# Patient Record
Sex: FEMALE | Race: WHITE | NOT HISPANIC OR LATINO
[De-identification: names, ages, dates, MRNs, and addresses within clinical notes are randomized per-mention and may not be internally consistent; named-entity substitution may affect disease eponyms.]

---

## 2017-03-03 ENCOUNTER — RX RENEWAL (OUTPATIENT)
Age: 72
End: 2017-03-03

## 2017-04-19 ENCOUNTER — NON-APPOINTMENT (OUTPATIENT)
Age: 72
End: 2017-04-19

## 2017-04-19 ENCOUNTER — APPOINTMENT (OUTPATIENT)
Dept: CARDIOLOGY | Facility: CLINIC | Age: 72
End: 2017-04-19

## 2017-04-19 VITALS
OXYGEN SATURATION: 97 % | HEIGHT: 68 IN | DIASTOLIC BLOOD PRESSURE: 67 MMHG | SYSTOLIC BLOOD PRESSURE: 137 MMHG | RESPIRATION RATE: 14 BRPM | WEIGHT: 268 LBS | BODY MASS INDEX: 40.62 KG/M2 | HEART RATE: 71 BPM

## 2017-04-20 LAB
24R-OH-CALCIDIOL SERPL-MCNC: 22.1 PG/ML
ALBUMIN SERPL ELPH-MCNC: 3.6 G/DL
ALP BLD-CCNC: 120 U/L
ALT SERPL-CCNC: 16 U/L
ANION GAP SERPL CALC-SCNC: 17 MMOL/L
AST SERPL-CCNC: 28 U/L
BASOPHILS # BLD AUTO: 0.05 K/UL
BASOPHILS NFR BLD AUTO: 0.8 %
BILIRUB SERPL-MCNC: 0.4 MG/DL
BUN SERPL-MCNC: 27 MG/DL
CALCIUM SERPL-MCNC: 9.4 MG/DL
CHLORIDE SERPL-SCNC: 106 MMOL/L
CHOLEST SERPL-MCNC: 160 MG/DL
CHOLEST/HDLC SERPL: 2.3 RATIO
CO2 SERPL-SCNC: 20 MMOL/L
CREAT SERPL-MCNC: 0.84 MG/DL
EOSINOPHIL # BLD AUTO: 0.28 K/UL
EOSINOPHIL NFR BLD AUTO: 4.4 %
GLUCOSE SERPL-MCNC: 89 MG/DL
HBA1C MFR BLD HPLC: 5.8 %
HCT VFR BLD CALC: 39.4 %
HDLC SERPL-MCNC: 69 MG/DL
HGB BLD-MCNC: 12.6 G/DL
IMM GRANULOCYTES NFR BLD AUTO: 0.2 %
LDLC SERPL CALC-MCNC: 73 MG/DL
LYMPHOCYTES # BLD AUTO: 1.74 K/UL
LYMPHOCYTES NFR BLD AUTO: 27.4 %
MAN DIFF?: NORMAL
MCHC RBC-ENTMCNC: 30.1 PG
MCHC RBC-ENTMCNC: 32 GM/DL
MCV RBC AUTO: 94 FL
MONOCYTES # BLD AUTO: 0.87 K/UL
MONOCYTES NFR BLD AUTO: 13.7 %
NEUTROPHILS # BLD AUTO: 3.39 K/UL
NEUTROPHILS NFR BLD AUTO: 53.5 %
PLATELET # BLD AUTO: 306 K/UL
POTASSIUM SERPL-SCNC: 5 MMOL/L
PROT SERPL-MCNC: 6.8 G/DL
RBC # BLD: 4.19 M/UL
RBC # FLD: 15.2 %
SODIUM SERPL-SCNC: 143 MMOL/L
TRIGL SERPL-MCNC: 89 MG/DL
TSH SERPL-ACNC: 0.62 UIU/ML
VIT B12 SERPL-MCNC: 326 PG/ML
WBC # FLD AUTO: 6.34 K/UL

## 2017-05-12 ENCOUNTER — RX RENEWAL (OUTPATIENT)
Age: 72
End: 2017-05-12

## 2017-06-28 ENCOUNTER — APPOINTMENT (OUTPATIENT)
Dept: OPHTHALMOLOGY | Facility: CLINIC | Age: 72
End: 2017-06-28

## 2017-10-18 ENCOUNTER — NON-APPOINTMENT (OUTPATIENT)
Age: 72
End: 2017-10-18

## 2017-10-18 ENCOUNTER — APPOINTMENT (OUTPATIENT)
Dept: CARDIOLOGY | Facility: CLINIC | Age: 72
End: 2017-10-18
Payer: COMMERCIAL

## 2017-10-18 VITALS
HEART RATE: 78 BPM | DIASTOLIC BLOOD PRESSURE: 71 MMHG | TEMPERATURE: 98.1 F | RESPIRATION RATE: 12 BRPM | HEIGHT: 68 IN | SYSTOLIC BLOOD PRESSURE: 103 MMHG | BODY MASS INDEX: 39.4 KG/M2 | OXYGEN SATURATION: 98 % | WEIGHT: 260 LBS

## 2017-10-18 PROCEDURE — 99215 OFFICE O/P EST HI 40 MIN: CPT

## 2017-11-21 ENCOUNTER — APPOINTMENT (OUTPATIENT)
Dept: INTERNAL MEDICINE | Facility: CLINIC | Age: 72
End: 2017-11-21
Payer: COMMERCIAL

## 2017-11-21 VITALS
HEIGHT: 68 IN | OXYGEN SATURATION: 98 % | RESPIRATION RATE: 12 BRPM | DIASTOLIC BLOOD PRESSURE: 69 MMHG | BODY MASS INDEX: 40.16 KG/M2 | SYSTOLIC BLOOD PRESSURE: 146 MMHG | HEART RATE: 74 BPM | WEIGHT: 265 LBS | TEMPERATURE: 98 F

## 2017-11-21 PROCEDURE — 99213 OFFICE O/P EST LOW 20 MIN: CPT

## 2018-03-05 ENCOUNTER — RX RENEWAL (OUTPATIENT)
Age: 73
End: 2018-03-05

## 2018-04-18 ENCOUNTER — APPOINTMENT (OUTPATIENT)
Dept: CARDIOLOGY | Facility: CLINIC | Age: 73
End: 2018-04-18
Payer: COMMERCIAL

## 2018-04-18 ENCOUNTER — NON-APPOINTMENT (OUTPATIENT)
Age: 73
End: 2018-04-18

## 2018-04-18 VITALS
BODY MASS INDEX: 38.8 KG/M2 | SYSTOLIC BLOOD PRESSURE: 138 MMHG | OXYGEN SATURATION: 99 % | RESPIRATION RATE: 12 BRPM | WEIGHT: 256 LBS | TEMPERATURE: 98.4 F | HEART RATE: 88 BPM | DIASTOLIC BLOOD PRESSURE: 74 MMHG | HEIGHT: 68 IN

## 2018-04-18 DIAGNOSIS — I45.10 UNSPECIFIED RIGHT BUNDLE-BRANCH BLOCK: ICD-10-CM

## 2018-04-18 LAB
ALBUMIN SERPL ELPH-MCNC: 3.4 G/DL
ALP BLD-CCNC: 97 U/L
ALT SERPL-CCNC: 19 U/L
ANION GAP SERPL CALC-SCNC: 13 MMOL/L
AST SERPL-CCNC: 33 U/L
BASOPHILS # BLD AUTO: 0.05 K/UL
BASOPHILS NFR BLD AUTO: 0.7 %
BILIRUB SERPL-MCNC: 0.5 MG/DL
BUN SERPL-MCNC: 32 MG/DL
CALCIUM SERPL-MCNC: 9.2 MG/DL
CHLORIDE SERPL-SCNC: 105 MMOL/L
CHOLEST SERPL-MCNC: 152 MG/DL
CHOLEST/HDLC SERPL: 2.1 RATIO
CO2 SERPL-SCNC: 24 MMOL/L
CREAT SERPL-MCNC: 0.97 MG/DL
EOSINOPHIL # BLD AUTO: 0.13 K/UL
EOSINOPHIL NFR BLD AUTO: 1.8 %
GLUCOSE SERPL-MCNC: 92 MG/DL
HBA1C MFR BLD HPLC: 5.4 %
HCT VFR BLD CALC: 36.4 %
HDLC SERPL-MCNC: 73 MG/DL
HGB BLD-MCNC: 12 G/DL
IMM GRANULOCYTES NFR BLD AUTO: 0.1 %
LDLC SERPL CALC-MCNC: 66 MG/DL
LYMPHOCYTES # BLD AUTO: 1.34 K/UL
LYMPHOCYTES NFR BLD AUTO: 18.3 %
MAN DIFF?: NORMAL
MCHC RBC-ENTMCNC: 31.4 PG
MCHC RBC-ENTMCNC: 33 GM/DL
MCV RBC AUTO: 95.3 FL
MONOCYTES # BLD AUTO: 0.53 K/UL
MONOCYTES NFR BLD AUTO: 7.3 %
NEUTROPHILS # BLD AUTO: 5.25 K/UL
NEUTROPHILS NFR BLD AUTO: 71.8 %
PLATELET # BLD AUTO: 336 K/UL
POTASSIUM SERPL-SCNC: 4.4 MMOL/L
PROT SERPL-MCNC: 6.7 G/DL
RBC # BLD: 3.82 M/UL
RBC # FLD: 15 %
SODIUM SERPL-SCNC: 142 MMOL/L
TRIGL SERPL-MCNC: 65 MG/DL
WBC # FLD AUTO: 7.31 K/UL

## 2018-04-18 PROCEDURE — 99214 OFFICE O/P EST MOD 30 MIN: CPT

## 2018-04-19 LAB — 25(OH)D3 SERPL-MCNC: 43.9 NG/ML

## 2018-05-14 ENCOUNTER — RX RENEWAL (OUTPATIENT)
Age: 73
End: 2018-05-14

## 2018-07-17 ENCOUNTER — MEDICATION RENEWAL (OUTPATIENT)
Age: 73
End: 2018-07-17

## 2018-09-12 ENCOUNTER — APPOINTMENT (OUTPATIENT)
Dept: CARDIOLOGY | Facility: CLINIC | Age: 73
End: 2018-09-12
Payer: COMMERCIAL

## 2018-09-12 ENCOUNTER — NON-APPOINTMENT (OUTPATIENT)
Age: 73
End: 2018-09-12

## 2018-09-12 VITALS
HEART RATE: 76 BPM | OXYGEN SATURATION: 100 % | SYSTOLIC BLOOD PRESSURE: 130 MMHG | DIASTOLIC BLOOD PRESSURE: 67 MMHG | TEMPERATURE: 98.7 F | HEIGHT: 68 IN | RESPIRATION RATE: 14 BRPM | BODY MASS INDEX: 37.74 KG/M2 | WEIGHT: 249 LBS

## 2018-09-12 LAB
BASOPHILS # BLD AUTO: 0.01 K/UL
BASOPHILS NFR BLD AUTO: 0.1 %
EOSINOPHIL # BLD AUTO: 0 K/UL
EOSINOPHIL NFR BLD AUTO: 0 %
HCT VFR BLD CALC: 38.5 %
HGB BLD-MCNC: 12.4 G/DL
IMM GRANULOCYTES NFR BLD AUTO: 0.2 %
LYMPHOCYTES # BLD AUTO: 0.97 K/UL
LYMPHOCYTES NFR BLD AUTO: 10.7 %
MAN DIFF?: NORMAL
MCHC RBC-ENTMCNC: 31.1 PG
MCHC RBC-ENTMCNC: 32.2 GM/DL
MCV RBC AUTO: 96.5 FL
MONOCYTES # BLD AUTO: 0.86 K/UL
MONOCYTES NFR BLD AUTO: 9.5 %
NEUTROPHILS # BLD AUTO: 7.17 K/UL
NEUTROPHILS NFR BLD AUTO: 79.5 %
PLATELET # BLD AUTO: 370 K/UL
RBC # BLD: 3.99 M/UL
RBC # FLD: 14.7 %
WBC # FLD AUTO: 9.03 K/UL

## 2018-09-12 PROCEDURE — 99214 OFFICE O/P EST MOD 30 MIN: CPT | Mod: 25

## 2018-09-13 LAB
25(OH)D3 SERPL-MCNC: 49.9 NG/ML
ALBUMIN SERPL ELPH-MCNC: 3.6 G/DL
ALP BLD-CCNC: 92 U/L
ALT SERPL-CCNC: 29 U/L
ANION GAP SERPL CALC-SCNC: 14 MMOL/L
AST SERPL-CCNC: 38 U/L
BILIRUB SERPL-MCNC: 0.4 MG/DL
BUN SERPL-MCNC: 34 MG/DL
CALCIUM SERPL-MCNC: 9.5 MG/DL
CHLORIDE SERPL-SCNC: 106 MMOL/L
CHOLEST SERPL-MCNC: 149 MG/DL
CHOLEST/HDLC SERPL: 2.4 RATIO
CO2 SERPL-SCNC: 23 MMOL/L
CREAT SERPL-MCNC: 0.93 MG/DL
GLUCOSE SERPL-MCNC: 92 MG/DL
HBA1C MFR BLD HPLC: 5.4 %
HDLC SERPL-MCNC: 63 MG/DL
LDLC SERPL CALC-MCNC: 73 MG/DL
POTASSIUM SERPL-SCNC: 4.7 MMOL/L
PROT SERPL-MCNC: 6.4 G/DL
SODIUM SERPL-SCNC: 143 MMOL/L
TRIGL SERPL-MCNC: 65 MG/DL

## 2018-11-26 ENCOUNTER — RX RENEWAL (OUTPATIENT)
Age: 73
End: 2018-11-26

## 2019-01-25 ENCOUNTER — APPOINTMENT (OUTPATIENT)
Dept: INTERNAL MEDICINE | Facility: CLINIC | Age: 74
End: 2019-01-25
Payer: COMMERCIAL

## 2019-01-25 VITALS
SYSTOLIC BLOOD PRESSURE: 131 MMHG | DIASTOLIC BLOOD PRESSURE: 69 MMHG | TEMPERATURE: 97.6 F | RESPIRATION RATE: 12 BRPM | HEIGHT: 68 IN | HEART RATE: 71 BPM | OXYGEN SATURATION: 97 %

## 2019-01-25 DIAGNOSIS — Z87.09 PERSONAL HISTORY OF OTHER DISEASES OF THE RESPIRATORY SYSTEM: ICD-10-CM

## 2019-01-25 DIAGNOSIS — J06.9 ACUTE UPPER RESPIRATORY INFECTION, UNSPECIFIED: ICD-10-CM

## 2019-01-25 PROCEDURE — 99214 OFFICE O/P EST MOD 30 MIN: CPT

## 2019-01-25 NOTE — REVIEW OF SYSTEMS
[Fever] : fever [Chills] : chills [Fatigue] : fatigue [Earache] : earache [Hearing Loss] : no hearing loss [Nasal Discharge] : nasal discharge [Sore Throat] : sore throat [Shortness Of Breath] : no shortness of breath [Cough] : cough [Negative] : Heme/Lymph

## 2019-01-25 NOTE — PHYSICAL EXAM
[No Acute Distress] : no acute distress [Well Nourished] : well nourished [Well Developed] : well developed [Normal Sclera/Conjunctiva] : normal sclera/conjunctiva [EOMI] : extraocular movements intact [Normal Outer Ear/Nose] : the outer ears and nose were normal in appearance [Normal TMs] : both tympanic membranes were normal [No JVD] : no jugular venous distention [Supple] : supple [No Lymphadenopathy] : no lymphadenopathy [Thyroid Normal, No Nodules] : the thyroid was normal and there were no nodules present [No Respiratory Distress] : no respiratory distress  [Clear to Auscultation] : lungs were clear to auscultation bilaterally [No Accessory Muscle Use] : no accessory muscle use [Normal Rate] : normal rate  [Regular Rhythm] : with a regular rhythm [Normal S1, S2] : normal S1 and S2 [No Murmur] : no murmur heard [No Carotid Bruits] : no carotid bruits [Pedal Pulses Present] : the pedal pulses are present [No Edema] : there was no peripheral edema [Soft] : abdomen soft [Non Tender] : non-tender [Non-distended] : non-distended [No HSM] : no HSM [Normal Bowel Sounds] : normal bowel sounds [Normal Posterior Cervical Nodes] : no posterior cervical lymphadenopathy [Normal Anterior Cervical Nodes] : no anterior cervical lymphadenopathy [No CVA Tenderness] : no CVA  tenderness [No Spinal Tenderness] : no spinal tenderness [No Joint Swelling] : no joint swelling [Grossly Normal Strength/Tone] : grossly normal strength/tone [No Rash] : no rash [Normal Gait] : normal gait [Coordination Grossly Intact] : coordination grossly intact [No Focal Deficits] : no focal deficits [Normal Affect] : the affect was normal [Normal Insight/Judgement] : insight and judgment were intact [de-identified] : + throat erythema / no exudate

## 2019-01-25 NOTE — HISTORY OF PRESENT ILLNESS
[de-identified] : 73 year old female with h/o RA/ Lupus/ Hypothyroidism/ Hypertension/ Hyperlipidemia presents with acute URI x 3 days : + sore throat/ earache/ cough mostly dry , + associated sneezing, fever, chills, malaise . Symptoms getting worse at night \par She takes OTC cold medication without much improvement.

## 2019-02-04 PROBLEM — I45.10 RIGHT BUNDLE BRANCH BLOCK: Status: ACTIVE | Noted: 2018-04-18

## 2019-02-06 ENCOUNTER — APPOINTMENT (OUTPATIENT)
Dept: CARDIOLOGY | Facility: CLINIC | Age: 74
End: 2019-02-06
Payer: COMMERCIAL

## 2019-02-06 ENCOUNTER — APPOINTMENT (OUTPATIENT)
Dept: INTERNAL MEDICINE | Facility: CLINIC | Age: 74
End: 2019-02-06
Payer: COMMERCIAL

## 2019-02-06 ENCOUNTER — NON-APPOINTMENT (OUTPATIENT)
Age: 74
End: 2019-02-06

## 2019-02-06 VITALS
BODY MASS INDEX: 36.37 KG/M2 | SYSTOLIC BLOOD PRESSURE: 116 MMHG | RESPIRATION RATE: 12 BRPM | TEMPERATURE: 97.4 F | HEIGHT: 68 IN | OXYGEN SATURATION: 100 % | HEART RATE: 88 BPM | WEIGHT: 240 LBS | DIASTOLIC BLOOD PRESSURE: 73 MMHG

## 2019-02-06 DIAGNOSIS — G56.01 CARPAL TUNNEL SYNDROME, RIGHT UPPER LIMB: ICD-10-CM

## 2019-02-06 PROCEDURE — 99214 OFFICE O/P EST MOD 30 MIN: CPT

## 2019-02-06 PROCEDURE — 93000 ELECTROCARDIOGRAM COMPLETE: CPT

## 2019-02-06 PROCEDURE — 99215 OFFICE O/P EST HI 40 MIN: CPT | Mod: 25

## 2019-02-06 RX ORDER — MOMETASONE 50 UG/1
50 SPRAY, METERED NASAL DAILY
Qty: 1 | Refills: 0 | Status: DISCONTINUED | COMMUNITY
Start: 2019-01-25 | End: 2019-02-06

## 2019-02-06 RX ORDER — AMOXICILLIN AND CLAVULANATE POTASSIUM 875; 125 MG/1; MG/1
875-125 TABLET, COATED ORAL
Qty: 14 | Refills: 0 | Status: DISCONTINUED | COMMUNITY
Start: 2019-01-25 | End: 2019-02-06

## 2019-02-06 RX ORDER — BENZONATATE 200 MG/1
200 CAPSULE ORAL
Qty: 20 | Refills: 0 | Status: DISCONTINUED | COMMUNITY
Start: 2019-01-25 | End: 2019-02-06

## 2019-02-06 NOTE — DISCUSSION/SUMMARY
[Procedure Low Risk] : the procedure risk is low [Patient Intermediate Risk] : the patient is an intermediate risk [Optimized for Surgery] : the patient is optimized for surgery [FreeTextEntry1] : The patient is a 73-year-old obese female history of rheumatoid arthritis,  lupus, hypothyroidism, hyperlipidemia, hypertension, lower extremity edema ,RBBB, s/p left lobectomy for cancer awaiting hand surgery.\par #1 RA- on MTX and plaquenil, ambulates with walker\par #2 Htn- stable on losartan 50/12.5mg\par #3 Lipids- on atorvastatin\par #4 Hypothyroidism- on levothyroxine\par #5 CV- no angina or HF, ECG with RBB unchanged\par #6 Ortho- There are no cardiac contraindications to proceeding with right hand surgery. \par \par

## 2019-02-06 NOTE — PHYSICAL EXAM
[No Deformities] : no deformities [Normal Conjunctiva] : the conjunctiva exhibited no abnormalities [Eyelids - No Xanthelasma] : the eyelids demonstrated no xanthelasmas [Normal Oral Mucosa] : normal oral mucosa [No Oral Pallor] : no oral pallor [No Oral Cyanosis] : no oral cyanosis [Normal Jugular Venous A Waves Present] : normal jugular venous A waves present [Normal Jugular Venous V Waves Present] : normal jugular venous V waves present [No Jugular Venous Valerio A Waves] : no jugular venous valerio A waves [Respiration, Rhythm And Depth] : normal respiratory rhythm and effort [Exaggerated Use Of Accessory Muscles For Inspiration] : no accessory muscle use [Auscultation Breath Sounds / Voice Sounds] : lungs were clear to auscultation bilaterally [Heart Rate And Rhythm] : heart rate and rhythm were normal [Heart Sounds] : normal S1 and S2 [Abdomen Soft] : soft [Abdomen Tenderness] : non-tender [Abdomen Mass (___ Cm)] : no abdominal mass palpated [Abnormal Walk] : normal gait [Gait - Sufficient For Exercise Testing] : the gait was sufficient for exercise testing [Nail Clubbing] : no clubbing of the fingernails [Cyanosis, Localized] : no localized cyanosis [Petechial Hemorrhages (___cm)] : no petechial hemorrhages [FreeTextEntry1] : nonpitting edema [Skin Color & Pigmentation] : normal skin color and pigmentation [] : no rash [No Venous Stasis] : no venous stasis [Skin Lesions] : no skin lesions [No Skin Ulcers] : no skin ulcer [No Xanthoma] : no  xanthoma was observed [Oriented To Time, Place, And Person] : oriented to person, place, and time [Affect] : the affect was normal [Mood] : the mood was normal [No Anxiety] : not feeling anxious

## 2019-02-06 NOTE — REVIEW OF SYSTEMS
[see HPI] : see HPI [Recent Weight Gain (___ Lbs)] : no recent weight gain [Recent Weight Loss (___ Lbs)] : recent [unfilled] ~Ulb weight loss [Shortness Of Breath] : no shortness of breath [Dyspnea on exertion] : not dyspnea during exertion [Lower Ext Edema] : no extremity edema [Palpitations] : no palpitations

## 2019-02-06 NOTE — HISTORY OF PRESENT ILLNESS
[Preoperative Visit] : for a medical evaluation prior to surgery [Scheduled Procedure ___] : a [unfilled] [Date of Surgery ___] : on [unfilled] [Surgeon Name ___] : surgeon: [unfilled] [FreeTextEntry1] : Marely is awaiting carpal tunnel surgery first on her right hand. She has been having difficulty walking even with a rolling walker She denies any new symptoms of chest pain, palpitations or shortness of breath. She is in good spirits today.

## 2019-02-07 LAB
ALBUMIN SERPL ELPH-MCNC: 3.7 G/DL
ALP BLD-CCNC: 88 U/L
ALT SERPL-CCNC: 18 U/L
ANION GAP SERPL CALC-SCNC: 11 MMOL/L
APPEARANCE: CLEAR
APTT BLD: 27.5 SEC
AST SERPL-CCNC: 24 U/L
BACTERIA: NEGATIVE
BASOPHILS # BLD AUTO: 0.03 K/UL
BASOPHILS NFR BLD AUTO: 0.4 %
BILIRUB SERPL-MCNC: 0.4 MG/DL
BILIRUBIN URINE: NEGATIVE
BLOOD URINE: NEGATIVE
BUN SERPL-MCNC: 32 MG/DL
CALCIUM SERPL-MCNC: 9 MG/DL
CHLORIDE SERPL-SCNC: 104 MMOL/L
CO2 SERPL-SCNC: 26 MMOL/L
COLOR: ABNORMAL
CREAT SERPL-MCNC: 0.89 MG/DL
EOSINOPHIL # BLD AUTO: 0.16 K/UL
EOSINOPHIL NFR BLD AUTO: 1.9 %
GLUCOSE QUALITATIVE U: NEGATIVE MG/DL
GLUCOSE SERPL-MCNC: 87 MG/DL
HCT VFR BLD CALC: 41.3 %
HGB BLD-MCNC: 12.7 G/DL
HYALINE CASTS: 8 /LPF
IMM GRANULOCYTES NFR BLD AUTO: 0.1 %
INR PPP: 0.93 RATIO
KETONES URINE: NEGATIVE
LEUKOCYTE ESTERASE URINE: ABNORMAL
LYMPHOCYTES # BLD AUTO: 1.49 K/UL
LYMPHOCYTES NFR BLD AUTO: 18 %
MAN DIFF?: NORMAL
MCHC RBC-ENTMCNC: 30.8 GM/DL
MCHC RBC-ENTMCNC: 30.8 PG
MCV RBC AUTO: 100.2 FL
MICROSCOPIC-UA: NORMAL
MONOCYTES # BLD AUTO: 1.14 K/UL
MONOCYTES NFR BLD AUTO: 13.8 %
NEUTROPHILS # BLD AUTO: 5.43 K/UL
NEUTROPHILS NFR BLD AUTO: 65.8 %
NITRITE URINE: NEGATIVE
PH URINE: 6
PLATELET # BLD AUTO: 339 K/UL
POTASSIUM SERPL-SCNC: 4.6 MMOL/L
PROT SERPL-MCNC: 6.3 G/DL
PROTEIN URINE: NEGATIVE MG/DL
PT BLD: 10.6 SEC
RBC # BLD: 4.12 M/UL
RBC # FLD: 15.5 %
RED BLOOD CELLS URINE: 4 /HPF
SODIUM SERPL-SCNC: 141 MMOL/L
SPECIFIC GRAVITY URINE: 1.03
SQUAMOUS EPITHELIAL CELLS: 18 /HPF
UROBILINOGEN URINE: NEGATIVE MG/DL
WBC # FLD AUTO: 8.26 K/UL
WHITE BLOOD CELLS URINE: 3 /HPF

## 2019-02-10 PROBLEM — G56.01 CARPAL TUNNEL SYNDROME OF RIGHT WRIST: Status: ACTIVE | Noted: 2019-02-10

## 2019-02-10 NOTE — HISTORY OF PRESENT ILLNESS
[FreeTextEntry1] : Rt carpal tunnel surgery  [FreeTextEntry2] : 2/20/19 [FreeTextEntry3] : Dr. Price [FreeTextEntry4] : 73 year old female with h/o RA/ Lupus/ Hypothyroidism/ Hypertension presents for medical clearance for Rt carpal tunnel surgery.\par Pt denies CP/SOB, dizziness, abdominal p[ain, palpitation. \par

## 2019-02-10 NOTE — PHYSICAL EXAM
[No Acute Distress] : no acute distress [Well Nourished] : well nourished [Well Developed] : well developed [Well-Appearing] : well-appearing [Normal Sclera/Conjunctiva] : normal sclera/conjunctiva [EOMI] : extraocular movements intact [Normal Outer Ear/Nose] : the outer ears and nose were normal in appearance [Normal Oropharynx] : the oropharynx was normal [Normal TMs] : both tympanic membranes were normal [No JVD] : no jugular venous distention [Supple] : supple [No Lymphadenopathy] : no lymphadenopathy [Thyroid Normal, No Nodules] : the thyroid was normal and there were no nodules present [No Respiratory Distress] : no respiratory distress  [Clear to Auscultation] : lungs were clear to auscultation bilaterally [No Accessory Muscle Use] : no accessory muscle use [Normal Rate] : normal rate  [Regular Rhythm] : with a regular rhythm [Normal S1, S2] : normal S1 and S2 [No Murmur] : no murmur heard [No Carotid Bruits] : no carotid bruits [Pedal Pulses Present] : the pedal pulses are present [No Edema] : there was no peripheral edema [Soft] : abdomen soft [Non Tender] : non-tender [Non-distended] : non-distended [No HSM] : no HSM [Normal Bowel Sounds] : normal bowel sounds [Normal Posterior Cervical Nodes] : no posterior cervical lymphadenopathy [Normal Anterior Cervical Nodes] : no anterior cervical lymphadenopathy [No CVA Tenderness] : no CVA  tenderness [No Spinal Tenderness] : no spinal tenderness [No Joint Swelling] : no joint swelling [Grossly Normal Strength/Tone] : grossly normal strength/tone [No Rash] : no rash [Normal Gait] : normal gait [Coordination Grossly Intact] : coordination grossly intact [No Focal Deficits] : no focal deficits [Normal Affect] : the affect was normal [Normal Insight/Judgement] : insight and judgment were intact [de-identified] : ambulates with walker

## 2019-02-10 NOTE — PLAN
[FreeTextEntry1] : 73 year old female with h/o RA/ Lupus/ Hypothyroidism/ Hypertension presents medically stable for planned surgery . Pt was evaluated by cardio prior to surgery > see cardio clearance attached.\par Low risk surgery/ intermediate risk patient.

## 2019-02-11 LAB — BACTERIA UR CULT: NORMAL

## 2019-04-30 ENCOUNTER — APPOINTMENT (OUTPATIENT)
Dept: ORTHOPEDIC SURGERY | Facility: CLINIC | Age: 74
End: 2019-04-30
Payer: COMMERCIAL

## 2019-04-30 VITALS — HEART RATE: 78 BPM | DIASTOLIC BLOOD PRESSURE: 63 MMHG | SYSTOLIC BLOOD PRESSURE: 100 MMHG

## 2019-04-30 VITALS — BODY MASS INDEX: 37.35 KG/M2 | WEIGHT: 238 LBS | HEIGHT: 67 IN

## 2019-04-30 PROCEDURE — 73562 X-RAY EXAM OF KNEE 3: CPT | Mod: 50

## 2019-04-30 PROCEDURE — 99204 OFFICE O/P NEW MOD 45 MIN: CPT

## 2019-04-30 PROCEDURE — 73521 X-RAY EXAM HIPS BI 2 VIEWS: CPT

## 2019-06-17 ENCOUNTER — NON-APPOINTMENT (OUTPATIENT)
Age: 74
End: 2019-06-17

## 2019-06-17 ENCOUNTER — APPOINTMENT (OUTPATIENT)
Dept: CARDIOLOGY | Facility: CLINIC | Age: 74
End: 2019-06-17
Payer: MEDICARE

## 2019-06-17 VITALS
DIASTOLIC BLOOD PRESSURE: 64 MMHG | BODY MASS INDEX: 36.88 KG/M2 | OXYGEN SATURATION: 99 % | HEIGHT: 67 IN | WEIGHT: 235 LBS | SYSTOLIC BLOOD PRESSURE: 101 MMHG | TEMPERATURE: 97.4 F | RESPIRATION RATE: 13 BRPM | HEART RATE: 89 BPM

## 2019-06-17 PROCEDURE — 99215 OFFICE O/P EST HI 40 MIN: CPT | Mod: 25

## 2019-06-17 PROCEDURE — 93000 ELECTROCARDIOGRAM COMPLETE: CPT

## 2019-06-17 RX ORDER — NITROFURANTOIN MACROCRYSTALS 100 MG/1
100 CAPSULE ORAL
Qty: 28 | Refills: 0 | Status: DISCONTINUED | COMMUNITY
Start: 2019-01-09

## 2019-06-17 RX ORDER — ACETAMINOPHEN AND CODEINE 300; 30 MG/1; MG/1
300-30 TABLET ORAL
Qty: 60 | Refills: 0 | Status: DISCONTINUED | COMMUNITY
Start: 2019-04-29

## 2019-06-17 RX ORDER — CEFADROXIL 500 MG/1
500 CAPSULE ORAL
Qty: 4 | Refills: 0 | Status: DISCONTINUED | COMMUNITY
Start: 2019-02-19

## 2019-06-17 RX ORDER — GABAPENTIN 300 MG/1
300 CAPSULE ORAL
Qty: 60 | Refills: 0 | Status: DISCONTINUED | COMMUNITY
Start: 2019-04-03

## 2019-06-17 RX ORDER — ETODOLAC 400 MG/1
400 TABLET, FILM COATED ORAL
Qty: 30 | Refills: 0 | Status: DISCONTINUED | COMMUNITY
Start: 2019-05-06

## 2019-06-21 ENCOUNTER — APPOINTMENT (OUTPATIENT)
Dept: CARDIOLOGY | Facility: CLINIC | Age: 74
End: 2019-06-21
Payer: MEDICARE

## 2019-06-21 PROCEDURE — 93306 TTE W/DOPPLER COMPLETE: CPT

## 2019-06-27 NOTE — REVIEW OF SYSTEMS
[see HPI] : see HPI [Recent Weight Gain (___ Lbs)] : no recent weight gain [Recent Weight Loss (___ Lbs)] : no recent weight loss [Shortness Of Breath] : no shortness of breath [Dyspnea on exertion] : not dyspnea during exertion [Lower Ext Edema] : no extremity edema [Palpitations] : no palpitations

## 2019-06-27 NOTE — HISTORY OF PRESENT ILLNESS
[Preoperative Visit] : for a medical evaluation prior to surgery [Scheduled Procedure ___] : a [unfilled] [Date of Surgery ___] : on [unfilled] [Surgeon Name ___] : surgeon: [unfilled] [FreeTextEntry1] : Marely  has been having difficulty walking even with a rolling walker and is now proceeding with surgery. She denies any new symptoms of chest pain, palpitations or shortness of breath. She is in good spirits today.

## 2019-06-27 NOTE — DISCUSSION/SUMMARY
[Procedure Intermediate Risk] : the procedure risk is intermediate [Patient Intermediate Risk] : the patient is an intermediate risk [Additional Diagnostics Recommended] : additional diagnostics recommended [FreeTextEntry1] : The patient is a 73-year-old obese female history of rheumatoid arthritis,  lupus, hypothyroidism, hyperlipidemia, hypertension, lower extremity edema ,RBBB, s/p left lobectomy for cancer awaiting hip and knee surgery.\par #1 RA- on MTX and plaquenil, ambulates with walker\par #2 Htn- stable on losartan 50/12.5mg\par #3 Lipids- on atorvastatin\par #4 Hypothyroidism- on levothyroxine\par #5 CV- no angina or HF, ECG with RBBB unchanged, recommend echo for MR prior to surgery\par #6 Ortho- Hip and knee surgery clearance pending ECHO results\par \par 6/27/19 Addendum: ECHO normal LV function. There are no cardiac contraindications to hip and knee surgery as planned. \par \par

## 2019-06-27 NOTE — PHYSICAL EXAM
[No Deformities] : no deformities [Eyelids - No Xanthelasma] : the eyelids demonstrated no xanthelasmas [Normal Conjunctiva] : the conjunctiva exhibited no abnormalities [No Oral Cyanosis] : no oral cyanosis [Normal Oral Mucosa] : normal oral mucosa [No Oral Pallor] : no oral pallor [No Jugular Venous Valerio A Waves] : no jugular venous valerio A waves [Normal Jugular Venous A Waves Present] : normal jugular venous A waves present [Normal Jugular Venous V Waves Present] : normal jugular venous V waves present [Exaggerated Use Of Accessory Muscles For Inspiration] : no accessory muscle use [Respiration, Rhythm And Depth] : normal respiratory rhythm and effort [Heart Rate And Rhythm] : heart rate and rhythm were normal [Auscultation Breath Sounds / Voice Sounds] : lungs were clear to auscultation bilaterally [Heart Sounds] : normal S1 and S2 [Abdomen Tenderness] : non-tender [Abdomen Soft] : soft [Abdomen Mass (___ Cm)] : no abdominal mass palpated [Gait - Sufficient For Exercise Testing] : the gait was sufficient for exercise testing [Abnormal Walk] : normal gait [Nail Clubbing] : no clubbing of the fingernails [Cyanosis, Localized] : no localized cyanosis [Petechial Hemorrhages (___cm)] : no petechial hemorrhages [Skin Color & Pigmentation] : normal skin color and pigmentation [No Venous Stasis] : no venous stasis [] : no rash [No Xanthoma] : no  xanthoma was observed [No Skin Ulcers] : no skin ulcer [Skin Lesions] : no skin lesions [No Anxiety] : not feeling anxious [Affect] : the affect was normal [Mood] : the mood was normal [Oriented To Time, Place, And Person] : oriented to person, place, and time [Systolic grade ___/6] : A grade [unfilled]/6 systolic murmur was heard. [FreeTextEntry1] : nonpitting edema

## 2019-06-28 ENCOUNTER — APPOINTMENT (OUTPATIENT)
Dept: INTERNAL MEDICINE | Facility: CLINIC | Age: 74
End: 2019-06-28
Payer: MEDICARE

## 2019-06-28 VITALS
DIASTOLIC BLOOD PRESSURE: 72 MMHG | RESPIRATION RATE: 12 BRPM | WEIGHT: 230 LBS | BODY MASS INDEX: 36.1 KG/M2 | TEMPERATURE: 98.1 F | SYSTOLIC BLOOD PRESSURE: 127 MMHG | HEART RATE: 78 BPM | OXYGEN SATURATION: 100 % | HEIGHT: 67 IN

## 2019-06-28 DIAGNOSIS — M16.11 UNILATERAL PRIMARY OSTEOARTHRITIS, RIGHT HIP: ICD-10-CM

## 2019-06-28 PROCEDURE — 99214 OFFICE O/P EST MOD 30 MIN: CPT

## 2019-07-01 LAB
ALBUMIN SERPL ELPH-MCNC: 3.6 G/DL
ALP BLD-CCNC: 104 U/L
ALT SERPL-CCNC: 14 U/L
ANION GAP SERPL CALC-SCNC: 13 MMOL/L
APPEARANCE: ABNORMAL
APTT BLD: 27.4 SEC
AST SERPL-CCNC: 17 U/L
BACTERIA UR CULT: ABNORMAL
BACTERIA: ABNORMAL
BASOPHILS # BLD AUTO: 0.07 K/UL
BASOPHILS NFR BLD AUTO: 0.8 %
BILIRUB SERPL-MCNC: 0.8 MG/DL
BILIRUBIN URINE: ABNORMAL
BLOOD URINE: NEGATIVE
BUN SERPL-MCNC: 27 MG/DL
CALCIUM SERPL-MCNC: 9.2 MG/DL
CHLORIDE SERPL-SCNC: 104 MMOL/L
CO2 SERPL-SCNC: 24 MMOL/L
COLOR: YELLOW
CREAT SERPL-MCNC: 0.75 MG/DL
EOSINOPHIL # BLD AUTO: 0.12 K/UL
EOSINOPHIL NFR BLD AUTO: 1.3 %
GLUCOSE QUALITATIVE U: NEGATIVE
GLUCOSE SERPL-MCNC: 82 MG/DL
HCT VFR BLD CALC: 39.3 %
HGB BLD-MCNC: 12.5 G/DL
HYALINE CASTS: 0 /LPF
IMM GRANULOCYTES NFR BLD AUTO: 0.4 %
INR PPP: 1.01 RATIO
KETONES URINE: NEGATIVE
LEUKOCYTE ESTERASE URINE: ABNORMAL
LYMPHOCYTES # BLD AUTO: 1.58 K/UL
LYMPHOCYTES NFR BLD AUTO: 17.6 %
MAN DIFF?: NORMAL
MCHC RBC-ENTMCNC: 30.4 PG
MCHC RBC-ENTMCNC: 31.8 GM/DL
MCV RBC AUTO: 95.6 FL
MICROSCOPIC-UA: NORMAL
MONOCYTES # BLD AUTO: 0.87 K/UL
MONOCYTES NFR BLD AUTO: 9.7 %
NEUTROPHILS # BLD AUTO: 6.32 K/UL
NEUTROPHILS NFR BLD AUTO: 70.2 %
NITRITE URINE: NEGATIVE
PH URINE: 6.5
PLATELET # BLD AUTO: 260 K/UL
POTASSIUM SERPL-SCNC: 3.9 MMOL/L
PROT SERPL-MCNC: 5.9 G/DL
PROTEIN URINE: NEGATIVE
PT BLD: 11.4 SEC
RBC # BLD: 4.11 M/UL
RBC # FLD: 14.4 %
RED BLOOD CELLS URINE: 12 /HPF
SODIUM SERPL-SCNC: 141 MMOL/L
SPECIFIC GRAVITY URINE: 1.02
SQUAMOUS EPITHELIAL CELLS: 6 /HPF
TSH SERPL-ACNC: 0.55 UIU/ML
UROBILINOGEN URINE: NORMAL
WBC # FLD AUTO: 9 K/UL
WHITE BLOOD CELLS URINE: 18 /HPF

## 2019-07-02 ENCOUNTER — OUTPATIENT (OUTPATIENT)
Dept: OUTPATIENT SERVICES | Facility: HOSPITAL | Age: 74
LOS: 1 days | End: 2019-07-02
Payer: MEDICARE

## 2019-07-02 VITALS
WEIGHT: 231.49 LBS | SYSTOLIC BLOOD PRESSURE: 118 MMHG | RESPIRATION RATE: 15 BRPM | OXYGEN SATURATION: 96 % | TEMPERATURE: 98 F | DIASTOLIC BLOOD PRESSURE: 80 MMHG | HEART RATE: 78 BPM | HEIGHT: 62 IN

## 2019-07-02 DIAGNOSIS — Z01.818 ENCOUNTER FOR OTHER PREPROCEDURAL EXAMINATION: ICD-10-CM

## 2019-07-02 DIAGNOSIS — Z98.890 OTHER SPECIFIED POSTPROCEDURAL STATES: Chronic | ICD-10-CM

## 2019-07-02 DIAGNOSIS — Z98.49 CATARACT EXTRACTION STATUS, UNSPECIFIED EYE: Chronic | ICD-10-CM

## 2019-07-02 DIAGNOSIS — M25.562 PAIN IN LEFT KNEE: ICD-10-CM

## 2019-07-02 DIAGNOSIS — C34.90 MALIGNANT NEOPLASM OF UNSPECIFIED PART OF UNSPECIFIED BRONCHUS OR LUNG: Chronic | ICD-10-CM

## 2019-07-02 DIAGNOSIS — Z90.89 ACQUIRED ABSENCE OF OTHER ORGANS: Chronic | ICD-10-CM

## 2019-07-02 DIAGNOSIS — M16.11 UNILATERAL PRIMARY OSTEOARTHRITIS, RIGHT HIP: ICD-10-CM

## 2019-07-02 DIAGNOSIS — M25.551 PAIN IN RIGHT HIP: ICD-10-CM

## 2019-07-02 LAB
ALBUMIN SERPL ELPH-MCNC: 3.1 G/DL — LOW (ref 3.3–5)
ALP SERPL-CCNC: 113 U/L — SIGNIFICANT CHANGE UP (ref 30–120)
ALT FLD-CCNC: 23 U/L DA — SIGNIFICANT CHANGE UP (ref 10–60)
ANION GAP SERPL CALC-SCNC: 6 MMOL/L — SIGNIFICANT CHANGE UP (ref 5–17)
APTT BLD: 29 SEC — SIGNIFICANT CHANGE UP (ref 28.5–37)
AST SERPL-CCNC: 20 U/L — SIGNIFICANT CHANGE UP (ref 10–40)
BILIRUB SERPL-MCNC: 0.5 MG/DL — SIGNIFICANT CHANGE UP (ref 0.2–1.2)
BUN SERPL-MCNC: 31 MG/DL — HIGH (ref 7–23)
CALCIUM SERPL-MCNC: 9 MG/DL — SIGNIFICANT CHANGE UP (ref 8.4–10.5)
CHLORIDE SERPL-SCNC: 106 MMOL/L — SIGNIFICANT CHANGE UP (ref 96–108)
CO2 SERPL-SCNC: 31 MMOL/L — SIGNIFICANT CHANGE UP (ref 22–31)
CREAT SERPL-MCNC: 0.83 MG/DL — SIGNIFICANT CHANGE UP (ref 0.5–1.3)
GLUCOSE SERPL-MCNC: 111 MG/DL — HIGH (ref 70–99)
HCT VFR BLD CALC: 41.9 % — SIGNIFICANT CHANGE UP (ref 34.5–45)
HGB BLD-MCNC: 13.6 G/DL — SIGNIFICANT CHANGE UP (ref 11.5–15.5)
INR BLD: 1.02 RATIO — SIGNIFICANT CHANGE UP (ref 0.88–1.16)
MCHC RBC-ENTMCNC: 31.6 PG — SIGNIFICANT CHANGE UP (ref 27–34)
MCHC RBC-ENTMCNC: 32.5 GM/DL — SIGNIFICANT CHANGE UP (ref 32–36)
MCV RBC AUTO: 97.2 FL — SIGNIFICANT CHANGE UP (ref 80–100)
MRSA PCR RESULT.: SIGNIFICANT CHANGE UP
NRBC # BLD: 0 /100 WBCS — SIGNIFICANT CHANGE UP (ref 0–0)
PLATELET # BLD AUTO: 264 K/UL — SIGNIFICANT CHANGE UP (ref 150–400)
POTASSIUM SERPL-MCNC: 3.5 MMOL/L — SIGNIFICANT CHANGE UP (ref 3.5–5.3)
POTASSIUM SERPL-SCNC: 3.5 MMOL/L — SIGNIFICANT CHANGE UP (ref 3.5–5.3)
PROT SERPL-MCNC: 7 G/DL — SIGNIFICANT CHANGE UP (ref 6–8.3)
PROTHROM AB SERPL-ACNC: 11.1 SEC — SIGNIFICANT CHANGE UP (ref 10–12.9)
RBC # BLD: 4.31 M/UL — SIGNIFICANT CHANGE UP (ref 3.8–5.2)
RBC # FLD: 14.5 % — SIGNIFICANT CHANGE UP (ref 10.3–14.5)
S AUREUS DNA NOSE QL NAA+PROBE: DETECTED
SODIUM SERPL-SCNC: 143 MMOL/L — SIGNIFICANT CHANGE UP (ref 135–145)
WBC # BLD: 10.05 K/UL — SIGNIFICANT CHANGE UP (ref 3.8–10.5)
WBC # FLD AUTO: 10.05 K/UL — SIGNIFICANT CHANGE UP (ref 3.8–10.5)

## 2019-07-02 PROCEDURE — 86901 BLOOD TYPING SEROLOGIC RH(D): CPT

## 2019-07-02 PROCEDURE — 87640 STAPH A DNA AMP PROBE: CPT

## 2019-07-02 PROCEDURE — 36415 COLL VENOUS BLD VENIPUNCTURE: CPT

## 2019-07-02 PROCEDURE — 85730 THROMBOPLASTIN TIME PARTIAL: CPT

## 2019-07-02 PROCEDURE — 85027 COMPLETE CBC AUTOMATED: CPT

## 2019-07-02 PROCEDURE — 87641 MR-STAPH DNA AMP PROBE: CPT

## 2019-07-02 PROCEDURE — 86900 BLOOD TYPING SEROLOGIC ABO: CPT

## 2019-07-02 PROCEDURE — 80053 COMPREHEN METABOLIC PANEL: CPT

## 2019-07-02 PROCEDURE — G0463: CPT

## 2019-07-02 PROCEDURE — 86850 RBC ANTIBODY SCREEN: CPT

## 2019-07-02 PROCEDURE — 85610 PROTHROMBIN TIME: CPT

## 2019-07-02 NOTE — H&P PST ADULT - NSICDXPASTMEDICALHX_GEN_ALL_CORE_FT
PAST MEDICAL HISTORY:  Benign heart murmur     H/O degenerative disc disease     Hyperlipidemia     Hypertension     Hypothyroid     Osteoarthritis     Rheumatoid arthritis     SLE (systemic lupus erythematosus)

## 2019-07-02 NOTE — H&P PST ADULT - VENOUS THROMBOEMBOLISM CURRENT STATUS
(2) malignancy (present or previous)/(1) other risk factor (includes escalating BMI, pack-years of smoking, diabetes requiring insulin, chemotherapy, female gender and length of surgery)/(1) swollen legs (current)

## 2019-07-02 NOTE — H&P PST ADULT - NSICDXPROBLEM_GEN_ALL_CORE_FT
PROBLEM DIAGNOSES  Problem: Left knee pain  Assessment and Plan: left total knee replacement    Problem: Right hip pain  Assessment and Plan: right total hip replacement

## 2019-07-02 NOTE — H&P PST ADULT - NSICDXPASTSURGICALHX_GEN_ALL_CORE_FT
PAST SURGICAL HISTORY:  Lung cancer small tumor removed 2015    S/P carpal tunnel release left    S/P cataract surgery left    S/P eye surgery child    S/P knee surgery bilateral    S/P thyroid surgery 1 lobe removed    S/P tonsillectomy

## 2019-07-02 NOTE — H&P PST ADULT - ASSESSMENT
this is a 74 y/o female who is scheduled for right total hip replacement on 7/17 and left knee replacement 7/22

## 2019-07-02 NOTE — H&P PST ADULT - HISTORY OF PRESENT ILLNESS
this is a 74 y/o female who has right hip and left knee pain for years; it got much worse recently; tests show bone on bone of right hip; arthritis left knee; to have right total hip replacement and left total knee replacement-takes lodine, gabapentin and ketoprofen for pain

## 2019-07-03 RX ORDER — MUPIROCIN 20 MG/G
1 OINTMENT TOPICAL
Qty: 1 | Refills: 0
Start: 2019-07-03 | End: 2019-07-07

## 2019-07-03 NOTE — PROGRESS NOTE ADULT - SUBJECTIVE AND OBJECTIVE BOX
Pts nose culture is + for MSSA. Rx sent to pharmacy. Called and spoke with patient. Instructions reviewed and questions addressed. Pt. is reminded to document use of ointment on yellow work sheet and to bring that with her on the day of surgery. tg

## 2019-07-07 PROBLEM — M16.11 PRIMARY LOCALIZED OSTEOARTHRITIS OF RIGHT HIP: Status: ACTIVE | Noted: 2019-04-30

## 2019-07-07 NOTE — PHYSICAL EXAM
[No Acute Distress] : no acute distress [Well Nourished] : well nourished [Well Developed] : well developed [Well-Appearing] : well-appearing [Normal Sclera/Conjunctiva] : normal sclera/conjunctiva [EOMI] : extraocular movements intact [Normal Outer Ear/Nose] : the outer ears and nose were normal in appearance [Normal Oropharynx] : the oropharynx was normal [Normal TMs] : both tympanic membranes were normal [No JVD] : no jugular venous distention [Supple] : supple [No Lymphadenopathy] : no lymphadenopathy [Thyroid Normal, No Nodules] : the thyroid was normal and there were no nodules present [No Respiratory Distress] : no respiratory distress  [Clear to Auscultation] : lungs were clear to auscultation bilaterally [No Accessory Muscle Use] : no accessory muscle use [Normal Rate] : normal rate  [Regular Rhythm] : with a regular rhythm [Normal S1, S2] : normal S1 and S2 [No Carotid Bruits] : no carotid bruits [Pedal Pulses Present] : the pedal pulses are present [No Edema] : there was no peripheral edema [Soft] : abdomen soft [Non Tender] : non-tender [Non-distended] : non-distended [No Masses] : no abdominal mass palpated [No HSM] : no HSM [Normal Bowel Sounds] : normal bowel sounds [Normal Posterior Cervical Nodes] : no posterior cervical lymphadenopathy [Normal Anterior Cervical Nodes] : no anterior cervical lymphadenopathy [No CVA Tenderness] : no CVA  tenderness [No Spinal Tenderness] : no spinal tenderness [Grossly Normal Strength/Tone] : grossly normal strength/tone [No Rash] : no rash [Normal Gait] : normal gait [Coordination Grossly Intact] : coordination grossly intact [No Focal Deficits] : no focal deficits [Deep Tendon Reflexes (DTR)] : deep tendon reflexes were 2+ and symmetric [Normal Affect] : the affect was normal [Normal Insight/Judgement] : insight and judgment were intact [de-identified] : + BECCA 2/6 [de-identified] : + knee/hip  pain

## 2019-07-07 NOTE — HISTORY OF PRESENT ILLNESS
[FreeTextEntry1] : Rt hip arthroplasty  [FreeTextEntry2] : 7/17/19 [FreeTextEntry3] : DR. Galindo/ Dr. Leslie [FreeTextEntry4] : 73 year old female with h/o RA/Lupus / Htn/Hld/Hypothyroidism presents for medical clearance prior to Rt hip surgery.\par She denies CP/SOB/dizziness, palpitation, abdominal pain. She ambulates with walker\par

## 2019-07-07 NOTE — PLAN
[FreeTextEntry1] : 73 year old female with h/o RA/Htn/Hld/ Hypothyroidism medically stable for planned surgery. Pt was evaluated by cardio prior to surgery ( see cardio clearance attached ) \par Intermediate risk surgery and patient

## 2019-07-08 ENCOUNTER — RX RENEWAL (OUTPATIENT)
Age: 74
End: 2019-07-08

## 2019-07-15 RX ORDER — ONDANSETRON 8 MG/1
4 TABLET, FILM COATED ORAL EVERY 6 HOURS
Refills: 0 | Status: DISCONTINUED | OUTPATIENT
Start: 2019-07-17 | End: 2019-07-22

## 2019-07-15 RX ORDER — POLYETHYLENE GLYCOL 3350 17 G/17G
17 POWDER, FOR SOLUTION ORAL DAILY
Refills: 0 | Status: DISCONTINUED | OUTPATIENT
Start: 2019-07-17 | End: 2019-07-22

## 2019-07-15 RX ORDER — DOCUSATE SODIUM 100 MG
100 CAPSULE ORAL THREE TIMES A DAY
Refills: 0 | Status: DISCONTINUED | OUTPATIENT
Start: 2019-07-17 | End: 2019-07-22

## 2019-07-15 RX ORDER — MAGNESIUM HYDROXIDE 400 MG/1
30 TABLET, CHEWABLE ORAL DAILY
Refills: 0 | Status: DISCONTINUED | OUTPATIENT
Start: 2019-07-17 | End: 2019-07-22

## 2019-07-15 RX ORDER — SENNA PLUS 8.6 MG/1
2 TABLET ORAL AT BEDTIME
Refills: 0 | Status: DISCONTINUED | OUTPATIENT
Start: 2019-07-17 | End: 2019-07-22

## 2019-07-16 NOTE — PATIENT PROFILE ADULT - IS PATIENT POST-MENOPAUSAL?
yes Has The Growth Been Previously Biopsied?: has been previously biopsied Body Location Override (Optional): Right central forehead

## 2019-07-17 ENCOUNTER — APPOINTMENT (OUTPATIENT)
Dept: ORTHOPEDIC SURGERY | Facility: HOSPITAL | Age: 74
End: 2019-07-17

## 2019-07-17 ENCOUNTER — RESULT REVIEW (OUTPATIENT)
Age: 74
End: 2019-07-17

## 2019-07-17 ENCOUNTER — INPATIENT (INPATIENT)
Facility: HOSPITAL | Age: 74
LOS: 4 days | Discharge: NURSING, REHAB OR LONG-TERM CARE | DRG: 470 | End: 2019-07-22
Attending: ORTHOPAEDIC SURGERY | Admitting: ORTHOPAEDIC SURGERY
Payer: MEDICARE

## 2019-07-17 VITALS
TEMPERATURE: 98 F | DIASTOLIC BLOOD PRESSURE: 52 MMHG | HEIGHT: 66 IN | SYSTOLIC BLOOD PRESSURE: 126 MMHG | HEART RATE: 96 BPM | WEIGHT: 232.37 LBS | RESPIRATION RATE: 20 BRPM

## 2019-07-17 DIAGNOSIS — Z98.890 OTHER SPECIFIED POSTPROCEDURAL STATES: Chronic | ICD-10-CM

## 2019-07-17 DIAGNOSIS — Z90.89 ACQUIRED ABSENCE OF OTHER ORGANS: Chronic | ICD-10-CM

## 2019-07-17 DIAGNOSIS — Z98.49 CATARACT EXTRACTION STATUS, UNSPECIFIED EYE: Chronic | ICD-10-CM

## 2019-07-17 DIAGNOSIS — E78.5 HYPERLIPIDEMIA, UNSPECIFIED: ICD-10-CM

## 2019-07-17 DIAGNOSIS — C34.90 MALIGNANT NEOPLASM OF UNSPECIFIED PART OF UNSPECIFIED BRONCHUS OR LUNG: Chronic | ICD-10-CM

## 2019-07-17 DIAGNOSIS — M32.9 SYSTEMIC LUPUS ERYTHEMATOSUS, UNSPECIFIED: ICD-10-CM

## 2019-07-17 DIAGNOSIS — M16.11 UNILATERAL PRIMARY OSTEOARTHRITIS, RIGHT HIP: ICD-10-CM

## 2019-07-17 DIAGNOSIS — I10 ESSENTIAL (PRIMARY) HYPERTENSION: ICD-10-CM

## 2019-07-17 DIAGNOSIS — E03.9 HYPOTHYROIDISM, UNSPECIFIED: ICD-10-CM

## 2019-07-17 DIAGNOSIS — M19.90 UNSPECIFIED OSTEOARTHRITIS, UNSPECIFIED SITE: ICD-10-CM

## 2019-07-17 PROBLEM — R01.0 BENIGN AND INNOCENT CARDIAC MURMURS: Chronic | Status: ACTIVE | Noted: 2019-07-02

## 2019-07-17 PROBLEM — M06.9 RHEUMATOID ARTHRITIS, UNSPECIFIED: Chronic | Status: ACTIVE | Noted: 2019-07-02

## 2019-07-17 PROBLEM — Z87.39 PERSONAL HISTORY OF OTHER DISEASES OF THE MUSCULOSKELETAL SYSTEM AND CONNECTIVE TISSUE: Chronic | Status: ACTIVE | Noted: 2019-07-02

## 2019-07-17 LAB
ANION GAP SERPL CALC-SCNC: 8 MMOL/L — SIGNIFICANT CHANGE UP (ref 5–17)
BUN SERPL-MCNC: 24 MG/DL — HIGH (ref 7–23)
CALCIUM SERPL-MCNC: 9.1 MG/DL — SIGNIFICANT CHANGE UP (ref 8.4–10.5)
CHLORIDE SERPL-SCNC: 102 MMOL/L — SIGNIFICANT CHANGE UP (ref 96–108)
CO2 SERPL-SCNC: 27 MMOL/L — SIGNIFICANT CHANGE UP (ref 22–31)
CREAT SERPL-MCNC: 0.84 MG/DL — SIGNIFICANT CHANGE UP (ref 0.5–1.3)
GLUCOSE SERPL-MCNC: 208 MG/DL — HIGH (ref 70–99)
HCT VFR BLD CALC: 37.2 % — SIGNIFICANT CHANGE UP (ref 34.5–45)
HGB BLD-MCNC: 12 G/DL — SIGNIFICANT CHANGE UP (ref 11.5–15.5)
POTASSIUM SERPL-MCNC: 4 MMOL/L — SIGNIFICANT CHANGE UP (ref 3.5–5.3)
POTASSIUM SERPL-SCNC: 4 MMOL/L — SIGNIFICANT CHANGE UP (ref 3.5–5.3)
SODIUM SERPL-SCNC: 137 MMOL/L — SIGNIFICANT CHANGE UP (ref 135–145)

## 2019-07-17 PROCEDURE — 73502 X-RAY EXAM HIP UNI 2-3 VIEWS: CPT | Mod: 26,RT

## 2019-07-17 PROCEDURE — 27130 TOTAL HIP ARTHROPLASTY: CPT | Mod: RT

## 2019-07-17 PROCEDURE — 88311 DECALCIFY TISSUE: CPT | Mod: 26

## 2019-07-17 PROCEDURE — 99223 1ST HOSP IP/OBS HIGH 75: CPT

## 2019-07-17 PROCEDURE — 27130 TOTAL HIP ARTHROPLASTY: CPT | Mod: 82,RT

## 2019-07-17 PROCEDURE — 88305 TISSUE EXAM BY PATHOLOGIST: CPT | Mod: 26

## 2019-07-17 RX ORDER — ATORVASTATIN CALCIUM 80 MG/1
20 TABLET, FILM COATED ORAL AT BEDTIME
Refills: 0 | Status: DISCONTINUED | OUTPATIENT
Start: 2019-07-17 | End: 2019-07-22

## 2019-07-17 RX ORDER — TRANEXAMIC ACID 100 MG/ML
1000 INJECTION, SOLUTION INTRAVENOUS ONCE
Refills: 0 | Status: COMPLETED | OUTPATIENT
Start: 2019-07-17 | End: 2019-07-17

## 2019-07-17 RX ORDER — ONDANSETRON 8 MG/1
4 TABLET, FILM COATED ORAL ONCE
Refills: 0 | Status: DISCONTINUED | OUTPATIENT
Start: 2019-07-17 | End: 2019-07-17

## 2019-07-17 RX ORDER — APREPITANT 80 MG/1
40 CAPSULE ORAL ONCE
Refills: 0 | Status: COMPLETED | OUTPATIENT
Start: 2019-07-17 | End: 2019-07-17

## 2019-07-17 RX ORDER — HYDROMORPHONE HYDROCHLORIDE 2 MG/ML
0.5 INJECTION INTRAMUSCULAR; INTRAVENOUS; SUBCUTANEOUS
Refills: 0 | Status: DISCONTINUED | OUTPATIENT
Start: 2019-07-17 | End: 2019-07-17

## 2019-07-17 RX ORDER — ACETAMINOPHEN 500 MG
1000 TABLET ORAL ONCE
Refills: 0 | Status: COMPLETED | OUTPATIENT
Start: 2019-07-17 | End: 2019-07-17

## 2019-07-17 RX ORDER — DIPHENHYDRAMINE HCL 50 MG
25 CAPSULE ORAL ONCE
Refills: 0 | Status: COMPLETED | OUTPATIENT
Start: 2019-07-17 | End: 2019-07-17

## 2019-07-17 RX ORDER — ACETAMINOPHEN 500 MG
1000 TABLET ORAL EVERY 8 HOURS
Refills: 0 | Status: DISCONTINUED | OUTPATIENT
Start: 2019-07-18 | End: 2019-07-22

## 2019-07-17 RX ORDER — LOSARTAN POTASSIUM 100 MG/1
50 TABLET, FILM COATED ORAL DAILY
Refills: 0 | Status: DISCONTINUED | OUTPATIENT
Start: 2019-07-19 | End: 2019-07-22

## 2019-07-17 RX ORDER — CHLORHEXIDINE GLUCONATE 213 G/1000ML
1 SOLUTION TOPICAL ONCE
Refills: 0 | Status: COMPLETED | OUTPATIENT
Start: 2019-07-17 | End: 2019-07-17

## 2019-07-17 RX ORDER — HYDROMORPHONE HYDROCHLORIDE 2 MG/ML
0.5 INJECTION INTRAMUSCULAR; INTRAVENOUS; SUBCUTANEOUS
Refills: 0 | Status: DISCONTINUED | OUTPATIENT
Start: 2019-07-17 | End: 2019-07-22

## 2019-07-17 RX ORDER — CEFAZOLIN SODIUM 1 G
2000 VIAL (EA) INJECTION EVERY 8 HOURS
Refills: 0 | Status: COMPLETED | OUTPATIENT
Start: 2019-07-17 | End: 2019-07-18

## 2019-07-17 RX ORDER — CELECOXIB 200 MG/1
200 CAPSULE ORAL EVERY 12 HOURS
Refills: 0 | Status: COMPLETED | OUTPATIENT
Start: 2019-07-18 | End: 2019-07-21

## 2019-07-17 RX ORDER — LEVOTHYROXINE SODIUM 125 MCG
200 TABLET ORAL DAILY
Refills: 0 | Status: DISCONTINUED | OUTPATIENT
Start: 2019-07-17 | End: 2019-07-22

## 2019-07-17 RX ORDER — CEFAZOLIN SODIUM 1 G
2000 VIAL (EA) INJECTION ONCE
Refills: 0 | Status: COMPLETED | OUTPATIENT
Start: 2019-07-17 | End: 2019-07-17

## 2019-07-17 RX ORDER — GABAPENTIN 400 MG/1
300 CAPSULE ORAL
Refills: 0 | Status: DISCONTINUED | OUTPATIENT
Start: 2019-07-17 | End: 2019-07-22

## 2019-07-17 RX ORDER — FOLIC ACID 0.8 MG
1 TABLET ORAL DAILY
Refills: 0 | Status: DISCONTINUED | OUTPATIENT
Start: 2019-07-17 | End: 2019-07-22

## 2019-07-17 RX ORDER — ENOXAPARIN SODIUM 100 MG/ML
40 INJECTION SUBCUTANEOUS DAILY
Refills: 0 | Status: DISCONTINUED | OUTPATIENT
Start: 2019-07-18 | End: 2019-07-20

## 2019-07-17 RX ORDER — OXYCODONE HYDROCHLORIDE 5 MG/1
10 TABLET ORAL
Refills: 0 | Status: DISCONTINUED | OUTPATIENT
Start: 2019-07-17 | End: 2019-07-22

## 2019-07-17 RX ORDER — SODIUM CHLORIDE 9 MG/ML
1000 INJECTION, SOLUTION INTRAVENOUS
Refills: 0 | Status: DISCONTINUED | OUTPATIENT
Start: 2019-07-17 | End: 2019-07-17

## 2019-07-17 RX ORDER — OXYCODONE HYDROCHLORIDE 5 MG/1
5 TABLET ORAL
Refills: 0 | Status: DISCONTINUED | OUTPATIENT
Start: 2019-07-17 | End: 2019-07-22

## 2019-07-17 RX ORDER — ACETAMINOPHEN 500 MG
1000 TABLET ORAL EVERY 6 HOURS
Refills: 0 | Status: COMPLETED | OUTPATIENT
Start: 2019-07-17 | End: 2019-07-18

## 2019-07-17 RX ORDER — SODIUM CHLORIDE 9 MG/ML
1000 INJECTION, SOLUTION INTRAVENOUS
Refills: 0 | Status: DISCONTINUED | OUTPATIENT
Start: 2019-07-17 | End: 2019-07-18

## 2019-07-17 RX ADMIN — Medication 100 MILLIGRAM(S): at 16:58

## 2019-07-17 RX ADMIN — Medication 100 MILLIGRAM(S): at 21:22

## 2019-07-17 RX ADMIN — SODIUM CHLORIDE 100 MILLILITER(S): 9 INJECTION, SOLUTION INTRAVENOUS at 16:08

## 2019-07-17 RX ADMIN — OXYCODONE HYDROCHLORIDE 10 MILLIGRAM(S): 5 TABLET ORAL at 23:15

## 2019-07-17 RX ADMIN — Medication 1000 MILLIGRAM(S): at 21:23

## 2019-07-17 RX ADMIN — CHLORHEXIDINE GLUCONATE 1 APPLICATION(S): 213 SOLUTION TOPICAL at 07:06

## 2019-07-17 RX ADMIN — HYDROMORPHONE HYDROCHLORIDE 0.5 MILLIGRAM(S): 2 INJECTION INTRAMUSCULAR; INTRAVENOUS; SUBCUTANEOUS at 12:40

## 2019-07-17 RX ADMIN — HYDROMORPHONE HYDROCHLORIDE 0.5 MILLIGRAM(S): 2 INJECTION INTRAMUSCULAR; INTRAVENOUS; SUBCUTANEOUS at 12:14

## 2019-07-17 RX ADMIN — Medication 400 MILLIGRAM(S): at 15:13

## 2019-07-17 RX ADMIN — HYDROMORPHONE HYDROCHLORIDE 0.5 MILLIGRAM(S): 2 INJECTION INTRAMUSCULAR; INTRAVENOUS; SUBCUTANEOUS at 12:11

## 2019-07-17 RX ADMIN — SODIUM CHLORIDE 100 MILLILITER(S): 9 INJECTION, SOLUTION INTRAVENOUS at 11:35

## 2019-07-17 RX ADMIN — ATORVASTATIN CALCIUM 20 MILLIGRAM(S): 80 TABLET, FILM COATED ORAL at 21:22

## 2019-07-17 RX ADMIN — Medication 25 MILLIGRAM(S): at 12:41

## 2019-07-17 RX ADMIN — Medication 400 MILLIGRAM(S): at 21:22

## 2019-07-17 RX ADMIN — Medication 1000 MILLIGRAM(S): at 15:16

## 2019-07-17 RX ADMIN — APREPITANT 40 MILLIGRAM(S): 80 CAPSULE ORAL at 07:38

## 2019-07-17 RX ADMIN — OXYCODONE HYDROCHLORIDE 10 MILLIGRAM(S): 5 TABLET ORAL at 22:31

## 2019-07-17 RX ADMIN — HYDROMORPHONE HYDROCHLORIDE 0.5 MILLIGRAM(S): 2 INJECTION INTRAMUSCULAR; INTRAVENOUS; SUBCUTANEOUS at 11:51

## 2019-07-17 RX ADMIN — GABAPENTIN 300 MILLIGRAM(S): 400 CAPSULE ORAL at 16:58

## 2019-07-17 NOTE — OCCUPATIONAL THERAPY INITIAL EVALUATION ADULT - ADDITIONAL COMMENTS
Lives alone . 5 steps to enter with handrail. no steps inside. stall shower. Has a cane, RW and showerchair

## 2019-07-17 NOTE — BRIEF OPERATIVE NOTE - NSICDXBRIEFPREOP_GEN_ALL_CORE_FT
PRE-OP DIAGNOSIS:  Degenerative joint disease (DJD) of hip 17-Jul-2019 11:10:00 right,  RA, SLE Leandro Reza

## 2019-07-17 NOTE — CONSULT NOTE ADULT - PROBLEM SELECTOR RECOMMENDATION 2
hold methotrexate and plaquenil for now  restart plaquenil 3-5 days post second surgery  restart methotrexate 3 weeks post second surgery

## 2019-07-17 NOTE — CONSULT NOTE ADULT - SUBJECTIVE AND OBJECTIVE BOX
Patient is a 73y old  Female who presents with a chief complaint of right hip and left knee pain    HPI: 72 y/o female who has right hip and left knee pain for years, recently much worse and affecting ambulation.  She has been taking lodine, gabapentin and ketoprofen for pain.  Now s/p right THR.  No major complaints. pain overall controlled.  feeling very sleepy.     REVIEW OF SYSTEMS:  CONSTITUTIONAL: No fever, weight loss; +post anesthesia fatigue  EYES: No eye pain, visual disturbances, or discharge  ENMT:  No difficulty hearing, tinnitus, vertigo; No sinus or throat pain  NECK: No pain or stiffness  BREASTS: No pain, masses, or nipple discharge  RESPIRATORY: No cough, wheezing, chills or hemoptysis; No shortness of breath  CARDIOVASCULAR: No chest pain, palpitations, dizziness, or leg swelling  GASTROINTESTINAL: No abdominal or epigastric pain. No nausea, vomiting, or hematemesis; No diarrhea or constipation. No melena or hematochezia.  GENITOURINARY: No dysuria, frequency, hematuria, or incontinence  NEUROLOGICAL: No headaches, memory loss, loss of strength, numbness, or tremors  SKIN: No itching, burning, rashes, or lesions   LYMPH NODES: No enlarged glands  ENDOCRINE: No heat or cold intolerance; No hair loss  MUSCULOSKELETAL: No muscle or back pain  PSYCHIATRIC: No depression, anxiety, mood swings, or difficulty sleeping  HEME/LYMPH: No easy bruising, or bleeding gums  ALLERGY AND IMMUNOLOGIC: No hives or eczema    PAST MEDICAL & SURGICAL HISTORY:  Hypothyroid  Benign heart murmur  Hyperlipidemia  Hypertension  H/O degenerative disc disease  Osteoarthritis  SLE (systemic lupus erythematosus)  Rheumatoid arthritis  S/P thyroid surgery: 1 lobe removed  Lung cancer: small tumor removed   S/P knee surgery: bilateral  S/P carpal tunnel release: left  S/P eye surgery: child  S/P cataract surgery: left  S/P tonsillectomy      SOCIAL HISTORY:  Residence: [ ] [facility]  [ ] SNF  [x ] Community  [ ] Substance abuse: denies  [ ] Tobacco: never  [ ] Alcohol use: denies    Allergies  No Known Allergies    MEDICATIONS  (STANDING):  acetaminophen  IVPB .. 1000 milliGRAM(s) IV Intermittent every 6 hours  atorvastatin 20 milliGRAM(s) Oral at bedtime  ceFAZolin   IVPB 2000 milliGRAM(s) IV Intermittent every 8 hours  docusate sodium 100 milliGRAM(s) Oral three times a day  folic acid 1 milliGRAM(s) Oral daily  gabapentin 300 milliGRAM(s) Oral two times a day  lactated ringers. 1000 milliLiter(s) (100 mL/Hr) IV Continuous <Continuous>  levothyroxine 200 MICROGram(s) Oral daily  polyethylene glycol 3350 17 Gram(s) Oral daily    MEDICATIONS  (PRN):  aluminum hydroxide/magnesium hydroxide/simethicone Suspension 30 milliLiter(s) Oral four times a day PRN Indigestion  HYDROmorphone  Injectable 0.5 milliGRAM(s) IV Push every 3 hours PRN Severe Pain (7 - 10)  magnesium hydroxide Suspension 30 milliLiter(s) Oral daily PRN Constipation  ondansetron Injectable 4 milliGRAM(s) IV Push every 6 hours PRN Nausea and/or Vomiting  oxyCODONE    IR 5 milliGRAM(s) Oral every 3 hours PRN Mild Pain (1 - 3)  oxyCODONE    IR 10 milliGRAM(s) Oral every 3 hours PRN Moderate Pain (4 - 6)  senna 2 Tablet(s) Oral at bedtime PRN Constipation      FAMILY HISTORY:  FHx: rheumatoid arthritis: father     Vital Signs Last 24 Hrs  T(C): 36.8 (2019 15:05), Max: 36.8 (2019 06:43)  T(F): 98.3 (2019 15:05), Max: 98.3 (2019 15:05)  HR: 86 (2019 15:05) (63 - 96)  BP: 107/57 (2019 15:05) (105/57 - 144/72)  BP(mean): --  RR: 18 (2019 15:05) (12 - 20)  SpO2: 98% (2019 15:05) (97% - 100%)    PHYSICAL EXAM:  GENERAL: NAD, well-groomed, well-developed  HEAD:  Atraumatic, Normocephalic  EYES:  conjunctiva and sclera clear  ENMT:  Moist mucous membranes  NECK: Supple, No JVD  NERVOUS SYSTEM:  Alert & Oriented X3, Good concentration; Moving all 4 extremities; No gross sensory deficits  CHEST/LUNG: Clear to auscultation bilaterally; No rales, rhonchi, wheezing, or rubs  HEART: Regular rate and rhythm; No murmurs, rubs, or gallops  ABDOMEN: Soft, Nontender, Nondistended; Bowel sounds present  EXTREMITIES:  2+ Peripheral Pulses, No clubbing, cyanosis, or edema  LYMPH: No lymphadenopathy noted  /RECTAL: Not examined  BREAST: Not examined  SKIN: No rashes or lesions  INCISION: dressing dry and intake    LABS:    CAPILLARY BLOOD GLUCOSE    RADIOLOGY & ADDITIONAL STUDIES:    EKG: rbbb, nsr  Personally Reviewed:  [x ] YES     Imaging: thr in place  Personally Reviewed:  [x ] YES     Consultant(s) Notes Reviewed:  pre-op med, cardio, rheum clearances.     Care Discussed with Consultants/Other Providers:

## 2019-07-17 NOTE — CONSULT NOTE ADULT - PROBLEM SELECTOR RECOMMENDATION 9
Pain Management: acceptable- continue current care Tylenol ATC/Celebrex ATC/ Oxycodone PRN  Continue PT/OT  DVT proph: [  ] low risk - Aspirin  [ x ] high risk -Lovenox [  ] high risk - Eliquis [  ] other:_________  DC plan:  [  ] Home with HC  [ x ] Rehab   [  ] TBD  [  ]other:___________

## 2019-07-17 NOTE — BRIEF OPERATIVE NOTE - NSICDXBRIEFPOSTOP_GEN_ALL_CORE_FT
POST-OP DIAGNOSIS:  Degenerative joint disease (DJD) of hip 17-Jul-2019 11:10:14 Right , RA, SLE Leandro Reza

## 2019-07-18 LAB
ANION GAP SERPL CALC-SCNC: 8 MMOL/L — SIGNIFICANT CHANGE UP (ref 5–17)
BUN SERPL-MCNC: 21 MG/DL — SIGNIFICANT CHANGE UP (ref 7–23)
CALCIUM SERPL-MCNC: 9.2 MG/DL — SIGNIFICANT CHANGE UP (ref 8.4–10.5)
CHLORIDE SERPL-SCNC: 103 MMOL/L — SIGNIFICANT CHANGE UP (ref 96–108)
CO2 SERPL-SCNC: 26 MMOL/L — SIGNIFICANT CHANGE UP (ref 22–31)
CREAT SERPL-MCNC: 0.77 MG/DL — SIGNIFICANT CHANGE UP (ref 0.5–1.3)
GLUCOSE SERPL-MCNC: 128 MG/DL — HIGH (ref 70–99)
HCT VFR BLD CALC: 34.1 % — LOW (ref 34.5–45)
HGB BLD-MCNC: 11.2 G/DL — LOW (ref 11.5–15.5)
MCHC RBC-ENTMCNC: 31.5 PG — SIGNIFICANT CHANGE UP (ref 27–34)
MCHC RBC-ENTMCNC: 32.8 GM/DL — SIGNIFICANT CHANGE UP (ref 32–36)
MCV RBC AUTO: 96.1 FL — SIGNIFICANT CHANGE UP (ref 80–100)
NRBC # BLD: 0 /100 WBCS — SIGNIFICANT CHANGE UP (ref 0–0)
PLATELET # BLD AUTO: 246 K/UL — SIGNIFICANT CHANGE UP (ref 150–400)
POTASSIUM SERPL-MCNC: 4.7 MMOL/L — SIGNIFICANT CHANGE UP (ref 3.5–5.3)
POTASSIUM SERPL-SCNC: 4.7 MMOL/L — SIGNIFICANT CHANGE UP (ref 3.5–5.3)
RBC # BLD: 3.55 M/UL — LOW (ref 3.8–5.2)
RBC # FLD: 13.4 % — SIGNIFICANT CHANGE UP (ref 10.3–14.5)
SODIUM SERPL-SCNC: 137 MMOL/L — SIGNIFICANT CHANGE UP (ref 135–145)
WBC # BLD: 14.78 K/UL — HIGH (ref 3.8–10.5)
WBC # FLD AUTO: 14.78 K/UL — HIGH (ref 3.8–10.5)

## 2019-07-18 PROCEDURE — 99233 SBSQ HOSP IP/OBS HIGH 50: CPT

## 2019-07-18 RX ADMIN — Medication 1000 MILLIGRAM(S): at 17:17

## 2019-07-18 RX ADMIN — Medication 100 MILLIGRAM(S): at 05:28

## 2019-07-18 RX ADMIN — CELECOXIB 200 MILLIGRAM(S): 200 CAPSULE ORAL at 22:20

## 2019-07-18 RX ADMIN — CELECOXIB 200 MILLIGRAM(S): 200 CAPSULE ORAL at 08:41

## 2019-07-18 RX ADMIN — OXYCODONE HYDROCHLORIDE 10 MILLIGRAM(S): 5 TABLET ORAL at 08:24

## 2019-07-18 RX ADMIN — Medication 100 MILLIGRAM(S): at 22:20

## 2019-07-18 RX ADMIN — Medication 100 MILLIGRAM(S): at 14:47

## 2019-07-18 RX ADMIN — Medication 400 MILLIGRAM(S): at 03:39

## 2019-07-18 RX ADMIN — CELECOXIB 200 MILLIGRAM(S): 200 CAPSULE ORAL at 08:42

## 2019-07-18 RX ADMIN — OXYCODONE HYDROCHLORIDE 10 MILLIGRAM(S): 5 TABLET ORAL at 22:20

## 2019-07-18 RX ADMIN — ENOXAPARIN SODIUM 40 MILLIGRAM(S): 100 INJECTION SUBCUTANEOUS at 08:41

## 2019-07-18 RX ADMIN — Medication 1 MILLIGRAM(S): at 14:47

## 2019-07-18 RX ADMIN — OXYCODONE HYDROCHLORIDE 10 MILLIGRAM(S): 5 TABLET ORAL at 08:54

## 2019-07-18 RX ADMIN — Medication 100 MILLIGRAM(S): at 00:48

## 2019-07-18 RX ADMIN — GABAPENTIN 300 MILLIGRAM(S): 400 CAPSULE ORAL at 05:28

## 2019-07-18 RX ADMIN — Medication 1000 MILLIGRAM(S): at 08:41

## 2019-07-18 RX ADMIN — Medication 1000 MILLIGRAM(S): at 03:51

## 2019-07-18 RX ADMIN — ATORVASTATIN CALCIUM 20 MILLIGRAM(S): 80 TABLET, FILM COATED ORAL at 22:20

## 2019-07-18 RX ADMIN — OXYCODONE HYDROCHLORIDE 10 MILLIGRAM(S): 5 TABLET ORAL at 22:55

## 2019-07-18 RX ADMIN — Medication 200 MICROGRAM(S): at 05:28

## 2019-07-18 RX ADMIN — POLYETHYLENE GLYCOL 3350 17 GRAM(S): 17 POWDER, FOR SOLUTION ORAL at 22:20

## 2019-07-18 RX ADMIN — GABAPENTIN 300 MILLIGRAM(S): 400 CAPSULE ORAL at 17:17

## 2019-07-18 RX ADMIN — Medication 1000 MILLIGRAM(S): at 08:42

## 2019-07-18 NOTE — PROGRESS NOTE ADULT - PROBLEM SELECTOR PLAN 2
hold methotrexate and plaquenil for now  restart plaquenil 3-5 days post second surgery  restart methotrexate 3 weeks post second surgery.

## 2019-07-18 NOTE — PROGRESS NOTE ADULT - PROBLEM SELECTOR PLAN 1
Pain Management: acceptable- continue current care Tylenol ATC/Celebrex ATC/ Oxycodone PRN  Continue PT/OT  DVT proph: [  ] low risk - Aspirin  [x ] high risk -Lovenox [  ] high risk - Eliquis [  ] other:_________  DC plan:  [  ] Home with HC  [ x ] Rehab   [  ] TBD  [  ]other:___________

## 2019-07-19 DIAGNOSIS — R33.9 RETENTION OF URINE, UNSPECIFIED: ICD-10-CM

## 2019-07-19 LAB
ANION GAP SERPL CALC-SCNC: 4 MMOL/L — LOW (ref 5–17)
BUN SERPL-MCNC: 24 MG/DL — HIGH (ref 7–23)
CALCIUM SERPL-MCNC: 9.1 MG/DL — SIGNIFICANT CHANGE UP (ref 8.4–10.5)
CHLORIDE SERPL-SCNC: 107 MMOL/L — SIGNIFICANT CHANGE UP (ref 96–108)
CO2 SERPL-SCNC: 30 MMOL/L — SIGNIFICANT CHANGE UP (ref 22–31)
CREAT SERPL-MCNC: 0.76 MG/DL — SIGNIFICANT CHANGE UP (ref 0.5–1.3)
GLUCOSE SERPL-MCNC: 110 MG/DL — HIGH (ref 70–99)
HCT VFR BLD CALC: 31.6 % — LOW (ref 34.5–45)
HGB BLD-MCNC: 10.3 G/DL — LOW (ref 11.5–15.5)
MCHC RBC-ENTMCNC: 31.5 PG — SIGNIFICANT CHANGE UP (ref 27–34)
MCHC RBC-ENTMCNC: 32.6 GM/DL — SIGNIFICANT CHANGE UP (ref 32–36)
MCV RBC AUTO: 96.6 FL — SIGNIFICANT CHANGE UP (ref 80–100)
NRBC # BLD: 0 /100 WBCS — SIGNIFICANT CHANGE UP (ref 0–0)
PLATELET # BLD AUTO: 244 K/UL — SIGNIFICANT CHANGE UP (ref 150–400)
POTASSIUM SERPL-MCNC: 4.4 MMOL/L — SIGNIFICANT CHANGE UP (ref 3.5–5.3)
POTASSIUM SERPL-SCNC: 4.4 MMOL/L — SIGNIFICANT CHANGE UP (ref 3.5–5.3)
RBC # BLD: 3.27 M/UL — LOW (ref 3.8–5.2)
RBC # FLD: 13.7 % — SIGNIFICANT CHANGE UP (ref 10.3–14.5)
SODIUM SERPL-SCNC: 141 MMOL/L — SIGNIFICANT CHANGE UP (ref 135–145)
WBC # BLD: 12.98 K/UL — HIGH (ref 3.8–10.5)
WBC # FLD AUTO: 12.98 K/UL — HIGH (ref 3.8–10.5)

## 2019-07-19 PROCEDURE — 99233 SBSQ HOSP IP/OBS HIGH 50: CPT

## 2019-07-19 RX ADMIN — GABAPENTIN 300 MILLIGRAM(S): 400 CAPSULE ORAL at 16:47

## 2019-07-19 RX ADMIN — Medication 1000 MILLIGRAM(S): at 08:37

## 2019-07-19 RX ADMIN — ATORVASTATIN CALCIUM 20 MILLIGRAM(S): 80 TABLET, FILM COATED ORAL at 21:27

## 2019-07-19 RX ADMIN — Medication 1000 MILLIGRAM(S): at 16:47

## 2019-07-19 RX ADMIN — ENOXAPARIN SODIUM 40 MILLIGRAM(S): 100 INJECTION SUBCUTANEOUS at 08:37

## 2019-07-19 RX ADMIN — Medication 200 MICROGRAM(S): at 06:19

## 2019-07-19 RX ADMIN — OXYCODONE HYDROCHLORIDE 10 MILLIGRAM(S): 5 TABLET ORAL at 03:21

## 2019-07-19 RX ADMIN — CELECOXIB 200 MILLIGRAM(S): 200 CAPSULE ORAL at 08:37

## 2019-07-19 RX ADMIN — Medication 100 MILLIGRAM(S): at 06:19

## 2019-07-19 RX ADMIN — CELECOXIB 200 MILLIGRAM(S): 200 CAPSULE ORAL at 21:27

## 2019-07-19 RX ADMIN — LOSARTAN POTASSIUM 50 MILLIGRAM(S): 100 TABLET, FILM COATED ORAL at 06:19

## 2019-07-19 RX ADMIN — Medication 100 MILLIGRAM(S): at 11:48

## 2019-07-19 RX ADMIN — CELECOXIB 200 MILLIGRAM(S): 200 CAPSULE ORAL at 21:28

## 2019-07-19 RX ADMIN — CELECOXIB 200 MILLIGRAM(S): 200 CAPSULE ORAL at 08:36

## 2019-07-19 RX ADMIN — Medication 1 MILLIGRAM(S): at 11:48

## 2019-07-19 RX ADMIN — OXYCODONE HYDROCHLORIDE 10 MILLIGRAM(S): 5 TABLET ORAL at 03:55

## 2019-07-19 NOTE — PROGRESS NOTE ADULT - PROBLEM SELECTOR PLAN 1
Pain Management: acceptable- continue current care Tylenol ATC/Celebrex ATC/ Oxycodone PRN  Continue PT/OT  DVT proph: [  ] low risk - Aspirin  [x ] high risk -Lovenox [  ] high risk - Eliquis [  ] other:_________  DC plan:  [  ] Home with HC  [ x ] Rehab   [  ] TBD  [  ]other:___________  d/w patient and daughter instructions for stage 2. they are considering whether to proceed.

## 2019-07-19 NOTE — PROGRESS NOTE ADULT - PROBLEM SELECTOR PLAN 6
velasquez placed last night.  does have history of constipation as well.  considering patient is moving very slowly would leave velasquez in at least until she has a BM.  will continue to monitor for appropriate voiding time.

## 2019-07-20 PROCEDURE — 99233 SBSQ HOSP IP/OBS HIGH 50: CPT

## 2019-07-20 PROCEDURE — 93970 EXTREMITY STUDY: CPT | Mod: 26

## 2019-07-20 RX ORDER — ENOXAPARIN SODIUM 100 MG/ML
110 INJECTION SUBCUTANEOUS
Refills: 0 | Status: DISCONTINUED | OUTPATIENT
Start: 2019-07-20 | End: 2019-07-22

## 2019-07-20 RX ADMIN — Medication 100 MILLIGRAM(S): at 21:22

## 2019-07-20 RX ADMIN — Medication 1000 MILLIGRAM(S): at 17:22

## 2019-07-20 RX ADMIN — GABAPENTIN 300 MILLIGRAM(S): 400 CAPSULE ORAL at 17:21

## 2019-07-20 RX ADMIN — OXYCODONE HYDROCHLORIDE 10 MILLIGRAM(S): 5 TABLET ORAL at 23:22

## 2019-07-20 RX ADMIN — ENOXAPARIN SODIUM 110 MILLIGRAM(S): 100 INJECTION SUBCUTANEOUS at 11:04

## 2019-07-20 RX ADMIN — Medication 1 MILLIGRAM(S): at 11:02

## 2019-07-20 RX ADMIN — Medication 1000 MILLIGRAM(S): at 09:46

## 2019-07-20 RX ADMIN — Medication 200 MICROGRAM(S): at 06:11

## 2019-07-20 RX ADMIN — LOSARTAN POTASSIUM 50 MILLIGRAM(S): 100 TABLET, FILM COATED ORAL at 06:12

## 2019-07-20 RX ADMIN — CELECOXIB 200 MILLIGRAM(S): 200 CAPSULE ORAL at 09:47

## 2019-07-20 RX ADMIN — Medication 100 MILLIGRAM(S): at 06:11

## 2019-07-20 RX ADMIN — ATORVASTATIN CALCIUM 20 MILLIGRAM(S): 80 TABLET, FILM COATED ORAL at 21:21

## 2019-07-20 RX ADMIN — OXYCODONE HYDROCHLORIDE 10 MILLIGRAM(S): 5 TABLET ORAL at 23:52

## 2019-07-20 RX ADMIN — CELECOXIB 200 MILLIGRAM(S): 200 CAPSULE ORAL at 21:21

## 2019-07-20 RX ADMIN — CELECOXIB 200 MILLIGRAM(S): 200 CAPSULE ORAL at 09:46

## 2019-07-20 RX ADMIN — ENOXAPARIN SODIUM 110 MILLIGRAM(S): 100 INJECTION SUBCUTANEOUS at 21:21

## 2019-07-20 RX ADMIN — Medication 1000 MILLIGRAM(S): at 17:21

## 2019-07-20 RX ADMIN — GABAPENTIN 300 MILLIGRAM(S): 400 CAPSULE ORAL at 06:12

## 2019-07-20 RX ADMIN — Medication 1000 MILLIGRAM(S): at 09:47

## 2019-07-21 LAB
ALBUMIN SERPL ELPH-MCNC: 2.4 G/DL — LOW (ref 3.3–5)
ALP SERPL-CCNC: 100 U/L — SIGNIFICANT CHANGE UP (ref 30–120)
ALT FLD-CCNC: 27 U/L DA — SIGNIFICANT CHANGE UP (ref 10–60)
ANION GAP SERPL CALC-SCNC: 3 MMOL/L — LOW (ref 5–17)
APTT BLD: 34.2 SEC — SIGNIFICANT CHANGE UP (ref 28.5–37)
AST SERPL-CCNC: 36 U/L — SIGNIFICANT CHANGE UP (ref 10–40)
BASOPHILS # BLD AUTO: 0.05 K/UL — SIGNIFICANT CHANGE UP (ref 0–0.2)
BASOPHILS NFR BLD AUTO: 0.6 % — SIGNIFICANT CHANGE UP (ref 0–2)
BILIRUB SERPL-MCNC: 0.5 MG/DL — SIGNIFICANT CHANGE UP (ref 0.2–1.2)
BUN SERPL-MCNC: 22 MG/DL — SIGNIFICANT CHANGE UP (ref 7–23)
CALCIUM SERPL-MCNC: 9.1 MG/DL — SIGNIFICANT CHANGE UP (ref 8.4–10.5)
CHLORIDE SERPL-SCNC: 104 MMOL/L — SIGNIFICANT CHANGE UP (ref 96–108)
CO2 SERPL-SCNC: 34 MMOL/L — HIGH (ref 22–31)
CREAT SERPL-MCNC: 0.75 MG/DL — SIGNIFICANT CHANGE UP (ref 0.5–1.3)
EOSINOPHIL # BLD AUTO: 0.21 K/UL — SIGNIFICANT CHANGE UP (ref 0–0.5)
EOSINOPHIL NFR BLD AUTO: 2.4 % — SIGNIFICANT CHANGE UP (ref 0–6)
GLUCOSE SERPL-MCNC: 96 MG/DL — SIGNIFICANT CHANGE UP (ref 70–99)
HCT VFR BLD CALC: 37.2 % — SIGNIFICANT CHANGE UP (ref 34.5–45)
HGB BLD-MCNC: 11.7 G/DL — SIGNIFICANT CHANGE UP (ref 11.5–15.5)
IMM GRANULOCYTES NFR BLD AUTO: 0.5 % — SIGNIFICANT CHANGE UP (ref 0–1.5)
INR BLD: 1.03 RATIO — SIGNIFICANT CHANGE UP (ref 0.88–1.16)
LYMPHOCYTES # BLD AUTO: 1.66 K/UL — SIGNIFICANT CHANGE UP (ref 1–3.3)
LYMPHOCYTES # BLD AUTO: 19 % — SIGNIFICANT CHANGE UP (ref 13–44)
MAGNESIUM SERPL-MCNC: 2.2 MG/DL — SIGNIFICANT CHANGE UP (ref 1.6–2.6)
MCHC RBC-ENTMCNC: 30.9 PG — SIGNIFICANT CHANGE UP (ref 27–34)
MCHC RBC-ENTMCNC: 31.5 GM/DL — LOW (ref 32–36)
MCV RBC AUTO: 98.2 FL — SIGNIFICANT CHANGE UP (ref 80–100)
MONOCYTES # BLD AUTO: 0.96 K/UL — HIGH (ref 0–0.9)
MONOCYTES NFR BLD AUTO: 11 % — SIGNIFICANT CHANGE UP (ref 2–14)
NEUTROPHILS # BLD AUTO: 5.83 K/UL — SIGNIFICANT CHANGE UP (ref 1.8–7.4)
NEUTROPHILS NFR BLD AUTO: 66.5 % — SIGNIFICANT CHANGE UP (ref 43–77)
NRBC # BLD: 0 /100 WBCS — SIGNIFICANT CHANGE UP (ref 0–0)
PHOSPHATE SERPL-MCNC: 3.3 MG/DL — SIGNIFICANT CHANGE UP (ref 2.5–4.5)
PLATELET # BLD AUTO: 353 K/UL — SIGNIFICANT CHANGE UP (ref 150–400)
POTASSIUM SERPL-MCNC: 4.1 MMOL/L — SIGNIFICANT CHANGE UP (ref 3.5–5.3)
POTASSIUM SERPL-SCNC: 4.1 MMOL/L — SIGNIFICANT CHANGE UP (ref 3.5–5.3)
PROT SERPL-MCNC: 6.7 G/DL — SIGNIFICANT CHANGE UP (ref 6–8.3)
PROTHROM AB SERPL-ACNC: 11.2 SEC — SIGNIFICANT CHANGE UP (ref 10–12.9)
RBC # BLD: 3.79 M/UL — LOW (ref 3.8–5.2)
RBC # FLD: 14.1 % — SIGNIFICANT CHANGE UP (ref 10.3–14.5)
SODIUM SERPL-SCNC: 141 MMOL/L — SIGNIFICANT CHANGE UP (ref 135–145)
WBC # BLD: 8.75 K/UL — SIGNIFICANT CHANGE UP (ref 3.8–10.5)
WBC # FLD AUTO: 8.75 K/UL — SIGNIFICANT CHANGE UP (ref 3.8–10.5)

## 2019-07-21 PROCEDURE — 99233 SBSQ HOSP IP/OBS HIGH 50: CPT

## 2019-07-21 RX ORDER — BACITRACIN ZINC 500 UNIT/G
1 OINTMENT IN PACKET (EA) TOPICAL
Refills: 0 | Status: DISCONTINUED | OUTPATIENT
Start: 2019-07-21 | End: 2019-07-22

## 2019-07-21 RX ADMIN — Medication 1000 MILLIGRAM(S): at 17:17

## 2019-07-21 RX ADMIN — ENOXAPARIN SODIUM 110 MILLIGRAM(S): 100 INJECTION SUBCUTANEOUS at 21:26

## 2019-07-21 RX ADMIN — Medication 100 MILLIGRAM(S): at 06:05

## 2019-07-21 RX ADMIN — Medication 1000 MILLIGRAM(S): at 17:20

## 2019-07-21 RX ADMIN — Medication 1 APPLICATION(S): at 17:17

## 2019-07-21 RX ADMIN — ATORVASTATIN CALCIUM 20 MILLIGRAM(S): 80 TABLET, FILM COATED ORAL at 21:25

## 2019-07-21 RX ADMIN — GABAPENTIN 300 MILLIGRAM(S): 400 CAPSULE ORAL at 06:04

## 2019-07-21 RX ADMIN — Medication 1 MILLIGRAM(S): at 12:09

## 2019-07-21 RX ADMIN — CELECOXIB 200 MILLIGRAM(S): 200 CAPSULE ORAL at 08:06

## 2019-07-21 RX ADMIN — GABAPENTIN 300 MILLIGRAM(S): 400 CAPSULE ORAL at 17:17

## 2019-07-21 RX ADMIN — OXYCODONE HYDROCHLORIDE 10 MILLIGRAM(S): 5 TABLET ORAL at 23:18

## 2019-07-21 RX ADMIN — Medication 1000 MILLIGRAM(S): at 08:12

## 2019-07-21 RX ADMIN — CELECOXIB 200 MILLIGRAM(S): 200 CAPSULE ORAL at 01:48

## 2019-07-21 RX ADMIN — Medication 1 APPLICATION(S): at 08:13

## 2019-07-21 RX ADMIN — Medication 200 MICROGRAM(S): at 06:05

## 2019-07-21 RX ADMIN — Medication 1000 MILLIGRAM(S): at 08:06

## 2019-07-21 RX ADMIN — ENOXAPARIN SODIUM 110 MILLIGRAM(S): 100 INJECTION SUBCUTANEOUS at 08:07

## 2019-07-21 RX ADMIN — Medication 100 MILLIGRAM(S): at 12:10

## 2019-07-21 RX ADMIN — CELECOXIB 200 MILLIGRAM(S): 200 CAPSULE ORAL at 08:12

## 2019-07-22 ENCOUNTER — TRANSCRIPTION ENCOUNTER (OUTPATIENT)
Age: 74
End: 2019-07-22

## 2019-07-22 ENCOUNTER — APPOINTMENT (OUTPATIENT)
Dept: ORTHOPEDIC SURGERY | Facility: HOSPITAL | Age: 74
End: 2019-07-22

## 2019-07-22 VITALS
HEART RATE: 70 BPM | SYSTOLIC BLOOD PRESSURE: 101 MMHG | OXYGEN SATURATION: 92 % | DIASTOLIC BLOOD PRESSURE: 54 MMHG | RESPIRATION RATE: 16 BRPM | TEMPERATURE: 98 F

## 2019-07-22 LAB
ANION GAP SERPL CALC-SCNC: 2 MMOL/L — LOW (ref 5–17)
APTT BLD: 33.3 SEC — SIGNIFICANT CHANGE UP (ref 28.5–37)
BASOPHILS # BLD AUTO: 0.04 K/UL — SIGNIFICANT CHANGE UP (ref 0–0.2)
BASOPHILS NFR BLD AUTO: 0.6 % — SIGNIFICANT CHANGE UP (ref 0–2)
BUN SERPL-MCNC: 23 MG/DL — SIGNIFICANT CHANGE UP (ref 7–23)
CALCIUM SERPL-MCNC: 8.9 MG/DL — SIGNIFICANT CHANGE UP (ref 8.4–10.5)
CHLORIDE SERPL-SCNC: 107 MMOL/L — SIGNIFICANT CHANGE UP (ref 96–108)
CO2 SERPL-SCNC: 32 MMOL/L — HIGH (ref 22–31)
CREAT SERPL-MCNC: 0.85 MG/DL — SIGNIFICANT CHANGE UP (ref 0.5–1.3)
EOSINOPHIL # BLD AUTO: 0.22 K/UL — SIGNIFICANT CHANGE UP (ref 0–0.5)
EOSINOPHIL NFR BLD AUTO: 3 % — SIGNIFICANT CHANGE UP (ref 0–6)
GLUCOSE SERPL-MCNC: 85 MG/DL — SIGNIFICANT CHANGE UP (ref 70–99)
HCT VFR BLD CALC: 33.1 % — LOW (ref 34.5–45)
HGB BLD-MCNC: 10.6 G/DL — LOW (ref 11.5–15.5)
IMM GRANULOCYTES NFR BLD AUTO: 1 % — SIGNIFICANT CHANGE UP (ref 0–1.5)
INR BLD: 1.07 RATIO — SIGNIFICANT CHANGE UP (ref 0.88–1.16)
LYMPHOCYTES # BLD AUTO: 1.44 K/UL — SIGNIFICANT CHANGE UP (ref 1–3.3)
LYMPHOCYTES # BLD AUTO: 19.9 % — SIGNIFICANT CHANGE UP (ref 13–44)
MAGNESIUM SERPL-MCNC: 2.1 MG/DL — SIGNIFICANT CHANGE UP (ref 1.6–2.6)
MCHC RBC-ENTMCNC: 31.4 PG — SIGNIFICANT CHANGE UP (ref 27–34)
MCHC RBC-ENTMCNC: 32 GM/DL — SIGNIFICANT CHANGE UP (ref 32–36)
MCV RBC AUTO: 97.9 FL — SIGNIFICANT CHANGE UP (ref 80–100)
MONOCYTES # BLD AUTO: 0.93 K/UL — HIGH (ref 0–0.9)
MONOCYTES NFR BLD AUTO: 12.9 % — SIGNIFICANT CHANGE UP (ref 2–14)
NEUTROPHILS # BLD AUTO: 4.52 K/UL — SIGNIFICANT CHANGE UP (ref 1.8–7.4)
NEUTROPHILS NFR BLD AUTO: 62.6 % — SIGNIFICANT CHANGE UP (ref 43–77)
NRBC # BLD: 0 /100 WBCS — SIGNIFICANT CHANGE UP (ref 0–0)
PHOSPHATE SERPL-MCNC: 3.7 MG/DL — SIGNIFICANT CHANGE UP (ref 2.5–4.5)
PLATELET # BLD AUTO: 314 K/UL — SIGNIFICANT CHANGE UP (ref 150–400)
POTASSIUM SERPL-MCNC: 4.5 MMOL/L — SIGNIFICANT CHANGE UP (ref 3.5–5.3)
POTASSIUM SERPL-SCNC: 4.5 MMOL/L — SIGNIFICANT CHANGE UP (ref 3.5–5.3)
PROTHROM AB SERPL-ACNC: 11.7 SEC — SIGNIFICANT CHANGE UP (ref 10–12.9)
RBC # BLD: 3.38 M/UL — LOW (ref 3.8–5.2)
RBC # FLD: 13.9 % — SIGNIFICANT CHANGE UP (ref 10.3–14.5)
SODIUM SERPL-SCNC: 141 MMOL/L — SIGNIFICANT CHANGE UP (ref 135–145)
WBC # BLD: 7.22 K/UL — SIGNIFICANT CHANGE UP (ref 3.8–10.5)
WBC # FLD AUTO: 7.22 K/UL — SIGNIFICANT CHANGE UP (ref 3.8–10.5)

## 2019-07-22 PROCEDURE — 99239 HOSP IP/OBS DSCHRG MGMT >30: CPT

## 2019-07-22 RX ORDER — HYDROXYCHLOROQUINE SULFATE 200 MG
1 TABLET ORAL
Qty: 0 | Refills: 0 | DISCHARGE

## 2019-07-22 RX ORDER — OXYCODONE HYDROCHLORIDE 5 MG/1
1 TABLET ORAL
Qty: 0 | Refills: 0 | DISCHARGE
Start: 2019-07-22

## 2019-07-22 RX ORDER — IBUPROFEN 200 MG
2 TABLET ORAL
Qty: 0 | Refills: 0 | DISCHARGE

## 2019-07-22 RX ORDER — APIXABAN 2.5 MG/1
2 TABLET, FILM COATED ORAL
Qty: 20 | Refills: 0
Start: 2019-07-22 | End: 2019-07-26

## 2019-07-22 RX ORDER — APIXABAN 2.5 MG/1
1 TABLET, FILM COATED ORAL
Qty: 0 | Refills: 0 | DISCHARGE
Start: 2019-07-22

## 2019-07-22 RX ORDER — SENNA PLUS 8.6 MG/1
2 TABLET ORAL
Qty: 0 | Refills: 0 | DISCHARGE
Start: 2019-07-22

## 2019-07-22 RX ORDER — METHOTREXATE 2.5 MG/1
0 TABLET ORAL
Qty: 0 | Refills: 0 | DISCHARGE

## 2019-07-22 RX ORDER — ETODOLAC 400 MG/1
1 TABLET, FILM COATED ORAL
Qty: 0 | Refills: 0 | DISCHARGE

## 2019-07-22 RX ORDER — APIXABAN 2.5 MG/1
10 TABLET, FILM COATED ORAL EVERY 12 HOURS
Refills: 0 | Status: DISCONTINUED | OUTPATIENT
Start: 2019-07-22 | End: 2019-07-22

## 2019-07-22 RX ORDER — APIXABAN 2.5 MG/1
2 TABLET, FILM COATED ORAL
Qty: 0 | Refills: 0 | DISCHARGE
Start: 2019-07-22

## 2019-07-22 RX ORDER — ACETAMINOPHEN 500 MG
2 TABLET ORAL
Qty: 0 | Refills: 0 | DISCHARGE
Start: 2019-07-22

## 2019-07-22 RX ADMIN — OXYCODONE HYDROCHLORIDE 10 MILLIGRAM(S): 5 TABLET ORAL at 00:04

## 2019-07-22 RX ADMIN — LOSARTAN POTASSIUM 50 MILLIGRAM(S): 100 TABLET, FILM COATED ORAL at 05:37

## 2019-07-22 RX ADMIN — Medication 1000 MILLIGRAM(S): at 08:55

## 2019-07-22 RX ADMIN — Medication 200 MICROGRAM(S): at 05:37

## 2019-07-22 RX ADMIN — Medication 1000 MILLIGRAM(S): at 08:52

## 2019-07-22 RX ADMIN — OXYCODONE HYDROCHLORIDE 10 MILLIGRAM(S): 5 TABLET ORAL at 07:27

## 2019-07-22 RX ADMIN — Medication 1 MILLIGRAM(S): at 12:18

## 2019-07-22 RX ADMIN — GABAPENTIN 300 MILLIGRAM(S): 400 CAPSULE ORAL at 05:37

## 2019-07-22 RX ADMIN — ENOXAPARIN SODIUM 110 MILLIGRAM(S): 100 INJECTION SUBCUTANEOUS at 08:52

## 2019-07-22 RX ADMIN — Medication 1 APPLICATION(S): at 05:37

## 2019-07-22 RX ADMIN — OXYCODONE HYDROCHLORIDE 10 MILLIGRAM(S): 5 TABLET ORAL at 06:57

## 2019-07-22 NOTE — PROGRESS NOTE ADULT - ASSESSMENT
73F with SLE, HTN, HLD, here electively for right hip replacement.  POD #4 (s/p   surgery on 7/17)    Acute DVT   - provoked from surgery  - Switch to Eliquis - minimum 3 months tx.   - monitor bleeding  - staged surgery cancelled  - discussed case with patients rheumatologist.  he will follow up hypercoagulable work up after rehab.  May need longer than 3 months but he will decide after he views lab results. Message left for PMD.     Right hip replacement  - PT/OT  - incentive spirometer  - bowel regimen    HTN/HLD  - cont with losartan  - cont to hold HCTZ  - cont with lipitor    SLE  - holding methotrexate and plaquenil  - restart plaquenil 3-5 days post second surgery  - restart methotrexate 3 weeks post second surgery    Urinary retention  - resolved    Hypothyroidism  - cont with synthroid    Preventive measure  - will be on therapeutic dosing of eliquis
73F with SLE, HTN, HLD, here electively for right hip replacement.  POD #4 (s/p   surgery on 7/17)    Acute DVT   - provoked from surgery  - cont lovenox (110mg BID)  - monitor bleeding  - staged surgery cancelled  - consider transition to eliquis or other NOAC @ rehab    Right hip replacement  - DVT ppx and pain control as per ortho/anesthesia  - PT/OT  - advance diet as tolerated  - anti-emetics PRN  - incentive spirometer  - bowel regiment    HTN/HLD  - cont with losartan  - cont to hold HCTZ  - cont with lipitor    SLE  - holding methotrexate and plaquenil  - restart plaquenil 3-5 days post second surgery  - restart methotrexate 3 weeks post second surgery    Urinary retention  - resolved    Hypothyroidism  - cont with synthroid    Preventive measure  - will be on therapeutic dosing of lovenox for acute DVT
73F s/p right THR
73F s/p right THR
73F with SLE, HTN, HLD, here electively for right hip replacement.  POD #3 (s/p   surgery on 7/17)    Acute DVT   - provoked from surgery  - increase lovenox to therapeutic dosing (110mg BID)  - ortho to post-pone surgery for now  - transition to eliquis or other NOAC     Right hip replacement  - DVT ppx and pain control as per ortho/anesthesia  - PT/OT  - advance diet as tolerated  - anti-emetics PRN  - incentive spirometer  - bowel regiment    HTN/HLD  - cont with losartan  - cont to hold HCTZ  - cont with lipitor    SLE  - holding methotrexate and plaquenil  - restart plaquenil 3-5 days post second surgery  - restart methotrexate 3 weeks post second surgery    Urinary retention  - resolved    Hypothyroidism  - cont with synthroid    Preventive measure  - will be on therapeutic dosing of lovenox for acute DVT
Neurovascular status in tact

## 2019-07-22 NOTE — DISCHARGE NOTE PROVIDER - CARE PROVIDER_API CALL
Mansoor Castillo)  Orthopaedic Surgery  [address] [facility], [address]  [place] [address]  Phone: [phone]  Fax: [phone]  Follow Up Time: Mansoor Castillo)  Orthopaedic Surgery  [address] [facility], [address]  [place] [address]  Phone: [phone]  Fax: [phone]  Follow Up Time:     Kalani Uriarte (DO)  Internal Medicine  [address] [place] [address]  Phone: [phone]  Fax: [phone]  Follow Up Time: 1 week    Carmel Mejias ()  [facility] [place] [facility], [address] [place] [address] [address] [place] [address]  Phone: [phone]  Fax: [phone]  Follow Up Time: 2 weeks    Josselin Ramirez)  Cardiovascular Disease; Interventional Cardiology  [address] [place] [address]  Phone: [phone]  Fax: [phone]  Follow Up Time: 1 month

## 2019-07-22 NOTE — PROGRESS NOTE ADULT - PROVIDER SPECIALTY LIST ADULT
Hospitalist
Orthopedics
Hospitalist
Hospitalist

## 2019-07-22 NOTE — DISCHARGE NOTE PROVIDER - PROVIDER TOKENS
PROVIDER:[TOKEN:[[id number]]] PROVIDER:[TOKEN:[[id number]]],PROVIDER:[TOKEN:[[id number]],FOLLOWUP:[1 week]],PROVIDER:[TOKEN:[[id number]],FOLLOWUP:[2 weeks]],PROVIDER:[TOKEN:[[id number]],FOLLOWUP:[1 month]]

## 2019-07-22 NOTE — DISCHARGE NOTE PROVIDER - CARE PROVIDERS DIRECT ADDRESSES
,[email or web address]

## 2019-07-22 NOTE — DISCHARGE NOTE PROVIDER - HOSPITAL COURSE
This patient was admitted to [facility] with a history of severe degenerative joint disease of the right hip.  Patient went to Pre-Surgical Testing at [facility] and was medically cleared to undergo elective procedure.  No operative or irineo-operative complications arose during patients hospital course.  Patient received antibiotic according to SCIP guidelines for infection prevention.  Lovenox was given for DVT prophylaxis since the patient was scheduled to have stage 2 of her procedure (left total knee replacement) on 7/22.  A routine lower extremity doppler was done after stage one of her procedure where a DVT was noted.  Patient was started on therapeutic lovenox after confirmation of her DVT and then changed to therapeutic Eliquis. Anesthesia, Medical Hospitalist, Physical Therapy and Occupational Therapy were consulted. Patient is stable for discharge with a good prognosis.  Appropriate discharge instructions and medications are provided in this document.

## 2019-07-22 NOTE — PROGRESS NOTE ADULT - SUBJECTIVE AND OBJECTIVE BOX
INTERVAL HPI/OVERNIGHT EVENTS:  No acute events overnight.  Patient doing well.  Tolerating PO with normal BMs.  Awaiting rehab.      MEDICATIONS  (STANDING):  acetaminophen   Tablet .. 1000 milliGRAM(s) Oral every 8 hours  atorvastatin 20 milliGRAM(s) Oral at bedtime  BACItracin   Ointment 1 Application(s) Topical two times a day  docusate sodium 100 milliGRAM(s) Oral three times a day  enoxaparin Injectable 110 milliGRAM(s) SubCutaneous two times a day  folic acid 1 milliGRAM(s) Oral daily  gabapentin 300 milliGRAM(s) Oral two times a day  levothyroxine 200 MICROGram(s) Oral daily  losartan 50 milliGRAM(s) Oral daily  polyethylene glycol 3350 17 Gram(s) Oral daily    MEDICATIONS  (PRN):  aluminum hydroxide/magnesium hydroxide/simethicone Suspension 30 milliLiter(s) Oral four times a day PRN Indigestion  bisacodyl Suppository 10 milliGRAM(s) Rectal daily PRN If no bowel movement by POD#2  HYDROmorphone  Injectable 0.5 milliGRAM(s) IV Push every 3 hours PRN Severe Pain (7 - 10)  magnesium hydroxide Suspension 30 milliLiter(s) Oral daily PRN Constipation  ondansetron Injectable 4 milliGRAM(s) IV Push every 6 hours PRN Nausea and/or Vomiting  oxyCODONE    IR 5 milliGRAM(s) Oral every 3 hours PRN Mild Pain (1 - 3)  oxyCODONE    IR 10 milliGRAM(s) Oral every 3 hours PRN Moderate Pain (4 - 6)  senna 2 Tablet(s) Oral at bedtime PRN Constipation      Allergies    No Known Allergies    Intolerances      Vital Signs Last 24 Hrs  T(C): 36.4 (21 Jul 2019 07:39), Max: 36.7 (20 Jul 2019 15:03)  T(F): 97.6 (21 Jul 2019 07:39), Max: 98.1 (20 Jul 2019 15:03)  HR: 63 (21 Jul 2019 07:39) (63 - 75)  BP: 115/72 (21 Jul 2019 07:39) (105/52 - 115/72)  BP(mean): --  RR: 16 (21 Jul 2019 07:39) (16 - 16)  SpO2: 99% (21 Jul 2019 07:39) (99% - 100%)    PHYSICAL EXAM:  GENERAL: middle age female in NAD  HEAD:  atraumatic  EYES: EOMI, conjunctiva and sclera clear  ENMT: No tonsillar erythema, exudates, or enlargement; Moist mucous membranes, Good dentition, No lesions  NECK: Supple, No JVD, Normal thyroid  NERVOUS SYSTEM:  Alert & Oriented X3, Good concentration; Motor Strength 5/5 B/L upper and lower extremities  CHEST/LUNG: Clear to auscultation bilaterally; No rales, rhonchi, wheezing, or rubs  HEART: Regular rate and rhythm; No murmurs, rubs, or gallops  ABDOMEN: Soft, Nontender, Nondistended; Bowel sounds present  EXTREMITIES:  2+ Peripheral Pulses, No clubbing, cyanosis, or edema  SKIN: surgical site clean      LABS:                        11.7   8.75  )-----------( 353      ( 21 Jul 2019 06:34 )             37.2     21 Jul 2019 06:34    141    |  104    |  22     ----------------------------<  96     4.1     |  34     |  0.75     Ca    9.1        21 Jul 2019 06:34  Phos  3.3       21 Jul 2019 06:34  Mg     2.2       21 Jul 2019 06:34    TPro  6.7    /  Alb  2.4    /  TBili  0.5    /  DBili  x      /  AST  36     /  ALT  27     /  AlkPhos  100    21 Jul 2019 06:34    PT/INR - ( 21 Jul 2019 06:34 )   PT: 11.2 sec;   INR: 1.03 ratio         PTT - ( 21 Jul 2019 06:34 )  PTT:34.2 sec    RADIOLOGY & ADDITIONAL TESTS:    Imaging Personally Reviewed:  [ ] YES  [ ] NO    Consultant(s) Notes Reviewed:  [X] YES  [ ] NO    Care Discussed with Consultants/Other Providers [ ] YES  [ ] NO
Patient is a 73y old  Female who presents with a chief complaint of Total hip replacmenent (22 Jul 2019 10:32)    INTERVAL HPI/OVERNIGHT EVENTS: feeling well, needs pain medication but then feels a little out of it from the meds.  no new knee pain or swelling.     MEDICATIONS  (STANDING):  acetaminophen   Tablet .. 1000 milliGRAM(s) Oral every 8 hours  apixaban 10 milliGRAM(s) Oral every 12 hours  atorvastatin 20 milliGRAM(s) Oral at bedtime  BACItracin   Ointment 1 Application(s) Topical two times a day  docusate sodium 100 milliGRAM(s) Oral three times a day  folic acid 1 milliGRAM(s) Oral daily  gabapentin 300 milliGRAM(s) Oral two times a day  levothyroxine 200 MICROGram(s) Oral daily  losartan 50 milliGRAM(s) Oral daily  polyethylene glycol 3350 17 Gram(s) Oral daily    MEDICATIONS  (PRN):  aluminum hydroxide/magnesium hydroxide/simethicone Suspension 30 milliLiter(s) Oral four times a day PRN Indigestion  bisacodyl Suppository 10 milliGRAM(s) Rectal daily PRN If no bowel movement by POD#2  HYDROmorphone  Injectable 0.5 milliGRAM(s) IV Push every 3 hours PRN Severe Pain (7 - 10)  magnesium hydroxide Suspension 30 milliLiter(s) Oral daily PRN Constipation  ondansetron Injectable 4 milliGRAM(s) IV Push every 6 hours PRN Nausea and/or Vomiting  oxyCODONE    IR 5 milliGRAM(s) Oral every 3 hours PRN Mild Pain (1 - 3)  oxyCODONE    IR 10 milliGRAM(s) Oral every 3 hours PRN Moderate Pain (4 - 6)  senna 2 Tablet(s) Oral at bedtime PRN Constipation      Allergies  No Known Allergies    REVIEW OF SYSTEMS:  CONSTITUTIONAL: No fever, weight loss, or fatigue  EYES: No eye pain, visual disturbances, or discharge  ENMT:  No difficulty hearing, tinnitus, vertigo; No sinus or throat pain  NECK: No pain or stiffness  BREASTS: No pain, masses, or nipple discharge  RESPIRATORY: No cough, wheezing, chills or hemoptysis; No shortness of breath  CARDIOVASCULAR: No chest pain, palpitations, or lightheadedness  GASTROINTESTINAL: No abdominal or epigastric pain. No nausea, vomiting, or hematemesis; No diarrhea or constipation. No melena or hematochezia.  GENITOURINARY: No dysuria, frequency, hematuria, or incontinence  NEUROLOGICAL: No headaches, vertigo, memory loss, loss of strength, numbness, or tremors  SKIN: No itching, burning, rashes, or lesions   LYMPH NODES: No enlarged glands  ENDOCRINE: No heat or cold intolerance; No hair loss; No polydipsia or polyuria  MUSCULOSKELETAL: No back pain  PSYCHIATRIC: No depression, anxiety, or mood swings  HEME/LYMPH: No easy bruising, or bleeding gums  ALLERGY AND IMMUNOLOGIC: No hives or eczema    Vital Signs Last 24 Hrs  T(C): 36.6 (22 Jul 2019 07:32), Max: 36.6 (21 Jul 2019 15:33)  T(F): 97.9 (22 Jul 2019 07:32), Max: 97.9 (22 Jul 2019 07:32)  HR: 70 (22 Jul 2019 07:32) (70 - 71)  BP: 101/54 (22 Jul 2019 07:32) (101/54 - 129/77)  BP(mean): --  RR: 16 (22 Jul 2019 07:32) (16 - 17)  SpO2: 92% (22 Jul 2019 07:32) (92% - 97%)    PHYSICAL EXAM:  GENERAL: NAD, well-groomed, well-developed  HEAD:  Atraumatic, Normocephalic  EYES: E conjunctiva and sclera clear  ENMT: Moist mucous membranes  NECK: Supple, No JVD,  NERVOUS SYSTEM:  Alert & Oriented X3, Good concentration; Bilateral LE mobile, sensation to light touch intact  CHEST/LUNG: Clear to auscultation bilaterally; No rales, rhonchi, wheezing, or rubs  HEART: Regular rate and rhythm; No murmurs, rubs, or gallops  ABDOMEN: Soft, Nontender, Nondistended; Bowel sounds present  EXTREMITIES:  2+ Peripheral Pulses, No clubbing or cyanosis  LYMPH: No lymphadenopathy noted  SKIN: No rashes or lesions  INCISION:  Dressing dry and intact    LABS:                        10.6   7.22  )-----------( 314      ( 22 Jul 2019 06:44 )             33.1     22 Jul 2019 06:44    141    |  107    |  23     ----------------------------<  85     4.5     |  32     |  0.85     Ca    8.9        22 Jul 2019 06:44  Phos  3.7       22 Jul 2019 06:44  Mg     2.1       22 Jul 2019 06:44      PT/INR - ( 22 Jul 2019 06:44 )   PT: 11.7 sec;   INR: 1.07 ratio         PTT - ( 22 Jul 2019 06:44 )  PTT:33.3 sec    CAPILLARY BLOOD GLUCOSE          RADIOLOGY & ADDITIONAL TESTS:    Imaging Personally Reviewed:      [ ] Consultant(s) Notes Reviewed  [x] Care Discussed with Consultants/Other Providers:  Ortho PA- plan of care
INTERVAL HPI/OVERNIGHT EVENTS:  No acute events overnight.  Feeling well overall.  Pain controlled.  Tolerating PO with no nausea/vomiting.  Moving her bowels with no diarrhea.  US earlier this morning revealed a right popliteal DVT.      MEDICATIONS  (STANDING):  acetaminophen   Tablet .. 1000 milliGRAM(s) Oral every 8 hours  atorvastatin 20 milliGRAM(s) Oral at bedtime  celecoxib 200 milliGRAM(s) Oral every 12 hours  docusate sodium 100 milliGRAM(s) Oral three times a day  enoxaparin Injectable 110 milliGRAM(s) SubCutaneous two times a day  folic acid 1 milliGRAM(s) Oral daily  gabapentin 300 milliGRAM(s) Oral two times a day  levothyroxine 200 MICROGram(s) Oral daily  losartan 50 milliGRAM(s) Oral daily  polyethylene glycol 3350 17 Gram(s) Oral daily    MEDICATIONS  (PRN):  aluminum hydroxide/magnesium hydroxide/simethicone Suspension 30 milliLiter(s) Oral four times a day PRN Indigestion  bisacodyl Suppository 10 milliGRAM(s) Rectal daily PRN If no bowel movement by POD#2  HYDROmorphone  Injectable 0.5 milliGRAM(s) IV Push every 3 hours PRN Severe Pain (7 - 10)  magnesium hydroxide Suspension 30 milliLiter(s) Oral daily PRN Constipation  ondansetron Injectable 4 milliGRAM(s) IV Push every 6 hours PRN Nausea and/or Vomiting  oxyCODONE    IR 5 milliGRAM(s) Oral every 3 hours PRN Mild Pain (1 - 3)  oxyCODONE    IR 10 milliGRAM(s) Oral every 3 hours PRN Moderate Pain (4 - 6)  senna 2 Tablet(s) Oral at bedtime PRN Constipation      Allergies  No Known Allergies    REVIEW OF SYSTEMS:  slight hip pain but controlled, other ROS negative    Vital Signs Last 24 Hrs  T(C): 36.8 (20 Jul 2019 07:53), Max: 36.8 (20 Jul 2019 07:53)  T(F): 98.2 (20 Jul 2019 07:53), Max: 98.2 (20 Jul 2019 07:53)  HR: 62 (20 Jul 2019 07:53) (62 - 69)  BP: 117/73 (20 Jul 2019 07:53) (106/67 - 122/70)  BP(mean): --  RR: 17 (20 Jul 2019 07:53) (16 - 17)  SpO2: 99% (20 Jul 2019 07:53) (94% - 99%)    PHYSICAL EXAM:  GENERAL: middle age female in NAD  HEAD:  atraumatic  EYES: EOMI, conjunctiva and sclera clear  ENMT: No tonsillar erythema, exudates, or enlargement; Moist mucous membranes, Good dentition, No lesions  NECK: Supple, No JVD, Normal thyroid  NERVOUS SYSTEM:  Alert & Oriented X3, Good concentration; Motor Strength 5/5 B/L upper and lower extremities  CHEST/LUNG: Clear to auscultation bilaterally; No rales, rhonchi, wheezing, or rubs  HEART: Regular rate and rhythm; No murmurs, rubs, or gallops  ABDOMEN: Soft, Nontender, Nondistended; Bowel sounds present  EXTREMITIES:  2+ Peripheral Pulses, No clubbing, cyanosis, or edema  SKIN: No rashes or lesions    LABS:    Ca    9.1        19 Jul 2019 07:13
KAI THOMAS [id number]    Pt is a 73y year old Female s/p right THR. pain is 5/10. Tolerating regular diet, (+) voids.  Denies chest pain/shortness of breath/nausea/vomitting.     Vital Signs Last 24 Hrs  T(C): 36.6 (22 Jul 2019 07:32), Max: 36.6 (21 Jul 2019 15:33)  T(F): 97.9 (22 Jul 2019 07:32), Max: 97.9 (22 Jul 2019 07:32)  HR: 70 (22 Jul 2019 07:32) (70 - 71)  BP: 101/54 (22 Jul 2019 07:32) (101/54 - 129/77)  BP(mean): --  RR: 16 (22 Jul 2019 07:32) (16 - 17)  SpO2: 92% (22 Jul 2019 07:32) (92% - 97%)                              10.6   7.22  )-----------( 314      ( 22 Jul 2019 06:44 )             33.1     07-22    141  |  107  |  23  ----------------------------<  85  4.5   |  32<H>  |  0.85    Ca    8.9      22 Jul 2019 06:44  Phos  3.7     07-22  Mg     2.1     07-22    TPro  6.7  /  Alb  2.4<L>  /  TBili  0.5  /  DBili  x   /  AST  36  /  ALT  27  /  AlkPhos  100  07-21        PE:   RLE: Dressing changed, wound clean and intact, no erythema or drainage, prineo intact. Sensation intact to light touch distally, (+2) DP/PT pulses, EHL/FHL/TA intact, Capillary refill < 2 seconds. negative calf tenderness PAS on.    A: 73y year old Female s/p right THR POD#5    Plan:   -DVT ppx = PAS +  enoxaparin Injectable 110 milliGRAM(s) SubCutaneous two times a day    -PT/OT = OOB  -Hip dislocation precautions  -Pain control   -Medicine to follow   -Incentive spirometry  -+ DVT RLE, currently receiving therapeutic lovenox   dispo: D/C planning
ORTHOPEDIC PA PROGRESS NOTE  KAI THOMAS      73y Female                                 SY 2WST 218 02                                                                                                                           POD #    3d     STATUS POST:       Procedure: Right total hip replacement             Patient seen and examined at bedside.      Current Pain Management:    acetaminophen   Tablet .. 1000 milliGRAM(s) Oral every 8 hours  celecoxib 200 milliGRAM(s) Oral every 12 hours  gabapentin 300 milliGRAM(s) Oral two times a day  HYDROmorphone  Injectable 0.5 milliGRAM(s) IV Push every 3 hours PRN  ondansetron Injectable 4 milliGRAM(s) IV Push every 6 hours PRN  oxyCODONE    IR 5 milliGRAM(s) Oral every 3 hours PRN  oxyCODONE    IR 10 milliGRAM(s) Oral every 3 hours PRN      T(F): 98.2  HR: 62  BP: 117/73  RR: 17  SpO2: 99%               07-19-19 @ 07:01  -  07-20-19 @ 07:00  --------------------------------------------------------  IN:  Total IN: 0 mL    OUT:    Indwelling Catheter - Urethral: 2850 mL  Total OUT: 2850 mL    Total NET: -2850 mL        Physical Exam :    -   Dressing changed sterile.   -   Wound C/D/I.   -   Distal Neurvascular status intact grossly.   -   Warm well perfused; capillary refill <3 seconds   -   (+)EHL/FHL   -   (+) Sensation to light touch  -   (+) Calf tenderness LLE      A/P: 73y Female s/p Right total hip replacement; +DVT LLE     -   Ortho Stable  -   Pain control:  acetaminophen   Tablet .. 1000 milliGRAM(s) Oral every 8 hours  celecoxib 200 milliGRAM(s) Oral every 12 hours  gabapentin 300 milliGRAM(s) Oral two times a day  HYDROmorphone  Injectable 0.5 milliGRAM(s) IV Push every 3 hours PRN  ondansetron Injectable 4 milliGRAM(s) IV Push every 6 hours PRN  oxyCODONE    IR 5 milliGRAM(s) Oral every 3 hours PRN  oxyCODONE    IR 10 milliGRAM(s) Oral every 3 hours PRN    -   Medicine to follow  -   DVT LLE Discussed w Dr. Delmis will order ^Lovenox  -   DVT ppx:    PAS +  ^Lovenox  -   PT/OT OOB,  Weight bearing status: WBAT   -  Dispo:  STAGE 2 Cancelled  -   Prescribed Medications:  mupirocin 2% topical ointment: Apply topically in both nostrils 2 times a day x5 days
ORTHOPEDIC PA PROGRESS NOTE  KAI THOMAS      73y Female                                 SY 2WST 218 02                                                                                                                           POD #    4d STAGE 2 Cancelled    STATUS POST:       Procedure: Right total hip replacement             Patient seen and examined at bedside.      Current Pain Management:    acetaminophen   Tablet .. 1000 milliGRAM(s) Oral every 8 hours  celecoxib 200 milliGRAM(s) Oral every 12 hours  gabapentin 300 milliGRAM(s) Oral two times a day  HYDROmorphone  Injectable 0.5 milliGRAM(s) IV Push every 3 hours PRN  ondansetron Injectable 4 milliGRAM(s) IV Push every 6 hours PRN  oxyCODONE    IR 5 milliGRAM(s) Oral every 3 hours PRN  oxyCODONE    IR 10 milliGRAM(s) Oral every 3 hours PRN      T(F): 97.6  HR: 63  BP: 115/72  RR: 16  SpO2: 99%                         11.7   8.75  )-----------( 353      ( 21 Jul 2019 06:34 )             37.2         07-21    141  |  104  |  22  ----------------------------<  96  4.1   |  34<H>  |  0.75    Ca    9.1      21 Jul 2019 06:34  Phos  3.3     07-21  Mg     2.2     07-21    TPro  6.7  /  Alb  2.4<L>  /  TBili  0.5  /  DBili  x   /  AST  36  /  ALT  27  /  AlkPhos  100  07-21  PT/INR - ( 21 Jul 2019 06:34 )   PT: 11.2 sec;   INR: 1.03 ratio           07-20-19 @ 07:01  -  07-21-19 @ 07:00  --------------------------------------------------------  IN:  Total IN: 0 mL    OUT:    Voided: 400 mL  Total OUT: 400 mL    Total NET: -400 mL        Physical Exam :    -   Dressing changed sterile.   -   Wound C/D/I. prineo  -   Distal Neurvascular status intact grossly.   -   Warm well perfused; capillary refill <3 seconds   -   (+)EHL/FHL   -   (+) Sensation to light touch  -   (-) Calf tenderness Bilaterally      A/P: 73y Female s/p Right total hip replacement     -   Ortho Stable  -   Pain control:  acetaminophen   Tablet .. 1000 milliGRAM(s) Oral every 8 hours  celecoxib 200 milliGRAM(s) Oral every 12 hours  gabapentin 300 milliGRAM(s) Oral two times a day  HYDROmorphone  Injectable 0.5 milliGRAM(s) IV Push every 3 hours PRN  ondansetron Injectable 4 milliGRAM(s) IV Push every 6 hours PRN  oxyCODONE    IR 5 milliGRAM(s) Oral every 3 hours PRN  oxyCODONE    IR 10 milliGRAM(s) Oral every 3 hours PRN    -   Medicine ---> DVT RLE -->  ^Lov  -   DVT ppx:    PAS +  enoxaparin Injectable: 110 milliGRAM(s) SubCutaneous, enoxaparin Injectable: 110 milliGRAM(s) SubCutaneous  -   PT/OT OOB,  Weight bearing status: WBAT   -  Dispo:  [facility] Monday  -   Prescribed Medications:  mupirocin 2% topical ointment: Apply topically in both nostrils 2 times a day x5 days
Ortho PA - Post Op Check - S/P - Right THR with Spinal;  STAGED Left KNEE 7/22        Pt alert and comfortable with c/o 4 out of 10 pain in her right HIP (given IVP dilaudid)  Denies nausea     Vital Signs Last 24 Hrs  T(C): 36.8 (07-17-19 @ 15:05), Max: 36.8 (07-17-19 @ 15:05)  T(F): 98.3 (07-17-19 @ 15:05), Max: 98.3 (07-17-19 @ 15:05)  HR: 86 (07-17-19 @ 15:05) (63 - 86)  BP: 107/57 (07-17-19 @ 15:05) (105/57 - 144/72)  BP(mean): --  RR: 18 (07-17-19 @ 15:05) (12 - 20)  SpO2: 98% (07-17-19 @ 15:05) (97% - 100%)  I&O's Detail    17 Jul 2019 07:01  -  17 Jul 2019 16:07  --------------------------------------------------------  IN:    lactated ringers.: 1750 mL  Total IN: 1750 mL    OUT:    Estimated Blood Loss: 300 mL in OR  Total OUT: 300 mL    Total NET: 1450 mL        I&O's Summary    17 Jul 2019 07:01  -  17 Jul 2019 16:07  --------------------------------------------------------  IN: 1750 mL / OUT: 300 mL / NET: 1450 mL                       MEDICATIONS:acetaminophen  IVPB .. 1000 milliGRAM(s) IV Intermittent every 6 hours  aluminum hydroxide/magnesium hydroxide/simethicone Suspension 30 milliLiter(s) Oral four times a day PRN  atorvastatin 20 milliGRAM(s) Oral at bedtime  ceFAZolin   IVPB 2000 milliGRAM(s) IV Intermittent every 8 hours  docusate sodium 100 milliGRAM(s) Oral three times a day  folic acid 1 milliGRAM(s) Oral daily  gabapentin 300 milliGRAM(s) Oral two times a day  HYDROmorphone  Injectable 0.5 milliGRAM(s) IV Push every 3 hours PRN  lactated ringers. 1000 milliLiter(s) IV Continuous <Continuous>  levothyroxine 200 MICROGram(s) Oral daily  magnesium hydroxide Suspension 30 milliLiter(s) Oral daily PRN  ondansetron Injectable 4 milliGRAM(s) IV Push every 6 hours PRN  oxyCODONE    IR 5 milliGRAM(s) Oral every 3 hours PRN  oxyCODONE    IR 10 milliGRAM(s) Oral every 3 hours PRN  polyethylene glycol 3350 17 Gram(s) Oral daily  senna 2 Tablet(s) Oral at bedtime PRN    Anticoagulation: PAS to LE's      Antibiotics:   ceFAZolin   IVPB 2000 milliGRAM(s) IV Intermittent every 8 hours for 2 more doses postop      Pain medications:   acetaminophen  IVPB .. 1000 milliGRAM(s) IV Intermittent every 6 hours  gabapentin 300 milliGRAM(s) Oral two times a day  HYDROmorphone  Injectable 0.5 milliGRAM(s) IV Push every 3 hours PRN  ondansetron Injectable 4 milliGRAM(s) IV Push every 6 hours PRN  oxyCODONE    IR 5 milliGRAM(s) Oral every 3 hours PRN  oxyCODONE    IR 10 milliGRAM(s) Oral every 3 hours PRN          PE:  Right Hip-abduction pillow in place.  Primary surgical bandage dry and intact.  Feet mobile and sensate.  DP pulse 2+ right foot.  PAS on LE's.  Calves soft and nontender.    A/P: Ortho stable;  STAGE 2 left total KNEE on 7/22  - Continue post-op orders; pain management with above plan  - Check labs today and in A.M.  - DVT prevention with Lovenox 40 mg daily starting in AM until 7/21  - PT /OT for OOB, full WBAT  - Medical consult with Hospitalist pending.  -Will continue to monitor closely with attendings.
POST OPERATIVE DAY #: 1    73y Female  s/p  Right  Posterior THR                        SUBJECTIVE: Patient seen and examined at bedside. s/p straight cath last night for urinary retention. voided to day with minimal residual, will continue to monitor.     Pain:  well controlled       Pain scale:   3/10  Denies CP, SOB, N/V/D, weakness, numbness   No new complains     OBJECTIVE:     Vital Signs Last 24 Hrs  T(C): 36.6 (18 Jul 2019 07:45), Max: 37 (17 Jul 2019 23:48)  T(F): 97.8 (18 Jul 2019 07:45), Max: 98.6 (17 Jul 2019 23:48)  HR: 63 (18 Jul 2019 07:45) (63 - 88)  BP: 105/66 (18 Jul 2019 07:45) (105/57 - 144/72)  BP(mean): --  RR: 16 (18 Jul 2019 07:45) (12 - 20)  SpO2: 98% (18 Jul 2019 07:45) (93% - 100%)    Affected extremity: RLE         Dressing: clean/dry/intact                          Sensation: intact to light touch          Motor exam:   5/ 5 Tibialis Anterior/Gastrocnemius-Soleus, EHL/FHL         warm, well-perfused; capillary refill < 3 seconds         negative calf tenderness B/L LE    LABS:                        11.2   14.78 )-----------( 246      ( 18 Jul 2019 07:18 )             34.1     07-18    137  |  103  |  21  ----------------------------<  128<H>  4.7   |  26  |  0.77    Ca    9.2      18 Jul 2019 07:18        I&O's Detail    17 Jul 2019 07:01  -  18 Jul 2019 07:00  --------------------------------------------------------  IN:    lactated ringers.: 1750 mL  Total IN: 1750 mL    OUT:    Estimated Blood Loss: 300 mL    Intermittent Catheterization - Urethral: 1300 mL  Total OUT: 1600 mL    Total NET: 150 mL      18 Jul 2019 07:01  -  18 Jul 2019 08:36  --------------------------------------------------------  IN:  Total IN: 0 mL    OUT:    Voided: 280 mL  Total OUT: 280 mL    Total NET: -280 mL          MEDICATIONS:      Pain management:  acetaminophen   Tablet .. 1000 milliGRAM(s) Oral every 8 hours  celecoxib 200 milliGRAM(s) Oral every 12 hours  gabapentin 300 milliGRAM(s) Oral two times a day  HYDROmorphone  Injectable 0.5 milliGRAM(s) IV Push every 3 hours PRN  ondansetron Injectable 4 milliGRAM(s) IV Push every 6 hours PRN  oxyCODONE    IR 5 milliGRAM(s) Oral every 3 hours PRN  oxyCODONE    IR 10 milliGRAM(s) Oral every 3 hours PRN    DVT prophylaxis:   enoxaparin Injectable 40 milliGRAM(s) SubCutaneous daily      RADIOLOGY & ADDITIONAL STUDIES:    ASSESSMENT AND PLAN:     - Analgesic pain control  - DVT prophylaxis:  Lovenox 40mg daily    SCDs       - HO prophylaxis: Celebrex 200mg twice a day x 21 days   - PT/OT: Weight Bearing Status:  Weight bearing as tolerated, OOBTC          Posteiror Hip precautions   Abduction pillow     -  Incentive spirometry  - Advance diet as tolerated  - Hospitalist is following  -  Follow up labs  -  Disposition: staged for Left TKR for next week
Patient is a 73y old  Female who presents with a chief complaint of     INTERVAL HPI/OVERNIGHT EVENTS: feeling well, no complaints.  pain controlled. had some urinary retention last night.  still feels like she needs to urinate and cannot.     MEDICATIONS  (STANDING):  acetaminophen   Tablet .. 1000 milliGRAM(s) Oral every 8 hours  atorvastatin 20 milliGRAM(s) Oral at bedtime  celecoxib 200 milliGRAM(s) Oral every 12 hours  docusate sodium 100 milliGRAM(s) Oral three times a day  enoxaparin Injectable 40 milliGRAM(s) SubCutaneous daily  folic acid 1 milliGRAM(s) Oral daily  gabapentin 300 milliGRAM(s) Oral two times a day  levothyroxine 200 MICROGram(s) Oral daily  polyethylene glycol 3350 17 Gram(s) Oral daily    MEDICATIONS  (PRN):  aluminum hydroxide/magnesium hydroxide/simethicone Suspension 30 milliLiter(s) Oral four times a day PRN Indigestion  HYDROmorphone  Injectable 0.5 milliGRAM(s) IV Push every 3 hours PRN Severe Pain (7 - 10)  magnesium hydroxide Suspension 30 milliLiter(s) Oral daily PRN Constipation  ondansetron Injectable 4 milliGRAM(s) IV Push every 6 hours PRN Nausea and/or Vomiting  oxyCODONE    IR 5 milliGRAM(s) Oral every 3 hours PRN Mild Pain (1 - 3)  oxyCODONE    IR 10 milliGRAM(s) Oral every 3 hours PRN Moderate Pain (4 - 6)  senna 2 Tablet(s) Oral at bedtime PRN Constipation    Allergies  No Known Allergies    REVIEW OF SYSTEMS:  CONSTITUTIONAL: No fever, weight loss, or fatigue  EYES: No eye pain, visual disturbances, or discharge  ENMT:  No difficulty hearing, tinnitus, vertigo; No sinus or throat pain  NECK: No pain or stiffness  BREASTS: No pain, masses, or nipple discharge  RESPIRATORY: No cough, wheezing, chills or hemoptysis; No shortness of breath  CARDIOVASCULAR: No chest pain, palpitations, or lightheadedness  GASTROINTESTINAL: No abdominal or epigastric pain. No nausea, vomiting, or hematemesis; No diarrhea or constipation. No melena or hematochezia.  GENITOURINARY: see hpi  NEUROLOGICAL: No headaches, vertigo, memory loss, loss of strength, numbness, or tremors  SKIN: No itching, burning, rashes, or lesions   LYMPH NODES: No enlarged glands  ENDOCRINE: No heat or cold intolerance; No hair loss; No polydipsia or polyuria  MUSCULOSKELETAL: No back pain  PSYCHIATRIC: No depression, anxiety, or mood swings  HEME/LYMPH: No easy bruising, or bleeding gums  ALLERGY AND IMMUNOLOGIC: No hives or eczema    Vital Signs Last 24 Hrs  T(C): 36.7 (18 Jul 2019 11:46), Max: 37 (17 Jul 2019 23:48)  T(F): 98 (18 Jul 2019 11:46), Max: 98.6 (17 Jul 2019 23:48)  HR: 62 (18 Jul 2019 11:46) (62 - 88)  BP: 102/62 (18 Jul 2019 11:46) (102/62 - 129/82)  BP(mean): --  RR: 16 (18 Jul 2019 11:46) (14 - 18)  SpO2: 97% (18 Jul 2019 11:46) (93% - 100%)    PHYSICAL EXAM:  GENERAL: NAD, well-groomed, well-developed  HEAD:  Atraumatic, Normocephalic  EYES: EOMI, PERRLA, conjunctiva and sclera clear  ENMT: Moist mucous membranes  NECK: Supple, No JVD  NERVOUS SYSTEM:  Alert & Oriented X3, Good concentration; Bilateral LE mobile, sensation to light touch intact  CHEST/LUNG: Clear to auscultation bilaterally; No rales, rhonchi, wheezing, or rubs  HEART: Regular rate and rhythm; No murmurs, rubs, or gallops  ABDOMEN: Soft, Nontender, Nondistended; Bowel sounds present  EXTREMITIES:  2+ Peripheral Pulses, No clubbing or cyanosis  LYMPH: No lymphadenopathy noted  SKIN: No rashes or lesions  INCISION:  Dressing dry and intact    LABS:                        11.2   14.78 )-----------( 246      ( 18 Jul 2019 07:18 )             34.1     18 Jul 2019 07:18    137    |  103    |  21     ----------------------------<  128    4.7     |  26     |  0.77     Ca    9.2        18 Jul 2019 07:18          CAPILLARY BLOOD GLUCOSE          RADIOLOGY & ADDITIONAL TESTS:    Imaging Personally Reviewed:      [ ] Consultant(s) Notes Reviewed  [x] Care Discussed with Consultants/Other Providers:  Ortho PA- plan of care
Patient is a 73y old  Female who presents with a chief complaint of right hip pain, left knee pain (19 Jul 2019 10:01)    INTERVAL HPI/OVERNIGHT EVENTS: feeling well, pain controlled.  needed velasquez placement last night.     MEDICATIONS  (STANDING):  acetaminophen   Tablet .. 1000 milliGRAM(s) Oral every 8 hours  atorvastatin 20 milliGRAM(s) Oral at bedtime  celecoxib 200 milliGRAM(s) Oral every 12 hours  docusate sodium 100 milliGRAM(s) Oral three times a day  enoxaparin Injectable 40 milliGRAM(s) SubCutaneous daily  folic acid 1 milliGRAM(s) Oral daily  gabapentin 300 milliGRAM(s) Oral two times a day  levothyroxine 200 MICROGram(s) Oral daily  losartan 50 milliGRAM(s) Oral daily  polyethylene glycol 3350 17 Gram(s) Oral daily    MEDICATIONS  (PRN):  aluminum hydroxide/magnesium hydroxide/simethicone Suspension 30 milliLiter(s) Oral four times a day PRN Indigestion  bisacodyl Suppository 10 milliGRAM(s) Rectal daily PRN If no bowel movement by POD#2  HYDROmorphone  Injectable 0.5 milliGRAM(s) IV Push every 3 hours PRN Severe Pain (7 - 10)  magnesium hydroxide Suspension 30 milliLiter(s) Oral daily PRN Constipation  ondansetron Injectable 4 milliGRAM(s) IV Push every 6 hours PRN Nausea and/or Vomiting  oxyCODONE    IR 5 milliGRAM(s) Oral every 3 hours PRN Mild Pain (1 - 3)  oxyCODONE    IR 10 milliGRAM(s) Oral every 3 hours PRN Moderate Pain (4 - 6)  senna 2 Tablet(s) Oral at bedtime PRN Constipation      Allergies  No Known Allergies    REVIEW OF SYSTEMS:  CONSTITUTIONAL: No fever, weight loss, or fatigue  EYES: No eye pain, visual disturbances, or discharge  ENMT:  No difficulty hearing, tinnitus, vertigo; No sinus or throat pain  NECK: No pain or stiffness  BREASTS: No pain, masses, or nipple discharge  RESPIRATORY: No cough, wheezing, chills or hemoptysis; No shortness of breath  CARDIOVASCULAR: No chest pain, palpitations, or lightheadedness  GASTROINTESTINAL: No abdominal or epigastric pain. No nausea, vomiting, or hematemesis; No diarrhea or constipation. No melena or hematochezia.  GENITOURINARY: No dysuria, frequency, hematuria, or incontinence  NEUROLOGICAL: No headaches, vertigo, memory loss, loss of strength, numbness, or tremors  SKIN: No itching, burning, rashes, or lesions   LYMPH NODES: No enlarged glands  ENDOCRINE: No heat or cold intolerance; No hair loss; No polydipsia or polyuria  MUSCULOSKELETAL: No back pain  PSYCHIATRIC: No depression, anxiety, or mood swings  HEME/LYMPH: No easy bruising, or bleeding gums  ALLERGY AND IMMUNOLOGIC: No hives or eczema    Vital Signs Last 24 Hrs  T(C): 36.4 (19 Jul 2019 11:07), Max: 36.6 (18 Jul 2019 15:30)  T(F): 97.6 (19 Jul 2019 11:07), Max: 97.8 (18 Jul 2019 15:30)  HR: 67 (19 Jul 2019 11:07) (61 - 71)  BP: 137/76 (19 Jul 2019 11:07) (109/59 - 137/76)  BP(mean): --  RR: 17 (19 Jul 2019 11:07) (15 - 18)  SpO2: 98% (19 Jul 2019 11:07) (95% - 100%)    PHYSICAL EXAM:  GENERAL: NAD, well-groomed, well-developed  HEAD:  Atraumatic, Normocephalic  EYES:  conjunctiva and sclera clear  ENMT: Moist mucous membranes,  NECK: Supple, No JVD, Normal thyroid  NERVOUS SYSTEM:  Alert & Oriented X3, Good concentration; Bilateral LE mobile, sensation to light touch intact  CHEST/LUNG: Clear to auscultation bilaterally;   HEART: Regular rate and rhythm;   ABDOMEN: Soft, Nontender, Nondistended; Bowel sounds present  EXTREMITIES:  2+ Peripheral Pulses, No clubbing or cyanosis  LYMPH: No lymphadenopathy noted  SKIN: No rashes or lesions  INCISION:  Dressing dry and intact    LABS:                        10.3   12.98 )-----------( 244      ( 19 Jul 2019 07:13 )             31.6     19 Jul 2019 07:13    141    |  107    |  24     ----------------------------<  110    4.4     |  30     |  0.76     Ca    9.1        19 Jul 2019 07:13    CAPILLARY BLOOD GLUCOSE      RADIOLOGY & ADDITIONAL TESTS:    Imaging Personally Reviewed:      [ ] Consultant(s) Notes Reviewed  [x] Care Discussed with Consultants/Other Providers:  Ortho PA- plan of care
Procedure: Right  Posterior THR  POD#: 2    S: Pt without complaints. No SOB,CP, N/V. Tolerated Fluids / Diet well.   Pain comfortable on Interval Rx + Tylenol + Celebrex. No BM yet  + flatus, No abdominal pain.  Simmons placed last night for retention  acetaminophen   Tablet .. 1000 milliGRAM(s) Oral every 8 hours  celecoxib 200 milliGRAM(s) Oral every 12 hours  gabapentin 300 milliGRAM(s) Oral two times a day  HYDROmorphone  Injectable 0.5 milliGRAM(s) IV Push every 3 hours PRN  ondansetron Injectable 4 milliGRAM(s) IV Push every 6 hours PRN  oxyCODONE    IR 5 milliGRAM(s) Oral every 3 hours PRN  oxyCODONE    IR 10 milliGRAM(s) Oral every 3 hours PRN    O: General: Pt Alert and oriented, On exam NAD,   VS: Vital Signs Last 24 Hrs  T(C): 36.5 (19 Jul 2019 07:11), Max: 36.7 (18 Jul 2019 11:46)  T(F): 97.7 (19 Jul 2019 07:11), Max: 98 (18 Jul 2019 11:46)  HR: 61 (19 Jul 2019 07:11) (61 - 71)  BP: 109/59 (19 Jul 2019 07:11) (102/62 - 119/77)  BP(mean): --  RR: 17 (19 Jul 2019 07:11) (15 - 18)  SpO2: 96% (19 Jul 2019 07:11) (95% - 100%)  Heart: RRR 2/6 syst murmur  Lungs: BS clear bilat.  Abdomen: Soft; no distention, benign exam    Ext Right  Post. Hip  Posterior Incision clean, dry, & intact, ;  redressed.   Neurologic: Has sensation bilat. feet & toes ;  Full AROM bilat feet & toes. EHL / AT  = Bilat: 5/5   Vascular: Feet toes warm, pink. DP = 2+. Calves soft ; w/o tenderness bilat..  VTEP: On Bilat. Venodynes + enoxaparin Injectable 40 milliGRAM(s) SubCutaneous daily     H.O Prophylaxis: Celebrex 200mg BID x Goal 21 Days  Activity in PT  Noted. Walked 150 ft. with walker                          10.3   12.98 )-----------( 244      ( 19 Jul 2019 07:13 )             31.6     07-19    141  |  107  |  24<H>  ----------------------------<  110<H>  4.4   |  30  |  0.76    Ca    9.1      19 Jul 2019 07:13        Hospitalist input noted    Primary Orthopedic Assessment:  • Stable from Orthopedic perspective  • Neuro motor exam stable  • Labs: stable, post op anemia as expected.      Plan:   • Continue:  PT/OT/Weightbearing as tolerated with assistance of a walker/Posterior THR precautions/Ice to hip/          Incentive spirometry encouraged / Celebrex for H.O  • Continue DVT prophylaxis as prescribed, including use of compression devices and ankle pumps  • Continue Pain Rx  • Posterior THR hip precautions reviewed with patient  • Plans per Medicine / Anesthesia / Cardiology  • Discharge planning – anticipated discharge is  subacute rehabilitation when medically stable & cleared by PT/OT, after left TKA next week  -- to have doppler this weekend as per routine

## 2019-07-22 NOTE — DISCHARGE NOTE PROVIDER - NSDCCPCAREPLAN_GEN_ALL_CORE_FT
PRINCIPAL DISCHARGE DIAGNOSIS  Diagnosis: History of total hip replacement, right  Assessment and Plan of Treatment: PRINCIPAL DISCHARGE DIAGNOSIS  Diagnosis: History of total hip replacement, right  Assessment and Plan of Treatment:       SECONDARY DISCHARGE DIAGNOSES  Diagnosis: DVT, lower extremity  Assessment and Plan of Treatment: Patient started Tx on 7/20.  Needs 7 days Eliquis 10mg BID through 7/26.  on 7/27 switch to 5mg BID for 3 months therapy minimum.   patient needs to f/u with her rheum about hypercoagulable work up. PRINCIPAL DISCHARGE DIAGNOSIS  Diagnosis: History of total hip replacement, right  Assessment and Plan of Treatment:       SECONDARY DISCHARGE DIAGNOSES  Diagnosis: DVT, lower extremity  Assessment and Plan of Treatment: Patient started Tx on 7/20.  Needs 7 days Eliquis 10mg BID through 7/26.  on 7/27 switch to 5mg BID for 3 months therapy minimum.   patient needs to f/u with her rheum about hypercoagulable work up.    Diagnosis: Systemic lupus erythematosus, unspecified SLE type, unspecified organ involvement status  Assessment and Plan of Treatment: - restart plaquenil 3-5 days   - restart methotrexate 3 weeks post surgery

## 2019-07-22 NOTE — DISCHARGE NOTE PROVIDER - NSDCFUADDINST_GEN_ALL_CORE_FT
Physical Therapy /Occupational Therapy for: Ambulation, Transfers , Stairs, Range of motion, isometrics ADLs (activities of daily living)  TOTAL HIP PRECAUTIONS-Weight bearing as tolerated.  Continue abduction pillow while in bed, do not bend hip >90 degrees, sit in hip chair only.  Keep incision area clean and dry you may shower post operative day 5 if no drainage present.  Follow up in 2 weeks for prineo removal.  Follow up with your primary care phsyician within 2-3 weeks of discharge home   DVT- Patient will be on Eliquis 10mg twice daily for 5 more days while in rehab then change to Eliquis 5mg twice daily for 3 months.    Patient has been instructed to follow up with Vascular surgeon upon discharge from rehab facility

## 2019-07-22 NOTE — DISCHARGE NOTE NURSING/CASE MANAGEMENT/SOCIAL WORK - NSDCDPATPORTLINK_GEN_ALL_CORE
You can access the [facility], offered by [facility], by registering with the following website: [email or web address]

## 2019-07-23 LAB
B2 GLYCOPROT1 AB SER QL: NEGATIVE — SIGNIFICANT CHANGE UP
CARDIOLIPIN AB SER-ACNC: NEGATIVE — SIGNIFICANT CHANGE UP
DRVVT SCREEN TO CONFIRM RATIO: SIGNIFICANT CHANGE UP
LA NT DPL PPP QL: 32.2 SEC — SIGNIFICANT CHANGE UP

## 2019-07-24 LAB
PS IGA SER-ACNC: <20 U/ML — SIGNIFICANT CHANGE UP
PS IGG SER-ACNC: <10 U/ML — SIGNIFICANT CHANGE UP
PS IGM SER-ACNC: <25 U/ML — SIGNIFICANT CHANGE UP

## 2019-08-05 ENCOUNTER — APPOINTMENT (OUTPATIENT)
Dept: ORTHOPEDIC SURGERY | Facility: CLINIC | Age: 74
End: 2019-08-05
Payer: MEDICARE

## 2019-08-05 VITALS — DIASTOLIC BLOOD PRESSURE: 69 MMHG | HEART RATE: 67 BPM | SYSTOLIC BLOOD PRESSURE: 122 MMHG

## 2019-08-05 PROCEDURE — 99024 POSTOP FOLLOW-UP VISIT: CPT

## 2019-08-05 PROCEDURE — 73502 X-RAY EXAM HIP UNI 2-3 VIEWS: CPT

## 2019-08-07 ENCOUNTER — APPOINTMENT (OUTPATIENT)
Dept: CARDIOLOGY | Facility: CLINIC | Age: 74
End: 2019-08-07

## 2019-08-08 NOTE — PRE-OP CHECKLIST - MUPIRONCIN START DATE
-on oral hypoglycemics, hold inpatient  -continue with ISS. F/u hga1c, if FS remain elevated, should start on basal bolus
12-Jul-2019

## 2019-08-15 ENCOUNTER — NON-APPOINTMENT (OUTPATIENT)
Age: 74
End: 2019-08-15

## 2019-08-15 ENCOUNTER — APPOINTMENT (OUTPATIENT)
Dept: INTERNAL MEDICINE | Facility: CLINIC | Age: 74
End: 2019-08-15
Payer: MEDICARE

## 2019-08-15 ENCOUNTER — APPOINTMENT (OUTPATIENT)
Dept: CARDIOLOGY | Facility: CLINIC | Age: 74
End: 2019-08-15
Payer: MEDICARE

## 2019-08-15 VITALS
DIASTOLIC BLOOD PRESSURE: 70 MMHG | TEMPERATURE: 97.8 F | OXYGEN SATURATION: 99 % | SYSTOLIC BLOOD PRESSURE: 118 MMHG | HEART RATE: 109 BPM | BODY MASS INDEX: 36.57 KG/M2 | HEIGHT: 67 IN | RESPIRATION RATE: 12 BRPM | WEIGHT: 233 LBS

## 2019-08-15 PROCEDURE — 99215 OFFICE O/P EST HI 40 MIN: CPT

## 2019-08-15 PROCEDURE — 99214 OFFICE O/P EST MOD 30 MIN: CPT

## 2019-08-15 NOTE — PHYSICAL EXAM
[No Deformities] : no deformities [Normal Conjunctiva] : the conjunctiva exhibited no abnormalities [Eyelids - No Xanthelasma] : the eyelids demonstrated no xanthelasmas [Normal Oral Mucosa] : normal oral mucosa [No Oral Pallor] : no oral pallor [No Oral Cyanosis] : no oral cyanosis [Normal Jugular Venous A Waves Present] : normal jugular venous A waves present [Normal Jugular Venous V Waves Present] : normal jugular venous V waves present [No Jugular Venous Valerio A Waves] : no jugular venous valerio A waves [Respiration, Rhythm And Depth] : normal respiratory rhythm and effort [Exaggerated Use Of Accessory Muscles For Inspiration] : no accessory muscle use [Auscultation Breath Sounds / Voice Sounds] : lungs were clear to auscultation bilaterally [Heart Sounds] : normal S1 and S2 [Heart Rate And Rhythm] : heart rate and rhythm were normal [Systolic grade ___/6] : A grade [unfilled]/6 systolic murmur was heard. [Abdomen Soft] : soft [Abdomen Tenderness] : non-tender [Abdomen Mass (___ Cm)] : no abdominal mass palpated [Abnormal Walk] : normal gait [Gait - Sufficient For Exercise Testing] : the gait was sufficient for exercise testing [Nail Clubbing] : no clubbing of the fingernails [Petechial Hemorrhages (___cm)] : no petechial hemorrhages [Cyanosis, Localized] : no localized cyanosis [FreeTextEntry1] : nonpitting edema [] : no rash [Skin Color & Pigmentation] : normal skin color and pigmentation [No Venous Stasis] : no venous stasis [Skin Lesions] : no skin lesions [No Xanthoma] : no  xanthoma was observed [No Skin Ulcers] : no skin ulcer [Oriented To Time, Place, And Person] : oriented to person, place, and time [Affect] : the affect was normal [Mood] : the mood was normal [No Anxiety] : not feeling anxious

## 2019-08-15 NOTE — HISTORY OF PRESENT ILLNESS
[FreeTextEntry1] : Marely  had her rigth hip surgery but developed DVT in that leg at rehab. Now knee surgery on hold. She denies any new symptoms of chest pain, palpitations or shortness of breath. She is in good spirits today.

## 2019-08-15 NOTE — DISCUSSION/SUMMARY
[FreeTextEntry1] : The patient is a 73-year-old obese female history of rheumatoid arthritis,  lupus, hypothyroidism, hyperlipidemia, hypertension, lower extremity edema ,RBBB, s/p left lobectomy for cancer s/p rt hip replacement complicated by DVT.\par #1 RA- on MTX and plaquenil, ambulates with walker\par #2 Htn- stable on losartan 50/12.5mg\par #3 Lipids- on atorvastatin\par #4 Hypothyroidism- on levothyroxine\par #5 CV- no angina or HF, ECG with RBBB unchanged\par #6 Ortho- s/p right hip replacement, now with right DVT on eliquis, no bleeding, postpone surgery for three months on left knee, recommend appointment with Dr. Hoyos prior to surgery\par \par 6/27/19 Addendum: ECHO normal LV function. There are no cardiac contraindications to hip and knee surgery as planned. \par \par  [___ Month(s)] : [unfilled] month(s)

## 2019-08-15 NOTE — REASON FOR VISIT
[Follow-Up - From Hospitalization] : follow-up of a recent hospitalization for [FreeTextEntry1] : DVT

## 2019-08-19 LAB
ALBUMIN SERPL ELPH-MCNC: 3.6 G/DL
ALP BLD-CCNC: 113 U/L
ALT SERPL-CCNC: 8 U/L
ANION GAP SERPL CALC-SCNC: 12 MMOL/L
AST SERPL-CCNC: 16 U/L
BASOPHILS # BLD AUTO: 0.1 K/UL
BASOPHILS NFR BLD AUTO: 1.4 %
BILIRUB SERPL-MCNC: 0.3 MG/DL
BUN SERPL-MCNC: 43 MG/DL
CALCIUM SERPL-MCNC: 9.2 MG/DL
CHLORIDE SERPL-SCNC: 105 MMOL/L
CO2 SERPL-SCNC: 24 MMOL/L
CREAT SERPL-MCNC: 0.95 MG/DL
EOSINOPHIL # BLD AUTO: 0.22 K/UL
EOSINOPHIL NFR BLD AUTO: 3.2 %
GLUCOSE SERPL-MCNC: 134 MG/DL
HCT VFR BLD CALC: 39.5 %
HGB BLD-MCNC: 11.8 G/DL
IMM GRANULOCYTES NFR BLD AUTO: 0.1 %
LYMPHOCYTES # BLD AUTO: 1.56 K/UL
LYMPHOCYTES NFR BLD AUTO: 22.4 %
MAN DIFF?: NORMAL
MCHC RBC-ENTMCNC: 29.9 GM/DL
MCHC RBC-ENTMCNC: 30.3 PG
MCV RBC AUTO: 101.3 FL
MONOCYTES # BLD AUTO: 0.93 K/UL
MONOCYTES NFR BLD AUTO: 13.4 %
NEUTROPHILS # BLD AUTO: 4.13 K/UL
NEUTROPHILS NFR BLD AUTO: 59.5 %
PLATELET # BLD AUTO: 354 K/UL
POTASSIUM SERPL-SCNC: 3.5 MMOL/L
PROT SERPL-MCNC: 7.1 G/DL
RBC # BLD: 3.9 M/UL
RBC # FLD: 13.7 %
SODIUM SERPL-SCNC: 141 MMOL/L
WBC # FLD AUTO: 6.95 K/UL

## 2019-09-07 NOTE — PLAN
[FreeTextEntry1] : 1. RA\par continue MTX/ Plaquenil \par rheumatology follow up\par 2. s/p RTHR \par PT/ orto follow up\par 3. DVT\par continue Eliquis \par 4. Htn\par Losartan/ HCTZ 50/12.5 mg QD \par cardio follow up \par 5. Hld\par continue statins\par will order labs today

## 2019-09-07 NOTE — HISTORY OF PRESENT ILLNESS
[de-identified] : 73 year old female with h/o RA/ SLE/ Htn/Hld/ Hypothyroidism presents for follow up after recent on 7/17/19 Rt THR >complicated by  post OP DVT >pt  on Eliquis > Left THR on hold for now. Pt doing well post op. She denies CP/SOB, dizziness, N, V, abd pain. \par She ambulates with walker

## 2019-09-10 ENCOUNTER — INBOUND DOCUMENT (OUTPATIENT)
Age: 74
End: 2019-09-10

## 2019-09-17 ENCOUNTER — APPOINTMENT (OUTPATIENT)
Dept: ORTHOPEDIC SURGERY | Facility: CLINIC | Age: 74
End: 2019-09-17
Payer: MEDICARE

## 2019-09-17 VITALS
HEART RATE: 68 BPM | BODY MASS INDEX: 36.57 KG/M2 | WEIGHT: 233 LBS | DIASTOLIC BLOOD PRESSURE: 65 MMHG | SYSTOLIC BLOOD PRESSURE: 112 MMHG | HEIGHT: 67 IN

## 2019-09-17 DIAGNOSIS — M17.12 UNILATERAL PRIMARY OSTEOARTHRITIS, LEFT KNEE: ICD-10-CM

## 2019-09-17 DIAGNOSIS — Z96.641 PRESENCE OF RIGHT ARTIFICIAL HIP JOINT: ICD-10-CM

## 2019-09-17 PROCEDURE — 73502 X-RAY EXAM HIP UNI 2-3 VIEWS: CPT

## 2019-09-17 PROCEDURE — 99024 POSTOP FOLLOW-UP VISIT: CPT

## 2019-09-19 PROCEDURE — 85027 COMPLETE CBC AUTOMATED: CPT

## 2019-09-19 PROCEDURE — 97165 OT EVAL LOW COMPLEX 30 MIN: CPT

## 2019-09-19 PROCEDURE — 83735 ASSAY OF MAGNESIUM: CPT

## 2019-09-19 PROCEDURE — 88305 TISSUE EXAM BY PATHOLOGIST: CPT

## 2019-09-19 PROCEDURE — 86147 CARDIOLIPIN ANTIBODY EA IG: CPT

## 2019-09-19 PROCEDURE — 85014 HEMATOCRIT: CPT

## 2019-09-19 PROCEDURE — 80048 BASIC METABOLIC PNL TOTAL CA: CPT

## 2019-09-19 PROCEDURE — 97535 SELF CARE MNGMENT TRAINING: CPT

## 2019-09-19 PROCEDURE — 36415 COLL VENOUS BLD VENIPUNCTURE: CPT

## 2019-09-19 PROCEDURE — 97110 THERAPEUTIC EXERCISES: CPT

## 2019-09-19 PROCEDURE — 80053 COMPREHEN METABOLIC PANEL: CPT

## 2019-09-19 PROCEDURE — 86146 BETA-2 GLYCOPROTEIN ANTIBODY: CPT

## 2019-09-19 PROCEDURE — 85730 THROMBOPLASTIN TIME PARTIAL: CPT

## 2019-09-19 PROCEDURE — 84100 ASSAY OF PHOSPHORUS: CPT

## 2019-09-19 PROCEDURE — 93970 EXTREMITY STUDY: CPT

## 2019-09-19 PROCEDURE — 85613 RUSSELL VIPER VENOM DILUTED: CPT

## 2019-09-19 PROCEDURE — C1776: CPT

## 2019-09-19 PROCEDURE — 86148 ANTI-PHOSPHOLIPID ANTIBODY: CPT

## 2019-09-19 PROCEDURE — C1713: CPT

## 2019-09-19 PROCEDURE — 97530 THERAPEUTIC ACTIVITIES: CPT

## 2019-09-19 PROCEDURE — 85018 HEMOGLOBIN: CPT

## 2019-09-19 PROCEDURE — 97161 PT EVAL LOW COMPLEX 20 MIN: CPT

## 2019-09-19 PROCEDURE — 88311 DECALCIFY TISSUE: CPT

## 2019-09-19 PROCEDURE — 85610 PROTHROMBIN TIME: CPT

## 2019-09-19 PROCEDURE — 97116 GAIT TRAINING THERAPY: CPT

## 2019-09-19 PROCEDURE — 73502 X-RAY EXAM HIP UNI 2-3 VIEWS: CPT

## 2019-10-04 ENCOUNTER — APPOINTMENT (OUTPATIENT)
Dept: CARDIOLOGY | Facility: CLINIC | Age: 74
End: 2019-10-04
Payer: MEDICARE

## 2019-10-04 VITALS
TEMPERATURE: 98.1 F | SYSTOLIC BLOOD PRESSURE: 109 MMHG | HEIGHT: 67 IN | OXYGEN SATURATION: 97 % | RESPIRATION RATE: 12 BRPM | HEART RATE: 67 BPM | DIASTOLIC BLOOD PRESSURE: 67 MMHG

## 2019-10-04 PROCEDURE — 99214 OFFICE O/P EST MOD 30 MIN: CPT

## 2019-10-04 NOTE — PHYSICAL EXAM
[Normal Appearance] : normal appearance [General Appearance - Well Developed] : well developed [Well Groomed] : well groomed [General Appearance - Well Nourished] : well nourished [No Deformities] : no deformities [General Appearance - In No Acute Distress] : no acute distress [Normal Conjunctiva] : the conjunctiva exhibited no abnormalities [Eyelids - No Xanthelasma] : the eyelids demonstrated no xanthelasmas [No Oral Pallor] : no oral pallor [Normal Oral Mucosa] : normal oral mucosa [No Oral Cyanosis] : no oral cyanosis [Normal Jugular Venous V Waves Present] : normal jugular venous V waves present [Normal Jugular Venous A Waves Present] : normal jugular venous A waves present [Heart Sounds] : normal S1 and S2 [No Jugular Venous Valerio A Waves] : no jugular venous valerio A waves [Heart Rate And Rhythm] : heart rate and rhythm were normal [Murmurs] : no murmurs present [Respiration, Rhythm And Depth] : normal respiratory rhythm and effort [Exaggerated Use Of Accessory Muscles For Inspiration] : no accessory muscle use [Abdomen Soft] : soft [Abdomen Tenderness] : non-tender [Auscultation Breath Sounds / Voice Sounds] : lungs were clear to auscultation bilaterally [Abdomen Mass (___ Cm)] : no abdominal mass palpated [Nail Clubbing] : no clubbing of the fingernails [Cyanosis, Localized] : no localized cyanosis [Petechial Hemorrhages (___cm)] : no petechial hemorrhages [FreeTextEntry1] : 1 + edema bilaterally  [Skin Color & Pigmentation] : normal skin color and pigmentation [] : no rash [No Venous Stasis] : no venous stasis [No Skin Ulcers] : no skin ulcer [Skin Lesions] : no skin lesions [No Xanthoma] : no  xanthoma was observed [Affect] : the affect was normal [Oriented To Time, Place, And Person] : oriented to person, place, and time [Mood] : the mood was normal [No Anxiety] : not feeling anxious

## 2019-10-04 NOTE — ASSESSMENT
[FreeTextEntry1] : Assessment:\par 1.  R Pop DVT\par - provoked\par - July 20th 2019\par - on Eliquis 5mg BID\par 2.  R THP\par 3.  needs L TKR\par \par Plan\par 1.  At the end of 3 months of a/c would repeat venous duplex with visit to see me afterwards\par 2.  If there is no DVT on duplex, then I have no objection to proceeding with TKR.   Would hold Eliquis 2 days prior to TKR, and restart Eliquis day afterwards for another 3 months\par 3.  Await duplex at the end of this month\par \par [place] [id number]

## 2019-10-04 NOTE — REASON FOR VISIT
[Initial Evaluation] : an initial evaluation of [FreeTextEntry1] : 73 F R total hip replacement July 17th - developed DVT.  No prior history of DVT.  No family history of DVT.  She has been on Eliquis since July 20th (less than 3 months).  She is here today because she plans to have a L TKR November 11th with Dr. Castillo.  her R leg feels good.  \par \par \par \par IMPRESSION: \par \par DVT noted in the right popliteal vein and visualized segment of the right \par posterior tibial vein. \par \par The findings were discussed with WILLIE BELLA on 7/20/2019 8:20 AM. \par Hospital policies for call back including read back policies were followed. \par The verbal communication call back supplements this written report. \par \par \par

## 2019-11-01 ENCOUNTER — APPOINTMENT (OUTPATIENT)
Dept: CARDIOLOGY | Facility: CLINIC | Age: 74
End: 2019-11-01
Payer: MEDICARE

## 2019-11-01 VITALS
WEIGHT: 248 LBS | SYSTOLIC BLOOD PRESSURE: 152 MMHG | HEIGHT: 67 IN | BODY MASS INDEX: 38.92 KG/M2 | OXYGEN SATURATION: 96 % | RESPIRATION RATE: 14 BRPM | DIASTOLIC BLOOD PRESSURE: 82 MMHG | HEART RATE: 73 BPM

## 2019-11-01 PROCEDURE — ZZZZZ: CPT

## 2019-11-01 PROCEDURE — 93970 EXTREMITY STUDY: CPT

## 2019-11-01 PROCEDURE — 99214 OFFICE O/P EST MOD 30 MIN: CPT

## 2019-11-01 NOTE — PHYSICAL EXAM
[General Appearance - Well Developed] : well developed [Normal Appearance] : normal appearance [Well Groomed] : well groomed [General Appearance - Well Nourished] : well nourished [No Deformities] : no deformities [General Appearance - In No Acute Distress] : no acute distress [Normal Conjunctiva] : the conjunctiva exhibited no abnormalities [Eyelids - No Xanthelasma] : the eyelids demonstrated no xanthelasmas [Normal Oral Mucosa] : normal oral mucosa [No Oral Pallor] : no oral pallor [No Oral Cyanosis] : no oral cyanosis [Normal Jugular Venous A Waves Present] : normal jugular venous A waves present [Normal Jugular Venous V Waves Present] : normal jugular venous V waves present [No Jugular Venous Valerio A Waves] : no jugular venous valerio A waves [Respiration, Rhythm And Depth] : normal respiratory rhythm and effort [Exaggerated Use Of Accessory Muscles For Inspiration] : no accessory muscle use [Auscultation Breath Sounds / Voice Sounds] : lungs were clear to auscultation bilaterally [Heart Rate And Rhythm] : heart rate and rhythm were normal [Heart Sounds] : normal S1 and S2 [Murmurs] : no murmurs present [Abdomen Soft] : soft [Abdomen Tenderness] : non-tender [Abdomen Mass (___ Cm)] : no abdominal mass palpated [Nail Clubbing] : no clubbing of the fingernails [Cyanosis, Localized] : no localized cyanosis [Petechial Hemorrhages (___cm)] : no petechial hemorrhages [Skin Color & Pigmentation] : normal skin color and pigmentation [] : no rash [No Venous Stasis] : no venous stasis [Skin Lesions] : no skin lesions [No Skin Ulcers] : no skin ulcer [No Xanthoma] : no  xanthoma was observed [Oriented To Time, Place, And Person] : oriented to person, place, and time [Affect] : the affect was normal [Mood] : the mood was normal [No Anxiety] : not feeling anxious [FreeTextEntry1] : 1 + edema bilaterally.  Stemmers sign, hyperpigmentation, lipodermatosclerosis.

## 2019-11-01 NOTE — REASON FOR VISIT
[Follow-Up - Clinic] : a clinic follow-up of [FreeTextEntry1] :  Followup visit 11/1/2019\par She had a venous duplex today which showed NO DVT\par SHe needs bilateral TKR\par tentative for November 11th. \par She remains on eliquis 5mg BID\par No prior DVT\par she has recovered from her hip\par Both knees bother her.\par She walks with a walker.\par She is still has leg swelling - despite leg elevation and compression. \par

## 2019-12-18 ENCOUNTER — NON-APPOINTMENT (OUTPATIENT)
Age: 74
End: 2019-12-18

## 2019-12-18 ENCOUNTER — APPOINTMENT (OUTPATIENT)
Dept: CARDIOLOGY | Facility: CLINIC | Age: 74
End: 2019-12-18
Payer: MEDICARE

## 2019-12-18 VITALS
DIASTOLIC BLOOD PRESSURE: 75 MMHG | WEIGHT: 247 LBS | TEMPERATURE: 97.4 F | HEIGHT: 67 IN | HEART RATE: 70 BPM | OXYGEN SATURATION: 99 % | RESPIRATION RATE: 14 BRPM | BODY MASS INDEX: 38.77 KG/M2 | SYSTOLIC BLOOD PRESSURE: 146 MMHG

## 2019-12-18 PROCEDURE — 99215 OFFICE O/P EST HI 40 MIN: CPT

## 2019-12-18 NOTE — DISCUSSION/SUMMARY
[Procedure Intermediate Risk] : the procedure risk is intermediate [Patient Intermediate Risk] : the patient is an intermediate risk [Optimized for Surgery] : the patient is optimized for surgery [FreeTextEntry1] : The patient is a 74-year-old obese female history of rheumatoid arthritis,  lupus, hypothyroidism, hyperlipidemia, hypertension, lower extremity edema ,RBBB, s/p left lobectomy for cancer s/p rt hip replacement complicated by DVT awaiting knee replacement.\par #1 RA- on MTX and plaquenil, ambulates with walker\par #2 Htn- stable on losartan 50/12.5mg\par #3 Lipids- on atorvastatin\par #4 Hypothyroidism- on levothyroxine\par #5 CV- no angina or HF, ECG with RBBB unchanged\par #6 Ortho- s/p right hip replacement, now with right DVT on eliquis, no bleeding, There are no cardiac contraindications to proceeding with left knee replacement surgery.\par \par 6/27/19 Addendum: ECHO normal LV function. There are no cardiac contraindications to hip and knee surgery as planned. \par \par

## 2019-12-18 NOTE — PHYSICAL EXAM
[No Deformities] : no deformities [Normal Conjunctiva] : the conjunctiva exhibited no abnormalities [Eyelids - No Xanthelasma] : the eyelids demonstrated no xanthelasmas [Normal Oral Mucosa] : normal oral mucosa [No Oral Pallor] : no oral pallor [No Oral Cyanosis] : no oral cyanosis [Normal Jugular Venous A Waves Present] : normal jugular venous A waves present [Normal Jugular Venous V Waves Present] : normal jugular venous V waves present [No Jugular Venous Valerio A Waves] : no jugular venous valerio A waves [Respiration, Rhythm And Depth] : normal respiratory rhythm and effort [Exaggerated Use Of Accessory Muscles For Inspiration] : no accessory muscle use [Heart Rate And Rhythm] : heart rate and rhythm were normal [Heart Sounds] : normal S1 and S2 [Auscultation Breath Sounds / Voice Sounds] : lungs were clear to auscultation bilaterally [Systolic grade ___/6] : A grade [unfilled]/6 systolic murmur was heard. [Abdomen Soft] : soft [Abdomen Tenderness] : non-tender [Abdomen Mass (___ Cm)] : no abdominal mass palpated [Gait - Sufficient For Exercise Testing] : the gait was sufficient for exercise testing [Abnormal Walk] : normal gait [Nail Clubbing] : no clubbing of the fingernails [Petechial Hemorrhages (___cm)] : no petechial hemorrhages [Cyanosis, Localized] : no localized cyanosis [FreeTextEntry1] : nonpitting edema [Skin Color & Pigmentation] : normal skin color and pigmentation [No Venous Stasis] : no venous stasis [] : no rash [Skin Lesions] : no skin lesions [No Skin Ulcers] : no skin ulcer [No Xanthoma] : no  xanthoma was observed [Oriented To Time, Place, And Person] : oriented to person, place, and time [Affect] : the affect was normal [No Anxiety] : not feeling anxious [Mood] : the mood was normal

## 2019-12-18 NOTE — REVIEW OF SYSTEMS
[Recent Weight Gain (___ Lbs)] : no recent weight gain [see HPI] : see HPI [Recent Weight Loss (___ Lbs)] : no recent weight loss [Shortness Of Breath] : no shortness of breath [Dyspnea on exertion] : not dyspnea during exertion [Palpitations] : no palpitations [Lower Ext Edema] : no extremity edema

## 2019-12-18 NOTE — HISTORY OF PRESENT ILLNESS
[Preoperative Visit] : for a medical evaluation prior to surgery [Scheduled Procedure ___] : a [unfilled] [Date of Surgery ___] : on [unfilled] [Surgeon Name ___] : surgeon: [unfilled] [FreeTextEntry1] : Marely  had her right hip surgery which went well. Now proceeding with left knee. She denies any new symptoms of chest pain, palpitations or shortness of breath.

## 2019-12-27 ENCOUNTER — APPOINTMENT (OUTPATIENT)
Dept: INTERNAL MEDICINE | Facility: CLINIC | Age: 74
End: 2019-12-27
Payer: MEDICARE

## 2019-12-27 ENCOUNTER — APPOINTMENT (OUTPATIENT)
Dept: CARDIOLOGY | Facility: CLINIC | Age: 74
End: 2019-12-27
Payer: MEDICARE

## 2019-12-27 VITALS
RESPIRATION RATE: 14 BRPM | WEIGHT: 242 LBS | DIASTOLIC BLOOD PRESSURE: 65 MMHG | HEART RATE: 88 BPM | TEMPERATURE: 98.9 F | SYSTOLIC BLOOD PRESSURE: 136 MMHG | BODY MASS INDEX: 37.98 KG/M2 | OXYGEN SATURATION: 100 % | HEIGHT: 67 IN

## 2019-12-27 PROCEDURE — 99214 OFFICE O/P EST MOD 30 MIN: CPT

## 2019-12-27 RX ORDER — AMOXICILLIN 500 MG/1
500 CAPSULE ORAL
Qty: 20 | Refills: 4 | Status: DISCONTINUED | COMMUNITY
Start: 2019-08-05 | End: 2019-12-27

## 2019-12-27 NOTE — REASON FOR VISIT
[Follow-Up - Clinic] : a clinic follow-up of [FreeTextEntry1] :  Followup visit 12/27/2019\par She had a venous duplex last visit which showed NO DVT\par She remains on eliquis 5mg BID\par Both knees bother her.\par Walking with a walker\par She received her pnuematic pump, but not using.  \par No bleeding or bruising.  \par \par Plans for TKR on the left 1/15/2019.

## 2019-12-27 NOTE — PHYSICAL EXAM
[General Appearance - Well Developed] : well developed [Normal Appearance] : normal appearance [Well Groomed] : well groomed [General Appearance - Well Nourished] : well nourished [No Deformities] : no deformities [General Appearance - In No Acute Distress] : no acute distress [Normal Conjunctiva] : the conjunctiva exhibited no abnormalities [Eyelids - No Xanthelasma] : the eyelids demonstrated no xanthelasmas [Normal Oral Mucosa] : normal oral mucosa [No Oral Pallor] : no oral pallor [No Oral Cyanosis] : no oral cyanosis [Normal Jugular Venous A Waves Present] : normal jugular venous A waves present [Normal Jugular Venous V Waves Present] : normal jugular venous V waves present [No Jugular Venous Valerio A Waves] : no jugular venous valerio A waves [Respiration, Rhythm And Depth] : normal respiratory rhythm and effort [Exaggerated Use Of Accessory Muscles For Inspiration] : no accessory muscle use [Auscultation Breath Sounds / Voice Sounds] : lungs were clear to auscultation bilaterally [Heart Rate And Rhythm] : heart rate and rhythm were normal [Heart Sounds] : normal S1 and S2 [Murmurs] : no murmurs present [Abdomen Soft] : soft [Abdomen Tenderness] : non-tender [Abdomen Mass (___ Cm)] : no abdominal mass palpated [Nail Clubbing] : no clubbing of the fingernails [Cyanosis, Localized] : no localized cyanosis [Petechial Hemorrhages (___cm)] : no petechial hemorrhages [] : no rash [Skin Color & Pigmentation] : normal skin color and pigmentation [No Venous Stasis] : no venous stasis [Skin Lesions] : no skin lesions [No Skin Ulcers] : no skin ulcer [No Xanthoma] : no  xanthoma was observed [Affect] : the affect was normal [Oriented To Time, Place, And Person] : oriented to person, place, and time [No Anxiety] : not feeling anxious [Mood] : the mood was normal [FreeTextEntry1] : 1 + edema bilaterally.  Stemmers sign, hyperpigmentation, lipodermatosclerosis.

## 2019-12-27 NOTE — ASSESSMENT
[FreeTextEntry1] : Assessment:\par 1.  R Pop DVT\par - provoked\par - July 20th 2019\par - on Eliquis 5mg BID\par 2.  R THP\par 3.  needs L TKR\par 4.  Lymphedema\par \par Plan\par 1.  I have no objection to proceeding with TKR. \par 2.  Would hold Eliquis 2-3 days prior to TKR, and restart Eliquis day afterwards for another 3 months\par 3.  While off anticoagulation, would make sure to don pneumatic compression\par 4.  No need for IVC filter\par 5.  Start using pneumatic pump 1 hour per day.\par \par Return 3 months post op\par \par Soha [id number]

## 2019-12-29 ENCOUNTER — RX RENEWAL (OUTPATIENT)
Age: 74
End: 2019-12-29

## 2019-12-29 LAB
APPEARANCE: CLEAR
BACTERIA UR CULT: ABNORMAL
BACTERIA: ABNORMAL
BASOPHILS # BLD AUTO: 0.09 K/UL
BASOPHILS NFR BLD AUTO: 1 %
BILIRUBIN URINE: NEGATIVE
BLOOD URINE: NEGATIVE
COLOR: YELLOW
EOSINOPHIL # BLD AUTO: 0.12 K/UL
EOSINOPHIL NFR BLD AUTO: 1.3 %
GLUCOSE QUALITATIVE U: NEGATIVE
HCT VFR BLD CALC: 40.8 %
HGB BLD-MCNC: 12.8 G/DL
HYALINE CASTS: 4 /LPF
IMM GRANULOCYTES NFR BLD AUTO: 0.4 %
KETONES URINE: NEGATIVE
LEUKOCYTE ESTERASE URINE: ABNORMAL
LYMPHOCYTES # BLD AUTO: 1.76 K/UL
LYMPHOCYTES NFR BLD AUTO: 19 %
MAN DIFF?: NORMAL
MCHC RBC-ENTMCNC: 30.3 PG
MCHC RBC-ENTMCNC: 31.4 GM/DL
MCV RBC AUTO: 96.5 FL
MICROSCOPIC-UA: NORMAL
MONOCYTES # BLD AUTO: 1.04 K/UL
MONOCYTES NFR BLD AUTO: 11.3 %
NEUTROPHILS # BLD AUTO: 6.19 K/UL
NEUTROPHILS NFR BLD AUTO: 67 %
NITRITE URINE: POSITIVE
PH URINE: 5.5
PLATELET # BLD AUTO: 291 K/UL
PROTEIN URINE: NEGATIVE
RBC # BLD: 4.23 M/UL
RBC # FLD: 16.5 %
RED BLOOD CELLS URINE: 6 /HPF
SPECIFIC GRAVITY URINE: 1.02
SQUAMOUS EPITHELIAL CELLS: 6 /HPF
TSH SERPL-ACNC: 3.28 UIU/ML
UROBILINOGEN URINE: NORMAL
WBC # FLD AUTO: 9.24 K/UL
WHITE BLOOD CELLS URINE: 11 /HPF

## 2019-12-29 NOTE — PHYSICAL EXAM
[No Acute Distress] : no acute distress [Well Nourished] : well nourished [Well Developed] : well developed [Well-Appearing] : well-appearing [Normal Sclera/Conjunctiva] : normal sclera/conjunctiva [EOMI] : extraocular movements intact [Normal Outer Ear/Nose] : the outer ears and nose were normal in appearance [Normal Oropharynx] : the oropharynx was normal [Normal TMs] : both tympanic membranes were normal [No JVD] : no jugular venous distention [No Lymphadenopathy] : no lymphadenopathy [Supple] : supple [No Respiratory Distress] : no respiratory distress  [No Accessory Muscle Use] : no accessory muscle use [Clear to Auscultation] : lungs were clear to auscultation bilaterally [Normal Rate] : normal rate  [Regular Rhythm] : with a regular rhythm [Normal S1, S2] : normal S1 and S2 [No Murmur] : no murmur heard [No Carotid Bruits] : no carotid bruits [Pedal Pulses Present] : the pedal pulses are present [Soft] : abdomen soft [Non Tender] : non-tender [Non-distended] : non-distended [No Masses] : no abdominal mass palpated [No HSM] : no HSM [Normal Bowel Sounds] : normal bowel sounds [Normal Posterior Cervical Nodes] : no posterior cervical lymphadenopathy [Normal Anterior Cervical Nodes] : no anterior cervical lymphadenopathy [No CVA Tenderness] : no CVA  tenderness [No Spinal Tenderness] : no spinal tenderness [Grossly Normal Strength/Tone] : grossly normal strength/tone [No Rash] : no rash [No Focal Deficits] : no focal deficits [Normal Affect] : the affect was normal [Normal Insight/Judgement] : insight and judgment were intact [de-identified] : + lymphedema  [de-identified] : b/l knee tenderness with ROM  [de-identified] : ambulates with walker

## 2019-12-29 NOTE — PLAN
[FreeTextEntry1] : 74 year old female with h/o RA/ Lupus / Htn/Hld/ Hypothyroidism/ OA / DVT on Eliquis  medically stable for planned surgery. \par Pt was evaluated by cardio/ vascular > se recommendation attached .\par Labs > + UTI with E. coli > start Macrobid 100 mg BID x 7 days \par Intermediate risk surgery/ intermediate risk patient

## 2019-12-29 NOTE — HISTORY OF PRESENT ILLNESS
[FreeTextEntry1] : left TKR [FreeTextEntry2] : 1/15/19 [FreeTextEntry3] : Dr. Castillo [FreeTextEntry4] : 74 year old female with h/o RA/ Lupus / Htn/Hld/ Hypothyroidism/ OA / DVT on Eliquis  presents for medical clearance prior to left TKR.Pt had Venous doppler in 10/2019 > no DVT \par Pt denies CP/SOB, dizziness, palpitation, abdominal pain

## 2019-12-30 LAB
ALBUMIN SERPL ELPH-MCNC: 3.9 G/DL
ALP BLD-CCNC: 111 U/L
ALT SERPL-CCNC: 12 U/L
ANION GAP SERPL CALC-SCNC: 13 MMOL/L
AST SERPL-CCNC: 20 U/L
BILIRUB SERPL-MCNC: 0.3 MG/DL
BUN SERPL-MCNC: 29 MG/DL
CALCIUM SERPL-MCNC: 9 MG/DL
CHLORIDE SERPL-SCNC: 101 MMOL/L
CO2 SERPL-SCNC: 26 MMOL/L
CREAT SERPL-MCNC: 0.84 MG/DL
GLUCOSE SERPL-MCNC: 89 MG/DL
POTASSIUM SERPL-SCNC: 4.1 MMOL/L
PROT SERPL-MCNC: 6.8 G/DL
SODIUM SERPL-SCNC: 140 MMOL/L

## 2019-12-31 ENCOUNTER — OUTPATIENT (OUTPATIENT)
Dept: OUTPATIENT SERVICES | Facility: HOSPITAL | Age: 74
LOS: 1 days | End: 2019-12-31
Payer: MEDICARE

## 2019-12-31 VITALS
HEIGHT: 66 IN | SYSTOLIC BLOOD PRESSURE: 120 MMHG | HEART RATE: 62 BPM | DIASTOLIC BLOOD PRESSURE: 68 MMHG | OXYGEN SATURATION: 98 % | RESPIRATION RATE: 16 BRPM | WEIGHT: 243.83 LBS | TEMPERATURE: 97 F

## 2019-12-31 DIAGNOSIS — Z98.890 OTHER SPECIFIED POSTPROCEDURAL STATES: Chronic | ICD-10-CM

## 2019-12-31 DIAGNOSIS — Z90.89 ACQUIRED ABSENCE OF OTHER ORGANS: Chronic | ICD-10-CM

## 2019-12-31 DIAGNOSIS — M17.12 UNILATERAL PRIMARY OSTEOARTHRITIS, LEFT KNEE: ICD-10-CM

## 2019-12-31 DIAGNOSIS — Z98.49 CATARACT EXTRACTION STATUS, UNSPECIFIED EYE: Chronic | ICD-10-CM

## 2019-12-31 DIAGNOSIS — C34.90 MALIGNANT NEOPLASM OF UNSPECIFIED PART OF UNSPECIFIED BRONCHUS OR LUNG: Chronic | ICD-10-CM

## 2019-12-31 DIAGNOSIS — Z96.641 PRESENCE OF RIGHT ARTIFICIAL HIP JOINT: Chronic | ICD-10-CM

## 2019-12-31 DIAGNOSIS — Z01.818 ENCOUNTER FOR OTHER PREPROCEDURAL EXAMINATION: ICD-10-CM

## 2019-12-31 LAB
ALBUMIN SERPL ELPH-MCNC: 3.4 G/DL — SIGNIFICANT CHANGE UP (ref 3.3–5)
ALP SERPL-CCNC: 116 U/L — SIGNIFICANT CHANGE UP (ref 30–120)
ALT FLD-CCNC: 20 U/L DA — SIGNIFICANT CHANGE UP (ref 10–60)
ANION GAP SERPL CALC-SCNC: 6 MMOL/L — SIGNIFICANT CHANGE UP (ref 5–17)
APTT BLD: 30.1 SEC — SIGNIFICANT CHANGE UP (ref 28.5–37)
AST SERPL-CCNC: 21 U/L — SIGNIFICANT CHANGE UP (ref 10–40)
BILIRUB SERPL-MCNC: 0.5 MG/DL — SIGNIFICANT CHANGE UP (ref 0.2–1.2)
BLD GP AB SCN SERPL QL: SIGNIFICANT CHANGE UP
BUN SERPL-MCNC: 28 MG/DL — HIGH (ref 7–23)
CALCIUM SERPL-MCNC: 8.9 MG/DL — SIGNIFICANT CHANGE UP (ref 8.4–10.5)
CHLORIDE SERPL-SCNC: 104 MMOL/L — SIGNIFICANT CHANGE UP (ref 96–108)
CO2 SERPL-SCNC: 30 MMOL/L — SIGNIFICANT CHANGE UP (ref 22–31)
CREAT SERPL-MCNC: 0.82 MG/DL — SIGNIFICANT CHANGE UP (ref 0.5–1.3)
GLUCOSE SERPL-MCNC: 92 MG/DL — SIGNIFICANT CHANGE UP (ref 70–99)
HCT VFR BLD CALC: 40.4 % — SIGNIFICANT CHANGE UP (ref 34.5–45)
HGB BLD-MCNC: 12.9 G/DL — SIGNIFICANT CHANGE UP (ref 11.5–15.5)
INR BLD: 1.13 RATIO — SIGNIFICANT CHANGE UP (ref 0.88–1.16)
MCHC RBC-ENTMCNC: 30.2 PG — SIGNIFICANT CHANGE UP (ref 27–34)
MCHC RBC-ENTMCNC: 31.9 GM/DL — LOW (ref 32–36)
MCV RBC AUTO: 94.6 FL — SIGNIFICANT CHANGE UP (ref 80–100)
MRSA PCR RESULT.: SIGNIFICANT CHANGE UP
NRBC # BLD: 0 /100 WBCS — SIGNIFICANT CHANGE UP (ref 0–0)
PLATELET # BLD AUTO: 342 K/UL — SIGNIFICANT CHANGE UP (ref 150–400)
POTASSIUM SERPL-MCNC: 4 MMOL/L — SIGNIFICANT CHANGE UP (ref 3.5–5.3)
POTASSIUM SERPL-SCNC: 4 MMOL/L — SIGNIFICANT CHANGE UP (ref 3.5–5.3)
PROT SERPL-MCNC: 7.4 G/DL — SIGNIFICANT CHANGE UP (ref 6–8.3)
PROTHROM AB SERPL-ACNC: 12.4 SEC — SIGNIFICANT CHANGE UP (ref 10–12.9)
RBC # BLD: 4.27 M/UL — SIGNIFICANT CHANGE UP (ref 3.8–5.2)
RBC # FLD: 16.4 % — HIGH (ref 10.3–14.5)
S AUREUS DNA NOSE QL NAA+PROBE: DETECTED
SODIUM SERPL-SCNC: 140 MMOL/L — SIGNIFICANT CHANGE UP (ref 135–145)
WBC # BLD: 7.69 K/UL — SIGNIFICANT CHANGE UP (ref 3.8–10.5)
WBC # FLD AUTO: 7.69 K/UL — SIGNIFICANT CHANGE UP (ref 3.8–10.5)

## 2019-12-31 PROCEDURE — G0463: CPT

## 2019-12-31 RX ORDER — ATORVASTATIN CALCIUM 80 MG/1
1 TABLET, FILM COATED ORAL
Qty: 0 | Refills: 0 | DISCHARGE

## 2019-12-31 NOTE — H&P PST ADULT - NSICDXPASTSURGICALHX_GEN_ALL_CORE_FT
PAST SURGICAL HISTORY:  Lung cancer small tumor removed 2015    S/P carpal tunnel release left    S/P cataract surgery left    S/P eye surgery child    S/P knee surgery bilateral    S/P thyroid surgery 1 lobe removed    S/P tonsillectomy     Status post total hip replacement, right

## 2019-12-31 NOTE — H&P PST ADULT - NSICDXPASTMEDICALHX_GEN_ALL_CORE_FT
PAST MEDICAL HISTORY:  Benign heart murmur     H/O degenerative disc disease     Hyperlipidemia     Hypertension     Hypothyroid     Obesity, Class II, BMI 35-39.9, no comorbidity     Osteoarthritis     Rheumatoid arthritis     SLE (systemic lupus erythematosus)

## 2019-12-31 NOTE — H&P PST ADULT - HISTORY OF PRESENT ILLNESS
73yo female patient with approximately 15yr history of progressively worsening left knee pain. She states that the pain "comes and goes" throughout  the week. She has had injections in the past with only minor and temporary improvement. She rates the pain at 3-5/10. She takes Advil or Tylenol prn, very infrequently with some relief. She was told that TKR is recommended and presents today for PSTs.

## 2019-12-31 NOTE — H&P PST ADULT - NSICDXPROBLEM_GEN_ALL_CORE_FT
PROBLEM DIAGNOSES  Problem: Primary osteoarthritis of left knee  Assessment and Plan: LEFT Total Knee Replacement on 1/15/20

## 2019-12-31 NOTE — H&P PST ADULT - MUSCULOSKELETAL
details… left knee/decreased ROM/diminished strength/decreased ROM due to pain/joint swelling/no joint erythema/no joint warmth/no calf tenderness detailed exam

## 2019-12-31 NOTE — H&P PST ADULT - ASSESSMENT
75yo female patient scheduled for surgery on 1/15/20.  She will obtain Medical, Cardiac, Rheumatology and Vascular Clearances. She will be NPO as per Anesthesia and will take Synthroid and Losartan HCTZ on AM of surgery with a sip of water. All pre-op instructions reviewed with patient. She denies any metal allergies. Her med sheet will be reviewed by Dr. Chilel.

## 2019-12-31 NOTE — H&P PST ADULT - OTHER CARE PROVIDERS
Cardiologist: Dr. Ramirez- [phone]/ Vascular: Dr. Hoyos- [phone]/Rheumatolgist: Dr. Davis- [phone]

## 2020-01-02 RX ORDER — MUPIROCIN 20 MG/G
1 OINTMENT TOPICAL
Qty: 1 | Refills: 0
Start: 2020-01-02 | End: 2020-01-06

## 2020-01-14 NOTE — PROGRESS NOTE ADULT - ASSESSMENT
Presurgical evaluation:  1.	Topical TXA (DVT within 12 months, RA, SLE)  2.	Acetaminophen and Celecoxib 100mg q12h (x 2-3 weeks only)  3.	VTE prophylaxis: Eliquis 2.5mg q12h POD1 then Eliquis 5mg q12h beginning POD2

## 2020-01-14 NOTE — PROGRESS NOTE ADULT - SUBJECTIVE AND OBJECTIVE BOX
Presurgical evaluation:  No Known Allergies:     Home Medications:   · 	apixaban (Eliquis) 5 mg every 12 hours  · 	hydroxychloroquine 200 mg once a day  · 	methotrexate 15 mg once a week- q Monday. Last dose 12/30/19- until after surgery.  · 	Centrum Silver once a day  · 	Centrum MultiGummies Women once a day  · 	folic acid 1 mg once a day  · 	losartan-hydrochlorothiazide 50mg-12.5mg once a day  · 	Synthroid 200 mcg once a day  · 	gabapentin 300 mg 2 times a day    PAST MEDICAL HISTORY:  Benign heart murmur   H/O degenerative disc disease   Hyperlipidemia   Hypertension   Hypothyroid   Obesity, Class II, BMI 35-39.9, no comorbidity   Osteoarthritis   Rheumatoid arthritis   SLE (systemic lupus erythematosus).     PAST SURGICAL HISTORY:  Lung cancer small tumor removed 2015  S/P carpal tunnel release left  S/P cataract surgery left  S/P eye surgery child  S/P knee surgery bilateral  S/P thyroid surgery 1 lobe removed  S/P tonsillectomy   Status post total hip replacement, right.     PST values of interest:  SCr 0.82 mg/dL  BUN 28 (­)  PT/INR 12.3/1.13 (normal? Þ Eliquis)  LFTs WNL

## 2020-01-15 ENCOUNTER — RESULT REVIEW (OUTPATIENT)
Age: 75
End: 2020-01-15

## 2020-01-15 ENCOUNTER — APPOINTMENT (OUTPATIENT)
Dept: ORTHOPEDIC SURGERY | Facility: HOSPITAL | Age: 75
End: 2020-01-15

## 2020-01-15 ENCOUNTER — INPATIENT (INPATIENT)
Facility: HOSPITAL | Age: 75
LOS: 1 days | Discharge: NURSING, REHAB OR LONG-TERM CARE | DRG: 470 | End: 2020-01-17
Attending: ORTHOPAEDIC SURGERY | Admitting: ORTHOPAEDIC SURGERY
Payer: MEDICARE

## 2020-01-15 VITALS
HEART RATE: 68 BPM | HEIGHT: 66 IN | RESPIRATION RATE: 18 BRPM | DIASTOLIC BLOOD PRESSURE: 60 MMHG | WEIGHT: 244.27 LBS | SYSTOLIC BLOOD PRESSURE: 112 MMHG | TEMPERATURE: 98 F

## 2020-01-15 DIAGNOSIS — Z01.818 ENCOUNTER FOR OTHER PREPROCEDURAL EXAMINATION: ICD-10-CM

## 2020-01-15 DIAGNOSIS — C34.90 MALIGNANT NEOPLASM OF UNSPECIFIED PART OF UNSPECIFIED BRONCHUS OR LUNG: Chronic | ICD-10-CM

## 2020-01-15 DIAGNOSIS — Z98.890 OTHER SPECIFIED POSTPROCEDURAL STATES: Chronic | ICD-10-CM

## 2020-01-15 DIAGNOSIS — Z98.49 CATARACT EXTRACTION STATUS, UNSPECIFIED EYE: Chronic | ICD-10-CM

## 2020-01-15 DIAGNOSIS — Z96.641 PRESENCE OF RIGHT ARTIFICIAL HIP JOINT: Chronic | ICD-10-CM

## 2020-01-15 DIAGNOSIS — M17.12 UNILATERAL PRIMARY OSTEOARTHRITIS, LEFT KNEE: ICD-10-CM

## 2020-01-15 DIAGNOSIS — Z90.89 ACQUIRED ABSENCE OF OTHER ORGANS: Chronic | ICD-10-CM

## 2020-01-15 PROBLEM — E66.9 OBESITY, UNSPECIFIED: Chronic | Status: ACTIVE | Noted: 2019-12-31

## 2020-01-15 LAB
ALBUMIN SERPL ELPH-MCNC: 2.7 G/DL — LOW (ref 3.3–5)
ALP SERPL-CCNC: 108 U/L — SIGNIFICANT CHANGE UP (ref 30–120)
ALT FLD-CCNC: 20 U/L DA — SIGNIFICANT CHANGE UP (ref 10–60)
ANION GAP SERPL CALC-SCNC: 10 MMOL/L — SIGNIFICANT CHANGE UP (ref 5–17)
ANION GAP SERPL CALC-SCNC: 4 MMOL/L — LOW (ref 5–17)
AST SERPL-CCNC: 21 U/L — SIGNIFICANT CHANGE UP (ref 10–40)
BILIRUB SERPL-MCNC: 0.5 MG/DL — SIGNIFICANT CHANGE UP (ref 0.2–1.2)
BUN SERPL-MCNC: 21 MG/DL — SIGNIFICANT CHANGE UP (ref 7–23)
BUN SERPL-MCNC: 25 MG/DL — HIGH (ref 7–23)
CALCIUM SERPL-MCNC: 8.5 MG/DL — SIGNIFICANT CHANGE UP (ref 8.4–10.5)
CALCIUM SERPL-MCNC: 8.6 MG/DL — SIGNIFICANT CHANGE UP (ref 8.4–10.5)
CHLORIDE SERPL-SCNC: 105 MMOL/L — SIGNIFICANT CHANGE UP (ref 96–108)
CHLORIDE SERPL-SCNC: 99 MMOL/L — SIGNIFICANT CHANGE UP (ref 96–108)
CO2 SERPL-SCNC: 27 MMOL/L — SIGNIFICANT CHANGE UP (ref 22–31)
CO2 SERPL-SCNC: 31 MMOL/L — SIGNIFICANT CHANGE UP (ref 22–31)
CREAT SERPL-MCNC: 0.79 MG/DL — SIGNIFICANT CHANGE UP (ref 0.5–1.3)
CREAT SERPL-MCNC: 1.02 MG/DL — SIGNIFICANT CHANGE UP (ref 0.5–1.3)
GLUCOSE SERPL-MCNC: 136 MG/DL — HIGH (ref 70–99)
GLUCOSE SERPL-MCNC: 224 MG/DL — HIGH (ref 70–99)
HCT VFR BLD CALC: 39.7 % — SIGNIFICANT CHANGE UP (ref 34.5–45)
HGB BLD-MCNC: 12.5 G/DL — SIGNIFICANT CHANGE UP (ref 11.5–15.5)
MAGNESIUM SERPL-MCNC: 1.8 MG/DL — SIGNIFICANT CHANGE UP (ref 1.6–2.6)
MCHC RBC-ENTMCNC: 29.8 PG — SIGNIFICANT CHANGE UP (ref 27–34)
MCHC RBC-ENTMCNC: 31.5 GM/DL — LOW (ref 32–36)
MCV RBC AUTO: 94.5 FL — SIGNIFICANT CHANGE UP (ref 80–100)
NRBC # BLD: 0 /100 WBCS — SIGNIFICANT CHANGE UP (ref 0–0)
PLATELET # BLD AUTO: 263 K/UL — SIGNIFICANT CHANGE UP (ref 150–400)
POTASSIUM SERPL-MCNC: 3.5 MMOL/L — SIGNIFICANT CHANGE UP (ref 3.5–5.3)
POTASSIUM SERPL-MCNC: 4 MMOL/L — SIGNIFICANT CHANGE UP (ref 3.5–5.3)
POTASSIUM SERPL-SCNC: 3.5 MMOL/L — SIGNIFICANT CHANGE UP (ref 3.5–5.3)
POTASSIUM SERPL-SCNC: 4 MMOL/L — SIGNIFICANT CHANGE UP (ref 3.5–5.3)
PROT SERPL-MCNC: 6.1 G/DL — SIGNIFICANT CHANGE UP (ref 6–8.3)
RBC # BLD: 4.2 M/UL — SIGNIFICANT CHANGE UP (ref 3.8–5.2)
RBC # FLD: 15.6 % — HIGH (ref 10.3–14.5)
SODIUM SERPL-SCNC: 136 MMOL/L — SIGNIFICANT CHANGE UP (ref 135–145)
SODIUM SERPL-SCNC: 140 MMOL/L — SIGNIFICANT CHANGE UP (ref 135–145)
TSH SERPL-MCNC: 1.21 UIU/ML — SIGNIFICANT CHANGE UP (ref 0.27–4.2)
WBC # BLD: 10.81 K/UL — HIGH (ref 3.8–10.5)
WBC # FLD AUTO: 10.81 K/UL — HIGH (ref 3.8–10.5)

## 2020-01-15 PROCEDURE — 27447 TOTAL KNEE ARTHROPLASTY: CPT | Mod: LT

## 2020-01-15 PROCEDURE — 88311 DECALCIFY TISSUE: CPT | Mod: 26

## 2020-01-15 PROCEDURE — 88305 TISSUE EXAM BY PATHOLOGIST: CPT | Mod: 26

## 2020-01-15 PROCEDURE — 27447 TOTAL KNEE ARTHROPLASTY: CPT | Mod: 82,LT

## 2020-01-15 PROCEDURE — 73562 X-RAY EXAM OF KNEE 3: CPT | Mod: 26,LT

## 2020-01-15 PROCEDURE — 99223 1ST HOSP IP/OBS HIGH 75: CPT

## 2020-01-15 PROCEDURE — 93010 ELECTROCARDIOGRAM REPORT: CPT

## 2020-01-15 RX ORDER — LEVOTHYROXINE SODIUM 125 MCG
200 TABLET ORAL DAILY
Refills: 0 | Status: DISCONTINUED | OUTPATIENT
Start: 2020-01-15 | End: 2020-01-17

## 2020-01-15 RX ORDER — SENNA PLUS 8.6 MG/1
2 TABLET ORAL AT BEDTIME
Refills: 0 | Status: DISCONTINUED | OUTPATIENT
Start: 2020-01-15 | End: 2020-01-17

## 2020-01-15 RX ORDER — OXYCODONE HYDROCHLORIDE 5 MG/1
10 TABLET ORAL
Refills: 0 | Status: DISCONTINUED | OUTPATIENT
Start: 2020-01-15 | End: 2020-01-17

## 2020-01-15 RX ORDER — CHLORHEXIDINE GLUCONATE 213 G/1000ML
1 SOLUTION TOPICAL ONCE
Refills: 0 | Status: COMPLETED | OUTPATIENT
Start: 2020-01-15 | End: 2020-01-15

## 2020-01-15 RX ORDER — APIXABAN 2.5 MG/1
2.5 TABLET, FILM COATED ORAL EVERY 12 HOURS
Refills: 0 | Status: COMPLETED | OUTPATIENT
Start: 2020-01-16 | End: 2020-01-16

## 2020-01-15 RX ORDER — POLYETHYLENE GLYCOL 3350 17 G/17G
17 POWDER, FOR SOLUTION ORAL AT BEDTIME
Refills: 0 | Status: DISCONTINUED | OUTPATIENT
Start: 2020-01-15 | End: 2020-01-17

## 2020-01-15 RX ORDER — ONDANSETRON 8 MG/1
4 TABLET, FILM COATED ORAL EVERY 6 HOURS
Refills: 0 | Status: DISCONTINUED | OUTPATIENT
Start: 2020-01-15 | End: 2020-01-17

## 2020-01-15 RX ORDER — ACETAMINOPHEN 500 MG
1000 TABLET ORAL EVERY 6 HOURS
Refills: 0 | Status: COMPLETED | OUTPATIENT
Start: 2020-01-15 | End: 2020-01-16

## 2020-01-15 RX ORDER — SODIUM CHLORIDE 9 MG/ML
1000 INJECTION, SOLUTION INTRAVENOUS
Refills: 0 | Status: DISCONTINUED | OUTPATIENT
Start: 2020-01-15 | End: 2020-01-15

## 2020-01-15 RX ORDER — HYDROMORPHONE HYDROCHLORIDE 2 MG/ML
0.5 INJECTION INTRAMUSCULAR; INTRAVENOUS; SUBCUTANEOUS
Refills: 0 | Status: DISCONTINUED | OUTPATIENT
Start: 2020-01-15 | End: 2020-01-17

## 2020-01-15 RX ORDER — ONDANSETRON 8 MG/1
4 TABLET, FILM COATED ORAL ONCE
Refills: 0 | Status: DISCONTINUED | OUTPATIENT
Start: 2020-01-15 | End: 2020-01-15

## 2020-01-15 RX ORDER — OXYCODONE HYDROCHLORIDE 5 MG/1
5 TABLET ORAL
Refills: 0 | Status: DISCONTINUED | OUTPATIENT
Start: 2020-01-15 | End: 2020-01-17

## 2020-01-15 RX ORDER — APIXABAN 2.5 MG/1
5 TABLET, FILM COATED ORAL EVERY 12 HOURS
Refills: 0 | Status: DISCONTINUED | OUTPATIENT
Start: 2020-01-17 | End: 2020-01-17

## 2020-01-15 RX ORDER — HYDROXYCHLOROQUINE SULFATE 200 MG
200 TABLET ORAL DAILY
Refills: 0 | Status: DISCONTINUED | OUTPATIENT
Start: 2020-01-15 | End: 2020-01-15

## 2020-01-15 RX ORDER — HYDROMORPHONE HYDROCHLORIDE 2 MG/ML
0.5 INJECTION INTRAMUSCULAR; INTRAVENOUS; SUBCUTANEOUS
Refills: 0 | Status: DISCONTINUED | OUTPATIENT
Start: 2020-01-15 | End: 2020-01-15

## 2020-01-15 RX ORDER — POLYETHYLENE GLYCOL 3350 17 G/17G
17 POWDER, FOR SOLUTION ORAL DAILY
Refills: 0 | Status: DISCONTINUED | OUTPATIENT
Start: 2020-01-15 | End: 2020-01-15

## 2020-01-15 RX ORDER — MAGNESIUM HYDROXIDE 400 MG/1
30 TABLET, CHEWABLE ORAL DAILY
Refills: 0 | Status: DISCONTINUED | OUTPATIENT
Start: 2020-01-15 | End: 2020-01-17

## 2020-01-15 RX ORDER — APREPITANT 80 MG/1
40 CAPSULE ORAL ONCE
Refills: 0 | Status: COMPLETED | OUTPATIENT
Start: 2020-01-15 | End: 2020-01-15

## 2020-01-15 RX ORDER — CEFAZOLIN SODIUM 1 G
2000 VIAL (EA) INJECTION ONCE
Refills: 0 | Status: COMPLETED | OUTPATIENT
Start: 2020-01-15 | End: 2020-01-15

## 2020-01-15 RX ORDER — ACETAMINOPHEN 500 MG
1000 TABLET ORAL ONCE
Refills: 0 | Status: COMPLETED | OUTPATIENT
Start: 2020-01-15 | End: 2020-01-15

## 2020-01-15 RX ORDER — PANTOPRAZOLE SODIUM 20 MG/1
40 TABLET, DELAYED RELEASE ORAL
Refills: 0 | Status: DISCONTINUED | OUTPATIENT
Start: 2020-01-15 | End: 2020-01-17

## 2020-01-15 RX ORDER — ACETAMINOPHEN 500 MG
1000 TABLET ORAL EVERY 8 HOURS
Refills: 0 | Status: DISCONTINUED | OUTPATIENT
Start: 2020-01-16 | End: 2020-01-17

## 2020-01-15 RX ORDER — FOLIC ACID 0.8 MG
1 TABLET ORAL DAILY
Refills: 0 | Status: DISCONTINUED | OUTPATIENT
Start: 2020-01-15 | End: 2020-01-17

## 2020-01-15 RX ORDER — LOSARTAN POTASSIUM 100 MG/1
50 TABLET, FILM COATED ORAL DAILY
Refills: 0 | Status: DISCONTINUED | OUTPATIENT
Start: 2020-01-17 | End: 2020-01-17

## 2020-01-15 RX ORDER — CEFAZOLIN SODIUM 1 G
2000 VIAL (EA) INJECTION EVERY 8 HOURS
Refills: 0 | Status: COMPLETED | OUTPATIENT
Start: 2020-01-15 | End: 2020-01-15

## 2020-01-15 RX ORDER — GABAPENTIN 400 MG/1
300 CAPSULE ORAL
Refills: 0 | Status: DISCONTINUED | OUTPATIENT
Start: 2020-01-15 | End: 2020-01-17

## 2020-01-15 RX ORDER — SENNA PLUS 8.6 MG/1
2 TABLET ORAL AT BEDTIME
Refills: 0 | Status: DISCONTINUED | OUTPATIENT
Start: 2020-01-15 | End: 2020-01-15

## 2020-01-15 RX ORDER — SODIUM CHLORIDE 9 MG/ML
1000 INJECTION, SOLUTION INTRAVENOUS
Refills: 0 | Status: DISCONTINUED | OUTPATIENT
Start: 2020-01-15 | End: 2020-01-17

## 2020-01-15 RX ORDER — OXYCODONE HYDROCHLORIDE 5 MG/1
5 TABLET ORAL ONCE
Refills: 0 | Status: DISCONTINUED | OUTPATIENT
Start: 2020-01-15 | End: 2020-01-15

## 2020-01-15 RX ADMIN — OXYCODONE HYDROCHLORIDE 5 MILLIGRAM(S): 5 TABLET ORAL at 23:54

## 2020-01-15 RX ADMIN — APREPITANT 40 MILLIGRAM(S): 80 CAPSULE ORAL at 06:37

## 2020-01-15 RX ADMIN — Medication 100 MILLIGRAM(S): at 23:54

## 2020-01-15 RX ADMIN — CHLORHEXIDINE GLUCONATE 1 APPLICATION(S): 213 SOLUTION TOPICAL at 06:37

## 2020-01-15 RX ADMIN — OXYCODONE HYDROCHLORIDE 10 MILLIGRAM(S): 5 TABLET ORAL at 19:20

## 2020-01-15 RX ADMIN — Medication 100 MILLIGRAM(S): at 16:39

## 2020-01-15 RX ADMIN — OXYCODONE HYDROCHLORIDE 10 MILLIGRAM(S): 5 TABLET ORAL at 18:50

## 2020-01-15 RX ADMIN — HYDROMORPHONE HYDROCHLORIDE 0.5 MILLIGRAM(S): 2 INJECTION INTRAMUSCULAR; INTRAVENOUS; SUBCUTANEOUS at 11:15

## 2020-01-15 RX ADMIN — SODIUM CHLORIDE 75 MILLILITER(S): 9 INJECTION, SOLUTION INTRAVENOUS at 15:06

## 2020-01-15 RX ADMIN — HYDROMORPHONE HYDROCHLORIDE 0.5 MILLIGRAM(S): 2 INJECTION INTRAMUSCULAR; INTRAVENOUS; SUBCUTANEOUS at 10:45

## 2020-01-15 RX ADMIN — HYDROMORPHONE HYDROCHLORIDE 0.5 MILLIGRAM(S): 2 INJECTION INTRAMUSCULAR; INTRAVENOUS; SUBCUTANEOUS at 11:00

## 2020-01-15 RX ADMIN — Medication 400 MILLIGRAM(S): at 21:10

## 2020-01-15 RX ADMIN — SODIUM CHLORIDE 100 MILLILITER(S): 9 INJECTION, SOLUTION INTRAVENOUS at 11:07

## 2020-01-15 RX ADMIN — Medication 400 MILLIGRAM(S): at 15:07

## 2020-01-15 RX ADMIN — HYDROMORPHONE HYDROCHLORIDE 0.5 MILLIGRAM(S): 2 INJECTION INTRAMUSCULAR; INTRAVENOUS; SUBCUTANEOUS at 10:28

## 2020-01-15 RX ADMIN — Medication 1000 MILLIGRAM(S): at 15:35

## 2020-01-15 RX ADMIN — GABAPENTIN 300 MILLIGRAM(S): 400 CAPSULE ORAL at 18:50

## 2020-01-15 NOTE — CONSULT NOTE ADULT - SUBJECTIVE AND OBJECTIVE BOX
Information Obtained from:  EHR, Physical Chart, Patient at bedside (relevant EHR and Chart information verified with patient)    HPI:  73yo F admitted s/p LEFT TKR today.  Had Right THR in 2019, was planning on having staged, but found to have Right LE DVT and stage 2 was cancelled.  Has had 15 years of progressively worsening left knee pain, intermittent, sharp, tried injections with some relief, takes tylenol or advil PRN with some relief, pain ranges rom 0-5/10, worse with activity.     REVIEW OF SYSTEMS:  CONSTITUTIONAL: No fever, weight loss, or fatigue  EYES: No eye pain, visual disturbances, or discharge  ENMT:  No difficulty hearing, tinnitus, vertigo; No sinus or throat pain  NECK: No pain or stiffness  BREASTS: No pain, masses, or nipple discharge  RESPIRATORY: No cough, wheezing, chills or hemoptysis; No shortness of breath  CARDIOVASCULAR: No chest pain, palpitations, dizziness, or leg swelling  GASTROINTESTINAL: No abdominal or epigastric pain. No nausea, vomiting, or hematemesis; No diarrhea or constipation. No melena or hematochezia.  GENITOURINARY: No dysuria, frequency, hematuria, or incontinence  NEUROLOGICAL: No headaches, memory loss, loss of strength, numbness, or tremors  SKIN: No itching, burning, rashes, or lesions   LYMPH NODES: No enlarged glands  ENDOCRINE: No heat or cold intolerance; No hair loss  MUSCULOSKELETAL: No myalgias  PSYCHIATRIC: No depression, anxiety, mood swings, or difficulty sleeping  HEME/LYMPH: No easy bruising, or bleeding gums  ALLERGY AND IMMUNOLOGIC: No hives or eczema    PAST MEDICAL & SURGICAL HISTORY:  Obesity, Class II, BMI 35-39.9, no comorbidity  Hypothyroid  Benign heart murmur  Hyperlipidemia  Hypertension  H/O degenerative disc disease  Osteoarthritis  SLE (systemic lupus erythematosus)  Rheumatoid arthritis  Status post total hip replacement, right  S/P thyroid surgery: 1 lobe removed  Lung cancer: small tumor removed   S/P knee surgery: bilateral  S/P carpal tunnel release: left  S/P eye surgery: child  S/P cataract surgery: left  S/P tonsillectomy    SOCIAL HISTORY:  Lives: alone  Smoking Hx: none  ETOH consumption: 1-2 drinks monthly or less on average  Illicit Drug Use:  None    Allergies    No Known Allergies    Intolerances     Home Medications:  apixaban 5 mg oral tablet: 1 tab(s) orally every 12 hours (15 Tyron 2020 06:34)  Centrum MultiGummies Women oral tablet, chewable: 2 tab(s) orally once a day (15 Tyron 2020 06:34)  Centrum Silver oral tablet: 1 tab(s) orally once a day (15 Tyron 2020 06:34)  folic acid 1 mg oral tablet: 1 tab(s) orally once a day (15 Tyron 2020 06:34)  gabapentin 300 mg oral tablet: orally 2 times a day (15 Tyron 2020 06:34)  hydroxychloroquine 200 mg oral tablet: 1 tab(s) orally once a day (15 Tyron 2020 06:34)  losartan-hydrochlorothiazide 50mg-12.5mg oral tablet: 1 tab(s) orally once a day (15 Tyron 2020 06:34)  methotrexate 2.5 mg oral tablet: 6 tab(s) orally once a week- q Monday.  Last dose 19- until after surgery. (15 Tyron 2020 06:34)  Synthroid 200 mcg (0.2 mg) oral tablet: 1 tab(s) orally once a day (15 Tyron 2020 06:34)    FAMILY HISTORY:  FHx: rheumatoid arthritis: father     PHYSICAL EXAM:  Vital Signs Last 24 Hrs  T(C): 36.4 (15 Tyron 2020 06:15), Max: 36.4 (15 Tyron 2020 06:15)  T(F): 97.5 (15 Tyron 2020 06:15), Max: 97.5 (15 Tyron 2020 06:15)  HR: 68 (15 Tyron 2020 06:15) (68 - 68)  BP: 112/60 (15 Tyron 2020 06:15) (112/60 - 112/60)  BP(mean): --  RR: 18 (15 Tyron 2020 06:15) (18 - 18)  SpO2: --    GENERAL: NAD, well-groomed, well-developed, awake, alert, oriented x 3, fluent and coherent speech  EYES: EOMI, PERRLA, conjunctiva and sclera clear  ENMT: No tonsillar erythema, exudates, or enlargement; Moist mucous membranes, Good dentition, No lesions  NECK: Supple, No JVD, No Cervical LAD, No thyromegaly, No thyroid nodules  NERVOUS SYSTEM:  Good concentration; No facial droop  CHEST/LUNG: Clear to auscultation bilaterally; No rales, rhonchi, wheezing, or rubs  HEART: Regular rate and rhythm; No murmurs, rubs, or gallops  ABDOMEN: Soft, Nontender, Nondistended, Bowel sounds present, No palpable masses or organomegaly, No bruits  EXTREMITIES:  2+ Peripheral Pulses, No clubbing, cyanosis, or edema  INCISION:  Dressing clean/dry/intact    EKG (was personally reviewed): Information Obtained from:  EHR, Physical Chart, Patient at bedside (relevant EHR and Chart information verified with patient)    HPI:  73yo F admitted s/p LEFT TKR today.  Had Right THR in 2019, was planning on having staged, but found to have Right LE DVT and stage 2 was cancelled.  Has had 15 years of progressively worsening left knee pain, intermittent, sharp, tried injections with some relief, takes tylenol or advil PRN with some relief, pain ranges rom 0-5/10, worse with activity.     Patient had an arrythmia in OR, started as soon as he as moved to OR table, only med received prior was Versed 2mg.  On EKGs, looks like Type 2 Heart Block (Mobitz I), also had ST depressions in V1-V6, resolved on subsequent ekg.    REVIEW OF SYSTEMS:  CONSTITUTIONAL: No fever, weight loss, or fatigue  EYES: No eye pain, visual disturbances, or discharge  ENMT:  No difficulty hearing, tinnitus, vertigo; No sinus or throat pain  NECK: No pain or stiffness  BREASTS: No pain, masses, or nipple discharge  RESPIRATORY: No cough, wheezing, chills or hemoptysis; No shortness of breath  CARDIOVASCULAR: No chest pain, palpitations, dizziness, or leg swelling  GASTROINTESTINAL: No abdominal or epigastric pain. No nausea, vomiting, or hematemesis; No diarrhea or constipation. No melena or hematochezia.  GENITOURINARY: No dysuria, frequency, hematuria, or incontinence  NEUROLOGICAL: No headaches, memory loss, loss of strength, numbness, or tremors  SKIN: No itching, burning, rashes, or lesions   LYMPH NODES: No enlarged glands  ENDOCRINE: No heat or cold intolerance; No hair loss  MUSCULOSKELETAL: No myalgias  PSYCHIATRIC: No depression, anxiety, mood swings, or difficulty sleeping  HEME/LYMPH: No easy bruising, or bleeding gums  ALLERGY AND IMMUNOLOGIC: No hives or eczema    PAST MEDICAL & SURGICAL HISTORY:  Obesity, Class II, BMI 35-39.9, no comorbidity  Hypothyroid  Benign heart murmur  Hyperlipidemia  Hypertension  H/O degenerative disc disease  Osteoarthritis  SLE (systemic lupus erythematosus)  Rheumatoid arthritis  Status post total hip replacement, right  S/P thyroid surgery: 1 lobe removed  Lung cancer: small tumor removed   S/P knee surgery: bilateral  S/P carpal tunnel release: left  S/P eye surgery: child  S/P cataract surgery: left  S/P tonsillectomy    SOCIAL HISTORY:  Lives: alone  Smoking Hx: none  ETOH consumption: 1-2 drinks monthly or less on average  Illicit Drug Use:  None    Allergies    No Known Allergies    Intolerances     Home Medications:  apixaban 5 mg oral tablet: 1 tab(s) orally every 12 hours (15 Tyron 2020 06:34)  Centrum MultiGummies Women oral tablet, chewable: 2 tab(s) orally once a day (15 Tyron 2020 06:34)  Centrum Silver oral tablet: 1 tab(s) orally once a day (15 Tyron 2020 06:34)  folic acid 1 mg oral tablet: 1 tab(s) orally once a day (15 Tyron 2020 06:)  gabapentin 300 mg oral tablet: orally 2 times a day (15 Tyron 2020 06:34)  hydroxychloroquine 200 mg oral tablet: 1 tab(s) orally once a day (15 Tyron 2020 06:34)  losartan-hydrochlorothiazide 50mg-12.5mg oral tablet: 1 tab(s) orally once a day (15 Tyron 2020 06:34)  methotrexate 2.5 mg oral tablet: 6 tab(s) orally once a week- q Monday.  Last dose 19- until after surgery. (15 Tyron 2020 06:34)  Synthroid 200 mcg (0.2 mg) oral tablet: 1 tab(s) orally once a day (15 Tyron 2020 06:34)    FAMILY HISTORY:  FHx: rheumatoid arthritis: father     PHYSICAL EXAM:  Vital Signs Last 24 Hrs  T(C): 36.4 (15 Tyron 2020 06:15), Max: 36.4 (15 Tyron 2020 06:15)  T(F): 97.5 (15 Tyron 2020 06:15), Max: 97.5 (15 Tyron 2020 06:15)  HR: 68 (15 Tyron 2020 06:15) (68 - 68)  BP: 112/60 (15 Tyron 2020 06:15) (112/60 - 112/60)  BP(mean): --  RR: 18 (15 Tyron 2020 06:15) (18 - 18)  SpO2: --    GENERAL: NAD, well-groomed, well-developed, awake,oriented x 3, fluent and coherent speech  EYES: EOMI, PERRLA, conjunctiva and sclera clear  ENMT: No tonsillar erythema, exudates, or enlargement; Moist mucous membranes, Good dentition, No lesions  NECK: Supple, No JVD, No Cervical LAD, No thyromegaly, No thyroid nodules  NERVOUS SYSTEM:  Good concentration; No facial droop  CHEST/LUNG: Clear to auscultation bilaterally; No rales, rhonchi, wheezing, or rubs  HEART: Regular rate and rhythm; +systolic murmur LSB  ABDOMEN: Soft, Nontender, Nondistended, Bowel sounds present, No palpable masses or organomegaly, No bruits  EXTREMITIES:  2+ Peripheral Pulses, No clubbing, cyanosis, or edema  INCISION:  Dressing clean/dry/intact    EKG (was personally reviewed): Yes, see HPI

## 2020-01-15 NOTE — PHYSICAL THERAPY INITIAL EVALUATION ADULT - RANGE OF MOTION EXAMINATION, REHAB EVAL
Right LE ROM was WFL (within functional limits)/deficits as listed below/left knee flexion 60 deg in sitting

## 2020-01-15 NOTE — PHYSICAL THERAPY INITIAL EVALUATION ADULT - ADDITIONAL COMMENTS
Pt lives alone in a house with no steps to enter, no steps inside.   Pt has rolling walker and straight cane

## 2020-01-15 NOTE — PHYSICAL THERAPY INITIAL EVALUATION ADULT - GAIT DEVIATIONS NOTED, PT EVAL
decreased wai/decreased weight-shifting ability/decreased velocity of limb motion/decreased step length/decreased stride length

## 2020-01-15 NOTE — PROGRESS NOTE ADULT - SUBJECTIVE AND OBJECTIVE BOX
Ortho PA - Post Op Check - S/P Left TKR under general anesthesia and adductor canal nerve block           Pt lethargic but easily arousable and comfortable with no complaints of pain or nausea.    Vital Signs Last 24 Hrs  T(C): 36.3 (01-15-20 @ 12:02), Max: 36.6 (01-15-20 @ 10:14)  T(F): 97.4 (01-15-20 @ 12:02), Max: 97.8 (01-15-20 @ 10:14)  HR: 66 (01-15-20 @ 12:02) (66 - 88)  BP: 108/72 (01-15-20 @ 12:02) (100/57 - 124/58)  BP(mean): --  RR: 16 (01-15-20 @ 12:02) (15 - 22)  SpO2: 95% (01-15-20 @ 11:40) (94% - 100%)  I&O's Detail    15 Tyron 2020 07:01  -  15 Tyron 2020 15:40  --------------------------------------------------------  IN:    lactated ringers.: 1100 mL  Total IN: 1100 mL    OUT:    Estimated Blood Loss: 150 mL in OR  Total OUT: 150 mL    Total NET: 950 mL        I&O's Summary    15 Tyron 2020 07:01  -  15 Tyron 2020 15:40  --------------------------------------------------------  IN: 1100 mL / OUT: 150 mL / NET: 950 mL               01-15    140  |  105  |  21  ----------------------------<  136<H>  3.5   |  31  |  0.79    Ca    8.6      15 Tyron 2020 10:54  Mg     1.8     01-15    TPro  6.1  /  Alb  2.7<L>  /  TBili  0.5  /  DBili  x   /  AST  21  /  ALT  20  /  AlkPhos  108  01-15    MEDICATIONS:acetaminophen  IVPB .. 1000 milliGRAM(s) IV Intermittent every 6 hours  aluminum hydroxide/magnesium hydroxide/simethicone Suspension 30 milliLiter(s) Oral four times a day PRN  ceFAZolin   IVPB 2000 milliGRAM(s) IV Intermittent every 8 hours  folic acid 1 milliGRAM(s) Oral daily  gabapentin 300 milliGRAM(s) Oral two times a day  HYDROmorphone  Injectable 0.5 milliGRAM(s) IV Push every 3 hours PRN  lactated ringers. 1000 milliLiter(s) IV Continuous <Continuous>  levothyroxine 200 MICROGram(s) Oral daily  magnesium hydroxide Suspension 30 milliLiter(s) Oral daily PRN  ondansetron Injectable 4 milliGRAM(s) IV Push every 6 hours PRN  oxyCODONE    IR 5 milliGRAM(s) Oral every 3 hours PRN  oxyCODONE    IR 10 milliGRAM(s) Oral every 3 hours PRN  pantoprazole    Tablet 40 milliGRAM(s) Oral before breakfast  polyethylene glycol 3350 17 Gram(s) Oral at bedtime  senna 2 Tablet(s) Oral at bedtime    Anticoagulation:  PAS to both LE's      Antibiotics:   ceFAZolin   IVPB 2000 milliGRAM(s) IV Intermittent every 8 hours for 2 more doses post op      Pain medications:   acetaminophen  IVPB .. 1000 milliGRAM(s) IV Intermittent every 6 hours  gabapentin 300 milliGRAM(s) Oral two times a day  HYDROmorphone  Injectable 0.5 milliGRAM(s) IV Push every 3 hours PRN  ondansetron Injectable 4 milliGRAM(s) IV Push every 6 hours PRN  oxyCODONE    IR 5 milliGRAM(s) Oral every 3 hours PRN  oxyCODONE    IR 10 milliGRAM(s) Oral every 3 hours PRN          PE:  Left Knee-primary surgical bandage/NELLY dry and intact.  Hemovac drain intact and patent.  Feet mobile, warm and sensate.  EHLs/ant.tibs. 5/5  PAS on LE's.  Calves soft and nontender. Moderate pitting edema noted left foot and ankle.     A/P: Ortho stable post-op  - Continue post-op orders; pain management with above plan.  - Check labs today and in A.M.  - DVT prevention with PAS and will start Eliquis in AM (on PREop treatment doses of Eliquis for hx of DVT)  - PT /OT for OOB, full WBAT  - Medical consult with Dr. Navas appreciated and cardiology consult with Dr. Chambers appreciated (NO new recommendations for arrhythmia noted in OR); tele monitoring ordered.  -Discharge planning Rehab, as per patient.  -Will continue to monitor closely with attendings.

## 2020-01-15 NOTE — CONSULT NOTE ADULT - ASSESSMENT
POD#0 s/p LEFT TKR  - Pain control  - Bowel regimen  - PT/OT  - VTE PPx - Eliquis    Post-Operative RLE DVT in July 2019  - Eliquis 2.5mg BID on POD#1  - resume treatment dose Eliquis on POD#2    HTN  - hold hctz  - resume ARB on POD#@    HLD  -     SLE  -     Hypothyroidism  - POD#0 s/p LEFT TKR  - Pain control  - Bowel regimen  - PT/OT  - VTE PPx - Eliquis    Intraoperative Arrhythmia - appears to have been Type 2 Mobitz I  - no AV node blocking agents  - monitor on tele  - labs pending  - Cardiology eval  - ECHO in June 2019 - diastolic dysfunction, normal EF, normal RVSP    Post-Operative RLE DVT in July 2019  - Eliquis 2.5mg BID on POD#1  - resume treatment dose Eliquis on POD#2  - per note in chart, she had f/u Dopplers in Oct 2019 which did not show any DVT  - no right calf tenderness/edema    HTN  - hold hctz  - resume ARB on POD#2    SLE and RA  - per her rheum clearance, advised to hold MTX and Hydroxychloroquine until Feb  - continue folic acid    Hypothyroidism  - continue synthroid

## 2020-01-15 NOTE — CONSULT NOTE ADULT - ASSESSMENT
The patient is a 74 year old female with a history of HTN, HL, RA, DVT who is admitted s/p left TKR.    Plan:  - Underlying ECG with mild conduction disease  - Telemetry findings possibly due to undiagnosed TAMMY  - Continue to monitor on telemetry  - Avoid AV eliz blocking agents  - Labs pending  - Continue apixaban for prior DVT and DVT prophylaxis  - Continue losartan 50 mg daily

## 2020-01-16 ENCOUNTER — TRANSCRIPTION ENCOUNTER (OUTPATIENT)
Age: 75
End: 2020-01-16

## 2020-01-16 LAB
ANION GAP SERPL CALC-SCNC: 6 MMOL/L — SIGNIFICANT CHANGE UP (ref 5–17)
BASOPHILS # BLD AUTO: 0.01 K/UL — SIGNIFICANT CHANGE UP (ref 0–0.2)
BASOPHILS NFR BLD AUTO: 0.1 % — SIGNIFICANT CHANGE UP (ref 0–2)
BUN SERPL-MCNC: 22 MG/DL — SIGNIFICANT CHANGE UP (ref 7–23)
CALCIUM SERPL-MCNC: 9 MG/DL — SIGNIFICANT CHANGE UP (ref 8.4–10.5)
CHLORIDE SERPL-SCNC: 100 MMOL/L — SIGNIFICANT CHANGE UP (ref 96–108)
CO2 SERPL-SCNC: 29 MMOL/L — SIGNIFICANT CHANGE UP (ref 22–31)
CREAT SERPL-MCNC: 1.09 MG/DL — SIGNIFICANT CHANGE UP (ref 0.5–1.3)
EOSINOPHIL # BLD AUTO: 0 K/UL — SIGNIFICANT CHANGE UP (ref 0–0.5)
EOSINOPHIL NFR BLD AUTO: 0 % — SIGNIFICANT CHANGE UP (ref 0–6)
GLUCOSE SERPL-MCNC: 133 MG/DL — HIGH (ref 70–99)
HCT VFR BLD CALC: 36 % — SIGNIFICANT CHANGE UP (ref 34.5–45)
HGB BLD-MCNC: 11.6 G/DL — SIGNIFICANT CHANGE UP (ref 11.5–15.5)
IMM GRANULOCYTES NFR BLD AUTO: 0.4 % — SIGNIFICANT CHANGE UP (ref 0–1.5)
LYMPHOCYTES # BLD AUTO: 1.04 K/UL — SIGNIFICANT CHANGE UP (ref 1–3.3)
LYMPHOCYTES # BLD AUTO: 6.9 % — LOW (ref 13–44)
MCHC RBC-ENTMCNC: 30.1 PG — SIGNIFICANT CHANGE UP (ref 27–34)
MCHC RBC-ENTMCNC: 32.2 GM/DL — SIGNIFICANT CHANGE UP (ref 32–36)
MCV RBC AUTO: 93.5 FL — SIGNIFICANT CHANGE UP (ref 80–100)
MONOCYTES # BLD AUTO: 1.27 K/UL — HIGH (ref 0–0.9)
MONOCYTES NFR BLD AUTO: 8.4 % — SIGNIFICANT CHANGE UP (ref 2–14)
NEUTROPHILS # BLD AUTO: 12.7 K/UL — HIGH (ref 1.8–7.4)
NEUTROPHILS NFR BLD AUTO: 84.2 % — HIGH (ref 43–77)
NRBC # BLD: 0 /100 WBCS — SIGNIFICANT CHANGE UP (ref 0–0)
PLATELET # BLD AUTO: 259 K/UL — SIGNIFICANT CHANGE UP (ref 150–400)
POTASSIUM SERPL-MCNC: 4.4 MMOL/L — SIGNIFICANT CHANGE UP (ref 3.5–5.3)
POTASSIUM SERPL-SCNC: 4.4 MMOL/L — SIGNIFICANT CHANGE UP (ref 3.5–5.3)
RBC # BLD: 3.85 M/UL — SIGNIFICANT CHANGE UP (ref 3.8–5.2)
RBC # FLD: 15.3 % — HIGH (ref 10.3–14.5)
SODIUM SERPL-SCNC: 135 MMOL/L — SIGNIFICANT CHANGE UP (ref 135–145)
WBC # BLD: 15.08 K/UL — HIGH (ref 3.8–10.5)
WBC # FLD AUTO: 15.08 K/UL — HIGH (ref 3.8–10.5)

## 2020-01-16 PROCEDURE — 99233 SBSQ HOSP IP/OBS HIGH 50: CPT

## 2020-01-16 RX ORDER — CELECOXIB 200 MG/1
100 CAPSULE ORAL ONCE
Refills: 0 | Status: COMPLETED | OUTPATIENT
Start: 2020-01-16 | End: 2020-01-16

## 2020-01-16 RX ORDER — ACETAMINOPHEN 500 MG
2 TABLET ORAL
Qty: 0 | Refills: 0 | DISCHARGE
Start: 2020-01-16

## 2020-01-16 RX ORDER — SENNA PLUS 8.6 MG/1
2 TABLET ORAL
Qty: 0 | Refills: 0 | DISCHARGE
Start: 2020-01-16

## 2020-01-16 RX ORDER — CELECOXIB 200 MG/1
1 CAPSULE ORAL
Qty: 0 | Refills: 0 | DISCHARGE
Start: 2020-01-16

## 2020-01-16 RX ORDER — PANTOPRAZOLE SODIUM 20 MG/1
1 TABLET, DELAYED RELEASE ORAL
Qty: 0 | Refills: 0 | DISCHARGE
Start: 2020-01-16

## 2020-01-16 RX ORDER — CELECOXIB 200 MG/1
100 CAPSULE ORAL EVERY 12 HOURS
Refills: 0 | Status: DISCONTINUED | OUTPATIENT
Start: 2020-01-16 | End: 2020-01-17

## 2020-01-16 RX ORDER — OXYCODONE HYDROCHLORIDE 5 MG/1
1 TABLET ORAL
Qty: 0 | Refills: 0 | DISCHARGE
Start: 2020-01-16

## 2020-01-16 RX ORDER — LOSARTAN POTASSIUM 100 MG/1
1 TABLET, FILM COATED ORAL
Qty: 0 | Refills: 0 | DISCHARGE
Start: 2020-01-16

## 2020-01-16 RX ORDER — POLYETHYLENE GLYCOL 3350 17 G/17G
17 POWDER, FOR SOLUTION ORAL
Qty: 0 | Refills: 0 | DISCHARGE
Start: 2020-01-16

## 2020-01-16 RX ADMIN — PANTOPRAZOLE SODIUM 40 MILLIGRAM(S): 20 TABLET, DELAYED RELEASE ORAL at 05:43

## 2020-01-16 RX ADMIN — CELECOXIB 100 MILLIGRAM(S): 200 CAPSULE ORAL at 12:06

## 2020-01-16 RX ADMIN — OXYCODONE HYDROCHLORIDE 5 MILLIGRAM(S): 5 TABLET ORAL at 00:24

## 2020-01-16 RX ADMIN — Medication 400 MILLIGRAM(S): at 01:55

## 2020-01-16 RX ADMIN — Medication 1000 MILLIGRAM(S): at 09:00

## 2020-01-16 RX ADMIN — Medication 200 MICROGRAM(S): at 05:43

## 2020-01-16 RX ADMIN — OXYCODONE HYDROCHLORIDE 5 MILLIGRAM(S): 5 TABLET ORAL at 05:44

## 2020-01-16 RX ADMIN — CELECOXIB 100 MILLIGRAM(S): 200 CAPSULE ORAL at 21:25

## 2020-01-16 RX ADMIN — SENNA PLUS 2 TABLET(S): 8.6 TABLET ORAL at 21:26

## 2020-01-16 RX ADMIN — Medication 1000 MILLIGRAM(S): at 01:57

## 2020-01-16 RX ADMIN — OXYCODONE HYDROCHLORIDE 5 MILLIGRAM(S): 5 TABLET ORAL at 21:55

## 2020-01-16 RX ADMIN — OXYCODONE HYDROCHLORIDE 5 MILLIGRAM(S): 5 TABLET ORAL at 06:14

## 2020-01-16 RX ADMIN — APIXABAN 2.5 MILLIGRAM(S): 2.5 TABLET, FILM COATED ORAL at 21:25

## 2020-01-16 RX ADMIN — CELECOXIB 100 MILLIGRAM(S): 200 CAPSULE ORAL at 12:05

## 2020-01-16 RX ADMIN — Medication 1000 MILLIGRAM(S): at 17:40

## 2020-01-16 RX ADMIN — GABAPENTIN 300 MILLIGRAM(S): 400 CAPSULE ORAL at 05:43

## 2020-01-16 RX ADMIN — CELECOXIB 100 MILLIGRAM(S): 200 CAPSULE ORAL at 21:27

## 2020-01-16 RX ADMIN — APIXABAN 2.5 MILLIGRAM(S): 2.5 TABLET, FILM COATED ORAL at 08:37

## 2020-01-16 RX ADMIN — GABAPENTIN 300 MILLIGRAM(S): 400 CAPSULE ORAL at 17:40

## 2020-01-16 RX ADMIN — Medication 1 MILLIGRAM(S): at 12:05

## 2020-01-16 RX ADMIN — Medication 1000 MILLIGRAM(S): at 08:37

## 2020-01-16 RX ADMIN — OXYCODONE HYDROCHLORIDE 5 MILLIGRAM(S): 5 TABLET ORAL at 21:25

## 2020-01-16 NOTE — PROGRESS NOTE ADULT - ASSESSMENT
POD#1 s/p LEFT TKR  - Pain control  - Bowel regimen  - PT/OT  - VTE PPx - Eliquis  - Wants SHAZIA    Intraoperative Arrhythmia - appears to have been Type 2 Mobitz I  - no AV node blocking agents  - monitor on tele  - Cardiology eval appreciated  - ECHO in June 2019 - diastolic dysfunction, normal EF, normal RVSP    Post-Operative RLE DVT in July 2019  - Eliquis 2.5mg BID on POD#1  - resume treatment dose Eliquis on POD#2  - per note in chart, she had f/u Dopplers in Oct 2019 which did not show any DVT  - no right calf tenderness/edema    HTN  - hold hctz  - resume ARB on POD#2    SLE and RA  - per her rheum clearance, advised to hold MTX and Hydroxychloroquine until Feb  - continue folic acid    Hypothyroidism  - continue synthroid

## 2020-01-16 NOTE — DISCHARGE NOTE PROVIDER - NSDCACTIVITY_GEN_ALL_CORE
Do not drive or operate machinery/Walking - Outdoors allowed/Showering allowed/Stairs allowed/Walking - Indoors allowed

## 2020-01-16 NOTE — DISCHARGE NOTE PROVIDER - HOSPITAL COURSE
This patient was admitted to [facility] with a history of severe degenerative joint disease of the left knee.  Patient went to Pre-Surgical Testing at [facility] and was medically cleared to undergo elective procedure. Patient underwent left total knee replacement by Dr. Castillo. Procedure was well tolerated.  No operative or irineo-operative complications arose during patients hospital course.  Patient received antibiotic according to SCIP guidelines for infection prevention.  Eliquis was given for DVT prophylaxis.  Anesthesia, Medical Hospitalist, Physical Therapy and Occupational Therapy were consulted. Patient is stable for discharge with a good prognosis.  Appropriate discharge instructions and medications are provided in this document.

## 2020-01-16 NOTE — DISCHARGE NOTE PROVIDER - CARE PROVIDERS DIRECT ADDRESSES
[email or web address]

## 2020-01-16 NOTE — PROGRESS NOTE ADULT - SUBJECTIVE AND OBJECTIVE BOX
Chief Complaint: TKR    Interval Events: No events overnight. No complaints.    Review of Systems:  General: No fevers, chills, weight loss or gain  Skin: No rashes, color changes  Cardiovascular: No chest pain, orthopnea  Respiratory: No shortness of breath, cough  Gastrointestinal: No nausea, abdominal pain  Genitourinary: No incontinence, pain with urination  Musculoskeletal: No pain, swelling, decreased range of motion  Neurological: No headache, weakness  Psychiatric: No depression, anxiety  Endocrine: No weight loss or gain, increased thirst  All other systems are comprehensively negative.    Physical Exam:  Vitals:        Vital Signs Last 24 Hrs  T(C): 36.8 (16 Jan 2020 07:11), Max: 36.8 (15 Tyron 2020 23:36)  T(F): 98.3 (16 Jan 2020 07:11), Max: 98.3 (15 Tyron 2020 23:36)  HR: 68 (16 Jan 2020 07:11) (63 - 88)  BP: 113/64 (16 Jan 2020 07:11) (96/60 - 124/58)  BP(mean): --  RR: 16 (16 Jan 2020 07:11) (15 - 22)  SpO2: 95% (16 Jan 2020 07:11) (92% - 100%)  General: NAD  HEENT: MMM  Neck: No JVD, no carotid bruit  Lungs: CTAB  CV: RRR, nl S1/S2, no M/R/G  Abdomen: S/NT/ND, +BS  Extremities: No LE edema, no cyanosis  Neuro: AAOx3, non-focal  Skin: No rash    Labs:                        11.6   15.08 )-----------( 259      ( 16 Jan 2020 07:15 )             36.0     01-16    135  |  100  |  22  ----------------------------<  133<H>  4.4   |  29  |  1.09    Ca    9.0      16 Jan 2020 07:15  Mg     1.8     01-15    TPro  6.1  /  Alb  2.7<L>  /  TBili  0.5  /  DBili  x   /  AST  21  /  ALT  20  /  AlkPhos  108  01-15            Telemetry: Sinus rhythm, Mobitz 1

## 2020-01-16 NOTE — PROGRESS NOTE ADULT - SUBJECTIVE AND OBJECTIVE BOX
INTERVAL HPI/OVERNIGHT EVENTS:   Patient seen and examined.  Eating, voiding, no BM yet.  No fevers, chills, sweats, dizziness, HA, changes in vision, cp, palpitations, sob, persistent cough, n/v/d, abd pain, dysuria, focal weakness, or calf pain.     REVIEW OF SYSTEMS:  See HPI,  all others negative    PHYSICAL EXAM:  Vital Signs Last 24 Hrs  T(C): 36.8 (2020 07:11), Max: 36.8 (15 Tyron 2020 23:36)  T(F): 98.3 (2020 07:11), Max: 98.3 (15 Tyron 2020 23:36)  HR: 68 (2020 07:11) (63 - 88)  BP: 113/64 (2020 07:11) (96/60 - 124/58)  BP(mean): --  RR: 16 (2020 07:11) (15 - 22)  SpO2: 95% (2020 07:11) (92% - 100%)    GENERAL: NAD, well-groomed, well-developed, awake, alert, oriented x 3, fluent and coherent speech  EYES: EOMI, PERRLA, conjunctiva and sclera clear  ENMT: No tonsillar erythema, exudates, or enlargement;   NECK: Supple, No JVD, No Cervical LAD, No thyromegaly, No thyroid nodules felt  NERVOUS SYSTEM:  Good concentration; Moving all 4 extremities against gravity and resistance; No gross sensory deficits, No facial droop  CHEST/LUNG: Clear to auscultation bilaterally; No rales, rhonchi, wheezing, or rubs  HEART: Regular rate and rhythm; No murmurs, rubs, or gallops  ABDOMEN: Soft, Nontender, Nondistended, Bowel sounds present, No palpable masses or organomegaly, No bruits  EXTREMITIES:  2+ Peripheral Pulses, No clubbing, cyanosis, or edema  LYMPH: No lymphadenopathy  SKIN: No rashes or lesions    Diagnostic Testin.6   15.08 )-----------( 259      ( 2020 07:15 )             36.0     2020 07:15    135    |  100    |  22     ----------------------------<  133    4.4     |  29     |  1.09     Ca    9.0        2020 07:15  Mg     1.8       15 2020 10:54    TPro  6.1    /  Alb  2.7    /  TBili  0.5    /  DBili  x      /  AST  21     /  ALT  20     /  AlkPhos  108    15 Tyron 2020 10:54

## 2020-01-16 NOTE — DISCHARGE NOTE PROVIDER - NSDCCPCAREPLAN_GEN_ALL_CORE_FT
PRINCIPAL DISCHARGE DIAGNOSIS  Diagnosis: Degenerative joint disease of left knee  Assessment and Plan of Treatment: For your total knee replacement:  Physical Therapy/Occupational Therapy for: ambulation, transfers, stairs, ADL's (activities of daily living), range of motion exercises, and isometrics  -Activity  • Weight Bearing as tolerated with rolling walker.  • Take short, frequent walks increasing the distance that you walk each day as tolerated.  • Change your position every hour to decrease pain and stiffness.  • Continue the exercises taught to you by your physical therapist.  • No driving until cleared by the doctor.  • No tub baths, hot tubs, or swimming pools until instructed by your doctor.  • Do not squat down on the floor.  • Do not kneel or twist your knee.  • Range of Motion Goals: Flexion= 120 degrees, Extension = 0 degrees  Keep Mirna dressing on for 7 days from date of surgery. Before showering, pause the suction by pressing the orange button and then disconnect to battery pack. Pat dressing dry when you are finished showering, and reconnect the battery pack to the tubing. Press the orange button to resume. After 7 days, remove and dispose of the dressing and battery pack.  Suture removal 2 weeks after surgery at Surgeon's office. PRINCIPAL DISCHARGE DIAGNOSIS  Diagnosis: Degenerative joint disease of left knee  Assessment and Plan of Treatment: For your total knee replacement:  Physical Therapy/Occupational Therapy for: ambulation, transfers, stairs, ADL's (activities of daily living), range of motion exercises, and isometrics  -Activity  • Weight Bearing as tolerated with rolling walker.  • Take short, frequent walks increasing the distance that you walk each day as tolerated.  • Change your position every hour to decrease pain and stiffness.  • Continue the exercises taught to you by your physical therapist.  • No driving until cleared by the doctor.  • No tub baths, hot tubs, or swimming pools until instructed by your doctor.  • Do not squat down on the floor.  • Do not kneel or twist your knee.  • Range of Motion Goals: Flexion= 120 degrees, Extension = 0 degrees  Keep Mirna dressing on for 7 days from date of surgery. Before showering, pause the suction by pressing the orange button and then disconnect to battery pack. Pat dressing dry when you are finished showering, and reconnect the battery pack to the tubing. Press the orange button to resume. After 7 days, remove and dispose of the dressing and battery pack.  Suture removal 2 weeks after surgery at Surgeon's office.      SECONDARY DISCHARGE DIAGNOSES  Diagnosis: Systemic lupus erythematosus  Assessment and Plan of Treatment: - concurrent RA as well  - MTX and Hydroxychloroquine on hold until Feb per her Rheum recs  - f/u with Rheum prior to resuming    Diagnosis: Second degree heart block  Assessment and Plan of Treatment: - Fermín 1  - intermittent  - asymptomatic  - avoid AV eliz blocking agents

## 2020-01-16 NOTE — DISCHARGE NOTE PROVIDER - CARE PROVIDER_API CALL
Mansoor Castillo)  Orthopaedic Surgery  [address], [address]  [place] [address]  Phone: [phone]  Fax: [phone]  Follow Up Time:

## 2020-01-16 NOTE — PROGRESS NOTE ADULT - SUBJECTIVE AND OBJECTIVE BOX
KAI THOMAS                                                                [id number]                                                      Allergies---No Known Allergies        Pt is a 74y year old Female s/p left TKR.    Pain is 2/10.   Tolerating the diet.   The patient is A&O x 3, resting comfortably in bed with no complaints.   Denies any chest pain / shortness of breath / dyspnea/ nausea / vomiting / headaches or light headed ness.        Vital Signs Last 24 Hrs  T(F): 98.3 (01-16-20 @ 07:11), Max: 98.3 (01-15-20 @ 23:36)  HR: 68 (01-16-20 @ 07:11)  BP: 113/64 (01-16-20 @ 07:11)  RR: 16 (01-16-20 @ 07:11)  SpO2: 95% (01-16-20 @ 07:11)      I&O's Detail    15 Tyron 2020 07:01  -  16 Jan 2020 07:00  --------------------------------------------------------  IN:    IV PiggyBack: 150 mL    lactated ringers.: 450 mL    lactated ringers.: 1100 mL  Total IN: 1700 mL    OUT:    Estimated Blood Loss: 150 mL    Voided: 1300 mL  Total OUT: 1450 mL    Total NET: 250 mL      PE:   Left Lower Extremity:   Dressing/Ace wrap is C/D/I.   Neurovascularly intact.   No gross evidence of motor or sensory deficit.   +2  DP/PT pulses.   EHL/FHL/TA intact.   Toes are pink and mobile.   Capillary refill < 2 seconds.   Negative calf tenderness.   PAS on.   HV in place                             11.6   15.08 )--------------( 259                          01-16-20 @ 07:15               36.0         135   |  100  |  22  -----------------------------<  133                  01-16-20 @ 07:15  4.4    |  29    |  1.09        Ca    9.0              A:   Pt is a 74y year old Female S/P left total knee replacement, Post Op Day #1        Plan:    - Follow up with Medicine   - OOB with PT/OT   - Pain control    - Incentive spirometry   - Labs in A.M.   - Dressing change tomorrow   - D/C planning   - DVT ppx = PAS +  apixaban 2.5 milliGRAM(s) Oral every 12 hours                                                                                                                                                                             Sin Arizmendi RPA-KARIE

## 2020-01-16 NOTE — DISCHARGE NOTE PROVIDER - NSDCCPTREATMENT_GEN_ALL_CORE_FT
PRINCIPAL PROCEDURE  Procedure: Total left knee replacement  Findings and Treatment: severe left knee DJD

## 2020-01-16 NOTE — DISCHARGE NOTE PROVIDER - NSDCMRMEDTOKEN_GEN_ALL_CORE_FT
acetaminophen 500 mg oral tablet: 2 tab(s) orally every 8 hours  apixaban 5 mg oral tablet: 1 tab(s) orally every 12 hours  celecoxib 100 mg oral capsule: 1 cap(s) orally every 12 hours  Centrum MultiGummies Women oral tablet, chewable: 2 tab(s) orally once a day  Centrum Silver oral tablet: 1 tab(s) orally once a day  folic acid 1 mg oral tablet: 1 tab(s) orally once a day  gabapentin 300 mg oral tablet: orally 2 times a day  hydroxychloroquine 200 mg oral tablet: 1 tab(s) orally once a day  losartan 50 mg oral tablet: 1 tab(s) orally once a day  losartan-hydrochlorothiazide 50mg-12.5mg oral tablet: 1 tab(s) orally once a day  methotrexate 2.5 mg oral tablet: 6 tab(s) orally once a week- q Monday.  Last dose 12/30/19- until after surgery.  oxyCODONE 10 mg oral tablet: 1 tab(s) orally every 3 hours, As needed, Moderate Pain (4 - 6)  oxyCODONE 5 mg oral tablet: 1 tab(s) orally every 3 hours, As needed, Mild Pain (1 - 3)  pantoprazole 40 mg oral delayed release tablet: 1 tab(s) orally once a day (before a meal)  polyethylene glycol 3350 oral powder for reconstitution: 17 gram(s) orally once a day (at bedtime)  senna oral tablet: 2 tab(s) orally once a day (at bedtime)  Synthroid 200 mcg (0.2 mg) oral tablet: 1 tab(s) orally once a day acetaminophen 500 mg oral tablet: 2 tab(s) orally every 8 hours  apixaban 5 mg oral tablet: 1 tab(s) orally every 12 hours  celecoxib 100 mg oral capsule: 1 cap(s) orally every 12 hours  Centrum MultiGummies Women oral tablet, chewable: 2 tab(s) orally once a day  Centrum Silver oral tablet: 1 tab(s) orally once a day  folic acid 1 mg oral tablet: 1 tab(s) orally once a day  gabapentin 300 mg oral tablet: orally 2 times a day  losartan 50 mg oral tablet: 1 tab(s) orally once a day  losartan-hydrochlorothiazide 50mg-12.5mg oral tablet: 1 tab(s) orally once a day  oxyCODONE 10 mg oral tablet: 1 tab(s) orally every 3 hours, As needed, Moderate Pain (4 - 6)  oxyCODONE 5 mg oral tablet: 1 tab(s) orally every 3 hours, As needed, Mild Pain (1 - 3)  pantoprazole 40 mg oral delayed release tablet: 1 tab(s) orally once a day (before a meal)  polyethylene glycol 3350 oral powder for reconstitution: 17 gram(s) orally once a day (at bedtime)  senna oral tablet: 2 tab(s) orally once a day (at bedtime)  Synthroid 200 mcg (0.2 mg) oral tablet: 1 tab(s) orally once a day

## 2020-01-16 NOTE — DISCHARGE NOTE PROVIDER - NSDCFUSCHEDAPPT_GEN_ALL_CORE_FT
KAI THOMAS ; 02/03/2020 ; [facility]  KAI THOMAS ; 03/17/2020 ; [facility] KAI THOMAS ; 02/03/2020 ; [facility]  KAI THOMAS ; 03/17/2020 ; [facility] KAI THOMAS ; 02/03/2020 ; [facility]  KAI THOMAS ; 03/17/2020 ; [facility] [address] KAI THOMAS ; 02/03/2020 ; [facility]  KAI THOMAS ; 03/17/2020 ; [facility] [address]

## 2020-01-16 NOTE — PROGRESS NOTE ADULT - ASSESSMENT
The patient is a 74 year old female with a history of HTN, HL, RA, DVT who is admitted s/p left TKR.    Plan:  - Yesterday with Fermín 1 on telemetry. Today with 1st deg AVB.  - Continue to monitor on telemetry  - Avoid AV eliz blocking agents  - Continue apixaban for prior DVT and DVT prophylaxis  - Continue losartan 50 mg daily  - Pain control  - PT

## 2020-01-17 ENCOUNTER — TRANSCRIPTION ENCOUNTER (OUTPATIENT)
Age: 75
End: 2020-01-17

## 2020-01-17 VITALS
HEART RATE: 70 BPM | DIASTOLIC BLOOD PRESSURE: 70 MMHG | RESPIRATION RATE: 18 BRPM | TEMPERATURE: 98 F | OXYGEN SATURATION: 97 % | SYSTOLIC BLOOD PRESSURE: 111 MMHG

## 2020-01-17 DIAGNOSIS — M32.9 SYSTEMIC LUPUS ERYTHEMATOSUS, UNSPECIFIED: ICD-10-CM

## 2020-01-17 LAB
ANION GAP SERPL CALC-SCNC: 3 MMOL/L — LOW (ref 5–17)
BASOPHILS # BLD AUTO: 0.02 K/UL — SIGNIFICANT CHANGE UP (ref 0–0.2)
BASOPHILS NFR BLD AUTO: 0.2 % — SIGNIFICANT CHANGE UP (ref 0–2)
BUN SERPL-MCNC: 24 MG/DL — HIGH (ref 7–23)
CALCIUM SERPL-MCNC: 8.9 MG/DL — SIGNIFICANT CHANGE UP (ref 8.4–10.5)
CHLORIDE SERPL-SCNC: 104 MMOL/L — SIGNIFICANT CHANGE UP (ref 96–108)
CO2 SERPL-SCNC: 32 MMOL/L — HIGH (ref 22–31)
CREAT SERPL-MCNC: 0.9 MG/DL — SIGNIFICANT CHANGE UP (ref 0.5–1.3)
EOSINOPHIL # BLD AUTO: 0.01 K/UL — SIGNIFICANT CHANGE UP (ref 0–0.5)
EOSINOPHIL NFR BLD AUTO: 0.1 % — SIGNIFICANT CHANGE UP (ref 0–6)
GLUCOSE SERPL-MCNC: 115 MG/DL — HIGH (ref 70–99)
HCT VFR BLD CALC: 33.7 % — LOW (ref 34.5–45)
HGB BLD-MCNC: 10.7 G/DL — LOW (ref 11.5–15.5)
IMM GRANULOCYTES NFR BLD AUTO: 0.4 % — SIGNIFICANT CHANGE UP (ref 0–1.5)
LYMPHOCYTES # BLD AUTO: 1.21 K/UL — SIGNIFICANT CHANGE UP (ref 1–3.3)
LYMPHOCYTES # BLD AUTO: 9.4 % — LOW (ref 13–44)
MCHC RBC-ENTMCNC: 29.9 PG — SIGNIFICANT CHANGE UP (ref 27–34)
MCHC RBC-ENTMCNC: 31.8 GM/DL — LOW (ref 32–36)
MCV RBC AUTO: 94.1 FL — SIGNIFICANT CHANGE UP (ref 80–100)
MONOCYTES # BLD AUTO: 2.12 K/UL — HIGH (ref 0–0.9)
MONOCYTES NFR BLD AUTO: 16.4 % — HIGH (ref 2–14)
NEUTROPHILS # BLD AUTO: 9.51 K/UL — HIGH (ref 1.8–7.4)
NEUTROPHILS NFR BLD AUTO: 73.5 % — SIGNIFICANT CHANGE UP (ref 43–77)
NRBC # BLD: 0 /100 WBCS — SIGNIFICANT CHANGE UP (ref 0–0)
PLATELET # BLD AUTO: 255 K/UL — SIGNIFICANT CHANGE UP (ref 150–400)
POTASSIUM SERPL-MCNC: 4.6 MMOL/L — SIGNIFICANT CHANGE UP (ref 3.5–5.3)
POTASSIUM SERPL-SCNC: 4.6 MMOL/L — SIGNIFICANT CHANGE UP (ref 3.5–5.3)
RBC # BLD: 3.58 M/UL — LOW (ref 3.8–5.2)
RBC # FLD: 15.8 % — HIGH (ref 10.3–14.5)
SODIUM SERPL-SCNC: 139 MMOL/L — SIGNIFICANT CHANGE UP (ref 135–145)
WBC # BLD: 12.92 K/UL — HIGH (ref 3.8–10.5)
WBC # FLD AUTO: 12.92 K/UL — HIGH (ref 3.8–10.5)

## 2020-01-17 PROCEDURE — 97530 THERAPEUTIC ACTIVITIES: CPT

## 2020-01-17 PROCEDURE — 97161 PT EVAL LOW COMPLEX 20 MIN: CPT

## 2020-01-17 PROCEDURE — 97110 THERAPEUTIC EXERCISES: CPT

## 2020-01-17 PROCEDURE — 97165 OT EVAL LOW COMPLEX 30 MIN: CPT

## 2020-01-17 PROCEDURE — 93005 ELECTROCARDIOGRAM TRACING: CPT

## 2020-01-17 PROCEDURE — 99239 HOSP IP/OBS DSCHRG MGMT >30: CPT

## 2020-01-17 PROCEDURE — C1713: CPT

## 2020-01-17 PROCEDURE — 36415 COLL VENOUS BLD VENIPUNCTURE: CPT

## 2020-01-17 PROCEDURE — 84443 ASSAY THYROID STIM HORMONE: CPT

## 2020-01-17 PROCEDURE — 83735 ASSAY OF MAGNESIUM: CPT

## 2020-01-17 PROCEDURE — 88311 DECALCIFY TISSUE: CPT

## 2020-01-17 PROCEDURE — C1776: CPT

## 2020-01-17 PROCEDURE — 85027 COMPLETE CBC AUTOMATED: CPT

## 2020-01-17 PROCEDURE — 97116 GAIT TRAINING THERAPY: CPT

## 2020-01-17 PROCEDURE — C1889: CPT

## 2020-01-17 PROCEDURE — 80053 COMPREHEN METABOLIC PANEL: CPT

## 2020-01-17 PROCEDURE — 88305 TISSUE EXAM BY PATHOLOGIST: CPT

## 2020-01-17 PROCEDURE — 73562 X-RAY EXAM OF KNEE 3: CPT

## 2020-01-17 PROCEDURE — 80048 BASIC METABOLIC PNL TOTAL CA: CPT

## 2020-01-17 RX ORDER — HYDROXYCHLOROQUINE SULFATE 200 MG
1 TABLET ORAL
Qty: 0 | Refills: 0 | DISCHARGE

## 2020-01-17 RX ORDER — METHOTREXATE 2.5 MG/1
6 TABLET ORAL
Qty: 0 | Refills: 0 | DISCHARGE

## 2020-01-17 RX ORDER — BENZOCAINE AND MENTHOL 5; 1 G/100ML; G/100ML
1 LIQUID ORAL ONCE
Refills: 0 | Status: COMPLETED | OUTPATIENT
Start: 2020-01-17 | End: 2020-01-17

## 2020-01-17 RX ADMIN — Medication 200 MICROGRAM(S): at 05:22

## 2020-01-17 RX ADMIN — Medication 1000 MILLIGRAM(S): at 09:25

## 2020-01-17 RX ADMIN — Medication 1000 MILLIGRAM(S): at 09:55

## 2020-01-17 RX ADMIN — Medication 1 MILLIGRAM(S): at 11:44

## 2020-01-17 RX ADMIN — APIXABAN 5 MILLIGRAM(S): 2.5 TABLET, FILM COATED ORAL at 09:25

## 2020-01-17 RX ADMIN — CELECOXIB 100 MILLIGRAM(S): 200 CAPSULE ORAL at 09:25

## 2020-01-17 RX ADMIN — BENZOCAINE AND MENTHOL 1 LOZENGE: 5; 1 LIQUID ORAL at 05:42

## 2020-01-17 RX ADMIN — MAGNESIUM HYDROXIDE 30 MILLILITER(S): 400 TABLET, CHEWABLE ORAL at 05:22

## 2020-01-17 RX ADMIN — CELECOXIB 100 MILLIGRAM(S): 200 CAPSULE ORAL at 09:55

## 2020-01-17 RX ADMIN — PANTOPRAZOLE SODIUM 40 MILLIGRAM(S): 20 TABLET, DELAYED RELEASE ORAL at 05:22

## 2020-01-17 RX ADMIN — GABAPENTIN 300 MILLIGRAM(S): 400 CAPSULE ORAL at 05:22

## 2020-01-17 NOTE — PROGRESS NOTE ADULT - ASSESSMENT
POD#2 s/p LEFT TKR  - Pain control  - Bowel regimen  - PT/OT  - VTE PPx - Eliquis  - stable for discharge from medical perspective    Intraoperative Arrhythmia - appears to have been Type 2 Mobitz I  - no AV node blocking agents  - monitor on tele  - Cardiology eval appreciated  - ECHO in June 2019 - diastolic dysfunction, normal EF, normal RVSP    Post-Operative RLE DVT in July 2019  - Eliquis 2.5mg BID on POD#1  - resume treatment dose Eliquis on POD#2  - per note in chart, she had f/u Dopplers in Oct 2019 which did not show any DVT  - not calf tenderness/edema    HTN  - hold hctz  - resume ARB on POD#2    SLE and RA  - per her rheum clearance, advised to hold MTX and Hydroxychloroquine until Feb  - continue folic acid    Hypothyroidism  - continue synthroid

## 2020-01-17 NOTE — PROGRESS NOTE ADULT - SUBJECTIVE AND OBJECTIVE BOX
INTERVAL HPI/OVERNIGHT EVENTS:   Patient seen and examined.  Eating, voiding, had BM.  No fevers, chills, sweats, dizziness, HA, changes in vision, cp, palpitations, sob, persistent cough, n/v/d, abd pain, dysuria, focal weakness, or calf pain.   Feeling well overall.    REVIEW OF SYSTEMS:  See HPI,  all others negative    PHYSICAL EXAM:  Vital Signs Last 24 Hrs  T(C): 36.4 (17 Jan 2020 07:56), Max: 36.8 (16 Jan 2020 11:13)  T(F): 97.6 (17 Jan 2020 07:56), Max: 98.3 (16 Jan 2020 11:13)  HR: 72 (17 Jan 2020 07:56) (64 - 75)  BP: 150/76 (17 Jan 2020 07:56) (112/72 - 150/76)  BP(mean): --  RR: 18 (17 Jan 2020 07:56) (15 - 18)  SpO2: 96% (17 Jan 2020 07:56) (95% - 98%)    GENERAL: NAD, well-groomed, well-developed, awake, alert, oriented x 3, fluent and coherent speech  EYES: EOMI, PERRLA, conjunctiva and sclera clear  ENMT: No tonsillar erythema, exudates, or enlargement;   NECK: Supple, No JVD, No Cervical LAD   NERVOUS SYSTEM:  Good concentration; Moving all 4 extremities against gravity and resistance; No gross sensory deficits, No facial droop  CHEST/LUNG: Clear to auscultation bilaterally; No rales, rhonchi, wheezing, or rubs  HEART: Regular rate and rhythm; No murmurs, rubs, or gallops  ABDOMEN: Soft, Nontender, Nondistended, Bowel sounds present, No palpable masses or organomegaly, No bruits  EXTREMITIES:  2+ Peripheral Pulses, No clubbing, cyanosis, or edema, no calf tenderness in either leg  WOUND: c/d/i, no drainage	    Diagnostic Testing:                          10.7   12.92 )-----------( 255      ( 17 Jan 2020 07:21 )             33.7     01-17    139  |  104  |  24<H>  ----------------------------<  115<H>  4.6   |  32<H>  |  0.90    Ca    8.9      17 Jan 2020 07:21  Mg     1.8     01-15    TPro  6.1  /  Alb  2.7<L>  /  TBili  0.5  /  DBili  x   /  AST  21  /  ALT  20  /  AlkPhos  108  01-15

## 2020-01-17 NOTE — PHARMACOTHERAPY INTERVENTION NOTE - COMMENTS
Admission medication reconciliation POD1
Presurgical evaluation for postoperative medication management. Team emailed. Note placed in [facility].

## 2020-01-17 NOTE — PROGRESS NOTE ADULT - SUBJECTIVE AND OBJECTIVE BOX
Discharge medication calendar:  (Eliquis 5mg BID preop)  Eliquis 5mg q12h  APAP 1000mg q12h x 2-3 weeks  Celecoxib 100mg q12h x 2-3 weeks only  Pantoprazole 40mg QAM x 3 weeks  Narcotic PRN  Docusate 100mg TID while taking narcotic  Miralax, Senna, or Bisacodyl PRN for treatment of constipation

## 2020-01-17 NOTE — DISCHARGE NOTE NURSING/CASE MANAGEMENT/SOCIAL WORK - PATIENT PORTAL LINK FT
You can access the FollowMyHealth Patient Portal offered by [facility] by registering at the following website: [email or web address]. By joining [employer]’s FollowMyHealth portal, you will also be able to view your health information using other applications (apps) compatible with our system.

## 2020-01-17 NOTE — PROGRESS NOTE ADULT - SUBJECTIVE AND OBJECTIVE BOX
Chief Complaint: TKR    Interval Events: No events overnight. No complaints.    Review of Systems:  General: No fevers, chills, weight loss or gain  Skin: No rashes, color changes  Cardiovascular: No chest pain, orthopnea  Respiratory: No shortness of breath, cough  Gastrointestinal: No nausea, abdominal pain  Genitourinary: No incontinence, pain with urination  Musculoskeletal: No pain, swelling, decreased range of motion  Neurological: No headache, weakness  Psychiatric: No depression, anxiety  Endocrine: No weight loss or gain, increased thirst  All other systems are comprehensively negative.    Physical Exam:  Vital Signs Last 24 Hrs  T(C): 36.4 (17 Jan 2020 07:56), Max: 36.8 (16 Jan 2020 11:13)  T(F): 97.6 (17 Jan 2020 07:56), Max: 98.3 (16 Jan 2020 11:13)  HR: 72 (17 Jan 2020 07:56) (64 - 75)  BP: 150/76 (17 Jan 2020 07:56) (112/72 - 150/76)  BP(mean): --  RR: 18 (17 Jan 2020 07:56) (15 - 18)  SpO2: 96% (17 Jan 2020 07:56) (95% - 98%)  General: NAD  HEENT: MMM  Neck: No JVD, no carotid bruit  Lungs: CTAB  CV: RRR, nl S1/S2, no M/R/G  Abdomen: S/NT/ND, +BS  Extremities: No LE edema, no cyanosis  Neuro: AAOx3, non-focal  Skin: No rash    Labs:             01-17    139  |  104  |  24<H>  ----------------------------<  115<H>  4.6   |  32<H>  |  0.90    Ca    8.9      17 Jan 2020 07:21  Mg     1.8     01-15    TPro  6.1  /  Alb  2.7<L>  /  TBili  0.5  /  DBili  x   /  AST  21  /  ALT  20  /  AlkPhos  108  01-15                        10.7   12.92 )-----------( 255      ( 17 Jan 2020 07:21 )             33.7         Telemetry: Sinus rhythm

## 2020-01-17 NOTE — PROGRESS NOTE ADULT - ASSESSMENT
The patient is a 74 year old female with a history of HTN, HL, RA, DVT who is admitted s/p left TKR.    Plan:  - Fermín 1 - during the irineo-operative period. Resolved.  - Avoid AV eliz blocking agents  - Discontinue telemetry  - Continue apixaban for prior DVT and DVT prophylaxis  - Continue losartan 50 mg daily  - Pain control  - PT  - Discharge planning

## 2020-01-17 NOTE — PROGRESS NOTE ADULT - SUBJECTIVE AND OBJECTIVE BOX
KAI THOMAS  [id number]    Pt is a 74y year old Female s/p left TKR. pain is 3/10. (+) Voids, tolerating regular diet. Denies chest pain/shortness of breath/nausea/vomitting.     Vital Signs Last 24 Hrs  T(C): 36.5 (17 Jan 2020 03:45), Max: 36.8 (16 Jan 2020 11:13)  T(F): 97.7 (17 Jan 2020 03:45), Max: 98.3 (16 Jan 2020 11:13)  HR: 64 (17 Jan 2020 05:21) (64 - 75)  BP: 117/72 (17 Jan 2020 05:21) (112/72 - 123/60)  BP(mean): --  RR: 15 (17 Jan 2020 03:45) (15 - 16)  SpO2: 97% (17 Jan 2020 03:45) (95% - 98%)    I&O's Detail                            11.6   15.08 )-----------( 259      ( 16 Jan 2020 07:15 )             36.0     01-16    135  |  100  |  22  ----------------------------<  133<H>  4.4   |  29  |  1.09    Ca    9.0      16 Jan 2020 07:15  Mg     1.8     01-15    TPro  6.1  /  Alb  2.7<L>  /  TBili  0.5  /  DBili  x   /  AST  21  /  ALT  20  /  AlkPhos  108  01-15        PE:   LLE: Dressing changed, incision clean and dry, no erythema or drainage karmen intact and functioning SILT distally, (+2) DP/PT pulses, EHL/FHL/TA intact, Capillary refill < 2 seconds. negative calf tenderness PAS on,  A: 74y year old Female s/p left TKR POD#2    Plan:   -DVT ppx = apixaban 5 milliGRAM(s) Oral every 12 hours    -PT/OT = OOB  -Pain control   -Medicine to follow   -continue to follow Labs  -continue use of PAS  -Dispo: SHAZIA

## 2020-01-25 ENCOUNTER — RX RENEWAL (OUTPATIENT)
Age: 75
End: 2020-01-25

## 2020-02-03 ENCOUNTER — APPOINTMENT (OUTPATIENT)
Dept: ORTHOPEDIC SURGERY | Facility: CLINIC | Age: 75
End: 2020-02-03
Payer: MEDICARE

## 2020-02-03 VITALS — HEART RATE: 80 BPM | DIASTOLIC BLOOD PRESSURE: 71 MMHG | SYSTOLIC BLOOD PRESSURE: 115 MMHG

## 2020-02-03 PROCEDURE — 73562 X-RAY EXAM OF KNEE 3: CPT | Mod: LT

## 2020-02-03 PROCEDURE — 99024 POSTOP FOLLOW-UP VISIT: CPT

## 2020-02-28 ENCOUNTER — APPOINTMENT (OUTPATIENT)
Dept: CARDIOLOGY | Facility: CLINIC | Age: 75
End: 2020-02-28
Payer: MEDICARE

## 2020-02-28 VITALS
SYSTOLIC BLOOD PRESSURE: 123 MMHG | DIASTOLIC BLOOD PRESSURE: 65 MMHG | HEIGHT: 67 IN | HEART RATE: 92 BPM | OXYGEN SATURATION: 98 % | RESPIRATION RATE: 16 BRPM

## 2020-02-28 PROCEDURE — 99214 OFFICE O/P EST MOD 30 MIN: CPT

## 2020-02-28 NOTE — ASSESSMENT
[FreeTextEntry1] : Assessment:\par 1.  R Pop DVT\par - provoked\par - July 20th 2019\par - on Eliquis 5mg BID\par 2.  R THP\par 3.  needs L TKR\par 4.  Lymphedema\par \par Plan\par 1.  Continue eliquis 5mg BID for now\par 2.  Increase pneumatic pump to BID   - once at 1PM and once at 7PM\par 3. Compression garments as tolerated - rx given \par 4.  Still with immobility, will continue eliquis for now. \par 5.   Will consider d/c eliquis vs reduce dose next vist\par 6. Return in 3 months.

## 2020-02-28 NOTE — PHYSICAL EXAM
[Normal Appearance] : normal appearance [General Appearance - Well Developed] : well developed [Well Groomed] : well groomed [General Appearance - Well Nourished] : well nourished [No Deformities] : no deformities [Normal Conjunctiva] : the conjunctiva exhibited no abnormalities [General Appearance - In No Acute Distress] : no acute distress [Eyelids - No Xanthelasma] : the eyelids demonstrated no xanthelasmas [No Oral Pallor] : no oral pallor [No Oral Cyanosis] : no oral cyanosis [Normal Oral Mucosa] : normal oral mucosa [Normal Jugular Venous A Waves Present] : normal jugular venous A waves present [Normal Jugular Venous V Waves Present] : normal jugular venous V waves present [No Jugular Venous Valerio A Waves] : no jugular venous valerio A waves [Respiration, Rhythm And Depth] : normal respiratory rhythm and effort [Exaggerated Use Of Accessory Muscles For Inspiration] : no accessory muscle use [Heart Sounds] : normal S1 and S2 [Auscultation Breath Sounds / Voice Sounds] : lungs were clear to auscultation bilaterally [Heart Rate And Rhythm] : heart rate and rhythm were normal [Murmurs] : no murmurs present [Abdomen Soft] : soft [Abdomen Tenderness] : non-tender [Abdomen Mass (___ Cm)] : no abdominal mass palpated [Nail Clubbing] : no clubbing of the fingernails [FreeTextEntry1] : 1 + edema bilaterally L>R.    Stemmers sign, hyperpigmentation, lipodermatosclerosis.  [Petechial Hemorrhages (___cm)] : no petechial hemorrhages [Cyanosis, Localized] : no localized cyanosis [] : no rash [No Venous Stasis] : no venous stasis [Skin Color & Pigmentation] : normal skin color and pigmentation [No Xanthoma] : no  xanthoma was observed [Skin Lesions] : no skin lesions [No Skin Ulcers] : no skin ulcer [Affect] : the affect was normal [Oriented To Time, Place, And Person] : oriented to person, place, and time [Mood] : the mood was normal [No Anxiety] : not feeling anxious

## 2020-02-28 NOTE — REASON FOR VISIT
[Follow-Up - Clinic] : a clinic follow-up of [FreeTextEntry1] :  Followup visit 2/28/2020\par  Had L TKR 6 weeks ago. \par She remains on eliquis 5mg BID\par  Walking with a walker\par She received her pneumatic pump - using every day in the evening. \par   No blood in the urine or stool

## 2020-03-17 ENCOUNTER — APPOINTMENT (OUTPATIENT)
Dept: ORTHOPEDIC SURGERY | Facility: CLINIC | Age: 75
End: 2020-03-17
Payer: MEDICARE

## 2020-03-17 ENCOUNTER — APPOINTMENT (OUTPATIENT)
Dept: ORTHOPEDIC SURGERY | Facility: CLINIC | Age: 75
End: 2020-03-17

## 2020-03-17 VITALS
DIASTOLIC BLOOD PRESSURE: 71 MMHG | SYSTOLIC BLOOD PRESSURE: 116 MMHG | HEART RATE: 69 BPM | BODY MASS INDEX: 37.83 KG/M2 | TEMPERATURE: 98.3 F | HEIGHT: 67 IN | WEIGHT: 241 LBS

## 2020-03-17 PROCEDURE — 99024 POSTOP FOLLOW-UP VISIT: CPT

## 2020-03-17 PROCEDURE — 73562 X-RAY EXAM OF KNEE 3: CPT | Mod: 26,LT

## 2020-05-22 ENCOUNTER — APPOINTMENT (OUTPATIENT)
Dept: CARDIOLOGY | Facility: CLINIC | Age: 75
End: 2020-05-22

## 2020-08-05 ENCOUNTER — NON-APPOINTMENT (OUTPATIENT)
Age: 75
End: 2020-08-05

## 2020-08-05 ENCOUNTER — APPOINTMENT (OUTPATIENT)
Dept: CARDIOLOGY | Facility: CLINIC | Age: 75
End: 2020-08-05
Payer: MEDICARE

## 2020-08-05 VITALS
DIASTOLIC BLOOD PRESSURE: 74 MMHG | RESPIRATION RATE: 16 BRPM | SYSTOLIC BLOOD PRESSURE: 122 MMHG | HEART RATE: 93 BPM | TEMPERATURE: 97.1 F | OXYGEN SATURATION: 98 %

## 2020-08-05 PROCEDURE — 99214 OFFICE O/P EST MOD 30 MIN: CPT

## 2020-08-05 PROCEDURE — 93000 ELECTROCARDIOGRAM COMPLETE: CPT

## 2020-08-05 NOTE — HISTORY OF PRESENT ILLNESS
[FreeTextEntry1] : Marely  had her left knee surgery in January and signed out of rehab February just before everything exploded with COVID. She denies any new symptoms of chest pain, palpitations or shortness of breath. She is in good spirits today.

## 2020-08-05 NOTE — PHYSICAL EXAM
[No Deformities] : no deformities [Normal Conjunctiva] : the conjunctiva exhibited no abnormalities [Normal Oral Mucosa] : normal oral mucosa [Eyelids - No Xanthelasma] : the eyelids demonstrated no xanthelasmas [No Oral Pallor] : no oral pallor [No Oral Cyanosis] : no oral cyanosis [Normal Jugular Venous V Waves Present] : normal jugular venous V waves present [Normal Jugular Venous A Waves Present] : normal jugular venous A waves present [No Jugular Venous Valerio A Waves] : no jugular venous valerio A waves [Respiration, Rhythm And Depth] : normal respiratory rhythm and effort [Auscultation Breath Sounds / Voice Sounds] : lungs were clear to auscultation bilaterally [Exaggerated Use Of Accessory Muscles For Inspiration] : no accessory muscle use [Heart Sounds] : normal S1 and S2 [Heart Rate And Rhythm] : heart rate and rhythm were normal [Abdomen Soft] : soft [Systolic grade ___/6] : A grade [unfilled]/6 systolic murmur was heard. [Abdomen Tenderness] : non-tender [Abdomen Mass (___ Cm)] : no abdominal mass palpated [Abnormal Walk] : normal gait [Gait - Sufficient For Exercise Testing] : the gait was sufficient for exercise testing [Nail Clubbing] : no clubbing of the fingernails [Cyanosis, Localized] : no localized cyanosis [Petechial Hemorrhages (___cm)] : no petechial hemorrhages [Skin Color & Pigmentation] : normal skin color and pigmentation [] : no rash [No Venous Stasis] : no venous stasis [Skin Lesions] : no skin lesions [No Skin Ulcers] : no skin ulcer [No Xanthoma] : no  xanthoma was observed [Oriented To Time, Place, And Person] : oriented to person, place, and time [Affect] : the affect was normal [Mood] : the mood was normal [No Anxiety] : not feeling anxious [FreeTextEntry1] : nonpitting edema

## 2020-08-05 NOTE — DISCUSSION/SUMMARY
[___ Month(s)] : [unfilled] month(s) [FreeTextEntry1] : The patient is a 74-year-old obese female history of rheumatoid arthritis,  lupus, hypothyroidism, hyperlipidemia, hypertension, lower extremity edema ,RBBB, s/p left lobectomy for cancer s/p rt hip replacement complicated by DVT s/p left knee replacement who is doing well. \par #1 RA- on MTX and plaquenil, ambulates with walker\par #2 Htn- stable on losartan 50/12.5mg\par #3 Lipids- on atorvastatin, labs today\par #4 Hypothyroidism- on levothyroxine\par #5 CV- no angina or HF, ECG with RBBB unchanged\par #6 Ortho- s/p right hip replacement and left knee replacement, right DVT on eliquis, no bleeding, f/u Dr. Hoyos\par \par 6/27/19 Addendum: ECHO normal LV function. There are no cardiac contraindications to hip and knee surgery as planned. \par \par

## 2020-08-06 LAB
25(OH)D3 SERPL-MCNC: 38.2 NG/ML
ALBUMIN SERPL ELPH-MCNC: 4 G/DL
ALP BLD-CCNC: 102 U/L
ALT SERPL-CCNC: 17 U/L
ANION GAP SERPL CALC-SCNC: 18 MMOL/L
AST SERPL-CCNC: 29 U/L
BASOPHILS # BLD AUTO: 0.03 K/UL
BASOPHILS NFR BLD AUTO: 0.6 %
BILIRUB SERPL-MCNC: 0.5 MG/DL
BUN SERPL-MCNC: 26 MG/DL
CALCIUM SERPL-MCNC: 9.5 MG/DL
CHLORIDE SERPL-SCNC: 101 MMOL/L
CHOLEST SERPL-MCNC: 160 MG/DL
CHOLEST/HDLC SERPL: 2.3 RATIO
CO2 SERPL-SCNC: 22 MMOL/L
CREAT SERPL-MCNC: 0.84 MG/DL
EOSINOPHIL # BLD AUTO: 0.07 K/UL
EOSINOPHIL NFR BLD AUTO: 1.3 %
ESTIMATED AVERAGE GLUCOSE: 111 MG/DL
GLUCOSE SERPL-MCNC: 96 MG/DL
HBA1C MFR BLD HPLC: 5.5 %
HCT VFR BLD CALC: 41.7 %
HDLC SERPL-MCNC: 71 MG/DL
HGB BLD-MCNC: 13.3 G/DL
IMM GRANULOCYTES NFR BLD AUTO: 0 %
LDLC SERPL CALC-MCNC: 70 MG/DL
LYMPHOCYTES # BLD AUTO: 1.29 K/UL
LYMPHOCYTES NFR BLD AUTO: 24.7 %
MAN DIFF?: NORMAL
MCHC RBC-ENTMCNC: 30.6 PG
MCHC RBC-ENTMCNC: 31.9 GM/DL
MCV RBC AUTO: 95.9 FL
MONOCYTES # BLD AUTO: 0.59 K/UL
MONOCYTES NFR BLD AUTO: 11.3 %
NEUTROPHILS # BLD AUTO: 3.24 K/UL
NEUTROPHILS NFR BLD AUTO: 62.1 %
PLATELET # BLD AUTO: 304 K/UL
POTASSIUM SERPL-SCNC: 4.1 MMOL/L
PROT SERPL-MCNC: 6.5 G/DL
RBC # BLD: 4.35 M/UL
RBC # FLD: 14.9 %
SODIUM SERPL-SCNC: 141 MMOL/L
TRIGL SERPL-MCNC: 96 MG/DL
TSH SERPL-ACNC: 3.15 UIU/ML
VIT B12 SERPL-MCNC: 840 PG/ML
WBC # FLD AUTO: 5.22 K/UL

## 2020-08-07 ENCOUNTER — APPOINTMENT (OUTPATIENT)
Dept: CARDIOLOGY | Facility: CLINIC | Age: 75
End: 2020-08-07
Payer: MEDICARE

## 2020-08-07 VITALS
HEART RATE: 88 BPM | RESPIRATION RATE: 16 BRPM | SYSTOLIC BLOOD PRESSURE: 133 MMHG | OXYGEN SATURATION: 98 % | TEMPERATURE: 97.9 F | DIASTOLIC BLOOD PRESSURE: 69 MMHG

## 2020-08-07 LAB
SARS-COV-2 IGG SERPL IA-ACNC: 0.08 INDEX
SARS-COV-2 IGG SERPL QL IA: NEGATIVE

## 2020-08-07 PROCEDURE — 99214 OFFICE O/P EST MOD 30 MIN: CPT

## 2020-08-07 NOTE — PHYSICAL EXAM
[General Appearance - Well Developed] : well developed [Normal Appearance] : normal appearance [Well Groomed] : well groomed [General Appearance - Well Nourished] : well nourished [No Deformities] : no deformities [General Appearance - In No Acute Distress] : no acute distress [Normal Conjunctiva] : the conjunctiva exhibited no abnormalities [Normal Oral Mucosa] : normal oral mucosa [Eyelids - No Xanthelasma] : the eyelids demonstrated no xanthelasmas [No Oral Pallor] : no oral pallor [Normal Jugular Venous A Waves Present] : normal jugular venous A waves present [No Oral Cyanosis] : no oral cyanosis [No Jugular Venous Valerio A Waves] : no jugular venous valerio A waves [Normal Jugular Venous V Waves Present] : normal jugular venous V waves present [Auscultation Breath Sounds / Voice Sounds] : lungs were clear to auscultation bilaterally [Exaggerated Use Of Accessory Muscles For Inspiration] : no accessory muscle use [Respiration, Rhythm And Depth] : normal respiratory rhythm and effort [Murmurs] : no murmurs present [Heart Rate And Rhythm] : heart rate and rhythm were normal [Heart Sounds] : normal S1 and S2 [Abdomen Soft] : soft [Abdomen Tenderness] : non-tender [Abdomen Mass (___ Cm)] : no abdominal mass palpated [Cyanosis, Localized] : no localized cyanosis [Nail Clubbing] : no clubbing of the fingernails [Petechial Hemorrhages (___cm)] : no petechial hemorrhages [Skin Color & Pigmentation] : normal skin color and pigmentation [] : no rash [Skin Lesions] : no skin lesions [No Venous Stasis] : no venous stasis [No Xanthoma] : no  xanthoma was observed [No Skin Ulcers] : no skin ulcer [Affect] : the affect was normal [Oriented To Time, Place, And Person] : oriented to person, place, and time [Mood] : the mood was normal [No Anxiety] : not feeling anxious [FreeTextEntry1] : 1 + edema bilaterally L>R.    Stemmers sign, hyperpigmentation, lipodermatosclerosis.

## 2020-08-07 NOTE — ASSESSMENT
[FreeTextEntry1] : Assessment:\par 1.  R Pop DVT\par - provoked\par - July 20th 2019\par - on Eliquis 5mg BID\par 2.  R THP\par 3.  Lymphedema\par \par Plan\par 1.  Continue eliquis 5mg BID for now\par 2.  Continue pneumatic pump to BID   - once at 1PM and once at 7PM\par 3.  Compression garments as tolerated  \par 4.  Still with immobility, will continue eliquis for now. \par 5.  Referral to [facility] lymphedema therapy. \par 6.  Leg elevation.\par 7. Return  6 months.

## 2020-08-07 NOTE — REASON FOR VISIT
[Follow-Up - Clinic] : a clinic follow-up of [FreeTextEntry1] :  Followup visit 8/7/2020\par  \par She remains on eliquis 5mg BID\par  Walking with a walker\par  She is using the pump BID in the morning and evening.  Using it 1 hour each time.\par She is wearing compression, but not today\par She is using moisturizer\par She complains of L knee pains at times.\par She is using a knee high compression.\par \par \par No changes to her medications.\par

## 2020-08-13 ENCOUNTER — RX RENEWAL (OUTPATIENT)
Age: 75
End: 2020-08-13

## 2020-08-18 ENCOUNTER — APPOINTMENT (OUTPATIENT)
Dept: ORTHOPEDIC SURGERY | Facility: CLINIC | Age: 75
End: 2020-08-18
Payer: MEDICARE

## 2020-08-18 VITALS
TEMPERATURE: 97.8 F | HEIGHT: 67 IN | WEIGHT: 240 LBS | HEART RATE: 65 BPM | SYSTOLIC BLOOD PRESSURE: 116 MMHG | DIASTOLIC BLOOD PRESSURE: 67 MMHG | BODY MASS INDEX: 37.67 KG/M2

## 2020-08-18 PROCEDURE — 73562 X-RAY EXAM OF KNEE 3: CPT | Mod: 50

## 2020-08-18 PROCEDURE — 99213 OFFICE O/P EST LOW 20 MIN: CPT

## 2020-08-18 RX ORDER — NITROFURANTOIN MACROCRYSTALS 100 MG/1
100 CAPSULE ORAL
Qty: 14 | Refills: 0 | Status: DISCONTINUED | COMMUNITY
Start: 2019-12-29 | End: 2020-08-18

## 2020-08-31 ENCOUNTER — RESULT REVIEW (OUTPATIENT)
Age: 75
End: 2020-08-31

## 2020-09-04 ENCOUNTER — RESULT REVIEW (OUTPATIENT)
Age: 75
End: 2020-09-04

## 2020-09-04 ENCOUNTER — APPOINTMENT (OUTPATIENT)
Dept: MAMMOGRAPHY | Facility: IMAGING CENTER | Age: 75
End: 2020-09-04
Payer: MEDICARE

## 2020-09-04 ENCOUNTER — OUTPATIENT (OUTPATIENT)
Dept: OUTPATIENT SERVICES | Facility: HOSPITAL | Age: 75
LOS: 1 days | End: 2020-09-04
Payer: COMMERCIAL

## 2020-09-04 DIAGNOSIS — C34.90 MALIGNANT NEOPLASM OF UNSPECIFIED PART OF UNSPECIFIED BRONCHUS OR LUNG: Chronic | ICD-10-CM

## 2020-09-04 DIAGNOSIS — Z98.890 OTHER SPECIFIED POSTPROCEDURAL STATES: Chronic | ICD-10-CM

## 2020-09-04 DIAGNOSIS — Z00.8 ENCOUNTER FOR OTHER GENERAL EXAMINATION: ICD-10-CM

## 2020-09-04 DIAGNOSIS — Z96.641 PRESENCE OF RIGHT ARTIFICIAL HIP JOINT: Chronic | ICD-10-CM

## 2020-09-04 DIAGNOSIS — Z90.89 ACQUIRED ABSENCE OF OTHER ORGANS: Chronic | ICD-10-CM

## 2020-09-04 DIAGNOSIS — Z98.49 CATARACT EXTRACTION STATUS, UNSPECIFIED EYE: Chronic | ICD-10-CM

## 2020-09-04 PROCEDURE — 77063 BREAST TOMOSYNTHESIS BI: CPT | Mod: 26

## 2020-09-04 PROCEDURE — 77067 SCR MAMMO BI INCL CAD: CPT

## 2020-09-04 PROCEDURE — 77063 BREAST TOMOSYNTHESIS BI: CPT

## 2020-09-04 PROCEDURE — 77067 SCR MAMMO BI INCL CAD: CPT | Mod: 26

## 2020-09-25 ENCOUNTER — EMERGENCY (EMERGENCY)
Facility: HOSPITAL | Age: 75
LOS: 1 days | Discharge: HOME | End: 2020-09-25
Attending: EMERGENCY MEDICINE | Admitting: EMERGENCY MEDICINE
Payer: MEDICARE

## 2020-09-25 VITALS
TEMPERATURE: 98 F | HEART RATE: 71 BPM | DIASTOLIC BLOOD PRESSURE: 67 MMHG | RESPIRATION RATE: 14 BRPM | OXYGEN SATURATION: 100 % | SYSTOLIC BLOOD PRESSURE: 119 MMHG

## 2020-09-25 DIAGNOSIS — Z96.641 PRESENCE OF RIGHT ARTIFICIAL HIP JOINT: Chronic | ICD-10-CM

## 2020-09-25 DIAGNOSIS — C34.90 MALIGNANT NEOPLASM OF UNSPECIFIED PART OF UNSPECIFIED BRONCHUS OR LUNG: Chronic | ICD-10-CM

## 2020-09-25 DIAGNOSIS — Z98.890 OTHER SPECIFIED POSTPROCEDURAL STATES: Chronic | ICD-10-CM

## 2020-09-25 DIAGNOSIS — Z96.659 PRESENCE OF UNSPECIFIED ARTIFICIAL KNEE JOINT: Chronic | ICD-10-CM

## 2020-09-25 DIAGNOSIS — Z98.49 CATARACT EXTRACTION STATUS, UNSPECIFIED EYE: Chronic | ICD-10-CM

## 2020-09-25 DIAGNOSIS — Z90.89 ACQUIRED ABSENCE OF OTHER ORGANS: Chronic | ICD-10-CM

## 2020-09-25 PROCEDURE — 99283 EMERGENCY DEPT VISIT LOW MDM: CPT

## 2020-09-25 RX ORDER — OXYCODONE HYDROCHLORIDE 5 MG/1
1 TABLET ORAL
Qty: 8 | Refills: 0
Start: 2020-09-25

## 2020-09-25 RX ORDER — OXYCODONE HYDROCHLORIDE 5 MG/1
5 TABLET ORAL ONCE
Refills: 0 | Status: DISCONTINUED | OUTPATIENT
Start: 2020-09-25 | End: 2020-09-25

## 2020-09-25 RX ORDER — LIDOCAINE 4 G/100G
1 CREAM TOPICAL ONCE
Refills: 0 | Status: COMPLETED | OUTPATIENT
Start: 2020-09-25 | End: 2020-09-25

## 2020-09-25 RX ORDER — MENTHOL AND METHYL SALICYLATE 10; 30 G/100G; G/100G
1 STICK TOPICAL
Qty: 8 | Refills: 0
Start: 2020-09-25

## 2020-09-25 RX ADMIN — OXYCODONE HYDROCHLORIDE 5 MILLIGRAM(S): 5 TABLET ORAL at 10:17

## 2020-09-25 RX ADMIN — LIDOCAINE 1 PATCH: 4 CREAM TOPICAL at 10:17

## 2020-09-25 NOTE — ED PROVIDER NOTE - ATTENDING CONTRIBUTION TO CARE
Pt was seen and evaluated by me. Pt is a 75 y/o female with a hx of chronic back pain, HTN, Hypothyroidism, RA, and OA who presented to the ED for low back pain X 3 wks. Pt states over the past 3 wks having increased low back pain when sitting or trying to stand but improved when standing or lying down. Pt denies any recent trauma. Denies any loss of bowel or bladder function. Pt was seen recently by her Orthopedic, Dr. Salvador where a recent MRI was done. Pt denies any fever, chills, nausea, vomiting, SOB, chest pain, or abd pain. Lungs CTA b/l. RRR. Abd soft, non-tender. No midline tenderness or swelling. 5/5 b/l LE.   Concern for back strain/herniation  Analgesia

## 2020-09-25 NOTE — ED PROVIDER NOTE - NSFOLLOWUPINSTRUCTIONS_ED_ALL_ED_FT
Follow up with your Primary Medical Doctor in 1-2 days.  Follow up with your Orthopedic Dr Salvador on Wednesday as scheduled.  Heat to back.  Take Tylenol 650mg orally every 6 hours as needed for mild to moderate pain.  Oxycodone 1 tablet every 6 hrs as needed for severe pain. Do not drive or drink alcohol while taking this medications.  Return to the ER for any persistent/worsening or new symptoms, weakness, numbness, difficulty urinating or any concerning symptoms.

## 2020-09-25 NOTE — ED ADULT NURSE NOTE - NSIMPLEMENTINTERV_GEN_ALL_ED
Implemented All Universal Safety Interventions:  [place] to call system. Call bell, personal items and telephone within reach. Instruct patient to call for assistance. Room bathroom lighting operational. Non-slip footwear when patient is off stretcher. Physically safe environment: no spills, clutter or unnecessary equipment. Stretcher in lowest position, wheels locked, appropriate side rails in place.

## 2020-09-25 NOTE — ED ADULT NURSE NOTE - OBJECTIVE STATEMENT
Receive pt in spot 27 alert and oriented x 4 c/o B/L LE pain and lower back pain.  Denies recent injury , incontinent., chills, N/V, dysuria.  Pt ambulates with a walker.  Meds given as per order

## 2020-09-25 NOTE — ED PROVIDER NOTE - PROGRESS NOTE DETAILS
Dr. Gómez: Dr. Salvador's office contacted to get copy of recent MRI. MRI showed multiple areas of herniation with impingement on thecal sac. Discussed results with Dr. Salvador ([phone]) who noted that given pt is currently neurologically intact pt can f/u as outpt as scheduled this Wednesday. Dr. Gómez: MRI results discussed with pt. Pt was offered Social Work but notes feel safe at home and ambulating with walker. Pt feels comfortable and would like to f/u as outpt. WILLIE Gaytan: pt feels better, pain improved, pt ambulatory with walker.  Pt states she feels safe at home offered and declined SW.  Discharge reviewed with and d/w patient.

## 2020-09-25 NOTE — ED PROVIDER NOTE - PATIENT PORTAL LINK FT
You can access the FollowMyHealth Patient Portal offered by [facility] by registering at the following website: [email or web address]. By joining [facility] [facility]’s FollowMyHealth portal, you will also be able to view your health information using other applications (apps) compatible with our system.

## 2020-09-25 NOTE — ED PROVIDER NOTE - OBJECTIVE STATEMENT
73 y/o female with a hx of HTN, Hypothyroidism, RA, LLE DVT(2019) on Eliquis presents to the ER c/o chronic low back pain worse over the last 3 weeks.  Pt is being followed by  Orthopedics Dr Sloan Salvador and had a MRI and x-ray approximately 1 week ago.  Pt denies fall, trauma, weakness, numbness, tingling, fecal/urinary incontinence, fevers, chills.  Pt is only taking Tylenol at home with no relief.  Pt walks with a  walker.

## 2020-09-25 NOTE — ED ADULT TRIAGE NOTE - CHIEF COMPLAINT QUOTE
Pt arrives c/o back pain and B/L leg pain x 2 weeks that has progressively worsened. Hx: R. Arthritis, HTN, Hypothyroid, on Eliquis for hx of clots. Ambulating with walker and difficulty in triage. Taking pain medication at home w/o relief.

## 2020-09-25 NOTE — ED PROVIDER NOTE - CLINICAL SUMMARY MEDICAL DECISION MAKING FREE TEXT BOX
73 y/o female with a hx of HTN, Hypothyroidism, RA, LLE DVT(2019) on Eliquis presents to the ER c/o chronic low back pain worse over the last 3 weeks.    Recent MRI followed by Ortho  Concern for back strain/herniation  Analgesia

## 2020-09-25 NOTE — ED PROVIDER NOTE - PMH
DVT (deep venous thrombosis)    HTN (hypertension)    Hypothyroidism    RA (rheumatoid arthritis)

## 2020-10-12 ENCOUNTER — APPOINTMENT (OUTPATIENT)
Dept: CARDIOLOGY | Facility: CLINIC | Age: 75
End: 2020-10-12
Payer: MEDICARE

## 2020-10-12 ENCOUNTER — APPOINTMENT (OUTPATIENT)
Dept: ORTHOPEDIC SURGERY | Facility: HOSPITAL | Age: 75
End: 2020-10-12

## 2020-10-12 ENCOUNTER — NON-APPOINTMENT (OUTPATIENT)
Age: 75
End: 2020-10-12

## 2020-10-12 VITALS — DIASTOLIC BLOOD PRESSURE: 65 MMHG | SYSTOLIC BLOOD PRESSURE: 110 MMHG

## 2020-10-12 VITALS
OXYGEN SATURATION: 97 % | TEMPERATURE: 96.6 F | RESPIRATION RATE: 14 BRPM | SYSTOLIC BLOOD PRESSURE: 94 MMHG | HEART RATE: 67 BPM | DIASTOLIC BLOOD PRESSURE: 56 MMHG

## 2020-10-12 PROCEDURE — 99214 OFFICE O/P EST MOD 30 MIN: CPT

## 2020-10-12 RX ORDER — OXYCODONE 5 MG/1
5 TABLET ORAL
Qty: 8 | Refills: 0 | Status: DISCONTINUED | COMMUNITY
Start: 2020-09-25

## 2020-10-12 RX ORDER — BETAMETHASONE DIPROPIONATE 0.5 MG/G
0.05 OINTMENT TOPICAL
Qty: 45 | Refills: 0 | Status: DISCONTINUED | COMMUNITY
Start: 2020-09-04

## 2020-10-12 RX ORDER — SODIUM CHLORIDE 20 MG/ML
2 SOLUTION OPHTHALMIC
Qty: 15 | Refills: 0 | Status: DISCONTINUED | COMMUNITY
Start: 2020-09-28

## 2020-10-12 RX ORDER — LOSARTAN POTASSIUM AND HYDROCHLOROTHIAZIDE 12.5; 5 MG/1; MG/1
50-12.5 TABLET ORAL DAILY
Qty: 90 | Refills: 3 | Status: DISCONTINUED | COMMUNITY
Start: 2018-07-17 | End: 2020-10-12

## 2020-10-12 RX ORDER — NITROFURANTOIN (MONOHYDRATE/MACROCRYSTALS) 25; 75 MG/1; MG/1
100 CAPSULE ORAL
Qty: 10 | Refills: 0 | Status: DISCONTINUED | COMMUNITY
Start: 2020-09-29

## 2020-10-12 NOTE — PHYSICAL EXAM
[No Deformities] : no deformities [Normal Conjunctiva] : the conjunctiva exhibited no abnormalities [Eyelids - No Xanthelasma] : the eyelids demonstrated no xanthelasmas [Normal Oral Mucosa] : normal oral mucosa [No Oral Pallor] : no oral pallor [No Oral Cyanosis] : no oral cyanosis [Normal Jugular Venous A Waves Present] : normal jugular venous A waves present [Normal Jugular Venous V Waves Present] : normal jugular venous V waves present [No Jugular Venous Valerio A Waves] : no jugular venous valerio A waves [Respiration, Rhythm And Depth] : normal respiratory rhythm and effort [Exaggerated Use Of Accessory Muscles For Inspiration] : no accessory muscle use [Auscultation Breath Sounds / Voice Sounds] : lungs were clear to auscultation bilaterally [Heart Rate And Rhythm] : heart rate and rhythm were normal [Heart Sounds] : normal S1 and S2 [Systolic grade ___/6] : A grade [unfilled]/6 systolic murmur was heard. [Abdomen Soft] : soft [Abdomen Tenderness] : non-tender [Abdomen Mass (___ Cm)] : no abdominal mass palpated [Abnormal Walk] : normal gait [Gait - Sufficient For Exercise Testing] : the gait was sufficient for exercise testing [Nail Clubbing] : no clubbing of the fingernails [Cyanosis, Localized] : no localized cyanosis [Petechial Hemorrhages (___cm)] : no petechial hemorrhages [Skin Color & Pigmentation] : normal skin color and pigmentation [] : no rash [No Venous Stasis] : no venous stasis [Skin Lesions] : no skin lesions [No Skin Ulcers] : no skin ulcer [No Xanthoma] : no  xanthoma was observed [Oriented To Time, Place, And Person] : oriented to person, place, and time [Affect] : the affect was normal [Mood] : the mood was normal [No Anxiety] : not feeling anxious [FreeTextEntry1] : nonpitting edema

## 2020-10-12 NOTE — HISTORY OF PRESENT ILLNESS
[FreeTextEntry1] : Marely denies any chest pain, palpitations or dizziness. Requires an injection for hip pain.

## 2020-10-27 ENCOUNTER — RX RENEWAL (OUTPATIENT)
Age: 75
End: 2020-10-27

## 2020-12-21 PROBLEM — Z87.09 HISTORY OF SORE THROAT: Status: RESOLVED | Noted: 2019-01-25 | Resolved: 2020-12-21

## 2020-12-21 PROBLEM — J06.9 ACUTE URI: Status: RESOLVED | Noted: 2017-11-21 | Resolved: 2020-12-21

## 2020-12-27 ENCOUNTER — INPATIENT (INPATIENT)
Facility: HOSPITAL | Age: 75
LOS: 15 days | Discharge: NURSING, REHAB OR LONG-TERM CARE | End: 2021-01-12
Attending: NEUROLOGICAL SURGERY | Admitting: NEUROLOGICAL SURGERY
Payer: MEDICARE

## 2020-12-27 VITALS
DIASTOLIC BLOOD PRESSURE: 65 MMHG | HEART RATE: 91 BPM | TEMPERATURE: 97 F | OXYGEN SATURATION: 100 % | HEIGHT: 66 IN | RESPIRATION RATE: 16 BRPM | SYSTOLIC BLOOD PRESSURE: 129 MMHG

## 2020-12-27 DIAGNOSIS — Z98.890 OTHER SPECIFIED POSTPROCEDURAL STATES: Chronic | ICD-10-CM

## 2020-12-27 DIAGNOSIS — Z98.49 CATARACT EXTRACTION STATUS, UNSPECIFIED EYE: Chronic | ICD-10-CM

## 2020-12-27 DIAGNOSIS — C34.90 MALIGNANT NEOPLASM OF UNSPECIFIED PART OF UNSPECIFIED BRONCHUS OR LUNG: Chronic | ICD-10-CM

## 2020-12-27 DIAGNOSIS — Z96.641 PRESENCE OF RIGHT ARTIFICIAL HIP JOINT: Chronic | ICD-10-CM

## 2020-12-27 DIAGNOSIS — Z96.659 PRESENCE OF UNSPECIFIED ARTIFICIAL KNEE JOINT: Chronic | ICD-10-CM

## 2020-12-27 DIAGNOSIS — Z90.89 ACQUIRED ABSENCE OF OTHER ORGANS: Chronic | ICD-10-CM

## 2020-12-27 LAB
ALBUMIN SERPL ELPH-MCNC: 3.4 G/DL — SIGNIFICANT CHANGE UP (ref 3.3–5)
ALP SERPL-CCNC: 99 U/L — SIGNIFICANT CHANGE UP (ref 40–120)
ALT FLD-CCNC: 16 U/L — SIGNIFICANT CHANGE UP (ref 4–33)
ANION GAP SERPL CALC-SCNC: 12 MMOL/L — SIGNIFICANT CHANGE UP (ref 7–14)
APTT BLD: 29.3 SEC — SIGNIFICANT CHANGE UP (ref 27–36.3)
AST SERPL-CCNC: 26 U/L — SIGNIFICANT CHANGE UP (ref 4–32)
BASE EXCESS BLDV CALC-SCNC: 2.1 MMOL/L — HIGH (ref -3–2)
BASOPHILS # BLD AUTO: 0 K/UL — SIGNIFICANT CHANGE UP (ref 0–0.2)
BASOPHILS NFR BLD AUTO: 0 % — SIGNIFICANT CHANGE UP (ref 0–2)
BILIRUB SERPL-MCNC: 0.6 MG/DL — SIGNIFICANT CHANGE UP (ref 0.2–1.2)
BLOOD GAS VENOUS - CREATININE: 0.8 MG/DL — SIGNIFICANT CHANGE UP (ref 0.5–1.3)
BLOOD GAS VENOUS COMPREHENSIVE RESULT: SIGNIFICANT CHANGE UP
BUN SERPL-MCNC: 19 MG/DL — SIGNIFICANT CHANGE UP (ref 7–23)
CALCIUM SERPL-MCNC: 9.2 MG/DL — SIGNIFICANT CHANGE UP (ref 8.4–10.5)
CHLORIDE BLDV-SCNC: 105 MMOL/L — SIGNIFICANT CHANGE UP (ref 96–108)
CHLORIDE SERPL-SCNC: 103 MMOL/L — SIGNIFICANT CHANGE UP (ref 98–107)
CO2 SERPL-SCNC: 24 MMOL/L — SIGNIFICANT CHANGE UP (ref 22–31)
CREAT SERPL-MCNC: 0.8 MG/DL — SIGNIFICANT CHANGE UP (ref 0.5–1.3)
EOSINOPHIL # BLD AUTO: 0.16 K/UL — SIGNIFICANT CHANGE UP (ref 0–0.5)
EOSINOPHIL NFR BLD AUTO: 1.7 % — SIGNIFICANT CHANGE UP (ref 0–6)
GAS PNL BLDV: 139 MMOL/L — SIGNIFICANT CHANGE UP (ref 136–146)
GLUCOSE BLDV-MCNC: 113 MG/DL — HIGH (ref 70–99)
GLUCOSE SERPL-MCNC: 109 MG/DL — HIGH (ref 70–99)
HCO3 BLDV-SCNC: 25 MMOL/L — SIGNIFICANT CHANGE UP (ref 20–27)
HCT VFR BLD CALC: 38.8 % — SIGNIFICANT CHANGE UP (ref 34.5–45)
HCT VFR BLDA CALC: 40.8 % — SIGNIFICANT CHANGE UP (ref 34.5–46.5)
HGB BLD CALC-MCNC: 13.3 G/DL — SIGNIFICANT CHANGE UP (ref 11.5–15.5)
HGB BLD-MCNC: 12.2 G/DL — SIGNIFICANT CHANGE UP (ref 11.5–15.5)
IANC: 7.26 K/UL — SIGNIFICANT CHANGE UP (ref 1.5–8.5)
INR BLD: 1.38 RATIO — HIGH (ref 0.88–1.16)
LACTATE BLDV-MCNC: 1.3 MMOL/L — SIGNIFICANT CHANGE UP (ref 0.5–2)
LYMPHOCYTES # BLD AUTO: 0.42 K/UL — LOW (ref 1–3.3)
LYMPHOCYTES # BLD AUTO: 4.4 % — LOW (ref 13–44)
MCHC RBC-ENTMCNC: 30.1 PG — SIGNIFICANT CHANGE UP (ref 27–34)
MCHC RBC-ENTMCNC: 31.4 GM/DL — LOW (ref 32–36)
MCV RBC AUTO: 95.8 FL — SIGNIFICANT CHANGE UP (ref 80–100)
MONOCYTES # BLD AUTO: 0.85 K/UL — SIGNIFICANT CHANGE UP (ref 0–0.9)
MONOCYTES NFR BLD AUTO: 8.8 % — SIGNIFICANT CHANGE UP (ref 2–14)
NEUTROPHILS # BLD AUTO: 7.94 K/UL — HIGH (ref 1.8–7.4)
NEUTROPHILS NFR BLD AUTO: 82.5 % — HIGH (ref 43–77)
PCO2 BLDV: 48 MMHG — SIGNIFICANT CHANGE UP (ref 41–51)
PH BLDV: 7.36 — SIGNIFICANT CHANGE UP (ref 7.32–7.43)
PLATELET # BLD AUTO: 327 K/UL — SIGNIFICANT CHANGE UP (ref 150–400)
PO2 BLDV: 36 MMHG — SIGNIFICANT CHANGE UP (ref 35–40)
POTASSIUM BLDV-SCNC: 3.8 MMOL/L — SIGNIFICANT CHANGE UP (ref 3.4–4.5)
POTASSIUM SERPL-MCNC: 4.1 MMOL/L — SIGNIFICANT CHANGE UP (ref 3.5–5.3)
POTASSIUM SERPL-SCNC: 4.1 MMOL/L — SIGNIFICANT CHANGE UP (ref 3.5–5.3)
PROT SERPL-MCNC: 6.6 G/DL — SIGNIFICANT CHANGE UP (ref 6–8.3)
PROTHROM AB SERPL-ACNC: 15.6 SEC — HIGH (ref 10.6–13.6)
RBC # BLD: 4.05 M/UL — SIGNIFICANT CHANGE UP (ref 3.8–5.2)
RBC # FLD: 13.6 % — SIGNIFICANT CHANGE UP (ref 10.3–14.5)
SAO2 % BLDV: 64.5 % — SIGNIFICANT CHANGE UP (ref 60–85)
SODIUM SERPL-SCNC: 139 MMOL/L — SIGNIFICANT CHANGE UP (ref 135–145)
WBC # BLD: 9.63 K/UL — SIGNIFICANT CHANGE UP (ref 3.8–10.5)
WBC # FLD AUTO: 9.63 K/UL — SIGNIFICANT CHANGE UP (ref 3.8–10.5)

## 2020-12-27 PROCEDURE — 99285 EMERGENCY DEPT VISIT HI MDM: CPT

## 2020-12-27 PROCEDURE — 72170 X-RAY EXAM OF PELVIS: CPT | Mod: 26

## 2020-12-27 RX ORDER — OXYCODONE HYDROCHLORIDE 5 MG/1
10 TABLET ORAL ONCE
Refills: 0 | Status: DISCONTINUED | OUTPATIENT
Start: 2020-12-27 | End: 2020-12-27

## 2020-12-27 RX ORDER — LIDOCAINE 4 G/100G
1 CREAM TOPICAL ONCE
Refills: 0 | Status: COMPLETED | OUTPATIENT
Start: 2020-12-27 | End: 2020-12-27

## 2020-12-27 RX ORDER — ACETAMINOPHEN 500 MG
975 TABLET ORAL ONCE
Refills: 0 | Status: COMPLETED | OUTPATIENT
Start: 2020-12-27 | End: 2020-12-27

## 2020-12-27 RX ADMIN — LIDOCAINE 1 PATCH: 4 CREAM TOPICAL at 21:03

## 2020-12-27 RX ADMIN — Medication 975 MILLIGRAM(S): at 21:02

## 2020-12-27 NOTE — ED PROVIDER NOTE - PHYSICAL EXAMINATION
Gen: WDWN, NAD, morbidly obese   HEENT: EOMI, no nasal discharge, mucous membranes moist  CV: RRR, no M/R/G  Resp: CTAB, no W/R/R  GI: Abdomen soft non-distended, NTTP, no masses  MSK: No open wounds, no bruising, no LE edema  Neuro: A&Ox4, following commands, moving all four extremities spontaneously  Psych: appropriate mood, denies AH, VH, SI Gen: WDWN, NAD, morbidly obese   HEENT: EOMI, no nasal discharge, mucous membranes moist  CV: RRR, no M/R/G  Resp: CTAB, no W/R/R  GI: Abdomen soft non-distended, NTTP, no masses  MSK: No open wounds, no bruising, + midline lumbar ttp w/o overlying skin changes, ambulation deferred, LLE noted asymmetrically larger then RLE w/o acute change or tenderness  Neuro: CN2-12 grossly intact, A&Ox4, MS +5/5 in UE and  4/5 in LE BL, gait deferred, LE sensation intact b/l, + intact rectal tone   Psych: appropriate mood, denies AH, VH, SI  Skin: no skin changes overlying spine

## 2020-12-27 NOTE — ED ADULT NURSE NOTE - OBJECTIVE STATEMENT
Pt a&ox3 non ambulatory due to worsening back pain. Pt presents to ED for severe back pain. Pt states she has had chronic back pain but has worsened after epidural injection last week. Pt also endorses constipation as well. Pt left leg chronically larger than right. Skin intact. 20g IV placed in Rt AC, labs sent. Will monitor.

## 2020-12-27 NOTE — ED PROVIDER NOTE - CLINICAL SUMMARY MEDICAL DECISION MAKING FREE TEXT BOX
75F hx chronic back pain w/ multiple prior sites of herniation, thecal sac inpingement p/w 1 week worsening lower back pain, inability to ambulate s/p spinal injection w/ intact LE sensation, rectal tone, + midline back tenderness.     Impression: concern for epidural abscess vs hematoma s/p injection given pt on AC vs possibly back pain 2/2 physical habitus, possible new bony injury. Plan for labs, MRI imaging.

## 2020-12-27 NOTE — ED PROVIDER NOTE - ATTENDING CONTRIBUTION TO CARE
76 yo F past medical history htn , hypothyroid, ra, LLE dvt with acute on chronic lower back pain. reports pain worse since recent "vitamin" injection in her back approx a week ago. patient states has been adherent with AC and was not dc'd prior to injection.  +constipation for same period of time, no changes in urination. denies numbness. exam as above. Ddx includes, but not limited to, epidural hematoma, epidural abscess, progressive worsening of chronic back pain. plan: labs, stat MRI, admit, symptom relief prn.

## 2020-12-27 NOTE — ED ADULT TRIAGE NOTE - CHIEF COMPLAINT QUOTE
Pt c/o back pain x 1 wee, unable to stand due to pain. Has worsened. Denies any falls/injuries, urinary symptoms. Hx: R. Arthritis, Lupus, HLD, HTN

## 2020-12-27 NOTE — ED PROVIDER NOTE - PMH
Benign heart murmur    DVT (deep venous thrombosis)    H/O degenerative disc disease    HTN (hypertension)    Hyperlipidemia    Hypertension    Hypothyroid    Hypothyroidism    Obesity, Class II, BMI 35-39.9, no comorbidity    Osteoarthritis    RA (rheumatoid arthritis)    Rheumatoid arthritis    SLE (systemic lupus erythematosus)

## 2020-12-27 NOTE — ED ADULT NURSE NOTE - NSIMPLEMENTINTERV_GEN_ALL_ED
Implemented All Fall Risk Interventions:  [place] to call system. Call bell, personal items and telephone within reach. Instruct patient to call for assistance. Room bathroom lighting operational. Non-slip footwear when patient is off stretcher. Physically safe environment: no spills, clutter or unnecessary equipment. Stretcher in lowest position, wheels locked, appropriate side rails in place. Provide visual cue, wrist band, yellow gown, etc. Monitor gait and stability. Monitor for mental status changes and reorient to person, place, and time. Review medications for side effects contributing to fall risk. Reinforce activity limits and safety measures with patient and family.

## 2020-12-27 NOTE — ED ADULT NURSE REASSESSMENT NOTE - NS ED NURSE REASSESS COMMENT FT1
Received report from ALE Hutchins. Pt resting in bed at this time, complaining of back pain. Medicated as per order. Resp. even and unlabored. Denies CP, SOB, HA, and dizziness. VS as noted. NAD. Will continue to monitor.

## 2020-12-27 NOTE — ED PROVIDER NOTE - OBJECTIVE STATEMENT
75F HTN, hypoparathyroid (on synthroid), RA, LLE DVT (2019) on eliquis p/w severe acute on chronic lower back pain. Pt reports months-long hx of 75F HTN, hypoparathyroid (on synthroid), RA, LLE DVT (2019) on eliquis p/w severe acute on chronic lower back pain. Pt reports months-long hx of chronic lower back pain, prior MRI imaging (in september) + for multiple areas of herniation w/ impingement on thecal sac - at that time pt followed outpt w/ Dr. Salvador, her orthopedist. Pt endorses multiple lower spinal injections (unsure of product) most recently last wednesday, and endorses severe worsened midspinal tenderness since most recent injectections w/ decreased inability to ambulate, also endorsing constipation over past week w/o change in urination, no overlying skin changes over spine, no fevers/chills/CP or SOB, no new LE tenderness or changes in sensation, denies numbness/tingling. Baseline walks with walker. 75F HTN, hypothyroid (on synthroid), RA, LLE DVT (2019) on eliquis p/w severe acute on chronic lower back pain. Pt reports months-long hx of chronic lower back pain, prior MRI imaging (in september) + for multiple areas of herniation w/ impingement on thecal sac - at that time pt followed outpt w/ Dr. Salvador, her orthopedist. Pt endorses multiple lower spinal injections (unsure of product) most recently last wednesday, and endorses severe worsened midspinal tenderness since most recent injectections w/ decreased inability to ambulate, also endorsing constipation over past week w/o change in urination, no overlying skin changes over spine, no fevers/chills/CP or SOB, no new LE tenderness or changes in sensation, denies numbness/tingling. Baseline walks with walker.

## 2020-12-28 DIAGNOSIS — M32.9 SYSTEMIC LUPUS ERYTHEMATOSUS, UNSPECIFIED: ICD-10-CM

## 2020-12-28 DIAGNOSIS — I10 ESSENTIAL (PRIMARY) HYPERTENSION: ICD-10-CM

## 2020-12-28 DIAGNOSIS — M06.9 RHEUMATOID ARTHRITIS, UNSPECIFIED: ICD-10-CM

## 2020-12-28 DIAGNOSIS — E03.9 HYPOTHYROIDISM, UNSPECIFIED: ICD-10-CM

## 2020-12-28 DIAGNOSIS — M47.27 OTHER SPONDYLOSIS WITH RADICULOPATHY, LUMBOSACRAL REGION: ICD-10-CM

## 2020-12-28 DIAGNOSIS — I82.531 CHRONIC EMBOLISM AND THROMBOSIS OF RIGHT POPLITEAL VEIN: ICD-10-CM

## 2020-12-28 DIAGNOSIS — Z29.9 ENCOUNTER FOR PROPHYLACTIC MEASURES, UNSPECIFIED: ICD-10-CM

## 2020-12-28 DIAGNOSIS — M54.9 DORSALGIA, UNSPECIFIED: ICD-10-CM

## 2020-12-28 LAB
ERYTHROCYTE [SEDIMENTATION RATE] IN BLOOD: 52 MM/HR — HIGH (ref 4–25)
HCT VFR BLD CALC: 36.2 % — SIGNIFICANT CHANGE UP (ref 34.5–45)
HGB BLD-MCNC: 11.5 G/DL — SIGNIFICANT CHANGE UP (ref 11.5–15.5)
MCHC RBC-ENTMCNC: 29.9 PG — SIGNIFICANT CHANGE UP (ref 27–34)
MCHC RBC-ENTMCNC: 31.8 GM/DL — LOW (ref 32–36)
MCV RBC AUTO: 94 FL — SIGNIFICANT CHANGE UP (ref 80–100)
NRBC # BLD: 0 /100 WBCS — SIGNIFICANT CHANGE UP
NRBC # FLD: 0 K/UL — SIGNIFICANT CHANGE UP
PLATELET # BLD AUTO: 297 K/UL — SIGNIFICANT CHANGE UP (ref 150–400)
RBC # BLD: 3.85 M/UL — SIGNIFICANT CHANGE UP (ref 3.8–5.2)
RBC # FLD: 13.6 % — SIGNIFICANT CHANGE UP (ref 10.3–14.5)
SARS-COV-2 RNA SPEC QL NAA+PROBE: SIGNIFICANT CHANGE UP
WBC # BLD: 6.9 K/UL — SIGNIFICANT CHANGE UP (ref 3.8–10.5)
WBC # FLD AUTO: 6.9 K/UL — SIGNIFICANT CHANGE UP (ref 3.8–10.5)

## 2020-12-28 PROCEDURE — 99223 1ST HOSP IP/OBS HIGH 75: CPT

## 2020-12-28 PROCEDURE — 72147 MRI CHEST SPINE W/DYE: CPT | Mod: 26

## 2020-12-28 PROCEDURE — 72149 MRI LUMBAR SPINE W/DYE: CPT | Mod: 26

## 2020-12-28 RX ORDER — OXYCODONE AND ACETAMINOPHEN 5; 325 MG/1; MG/1
1 TABLET ORAL EVERY 6 HOURS
Refills: 0 | Status: DISCONTINUED | OUTPATIENT
Start: 2020-12-28 | End: 2021-01-01

## 2020-12-28 RX ORDER — FOLIC ACID 0.8 MG
1 TABLET ORAL DAILY
Refills: 0 | Status: DISCONTINUED | OUTPATIENT
Start: 2020-12-28 | End: 2021-01-12

## 2020-12-28 RX ORDER — OXYCODONE HYDROCHLORIDE 5 MG/1
5 TABLET ORAL ONCE
Refills: 0 | Status: DISCONTINUED | OUTPATIENT
Start: 2020-12-28 | End: 2020-12-28

## 2020-12-28 RX ORDER — POLYETHYLENE GLYCOL 3350 17 G/17G
17 POWDER, FOR SOLUTION ORAL DAILY
Refills: 0 | Status: DISCONTINUED | OUTPATIENT
Start: 2020-12-28 | End: 2021-01-01

## 2020-12-28 RX ORDER — INFLUENZA VIRUS VACCINE 15; 15; 15; 15 UG/.5ML; UG/.5ML; UG/.5ML; UG/.5ML
0.5 SUSPENSION INTRAMUSCULAR ONCE
Refills: 0 | Status: DISCONTINUED | OUTPATIENT
Start: 2020-12-28 | End: 2020-12-29

## 2020-12-28 RX ORDER — GABAPENTIN 400 MG/1
0 CAPSULE ORAL
Qty: 0 | Refills: 0 | DISCHARGE

## 2020-12-28 RX ORDER — ACETAMINOPHEN 500 MG
650 TABLET ORAL EVERY 6 HOURS
Refills: 0 | Status: DISCONTINUED | OUTPATIENT
Start: 2020-12-28 | End: 2021-01-02

## 2020-12-28 RX ORDER — LEVOTHYROXINE SODIUM 125 MCG
200 TABLET ORAL DAILY
Refills: 0 | Status: DISCONTINUED | OUTPATIENT
Start: 2020-12-28 | End: 2021-01-02

## 2020-12-28 RX ORDER — LOSARTAN POTASSIUM 100 MG/1
50 TABLET, FILM COATED ORAL DAILY
Refills: 0 | Status: DISCONTINUED | OUTPATIENT
Start: 2020-12-28 | End: 2021-01-05

## 2020-12-28 RX ORDER — OXYCODONE AND ACETAMINOPHEN 5; 325 MG/1; MG/1
2 TABLET ORAL EVERY 6 HOURS
Refills: 0 | Status: DISCONTINUED | OUTPATIENT
Start: 2020-12-28 | End: 2021-01-01

## 2020-12-28 RX ORDER — MULTIVIT-MIN/FERROUS GLUCONATE 9 MG/15 ML
1 LIQUID (ML) ORAL
Qty: 0 | Refills: 0 | DISCHARGE

## 2020-12-28 RX ORDER — LOSARTAN/HYDROCHLOROTHIAZIDE 100MG-25MG
1 TABLET ORAL
Qty: 0 | Refills: 0 | DISCHARGE

## 2020-12-28 RX ORDER — HYDROXYCHLOROQUINE SULFATE 200 MG
200 TABLET ORAL DAILY
Refills: 0 | Status: DISCONTINUED | OUTPATIENT
Start: 2020-12-28 | End: 2021-01-12

## 2020-12-28 RX ORDER — SENNA PLUS 8.6 MG/1
2 TABLET ORAL AT BEDTIME
Refills: 0 | Status: DISCONTINUED | OUTPATIENT
Start: 2020-12-28 | End: 2021-01-12

## 2020-12-28 RX ORDER — ATORVASTATIN CALCIUM 80 MG/1
20 TABLET, FILM COATED ORAL AT BEDTIME
Refills: 0 | Status: DISCONTINUED | OUTPATIENT
Start: 2020-12-28 | End: 2021-01-12

## 2020-12-28 RX ORDER — MULTIVIT-MIN/FERROUS GLUCONATE 9 MG/15 ML
2 LIQUID (ML) ORAL
Qty: 0 | Refills: 0 | DISCHARGE

## 2020-12-28 RX ORDER — APIXABAN 2.5 MG/1
5 TABLET, FILM COATED ORAL EVERY 12 HOURS
Refills: 0 | Status: DISCONTINUED | OUTPATIENT
Start: 2020-12-28 | End: 2020-12-29

## 2020-12-28 RX ORDER — METHOTREXATE 2.5 MG/1
15 TABLET ORAL
Refills: 0 | Status: DISCONTINUED | OUTPATIENT
Start: 2020-12-28 | End: 2020-12-29

## 2020-12-28 RX ADMIN — Medication 200 MILLIGRAM(S): at 12:52

## 2020-12-28 RX ADMIN — LOSARTAN POTASSIUM 50 MILLIGRAM(S): 100 TABLET, FILM COATED ORAL at 12:53

## 2020-12-28 RX ADMIN — POLYETHYLENE GLYCOL 3350 17 GRAM(S): 17 POWDER, FOR SOLUTION ORAL at 12:53

## 2020-12-28 RX ADMIN — Medication 1 MILLIGRAM(S): at 12:53

## 2020-12-28 RX ADMIN — OXYCODONE AND ACETAMINOPHEN 2 TABLET(S): 5; 325 TABLET ORAL at 21:25

## 2020-12-28 RX ADMIN — ATORVASTATIN CALCIUM 20 MILLIGRAM(S): 80 TABLET, FILM COATED ORAL at 21:18

## 2020-12-28 RX ADMIN — OXYCODONE HYDROCHLORIDE 5 MILLIGRAM(S): 5 TABLET ORAL at 12:53

## 2020-12-28 RX ADMIN — OXYCODONE HYDROCHLORIDE 5 MILLIGRAM(S): 5 TABLET ORAL at 13:40

## 2020-12-28 RX ADMIN — OXYCODONE HYDROCHLORIDE 5 MILLIGRAM(S): 5 TABLET ORAL at 00:47

## 2020-12-28 RX ADMIN — OXYCODONE AND ACETAMINOPHEN 2 TABLET(S): 5; 325 TABLET ORAL at 20:31

## 2020-12-28 RX ADMIN — APIXABAN 5 MILLIGRAM(S): 2.5 TABLET, FILM COATED ORAL at 17:19

## 2020-12-28 RX ADMIN — METHOTREXATE 15 MILLIGRAM(S): 2.5 TABLET ORAL at 14:19

## 2020-12-28 RX ADMIN — Medication 200 MICROGRAM(S): at 14:19

## 2020-12-28 RX ADMIN — SENNA PLUS 2 TABLET(S): 8.6 TABLET ORAL at 21:18

## 2020-12-28 RX ADMIN — OXYCODONE HYDROCHLORIDE 5 MILLIGRAM(S): 5 TABLET ORAL at 00:23

## 2020-12-28 NOTE — H&P ADULT - ASSESSMENT
75F with past medical history of HTN, HLD, SLE, RA, hypothyroidism, provoked R. popliteal DVT in 2019 on eliquis, chronic low back pain admitted for acute on chronic low back pain concern r/o OM, pending ortho and PT evaluation, low concern for cord compression.

## 2020-12-28 NOTE — PATIENT PROFILE ADULT - FLU SEASON?
----- Message from Stefano Hernandez sent at 8/30/2017 12:47 PM CDT -----  Contact: Ne IyerKathleen Howard is returning your call. She can be reached at [phone]  
I spoke to patient.  She is one Maxitrol per her Ophthalmologist on the [facility] for eyelid infection.  Questioned if it was safe to use with the FML.  Advised her to follow-up with they prescribing physician and to call back should she have any further questions/concerns.   
Yes...

## 2020-12-28 NOTE — H&P ADULT - NSHPPHYSICALEXAM_GEN_ALL_CORE
Vital Signs Last 24 Hrs  T(C): 37.7 (28 Dec 2020 08:55), Max: 37.7 (28 Dec 2020 08:55)  T(F): 99.8 (28 Dec 2020 08:55), Max: 99.8 (28 Dec 2020 08:55)  HR: 75 (28 Dec 2020 08:55) (72 - 91)  BP: 130/71 (28 Dec 2020 08:55) (114/53 - 130/71)  BP(mean): --  RR: 20 (28 Dec 2020 08:55) (16 - 20)  SpO2: 100% (28 Dec 2020 08:55) (97% - 100%)    General: morbidly obese, mod distress due to pain   Neurology: A&Ox3, 5/5 b/l UE and RLE, 3/5 LLE, CN 2-12 intact, unable to assess gait, per ED+rectal tone  Eyes: PERRLA/ EOMI, Gross vision intact  ENT/Neck: Neck supple, Gross hearing intact  Respiratory: CTA B/L, No wheezing, rales, rhonchi  CV: RRR, S1S2, no murmurs, rubs or gallops  Abdominal: Soft, NT, ND, hypoactive BS  Extremities: LLE non pitting edema > RLE, +peripheral pulses  MSK: TTP over L4,L5, S1 vertebra, no paraspinal tenderness, sensation intact, unable to asses flexion/extension  Skin: No lumbosacral erythema, ulcer, skin tear

## 2020-12-28 NOTE — H&P ADULT - PROBLEM SELECTOR PLAN 1
MR lumbosacral showed abnormal signal adjacent to the L2-3 disc space. While these findings could be compatible with degenerative changes possibly of early discitis and osteomyelitis must be considered. Clinical correlation and continued close interval follow-up is recommended  - concern for early OM given recent inj 12/16, no epidural abscess or cord compression seen   - pt appears non toxic, afebrile, no leukocytosis  - obtain ESR/CRP to further risk stratify  - if elevated consider ID consult to aid in decision making   - ortho consulted in ED, f/u reccs  - pain control with percocet 10 mg q6h PRN for severe pain, percocet 5 mg q6h for mod pain, acetaminophen prn for mild pain  - add bowel regimen  - monitor off abx  - if febrile or decompensates start on vanc/zosyn  - f/u Bx x 2 (specimen received) MR lumbosacral showed abnormal signal adjacent to the L2-3 disc space. "While these findings could be compatible with degenerative changes possibly of early discitis and osteomyelitis must be considered"  - concern for early OM given recent inj 12/16, although no hematoma, epidural abscess or cord compression seen  - pt appears non toxic, afebrile, no leukocytosis  - obtain ESR/CRP to further risk stratify  - if elevated consider ID consult to aid in decision making   - ortho consulted in ED, f/u reccs  - pain control with percocet 10 mg q6h PRN for severe pain, percocet 5 mg q6h for mod pain, acetaminophen prn for mild pain  - add bowel regimen  - monitor off abx  - if febrile or decompensates start on vanc/zosyn  - f/u Bx x 2 (specimen received)

## 2020-12-28 NOTE — CHART NOTE - NSCHARTNOTEFT_GEN_A_CORE
Spoke to the daughter Adeline and answered all questions and addressed all concerns. Ortho recs still pending so unable to update the daughter fully.

## 2020-12-28 NOTE — H&P ADULT - NSHPSOCIALHISTORY_GEN_ALL_CORE
Lives alone, independent in ADLS and IADLs. ambulates with a rolling walker. Denies smoking, other illicit drug use. Occasional etoh use.

## 2020-12-28 NOTE — H&P ADULT - NSICDXPASTSURGICALHX_GEN_ALL_CORE_FT
PAST SURGICAL HISTORY:  Lung cancer small tumor removed 2015    S/P carpal tunnel release left    S/P cataract surgery left    S/P eye surgery child    S/P knee replacement     S/P knee surgery bilateral    S/P thyroid surgery 1 lobe removed    S/P tonsillectomy     Status post total hip replacement, right

## 2020-12-28 NOTE — H&P ADULT - PROBLEM SELECTOR PLAN 3
resume eliquis 5 mg BID for R. popliteal DVT  follows vascular cardiologist Dr. Hoyos outpatient, per most recent note, pt had L<R lymphedema, recc compression stockings  can f.u with Dr. Hoyos post discharge resume eliquis 5 mg BID for R. popliteal DVT  follows vascular cardiologist Dr. Hoyos outpatient, per most recent note, pt had L>R lymphedema, recc compression stockings  can f.u with Dr. Hoyos post discharge

## 2020-12-29 DIAGNOSIS — M51.36 OTHER INTERVERTEBRAL DISC DEGENERATION, LUMBAR REGION: ICD-10-CM

## 2020-12-29 LAB
ANION GAP SERPL CALC-SCNC: 10 MMOL/L — SIGNIFICANT CHANGE UP (ref 7–14)
BUN SERPL-MCNC: 20 MG/DL — SIGNIFICANT CHANGE UP (ref 7–23)
CALCIUM SERPL-MCNC: 9.1 MG/DL — SIGNIFICANT CHANGE UP (ref 8.4–10.5)
CHLORIDE SERPL-SCNC: 104 MMOL/L — SIGNIFICANT CHANGE UP (ref 98–107)
CO2 SERPL-SCNC: 27 MMOL/L — SIGNIFICANT CHANGE UP (ref 22–31)
CREAT SERPL-MCNC: 0.82 MG/DL — SIGNIFICANT CHANGE UP (ref 0.5–1.3)
GLUCOSE SERPL-MCNC: 79 MG/DL — SIGNIFICANT CHANGE UP (ref 70–99)
HCT VFR BLD CALC: 37.2 % — SIGNIFICANT CHANGE UP (ref 34.5–45)
HGB BLD-MCNC: 11.8 G/DL — SIGNIFICANT CHANGE UP (ref 11.5–15.5)
MAGNESIUM SERPL-MCNC: 2 MG/DL — SIGNIFICANT CHANGE UP (ref 1.6–2.6)
MCHC RBC-ENTMCNC: 30.1 PG — SIGNIFICANT CHANGE UP (ref 27–34)
MCHC RBC-ENTMCNC: 31.7 GM/DL — LOW (ref 32–36)
MCV RBC AUTO: 94.9 FL — SIGNIFICANT CHANGE UP (ref 80–100)
NRBC # BLD: 0 /100 WBCS — SIGNIFICANT CHANGE UP
NRBC # FLD: 0 K/UL — SIGNIFICANT CHANGE UP
PHOSPHATE SERPL-MCNC: 4.2 MG/DL — SIGNIFICANT CHANGE UP (ref 2.5–4.5)
PLATELET # BLD AUTO: 309 K/UL — SIGNIFICANT CHANGE UP (ref 150–400)
POTASSIUM SERPL-MCNC: 4.1 MMOL/L — SIGNIFICANT CHANGE UP (ref 3.5–5.3)
POTASSIUM SERPL-SCNC: 4.1 MMOL/L — SIGNIFICANT CHANGE UP (ref 3.5–5.3)
RBC # BLD: 3.92 M/UL — SIGNIFICANT CHANGE UP (ref 3.8–5.2)
RBC # FLD: 13.5 % — SIGNIFICANT CHANGE UP (ref 10.3–14.5)
SODIUM SERPL-SCNC: 141 MMOL/L — SIGNIFICANT CHANGE UP (ref 135–145)
WBC # BLD: 6.05 K/UL — SIGNIFICANT CHANGE UP (ref 3.8–10.5)
WBC # FLD AUTO: 6.05 K/UL — SIGNIFICANT CHANGE UP (ref 3.8–10.5)

## 2020-12-29 PROCEDURE — 99233 SBSQ HOSP IP/OBS HIGH 50: CPT

## 2020-12-29 PROCEDURE — 93970 EXTREMITY STUDY: CPT | Mod: 26

## 2020-12-29 PROCEDURE — 93010 ELECTROCARDIOGRAM REPORT: CPT

## 2020-12-29 RX ORDER — MAGNESIUM HYDROXIDE 400 MG/1
30 TABLET, CHEWABLE ORAL DAILY
Refills: 0 | Status: DISCONTINUED | OUTPATIENT
Start: 2020-12-29 | End: 2021-01-01

## 2020-12-29 RX ORDER — ENOXAPARIN SODIUM 100 MG/ML
40 INJECTION SUBCUTANEOUS AT BEDTIME
Refills: 0 | Status: DISCONTINUED | OUTPATIENT
Start: 2020-12-29 | End: 2020-12-31

## 2020-12-29 RX ORDER — INFLUENZA VIRUS VACCINE 15; 15; 15; 15 UG/.5ML; UG/.5ML; UG/.5ML; UG/.5ML
0.7 SUSPENSION INTRAMUSCULAR ONCE
Refills: 0 | Status: COMPLETED | OUTPATIENT
Start: 2020-12-29 | End: 2020-12-29

## 2020-12-29 RX ADMIN — ENOXAPARIN SODIUM 40 MILLIGRAM(S): 100 INJECTION SUBCUTANEOUS at 21:55

## 2020-12-29 RX ADMIN — Medication 200 MICROGRAM(S): at 06:12

## 2020-12-29 RX ADMIN — Medication 1 MILLIGRAM(S): at 11:09

## 2020-12-29 RX ADMIN — SENNA PLUS 2 TABLET(S): 8.6 TABLET ORAL at 21:55

## 2020-12-29 RX ADMIN — OXYCODONE AND ACETAMINOPHEN 2 TABLET(S): 5; 325 TABLET ORAL at 15:37

## 2020-12-29 RX ADMIN — APIXABAN 5 MILLIGRAM(S): 2.5 TABLET, FILM COATED ORAL at 06:12

## 2020-12-29 RX ADMIN — OXYCODONE AND ACETAMINOPHEN 2 TABLET(S): 5; 325 TABLET ORAL at 15:07

## 2020-12-29 RX ADMIN — POLYETHYLENE GLYCOL 3350 17 GRAM(S): 17 POWDER, FOR SOLUTION ORAL at 11:09

## 2020-12-29 RX ADMIN — OXYCODONE AND ACETAMINOPHEN 2 TABLET(S): 5; 325 TABLET ORAL at 07:00

## 2020-12-29 RX ADMIN — LOSARTAN POTASSIUM 50 MILLIGRAM(S): 100 TABLET, FILM COATED ORAL at 06:12

## 2020-12-29 RX ADMIN — OXYCODONE AND ACETAMINOPHEN 2 TABLET(S): 5; 325 TABLET ORAL at 06:12

## 2020-12-29 RX ADMIN — OXYCODONE AND ACETAMINOPHEN 2 TABLET(S): 5; 325 TABLET ORAL at 22:43

## 2020-12-29 RX ADMIN — Medication 200 MILLIGRAM(S): at 11:09

## 2020-12-29 RX ADMIN — OXYCODONE AND ACETAMINOPHEN 2 TABLET(S): 5; 325 TABLET ORAL at 21:56

## 2020-12-29 RX ADMIN — ATORVASTATIN CALCIUM 20 MILLIGRAM(S): 80 TABLET, FILM COATED ORAL at 21:55

## 2020-12-29 RX ADMIN — MAGNESIUM HYDROXIDE 30 MILLILITER(S): 400 TABLET, CHEWABLE ORAL at 17:46

## 2020-12-29 NOTE — PROGRESS NOTE ADULT - ASSESSMENT
76 yo F with HTN, HLD, SLE, RA, hypothyroidism, provoked R. popliteal DVT in 2019 on Eliquis, chronic low back pain admitted for acute on chronic low back pain concern r/o OM, pending NSY and PT eval.

## 2020-12-29 NOTE — PROGRESS NOTE ADULT - SUBJECTIVE AND OBJECTIVE BOX
Patient is a 75y old  Female who presents with a chief complaint of acute on chronic low back pain    SUBJECTIVE / OVERNIGHT EVENTS:    No CP, SOB, f/c/n/v  Reports constipation, no dysuria   Reports back pain still there, some intermittent radiation down legs L>R, denies numbness or tingling  Reports intermittent accidents with urine but not 2/2 loss of sensation but due to inability to make it to bathroom 2/2 pain   No prior CVA or MI, exercise tolerance limited 2/2 back pain, no episodes of CP or SOB  Follows with a cardiologist for a murmur & HTN, no prior stents etc, has baseline RBBB  Per d/w daughter eliquis on since knee surgery in Jan 2020 for provoked DVT s/p knee replacement, no other VTE events, she is not sure why she is still on it but thinks it is due to plan for L knee replacement in Oct that was later canceled.     MEDICATIONS  (STANDING):  apixaban 5 milliGRAM(s) Oral every 12 hours  atorvastatin 20 milliGRAM(s) Oral at bedtime  folic acid 1 milliGRAM(s) Oral daily  hydroxychloroquine 200 milliGRAM(s) Oral daily  influenza  Vaccine (HIGH DOSE) 0.7 milliLiter(s) IntraMuscular once  levothyroxine 200 MICROGram(s) Oral daily  losartan 50 milliGRAM(s) Oral daily  methotrexate 15 milliGRAM(s) Oral <User Schedule>  polyethylene glycol 3350 17 Gram(s) Oral daily  senna 2 Tablet(s) Oral at bedtime    MEDICATIONS  (PRN):  acetaminophen   Tablet .. 650 milliGRAM(s) Oral every 6 hours PRN Mild Pain (1 - 3)  oxycodone    5 mG/acetaminophen 325 mG 2 Tablet(s) Oral every 6 hours PRN Severe Pain (7 - 10)  oxycodone    5 mG/acetaminophen 325 mG 1 Tablet(s) Oral every 6 hours PRN Moderate Pain (4 - 6)    T(C): 36.4 (12-29-20 @ 09:40), Max: 36.9 (12-29-20 @ 06:09)  HR: 60 (12-29-20 @ 09:40) (60 - 76)  BP: 121/51 (12-29-20 @ 09:40) (121/51 - 135/79)  RR: 18 (12-29-20 @ 09:40) (17 - 20)  SpO2: 100% (12-29-20 @ 09:40) (98% - 100%)    I&O's Summary    28 Dec 2020 07:01  -  29 Dec 2020 07:00  --------------------------------------------------------  IN: 600 mL / OUT: 1900 mL / NET: -1300 mL    29 Dec 2020 07:01  -  29 Dec 2020 12:33  --------------------------------------------------------  IN: 0 mL / OUT: 250 mL / NET: -250 mL    PHYSICAL EXAM:  GENERAL: NAD, obese   HEAD:  Atraumatic, Normocephalic  CHEST/LUNG: Clear to auscultation bilaterally; No wheeze  HEART: Regular rate and rhythm; No murmurs, rubs, or gallops  ABDOMEN: obese, soft, Nontender, Nondistended; Bowel sounds present  EXTREMITIES:   warm and well perfused, No clubbing, cyanosis, or edema  PSYCH: AAOx3  NEUROLOGY: non-focal  SKIN: healed R knee scar     LABS:                        11.8   6.05  )-----------( 309      ( 29 Dec 2020 07:19 )             37.2     12-29    141  |  104  |  20  ----------------------------<  79  4.1   |  27  |  0.82    Ca    9.1      29 Dec 2020 07:19  Phos  4.2     12-29  Mg     2.0     12-29    TPro  6.6  /  Alb  3.4  /  TBili  0.6  /  DBili  x   /  AST  26  /  ALT  16  /  AlkPhos  99  12-27    PT/INR - ( 27 Dec 2020 20:48 )   PT: 15.6 sec;   INR: 1.38 ratio    PTT - ( 27 Dec 2020 20:48 )  PTT:29.3 sec    Microbiology: Culture Results:   No growth to date. (12-28 @ 07:14)  Culture Results:   No growth to date. (12-28 @ 01:50)    VBG 12-27 @ 20:47  pH: 7.36/pCO2: 48/pO2: 36/HCO3: 25/lactate: 1.3    Consultant(s) Notes Reviewed:  NSY    Care Discussed with Consultants/Other Providers:   Patient is a 75y old  Female who presents with a chief complaint of acute on chronic low back pain    SUBJECTIVE / OVERNIGHT EVENTS:    No CP, SOB, f/c/n/v  Reports constipation, no dysuria   Reports back pain still there, some intermittent radiation down legs L>R, denies numbness or tingling  Reports intermittent accidents with urine but not 2/2 loss of sensation but due to inability to make it to bathroom 2/2 pain   No prior CVA or MI, exercise tolerance limited 2/2 back pain, no episodes of CP or SOB  Follows with a cardiologist for a murmur & HTN, no prior stents etc, has baseline RBBB  Per d/w daughter eliquis on since knee surgery in Jan 2020 for provoked DVT s/p knee replacement, no other VTE events, she is not sure why she is still on it    MEDICATIONS  (STANDING):  apixaban 5 milliGRAM(s) Oral every 12 hours  atorvastatin 20 milliGRAM(s) Oral at bedtime  folic acid 1 milliGRAM(s) Oral daily  hydroxychloroquine 200 milliGRAM(s) Oral daily  influenza  Vaccine (HIGH DOSE) 0.7 milliLiter(s) IntraMuscular once  levothyroxine 200 MICROGram(s) Oral daily  losartan 50 milliGRAM(s) Oral daily  methotrexate 15 milliGRAM(s) Oral <User Schedule>  polyethylene glycol 3350 17 Gram(s) Oral daily  senna 2 Tablet(s) Oral at bedtime    MEDICATIONS  (PRN):  acetaminophen   Tablet .. 650 milliGRAM(s) Oral every 6 hours PRN Mild Pain (1 - 3)  oxycodone    5 mG/acetaminophen 325 mG 2 Tablet(s) Oral every 6 hours PRN Severe Pain (7 - 10)  oxycodone    5 mG/acetaminophen 325 mG 1 Tablet(s) Oral every 6 hours PRN Moderate Pain (4 - 6)    T(C): 36.4 (12-29-20 @ 09:40), Max: 36.9 (12-29-20 @ 06:09)  HR: 60 (12-29-20 @ 09:40) (60 - 76)  BP: 121/51 (12-29-20 @ 09:40) (121/51 - 135/79)  RR: 18 (12-29-20 @ 09:40) (17 - 20)  SpO2: 100% (12-29-20 @ 09:40) (98% - 100%)    I&O's Summary    28 Dec 2020 07:01  -  29 Dec 2020 07:00  --------------------------------------------------------  IN: 600 mL / OUT: 1900 mL / NET: -1300 mL    29 Dec 2020 07:01  -  29 Dec 2020 12:33  --------------------------------------------------------  IN: 0 mL / OUT: 250 mL / NET: -250 mL    PHYSICAL EXAM:  GENERAL: NAD, obese   HEAD:  Atraumatic, Normocephalic  CHEST/LUNG: Clear to auscultation bilaterally; No wheeze  HEART: Regular rate and rhythm; SEJM  ABDOMEN: obese, soft, Nontender, Nondistended; Bowel sounds present  EXTREMITIES:   warm and well perfused, No clubbing, cyanosis, or edema  PSYCH: AAOx3  NEUROLOGY: non-focal  SKIN: healed R knee scar     LABS:                        11.8   6.05  )-----------( 309      ( 29 Dec 2020 07:19 )             37.2     12-29    141  |  104  |  20  ----------------------------<  79  4.1   |  27  |  0.82    Ca    9.1      29 Dec 2020 07:19  Phos  4.2     12-29  Mg     2.0     12-29    TPro  6.6  /  Alb  3.4  /  TBili  0.6  /  DBili  x   /  AST  26  /  ALT  16  /  AlkPhos  99  12-27    PT/INR - ( 27 Dec 2020 20:48 )   PT: 15.6 sec;   INR: 1.38 ratio    PTT - ( 27 Dec 2020 20:48 )  PTT:29.3 sec    Microbiology: Culture Results:   No growth to date. (12-28 @ 07:14)  Culture Results:   No growth to date. (12-28 @ 01:50)    VBG 12-27 @ 20:47  pH: 7.36/pCO2: 48/pO2: 36/HCO3: 25/lactate: 1.3    Consultant(s) Notes Reviewed:  NSY    Care Discussed with Consultants/Other Providers:

## 2020-12-29 NOTE — CONSULT NOTE ADULT - SUBJECTIVE AND OBJECTIVE BOX
KAI THOMAS 75y ,Female  HPI:  75F with past medical history of HTN, HLD, SLE, RA, hypothyroidism, provoked R. popliteal DVT in 2019 on eliquis, chronic low back pain presents to ED for acute on chronic low back pain. Pt has several months of chronic lumbosacral back pain, evaluated by outpatient ortho and referred for physical therapy and pain management. She has been receiving ?vitamin spinal injections. She last received low back inj on 12/16 after which she her low back pain worsened. She took oxycodone 5 mg qd (prescribed in september during a ED visit) and acetaminophen for past three days without relief. She also endorses worsening pain during ambulation with walker and has affected her quality of life. She has constipation, last BM 4 days ago. Otherwise denies fever, chills, N/V, abdominal pain, dysuria, urinary retention, fecal incontinence, saddle anesthesia, fall, LOC, trauma. During ROS she endorsed LLE weakness for several months, no numbness or tingling and has LLE edema >RLE edema, reported undergoing LLE US 4 months ago, was negative for DVT. Currently has 8/10 pain, improved to 5/10 with oxycodone 5 mg x 2 in ED. MRI of T/L spine with chantel shows Abnormal signal with associated enhancement is seen involving the endplates adjacent to the L2-3 disc space. There is slight increased T2 prolongation involving the L2-3 disc space with associated widening. While these findings could be compatible with degenerative changes possibly of early discitis and osteomyelitis must be considered. Clinical correlation and continued close interval follow-up is recommended.   There are no abnormal disc herniations identified.T9-10: Disc bulge and bilateral hypertrophic facet changes seen. Moderate narrowing spinal canal. Severe narrowing left neural foramen T10-11: Disc bulge and bilateral hypertrophic facet changes seen. Mild narrowing spinal canal. Severe narrowing left neural foramen T11-12: Bilateral hypertrophic facet changes seen. Moderate narrowing left neural foramen. T12-L1: Disc bulge and bilateral hypertrophic facet changes seen. Moderate narrowing right neural foramen The spinal cord demonstrates normal signal and caliber.  No abnormal areas enhancement seen.  Patient paraspinal soft tissues appear normal.    PAST MEDICAL & SURGICAL HISTORY:  HTN (hypertension)  DVT (deep venous thrombosis)  Hypothyroidism  RA (rheumatoid arthritis)  Obesity, Class II, BMI 35-39.9, no comorbidity  Hypothyroid  Benign heart murmur  Hyperlipidemia  H/O degenerative disc disease  Osteoarthritis  SLE (systemic lupus erythematosus)  S/P knee replacement  Status post total hip replacement, right  S/P thyroid surgery   1 lobes mall tumor removed 2015  S/P carpal tunnel releaseleft  S/P cataract surgery left  S/P tonsillectomy    Allergies  No Known Allergies    MEDICATIONS  (STANDING):  apixaban 5 milliGRAM(s) Oral every 12 hours  atorvastatin 20 milliGRAM(s) Oral at bedtime  folic acid 1 milliGRAM(s) Oral daily  hydroxychloroquine 200 milliGRAM(s) Oral daily  influenza  Vaccine (HIGH DOSE) 0.7 milliLiter(s) IntraMuscular once  levothyroxine 200 MICROGram(s) Oral daily  losartan 50 milliGRAM(s) Oral daily  methotrexate 15 milliGRAM(s) Oral <User Schedule>  polyethylene glycol 3350 17 Gram(s) Oral daily  senna 2 Tablet(s) Oral at bedtime    MEDICATIONS  (PRN):  acetaminophen   Tablet .. 650 milliGRAM(s) Oral every 6 hours PRN Mild Pain (1 - 3)  oxycodone    5 mG/acetaminophen 325 mG 2 Tablet(s) Oral every 6 hours PRN Severe Pain (7 - 10)  oxycodone    5 mG/acetaminophen 325 mG 1 Tablet(s) Oral every 6 hours PRN Moderate Pain (4 - 6)    Vital Signs Last 24 Hrs  T(C): 36.9 (29 Dec 2020 06:09), Max: 36.9 (29 Dec 2020 06:09)  T(F): 98.4 (29 Dec 2020 06:09), Max: 98.4 (29 Dec 2020 06:09)  HR: 63 (29 Dec 2020 06:09) (63 - 76)  BP: 121/64 (29 Dec 2020 06:09) (121/60 - 135/79)  BP(mean): --  RR: 17 (29 Dec 2020 06:09) (17 - 20)  SpO2: 100% (29 Dec 2020 06:09) (98% - 100%)    PE:  AA&0 x 3, CN 2-12 grossly intact, speach clear, follows commands, PERRL  Motor: strength : BUE  Sensory: intact to light touch    LABS:                        11.8   6.05  )-----------( 309      ( 29 Dec 2020 07:19 )             37.2     12-29    141  |  104  |  20  ----------------------------<  79  4.1   |  27  |  0.82    Ca    9.1      29 Dec 2020 07:19  Phos  4.2     12-29  Mg     2.0     12-29    TPro  6.6  /  Alb  3.4  /  TBili  0.6  /  DBili  x   /  AST  26  /  ALT  16  /  AlkPhos  99  12-27    PT/INR - ( 27 Dec 2020 20:48 )   PT: 15.6 sec;   INR: 1.38 ratio         PTT - ( 27 Dec 2020 20:48 )  PTT:29.3 sec      12-28-20 @ 07:01  -  12-29-20 @ 07:00  --------------------------------------------------------  IN: 600 mL / OUT: 1900 mL / NET: -1300 mL         KAI THOMAS 75y ,Female  HPI:  75F with past medical history of HTN, HLD, SLE, RA, hypothyroidism, provoked R. popliteal DVT in 2019 on eliquis, chronic low back pain presents to ED for acute on chronic low back pain. Pt has several months of chronic lumbosacral back pain, evaluated by outpatient ortho and referred for physical therapy and pain management. She has been receiving ?vitamin spinal injections. She last received low back inj on 12/16 after which she her low back pain worsened. She took oxycodone 5 mg qd (prescribed in september during a ED visit) and acetaminophen for past three days without relief. She also endorses worsening pain during ambulation with walker and has affected her quality of life. She has constipation, last BM 4 days ago. Otherwise denies fever, chills, N/V, abdominal pain, dysuria, urinary retention, fecal incontinence, saddle anesthesia, fall, LOC, trauma. During ROS she endorsed LLE weakness for several months, no numbness or tingling and has LLE edema >RLE edema, reported undergoing LLE US 4 months ago, was negative for DVT. Currently has 8/10 pain, improved to 5/10 with oxycodone 5 mg x 2 in ED. MRI of T/L spine with chantel shows Abnormal signal with associated enhancement is seen involving the endplates adjacent to the L2-3 disc space. There is slight increased T2 prolongation involving the L2-3 disc space with associated widening. While these findings could be compatible with degenerative changes possibly of early discitis and osteomyelitis must be considered. Clinical correlation and continued close interval follow-up is recommended.   There are no abnormal disc herniations identified.T9-10: Disc bulge and bilateral hypertrophic facet changes seen. Moderate narrowing spinal canal. Severe narrowing left neural foramen T10-11: Disc bulge and bilateral hypertrophic facet changes seen. Mild narrowing spinal canal. Severe narrowing left neural foramen T11-12: Bilateral hypertrophic facet changes seen. Moderate narrowing left neural foramen. T12-L1: Disc bulge and bilateral hypertrophic facet changes seen. Moderate narrowing right neural foramen The spinal cord demonstrates normal signal and caliber.  No abnormal areas enhancement seen.  Patient paraspinal soft tissues appear normal.    PAST MEDICAL & SURGICAL HISTORY:  HTN (hypertension)  DVT (deep venous thrombosis)  Hypothyroidism  RA (rheumatoid arthritis)  Obesity, Class II, BMI 35-39.9, no comorbidity  Hypothyroid  Benign heart murmur  Hyperlipidemia  H/O degenerative disc disease  Osteoarthritis  SLE (systemic lupus erythematosus)  S/P knee replacement  Status post total hip replacement, right  S/P thyroid surgery   1 lobes mall tumor removed 2015  S/P carpal tunnel releaseleft  S/P cataract surgery left  S/P tonsillectomy    Allergies  No Known Allergies    MEDICATIONS  (STANDING):  apixaban 5 milliGRAM(s) Oral every 12 hours  atorvastatin 20 milliGRAM(s) Oral at bedtime  folic acid 1 milliGRAM(s) Oral daily  hydroxychloroquine 200 milliGRAM(s) Oral daily  influenza  Vaccine (HIGH DOSE) 0.7 milliLiter(s) IntraMuscular once  levothyroxine 200 MICROGram(s) Oral daily  losartan 50 milliGRAM(s) Oral daily  methotrexate 15 milliGRAM(s) Oral <User Schedule>  polyethylene glycol 3350 17 Gram(s) Oral daily  senna 2 Tablet(s) Oral at bedtime    MEDICATIONS  (PRN):  acetaminophen   Tablet .. 650 milliGRAM(s) Oral every 6 hours PRN Mild Pain (1 - 3)  oxycodone    5 mG/acetaminophen 325 mG 2 Tablet(s) Oral every 6 hours PRN Severe Pain (7 - 10)  oxycodone    5 mG/acetaminophen 325 mG 1 Tablet(s) Oral every 6 hours PRN Moderate Pain (4 - 6)    Vital Signs Last 24 Hrs  T(C): 36.9 (29 Dec 2020 06:09), Max: 36.9 (29 Dec 2020 06:09)  T(F): 98.4 (29 Dec 2020 06:09), Max: 98.4 (29 Dec 2020 06:09)  HR: 63 (29 Dec 2020 06:09) (63 - 76)  BP: 121/64 (29 Dec 2020 06:09) (121/60 - 135/79)  BP(mean): --  RR: 17 (29 Dec 2020 06:09) (17 - 20)  SpO2: 100% (29 Dec 2020 06:09) (98% - 100%)    PE:  AA&0 x 3, CN 2-12 grossly intact, speach clear, follows commands, PERRL  Motor: strength : BUE 5/5, BLE: HF/HA 4/5, KE/KF 5/5, PF/DF 5/5  Sensory: intact to light touch  no clonus  SLR + 20 degrees b/l  + bony tenderness over lower lumbar spine, no paraspinal tenderness    LABS:                        11.8   6.05  )-----------( 309      ( 29 Dec 2020 07:19 )             37.2     12-29    141  |  104  |  20  ----------------------------<  79  4.1   |  27  |  0.82    Ca    9.1      29 Dec 2020 07:19  Phos  4.2     12-29  Mg     2.0     12-29    TPro  6.6  /  Alb  3.4  /  TBili  0.6  /  DBili  x   /  AST  26  /  ALT  16  /  AlkPhos  99  12-27    PT/INR - ( 27 Dec 2020 20:48 )   PT: 15.6 sec;   INR: 1.38 ratio         PTT - ( 27 Dec 2020 20:48 )  PTT:29.3 sec      12-28-20 @ 07:01  -  12-29-20 @ 07:00  --------------------------------------------------------  IN: 600 mL / OUT: 1900 mL / NET: -1300 mL

## 2020-12-29 NOTE — PROGRESS NOTE ADULT - PROBLEM SELECTOR PLAN 1
MR thoracic/lumbar shows multilevel degenerative disease with several areas of severe neural foramen stenosis likely leading to LLE weakness, no evidence of cord compression   - c/w pain control and bowel reigmen  - f/u NSY recs  - PT eval  - pt report NSY may be recommending OR, RCRI=0 (no DM, stroke, MI, renal disease), medically optimized for OR, need to hold Eliquis at least 48 hours prior but defer to NSY MR thoracic/lumbar shows multilevel degenerative disease with several areas of severe neural foramen stenosis likely leading to LLE weakness, no evidence of cord compression   - c/w pain control and bowel reigmen  - f/u NSY recs  - PT eval  - pt report NSY may be recommending OR, RCRI=0 (no DM, stroke, MI, renal disease), medically optimized for OR, need to hold Eliquis at least 48 hours prior but defer to NSY;  TTE 2019, normal EF 65%, stage 1 diastolic dysfxn, mild-mod MR MR thoracic/lumbar shows multilevel degenerative disease with several areas of severe neural foramen stenosis likely leading to LLE weakness, no evidence of cord compression   - c/w pain control and bowel regimen  - f/u NSY recs (note incomplete)  - PT eval (12/29): home PT  - pt report NSY may be recommending OR, RCRI=0 (no DM, stroke, MI, renal disease), medically optimized for OR, need to hold Eliquis at least 48 hours prior (last dose 12/29 6am) but defer to NSY final duration;  TTE 2019, normal EF 65%, stage 1 diastolic dysfxn, mild-mod MR; check EKG, CXR, neck x-ray given RA history

## 2020-12-29 NOTE — PROGRESS NOTE ADULT - PROBLEM SELECTOR PLAN 8
DVT ppx: on therapeutic Eliquis  Diet: DASH  Dispo: pending NSY recs; care d/w daughter 12/29 DVT ppx: lovenox ppx  Diet: DASH  Dispo: pending NSY recs; care d/w daughter 12/29

## 2020-12-29 NOTE — PROGRESS NOTE ADULT - PROBLEM SELECTOR PLAN 3
Follows with vascular cardiologist Dr. Hoyos outpatient, per most recent note, pt had L>R lymphedema, recc compression stockings  - provoked DVT >6 months of treatment, will hold eliquis, check LE dopplers prior to possible OR Follows with vascular cardiologist Dr. Hoyos outpatient.  US July 2019:  DVT noted in the right popliteal vein and visualized segment of the right  posterior tibial vein.  - provoked DVT >6 months of treatment, will hold Eliquis, check LE dopplers prior to possible OR

## 2020-12-30 DIAGNOSIS — K59.01 SLOW TRANSIT CONSTIPATION: ICD-10-CM

## 2020-12-30 PROCEDURE — 93306 TTE W/DOPPLER COMPLETE: CPT | Mod: 26

## 2020-12-30 PROCEDURE — 99233 SBSQ HOSP IP/OBS HIGH 50: CPT

## 2020-12-30 PROCEDURE — 71046 X-RAY EXAM CHEST 2 VIEWS: CPT | Mod: 26

## 2020-12-30 PROCEDURE — 72040 X-RAY EXAM NECK SPINE 2-3 VW: CPT | Mod: 26

## 2020-12-30 RX ORDER — SOD SULF/SODIUM/NAHCO3/KCL/PEG
500 SOLUTION, RECONSTITUTED, ORAL ORAL ONCE
Refills: 0 | Status: DISCONTINUED | OUTPATIENT
Start: 2020-12-30 | End: 2021-01-01

## 2020-12-30 RX ADMIN — LOSARTAN POTASSIUM 50 MILLIGRAM(S): 100 TABLET, FILM COATED ORAL at 05:08

## 2020-12-30 RX ADMIN — Medication 200 MICROGRAM(S): at 05:08

## 2020-12-30 RX ADMIN — Medication 650 MILLIGRAM(S): at 12:33

## 2020-12-30 RX ADMIN — POLYETHYLENE GLYCOL 3350 17 GRAM(S): 17 POWDER, FOR SOLUTION ORAL at 12:33

## 2020-12-30 RX ADMIN — OXYCODONE AND ACETAMINOPHEN 2 TABLET(S): 5; 325 TABLET ORAL at 06:37

## 2020-12-30 RX ADMIN — Medication 650 MILLIGRAM(S): at 13:20

## 2020-12-30 RX ADMIN — OXYCODONE AND ACETAMINOPHEN 2 TABLET(S): 5; 325 TABLET ORAL at 05:09

## 2020-12-30 RX ADMIN — Medication 200 MILLIGRAM(S): at 12:34

## 2020-12-30 RX ADMIN — Medication 1 MILLIGRAM(S): at 12:34

## 2020-12-30 RX ADMIN — ENOXAPARIN SODIUM 40 MILLIGRAM(S): 100 INJECTION SUBCUTANEOUS at 22:47

## 2020-12-30 NOTE — PROGRESS NOTE ADULT - PROBLEM SELECTOR PLAN 1
MR thoracic/lumbar shows multilevel degenerative disease with several areas of severe neural foramen stenosis likely leading to LLE weakness, no evidence of cord compression   - c/w pain control and bowel regimen  - f/u NSY recs (note incomplete)  - PT eval (12/29): home PT  - pt report NSY may be recommending OR, RCRI=0 (no DM, stroke, MI, renal disease), medically optimized for OR, need to hold Eliquis at least 48 hours prior (last dose 12/29 6am) but defer to NSY final duration;  TTE 2019, normal EF 65%, stage 1 diastolic dysfxn, mild-mod MR;  EKG reviewed, check TTE (called to expedite), neck x-ray OK

## 2020-12-30 NOTE — PROGRESS NOTE ADULT - SUBJECTIVE AND OBJECTIVE BOX
Patient is a 75y old  Female who presents with a chief complaint of acute on chronic low back pain    SUBJECTIVE / OVERNIGHT EVENTS:    Feels OK  No CP, SOB, f/c/n/v  Still no BM, tolerating diet  Still amenable to surgery if offered     MEDICATIONS  (STANDING):  atorvastatin 20 milliGRAM(s) Oral at bedtime  enoxaparin Injectable 40 milliGRAM(s) SubCutaneous at bedtime  folic acid 1 milliGRAM(s) Oral daily  hydroxychloroquine 200 milliGRAM(s) Oral daily  influenza  Vaccine (HIGH DOSE) 0.7 milliLiter(s) IntraMuscular once  levothyroxine 200 MICROGram(s) Oral daily  losartan 50 milliGRAM(s) Oral daily  polyethylene glycol 3350 17 Gram(s) Oral daily  polyethylene glycol/electrolyte Solution 500 milliLiter(s) Oral once  senna 2 Tablet(s) Oral at bedtime    MEDICATIONS  (PRN):  acetaminophen   Tablet .. 650 milliGRAM(s) Oral every 6 hours PRN Mild Pain (1 - 3)  magnesium hydroxide Suspension 30 milliLiter(s) Oral daily PRN Constipation  oxycodone    5 mG/acetaminophen 325 mG 2 Tablet(s) Oral every 6 hours PRN Severe Pain (7 - 10)  oxycodone    5 mG/acetaminophen 325 mG 1 Tablet(s) Oral every 6 hours PRN Moderate Pain (4 - 6)    T(C): 36.6 (12-30-20 @ 13:03), Max: 36.8 (12-30-20 @ 09:50)  HR: 63 (12-30-20 @ 13:03) (62 - 67)  BP: 123/61 (12-30-20 @ 13:03) (112/59 - 123/61)  RR: 16 (12-30-20 @ 13:03) (15 - 17)  SpO2: 100% (12-30-20 @ 13:03) (97% - 100%)    I&O's Summary    29 Dec 2020 07:01  -  30 Dec 2020 07:00  --------------------------------------------------------  IN: 0 mL / OUT: 1000 mL / NET: -1000 mL    30 Dec 2020 07:01  -  30 Dec 2020 13:15  --------------------------------------------------------  IN: 0 mL / OUT: 370 mL / NET: -370 mL    PHYSICAL EXAM:  GENERAL: NAD, obese   HEAD:  Atraumatic, Normocephalic  CHEST/LUNG: Clear to auscultation bilaterally; No wheeze  HEART: Regular rate and rhythm; SEJM  ABDOMEN: obese, soft, Nontender, Nondistended; Bowel sounds present  EXTREMITIES:   warm and well perfused, No clubbing, cyanosis, or edema  PSYCH: AAOx3  NEUROLOGY: non-focal  SKIN: healed l knee scar     LABS:                        11.8   6.05  )-----------( 309      ( 29 Dec 2020 07:19 )             37.2     12-29    141  |  104  |  20  ----------------------------<  79  4.1   |  27  |  0.82    Ca    9.1      29 Dec 2020 07:19  Phos  4.2     12-29  Mg     2.0     12-29    Microbiology: Culture Results:   No growth to date. (12-28 @ 07:14)  Culture Results:   No growth to date. (12-28 @ 01:50)    LE US: No evidence of deep venous thrombosis in either lower extremity.    Consultant(s) Notes Reviewed:    Care Discussed with Consultants/Other Providers:

## 2020-12-30 NOTE — PROGRESS NOTE ADULT - PROBLEM SELECTOR PLAN 3
Follows with vascular cardiologist Dr. Hoyos outpatient.  US July 2019:  DVT noted in the right popliteal vein and visualized segment of the right  posterior tibial vein.  - provoked DVT >6 months of treatment, hold eliquis, LE dopplers on 12/29 neg

## 2020-12-30 NOTE — PROGRESS NOTE ADULT - ASSESSMENT
76 yo F with HTN, HLD, SLE, RA, hypothyroidism, provoked R. popliteal DVT in 2019 in setting of R hip replacement on Eliquis, chronic low back pain admitted for acute on chronic low back pain concern due to severe DJD with neuroforaminal narrowing.  74 yo F with HTN, HLD, SLE, RA, hypothyroidism, provoked R. popliteal DVT in 2019 in setting of R hip replacement on Eliquis, chronic low back pain admitted for acute on chronic low back pain concern due to severe DJD with severe neural foramen narrowing planned for OR. yes

## 2020-12-31 ENCOUNTER — TRANSCRIPTION ENCOUNTER (OUTPATIENT)
Age: 75
End: 2020-12-31

## 2020-12-31 PROCEDURE — 99232 SBSQ HOSP IP/OBS MODERATE 35: CPT

## 2020-12-31 PROCEDURE — 72131 CT LUMBAR SPINE W/O DYE: CPT | Mod: 26

## 2020-12-31 PROCEDURE — 72128 CT CHEST SPINE W/O DYE: CPT | Mod: 26

## 2020-12-31 RX ADMIN — LOSARTAN POTASSIUM 50 MILLIGRAM(S): 100 TABLET, FILM COATED ORAL at 05:48

## 2020-12-31 RX ADMIN — Medication 1 MILLIGRAM(S): at 12:39

## 2020-12-31 RX ADMIN — OXYCODONE AND ACETAMINOPHEN 2 TABLET(S): 5; 325 TABLET ORAL at 15:13

## 2020-12-31 RX ADMIN — POLYETHYLENE GLYCOL 3350 17 GRAM(S): 17 POWDER, FOR SOLUTION ORAL at 12:39

## 2020-12-31 RX ADMIN — OXYCODONE AND ACETAMINOPHEN 1 TABLET(S): 5; 325 TABLET ORAL at 09:11

## 2020-12-31 RX ADMIN — ATORVASTATIN CALCIUM 20 MILLIGRAM(S): 80 TABLET, FILM COATED ORAL at 05:47

## 2020-12-31 RX ADMIN — Medication 200 MICROGRAM(S): at 05:48

## 2020-12-31 RX ADMIN — SENNA PLUS 2 TABLET(S): 8.6 TABLET ORAL at 05:48

## 2020-12-31 RX ADMIN — SENNA PLUS 2 TABLET(S): 8.6 TABLET ORAL at 22:25

## 2020-12-31 RX ADMIN — OXYCODONE AND ACETAMINOPHEN 2 TABLET(S): 5; 325 TABLET ORAL at 16:10

## 2020-12-31 RX ADMIN — Medication 200 MILLIGRAM(S): at 12:39

## 2020-12-31 RX ADMIN — OXYCODONE AND ACETAMINOPHEN 1 TABLET(S): 5; 325 TABLET ORAL at 08:25

## 2020-12-31 RX ADMIN — ATORVASTATIN CALCIUM 20 MILLIGRAM(S): 80 TABLET, FILM COATED ORAL at 22:25

## 2020-12-31 NOTE — PROGRESS NOTE ADULT - PROBLEM SELECTOR PLAN 9
DVT ppx: Lovenox ppx  Diet: DASH  Dispo: pending OR 1/1, plan per NSY DVT ppx: Lovenox ppx  Diet: DASH  Dispo: pending OR 1/1, plan per NSY, pending consent, per NSY will likely be tx to their service post-op

## 2020-12-31 NOTE — PROGRESS NOTE ADULT - PROBLEM SELECTOR PLAN 3
Follows with vascular cardiologist Dr. Hoyos outpatient.  US July 2019:  DVT noted in the right popliteal vein and visualized segment of the right  posterior tibial vein.  - provoked DVT July 2019 in setting of hip surgery, already >6 months of treatment, hold eliquis, LE dopplers on 12/29 neg  - would start ppx as soon as able post-op given history of post-op DVT

## 2020-12-31 NOTE — PROGRESS NOTE ADULT - PROBLEM SELECTOR PLAN 1
MR thoracic/lumbar shows multilevel degenerative disease with several areas of severe neural foramen stenosis likely leading to LLE weakness, no evidence of cord compression   - c/w pain control and bowel regimen  - NSY plans for OR 1/1  - PT eval (12/29): home PT, likely needs re-eval post-op  - RCRI=0 (no DM, stroke, MI, renal disease), medically optimized for OR, Eliquis off least 48 hours (last dose 12/29 6am) ; TTE as above (EF wnl, stage 1 diastolic dysfxn, mod AS), CXR and neck x-ray OK--Ok to proceed with planned procedure

## 2020-12-31 NOTE — PROGRESS NOTE ADULT - ASSESSMENT
76 yo F with HTN, HLD, SLE, RA, hypothyroidism, provoked R. popliteal DVT in 2019 in setting of R hip replacement on Eliquis, chronic low back pain admitted for acute on chronic low back pain concern due to severe DJD with severe neural foramen narrowing planned for OR.

## 2021-01-01 ENCOUNTER — APPOINTMENT (OUTPATIENT)
Dept: NEUROSURGERY | Facility: HOSPITAL | Age: 76
End: 2021-01-01
Payer: MEDICARE

## 2021-01-01 LAB
ALBUMIN SERPL ELPH-MCNC: 3.2 G/DL — LOW (ref 3.3–5)
ALP SERPL-CCNC: 89 U/L — SIGNIFICANT CHANGE UP (ref 40–120)
ALT FLD-CCNC: 18 U/L — SIGNIFICANT CHANGE UP (ref 4–33)
ANION GAP SERPL CALC-SCNC: 10 MMOL/L — SIGNIFICANT CHANGE UP (ref 7–14)
AST SERPL-CCNC: 34 U/L — HIGH (ref 4–32)
BILIRUB SERPL-MCNC: 0.5 MG/DL — SIGNIFICANT CHANGE UP (ref 0.2–1.2)
BLD GP AB SCN SERPL QL: NEGATIVE — SIGNIFICANT CHANGE UP
BLOOD GAS ARTERIAL COMPREHENSIVE RESULT: SIGNIFICANT CHANGE UP
BUN SERPL-MCNC: 15 MG/DL — SIGNIFICANT CHANGE UP (ref 7–23)
CALCIUM SERPL-MCNC: 8.7 MG/DL — SIGNIFICANT CHANGE UP (ref 8.4–10.5)
CHLORIDE SERPL-SCNC: 104 MMOL/L — SIGNIFICANT CHANGE UP (ref 98–107)
CO2 SERPL-SCNC: 24 MMOL/L — SIGNIFICANT CHANGE UP (ref 22–31)
CREAT SERPL-MCNC: 0.82 MG/DL — SIGNIFICANT CHANGE UP (ref 0.5–1.3)
GAS PNL BLDA: SIGNIFICANT CHANGE UP
GAS PNL BLDA: SIGNIFICANT CHANGE UP
GLUCOSE SERPL-MCNC: 130 MG/DL — HIGH (ref 70–99)
HCT VFR BLD CALC: 38.5 % — SIGNIFICANT CHANGE UP (ref 34.5–45)
HGB BLD-MCNC: 12.8 G/DL — SIGNIFICANT CHANGE UP (ref 11.5–15.5)
MAGNESIUM SERPL-MCNC: 1.7 MG/DL — SIGNIFICANT CHANGE UP (ref 1.6–2.6)
MCHC RBC-ENTMCNC: 30.3 PG — SIGNIFICANT CHANGE UP (ref 27–34)
MCHC RBC-ENTMCNC: 33.2 GM/DL — SIGNIFICANT CHANGE UP (ref 32–36)
MCV RBC AUTO: 91 FL — SIGNIFICANT CHANGE UP (ref 80–100)
NRBC # BLD: 0 /100 WBCS — SIGNIFICANT CHANGE UP
NRBC # FLD: 0 K/UL — SIGNIFICANT CHANGE UP
PHOSPHATE SERPL-MCNC: 4.2 MG/DL — SIGNIFICANT CHANGE UP (ref 2.5–4.5)
PLATELET # BLD AUTO: 350 K/UL — SIGNIFICANT CHANGE UP (ref 150–400)
POTASSIUM SERPL-MCNC: 4 MMOL/L — SIGNIFICANT CHANGE UP (ref 3.5–5.3)
POTASSIUM SERPL-SCNC: 4 MMOL/L — SIGNIFICANT CHANGE UP (ref 3.5–5.3)
PROT SERPL-MCNC: 5.6 G/DL — LOW (ref 6–8.3)
RBC # BLD: 4.23 M/UL — SIGNIFICANT CHANGE UP (ref 3.8–5.2)
RBC # FLD: 13.7 % — SIGNIFICANT CHANGE UP (ref 10.3–14.5)
RH IG SCN BLD-IMP: POSITIVE — SIGNIFICANT CHANGE UP
RH IG SCN BLD-IMP: POSITIVE — SIGNIFICANT CHANGE UP
SODIUM SERPL-SCNC: 138 MMOL/L — SIGNIFICANT CHANGE UP (ref 135–145)
WBC # BLD: 13.41 K/UL — HIGH (ref 3.8–10.5)
WBC # FLD AUTO: 13.41 K/UL — HIGH (ref 3.8–10.5)

## 2021-01-01 PROCEDURE — 22843 INSERT SPINE FIXATION DEVICE: CPT

## 2021-01-01 PROCEDURE — 99232 SBSQ HOSP IP/OBS MODERATE 35: CPT

## 2021-01-01 PROCEDURE — 63048 LAM FACETEC &FORAMOT EA ADDL: CPT

## 2021-01-01 PROCEDURE — 71045 X-RAY EXAM CHEST 1 VIEW: CPT | Mod: 26

## 2021-01-01 PROCEDURE — 22612 ARTHRD PST TQ 1NTRSPC LUMBAR: CPT

## 2021-01-01 PROCEDURE — 63047 LAM FACETEC & FORAMOT LUMBAR: CPT

## 2021-01-01 PROCEDURE — 22614 ARTHRD PST TQ 1NTRSPC EA ADD: CPT

## 2021-01-01 RX ORDER — CHLORHEXIDINE GLUCONATE 213 G/1000ML
15 SOLUTION TOPICAL EVERY 12 HOURS
Refills: 0 | Status: DISCONTINUED | OUTPATIENT
Start: 2021-01-01 | End: 2021-01-02

## 2021-01-01 RX ORDER — OXYCODONE AND ACETAMINOPHEN 5; 325 MG/1; MG/1
2 TABLET ORAL EVERY 6 HOURS
Refills: 0 | Status: DISCONTINUED | OUTPATIENT
Start: 2021-01-01 | End: 2021-01-01

## 2021-01-01 RX ORDER — CEFAZOLIN SODIUM 1 G
3000 VIAL (EA) INJECTION EVERY 8 HOURS
Refills: 0 | Status: DISCONTINUED | OUTPATIENT
Start: 2021-01-02 | End: 2021-01-06

## 2021-01-01 RX ORDER — SODIUM CHLORIDE 9 MG/ML
1000 INJECTION INTRAMUSCULAR; INTRAVENOUS; SUBCUTANEOUS
Refills: 0 | Status: DISCONTINUED | OUTPATIENT
Start: 2021-01-01 | End: 2021-01-03

## 2021-01-01 RX ORDER — OXYCODONE AND ACETAMINOPHEN 5; 325 MG/1; MG/1
1 TABLET ORAL EVERY 6 HOURS
Refills: 0 | Status: DISCONTINUED | OUTPATIENT
Start: 2021-01-01 | End: 2021-01-01

## 2021-01-01 RX ADMIN — OXYCODONE AND ACETAMINOPHEN 1 TABLET(S): 5; 325 TABLET ORAL at 05:57

## 2021-01-01 RX ADMIN — LOSARTAN POTASSIUM 50 MILLIGRAM(S): 100 TABLET, FILM COATED ORAL at 05:57

## 2021-01-01 RX ADMIN — OXYCODONE AND ACETAMINOPHEN 1 TABLET(S): 5; 325 TABLET ORAL at 06:30

## 2021-01-01 RX ADMIN — Medication 200 MICROGRAM(S): at 05:57

## 2021-01-01 NOTE — PROGRESS NOTE ADULT - PROBLEM SELECTOR PLAN 9
DVT ppx: Lovenox ppx  Diet: DASH  Dispo: pending OR 1/1, plan per NSY, pending consent, per NSY will likely be tx to their service post-op

## 2021-01-01 NOTE — PROGRESS NOTE ADULT - SUBJECTIVE AND OBJECTIVE BOX
Patient is a 75y old  Female who presents with a chief complaint of acute on chronic low back pain      SUBJECTIVE / OVERNIGHT EVENTS: No events.    MEDICATIONS  (STANDING):  atorvastatin 20 milliGRAM(s) Oral at bedtime  chlorhexidine 0.12% Liquid 15 milliLiter(s) Oral Mucosa every 12 hours  folic acid 1 milliGRAM(s) Oral daily  hydroxychloroquine 200 milliGRAM(s) Oral daily  influenza  Vaccine (HIGH DOSE) 0.7 milliLiter(s) IntraMuscular once  levothyroxine 200 MICROGram(s) Oral daily  losartan 50 milliGRAM(s) Oral daily  polyethylene glycol 3350 17 Gram(s) Oral daily  polyethylene glycol/electrolyte Solution 500 milliLiter(s) Oral once  senna 2 Tablet(s) Oral at bedtime    MEDICATIONS  (PRN):  acetaminophen   Tablet .. 650 milliGRAM(s) Oral every 6 hours PRN Mild Pain (1 - 3)  magnesium hydroxide Suspension 30 milliLiter(s) Oral daily PRN Constipation  oxycodone    5 mG/acetaminophen 325 mG 2 Tablet(s) Oral every 6 hours PRN Severe Pain (7 - 10)  oxycodone    5 mG/acetaminophen 325 mG 1 Tablet(s) Oral every 6 hours PRN Moderate Pain (4 - 6)      PHYSICAL EXAM:  Vital Signs Last 24 Hrs  T(C): 36.5 (01 Jan 2021 09:15), Max: 36.6 (01 Jan 2021 01:27)  T(F): 97.7 (01 Jan 2021 09:15), Max: 97.9 (01 Jan 2021 06:00)  HR: 62 (01 Jan 2021 09:15) (62 - 78)  BP: 138/54 (01 Jan 2021 09:15) (116/64 - 138/54)  BP(mean): --  RR: 18 (01 Jan 2021 09:15) (18 - 18)  SpO2: 97% (01 Jan 2021 09:15) (97% - 100%)    CONSTITUTIONAL: NAD, well-developed, well-groomed  RESPIRATORY: Normal respiratory effort; lungs are clear to auscultation bilaterally  CARDIOVASCULAR: Regular rate and rhythm, normal S1 and S2, no murmur/rub/gallop; No lower extremity edema  GASTROINTESTINAL: Nontender to palpation, normoactive bowel sounds, no rebound/guarding; No hepatosplenomegaly  MUSCULOSKELETAL:  no clubbing or cyanosis of digits; no joint swelling or tenderness to palpation  NEUROLOGY: non-focal; no gross sensory deficits   PSYCH: A+O to person, place, and time; affect appropriate  SKIN: No rashes; warm     LABS:                      RADIOLOGY & ADDITIONAL TESTS:  Results Reviewed: Y  Imaging Personally Reviewed: Y  Electrocardiogram Personally Reviewed:    COORDINATION OF CARE:  Care Discussed with Consultants/Other Providers [Y/N]:  Prior or Outpatient Records Reviewed [Y/N]:

## 2021-01-01 NOTE — BRIEF OPERATIVE NOTE - NSICDXBRIEFPROCEDURE_GEN_ALL_CORE_FT
PROCEDURES:  Decompression, spine, lumbar, posterior approach, with fusion of posterior spinal column 01-Jan-2021 21:31:34  Jerson Funez

## 2021-01-02 DIAGNOSIS — Z98.1 ARTHRODESIS STATUS: ICD-10-CM

## 2021-01-02 LAB
ANION GAP SERPL CALC-SCNC: 12 MMOL/L — SIGNIFICANT CHANGE UP (ref 7–14)
BLOOD GAS ARTERIAL COMPREHENSIVE RESULT: SIGNIFICANT CHANGE UP
BUN SERPL-MCNC: 17 MG/DL — SIGNIFICANT CHANGE UP (ref 7–23)
CALCIUM SERPL-MCNC: 9.1 MG/DL — SIGNIFICANT CHANGE UP (ref 8.4–10.5)
CHLORIDE SERPL-SCNC: 103 MMOL/L — SIGNIFICANT CHANGE UP (ref 98–107)
CO2 SERPL-SCNC: 22 MMOL/L — SIGNIFICANT CHANGE UP (ref 22–31)
CREAT SERPL-MCNC: 0.8 MG/DL — SIGNIFICANT CHANGE UP (ref 0.5–1.3)
CULTURE RESULTS: SIGNIFICANT CHANGE UP
CULTURE RESULTS: SIGNIFICANT CHANGE UP
GLUCOSE SERPL-MCNC: 143 MG/DL — HIGH (ref 70–99)
HCT VFR BLD CALC: 40.1 % — SIGNIFICANT CHANGE UP (ref 34.5–45)
HGB BLD-MCNC: 13.3 G/DL — SIGNIFICANT CHANGE UP (ref 11.5–15.5)
MAGNESIUM SERPL-MCNC: 1.8 MG/DL — SIGNIFICANT CHANGE UP (ref 1.6–2.6)
MCHC RBC-ENTMCNC: 30.2 PG — SIGNIFICANT CHANGE UP (ref 27–34)
MCHC RBC-ENTMCNC: 33.2 GM/DL — SIGNIFICANT CHANGE UP (ref 32–36)
MCV RBC AUTO: 91.1 FL — SIGNIFICANT CHANGE UP (ref 80–100)
NRBC # BLD: 0 /100 WBCS — SIGNIFICANT CHANGE UP
NRBC # FLD: 0 K/UL — SIGNIFICANT CHANGE UP
PHOSPHATE SERPL-MCNC: 5.1 MG/DL — HIGH (ref 2.5–4.5)
PLATELET # BLD AUTO: 356 K/UL — SIGNIFICANT CHANGE UP (ref 150–400)
POTASSIUM SERPL-MCNC: 4.4 MMOL/L — SIGNIFICANT CHANGE UP (ref 3.5–5.3)
POTASSIUM SERPL-SCNC: 4.4 MMOL/L — SIGNIFICANT CHANGE UP (ref 3.5–5.3)
RBC # BLD: 4.4 M/UL — SIGNIFICANT CHANGE UP (ref 3.8–5.2)
RBC # FLD: 13.9 % — SIGNIFICANT CHANGE UP (ref 10.3–14.5)
SODIUM SERPL-SCNC: 137 MMOL/L — SIGNIFICANT CHANGE UP (ref 135–145)
SPECIMEN SOURCE: SIGNIFICANT CHANGE UP
SPECIMEN SOURCE: SIGNIFICANT CHANGE UP
WBC # BLD: 16.61 K/UL — HIGH (ref 3.8–10.5)
WBC # FLD AUTO: 16.61 K/UL — HIGH (ref 3.8–10.5)

## 2021-01-02 PROCEDURE — 99232 SBSQ HOSP IP/OBS MODERATE 35: CPT

## 2021-01-02 PROCEDURE — 99291 CRITICAL CARE FIRST HOUR: CPT | Mod: 25

## 2021-01-02 PROCEDURE — 71045 X-RAY EXAM CHEST 1 VIEW: CPT | Mod: 26

## 2021-01-02 RX ORDER — DEXMEDETOMIDINE HYDROCHLORIDE IN 0.9% SODIUM CHLORIDE 4 UG/ML
0.2 INJECTION INTRAVENOUS
Qty: 400 | Refills: 0 | Status: DISCONTINUED | OUTPATIENT
Start: 2021-01-02 | End: 2021-01-02

## 2021-01-02 RX ORDER — SODIUM CHLORIDE 9 MG/ML
1000 INJECTION, SOLUTION INTRAVENOUS ONCE
Refills: 0 | Status: COMPLETED | OUTPATIENT
Start: 2021-01-02 | End: 2021-01-02

## 2021-01-02 RX ORDER — SODIUM CHLORIDE 9 MG/ML
500 INJECTION, SOLUTION INTRAVENOUS
Refills: 0 | Status: DISCONTINUED | OUTPATIENT
Start: 2021-01-02 | End: 2021-01-03

## 2021-01-02 RX ORDER — HYDROMORPHONE HYDROCHLORIDE 2 MG/ML
0.5 INJECTION INTRAMUSCULAR; INTRAVENOUS; SUBCUTANEOUS EVERY 4 HOURS
Refills: 0 | Status: DISCONTINUED | OUTPATIENT
Start: 2021-01-02 | End: 2021-01-03

## 2021-01-02 RX ORDER — SODIUM CHLORIDE 9 MG/ML
500 INJECTION INTRAMUSCULAR; INTRAVENOUS; SUBCUTANEOUS ONCE
Refills: 0 | Status: COMPLETED | OUTPATIENT
Start: 2021-01-02 | End: 2021-01-02

## 2021-01-02 RX ORDER — LEVOTHYROXINE SODIUM 125 MCG
150 TABLET ORAL AT BEDTIME
Refills: 0 | Status: DISCONTINUED | OUTPATIENT
Start: 2021-01-02 | End: 2021-01-03

## 2021-01-02 RX ORDER — MAGNESIUM SULFATE 500 MG/ML
2 VIAL (ML) INJECTION ONCE
Refills: 0 | Status: COMPLETED | OUTPATIENT
Start: 2021-01-02 | End: 2021-01-02

## 2021-01-02 RX ORDER — FENTANYL CITRATE 50 UG/ML
50 INJECTION INTRAVENOUS EVERY 4 HOURS
Refills: 0 | Status: DISCONTINUED | OUTPATIENT
Start: 2021-01-02 | End: 2021-01-03

## 2021-01-02 RX ORDER — PHENYLEPHRINE HYDROCHLORIDE 10 MG/ML
0.1 INJECTION INTRAVENOUS
Qty: 160 | Refills: 0 | Status: DISCONTINUED | OUTPATIENT
Start: 2021-01-02 | End: 2021-01-03

## 2021-01-02 RX ORDER — ACETAMINOPHEN 500 MG
1000 TABLET ORAL EVERY 6 HOURS
Refills: 0 | Status: COMPLETED | OUTPATIENT
Start: 2021-01-02 | End: 2021-01-03

## 2021-01-02 RX ORDER — PROPOFOL 10 MG/ML
5 INJECTION, EMULSION INTRAVENOUS
Qty: 1000 | Refills: 0 | Status: DISCONTINUED | OUTPATIENT
Start: 2021-01-02 | End: 2021-01-02

## 2021-01-02 RX ADMIN — FENTANYL CITRATE 50 MICROGRAM(S): 50 INJECTION INTRAVENOUS at 21:00

## 2021-01-02 RX ADMIN — HYDROMORPHONE HYDROCHLORIDE 0.5 MILLIGRAM(S): 2 INJECTION INTRAMUSCULAR; INTRAVENOUS; SUBCUTANEOUS at 17:52

## 2021-01-02 RX ADMIN — CHLORHEXIDINE GLUCONATE 15 MILLILITER(S): 213 SOLUTION TOPICAL at 17:25

## 2021-01-02 RX ADMIN — SODIUM CHLORIDE 1000 MILLILITER(S): 9 INJECTION, SOLUTION INTRAVENOUS at 04:24

## 2021-01-02 RX ADMIN — HYDROMORPHONE HYDROCHLORIDE 0.5 MILLIGRAM(S): 2 INJECTION INTRAMUSCULAR; INTRAVENOUS; SUBCUTANEOUS at 22:35

## 2021-01-02 RX ADMIN — SODIUM CHLORIDE 1000 MILLILITER(S): 9 INJECTION, SOLUTION INTRAVENOUS at 21:02

## 2021-01-02 RX ADMIN — HYDROMORPHONE HYDROCHLORIDE 0.5 MILLIGRAM(S): 2 INJECTION INTRAMUSCULAR; INTRAVENOUS; SUBCUTANEOUS at 06:31

## 2021-01-02 RX ADMIN — PHENYLEPHRINE HYDROCHLORIDE 2.3 MICROGRAM(S)/KG/MIN: 10 INJECTION INTRAVENOUS at 00:59

## 2021-01-02 RX ADMIN — SODIUM CHLORIDE 100 MILLILITER(S): 9 INJECTION INTRAMUSCULAR; INTRAVENOUS; SUBCUTANEOUS at 09:20

## 2021-01-02 RX ADMIN — SODIUM CHLORIDE 100 MILLILITER(S): 9 INJECTION INTRAMUSCULAR; INTRAVENOUS; SUBCUTANEOUS at 00:59

## 2021-01-02 RX ADMIN — HYDROMORPHONE HYDROCHLORIDE 0.5 MILLIGRAM(S): 2 INJECTION INTRAMUSCULAR; INTRAVENOUS; SUBCUTANEOUS at 17:25

## 2021-01-02 RX ADMIN — Medication 400 MILLIGRAM(S): at 17:25

## 2021-01-02 RX ADMIN — HYDROMORPHONE HYDROCHLORIDE 0.5 MILLIGRAM(S): 2 INJECTION INTRAMUSCULAR; INTRAVENOUS; SUBCUTANEOUS at 07:00

## 2021-01-02 RX ADMIN — CHLORHEXIDINE GLUCONATE 15 MILLILITER(S): 213 SOLUTION TOPICAL at 05:07

## 2021-01-02 RX ADMIN — PROPOFOL 3.68 MICROGRAM(S)/KG/MIN: 10 INJECTION, EMULSION INTRAVENOUS at 00:59

## 2021-01-02 RX ADMIN — Medication 150 MICROGRAM(S): at 22:01

## 2021-01-02 RX ADMIN — Medication 50 GRAM(S): at 06:12

## 2021-01-02 RX ADMIN — Medication 400 MILLIGRAM(S): at 12:11

## 2021-01-02 RX ADMIN — Medication 1000 MILLIGRAM(S): at 12:41

## 2021-01-02 RX ADMIN — SODIUM CHLORIDE 100 MILLILITER(S): 9 INJECTION, SOLUTION INTRAVENOUS at 18:00

## 2021-01-02 RX ADMIN — SENNA PLUS 2 TABLET(S): 8.6 TABLET ORAL at 22:02

## 2021-01-02 RX ADMIN — HYDROMORPHONE HYDROCHLORIDE 0.5 MILLIGRAM(S): 2 INJECTION INTRAMUSCULAR; INTRAVENOUS; SUBCUTANEOUS at 22:05

## 2021-01-02 RX ADMIN — Medication 200 MILLIGRAM(S): at 18:52

## 2021-01-02 RX ADMIN — SODIUM CHLORIDE 500 MILLILITER(S): 9 INJECTION INTRAMUSCULAR; INTRAVENOUS; SUBCUTANEOUS at 11:22

## 2021-01-02 RX ADMIN — Medication 200 MILLIGRAM(S): at 11:20

## 2021-01-02 RX ADMIN — Medication 400 MILLIGRAM(S): at 05:07

## 2021-01-02 RX ADMIN — DEXMEDETOMIDINE HYDROCHLORIDE IN 0.9% SODIUM CHLORIDE 6.13 MICROGRAM(S)/KG/HR: 4 INJECTION INTRAVENOUS at 09:20

## 2021-01-02 RX ADMIN — Medication 1000 MILLIGRAM(S): at 05:45

## 2021-01-02 RX ADMIN — ATORVASTATIN CALCIUM 20 MILLIGRAM(S): 80 TABLET, FILM COATED ORAL at 22:01

## 2021-01-02 RX ADMIN — Medication 100 MILLIGRAM(S): at 01:49

## 2021-01-02 RX ADMIN — FENTANYL CITRATE 50 MICROGRAM(S): 50 INJECTION INTRAVENOUS at 20:28

## 2021-01-02 RX ADMIN — Medication 1000 MILLIGRAM(S): at 18:52

## 2021-01-02 NOTE — CONSULT NOTE ADULT - ATTENDING COMMENTS
Patient s/p laminectomy and spinal fusion remained intubated postop  N d/c propofol, start precedex, maintain on pain medication prn  resp minimal vent settings, sbt cpap/ps  cv hemodynamically stable perfusing well  gi npo plan for extubation to resume po medications  gurenal monitor uop  heme vte ppx  id no changes  endo - to resume methotrexate and hydroxychloroquine once tolerating diet    The patient is a critical care patient with life threatening hemodynamic and metabolic instability in SICU.  I have personally interviewed when possible and examined the patient, reviewed data and laboratory tests/x-rays and all pertinent electronic images.  I was physically present for the key portions of the evaluation and management (E/M) service provided.   The SICU team has a constant risk benefit analyzes discussion with the primary team, all consultants, House Staff and PA's on all decisions.  These diagnoses are unrelated to the surgical procedure noted above.  I meet with family if needed to get further history, discuss the case and make care decisions for this patient who might not be able to participate.  Time involved in performance of separately billable procedures was not counted toward my critical care time. There is no overlap.  I spent 55-75 minutes ( 0800Hrs-0915Hrs in AM/ 1600Hrs-1715Hrs in PM, or other time indicated) of critical care time for the diagnoses, assessment, plan and interventions.  This time excludes time spent on separate procedures and teaching.

## 2021-01-02 NOTE — CHART NOTE - NSCHARTNOTEFT_GEN_A_CORE
Patient remains in the SICU at this time.        MEDICATIONS  (STANDING):  acetaminophen  IVPB .. 1000 milliGRAM(s) IV Intermittent every 6 hours  atorvastatin 20 milliGRAM(s) Oral at bedtime  ceFAZolin   IVPB 3000 milliGRAM(s) IV Intermittent every 8 hours  chlorhexidine 0.12% Liquid 15 milliLiter(s) Oral Mucosa every 12 hours  dexMEDEtomidine Infusion 0.2 MICROgram(s)/kG/Hr (6.13 mL/Hr) IV Continuous <Continuous>  folic acid 1 milliGRAM(s) Oral daily  hydroxychloroquine 200 milliGRAM(s) Oral daily  influenza  Vaccine (HIGH DOSE) 0.7 milliLiter(s) IntraMuscular once  lactated ringers. 500 milliLiter(s) (100 mL/Hr) IV Continuous <Continuous>  levothyroxine Injectable 150 MICROGram(s) IV Push at bedtime  losartan 50 milliGRAM(s) Oral daily  phenylephrine    Infusion 0.1 MICROgram(s)/kG/Min (2.3 mL/Hr) IV Continuous <Continuous>  senna 2 Tablet(s) Oral at bedtime  sodium chloride 0.9%. 1000 milliLiter(s) (100 mL/Hr) IV Continuous <Continuous>    MEDICATIONS  (PRN):  HYDROmorphone  Injectable 0.5 milliGRAM(s) IV Push every 4 hours PRN Severe Pain (7 - 10)      PHYSICAL EXAM:  Vital Signs Last 24 Hrs  T(C): 36.9 (02 Jan 2021 16:00), Max: 37.2 (02 Jan 2021 08:00)  T(F): 98.5 (02 Jan 2021 16:00), Max: 99 (02 Jan 2021 08:00)  HR: 79 (02 Jan 2021 18:00) (57 - 91)  BP: --  BP(mean): --  RR: 22 (02 Jan 2021 18:00) (12 - 22)  SpO2: 94% (02 Jan 2021 18:00) (94% - 100%)      LABS:                        13.3   16.61 )-----------( 356      ( 02 Jan 2021 01:06 )             40.1     01-02    137  |  103  |  17  ----------------------------<  143<H>  4.4   |  22  |  0.80    Ca    9.1      02 Jan 2021 01:06  Phos  5.1     01-02  Mg     1.8     01-02    TPro  5.6<L>  /  Alb  3.2<L>  /  TBili  0.5  /  DBili  x   /  AST  34<H>  /  ALT  18  /  AlkPhos  89  01-01

## 2021-01-02 NOTE — PROGRESS NOTE ADULT - ASSESSMENT
74 YO female postop Posterior T10-L5 instrumented fusion, L1-L5 laminectomies, L L4-L5 foraminotomy, still intubated and sedated

## 2021-01-02 NOTE — CONSULT NOTE ADULT - ASSESSMENT
75F w/ hx of HTN, HLD, SLE, RA, hypothyroidism, provoked R. popliteal DVT in 2019 on eliquis, chronic low back pain presents with acute on chronic low back pain. now s/p T10-L5 fusion and L1-L5 laminectomy 1/1 for degenerative disc disease, remained intubated postoperatively. SICU consulted for mechanical ventilator management and q1hr neuro check.     PLAN:    NEURO: degenerative disc disease s/p T10-L5 fusion, L1-L5 laminectomy; acute postoperative pain; acute on chronic back pain  - propofol for sedation, wean as tolerated  - tylenol, dilaudid PRN for pain  - q1 hour neuro check    RESPIRATORY:   - Intubated on AC 12/0.58/5/60%  - follow up ABG, AM CXR    CARDIOVASCULAR: HTN  - carmen PRN for MAP goal of >65  - holding home antihypertensive medication  - resume lipitor, losartan, once resume PO    GI/NUTRITION:  - NPO  - bowel regimen with senna    GENITOURINARY/RENAL:  - Monitor UOP, I&Os  - Monitor Cr/BUN  - NS@100    HEMATOLOGIC: provoked R popliteal DVT  - monitor h&h  - holding DVT ppx    INFECTIOUS DISEASE: no acute issue  - Ancef 3g q8 for 24 hr postop  - monitor for fever and leukocytosis    ENDOCRINE: hypothyroidism  - continue with home synthroid    Rheum: SLE, RA  - continue with hydroxychloroquine for SLE    DISPO: SICU 75F w/ hx of HTN, HLD, SLE, RA, hypothyroidism, provoked R. popliteal DVT in 2019 on eliquis, chronic low back pain presents with acute on chronic low back pain. now s/p T10-L5 fusion and L1-L5 laminectomy 1/1 for degenerative disc disease, remained intubated postoperatively. SICU consulted for mechanical ventilator management and q1hr neuro check.     PLAN:    NEURO: degenerative disc disease s/p T10-L5 fusion, L1-L5 laminectomy; acute postoperative pain; acute on chronic back pain  - propofol for sedation, wean as tolerated  - tylenol, dilaudid PRN for pain  - q1 hour neuro check    RESPIRATORY:   - Intubated on AC 12/0.58/5/60%  - follow up ABG, AM CXR    CARDIOVASCULAR: HTN  - carmen PRN for MAP goal of >65  - holding home antihypertensive medication  - resume lipitor, losartan, once resume PO    GI/NUTRITION:  - NPO  - bowel regimen with senna    GENITOURINARY/RENAL:  - Monitor UOP, I&Os  - Monitor Cr/BUN  - NS@100  - getting 1L LR bolus for low UOP    HEMATOLOGIC: provoked R popliteal DVT  - monitor h&h  - holding DVT ppx    INFECTIOUS DISEASE: no acute issue  - Ancef 3g q8 for 24 hr postop  - monitor for fever and leukocytosis    ENDOCRINE: hypothyroidism  - continue with home synthroid    Rheum: SLE, RA  - continue with hydroxychloroquine for SLE    DISPO: SICU 75F w/ hx of HTN, HLD, SLE, RA, hypothyroidism, provoked R. popliteal DVT in 2019 on eliquis, chronic low back pain presents with acute on chronic low back pain. now s/p T10-L5 fusion and L1-L5 laminectomy 1/1 for degenerative disc disease, remained intubated postoperatively. SICU consulted for mechanical ventilator management and q1hr neuro check.     PLAN:    NEURO: degenerative disc disease s/p T10-L5 fusion, L1-L5 laminectomy; acute postoperative pain; acute on chronic back pain  - propofol for sedation, wean as tolerated  - tylenol, dilaudid PRN for pain  - q1 hour neuro check    RESPIRATORY:   - Intubated on AC 12/0.58/5/60%  - follow up ABG, AM CXR    CARDIOVASCULAR: HTN  - carmen PRN for MAP goal of >65  - holding home antihypertensive medication  - resume lipitor, losartan, once resume PO    GI/NUTRITION:  - NPO  - keotube attempted, unable to advance 2/2 hiatal hernia  - bowel regimen with senna    GENITOURINARY/RENAL:  - Monitor UOP, I&Os  - Monitor Cr/BUN  - NS@100  - getting 1L LR bolus for low UOP    HEMATOLOGIC: provoked R popliteal DVT  - monitor h&h  - holding chemical DVT ppx; SCDs DVT ppx    INFECTIOUS DISEASE: no acute issue  - Ancef 3g q8 for 24 hr postop  - monitor for fever and leukocytosis    ENDOCRINE: hypothyroidism  - continue with home synthroid    Rheum: SLE, RA  - continue with hydroxychloroquine, methotrexate for SLE when resume PO    DISPO: SICU 75F w/ hx of HTN, HLD, SLE, RA, hypothyroidism, provoked R. popliteal DVT in 2019 on eliquis, chronic low back pain presents with acute on chronic low back pain. now s/p T10-L5 fusion and L1-L5 laminectomy 1/1 for degenerative disc disease, remained intubated postoperatively. SICU consulted for mechanical ventilator management and q1hr neuro check.     PLAN:    NEURO: degenerative disc disease s/p T10-L5 fusion, L1-L5 laminectomy; acute postoperative pain; acute on chronic back pain. Awake, alert, moves extremities, follows commands.   - start precedex, wean propofol for extubation   - Haldol PRN for agitation   - tylenol, dilaudid PRN for pain  - q1 hour neuro check    RESPIRATORY:   - Intubated on AC 12/0.58/5/40%  - follow up ABG, AM CXR  - plan to extubate this morning     CARDIOVASCULAR: HTN  - carmen PRN for MAP goal of >65  - holding home antihypertensive medication  - resume lipitor, losartan, once resume PO    GI/NUTRITION:  - NPO  - keotube attempted, unable to advance 2/2 hiatal hernia  - possible regular diet after extubation   - bowel regimen with senna    GENITOURINARY/RENAL:  - Monitor UOP, I&Os  - Monitor Cr/BUN  - NS@100  - s/p 1L bolus, improved urine output today     HEMATOLOGIC: provoked R popliteal DVT  - monitor h&h  - holding chemical DVT ppx; SCDs DVT ppx    INFECTIOUS DISEASE: no acute issue  - Ancef 3g q8 for 24 hr postop  - monitor for fever and leukocytosis    ENDOCRINE: hypothyroidism  - continue with home synthroid IV     Rheum: SLE, RA  - continue with hydroxychloroquine, methotrexate for SLE when resume PO    F/u PT today     DISPO: SICU

## 2021-01-02 NOTE — PROGRESS NOTE ADULT - SUBJECTIVE AND OBJECTIVE BOX
Neurosurgery postop  Patient remains intubated, on vent  ICU Vital Signs Last 24 Hrs  T(C): 36.1 (01 Jan 2021 22:00), Max: 36.6 (01 Jan 2021 06:00)  T(F): 96.9 (01 Jan 2021 22:00), Max: 97.9 (01 Jan 2021 06:00)  HR: 65 (02 Jan 2021 02:00) (57 - 69)  BP: 138/54 (01 Jan 2021 09:15) (130/83 - 138/54)  BP(mean): --  ABP: 131/67 (02 Jan 2021 02:00) (75/39 - 131/67)  ABP(mean): 87 (02 Jan 2021 02:00) (51 - 87)  RR: 12 (02 Jan 2021 02:00) (12 - 18)  SpO2: 100% (02 Jan 2021 02:00) (96% - 100%)    On propofol  Not opening eyes  +Following simple commands  CONNELL with good strength    HMV # 1: 75cc  HMV #2: 30cc  HMV #3: 30cc    MEDICATIONS  (STANDING):  atorvastatin 20 milliGRAM(s) Oral at bedtime  ceFAZolin   IVPB 3000 milliGRAM(s) IV Intermittent every 8 hours  chlorhexidine 0.12% Liquid 15 milliLiter(s) Oral Mucosa every 12 hours  folic acid 1 milliGRAM(s) Oral daily  hydroxychloroquine 200 milliGRAM(s) Oral daily  influenza  Vaccine (HIGH DOSE) 0.7 milliLiter(s) IntraMuscular once  levothyroxine 200 MICROGram(s) Oral daily  losartan 50 milliGRAM(s) Oral daily  phenylephrine    Infusion 0.1 MICROgram(s)/kG/Min (2.3 mL/Hr) IV Continuous <Continuous>  propofol Infusion 5 MICROgram(s)/kG/Min (3.68 mL/Hr) IV Continuous <Continuous>  senna 2 Tablet(s) Oral at bedtime  sodium chloride 0.9%. 1000 milliLiter(s) (100 mL/Hr) IV Continuous <Continuous>    MEDICATIONS  (PRN):  acetaminophen   Tablet .. 650 milliGRAM(s) Oral every 6 hours PRN Mild Pain (1 - 3)                          13.3   16.61 )-----------( 356      ( 02 Jan 2021 01:06 )             40.1     01-02    137  |  103  |  17  ----------------------------<  143<H>  4.4   |  22  |  0.80    Ca    9.1      02 Jan 2021 01:06  Phos  5.1     01-02  Mg     1.8     01-02    TPro  5.6<L>  /  Alb  3.2<L>  /  TBili  0.5  /  DBili  x   /  AST  34<H>  /  ALT  18  /  AlkPhos  89  01-01

## 2021-01-02 NOTE — CONSULT NOTE ADULT - SUBJECTIVE AND OBJECTIVE BOX
SICU CONSULT NOTE    HPI  75F with past medical history of HTN, HLD, SLE, RA, hypothyroidism, provoked R. popliteal DVT in 2019 on eliquis, chronic low back pain presents to ED for acute on chronic low back pain. Pt has several months of chronic lumbosacral back pain, evaluated by outpatient ortho and referred for physical therapy and pain management. She has been receiving ?vitamin spinal injections. She last received low back inj on 12/16 after which she her low back pain worsened. She took oxycodone 5 mg qd (prescribed in september during a ED visit) and acetaminophen for past three days without relief. She also endorses worsening pain during ambulation with walker and has affected her quality of life. She has constipation, last BM 4 days ago. Otherwise denies fever, chills, N/V, abdominal pain, dysuria, urinary retention, fecal incontinence, saddle anesthesia, fall, LOC, trauma. During ROS she endorsed LLE weakness for several months, no numbness or tingling and has LLE edema >RLE edema, reported undergoing LLE US 4 months ago, was negative for DVT. Currently has 8/10 pain, improved to 5/10 with oxycodone 5 mg x 2 in ED.    Pt now s/p T10-L5 fusion and L1-L5 laminectomy 1/1, remained intubated postoperatively. SICU consulted for mechanical ventilator management and q1hr neuro check. (Duration: ~7hr, EBL 1L, 2u pRBC, 500 albumin, 4L crystolloid, )    PAST MEDICAL HISTORY: HTN (hypertension)    DVT (deep venous thrombosis)    Hypothyroidism    RA (rheumatoid arthritis)    Obesity, Class II, BMI 35-39.9, no comorbidity    Hypothyroid    Benign heart murmur    Hyperlipidemia    Hypertension    H/O degenerative disc disease    Osteoarthritis    SLE (systemic lupus erythematosus)    Rheumatoid arthritis    PAST SURGICAL HISTORY: S/P knee replacement    Status post total hip replacement, right    S/P thyroid surgery    Lung cancer    S/P knee surgery    S/P carpal tunnel release    S/P eye surgery    S/P cataract surgery    S/P tonsillectomy    FAMILY HISTORY: No pertinent family history in first degree relatives    FHx: rheumatoid arthritis    SOCIAL HISTORY:    CODE STATUS:     HOME MEDICATIONS:    ALLERGIES: No Known Allergies      VITAL SIGNS:  ICU Vital Signs Last 24 Hrs  T(C): 36.1 (01 Jan 2021 22:00), Max: 36.6 (01 Jan 2021 06:00)  T(F): 96.9 (01 Jan 2021 22:00), Max: 97.9 (01 Jan 2021 06:00)  HR: 65 (02 Jan 2021 02:00) (57 - 69)  BP: 138/54 (01 Jan 2021 09:15) (130/83 - 138/54)  BP(mean): --  ABP: 131/67 (02 Jan 2021 02:00) (75/39 - 131/67)  ABP(mean): 87 (02 Jan 2021 02:00) (51 - 87)  RR: 12 (02 Jan 2021 02:00) (12 - 18)  SpO2: 100% (02 Jan 2021 02:00) (96% - 100%)      NEURO  Exam: minimally sedated; hemovac x 3 draining ss  acetaminophen   Tablet .. 650 milliGRAM(s) Oral every 6 hours PRN Mild Pain (1 - 3)  propofol Infusion 5 MICROgram(s)/kG/Min IV Continuous <Continuous>    RESPIRATORY  Mechanical Ventilation: Mode: AC/ CMV (Assist Control/ Continuous Mandatory Ventilation), RR (machine): 12, RR (patient): 12, TV (machine): 580, FiO2: 40, PEEP: 5, ITime: 1, MAP: 10, PIP: 31  ABG - ( 02 Jan 2021 01:06 )  pH: 7.39  /  pCO2: 41    /  pO2: 118   / HCO3: 24    / Base Excess: x     /  SaO2: 98.6    Lactate: x      Exam: intubated on mechanical ventilation    CARDIOVASCULAR  Exam: RRR  Cardiac Rhythm:  losartan 50 milliGRAM(s) Oral daily  phenylephrine    Infusion 0.1 MICROgram(s)/kG/Min IV Continuous <Continuous>    GI/NUTRITION  Exam: obese abdomen, soft, ND  Diet: NPO  senna 2 Tablet(s) Oral at bedtime    GENITOURINARY/RENAL  folic acid 1 milliGRAM(s) Oral daily  sodium chloride 0.9%. 1000 milliLiter(s) IV Continuous <Continuous>    12-31 @ 07:01  -  01-01 @ 07:00  --------------------------------------------------------  IN:  Total IN: 0 mL    OUT:    Voided (mL): 1700 mL  Total OUT: 1700 mL    Total NET: -1700 mL      01-01 @ 07:01  -  01-02 @ 03:09  --------------------------------------------------------  IN:    Phenylephrine: 13.8 mL    Propofol: 43.8 mL    sodium chloride 0.9%: 300 mL  Total IN: 357.6 mL    OUT:    Drain (mL): 30 mL    Drain (mL): 75 mL    Drain (mL): 30 mL    Indwelling Catheter - Urethral (mL): 50 mL    Voided (mL): 200 mL  Total OUT: 385 mL    Total NET: -27.4 mL        Weight (kg): 122.5 (01-01 @ 09:10)  01-02    137  |  103  |  17  ----------------------------<  143<H>  4.4   |  22  |  0.80    Ca    9.1      02 Jan 2021 01:06  Phos  5.1     01-02  Mg     1.8     01-02    TPro  5.6<L>  /  Alb  3.2<L>  /  TBili  0.5  /  DBili  x   /  AST  34<H>  /  ALT  18  /  AlkPhos  89  01-01    [ ] Simmons catheter, indication: urine output monitoring in critically ill patient    HEMATOLOGIC  [ ] VTE Prophylaxis:                          13.3   16.61 )-----------( 356      ( 02 Jan 2021 01:06 )             40.1       Transfusion: [ ] PRBC	[ ] Platelets	[ ] FFP	[ ] Cryoprecipitate      INFECTIOUS DISEASES  ceFAZolin   IVPB 3000 milliGRAM(s) IV Intermittent every 8 hours  hydroxychloroquine 200 milliGRAM(s) Oral daily  influenza  Vaccine (HIGH DOSE) 0.7 milliLiter(s) IntraMuscular once    RECENT CULTURES:  Specimen Source: .Blood Blood-Venous  Date/Time: 12-28 @ 07:14  Culture Results:   No growth to date.  Gram Stain: --  Organism: --      ENDOCRINE  atorvastatin 20 milliGRAM(s) Oral at bedtime  levothyroxine 200 MICROGram(s) Oral daily    CAPILLARY BLOOD GLUCOSE          PATIENT CARE ACCESS DEVICES:  [x] Peripheral IV  [ ] Central Venous Line	[ ] R	[ ] L	[ ] IJ	[ ] Fem	[ ] SC	Placed:   [x] Arterial Line		[x] R	[ ] L	[ ] Fem	[x] Rad	[ ] Ax	Placed:   [ ] PICC:					[ ] Mediport  [x] Urinary Catheter, Date Placed: 1/1  [x] Necessity of urinary, arterial, and venous catheters discussed    OTHER MEDICATIONS: chlorhexidine 0.12% Liquid 15 milliLiter(s) Oral Mucosa every 12 hours      IMAGING STUDIES:

## 2021-01-03 LAB
ANION GAP SERPL CALC-SCNC: 12 MMOL/L — SIGNIFICANT CHANGE UP (ref 7–14)
BLD GP AB SCN SERPL QL: NEGATIVE — SIGNIFICANT CHANGE UP
BLOOD GAS ARTERIAL - LYTES,HGB,ICA,LACT RESULT: SIGNIFICANT CHANGE UP
BUN SERPL-MCNC: 24 MG/DL — HIGH (ref 7–23)
CALCIUM SERPL-MCNC: 8.2 MG/DL — LOW (ref 8.4–10.5)
CHLORIDE SERPL-SCNC: 103 MMOL/L — SIGNIFICANT CHANGE UP (ref 98–107)
CO2 SERPL-SCNC: 20 MMOL/L — LOW (ref 22–31)
CREAT SERPL-MCNC: 1.1 MG/DL — SIGNIFICANT CHANGE UP (ref 0.5–1.3)
GLUCOSE SERPL-MCNC: 118 MG/DL — HIGH (ref 70–99)
HCT VFR BLD CALC: 27.5 % — LOW (ref 34.5–45)
HCT VFR BLD CALC: 27.9 % — LOW (ref 34.5–45)
HCT VFR BLD CALC: 30.7 % — LOW (ref 34.5–45)
HGB BLD-MCNC: 10.3 G/DL — LOW (ref 11.5–15.5)
HGB BLD-MCNC: 9.1 G/DL — LOW (ref 11.5–15.5)
HGB BLD-MCNC: 9.4 G/DL — LOW (ref 11.5–15.5)
MAGNESIUM SERPL-MCNC: 2 MG/DL — SIGNIFICANT CHANGE UP (ref 1.6–2.6)
MCHC RBC-ENTMCNC: 30.2 PG — SIGNIFICANT CHANGE UP (ref 27–34)
MCHC RBC-ENTMCNC: 30.7 PG — SIGNIFICANT CHANGE UP (ref 27–34)
MCHC RBC-ENTMCNC: 31.1 PG — SIGNIFICANT CHANGE UP (ref 27–34)
MCHC RBC-ENTMCNC: 33.1 GM/DL — SIGNIFICANT CHANGE UP (ref 32–36)
MCHC RBC-ENTMCNC: 33.6 GM/DL — SIGNIFICANT CHANGE UP (ref 32–36)
MCHC RBC-ENTMCNC: 33.7 GM/DL — SIGNIFICANT CHANGE UP (ref 32–36)
MCV RBC AUTO: 91.4 FL — SIGNIFICANT CHANGE UP (ref 80–100)
MCV RBC AUTO: 91.4 FL — SIGNIFICANT CHANGE UP (ref 80–100)
MCV RBC AUTO: 92.4 FL — SIGNIFICANT CHANGE UP (ref 80–100)
NRBC # BLD: 0 /100 WBCS — SIGNIFICANT CHANGE UP
NRBC # FLD: 0 K/UL — SIGNIFICANT CHANGE UP
PHOSPHATE SERPL-MCNC: 4.7 MG/DL — HIGH (ref 2.5–4.5)
PLATELET # BLD AUTO: 235 K/UL — SIGNIFICANT CHANGE UP (ref 150–400)
PLATELET # BLD AUTO: 235 K/UL — SIGNIFICANT CHANGE UP (ref 150–400)
PLATELET # BLD AUTO: 263 K/UL — SIGNIFICANT CHANGE UP (ref 150–400)
POTASSIUM SERPL-MCNC: 4.2 MMOL/L — SIGNIFICANT CHANGE UP (ref 3.5–5.3)
POTASSIUM SERPL-SCNC: 4.2 MMOL/L — SIGNIFICANT CHANGE UP (ref 3.5–5.3)
RBC # BLD: 3.01 M/UL — LOW (ref 3.8–5.2)
RBC # BLD: 3.02 M/UL — LOW (ref 3.8–5.2)
RBC # BLD: 3.36 M/UL — LOW (ref 3.8–5.2)
RBC # FLD: 14.3 % — SIGNIFICANT CHANGE UP (ref 10.3–14.5)
RBC # FLD: 14.3 % — SIGNIFICANT CHANGE UP (ref 10.3–14.5)
RBC # FLD: 14.4 % — SIGNIFICANT CHANGE UP (ref 10.3–14.5)
RH IG SCN BLD-IMP: POSITIVE — SIGNIFICANT CHANGE UP
SODIUM SERPL-SCNC: 135 MMOL/L — SIGNIFICANT CHANGE UP (ref 135–145)
WBC # BLD: 14.31 K/UL — HIGH (ref 3.8–10.5)
WBC # BLD: 15.35 K/UL — HIGH (ref 3.8–10.5)
WBC # BLD: 16.95 K/UL — HIGH (ref 3.8–10.5)
WBC # FLD AUTO: 14.31 K/UL — HIGH (ref 3.8–10.5)
WBC # FLD AUTO: 15.35 K/UL — HIGH (ref 3.8–10.5)
WBC # FLD AUTO: 16.95 K/UL — HIGH (ref 3.8–10.5)

## 2021-01-03 PROCEDURE — 71045 X-RAY EXAM CHEST 1 VIEW: CPT | Mod: 26

## 2021-01-03 PROCEDURE — 99232 SBSQ HOSP IP/OBS MODERATE 35: CPT

## 2021-01-03 RX ORDER — LEVOTHYROXINE SODIUM 125 MCG
200 TABLET ORAL DAILY
Refills: 0 | Status: DISCONTINUED | OUTPATIENT
Start: 2021-01-03 | End: 2021-01-12

## 2021-01-03 RX ORDER — SODIUM CHLORIDE 9 MG/ML
1000 INJECTION INTRAMUSCULAR; INTRAVENOUS; SUBCUTANEOUS ONCE
Refills: 0 | Status: COMPLETED | OUTPATIENT
Start: 2021-01-03 | End: 2021-01-03

## 2021-01-03 RX ORDER — TIZANIDINE 4 MG/1
1 TABLET ORAL EVERY 6 HOURS
Refills: 0 | Status: COMPLETED | OUTPATIENT
Start: 2021-01-03 | End: 2021-01-05

## 2021-01-03 RX ORDER — DEXMEDETOMIDINE HYDROCHLORIDE IN 0.9% SODIUM CHLORIDE 4 UG/ML
0.2 INJECTION INTRAVENOUS
Qty: 400 | Refills: 0 | Status: DISCONTINUED | OUTPATIENT
Start: 2021-01-03 | End: 2021-01-03

## 2021-01-03 RX ORDER — OXYCODONE HYDROCHLORIDE 5 MG/1
10 TABLET ORAL
Refills: 0 | Status: DISCONTINUED | OUTPATIENT
Start: 2021-01-03 | End: 2021-01-10

## 2021-01-03 RX ORDER — SODIUM CHLORIDE 9 MG/ML
500 INJECTION, SOLUTION INTRAVENOUS ONCE
Refills: 0 | Status: COMPLETED | OUTPATIENT
Start: 2021-01-03 | End: 2021-01-03

## 2021-01-03 RX ORDER — OXYCODONE HYDROCHLORIDE 5 MG/1
5 TABLET ORAL
Refills: 0 | Status: DISCONTINUED | OUTPATIENT
Start: 2021-01-03 | End: 2021-01-10

## 2021-01-03 RX ADMIN — OXYCODONE HYDROCHLORIDE 10 MILLIGRAM(S): 5 TABLET ORAL at 15:35

## 2021-01-03 RX ADMIN — SODIUM CHLORIDE 1000 MILLILITER(S): 9 INJECTION INTRAMUSCULAR; INTRAVENOUS; SUBCUTANEOUS at 22:44

## 2021-01-03 RX ADMIN — TIZANIDINE 1 MILLIGRAM(S): 4 TABLET ORAL at 11:09

## 2021-01-03 RX ADMIN — Medication 200 MILLIGRAM(S): at 11:34

## 2021-01-03 RX ADMIN — FENTANYL CITRATE 50 MICROGRAM(S): 50 INJECTION INTRAVENOUS at 01:25

## 2021-01-03 RX ADMIN — HYDROMORPHONE HYDROCHLORIDE 0.5 MILLIGRAM(S): 2 INJECTION INTRAMUSCULAR; INTRAVENOUS; SUBCUTANEOUS at 06:11

## 2021-01-03 RX ADMIN — Medication 400 MILLIGRAM(S): at 00:09

## 2021-01-03 RX ADMIN — TIZANIDINE 1 MILLIGRAM(S): 4 TABLET ORAL at 18:39

## 2021-01-03 RX ADMIN — FENTANYL CITRATE 50 MICROGRAM(S): 50 INJECTION INTRAVENOUS at 09:38

## 2021-01-03 RX ADMIN — Medication 200 MILLIGRAM(S): at 11:10

## 2021-01-03 RX ADMIN — DEXMEDETOMIDINE HYDROCHLORIDE IN 0.9% SODIUM CHLORIDE 6.13 MICROGRAM(S)/KG/HR: 4 INJECTION INTRAVENOUS at 02:09

## 2021-01-03 RX ADMIN — Medication 1000 MILLIGRAM(S): at 00:30

## 2021-01-03 RX ADMIN — SODIUM CHLORIDE 500 MILLILITER(S): 9 INJECTION, SOLUTION INTRAVENOUS at 04:00

## 2021-01-03 RX ADMIN — Medication 1 MILLIGRAM(S): at 11:10

## 2021-01-03 RX ADMIN — LOSARTAN POTASSIUM 50 MILLIGRAM(S): 100 TABLET, FILM COATED ORAL at 11:10

## 2021-01-03 RX ADMIN — FENTANYL CITRATE 50 MICROGRAM(S): 50 INJECTION INTRAVENOUS at 09:24

## 2021-01-03 RX ADMIN — OXYCODONE HYDROCHLORIDE 10 MILLIGRAM(S): 5 TABLET ORAL at 16:05

## 2021-01-03 RX ADMIN — OXYCODONE HYDROCHLORIDE 10 MILLIGRAM(S): 5 TABLET ORAL at 11:40

## 2021-01-03 RX ADMIN — HYDROMORPHONE HYDROCHLORIDE 0.5 MILLIGRAM(S): 2 INJECTION INTRAMUSCULAR; INTRAVENOUS; SUBCUTANEOUS at 06:30

## 2021-01-03 RX ADMIN — SENNA PLUS 2 TABLET(S): 8.6 TABLET ORAL at 21:32

## 2021-01-03 RX ADMIN — Medication 200 MILLIGRAM(S): at 18:39

## 2021-01-03 RX ADMIN — OXYCODONE HYDROCHLORIDE 10 MILLIGRAM(S): 5 TABLET ORAL at 18:39

## 2021-01-03 RX ADMIN — Medication 200 MILLIGRAM(S): at 01:40

## 2021-01-03 RX ADMIN — TIZANIDINE 1 MILLIGRAM(S): 4 TABLET ORAL at 23:39

## 2021-01-03 RX ADMIN — FENTANYL CITRATE 50 MICROGRAM(S): 50 INJECTION INTRAVENOUS at 01:09

## 2021-01-03 RX ADMIN — OXYCODONE HYDROCHLORIDE 10 MILLIGRAM(S): 5 TABLET ORAL at 11:10

## 2021-01-03 RX ADMIN — ATORVASTATIN CALCIUM 20 MILLIGRAM(S): 80 TABLET, FILM COATED ORAL at 21:32

## 2021-01-03 NOTE — PHYSICAL THERAPY INITIAL EVALUATION ADULT - DISCHARGE DISPOSITION, PT EVAL
anticipate discharge to inpatient rehab facility; however, monitor PT notes closely pending functional assessment
Anticipate Home with home PT in order to address independence during ADLs and ambulation and decrease fall risk during all functional activities . monitor PT notes closely, pending further gait assessment.

## 2021-01-03 NOTE — PHYSICAL THERAPY INITIAL EVALUATION ADULT - ADDITIONAL COMMENTS
Pt lives in house alone. Pt has ramp to enter, resides on first floor. ambulates with rolling walker versus rollator. Pt was independent in functional activities.     pt left seated in bedside chair, NAD. +call bell. RN aware of session.
Pt lives in house alone. Ramp to enter house. Resides on first floor. ambulates with rolling walker versus rollator. Pt was independent in functional activities prior to admission.    Pt left semi-españa in bed, NAD. +call bell. RN present and aware of session. lines and tubes intact.

## 2021-01-03 NOTE — PROGRESS NOTE ADULT - ATTENDING COMMENTS
Patient overnight without issues. Extubated, doing well.  post op  pain control  q4 hr neurochecks  monitor hg, repeat cbc in afternoon, if stable floor eligible  resume vte ppx  home medications resumed, holding antihypertensives insetting of requirement for elevated map for spinal perfusion    I have personally interviewed when possible and examined the patient, reviewed data and laboratory tests/x-rays and all pertinent electronic images.  I was physically present for the key portions of the evaluation and management (E/M) service provided.   The SICU team has a constant risk benefit analyzes discussion with the primary team, all consultants, House Staff and PA's on all decisions.  These diagnoses are unrelated to the surgical procedure noted above.  I meet with family if needed to get further history, discuss the case and make care decisions for this patient who might not be able to participate.  Time involved in performance of separately billable procedures was not counted toward my critical care time. There is no overlap.  I spent 30 minutes ( 0800Hrs-0915Hrs in AM/ 1600Hrs-1715Hrs in PM, or other time indicated) of critical care time for the diagnoses, assessment, plan and interventions.  This time excludes time spent on separate procedures and teaching.

## 2021-01-03 NOTE — PHYSICAL THERAPY INITIAL EVALUATION ADULT - GENERAL OBSERVATIONS, REHAB EVAL
Pt received semi-españa in bed, NAD. Pt agreeable to PT consultation. Cleared for PT as per ALE Chirinos
Pt received semi-españa in bed, NAD. Pt agreeable to PT consultation. Cleared for PT as per RN

## 2021-01-03 NOTE — PROGRESS NOTE ADULT - SUBJECTIVE AND OBJECTIVE BOX
S/P extubation  C/O pain  ICU Vital Signs Last 24 Hrs  T(C): 36.6 (03 Jan 2021 00:00), Max: 37.3 (02 Jan 2021 20:00)  T(F): 97.8 (03 Jan 2021 00:00), Max: 99.1 (02 Jan 2021 20:00)  HR: 99 (03 Jan 2021 01:00) (60 - 101)  BP: --  BP(mean): --  ABP: 129/42 (03 Jan 2021 01:00) (85/44 - 155/77)  ABP(mean): 64 (03 Jan 2021 01:00) (45 - 101)  RR: 18 (03 Jan 2021 01:00) (12 - 22)  SpO2: 95% (03 Jan 2021 01:00) (93% - 100%)    AAO X 3  PERRLA, EOMI  CONNELL strength 5/5 bilateral UE  4/5 bilateral LE limited by pain  SILT    HMV # 1: 210cc  HMV #2: 70cc  HMV #3: 40cc    MEDICATIONS  (STANDING):  atorvastatin 20 milliGRAM(s) Oral at bedtime  ceFAZolin   IVPB 3000 milliGRAM(s) IV Intermittent every 8 hours  dexMEDEtomidine Infusion 0.2 MICROgram(s)/kG/Hr (6.13 mL/Hr) IV Continuous <Continuous>  folic acid 1 milliGRAM(s) Oral daily  hydroxychloroquine 200 milliGRAM(s) Oral daily  influenza  Vaccine (HIGH DOSE) 0.7 milliLiter(s) IntraMuscular once  lactated ringers. 500 milliLiter(s) (100 mL/Hr) IV Continuous <Continuous>  levothyroxine Injectable 150 MICROGram(s) IV Push at bedtime  losartan 50 milliGRAM(s) Oral daily  phenylephrine    Infusion 0.1 MICROgram(s)/kG/Min (2.3 mL/Hr) IV Continuous <Continuous>  senna 2 Tablet(s) Oral at bedtime  sodium chloride 0.9%. 1000 milliLiter(s) (100 mL/Hr) IV Continuous <Continuous>    MEDICATIONS  (PRN):  fentaNYL    Injectable 50 MICROGram(s) IV Push every 4 hours PRN Severe Pain (7 - 10)  HYDROmorphone  Injectable 0.5 milliGRAM(s) IV Push every 4 hours PRN Severe Pain (7 - 10)                          10.3   16.95 )-----------( 263      ( 03 Jan 2021 01:05 )             30.7     01-03    135  |  103  |  24<H>  ----------------------------<  118<H>  4.2   |  20<L>  |  1.10    Ca    8.2<L>      03 Jan 2021 01:05  Phos  4.7     01-03  Mg     2.0     01-03    TPro  5.6<L>  /  Alb  3.2<L>  /  TBili  0.5  /  DBili  x   /  AST  34<H>  /  ALT  18  /  AlkPhos  89  01-01

## 2021-01-03 NOTE — CONSULT NOTE ADULT - SUBJECTIVE AND OBJECTIVE BOX
Requested to consult on this patient for pain management.    HPI: [id number]    No pertinent family history in first degree relatives    FHx: rheumatoid arthritis    Handoff    High Risk    HTN (hypertension)    DVT (deep venous thrombosis)    Hypothyroidism    RA (rheumatoid arthritis)    Obesity, Class II, BMI 35-39.9, no comorbidity    Hypothyroid    Benign heart murmur    Hyperlipidemia    Hypertension    H/O degenerative disc disease    Osteoarthritis    SLE (systemic lupus erythematosus)    Rheumatoid arthritis    Back pain    S/P spinal fusion    Slow transit constipation    Degenerative disc disease, lumbar    Preventive measure    Hypothyroidism    SLE (systemic lupus erythematosus)    RA (rheumatoid arthritis)    Essential hypertension    Chronic deep vein thrombosis (DVT) of popliteal vein of right lower extremity    Lumbosacral spondylosis with radiculopathy    Decompression, spine, lumbar, posterior approach, with fusion of posterior spinal column    S/P knee replacement    Status post total hip replacement, right    S/P thyroid surgery    Lung cancer    S/P knee surgery    S/P carpal tunnel release    S/P eye surgery    S/P cataract surgery    S/P tonsillectomy    BACK PAIN    Discitis of lumbosacral region    90+    [email or web address]        atorvastatin 20 milliGRAM(s) Oral at bedtime  ceFAZolin   IVPB 3000 milliGRAM(s) IV Intermittent every 8 hours  dexMEDEtomidine Infusion 0.2 MICROgram(s)/kG/Hr IV Continuous <Continuous>  folic acid 1 milliGRAM(s) Oral daily  hydroxychloroquine 200 milliGRAM(s) Oral daily  influenza  Vaccine (HIGH DOSE) 0.7 milliLiter(s) IntraMuscular once  levothyroxine Injectable 150 MICROGram(s) IV Push at bedtime  losartan 50 milliGRAM(s) Oral daily  oxyCODONE    IR 5 milliGRAM(s) Oral every 3 hours PRN  oxyCODONE    IR 10 milliGRAM(s) Oral every 3 hours PRN  phenylephrine    Infusion 0.1 MICROgram(s)/kG/Min IV Continuous <Continuous>  senna 2 Tablet(s) Oral at bedtime  sodium chloride 0.9%. 1000 milliLiter(s) IV Continuous <Continuous>      No Known Allergies      oxyCODONE    IR:   10 milliGRAM(s), Oral, every 3 hours, PRN for Severe Pain (7 - 10)  Special Instructions: Hold for oversedation. Only one dose in any three hours.  Administration Instructions: This is a Look-alike/Sound-alike Medication  Provider's Contact #: [phone] (01-03-21 @ 10:11)  oxyCODONE    IR:   5 milliGRAM(s), Oral, every 3 hours, PRN for Moderate Pain (4 - 6)  Special Instructions: Hold for oversedation. Only one dose in any three hours.  Administration Instructions: This is a Look-alike/Sound-alike Medication  Provider's Contact #: [phone] (01-03-21 @ 10:11)  Complete Blood Count: 14:00 (01-03-21 @ 08:48)  Intake and Output:     Frequency:  every 4 hours (01-03-21 @ 07:53)  Vital Signs:     Frequency:  every 4 hours (01-03-21 @ 07:53)  Assess Neurological Status:     Type of Neuro Check:  General    Frequency:  every 4 hours (01-03-21 @ 07:53)  Type + Screen: STAT (01-03-21 @ 06:51)  Complete Blood Count: STAT (01-03-21 @ 06:50)  lactated ringers Bolus:   500 milliLiter(s), IV Bolus, once, infuse over 1 Hour(s), Stop After 1 Doses (01-03-21 @ 03:42)  Diet, Regular (01-03-21 @ 03:07)  Consult- Pain Service, Acute:    Reason for Consult: 60 y.o. F s/p T10-L5 fusion and L1-L5 laminectomy for generative disc disease   Team Name: [facility] Pain Service Acute, Team (01-03-21 @ 02:01)  dexMEDEtomidine Infusion: [Ordered as PRECEDEX]  400 MICROGram(s) in sodium chloride 0.9% 100 milliLiter(s), infuse at 6.13 mL/Hr  Dose Rate: 0.2 MICROgram(s)/kG/Hr   Titrate to Goal of:  RASS Score of 0 to -1     TITRATION INSTRUCTIONS: Increase or decrease by 0.05 MICROgram(s)/kG/Hr every 15 minutes to reach specified physiologic goal.   (Maximum of 1.5 MICROgram(s)/kG/Hr)     PAUSE INSTRUCTIONS: If no contraindications for daily spontaneous awakening trial- Pause medication daily     RESTART INSTRUCTIONS: If patient awakens within 30 and less than 60 minutes, restart dose at 1/2 previous rate; if greater than 60 minutes notify Physician/PA/NP to discontinue medication and re-evaluate need for sedation  Special Instructions: Notify provider at 0.7 MICROgram(s)/kG/Hr or greater.  Administration Instructions: This is a Look-alike/Sound-alike Medication  Provider's Contact #: [phone] (01-03-21 @ 02:01)  Blood Gas Arterial - Calcium, Ionized (01-03-21 @ 01:05)  Blood Gas Hemoglobin/Hematocrit (01-03-21 @ 01:05)  Blood Gas Arterial, Lactate (01-03-21 @ 01:05)  Blood Gas Arterial - Sodium (01-03-21 @ 01:05)  Blood Gas Arterial - Potassium (01-03-21 @ 01:05)  Blood Gas Arterial - Glucose (01-03-21 @ 01:05)  Blood Gas Arterial - Chloride (01-03-21 @ 01:05)  Blood Gas Profile - Arterial (01-03-21 @ 01:05)  Indwelling Urethral Catheter:     Connect To:  Straight Drainage/[place]    Indication:  Perioperative use for Selected Surgical Procedures (01-03-21 @ 00:12)  Diet, NPO:   Except Medications   Tube Feed Start Time: 01:00 (01-02-21 @ 21:32)  lactated ringers Bolus:   1000 milliLiter(s), IV Bolus, once, infuse over 60 Minute(s), Stop After 1 Doses (01-02-21 @ 20:34)  fentaNYL    Injectable:   50 MICROGram(s), IV Push, every 4 hours, PRN for Severe Pain (7 - 10)  Administration Instructions: This is a Look-alike/Sound-alike Medication (01-02-21 @ 20:21)  lactated ringers.: Solution, 500 milliLiter(s) infuse at 100 mL/Hr (01-02-21 @ 17:50)  Extubation:   Clinical Indication. pt asking for tube out, able to raise head above pillow, following commands, CPAP vent on minimal  settings, oxygenating well. vital signs normal. (01-02-21 @ 17:08)  Consult- PT Evaluate and Treat:   *Reason for Consult - Must select at least one choice*     Ortho Trauma/Surgery  Weight Bearing Restrictions: Yes    Left Lower Extremity: Non-Weight Bearing    Right Lower Extremity: Non-Weight Bearing (01-02-21 @ 10:53)  sodium chloride 0.9% Bolus:   500 milliLiter(s), IV Bolus, once, infuse over 1 Hour(s), Stop After 1 Doses (01-02-21 @ 10:53)  Indwelling Urethral Catheter:     Connect To:  Straight Drainage/[place]    Indication:  Perioperative use for Selected Surgical Procedures (01-02-21 @ 10:52)                   PHYSICAL EXAM:    GENERAL: Alert & Oriented x 3 in NAD, well-groomed, well-developed         Impression/Plan: Requested by SICU to help manage pain. PO Oxycodone ordered PRN for now along with non-opioid adjuvant analgesics. May call Pain Service if further assistance needed.    Patient was seen 01-03-21 @ 9:12

## 2021-01-03 NOTE — PHYSICAL THERAPY INITIAL EVALUATION ADULT - CRITERIA FOR SKILLED THERAPEUTIC INTERVENTIONS
impairments found/rehab potential/anticipated discharge recommendation
impairments found/rehab potential/anticipated discharge recommendation

## 2021-01-03 NOTE — PROGRESS NOTE ADULT - ASSESSMENT
75F w/ hx of HTN, HLD, SLE, RA, hypothyroidism, provoked R. popliteal DVT in 2019 on eliquis, chronic low back pain presents with acute on chronic low back pain. now s/p T10-L5 fusion and L1-L5 laminectomy 1/1 for degenerative disc disease, remained intubated postoperatively. SICU consulted for mechanical ventilator management and q1hr neuro check.     PLAN:    NEURO: degenerative disc disease s/p T10-L5 fusion, L1-L5 laminectomy; acute postoperative pain; acute on chronic back pain. Awake, alert, moves extremities, follows commands.   - tylenol, dilaudid PRN/ standing tylenol  - pain not controlled, pain consulted, will see pt in AM  - started precedex gtt  - q1 hour neuro check    RESPIRATORY:   - extubatedm satting well no RA    CARDIOVASCULAR: HTN  - MAP goal of >65  - holding home antihypertensive medication  - resume lipitor, losartan, once resume PO    GI/NUTRITION:  - NPO  - keotube attempted, unable to advance 2/2 hiatal hernia  - extubated, started reg diet  - bowel regimen with senna    GENITOURINARY/RENAL:  - Monitor UOP, I&Os  - Monitor Cr/BUN  - NS@100  - s/p 1L bolus, improved urine output today     HEMATOLOGIC: provoked R popliteal DVT  - monitor h&h  - holding chemical DVT ppx; SCDs DVT ppx    INFECTIOUS DISEASE: no acute issue  - Ancef 3g q8 for 24 hr postop  - monitor for fever and leukocytosis    ENDOCRINE: hypothyroidism  - continue with home synthroid IV     Rheum: SLE, RA  - continue with hydroxychloroquine, methotrexate for SLE when resume PO    F/u PT today     DISPO: SICU 75F w/ hx of HTN, HLD, SLE, RA, hypothyroidism, provoked R. popliteal DVT in 2019 on eliquis, chronic low back pain presents with acute on chronic low back pain. now s/p T10-L5 fusion and L1-L5 laminectomy 1/1 for degenerative disc disease, remained intubated postoperatively. SICU consulted for mechanical ventilator management and q1hr neuro check.     PLAN:    NEURO: degenerative disc disease s/p T10-L5 fusion, L1-L5 laminectomy; acute postoperative pain; acute on chronic back pain. Awake, alert, moves extremities, follows commands.   - oxy, flexaril, Dilaudid PRN/ standing Tylenol  - pain not controlled, pain consulted, will see pt in AM  - started precedex gtt  - q4 hour neuro check    RESPIRATORY:   - extubated satting well no RA    CARDIOVASCULAR: HTN  - MAP goal of >65  - holding home antihypertensive medication  - resume lipitor, losartan, once resume PO    GI/NUTRITION:  - started reg diet  - bowel regimen with senna, lactulose, Miralax     GENITOURINARY/RENAL:  - Monitor UOP, I&Os  - Monitor Cr/BUN  - s/p 1L bolus, improved urine output today     HEMATOLOGIC: provoked R popliteal DVT  -drop in H/H noted to 9/27. Will repeat CBC    -monitor h&h  -holding chemical DVT ppx; will restart when H/H stable   -SCDs    INFECTIOUS DISEASE: no acute issue  - Ancef 3g q8 for 24 hr postop  - monitor for fever and leukocytosis    ENDOCRINE: hypothyroidism  - continue with home synthroid IV     Rheum: SLE, RA  - continue with hydroxychloroquine, methotrexate (1x weekly) for SLE when resume PO    F/u PT today     DISPO: SICU

## 2021-01-03 NOTE — PHYSICAL THERAPY INITIAL EVALUATION ADULT - LEVEL OF INDEPENDENCE: SUPINE/SIT, REHAB EVAL
pt defers secondary to pain
log roll to minimize stress on spine/minimum assist (75% patients effort)

## 2021-01-03 NOTE — PHYSICAL THERAPY INITIAL EVALUATION ADULT - SHORT TERM MEMORY, REHAB EVAL
Called patient and left VM in reference to scheduled device check appt today at 9 am. Asked pt to return call to clinic to notify us if he is coming to today's appt or not.   
intact
intact

## 2021-01-03 NOTE — PROGRESS NOTE ADULT - ASSESSMENT
74 YO female Stable POD # 2 S/P Posterior T10-L5 instrumented fusion, L1-L5 laminectomies, L L4-L5 foraminotomy

## 2021-01-03 NOTE — PROGRESS NOTE ADULT - SUBJECTIVE AND OBJECTIVE BOX
Day _1  of Anesthesia Pain Management Service    SUBJECTIVE:    Therapy:	  [ ] IV PCA	   [ ] Epidural           [ ] s/p Spinal Opoid              [ ] Postpartum infusion	  [ ] Patient controlled regional anesthesia (PCRA)    [X] prn Analgesics    OBJECTIVE:   [X ] No new signs     [ ] Other:    Side Effects:  [X] None			[ ] Other:    Assessment of Catheter Site:		[ ] Intact		[ ] Other:    ASSESSMENT/PLAN  [X ] Continue current therapy    [ ] Therapy changed to:    [ ] IV PCA       [ ] Epidural     [ ] prn Analgesics     Comments: Agree with NP note

## 2021-01-03 NOTE — PHYSICAL THERAPY INITIAL EVALUATION ADULT - PERTINENT HX OF CURRENT PROBLEM, REHAB EVAL
76 YO female Stable POD # 2 S/P Posterior T10-L5 instrumented fusion, L1-L5 laminectomies, L4-L5 foraminotomy on 1/1/2021
75 y.o. Female with HTN, HLD, SLE, RA, hypothyroidism, provoked R. popliteal DVT in 2019 on Eliquis, chronic low back pain admitted for acute on chronic low back pain concern rule out OM, pending neurosurgery and PT eval.

## 2021-01-03 NOTE — PROGRESS NOTE ADULT - SUBJECTIVE AND OBJECTIVE BOX
SICU Daily Progress Note  ----------------------------------------------------------------------------------  Overnight events:  - pt complaining of controlled pain after offering dilaudid/fentanyl IVP and standing tylenol  - pain consult placed, will see pt in the morning    HPI  75F with past medical history of HTN, HLD, SLE, RA, hypothyroidism, provoked R. popliteal DVT in 2019 on eliquis, chronic low back pain presents to ED for acute on chronic low back pain. Pt has several months of chronic lumbosacral back pain, evaluated by outpatient ortho and referred for physical therapy and pain management. She has been receiving ?vitamin spinal injections. She last received low back inj on 12/16 after which she her low back pain worsened. She took oxycodone 5 mg qd (prescribed in september during a ED visit) and acetaminophen for past three days without relief. She also endorses worsening pain during ambulation with walker and has affected her quality of life. She has constipation, last BM 4 days ago. Otherwise denies fever, chills, N/V, abdominal pain, dysuria, urinary retention, fecal incontinence, saddle anesthesia, fall, LOC, trauma. During ROS she endorsed LLE weakness for several months, no numbness or tingling and has LLE edema >RLE edema, reported undergoing LLE US 4 months ago, was negative for DVT. Currently has 8/10 pain, improved to 5/10 with oxycodone 5 mg x 2 in ED.    Pt now s/p T10-L5 fusion and L1-L5 laminectomy 1/1, remained intubated postoperatively. SICU consulted for mechanical ventilator management and q1hr neuro check. (Duration: ~7hr, EBL 1L, 2u pRBC, 500 albumin, 4L crystolloid, )        MEDICATIONS  (STANDING):  atorvastatin 20 milliGRAM(s) Oral at bedtime  ceFAZolin   IVPB 3000 milliGRAM(s) IV Intermittent every 8 hours  dexMEDEtomidine Infusion 0.2 MICROgram(s)/kG/Hr (6.13 mL/Hr) IV Continuous <Continuous>  folic acid 1 milliGRAM(s) Oral daily  hydroxychloroquine 200 milliGRAM(s) Oral daily  influenza  Vaccine (HIGH DOSE) 0.7 milliLiter(s) IntraMuscular once  lactated ringers. 500 milliLiter(s) (100 mL/Hr) IV Continuous <Continuous>  levothyroxine Injectable 150 MICROGram(s) IV Push at bedtime  losartan 50 milliGRAM(s) Oral daily  phenylephrine    Infusion 0.1 MICROgram(s)/kG/Min (2.3 mL/Hr) IV Continuous <Continuous>  senna 2 Tablet(s) Oral at bedtime  sodium chloride 0.9%. 1000 milliLiter(s) (100 mL/Hr) IV Continuous <Continuous>    MEDICATIONS  (PRN):  fentaNYL    Injectable 50 MICROGram(s) IV Push every 4 hours PRN Severe Pain (7 - 10)  HYDROmorphone  Injectable 0.5 milliGRAM(s) IV Push every 4 hours PRN Severe Pain (7 - 10)    ICU Vital Signs Last 24 Hrs  T(C): 36.6 (03 Jan 2021 00:00), Max: 37.3 (02 Jan 2021 20:00)  T(F): 97.8 (03 Jan 2021 00:00), Max: 99.1 (02 Jan 2021 20:00)  HR: 99 (03 Jan 2021 01:00) (60 - 101)  BP: --  BP(mean): --  ABP: 129/42 (03 Jan 2021 01:00) (85/44 - 155/77)  ABP(mean): 64 (03 Jan 2021 01:00) (45 - 101)  RR: 18 (03 Jan 2021 01:00) (12 - 22)  SpO2: 95% (03 Jan 2021 01:00) (93% - 100%)        NEURO  Exam: minimally sedated; hemovac x 3 draining ss      RESPIRATORY  CTA B/L    CARDIOVASCULAR  Exam: RRR  Cardiac Rhythm:    GI/NUTRITION  Exam: obese abdomen, soft, ND  Diet: NPO    GENITOURINARY/RENAL  I&O's Detail    01 Jan 2021 07:01  -  02 Jan 2021 07:00  --------------------------------------------------------  IN:    Phenylephrine: 16 mL    Propofol: 102.3 mL    sodium chloride 0.9%: 800 mL  Total IN: 918.3 mL    OUT:    Drain (mL): 60 mL    Drain (mL): 60 mL    Drain (mL): 165 mL    Indwelling Catheter - Urethral (mL): 350 mL    Voided (mL): 200 mL  Total OUT: 835 mL    Total NET: 83.3 mL      02 Jan 2021 07:01  -  03 Jan 2021 03:04  --------------------------------------------------------  IN:    Dexmedetomidine: 36.6 mL    Lactated Ringers: 500 mL    Lactated Ringers Bolus: 1000 mL    Propofol: 7.3 mL    sodium chloride 0.9%: 1900 mL    Sodium Chloride 0.9% Bolus: 500 mL  Total IN: 3943.9 mL    OUT:    Drain (mL): 210 mL    Drain (mL): 70 mL    Drain (mL): 40 mL    Indwelling Catheter - Urethral (mL): 505 mL  Total OUT: 825 mL    Total NET: 3118.9 mL          PATIENT CARE ACCESS DEVICES:  [x] Peripheral IV  [ ] Central Venous Line	[ ] R	[ ] L	[ ] IJ	[ ] Fem	[ ] SC	Placed:   [x] Arterial Line		[x] R	[ ] L	[ ] Fem	[x] Rad	[ ] Ax	Placed:   [ ] PICC:					[ ] Mediport  [x] Urinary Catheter, Date Placed: 1/1  [x] Necessity of urinary, arterial, and venous catheters discussed    CBC (01-03 @ 01:05)                              10.3<L>                         16.95<H>  )----------------(  263        --    % Neutrophils, --    % Lymphocytes, ANC: --                                  30.7<L>  CBC (01-02 @ 01:06)                              13.3                           16.61<H>  )----------------(  356        --    % Neutrophils, --    % Lymphocytes, ANC: --                                  40.1      BMP (01-03 @ 01:05)             135     |  103     |  24<H> 		Ca++ --      Ca 8.2<L>             ---------------------------------( 118<H>		Mg 2.0                4.2     |  20<L>   |  1.10  			Ph 4.7<H>  BMP (01-02 @ 01:06)             137     |  103     |  17    		Ca++ --      Ca 9.1                ---------------------------------( 143<H>		Mg 1.8                4.4     |  22      |  0.80  			Ph 5.1<H>    LFTs (01-01 @ 22:38)      TPro 5.6<L> / Alb 3.2<L> / TBili 0.5 / DBili -- / AST 34<H> / ALT 18 / AlkPhos 89        ABG (01-03 @ 01:05)     7.38 / 38 / 77<L> / 23 / -1.9 / 96.2%     Lactate:    ABG (01-02 @ 01:06)     7.39 / 41 / 118<H> / 24 /  / 98.6%     Lactate:       SICU Daily Progress Note  ----------------------------------------------------------------------------------  Overnight events:  - pt complaining of controlled pain after offering dilaudid/fentanyl IVP and standing tylenol  - pain continues to be difficult to control. pain consult placed, will see pt in the morning  -listed to floor  -2pm CBC to monitor post op H/H drop  -q4h neuro checks    HPI  75F with past medical history of HTN, HLD, SLE, RA, hypothyroidism, provoked R. popliteal DVT in 2019 on eliquis, chronic low back pain presents to ED for acute on chronic low back pain. Pt has several months of chronic lumbosacral back pain, evaluated by outpatient ortho and referred for physical therapy and pain management. She has been receiving ?vitamin spinal injections. She last received low back inj on 12/16 after which she her low back pain worsened. She took oxycodone 5 mg qd (prescribed in september during a ED visit) and acetaminophen for past three days without relief. She also endorses worsening pain during ambulation with walker and has affected her quality of life. She has constipation, last BM 4 days ago. Otherwise denies fever, chills, N/V, abdominal pain, dysuria, urinary retention, fecal incontinence, saddle anesthesia, fall, LOC, trauma. During ROS she endorsed LLE weakness for several months, no numbness or tingling and has LLE edema >RLE edema, reported undergoing LLE US 4 months ago, was negative for DVT. Currently has 8/10 pain, improved to 5/10 with oxycodone 5 mg x 2 in ED.    Pt now s/p T10-L5 fusion and L1-L5 laminectomy 1/1, remained intubated postoperatively. SICU consulted for mechanical ventilator management and q1hr neuro check. (Duration: ~7hr, EBL 1L, 2u pRBC, 500 albumin, 4L crystolloid, )        MEDICATIONS  (STANDING):  atorvastatin 20 milliGRAM(s) Oral at bedtime  ceFAZolin   IVPB 3000 milliGRAM(s) IV Intermittent every 8 hours  dexMEDEtomidine Infusion 0.2 MICROgram(s)/kG/Hr (6.13 mL/Hr) IV Continuous <Continuous>  folic acid 1 milliGRAM(s) Oral daily  hydroxychloroquine 200 milliGRAM(s) Oral daily  influenza  Vaccine (HIGH DOSE) 0.7 milliLiter(s) IntraMuscular once  lactated ringers. 500 milliLiter(s) (100 mL/Hr) IV Continuous <Continuous>  levothyroxine Injectable 150 MICROGram(s) IV Push at bedtime  losartan 50 milliGRAM(s) Oral daily  phenylephrine    Infusion 0.1 MICROgram(s)/kG/Min (2.3 mL/Hr) IV Continuous <Continuous>  senna 2 Tablet(s) Oral at bedtime  sodium chloride 0.9%. 1000 milliLiter(s) (100 mL/Hr) IV Continuous <Continuous>    MEDICATIONS  (PRN):  fentaNYL    Injectable 50 MICROGram(s) IV Push every 4 hours PRN Severe Pain (7 - 10)  HYDROmorphone  Injectable 0.5 milliGRAM(s) IV Push every 4 hours PRN Severe Pain (7 - 10)    ICU Vital Signs Last 24 Hrs  T(C): 36.6 (03 Jan 2021 00:00), Max: 37.3 (02 Jan 2021 20:00)  T(F): 97.8 (03 Jan 2021 00:00), Max: 99.1 (02 Jan 2021 20:00)  HR: 99 (03 Jan 2021 01:00) (60 - 101)  ABP: 129/42 (03 Jan 2021 01:00) (85/44 - 155/77)  ABP(mean): 64 (03 Jan 2021 01:00) (45 - 101)  RR: 18 (03 Jan 2021 01:00) (12 - 22)  SpO2: 95% (03 Jan 2021 01:00) (93% - 100%)    NEURO  Exam: minimally sedated; hemovac x 3 draining ss    RESPIRATORY  CTA B/L    CARDIOVASCULAR  Exam: RRR  Cardiac Rhythm:    GI/NUTRITION  Exam: obese abdomen, soft, ND  Diet: NPO    GENITOURINARY/RENAL  I&O's Detail    01 Jan 2021 07:01  -  02 Jan 2021 07:00  --------------------------------------------------------  IN:    Phenylephrine: 16 mL    Propofol: 102.3 mL    sodium chloride 0.9%: 800 mL  Total IN: 918.3 mL    OUT:    Drain (mL): 60 mL    Drain (mL): 60 mL    Drain (mL): 165 mL    Indwelling Catheter - Urethral (mL): 350 mL    Voided (mL): 200 mL  Total OUT: 835 mL    Total NET: 83.3 mL      02 Jan 2021 07:01  -  03 Jan 2021 03:04  --------------------------------------------------------  IN:    Dexmedetomidine: 36.6 mL    Lactated Ringers: 500 mL    Lactated Ringers Bolus: 1000 mL    Propofol: 7.3 mL    sodium chloride 0.9%: 1900 mL    Sodium Chloride 0.9% Bolus: 500 mL  Total IN: 3943.9 mL    OUT:    Drain (mL): 210 mL    Drain (mL): 70 mL    Drain (mL): 40 mL    Indwelling Catheter - Urethral (mL): 505 mL  Total OUT: 825 mL    Total NET: 3118.9 mL    PATIENT CARE ACCESS DEVICES:  [x] Peripheral IV  [ ] Central Venous Line	[ ] R	[ ] L	[ ] IJ	[ ] Fem	[ ] SC	Placed:   [x] Arterial Line		[x] R	[ ] L	[ ] Fem	[x] Rad	[ ] Ax	Placed:   [ ] PICC:					[ ] Mediport  [x] Urinary Catheter, Date Placed: 1/1  [x] Necessity of urinary, arterial, and venous catheters discussed    CBC (01-03 @ 01:05)                              10.3<L>                         16.95<H>  )----------------(  263        --    % Neutrophils, --    % Lymphocytes, ANC: --                                  30.7<L>  CBC (01-02 @ 01:06)                              13.3                           16.61<H>  )----------------(  356        --    % Neutrophils, --    % Lymphocytes, ANC: --                                  40.1      BMP (01-03 @ 01:05)             135     |  103     |  24<H> 		Ca++ --      Ca 8.2<L>             ---------------------------------( 118<H>		Mg 2.0                4.2     |  20<L>   |  1.10  			Ph 4.7<H>  BMP (01-02 @ 01:06)             137     |  103     |  17    		Ca++ --      Ca 9.1                ---------------------------------( 143<H>		Mg 1.8                4.4     |  22      |  0.80  			Ph 5.1<H>    LFTs (01-01 @ 22:38)      TPro 5.6<L> / Alb 3.2<L> / TBili 0.5 / DBili -- / AST 34<H> / ALT 18 / AlkPhos 89  ABG (01-03 @ 01:05)     7.38 / 38 / 77<L> / 23 / -1.9 / 96.2%     Lactate:    ABG (01-02 @ 01:06)     7.39 / 41 / 118<H> / 24 /  / 98.6%     Lactate:

## 2021-01-03 NOTE — PHYSICAL THERAPY INITIAL EVALUATION ADULT - MANUAL MUSCLE TESTING RESULTS, REHAB EVAL
spinal precautions, BLE nonweight bearing/not tested due to
spinal precautions/grossly assessed due to

## 2021-01-04 LAB
ANION GAP SERPL CALC-SCNC: 6 MMOL/L — LOW (ref 7–14)
BUN SERPL-MCNC: 26 MG/DL — HIGH (ref 7–23)
CALCIUM SERPL-MCNC: 8.2 MG/DL — LOW (ref 8.4–10.5)
CHLORIDE SERPL-SCNC: 107 MMOL/L — SIGNIFICANT CHANGE UP (ref 98–107)
CO2 SERPL-SCNC: 24 MMOL/L — SIGNIFICANT CHANGE UP (ref 22–31)
CREAT SERPL-MCNC: 1.02 MG/DL — SIGNIFICANT CHANGE UP (ref 0.5–1.3)
GLUCOSE SERPL-MCNC: 101 MG/DL — HIGH (ref 70–99)
HCT VFR BLD CALC: 29.1 % — LOW (ref 34.5–45)
HGB BLD-MCNC: 9.3 G/DL — LOW (ref 11.5–15.5)
MAGNESIUM SERPL-MCNC: 2.1 MG/DL — SIGNIFICANT CHANGE UP (ref 1.6–2.6)
MCHC RBC-ENTMCNC: 30.5 PG — SIGNIFICANT CHANGE UP (ref 27–34)
MCHC RBC-ENTMCNC: 32 GM/DL — SIGNIFICANT CHANGE UP (ref 32–36)
MCV RBC AUTO: 95.4 FL — SIGNIFICANT CHANGE UP (ref 80–100)
NRBC # BLD: 0 /100 WBCS — SIGNIFICANT CHANGE UP
NRBC # FLD: 0 K/UL — SIGNIFICANT CHANGE UP
PHOSPHATE SERPL-MCNC: 3.4 MG/DL — SIGNIFICANT CHANGE UP (ref 2.5–4.5)
PLATELET # BLD AUTO: 241 K/UL — SIGNIFICANT CHANGE UP (ref 150–400)
POTASSIUM SERPL-MCNC: 4.1 MMOL/L — SIGNIFICANT CHANGE UP (ref 3.5–5.3)
POTASSIUM SERPL-SCNC: 4.1 MMOL/L — SIGNIFICANT CHANGE UP (ref 3.5–5.3)
RBC # BLD: 3.05 M/UL — LOW (ref 3.8–5.2)
RBC # FLD: 14.6 % — HIGH (ref 10.3–14.5)
SODIUM SERPL-SCNC: 137 MMOL/L — SIGNIFICANT CHANGE UP (ref 135–145)
WBC # BLD: 11.77 K/UL — HIGH (ref 3.8–10.5)
WBC # FLD AUTO: 11.77 K/UL — HIGH (ref 3.8–10.5)

## 2021-01-04 PROCEDURE — 99222 1ST HOSP IP/OBS MODERATE 55: CPT

## 2021-01-04 PROCEDURE — 99232 SBSQ HOSP IP/OBS MODERATE 35: CPT

## 2021-01-04 RX ORDER — HYDROMORPHONE HYDROCHLORIDE 2 MG/ML
0.25 INJECTION INTRAMUSCULAR; INTRAVENOUS; SUBCUTANEOUS
Refills: 0 | Status: DISCONTINUED | OUTPATIENT
Start: 2021-01-04 | End: 2021-01-11

## 2021-01-04 RX ORDER — ENOXAPARIN SODIUM 100 MG/ML
40 INJECTION SUBCUTANEOUS
Refills: 0 | Status: DISCONTINUED | OUTPATIENT
Start: 2021-01-04 | End: 2021-01-12

## 2021-01-04 RX ORDER — POLYETHYLENE GLYCOL 3350 17 G/17G
17 POWDER, FOR SOLUTION ORAL DAILY
Refills: 0 | Status: DISCONTINUED | OUTPATIENT
Start: 2021-01-04 | End: 2021-01-12

## 2021-01-04 RX ORDER — ENOXAPARIN SODIUM 100 MG/ML
40 INJECTION SUBCUTANEOUS DAILY
Refills: 0 | Status: DISCONTINUED | OUTPATIENT
Start: 2021-01-04 | End: 2021-01-04

## 2021-01-04 RX ORDER — KETOROLAC TROMETHAMINE 30 MG/ML
30 SYRINGE (ML) INJECTION EVERY 8 HOURS
Refills: 0 | Status: DISCONTINUED | OUTPATIENT
Start: 2021-01-04 | End: 2021-01-06

## 2021-01-04 RX ORDER — DIPHENHYDRAMINE HCL 50 MG
25 CAPSULE ORAL AT BEDTIME
Refills: 0 | Status: DISCONTINUED | OUTPATIENT
Start: 2021-01-04 | End: 2021-01-12

## 2021-01-04 RX ORDER — ACETAMINOPHEN 500 MG
500 TABLET ORAL EVERY 8 HOURS
Refills: 0 | Status: COMPLETED | OUTPATIENT
Start: 2021-01-04 | End: 2021-01-06

## 2021-01-04 RX ORDER — ACETAMINOPHEN 500 MG
650 TABLET ORAL EVERY 6 HOURS
Refills: 0 | Status: DISCONTINUED | OUTPATIENT
Start: 2021-01-04 | End: 2021-01-04

## 2021-01-04 RX ORDER — SODIUM CHLORIDE 9 MG/ML
1000 INJECTION INTRAMUSCULAR; INTRAVENOUS; SUBCUTANEOUS ONCE
Refills: 0 | Status: COMPLETED | OUTPATIENT
Start: 2021-01-04 | End: 2021-01-04

## 2021-01-04 RX ORDER — HYDROMORPHONE HYDROCHLORIDE 2 MG/ML
0.5 INJECTION INTRAMUSCULAR; INTRAVENOUS; SUBCUTANEOUS
Refills: 0 | Status: DISCONTINUED | OUTPATIENT
Start: 2021-01-04 | End: 2021-01-11

## 2021-01-04 RX ORDER — LANOLIN ALCOHOL/MO/W.PET/CERES
3 CREAM (GRAM) TOPICAL AT BEDTIME
Refills: 0 | Status: DISCONTINUED | OUTPATIENT
Start: 2021-01-04 | End: 2021-01-12

## 2021-01-04 RX ADMIN — Medication 200 MILLIGRAM(S): at 11:49

## 2021-01-04 RX ADMIN — Medication 650 MILLIGRAM(S): at 02:36

## 2021-01-04 RX ADMIN — TIZANIDINE 1 MILLIGRAM(S): 4 TABLET ORAL at 17:46

## 2021-01-04 RX ADMIN — Medication 200 MILLIGRAM(S): at 22:53

## 2021-01-04 RX ADMIN — Medication 200 MILLIGRAM(S): at 14:30

## 2021-01-04 RX ADMIN — Medication 30 MILLIGRAM(S): at 22:58

## 2021-01-04 RX ADMIN — Medication 500 MILLIGRAM(S): at 14:30

## 2021-01-04 RX ADMIN — Medication 200 MILLIGRAM(S): at 06:00

## 2021-01-04 RX ADMIN — SODIUM CHLORIDE 1000 MILLILITER(S): 9 INJECTION INTRAMUSCULAR; INTRAVENOUS; SUBCUTANEOUS at 00:28

## 2021-01-04 RX ADMIN — Medication 500 MILLIGRAM(S): at 22:16

## 2021-01-04 RX ADMIN — ENOXAPARIN SODIUM 40 MILLIGRAM(S): 100 INJECTION SUBCUTANEOUS at 17:45

## 2021-01-04 RX ADMIN — ATORVASTATIN CALCIUM 20 MILLIGRAM(S): 80 TABLET, FILM COATED ORAL at 22:16

## 2021-01-04 RX ADMIN — TIZANIDINE 1 MILLIGRAM(S): 4 TABLET ORAL at 11:50

## 2021-01-04 RX ADMIN — Medication 30 MILLIGRAM(S): at 16:15

## 2021-01-04 RX ADMIN — Medication 30 MILLIGRAM(S): at 15:41

## 2021-01-04 RX ADMIN — Medication 500 MILLIGRAM(S): at 22:58

## 2021-01-04 RX ADMIN — TIZANIDINE 1 MILLIGRAM(S): 4 TABLET ORAL at 05:59

## 2021-01-04 RX ADMIN — Medication 200 MICROGRAM(S): at 05:59

## 2021-01-04 RX ADMIN — POLYETHYLENE GLYCOL 3350 17 GRAM(S): 17 POWDER, FOR SOLUTION ORAL at 17:45

## 2021-01-04 RX ADMIN — LOSARTAN POTASSIUM 50 MILLIGRAM(S): 100 TABLET, FILM COATED ORAL at 05:59

## 2021-01-04 RX ADMIN — Medication 3 MILLIGRAM(S): at 02:36

## 2021-01-04 RX ADMIN — OXYCODONE HYDROCHLORIDE 10 MILLIGRAM(S): 5 TABLET ORAL at 10:00

## 2021-01-04 RX ADMIN — Medication 1 MILLIGRAM(S): at 11:49

## 2021-01-04 RX ADMIN — Medication 30 MILLIGRAM(S): at 22:16

## 2021-01-04 RX ADMIN — OXYCODONE HYDROCHLORIDE 10 MILLIGRAM(S): 5 TABLET ORAL at 09:00

## 2021-01-04 RX ADMIN — SENNA PLUS 2 TABLET(S): 8.6 TABLET ORAL at 22:16

## 2021-01-04 RX ADMIN — Medication 500 MILLIGRAM(S): at 15:06

## 2021-01-04 NOTE — PROGRESS NOTE ADULT - SUBJECTIVE AND OBJECTIVE BOX
PAST 24HR EVENTS: overnight patient hypotensive, asymptomatic, 2 boluses given     PHYSICAL EXAM:  Vital Signs Last 24 Hrs  T(C): 36.7 (03 Jan 2021 21:17), Max: 36.9 (03 Jan 2021 12:00)  T(F): 98 (03 Jan 2021 21:17), Max: 98.5 (03 Jan 2021 17:23)  HR: 76 (04 Jan 2021 00:28) (74 - 101)  BP: 99/42 (04 Jan 2021 00:28) (83/43 - 121/56)  RR: 18 (03 Jan 2021 22:19) (18 - 22)  SpO2: 100% (03 Jan 2021 22:19) (95% - 100%)    AAO X 3  PERRLA, EOMI  CONNELL strength 5/5 bilateral UE  4/5 bilateral LE limited by pain  SILT  3 HMV in place    MEDS:   atorvastatin 20 milliGRAM(s) Oral at bedtime  ceFAZolin   IVPB 3000 milliGRAM(s) IV Intermittent every 8 hours  folic acid 1 milliGRAM(s) Oral daily  hydroxychloroquine 200 milliGRAM(s) Oral daily  influenza  Vaccine (HIGH DOSE) 0.7 milliLiter(s) IntraMuscular once  levothyroxine 200 MICROGram(s) Oral daily  losartan 50 milliGRAM(s) Oral daily  oxyCODONE    IR 5 milliGRAM(s) Oral every 3 hours PRN  oxyCODONE    IR 10 milliGRAM(s) Oral every 3 hours PRN  senna 2 Tablet(s) Oral at bedtime  tiZANidine 1 milliGRAM(s) Oral every 6 hours      LABS:                        9.1    15.35 )-----------( 235      ( 03 Jan 2021 12:29 )             27.5     01-03    135  |  103  |  24<H>  ----------------------------<  118<H>  4.2   |  20<L>  |  1.10    Ca    8.2<L>      03 Jan 2021 01:05  Phos  4.7     01-03  Mg     2.0     01-03          RADIOLOGY:

## 2021-01-04 NOTE — PROGRESS NOTE ADULT - ASSESSMENT
75y old  Female who presents with a chief complaint of acute on chronic low back pain found to have severe neural foramen stenosis s/p surgical intervention on 1/1

## 2021-01-04 NOTE — OCCUPATIONAL THERAPY INITIAL EVALUATION ADULT - PERTINENT HX OF CURRENT PROBLEM, REHAB EVAL
Pt is a 74 Y/O female with  S/P Posterior T10-L5 instrumented fusion, L1-L5 laminectomies, L4-L5 foraminotomy on 1/1/2021

## 2021-01-04 NOTE — PROGRESS NOTE ADULT - SUBJECTIVE AND OBJECTIVE BOX
Follow up consult for Acute Pain Management     SUBJECTIVE:  The patient states she has back pain that has gotten worse overnight. She was unable to receive her oxycodone earlier as her blood pressure was low. Otherwise denies nausea/vomiting.  		  OBJECTIVE:  Patient is sitting in bed, appears mildly uncomfortable.    Pain Score:   (X) Refer to pain scores    Therapy:	[ ] IV PCA	[ ] Epidural   [ ] s/p Spinal Opioid	[ ] Peripheral nerve block  (x) PRN Oral/IV opioids and or Adjuvant non-opioid medications  	  Vital Signs Last 24 Hrs  T(C): 36.3 (04 Jan 2021 05:58), Max: 36.9 (03 Jan 2021 12:00)  T(F): 97.4 (04 Jan 2021 05:58), Max: 98.5 (03 Jan 2021 17:23)  HR: 72 (04 Jan 2021 05:58) (72 - 88)  BP: 100/45 (04 Jan 2021 05:58) (83/43 - 121/56)  BP(mean): --  RR: 18 (04 Jan 2021 05:58) (18 - 21)  SpO2: 99% (04 Jan 2021 05:58) (96% - 100%)    ( x) Alert & Oriented     ( ) No motor/sensory block     ( ) Nausea     ( ) Pruritis     ( ) Headache    ASSESSMENT/ PLAN    Therapy to  be:	[x ] Continue   [ ] Discontinued      Documentation and Verification of current medications:   [X] Done	[ ] Not done, not eligible    Comments:   Patient's pain is mildly worse due to not being dosed oxycodone in the setting of hypotension. Adding Tylenol 500mg PO q8h x2 days. Continue current pain regimen. Add Dilaudid 0.25mg IVP q3h PRN for moderate breakthrough pain, Dilaudid 0.50mg IVP q3h PRN for severe breakthrough pain, neither to be given within 1 hour of any other immediate acting opioid or sedative agents. IV Dilaudid can be discontinued 1-2 days prior to discharge.     PRN Oral/IV opioids and/or Adjuvant non-opioid medication to be ordered at this point. Pain service to sign off, no further recommendations for pain medications, at this time.  May call pain service if needed.    Progress Note written now but Patient was seen earlier.

## 2021-01-04 NOTE — PROGRESS NOTE ADULT - SUBJECTIVE AND OBJECTIVE BOX
Patient is a 75y old  Female who presents with a chief complaint of acute on chronic low back pain    SUBJECTIVE / OVERNIGHT EVENTS: Reports pain is better control today. Denies any chest pain, dizziness or SOB. Reports constipation.     MEDICATIONS  (STANDING):  atorvastatin 20 milliGRAM(s) Oral at bedtime  chlorhexidine 0.12% Liquid 15 milliLiter(s) Oral Mucosa every 12 hours  folic acid 1 milliGRAM(s) Oral daily  hydroxychloroquine 200 milliGRAM(s) Oral daily  influenza  Vaccine (HIGH DOSE) 0.7 milliLiter(s) IntraMuscular once  levothyroxine 200 MICROGram(s) Oral daily  losartan 50 milliGRAM(s) Oral daily  polyethylene glycol 3350 17 Gram(s) Oral daily  polyethylene glycol/electrolyte Solution 500 milliLiter(s) Oral once  senna 2 Tablet(s) Oral at bedtime    MEDICATIONS  (PRN):  acetaminophen   Tablet .. 650 milliGRAM(s) Oral every 6 hours PRN Mild Pain (1 - 3)  magnesium hydroxide Suspension 30 milliLiter(s) Oral daily PRN Constipation  oxycodone    5 mG/acetaminophen 325 mG 2 Tablet(s) Oral every 6 hours PRN Severe Pain (7 - 10)  oxycodone    5 mG/acetaminophen 325 mG 1 Tablet(s) Oral every 6 hours PRN Moderate Pain (4 - 6)      PHYSICAL EXAM:  Vital Signs Last 24 Hrs  T(C): 37 (04 Jan 2021 11:48), Max: 37 (04 Jan 2021 11:48)  T(F): 98.6 (04 Jan 2021 11:48), Max: 98.6 (04 Jan 2021 11:48)  HR: 76 (04 Jan 2021 11:48) (67 - 85)  BP: 101/54 (04 Jan 2021 11:48) (83/43 - 121/56)  BP(mean): --  RR: 15 (04 Jan 2021 11:48) (15 - 21)  SpO2: 98% (04 Jan 2021 11:48) (96% - 100%)    CONSTITUTIONAL: NAD, well-developed, well-groomed  RESPIRATORY: Normal respiratory effort; lungs are clear to auscultation bilaterally  CARDIOVASCULAR: Regular rate and rhythm, normal S1 and S2, no murmur/rub/gallop; No lower extremity edema  GASTROINTESTINAL: Nontender to palpation, normoactive bowel sounds, no rebound/guarding; No hepatosplenomegaly  MUSCULOSKELETAL:  no clubbing or cyanosis of digits; no joint swelling or tenderness to palpation  NEUROLOGY: non-focal; no gross sensory deficits   PSYCH: A+O to person, place, and time; affect appropriate  SKIN: No rashes; warm. 3 Surgical drains intact     LABS:                          9.3    11.77 )-----------( 241      ( 04 Jan 2021 07:12 )             29.1   01-04    137  |  107  |  26<H>  ----------------------------<  101<H>  4.1   |  24  |  1.02    Ca    8.2<L>      04 Jan 2021 07:12  Phos  3.4     01-04  Mg     2.1     01-04      RADIOLOGY & ADDITIONAL TESTS:  Results Reviewed: Y  Imaging Personally Reviewed: Y  Electrocardiogram Personally Reviewed:    COORDINATION OF CARE:  Care Discussed with Consultants/Other Providers [Y/N]:  Prior or Outpatient Records Reviewed [Y/N]:

## 2021-01-04 NOTE — PROGRESS NOTE ADULT - ASSESSMENT
74 yo female POD 3 s/p Posterior T10-L5 instrumented fusion, L1-L5 laminectomies, L L4-L5 foraminotomy    PLAN:   - monitor drains  - continue to check BP regularly

## 2021-01-04 NOTE — PROGRESS NOTE ADULT - PROBLEM SELECTOR PLAN 1
MR thoracic/lumbar shows multilevel degenerative disease with several areas of severe neural foramen stenosis likely leading to LLE weakness, no evidence of cord compression   - c/w pain control and bowel regimen  -s/p posterior T10-L5 instrumented fusion, L1-L5 laminectomies, L L4-L5 foraminotomy on 1/1  - PT eval post op- pending

## 2021-01-04 NOTE — CONSULT NOTE ADULT - SUBJECTIVE AND OBJECTIVE BOX
HPI:  75F with past medical history of HTN, HLD, SLE, RA, hypothyroidism, provoked R. popliteal DVT in 2019 on eliquis, chronic low back pain presents to ED for acute on chronic low back pain. Pt has several months of chronic lumbosacral back pain, evaluated by outpatient ortho and referred for physical therapy and pain management. She has been receiving ?vitamin spinal injections. She last received low back inj on 12/16 after which she her low back pain worsened. She took oxycodone 5 mg qd (prescribed in september during a ED visit) and acetaminophen for past three days without relief. She also endorses worsening pain during ambulation with walker and has affected her quality of life. She has constipation, last BM 4 days ago. Otherwise denies fever, chills, N/V, abdominal pain, dysuria, urinary retention, fecal incontinence, saddle anesthesia, fall, LOC, trauma. During ROS she endorsed LLE weakness for several months, no numbness or tingling and has LLE edema >RLE edema, reported undergoing LLE US 4 months ago, was negative for DVT. Currently has 8/10 pain, improved to 5/10 with oxycodone 5 mg x 2 in ED. (28 Dec 2020 11:16)    Patient was admitted on 12/28, MRI with severe neural foramen narrowing, multilevel degenerative disease, no evidence for cord compression. Patient had posterior T10-L5 instrumented fusion, L1-L5 laminectomies, L L4-L5 foraminotomy on 1/1, extubated on 1/2. Post op course significant for pain, hypotension. Patient seen today on [facility], s/p blood transfusion. Given Tylenol for pain due to low blood pressure. Patient reports she was taking tylenol and muscle relaxants at home regularly for pain.       REVIEW OF SYSTEMS  Constitutional - No fever, No weight loss, No fatigue  HEENT - No eye pain, No visual disturbances, No difficulty hearing, No tinnitus, No vertigo, No neck pain  Respiratory - No cough, No wheezing, No shortness of breath  Cardiovascular - No chest pain, No palpitations  Gastrointestinal - No abdominal pain, No nausea, No vomiting, No diarrhea, + constipation  Genitourinary - +velasquez  Neurological - No headaches, No memory loss, No loss of strength, No numbness, No tremors  Skin - No itching, No rashes, No lesions   Endocrine - No temperature intolerance  Musculoskeletal - No joint pain, No joint swelling, No muscle pain  Psychiatric - No depression, No anxiety    VITALS  T(C): 36.7 (01-04-21 @ 14:24), Max: 37 (01-04-21 @ 11:48)  HR: 79 (01-04-21 @ 14:24) (67 - 85)  BP: 96/56 (01-04-21 @ 14:24) (83/43 - 121/56)  RR: 15 (01-04-21 @ 14:24) (15 - 21)  SpO2: 99% (01-04-21 @ 14:24) (96% - 100%)  Wt(kg): --    PAST MEDICAL & SURGICAL HISTORY  HTN (hypertension)    DVT (deep venous thrombosis)    Hypothyroidism    RA (rheumatoid arthritis)    Obesity, Class II, BMI 35-39.9, no comorbidity    Hypothyroid    Benign heart murmur    Hyperlipidemia    Hypertension    H/O degenerative disc disease    Osteoarthritis    SLE (systemic lupus erythematosus)    Rheumatoid arthritis    S/P knee replacement    Status post total hip replacement, right    S/P thyroid surgery    Lung cancer    S/P knee surgery    S/P carpal tunnel release    S/P eye surgery    S/P cataract surgery    S/P tonsillectomy        SOCIAL HISTORY  Smoking - Denied  EtOH - Denied   Drugs - Denied    FUNCTIONAL HISTORY  Lives alone in private home, ramp to enter home, lives on first floor  Independent ADLs, used rollator/RW, son helps with shopping, driving her home after she shops     CURRENT FUNCTIONAL STATUS  1/4 PT  unable to document bed mobility due to pain/blood transfusion        FAMILY HISTORY     FHx: rheumatoid arthritis        RECENT LABS/IMAGING  CBC Full  -  ( 04 Jan 2021 07:12 )  WBC Count : 11.77 K/uL  RBC Count : 3.05 M/uL  Hemoglobin : 9.3 g/dL  Hematocrit : 29.1 %  Platelet Count - Automated : 241 K/uL  Mean Cell Volume : 95.4 fL  Mean Cell Hemoglobin : 30.5 pg  Mean Cell Hemoglobin Concentration : 32.0 gm/dL  Auto Neutrophil # : x  Auto Lymphocyte # : x  Auto Monocyte # : x  Auto Eosinophil # : x  Auto Basophil # : x  Auto Neutrophil % : x  Auto Lymphocyte % : x  Auto Monocyte % : x  Auto Eosinophil % : x  Auto Basophil % : x    01-04    137  |  107  |  26<H>  ----------------------------<  101<H>  4.1   |  24  |  1.02    Ca    8.2<L>      04 Jan 2021 07:12  Phos  3.4     01-04  Mg     2.1     01-04      < from: MR Lumbar Spine w/ IV Cont (12.28.20 @ 02:51) >    Lumbar spine:    Loss of the normal lumbar lordosis is seen.    Scoliosis is seen.    There is evidence of abnormal T1 and T2 prolongation with associated enhancement seen along seen involving the endplates adjacent to the L2-3 disc space. There is abnormal T2 prolongation seen involving the L2-3 disc spaces with associated widening of this disc space. There is also slight increase enhancement seen involving the interspinous process ligament at the L2-3 level. While these findings could be compatible with degenerative changes the possibility of early discitis and osteomyelitis inthis region must be considered. Clinical correlation and continued close interval follow-up is recommended.    L1-2: Bilateral hypertrophic facet changes seen. Mild narrowing of the spinal canal. Severe narrowing of the right neural foramen    L2-3: Disc bulge and bilateral hypertrophic facet changes seen. Hypertrophic change involving both ligamentum flavum flavum. Moderate narrowing of the spinal canal. Severe narrowing of the right neural foramen    L3-4: Disc bulge and bilateral hypertrophic facet changes seen. Severe narrowing of the spinal canal. Moderate narrowing of the left neural foramen and severe narrowing of the right neural foramen    L4-5: Disc bulge is seen which is more prominent on the left side. Bilateral hypertrophic facetchanges are seen. Moderate narrowing of the spinal canal. Severe narrowing of the left neural foramen.    L5-S1: Disc bulge and bilateral hypertrophic facet changes seen. This disc bulge is more prominent on the left side and does appear to contact the left S1 nerve root. Mild to moderate narrowing of the left spinal canal seen. Mild to moderate narrowing of both neural foramen    The conus ends at the top of L2 and appears normal    Atrophic changes suspected involving the right psoas muscle.    IMPRESSION: Degenerative changes as described above    Abnormal signal with associated enhancement is seen involving the endplates adjacent to the L2-3 disc space. There is slight increased T2 prolongation involving the L2-3 disc space with associated widening. While these findings could be compatible with degenerative changes possibly of early discitis and osteomyelitis must be considered. Clinical correlation and continued close interval follow-up is recommended.      < end of copied text >      ALLERGIES  No Known Allergies      MEDICATIONS   acetaminophen   Tablet .. 500 milliGRAM(s) Oral every 8 hours  atorvastatin 20 milliGRAM(s) Oral at bedtime  ceFAZolin   IVPB 3000 milliGRAM(s) IV Intermittent every 8 hours  diphenhydrAMINE 25 milliGRAM(s) Oral at bedtime PRN  folic acid 1 milliGRAM(s) Oral daily  HYDROmorphone  Injectable 0.5 milliGRAM(s) IV Push every 3 hours PRN  HYDROmorphone  Injectable 0.25 milliGRAM(s) IV Push every 3 hours PRN  hydroxychloroquine 200 milliGRAM(s) Oral daily  influenza  Vaccine (HIGH DOSE) 0.7 milliLiter(s) IntraMuscular once  levothyroxine 200 MICROGram(s) Oral daily  losartan 50 milliGRAM(s) Oral daily  melatonin 3 milliGRAM(s) Oral at bedtime PRN  oxyCODONE    IR 5 milliGRAM(s) Oral every 3 hours PRN  oxyCODONE    IR 10 milliGRAM(s) Oral every 3 hours PRN  polyethylene glycol 3350 17 Gram(s) Oral daily  senna 2 Tablet(s) Oral at bedtime  tiZANidine 1 milliGRAM(s) Oral every 6 hours      ----------------------------------------------------------------------------------------  PHYSICAL EXAM  Constitutional - NAD, in mild distress, in bed   HEENT - NCAT, EOMI, +drains   Neck - Supple, No limited ROM  Chest - Breathing comfortably, No wheezing  Cardiovascular - well perfused   Abdomen - Soft  Extremities - No C/C/E, No calf tenderness   Neurologic Exam -                    Cognitive - Awake, Alert, AAO to self, place, date, year, situation     Communication - Fluent, No dysarthria       Motor - moves all ext       Sensory - Intact to LT     Psychiatric - crying at times   ----------------------------------------------------------------------------------------  ASSESSMENT/PLAN  75yFemale with h/o HTN, RA, SLE on plaquenil, hypothyroidism with functional deficits after Posterior T10-L5 instrumented fusion, L1-L5 laminectomies, L L4-L5 foraminotomy  bowel regimen, miralax, senna   Pain - Tylenol, Dilaudid, tizanidine q6 hours   DVT PPX - SCDs  Rehab - Will continue to follow for ongoing rehab needs and recommendations.   patient has been unable to tolerate therapy post op, needs full PT/OT evals  she will need inpatient rehabilitation, level to be determined based on functional mobility/ability to tolerate three hours of therapy daily  continue bedside PT/OT  d/w patient and her daughter Opal

## 2021-01-05 LAB
HCT VFR BLD CALC: 29.9 % — LOW (ref 34.5–45)
HGB BLD-MCNC: 9.4 G/DL — LOW (ref 11.5–15.5)
MCHC RBC-ENTMCNC: 29.2 PG — SIGNIFICANT CHANGE UP (ref 27–34)
MCHC RBC-ENTMCNC: 31.4 GM/DL — LOW (ref 32–36)
MCV RBC AUTO: 92.9 FL — SIGNIFICANT CHANGE UP (ref 80–100)
NRBC # BLD: 0 /100 WBCS — SIGNIFICANT CHANGE UP
NRBC # FLD: 0 K/UL — SIGNIFICANT CHANGE UP
PLATELET # BLD AUTO: 244 K/UL — SIGNIFICANT CHANGE UP (ref 150–400)
RBC # BLD: 3.22 M/UL — LOW (ref 3.8–5.2)
RBC # FLD: 15.9 % — HIGH (ref 10.3–14.5)
WBC # BLD: 9.17 K/UL — SIGNIFICANT CHANGE UP (ref 3.8–10.5)
WBC # FLD AUTO: 9.17 K/UL — SIGNIFICANT CHANGE UP (ref 3.8–10.5)

## 2021-01-05 PROCEDURE — 99232 SBSQ HOSP IP/OBS MODERATE 35: CPT

## 2021-01-05 RX ADMIN — Medication 30 MILLIGRAM(S): at 22:35

## 2021-01-05 RX ADMIN — OXYCODONE HYDROCHLORIDE 5 MILLIGRAM(S): 5 TABLET ORAL at 00:24

## 2021-01-05 RX ADMIN — OXYCODONE HYDROCHLORIDE 10 MILLIGRAM(S): 5 TABLET ORAL at 10:12

## 2021-01-05 RX ADMIN — Medication 30 MILLIGRAM(S): at 15:00

## 2021-01-05 RX ADMIN — OXYCODONE HYDROCHLORIDE 10 MILLIGRAM(S): 5 TABLET ORAL at 15:20

## 2021-01-05 RX ADMIN — Medication 1 MILLIGRAM(S): at 11:49

## 2021-01-05 RX ADMIN — POLYETHYLENE GLYCOL 3350 17 GRAM(S): 17 POWDER, FOR SOLUTION ORAL at 13:52

## 2021-01-05 RX ADMIN — Medication 500 MILLIGRAM(S): at 15:00

## 2021-01-05 RX ADMIN — TIZANIDINE 1 MILLIGRAM(S): 4 TABLET ORAL at 05:13

## 2021-01-05 RX ADMIN — Medication 200 MILLIGRAM(S): at 11:49

## 2021-01-05 RX ADMIN — Medication 200 MILLIGRAM(S): at 05:16

## 2021-01-05 RX ADMIN — Medication 500 MILLIGRAM(S): at 22:34

## 2021-01-05 RX ADMIN — OXYCODONE HYDROCHLORIDE 10 MILLIGRAM(S): 5 TABLET ORAL at 21:00

## 2021-01-05 RX ADMIN — HYDROMORPHONE HYDROCHLORIDE 0.5 MILLIGRAM(S): 2 INJECTION INTRAMUSCULAR; INTRAVENOUS; SUBCUTANEOUS at 11:50

## 2021-01-05 RX ADMIN — TIZANIDINE 1 MILLIGRAM(S): 4 TABLET ORAL at 00:24

## 2021-01-05 RX ADMIN — SENNA PLUS 2 TABLET(S): 8.6 TABLET ORAL at 22:35

## 2021-01-05 RX ADMIN — Medication 30 MILLIGRAM(S): at 13:52

## 2021-01-05 RX ADMIN — Medication 30 MILLIGRAM(S): at 05:16

## 2021-01-05 RX ADMIN — ATORVASTATIN CALCIUM 20 MILLIGRAM(S): 80 TABLET, FILM COATED ORAL at 22:34

## 2021-01-05 RX ADMIN — OXYCODONE HYDROCHLORIDE 10 MILLIGRAM(S): 5 TABLET ORAL at 11:27

## 2021-01-05 RX ADMIN — ENOXAPARIN SODIUM 40 MILLIGRAM(S): 100 INJECTION SUBCUTANEOUS at 17:45

## 2021-01-05 RX ADMIN — OXYCODONE HYDROCHLORIDE 10 MILLIGRAM(S): 5 TABLET ORAL at 20:16

## 2021-01-05 RX ADMIN — LOSARTAN POTASSIUM 50 MILLIGRAM(S): 100 TABLET, FILM COATED ORAL at 05:16

## 2021-01-05 RX ADMIN — HYDROMORPHONE HYDROCHLORIDE 0.5 MILLIGRAM(S): 2 INJECTION INTRAMUSCULAR; INTRAVENOUS; SUBCUTANEOUS at 12:30

## 2021-01-05 RX ADMIN — Medication 200 MICROGRAM(S): at 05:13

## 2021-01-05 RX ADMIN — OXYCODONE HYDROCHLORIDE 5 MILLIGRAM(S): 5 TABLET ORAL at 01:20

## 2021-01-05 RX ADMIN — Medication 500 MILLIGRAM(S): at 05:12

## 2021-01-05 RX ADMIN — ENOXAPARIN SODIUM 40 MILLIGRAM(S): 100 INJECTION SUBCUTANEOUS at 05:14

## 2021-01-05 RX ADMIN — Medication 500 MILLIGRAM(S): at 13:52

## 2021-01-05 RX ADMIN — Medication 3 MILLIGRAM(S): at 22:34

## 2021-01-05 RX ADMIN — Medication 500 MILLIGRAM(S): at 23:05

## 2021-01-05 RX ADMIN — Medication 30 MILLIGRAM(S): at 23:05

## 2021-01-05 RX ADMIN — Medication 200 MILLIGRAM(S): at 13:54

## 2021-01-05 RX ADMIN — OXYCODONE HYDROCHLORIDE 10 MILLIGRAM(S): 5 TABLET ORAL at 16:08

## 2021-01-05 NOTE — PROGRESS NOTE ADULT - ASSESSMENT
76 YO female Luis S/P Posterior T10-L5 instrumented fusion, L1-L5 laminectomies, L L4-L5 foraminotomy

## 2021-01-05 NOTE — PROGRESS NOTE ADULT - SUBJECTIVE AND OBJECTIVE BOX
/Patient is a 75y old  Female who presents with a chief complaint of acute on chronic low back pain    SUBJECTIVE / OVERNIGHT EVENTS: Reports 9/10 pain in back today. Also reports constipation. No N/V/D.     MEDICATIONS  (STANDING):  atorvastatin 20 milliGRAM(s) Oral at bedtime  chlorhexidine 0.12% Liquid 15 milliLiter(s) Oral Mucosa every 12 hours  folic acid 1 milliGRAM(s) Oral daily  hydroxychloroquine 200 milliGRAM(s) Oral daily  influenza  Vaccine (HIGH DOSE) 0.7 milliLiter(s) IntraMuscular once  levothyroxine 200 MICROGram(s) Oral daily  losartan 50 milliGRAM(s) Oral daily  polyethylene glycol 3350 17 Gram(s) Oral daily  polyethylene glycol/electrolyte Solution 500 milliLiter(s) Oral once  senna 2 Tablet(s) Oral at bedtime    MEDICATIONS  (PRN):  acetaminophen   Tablet .. 650 milliGRAM(s) Oral every 6 hours PRN Mild Pain (1 - 3)  magnesium hydroxide Suspension 30 milliLiter(s) Oral daily PRN Constipation  oxycodone    5 mG/acetaminophen 325 mG 2 Tablet(s) Oral every 6 hours PRN Severe Pain (7 - 10)  oxycodone    5 mG/acetaminophen 325 mG 1 Tablet(s) Oral every 6 hours PRN Moderate Pain (4 - 6)      PHYSICAL EXAM:  Vital Signs Last 24 Hrs  T(C): 36.8 (05 Jan 2021 10:12), Max: 37 (04 Jan 2021 11:48)  T(F): 98.3 (05 Jan 2021 10:12), Max: 98.6 (04 Jan 2021 11:48)  HR: 67 (05 Jan 2021 10:12) (62 - 80)  BP: 104/59 (05 Jan 2021 10:12) (90/55 - 115/53)  BP(mean): --  RR: 15 (05 Jan 2021 10:12) (15 - 16)  SpO2: 99% (05 Jan 2021 10:12) (97% - 100%)    CONSTITUTIONAL: NAD, well-developed, well-groomed  RESPIRATORY: Normal respiratory effort; lungs are clear to auscultation bilaterally  CARDIOVASCULAR: Regular rate and rhythm, normal S1 and S2, no murmur/rub/gallop; No lower extremity edema  GASTROINTESTINAL: Nontender to palpation, normoactive bowel sounds, no rebound/guarding; No hepatosplenomegaly  MUSCULOSKELETAL:  no clubbing or cyanosis of digits; no joint swelling or tenderness to palpation  NEUROLOGY: non-focal; no gross sensory deficits   PSYCH: A+O to person, place, and time; affect appropriate  SKIN: No rashes; warm. 3 Surgical drains intact     LABS:                          9.4    9.17  )-----------( 244      ( 05 Jan 2021 05:23 )             29.9   01-04    137  |  107  |  26<H>  ----------------------------<  101<H>  4.1   |  24  |  1.02    Ca    8.2<L>      04 Jan 2021 07:12  Phos  3.4     01-04  Mg     2.1     01-04        RADIOLOGY & ADDITIONAL TESTS:  Results Reviewed: Y  Imaging Personally Reviewed: Y  Electrocardiogram Personally Reviewed:    COORDINATION OF CARE:  Care Discussed with Consultants/Other Providers [Y/N]:  Prior or Outpatient Records Reviewed [Y/N]:

## 2021-01-05 NOTE — PROGRESS NOTE ADULT - PROBLEM SELECTOR PLAN 1
MR thoracic/lumbar shows multilevel degenerative disease with several areas of severe neural foramen stenosis likely leading to LLE weakness, no evidence of cord compression   - c/w pain control and bowel regimen  -s/p posterior T10-L5 instrumented fusion, L1-L5 laminectomies, L L4-L5 foraminotomy on 1/1  - PT eval - likely will need rehab

## 2021-01-05 NOTE — PROGRESS NOTE ADULT - SUBJECTIVE AND OBJECTIVE BOX
Still having pain  Vital Signs Last 24 Hrs  T(C): 36.6 (05 Jan 2021 00:32), Max: 37 (04 Jan 2021 11:48)  T(F): 97.8 (05 Jan 2021 00:32), Max: 98.6 (04 Jan 2021 11:48)  HR: 75 (05 Jan 2021 00:32) (67 - 80)  BP: 107/45 (05 Jan 2021 00:32) (90/55 - 112/59)  BP(mean): --  RR: 16 (05 Jan 2021 00:32) (15 - 18)  SpO2: 97% (05 Jan 2021 00:32) (97% - 99%)    AAO X 3  PERRLA, EOMI  CONNELL strength 5/5 bilateral UE  4/5 bilateral LE limited by pain  SILT    HMV # 1: 75cc  HMV #2: 50cc  HMV #3: 52.5cc    MEDICATIONS  (STANDING):  acetaminophen   Tablet .. 500 milliGRAM(s) Oral every 8 hours  atorvastatin 20 milliGRAM(s) Oral at bedtime  ceFAZolin   IVPB 3000 milliGRAM(s) IV Intermittent every 8 hours  enoxaparin Injectable 40 milliGRAM(s) SubCutaneous two times a day  folic acid 1 milliGRAM(s) Oral daily  hydroxychloroquine 200 milliGRAM(s) Oral daily  influenza  Vaccine (HIGH DOSE) 0.7 milliLiter(s) IntraMuscular once  ketorolac   Injectable 30 milliGRAM(s) IV Push every 8 hours  levothyroxine 200 MICROGram(s) Oral daily  losartan 50 milliGRAM(s) Oral daily  polyethylene glycol 3350 17 Gram(s) Oral daily  senna 2 Tablet(s) Oral at bedtime  tiZANidine 1 milliGRAM(s) Oral every 6 hours    MEDICATIONS  (PRN):  diphenhydrAMINE 25 milliGRAM(s) Oral at bedtime PRN Insomnia  HYDROmorphone  Injectable 0.5 milliGRAM(s) IV Push every 3 hours PRN Severe breakthrough Pain (7 - 10)  HYDROmorphone  Injectable 0.25 milliGRAM(s) IV Push every 3 hours PRN Moderate breakthrough Pain (4 - 6)  melatonin 3 milliGRAM(s) Oral at bedtime PRN Insomnia  oxyCODONE    IR 5 milliGRAM(s) Oral every 3 hours PRN Moderate Pain (4 - 6)  oxyCODONE    IR 10 milliGRAM(s) Oral every 3 hours PRN Severe Pain (7 - 10)                          9.3    11.77 )-----------( 241      ( 04 Jan 2021 07:12 )             29.1     01-04    137  |  107  |  26<H>  ----------------------------<  101<H>  4.1   |  24  |  1.02    Ca    8.2<L>      04 Jan 2021 07:12  Phos  3.4     01-04  Mg     2.1     01-04

## 2021-01-06 ENCOUNTER — TRANSCRIPTION ENCOUNTER (OUTPATIENT)
Age: 76
End: 2021-01-06

## 2021-01-06 PROCEDURE — 99232 SBSQ HOSP IP/OBS MODERATE 35: CPT

## 2021-01-06 RX ORDER — POLYETHYLENE GLYCOL 3350 17 G/17G
17 POWDER, FOR SOLUTION ORAL
Qty: 0 | Refills: 0 | DISCHARGE
Start: 2021-01-06

## 2021-01-06 RX ORDER — SENNA PLUS 8.6 MG/1
2 TABLET ORAL
Qty: 0 | Refills: 0 | DISCHARGE
Start: 2021-01-06

## 2021-01-06 RX ADMIN — Medication 30 MILLIGRAM(S): at 06:26

## 2021-01-06 RX ADMIN — OXYCODONE HYDROCHLORIDE 10 MILLIGRAM(S): 5 TABLET ORAL at 12:18

## 2021-01-06 RX ADMIN — Medication 200 MICROGRAM(S): at 06:25

## 2021-01-06 RX ADMIN — SENNA PLUS 2 TABLET(S): 8.6 TABLET ORAL at 20:48

## 2021-01-06 RX ADMIN — OXYCODONE HYDROCHLORIDE 10 MILLIGRAM(S): 5 TABLET ORAL at 01:20

## 2021-01-06 RX ADMIN — Medication 200 MILLIGRAM(S): at 12:18

## 2021-01-06 RX ADMIN — Medication 30 MILLIGRAM(S): at 15:11

## 2021-01-06 RX ADMIN — Medication 200 MILLIGRAM(S): at 07:57

## 2021-01-06 RX ADMIN — Medication 200 MILLIGRAM(S): at 00:37

## 2021-01-06 RX ADMIN — ENOXAPARIN SODIUM 40 MILLIGRAM(S): 100 INJECTION SUBCUTANEOUS at 17:39

## 2021-01-06 RX ADMIN — ATORVASTATIN CALCIUM 20 MILLIGRAM(S): 80 TABLET, FILM COATED ORAL at 20:48

## 2021-01-06 RX ADMIN — ENOXAPARIN SODIUM 40 MILLIGRAM(S): 100 INJECTION SUBCUTANEOUS at 06:25

## 2021-01-06 RX ADMIN — OXYCODONE HYDROCHLORIDE 10 MILLIGRAM(S): 5 TABLET ORAL at 17:38

## 2021-01-06 RX ADMIN — OXYCODONE HYDROCHLORIDE 10 MILLIGRAM(S): 5 TABLET ORAL at 00:37

## 2021-01-06 RX ADMIN — Medication 500 MILLIGRAM(S): at 06:25

## 2021-01-06 RX ADMIN — OXYCODONE HYDROCHLORIDE 10 MILLIGRAM(S): 5 TABLET ORAL at 06:25

## 2021-01-06 RX ADMIN — Medication 1 MILLIGRAM(S): at 12:18

## 2021-01-06 RX ADMIN — POLYETHYLENE GLYCOL 3350 17 GRAM(S): 17 POWDER, FOR SOLUTION ORAL at 15:12

## 2021-01-06 RX ADMIN — OXYCODONE HYDROCHLORIDE 10 MILLIGRAM(S): 5 TABLET ORAL at 20:48

## 2021-01-06 NOTE — DISCHARGE NOTE PROVIDER - NSDCMRMEDTOKEN_GEN_ALL_CORE_FT
acetaminophen 500 mg oral tablet: 2 tab(s) orally every 8 hours  apixaban 5 mg oral tablet: 1 tab(s) orally every 12 hours  atorvastatin 20 mg oral tablet: 1 tab(s) orally once a day  folic acid 1 mg oral tablet: 1 tab(s) orally once a day  hydroxychloroquine 200 mg oral tablet: 1 tab(s) orally once a day  losartan 50 mg oral tablet: 1 tab(s) orally once a day  methotrexate 2.5 mg oral tablet: 6 tab(s) orally once a week on mondays  oxyCODONE 5 mg oral capsule: 1 cap(s) orally every 6 hours as needed for severe pain, don&#x27;t drive or drink alcohol while taking this medication. MDD:4  polyethylene glycol 3350 oral powder for reconstitution: 17 gram(s) orally once a day  senna oral tablet: 2 tab(s) orally once a day (at bedtime)  Synthroid 200 mcg (0.2 mg) oral tablet: 1 tab(s) orally once a day   acetaminophen 500 mg oral tablet: 2 tab(s) orally every 8 hours  apixaban 5 mg oral tablet: 1 tab(s) orally every 12 hours  atorvastatin 20 mg oral tablet: 1 tab(s) orally once a day  folic acid 1 mg oral tablet: 1 tab(s) orally once a day  hydroxychloroquine 200 mg oral tablet: 1 tab(s) orally once a day  losartan 50 mg oral tablet: 1 tab(s) orally once a day  magnesium hydroxide 8% oral suspension: 30 milliliter(s) orally once a day, As needed, Constipation  methotrexate 2.5 mg oral tablet: 6 tab(s) orally once a week on mondays  oxyCODONE 10 mg oral tablet: 1 tab(s) orally every 4 hours, As needed, Severe Pain (7 - 10)  oxyCODONE 5 mg oral capsule: 1 cap(s) orally every 6 hours as needed for severe pain, don&#x27;t drive or drink alcohol while taking this medication. MDD:4  polyethylene glycol 3350 oral powder for reconstitution: 17 gram(s) orally once a day  senna oral tablet: 2 tab(s) orally once a day (at bedtime)  sorbitol 70%/mineral oil/magnesium hydroxide/glycerin Enema: 120 milliliter(s) rectal once  Synthroid 200 mcg (0.2 mg) oral tablet: 1 tab(s) orally once a day

## 2021-01-06 NOTE — DISCHARGE NOTE PROVIDER - HOSPITAL COURSE
HPI:  75F with past medical history of HTN, HLD, SLE, RA, hypothyroidism, provoked R. popliteal DVT in 2019 on eliquis, chronic low back pain presents to ED for acute on chronic low back pain. Pt has several months of chronic lumbosacral back pain, evaluated by outpatient ortho and referred for physical therapy and pain management. She has been receiving ?vitamin spinal injections. She last received low back inj on 12/16 after which she her low back pain worsened. She took oxycodone 5 mg qd (prescribed in september during a ED visit) and acetaminophen for past three days without relief. She also endorses worsening pain during ambulation with walker and has affected her quality of life. She has constipation, last BM 4 days ago. Otherwise denies fever, chills, N/V, abdominal pain, dysuria, urinary retention, fecal incontinence, saddle anesthesia, fall, LOC, trauma. During ROS she endorsed LLE weakness for several months, no numbness or tingling and has LLE edema >RLE edema, reported undergoing LLE US 4 months ago, was negative for DVT. Currently has 8/10 pain, improved to 5/10 with oxycodone 5 mg x 2 in ED. (28 Dec 2020 11:16)     12/28: Mri T/Ls: IMPRESSION: Degenerative changes as described above    Abnormal signal with associated enhancement is seen involving the endplates adjacent to the L2-3 disc space. There is slight increased T2 prolongation involving the L2-3 disc space with associated widening. While these findings could be compatible with degenerative changes possibly of early discitis and osteomyelitis must be considered. Clinical correlation and continued close interval follow-up is recommended.    01/01/20 Patient s/p L1-L5 laminectomy, T10-L5 posterior surgical fusion. HMV x 3 drains placed.     PT/OT recommended Rehab.

## 2021-01-06 NOTE — PROGRESS NOTE ADULT - SUBJECTIVE AND OBJECTIVE BOX
/Patient is a 75y old  Female who presents with a chief complaint of acute on chronic low back pain    SUBJECTIVE / OVERNIGHT EVENTS: Reports pain is better today. Also reports constipation but wants to try prune juice today. No N/V/D.     MEDICATIONS  (STANDING):  atorvastatin 20 milliGRAM(s) Oral at bedtime  chlorhexidine 0.12% Liquid 15 milliLiter(s) Oral Mucosa every 12 hours  folic acid 1 milliGRAM(s) Oral daily  hydroxychloroquine 200 milliGRAM(s) Oral daily  influenza  Vaccine (HIGH DOSE) 0.7 milliLiter(s) IntraMuscular once  levothyroxine 200 MICROGram(s) Oral daily  losartan 50 milliGRAM(s) Oral daily  polyethylene glycol 3350 17 Gram(s) Oral daily  polyethylene glycol/electrolyte Solution 500 milliLiter(s) Oral once  senna 2 Tablet(s) Oral at bedtime    MEDICATIONS  (PRN):  acetaminophen   Tablet .. 650 milliGRAM(s) Oral every 6 hours PRN Mild Pain (1 - 3)  magnesium hydroxide Suspension 30 milliLiter(s) Oral daily PRN Constipation  oxycodone    5 mG/acetaminophen 325 mG 2 Tablet(s) Oral every 6 hours PRN Severe Pain (7 - 10)  oxycodone    5 mG/acetaminophen 325 mG 1 Tablet(s) Oral every 6 hours PRN Moderate Pain (4 - 6)      PHYSICAL EXAM:  Vital Signs Last 24 Hrs  T(C): 36.6 (06 Jan 2021 06:22), Max: 36.8 (05 Jan 2021 11:47)  T(F): 97.8 (06 Jan 2021 06:22), Max: 98.3 (05 Jan 2021 11:47)  HR: 67 (06 Jan 2021 06:22) (67 - 80)  BP: 110/60 (06 Jan 2021 06:22) (107/51 - 136/75)  BP(mean): --  RR: 16 (06 Jan 2021 06:22) (15 - 18)  SpO2: 99% (06 Jan 2021 06:22) (96% - 100%)    CONSTITUTIONAL: NAD, well-developed, well-groomed  RESPIRATORY: Normal respiratory effort; lungs are clear to auscultation bilaterally  CARDIOVASCULAR: Regular rate and rhythm, normal S1 and S2, no murmur/rub/gallop; No lower extremity edema  GASTROINTESTINAL: Nontender to palpation, normoactive bowel sounds, no rebound/guarding; No hepatosplenomegaly  MUSCULOSKELETAL:  no clubbing or cyanosis of digits; no joint swelling or tenderness to palpation  NEUROLOGY: non-focal; no gross sensory deficits   PSYCH: A+O to person, place, and time; affect appropriate  SKIN: No rashes; warm. 3 Surgical drains intact     LABS:                          9.4    9.17  )-----------( 244      ( 05 Jan 2021 05:23 )             29.9   01-04    137  |  107  |  26<H>  ----------------------------<  101<H>  4.1   |  24  |  1.02    Ca    8.2<L>      04 Jan 2021 07:12  Phos  3.4     01-04  Mg     2.1     01-04        RADIOLOGY & ADDITIONAL TESTS:  Results Reviewed: Y  Imaging Personally Reviewed: Y  Electrocardiogram Personally Reviewed:    COORDINATION OF CARE:  Care Discussed with Consultants/Other Providers [Y/N]:  Prior or Outpatient Records Reviewed [Y/N]:

## 2021-01-06 NOTE — DISCHARGE NOTE PROVIDER - CARE PROVIDER_API CALL
[facility] - [facility]  [address], [address]  [place] [address]  Phone: [phone]  Fax: [phone]  Follow Up Time: 1 week

## 2021-01-06 NOTE — PROGRESS NOTE ADULT - ASSESSMENT
74 YO female stable S/P Posterior T10-L5 instrumented fusion, L1-L5 laminectomies, L L4-L5 foraminotomy

## 2021-01-06 NOTE — PROGRESS NOTE ADULT - SUBJECTIVE AND OBJECTIVE BOX
No issues overnight  Vital Signs Last 24 Hrs  T(C): 36.5 (06 Jan 2021 01:24), Max: 36.9 (05 Jan 2021 09:14)  T(F): 97.7 (06 Jan 2021 01:24), Max: 98.5 (05 Jan 2021 09:14)  HR: 67 (06 Jan 2021 01:24) (62 - 80)  BP: 107/51 (06 Jan 2021 01:24) (102/57 - 136/75)  BP(mean): --  RR: 18 (06 Jan 2021 01:24) (15 - 18)  SpO2: 96% (06 Jan 2021 01:24) (96% - 100%)    AAO X 3  PERRLA, EOMI  CONNELL strength 5/5 bilateral UE  4/5 bilateral LE limited by pain  SILT    HMV # 1: 25cc  HMV #2: 25cc  HMV #3: 15cc    MEDICATIONS  (STANDING):  acetaminophen   Tablet .. 500 milliGRAM(s) Oral every 8 hours  atorvastatin 20 milliGRAM(s) Oral at bedtime  ceFAZolin   IVPB 3000 milliGRAM(s) IV Intermittent every 8 hours  enoxaparin Injectable 40 milliGRAM(s) SubCutaneous two times a day  folic acid 1 milliGRAM(s) Oral daily  hydroxychloroquine 200 milliGRAM(s) Oral daily  influenza  Vaccine (HIGH DOSE) 0.7 milliLiter(s) IntraMuscular once  ketorolac   Injectable 30 milliGRAM(s) IV Push every 8 hours  levothyroxine 200 MICROGram(s) Oral daily  polyethylene glycol 3350 17 Gram(s) Oral daily  senna 2 Tablet(s) Oral at bedtime    MEDICATIONS  (PRN):  diphenhydrAMINE 25 milliGRAM(s) Oral at bedtime PRN Insomnia  HYDROmorphone  Injectable 0.5 milliGRAM(s) IV Push every 3 hours PRN Severe breakthrough Pain (7 - 10)  HYDROmorphone  Injectable 0.25 milliGRAM(s) IV Push every 3 hours PRN Moderate breakthrough Pain (4 - 6)  melatonin 3 milliGRAM(s) Oral at bedtime PRN Insomnia  oxyCODONE    IR 5 milliGRAM(s) Oral every 3 hours PRN Moderate Pain (4 - 6)  oxyCODONE    IR 10 milliGRAM(s) Oral every 3 hours PRN Severe Pain (7 - 10)                          9.4    9.17  )-----------( 244      ( 05 Jan 2021 05:23 )             29.9     01-04    137  |  107  |  26<H>  ----------------------------<  101<H>  4.1   |  24  |  1.02    Ca    8.2<L>      04 Jan 2021 07:12  Phos  3.4     01-04  Mg     2.1     01-04

## 2021-01-06 NOTE — DISCHARGE NOTE PROVIDER - NSDCCPTREATMENT_GEN_ALL_CORE_FT
PRINCIPAL PROCEDURE  Procedure: Decompression, spine, lumbar, posterior approach, with fusion of posterior spinal column  Findings and Treatment:

## 2021-01-06 NOTE — PROGRESS NOTE ADULT - PROBLEM SELECTOR PLAN 3
Follows with vascular cardiologist Dr. Hoyos outpatient.  US July 2019:  DVT noted in the right popliteal vein and visualized segment of the right  posterior tibial vein.  - provoked DVT July 2019 in setting of hip surgery, already >6 months of treatment, hold eliquis, LE dopplers on 12/29 neg  - lovenox for dvt ppx

## 2021-01-07 PROCEDURE — 99222 1ST HOSP IP/OBS MODERATE 55: CPT

## 2021-01-07 PROCEDURE — 99232 SBSQ HOSP IP/OBS MODERATE 35: CPT

## 2021-01-07 RX ORDER — GLYCERIN ADULT
1 SUPPOSITORY, RECTAL RECTAL ONCE
Refills: 0 | Status: COMPLETED | OUTPATIENT
Start: 2021-01-07 | End: 2021-01-07

## 2021-01-07 RX ADMIN — Medication 1 MILLIGRAM(S): at 11:15

## 2021-01-07 RX ADMIN — OXYCODONE HYDROCHLORIDE 10 MILLIGRAM(S): 5 TABLET ORAL at 00:05

## 2021-01-07 RX ADMIN — ATORVASTATIN CALCIUM 20 MILLIGRAM(S): 80 TABLET, FILM COATED ORAL at 22:22

## 2021-01-07 RX ADMIN — OXYCODONE HYDROCHLORIDE 10 MILLIGRAM(S): 5 TABLET ORAL at 05:07

## 2021-01-07 RX ADMIN — HYDROMORPHONE HYDROCHLORIDE 0.5 MILLIGRAM(S): 2 INJECTION INTRAMUSCULAR; INTRAVENOUS; SUBCUTANEOUS at 10:58

## 2021-01-07 RX ADMIN — Medication 200 MICROGRAM(S): at 05:07

## 2021-01-07 RX ADMIN — Medication 1 SUPPOSITORY(S): at 22:23

## 2021-01-07 RX ADMIN — ENOXAPARIN SODIUM 40 MILLIGRAM(S): 100 INJECTION SUBCUTANEOUS at 05:07

## 2021-01-07 RX ADMIN — Medication 200 MILLIGRAM(S): at 11:15

## 2021-01-07 RX ADMIN — OXYCODONE HYDROCHLORIDE 10 MILLIGRAM(S): 5 TABLET ORAL at 22:22

## 2021-01-07 RX ADMIN — ENOXAPARIN SODIUM 40 MILLIGRAM(S): 100 INJECTION SUBCUTANEOUS at 17:59

## 2021-01-07 RX ADMIN — OXYCODONE HYDROCHLORIDE 10 MILLIGRAM(S): 5 TABLET ORAL at 17:59

## 2021-01-07 RX ADMIN — POLYETHYLENE GLYCOL 3350 17 GRAM(S): 17 POWDER, FOR SOLUTION ORAL at 11:15

## 2021-01-07 RX ADMIN — OXYCODONE HYDROCHLORIDE 10 MILLIGRAM(S): 5 TABLET ORAL at 09:30

## 2021-01-07 RX ADMIN — OXYCODONE HYDROCHLORIDE 10 MILLIGRAM(S): 5 TABLET ORAL at 12:29

## 2021-01-07 RX ADMIN — SENNA PLUS 2 TABLET(S): 8.6 TABLET ORAL at 22:22

## 2021-01-07 NOTE — CONSULT NOTE ADULT - REASON FOR ADMISSION
acute on chronic low back pain

## 2021-01-07 NOTE — CONSULT NOTE ADULT - SUBJECTIVE AND OBJECTIVE BOX
Patient is a 75y old  Female who presents with a chief complaint of acute on chronic low back pain (07 Jan 2021 13:20)      HPI:  75F with past medical history of HTN, HLD, SLE, RA, hypothyroidism, provoked R. popliteal DVT in 2019 on eliquis, chronic low back pain presents to ED for acute on chronic low back pain. Pt has several months of chronic lumbosacral back pain, evaluated by outpatient ortho and referred for physical therapy and pain management. She has been receiving ?vitamin spinal injections. She last received low back inj on 12/16 after which she her low back pain worsened. She took oxycodone 5 mg qd (prescribed in september during a ED visit) and acetaminophen for past three days without relief. She also endorses worsening pain during ambulation with walker and has affected her quality of life. She has constipation, last BM 4 days ago. Otherwise denies fever, chills, N/V, abdominal pain, dysuria, urinary retention, fecal incontinence, saddle anesthesia, fall, LOC, trauma. During ROS she endorsed LLE weakness for several months, no numbness or tingling and has LLE edema >RLE edema, reported undergoing LLE US 4 months ago, was negative for DVT. Currently has 8/10 pain, improved to 5/10 with oxycodone 5 mg x 2 in ED. (28 Dec 2020 11:16)  Patient went to OR 1/1/2021 S/P Posterior T10-L5 instrumented fusion, L1-L5 laminectomies, L L4-L5 foraminotomy   for fusion    Patient reports pain is 7/10 currently, she reports constipation.     REVIEW OF SYSTEMS  Constitutional - No fever, No weight loss, No fatigue  HEENT - No eye pain, No visual disturbances, No difficulty hearing, No tinnitus, No vertigo, No neck pain  Respiratory - + mild cough, No wheezing, No shortness of breath  Cardiovascular - No chest pain, No palpitations  Gastrointestinal - +abdominal pain, No nausea, No vomiting, No diarrhea, + constipation  Genitourinary - No dysuria, No frequency, No hematuria, No incontinence  Neurological - No headaches, No memory loss, + loss of strength, No numbness, No tremors  Skin - s/p surgery  Endocrine - No temperature intolerance  Musculoskeletal - + pain  Psychiatric - No depression, No anxiety    PAST MEDICAL & SURGICAL HISTORY  HTN (hypertension)  DVT (deep venous thrombosis)  Hypothyroidism  RA (rheumatoid arthritis)  Obesity, Class II, BMI 35-39.9, no comorbidity  Hypothyroid  Benign heart murmur  Hyperlipidemia  Hypertension  H/O degenerative disc disease  Osteoarthritis  SLE (systemic lupus erythematosus)  Rheumatoid arthritis  S/P knee replacement  Status post total hip replacement, right  S/P thyroid surgery  Lung cancer  S/P knee surgery  S/P carpal tunnel release  S/P eye surgery  S/P cataract surgery  S/P tonsillectomy      SOCIAL HISTORY  Smoking - Denied  EtOH - Denied   Drugs - Denied    FUNCTIONAL HISTORY  Lives alone in 1 story house  Independent with RW at baseline    CURRENT FUNCTIONAL STATUS  BM: max of 2  T: 10 steps with RW and mod of 2    FAMILY HISTORY   No pertinent family history in first degree relatives    FHx: rheumatoid arthritis        RECENT LABS/IMAGING  < from: MR Lumbar Spine w/ IV Cont (12.28.20 @ 02:51) >    EXAM:  MR SPINE LUMBAR IC      EXAM:  MR SPINE THORACIC IC        PROCEDURE DATE:  Dec 28 2020         INTERPRETATION:  Clinical indication: Recent spinal injections. Patient presents with back pain.    MRI of the thoracic and lumbar spine was performed using sagittal T1-T2 and STIR sequence. Axial T1 and T2-weighted sequences were performed as well. The patient was injected with approximately 10 cc of Gadavist IV and sagittal T1-weighted sequence fat suppression was performed. Axial T1-weighted sequence was performed. Thoracic spine:    Scoliosis is identified.    Disc desiccation seen throughout the third thoracic spine region which is secondary to chronic degenerative changes.    The vertebral body height appears maintained.    There areno abnormal disc herniations identified.    T9-10: Disc bulge and bilateral hypertrophic facet changes seen. Moderate narrowing spinal canal. Severe narrowing left neural foramen    T10-11: Disc bulge and bilateral hypertrophic facet changes seen. Mild narrowing spinal canal. Severe narrowing left neural foramen    T11-12: Bilateral hypertrophic facet changes seen. Moderate narrowing left neural foramen.    T12-L1: Disc bulge and bilateral hypertrophic facet changes seen. Moderate narrowing rightneural foramen    The spinal cord demonstrates normal signal and caliber.    No abnormal areas enhancement seen.    Patient paraspinal soft tissues appear normal.    Lumbar spine:    Loss of the normal lumbar lordosis is seen.    Scoliosis is seen.    There is evidence of abnormal T1 and T2 prolongation with associated enhancement seen along seen involving the endplates adjacent to the L2-3 disc space. There is abnormal T2 prolongation seen involving the L2-3 disc spaces with associated widening of this disc space. There is also slight increase enhancement seen involving the interspinous process ligament at the L2-3 level. While these findings could be compatible with degenerative changes the possibility of early discitis and osteomyelitis inthis region must be considered. Clinical correlation and continued close interval follow-up is recommended.    L1-2: Bilateral hypertrophic facet changes seen. Mild narrowing of the spinal canal. Severe narrowing of the right neural foramen    L2-3: Disc bulge and bilateral hypertrophic facet changes seen. Hypertrophic change involving both ligamentum flavum flavum. Moderate narrowing of the spinal canal. Severe narrowing of the right neural foramen    L3-4: Disc bulge and bilateral hypertrophic facet changes seen. Severe narrowing of the spinal canal. Moderate narrowing of the left neural foramen and severe narrowing of the right neural foramen    L4-5: Disc bulge is seen which is more prominent on the left side. Bilateral hypertrophic facetchanges are seen. Moderate narrowing of the spinal canal. Severe narrowing of the left neural foramen.    L5-S1: Disc bulge and bilateral hypertrophic facet changes seen. This disc bulge is more prominent on the left side and does appear to contact the left S1 nerve root. Mild to moderate narrowing of the left spinal canal seen. Mild to moderate narrowing of both neural foramen    The conus ends at the top of L2 and appears normal    Atrophic changes suspected involving the right psoas muscle.    IMPRESSION: Degenerative changes as described above    Abnormal signal with associated enhancement is seen involving the endplates adjacent to the L2-3 disc space. There is slight increased T2 prolongation involving the L2-3 disc space with associated widening. While these findings could be compatible with degenerative changes possibly of early discitis and osteomyelitis must be considered. Clinical correlation and continued close interval follow-up is recommended.      < end of copied text >      VITALS  T(C): 36.8 (01-07-21 @ 10:24), Max: 36.8 (01-07-21 @ 02:15)  HR: 82 (01-07-21 @ 11:15) (72 - 88)  BP: 110/48 (01-07-21 @ 11:15) (108/45 - 124/49)  RR: 20 (01-07-21 @ 11:15) (16 - 20)  SpO2: 95% (01-07-21 @ 11:15) (95% - 100%)  Wt(kg): --    ALLERGIES  No Known Allergies      MEDICATIONS   atorvastatin 20 milliGRAM(s) Oral at bedtime  diphenhydrAMINE 25 milliGRAM(s) Oral at bedtime PRN  enoxaparin Injectable 40 milliGRAM(s) SubCutaneous two times a day  folic acid 1 milliGRAM(s) Oral daily  HYDROmorphone  Injectable 0.5 milliGRAM(s) IV Push every 3 hours PRN  HYDROmorphone  Injectable 0.25 milliGRAM(s) IV Push every 3 hours PRN  hydroxychloroquine 200 milliGRAM(s) Oral daily  influenza  Vaccine (HIGH DOSE) 0.7 milliLiter(s) IntraMuscular once  levothyroxine 200 MICROGram(s) Oral daily  melatonin 3 milliGRAM(s) Oral at bedtime PRN  oxyCODONE    IR 5 milliGRAM(s) Oral every 3 hours PRN  oxyCODONE    IR 10 milliGRAM(s) Oral every 3 hours PRN  polyethylene glycol 3350 17 Gram(s) Oral daily  senna 2 Tablet(s) Oral at bedtime      ----------------------------------------------------------------------------------------  PHYSICAL EXAM  Constitutional - NAD   HEENT - NCAT, EOMI   Chest - no respiratory distress  Cardiovascular - RRR, S1S2   Abdomen -   Soft, NTND  Extremities - healed L knee incision, No calf tenderness   Neurologic Exam -                    Cognitive - Awake, Alert, AAO to self, place, date, year, situation     Communication - Fluent, No dysarthria     Cranial Nerves - CN 2-12 intact     Motor -                      LEFT    UE - ShAB 5/5, EF 5/5, EE 5/5, WE 5/5,  5/5                    RIGHT UE - ShAB 5/5, EF 5/5, EE 5/5, WE 5/5,  5/5                    LEFT    LE - 4/5                    RIGHT LE - 4+/5     Sensory - Intact to LT          Balance - WNL Static  Psychiatric - Mood stable, Affect WNL  ----------------------------------------------------------------------------------------  ASSESSMENT/PLAN  74 yo female stable S/P Posterior T10-L5 instrumented fusion, L1-L5 laminectomies, L L4-L5 foraminotomy  constipation: pt reports last bm 1 week ago- recommend dulcolax suppository  Pain -dilaudid iv prn, oxy ir, with bowel regimen  Diet: regular  continue bedside PT and OT  turn and position q 2 to prevent skin breakdown  Rehab -    Recommend ACUTE inpatient rehabilitation for the functional deficits consisting of 3 hours of therapy/day & 24 hour RN/daily PMR physician for comorbid medical management. Will continue to follow for ongoing rehab needs and recommendations.     patient must be off iv pain medications 24 hours prior to rehab

## 2021-01-07 NOTE — PROGRESS NOTE ADULT - SUBJECTIVE AND OBJECTIVE BOX
Patient is a 75y old  Female who presents with a chief complaint of acute on chronic low back pain (07 Jan 2021 01:12)      SUBJECTIVE / OVERNIGHT EVENTS: Pt feeling generally well. Major complaint is constipation unrelieved with miralax.    MEDICATIONS  (STANDING):  atorvastatin 20 milliGRAM(s) Oral at bedtime  enoxaparin Injectable 40 milliGRAM(s) SubCutaneous two times a day  folic acid 1 milliGRAM(s) Oral daily  hydroxychloroquine 200 milliGRAM(s) Oral daily  influenza  Vaccine (HIGH DOSE) 0.7 milliLiter(s) IntraMuscular once  levothyroxine 200 MICROGram(s) Oral daily  polyethylene glycol 3350 17 Gram(s) Oral daily  senna 2 Tablet(s) Oral at bedtime    MEDICATIONS  (PRN):  diphenhydrAMINE 25 milliGRAM(s) Oral at bedtime PRN Insomnia  HYDROmorphone  Injectable 0.5 milliGRAM(s) IV Push every 3 hours PRN Severe breakthrough Pain (7 - 10)  HYDROmorphone  Injectable 0.25 milliGRAM(s) IV Push every 3 hours PRN Moderate breakthrough Pain (4 - 6)  melatonin 3 milliGRAM(s) Oral at bedtime PRN Insomnia  oxyCODONE    IR 5 milliGRAM(s) Oral every 3 hours PRN Moderate Pain (4 - 6)  oxyCODONE    IR 10 milliGRAM(s) Oral every 3 hours PRN Severe Pain (7 - 10)      CAPILLARY BLOOD GLUCOSE        I&O's Summary    06 Jan 2021 07:01  -  07 Jan 2021 07:00  --------------------------------------------------------  IN: 400 mL / OUT: 1550 mL / NET: -1150 mL        PHYSICAL EXAM:  Vital Signs Last 24 Hrs  T(C): 36.8 (07 Jan 2021 10:24), Max: 37 (06 Jan 2021 15:10)  T(F): 98.3 (07 Jan 2021 10:24), Max: 98.6 (06 Jan 2021 15:10)  HR: 82 (07 Jan 2021 11:15) (72 - 88)  BP: 110/48 (07 Jan 2021 11:15) (108/45 - 124/49)  BP(mean): --  RR: 20 (07 Jan 2021 11:15) (16 - 20)  SpO2: 95% (07 Jan 2021 11:15) (95% - 100%)  CONSTITUTIONAL: NAD, well-developed, well-groomed  EYES: PERRLA; conjunctiva and sclera clear  ENMT: Moist oral mucosa, no pharyngeal injection or exudates; normal dentition  NECK: Supple, no palpable masses; no thyromegaly  RESPIRATORY: Normal respiratory effort; lungs are clear to auscultation bilaterally  CARDIOVASCULAR: Regular rate and rhythm, normal S1 and S2, no murmur/rub/gallop; No lower extremity edema; Peripheral pulses are 2+ bilaterally  ABDOMEN: Nontender to palpation, normoactive bowel sounds, no rebound/guarding; No hepatosplenomegaly    LABS:                      RADIOLOGY & ADDITIONAL TESTS:  Results Reviewed:   Imaging Personally Reviewed:  Electrocardiogram Personally Reviewed:    COORDINATION OF CARE:  Care Discussed with Consultants/Other Providers [Y/N]:  Prior or Outpatient Records Reviewed [Y/N]:

## 2021-01-07 NOTE — PROGRESS NOTE ADULT - ASSESSMENT
76 YO female stable S/P Posterior T10-L5 instrumented fusion, L1-L5 laminectomies, L L4-L5 foraminotomy

## 2021-01-07 NOTE — CONSULT NOTE ADULT - CONSULT REASON
Intubated, q1 hr neuro check
Rehabilitation consult
s/p lumbar fusion
evaluate for rehab
DDD, T/L spine

## 2021-01-07 NOTE — PROGRESS NOTE ADULT - SUBJECTIVE AND OBJECTIVE BOX
No issues overnight  Vital Signs Last 24 Hrs  T(C): 36.6 (06 Jan 2021 21:16), Max: 37 (06 Jan 2021 15:10)  T(F): 97.8 (06 Jan 2021 21:16), Max: 98.6 (06 Jan 2021 15:10)  HR: 82 (06 Jan 2021 21:16) (64 - 82)  BP: 123/67 (06 Jan 2021 21:16) (107/51 - 124/49)  BP(mean): --  RR: 16 (06 Jan 2021 21:16) (16 - 18)  SpO2: 97% (06 Jan 2021 21:16) (96% - 100%)    AAO X 3  PERRLA, EOMI  CONNELL strength 5/5 bilateral UE  4/5 bilateral LE     MEDICATIONS  (STANDING):  atorvastatin 20 milliGRAM(s) Oral at bedtime  enoxaparin Injectable 40 milliGRAM(s) SubCutaneous two times a day  folic acid 1 milliGRAM(s) Oral daily  hydroxychloroquine 200 milliGRAM(s) Oral daily  influenza  Vaccine (HIGH DOSE) 0.7 milliLiter(s) IntraMuscular once  levothyroxine 200 MICROGram(s) Oral daily  polyethylene glycol 3350 17 Gram(s) Oral daily  senna 2 Tablet(s) Oral at bedtime    MEDICATIONS  (PRN):  diphenhydrAMINE 25 milliGRAM(s) Oral at bedtime PRN Insomnia  HYDROmorphone  Injectable 0.5 milliGRAM(s) IV Push every 3 hours PRN Severe breakthrough Pain (7 - 10)  HYDROmorphone  Injectable 0.25 milliGRAM(s) IV Push every 3 hours PRN Moderate breakthrough Pain (4 - 6)  melatonin 3 milliGRAM(s) Oral at bedtime PRN Insomnia  oxyCODONE    IR 5 milliGRAM(s) Oral every 3 hours PRN Moderate Pain (4 - 6)  oxyCODONE    IR 10 milliGRAM(s) Oral every 3 hours PRN Severe Pain (7 - 10)                          9.4    9.17  )-----------( 244      ( 05 Jan 2021 05:23 )             29.9

## 2021-01-08 PROCEDURE — 99232 SBSQ HOSP IP/OBS MODERATE 35: CPT

## 2021-01-08 RX ORDER — GLYCERIN ADULT
1 SUPPOSITORY, RECTAL RECTAL ONCE
Refills: 0 | Status: DISCONTINUED | OUTPATIENT
Start: 2021-01-08 | End: 2021-01-12

## 2021-01-08 RX ORDER — MAGNESIUM HYDROXIDE 400 MG/1
30 TABLET, CHEWABLE ORAL DAILY
Refills: 0 | Status: DISCONTINUED | OUTPATIENT
Start: 2021-01-08 | End: 2021-01-12

## 2021-01-08 RX ADMIN — Medication 200 MICROGRAM(S): at 06:08

## 2021-01-08 RX ADMIN — OXYCODONE HYDROCHLORIDE 10 MILLIGRAM(S): 5 TABLET ORAL at 09:47

## 2021-01-08 RX ADMIN — ENOXAPARIN SODIUM 40 MILLIGRAM(S): 100 INJECTION SUBCUTANEOUS at 17:30

## 2021-01-08 RX ADMIN — Medication 1 ENEMA: at 18:45

## 2021-01-08 RX ADMIN — OXYCODONE HYDROCHLORIDE 10 MILLIGRAM(S): 5 TABLET ORAL at 06:08

## 2021-01-08 RX ADMIN — Medication 1 MILLIGRAM(S): at 12:50

## 2021-01-08 RX ADMIN — SENNA PLUS 2 TABLET(S): 8.6 TABLET ORAL at 22:05

## 2021-01-08 RX ADMIN — ENOXAPARIN SODIUM 40 MILLIGRAM(S): 100 INJECTION SUBCUTANEOUS at 06:08

## 2021-01-08 RX ADMIN — OXYCODONE HYDROCHLORIDE 10 MILLIGRAM(S): 5 TABLET ORAL at 02:09

## 2021-01-08 RX ADMIN — OXYCODONE HYDROCHLORIDE 10 MILLIGRAM(S): 5 TABLET ORAL at 18:45

## 2021-01-08 RX ADMIN — ATORVASTATIN CALCIUM 20 MILLIGRAM(S): 80 TABLET, FILM COATED ORAL at 22:04

## 2021-01-08 RX ADMIN — Medication 200 MILLIGRAM(S): at 12:50

## 2021-01-08 RX ADMIN — POLYETHYLENE GLYCOL 3350 17 GRAM(S): 17 POWDER, FOR SOLUTION ORAL at 12:50

## 2021-01-08 NOTE — PROGRESS NOTE ADULT - SUBJECTIVE AND OBJECTIVE BOX
PAST 24HR EVENTS: no acute issues o/n reported.    HPI: 75y Female     PHYSICAL EXAM:  awake, a&Ox3, fc  perrl, tracts  b/l UE 5/5 b/l LE 4/5 pain limited  incision clean, dry, no dc noted.    Vital Signs Last 24 Hrs  T(C): 36.8 (08 Jan 2021 01:44), Max: 37.1 (07 Jan 2021 18:02)  T(F): 98.2 (08 Jan 2021 01:44), Max: 98.7 (07 Jan 2021 18:02)  HR: 84 (08 Jan 2021 01:44) (68 - 88)  BP: 128/62 (08 Jan 2021 01:44) (108/45 - 140/87)  BP(mean): --  RR: 20 (08 Jan 2021 01:44) (18 - 20)  SpO2: 98% (08 Jan 2021 01:44) (95% - 98%)      MEDS:   atorvastatin 20 milliGRAM(s) Oral at bedtime  diphenhydrAMINE 25 milliGRAM(s) Oral at bedtime PRN  enoxaparin Injectable 40 milliGRAM(s) SubCutaneous two times a day  folic acid 1 milliGRAM(s) Oral daily  HYDROmorphone  Injectable 0.5 milliGRAM(s) IV Push every 3 hours PRN  HYDROmorphone  Injectable 0.25 milliGRAM(s) IV Push every 3 hours PRN  hydroxychloroquine 200 milliGRAM(s) Oral daily  influenza  Vaccine (HIGH DOSE) 0.7 milliLiter(s) IntraMuscular once  levothyroxine 200 MICROGram(s) Oral daily  melatonin 3 milliGRAM(s) Oral at bedtime PRN  oxyCODONE    IR 10 milliGRAM(s) Oral every 3 hours PRN  oxyCODONE    IR 5 milliGRAM(s) Oral every 3 hours PRN  polyethylene glycol 3350 17 Gram(s) Oral daily  senna 2 Tablet(s) Oral at bedtime      LABS:

## 2021-01-08 NOTE — PROGRESS NOTE ADULT - SUBJECTIVE AND OBJECTIVE BOX
Patient is a 75y old  Female who presents with a chief complaint of acute on chronic low back pain (08 Jan 2021 10:16)      SUBJECTIVE / OVERNIGHT EVENTS: Still with constipation.    MEDICATIONS  (STANDING):  atorvastatin 20 milliGRAM(s) Oral at bedtime  enoxaparin Injectable 40 milliGRAM(s) SubCutaneous two times a day  folic acid 1 milliGRAM(s) Oral daily  hydroxychloroquine 200 milliGRAM(s) Oral daily  influenza  Vaccine (HIGH DOSE) 0.7 milliLiter(s) IntraMuscular once  levothyroxine 200 MICROGram(s) Oral daily  polyethylene glycol 3350 17 Gram(s) Oral daily  senna 2 Tablet(s) Oral at bedtime    MEDICATIONS  (PRN):  diphenhydrAMINE 25 milliGRAM(s) Oral at bedtime PRN Insomnia  HYDROmorphone  Injectable 0.5 milliGRAM(s) IV Push every 3 hours PRN Severe breakthrough Pain (7 - 10)  HYDROmorphone  Injectable 0.25 milliGRAM(s) IV Push every 3 hours PRN Moderate breakthrough Pain (4 - 6)  melatonin 3 milliGRAM(s) Oral at bedtime PRN Insomnia  oxyCODONE    IR 10 milliGRAM(s) Oral every 3 hours PRN Severe Pain (7 - 10)  oxyCODONE    IR 5 milliGRAM(s) Oral every 3 hours PRN Moderate Pain (4 - 6)      CAPILLARY BLOOD GLUCOSE        I&O's Summary    07 Jan 2021 07:01  -  08 Jan 2021 07:00  --------------------------------------------------------  IN: 0 mL / OUT: 1400 mL / NET: -1400 mL        PHYSICAL EXAM:  Vital Signs Last 24 Hrs  T(C): 36.9 (08 Jan 2021 06:07), Max: 37.1 (07 Jan 2021 18:02)  T(F): 98.5 (08 Jan 2021 06:07), Max: 98.7 (07 Jan 2021 18:02)  HR: 97 (08 Jan 2021 06:07) (68 - 97)  BP: 128/76 (08 Jan 2021 06:07) (118/53 - 140/87)  BP(mean): --  RR: 19 (08 Jan 2021 06:07) (18 - 20)  SpO2: 97% (08 Jan 2021 06:07) (95% - 98%)  CONSTITUTIONAL: NAD, well-developed, well-groomed  EYES: PERRLA; conjunctiva and sclera clear  ENMT: Moist oral mucosa, no pharyngeal injection or exudates; normal dentition  NECK: Supple, no palpable masses; no thyromegaly  RESPIRATORY: Normal respiratory effort; lungs are clear to auscultation bilaterally  CARDIOVASCULAR: Regular rate and rhythm, normal S1 and S2, no murmur/rub/gallop; No lower extremity edema; Peripheral pulses are 2+ bilaterally  ABDOMEN: Nontender to palpation, normoactive bowel sounds, no rebound/guarding; No hepatosplenomegaly

## 2021-01-09 PROCEDURE — 99232 SBSQ HOSP IP/OBS MODERATE 35: CPT

## 2021-01-09 RX ADMIN — Medication 200 MILLIGRAM(S): at 12:32

## 2021-01-09 RX ADMIN — OXYCODONE HYDROCHLORIDE 10 MILLIGRAM(S): 5 TABLET ORAL at 12:32

## 2021-01-09 RX ADMIN — OXYCODONE HYDROCHLORIDE 10 MILLIGRAM(S): 5 TABLET ORAL at 09:17

## 2021-01-09 RX ADMIN — MAGNESIUM HYDROXIDE 30 MILLILITER(S): 400 TABLET, CHEWABLE ORAL at 03:42

## 2021-01-09 RX ADMIN — SENNA PLUS 2 TABLET(S): 8.6 TABLET ORAL at 21:15

## 2021-01-09 RX ADMIN — OXYCODONE HYDROCHLORIDE 5 MILLIGRAM(S): 5 TABLET ORAL at 03:39

## 2021-01-09 RX ADMIN — Medication 1 MILLIGRAM(S): at 12:32

## 2021-01-09 RX ADMIN — OXYCODONE HYDROCHLORIDE 5 MILLIGRAM(S): 5 TABLET ORAL at 21:15

## 2021-01-09 RX ADMIN — OXYCODONE HYDROCHLORIDE 5 MILLIGRAM(S): 5 TABLET ORAL at 16:15

## 2021-01-09 RX ADMIN — ENOXAPARIN SODIUM 40 MILLIGRAM(S): 100 INJECTION SUBCUTANEOUS at 16:15

## 2021-01-09 RX ADMIN — OXYCODONE HYDROCHLORIDE 5 MILLIGRAM(S): 5 TABLET ORAL at 00:38

## 2021-01-09 RX ADMIN — POLYETHYLENE GLYCOL 3350 17 GRAM(S): 17 POWDER, FOR SOLUTION ORAL at 18:22

## 2021-01-09 RX ADMIN — ATORVASTATIN CALCIUM 20 MILLIGRAM(S): 80 TABLET, FILM COATED ORAL at 21:15

## 2021-01-09 RX ADMIN — ENOXAPARIN SODIUM 40 MILLIGRAM(S): 100 INJECTION SUBCUTANEOUS at 06:29

## 2021-01-09 RX ADMIN — Medication 200 MICROGRAM(S): at 06:29

## 2021-01-09 NOTE — PROGRESS NOTE ADULT - SUBJECTIVE AND OBJECTIVE BOX
Patient is a 75y old  Female who presents with a chief complaint of acute on chronic low back pain (09 Jan 2021 05:20)      SUBJECTIVE / OVERNIGHT EVENTS: No overnight event. No complaint this morning.    MEDICATIONS  (STANDING):  atorvastatin 20 milliGRAM(s) Oral at bedtime  enoxaparin Injectable 40 milliGRAM(s) SubCutaneous two times a day  folic acid 1 milliGRAM(s) Oral daily  hydroxychloroquine 200 milliGRAM(s) Oral daily  influenza  Vaccine (HIGH DOSE) 0.7 milliLiter(s) IntraMuscular once  levothyroxine 200 MICROGram(s) Oral daily  polyethylene glycol 3350 17 Gram(s) Oral daily  senna 2 Tablet(s) Oral at bedtime    MEDICATIONS  (PRN):  diphenhydrAMINE 25 milliGRAM(s) Oral at bedtime PRN Insomnia  glycerin Suppository - Adult 1 Suppository(s) Rectal once PRN Constipation  HYDROmorphone  Injectable 0.5 milliGRAM(s) IV Push every 3 hours PRN Severe breakthrough Pain (7 - 10)  HYDROmorphone  Injectable 0.25 milliGRAM(s) IV Push every 3 hours PRN Moderate breakthrough Pain (4 - 6)  magnesium hydroxide Suspension 30 milliLiter(s) Oral daily PRN Constipation  melatonin 3 milliGRAM(s) Oral at bedtime PRN Insomnia  oxyCODONE    IR 5 milliGRAM(s) Oral every 3 hours PRN Moderate Pain (4 - 6)  oxyCODONE    IR 10 milliGRAM(s) Oral every 3 hours PRN Severe Pain (7 - 10)      CAPILLARY BLOOD GLUCOSE        I&O's Summary    08 Jan 2021 07:01  -  09 Jan 2021 07:00  --------------------------------------------------------  IN: 0 mL / OUT: 1000 mL / NET: -1000 mL        PHYSICAL EXAM:  Vital Signs Last 24 Hrs  T(C): 36.6 (09 Jan 2021 09:28), Max: 36.9 (08 Jan 2021 14:05)  T(F): 97.8 (09 Jan 2021 09:28), Max: 98.4 (08 Jan 2021 14:05)  HR: 69 (09 Jan 2021 09:28) (64 - 85)  BP: 145/60 (09 Jan 2021 09:28) (105/47 - 145/60)  BP(mean): --  RR: 16 (09 Jan 2021 09:28) (16 - 20)  SpO2: 97% (09 Jan 2021 09:28) (95% - 100%)  CONSTITUTIONAL: NAD, well-developed, well-groomed  EYES: PERRLA; conjunctiva and sclera clear  ENMT: Moist oral mucosa, no pharyngeal injection or exudates; normal dentition  NECK: Supple, no palpable masses; no thyromegaly  RESPIRATORY: Normal respiratory effort; lungs are clear to auscultation bilaterally  CARDIOVASCULAR: Regular rate and rhythm, normal S1 and S2, no murmur/rub/gallop; No lower extremity edema; Peripheral pulses are 2+ bilaterally  ABDOMEN: Nontender to palpation, normoactive bowel sounds, no rebound/guarding; No hepatosplenomegaly

## 2021-01-09 NOTE — PROGRESS NOTE ADULT - SUBJECTIVE AND OBJECTIVE BOX
PAST 24HR EVENTS: no acute issues o/n reported.    HPI: 75y Female     PHYSICAL EXAM:  awake, a&Ox3, fc  perrl, tracts  b/l UE 5/5 b/l LE 4/5 pain limited     Vital Signs Last 24 Hrs  T(C): 36.6 (08 Jan 2021 21:36), Max: 36.9 (08 Jan 2021 06:07)  T(F): 97.9 (08 Jan 2021 21:36), Max: 98.5 (08 Jan 2021 06:07)  HR: 77 (08 Jan 2021 21:36) (64 - 97)  BP: 136/65 (08 Jan 2021 21:36) (105/47 - 136/65)  BP(mean): --  ABP: --  ABP(mean): --  RR: 16 (08 Jan 2021 21:36) (16 - 20)  SpO2: 95% (08 Jan 2021 21:36) (95% - 100%)  incision clean, dry, no dc noted.          MEDS:   atorvastatin 20 milliGRAM(s) Oral at bedtime  diphenhydrAMINE 25 milliGRAM(s) Oral at bedtime PRN  enoxaparin Injectable 40 milliGRAM(s) SubCutaneous two times a day  folic acid 1 milliGRAM(s) Oral daily  HYDROmorphone  Injectable 0.5 milliGRAM(s) IV Push every 3 hours PRN  HYDROmorphone  Injectable 0.25 milliGRAM(s) IV Push every 3 hours PRN  hydroxychloroquine 200 milliGRAM(s) Oral daily  influenza  Vaccine (HIGH DOSE) 0.7 milliLiter(s) IntraMuscular once  levothyroxine 200 MICROGram(s) Oral daily  melatonin 3 milliGRAM(s) Oral at bedtime PRN  oxyCODONE    IR 10 milliGRAM(s) Oral every 3 hours PRN  oxyCODONE    IR 5 milliGRAM(s) Oral every 3 hours PRN  polyethylene glycol 3350 17 Gram(s) Oral daily  senna 2 Tablet(s) Oral at bedtime      LABS:

## 2021-01-10 PROCEDURE — 99232 SBSQ HOSP IP/OBS MODERATE 35: CPT

## 2021-01-10 RX ORDER — MULTIVIT WITH MIN/MFOLATE/K2 340-15/3 G
1 POWDER (GRAM) ORAL ONCE
Refills: 0 | Status: DISCONTINUED | OUTPATIENT
Start: 2021-01-10 | End: 2021-01-12

## 2021-01-10 RX ADMIN — ATORVASTATIN CALCIUM 20 MILLIGRAM(S): 80 TABLET, FILM COATED ORAL at 21:12

## 2021-01-10 RX ADMIN — MAGNESIUM HYDROXIDE 30 MILLILITER(S): 400 TABLET, CHEWABLE ORAL at 07:06

## 2021-01-10 RX ADMIN — Medication 200 MILLIGRAM(S): at 14:16

## 2021-01-10 RX ADMIN — ENOXAPARIN SODIUM 40 MILLIGRAM(S): 100 INJECTION SUBCUTANEOUS at 06:30

## 2021-01-10 RX ADMIN — OXYCODONE HYDROCHLORIDE 10 MILLIGRAM(S): 5 TABLET ORAL at 10:34

## 2021-01-10 RX ADMIN — ENOXAPARIN SODIUM 40 MILLIGRAM(S): 100 INJECTION SUBCUTANEOUS at 18:57

## 2021-01-10 RX ADMIN — OXYCODONE HYDROCHLORIDE 10 MILLIGRAM(S): 5 TABLET ORAL at 14:16

## 2021-01-10 RX ADMIN — OXYCODONE HYDROCHLORIDE 5 MILLIGRAM(S): 5 TABLET ORAL at 01:21

## 2021-01-10 RX ADMIN — POLYETHYLENE GLYCOL 3350 17 GRAM(S): 17 POWDER, FOR SOLUTION ORAL at 14:16

## 2021-01-10 RX ADMIN — Medication 200 MICROGRAM(S): at 06:32

## 2021-01-10 RX ADMIN — OXYCODONE HYDROCHLORIDE 10 MILLIGRAM(S): 5 TABLET ORAL at 20:27

## 2021-01-10 RX ADMIN — Medication 1 MILLIGRAM(S): at 14:15

## 2021-01-10 RX ADMIN — OXYCODONE HYDROCHLORIDE 10 MILLIGRAM(S): 5 TABLET ORAL at 06:30

## 2021-01-10 RX ADMIN — HYDROMORPHONE HYDROCHLORIDE 0.5 MILLIGRAM(S): 2 INJECTION INTRAMUSCULAR; INTRAVENOUS; SUBCUTANEOUS at 15:33

## 2021-01-10 NOTE — PROGRESS NOTE ADULT - SUBJECTIVE AND OBJECTIVE BOX
Patient is a 75y old  Female who presents with a chief complaint of acute on chronic low back pain (09 Jan 2021 13:14)      SUBJECTIVE / OVERNIGHT EVENTS: No overnight event. Pain tolerable today.    MEDICATIONS  (STANDING):  atorvastatin 20 milliGRAM(s) Oral at bedtime  enoxaparin Injectable 40 milliGRAM(s) SubCutaneous two times a day  folic acid 1 milliGRAM(s) Oral daily  hydroxychloroquine 200 milliGRAM(s) Oral daily  influenza  Vaccine (HIGH DOSE) 0.7 milliLiter(s) IntraMuscular once  levothyroxine 200 MICROGram(s) Oral daily  magnesium citrate Oral Solution 1 Bottle Oral once  polyethylene glycol 3350 17 Gram(s) Oral daily  senna 2 Tablet(s) Oral at bedtime  sorbitol 70%/mineral oil/magnesium hydroxide/glycerin Enema 120 milliLiter(s) Rectal once    MEDICATIONS  (PRN):  diphenhydrAMINE 25 milliGRAM(s) Oral at bedtime PRN Insomnia  glycerin Suppository - Adult 1 Suppository(s) Rectal once PRN Constipation  HYDROmorphone  Injectable 0.5 milliGRAM(s) IV Push every 3 hours PRN Severe breakthrough Pain (7 - 10)  HYDROmorphone  Injectable 0.25 milliGRAM(s) IV Push every 3 hours PRN Moderate breakthrough Pain (4 - 6)  magnesium hydroxide Suspension 30 milliLiter(s) Oral daily PRN Constipation  melatonin 3 milliGRAM(s) Oral at bedtime PRN Insomnia  oxyCODONE    IR 5 milliGRAM(s) Oral every 3 hours PRN Moderate Pain (4 - 6)  oxyCODONE    IR 10 milliGRAM(s) Oral every 3 hours PRN Severe Pain (7 - 10)      CAPILLARY BLOOD GLUCOSE        I&O's Summary    09 Jan 2021 07:01  -  10 Tyron 2021 07:00  --------------------------------------------------------  IN: 900 mL / OUT: 500 mL / NET: 400 mL        PHYSICAL EXAM:  Vital Signs Last 24 Hrs  T(C): 36.8 (10 Tyron 2021 10:21), Max: 37.1 (09 Jan 2021 23:03)  T(F): 98.3 (10 Tyron 2021 10:21), Max: 98.7 (09 Jan 2021 23:03)  HR: 68 (10 Tyron 2021 10:21) (68 - 82)  BP: 112/58 (10 Tyron 2021 10:21) (112/58 - 141/60)  BP(mean): --  RR: 16 (10 Tyron 2021 10:21) (16 - 16)  SpO2: 100% (10 Tyron 2021 10:21) (96% - 100%)  CONSTITUTIONAL: NAD, well-developed, well-groomed  EYES: PERRLA; conjunctiva and sclera clear  ENMT: Moist oral mucosa, no pharyngeal injection or exudates; normal dentition  NECK: Supple, no palpable masses; no thyromegaly  RESPIRATORY: Normal respiratory effort; lungs are clear to auscultation bilaterally  CARDIOVASCULAR: Regular rate and rhythm, normal S1 and S2, no murmur/rub/gallop; No lower extremity edema; Peripheral pulses are 2+ bilaterally  ABDOMEN: Nontender to palpation, normoactive bowel sounds, no rebound/guarding; No hepatosplenomegaly

## 2021-01-10 NOTE — PROGRESS NOTE ADULT - SUBJECTIVE AND OBJECTIVE BOX
PAST 24HR EVENTS: no acute issues o/n reported.    HPI: 75y Female     PHYSICAL EXAM:  awake, a&Ox3, fc  b/l UE 5/5 b/l LE 4/5 pain limited proximally otherwise 5/5     Vital Signs Last 24 Hrs  T(C): 36.6 (08 Jan 2021 21:36), Max: 36.9 (08 Jan 2021 06:07)  T(F): 97.9 (08 Jan 2021 21:36), Max: 98.5 (08 Jan 2021 06:07)  HR: 77 (08 Jan 2021 21:36) (64 - 97)  BP: 136/65 (08 Jan 2021 21:36) (105/47 - 136/65)  BP(mean): --  ABP: --  ABP(mean): --  RR: 16 (08 Jan 2021 21:36) (16 - 20)  SpO2: 95% (08 Jan 2021 21:36) (95% - 100%)  incision clean, dry, no dc noted.          MEDS:   atorvastatin 20 milliGRAM(s) Oral at bedtime  diphenhydrAMINE 25 milliGRAM(s) Oral at bedtime PRN  enoxaparin Injectable 40 milliGRAM(s) SubCutaneous two times a day  folic acid 1 milliGRAM(s) Oral daily  HYDROmorphone  Injectable 0.5 milliGRAM(s) IV Push every 3 hours PRN  HYDROmorphone  Injectable 0.25 milliGRAM(s) IV Push every 3 hours PRN  hydroxychloroquine 200 milliGRAM(s) Oral daily  influenza  Vaccine (HIGH DOSE) 0.7 milliLiter(s) IntraMuscular once  levothyroxine 200 MICROGram(s) Oral daily  melatonin 3 milliGRAM(s) Oral at bedtime PRN  oxyCODONE    IR 10 milliGRAM(s) Oral every 3 hours PRN  oxyCODONE    IR 5 milliGRAM(s) Oral every 3 hours PRN  polyethylene glycol 3350 17 Gram(s) Oral daily  senna 2 Tablet(s) Oral at bedtime      LABS:

## 2021-01-10 NOTE — PROGRESS NOTE ADULT - ASSESSMENT
75F s/p L1-L5 lami, T10-L5 PSF  - awaiting rehab  - was able to take a few steps the other day  - cont incision checks  - cont dvt ppx

## 2021-01-11 LAB
ANION GAP SERPL CALC-SCNC: 8 MMOL/L — SIGNIFICANT CHANGE UP (ref 7–14)
BUN SERPL-MCNC: 16 MG/DL — SIGNIFICANT CHANGE UP (ref 7–23)
CALCIUM SERPL-MCNC: 8.8 MG/DL — SIGNIFICANT CHANGE UP (ref 8.4–10.5)
CHLORIDE SERPL-SCNC: 100 MMOL/L — SIGNIFICANT CHANGE UP (ref 98–107)
CO2 SERPL-SCNC: 29 MMOL/L — SIGNIFICANT CHANGE UP (ref 22–31)
CREAT SERPL-MCNC: 0.76 MG/DL — SIGNIFICANT CHANGE UP (ref 0.5–1.3)
GLUCOSE SERPL-MCNC: 95 MG/DL — SIGNIFICANT CHANGE UP (ref 70–99)
HCT VFR BLD CALC: 38 % — SIGNIFICANT CHANGE UP (ref 34.5–45)
HGB BLD-MCNC: 11.7 G/DL — SIGNIFICANT CHANGE UP (ref 11.5–15.5)
MCHC RBC-ENTMCNC: 29.5 PG — SIGNIFICANT CHANGE UP (ref 27–34)
MCHC RBC-ENTMCNC: 30.8 GM/DL — LOW (ref 32–36)
MCV RBC AUTO: 95.7 FL — SIGNIFICANT CHANGE UP (ref 80–100)
NRBC # BLD: 0 /100 WBCS — SIGNIFICANT CHANGE UP
NRBC # FLD: 0 K/UL — SIGNIFICANT CHANGE UP
PLATELET # BLD AUTO: 477 K/UL — HIGH (ref 150–400)
POTASSIUM SERPL-MCNC: 4.2 MMOL/L — SIGNIFICANT CHANGE UP (ref 3.5–5.3)
POTASSIUM SERPL-SCNC: 4.2 MMOL/L — SIGNIFICANT CHANGE UP (ref 3.5–5.3)
RBC # BLD: 3.97 M/UL — SIGNIFICANT CHANGE UP (ref 3.8–5.2)
RBC # FLD: 14.1 % — SIGNIFICANT CHANGE UP (ref 10.3–14.5)
SARS-COV-2 RNA SPEC QL NAA+PROBE: SIGNIFICANT CHANGE UP
SODIUM SERPL-SCNC: 137 MMOL/L — SIGNIFICANT CHANGE UP (ref 135–145)
WBC # BLD: 8.11 K/UL — SIGNIFICANT CHANGE UP (ref 3.8–10.5)
WBC # FLD AUTO: 8.11 K/UL — SIGNIFICANT CHANGE UP (ref 3.8–10.5)

## 2021-01-11 PROCEDURE — 99233 SBSQ HOSP IP/OBS HIGH 50: CPT

## 2021-01-11 RX ORDER — OXYCODONE HYDROCHLORIDE 5 MG/1
5 TABLET ORAL EVERY 4 HOURS
Refills: 0 | Status: DISCONTINUED | OUTPATIENT
Start: 2021-01-11 | End: 2021-01-12

## 2021-01-11 RX ORDER — OXYCODONE HYDROCHLORIDE 5 MG/1
10 TABLET ORAL EVERY 4 HOURS
Refills: 0 | Status: DISCONTINUED | OUTPATIENT
Start: 2021-01-11 | End: 2021-01-12

## 2021-01-11 RX ADMIN — OXYCODONE HYDROCHLORIDE 10 MILLIGRAM(S): 5 TABLET ORAL at 07:03

## 2021-01-11 RX ADMIN — OXYCODONE HYDROCHLORIDE 10 MILLIGRAM(S): 5 TABLET ORAL at 15:50

## 2021-01-11 RX ADMIN — ENOXAPARIN SODIUM 40 MILLIGRAM(S): 100 INJECTION SUBCUTANEOUS at 18:53

## 2021-01-11 RX ADMIN — OXYCODONE HYDROCHLORIDE 10 MILLIGRAM(S): 5 TABLET ORAL at 22:19

## 2021-01-11 RX ADMIN — ENOXAPARIN SODIUM 40 MILLIGRAM(S): 100 INJECTION SUBCUTANEOUS at 05:37

## 2021-01-11 RX ADMIN — Medication 200 MICROGRAM(S): at 05:37

## 2021-01-11 RX ADMIN — OXYCODONE HYDROCHLORIDE 10 MILLIGRAM(S): 5 TABLET ORAL at 11:27

## 2021-01-11 RX ADMIN — ATORVASTATIN CALCIUM 20 MILLIGRAM(S): 80 TABLET, FILM COATED ORAL at 22:19

## 2021-01-11 RX ADMIN — Medication 200 MILLIGRAM(S): at 12:40

## 2021-01-11 RX ADMIN — Medication 1 MILLIGRAM(S): at 12:40

## 2021-01-11 NOTE — PROGRESS NOTE ADULT - SUBJECTIVE AND OBJECTIVE BOX
PAST 24HR EVENTS: no acute issues o/n reported.    HPI: 75y Female     PHYSICAL EXAM:  awake, a&Ox3, fc  perrl, tracts  b/l UE 5/5 b/l LE 4/5 pain limited    Vital Signs Last 24 Hrs  T(C): 36.9 (11 Jan 2021 01:09), Max: 36.9 (11 Jan 2021 01:09)  T(F): 98.4 (11 Jan 2021 01:09), Max: 98.4 (11 Jan 2021 01:09)  HR: 69 (11 Jan 2021 01:09) (68 - 95)  BP: 117/60 (11 Jan 2021 01:09) (112/58 - 143/72)  BP(mean): --  ABP: --  ABP(mean): --  RR: 18 (11 Jan 2021 01:09) (16 - 18)  SpO2: 99% (11 Jan 2021 01:09) (96% - 100%)            MEDS:   atorvastatin 20 milliGRAM(s) Oral at bedtime  diphenhydrAMINE 25 milliGRAM(s) Oral at bedtime PRN  enoxaparin Injectable 40 milliGRAM(s) SubCutaneous two times a day  folic acid 1 milliGRAM(s) Oral daily  HYDROmorphone  Injectable 0.5 milliGRAM(s) IV Push every 3 hours PRN  HYDROmorphone  Injectable 0.25 milliGRAM(s) IV Push every 3 hours PRN  hydroxychloroquine 200 milliGRAM(s) Oral daily  influenza  Vaccine (HIGH DOSE) 0.7 milliLiter(s) IntraMuscular once  levothyroxine 200 MICROGram(s) Oral daily  melatonin 3 milliGRAM(s) Oral at bedtime PRN  oxyCODONE    IR 10 milliGRAM(s) Oral every 3 hours PRN  oxyCODONE    IR 5 milliGRAM(s) Oral every 3 hours PRN  polyethylene glycol 3350 17 Gram(s) Oral daily  senna 2 Tablet(s) Oral at bedtime      LABS:

## 2021-01-11 NOTE — DIETITIAN INITIAL EVALUATION ADULT. - ADD RECOMMEND
1. Encourage & assist Pt with meals; Monitor PO diet tolerance; Honor food preferences;       2. Add Multivitamins with minerals 1 tab daily for micronutrient coverage;         3. Monitor labs, hydration status;

## 2021-01-11 NOTE — DIETITIAN INITIAL EVALUATION ADULT. - OTHER INFO
Pt 76 yo female with acute on chronic low back pain found to have severe neural foramen stenosis; Pt s/p posterior T10-L5 instrumented fusion, L1-L5 laminectomies, L L4-L5 foraminotomy on 1/1 - per chart review.     Of note Pt appears alert, oriented. Per Pt, her appetite fair; no chewing or swallowing difficulty; no nausea, vomiting or diarrhea @ this time. +BM (1/10/21) - per flow sheet. At home Pt does not follow any therapeutic diet reported. Per Pt her height: ~67", but Pt not sure about her current body weight. Food preferences discussed. At time of visit Pt C/O abdominal pain, nurse made aware.     No other food related concerns voiced @ present. RDN remains available.

## 2021-01-11 NOTE — PROGRESS NOTE ADULT - SUBJECTIVE AND OBJECTIVE BOX
Shane Rdz MD  [employer]ist  Pager [phone]  Email: [email or web address]          CHIEF COMPLAINT: f/u     SUBJECTIVE / OVERNIGHT EVENTS: Patient seen and examined. No acute events overnight. Pain well controlled and patient without any complaints.    MEDICATIONS  (STANDING):  atorvastatin 20 milliGRAM(s) Oral at bedtime  enoxaparin Injectable 40 milliGRAM(s) SubCutaneous two times a day  folic acid 1 milliGRAM(s) Oral daily  hydroxychloroquine 200 milliGRAM(s) Oral daily  influenza  Vaccine (HIGH DOSE) 0.7 milliLiter(s) IntraMuscular once  levothyroxine 200 MICROGram(s) Oral daily  magnesium citrate Oral Solution 1 Bottle Oral once  polyethylene glycol 3350 17 Gram(s) Oral daily  senna 2 Tablet(s) Oral at bedtime  sorbitol 70%/mineral oil/magnesium hydroxide/glycerin Enema 120 milliLiter(s) Rectal once    MEDICATIONS  (PRN):  diphenhydrAMINE 25 milliGRAM(s) Oral at bedtime PRN Insomnia  glycerin Suppository - Adult 1 Suppository(s) Rectal once PRN Constipation  magnesium hydroxide Suspension 30 milliLiter(s) Oral daily PRN Constipation  melatonin 3 milliGRAM(s) Oral at bedtime PRN Insomnia  oxyCODONE    IR 5 milliGRAM(s) Oral every 4 hours PRN Moderate Pain (4 - 6)  oxyCODONE    IR 10 milliGRAM(s) Oral every 4 hours PRN Severe Pain (7 - 10)      VITALS:  T(F): 98.5 (01-11-21 @ 09:47), Max: 98.7 (01-11-21 @ 05:24)  HR: 81 (01-11-21 @ 09:47) (69 - 95)  BP: 118/55 (01-11-21 @ 09:47) (109/57 - 143/72)  RR: 18 (01-11-21 @ 09:47) (16 - 18)  SpO2: 96% (01-11-21 @ 09:47)  Wt(kg): --      CAPILLARY BLOOD GLUCOSE      PHYSICAL EXAM:  GENERAL: NAD, on room air, obese  HEAD:  Atraumatic, Normocephalic  EYES: conjunctiva and sclera clear  NECK: Supple, No JVD  CHEST/LUNG: Clear to auscultation bilaterally; No wheeze  HEART: Regular rate and rhythm; No murmurs, rubs, or gallops  ABDOMEN: Soft, Nontender, Nondistended; Bowel sounds present  EXTREMITIES:  2+ Peripheral Pulses, No clubbing, cyanosis, or edema  PSYCH: AAOx3  NEUROLOGY: non-focal  SKIN: No rashes or lesions    LABS:                        MICROBIOLOGY:        RADIOLOGY & ADDITIONAL TESTS:  Imaging Personally Reviewed: [ ] Yes    [ ] Consultant(s) Notes Reviewed:  [x] Care Discussed with Consultants/Other Providers:

## 2021-01-11 NOTE — PROGRESS NOTE ADULT - SUBJECTIVE AND OBJECTIVE BOX
patient continues to have pain, participating with therapy    REVIEW OF SYSTEMS  Constitutional - No fever,  No fatigue  HEENT - No vertigo, No neck pain  Neurological - No headaches, No memory loss, No loss of strength, No numbness, No tremors  Skin - No rashes, No lesions   Musculoskeletal - No joint pain, No joint swelling, No muscle pain  Psychiatric - No depression, No anxiety    FUNCTIONAL PROGRESS  1/11 PT  bed mobility min to mod assist x 2  transfers, min to mod assist x 2  ambulation min to mod assist x 2, 5 steps with RW   limited by Bowel movements    1/11 OT  bed mobility mod to max assist  transfers min to mod assist x 2      VITALS  T(C): 36.9 (01-11-21 @ 09:47), Max: 37.1 (01-11-21 @ 05:24)  HR: 81 (01-11-21 @ 09:47) (69 - 81)  BP: 118/55 (01-11-21 @ 09:47) (109/57 - 133/53)  RR: 18 (01-11-21 @ 09:47) (16 - 18)  SpO2: 96% (01-11-21 @ 09:47) (96% - 99%)  Wt(kg): --    MEDICATIONS   atorvastatin 20 milliGRAM(s) at bedtime  diphenhydrAMINE 25 milliGRAM(s) at bedtime PRN  enoxaparin Injectable 40 milliGRAM(s) two times a day  folic acid 1 milliGRAM(s) daily  glycerin Suppository - Adult 1 Suppository(s) once PRN  hydroxychloroquine 200 milliGRAM(s) daily  influenza  Vaccine (HIGH DOSE) 0.7 milliLiter(s) once  levothyroxine 200 MICROGram(s) daily  magnesium citrate Oral Solution 1 Bottle once  magnesium hydroxide Suspension 30 milliLiter(s) daily PRN  melatonin 3 milliGRAM(s) at bedtime PRN  oxyCODONE    IR 5 milliGRAM(s) every 4 hours PRN  oxyCODONE    IR 10 milliGRAM(s) every 4 hours PRN  polyethylene glycol 3350 17 Gram(s) daily  senna 2 Tablet(s) at bedtime  sorbitol 70%/mineral oil/magnesium hydroxide/glycerin Enema 120 milliLiter(s) once      RECENT LABS - Reviewed    -----------------------------------------------------------------------------------   PHYSICAL EXAM  Constitutional - NAD, seen during therapy   HEENT - NCAT, EOMI   Chest - no respiratory distress  Cardiovascular - warm, well perfused   Abdomen -   Soft, NTND  Back incision C/D/I, no drainage   Neurologic Exam -                    Cognitive - Awake, Alert, AAO to self, place, date, year, situation     Motor -                      LEFT    UE - ShAB 5/5, EF 5/5, EE 5/5, WE 5/5,  5/5                    RIGHT UE - ShAB 5/5, EF 5/5, EE 5/5, WE 5/5,  5/5                    LEFT    LE - 4/5                    RIGHT LE - 4+/5     Sensory - Intact to LT          Balance - WNL Static  Psychiatric - Mood stable, Affect WNL  ----------------------------------------------------------------------------------------  ASSESSMENT/PLAN  76 yo female h/o RA, SLE, hypothyroidism stable S/P Posterior T10-L5 instrumented fusion, L1-L5 laminectomies, L L4-L5 foraminotomy  bowel regimen, multiple bm today, had lactulose   Pain -oxycodone prn   dvt ppx: lovenox  turn and position q 2 to prevent skin breakdown  Rehab -    Recommend ACUTE inpatient rehabilitation for the functional deficits consisting of 3 hours of therapy/day & 24 hour RN/daily PMR physician for comorbid medical management. Will continue to follow for ongoing rehab needs and recommendations.   continue bedside PT and OT  out of bed to chair daily   patient is making slow progress with therapy, but able to tolerate three therapies today.

## 2021-01-12 ENCOUNTER — INPATIENT (INPATIENT)
Facility: HOSPITAL | Age: 76
LOS: 49 days | Discharge: HOME HEALTH | DRG: 949 | End: 2021-03-03
Attending: PHYSICAL MEDICINE & REHABILITATION | Admitting: SPECIALIST
Payer: MEDICARE

## 2021-01-12 ENCOUNTER — TRANSCRIPTION ENCOUNTER (OUTPATIENT)
Age: 76
End: 2021-01-12

## 2021-01-12 VITALS
WEIGHT: 279.99 LBS | HEART RATE: 76 BPM | SYSTOLIC BLOOD PRESSURE: 135 MMHG | DIASTOLIC BLOOD PRESSURE: 75 MMHG | OXYGEN SATURATION: 97 % | RESPIRATION RATE: 14 BRPM | HEIGHT: 67 IN | TEMPERATURE: 99 F

## 2021-01-12 VITALS
SYSTOLIC BLOOD PRESSURE: 115 MMHG | OXYGEN SATURATION: 95 % | HEART RATE: 79 BPM | DIASTOLIC BLOOD PRESSURE: 60 MMHG | RESPIRATION RATE: 16 BRPM | TEMPERATURE: 98 F

## 2021-01-12 DIAGNOSIS — Z98.890 OTHER SPECIFIED POSTPROCEDURAL STATES: Chronic | ICD-10-CM

## 2021-01-12 DIAGNOSIS — Z90.89 ACQUIRED ABSENCE OF OTHER ORGANS: Chronic | ICD-10-CM

## 2021-01-12 DIAGNOSIS — Z98.1 ARTHRODESIS STATUS: ICD-10-CM

## 2021-01-12 DIAGNOSIS — C34.90 MALIGNANT NEOPLASM OF UNSPECIFIED PART OF UNSPECIFIED BRONCHUS OR LUNG: Chronic | ICD-10-CM

## 2021-01-12 DIAGNOSIS — Z96.641 PRESENCE OF RIGHT ARTIFICIAL HIP JOINT: Chronic | ICD-10-CM

## 2021-01-12 DIAGNOSIS — Z98.49 CATARACT EXTRACTION STATUS, UNSPECIFIED EYE: Chronic | ICD-10-CM

## 2021-01-12 DIAGNOSIS — Z96.659 PRESENCE OF UNSPECIFIED ARTIFICIAL KNEE JOINT: Chronic | ICD-10-CM

## 2021-01-12 PROCEDURE — 99222 1ST HOSP IP/OBS MODERATE 55: CPT | Mod: GC,AI

## 2021-01-12 PROCEDURE — 99233 SBSQ HOSP IP/OBS HIGH 50: CPT

## 2021-01-12 RX ORDER — MAGNESIUM HYDROXIDE 400 MG/1
30 TABLET, CHEWABLE ORAL
Qty: 0 | Refills: 0 | DISCHARGE
Start: 2021-01-12

## 2021-01-12 RX ORDER — HYDROXYCHLOROQUINE SULFATE 200 MG
200 TABLET ORAL DAILY
Refills: 0 | Status: DISCONTINUED | OUTPATIENT
Start: 2021-01-13 | End: 2021-02-01

## 2021-01-12 RX ORDER — LEVOTHYROXINE SODIUM 125 MCG
200 TABLET ORAL DAILY
Refills: 0 | Status: DISCONTINUED | OUTPATIENT
Start: 2021-01-13 | End: 2021-03-03

## 2021-01-12 RX ORDER — SENNA PLUS 8.6 MG/1
2 TABLET ORAL AT BEDTIME
Refills: 0 | Status: DISCONTINUED | OUTPATIENT
Start: 2021-01-12 | End: 2021-02-26

## 2021-01-12 RX ORDER — SIMETHICONE 80 MG/1
80 TABLET, CHEWABLE ORAL ONCE
Refills: 0 | Status: COMPLETED | OUTPATIENT
Start: 2021-01-12 | End: 2021-01-12

## 2021-01-12 RX ORDER — ENOXAPARIN SODIUM 100 MG/ML
40 INJECTION SUBCUTANEOUS
Refills: 0 | Status: DISCONTINUED | OUTPATIENT
Start: 2021-01-12 | End: 2021-02-05

## 2021-01-12 RX ORDER — INFLUENZA VIRUS VACCINE 15; 15; 15; 15 UG/.5ML; UG/.5ML; UG/.5ML; UG/.5ML
0.5 SUSPENSION INTRAMUSCULAR ONCE
Refills: 0 | Status: COMPLETED | OUTPATIENT
Start: 2021-01-12 | End: 2021-01-12

## 2021-01-12 RX ORDER — OXYCODONE HYDROCHLORIDE 5 MG/1
10 TABLET ORAL EVERY 4 HOURS
Refills: 0 | Status: DISCONTINUED | OUTPATIENT
Start: 2021-01-12 | End: 2021-01-19

## 2021-01-12 RX ORDER — MAGNESIUM HYDROXIDE 400 MG/1
30 TABLET, CHEWABLE ORAL DAILY
Refills: 0 | Status: DISCONTINUED | OUTPATIENT
Start: 2021-01-12 | End: 2021-03-03

## 2021-01-12 RX ORDER — OXYCODONE HYDROCHLORIDE 5 MG/1
5 TABLET ORAL EVERY 4 HOURS
Refills: 0 | Status: DISCONTINUED | OUTPATIENT
Start: 2021-01-12 | End: 2021-01-19

## 2021-01-12 RX ORDER — LANOLIN ALCOHOL/MO/W.PET/CERES
3 CREAM (GRAM) TOPICAL AT BEDTIME
Refills: 0 | Status: DISCONTINUED | OUTPATIENT
Start: 2021-01-12 | End: 2021-03-03

## 2021-01-12 RX ORDER — POLYETHYLENE GLYCOL 3350 17 G/17G
17 POWDER, FOR SOLUTION ORAL
Refills: 0 | Status: DISCONTINUED | OUTPATIENT
Start: 2021-01-12 | End: 2021-01-14

## 2021-01-12 RX ORDER — OXYCODONE HYDROCHLORIDE 5 MG/1
1 TABLET ORAL
Qty: 0 | Refills: 0 | DISCHARGE
Start: 2021-01-12

## 2021-01-12 RX ORDER — ATORVASTATIN CALCIUM 80 MG/1
20 TABLET, FILM COATED ORAL AT BEDTIME
Refills: 0 | Status: DISCONTINUED | OUTPATIENT
Start: 2021-01-12 | End: 2021-03-03

## 2021-01-12 RX ORDER — FOLIC ACID 0.8 MG
1 TABLET ORAL DAILY
Refills: 0 | Status: DISCONTINUED | OUTPATIENT
Start: 2021-01-13 | End: 2021-03-03

## 2021-01-12 RX ADMIN — POLYETHYLENE GLYCOL 3350 17 GRAM(S): 17 POWDER, FOR SOLUTION ORAL at 11:55

## 2021-01-12 RX ADMIN — ATORVASTATIN CALCIUM 20 MILLIGRAM(S): 80 TABLET, FILM COATED ORAL at 22:21

## 2021-01-12 RX ADMIN — Medication 200 MILLIGRAM(S): at 11:56

## 2021-01-12 RX ADMIN — ENOXAPARIN SODIUM 40 MILLIGRAM(S): 100 INJECTION SUBCUTANEOUS at 17:46

## 2021-01-12 RX ADMIN — Medication 200 MICROGRAM(S): at 06:22

## 2021-01-12 RX ADMIN — SENNA PLUS 2 TABLET(S): 8.6 TABLET ORAL at 22:21

## 2021-01-12 RX ADMIN — OXYCODONE HYDROCHLORIDE 10 MILLIGRAM(S): 5 TABLET ORAL at 06:22

## 2021-01-12 RX ADMIN — OXYCODONE HYDROCHLORIDE 10 MILLIGRAM(S): 5 TABLET ORAL at 17:47

## 2021-01-12 RX ADMIN — Medication 1 MILLIGRAM(S): at 11:56

## 2021-01-12 RX ADMIN — ENOXAPARIN SODIUM 40 MILLIGRAM(S): 100 INJECTION SUBCUTANEOUS at 06:22

## 2021-01-12 RX ADMIN — OXYCODONE HYDROCHLORIDE 10 MILLIGRAM(S): 5 TABLET ORAL at 23:45

## 2021-01-12 RX ADMIN — SIMETHICONE 80 MILLIGRAM(S): 80 TABLET, CHEWABLE ORAL at 10:30

## 2021-01-12 RX ADMIN — Medication 3 MILLIGRAM(S): at 23:47

## 2021-01-12 NOTE — H&P ADULT - ATTENDING COMMENTS
I saw and examined patient. I have reviewed the resident;'s note and agree with findings and plan as documented  75 year old female with history as stated above, including chronic LBP, recent fall at home, findings of extensive degenerative spine disease, admitted to rehab after recent spine surgery on 1/1/21.   Patient with LE weakness.   Back incision- clean  Begin full rehab program  On Lovenox for DVT prophylaxis.   Will need to discuss with neurosurgery about timing for resumption of oral AC    Hospitalist consult for management of medical comorbidities.

## 2021-01-12 NOTE — H&P ADULT - ASSESSMENT
Assessment/Plan:  KAI THOMAS is a 75y F with a history of HTN, HLD, SLE, RA, hypothyroidism, provoked R. 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 Hospital Course complicated by brief SICU stay for mechanical ventilation management, now extubated, also c/bpain control and constipation. Patient now admitted for a multidisciplinary rehab program. 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 Jaja Campos  - Gait Instability, ADL impairments and Functional impairments: start Comprehensive Rehab Program of PT/OT/SLP  - Pain management as below    #SLE/RA  - c/w plaquenil 200mg po daily    #h/o Provoked DVT in 2019  - Follows with vascular cardiologist Dr. Hoyos outpatient.    - US July 2019:  DVT noted in the right popliteal vein and visualized segment of the right  posterior tibial vein.  - provoked DVT July 2019 in setting of hip surgery, already >6 months of treatment,   - was on eliqiuis 5mg po bid at home, but per hospitalist at [facility] hold eliquis, as LE dopplers on 12/29 neg for any DVTs  - only lovenox for dvt ppx while admitted    #HTN  - monitor BP   - off bp meds right now as BP was on lower side at [facility] due to opioids  - takes losartan 50mg po qd at home    #HLD  - c/w lipitor 20mg po daily    #hypothyroidism  - c/w levothyroxine 200mcg po daily    #Sleep  - melatonin 3mg PRN    #Skin  - Skin assessment:  - Dressing assessment:  - plan:  - Pressure injury/Skin: OOB to Chair, PT/OT     #Pain Mgmt   - Tylenol PRN mild pain  - Oxycodone 5mg IR PRN moderate 4-7/10 pain  - Oxycodone 10mg IR prn severe 8-10/10 pain    #GI/Bowel Mgmt   - Continent c/w Senna, Miralax PRN  - advance regimen if still constipated    #/Bladder Mgmt   - Continent,   - PVR 8qh, SC if PVR > 350cc    #FEN   - Diet - Regular + Thins     #Precautions / PROPHYLAXIS:   - Falls, Spinal,   - ortho: spine precautions  - DVT: Lovenox        MEDICAL PROGNOSIS: GOOD                                   REHAB POTENTIAL: GOOD  ESTIMATED DISPOSITION: HOME                             ELOS: 10-14 Days   EXPECTED THERAPY:     P.T. 2hr/day       O.T. 1hr/day           EXP FREQUENCY: 5 days per 7 day period     PRESCREEN COMPARISON: I have reviewed the prescreen information and I have found no relevant changes between the preadmission screening and my post admission evaluation     RATIONALE FOR INPATIENT ADMISSION - Patient demonstrates the following: (check all that apply)  [X] Medically appropriate for rehabilitation admission  [X] Has attainable rehab goals with an appropriate initial discharge plan  [X] Has rehabilitation potential (expected to make a significant improvement within a reasonable period of time)   [X] Requires close medical managment by a rehab physician, rehab nursing care, Hospitalist and comprehensive interdisciplinary team (including PT, OT, & or SLP, Prosthetics and Orthotics)     Assessment/Plan:  KAI THOMAS is a 75y F with a history of HTN, HLD, SLE, RA, hypothyroidism, provoked R. 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 Hospital Course complicated by brief SICU stay for mechanical ventilation management, now extubated, also c/bpain control and constipation. Patient now admitted for a multidisciplinary rehab program. 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 Jaja Campos  - Gait Instability, ADL impairments and Functional impairments: start Comprehensive Rehab Program of PT/OT  - Pain management as below    #SLE/RA  - c/w plaquenil 200mg po daily    #h/o Provoked DVT in 2019  - Follows with vascular cardiologist Dr. Hoyos outpatient.    - US July 2019:  DVT noted in the right popliteal vein and visualized segment of the right  posterior tibial vein.  - provoked DVT July 2019 in setting of hip surgery, already >6 months of treatment,   - was on eliqiuis 5mg po bid at home, but per hospitalist at [facility] hold eliquis, as LE dopplers on 12/29 neg for any DVTs  - only lovenox for dvt ppx while admitted    #HTN  - monitor BP   - off bp meds right now as BP was on lower side at [facility] due to opioids  - takes losartan 50mg po qd at home    #HLD  - c/w lipitor 20mg po daily    #hypothyroidism  - c/w levothyroxine 200mcg po daily    #Sleep  - melatonin 3mg PRN    #Skin  - Skin assessment:  - Dressing assessment:  - plan:  - Pressure injury/Skin: OOB to Chair, PT/OT     #Pain Mgmt   - Tylenol PRN mild pain  - Oxycodone 5mg IR PRN moderate 4-7/10 pain  - Oxycodone 10mg IR prn severe 8-10/10 pain    #GI/Bowel Mgmt   - Continent c/w Senna, Miralax PRN  - advance regimen if still constipated    #/Bladder Mgmt   - Continent,   - PVR 8qh, SC if PVR > 350cc    #FEN   - Diet - Regular + Thins     #Precautions / PROPHYLAXIS:   - Falls, Spinal,   - ortho: spine precautions  - DVT: Lovenox        MEDICAL PROGNOSIS: GOOD                                   REHAB POTENTIAL: GOOD  ESTIMATED DISPOSITION: HOME                             ELOS:14-16 DAYS   EXPECTED THERAPY:     P.T. 2hr/day       O.T. 1hr/day           EXP FREQUENCY: 5 days per 7 day period     PRESCREEN COMPARISON: I have reviewed the prescreen information and I have found no relevant changes between the preadmission screening and my post admission evaluation     RATIONALE FOR INPATIENT ADMISSION - Patient demonstrates the following: (check all that apply)  [X] Medically appropriate for rehabilitation admission  [X] Has attainable rehab goals with an appropriate initial discharge plan  [X] Has rehabilitation potential (expected to make a significant improvement within a reasonable period of time)   [X] Requires close medical managment by a rehab physician, rehab nursing care, Hospitalist and comprehensive interdisciplinary team (including PT, OT, & or SLP, Prosthetics and Orthotics)

## 2021-01-12 NOTE — H&P ADULT - HISTORY OF PRESENT ILLNESS
75F with past medical history of HTN, HLD, SLE, RA, hypothyroidism, provoked R. popliteal DVT in 2019 on eliquis, chronic low back pain presents to ED for acute on chronic low back pain. Pt has several months of chronic lumbosacral back pain, evaluated by outpatient ortho and referred for physical therapy and pain management. She has been receiving ?vitamin spinal injections. She last received low back inj on 12/16 after which she her low back pain worsened. She took oxycodone 5 mg qd (prescribed in september during a ED visit) and acetaminophen for past three days without relief. She also endorses worsening pain during ambulation with walker and has affected her quality of life. She has constipation, last BM 4 days ago. Otherwise denies fever, chills, N/V, abdominal pain, dysuria, urinary retention, fecal incontinence, saddle anesthesia, fall, LOC, trauma. During ROS she endorsed LLE weakness for several months, no numbness or tingling and has LLE edema >RLE edema, reported undergoing LLE US 4 months ago, was negative for DVT. Currently has 8/10 pain, improved to 5/10 with oxycodone 5 mg x 2 in ED. (28 Dec 2020 11:16)   12/28: Mri T/Ls: IMPRESSION: Degenerative changes as described above  Abnormal signal with associated enhancement is seen involving the endplates adjacent to the L2-3 disc space. There is slight increased T2 prolongation involving the L2-3 disc space with associated widening. While these findings could be compatible with degenerative changes possibly of early discitis and osteomyelitis must be considered. Clinical correlation and continued close interval follow-up is recommended.  01/01/20 Patient s/p L1-L5 laminectomy, T10-L5 posterior surgical fusion. HMV x 3 drains placed by Dr. Campos.    PT/OT and PM&R evaluated patient and recommended Rehab.     Patient medically cleared and admitted to  Rehab on 1/12/21 75F with past medical history of HTN, HLD, SLE, RA, hypothyroidism, provoked R. popliteal DVT in 2019 on eliquis, chronic low back pain presents to ED for acute on chronic low back pain. Pt has several months of chronic lumbosacral back pain, evaluated by outpatient ortho and referred for physical therapy and pain management. She has been receiving ?vitamin spinal injections. She last received low back inj on 12/16 after which she her low back pain worsened. She took oxycodone 5 mg qd (prescribed in september during a ED visit) and acetaminophen for past three days without relief. She also endorses worsening pain during ambulation with walker and has affected her quality of life. She has constipation, last BM 4 days ago. Otherwise denies fever, chills, N/V, abdominal pain, dysuria, urinary retention, fecal incontinence, saddle anesthesia, fall, LOC, trauma. During ROS she endorsed LLE weakness for several months, no numbness or tingling and has LLE edema >RLE edema, reported undergoing LLE US 4 months ago, was negative for DVT. Currently has 8/10 pain, improved to 5/10 with oxycodone 5 mg x 2 in ED. (28 Dec 2020 11:16)   12/28: Mri T/Ls: IMPRESSION: Degenerative changes  Abnormal signal with associated enhancement is seen involving the endplates adjacent to the L2-3 disc space. There is slight increased T2 prolongation involving the L2-3 disc space with associated widening. While these findings could be compatible with degenerative changes possibly of early discitis and osteomyelitis must be considered.  Patient s/p L1-L5 laminectomy, T10-L5 posterior surgical fusion by Dr. Campos on 1/1/21  HMV x 3 drains. Post op with constipation needing aggressive bowel regimen.     PT/OT and PM&R evaluated patient and recommended Rehab.     Patient medically cleared and admitted to  Rehab on 1/12/21

## 2021-01-12 NOTE — PROGRESS NOTE ADULT - PROBLEM SELECTOR PLAN 4
BP at goal  Hold losartan due to low normal BP
BP at goal  - c/w losartan 50 mg daily
BP at goal  - c/w losartan 50 mg daily
BP at goal  Hold losartan due to low normal BP
- c/w losartan 50 mg daily
BP at goal  - c/w losartan 50 mg daily
BP at goal  Hold losartan due to low normal BP
- c/w losartan 50 mg daily
BP at goal  Hold losartan due to low normal BP

## 2021-01-12 NOTE — H&P ADULT - NSHPREVIEWOFSYSTEMS_GEN_ALL_CORE
Constitutional - No fever, No weight loss, No fatigue  HEENT - No eye pain, No visual disturbances, No difficulty hearing, No tinnitus, No vertigo, No neck pain  Respiratory - + mild cough, No wheezing, No shortness of breath  Cardiovascular - No chest pain, No palpitations  Gastrointestinal - +abdominal pain, No nausea, No vomiting, No diarrhea, + constipation  Genitourinary - No dysuria, No frequency, No hematuria, No incontinence  Neurological - No headaches, No memory loss, + loss of strength, No numbness, No tremors  Skin - s/p surgery  Endocrine - No temperature intolerance  Musculoskeletal - + pain  Psychiatric - No depression, No anxiety Constitutional - No fever, No weight loss, No fatigue  HEENT - No eye pain,   Respiratory - + mild cough, No wheezing, No shortness of breath  Cardiovascular - No chest pain, No palpitations  Gastrointestinal - +abdominal pain,  + constipation. Last BM today- Had BMs over past two days  Genitourinary - No dysuria,   Neurological - No headaches, No numbness  Skin - no it susanne  Endocrine - No temperature intolerance  Musculoskeletal - + pain  Psychiatric - No depression, No anxiety

## 2021-01-12 NOTE — PROGRESS NOTE ADULT - PROBLEM SELECTOR PROBLEM 3
Chronic deep vein thrombosis (DVT) of popliteal vein of right lower extremity

## 2021-01-12 NOTE — PROGRESS NOTE ADULT - PROBLEM SELECTOR PROBLEM 8
Preventive measure
Slow transit constipation

## 2021-01-12 NOTE — PROGRESS NOTE ADULT - REASON FOR ADMISSION
acute on chronic low back pain
LBP
acute on chronic low back pain

## 2021-01-12 NOTE — H&P ADULT - NSHPLABSRESULTS_GEN_ALL_CORE
< from: MR Lumbar Spine w/ IV Cont (12.28.20 @ 02:51) >    Lumbar spine:    Loss of the normal lumbar lordosis is seen.    Scoliosis is seen.    There is evidence of abnormal T1 and T2 prolongation with associated enhancement seen along seen involving the endplates adjacent to the L2-3 disc space. There is abnormal T2 prolongation seen involving the L2-3 disc spaces with associated widening of this disc space. There is also slight increase enhancement seen involving the interspinous process ligament at the L2-3 level. While these findings could be compatible with degenerative changes the possibility of early discitis and osteomyelitis inthis region must be considered. Clinical correlation and continued close interval follow-up is recommended.    L1-2: Bilateral hypertrophic facet changes seen. Mild narrowing of the spinal canal. Severe narrowing of the right neural foramen    L2-3: Disc bulge and bilateral hypertrophic facet changes seen. Hypertrophic change involving both ligamentum flavum flavum. Moderate narrowing of the spinal canal. Severe narrowing of the right neural foramen    L3-4: Disc bulge and bilateral hypertrophic facet changes seen. Severe narrowing of the spinal canal. Moderate narrowing of the left neural foramen and severe narrowing of the right neural foramen    L4-5: Disc bulge is seen which is more prominent on the left side. Bilateral hypertrophic facetchanges are seen. Moderate narrowing of the spinal canal. Severe narrowing of the left neural foramen.    L5-S1: Disc bulge and bilateral hypertrophic facet changes seen. This disc bulge is more prominent on the left side and does appear to contact the left S1 nerve root. Mild to moderate narrowing of the left spinal canal seen. Mild to moderate narrowing of both neural foramen    The conus ends at the top of L2 and appears normal    Atrophic changes suspected involving the right psoas muscle.    IMPRESSION: Degenerative changes as described above    Abnormal signal with associated enhancement is seen involving the endplates adjacent to the L2-3 disc space. There is slight increased T2 prolongation involving the L2-3 disc space with associated widening. While these findings could be compatible with degenerative changes possibly of early discitis and osteomyelitis must be considered. Clinical correlation and continued close interval follow-up is recommended.      < end of copied text >

## 2021-01-12 NOTE — DISCHARGE NOTE NURSING/CASE MANAGEMENT/SOCIAL WORK - PATIENT PORTAL LINK FT
You can access the FollowMyHealth Patient Portal offered by [facility] by registering at the following website: [email or web address]. By joining [facility]’s FollowMyHealth portal, you will also be able to view your health information using other applications (apps) compatible with our system.

## 2021-01-12 NOTE — PROGRESS NOTE ADULT - PROBLEM SELECTOR PROBLEM 5
RA (rheumatoid arthritis)

## 2021-01-12 NOTE — PROGRESS NOTE ADULT - SUBJECTIVE AND OBJECTIVE BOX
PAST 24HR EVENTS: no issues overnight, patient sitting in chair     PHYSICAL EXAM:  Vital Signs Last 24 Hrs  T(C): 36.9 (12 Jan 2021 01:41), Max: 37.1 (11 Jan 2021 22:19)  T(F): 98.5 (12 Jan 2021 01:41), Max: 98.8 (11 Jan 2021 22:19)  HR: 75 (12 Jan 2021 01:41) (69 - 81)  BP: 120/50 (12 Jan 2021 01:41) (118/55 - 127/52)  BP(mean): --  RR: 18 (12 Jan 2021 01:41) (18 - 18)  SpO2: 97% (12 Jan 2021 01:41) (96% - 100%)    AOx3, follows commands  PERRL, EOMI  b/l UE 5/5 b/l LE 4/5 pain limited  sensation intact  incision C/D/I    MEDS:   atorvastatin 20 milliGRAM(s) Oral at bedtime  diphenhydrAMINE 25 milliGRAM(s) Oral at bedtime PRN  enoxaparin Injectable 40 milliGRAM(s) SubCutaneous two times a day  folic acid 1 milliGRAM(s) Oral daily  glycerin Suppository - Adult 1 Suppository(s) Rectal once PRN  hydroxychloroquine 200 milliGRAM(s) Oral daily  influenza  Vaccine (HIGH DOSE) 0.7 milliLiter(s) IntraMuscular once  levothyroxine 200 MICROGram(s) Oral daily  magnesium citrate Oral Solution 1 Bottle Oral once  magnesium hydroxide Suspension 30 milliLiter(s) Oral daily PRN  melatonin 3 milliGRAM(s) Oral at bedtime PRN  oxyCODONE    IR 5 milliGRAM(s) Oral every 4 hours PRN  oxyCODONE    IR 10 milliGRAM(s) Oral every 4 hours PRN  polyethylene glycol 3350 17 Gram(s) Oral daily  senna 2 Tablet(s) Oral at bedtime  sorbitol 70%/mineral oil/magnesium hydroxide/glycerin Enema 120 milliLiter(s) Rectal once      LABS:                        11.7   8.11  )-----------( 477      ( 11 Jan 2021 18:11 )             38.0     01-11    137  |  100  |  16  ----------------------------<  95  4.2   |  29  |  0.76    Ca    8.8      11 Jan 2021 18:11          RADIOLOGY:

## 2021-01-12 NOTE — H&P ADULT - NSICDXPASTMEDICALHX_GEN_ALL_CORE_FT
PAST MEDICAL HISTORY:  Benign heart murmur     DVT (deep venous thrombosis)     H/O degenerative disc disease     HTN (hypertension)     Hyperlipidemia     Hypertension     Hypothyroid     Hypothyroidism     Obesity, Class II, BMI 35-39.9, no comorbidity     Osteoarthritis     RA (rheumatoid arthritis)     Rheumatoid arthritis     SLE (systemic lupus erythematosus)

## 2021-01-12 NOTE — PROGRESS NOTE ADULT - PROBLEM SELECTOR PROBLEM 7
Hypothyroidism

## 2021-01-12 NOTE — PROGRESS NOTE ADULT - PROBLEM SELECTOR PROBLEM 2
R/O Discitis of lumbosacral region

## 2021-01-12 NOTE — PROGRESS NOTE ADULT - SUBJECTIVE AND OBJECTIVE BOX
Shane Rdz MD  [employer]ist  Pager [phone]  Email: [email or web address]          PROGRESS NOTE:     Patient is a 75y old  Female who presents with a chief complaint of acute on chronic low back pain (11 Jan 2021 16:37)      SUBJECTIVE / OVERNIGHT EVENTS:  Patient seen and examined this morning. She complains of pain and discomfort in her stomach, states that she has this kind of discomfort before when she had gas. She denies shortness of breath, f/c/n/v.       MEDICATIONS  (STANDING):  atorvastatin 20 milliGRAM(s) Oral at bedtime  enoxaparin Injectable 40 milliGRAM(s) SubCutaneous two times a day  folic acid 1 milliGRAM(s) Oral daily  hydroxychloroquine 200 milliGRAM(s) Oral daily  levothyroxine 200 MICROGram(s) Oral daily  magnesium citrate Oral Solution 1 Bottle Oral once  polyethylene glycol 3350 17 Gram(s) Oral daily  senna 2 Tablet(s) Oral at bedtime  sorbitol 70%/mineral oil/magnesium hydroxide/glycerin Enema 120 milliLiter(s) Rectal once    MEDICATIONS  (PRN):  diphenhydrAMINE 25 milliGRAM(s) Oral at bedtime PRN Insomnia  glycerin Suppository - Adult 1 Suppository(s) Rectal once PRN Constipation  magnesium hydroxide Suspension 30 milliLiter(s) Oral daily PRN Constipation  melatonin 3 milliGRAM(s) Oral at bedtime PRN Insomnia  oxyCODONE    IR 5 milliGRAM(s) Oral every 4 hours PRN Moderate Pain (4 - 6)  oxyCODONE    IR 10 milliGRAM(s) Oral every 4 hours PRN Severe Pain (7 - 10)      CAPILLARY BLOOD GLUCOSE        I&O's Summary    11 Jan 2021 07:01  -  12 Jan 2021 07:00  --------------------------------------------------------  IN: 0 mL / OUT: 800 mL / NET: -800 mL    12 Jan 2021 07:01  -  12 Jan 2021 12:27  --------------------------------------------------------  IN: 0 mL / OUT: 700 mL / NET: -700 mL        PHYSICAL EXAM:  Vital Signs Last 24 Hrs  T(C): 36.8 (12 Jan 2021 10:30), Max: 37.1 (11 Jan 2021 22:19)  T(F): 98.3 (12 Jan 2021 10:30), Max: 98.8 (11 Jan 2021 22:19)  HR: 79 (12 Jan 2021 10:30) (69 - 79)  BP: 115/60 (12 Jan 2021 10:30) (115/60 - 145/47)  BP(mean): --  RR: 16 (12 Jan 2021 10:30) (16 - 18)  SpO2: 95% (12 Jan 2021 10:30) (95% - 100%)    PHYSICAL EXAM:  GENERAL: NAD, on room air, obese  HEAD:  Atraumatic, Normocephalic  EYES: conjunctiva and sclera clear  NECK: Supple, No JVD  CHEST/LUNG: Clear to auscultation bilaterally; No wheeze  HEART: Regular rate and rhythm; No murmurs, rubs, or gallops  ABDOMEN: Soft, Nontender, Nondistended; Bowel sounds present  EXTREMITIES:  2+ Peripheral Pulses, No clubbing, cyanosis, or edema  PSYCH: AAOx3  NEUROLOGY: non-focal  SKIN: No rashes or lesions      LABS:                        11.7   8.11  )-----------( 477      ( 11 Jan 2021 18:11 )             38.0     01-11    137  |  100  |  16  ----------------------------<  95  4.2   |  29  |  0.76    Ca    8.8      11 Jan 2021 18:11                  RADIOLOGY & ADDITIONAL TESTS:  Results Reviewed:   Imaging Personally Reviewed:  Electrocardiogram Personally Reviewed:    COORDINATION OF CARE:  Care Discussed with Consultants/Other Providers [Y/N]:  Prior or Outpatient Records Reviewed [Y/N]:

## 2021-01-12 NOTE — DISCHARGE NOTE NURSING/CASE MANAGEMENT/SOCIAL WORK - NSDCPNINST_GEN_ALL_CORE
Notify Dr Campos if you experience any increase in pain not relieved with medication, any redness, drainage or swelling around incision, any numbness or tingling in extremities or any fever >100.5.   Notify Dr Campos if you experience any increase in pain not relieved with medication, any redness, drainage or swelling around incision, any numbness or tingling in extremities or any fever >100.5.  Drink plenty of fluids.  no heavy lifting or straining.  Maintain proper body alignment, no bending or twisting at the waist.

## 2021-01-12 NOTE — PROGRESS NOTE ADULT - PROVIDER SPECIALTY LIST ADULT
Rehab Medicine
Neurosurgery
Pain Medicine
Pain Medicine
Rehab Medicine
Hospitalist
Neurosurgery
SICU
Neurosurgery
Neurosurgery
Hospitalist
Neurosurgery
Hospitalist
Neurosurgery
Neurosurgery
Hospitalist
Neurosurgery
Neurosurgery
Hospitalist

## 2021-01-12 NOTE — PROGRESS NOTE ADULT - PROBLEM SELECTOR PLAN 6
- c/w Plaquenil

## 2021-01-12 NOTE — PROGRESS NOTE ADULT - PROBLEM SELECTOR PLAN 2
MR lumbosacral showed abnormal signal adjacent to the L2-3 disc space. "While these findings could be compatible with degenerative changes possibly of early discitis and osteomyelitis must be considered".  At this point seems more c/w DJD as no s/s of infection.  - defer to NSY   - pt appears non toxic, afebrile, no leukocytosis; no recent fevers, blood cultures no growth
MR lumbosacral showed abnormal signal adjacent to the L2-3 disc space. "While these findings could be compatible with degenerative changes possibly of early discitis and osteomyelitis must be considered".  At this point seems more c/w DJD as no s/s of infection.  - defer to NSY re: imaging findings  - pt appears non toxic, afebrile, no leukocytosis; no recent fevers, blood cultures no growth  - ESR (52)--however given underling SLE/RA may be baseline elevated  - c/w pain control: percocet 10 mg q6h PRN for severe pain, percocet 5 mg q6h for mod pain, acetaminophen prn for mild pain; c/w bowel regimen ( 12/30)
MR lumbosacral showed abnormal signal adjacent to the L2-3 disc space. "While these findings could be compatible with degenerative changes possibly of early discitis and osteomyelitis must be considered".  At this point seems more c/w DJD as no s/s of infection.  - defer to NSY re: imaging findings  - pt appears non toxic, afebrile, no leukocytosis; no recent fevers, blood cultures no growth  - ESR (52)--however given underling SLE/RA may be baseline elevated  - c/w pain control: percocet 10 mg q6h PRN for severe pain, percocet 5 mg q6h for mod pain, acetaminophen prn for mild pain; c/w bowel regimen ( 12/30)
MR lumbosacral showed abnormal signal adjacent to the L2-3 disc space. "While these findings could be compatible with degenerative changes possibly of early discitis and osteomyelitis must be considered".  At this point seems more c/w DJD as no s/s of infection.  - defer to NSY re: imaging findings  - pt appears non toxic, afebrile, no leukocytosis; no recent fevers, blood cultures no growth  - ESR (52) and CRP (in lab)--however given underling SLE/RA may be baseline elevated  - c/w pain control: percocet 10 mg q6h PRN for severe pain, percocet 5 mg q6h for mod pain, acetaminophen prn for mild pain; c/w bowel regimen
MR lumbosacral showed abnormal signal adjacent to the L2-3 disc space. "While these findings could be compatible with degenerative changes possibly of early discitis and osteomyelitis must be considered".  At this point seems more c/w DJD as no s/s of infection.  - defer to NSY   - pt appears non toxic, afebrile, no leukocytosis; no recent fevers, blood cultures no growth
MR lumbosacral showed abnormal signal adjacent to the L2-3 disc space. "While these findings could be compatible with degenerative changes possibly of early discitis and osteomyelitis must be considered".  At this point seems more c/w DJD as no s/s of infection.  - defer to NSY re: imaging findings  - pt appears non toxic, afebrile, no leukocytosis; no recent fevers, blood cultures no growth  - ESR (52) and CRP (in lab)--however given underling SLE/RA may be baseline elevated  - c/w pain control: percocet 10 mg q6h PRN for severe pain, percocet 5 mg q6h for mod pain, acetaminophen prn for mild pain; c/w bowel regimen
MR lumbosacral showed abnormal signal adjacent to the L2-3 disc space. "While these findings could be compatible with degenerative changes possibly of early discitis and osteomyelitis must be considered".  At this point seems more c/w DJD as no s/s of infection.  - defer to NSY   - pt appears non toxic, afebrile, no leukocytosis; no recent fevers, blood cultures no growth
MR lumbosacral showed abnormal signal adjacent to the L2-3 disc space. "While these findings could be compatible with degenerative changes possibly of early discitis and osteomyelitis must be considered".  At this point seems more c/w DJD as no s/s of infection.  - defer to NSY   - pt appears non toxic, afebrile, no leukocytosis; no recent fevers, blood cultures no growth

## 2021-01-12 NOTE — DISCHARGE NOTE NURSING/CASE MANAGEMENT/SOCIAL WORK - NSDPDISTO_GEN_ALL_CORE
Sub-Acute rehab stable, back dressing in place, clean dry and intact, IAD to perineum, MAD to sacral/glutteal area, tolerating diet/Sub-Acute rehab stable, back dressing in place, clean dry and intact, IAD to perineum, incontinent of urine and stool  MAD to sacral/glutteal area, tolerating diet/Acute rehab

## 2021-01-12 NOTE — H&P ADULT - NSHPSOCIALHISTORY_GEN_ALL_CORE
SOCIAL HISTORY  Smoking - Denied  EtOH - Denied   Drugs - Denied    FUNCTIONAL HISTORY  Lives alone in 1 story house  Independent with RW at baseline    CURRENT FUNCTIONAL STATUS  BM: max of 2  T: 10 steps with RW and mod of 2

## 2021-01-12 NOTE — H&P ADULT - NSHPPHYSICALEXAM_GEN_ALL_CORE
Vital Signs  T(C): 36.8 (01-12-21 @ 10:30), Max: 37.1 (01-11-21 @ 22:19)  HR: 79 (01-12-21 @ 10:30) (69 - 79)  BP: 115/60 (01-12-21 @ 10:30) (115/60 - 145/47)  RR: 16 (01-12-21 @ 10:30) (16 - 18)  SpO2: 95% (01-12-21 @ 10:30) (95% - 100%)    Gen - NAD, Comfortable  HEENT - NCAT, EOMI, MMM  Neck - Supple, No limited ROM  Pulm - CTAB, No wheeze, No rhonchi, No crackles  Cardiovascular - RRR, S1S2, No m/r/g  Abdomen - Soft, NT/ND, +BS  Extremities - No C/C/E, No calf tenderness  Neuro-     Cognitive - AAOx3     Communication - Fluent, No dysarthria     Attention: Intact      Judgement: Good evidence of judgement     Memory: Recall 3 objects immediate and 3 min later         Cranial Nerves - CN 2-12 intact     Motor -                    LEFT    UE - ShAB 5/5, EF 5/5, EE 5/5, WE 5/5,  5/5                    RIGHT UE - ShAB 5/5, EF 5/5, EE 5/5, WE 5/5,  5/5                    LEFT    LE - HF 4/5, KE 4/5, DF 4/5, PF 4/5                    RIGHT LE - HF 4/5, KE 4/5, DF 4/5, PF 4/5        Sensory - Intact to LT     Reflexes - DTR Intact, No primitive reflex     Coordination - FTN intact     Tone - normal  Psychiatric - Mood stable, Affect WNL  Skin: Intact  Wounds: None Present Vital Signs  T(C): 36.8 (01-12-21 @ 10:30), Max: 37.1 (01-11-21 @ 22:19)  HR: 79 (01-12-21 @ 10:30) (69 - 79)  BP: 115/60 (01-12-21 @ 10:30) (115/60 - 145/47)  RR: 16 (01-12-21 @ 10:30) (16 - 18)  SpO2: 95% (01-12-21 @ 10:30) (95% - 100%)    Gen - NAD, Comfortable  HEENT - NCAT,  Pulm - CTAB,   Cardiovascular - S1S2,  Abdomen - Soft, NT/ND, +BS  Extremities - No C/C/E, No calf tenderness  Neuro-     Cognitive - AAOx3     Communication - Fluent, No dysarthria     No facial weakness, tongue midline     Motor -                    LEFT    UE - ShAB 5/5, EF 5/5, EE 5/5, WE 5/5,  5/5                    RIGHT UE - ShAB 5/5, EF 5/5, EE 5/5, WE 5/5,  5/5                    LEFT    LE - HF 2/5, KE 3/5, DF 4/5, PF 4/5                    RIGHT LE - HF 2/5, KE 3/5, DF 4/5, PF 4/5        Sensory - Intact to LT    Psychiatric - Mood stable, Affect WNL  Skin: Back incision with staples +   Wounds: None Present

## 2021-01-12 NOTE — PROVIDER CONTACT NOTE (OTHER) - SITUATION
Pt complaining of abdominal pain while working with PT and OT. Pt denies surgical/back pain. Pt states she had a bowel movement last night but feels as if it is gas pain.

## 2021-01-12 NOTE — PROGRESS NOTE ADULT - PROBLEM SELECTOR PLAN 5
- hold methotrexate weekly
- resume methotrexate weekly, may need to hold if planning for OR
- hold methotrexate weekly
- hold methotrexate weekly  - attempted to reach OP rheum Dr. Kalani Duckworth [phone], office closed until 1/4/2020, will need to f/u when office open
- hold methotrexate weekly
- hold methotrexate weekly  - attempted to reach OP rheum Dr. Kalani Duckworth [phone], office closed until 1/4/2020
- hold methotrexate weekly  - attempted to reach OP rheum Dr. Kalani Duckworth [phone], office closed until 1/4/2020, will need to f/u when office open
- hold methotrexate weekly

## 2021-01-12 NOTE — PROGRESS NOTE ADULT - PROBLEM SELECTOR PLAN 9
DVT ppx: Lovenox  Diet: DASH  Gas: given miralox, encouraged to move her legs more while in bed  Dispo: pending PT

## 2021-01-12 NOTE — PROGRESS NOTE ADULT - PROBLEM SELECTOR PROBLEM 1
S/P spinal fusion
S/P spinal fusion
Lumbosacral spondylosis with radiculopathy
S/P spinal fusion
S/P spinal fusion
Lumbosacral spondylosis with radiculopathy
Lumbosacral spondylosis with radiculopathy
S/P spinal fusion
Lumbosacral spondylosis with radiculopathy

## 2021-01-13 LAB
ALBUMIN SERPL ELPH-MCNC: 2.1 G/DL — LOW (ref 3.3–5)
ALP SERPL-CCNC: 104 U/L — SIGNIFICANT CHANGE UP (ref 40–120)
ALT FLD-CCNC: 16 U/L — SIGNIFICANT CHANGE UP (ref 10–45)
ANION GAP SERPL CALC-SCNC: 8 MMOL/L — SIGNIFICANT CHANGE UP (ref 5–17)
AST SERPL-CCNC: 34 U/L — SIGNIFICANT CHANGE UP (ref 10–40)
BASOPHILS # BLD AUTO: 0.08 K/UL — SIGNIFICANT CHANGE UP (ref 0–0.2)
BASOPHILS NFR BLD AUTO: 1.1 % — SIGNIFICANT CHANGE UP (ref 0–2)
BILIRUB SERPL-MCNC: 0.4 MG/DL — SIGNIFICANT CHANGE UP (ref 0.2–1.2)
BUN SERPL-MCNC: 12 MG/DL — SIGNIFICANT CHANGE UP (ref 7–23)
CALCIUM SERPL-MCNC: 8.8 MG/DL — SIGNIFICANT CHANGE UP (ref 8.4–10.5)
CHLORIDE SERPL-SCNC: 104 MMOL/L — SIGNIFICANT CHANGE UP (ref 96–108)
CO2 SERPL-SCNC: 29 MMOL/L — SIGNIFICANT CHANGE UP (ref 22–31)
CREAT SERPL-MCNC: 0.75 MG/DL — SIGNIFICANT CHANGE UP (ref 0.5–1.3)
EOSINOPHIL # BLD AUTO: 0.28 K/UL — SIGNIFICANT CHANGE UP (ref 0–0.5)
EOSINOPHIL NFR BLD AUTO: 3.8 % — SIGNIFICANT CHANGE UP (ref 0–6)
GLUCOSE SERPL-MCNC: 91 MG/DL — SIGNIFICANT CHANGE UP (ref 70–99)
HCT VFR BLD CALC: 33.3 % — LOW (ref 34.5–45)
HGB BLD-MCNC: 10.3 G/DL — LOW (ref 11.5–15.5)
IMM GRANULOCYTES NFR BLD AUTO: 0.7 % — SIGNIFICANT CHANGE UP (ref 0–1.5)
LYMPHOCYTES # BLD AUTO: 1.5 K/UL — SIGNIFICANT CHANGE UP (ref 1–3.3)
LYMPHOCYTES # BLD AUTO: 20.1 % — SIGNIFICANT CHANGE UP (ref 13–44)
MCHC RBC-ENTMCNC: 30.1 PG — SIGNIFICANT CHANGE UP (ref 27–34)
MCHC RBC-ENTMCNC: 30.9 GM/DL — LOW (ref 32–36)
MCV RBC AUTO: 97.4 FL — SIGNIFICANT CHANGE UP (ref 80–100)
MONOCYTES # BLD AUTO: 1.13 K/UL — HIGH (ref 0–0.9)
MONOCYTES NFR BLD AUTO: 15.1 % — HIGH (ref 2–14)
NEUTROPHILS # BLD AUTO: 4.42 K/UL — SIGNIFICANT CHANGE UP (ref 1.8–7.4)
NEUTROPHILS NFR BLD AUTO: 59.2 % — SIGNIFICANT CHANGE UP (ref 43–77)
NRBC # BLD: 0 /100 WBCS — SIGNIFICANT CHANGE UP (ref 0–0)
PLATELET # BLD AUTO: 468 K/UL — HIGH (ref 150–400)
POTASSIUM SERPL-MCNC: 3.8 MMOL/L — SIGNIFICANT CHANGE UP (ref 3.5–5.3)
POTASSIUM SERPL-SCNC: 3.8 MMOL/L — SIGNIFICANT CHANGE UP (ref 3.5–5.3)
PROT SERPL-MCNC: 6 G/DL — SIGNIFICANT CHANGE UP (ref 6–8.3)
RBC # BLD: 3.42 M/UL — LOW (ref 3.8–5.2)
RBC # FLD: 14.2 % — SIGNIFICANT CHANGE UP (ref 10.3–14.5)
SODIUM SERPL-SCNC: 141 MMOL/L — SIGNIFICANT CHANGE UP (ref 135–145)
WBC # BLD: 7.46 K/UL — SIGNIFICANT CHANGE UP (ref 3.8–10.5)
WBC # FLD AUTO: 7.46 K/UL — SIGNIFICANT CHANGE UP (ref 3.8–10.5)

## 2021-01-13 PROCEDURE — 74018 RADEX ABDOMEN 1 VIEW: CPT | Mod: 26

## 2021-01-13 PROCEDURE — 99232 SBSQ HOSP IP/OBS MODERATE 35: CPT | Mod: GC

## 2021-01-13 PROCEDURE — 99223 1ST HOSP IP/OBS HIGH 75: CPT

## 2021-01-13 RX ORDER — SIMETHICONE 80 MG/1
80 TABLET, CHEWABLE ORAL EVERY 6 HOURS
Refills: 0 | Status: DISCONTINUED | OUTPATIENT
Start: 2021-01-13 | End: 2021-03-03

## 2021-01-13 RX ORDER — ACETAMINOPHEN 500 MG
650 TABLET ORAL EVERY 6 HOURS
Refills: 0 | Status: DISCONTINUED | OUTPATIENT
Start: 2021-01-13 | End: 2021-03-03

## 2021-01-13 RX ADMIN — OXYCODONE HYDROCHLORIDE 10 MILLIGRAM(S): 5 TABLET ORAL at 15:01

## 2021-01-13 RX ADMIN — ATORVASTATIN CALCIUM 20 MILLIGRAM(S): 80 TABLET, FILM COATED ORAL at 22:13

## 2021-01-13 RX ADMIN — Medication 200 MICROGRAM(S): at 06:19

## 2021-01-13 RX ADMIN — OXYCODONE HYDROCHLORIDE 10 MILLIGRAM(S): 5 TABLET ORAL at 22:24

## 2021-01-13 RX ADMIN — ENOXAPARIN SODIUM 40 MILLIGRAM(S): 100 INJECTION SUBCUTANEOUS at 17:16

## 2021-01-13 RX ADMIN — SENNA PLUS 2 TABLET(S): 8.6 TABLET ORAL at 22:13

## 2021-01-13 RX ADMIN — Medication 3 MILLIGRAM(S): at 22:24

## 2021-01-13 RX ADMIN — Medication 1 MILLIGRAM(S): at 11:39

## 2021-01-13 RX ADMIN — Medication 200 MILLIGRAM(S): at 11:39

## 2021-01-13 RX ADMIN — OXYCODONE HYDROCHLORIDE 10 MILLIGRAM(S): 5 TABLET ORAL at 10:25

## 2021-01-13 RX ADMIN — Medication 650 MILLIGRAM(S): at 03:35

## 2021-01-13 RX ADMIN — ENOXAPARIN SODIUM 40 MILLIGRAM(S): 100 INJECTION SUBCUTANEOUS at 06:19

## 2021-01-13 RX ADMIN — OXYCODONE HYDROCHLORIDE 10 MILLIGRAM(S): 5 TABLET ORAL at 06:19

## 2021-01-13 NOTE — PROGRESS NOTE ADULT - ASSESSMENT
Assessment/Plan:  KAI THOMAS is a 75y F with a history of HTN, HLD, SLE, RA, hypothyroidism, provoked R. 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 Hospital Course complicated by brief SICU stay for mechanical ventilation management, now extubated, also c/bpain control and constipation. Patient now admitted for a multidisciplinary rehab program. 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 Jaja Campos  - Gait Instability, ADL impairments and Functional impairments: start Comprehensive Rehab Program of PT/OT  - Pain management as below    #SLE/RA  - c/w plaquenil 200mg po daily    #h/o Provoked DVT in 2019  - Follows with vascular cardiologist Dr. Hoyos outpatient.    - US July 2019:  DVT noted in the right popliteal vein and visualized segment of the right  posterior tibial vein.  - provoked DVT July 2019 in setting of hip surgery, already >6 months of treatment,   - was on eliqiuis 5mg po bid at home, but per hospitalist at [facility] hold eliquis, as LE dopplers on 12/29 neg for any DVTs  - only lovenox for dvt ppx while admitted    #HTN  - monitor BP   - off bp meds right now as BP was on lower side at [facility] due to opioids  - takes losartan 50mg po qd at home    #HLD  - c/w lipitor 20mg po daily    #hypothyroidism  - c/w levothyroxine 200mcg po daily    #Sleep  - melatonin 3mg PRN    #Skin  - staples to incision    #Pain Mgmt   - Tylenol PRN mild pain  - Oxycodone 5mg IR PRN moderate 4-7/10 pain  - Oxycodone 10mg IR prn severe 8-10/10 pain    #GI/Bowel Mgmt/ constipation  - Continent c/w Senna, Miralax PRN, milk of mag PRN   - advance regimen if still constipated d/t narcotic use  - if persistent, may consider abdominal xray    #/Bladder Mgmt   - Continent,   - PVR 8qh, SC if PVR > 350cc    #FEN   - Diet - Regular     #Precautions / PROPHYLAXIS:   - Falls, Spinal,   - ortho: spine precautions  - DVT: Lovenox

## 2021-01-13 NOTE — PROGRESS NOTE ADULT - ATTENDING COMMENTS
Patient seen at bedside along with NP Corinne Covarrubias.   Patient reports poor sleep due to ?pain, discomfort  OOB in chair with PT  Begin full rehab program    2. Per nursing staff, c/o abdominal pain   Abdomen soft, NT.   Patient has been having BMs after earlier post op constipation  Check Abd xray    3. Labs stable

## 2021-01-13 NOTE — PROGRESS NOTE ADULT - SUBJECTIVE AND OBJECTIVE BOX
Patient is a 75y old  Female who presents with a chief complaint of Back pain      HPI:  75F with past medical history of HTN, HLD, SLE, RA, hypothyroidism, provoked R. popliteal DVT in 2019 on eliquis, chronic low back pain presents to ED for acute on chronic low back pain. Pt has several months of chronic lumbosacral back pain, evaluated by outpatient ortho and referred for physical therapy and pain management. She has been receiving ?vitamin spinal injections. She last received low back inj on 12/16 after which she her low back pain worsened. She took oxycodone 5 mg qd (prescribed in september during a ED visit) and acetaminophen for past three days without relief. She also endorses worsening pain during ambulation with walker and has affected her quality of life. She has constipation, last BM 4 days ago. Otherwise denies fever, chills, N/V, abdominal pain, dysuria, urinary retention, fecal incontinence, saddle anesthesia, fall, LOC, trauma. During ROS she endorsed LLE weakness for several months, no numbness or tingling and has LLE edema >RLE edema, reported undergoing LLE US 4 months ago, was negative for DVT. Currently has 8/10 pain, improved to 5/10 with oxycodone 5 mg x 2 in ED. (28 Dec 2020 11:16)   12/28: Mri T/Ls: IMPRESSION: Degenerative changes  Abnormal signal with associated enhancement is seen involving the endplates adjacent to the L2-3 disc space. There is slight increased T2 prolongation involving the L2-3 disc space with associated widening. While these findings could be compatible with degenerative changes possibly of early discitis and osteomyelitis must be considered.  Patient s/p L1-L5 laminectomy, T10-L5 posterior surgical fusion by Dr. Campos on 1/1/21  HMV x 3 drains. Post op with constipation needing aggressive bowel regimen.     PT/OT and PM&R evaluated patient and recommended Rehab.     Patient medically cleared and admitted to  Rehab on 1/12/21 (12 Jan 2021 15:21)    Subjective:  Pt seen and examine at bedside during therapy.  She reports increased pain that kept her up overnight.  She ambulated a few steps using RW and 2 person assist.  She has mild abdominal discomfort due to constipation.    PAST MEDICAL & SURGICAL HISTORY:  HTN (hypertension)    DVT (deep venous thrombosis)    Hypothyroidism    RA (rheumatoid arthritis)    Obesity, Class II, BMI 35-39.9, no comorbidity    Hypothyroid    Benign heart murmur    Hyperlipidemia    Hypertension    H/O degenerative disc disease    Osteoarthritis    SLE (systemic lupus erythematosus)    Rheumatoid arthritis    S/P knee replacement    Status post total hip replacement, right    S/P thyroid surgery  1 lobe removed    Lung cancer  small tumor removed 2015    S/P knee surgery  bilateral    S/P carpal tunnel release  left    S/P eye surgery  child    S/P cataract surgery  left    S/P tonsillectomy      Allergies    No Known Allergies    Intolerances      PHYSICAL EXAM  75y  Vital Signs Last 24 Hrs  T(C): 36.6 (13 Jan 2021 09:36), Max: 37.1 (12 Jan 2021 16:32)  T(F): 97.9 (13 Jan 2021 09:36), Max: 98.7 (12 Jan 2021 16:32)  HR: 66 (13 Jan 2021 09:36) (66 - 76)  BP: 129/74 (13 Jan 2021 09:36) (124/67 - 135/75)  BP(mean): --  RR: 15 (13 Jan 2021 09:36) (14 - 16)  SpO2: 97% (13 Jan 2021 09:36) (95% - 97%)    Gen - NAD, Comfortable  HEENT - NCAT,  Pulm - CTAB,   Cardiovascular - S1S2,  Abdomen - Soft, NT/ND, +BS  Extremities - No C/C/E, No calf tenderness  Neuro-     Cognitive - AAOx3     Communication - Fluent, No dysarthria     No facial weakness, tongue midline     Motor -                    LEFT    UE - ShAB 5/5, EF 5/5, EE 5/5, WE 5/5,  5/5                    RIGHT UE - ShAB 5/5, EF 5/5, EE 5/5, WE 5/5,  5/5                    LEFT    LE - HF 2/5, KE 3/5, DF 4/5, PF 4/5                    RIGHT LE - HF 2/5, KE 3/5, DF 4/5, PF 4/5        Sensory - Intact to LT    Psychiatric - Mood stable, Affect WNL  Skin: Back incision with staples +   Wounds: None Present      RECENT LABS:                          10.3   7.46  )-----------( 468      ( 13 Jan 2021 06:56 )             33.3     01-13    141  |  104  |  12  ----------------------------<  91  3.8   |  29  |  0.75    Ca    8.8      13 Jan 2021 06:56    TPro  6.0  /  Alb  2.1<L>  /  TBili  0.4  /  DBili  x   /  AST  34  /  ALT  16  /  AlkPhos  104  01-13    LIVER FUNCTIONS - ( 13 Jan 2021 06:56 )  Alb: 2.1 g/dL / Pro: 6.0 g/dL / ALK PHOS: 104 U/L / ALT: 16 U/L / AST: 34 U/L / GGT: x             MEDICATIONS  (STANDING):  atorvastatin 20 milliGRAM(s) Oral at bedtime  enoxaparin Injectable 40 milliGRAM(s) SubCutaneous two times a day  folic acid 1 milliGRAM(s) Oral daily  hydroxychloroquine 200 milliGRAM(s) Oral daily  levothyroxine 200 MICROGram(s) Oral daily  senna 2 Tablet(s) Oral at bedtime    MEDICATIONS  (PRN):  acetaminophen   Tablet .. 650 milliGRAM(s) Oral every 6 hours PRN Temp greater or equal to 38C (100.4F), Mild Pain (1 - 3)  magnesium hydroxide Suspension 30 milliLiter(s) Oral daily PRN Constipation  melatonin 3 milliGRAM(s) Oral at bedtime PRN Insomnia  oxyCODONE    IR 5 milliGRAM(s) Oral every 4 hours PRN Moderate Pain (4 - 6)  oxyCODONE    IR 10 milliGRAM(s) Oral every 4 hours PRN Severe Pain (7 - 10)  polyethylene glycol 3350 17 Gram(s) Oral two times a day PRN Constipation  simethicone 80 milliGRAM(s) Chew every 6 hours PRN Upset Stomach                       Patient is a 75y old  Female who presents with a chief complaint of Back pain      HPI:  75F with past medical history of HTN, HLD, SLE, RA, hypothyroidism, provoked R. popliteal DVT in 2019 on eliquis, chronic low back pain presents to ED for acute on chronic low back pain. Pt has several months of chronic lumbosacral back pain, evaluated by outpatient ortho and referred for physical therapy and pain management. She has been receiving ?vitamin spinal injections. She last received low back inj on 12/16 after which she her low back pain worsened. She took oxycodone 5 mg qd (prescribed in september during a ED visit) and acetaminophen for past three days without relief. She also endorses worsening pain during ambulation with walker and has affected her quality of life. She has constipation, last BM 4 days ago. Otherwise denies fever, chills, N/V, abdominal pain, dysuria, urinary retention, fecal incontinence, saddle anesthesia, fall, LOC, trauma. During ROS she endorsed LLE weakness for several months, no numbness or tingling and has LLE edema >RLE edema, reported undergoing LLE US 4 months ago, was negative for DVT. Currently has 8/10 pain, improved to 5/10 with oxycodone 5 mg x 2 in ED. (28 Dec 2020 11:16)   12/28: Mri T/Ls: IMPRESSION: Degenerative changes  Abnormal signal with associated enhancement is seen involving the endplates adjacent to the L2-3 disc space. There is slight increased T2 prolongation involving the L2-3 disc space with associated widening. While these findings could be compatible with degenerative changes possibly of early discitis and osteomyelitis must be considered.  Patient s/p L1-L5 laminectomy, T10-L5 posterior surgical fusion by Dr. Campos on 1/1/21  HMV x 3 drains. Post op with constipation needing aggressive bowel regimen.     PT/OT and PM&R evaluated patient and recommended Rehab.     Patient medically cleared and admitted to  Rehab on 1/12/21 (12 Jan 2021 15:21)    Today's Subjective/Interval Event:  Pt seen and examine at bedside during therapy.  She reports increased pain that kept her up overnight.  She ambulated a few steps using RW and 2 person assist.  She has mild abdominal discomfort due to constipation.    PAST MEDICAL & SURGICAL HISTORY:  HTN (hypertension)    DVT (deep venous thrombosis)    Hypothyroidism    RA (rheumatoid arthritis)    Obesity, Class II, BMI 35-39.9, no comorbidity    Hypothyroid    Benign heart murmur    Hyperlipidemia    Hypertension    H/O degenerative disc disease    Osteoarthritis    SLE (systemic lupus erythematosus)    Rheumatoid arthritis    S/P knee replacement    Status post total hip replacement, right    S/P thyroid surgery  1 lobe removed    Lung cancer  small tumor removed 2015    S/P knee surgery  bilateral    S/P carpal tunnel release  left    S/P eye surgery  child    S/P cataract surgery  left    S/P tonsillectomy      Allergies    No Known Allergies    Intolerances      PHYSICAL EXAM  75y  Vital Signs Last 24 Hrs  T(C): 36.6 (13 Jan 2021 09:36), Max: 37.1 (12 Jan 2021 16:32)  T(F): 97.9 (13 Jan 2021 09:36), Max: 98.7 (12 Jan 2021 16:32)  HR: 66 (13 Jan 2021 09:36) (66 - 76)  BP: 129/74 (13 Jan 2021 09:36) (124/67 - 135/75)  BP(mean): --  RR: 15 (13 Jan 2021 09:36) (14 - 16)  SpO2: 97% (13 Jan 2021 09:36) (95% - 97%)    Gen - NAD, Comfortable  HEENT - NCAT,  Pulm - CTAB,   Cardiovascular - S1S2,  Abdomen - Soft, NT/ND, +BS  Extremities - No C/C/E, No calf tenderness  Neuro-     Cognitive - AAOx3     Communication - Fluent, No dysarthria     No facial weakness, tongue midline     Motor -                    LEFT    UE - ShAB 5/5, EF 5/5, EE 5/5, WE 5/5,  5/5                    RIGHT UE - ShAB 5/5, EF 5/5, EE 5/5, WE 5/5,  5/5                    LEFT    LE - HF 2/5, KE 3/5, DF 4/5, PF 4/5                    RIGHT LE - HF 2/5, KE 3/5, DF 4/5, PF 4/5        Sensory - Intact to LT    Psychiatric - Mood stable, Affect WNL  Skin: Back incision with staples +   Wounds: None Present      RECENT LABS:                          10.3   7.46  )-----------( 468      ( 13 Jan 2021 06:56 )             33.3     01-13    141  |  104  |  12  ----------------------------<  91  3.8   |  29  |  0.75    Ca    8.8      13 Jan 2021 06:56    TPro  6.0  /  Alb  2.1<L>  /  TBili  0.4  /  DBili  x   /  AST  34  /  ALT  16  /  AlkPhos  104  01-13    LIVER FUNCTIONS - ( 13 Jan 2021 06:56 )  Alb: 2.1 g/dL / Pro: 6.0 g/dL / ALK PHOS: 104 U/L / ALT: 16 U/L / AST: 34 U/L / GGT: x             MEDICATIONS  (STANDING):  atorvastatin 20 milliGRAM(s) Oral at bedtime  enoxaparin Injectable 40 milliGRAM(s) SubCutaneous two times a day  folic acid 1 milliGRAM(s) Oral daily  hydroxychloroquine 200 milliGRAM(s) Oral daily  levothyroxine 200 MICROGram(s) Oral daily  senna 2 Tablet(s) Oral at bedtime    MEDICATIONS  (PRN):  acetaminophen   Tablet .. 650 milliGRAM(s) Oral every 6 hours PRN Temp greater or equal to 38C (100.4F), Mild Pain (1 - 3)  magnesium hydroxide Suspension 30 milliLiter(s) Oral daily PRN Constipation  melatonin 3 milliGRAM(s) Oral at bedtime PRN Insomnia  oxyCODONE    IR 5 milliGRAM(s) Oral every 4 hours PRN Moderate Pain (4 - 6)  oxyCODONE    IR 10 milliGRAM(s) Oral every 4 hours PRN Severe Pain (7 - 10)  polyethylene glycol 3350 17 Gram(s) Oral two times a day PRN Constipation  simethicone 80 milliGRAM(s) Chew every 6 hours PRN Upset Stomach

## 2021-01-13 NOTE — CONSULT NOTE ADULT - SUBJECTIVE AND OBJECTIVE BOX
Patient is a 75y old  Female who presents with a chief complaint of Back pain (12 Jan 2021 15:21)    HPI:  75F with past medical history of HTN, HLD, SLE, RA, hypothyroidism, provoked R. popliteal DVT in 2019 on eliquis, chronic low back pain presents to ED for acute on chronic low back pain. Pt has several months of chronic lumbosacral back pain, evaluated by outpatient ortho and referred for physical therapy and pain management. She has been receiving ?vitamin spinal injections. She last received low back inj on 12/16 after which she her low back pain worsened. She took oxycodone 5 mg qd (prescribed in september during a ED visit) and acetaminophen for past three days without relief. She also endorses worsening pain during ambulation with walker and has affected her quality of life. She has constipation, last BM 4 days ago. Otherwise denies fever, chills, N/V, abdominal pain, dysuria, urinary retention, fecal incontinence, saddle anesthesia, fall, LOC, trauma. During ROS she endorsed LLE weakness for several months, no numbness or tingling and has LLE edema >RLE edema, reported undergoing LLE US 4 months ago, was negative for DVT. Currently has 8/10 pain, improved to 5/10 with oxycodone 5 mg x 2 in ED. (28 Dec 2020 11:16)   12/28: Mri T/Ls: IMPRESSION: Degenerative changes  Abnormal signal with associated enhancement is seen involving the endplates adjacent to the L2-3 disc space. There is slight increased T2 prolongation involving the L2-3 disc space with associated widening. While these findings could be compatible with degenerative changes possibly of early discitis and osteomyelitis must be considered.  Patient s/p L1-L5 laminectomy, T10-L5 posterior surgical fusion by Dr. Campos on 1/1/21  HMV x 3 drains. Post op with constipation needing aggressive bowel regimen.     PT/OT and PM&R evaluated patient and recommended Rehab.     Patient medically cleared and admitted to  Rehab on 1/12/21 (12 Jan 2021 15:21)    PAST MEDICAL & SURGICAL HISTORY:  HTN (hypertension)  DVT (deep venous thrombosis)  Hypothyroidism  RA (rheumatoid arthritis)  Obesity, Class II, BMI 35-39.9, no comorbidity  Hypothyroid  Benign heart murmur  Hyperlipidemia  Hypertension  H/O degenerative disc disease  Osteoarthritis  SLE (systemic lupus erythematosus)  Rheumatoid arthritis  S/P knee replacement Status post total hip replacement, right  S/P thyroid surgery 1 lobe removed  Lung cancer small tumor removed 2015  S/P knee surgery bilateral  S/P carpal tunnel release left  S/P eye surgery child  S/P cataract surgery left  S/P tonsillectomy     SOCIAL HISTORY:  FAMILY HISTORY:    ALLERGIES:  No Known Allergies    MEDICATIONS  (STANDING):  atorvastatin 20 milliGRAM(s) Oral at bedtime  enoxaparin Injectable 40 milliGRAM(s) SubCutaneous two times a day  folic acid 1 milliGRAM(s) Oral daily  hydroxychloroquine 200 milliGRAM(s) Oral daily  influenza   Vaccine 0.5 milliLiter(s) IntraMuscular once  levothyroxine 200 MICROGram(s) Oral daily  senna 2 Tablet(s) Oral at bedtime    MEDICATIONS  (PRN):  acetaminophen   Tablet .. 650 milliGRAM(s) Oral every 6 hours PRN Temp greater or equal to 38C (100.4F), Mild Pain (1 - 3)  magnesium hydroxide Suspension 30 milliLiter(s) Oral daily PRN Constipation  melatonin 3 milliGRAM(s) Oral at bedtime PRN Insomnia  oxyCODONE    IR 5 milliGRAM(s) Oral every 4 hours PRN Moderate Pain (4 - 6)  oxyCODONE    IR 10 milliGRAM(s) Oral every 4 hours PRN Severe Pain (7 - 10)  polyethylene glycol 3350 17 Gram(s) Oral two times a day PRN Constipation  simethicone 80 milliGRAM(s) Chew every 6 hours PRN Upset Stomach    Review of Systems: Refer to HPI for pertinent positives and negatives. All other ROS reviewed and negative except as otherwise stated above.    Vital Signs Last 24 Hrs  T(F): 98.3 (12 Jan 2021 22:19), Max: 98.7 (12 Jan 2021 16:32)  HR: 74 (12 Jan 2021 22:19) (74 - 79)  BP: 124/67 (12 Jan 2021 22:19) (115/60 - 135/75)  RR: 16 (12 Jan 2021 22:19) (14 - 16)  SpO2: 95% (12 Jan 2021 22:19) (95% - 97%)  I&O's Summary    PHYSICAL EXAM:  GENERAL: NAD, well-groomed, well-developed  HEAD:  Atraumatic, Normocephalic  EYES: EOMI, PERRL, conjunctiva and sclera clear  ENMT: Moist mucous membranes, Good dentition  NECK: Supple, No JVD  CHEST/LUNG: Clear to auscultation bilaterally, non-labored breathing, good air entry  HEART: RRR; S1/S2, No murmur  ABDOMEN: Soft, Nontender, Nondistended; Bowel sounds present  VASCULAR: Normal pulses, Normal capillary refill  EXTREMITIES: No cyanosis, No edema  LYMPH: No lymphadenopathy noted  SKIN: Warm, Intact  PSYCH: Normal mood and affect  NERVOUS SYSTEM:  A/O x3, Good concentration; CN 2-12 intact, No focal deficits, moves all extremities    LABS:                        10.3   7.46  )-----------( 468      ( 13 Jan 2021 06:56 )             33.3     01-11    137  |  100  |  16  ----------------------------<  95  4.2   |  29  |  0.76    Ca    8.8      11 Jan 2021 18:11      eGFR if Non African American: 76 mL/min/1.73M2 (01-11-21 @ 18:11)  eGFR if : 88 mL/min/1.73M2 (01-11-21 @ 18:11)    RADIOLOGY & ADDITIONAL TESTS:    < from: Xray Chest 1 View- PORTABLE-Routine (Xray Chest 1 View- PORTABLE-Routine in AM.) (01.03.21 @ 01:03) >  INTERPRETATION:  Chest one view    HISTORY: ICU follow-up    COMPARISON STUDY: 1/2/2021    Frontal expiratory view of the chest shows the heart to be similar in size. Endotracheal tube and nasogastric tube have been removed.    The lungs show bibasilar clearing with smaller left perihilar infiltrate and there is no evidence of pneumothorax nor pleural effusion. Spinal hardware is again noted.    IMPRESSION:  Partialclearing of the lungs as noted.    Thank you for the courtesy of this referral.    < end of copied text >  < from: CT Thoracic Spine No Cont (12.31.20 @ 19:45) >  FINDINGS:    THORACIC SPINE:    VERTEBRAL BODIES:  Normal in height. No fracture. Diffuse osteopenia. Marginal osteophyte formation.  ALIGNMENT:  Scoliosis convex to the right  INTERVERTEBRAL DISC SPACES:  Multilevel facet arthrosis. No significant disc bulge identified.  NEURAL FORAMINA:  Normal.  SPINAL CANAL:  No other intradural or extradural defects are seen.  MISCELLANEOUS:  Cardiomegaly. Fibrotic changes left upper lobe/lingula with apparent postoperative changes.      LUMBAR SPINE:    VERTEBRAL BODIES:  Normal in height. No fracture. Marginal osteophyte formation  ALIGNMENT:  Scoliosis convex to the left.  INTERVERTEBRAL DISC SPACES:  Multilevel vacuum phenomenon with disc bulges and facet arthrosis. Multilevel foraminal narrowing. L3-L4 moderate to severe spinal stenosis.  SPINAL CANAL:  No other intradural or extradural defects are seen.  MISCELLANEOUS:  Large hiatal hernia.      IMPRESSION:  Multilevel spondylitic changes.    Scoliosis.      < end of copied text >      Care Discussed with Consultants/Other Providers: yes Patient is a 75y old  Female who presents with a chief complaint of Back pain (2021 15:21)    HPI:  75F with past medical history of HTN, HLD, SLE, RA, hypothyroidism, provoked R. popliteal DVT in 2019 on eliquis, chronic low back pain presents to ED for acute on chronic low back pain. Pt has several months of chronic lumbosacral back pain, evaluated by outpatient ortho and referred for physical therapy and pain management. She has been receiving ?vitamin spinal injections. She last received low back inj on  after which she her low back pain worsened. She took oxycodone 5 mg qd (prescribed in september during a ED visit) and acetaminophen for past three days without relief. She also endorses worsening pain during ambulation with walker and has affected her quality of life. She has constipation, last BM 4 days ago. Otherwise denies fever, chills, N/V, abdominal pain, dysuria, urinary retention, fecal incontinence, saddle anesthesia, fall, LOC, trauma. During ROS she endorsed LLE weakness for several months, no numbness or tingling and has LLE edema >RLE edema, reported undergoing LLE US 4 months ago, was negative for DVT. Currently has 8/10 pain, improved to 5/10 with oxycodone 5 mg x 2 in ED. (28 Dec 2020 11:16)   : Mri T/Ls: IMPRESSION: Degenerative changes  Abnormal signal with associated enhancement is seen involving the endplates adjacent to the L2-3 disc space. There is slight increased T2 prolongation involving the L2-3 disc space with associated widening. While these findings could be compatible with degenerative changes possibly of early discitis and osteomyelitis must be considered.  Patient s/p L1-L5 laminectomy, T10-L5 posterior surgical fusion by Dr. Campos on 21  HMV x 3 drains. Post op with constipation needing aggressive bowel regimen.   PT/OT and PM&R evaluated patient and recommended Rehab.   Patient medically cleared and admitted to  Rehab on 21 (2021 15:21)    Patient seen and examined at bedside, stable, c/o diffuse mid abdominal pain, constipation + small BM prior to rehab transfer, however still constipated. denies headache, fevers, chills, cp, sob, cough, nausea, vomiting. +mild lower back pain, LE weakness. Appetite, sleep, bladder, mood wnl     PAST MEDICAL & SURGICAL HISTORY:  HTN (hypertension)  DVT (deep venous thrombosis)  Hypothyroidism  RA (rheumatoid arthritis)  Obesity, Class II, BMI 35-39.9, no comorbidity  Hypothyroid  Benign heart murmur  Hyperlipidemia  Hypertension  H/O degenerative disc disease  Osteoarthritis  SLE (systemic lupus erythematosus)  Rheumatoid arthritis  S/P knee replacement Status post total hip replacement, right  S/P thyroid surgery 1 lobe removed  Lung cancer small tumor removed   S/P knee surgery bilateral  S/P carpal tunnel release left  S/P eye surgery child  S/P cataract surgery left  S/P tonsillectomy     SOCIAL HISTORY: Denies tobacco, EtOH, or illicit drugs. Ambulate with rolling walker at baseline. Lives in home, does not use stairs. Needs assistance with IADLs. Independent with ADLs.    FAMILY HISTORY: Mother -  age 58- CVA. father  age 60s - MI    ALLERGIES:  No Known Allergies    MEDICATIONS  (STANDING):  atorvastatin 20 milliGRAM(s) Oral at bedtime  enoxaparin Injectable 40 milliGRAM(s) SubCutaneous two times a day  folic acid 1 milliGRAM(s) Oral daily  hydroxychloroquine 200 milliGRAM(s) Oral daily  influenza   Vaccine 0.5 milliLiter(s) IntraMuscular once  levothyroxine 200 MICROGram(s) Oral daily  senna 2 Tablet(s) Oral at bedtime    MEDICATIONS  (PRN):  acetaminophen   Tablet .. 650 milliGRAM(s) Oral every 6 hours PRN Temp greater or equal to 38C (100.4F), Mild Pain (1 - 3)  magnesium hydroxide Suspension 30 milliLiter(s) Oral daily PRN Constipation  melatonin 3 milliGRAM(s) Oral at bedtime PRN Insomnia  oxyCODONE    IR 5 milliGRAM(s) Oral every 4 hours PRN Moderate Pain (4 - 6)  oxyCODONE    IR 10 milliGRAM(s) Oral every 4 hours PRN Severe Pain (7 - 10)  polyethylene glycol 3350 17 Gram(s) Oral two times a day PRN Constipation  simethicone 80 milliGRAM(s) Chew every 6 hours PRN Upset Stomach    Review of Systems: Refer to HPI for pertinent positives and negatives. All other ROS reviewed and negative except as otherwise stated above.    Vital Signs Last 24 Hrs  T(F): 98.3 (2021 22:19), Max: 98.7 (2021 16:32)  HR: 74 (2021 22:19) (74 - 79)  BP: 124/67 (2021 22:19) (115/60 - 135/75)  RR: 16 (2021 22:19) (14 - 16)  SpO2: 95% (2021 22:19) (95% - 97%)  I&O's Summary    PHYSICAL EXAM:  GENERAL: NAD, well-groomed, well-developed, obese  HEAD:  Atraumatic, Normocephalic  EYES: EOMI, PERRL, conjunctiva and sclera clear  ENMT: Moist mucous membranes, Good dentition  NECK: Supple, No JVD  CHEST/LUNG: Clear to auscultation bilaterally, non-labored breathing, good air entry  HEART: RRR; S1/S2, + systolic murmur  ABDOMEN: Soft, Nondistended; Bowel sounds present. + minimal diffuse ttp  VASCULAR: Normal pulses, Normal capillary refill  EXTREMITIES: No cyanosis. + trace LE edema b/l  LYMPH: No lymphadenopathy noted  SKIN: Warm, Intact  PSYCH: Normal mood and affect  NERVOUS SYSTEM:  A/O x3, Good concentration; No focal deficits, moves all extremities    LABS:                        10.3   7.46  )-----------( 468      ( 2021 06:56 )             33.3         137  |  100  |  16  ----------------------------<  95  4.2   |  29  |  0.76    Ca    8.8      2021 18:11      eGFR if Non African American: 76 mL/min/1.73M2 (21 @ 18:11)  eGFR if : 88 mL/min/1.73M2 (21 @ 18:11)    RADIOLOGY & ADDITIONAL TESTS:    < from: Xray Chest 1 View- PORTABLE-Routine (Xray Chest 1 View- PORTABLE-Routine in AM.) (21 @ 01:03) >  INTERPRETATION:  Chest one view    HISTORY: ICU follow-up    COMPARISON STUDY: 2021    Frontal expiratory view of the chest shows the heart to be similar in size. Endotracheal tube and nasogastric tube have been removed.    The lungs show bibasilar clearing with smaller left perihilar infiltrate and there is no evidence of pneumothorax nor pleural effusion. Spinal hardware is again noted.    IMPRESSION:  Partialclearing of the lungs as noted.    Thank you for the courtesy of this referral.    < end of copied text >  < from: CT Thoracic Spine No Cont (20 @ 19:45) >  FINDINGS:    THORACIC SPINE:    VERTEBRAL BODIES:  Normal in height. No fracture. Diffuse osteopenia. Marginal osteophyte formation.  ALIGNMENT:  Scoliosis convex to the right  INTERVERTEBRAL DISC SPACES:  Multilevel facet arthrosis. No significant disc bulge identified.  NEURAL FORAMINA:  Normal.  SPINAL CANAL:  No other intradural or extradural defects are seen.  MISCELLANEOUS:  Cardiomegaly. Fibrotic changes left upper lobe/lingula with apparent postoperative changes.      LUMBAR SPINE:    VERTEBRAL BODIES:  Normal in height. No fracture. Marginal osteophyte formation  ALIGNMENT:  Scoliosis convex to the left.  INTERVERTEBRAL DISC SPACES:  Multilevel vacuum phenomenon with disc bulges and facet arthrosis. Multilevel foraminal narrowing. L3-L4 moderate to severe spinal stenosis.  SPINAL CANAL:  No other intradural or extradural defects are seen.  MISCELLANEOUS:  Large hiatal hernia.      IMPRESSION:  Multilevel spondylitic changes.    Scoliosis.      < end of copied text >      Care Discussed with Consultants/Other Providers: yes

## 2021-01-13 NOTE — CONSULT NOTE ADULT - ASSESSMENT
75 y/ F with PMH HTN, HLD, SLE, RA, hypothyroidism, provoked R. 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 Hospital Course complicated by brief SICU stay for mechanical ventilation management, now extubated, also c/bpain control and constipation. Patient now admitted for a multidisciplinary rehab program on 1/12/21.    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 Jaja Campos  -Start comprehensive rehab program  -Pain management, bowel regimen per rehab     #Acute blood loss Anemia, likely from surgery  -H/H stable, continue to monitor     #SLE/RA  -Continue with plaquenil 200mg po daily    #H/o provoked right popliteal, right posterior tibial DVT (7/2019 in setting of hip surgery, already completed >6 months of treatment)   -Follows with vascular cardiologist Dr. Hoyos outpatient   -Treated with eliqiuis 5mg po bid, but per hospitalist at [facility] hold eliquis, as LE dopplers on 12/29 neg for DVTs  -On lovenox for dvt ppx during hospitalization    #HTN  -Monitor BP, BMP periodically  -Home med losartan 50mg qd, held due to low-normal BP 2/2 opioids    #HLD  -Continue lipitor 20mg po qd    #Hypothyroidism  -Continue levothyroxine 200mcg po qd    #DVT ppx: Lovenox 40mg BID  #GI ppx: PPI 75 y/ F with PMH HTN, HLD, SLE, RA, hypothyroidism, provoked R. 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 Hospital Course complicated by brief SICU stay for mechanical ventilation management, now extubated, also c/bpain control and constipation. Patient now admitted for a multidisciplinary rehab program on 21.    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 Jaja Campos  -Start comprehensive rehab program  -Pain management, bowel regimen per rehab   -Consider abd xray to assess stool burden     #Acute blood loss Anemia, likely from surgery  -H/H stable, continue to monitor     #SLE/RA  -Continue with plaquenil 200mg po daily    #H/o provoked right popliteal, right posterior tibial DVT (2019 in setting of hip surgery, already completed >6 months of treatment)   -Follows with vascular cardiologist Dr. Hoyos outpatient   -Treated with eliqiuis 5mg po bid chronically, but per hospitalist at [facility], eliquis held due to  LE dopplers neg for DVTs  -On lovenox for dvt ppx during hospitalization    #HTN  -Monitor BP, BMP periodically  -Home med losartan 50mg qd, held due to low-normal BP 2/2 opioids    #HLD  -Continue lipitor 20mg po qd    #Hypothyroidism  -Continue levothyroxine 200mcg po qd    #DVT ppx: Lovenox 40mg BID  #GI ppx: PPI    PCP: recently , will require new PCP follow up

## 2021-01-14 LAB
ALBUMIN SERPL ELPH-MCNC: 2.1 G/DL — LOW (ref 3.3–5)
ALP SERPL-CCNC: 96 U/L — SIGNIFICANT CHANGE UP (ref 40–120)
ALT FLD-CCNC: 17 U/L — SIGNIFICANT CHANGE UP (ref 10–45)
ANION GAP SERPL CALC-SCNC: 7 MMOL/L — SIGNIFICANT CHANGE UP (ref 5–17)
AST SERPL-CCNC: 31 U/L — SIGNIFICANT CHANGE UP (ref 10–40)
BASOPHILS # BLD AUTO: 0.07 K/UL — SIGNIFICANT CHANGE UP (ref 0–0.2)
BASOPHILS NFR BLD AUTO: 0.8 % — SIGNIFICANT CHANGE UP (ref 0–2)
BILIRUB SERPL-MCNC: 0.3 MG/DL — SIGNIFICANT CHANGE UP (ref 0.2–1.2)
BUN SERPL-MCNC: 13 MG/DL — SIGNIFICANT CHANGE UP (ref 7–23)
CALCIUM SERPL-MCNC: 8.7 MG/DL — SIGNIFICANT CHANGE UP (ref 8.4–10.5)
CHLORIDE SERPL-SCNC: 105 MMOL/L — SIGNIFICANT CHANGE UP (ref 96–108)
CO2 SERPL-SCNC: 30 MMOL/L — SIGNIFICANT CHANGE UP (ref 22–31)
CREAT SERPL-MCNC: 0.69 MG/DL — SIGNIFICANT CHANGE UP (ref 0.5–1.3)
EOSINOPHIL # BLD AUTO: 0.27 K/UL — SIGNIFICANT CHANGE UP (ref 0–0.5)
EOSINOPHIL NFR BLD AUTO: 3.3 % — SIGNIFICANT CHANGE UP (ref 0–6)
GLUCOSE SERPL-MCNC: 83 MG/DL — SIGNIFICANT CHANGE UP (ref 70–99)
HCT VFR BLD CALC: 33.6 % — LOW (ref 34.5–45)
HGB BLD-MCNC: 10.7 G/DL — LOW (ref 11.5–15.5)
IMM GRANULOCYTES NFR BLD AUTO: 0.5 % — SIGNIFICANT CHANGE UP (ref 0–1.5)
LYMPHOCYTES # BLD AUTO: 1.08 K/UL — SIGNIFICANT CHANGE UP (ref 1–3.3)
LYMPHOCYTES # BLD AUTO: 13.1 % — SIGNIFICANT CHANGE UP (ref 13–44)
MCHC RBC-ENTMCNC: 30.6 PG — SIGNIFICANT CHANGE UP (ref 27–34)
MCHC RBC-ENTMCNC: 31.8 GM/DL — LOW (ref 32–36)
MCV RBC AUTO: 96 FL — SIGNIFICANT CHANGE UP (ref 80–100)
MONOCYTES # BLD AUTO: 1.17 K/UL — HIGH (ref 0–0.9)
MONOCYTES NFR BLD AUTO: 14.2 % — HIGH (ref 2–14)
NEUTROPHILS # BLD AUTO: 5.63 K/UL — SIGNIFICANT CHANGE UP (ref 1.8–7.4)
NEUTROPHILS NFR BLD AUTO: 68.1 % — SIGNIFICANT CHANGE UP (ref 43–77)
NRBC # BLD: 0 /100 WBCS — SIGNIFICANT CHANGE UP (ref 0–0)
PLATELET # BLD AUTO: 457 K/UL — HIGH (ref 150–400)
POTASSIUM SERPL-MCNC: 4 MMOL/L — SIGNIFICANT CHANGE UP (ref 3.5–5.3)
POTASSIUM SERPL-SCNC: 4 MMOL/L — SIGNIFICANT CHANGE UP (ref 3.5–5.3)
PROT SERPL-MCNC: 5.8 G/DL — LOW (ref 6–8.3)
RBC # BLD: 3.5 M/UL — LOW (ref 3.8–5.2)
RBC # FLD: 14.5 % — SIGNIFICANT CHANGE UP (ref 10.3–14.5)
SARS-COV-2 RNA SPEC QL NAA+PROBE: SIGNIFICANT CHANGE UP
SODIUM SERPL-SCNC: 142 MMOL/L — SIGNIFICANT CHANGE UP (ref 135–145)
WBC # BLD: 8.26 K/UL — SIGNIFICANT CHANGE UP (ref 3.8–10.5)
WBC # FLD AUTO: 8.26 K/UL — SIGNIFICANT CHANGE UP (ref 3.8–10.5)

## 2021-01-14 PROCEDURE — 99232 SBSQ HOSP IP/OBS MODERATE 35: CPT

## 2021-01-14 RX ORDER — POLYETHYLENE GLYCOL 3350 17 G/17G
17 POWDER, FOR SOLUTION ORAL
Refills: 0 | Status: DISCONTINUED | OUTPATIENT
Start: 2021-01-14 | End: 2021-01-19

## 2021-01-14 RX ADMIN — OXYCODONE HYDROCHLORIDE 10 MILLIGRAM(S): 5 TABLET ORAL at 17:25

## 2021-01-14 RX ADMIN — SIMETHICONE 80 MILLIGRAM(S): 80 TABLET, CHEWABLE ORAL at 03:37

## 2021-01-14 RX ADMIN — SENNA PLUS 2 TABLET(S): 8.6 TABLET ORAL at 22:03

## 2021-01-14 RX ADMIN — Medication 3 MILLIGRAM(S): at 22:07

## 2021-01-14 RX ADMIN — Medication 650 MILLIGRAM(S): at 22:07

## 2021-01-14 RX ADMIN — OXYCODONE HYDROCHLORIDE 10 MILLIGRAM(S): 5 TABLET ORAL at 11:16

## 2021-01-14 RX ADMIN — Medication 1 MILLIGRAM(S): at 11:16

## 2021-01-14 RX ADMIN — MAGNESIUM HYDROXIDE 30 MILLILITER(S): 400 TABLET, CHEWABLE ORAL at 22:28

## 2021-01-14 RX ADMIN — ENOXAPARIN SODIUM 40 MILLIGRAM(S): 100 INJECTION SUBCUTANEOUS at 06:31

## 2021-01-14 RX ADMIN — OXYCODONE HYDROCHLORIDE 10 MILLIGRAM(S): 5 TABLET ORAL at 06:31

## 2021-01-14 RX ADMIN — Medication 200 MICROGRAM(S): at 06:31

## 2021-01-14 RX ADMIN — ENOXAPARIN SODIUM 40 MILLIGRAM(S): 100 INJECTION SUBCUTANEOUS at 16:13

## 2021-01-14 RX ADMIN — ATORVASTATIN CALCIUM 20 MILLIGRAM(S): 80 TABLET, FILM COATED ORAL at 22:03

## 2021-01-14 RX ADMIN — Medication 200 MILLIGRAM(S): at 11:16

## 2021-01-14 RX ADMIN — POLYETHYLENE GLYCOL 3350 17 GRAM(S): 17 POWDER, FOR SOLUTION ORAL at 16:13

## 2021-01-14 NOTE — PROGRESS NOTE ADULT - SUBJECTIVE AND OBJECTIVE BOX
Patient is a 75y old  Female who presents with a chief complaint of Back pain      HPI:  75F with past medical history of HTN, HLD, SLE, RA, hypothyroidism, provoked R. popliteal DVT in 2019 on eliquis, chronic low back pain presents to ED for acute on chronic low back pain. Pt has several months of chronic lumbosacral back pain, evaluated by outpatient ortho and referred for physical therapy and pain management. She has been receiving ?vitamin spinal injections. She last received low back inj on 12/16 after which she her low back pain worsened. She took oxycodone 5 mg qd (prescribed in september during a ED visit) and acetaminophen for past three days without relief. She also endorses worsening pain during ambulation with walker and has affected her quality of life. She has constipation, last BM 4 days ago. Otherwise denies fever, chills, N/V, abdominal pain, dysuria, urinary retention, fecal incontinence, saddle anesthesia, fall, LOC, trauma. During ROS she endorsed LLE weakness for several months, no numbness or tingling and has LLE edema >RLE edema, reported undergoing LLE US 4 months ago, was negative for DVT. Currently has 8/10 pain, improved to 5/10 with oxycodone 5 mg x 2 in ED. (28 Dec 2020 11:16)   12/28: Mri T/Ls: IMPRESSION: Degenerative changes  Abnormal signal with associated enhancement is seen involving the endplates adjacent to the L2-3 disc space. There is slight increased T2 prolongation involving the L2-3 disc space with associated widening. While these findings could be compatible with degenerative changes possibly of early discitis and osteomyelitis must be considered.  Patient s/p L1-L5 laminectomy, T10-L5 posterior surgical fusion by Dr. Campos on 1/1/21  HMV x 3 drains. Post op with constipation needing aggressive bowel regimen.     PT/OT and PM&R evaluated patient and recommended Rehab.     Patient medically cleared and admitted to  Rehab on 1/12/21 (12 Jan 2021 15:21)    Today's Subjective/Interval Event:  Pt seen and examine at bedside and in PT  Continues to have some abdominal discomfort  + BM on 1/13  Denies HA/CP/palpitations  Denies nausea or emesis        PHYSICAL EXAM  Vital Signs Last 24 Hrs  T(C): 36.6 (14 Jan 2021 08:50), Max: 36.8 (13 Jan 2021 22:09)  T(F): 97.9 (14 Jan 2021 08:50), Max: 98.2 (13 Jan 2021 22:09)  HR: 68 (14 Jan 2021 08:50) (68 - 73)  BP: 129/59 (14 Jan 2021 08:50) (129/59 - 129/68)  BP(mean): --  RR: 15 (14 Jan 2021 08:50) (15 - 17)  SpO2: 97% (14 Jan 2021 08:50) (96% - 97%)    Gen - NAD, Comfortable  Pulm - CTAB,   Cardiovascular - S1S2,  Abdomen - Soft, NT/ND, +BS  Extremities - No C/C/E, No calf tenderness    Motor -                    LEFT    UE - ShAB 5/5, EF 5/5, EE 5/5, WE 5/5,  5/5                    RIGHT UE - ShAB 5/5, EF 5/5, EE 5/5, WE 5/5,  5/5                    LEFT    LE - HF 2/5, KE 3/5, DF 4/5, PF 4/5                    RIGHT LE - HF 2/5, KE 3/5, DF 4/5, PF 4/5        Sensory - Intact to LT    Psychiatric - Mood stable, Affect WNL  Skin: Back incision with staples +   Wounds: None Present    Function:  Total assist with LE dressing, bed mobility  Gait: 15' with RW mod to max assist     MEDICATIONS  (STANDING):  atorvastatin 20 milliGRAM(s) Oral at bedtime  enoxaparin Injectable 40 milliGRAM(s) SubCutaneous two times a day  folic acid 1 milliGRAM(s) Oral daily  hydroxychloroquine 200 milliGRAM(s) Oral daily  levothyroxine 200 MICROGram(s) Oral daily  senna 2 Tablet(s) Oral at bedtime    MEDICATIONS  (PRN):  acetaminophen   Tablet .. 650 milliGRAM(s) Oral every 6 hours PRN Temp greater or equal to 38C (100.4F), Mild Pain (1 - 3)  magnesium hydroxide Suspension 30 milliLiter(s) Oral daily PRN Constipation  melatonin 3 milliGRAM(s) Oral at bedtime PRN Insomnia  oxyCODONE    IR 5 milliGRAM(s) Oral every 4 hours PRN Moderate Pain (4 - 6)  oxyCODONE    IR 10 milliGRAM(s) Oral every 4 hours PRN Severe Pain (7 - 10)  polyethylene glycol 3350 17 Gram(s) Oral two times a day PRN Constipation  simethicone 80 milliGRAM(s) Chew every 6 hours PRN Upset Stomach                            10.7   8.26  )-----------( 457      ( 14 Jan 2021 07:22 )             33.6                    01-14    142  |  105  |  13  ----------------------------<  83  4.0   |  30  |  0.69    Ca    8.7      14 Jan 2021 07:22    TPro  5.8<L>  /  Alb  2.1<L>  /  TBili  0.3  /  DBili  x   /  AST  31  /  ALT  17  /  AlkPhos  96  01-14      < from: Xray Abdomen 1 View PORTABLE -Routine (Xray Abdomen 1 View PORTABLE -Routine .) (01.13.21 @ 15:18) >  XAM:  XR ABDOMEN PORTABLE ROUTINE 1V      PROCEDURE DATE:  01/13/2021        INTERPRETATION:  KUB: AP and upright    COMPARISON: CT lumbar spine at 12/31/2020.    CLINICAL INFORMATION: Abdominal pain.    FINDINGS:  Spinal fusion fixation apparatustransfixes the thoracal lumbar spine.  There is a nonspecific, nonobstructive bowel gas pattern.  No free intra-abdominal air.  No obvious intra-abdominal masses.  No abnormal calcifications.  /  IMPRESSION:    No acute radiographic intra-abdominal findings.    < end of copied text >

## 2021-01-14 NOTE — PROGRESS NOTE ADULT - SUBJECTIVE AND OBJECTIVE BOX
Patient is a 75y old  Female who presents with a chief complaint of Back pain (14 Jan 2021 11:03)    Patient seen and examined at bedside. continues with diffuse intermittent general gassy abd pain. + small soft BM yesterday without significant relief. +back pain, unchanged. Appetite, sleep, bowel, bladder, mood wnl. denies headache, fever, chills, cp, sob, n/v      ALLERGIES:  No Known Allergies    MEDICATIONS  (STANDING):  atorvastatin 20 milliGRAM(s) Oral at bedtime  enoxaparin Injectable 40 milliGRAM(s) SubCutaneous two times a day  folic acid 1 milliGRAM(s) Oral daily  hydroxychloroquine 200 milliGRAM(s) Oral daily  levothyroxine 200 MICROGram(s) Oral daily  senna 2 Tablet(s) Oral at bedtime    MEDICATIONS  (PRN):  acetaminophen   Tablet .. 650 milliGRAM(s) Oral every 6 hours PRN Temp greater or equal to 38C (100.4F), Mild Pain (1 - 3)  magnesium hydroxide Suspension 30 milliLiter(s) Oral daily PRN Constipation  melatonin 3 milliGRAM(s) Oral at bedtime PRN Insomnia  oxyCODONE    IR 5 milliGRAM(s) Oral every 4 hours PRN Moderate Pain (4 - 6)  oxyCODONE    IR 10 milliGRAM(s) Oral every 4 hours PRN Severe Pain (7 - 10)  polyethylene glycol 3350 17 Gram(s) Oral two times a day PRN Constipation  simethicone 80 milliGRAM(s) Chew every 6 hours PRN Upset Stomach    Vital Signs Last 24 Hrs  T(F): 97.9 (14 Jan 2021 08:50), Max: 98.2 (13 Jan 2021 22:09)  HR: 68 (14 Jan 2021 08:50) (68 - 73)  BP: 129/59 (14 Jan 2021 08:50) (129/59 - 129/68)  RR: 15 (14 Jan 2021 08:50) (15 - 17)  SpO2: 97% (14 Jan 2021 08:50) (96% - 97%)  I&O's Summary    BMI (kg/m2): 43.8 (01-12-21 @ 16:32)    PHYSICAL EXAM:  General: NAD, A/O x 3, obese, pleasant  ENT: MMM, no scleral icterus  Neck: Supple, No JVD  Lungs: Respirations unlabored. Clear to auscultation bilaterally, no wheezes, rales, rhonchi  Cardio: RRR, S1/S2, + systolic murmur  Abdomen: bowel sounds present. soft, nondistended, mild tenderness to deep palpation. no rebound/guarding/mass.  Extremities: No calf tenderness, No pitting edema    LABS:                        10.7   8.26  )-----------( 457      ( 14 Jan 2021 07:22 )             33.6       01-14    142  |  105  |  13  ----------------------------<  83  4.0   |  30  |  0.69    Ca    8.7      14 Jan 2021 07:22    TPro  5.8  /  Alb  2.1  /  TBili  0.3  /  DBili  x   /  AST  31  /  ALT  17  /  AlkPhos  96  01-14     eGFR if Non African American: 85 mL/min/1.73M2 (01-14-21 @ 07:22)  eGFR if African American: 98 mL/min/1.73M2 (01-14-21 @ 07:22)    RADIOLOGY & ADDITIONAL TESTS:  < from: Xray Abdomen 1 View PORTABLE -Routine (Xray Abdomen 1 View PORTABLE -Routine .) (01.13.21 @ 15:18) >  FINDINGS:  Spinal fusion fixation apparatustransfixes the thoracal lumbar spine.  There is a nonspecific, nonobstructive bowel gas pattern.  No free intra-abdominal air.  No obvious intra-abdominal masses.  No abnormal calcifications.  /  IMPRESSION:    No acute radiographic intra-abdominal findings.    < end of copied text >      Care Discussed with Consultants/Other Providers: yes

## 2021-01-14 NOTE — PROGRESS NOTE ADULT - ASSESSMENT
75 y/ F with PMH HTN, HLD, SLE, RA, hypothyroidism, provoked R. 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 Hospital Course complicated by brief SICU stay for mechanical ventilation management, now extubated, also c/bpain control and constipation. Patient now admitted for a multidisciplinary rehab program on 1/12/21.    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 Jaja Campos  -Continue comprehensive rehab program  -Pain management, bowel regimen per rehab     #Acute blood loss Anemia, likely from surgery  -H/H stable, continue to monitor     #Abdominal pain - likely 2/2 opioid induced constipation   -Bowel regimen per rehab  -Recommend simethicone prn to standing x1-2 days, as patient forgetting to ask   -Abd xray + gas, stool    #SLE/RA  -Continue with plaquenil 200mg po daily    #H/o provoked right popliteal, right posterior tibial DVT (7/2019 in setting of hip surgery, already completed >6 months of treatment)   -Follows with vascular cardiologist Dr. Hoyos outpatient   -Treated with eliqiuis 5mg po bid chronically, but per hospitalist at [facility], eliquis held due to 12/29 LE dopplers neg for DVTs  -On lovenox for dvt ppx during hospitalization    #HTN  -Monitor BP, BMP periodically  -Home med losartan 50mg qd, held due to low-normal BP 2/2 opioids    #HLD  -Continue lipitor 20mg po qd    #Hypothyroidism  -Continue levothyroxine 200mcg po qd    #DVT ppx: Lovenox 40mg BID  #GI ppx: PPI

## 2021-01-14 NOTE — DIETITIAN INITIAL EVALUATION ADULT. - OTHER INFO
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 Pt denies any recent weight or appetite changes. Reports constipation during hospital course, which has since improved. Last BM 1/13. Receptive to prunes daily +encouraged adequate oral hydration. Reviewed weight management education, although pt not entirely receptive as she "dislikes" a variety of vegetable + diet recall high in saturated fats, take-out. Encouraged pt to prepare meals at home as often as possible in compliance with DASH/TLC diet recommendations.

## 2021-01-14 NOTE — PROGRESS NOTE ADULT - ATTENDING COMMENTS
Got a call from orthotist -[facility]- [place]  [phone] for TLSO- per surgeon request.   Will evaluate patient tomorrow.

## 2021-01-14 NOTE — PROGRESS NOTE ADULT - ASSESSMENT
Assessment/Plan:  KAI THOMAS is a 75y F with a history of HTN, HLD, SLE, RA, hypothyroidism, provoked R. 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 Hospital Course complicated by brief SICU stay for mechanical ventilation management, now extubated, also c/bpain control and constipation. Patient now admitted for a multidisciplinary rehab program. 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 Jaja Campos  -  Comprehensive Rehab Program of PT/OT- 3 hrs/day 5 days/week       #SLE/RA:- c/w plaquenil 200mg po daily    #h/o Provoked DVT in 2019  - Follows with vascular cardiologist Dr. Hoyos outpatient.    - US July 2019:  DVT noted in the right popliteal vein and visualized segment of the right  posterior tibial vein.  - provoked DVT July 2019 in setting of hip surgery, already >6 months of treatment,   - was on eliqiuis 5mg po bid at home, but per hospitalist at [facility] hold eliquis, as LE dopplers on 12/29 neg for any DVTs  - on  lovenox for dvt ppx while admitted    HTN:- monitor BP   - off bp meds right now as BP was on lower side at [facility] due to opioids  - takes losartan 50mg po qd at home    HLD:- c/w lipitor 20mg po daily    hypothyroidism:- c/w levothyroxine 200mcg po daily    Sleep:- melatonin 3mg PRN    Skin:- staples to incision    #Pain Mgmt :- Tylenol PRN mild pain  - Oxycodone 5mg IR PRN moderate 4-7/10 pain  - Oxycodone 10mg IR prn severe 8-10/10 pain    GI/Bowel Mgmt/ constipation: -c/w danna Escobar miralax to bid , MOM prn     /Bladder Mgmt :toileting prn     FEN :- Diet - Regular     Precautions / PROPHYLAXIS:   - Falls, Spinal,   - ortho: spine precautions  - DVT: Lovenox    Hospitalist fu noted    Disp: Team conference 1/14/21: Goals of mod TDD  1/28

## 2021-01-14 NOTE — DIETITIAN INITIAL EVALUATION ADULT. - ORAL INTAKE PTA/DIET HISTORY
Pt reports good appetite PTA. Denies chewing/swallowing difficulty. NKFA. States that she cooks for herself at home, but orders take-out about 50% of the time. Likes to eat cheeseburgers, chicken thighs. Dislikes broccoli, cauliflower, peppers, dark leafy greens (prefers iceberg lettuce), zucchini. Reports taking MVI PTA.

## 2021-01-14 NOTE — DIETITIAN INITIAL EVALUATION ADULT. - ADD RECOMMEND
Prunes daily at every bfast +adequate fluids. Suggest adding MVI po daily due to poor diet quality PTA.

## 2021-01-15 PROCEDURE — 99232 SBSQ HOSP IP/OBS MODERATE 35: CPT

## 2021-01-15 RX ADMIN — Medication 200 MICROGRAM(S): at 05:04

## 2021-01-15 RX ADMIN — SENNA PLUS 2 TABLET(S): 8.6 TABLET ORAL at 21:42

## 2021-01-15 RX ADMIN — ATORVASTATIN CALCIUM 20 MILLIGRAM(S): 80 TABLET, FILM COATED ORAL at 21:41

## 2021-01-15 RX ADMIN — POLYETHYLENE GLYCOL 3350 17 GRAM(S): 17 POWDER, FOR SOLUTION ORAL at 05:07

## 2021-01-15 RX ADMIN — OXYCODONE HYDROCHLORIDE 10 MILLIGRAM(S): 5 TABLET ORAL at 03:35

## 2021-01-15 RX ADMIN — Medication 1 MILLIGRAM(S): at 12:22

## 2021-01-15 RX ADMIN — OXYCODONE HYDROCHLORIDE 10 MILLIGRAM(S): 5 TABLET ORAL at 12:22

## 2021-01-15 RX ADMIN — POLYETHYLENE GLYCOL 3350 17 GRAM(S): 17 POWDER, FOR SOLUTION ORAL at 17:58

## 2021-01-15 RX ADMIN — Medication 200 MILLIGRAM(S): at 12:22

## 2021-01-15 RX ADMIN — OXYCODONE HYDROCHLORIDE 10 MILLIGRAM(S): 5 TABLET ORAL at 20:00

## 2021-01-15 RX ADMIN — Medication 3 MILLIGRAM(S): at 21:41

## 2021-01-15 RX ADMIN — ENOXAPARIN SODIUM 40 MILLIGRAM(S): 100 INJECTION SUBCUTANEOUS at 17:58

## 2021-01-15 RX ADMIN — ENOXAPARIN SODIUM 40 MILLIGRAM(S): 100 INJECTION SUBCUTANEOUS at 05:04

## 2021-01-15 RX ADMIN — OXYCODONE HYDROCHLORIDE 10 MILLIGRAM(S): 5 TABLET ORAL at 08:14

## 2021-01-15 NOTE — PROGRESS NOTE ADULT - SUBJECTIVE AND OBJECTIVE BOX
Patient is a 75y old  Female who presents with a chief complaint of Back pain (15 Tyron 2021 11:38)      Patient seen and examined at bedside. No overnight events.   Reports continued back pain, not well controlled with current medication management. Admits to band-like pain, denies radiation down extremities.   Had BM this morning. Having breakfast.       ALLERGIES:  No Known Allergies    MEDICATIONS  (STANDING):  atorvastatin 20 milliGRAM(s) Oral at bedtime  enoxaparin Injectable 40 milliGRAM(s) SubCutaneous two times a day  folic acid 1 milliGRAM(s) Oral daily  hydroxychloroquine 200 milliGRAM(s) Oral daily  levothyroxine 200 MICROGram(s) Oral daily  polyethylene glycol 3350 17 Gram(s) Oral two times a day  senna 2 Tablet(s) Oral at bedtime    MEDICATIONS  (PRN):  acetaminophen   Tablet .. 650 milliGRAM(s) Oral every 6 hours PRN Temp greater or equal to 38C (100.4F), Mild Pain (1 - 3)  magnesium hydroxide Suspension 30 milliLiter(s) Oral daily PRN Constipation  melatonin 3 milliGRAM(s) Oral at bedtime PRN Insomnia  oxyCODONE    IR 5 milliGRAM(s) Oral every 4 hours PRN Moderate Pain (4 - 6)  oxyCODONE    IR 10 milliGRAM(s) Oral every 4 hours PRN Severe Pain (7 - 10)  simethicone 80 milliGRAM(s) Chew every 6 hours PRN Upset Stomach    Vital Signs Last 24 Hrs  T(F): 97.5 (15 Tyron 2021 08:17), Max: 97.5 (15 Tyron 2021 08:17)  HR: 64 (15 Tyron 2021 08:17) (64 - 70)  BP: 123/72 (15 Tyron 2021 08:17) (108/65 - 123/72)  RR: 15 (15 Tyron 2021 08:17) (15 - 16)  SpO2: 99% (15 Tyron 2021 08:17) (96% - 99%)  I&O's Summary    BMI (kg/m2): 43.8 (01-12-21 @ 16:32)    PHYSICAL EXAM:  General: NAD, A/O x 3  ENT: MMM, no scleral icterus  Neck: Supple, No JVD  Lungs: Clear to auscultation bilaterally, no wheezes, rales, rhonchi  Cardio: RRR, S1/S2, No murmurs  Back: thoracic/lumbar staples without surrounding erythema c/d/i  Abdomen: Soft, Nontender, Nondistended; Bowel sounds present, obese  Extremities: No calf tenderness, No pitting edema    LABS:                        10.7   8.26  )-----------( 457      ( 14 Jan 2021 07:22 )             33.6       01-14    142  |  105  |  13  ----------------------------<  83  4.0   |  30  |  0.69    Ca    8.7      14 Jan 2021 07:22    TPro  5.8  /  Alb  2.1  /  TBili  0.3  /  DBili  x   /  AST  31  /  ALT  17  /  AlkPhos  96  01-14     eGFR if Non African American: 85 mL/min/1.73M2 (01-14-21 @ 07:22)  eGFR if African American: 98 mL/min/1.73M2 (01-14-21 @ 07:22)                                     RADIOLOGY & ADDITIONAL TESTS:    Care Discussed with Consultants/Other Providers: Dr. Ballard (physiatry)

## 2021-01-15 NOTE — PROGRESS NOTE ADULT - ATTENDING COMMENTS
Addendum :  Patient seen today by orthotist for TLSO brace placement. Patient refused stating pain increases with brace.   Have left a message for surgeon Dr. Campos- waiting for call back

## 2021-01-15 NOTE — PROGRESS NOTE ADULT - SUBJECTIVE AND OBJECTIVE BOX
Patient is a 75y old  Female who presents with a chief complaint of Back pain      HPI:  75F with past medical history of HTN, HLD, SLE, RA, hypothyroidism, provoked R. popliteal DVT in 2019 on eliquis, chronic low back pain presents to ED for acute on chronic low back pain. Pt has several months of chronic lumbosacral back pain, evaluated by outpatient ortho and referred for physical therapy and pain management. She has been receiving ?vitamin spinal injections. She last received low back inj on 12/16 after which she her low back pain worsened. She took oxycodone 5 mg qd (prescribed in september during a ED visit) and acetaminophen for past three days without relief. She also endorses worsening pain during ambulation with walker and has affected her quality of life. She has constipation, last BM 4 days ago. Otherwise denies fever, chills, N/V, abdominal pain, dysuria, urinary retention, fecal incontinence, saddle anesthesia, fall, LOC, trauma. During ROS she endorsed LLE weakness for several months, no numbness or tingling and has LLE edema >RLE edema, reported undergoing LLE US 4 months ago, was negative for DVT. Currently has 8/10 pain, improved to 5/10 with oxycodone 5 mg x 2 in ED. (28 Dec 2020 11:16)   12/28: Mri T/Ls: IMPRESSION: Degenerative changes  Abnormal signal with associated enhancement is seen involving the endplates adjacent to the L2-3 disc space. There is slight increased T2 prolongation involving the L2-3 disc space with associated widening. While these findings could be compatible with degenerative changes possibly of early discitis and osteomyelitis must be considered.  Patient s/p L1-L5 laminectomy, T10-L5 posterior surgical fusion by Dr. Campos on 1/1/21  HMV x 3 drains. Post op with constipation needing aggressive bowel regimen.     PT/OT and PM&R evaluated patient and recommended Rehab.     Patient medically cleared and admitted to  Rehab on 1/12/21 (12 Jan 2021 15:21)    Today's Subjective/Interval Event:  Pt seen and examine at bedside and in PT  Overnight poor sleep   Large BM  Abdominal pain improved.   Back pain minimally improved.   Reports right thigh numbness    Denies HA/CP/palpitations, Denies nausea or emesis  Denies dysuria         PHYSICAL EXAM  Vital Signs Last 24 Hrs  T(C): 36.4 (15 Tyron 2021 08:17), Max: 36.4 (15 Tyron 2021 08:17)  T(F): 97.5 (15 Tyron 2021 08:17), Max: 97.5 (15 Tyron 2021 08:17)  HR: 64 (15 Tyron 2021 08:17) (64 - 70)  BP: 123/72 (15 Tyron 2021 08:17) (108/65 - 123/72)  BP(mean): --  RR: 15 (15 Tyron 2021 08:17) (15 - 16)  SpO2: 99% (15 Tyron 2021 08:17) (96% - 99%)    Gen - NAD, Comfortable  Pulm - CTAB,   Cardiovascular - S1S2,  Abdomen - Soft, NT/ND, +BS  Extremities - No C/C/E, No calf tenderness    Motor -                    LEFT    UE - ShAB 5/5, EF 5/5, EE 5/5, WE 5/5,  5/5                    RIGHT UE - ShAB 5/5, EF 5/5, EE 5/5, WE 5/5,  5/5                    LEFT    LE - HF 2/5, KE 3/5, DF 4/5, PF 4/5                    RIGHT LE - HF 2/5, KE 3/5, DF 4/5, PF 4/5        Sensory - Intact to LT    Psychiatric - Mood stable, Affect WNL  Skin: Back incision with staples +   Wounds: None Present    Function:  Total assist with LE dressing, bed mobility  Gait: 15' with RW mod to max assist       MEDICATIONS  (STANDING):  atorvastatin 20 milliGRAM(s) Oral at bedtime  enoxaparin Injectable 40 milliGRAM(s) SubCutaneous two times a day  folic acid 1 milliGRAM(s) Oral daily  hydroxychloroquine 200 milliGRAM(s) Oral daily  levothyroxine 200 MICROGram(s) Oral daily  polyethylene glycol 3350 17 Gram(s) Oral two times a day  senna 2 Tablet(s) Oral at bedtime    MEDICATIONS  (PRN):  acetaminophen   Tablet .. 650 milliGRAM(s) Oral every 6 hours PRN Temp greater or equal to 38C (100.4F), Mild Pain (1 - 3)  magnesium hydroxide Suspension 30 milliLiter(s) Oral daily PRN Constipation  melatonin 3 milliGRAM(s) Oral at bedtime PRN Insomnia  oxyCODONE    IR 5 milliGRAM(s) Oral every 4 hours PRN Moderate Pain (4 - 6)  oxyCODONE    IR 10 milliGRAM(s) Oral every 4 hours PRN Severe Pain (7 - 10)  simethicone 80 milliGRAM(s) Chew every 6 hours PRN Upset Stomach                            10.7   8.26  )-----------( 457      ( 14 Jan 2021 07:22 )             33.6             01-14    142  |  105  |  13  ----------------------------<  83  4.0   |  30  |  0.69    Ca    8.7      14 Jan 2021 07:22    TPro  5.8<L>  /  Alb  2.1<L>  /  TBili  0.3  /  DBili  x   /  AST  31  /  ALT  17  /  AlkPhos  96  01-14      < from: Xray Abdomen 1 View PORTABLE -Routine (Xray Abdomen 1 View PORTABLE -Routine .) (01.13.21 @ 15:18) >  XAM:  XR ABDOMEN PORTABLE ROUTINE 1V      PROCEDURE DATE:  01/13/2021        INTERPRETATION:  KUB: AP and upright    COMPARISON: CT lumbar spine at 12/31/2020.    CLINICAL INFORMATION: Abdominal pain.    FINDINGS:  Spinal fusion fixation apparatustransfixes the thoracal lumbar spine.  There is a nonspecific, nonobstructive bowel gas pattern.  No free intra-abdominal air.  No obvious intra-abdominal masses.  No abnormal calcifications.  /  IMPRESSION:    No acute radiographic intra-abdominal findings.    < end of copied text >

## 2021-01-15 NOTE — CHART NOTE - NSCHARTNOTEFT_GEN_A_CORE
Pt seen at rehab gym to try fitting of TLSO.  Orthosis tried , patient able to stand with max assist x2.  Pt having pain while standing, sat back into wheelchair.  Dr Ballard also saw patient , pt having diffuculty standing  Dr Ballard wants to hold off dispensing brace until she speaks with Dr Campos at [facility]  No brace delivered , can resee next week.       Aime [facility] [place]  [phone]

## 2021-01-15 NOTE — PROGRESS NOTE ADULT - ASSESSMENT
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 Hospital Course complicated by brief SICU stay for mechanical ventilation management, currently extubated. Patient now admitted for a multidisciplinary rehab program on 1/12/21.    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 Jaja Campos  -Continue comprehensive rehab program  -Pain management, bowel regimen per rehab     #Acute blood loss Anemia, likely from surgery  -H/H stable, continue to monitor   -No overt signs of bleeding    #Abdominal pain - likely 2/2 opioid induced constipation   -Bowel regimen per rehab    #SLE/RA  -Continue with Plaquenil 200mg po daily    #H/o provoked right popliteal, right posterior tibial DVT (7/2019 in setting of hip surgery, already completed >6 months of treatment)   -Follows with vascular cardiologist Dr. Hoyos outpatient   -Treated with Eliquis 5mg po bid chronically, but per hospitalist at [facility], Eliquis held due to 12/29 LE dopplers neg for DVTs  -Continue Lovenox for DVT prophylaxis     #HTN  -Stable off anti-hypertensives  -Continue to hold home medication Losartan  -Monitor vitals    #HLD  -Chronic, continue Lipitor    #Hypothyroidism  -Chronic, continue Levothyroxine    #Prophylactic Measure  -DVT ppx: Lovenox 40mg BID  -GI ppx: PPI     75F with PMH HTN, HLD, SLE, RA, hypothyroidism, provoked right popliteal DVT in 2019, chronic low back pain presents to [facility] for acute on chronic low back pain on Dec 28, 2020, found to have 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 Hospital Course complicated by brief SICU stay for mechanical ventilation management, currently extubated. Patient now admitted for a multidisciplinary rehab program on 1/12/21.    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 Jaja Campos  -Continue comprehensive rehab program  -Pain management, bowel regimen per rehab. Consider increasing pain regimen?    #Acute blood loss Anemia, likely from surgery  -H/H stable, continue to monitor   -No overt signs of bleeding    #Abdominal pain - likely 2/2 opioid induced constipation   -Bowel regimen per rehab - had BM today.    #SLE/RA  -Continue with Plaquenil 200mg po daily    #H/o provoked right popliteal, right posterior tibial DVT (7/2019 in setting of hip surgery, already completed >6 months of treatment)   -Follows with vascular cardiologist Dr. Hoyos outpatient   -Treated with Eliquis 5mg po bid chronically, but per hospitalist at [facility], Eliquis held due to 12/29 LE dopplers neg for DVTs  -Continue Lovenox for DVT prophylaxis     #HTN  -Stable off anti-hypertensives  -Continue to hold home medication Losartan  -Monitor vitals    #HLD  -Chronic, continue Lipitor    #Hypothyroidism  -Chronic, continue Levothyroxine    #Prophylactic Measure  -DVT ppx: Lovenox 40mg BID  -GI ppx: PPI

## 2021-01-15 NOTE — CHART NOTE - NSCHARTNOTEFT_GEN_A_CORE
REHABILITATION DIAGNOSIS/IMPAIRMENT GROUP CODE:  Chronic LBP     COMORBIDITIES/COMPLICATING CONDITIONS IMPACTING REHABILITATION:  HEALTH ISSUES - PROBLEM Dx:  s/p L1-L5 laminectomy, T10-L5 posterior surgical fusion       PAST MEDICAL & SURGICAL HISTORY:  HTN (hypertension)    DVT (deep venous thrombosis)    Hypothyroidism    RA (rheumatoid arthritis)    Obesity, Class II, BMI 35-39.9, no comorbidity    Hypothyroid    Benign heart murmur    Hyperlipidemia    Hypertension    H/O degenerative disc disease    Osteoarthritis    SLE (systemic lupus erythematosus)    Rheumatoid arthritis    S/P knee replacement    Status post total hip replacement, right    S/P thyroid surgery  1 lobe removed    Lung cancer  small tumor removed 2015    S/P knee surgery  bilateral    S/P carpal tunnel release  left    S/P eye surgery  child    S/P cataract surgery  left    S/P tonsillectomy        Based upon consideration of the patient's impairments, functional status, complicating conditions and any other contributing factors and after information garnered from the assessments of all therapy disciplines involved in treating the patient and other pertinent clinicians:    INTERDISCIPLINARY REHABILITATION INTERVENTIONS:    [ x  ] Transfer Training  [ x  ] Bed Mobility  [ x  ] Therapeutic Exercise  [  x ] Balance/Coordination Exercises  [  x ] Locomotion retraining  [ x  ] Stairs  [ x  ] Functional Transfer Training  [ x  ] Bowel/Bladder program  [ x  ] Pain Management  [ x  ] Skin/Wound Care  [   ] Visual/Perceptual Training  [   ] Therapeutic Recreation Activities  [  x ] Neuromuscular Re-education  [ x  ] Activities of Daily Living  [   ] Speech Exercise  [   ] Swallowing Exercises  [   ] Vital Stim  [ x  ] Dietary Supplements  [   ] Calorie Count  [   ] Cognitive Exercises  [   ] Cognitive/Linguistic Treatment  [   ] Behavior Program  [   ] Neuropsych Therapy  [  x ] Patient/Family Counseling  [   ] Family Training  [   ] Community Re-entry  [   ] Orthotic Evaluation  [   ] Prosthetic Eval/Training    MEDICAL PROGNOSIS:  fair    REHAB POTENTIAL:    fair  EXPECTED DAILY THERAPY:         PT:2 hrs/day       OT:1 hr/day       ST:NA       p&O:na    EXPECTED INTENSITY OF PROGRAM:  3 hrs/day     EXPECTED FREQUENCY OF PROGRAM:  5 days/week    ESTIMATED LOS:  14-16 days    ESTIMATED DISPOSITION:  home     INTERDISCIPLINARY FUNCTIONAL OUTCOMES/GOALS:         Gait/Mobility:Modified independent       Transfers:Modified independent       ADLs:Modified independent       Functional Transfers:Modified independent       Medication Management:Modified independent       Communication:na       Cognitive:na       Dysphagia:na       Bladder:Modified independent       Bowel:Modified independent

## 2021-01-15 NOTE — PROGRESS NOTE ADULT - ASSESSMENT
Assessment/Plan:  KAI THOMAS is a 75y F with a history of HTN, HLD, SLE, RA, hypothyroidism, provoked R. 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 Hospital Course complicated by brief SICU stay for mechanical ventilation management, now extubated, also c/bpain control and constipation. Patient now admitted for a multidisciplinary rehab program. 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 Jaja Campos  -  Comprehensive Rehab Program of PT/OT- 3 hrs/day 5 days/week   orthotist -[facility]- [place]  [phone] for TLSO- per surgeon request.     SLE/RA:- c/w plaquenil 200mg po daily    h/o Provoked DVT in 2019  - Follows with vascular cardiologist Dr. Hoyos outpatient.    - US July 2019:  DVT noted in the right popliteal vein and visualized segment of the right  posterior tibial vein.  - provoked DVT July 2019 in setting of hip surgery, already >6 months of treatment,   - was on eliqiuis 5mg po bid at home, but per hospitalist at [facility] hold eliquis, as LE dopplers on 12/29 neg for any DVTs  - on  lovenox for dvt ppx while admitted    HTN:- monitor BP   - off bp meds right now as BP was on lower side at [facility] due to opioids  - takes losartan 50mg po qd at home    HLD:- c/w lipitor 20mg po daily    hypothyroidism:- c/w levothyroxine 200mcg po daily    Sleep:- melatonin 3mg PRN    Skin:- staples to incision    #Pain Mgmt :- Tylenol PRN mild pain  - Oxycodone 5mg IR PRN moderate 4-7/10 pain  - Oxycodone 10mg IR prn severe 8-10/10 pain    GI/Bowel Mgmt/ constipation: -c/w Senna, miralax  bid , MOM prn, Constipation improved     /Bladder Mgmt :toileting prn     FEN :- Diet - Regular     Precautions / PROPHYLAXIS:   - Falls, Spinal,   - ortho: spine precautions  - DVT: Lovenox    Hospitalist fu noted    Disp: Team conference 1/14/21: Goals of mod TDD  1/28

## 2021-01-16 PROCEDURE — 99232 SBSQ HOSP IP/OBS MODERATE 35: CPT

## 2021-01-16 RX ORDER — LIDOCAINE 4 G/100G
1 CREAM TOPICAL DAILY
Refills: 0 | Status: DISCONTINUED | OUTPATIENT
Start: 2021-01-16 | End: 2021-03-03

## 2021-01-16 RX ADMIN — SENNA PLUS 2 TABLET(S): 8.6 TABLET ORAL at 22:27

## 2021-01-16 RX ADMIN — ENOXAPARIN SODIUM 40 MILLIGRAM(S): 100 INJECTION SUBCUTANEOUS at 05:20

## 2021-01-16 RX ADMIN — SIMETHICONE 80 MILLIGRAM(S): 80 TABLET, CHEWABLE ORAL at 14:06

## 2021-01-16 RX ADMIN — Medication 200 MILLIGRAM(S): at 11:44

## 2021-01-16 RX ADMIN — LIDOCAINE 1 PATCH: 4 CREAM TOPICAL at 14:07

## 2021-01-16 RX ADMIN — ENOXAPARIN SODIUM 40 MILLIGRAM(S): 100 INJECTION SUBCUTANEOUS at 17:36

## 2021-01-16 RX ADMIN — Medication 3 MILLIGRAM(S): at 22:30

## 2021-01-16 RX ADMIN — Medication 1 MILLIGRAM(S): at 11:44

## 2021-01-16 RX ADMIN — LIDOCAINE 1 PATCH: 4 CREAM TOPICAL at 19:56

## 2021-01-16 RX ADMIN — ATORVASTATIN CALCIUM 20 MILLIGRAM(S): 80 TABLET, FILM COATED ORAL at 22:27

## 2021-01-16 RX ADMIN — OXYCODONE HYDROCHLORIDE 10 MILLIGRAM(S): 5 TABLET ORAL at 10:07

## 2021-01-16 RX ADMIN — OXYCODONE HYDROCHLORIDE 10 MILLIGRAM(S): 5 TABLET ORAL at 17:32

## 2021-01-16 RX ADMIN — Medication 200 MICROGRAM(S): at 05:21

## 2021-01-16 RX ADMIN — Medication 650 MILLIGRAM(S): at 11:44

## 2021-01-16 RX ADMIN — OXYCODONE HYDROCHLORIDE 10 MILLIGRAM(S): 5 TABLET ORAL at 22:27

## 2021-01-16 RX ADMIN — OXYCODONE HYDROCHLORIDE 10 MILLIGRAM(S): 5 TABLET ORAL at 05:20

## 2021-01-16 NOTE — PROGRESS NOTE ADULT - SUBJECTIVE AND OBJECTIVE BOX
Patient is a 75y old  Female who presents with a chief complaint of Back pain (16 Jan 2021 08:26)      Patient seen and examined at bedside.    ALLERGIES:  No Known Allergies    MEDICATIONS  (STANDING):  atorvastatin 20 milliGRAM(s) Oral at bedtime  enoxaparin Injectable 40 milliGRAM(s) SubCutaneous two times a day  folic acid 1 milliGRAM(s) Oral daily  hydroxychloroquine 200 milliGRAM(s) Oral daily  levothyroxine 200 MICROGram(s) Oral daily  polyethylene glycol 3350 17 Gram(s) Oral two times a day  senna 2 Tablet(s) Oral at bedtime    MEDICATIONS  (PRN):  acetaminophen   Tablet .. 650 milliGRAM(s) Oral every 6 hours PRN Temp greater or equal to 38C (100.4F), Mild Pain (1 - 3)  magnesium hydroxide Suspension 30 milliLiter(s) Oral daily PRN Constipation  melatonin 3 milliGRAM(s) Oral at bedtime PRN Insomnia  oxyCODONE    IR 5 milliGRAM(s) Oral every 4 hours PRN Moderate Pain (4 - 6)  oxyCODONE    IR 10 milliGRAM(s) Oral every 4 hours PRN Severe Pain (7 - 10)  simethicone 80 milliGRAM(s) Chew every 6 hours PRN Upset Stomach    Vital Signs Last 24 Hrs  T(F): 97.5 (16 Jan 2021 09:07), Max: 97.9 (15 Tyron 2021 20:40)  HR: 66 (16 Jan 2021 09:07) (66 - 74)  BP: 101/65 (16 Jan 2021 09:07) (101/65 - 120/71)  RR: 16 (16 Jan 2021 09:07) (16 - 16)  SpO2: 97% (16 Jan 2021 09:07) (97% - 97%)  I&O's Summary    BMI (kg/m2): 43.8 (01-12-21 @ 16:32)  PHYSICAL EXAM:  General: NAD, A/O x 3  ENT: MMM  Neck: Supple, No JVD  Lungs: Clear to auscultation bilaterally  Cardio: RRR, S1/S2, No murmurs  Abdomen: Soft, Nontender, Nondistended; Bowel sounds present  Extremities: No calf tenderness, No pitting edema    LABS:                        10.7   8.26  )-----------( 457      ( 14 Jan 2021 07:22 )             33.6       01-14    142  |  105  |  13  ----------------------------<  83  4.0   |  30  |  0.69    Ca    8.7      14 Jan 2021 07:22    TPro  5.8  /  Alb  2.1  /  TBili  0.3  /  DBili  x   /  AST  31  /  ALT  17  /  AlkPhos  96  01-14     eGFR if Non African American: 85 mL/min/1.73M2 (01-14-21 @ 07:22)  eGFR if African American: 98 mL/min/1.73M2 (01-14-21 @ 07:22)                                     RADIOLOGY & ADDITIONAL TESTS:    Care Discussed with Consultants/Other Providers:

## 2021-01-16 NOTE — PROGRESS NOTE ADULT - ASSESSMENT
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 Hospital Course complicated by brief SICU stay for mechanical ventilation management, currently extubated. Patient now admitted for a multidisciplinary rehab program on 1/12/21.    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 Jaja Campos  -Continue comprehensive rehab program  -Pain management, bowel regimen per rehab. Consider increasing pain regimen?    #Acute blood loss Anemia, likely from surgery  -H/H stable, continue to monitor   -No overt signs of bleeding    #Abdominal pain - likely 2/2 opioid induced constipation   -Bowel regimen per rehab - had BM today.    #SLE/RA  -Continue with Plaquenil 200mg po daily    #H/o provoked right popliteal, right posterior tibial DVT (7/2019 in setting of hip surgery, already completed >6 months of treatment)   -Follows with vascular cardiologist Dr. Hoyos outpatient   -Treated with Eliquis 5mg po bid chronically, but per hospitalist at [facility], Eliquis held due to 12/29 LE dopplers neg for DVTs  -Continue Lovenox for DVT prophylaxis     #HTN  -Stable off anti-hypertensives  -Continue to hold home medication Losartan  -Monitor vitals    #HLD  -Chronic, continue Lipitor    #Hypothyroidism  -Chronic, continue Levothyroxine    #Prophylactic Measure  -DVT ppx: Lovenox 40mg BID  -GI ppx: PPI

## 2021-01-16 NOTE — PROGRESS NOTE ADULT - SUBJECTIVE AND OBJECTIVE BOX
Cc: Gait dysfunction    HPI: Patient with no new medical issues today.  Pain controlled, no chest pain, no N/V, no Fevers/Chills. No other new ROS  Has been tolerating rehabilitation program.    acetaminophen   Tablet .. 650 milliGRAM(s) Oral every 6 hours PRN  atorvastatin 20 milliGRAM(s) Oral at bedtime  enoxaparin Injectable 40 milliGRAM(s) SubCutaneous two times a day  folic acid 1 milliGRAM(s) Oral daily  hydroxychloroquine 200 milliGRAM(s) Oral daily  levothyroxine 200 MICROGram(s) Oral daily  magnesium hydroxide Suspension 30 milliLiter(s) Oral daily PRN  melatonin 3 milliGRAM(s) Oral at bedtime PRN  oxyCODONE    IR 5 milliGRAM(s) Oral every 4 hours PRN  oxyCODONE    IR 10 milliGRAM(s) Oral every 4 hours PRN  polyethylene glycol 3350 17 Gram(s) Oral two times a day  senna 2 Tablet(s) Oral at bedtime  simethicone 80 milliGRAM(s) Chew every 6 hours PRN      T(C): 36.6 (01-15-21 @ 20:40), Max: 36.6 (01-15-21 @ 20:40)  HR: 74 (01-15-21 @ 20:40) (74 - 74)  BP: 120/71 (01-15-21 @ 20:40) (120/71 - 120/71)  RR: 16 (01-15-21 @ 20:40) (16 - 16)  SpO2: 97% (01-15-21 @ 20:40) (97% - 97%)    In NAD  HEENT- EOMI  Heart- Well Perfused  Lungs- No resp distress, no use of accessory resp muscles  Abd- + BS, NT  Ext- No calf pain  Neuro- Exam unchanged  Psych- Affect wnl    Imp: Patient with diagnosis of spinal fusion admitted for comprehensive acute rehabilitation.    Plan:  - Continue therapies  - DVT prophylaxis - Lovenox  - Skin- Turn q2h, check skin daily  - Continue current medications; patient medically stable.   - Patient is stable to continue current rehabilitation program.    Cc: Gait dysfunction    HPI: Patient with no new medical issues today.  + BM today  Pain controlled, no chest pain, no N/V, no Fevers/Chills. No other new ROS  Has been tolerating rehabilitation program.    acetaminophen   Tablet .. 650 milliGRAM(s) Oral every 6 hours PRN  atorvastatin 20 milliGRAM(s) Oral at bedtime  enoxaparin Injectable 40 milliGRAM(s) SubCutaneous two times a day  folic acid 1 milliGRAM(s) Oral daily  hydroxychloroquine 200 milliGRAM(s) Oral daily  levothyroxine 200 MICROGram(s) Oral daily  magnesium hydroxide Suspension 30 milliLiter(s) Oral daily PRN  melatonin 3 milliGRAM(s) Oral at bedtime PRN  oxyCODONE    IR 5 milliGRAM(s) Oral every 4 hours PRN  oxyCODONE    IR 10 milliGRAM(s) Oral every 4 hours PRN  polyethylene glycol 3350 17 Gram(s) Oral two times a day  senna 2 Tablet(s) Oral at bedtime  simethicone 80 milliGRAM(s) Chew every 6 hours PRN      T(C): 36.6 (01-15-21 @ 20:40), Max: 36.6 (01-15-21 @ 20:40)  HR: 74 (01-15-21 @ 20:40) (74 - 74)  BP: 120/71 (01-15-21 @ 20:40) (120/71 - 120/71)  RR: 16 (01-15-21 @ 20:40) (16 - 16)  SpO2: 97% (01-15-21 @ 20:40) (97% - 97%)    In NAD  HEENT- EOMI  Heart- Well Perfused  Lungs- No resp distress, no use of accessory resp muscles  Abd- + BS, NT  Ext- No calf pain  Neuro- Exam unchanged  Psych- Affect wnl    Imp: Patient with diagnosis of spinal fusion admitted for comprehensive acute rehabilitation.    Plan:  - Continue therapies  - DVT prophylaxis - Lovenox  - Skin- Turn q2h, check skin daily  - Continue current medications; patient medically stable.   - Patient is stable to continue current rehabilitation program.

## 2021-01-17 PROCEDURE — 99232 SBSQ HOSP IP/OBS MODERATE 35: CPT

## 2021-01-17 RX ADMIN — Medication 200 MILLIGRAM(S): at 11:18

## 2021-01-17 RX ADMIN — SENNA PLUS 2 TABLET(S): 8.6 TABLET ORAL at 22:30

## 2021-01-17 RX ADMIN — Medication 200 MICROGRAM(S): at 06:24

## 2021-01-17 RX ADMIN — OXYCODONE HYDROCHLORIDE 5 MILLIGRAM(S): 5 TABLET ORAL at 16:19

## 2021-01-17 RX ADMIN — POLYETHYLENE GLYCOL 3350 17 GRAM(S): 17 POWDER, FOR SOLUTION ORAL at 06:25

## 2021-01-17 RX ADMIN — OXYCODONE HYDROCHLORIDE 10 MILLIGRAM(S): 5 TABLET ORAL at 06:24

## 2021-01-17 RX ADMIN — SIMETHICONE 80 MILLIGRAM(S): 80 TABLET, CHEWABLE ORAL at 06:24

## 2021-01-17 RX ADMIN — LIDOCAINE 1 PATCH: 4 CREAM TOPICAL at 23:02

## 2021-01-17 RX ADMIN — Medication 650 MILLIGRAM(S): at 13:36

## 2021-01-17 RX ADMIN — Medication 3 MILLIGRAM(S): at 22:30

## 2021-01-17 RX ADMIN — LIDOCAINE 1 PATCH: 4 CREAM TOPICAL at 02:17

## 2021-01-17 RX ADMIN — Medication 1 MILLIGRAM(S): at 11:18

## 2021-01-17 RX ADMIN — LIDOCAINE 1 PATCH: 4 CREAM TOPICAL at 11:17

## 2021-01-17 RX ADMIN — OXYCODONE HYDROCHLORIDE 10 MILLIGRAM(S): 5 TABLET ORAL at 11:16

## 2021-01-17 RX ADMIN — SIMETHICONE 80 MILLIGRAM(S): 80 TABLET, CHEWABLE ORAL at 13:36

## 2021-01-17 RX ADMIN — ENOXAPARIN SODIUM 40 MILLIGRAM(S): 100 INJECTION SUBCUTANEOUS at 06:24

## 2021-01-17 RX ADMIN — POLYETHYLENE GLYCOL 3350 17 GRAM(S): 17 POWDER, FOR SOLUTION ORAL at 16:21

## 2021-01-17 RX ADMIN — OXYCODONE HYDROCHLORIDE 10 MILLIGRAM(S): 5 TABLET ORAL at 20:12

## 2021-01-17 RX ADMIN — ENOXAPARIN SODIUM 40 MILLIGRAM(S): 100 INJECTION SUBCUTANEOUS at 16:21

## 2021-01-17 RX ADMIN — LIDOCAINE 1 PATCH: 4 CREAM TOPICAL at 19:55

## 2021-01-17 RX ADMIN — ATORVASTATIN CALCIUM 20 MILLIGRAM(S): 80 TABLET, FILM COATED ORAL at 22:30

## 2021-01-17 NOTE — PROGRESS NOTE ADULT - ASSESSMENT
75F with PMH HTN, HLD, SLE, RA, hypothyroidism, provoked right popliteal DVT in 2019, chronic low back pain presents to 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 Hospital Course complicated by brief SICU stay for mechanical ventilation management, currently extubated. Patient now admitted for a multidisciplinary rehab program on 1/12/21.    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 Jaja Campos  -Continue comprehensive rehab program  -Pain management, bowel regimen per rehab. Consider increasing pain regimen?    #Acute blood loss Anemia, likely from surgery  -H/H stable, continue to monitor   -No overt signs of bleeding    #Abdominal pain - likely 2/2 opioid induced constipation   -Bowel regimen per rehab - had BM today.    #SLE/RA  -Continue with Plaquenil 200mg po daily    #H/o provoked right popliteal, right posterior tibial DVT (7/2019 in setting of hip surgery, already completed >6 months of treatment)   -Follows with vascular cardiologist Dr. Hoyos outpatient   -Treated with Eliquis 5mg po bid chronically, but per hospitalist at [facility], Eliquis held due to 12/29 LE dopplers neg for DVTs  -Continue Lovenox for DVT prophylaxis     #HTN  -Stable off anti-hypertensives  -Continue to hold home medication Losartan  -Monitor vitals    #HLD  -Chronic, continue Lipitor    #Hypothyroidism  -Chronic, continue Levothyroxine    #Prophylactic Measure  -DVT ppx: Lovenox 40mg BID  -GI ppx: PPI

## 2021-01-17 NOTE — PROGRESS NOTE ADULT - SUBJECTIVE AND OBJECTIVE BOX
Cc: Gait dysfunction    HPI: Patient with no new medical issues today.  Pain controlled, no chest pain, no N/V, no Fevers/Chills. No other new ROS  Has been tolerating rehabilitation program.  Appreciate Hospitalist f/u.    MEDICATIONS  (STANDING):  atorvastatin 20 milliGRAM(s) Oral at bedtime  enoxaparin Injectable 40 milliGRAM(s) SubCutaneous two times a day  folic acid 1 milliGRAM(s) Oral daily  hydroxychloroquine 200 milliGRAM(s) Oral daily  levothyroxine 200 MICROGram(s) Oral daily  lidocaine   Patch 1 Patch Transdermal daily  polyethylene glycol 3350 17 Gram(s) Oral two times a day  senna 2 Tablet(s) Oral at bedtime    MEDICATIONS  (PRN):  acetaminophen   Tablet .. 650 milliGRAM(s) Oral every 6 hours PRN Temp greater or equal to 38C (100.4F), Mild Pain (1 - 3)  magnesium hydroxide Suspension 30 milliLiter(s) Oral daily PRN Constipation  melatonin 3 milliGRAM(s) Oral at bedtime PRN Insomnia  oxyCODONE    IR 10 milliGRAM(s) Oral every 4 hours PRN Severe Pain (7 - 10)  oxyCODONE    IR 5 milliGRAM(s) Oral every 4 hours PRN Moderate Pain (4 - 6)  simethicone 80 milliGRAM(s) Chew every 6 hours PRN Upset Stomach    Vital Signs Last 24 Hrs  T(C): 36.7 (16 Jan 2021 22:25), Max: 36.7 (16 Jan 2021 22:25)  T(F): 98.1 (16 Jan 2021 22:25), Max: 98.1 (16 Jan 2021 22:25)  HR: 74 (16 Jan 2021 22:25) (66 - 74)  BP: 148/75 (16 Jan 2021 22:25) (101/65 - 148/75)  BP(mean): --  RR: 16 (16 Jan 2021 22:25) (16 - 16)  SpO2: 97% (16 Jan 2021 22:25) (97% - 97%)    In NAD  HEENT- EOMI  Heart- Well Perfused  Lungs- No resp distress, no use of accessory resp muscles  Abd- + BS, NT  Ext- No calf pain  Neuro- Exam unchanged  Psych- Affect wnl    Imp: Patient with diagnosis of spinal fusion admitted for comprehensive acute rehabilitation.    Plan:  - Continue therapies  - DVT prophylaxis - Lovenox  - Skin- Turn q2h, check skin daily  - Continue current medications; patient medically stable.   - Patient is stable to continue current rehabilitation program.

## 2021-01-17 NOTE — PROGRESS NOTE ADULT - SUBJECTIVE AND OBJECTIVE BOX
Patient is a 75y old  Female who presents with a chief complaint of Back pain (17 Jan 2021 08:24)      Patient seen and examined at bedside.    ALLERGIES:  No Known Allergies    MEDICATIONS  (STANDING):  atorvastatin 20 milliGRAM(s) Oral at bedtime  enoxaparin Injectable 40 milliGRAM(s) SubCutaneous two times a day  folic acid 1 milliGRAM(s) Oral daily  hydroxychloroquine 200 milliGRAM(s) Oral daily  levothyroxine 200 MICROGram(s) Oral daily  lidocaine   Patch 1 Patch Transdermal daily  polyethylene glycol 3350 17 Gram(s) Oral two times a day  senna 2 Tablet(s) Oral at bedtime    MEDICATIONS  (PRN):  acetaminophen   Tablet .. 650 milliGRAM(s) Oral every 6 hours PRN Temp greater or equal to 38C (100.4F), Mild Pain (1 - 3)  magnesium hydroxide Suspension 30 milliLiter(s) Oral daily PRN Constipation  melatonin 3 milliGRAM(s) Oral at bedtime PRN Insomnia  oxyCODONE    IR 10 milliGRAM(s) Oral every 4 hours PRN Severe Pain (7 - 10)  oxyCODONE    IR 5 milliGRAM(s) Oral every 4 hours PRN Moderate Pain (4 - 6)  simethicone 80 milliGRAM(s) Chew every 6 hours PRN Upset Stomach    Vital Signs Last 24 Hrs  T(F): 98.6 (17 Jan 2021 08:54), Max: 98.6 (17 Jan 2021 08:54)  HR: 81 (17 Jan 2021 08:54) (74 - 81)  BP: 94/58 (17 Jan 2021 08:54) (94/58 - 148/75)  RR: 17 (17 Jan 2021 08:54) (16 - 17)  SpO2: 95% (17 Jan 2021 08:54) (95% - 97%)  I&O's Summary    BMI (kg/m2): 43.8 (01-12-21 @ 16:32)  PHYSICAL EXAM:  General: NAD, A/O x 3  ENT: MMM  Neck: Supple, No JVD  Lungs: Clear to auscultation bilaterally  Cardio: RRR, S1/S2, No murmurs  Abdomen: Soft, Nontender, Nondistended; Bowel sounds present  Extremities: No calf tenderness, No pitting edema    LABS:                                                RADIOLOGY & ADDITIONAL TESTS:    Care Discussed with Consultants/Other Providers:

## 2021-01-18 LAB
ALBUMIN SERPL ELPH-MCNC: 2.2 G/DL — LOW (ref 3.3–5)
ALP SERPL-CCNC: 116 U/L — SIGNIFICANT CHANGE UP (ref 40–120)
ALT FLD-CCNC: 19 U/L — SIGNIFICANT CHANGE UP (ref 10–45)
ANION GAP SERPL CALC-SCNC: 9 MMOL/L — SIGNIFICANT CHANGE UP (ref 5–17)
AST SERPL-CCNC: 27 U/L — SIGNIFICANT CHANGE UP (ref 10–40)
BASOPHILS # BLD AUTO: 0.07 K/UL — SIGNIFICANT CHANGE UP (ref 0–0.2)
BASOPHILS NFR BLD AUTO: 1.1 % — SIGNIFICANT CHANGE UP (ref 0–2)
BILIRUB SERPL-MCNC: 0.3 MG/DL — SIGNIFICANT CHANGE UP (ref 0.2–1.2)
BUN SERPL-MCNC: 11 MG/DL — SIGNIFICANT CHANGE UP (ref 7–23)
CALCIUM SERPL-MCNC: 8.5 MG/DL — SIGNIFICANT CHANGE UP (ref 8.4–10.5)
CHLORIDE SERPL-SCNC: 105 MMOL/L — SIGNIFICANT CHANGE UP (ref 96–108)
CO2 SERPL-SCNC: 28 MMOL/L — SIGNIFICANT CHANGE UP (ref 22–31)
CREAT SERPL-MCNC: 0.66 MG/DL — SIGNIFICANT CHANGE UP (ref 0.5–1.3)
EOSINOPHIL # BLD AUTO: 0.24 K/UL — SIGNIFICANT CHANGE UP (ref 0–0.5)
EOSINOPHIL NFR BLD AUTO: 3.8 % — SIGNIFICANT CHANGE UP (ref 0–6)
GLUCOSE SERPL-MCNC: 91 MG/DL — SIGNIFICANT CHANGE UP (ref 70–99)
HCT VFR BLD CALC: 31.9 % — LOW (ref 34.5–45)
HGB BLD-MCNC: 10.1 G/DL — LOW (ref 11.5–15.5)
IMM GRANULOCYTES NFR BLD AUTO: 0.3 % — SIGNIFICANT CHANGE UP (ref 0–1.5)
LYMPHOCYTES # BLD AUTO: 1.27 K/UL — SIGNIFICANT CHANGE UP (ref 1–3.3)
LYMPHOCYTES # BLD AUTO: 20 % — SIGNIFICANT CHANGE UP (ref 13–44)
MCHC RBC-ENTMCNC: 30.3 PG — SIGNIFICANT CHANGE UP (ref 27–34)
MCHC RBC-ENTMCNC: 31.7 GM/DL — LOW (ref 32–36)
MCV RBC AUTO: 95.8 FL — SIGNIFICANT CHANGE UP (ref 80–100)
MONOCYTES # BLD AUTO: 0.88 K/UL — SIGNIFICANT CHANGE UP (ref 0–0.9)
MONOCYTES NFR BLD AUTO: 13.8 % — SIGNIFICANT CHANGE UP (ref 2–14)
NEUTROPHILS # BLD AUTO: 3.88 K/UL — SIGNIFICANT CHANGE UP (ref 1.8–7.4)
NEUTROPHILS NFR BLD AUTO: 61 % — SIGNIFICANT CHANGE UP (ref 43–77)
NRBC # BLD: 0 /100 WBCS — SIGNIFICANT CHANGE UP (ref 0–0)
PLATELET # BLD AUTO: 450 K/UL — HIGH (ref 150–400)
POTASSIUM SERPL-MCNC: 4.1 MMOL/L — SIGNIFICANT CHANGE UP (ref 3.5–5.3)
POTASSIUM SERPL-SCNC: 4.1 MMOL/L — SIGNIFICANT CHANGE UP (ref 3.5–5.3)
PROT SERPL-MCNC: 6.1 G/DL — SIGNIFICANT CHANGE UP (ref 6–8.3)
RBC # BLD: 3.33 M/UL — LOW (ref 3.8–5.2)
RBC # FLD: 14.5 % — SIGNIFICANT CHANGE UP (ref 10.3–14.5)
SODIUM SERPL-SCNC: 142 MMOL/L — SIGNIFICANT CHANGE UP (ref 135–145)
WBC # BLD: 6.36 K/UL — SIGNIFICANT CHANGE UP (ref 3.8–10.5)
WBC # FLD AUTO: 6.36 K/UL — SIGNIFICANT CHANGE UP (ref 3.8–10.5)

## 2021-01-18 PROCEDURE — 99232 SBSQ HOSP IP/OBS MODERATE 35: CPT

## 2021-01-18 RX ADMIN — OXYCODONE HYDROCHLORIDE 10 MILLIGRAM(S): 5 TABLET ORAL at 12:36

## 2021-01-18 RX ADMIN — LIDOCAINE 1 PATCH: 4 CREAM TOPICAL at 19:23

## 2021-01-18 RX ADMIN — Medication 200 MICROGRAM(S): at 05:37

## 2021-01-18 RX ADMIN — LIDOCAINE 1 PATCH: 4 CREAM TOPICAL at 23:29

## 2021-01-18 RX ADMIN — POLYETHYLENE GLYCOL 3350 17 GRAM(S): 17 POWDER, FOR SOLUTION ORAL at 05:37

## 2021-01-18 RX ADMIN — SENNA PLUS 2 TABLET(S): 8.6 TABLET ORAL at 21:42

## 2021-01-18 RX ADMIN — Medication 650 MILLIGRAM(S): at 05:41

## 2021-01-18 RX ADMIN — ENOXAPARIN SODIUM 40 MILLIGRAM(S): 100 INJECTION SUBCUTANEOUS at 05:38

## 2021-01-18 RX ADMIN — SIMETHICONE 80 MILLIGRAM(S): 80 TABLET, CHEWABLE ORAL at 00:04

## 2021-01-18 RX ADMIN — OXYCODONE HYDROCHLORIDE 10 MILLIGRAM(S): 5 TABLET ORAL at 21:43

## 2021-01-18 RX ADMIN — OXYCODONE HYDROCHLORIDE 10 MILLIGRAM(S): 5 TABLET ORAL at 03:33

## 2021-01-18 RX ADMIN — LIDOCAINE 1 PATCH: 4 CREAM TOPICAL at 10:57

## 2021-01-18 RX ADMIN — Medication 200 MILLIGRAM(S): at 10:57

## 2021-01-18 RX ADMIN — ATORVASTATIN CALCIUM 20 MILLIGRAM(S): 80 TABLET, FILM COATED ORAL at 21:42

## 2021-01-18 RX ADMIN — SIMETHICONE 80 MILLIGRAM(S): 80 TABLET, CHEWABLE ORAL at 17:55

## 2021-01-18 RX ADMIN — Medication 1 MILLIGRAM(S): at 10:57

## 2021-01-18 RX ADMIN — OXYCODONE HYDROCHLORIDE 10 MILLIGRAM(S): 5 TABLET ORAL at 08:36

## 2021-01-18 RX ADMIN — ENOXAPARIN SODIUM 40 MILLIGRAM(S): 100 INJECTION SUBCUTANEOUS at 17:53

## 2021-01-18 NOTE — PROGRESS NOTE ADULT - ASSESSMENT
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 Hospital Course complicated by brief SICU stay for mechanical ventilation management, currently extubated. Patient now admitted for a multidisciplinary rehab program on 1/12/21.    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 Jaja Campos  -Continue comprehensive rehab program  -Pain management, bowel regimen per rehab    #Acute blood loss Anemia, likely from surgery  -H/H stable, continue to monitor   -No overt signs of bleeding    #Abdominal pain - likely 2/2 opioid induced constipation   -Bowel regimen per rehab    #SLE/RA  -Continue with Plaquenil 200mg po daily    #H/o provoked right popliteal, right posterior tibial DVT (7/2019 in setting of hip surgery, already completed >6 months of treatment)   -Follows with vascular cardiologist Dr. Hoyos outpatient   -Treated with Eliquis 5mg po bid chronically, but per hospitalist at [facility], Eliquis held due to 12/29 LE dopplers neg for DVTs  -Continue Lovenox for DVT prophylaxis     #HTN  -Stable off anti-hypertensives  -Continue to hold home medication Losartan  -Monitor vitals    #HLD  -Chronic, continue Lipitor    #Hypothyroidism  -Chronic, continue Levothyroxine    #Prophylactic Measure  -DVT ppx: Lovenox 40mg BID  -GI ppx: PPI

## 2021-01-18 NOTE — PROGRESS NOTE ADULT - SUBJECTIVE AND OBJECTIVE BOX
Cc: Gait dysfunction    HPI: Patient with no new medical issues today.  Pain controlled, no chest pain, no N/V, no Fevers/Chills. No other new ROS  Has been tolerating rehabilitation program.  Appreciate Hospitalist f/u.    MEDICATIONS  (STANDING):  atorvastatin 20 milliGRAM(s) Oral at bedtime  enoxaparin Injectable 40 milliGRAM(s) SubCutaneous two times a day  folic acid 1 milliGRAM(s) Oral daily  hydroxychloroquine 200 milliGRAM(s) Oral daily  levothyroxine 200 MICROGram(s) Oral daily  lidocaine   Patch 1 Patch Transdermal daily  polyethylene glycol 3350 17 Gram(s) Oral two times a day  senna 2 Tablet(s) Oral at bedtime    MEDICATIONS  (PRN):  acetaminophen   Tablet .. 650 milliGRAM(s) Oral every 6 hours PRN Temp greater or equal to 38C (100.4F), Mild Pain (1 - 3)  magnesium hydroxide Suspension 30 milliLiter(s) Oral daily PRN Constipation  melatonin 3 milliGRAM(s) Oral at bedtime PRN Insomnia  oxyCODONE    IR 10 milliGRAM(s) Oral every 4 hours PRN Severe Pain (7 - 10)  oxyCODONE    IR 5 milliGRAM(s) Oral every 4 hours PRN Moderate Pain (4 - 6)  simethicone 80 milliGRAM(s) Chew every 6 hours PRN Upset Stomach    Vital Signs Last 24 Hrs  T(C): 36.7 (18 Jan 2021 08:21), Max: 37 (17 Jan 2021 08:54)  T(F): 98.1 (18 Jan 2021 08:21), Max: 98.6 (17 Jan 2021 08:54)  HR: 67 (18 Jan 2021 08:21) (67 - 81)  BP: 119/67 (18 Jan 2021 08:21) (94/58 - 119/67)  BP(mean): --  RR: 15 (18 Jan 2021 08:21) (15 - 17)  SpO2: 93% (18 Jan 2021 08:21) (93% - 96%)    In NAD  HEENT- EOMI  Heart- Well Perfused  Lungs- No resp distress, no use of accessory resp muscles  Abd- + BS, NT  Ext- No calf pain  Neuro- Exam unchanged  Psych- Affect wnl                          10.1   6.36  )-----------( 450      ( 18 Jan 2021 06:57 )             31.9     01-18    142  |  105  |  11  ----------------------------<  91  4.1   |  28  |  0.66    Ca    8.5      18 Jan 2021 06:57    TPro  6.1  /  Alb  2.2<L>  /  TBili  0.3  /  DBili  x   /  AST  27  /  ALT  19  /  AlkPhos  116  01-18      Imp: Patient with diagnosis of spinal fusion admitted for comprehensive acute rehabilitation.    Plan:  - Continue therapies  - DVT prophylaxis - Lovenox  - Labs stable.   - Skin- Turn q2h, check skin daily  - Continue current medications; patient medically stable.   - Patient is stable to continue current rehabilitation program.

## 2021-01-18 NOTE — PROGRESS NOTE ADULT - SUBJECTIVE AND OBJECTIVE BOX
Patient is a 75y old  Female who presents with a chief complaint of Back pain (18 Jan 2021 08:28)      Patient seen and examined at bedside.    ALLERGIES:  No Known Allergies    MEDICATIONS  (STANDING):  atorvastatin 20 milliGRAM(s) Oral at bedtime  enoxaparin Injectable 40 milliGRAM(s) SubCutaneous two times a day  folic acid 1 milliGRAM(s) Oral daily  hydroxychloroquine 200 milliGRAM(s) Oral daily  levothyroxine 200 MICROGram(s) Oral daily  lidocaine   Patch 1 Patch Transdermal daily  polyethylene glycol 3350 17 Gram(s) Oral two times a day  senna 2 Tablet(s) Oral at bedtime    MEDICATIONS  (PRN):  acetaminophen   Tablet .. 650 milliGRAM(s) Oral every 6 hours PRN Temp greater or equal to 38C (100.4F), Mild Pain (1 - 3)  magnesium hydroxide Suspension 30 milliLiter(s) Oral daily PRN Constipation  melatonin 3 milliGRAM(s) Oral at bedtime PRN Insomnia  oxyCODONE    IR 10 milliGRAM(s) Oral every 4 hours PRN Severe Pain (7 - 10)  oxyCODONE    IR 5 milliGRAM(s) Oral every 4 hours PRN Moderate Pain (4 - 6)  simethicone 80 milliGRAM(s) Chew every 6 hours PRN Upset Stomach    Vital Signs Last 24 Hrs  T(F): 98.1 (18 Jan 2021 08:21), Max: 98.1 (17 Jan 2021 22:28)  HR: 67 (18 Jan 2021 08:21) (67 - 69)  BP: 119/67 (18 Jan 2021 08:21) (117/67 - 119/67)  RR: 15 (18 Jan 2021 08:21) (15 - 16)  SpO2: 93% (18 Jan 2021 08:21) (93% - 96%)  I&O's Summary      PHYSICAL EXAM:  General: NAD, A/O x 3  ENT: MMM  Neck: Supple, No JVD  Lungs: Clear to auscultation bilaterally  Cardio: RRR, S1/S2, No murmurs  Abdomen: Soft, Nontender, Nondistended; Bowel sounds present  Extremities: No calf tenderness, No pitting edema    LABS:                        10.1   6.36  )-----------( 450      ( 18 Jan 2021 06:57 )             31.9       01-18    142  |  105  |  11  ----------------------------<  91  4.1   |  28  |  0.66    Ca    8.5      18 Jan 2021 06:57    TPro  6.1  /  Alb  2.2  /  TBili  0.3  /  DBili  x   /  AST  27  /  ALT  19  /  AlkPhos  116  01-18     eGFR if Non African American: 86 mL/min/1.73M2 (01-18-21 @ 06:57)  eGFR if African American: 100 mL/min/1.73M2 (01-18-21 @ 06:57)                                     RADIOLOGY & ADDITIONAL TESTS:    Care Discussed with Consultants/Other Providers:

## 2021-01-19 PROCEDURE — 99232 SBSQ HOSP IP/OBS MODERATE 35: CPT | Mod: GC

## 2021-01-19 PROCEDURE — 99232 SBSQ HOSP IP/OBS MODERATE 35: CPT

## 2021-01-19 RX ORDER — OXYCODONE HYDROCHLORIDE 5 MG/1
15 TABLET ORAL EVERY 4 HOURS
Refills: 0 | Status: DISCONTINUED | OUTPATIENT
Start: 2021-01-19 | End: 2021-01-26

## 2021-01-19 RX ORDER — OXYCODONE HYDROCHLORIDE 5 MG/1
10 TABLET ORAL EVERY 4 HOURS
Refills: 0 | Status: DISCONTINUED | OUTPATIENT
Start: 2021-01-19 | End: 2021-01-26

## 2021-01-19 RX ORDER — POLYETHYLENE GLYCOL 3350 17 G/17G
17 POWDER, FOR SOLUTION ORAL DAILY
Refills: 0 | Status: DISCONTINUED | OUTPATIENT
Start: 2021-01-19 | End: 2021-01-26

## 2021-01-19 RX ADMIN — SIMETHICONE 80 MILLIGRAM(S): 80 TABLET, CHEWABLE ORAL at 05:36

## 2021-01-19 RX ADMIN — OXYCODONE HYDROCHLORIDE 15 MILLIGRAM(S): 5 TABLET ORAL at 17:51

## 2021-01-19 RX ADMIN — ENOXAPARIN SODIUM 40 MILLIGRAM(S): 100 INJECTION SUBCUTANEOUS at 17:51

## 2021-01-19 RX ADMIN — ATORVASTATIN CALCIUM 20 MILLIGRAM(S): 80 TABLET, FILM COATED ORAL at 22:05

## 2021-01-19 RX ADMIN — OXYCODONE HYDROCHLORIDE 10 MILLIGRAM(S): 5 TABLET ORAL at 12:17

## 2021-01-19 RX ADMIN — OXYCODONE HYDROCHLORIDE 15 MILLIGRAM(S): 5 TABLET ORAL at 22:05

## 2021-01-19 RX ADMIN — Medication 3 MILLIGRAM(S): at 22:05

## 2021-01-19 RX ADMIN — Medication 1 MILLIGRAM(S): at 12:15

## 2021-01-19 RX ADMIN — OXYCODONE HYDROCHLORIDE 10 MILLIGRAM(S): 5 TABLET ORAL at 09:29

## 2021-01-19 RX ADMIN — Medication 200 MILLIGRAM(S): at 12:15

## 2021-01-19 RX ADMIN — SENNA PLUS 2 TABLET(S): 8.6 TABLET ORAL at 22:05

## 2021-01-19 RX ADMIN — ENOXAPARIN SODIUM 40 MILLIGRAM(S): 100 INJECTION SUBCUTANEOUS at 05:30

## 2021-01-19 RX ADMIN — Medication 200 MICROGRAM(S): at 05:30

## 2021-01-19 RX ADMIN — POLYETHYLENE GLYCOL 3350 17 GRAM(S): 17 POWDER, FOR SOLUTION ORAL at 05:30

## 2021-01-19 RX ADMIN — LIDOCAINE 1 PATCH: 4 CREAM TOPICAL at 17:54

## 2021-01-19 RX ADMIN — LIDOCAINE 1 PATCH: 4 CREAM TOPICAL at 12:15

## 2021-01-19 RX ADMIN — Medication 650 MILLIGRAM(S): at 05:31

## 2021-01-19 NOTE — PROGRESS NOTE ADULT - ASSESSMENT
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 Hospital Course complicated by brief SICU stay for mechanical ventilation management, currently extubated. Patient now admitted for a multidisciplinary rehab program on 1/12/21.    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 Jaja Campos  -Continue comprehensive rehab program  -Pain management, bowel regimen per rehab    #Acute blood loss Anemia, likely from surgery  -H/H stable, continue to monitor   -No overt signs of bleeding    #Abdominal pain - likely 2/2 opioid induced constipation   -Bowel regimen per rehab    #SLE/RA  -Continue with Plaquenil 200mg po daily    #H/o provoked right popliteal, right posterior tibial DVT (7/2019 in setting of hip surgery, already completed >6 months of treatment)   -Follows with vascular cardiologist Dr. Hoyos outpatient   -Treated with Eliquis 5mg po bid chronically, but per hospitalist at [facility], Eliquis held due to 12/29 LE dopplers neg for DVTs  -Continue Lovenox for DVT prophylaxis     #HTN  -Stable off anti-hypertensives  -Continue to hold home medication Losartan  -Monitor vitals    #HLD  -Chronic, continue Lipitor    #Hypothyroidism  -Chronic, continue Levothyroxine    #Prophylactic Measure  -DVT ppx: Lovenox 40mg BID  -GI ppx: PPI

## 2021-01-19 NOTE — PROGRESS NOTE ADULT - SUBJECTIVE AND OBJECTIVE BOX
Patient is a 75y old  Female who presents with a chief complaint of Back pain (18 Jan 2021 11:51)      Patient seen and examined at bedside.    ALLERGIES:  No Known Allergies    MEDICATIONS  (STANDING):  atorvastatin 20 milliGRAM(s) Oral at bedtime  enoxaparin Injectable 40 milliGRAM(s) SubCutaneous two times a day  folic acid 1 milliGRAM(s) Oral daily  hydroxychloroquine 200 milliGRAM(s) Oral daily  levothyroxine 200 MICROGram(s) Oral daily  lidocaine   Patch 1 Patch Transdermal daily  polyethylene glycol 3350 17 Gram(s) Oral two times a day  senna 2 Tablet(s) Oral at bedtime    MEDICATIONS  (PRN):  acetaminophen   Tablet .. 650 milliGRAM(s) Oral every 6 hours PRN Temp greater or equal to 38C (100.4F), Mild Pain (1 - 3)  magnesium hydroxide Suspension 30 milliLiter(s) Oral daily PRN Constipation  melatonin 3 milliGRAM(s) Oral at bedtime PRN Insomnia  oxyCODONE    IR 10 milliGRAM(s) Oral every 4 hours PRN Moderate Pain (4 - 6)  oxyCODONE    IR 15 milliGRAM(s) Oral every 4 hours PRN Severe Pain (7 - 10)  simethicone 80 milliGRAM(s) Chew every 6 hours PRN Upset Stomach    Vital Signs Last 24 Hrs  T(F): 98.1 (19 Jan 2021 08:19), Max: 98.2 (18 Jan 2021 20:35)  HR: 70 (19 Jan 2021 08:19) (67 - 70)  BP: 126/59 (19 Jan 2021 08:19) (122/71 - 126/59)  RR: 16 (19 Jan 2021 08:19) (16 - 17)  SpO2: 98% (19 Jan 2021 08:19) (95% - 98%)  I&O's Summary      PHYSICAL EXAM:  General: NAD, A/O x 3  ENT: MMM  Neck: Supple, No JVD  Lungs: Clear to auscultation bilaterally  Cardio: RRR, S1/S2, No murmurs  Abdomen: Soft, Nontender, Nondistended; Bowel sounds present  Extremities: No calf tenderness, No pitting edema    LABS:                        10.1   6.36  )-----------( 450      ( 18 Jan 2021 06:57 )             31.9       01-18    142  |  105  |  11  ----------------------------<  91  4.1   |  28  |  0.66    Ca    8.5      18 Jan 2021 06:57    TPro  6.1  /  Alb  2.2  /  TBili  0.3  /  DBili  x   /  AST  27  /  ALT  19  /  AlkPhos  116  01-18     eGFR if Non African American: 86 mL/min/1.73M2 (01-18-21 @ 06:57)  eGFR if African American: 100 mL/min/1.73M2 (01-18-21 @ 06:57)                                     RADIOLOGY & ADDITIONAL TESTS:    Care Discussed with Consultants/Other Providers:

## 2021-01-19 NOTE — PROGRESS NOTE ADULT - ATTENDING COMMENTS
Chart reviewed. Patient seen at bedside  Reports poor sleep overnight  Pain still not controlled optimally. Will increase oxycodone to 10mg for moderate pain and 15mg for severe pain.   On lactulose and senna for bowel program    2. Back incision staples - no erythema. lower third with some scabs-   LE motor strength on exam - HF and knee extension improved    3. Continue rehab program

## 2021-01-19 NOTE — PROGRESS NOTE ADULT - ASSESSMENT
Assessment/Plan:  KAI THOMAS is a 75y F with a history of HTN, HLD, SLE, RA, hypothyroidism, provoked R. 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 Hospital Course complicated by brief SICU stay for mechanical ventilation management, now extubated, also c/bpain control and constipation. Patient now admitted for a multidisciplinary rehab program. 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 Jaja Campos  -  Comprehensive Rehab Program of PT/OT- 3 hrs/day 5 days/week   orthotist -[facility]- [place]  [phone] for TLSO- per surgeon request.   - Patient refused TLSO.    SLE/RA:- c/w plaquenil 200mg po daily    h/o Provoked DVT in 2019  - Follows with vascular cardiologist Dr. Hoyos outpatient.    - US July 2019:  DVT noted in the right popliteal vein and visualized segment of the right  posterior tibial vein.  - provoked DVT July 2019 in setting of hip surgery, already >6 months of treatment,   - was on eliqiuis 5mg po bid at home, but per hospitalist at [facility] hold eliquis, as LE dopplers on 12/29 neg for any DVTs  - on lovenox for dvt ppx while admitted    HTN:- monitor BP   - off bp meds right now as BP was on lower side at [facility] due to opioids  - takes losartan 50mg po qd at home    HLD:- c/w lipitor 20mg po daily    hypothyroidism:- c/w levothyroxine 200mcg po daily    Sleep:- melatonin 3mg PRN    Skin:- staples to incision    #Pain Mgmt :- Tylenol PRN mild pain  - Oxycodone 5mg IR PRN moderate 4-7/10 pain  - Oxycodone 10mg IR prn severe 8-10/10 pain    GI/Bowel Mgmt/ constipation: -c/w Senna, miralax  bid , MOM prn, Constipation improved     /Bladder Mgmt :toileting prn     FEN :- Diet - Regular     Precautions / PROPHYLAXIS:   - Falls, Spinal,   - ortho: spine precautions  - DVT: Lovenox    Hospitalist fu noted    Disp: Team conference 1/14/21: Goals of mod TDD 1/28

## 2021-01-20 PROCEDURE — 99232 SBSQ HOSP IP/OBS MODERATE 35: CPT

## 2021-01-20 RX ORDER — OXYCODONE HYDROCHLORIDE 5 MG/1
10 TABLET ORAL EVERY 12 HOURS
Refills: 0 | Status: DISCONTINUED | OUTPATIENT
Start: 2021-01-20 | End: 2021-01-26

## 2021-01-20 RX ADMIN — ATORVASTATIN CALCIUM 20 MILLIGRAM(S): 80 TABLET, FILM COATED ORAL at 21:40

## 2021-01-20 RX ADMIN — ENOXAPARIN SODIUM 40 MILLIGRAM(S): 100 INJECTION SUBCUTANEOUS at 16:35

## 2021-01-20 RX ADMIN — OXYCODONE HYDROCHLORIDE 15 MILLIGRAM(S): 5 TABLET ORAL at 08:18

## 2021-01-20 RX ADMIN — SENNA PLUS 2 TABLET(S): 8.6 TABLET ORAL at 21:40

## 2021-01-20 RX ADMIN — OXYCODONE HYDROCHLORIDE 10 MILLIGRAM(S): 5 TABLET ORAL at 16:35

## 2021-01-20 RX ADMIN — ENOXAPARIN SODIUM 40 MILLIGRAM(S): 100 INJECTION SUBCUTANEOUS at 05:07

## 2021-01-20 RX ADMIN — SIMETHICONE 80 MILLIGRAM(S): 80 TABLET, CHEWABLE ORAL at 10:55

## 2021-01-20 RX ADMIN — Medication 1 MILLIGRAM(S): at 11:09

## 2021-01-20 RX ADMIN — Medication 200 MILLIGRAM(S): at 11:09

## 2021-01-20 RX ADMIN — OXYCODONE HYDROCHLORIDE 15 MILLIGRAM(S): 5 TABLET ORAL at 12:33

## 2021-01-20 RX ADMIN — OXYCODONE HYDROCHLORIDE 10 MILLIGRAM(S): 5 TABLET ORAL at 23:25

## 2021-01-20 RX ADMIN — Medication 200 MICROGRAM(S): at 05:07

## 2021-01-20 RX ADMIN — LIDOCAINE 1 PATCH: 4 CREAM TOPICAL at 00:35

## 2021-01-20 RX ADMIN — Medication 650 MILLIGRAM(S): at 11:09

## 2021-01-20 NOTE — PROGRESS NOTE ADULT - SUBJECTIVE AND OBJECTIVE BOX
HPI:  75F with past medical history of HTN, HLD, SLE, RA, hypothyroidism, provoked R. popliteal DVT in 2019 on eliquis, chronic low back pain presents to ED for acute on chronic low back pain. Pt has several months of chronic lumbosacral back pain, evaluated by outpatient ortho and referred for physical therapy and pain management. She has been receiving ?vitamin spinal injections. She last received low back inj on 12/16 after which she her low back pain worsened. She took oxycodone 5 mg qd (prescribed in september during a ED visit) and acetaminophen for past three days without relief. She also endorses worsening pain during ambulation with walker and has affected her quality of life. She has constipation, last BM 4 days ago. Otherwise denies fever, chills, N/V, abdominal pain, dysuria, urinary retention, fecal incontinence, saddle anesthesia, fall, LOC, trauma. During ROS she endorsed LLE weakness for several months, no numbness or tingling and has LLE edema >RLE edema, reported undergoing LLE US 4 months ago, was negative for DVT. Currently has 8/10 pain, improved to 5/10 with oxycodone 5 mg x 2 in ED. (28 Dec 2020 11:16)   12/28: Mri T/Ls: IMPRESSION: Degenerative changes  Abnormal signal with associated enhancement is seen involving the endplates adjacent to the L2-3 disc space. There is slight increased T2 prolongation involving the L2-3 disc space with associated widening. While these findings could be compatible with degenerative changes possibly of early discitis and osteomyelitis must be considered.  Patient s/p L1-L5 laminectomy, T10-L5 posterior surgical fusion by Dr. Campos on 1/1/21  HMV x 3 drains. Post op with constipation needing aggressive bowel regimen.   PT/OT and PM&R evaluated patient and recommended Rehab.     Patient medically cleared and admitted to  Rehab on 1/12/21 (12 Jan 2021 15:21)      SUBJECTIVE/INTERVAL EVENTS  Patient seen and assessed in PT.  Pain better controlled, but overnight with nursing delay for bathroom and hence had BM in diaper.     Denies CP/palpitations/abdominal pain or nausea    Vital Signs Last 24 Hrs  T(C): 36.4 (20 Jan 2021 08:25), Max: 36.7 (19 Jan 2021 20:45)  T(F): 97.5 (20 Jan 2021 08:25), Max: 98 (19 Jan 2021 20:45)  HR: 71 (20 Jan 2021 08:25) (63 - 71)  BP: 108/67 (20 Jan 2021 08:25) (108/67 - 126/73)  BP(mean): --  RR: 16 (20 Jan 2021 08:25) (16 - 16)  SpO2: 96% (20 Jan 2021 08:25) (96% - 98%)    Gen - NAD, Comfortable  Pulm - CTAB,   Cardiovascular - S1S2,  Abdomen - Soft, NT/ND, +BS  Extremities - No C/C/E, No calf tenderness    Motor -UE 5/5, LE proximal 3/5, distal 5/5                        Sensory - Intact to LT    Psychiatric - Mood stable, Affect WNL  Skin: long back incision with staples +, c/d/i  Wounds: None Present    Function:  mod assist with transfers  Gait: short distance with walker mod assist         RECENT LABS:                          10.1   6.36  )-----------( 450      ( 18 Jan 2021 06:57 )             31.9     01-18    142  |  105  |  11  ----------------------------<  91  4.1   |  28  |  0.66    Ca    8.5      18 Jan 2021 06:57    TPro  6.1  /  Alb  2.2<L>  /  TBili  0.3  /  DBili  x   /  AST  27  /  ALT  19  /  AlkPhos  116  01-18    LIVER FUNCTIONS - ( 18 Jan 2021 06:57 )  Alb: 2.2 g/dL / Pro: 6.1 g/dL / ALK PHOS: 116 U/L / ALT: 19 U/L / AST: 27 U/L / GGT: x

## 2021-01-20 NOTE — PROGRESS NOTE ADULT - SUBJECTIVE AND OBJECTIVE BOX
Patient is a 75y old  Female who presents with a chief complaint of Back pain (20 Jan 2021 13:35)      Patient seen and examined at bedside.    ALLERGIES:  No Known Allergies    MEDICATIONS  (STANDING):  atorvastatin 20 milliGRAM(s) Oral at bedtime  enoxaparin Injectable 40 milliGRAM(s) SubCutaneous two times a day  folic acid 1 milliGRAM(s) Oral daily  hydroxychloroquine 200 milliGRAM(s) Oral daily  levothyroxine 200 MICROGram(s) Oral daily  lidocaine   Patch 1 Patch Transdermal daily  senna 2 Tablet(s) Oral at bedtime    MEDICATIONS  (PRN):  acetaminophen   Tablet .. 650 milliGRAM(s) Oral every 6 hours PRN Temp greater or equal to 38C (100.4F), Mild Pain (1 - 3)  magnesium hydroxide Suspension 30 milliLiter(s) Oral daily PRN Constipation  melatonin 3 milliGRAM(s) Oral at bedtime PRN Insomnia  oxyCODONE    IR 10 milliGRAM(s) Oral every 4 hours PRN Moderate Pain (4 - 6)  oxyCODONE    IR 15 milliGRAM(s) Oral every 4 hours PRN Severe Pain (7 - 10)  polyethylene glycol 3350 17 Gram(s) Oral daily PRN Constipation  simethicone 80 milliGRAM(s) Chew every 6 hours PRN Upset Stomach    Vital Signs Last 24 Hrs  T(F): 97.5 (20 Jan 2021 08:25), Max: 98 (19 Jan 2021 20:45)  HR: 71 (20 Jan 2021 08:25) (63 - 71)  BP: 108/67 (20 Jan 2021 08:25) (108/67 - 126/73)  RR: 16 (20 Jan 2021 08:25) (16 - 16)  SpO2: 96% (20 Jan 2021 08:25) (96% - 98%)  I&O's Summary      PHYSICAL EXAM:  General: NAD, A/O x 3  ENT: MMM  Neck: Supple, No JVD  Lungs: Clear to auscultation bilaterally  Cardio: RRR, S1/S2, No murmurs  Abdomen: Soft, Nontender, Nondistended; Bowel sounds present  Extremities: No calf tenderness, No pitting edema    LABS:                        10.1   6.36  )-----------( 450      ( 18 Jan 2021 06:57 )             31.9       01-18    142  |  105  |  11  ----------------------------<  91  4.1   |  28  |  0.66    Ca    8.5      18 Jan 2021 06:57    TPro  6.1  /  Alb  2.2  /  TBili  0.3  /  DBili  x   /  AST  27  /  ALT  19  /  AlkPhos  116  01-18     eGFR if Non African American: 86 mL/min/1.73M2 (01-18-21 @ 06:57)  eGFR if African American: 100 mL/min/1.73M2 (01-18-21 @ 06:57)                                     RADIOLOGY & ADDITIONAL TESTS:    Care Discussed with Consultants/Other Providers:

## 2021-01-20 NOTE — PROGRESS NOTE ADULT - ASSESSMENT
Assessment/Plan:  KAI THOMAS is a 75y F with a history of HTN, HLD, SLE, RA, hypothyroidism, provoked R. 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 Hospital Course complicated by brief SICU stay for mechanical ventilation management, now extubated, also c/bpain control and constipation. Patient now admitted for a multidisciplinary rehab program. 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 Jaja Campos  -  Comprehensive Rehab Program of PT/OT- 3 hrs/day 5 days/week   orthotist -[facility]- [place]  [phone] for TLSO- per surgeon request.   - Patient refused TLSO as pain increased with use .    SLE/RA:- c/w plaquenil 200mg po daily    h/o Provoked DVT in 2019  - Follows with vascular cardiologist Dr. Hoyos outpatient.    - US July 2019:  DVT noted in the right popliteal vein and visualized segment of the right  posterior tibial vein.  - provoked DVT July 2019 in setting of hip surgery, already >6 months of treatment,   - was on eliqiuis 5mg po bid at home, but per hospitalist at [facility] hold eliquis, as LE dopplers on 12/29 neg for any DVTs  - on lovenox for dvt ppx while admitted    HTN:- monitor BP , currently stable off meds   -- takes losartan 50mg po qd at home    HLD:- c/w lipitor 20mg po daily    hypothyroidism:- c/w levothyroxine 200mcg po daily    Sleep:- melatonin 3mg PRN    Pain Mgmt :- Tylenol PRN mild pain,   - Oxycodone 10mg IR PRN moderate 4-7/10 pain  - Oxycodone 15mg IR prn severe 8-10/10 pain    GI/Bowel Mgmt/ constipation: -c/w Senna, miralax  bid , MOM prn    /Bladder Mgmt :toileting prn     FEN :- Diet - Regular     Precautions / PROPHYLAXIS:   - Falls, Spinal,   - ortho: spine precautions  - DVT: Lovenox    Hospitalist fabian noted    Disp: Team conference 1/14/21: Goals of mod TDD 1/28

## 2021-01-20 NOTE — PROGRESS NOTE ADULT - ASSESSMENT
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 Hospital Course complicated by brief SICU stay for mechanical ventilation management, currently extubated. Patient now admitted for a multidisciplinary rehab program on 1/12/21.    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 Jaja Campos  -Continue comprehensive rehab program  -Pain management, bowel regimen per rehab    #Acute blood loss Anemia, likely from surgery  -H/H stable, continue to monitor   -No overt signs of bleeding    #Abdominal pain - likely 2/2 opioid induced constipation   -Bowel regimen per rehab    #SLE/RA  -Continue with Plaquenil 200mg po daily    #H/o provoked right popliteal, right posterior tibial DVT (7/2019 in setting of hip surgery, already completed >6 months of treatment)   -Follows with vascular cardiologist Dr. Hoyos outpatient   -Treated with Eliquis 5mg po bid chronically, but per hospitalist at [facility], Eliquis held due to 12/29 LE dopplers neg for DVTs  -Continue Lovenox for DVT prophylaxis    #HTN  -Stable off anti-hypertensives  -Continue to hold home medication Losartan  -Monitor vitals    #HLD  -Chronic, continue Lipitor    #Hypothyroidism  -Chronic, continue Levothyroxine    #Prophylactic Measure  -DVT ppx: Lovenox 40mg BID  -GI ppx: PPI

## 2021-01-21 LAB
ALBUMIN SERPL ELPH-MCNC: 2.4 G/DL — LOW (ref 3.3–5)
ALP SERPL-CCNC: 127 U/L — HIGH (ref 40–120)
ALT FLD-CCNC: 16 U/L — SIGNIFICANT CHANGE UP (ref 10–45)
ANION GAP SERPL CALC-SCNC: 5 MMOL/L — SIGNIFICANT CHANGE UP (ref 5–17)
AST SERPL-CCNC: 27 U/L — SIGNIFICANT CHANGE UP (ref 10–40)
BASOPHILS # BLD AUTO: 0.07 K/UL — SIGNIFICANT CHANGE UP (ref 0–0.2)
BASOPHILS NFR BLD AUTO: 1.4 % — SIGNIFICANT CHANGE UP (ref 0–2)
BILIRUB SERPL-MCNC: 0.4 MG/DL — SIGNIFICANT CHANGE UP (ref 0.2–1.2)
BUN SERPL-MCNC: 13 MG/DL — SIGNIFICANT CHANGE UP (ref 7–23)
CALCIUM SERPL-MCNC: 8.8 MG/DL — SIGNIFICANT CHANGE UP (ref 8.4–10.5)
CHLORIDE SERPL-SCNC: 106 MMOL/L — SIGNIFICANT CHANGE UP (ref 96–108)
CO2 SERPL-SCNC: 31 MMOL/L — SIGNIFICANT CHANGE UP (ref 22–31)
CREAT SERPL-MCNC: 0.85 MG/DL — SIGNIFICANT CHANGE UP (ref 0.5–1.3)
EOSINOPHIL # BLD AUTO: 0.33 K/UL — SIGNIFICANT CHANGE UP (ref 0–0.5)
EOSINOPHIL NFR BLD AUTO: 6.7 % — HIGH (ref 0–6)
GLUCOSE SERPL-MCNC: 100 MG/DL — HIGH (ref 70–99)
HCT VFR BLD CALC: 33.1 % — LOW (ref 34.5–45)
HGB BLD-MCNC: 10.5 G/DL — LOW (ref 11.5–15.5)
IMM GRANULOCYTES NFR BLD AUTO: 0.4 % — SIGNIFICANT CHANGE UP (ref 0–1.5)
LYMPHOCYTES # BLD AUTO: 1.15 K/UL — SIGNIFICANT CHANGE UP (ref 1–3.3)
LYMPHOCYTES # BLD AUTO: 23.5 % — SIGNIFICANT CHANGE UP (ref 13–44)
MCHC RBC-ENTMCNC: 30.2 PG — SIGNIFICANT CHANGE UP (ref 27–34)
MCHC RBC-ENTMCNC: 31.7 GM/DL — LOW (ref 32–36)
MCV RBC AUTO: 95.1 FL — SIGNIFICANT CHANGE UP (ref 80–100)
MONOCYTES # BLD AUTO: 0.95 K/UL — HIGH (ref 0–0.9)
MONOCYTES NFR BLD AUTO: 19.4 % — HIGH (ref 2–14)
NEUTROPHILS # BLD AUTO: 2.38 K/UL — SIGNIFICANT CHANGE UP (ref 1.8–7.4)
NEUTROPHILS NFR BLD AUTO: 48.6 % — SIGNIFICANT CHANGE UP (ref 43–77)
NRBC # BLD: 0 /100 WBCS — SIGNIFICANT CHANGE UP (ref 0–0)
PLATELET # BLD AUTO: 382 K/UL — SIGNIFICANT CHANGE UP (ref 150–400)
POTASSIUM SERPL-MCNC: 4 MMOL/L — SIGNIFICANT CHANGE UP (ref 3.5–5.3)
POTASSIUM SERPL-SCNC: 4 MMOL/L — SIGNIFICANT CHANGE UP (ref 3.5–5.3)
PROT SERPL-MCNC: 6.1 G/DL — SIGNIFICANT CHANGE UP (ref 6–8.3)
RBC # BLD: 3.48 M/UL — LOW (ref 3.8–5.2)
RBC # FLD: 14.3 % — SIGNIFICANT CHANGE UP (ref 10.3–14.5)
SODIUM SERPL-SCNC: 142 MMOL/L — SIGNIFICANT CHANGE UP (ref 135–145)
WBC # BLD: 4.9 K/UL — SIGNIFICANT CHANGE UP (ref 3.8–10.5)
WBC # FLD AUTO: 4.9 K/UL — SIGNIFICANT CHANGE UP (ref 3.8–10.5)

## 2021-01-21 PROCEDURE — 99232 SBSQ HOSP IP/OBS MODERATE 35: CPT

## 2021-01-21 RX ADMIN — ENOXAPARIN SODIUM 40 MILLIGRAM(S): 100 INJECTION SUBCUTANEOUS at 16:34

## 2021-01-21 RX ADMIN — ATORVASTATIN CALCIUM 20 MILLIGRAM(S): 80 TABLET, FILM COATED ORAL at 21:12

## 2021-01-21 RX ADMIN — SENNA PLUS 2 TABLET(S): 8.6 TABLET ORAL at 21:12

## 2021-01-21 RX ADMIN — ENOXAPARIN SODIUM 40 MILLIGRAM(S): 100 INJECTION SUBCUTANEOUS at 05:29

## 2021-01-21 RX ADMIN — OXYCODONE HYDROCHLORIDE 10 MILLIGRAM(S): 5 TABLET ORAL at 16:34

## 2021-01-21 RX ADMIN — Medication 200 MILLIGRAM(S): at 12:14

## 2021-01-21 RX ADMIN — SIMETHICONE 80 MILLIGRAM(S): 80 TABLET, CHEWABLE ORAL at 14:41

## 2021-01-21 RX ADMIN — OXYCODONE HYDROCHLORIDE 15 MILLIGRAM(S): 5 TABLET ORAL at 05:30

## 2021-01-21 RX ADMIN — OXYCODONE HYDROCHLORIDE 10 MILLIGRAM(S): 5 TABLET ORAL at 05:30

## 2021-01-21 RX ADMIN — OXYCODONE HYDROCHLORIDE 15 MILLIGRAM(S): 5 TABLET ORAL at 10:16

## 2021-01-21 RX ADMIN — Medication 1 MILLIGRAM(S): at 12:14

## 2021-01-21 RX ADMIN — Medication 200 MICROGRAM(S): at 05:30

## 2021-01-21 NOTE — CHART NOTE - NSCHARTNOTEFT_GEN_A_CORE
NUTRITION FOLLOW UP    SOURCE: Patient [X)   Family [ ]    Medical Record (X)    DIET: DASH/TLC    Pt tolerating diet and consuming % of most meals. Last BM 1/20, but would like to continue on prunes/prune juice to prevent constipation. Weight management tips reviewed.     CURRENT WEIGHT:    (1/12) 280lbs    PERTINENT MEDS:   Pertinent Medications: MEDICATIONS  (STANDING):  atorvastatin 20 milliGRAM(s) Oral at bedtime  enoxaparin Injectable 40 milliGRAM(s) SubCutaneous two times a day  folic acid 1 milliGRAM(s) Oral daily  hydroxychloroquine 200 milliGRAM(s) Oral daily  levothyroxine 200 MICROGram(s) Oral daily  lidocaine   Patch 1 Patch Transdermal daily  oxyCODONE  ER Tablet 10 milliGRAM(s) Oral every 12 hours  senna 2 Tablet(s) Oral at bedtime    MEDICATIONS  (PRN):  acetaminophen   Tablet .. 650 milliGRAM(s) Oral every 6 hours PRN Temp greater or equal to 38C (100.4F), Mild Pain (1 - 3)  magnesium hydroxide Suspension 30 milliLiter(s) Oral daily PRN Constipation  melatonin 3 milliGRAM(s) Oral at bedtime PRN Insomnia  oxyCODONE    IR 10 milliGRAM(s) Oral every 4 hours PRN Moderate Pain (4 - 6)  oxyCODONE    IR 15 milliGRAM(s) Oral every 4 hours PRN Severe Pain (7 - 10)  polyethylene glycol 3350 17 Gram(s) Oral daily PRN Constipation  simethicone 80 milliGRAM(s) Chew every 6 hours PRN Upset Stomach      PERTINENT LABS:  01-21 Na142 mmol/L Glu 100 mg/dL<H> K+ 4.0 mmol/L Cr  0.85 mg/dL BUN 13 mg/dL 01-21 Alb 2.4 g/dL<L>      SKIN:  surgical incision on back  EDEMA: +1 left leg  LAST BM: 1/20 per pt     ESTIMATED NEEDS:   [X] no change since previous assessment  [ ] recalculated:     PREVIOUS NUTRITION DIAGNOSIS:    1. Obesity- DASH/TLC diet. Ongoing diet education    NUTRITION DIAGNOSIS is :  (X)  Ongoing         NEW NUTRITION DIAGNOSIS: N/A    NUTRITION RECOMMENDATIONS:   1. Continue current nutrition plan of care   2. Recommend MVI po daily  3. Ongoing diet education  4. Obtain new weight/weekly weights     MONITORING AND EVALUATION:   1. Tolerance to diet prescription   2. PO intake  3. Weights  4. Labs  5. Follow Up per protocol     RD to remain available   Tanya Martinez RDN   Pager #[phone]

## 2021-01-21 NOTE — PROGRESS NOTE ADULT - ASSESSMENT
Assessment/Plan:  KAI THOMAS is a 75y F with a history of HTN, HLD, SLE, RA, hypothyroidism, provoked R. 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 Hospital Course complicated by brief SICU stay for mechanical ventilation management, now extubated, also c/bpain control and constipation. Patient now admitted for a multidisciplinary rehab program. 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 Jaja Campos  -  Comprehensive Rehab Program of PT/OT- 3 hrs/day 5 days/week   orthotist -[facility]- [place]  [phone] for TLSO- per surgeon request.   - Patient refused TLSO as pain increased with use.    SLE/RA:- c/w plaquenil 200mg po daily    h/o Provoked DVT in 2019  - Follows with vascular cardiologist Dr. Hoyos outpatient.    - US July 2019:  DVT noted in the right popliteal vein and visualized segment of the right  posterior tibial vein.  - provoked DVT July 2019 in setting of hip surgery, already >6 months of treatment,   - was on eliqiuis 5mg po bid at home, but per hospitalist at [facility] hold eliquis, as LE dopplers on 12/29 neg for any DVTs  - on lovenox for dvt ppx while admitted    HTN:- monitor BP , currently stable off meds   -- takes losartan 50mg po qd at home    HLD:- c/w lipitor 20mg po daily    hypothyroidism:- c/w levothyroxine 200mcg po daily    Sleep:- melatonin 3mg PRN    Pain Mgmt :- Tylenol PRN mild pain,   - Oxycodone 10mg IR PRN moderate 4-7/10 pain  - Oxycodone 15mg IR prn severe 8-10/10 pain    GI/Bowel Mgmt/ constipation: -c/w Senna, miralax  bid , MOM prn    /Bladder Mgmt :toileting prn     FEN :- Diet - Regular     Precautions / PROPHYLAXIS:   - Falls, Spinal,   - ortho: spine precautions  - DVT: Lovenox    Hospitalist fabian noted    Disp: Team conference 1/14/21: Goals of mod TDD 1/28  Assessment/Plan:  KAI THOMAS is a 75y F with a history of HTN, HLD, SLE, RA, hypothyroidism, provoked R. 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 Hospital Course complicated by brief SICU stay for mechanical ventilation management, now extubated, also c/bpain control and constipation. Patient now admitted for a multidisciplinary rehab program. 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 Jaja Campos  -  Comprehensive Rehab Program of PT/OT- 3 hrs/day 5 days/week   orthotist -[facility]- [place]  [phone] for TLSO- per surgeon request.   - Patient refused TLSO as pain increased with use.    SLE/RA:- c/w plaquenil 200mg po daily    h/o Provoked DVT in 2019  - Follows with vascular cardiologist Dr. Hoyos outpatient.    - US July 2019:  DVT noted in the right popliteal vein and visualized segment of the right  posterior tibial vein.  - provoked DVT July 2019 in setting of hip surgery, already >6 months of treatment,   - was on eliqiuis 5mg po bid at home, but per hospitalist at [facility] hold eliquis, as LE dopplers on 12/29 neg for any DVTs  - on lovenox for dvt ppx while admitted    HTN:- monitor BP , currently stable off meds   -- takes losartan 50mg po qd at home    HLD:- c/w lipitor 20mg po daily    hypothyroidism:- c/w levothyroxine 200mcg po daily    Sleep:- melatonin 3mg PRN    Pain Mgmt :- Tylenol PRN mild pain,   - Oxycodone 10mg IR PRN moderate 4-7/10 pain  - Oxycodone 15mg IR prn severe 8-10/10 pain    GI/Bowel Mgmt/ constipation: -c/w Senna, miralax  bid , MOM prn    /Bladder Mgmt :toileting prn     FEN :- Diet - Regular     Precautions / PROPHYLAXIS:   - Falls, Spinal,   - ortho: spine precautions  - DVT: Lovenox    Hospitalist fu noted    Disp: Team conference 1/14/21 and 1/21/21: Goals of mod TDD 2/2   - OT: supervision grooming, maxA lower body dressing, modA transfer to commode  - PT: Jill transfers, gait 52' mid-modA with WC follow Assessment/Plan:  KAI THOMAS is a 75y F with a history of HTN, HLD, SLE, RA, hypothyroidism, provoked R. popliteal DVT in 2019 on eliquis, chronic low back pain presents to 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 Hospital Course complicated by brief SICU stay for mechanical ventilation management, now extubated, also c/bpain control and constipation. Patient now admitted for a multidisciplinary rehab program. 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 Jaja Campos  -  Comprehensive Rehab Program of PT/OT- 3 hrs/day 5 days/week  orthotist -[facility]- [place]  [phone] for TLSO- per surgeon request.   - Patient refused TLSO as pain increased with use.    SLE/RA:- c/w plaquenil 200mg po daily    h/o Provoked DVT in 2019  - Follows with vascular cardiologist Dr. Hoyos outpatient.    - US July 2019:  DVT noted in the right popliteal vein and visualized segment of the right  posterior tibial vein.  - provoked DVT July 2019 in setting of hip surgery, already >6 months of treatment,   - was on eliqiuis 5mg po bid at home, but per hospitalist at [facility] hold eliquis, as LE dopplers on 12/29 neg for any DVTs  - on lovenox for dvt ppx while admitted    HTN:- monitor BP , currently stable off meds   -- takes losartan 50mg po qd at home    HLD:- c/w lipitor 20mg po daily    hypothyroidism:- c/w levothyroxine 200mcg po daily    Sleep:- melatonin 3mg PRN    Pain Mgmt :- Tylenol PRN mild pain,   - Oxycodone 10mg IR PRN moderate 4-7/10 pain  - Oxycodone 15mg IR prn severe 8-10/10 pain    GI/Bowel Mgmt/ constipation: -c/w Senna, miralax  bid , MOM prn    /Bladder Mgmt :toileting prn     FEN :- Diet - Regular     Precautions / PROPHYLAXIS:   - Falls, Spinal,   - ortho: spine precautions  - DVT: Lovenox    Hospitalist fu noted    Disp: Team conference 1/14/21 and 1/21/21: Goals of mod TDD 2/2   - OT: supervision grooming, maxA lower body dressing, modA transfer to commode  - PT: Jill transfers, gait 52' mid-modA with WC follow

## 2021-01-21 NOTE — PROGRESS NOTE ADULT - SUBJECTIVE AND OBJECTIVE BOX
Patient is a 75y old  Female who presents with a chief complaint of Back pain (21 Jan 2021 09:44)      Patient seen and examined at bedside.    ALLERGIES:  No Known Allergies    MEDICATIONS  (STANDING):  atorvastatin 20 milliGRAM(s) Oral at bedtime  enoxaparin Injectable 40 milliGRAM(s) SubCutaneous two times a day  folic acid 1 milliGRAM(s) Oral daily  hydroxychloroquine 200 milliGRAM(s) Oral daily  levothyroxine 200 MICROGram(s) Oral daily  lidocaine   Patch 1 Patch Transdermal daily  oxyCODONE  ER Tablet 10 milliGRAM(s) Oral every 12 hours  senna 2 Tablet(s) Oral at bedtime    MEDICATIONS  (PRN):  acetaminophen   Tablet .. 650 milliGRAM(s) Oral every 6 hours PRN Temp greater or equal to 38C (100.4F), Mild Pain (1 - 3)  magnesium hydroxide Suspension 30 milliLiter(s) Oral daily PRN Constipation  melatonin 3 milliGRAM(s) Oral at bedtime PRN Insomnia  oxyCODONE    IR 10 milliGRAM(s) Oral every 4 hours PRN Moderate Pain (4 - 6)  oxyCODONE    IR 15 milliGRAM(s) Oral every 4 hours PRN Severe Pain (7 - 10)  polyethylene glycol 3350 17 Gram(s) Oral daily PRN Constipation  simethicone 80 milliGRAM(s) Chew every 6 hours PRN Upset Stomach    Vital Signs Last 24 Hrs  T(F): 98.2 (21 Jan 2021 08:26), Max: 98.3 (20 Jan 2021 20:42)  HR: 69 (21 Jan 2021 08:26) (69 - 77)  BP: 102/63 (21 Jan 2021 08:26) (102/63 - 115/62)  RR: 14 (21 Jan 2021 08:26) (14 - 17)  SpO2: 96% (21 Jan 2021 08:26) (96% - 98%)  I&O's Summary      PHYSICAL EXAM:  General: NAD, A/O x 3  ENT: MMM  Neck: Supple, No JVD  Lungs: Clear to auscultation bilaterally  Cardio: RRR, S1/S2, No murmurs  Abdomen: Soft, Nontender, Nondistended; Bowel sounds present  Extremities: No calf tenderness, No pitting edema    LABS:                        10.5   4.90  )-----------( 382      ( 21 Jan 2021 06:10 )             33.1       01-21    142  |  106  |  13  ----------------------------<  100  4.0   |  31  |  0.85    Ca    8.8      21 Jan 2021 06:10    TPro  6.1  /  Alb  2.4  /  TBili  0.4  /  DBili  x   /  AST  27  /  ALT  16  /  AlkPhos  127  01-21     eGFR if Non African American: 67 mL/min/1.73M2 (01-21-21 @ 06:10)  eGFR if African American: 77 mL/min/1.73M2 (01-21-21 @ 06:10)                                     RADIOLOGY & ADDITIONAL TESTS:    Care Discussed with Consultants/Other Providers:

## 2021-01-21 NOTE — PROGRESS NOTE ADULT - ATTENDING COMMENTS
Chart reviewed. Patient seen at bedside  No new issues overnight  Slept better. States pain is better controlled with oxycontin  Will monitor progress in therapy .    2. Back incision : clean    3. Labs stable     4. Progress reviewed at team conference today. Slow progress, would benefit from extending rehab stay for a few days TDD 2/2

## 2021-01-21 NOTE — PROGRESS NOTE ADULT - SUBJECTIVE AND OBJECTIVE BOX
Patient is a 75y old  Female who presents with a chief complaint of Back pain (20 Jan 2021 13:49)      HPI:  75F with past medical history of HTN, HLD, SLE, RA, hypothyroidism, provoked R. popliteal DVT in 2019 on eliquis, chronic low back pain presents to ED for acute on chronic low back pain. Pt has several months of chronic lumbosacral back pain, evaluated by outpatient ortho and referred for physical therapy and pain management. She has been receiving ?vitamin spinal injections. She last received low back inj on 12/16 after which she her low back pain worsened. She took oxycodone 5 mg qd (prescribed in september during a ED visit) and acetaminophen for past three days without relief. She also endorses worsening pain during ambulation with walker and has affected her quality of life. She has constipation, last BM 4 days ago. Otherwise denies fever, chills, N/V, abdominal pain, dysuria, urinary retention, fecal incontinence, saddle anesthesia, fall, LOC, trauma. During ROS she endorsed LLE weakness for several months, no numbness or tingling and has LLE edema >RLE edema, reported undergoing LLE US 4 months ago, was negative for DVT. Currently has 8/10 pain, improved to 5/10 with oxycodone 5 mg x 2 in ED. (28 Dec 2020 11:16)   12/28: Mri T/Ls: IMPRESSION: Degenerative changes  Abnormal signal with associated enhancement is seen involving the endplates adjacent to the L2-3 disc space. There is slight increased T2 prolongation involving the L2-3 disc space with associated widening. While these findings could be compatible with degenerative changes possibly of early discitis and osteomyelitis must be considered.  Patient s/p L1-L5 laminectomy, T10-L5 posterior surgical fusion by Dr. Campos on 1/1/21  HMV x 3 drains. Post op with constipation needing aggressive bowel regimen.     PT/OT and PM&R evaluated patient and recommended Rehab.     Patient medically cleared and admitted to  Rehab on 1/12/21 (12 Jan 2021 15:21)      SUBJECTIVE/INTERVAL EVENTS  Patient seen and assessed at bedside. No acute events overnight. Patient had diarrhea yesterday morning but no further episodes. She slept better overnight and pain is better controlled. Pain is minimal at rest. Denies headache, abdominal pain, chest pain.       PAST MEDICAL & SURGICAL HISTORY:  HTN (hypertension)    DVT (deep venous thrombosis)    Hypothyroidism    RA (rheumatoid arthritis)    Obesity, Class II, BMI 35-39.9, no comorbidity    Hypothyroid    Benign heart murmur    Hyperlipidemia    Hypertension    H/O degenerative disc disease    Osteoarthritis    SLE (systemic lupus erythematosus)    Rheumatoid arthritis    S/P knee replacement    Status post total hip replacement, right    S/P thyroid surgery  1 lobe removed    Lung cancer  small tumor removed 2015    S/P knee surgery  bilateral    S/P carpal tunnel release  left    S/P eye surgery  child    S/P cataract surgery  left    S/P tonsillectomy        Allergies    No Known Allergies    Intolerances          VITALS  75y  Vital Signs Last 24 Hrs  T(C): 36.8 (21 Jan 2021 08:26), Max: 36.8 (20 Jan 2021 20:42)  T(F): 98.2 (21 Jan 2021 08:26), Max: 98.3 (20 Jan 2021 20:42)  HR: 69 (21 Jan 2021 08:26) (69 - 77)  BP: 102/63 (21 Jan 2021 08:26) (102/63 - 115/62)  BP(mean): --  RR: 14 (21 Jan 2021 08:26) (14 - 17)  SpO2: 96% (21 Jan 2021 08:26) (96% - 98%)  Daily       Gen - NAD, Comfortable  Pulm - CTAB,   Cardiovascular - S1S2,  Abdomen - Soft, NT/ND, +BS  Extremities - No C/C/E, No calf tenderness    Motor -UE 5/5, LE proximal 3/5, distal 5/5                        Sensory - Intact to LT    Psychiatric - Mood stable, Affect WNL  Skin: long back incision with staples +, c/d/i  Wounds: None Present      RECENT LABS:                          10.5   4.90  )-----------( 382      ( 21 Jan 2021 06:10 )             33.1     01-21    142  |  106  |  13  ----------------------------<  100<H>  4.0   |  31  |  0.85    Ca    8.8      21 Jan 2021 06:10    TPro  6.1  /  Alb  2.4<L>  /  TBili  0.4  /  DBili  x   /  AST  27  /  ALT  16  /  AlkPhos  127<H>  01-21    LIVER FUNCTIONS - ( 21 Jan 2021 06:10 )  Alb: 2.4 g/dL / Pro: 6.1 g/dL / ALK PHOS: 127 U/L / ALT: 16 U/L / AST: 27 U/L / GGT: x               MEDICATIONS  (STANDING):  atorvastatin 20 milliGRAM(s) Oral at bedtime  enoxaparin Injectable 40 milliGRAM(s) SubCutaneous two times a day  folic acid 1 milliGRAM(s) Oral daily  hydroxychloroquine 200 milliGRAM(s) Oral daily  levothyroxine 200 MICROGram(s) Oral daily  lidocaine   Patch 1 Patch Transdermal daily  oxyCODONE  ER Tablet 10 milliGRAM(s) Oral every 12 hours  senna 2 Tablet(s) Oral at bedtime    MEDICATIONS  (PRN):  acetaminophen   Tablet .. 650 milliGRAM(s) Oral every 6 hours PRN Temp greater or equal to 38C (100.4F), Mild Pain (1 - 3)  magnesium hydroxide Suspension 30 milliLiter(s) Oral daily PRN Constipation  melatonin 3 milliGRAM(s) Oral at bedtime PRN Insomnia  oxyCODONE    IR 10 milliGRAM(s) Oral every 4 hours PRN Moderate Pain (4 - 6)  oxyCODONE    IR 15 milliGRAM(s) Oral every 4 hours PRN Severe Pain (7 - 10)  polyethylene glycol 3350 17 Gram(s) Oral daily PRN Constipation  simethicone 80 milliGRAM(s) Chew every 6 hours PRN Upset Stomach

## 2021-01-21 NOTE — PROGRESS NOTE ADULT - ASSESSMENT
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 Hospital Course complicated by brief SICU stay for mechanical ventilation management, currently extubated. Patient now admitted for a multidisciplinary rehab program on 1/12/21.    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 Jaja Campos  -Continue comprehensive rehab program  -Pain management, bowel regimen per rehab    #Acute blood loss Anemia, likely from surgery  -H/H stable, continue to monitor   -No overt signs of bleeding    #Abdominal pain - likely 2/2 opioid induced constipation   -Bowel regimen per rehab    #SLE/RA  -Continue with Plaquenil 200mg po daily    #H/o provoked right popliteal, right posterior tibial DVT (7/2019 in setting of hip surgery, already completed >6 months of treatment)   -Follows with vascular cardiologist Dr. Hoyos outpatient   -Treated with Eliquis 5mg po bid chronically, but per hospitalist at [facility], Eliquis held due to 12/29 LE dopplers neg for DVTs  -Continue Lovenox for DVT prophylaxis    #HTN  -Stable off anti-hypertensives  -Continue to hold home medication Losartan  -Monitor vitals    #HLD  -Chronic, continue Lipitor    #Hypothyroidism  -Chronic, continue Levothyroxine    #Prophylactic Measure  -DVT ppx: Lovenox 40mg BID  -GI ppx: PPI

## 2021-01-22 PROCEDURE — 99232 SBSQ HOSP IP/OBS MODERATE 35: CPT | Mod: GC

## 2021-01-22 PROCEDURE — 99232 SBSQ HOSP IP/OBS MODERATE 35: CPT

## 2021-01-22 RX ADMIN — OXYCODONE HYDROCHLORIDE 10 MILLIGRAM(S): 5 TABLET ORAL at 17:57

## 2021-01-22 RX ADMIN — ENOXAPARIN SODIUM 40 MILLIGRAM(S): 100 INJECTION SUBCUTANEOUS at 05:20

## 2021-01-22 RX ADMIN — ATORVASTATIN CALCIUM 20 MILLIGRAM(S): 80 TABLET, FILM COATED ORAL at 21:05

## 2021-01-22 RX ADMIN — OXYCODONE HYDROCHLORIDE 15 MILLIGRAM(S): 5 TABLET ORAL at 13:08

## 2021-01-22 RX ADMIN — Medication 200 MICROGRAM(S): at 05:20

## 2021-01-22 RX ADMIN — OXYCODONE HYDROCHLORIDE 10 MILLIGRAM(S): 5 TABLET ORAL at 00:30

## 2021-01-22 RX ADMIN — OXYCODONE HYDROCHLORIDE 10 MILLIGRAM(S): 5 TABLET ORAL at 05:20

## 2021-01-22 RX ADMIN — Medication 200 MILLIGRAM(S): at 13:07

## 2021-01-22 RX ADMIN — OXYCODONE HYDROCHLORIDE 15 MILLIGRAM(S): 5 TABLET ORAL at 08:34

## 2021-01-22 RX ADMIN — ENOXAPARIN SODIUM 40 MILLIGRAM(S): 100 INJECTION SUBCUTANEOUS at 17:58

## 2021-01-22 RX ADMIN — LIDOCAINE 1 PATCH: 4 CREAM TOPICAL at 19:28

## 2021-01-22 RX ADMIN — SENNA PLUS 2 TABLET(S): 8.6 TABLET ORAL at 21:05

## 2021-01-22 RX ADMIN — Medication 1 MILLIGRAM(S): at 13:07

## 2021-01-22 RX ADMIN — Medication 3 MILLIGRAM(S): at 00:30

## 2021-01-22 RX ADMIN — LIDOCAINE 1 PATCH: 4 CREAM TOPICAL at 17:57

## 2021-01-22 NOTE — PROGRESS NOTE ADULT - ATTENDING COMMENTS
Chart reviewed. Patient seen at bedside  Reports no BM for 2 days, but has not taken any miralax. Abdomen soft and NT . will change miralax to daily     2. Slow progress in therapy.   Also frustrated about delayed nursing for toileting. Toileting q2h while awake    3. Will reach out to Cardiologist Dr. Ramirez regarding continued oral AC.

## 2021-01-22 NOTE — PROGRESS NOTE ADULT - SUBJECTIVE AND OBJECTIVE BOX
Patient is a 75y old  Female who presents with a chief complaint of Back pain (22 Jan 2021 10:53)      Patient seen and examined at bedside.    ALLERGIES:  No Known Allergies    MEDICATIONS  (STANDING):  atorvastatin 20 milliGRAM(s) Oral at bedtime  enoxaparin Injectable 40 milliGRAM(s) SubCutaneous two times a day  folic acid 1 milliGRAM(s) Oral daily  hydroxychloroquine 200 milliGRAM(s) Oral daily  levothyroxine 200 MICROGram(s) Oral daily  lidocaine   Patch 1 Patch Transdermal daily  oxyCODONE  ER Tablet 10 milliGRAM(s) Oral every 12 hours  senna 2 Tablet(s) Oral at bedtime    MEDICATIONS  (PRN):  acetaminophen   Tablet .. 650 milliGRAM(s) Oral every 6 hours PRN Temp greater or equal to 38C (100.4F), Mild Pain (1 - 3)  magnesium hydroxide Suspension 30 milliLiter(s) Oral daily PRN Constipation  melatonin 3 milliGRAM(s) Oral at bedtime PRN Insomnia  oxyCODONE    IR 10 milliGRAM(s) Oral every 4 hours PRN Moderate Pain (4 - 6)  oxyCODONE    IR 15 milliGRAM(s) Oral every 4 hours PRN Severe Pain (7 - 10)  polyethylene glycol 3350 17 Gram(s) Oral daily PRN Constipation  simethicone 80 milliGRAM(s) Chew every 6 hours PRN Upset Stomach    Vital Signs Last 24 Hrs  T(F): 98.4 (22 Jan 2021 07:46), Max: 98.4 (22 Jan 2021 07:46)  HR: 68 (22 Jan 2021 07:46) (68 - 85)  BP: 108/63 (22 Jan 2021 07:46) (102/56 - 108/63)  RR: 15 (22 Jan 2021 07:46) (15 - 16)  SpO2: 96% (22 Jan 2021 07:46) (95% - 96%)  I&O's Summary      PHYSICAL EXAM:  General: NAD, A/O x 3  ENT: MMM  Neck: Supple, No JVD  Lungs: Clear to auscultation bilaterally  Cardio: RRR, S1/S2, No murmurs  Abdomen: Soft, Nontender, Nondistended; Bowel sounds present  Extremities: No calf tenderness, No pitting edema    LABS:                        10.5   4.90  )-----------( 382      ( 21 Jan 2021 06:10 )             33.1       01-21    142  |  106  |  13  ----------------------------<  100  4.0   |  31  |  0.85    Ca    8.8      21 Jan 2021 06:10    TPro  6.1  /  Alb  2.4  /  TBili  0.4  /  DBili  x   /  AST  27  /  ALT  16  /  AlkPhos  127  01-21     eGFR if Non African American: 67 mL/min/1.73M2 (01-21-21 @ 06:10)  eGFR if African American: 77 mL/min/1.73M2 (01-21-21 @ 06:10)                                     RADIOLOGY & ADDITIONAL TESTS:    Care Discussed with Consultants/Other Providers:

## 2021-01-22 NOTE — PROGRESS NOTE ADULT - ASSESSMENT
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 Hospital Course complicated by brief SICU stay for mechanical ventilation management, currently extubated. Patient now admitted for a multidisciplinary rehab program on 1/12/21.    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 Jaja Campos  -Continue comprehensive rehab program  -Pain management, bowel regimen per rehab    #Acute blood loss Anemia, likely from surgery  -H/H stable, continue to monitor   -No overt signs of bleeding    #Abdominal pain - likely 2/2 opioid induced constipation   -Bowel regimen per rehab    #SLE/RA  -Continue with Plaquenil 200mg po daily    #H/o provoked right popliteal, right posterior tibial DVT (7/2019 in setting of hip surgery, already completed >6 months of treatment)   -Follows with cardiologist Dr. Hoyos outpatient   -Treated with Eliquis 5mg po bid chronically -- AC stopped at [facility] as 12/29 LE dopplers neg for DVTs  - currently anticoagulated with lovenox 40mg BID as inpt in acute rehab  - to continue eliquis as will follow up with Dr. Hoyos as outpt    #HTN  -Stable off anti-hypertensives  -Continue to hold home medication Losartan  -Monitor vitals    #HLD  -Chronic, continue Lipitor    #Hypothyroidism  -Chronic, continue Levothyroxine    #Prophylactic Measure  -DVT ppx: Lovenox 40mg BID  -GI ppx: PPI

## 2021-01-22 NOTE — PROGRESS NOTE ADULT - ASSESSMENT
Assessment/Plan:  KAI THOMAS is a 75y F with a history of HTN, HLD, SLE, RA, hypothyroidism, provoked R. popliteal DVT in 2019 on eliquis, chronic low back pain presents to [facility] 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 Hospital Course complicated by brief SICU stay for mechanical ventilation management, now extubated, also c/bpain control and constipation. Patient now admitted for a multidisciplinary rehab program. 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 Jaja Campos  -  Comprehensive Rehab Program of PT/OT- 3 hrs/day 5 days/week  orthotist -[facility]- [place]  [phone] for TLSO- per surgeon request.   - Patient refused TLSO as pain increased with use.    SLE/RA:- c/w plaquenil 200mg po daily    h/o Provoked DVT in 2019  - Follows with vascular cardiologist Dr. Hoyos outpatient.    - US July 2019:  DVT noted in the right popliteal vein and visualized segment of the right  posterior tibial vein.  - provoked DVT July 2019 in setting of hip surgery, already >6 months of treatment,   - was on eliquis 5mg po bid at home, but per hospitalist at [facility] hold eliquis, as LE dopplers on 12/29 neg for any DVTs  - on lovenox for dvt ppx while admitted  - Patient was told to take life-long anticoagulation by her knee surgeon and cardiologist, Dr. Ramirez. Will contact Dr. Ramirez regarding AC for DVT.    HTN:- monitor BP , currently stable off meds   - takes losartan 50mg po qd at home    HLD:- c/w lipitor 20mg po daily    hypothyroidism:- c/w levothyroxine 200mcg po daily    Sleep:- melatonin 3mg PRN    Pain Mgmt :- Tylenol PRN mild pain,   - Oxycodone 10mg IR PRN moderate 4-7/10 pain  - Oxycodone 15mg IR prn severe 8-10/10 pain    GI/Bowel Mgmt/ constipation: -c/w Senna, miralax  bid , MOM prn    /Bladder Mgmt: toileting Q2hr when awake    FEN :- Diet - Regular     Precautions / PROPHYLAXIS:   - Falls, Spinal,   - ortho: spine precautions  - DVT: Lovenox    Hospitalist fu noted    Disp: Team conference 1/14/21 and 1/21/21: Goals of mod TDD 2/2   - OT: supervision grooming, maxA lower body dressing, modA transfer to commode  - PT: Jill transfers, gait 52' mid-modA with WC follow Assessment/Plan:  KAI THOMAS is a 75y F with a history of HTN, HLD, SLE, RA, hypothyroidism, provoked R. 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 Hospital Course complicated by brief SICU stay for mechanical ventilation management, now extubated, also c/bpain control and constipation. Patient now admitted for a multidisciplinary rehab program. 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 Jaja Campos  -  Comprehensive Rehab Program of PT/OT- 3 hrs/day 5 days/week  orthotist -[facility]- [place]  [phone] for TLSO- per surgeon request.   - Patient refused TLSO as pain increased with use.    SLE/RA:- c/w plaquenil 200mg po daily    h/o Provoked DVT in 2019  - Follows with vascular cardiologist Dr. Hoyos outpatient.    - US July 2019:  DVT noted in the right popliteal vein and visualized segment of the right  posterior tibial vein.  - provoked DVT July 2019 in setting of hip surgery, already >6 months of treatment,   - was on eliquis 5mg po bid at home, but per hospitalist at [facility] hold eliquis, as LE dopplers on 12/29 neg for any DVTs  - on lovenox for dvt ppx while admitted  - Patient was told to take life-long anticoagulation by her knee surgeon and cardiologist, Dr. Ramirez. Will contact Dr. Ramirez regarding AC for DVT.    HTN:- monitor BP , currently stable off meds   - takes losartan 50mg po qd at home    HLD:- c/w lipitor 20mg po daily    hypothyroidism:- c/w levothyroxine 200mcg po daily    Sleep:- melatonin 3mg PRN    Pain Mgmt :- Tylenol PRN mild pain,   - Oxycodone 10mg IR PRN moderate 4-7/10 pain  - Oxycodone 15mg IR prn severe 8-10/10 pain    GI/Bowel Mgmt/ constipation: -c/w Senna, miralax  bid , MOM prn    /Bladder Mgmt: toileting Q2hr when awake    FEN :- Diet - Regular     Precautions / PROPHYLAXIS:   - Falls, Spinal,   - ortho: spine precautions  - DVT: Lovenox    Hospitalist fu noted    Disp: Team conference 1/14/21 and 1/21/21: Goals of mod TDD 2/2   - OT: supervision grooming, maxA lower body dressing, modA transfer to commode  - PT: Jill transfers, gait 52' mid-modA with WC follow

## 2021-01-22 NOTE — PROGRESS NOTE ADULT - SUBJECTIVE AND OBJECTIVE BOX
Patient is a 75y old  Female who presents with a chief complaint of Back pain (21 Jan 2021 13:41)      HPI:  75F with past medical history of HTN, HLD, SLE, RA, hypothyroidism, provoked R. popliteal DVT in 2019 on eliquis, chronic low back pain presents to ED for acute on chronic low back pain. Pt has several months of chronic lumbosacral back pain, evaluated by outpatient ortho and referred for physical therapy and pain management. She has been receiving ?vitamin spinal injections. She last received low back inj on 12/16 after which she her low back pain worsened. She took oxycodone 5 mg qd (prescribed in september during a ED visit) and acetaminophen for past three days without relief. She also endorses worsening pain during ambulation with walker and has affected her quality of life. She has constipation, last BM 4 days ago. Otherwise denies fever, chills, N/V, abdominal pain, dysuria, urinary retention, fecal incontinence, saddle anesthesia, fall, LOC, trauma. During ROS she endorsed LLE weakness for several months, no numbness or tingling and has LLE edema >RLE edema, reported undergoing LLE US 4 months ago, was negative for DVT. Currently has 8/10 pain, improved to 5/10 with oxycodone 5 mg x 2 in ED. (28 Dec 2020 11:16)   12/28: Mri T/Ls: IMPRESSION: Degenerative changes  Abnormal signal with associated enhancement is seen involving the endplates adjacent to the L2-3 disc space. There is slight increased T2 prolongation involving the L2-3 disc space with associated widening. While these findings could be compatible with degenerative changes possibly of early discitis and osteomyelitis must be considered.  Patient s/p L1-L5 laminectomy, T10-L5 posterior surgical fusion by Dr. Campos on 1/1/21  HMV x 3 drains. Post op with constipation needing aggressive bowel regimen.     PT/OT and PM&R evaluated patient and recommended Rehab.     Patient medically cleared and admitted to  Rehab on 1/12/21 (12 Jan 2021 15:21)      SUBJECTIVE/INTERVAL EVENTS  Patient seen and assessed at bedside. No acute events overnight. She has not had a BM yesterday or today yet. Sleep was improved last night and pain is better controlled. Denies headache, abdominal pain, chest pain, leg swelling.      PAST MEDICAL & SURGICAL HISTORY:  HTN (hypertension)    DVT (deep venous thrombosis)    Hypothyroidism    RA (rheumatoid arthritis)    Obesity, Class II, BMI 35-39.9, no comorbidity    Hypothyroid    Benign heart murmur    Hyperlipidemia    Hypertension    H/O degenerative disc disease    Osteoarthritis    SLE (systemic lupus erythematosus)    Rheumatoid arthritis    S/P knee replacement    Status post total hip replacement, right    S/P thyroid surgery  1 lobe removed    Lung cancer  small tumor removed 2015    S/P knee surgery  bilateral    S/P carpal tunnel release  left    S/P eye surgery  child    S/P cataract surgery  left    S/P tonsillectomy        Allergies    No Known Allergies    Intolerances          VITALS  75y  Vital Signs Last 24 Hrs  T(C): 36.9 (22 Jan 2021 07:46), Max: 36.9 (22 Jan 2021 07:46)  T(F): 98.4 (22 Jan 2021 07:46), Max: 98.4 (22 Jan 2021 07:46)  HR: 68 (22 Jan 2021 07:46) (68 - 85)  BP: 108/63 (22 Jan 2021 07:46) (102/56 - 108/63)  BP(mean): --  RR: 15 (22 Jan 2021 07:46) (15 - 16)  SpO2: 96% (22 Jan 2021 07:46) (95% - 96%)  Daily       Gen - NAD, Comfortable  Pulm - CTAB,   Cardiovascular - S1S2,  Abdomen - Soft, NT/ND, +BS  Extremities - No C/C/E, No calf tenderness    Motor -UE 5/5, LE proximal 3/5, distal 5/5                        Sensory - Intact to LT    Psychiatric - Mood stable, Affect WNL  Skin: long back incision with sutures, c/d/i  Wounds: None Present        RECENT LABS:                          10.5   4.90  )-----------( 382      ( 21 Jan 2021 06:10 )             33.1     01-21    142  |  106  |  13  ----------------------------<  100<H>  4.0   |  31  |  0.85    Ca    8.8      21 Jan 2021 06:10    TPro  6.1  /  Alb  2.4<L>  /  TBili  0.4  /  DBili  x   /  AST  27  /  ALT  16  /  AlkPhos  127<H>  01-21    LIVER FUNCTIONS - ( 21 Jan 2021 06:10 )  Alb: 2.4 g/dL / Pro: 6.1 g/dL / ALK PHOS: 127 U/L / ALT: 16 U/L / AST: 27 U/L / GGT: x               MEDICATIONS  (STANDING):  atorvastatin 20 milliGRAM(s) Oral at bedtime  enoxaparin Injectable 40 milliGRAM(s) SubCutaneous two times a day  folic acid 1 milliGRAM(s) Oral daily  hydroxychloroquine 200 milliGRAM(s) Oral daily  levothyroxine 200 MICROGram(s) Oral daily  lidocaine   Patch 1 Patch Transdermal daily  oxyCODONE  ER Tablet 10 milliGRAM(s) Oral every 12 hours  senna 2 Tablet(s) Oral at bedtime    MEDICATIONS  (PRN):  acetaminophen   Tablet .. 650 milliGRAM(s) Oral every 6 hours PRN Temp greater or equal to 38C (100.4F), Mild Pain (1 - 3)  magnesium hydroxide Suspension 30 milliLiter(s) Oral daily PRN Constipation  melatonin 3 milliGRAM(s) Oral at bedtime PRN Insomnia  oxyCODONE    IR 10 milliGRAM(s) Oral every 4 hours PRN Moderate Pain (4 - 6)  oxyCODONE    IR 15 milliGRAM(s) Oral every 4 hours PRN Severe Pain (7 - 10)  polyethylene glycol 3350 17 Gram(s) Oral daily PRN Constipation  simethicone 80 milliGRAM(s) Chew every 6 hours PRN Upset Stomach

## 2021-01-23 PROCEDURE — 99232 SBSQ HOSP IP/OBS MODERATE 35: CPT

## 2021-01-23 RX ADMIN — LIDOCAINE 1 PATCH: 4 CREAM TOPICAL at 05:23

## 2021-01-23 RX ADMIN — OXYCODONE HYDROCHLORIDE 10 MILLIGRAM(S): 5 TABLET ORAL at 05:26

## 2021-01-23 RX ADMIN — ENOXAPARIN SODIUM 40 MILLIGRAM(S): 100 INJECTION SUBCUTANEOUS at 16:35

## 2021-01-23 RX ADMIN — Medication 3 MILLIGRAM(S): at 00:22

## 2021-01-23 RX ADMIN — ENOXAPARIN SODIUM 40 MILLIGRAM(S): 100 INJECTION SUBCUTANEOUS at 05:27

## 2021-01-23 RX ADMIN — Medication 1 MILLIGRAM(S): at 11:35

## 2021-01-23 RX ADMIN — Medication 200 MILLIGRAM(S): at 11:34

## 2021-01-23 RX ADMIN — OXYCODONE HYDROCHLORIDE 10 MILLIGRAM(S): 5 TABLET ORAL at 16:35

## 2021-01-23 RX ADMIN — Medication 650 MILLIGRAM(S): at 07:39

## 2021-01-23 RX ADMIN — OXYCODONE HYDROCHLORIDE 15 MILLIGRAM(S): 5 TABLET ORAL at 21:39

## 2021-01-23 RX ADMIN — Medication 200 MICROGRAM(S): at 05:26

## 2021-01-23 RX ADMIN — OXYCODONE HYDROCHLORIDE 10 MILLIGRAM(S): 5 TABLET ORAL at 00:22

## 2021-01-23 RX ADMIN — ATORVASTATIN CALCIUM 20 MILLIGRAM(S): 80 TABLET, FILM COATED ORAL at 21:39

## 2021-01-23 RX ADMIN — Medication 650 MILLIGRAM(S): at 16:36

## 2021-01-23 NOTE — PROGRESS NOTE ADULT - ASSESSMENT
Assessment/Plan:  KAI THOMAS is a 75y F with a history of HTN, HLD, SLE, RA, hypothyroidism, provoked R. 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 Hospital Course complicated by brief SICU stay for mechanical ventilation management, now extubated, also c/bpain control and constipation. Patient now admitted for a multidisciplinary rehab program. 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 Jaja Campos  -  Comprehensive Rehab Program of PT/OT- 3 hrs/day 5 days/week  orthotist -[facility]- [place]  [phone] for TLSO- per surgeon request.   - Patient refused TLSO as pain increased with use.    SLE/RA:- c/w plaquenil 200mg po daily    h/o Provoked DVT in 2019  - Follows with vascular cardiologist Dr. Hoyos outpatient.    - US July 2019:  DVT noted in the right popliteal vein and visualized segment of the right  posterior tibial vein.  - provoked DVT July 2019 in setting of hip surgery, already >6 months of treatment,   - was on eliquis 5mg po bid at home, but per hospitalist at [facility] hold eliquis, as LE dopplers on 12/29 neg for any DVTs  - on lovenox for dvt ppx while admitted- Resume eliquis at discharge  -   HTN:- monitor BP , currently stable off meds   - takes losartan 50mg po qd at home    HLD:- c/w lipitor 20mg po daily    hypothyroidism:- c/w levothyroxine 200mcg po daily    Sleep:- melatonin 3mg PRN    Pain Mgmt :- Tylenol PRN mild pain,   - Oxycodone 10mg IR PRN moderate 4-7/10 pain  - Oxycodone 15mg IR prn severe 8-10/10 pain    GI/Bowel Mgmt/ constipation: -c/w Senna, miralax  bid , MOM prn    /Bladder Mgmt: toileting Q2hr when awake    FEN :- Diet - Regular     Precautions / PROPHYLAXIS:   - Falls, Spinal,   - ortho: spine precautions  - DVT: Lovenox    Hospitalist fu noted    Disp: Team conference 1/14/21 and 1/21/21: Goals of mod TDD 2/2   - OT: supervision grooming, maxA lower body dressing, modA transfer to commode  - PT: Jill transfers, gait 52' mid-modA with WC follow

## 2021-01-23 NOTE — PROGRESS NOTE ADULT - SUBJECTIVE AND OBJECTIVE BOX
Patient is a 75y old  Female who presents with a chief complaint of Back pain (21 Jan 2021 13:41)      HPI:  75F with past medical history of HTN, HLD, SLE, RA, hypothyroidism, provoked R. popliteal DVT in 2019 on eliquis, chronic low back pain presents to ED for acute on chronic low back pain. Pt has several months of chronic lumbosacral back pain, evaluated by outpatient ortho and referred for physical therapy and pain management. She has been receiving ?vitamin spinal injections. She last received low back inj on 12/16 after which she her low back pain worsened. She took oxycodone 5 mg qd (prescribed in september during a ED visit) and acetaminophen for past three days without relief. She also endorses worsening pain during ambulation with walker and has affected her quality of life. She has constipation, last BM 4 days ago. Otherwise denies fever, chills, N/V, abdominal pain, dysuria, urinary retention, fecal incontinence, saddle anesthesia, fall, LOC, trauma. During ROS she endorsed LLE weakness for several months, no numbness or tingling and has LLE edema >RLE edema, reported undergoing LLE US 4 months ago, was negative for DVT. Currently has 8/10 pain, improved to 5/10 with oxycodone 5 mg x 2 in ED. (28 Dec 2020 11:16)   12/28: Mri T/Ls: IMPRESSION: Degenerative changes  Abnormal signal with associated enhancement is seen involving the endplates adjacent to the L2-3 disc space. There is slight increased T2 prolongation involving the L2-3 disc space with associated widening. While these findings could be compatible with degenerative changes possibly of early discitis and osteomyelitis must be considered.  Patient s/p L1-L5 laminectomy, T10-L5 posterior surgical fusion by Dr. Campos on 1/1/21  HMV x 3 drains. Post op with constipation needing aggressive bowel regimen.   Patient medically cleared and admitted to  Rehab on 1/12/21 (12 Jan 2021 15:21)      SUBJECTIVE/INTERVAL EVENTS  Patient seen and assessed at bedside. No acute events overnight.   Slept well and denies any pain overnight until she got up this am and was OOB  + bm   Denies headache, abdominal pain, chest pain, leg swelling.        Gen - NAD, Comfortable  Pulm - CTAB,   Cardiovascular - S1S2,  Abdomen - Soft, NT/ND, +BS  Extremities - No C/C/E, No calf tenderness    Motor -UE 5/5, LE proximal 3/5, distal 5/5     Psychiatric - Mood stable, Affect WNL  Skin: long back incision with sutures, c/d/i  Wounds: None Present        RECENT LABS:                          10.5   4.90  )-----------( 382      ( 21 Jan 2021 06:10 )             33.1     01-21    142  |  106  |  13  ----------------------------<  100<H>  4.0   |  31  |  0.85    Ca    8.8      21 Jan 2021 06:10    TPro  6.1  /  Alb  2.4<L>  /  TBili  0.4  /  DBili  x   /  AST  27  /  ALT  16  /  AlkPhos  127<H>  01-21    LIVER FUNCTIONS - ( 21 Jan 2021 06:10 )  Alb: 2.4 g/dL / Pro: 6.1 g/dL / ALK PHOS: 127 U/L / ALT: 16 U/L / AST: 27 U/L / GGT: x               MEDICATIONS  (STANDING):  atorvastatin 20 milliGRAM(s) Oral at bedtime  enoxaparin Injectable 40 milliGRAM(s) SubCutaneous two times a day  folic acid 1 milliGRAM(s) Oral daily  hydroxychloroquine 200 milliGRAM(s) Oral daily  levothyroxine 200 MICROGram(s) Oral daily  lidocaine   Patch 1 Patch Transdermal daily  oxyCODONE  ER Tablet 10 milliGRAM(s) Oral every 12 hours  senna 2 Tablet(s) Oral at bedtime    MEDICATIONS  (PRN):  acetaminophen   Tablet .. 650 milliGRAM(s) Oral every 6 hours PRN Temp greater or equal to 38C (100.4F), Mild Pain (1 - 3)  magnesium hydroxide Suspension 30 milliLiter(s) Oral daily PRN Constipation  melatonin 3 milliGRAM(s) Oral at bedtime PRN Insomnia  oxyCODONE    IR 10 milliGRAM(s) Oral every 4 hours PRN Moderate Pain (4 - 6)  oxyCODONE    IR 15 milliGRAM(s) Oral every 4 hours PRN Severe Pain (7 - 10)  polyethylene glycol 3350 17 Gram(s) Oral daily PRN Constipation  simethicone 80 milliGRAM(s) Chew every 6 hours PRN Upset Stomach

## 2021-01-23 NOTE — PROGRESS NOTE ADULT - ATTENDING COMMENTS
I have personally interviewed and examined this patient, reviewed pertinent clinical information, and performed the evaluation and management services provided at today's visit for inpatient medical follow up    I am available to discuss any issues related to the medical care of this patient on the unit, or by phone at [phone]

## 2021-01-23 NOTE — PROGRESS NOTE ADULT - SUBJECTIVE AND OBJECTIVE BOX
Patient is a 75y old  Female who presents with a chief complaint of Back pain (22 Jan 2021 14:40)      Patient examined and interviewed for follow up.  There were no major issues reported overnight ; This AM states she woke up very "stiff" but this is improving  +BMs, voiding. Participating in therapies  Denies fever, chills, myalgias, cough, shortness of breath  and all other systems were reviewed and were negative unless otherwise specified above    MEDICATIONS  (STANDING):  atorvastatin 20 milliGRAM(s) Oral at bedtime  enoxaparin Injectable 40 milliGRAM(s) SubCutaneous two times a day  folic acid 1 milliGRAM(s) Oral daily  hydroxychloroquine 200 milliGRAM(s) Oral daily  levothyroxine 200 MICROGram(s) Oral daily  lidocaine   Patch 1 Patch Transdermal daily  oxyCODONE  ER Tablet 10 milliGRAM(s) Oral every 12 hours  senna 2 Tablet(s) Oral at bedtime    MEDICATIONS  (PRN):  acetaminophen   Tablet .. 650 milliGRAM(s) Oral every 6 hours PRN Temp greater or equal to 38C (100.4F), Mild Pain (1 - 3)  magnesium hydroxide Suspension 30 milliLiter(s) Oral daily PRN Constipation  melatonin 3 milliGRAM(s) Oral at bedtime PRN Insomnia  oxyCODONE    IR 10 milliGRAM(s) Oral every 4 hours PRN Moderate Pain (4 - 6)  oxyCODONE    IR 15 milliGRAM(s) Oral every 4 hours PRN Severe Pain (7 - 10)  polyethylene glycol 3350 17 Gram(s) Oral daily PRN Constipation  simethicone 80 milliGRAM(s) Chew every 6 hours PRN Upset Stomach    Vital Signs Last 24 Hrs  T(C): 36.7 (23 Jan 2021 09:10), Max: 36.7 (23 Jan 2021 09:10)  T(F): 98 (23 Jan 2021 09:10), Max: 98 (23 Jan 2021 09:10)  HR: 71 (23 Jan 2021 09:10) (71 - 77)  BP: 116/73 (23 Jan 2021 09:10) (116/73 - 118/68)  BP(mean): --  RR: 15 (23 Jan 2021 09:10) (15 - 16)  SpO2: 96% (23 Jan 2021 09:10) (95% - 96%)  CAPILLARY BLOOD GLUCOSE    PHYSICAL EXAM:  GENERAL: NAD, well-groomed, well-developed  HEAD:  Atraumatic, Normocephalic  EYES: EOMI, PERRLA, conjunctiva and sclera clear  ENMT: No tonsillar erythema, exudates, or enlargement; Moist mucous membranes, Good dentition, No lesions  NECK: Supple, No JVD, No ONDINA  CHEST/LUNG: Clear to percussion bilaterally; No rales, rhonchi, wheezing, or rubs  HEART: Regular rate and rhythm; No murmurs, rubs, or gallops  ABDOMEN: Soft, Nontender, Nondistended; Bowel sounds present  EXTREMITIES:  2+ Peripheral Pulses, No clubbing, cyanosis, or edema  LYMPH: No lymphadenopathy noted  SKIN: No rashes or lesions  NERVOUS SYSTEM:  Alert & Oriented X3, no new neurologic deficits ;   Motor Strength 5/5 B/L upper and lower extremities;       LABS: There are no new laboratory or radiologic studies resulted at the time this progress note was generated    COVID-19 PCR: NotDetec (13 Jan 2021 22:00)  COVID-19 PCR: NotDetec (11 Jan 2021 17:07)  COVID-19 PCR: NotDetec (27 Dec 2020 20:59)

## 2021-01-23 NOTE — PROGRESS NOTE ADULT - ASSESSMENT
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 Hospital Course complicated by brief SICU stay for mechanical ventilation management, currently extubated. Patient now admitted for a multidisciplinary rehab program on 1/12/21.    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 Jaja Campos  -Continue comprehensive rehab program  -Pain management, bowel regimen per rehab    Acute blood loss Anemia, likely from surgery  -H/H stable, continue to monitor   -No overt signs of bleeding    Abdominal pain - likely 2/2 opioid induced constipation   -Bowel regimen per rehab    SLE/RA  -Continue with Plaquenil 200mg po daily    H/o provoked right popliteal, right posterior tibial DVT (7/2019 in setting of hip surgery, already completed >6 months of treatment)   -Follows with cardiologist Dr. Hoyos outpatient   -Treated with Eliquis 5mg po bid chronically -- AC stopped at [facility] as 12/29 LE dopplers neg for DVTs  - currently anticoagulated with lovenox 40mg BID as inpt in acute rehab  - post discharge to continue eliquis as will follow up with Dr. Hoyos as outpt    Essential HTN: stable off anti-hypertensives  -Continue to hold home medication Losartan  - VS per rehab protocol    HLD  -Chronic, continue Lipitor    Hypothyroidism  -Chronic, continue Levothyroxine    VTE ppx:   - Lovenox 40mg BID  GI ppx:   - continue PPI

## 2021-01-24 PROCEDURE — 99232 SBSQ HOSP IP/OBS MODERATE 35: CPT

## 2021-01-24 RX ORDER — OXYCODONE HYDROCHLORIDE 5 MG/1
5 TABLET ORAL ONCE
Refills: 0 | Status: DISCONTINUED | OUTPATIENT
Start: 2021-01-24 | End: 2021-01-24

## 2021-01-24 RX ADMIN — LIDOCAINE 1 PATCH: 4 CREAM TOPICAL at 20:00

## 2021-01-24 RX ADMIN — Medication 650 MILLIGRAM(S): at 08:35

## 2021-01-24 RX ADMIN — ENOXAPARIN SODIUM 40 MILLIGRAM(S): 100 INJECTION SUBCUTANEOUS at 17:41

## 2021-01-24 RX ADMIN — Medication 200 MICROGRAM(S): at 05:20

## 2021-01-24 RX ADMIN — SENNA PLUS 2 TABLET(S): 8.6 TABLET ORAL at 21:51

## 2021-01-24 RX ADMIN — Medication 650 MILLIGRAM(S): at 17:40

## 2021-01-24 RX ADMIN — ATORVASTATIN CALCIUM 20 MILLIGRAM(S): 80 TABLET, FILM COATED ORAL at 21:51

## 2021-01-24 RX ADMIN — ENOXAPARIN SODIUM 40 MILLIGRAM(S): 100 INJECTION SUBCUTANEOUS at 05:20

## 2021-01-24 RX ADMIN — Medication 1 MILLIGRAM(S): at 12:40

## 2021-01-24 RX ADMIN — Medication 200 MILLIGRAM(S): at 12:40

## 2021-01-24 RX ADMIN — OXYCODONE HYDROCHLORIDE 10 MILLIGRAM(S): 5 TABLET ORAL at 05:20

## 2021-01-24 RX ADMIN — OXYCODONE HYDROCHLORIDE 10 MILLIGRAM(S): 5 TABLET ORAL at 17:38

## 2021-01-24 RX ADMIN — OXYCODONE HYDROCHLORIDE 15 MILLIGRAM(S): 5 TABLET ORAL at 21:51

## 2021-01-24 RX ADMIN — OXYCODONE HYDROCHLORIDE 5 MILLIGRAM(S): 5 TABLET ORAL at 12:48

## 2021-01-24 RX ADMIN — LIDOCAINE 1 PATCH: 4 CREAM TOPICAL at 12:40

## 2021-01-24 NOTE — PROGRESS NOTE ADULT - ASSESSMENT
Assessment/Plan:  KAI THOMAS is a 75y F with a history of HTN, HLD, SLE, RA, hypothyroidism, provoked R. 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 Hospital Course complicated by brief SICU stay for mechanical ventilation management, now extubated, also c/bpain control and constipation. Patient now admitted for a multidisciplinary rehab program. 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 Jaja Campos  -  Comprehensive Rehab Program of PT/OT- 3 hrs/day 5 days/week    SLE/RA:- c/w plaquenil 200mg po daily    h/o Provoked DVT in 2019  - Follows with vascular cardiologist Dr. Hoyos outpatient.    - US July 2019:  DVT noted in the right popliteal vein and visualized segment of the right  posterior tibial vein.  - provoked DVT July 2019 in setting of hip surgery, already >6 months of treatment,   - was on eliquis 5mg po bid at home, but per hospitalist at [facility] hold eliquis, as LE dopplers on 12/29 neg for any DVTs  - on lovenox for dvt ppx while admitted- Resume eliquis at discharge  -   HTN:- monitor BP , currently stable off meds   - takes losartan 50mg po qd at home    HLD:- c/w lipitor 20mg po daily    hypothyroidism:- c/w levothyroxine 200mcg po daily    Sleep:- melatonin 3mg PRN    Pain Mgmt :- Tylenol PRN mild pain,   - Oxycodone 10mg IR PRN moderate 4-7/10 pain  - Oxycodone 15mg IR prn severe 8-10/10 pain  Oxycontin ER BID     GI/Bowel Mgmt/ constipation: -c/w Senna, miralax  bid , MOM prn    /Bladder Mgmt: toileting Q2hr when awake    FEN :- Diet - Regular     Precautions / PROPHYLAXIS:   - Falls, Spinal,   - DVT prophylaxis : Lovenox    Hospitalist fabian noted    Disp: ? home 2/2. based on progress. Patient lives alone   -

## 2021-01-24 NOTE — PROGRESS NOTE ADULT - ASSESSMENT
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 Hospital Course complicated by brief SICU stay for mechanical ventilation management, currently extubated. Patient now admitted for a multidisciplinary rehab program on 1/12/21.    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 Jaja Campos  -Continue comprehensive rehab program  -Pain management, bowel regimen per rehab    Acute blood loss Anemia, likely from surgery  -H/H stable, continue to monitor   -No overt signs of bleeding    Abdominal pain - likely 2/2 opioid induced constipation   -Bowel regimen per rehab    SLE/RA  -Continue with Plaquenil 200mg po daily    H/o provoked right popliteal, right posterior tibial DVT (7/2019 in setting of hip surgery, already completed >6 months of treatment)   -Follows with cardiologist Dr. Hoyos outpatient   -Treated with Eliquis 5mg po bid chronically -- AC stopped at [facility] as 12/29 LE dopplers neg for DVTs  - currently anticoagulated with lovenox 40mg BID as inpt in acute rehab  - post discharge to continue eliquis as will follow up with Dr. Hoyos as outpt    Essential HTN: stable off anti-hypertensives  -Continue to hold home medication Losartan  - VS per rehab protocol    HLD  -Chronic, continue Lipitor    Hypothyroidism  -Chronic, continue Levothyroxine    VTE ppx:   - Lovenox 40mg BID  GI ppx:   - continue PPI

## 2021-01-24 NOTE — PROGRESS NOTE ADULT - ATTENDING COMMENTS
I have personally interviewed and examined this patient, reviewed pertinent clinical information, and performed the evaluation and management services provided at today's visit for inpatient medical follow up    I am available to discuss any issues related to the medical care of this patient on the unit, or by phone at [phone]

## 2021-01-24 NOTE — PROGRESS NOTE ADULT - SUBJECTIVE AND OBJECTIVE BOX
HPI:  75F with past medical history of HTN, HLD, SLE, RA, hypothyroidism, provoked R. popliteal DVT in 2019 on eliquis, chronic low back pain presents to ED for acute on chronic low back pain. Pt has several months of chronic lumbosacral back pain, evaluated by outpatient ortho and referred for physical therapy and pain management. She has been receiving ?vitamin spinal injections. She last received low back inj on 12/16 after which she her low back pain worsened. She took oxycodone 5 mg qd (prescribed in september during a ED visit) and acetaminophen for past three days without relief. She also endorses worsening pain during ambulation with walker and has affected her quality of life. She has constipation, last BM 4 days ago. Otherwise denies fever, chills, N/V, abdominal pain, dysuria, urinary retention, fecal incontinence, saddle anesthesia, fall, LOC, trauma. During ROS she endorsed LLE weakness for several months, no numbness or tingling and has LLE edema >RLE edema, reported undergoing LLE US 4 months ago, was negative for DVT. Currently has 8/10 pain, improved to 5/10 with oxycodone 5 mg x 2 in ED. (28 Dec 2020 11:16)   12/28: Mri T/Ls: IMPRESSION: Degenerative changes  Abnormal signal with associated enhancement is seen involving the endplates adjacent to the L2-3 disc space. There is slight increased T2 prolongation involving the L2-3 disc space with associated widening. While these findings could be compatible with degenerative changes possibly of early discitis and osteomyelitis must be considered.  Patient s/p L1-L5 laminectomy, T10-L5 posterior surgical fusion by Dr. Campos on 1/1/21  HMV x 3 drains. Post op with constipation needing aggressive bowel regimen.   Patient medically cleared and admitted to  Rehab on 1/12/21 (12 Jan 2021 15:21)      SUBJECTIVE/INTERVAL EVENTS  Patient seen and assessed at bedside. No acute events overnight.   Slept well and denies any pain overnight   Pain appears controlled with current medication  Small BMs yesterday   Denies headache, abdominal pain, chest pain, leg swelling.      Vital Signs Last 24 Hrs  T(C): 37.1 (24 Jan 2021 08:33), Max: 37.1 (24 Jan 2021 08:33)  T(F): 98.8 (24 Jan 2021 08:33), Max: 98.8 (24 Jan 2021 08:33)  HR: 96 (24 Jan 2021 08:33) (72 - 96)  BP: 135/80 (24 Jan 2021 08:33) (126/82 - 135/80)  BP(mean): --  RR: 15 (24 Jan 2021 08:33) (15 - 15)  SpO2: 93% (24 Jan 2021 08:33) (93% - 98%)        Gen - NAD, Comfortable  Pulm - CTAB,   Cardiovascular - S1S2,  Abdomen - Soft, NT/ND, +BS  Extremities - No C/C/E, No calf tenderness    Motor -UE 5/5, LE proximal 3/5, distal 5/5     Psychiatric - Mood stable, Affect WNL  Skin: long back incision with staples, c/d/i      MEDICATIONS  (STANDING):  atorvastatin 20 milliGRAM(s) Oral at bedtime  enoxaparin Injectable 40 milliGRAM(s) SubCutaneous two times a day  folic acid 1 milliGRAM(s) Oral daily  hydroxychloroquine 200 milliGRAM(s) Oral daily  levothyroxine 200 MICROGram(s) Oral daily  lidocaine   Patch 1 Patch Transdermal daily  oxyCODONE  ER Tablet 10 milliGRAM(s) Oral every 12 hours  senna 2 Tablet(s) Oral at bedtime    MEDICATIONS  (PRN):  acetaminophen   Tablet .. 650 milliGRAM(s) Oral every 6 hours PRN Temp greater or equal to 38C (100.4F), Mild Pain (1 - 3)  magnesium hydroxide Suspension 30 milliLiter(s) Oral daily PRN Constipation  melatonin 3 milliGRAM(s) Oral at bedtime PRN Insomnia  oxyCODONE    IR 10 milliGRAM(s) Oral every 4 hours PRN Moderate Pain (4 - 6)  oxyCODONE    IR 15 milliGRAM(s) Oral every 4 hours PRN Severe Pain (7 - 10)  polyethylene glycol 3350 17 Gram(s) Oral daily PRN Constipation  simethicone 80 milliGRAM(s) Chew every 6 hours PRN Upset Stomach                    RECENT LABS:                          10.5   4.90  )-----------( 382      ( 21 Jan 2021 06:10 )             33.1     01-21    142  |  106  |  13  ----------------------------<  100<H>  4.0   |  31  |  0.85    Ca    8.8      21 Jan 2021 06:10    TPro  6.1  /  Alb  2.4<L>  /  TBili  0.4  /  DBili  x   /  AST  27  /  ALT  16  /  AlkPhos  127<H>  01-21    LIVER FUNCTIONS - ( 21 Jan 2021 06:10 )  Alb: 2.4 g/dL / Pro: 6.1 g/dL / ALK PHOS: 127 U/L / ALT: 16 U/L / AST: 27 U/L / GGT: x

## 2021-01-24 NOTE — PROGRESS NOTE ADULT - SUBJECTIVE AND OBJECTIVE BOX
Patient is a 75y old  Female who presents with a chief complaint of Back pain (23 Jan 2021 13:40)    Patient examined and interviewed for follow up.    There were no major issues reported overnight and patient is without new complaints.    +BMs  Denies fever, chills, myalgias, cough, shortness of breath  and all other systems were reviewed and were negative unless otherwise specified above    MEDICATIONS  (STANDING):  atorvastatin 20 milliGRAM(s) Oral at bedtime  enoxaparin Injectable 40 milliGRAM(s) SubCutaneous two times a day  folic acid 1 milliGRAM(s) Oral daily  hydroxychloroquine 200 milliGRAM(s) Oral daily  levothyroxine 200 MICROGram(s) Oral daily  lidocaine   Patch 1 Patch Transdermal daily  oxyCODONE  ER Tablet 10 milliGRAM(s) Oral every 12 hours  senna 2 Tablet(s) Oral at bedtime    MEDICATIONS  (PRN):  acetaminophen   Tablet .. 650 milliGRAM(s) Oral every 6 hours PRN Temp greater or equal to 38C (100.4F), Mild Pain (1 - 3)  magnesium hydroxide Suspension 30 milliLiter(s) Oral daily PRN Constipation  melatonin 3 milliGRAM(s) Oral at bedtime PRN Insomnia  oxyCODONE    IR 10 milliGRAM(s) Oral every 4 hours PRN Moderate Pain (4 - 6)  oxyCODONE    IR 15 milliGRAM(s) Oral every 4 hours PRN Severe Pain (7 - 10)  polyethylene glycol 3350 17 Gram(s) Oral daily PRN Constipation  simethicone 80 milliGRAM(s) Chew every 6 hours PRN Upset Stomach    Vital Signs Last 24 Hrs  T(C): 37.1 (24 Jan 2021 08:33), Max: 37.1 (24 Jan 2021 08:33)  T(F): 98.8 (24 Jan 2021 08:33), Max: 98.8 (24 Jan 2021 08:33)  HR: 96 (24 Jan 2021 08:33) (71 - 96)  BP: 135/80 (24 Jan 2021 08:33) (116/73 - 135/80)  BP(mean): --  RR: 15 (24 Jan 2021 08:33) (15 - 15)  SpO2: 93% (24 Jan 2021 08:33) (93% - 98%)    PHYSICAL EXAM:  GENERAL: well-developed, well nourished in no acute distress  HEAD:  Atraumatic, Normocephalic  EYES: EOMI, PERRLA, conjunctiva and sclera clear  ENMT: No tonsillar erythema, exudates, or enlargement; Moist mucous membranes, Good dentition, No lesions  NECK: Supple, No JVD, No ONDINA  CHEST/LUNG: Clear to percussion bilaterally; No rales, rhonchi, wheezing, or rubs  HEART: Regular rate and rhythm; No murmurs, rubs, or gallops  ABDOMEN: Soft, Nontender, Nondistended; Bowel sounds present  EXTREMITIES:  2+ Peripheral Pulses, No clubbing, cyanosis, or edema  LYMPH: No lymphadenopathy noted  SKIN: No rashes or lesions  NERVOUS SYSTEM:  Alert & Oriented X3, no new neurologic deficits ;     LABS: There are no new laboratory or radiologic studies resulted at the time this progress note was generated    COVID-19 PCR: NotDetec (13 Jan 2021 22:00)  COVID-19 PCR: NotDetec (11 Jan 2021 17:07)  COVID-19 PCR: NotDetec (27 Dec 2020 20:59)

## 2021-01-25 LAB
ALBUMIN SERPL ELPH-MCNC: 3 G/DL — LOW (ref 3.3–5)
ALP SERPL-CCNC: 147 U/L — HIGH (ref 40–120)
ALT FLD-CCNC: 19 U/L — SIGNIFICANT CHANGE UP (ref 10–45)
ANION GAP SERPL CALC-SCNC: 12 MMOL/L — SIGNIFICANT CHANGE UP (ref 5–17)
APPEARANCE UR: ABNORMAL
AST SERPL-CCNC: 23 U/L — SIGNIFICANT CHANGE UP (ref 10–40)
BASOPHILS # BLD AUTO: 0.1 K/UL — SIGNIFICANT CHANGE UP (ref 0–0.2)
BASOPHILS NFR BLD AUTO: 1.6 % — SIGNIFICANT CHANGE UP (ref 0–2)
BILIRUB SERPL-MCNC: 0.5 MG/DL — SIGNIFICANT CHANGE UP (ref 0.2–1.2)
BILIRUB UR-MCNC: NEGATIVE — SIGNIFICANT CHANGE UP
BUN SERPL-MCNC: 16 MG/DL — SIGNIFICANT CHANGE UP (ref 7–23)
CALCIUM SERPL-MCNC: 9.4 MG/DL — SIGNIFICANT CHANGE UP (ref 8.4–10.5)
CHLORIDE SERPL-SCNC: 105 MMOL/L — SIGNIFICANT CHANGE UP (ref 96–108)
CO2 SERPL-SCNC: 26 MMOL/L — SIGNIFICANT CHANGE UP (ref 22–31)
COLOR SPEC: YELLOW — SIGNIFICANT CHANGE UP
CREAT SERPL-MCNC: 0.9 MG/DL — SIGNIFICANT CHANGE UP (ref 0.5–1.3)
DIFF PNL FLD: ABNORMAL
EOSINOPHIL # BLD AUTO: 0.24 K/UL — SIGNIFICANT CHANGE UP (ref 0–0.5)
EOSINOPHIL NFR BLD AUTO: 3.9 % — SIGNIFICANT CHANGE UP (ref 0–6)
GLUCOSE SERPL-MCNC: 90 MG/DL — SIGNIFICANT CHANGE UP (ref 70–99)
GLUCOSE UR QL: NEGATIVE — SIGNIFICANT CHANGE UP
HCT VFR BLD CALC: 37 % — SIGNIFICANT CHANGE UP (ref 34.5–45)
HGB BLD-MCNC: 11.5 G/DL — SIGNIFICANT CHANGE UP (ref 11.5–15.5)
IMM GRANULOCYTES NFR BLD AUTO: 0.3 % — SIGNIFICANT CHANGE UP (ref 0–1.5)
KETONES UR-MCNC: NEGATIVE — SIGNIFICANT CHANGE UP
LEUKOCYTE ESTERASE UR-ACNC: ABNORMAL
LYMPHOCYTES # BLD AUTO: 1.65 K/UL — SIGNIFICANT CHANGE UP (ref 1–3.3)
LYMPHOCYTES # BLD AUTO: 26.9 % — SIGNIFICANT CHANGE UP (ref 13–44)
MCHC RBC-ENTMCNC: 29.5 PG — SIGNIFICANT CHANGE UP (ref 27–34)
MCHC RBC-ENTMCNC: 31.1 GM/DL — LOW (ref 32–36)
MCV RBC AUTO: 94.9 FL — SIGNIFICANT CHANGE UP (ref 80–100)
MONOCYTES # BLD AUTO: 0.98 K/UL — HIGH (ref 0–0.9)
MONOCYTES NFR BLD AUTO: 16 % — HIGH (ref 2–14)
NEUTROPHILS # BLD AUTO: 3.14 K/UL — SIGNIFICANT CHANGE UP (ref 1.8–7.4)
NEUTROPHILS NFR BLD AUTO: 51.3 % — SIGNIFICANT CHANGE UP (ref 43–77)
NITRITE UR-MCNC: POSITIVE
NRBC # BLD: 0 /100 WBCS — SIGNIFICANT CHANGE UP (ref 0–0)
PH UR: 7 — SIGNIFICANT CHANGE UP (ref 5–8)
PLATELET # BLD AUTO: 398 K/UL — SIGNIFICANT CHANGE UP (ref 150–400)
POTASSIUM SERPL-MCNC: 3.7 MMOL/L — SIGNIFICANT CHANGE UP (ref 3.5–5.3)
POTASSIUM SERPL-SCNC: 3.7 MMOL/L — SIGNIFICANT CHANGE UP (ref 3.5–5.3)
PROT SERPL-MCNC: 7.4 G/DL — SIGNIFICANT CHANGE UP (ref 6–8.3)
PROT UR-MCNC: 500 MG/DL
RBC # BLD: 3.9 M/UL — SIGNIFICANT CHANGE UP (ref 3.8–5.2)
RBC # FLD: 14.5 % — SIGNIFICANT CHANGE UP (ref 10.3–14.5)
SODIUM SERPL-SCNC: 143 MMOL/L — SIGNIFICANT CHANGE UP (ref 135–145)
SP GR SPEC: 1.01 — SIGNIFICANT CHANGE UP (ref 1.01–1.02)
UROBILINOGEN FLD QL: NEGATIVE — SIGNIFICANT CHANGE UP
WBC # BLD: 6.13 K/UL — SIGNIFICANT CHANGE UP (ref 3.8–10.5)
WBC # FLD AUTO: 6.13 K/UL — SIGNIFICANT CHANGE UP (ref 3.8–10.5)

## 2021-01-25 PROCEDURE — 99232 SBSQ HOSP IP/OBS MODERATE 35: CPT

## 2021-01-25 PROCEDURE — 99232 SBSQ HOSP IP/OBS MODERATE 35: CPT | Mod: GC

## 2021-01-25 RX ORDER — LACTOBACILLUS ACIDOPHILUS 100MM CELL
1 CAPSULE ORAL
Refills: 0 | Status: COMPLETED | OUTPATIENT
Start: 2021-01-25 | End: 2021-02-04

## 2021-01-25 RX ORDER — NITROFURANTOIN MACROCRYSTAL 50 MG
100 CAPSULE ORAL
Refills: 0 | Status: DISCONTINUED | OUTPATIENT
Start: 2021-01-25 | End: 2021-01-27

## 2021-01-25 RX ADMIN — OXYCODONE HYDROCHLORIDE 15 MILLIGRAM(S): 5 TABLET ORAL at 22:53

## 2021-01-25 RX ADMIN — ENOXAPARIN SODIUM 40 MILLIGRAM(S): 100 INJECTION SUBCUTANEOUS at 16:39

## 2021-01-25 RX ADMIN — ENOXAPARIN SODIUM 40 MILLIGRAM(S): 100 INJECTION SUBCUTANEOUS at 05:02

## 2021-01-25 RX ADMIN — LIDOCAINE 1 PATCH: 4 CREAM TOPICAL at 12:39

## 2021-01-25 RX ADMIN — OXYCODONE HYDROCHLORIDE 10 MILLIGRAM(S): 5 TABLET ORAL at 16:41

## 2021-01-25 RX ADMIN — Medication 1 MILLIGRAM(S): at 12:39

## 2021-01-25 RX ADMIN — SENNA PLUS 2 TABLET(S): 8.6 TABLET ORAL at 21:09

## 2021-01-25 RX ADMIN — Medication 100 MILLIGRAM(S): at 06:28

## 2021-01-25 RX ADMIN — Medication 100 MILLIGRAM(S): at 16:38

## 2021-01-25 RX ADMIN — OXYCODONE HYDROCHLORIDE 10 MILLIGRAM(S): 5 TABLET ORAL at 05:02

## 2021-01-25 RX ADMIN — SIMETHICONE 80 MILLIGRAM(S): 80 TABLET, CHEWABLE ORAL at 17:02

## 2021-01-25 RX ADMIN — Medication 650 MILLIGRAM(S): at 06:30

## 2021-01-25 RX ADMIN — OXYCODONE HYDROCHLORIDE 15 MILLIGRAM(S): 5 TABLET ORAL at 01:53

## 2021-01-25 RX ADMIN — LIDOCAINE 1 PATCH: 4 CREAM TOPICAL at 00:30

## 2021-01-25 RX ADMIN — Medication 200 MILLIGRAM(S): at 12:39

## 2021-01-25 RX ADMIN — OXYCODONE HYDROCHLORIDE 15 MILLIGRAM(S): 5 TABLET ORAL at 08:38

## 2021-01-25 RX ADMIN — LIDOCAINE 1 PATCH: 4 CREAM TOPICAL at 20:22

## 2021-01-25 RX ADMIN — ATORVASTATIN CALCIUM 20 MILLIGRAM(S): 80 TABLET, FILM COATED ORAL at 21:09

## 2021-01-25 RX ADMIN — Medication 200 MICROGRAM(S): at 05:02

## 2021-01-25 RX ADMIN — OXYCODONE HYDROCHLORIDE 15 MILLIGRAM(S): 5 TABLET ORAL at 12:42

## 2021-01-25 NOTE — PROGRESS NOTE ADULT - ASSESSMENT
Assessment/Plan:  KAI THOMAS is a 75y F with a history of HTN, HLD, SLE, RA, hypothyroidism, provoked R. 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 Hospital Course complicated by brief SICU stay for mechanical ventilation management, now extubated, also c/bpain control and constipation. Patient now admitted for a multidisciplinary rehab program. 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 Jaja Campos  -  Comprehensive Rehab Program of PT/OT- 3 hrs/day 5 days/week    SLE/RA:- c/w plaquenil 200mg po daily    h/o Provoked DVT in 2019  - Follows with vascular cardiologist Dr. Hoyos outpatient, recommended c/w Eliquis for now.  - US July 2019:  DVT noted in the right popliteal vein and visualized segment of the right  posterior tibial vein.  - provoked DVT July 2019 in setting of hip surgery, already >6 months of treatment,   - was on eliquis 5mg po bid at home, but per hospitalist at [facility] hold eliquis, as LE dopplers on 12/29 neg for any DVTs  - on lovenox for dvt ppx while admitted- Resume eliquis at discharge.  -   HTN:- monitor BP , currently stable off meds   - takes losartan 50mg po qd at home    HLD:- c/w lipitor 20mg po daily    hypothyroidism:- c/w levothyroxine 200mcg po daily    UTI:  - C/w Macrobid  - F/u UCx results    Sleep:- melatonin 3mg PRN    Pain Mgmt :- Tylenol PRN mild pain,   - Oxycodone 10mg IR PRN moderate 4-7/10 pain  - Oxycodone 15mg IR prn severe 8-10/10 pain  Oxycontin ER BID     GI/Bowel Mgmt/ constipation: -c/w Senna, miralax  bid , MOM prn    /Bladder Mgmt: toileting Q2hr when awake    FEN :- Diet - Regular     Precautions / PROPHYLAXIS:   - Falls, Spinal,   - DVT prophylaxis : Lovenox    Hospitalist fabian noted    Disp: ? home 2/2. based on progress. Patient lives alone   -

## 2021-01-25 NOTE — PROGRESS NOTE ADULT - ATTENDING COMMENTS
Chart reviewed. Patient seen at bedside  Overnight with dysuria and with abnormal UA and started on macrobid. Awaiting urine culture. WBC ok    Patient denies any acute back issues. Pain controlled with regimen. Last BM 1/24. Continue bowel regimen to minimise bowel induced constipation   Continue rehab program     2. Labs stable except mild elevation in alk phos

## 2021-01-25 NOTE — PROGRESS NOTE ADULT - SUBJECTIVE AND OBJECTIVE BOX
Patient is a 75y old  Female who presents with a chief complaint of Back pain (2021 13:04)      HPI:  75F with past medical history of HTN, HLD, SLE, RA, hypothyroidism, provoked R. popliteal DVT in 2019 on eliquis, chronic low back pain presents to ED for acute on chronic low back pain. Pt has several months of chronic lumbosacral back pain, evaluated by outpatient ortho and referred for physical therapy and pain management. She has been receiving ?vitamin spinal injections. She last received low back inj on  after which she her low back pain worsened. She took oxycodone 5 mg qd (prescribed in september during a ED visit) and acetaminophen for past three days without relief. She also endorses worsening pain during ambulation with walker and has affected her quality of life. She has constipation, last BM 4 days ago. Otherwise denies fever, chills, N/V, abdominal pain, dysuria, urinary retention, fecal incontinence, saddle anesthesia, fall, LOC, trauma. During ROS she endorsed LLE weakness for several months, no numbness or tingling and has LLE edema >RLE edema, reported undergoing LLE US 4 months ago, was negative for DVT. Currently has 8/10 pain, improved to 5/10 with oxycodone 5 mg x 2 in ED. (28 Dec 2020 11:16)   : Mri T/Ls: IMPRESSION: Degenerative changes  Abnormal signal with associated enhancement is seen involving the endplates adjacent to the L2-3 disc space. There is slight increased T2 prolongation involving the L2-3 disc space with associated widening. While these findings could be compatible with degenerative changes possibly of early discitis and osteomyelitis must be considered.  Patient s/p L1-L5 laminectomy, T10-L5 posterior surgical fusion by Dr. Campos on 21  HMV x 3 drains. Post op with constipation needing aggressive bowel regimen.     PT/OT and PM&R evaluated patient and recommended Rehab.     Patient medically cleared and admitted to  Rehab on 21 (2021 15:21)      SUBJECTIVE/INTERVAL EVENTS  Patient seen and assessed at bedside, sitting in wheelchair. Patient c/o dysuria last night, found to have +UA, started on Abx. She is feeling improved today, no significant pain when not moving around.       PAST MEDICAL & SURGICAL HISTORY:  HTN (hypertension)    DVT (deep venous thrombosis)    Hypothyroidism    RA (rheumatoid arthritis)    Obesity, Class II, BMI 35-39.9, no comorbidity    Hypothyroid    Benign heart murmur    Hyperlipidemia    Hypertension    H/O degenerative disc disease    Osteoarthritis    SLE (systemic lupus erythematosus)    Rheumatoid arthritis    S/P knee replacement    Status post total hip replacement, right    S/P thyroid surgery  1 lobe removed    Lung cancer  small tumor removed 2015    S/P knee surgery  bilateral    S/P carpal tunnel release  left    S/P eye surgery  child    S/P cataract surgery  left    S/P tonsillectomy        Allergies    No Known Allergies    Intolerances          VITALS  75y  Vital Signs Last 24 Hrs  T(C): 37 (2021 07:41), Max: 37 (2021 07:41)  T(F): 98.6 (2021 07:41), Max: 98.6 (2021 07:41)  HR: 76 (2021 07:41) (74 - 76)  BP: 113/71 (2021 07:41) (113/71 - 130/71)  BP(mean): --  RR: 14 (2021 07:41) (14 - 14)  SpO2: 99% (2021 07:41) (94% - 99%)  Daily       Gen - NAD, Comfortable  Pulm - CTAB,   Cardiovascular - S1S2,  Abdomen - Soft, NT/ND, +BS  Extremities - No C/C/E, No calf tenderness    Motor -UE 5/5, LE proximal 3/5, distal 5/5     Psychiatric - Mood stable, Affect WNL  Skin: long back incision with staples, c/d/i        RECENT LABS:                          11.5   6.13  )-----------( 398      ( 2021 07:18 )             37.0         143  |  105  |  16  ----------------------------<  90  3.7   |  26  |  0.90    Ca    9.4      2021 07:18    TPro  7.4  /  Alb  3.0<L>  /  TBili  0.5  /  DBili  x   /  AST  23  /  ALT  19  /  AlkPhos  147<H>      LIVER FUNCTIONS - ( 2021 07:18 )  Alb: 3.0 g/dL / Pro: 7.4 g/dL / ALK PHOS: 147 U/L / ALT: 19 U/L / AST: 23 U/L / GGT: x             Urinalysis Basic - ( 2021 02:32 )    Color: Yellow / Appearance: Slightly Turbid / S.010 / pH: x  Gluc: x / Ketone: Negative  / Bili: Negative / Urobili: Negative   Blood: x / Protein: 500 mg/dL / Nitrite: Positive   Leuk Esterase: Moderate / RBC: 0-4 /HPF / WBC 11-25 /HPF   Sq Epi: x / Non Sq Epi: Neg.-Few / Bacteria: TNTC /HPF        MEDICATIONS  (STANDING):  atorvastatin 20 milliGRAM(s) Oral at bedtime  enoxaparin Injectable 40 milliGRAM(s) SubCutaneous two times a day  folic acid 1 milliGRAM(s) Oral daily  hydroxychloroquine 200 milliGRAM(s) Oral daily  levothyroxine 200 MICROGram(s) Oral daily  lidocaine   Patch 1 Patch Transdermal daily  nitrofurantoin monohydrate/macrocrystals (MACROBID) 100 milliGRAM(s) Oral two times a day  oxyCODONE  ER Tablet 10 milliGRAM(s) Oral every 12 hours  senna 2 Tablet(s) Oral at bedtime    MEDICATIONS  (PRN):  acetaminophen   Tablet .. 650 milliGRAM(s) Oral every 6 hours PRN Temp greater or equal to 38C (100.4F), Mild Pain (1 - 3)  magnesium hydroxide Suspension 30 milliLiter(s) Oral daily PRN Constipation  melatonin 3 milliGRAM(s) Oral at bedtime PRN Insomnia  oxyCODONE    IR 10 milliGRAM(s) Oral every 4 hours PRN Moderate Pain (4 - 6)  oxyCODONE    IR 15 milliGRAM(s) Oral every 4 hours PRN Severe Pain (7 - 10)  polyethylene glycol 3350 17 Gram(s) Oral daily PRN Constipation  simethicone 80 milliGRAM(s) Chew every 6 hours PRN Upset Stomach

## 2021-01-25 NOTE — PROGRESS NOTE ADULT - ASSESSMENT
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 Hospital Course complicated by brief SICU stay for mechanical ventilation management, currently extubated. Patient now admitted for a multidisciplinary rehab program on 1/12/21.    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 Jaja Campos  -Continue comprehensive rehab program  -Pain management, bowel regimen per rehab    Acute blood loss Anemia, likely from surgery  -H/H stable, continue to monitor   -No overt signs of bleeding    Abdominal pain - likely 2/2 opioid induced constipation   -Bowel regimen per rehab    SLE/RA  -Continue with Plaquenil 200mg po daily    H/o provoked right popliteal, right posterior tibial DVT (7/2019 in setting of hip surgery, already completed >6 months of treatment)   -Follows with cardiologist Dr. Hoyos outpatient   -Treated with Eliquis 5mg po bid chronically -- AC stopped at [facility] as 12/29 LE dopplers neg for DVTs  - currently anticoagulated with lovenox 40mg BID as inpt in acute rehab  - post discharge to continue eliquis as will follow up with Dr. Hoyos as outpt    Essential HTN: stable off anti-hypertensives  -Continue to hold home medication Losartan  - VS per rehab protocol    HLD  -Chronic, continue Lipitor    Hypothyroidism  -Chronic, continue Levothyroxine    VTE ppx:   - Lovenox 40mg BID  GI ppx:   - continue PPI

## 2021-01-25 NOTE — PROGRESS NOTE ADULT - SUBJECTIVE AND OBJECTIVE BOX
Patient is a 75y old  Female who presents with a chief complaint of Back pain (2021 09:50)      Patient seen and examined at bedside.    ALLERGIES:  No Known Allergies    MEDICATIONS  (STANDING):  atorvastatin 20 milliGRAM(s) Oral at bedtime  enoxaparin Injectable 40 milliGRAM(s) SubCutaneous two times a day  folic acid 1 milliGRAM(s) Oral daily  hydroxychloroquine 200 milliGRAM(s) Oral daily  levothyroxine 200 MICROGram(s) Oral daily  lidocaine   Patch 1 Patch Transdermal daily  nitrofurantoin monohydrate/macrocrystals (MACROBID) 100 milliGRAM(s) Oral two times a day  oxyCODONE  ER Tablet 10 milliGRAM(s) Oral every 12 hours  senna 2 Tablet(s) Oral at bedtime    MEDICATIONS  (PRN):  acetaminophen   Tablet .. 650 milliGRAM(s) Oral every 6 hours PRN Temp greater or equal to 38C (100.4F), Mild Pain (1 - 3)  magnesium hydroxide Suspension 30 milliLiter(s) Oral daily PRN Constipation  melatonin 3 milliGRAM(s) Oral at bedtime PRN Insomnia  oxyCODONE    IR 10 milliGRAM(s) Oral every 4 hours PRN Moderate Pain (4 - 6)  oxyCODONE    IR 15 milliGRAM(s) Oral every 4 hours PRN Severe Pain (7 - 10)  polyethylene glycol 3350 17 Gram(s) Oral daily PRN Constipation  simethicone 80 milliGRAM(s) Chew every 6 hours PRN Upset Stomach    Vital Signs Last 24 Hrs  T(F): 98.6 (2021 07:41), Max: 98.6 (2021 07:41)  HR: 76 (2021 07:41) (74 - 76)  BP: 113/71 (2021 07:41) (113/71 - 130/71)  RR: 14 (2021 07:41) (14 - 14)  SpO2: 99% (2021 07:41) (94% - 99%)  I&O's Summary      PHYSICAL EXAM:  General: NAD, A/O x 3  ENT: MMM  Neck: Supple, No JVD  Lungs: Clear to auscultation bilaterally  Cardio: RRR, S1/S2, No murmurs  Abdomen: Soft, Nontender, Nondistended; Bowel sounds present  Extremities: No calf tenderness, No pitting edema    LABS:                        11.5   6.13  )-----------( 398      ( 2021 07:18 )             37.0           143  |  105  |  16  ----------------------------<  90  3.7   |  26  |  0.90    Ca    9.4      2021 07:18    TPro  7.4  /  Alb  3.0  /  TBili  0.5  /  DBili  x   /  AST  23  /  ALT  19  /  AlkPhos  147       eGFR if Non African American: 62 mL/min/1.73M2 (21 @ 07:18)  eGFR if : 72 mL/min/1.73M2 (21 @ 07:18)                               Urinalysis Basic - ( 2021 02:32 )    Color: Yellow / Appearance: Slightly Turbid / S.010 / pH: x  Gluc: x / Ketone: Negative  / Bili: Negative / Urobili: Negative   Blood: x / Protein: 500 mg/dL / Nitrite: Positive   Leuk Esterase: Moderate / RBC: 0-4 /HPF / WBC 11-25 /HPF   Sq Epi: x / Non Sq Epi: Neg.-Few / Bacteria: TNTC /HPF          RADIOLOGY & ADDITIONAL TESTS:    Care Discussed with Consultants/Other Providers:

## 2021-01-26 PROCEDURE — 99232 SBSQ HOSP IP/OBS MODERATE 35: CPT | Mod: GC

## 2021-01-26 PROCEDURE — 99232 SBSQ HOSP IP/OBS MODERATE 35: CPT

## 2021-01-26 RX ORDER — OXYCODONE HYDROCHLORIDE 5 MG/1
10 TABLET ORAL EVERY 4 HOURS
Refills: 0 | Status: DISCONTINUED | OUTPATIENT
Start: 2021-01-26 | End: 2021-01-29

## 2021-01-26 RX ORDER — OXYCODONE HYDROCHLORIDE 5 MG/1
10 TABLET ORAL EVERY 12 HOURS
Refills: 0 | Status: DISCONTINUED | OUTPATIENT
Start: 2021-01-26 | End: 2021-02-02

## 2021-01-26 RX ORDER — POLYETHYLENE GLYCOL 3350 17 G/17G
17 POWDER, FOR SOLUTION ORAL DAILY
Refills: 0 | Status: DISCONTINUED | OUTPATIENT
Start: 2021-01-26 | End: 2021-02-13

## 2021-01-26 RX ADMIN — Medication 200 MICROGRAM(S): at 05:00

## 2021-01-26 RX ADMIN — ENOXAPARIN SODIUM 40 MILLIGRAM(S): 100 INJECTION SUBCUTANEOUS at 05:00

## 2021-01-26 RX ADMIN — LIDOCAINE 1 PATCH: 4 CREAM TOPICAL at 00:23

## 2021-01-26 RX ADMIN — Medication 100 MILLIGRAM(S): at 16:50

## 2021-01-26 RX ADMIN — ENOXAPARIN SODIUM 40 MILLIGRAM(S): 100 INJECTION SUBCUTANEOUS at 16:50

## 2021-01-26 RX ADMIN — OXYCODONE HYDROCHLORIDE 15 MILLIGRAM(S): 5 TABLET ORAL at 22:12

## 2021-01-26 RX ADMIN — Medication 1 MILLIGRAM(S): at 12:18

## 2021-01-26 RX ADMIN — Medication 1 TABLET(S): at 05:00

## 2021-01-26 RX ADMIN — SIMETHICONE 80 MILLIGRAM(S): 80 TABLET, CHEWABLE ORAL at 14:07

## 2021-01-26 RX ADMIN — OXYCODONE HYDROCHLORIDE 15 MILLIGRAM(S): 5 TABLET ORAL at 07:51

## 2021-01-26 RX ADMIN — OXYCODONE HYDROCHLORIDE 15 MILLIGRAM(S): 5 TABLET ORAL at 12:18

## 2021-01-26 RX ADMIN — OXYCODONE HYDROCHLORIDE 10 MILLIGRAM(S): 5 TABLET ORAL at 16:50

## 2021-01-26 RX ADMIN — Medication 1 TABLET(S): at 16:50

## 2021-01-26 RX ADMIN — Medication 100 MILLIGRAM(S): at 05:00

## 2021-01-26 RX ADMIN — SIMETHICONE 80 MILLIGRAM(S): 80 TABLET, CHEWABLE ORAL at 20:46

## 2021-01-26 RX ADMIN — Medication 650 MILLIGRAM(S): at 10:19

## 2021-01-26 RX ADMIN — POLYETHYLENE GLYCOL 3350 17 GRAM(S): 17 POWDER, FOR SOLUTION ORAL at 12:19

## 2021-01-26 RX ADMIN — ATORVASTATIN CALCIUM 20 MILLIGRAM(S): 80 TABLET, FILM COATED ORAL at 20:46

## 2021-01-26 RX ADMIN — OXYCODONE HYDROCHLORIDE 10 MILLIGRAM(S): 5 TABLET ORAL at 05:00

## 2021-01-26 RX ADMIN — Medication 200 MILLIGRAM(S): at 12:18

## 2021-01-26 NOTE — PROGRESS NOTE ADULT - ASSESSMENT
Assessment/Plan:  KAI THOMAS is a 75y F with a history of HTN, HLD, SLE, RA, hypothyroidism, provoked R. 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 Hospital Course complicated by brief SICU stay for mechanical ventilation management, now extubated, also c/bpain control and constipation. Patient now admitted for a multidisciplinary rehab program. 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 Jaja Campos  -  Comprehensive Rehab Program of PT/OT- 3 hrs/day 5 days/week  - Will remove ~half of staples today 1/26 if incision is c/d/i    SLE/RA:- c/w plaquenil 200mg po daily    h/o Provoked DVT in 2019  - Follows with vascular cardiologist Dr. Hoyos outpatient, recommended c/w Eliquis for now.  - US July 2019:  DVT noted in the right popliteal vein and visualized segment of the right  posterior tibial vein.  - provoked DVT July 2019 in setting of hip surgery, already >6 months of treatment,   - was on eliquis 5mg po bid at home, but per hospitalist at [facility] hold eliquis, as LE dopplers on 12/29 neg for any DVTs  - on lovenox for dvt ppx while admitted- Resume eliquis at discharge.  -   HTN:- monitor BP , currently stable off meds   - takes losartan 50mg po qd at home    HLD:- c/w lipitor 20mg po daily    hypothyroidism:- c/w levothyroxine 200mcg po daily    UTI:  - C/w Macrobid  - UCx: providencia rettgari    Sleep:- melatonin 3mg PRN    Pain Mgmt :- Tylenol PRN mild pain,   - Oxycodone 10mg IR PRN moderate 4-7/10 pain  - Oxycodone 15mg IR prn severe 8-10/10 pain  Oxycontin ER BID     GI/Bowel Mgmt/ constipation: -c/w Senna, miralax daily, MOM prn    /Bladder Mgmt: toileting Q2hr when awake    FEN :- Diet - Regular     Precautions / PROPHYLAXIS:   - Falls, Spinal,   - DVT prophylaxis : Lovenox    Hospitalist fu noted    Disp: ? home 2/2/21. based on progress. Patient lives alone   -

## 2021-01-26 NOTE — PROGRESS NOTE ADULT - SUBJECTIVE AND OBJECTIVE BOX
Patient is a 75y old  Female who presents with a chief complaint of Back pain (2021 09:43)      HPI:  75F with past medical history of HTN, HLD, SLE, RA, hypothyroidism, provoked R. popliteal DVT in 2019 on eliquis, chronic low back pain presents to ED for acute on chronic low back pain. Pt has several months of chronic lumbosacral back pain, evaluated by outpatient ortho and referred for physical therapy and pain management. She has been receiving ?vitamin spinal injections. She last received low back inj on  after which she her low back pain worsened. She took oxycodone 5 mg qd (prescribed in september during a ED visit) and acetaminophen for past three days without relief. She also endorses worsening pain during ambulation with walker and has affected her quality of life. She has constipation, last BM 4 days ago. Otherwise denies fever, chills, N/V, abdominal pain, dysuria, urinary retention, fecal incontinence, saddle anesthesia, fall, LOC, trauma. During ROS she endorsed LLE weakness for several months, no numbness or tingling and has LLE edema >RLE edema, reported undergoing LLE US 4 months ago, was negative for DVT. Currently has 8/10 pain, improved to 5/10 with oxycodone 5 mg x 2 in ED. (28 Dec 2020 11:16)   : Mri T/Ls: IMPRESSION: Degenerative changes  Abnormal signal with associated enhancement is seen involving the endplates adjacent to the L2-3 disc space. There is slight increased T2 prolongation involving the L2-3 disc space with associated widening. While these findings could be compatible with degenerative changes possibly of early discitis and osteomyelitis must be considered.  Patient s/p L1-L5 laminectomy, T10-L5 posterior surgical fusion by Dr. Campos on 21  HMV x 3 drains. Post op with constipation needing aggressive bowel regimen.     PT/OT and PM&R evaluated patient and recommended Rehab.     Patient medically cleared and admitted to  Rehab on 21 (2021 15:21)      SUBJECTIVE/INTERVAL EVENTS  Patient seen and assessed at bedside. Patient has not had a BM in 2 days. Dysuria was improved. Denies headache, abdominal pain, leg pain.      PAST MEDICAL & SURGICAL HISTORY:  HTN (hypertension)    DVT (deep venous thrombosis)    Hypothyroidism    RA (rheumatoid arthritis)    Obesity, Class II, BMI 35-39.9, no comorbidity    Hypothyroid    Benign heart murmur    Hyperlipidemia    Hypertension    H/O degenerative disc disease    Osteoarthritis    SLE (systemic lupus erythematosus)    Rheumatoid arthritis    S/P knee replacement    Status post total hip replacement, right    S/P thyroid surgery  1 lobe removed    Lung cancer  small tumor removed 2015    S/P knee surgery  bilateral    S/P carpal tunnel release  left    S/P eye surgery  child    S/P cataract surgery  left    S/P tonsillectomy        Allergies    No Known Allergies    Intolerances          VITALS  75y  Vital Signs Last 24 Hrs  T(C): 36.8 (2021 08:19), Max: 36.8 (2021 08:19)  T(F): 98.3 (2021 08:19), Max: 98.3 (2021 08:19)  HR: 70 (2021 08:19) (70 - 71)  BP: 103/64 (2021 08:19) (103/64 - 136/79)  BP(mean): --  RR: 15 (2021 08:19) (15 - 15)  SpO2: 97% (2021 08:19) (97% - 99%)  Daily       Gen - NAD, Comfortable  Pulm - CTAB,   Cardiovascular - S1S2,  Abdomen - Soft, NT/ND, +BS  Extremities - No C/C/E, No calf tenderness    Motor -UE 5/5, LE proximal 3/5, distal 5/5     Psychiatric - Mood stable, Affect WNL  Skin: long back incision with staples, c/d/i        RECENT LABS:                          11.5   6.13  )-----------( 398      ( 2021 07:18 )             37.0         143  |  105  |  16  ----------------------------<  90  3.7   |  26  |  0.90    Ca    9.4      2021 07:18    TPro  7.4  /  Alb  3.0<L>  /  TBili  0.5  /  DBili  x   /  AST  23  /  ALT  19  /  AlkPhos  147<H>      LIVER FUNCTIONS - ( 2021 07:18 )  Alb: 3.0 g/dL / Pro: 7.4 g/dL / ALK PHOS: 147 U/L / ALT: 19 U/L / AST: 23 U/L / GGT: x             Urinalysis Basic - ( 2021 02:32 )    Color: Yellow / Appearance: Slightly Turbid / S.010 / pH: x  Gluc: x / Ketone: Negative  / Bili: Negative / Urobili: Negative   Blood: x / Protein: 500 mg/dL / Nitrite: Positive   Leuk Esterase: Moderate / RBC: 0-4 /HPF / WBC 11-25 /HPF   Sq Epi: x / Non Sq Epi: Neg.-Few / Bacteria: TNTC /HPF        Culture - Urine (collected 21 @ 04:50)  Source: .Urine Clean Catch (Midstream)  Preliminary Report (21 @ 11:31):    >100,000 CFU/ml Providencia rettgeri    <10,000 CFU/ml Normal Urogenital aminah present        MEDICATIONS  (STANDING):  atorvastatin 20 milliGRAM(s) Oral at bedtime  enoxaparin Injectable 40 milliGRAM(s) SubCutaneous two times a day  folic acid 1 milliGRAM(s) Oral daily  hydroxychloroquine 200 milliGRAM(s) Oral daily  lactobacillus acidophilus 1 Tablet(s) Oral two times a day  levothyroxine 200 MICROGram(s) Oral daily  lidocaine   Patch 1 Patch Transdermal daily  nitrofurantoin monohydrate/macrocrystals (MACROBID) 100 milliGRAM(s) Oral two times a day  oxyCODONE  ER Tablet 10 milliGRAM(s) Oral every 12 hours  polyethylene glycol 3350 17 Gram(s) Oral daily  senna 2 Tablet(s) Oral at bedtime    MEDICATIONS  (PRN):  acetaminophen   Tablet .. 650 milliGRAM(s) Oral every 6 hours PRN Temp greater or equal to 38C (100.4F), Mild Pain (1 - 3)  magnesium hydroxide Suspension 30 milliLiter(s) Oral daily PRN Constipation  melatonin 3 milliGRAM(s) Oral at bedtime PRN Insomnia  oxyCODONE    IR 10 milliGRAM(s) Oral every 4 hours PRN Moderate Pain (4 - 6)  oxyCODONE    IR 15 milliGRAM(s) Oral every 4 hours PRN Severe Pain (7 - 10)  simethicone 80 milliGRAM(s) Chew every 6 hours PRN Upset Stomach

## 2021-01-26 NOTE — PROGRESS NOTE ADULT - SUBJECTIVE AND OBJECTIVE BOX
Patient is a 75y old  Female who presents with a chief complaint of Back pain (2021 14:59)      Patient seen and examined at bedside.    ALLERGIES:  No Known Allergies    MEDICATIONS  (STANDING):  atorvastatin 20 milliGRAM(s) Oral at bedtime  enoxaparin Injectable 40 milliGRAM(s) SubCutaneous two times a day  folic acid 1 milliGRAM(s) Oral daily  hydroxychloroquine 200 milliGRAM(s) Oral daily  lactobacillus acidophilus 1 Tablet(s) Oral two times a day  levothyroxine 200 MICROGram(s) Oral daily  lidocaine   Patch 1 Patch Transdermal daily  nitrofurantoin monohydrate/macrocrystals (MACROBID) 100 milliGRAM(s) Oral two times a day  oxyCODONE  ER Tablet 10 milliGRAM(s) Oral every 12 hours  senna 2 Tablet(s) Oral at bedtime    MEDICATIONS  (PRN):  acetaminophen   Tablet .. 650 milliGRAM(s) Oral every 6 hours PRN Temp greater or equal to 38C (100.4F), Mild Pain (1 - 3)  magnesium hydroxide Suspension 30 milliLiter(s) Oral daily PRN Constipation  melatonin 3 milliGRAM(s) Oral at bedtime PRN Insomnia  oxyCODONE    IR 10 milliGRAM(s) Oral every 4 hours PRN Moderate Pain (4 - 6)  oxyCODONE    IR 15 milliGRAM(s) Oral every 4 hours PRN Severe Pain (7 - 10)  polyethylene glycol 3350 17 Gram(s) Oral daily PRN Constipation  simethicone 80 milliGRAM(s) Chew every 6 hours PRN Upset Stomach    Vital Signs Last 24 Hrs  T(F): 98.3 (2021 08:19), Max: 98.3 (2021 08:19)  HR: 70 (2021 08:19) (70 - 71)  BP: 103/64 (2021 08:19) (103/64 - 136/79)  RR: 15 (2021 08:19) (15 - 15)  SpO2: 97% (2021 08:19) (97% - 99%)  I&O's Summary      PHYSICAL EXAM:  General: NAD, A/O x 3  ENT: MMM  Neck: Supple, No JVD  Lungs: Clear to auscultation bilaterally  Cardio: RRR, S1/S2, No murmurs  Abdomen: Soft, Nontender, Nondistended; Bowel sounds present  Extremities: No calf tenderness, No pitting edema    LABS:                        11.5   6.13  )-----------( 398      ( 2021 07:18 )             37.0           143  |  105  |  16  ----------------------------<  90  3.7   |  26  |  0.90    Ca    9.4      2021 07:18    TPro  7.4  /  Alb  3.0  /  TBili  0.5  /  DBili  x   /  AST  23  /  ALT  19  /  AlkPhos  147       eGFR if Non African American: 62 mL/min/1.73M2 (21 @ 07:18)  eGFR if : 72 mL/min/1.73M2 (21 @ 07:18)                               Urinalysis Basic - ( 2021 02:32 )    Color: Yellow / Appearance: Slightly Turbid / S.010 / pH: x  Gluc: x / Ketone: Negative  / Bili: Negative / Urobili: Negative   Blood: x / Protein: 500 mg/dL / Nitrite: Positive   Leuk Esterase: Moderate / RBC: 0-4 /HPF / WBC 11-25 /HPF   Sq Epi: x / Non Sq Epi: Neg.-Few / Bacteria: TNTC /HPF          RADIOLOGY & ADDITIONAL TESTS:    Care Discussed with Consultants/Other Providers:

## 2021-01-26 NOTE — PROGRESS NOTE ADULT - ATTENDING COMMENTS
Chart reviewed. Patient seen at bedside  No new issues overnight  Pain well controlled  Slow progress in therapy    2. Incision- remove partial staples today    3. Continue rehab program

## 2021-01-26 NOTE — PROGRESS NOTE ADULT - ASSESSMENT
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 Hospital Course complicated by brief SICU stay for mechanical ventilation management, currently extubated. Patient now admitted for a multidisciplinary rehab program on 1/12/21.    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 Jaja Campos  -Continue comprehensive rehab program  -Pain management, bowel regimen per rehab    UTI  - macrobid  - follow up urine culture results    Acute blood loss Anemia, likely from surgery  -H/H stable, continue to monitor   -No overt signs of bleeding    Abdominal pain - likely 2/2 opioid induced constipation   -Bowel regimen per rehab    SLE/RA  -Continue with Plaquenil 200mg po daily    H/o provoked right popliteal, right posterior tibial DVT (7/2019 in setting of hip surgery, already completed >6 months of treatment)   -Follows with cardiologist Dr. Hoyos outpatient   -Treated with Eliquis 5mg po bid chronically -- AC stopped at [facility] as 12/29 LE dopplers neg for DVTs  - currently anticoagulated with lovenox 40mg BID as inpt in acute rehab  - post discharge to continue eliquis as will follow up with Dr. Hoyos as outpt    Essential HTN: stable off anti-hypertensives  -Continue to hold home medication Losartan  - VS per rehab protocol    HLD  -Chronic, continue Lipitor    Hypothyroidism  -Chronic, continue Levothyroxine    VTE ppx:   - Lovenox 40mg BID  GI ppx:   - continue PPI

## 2021-01-27 PROCEDURE — 99232 SBSQ HOSP IP/OBS MODERATE 35: CPT

## 2021-01-27 RX ORDER — CIPROFLOXACIN LACTATE 400MG/40ML
250 VIAL (ML) INTRAVENOUS EVERY 12 HOURS
Refills: 0 | Status: COMPLETED | OUTPATIENT
Start: 2021-01-27 | End: 2021-01-30

## 2021-01-27 RX ADMIN — Medication 650 MILLIGRAM(S): at 12:41

## 2021-01-27 RX ADMIN — LIDOCAINE 1 PATCH: 4 CREAM TOPICAL at 19:25

## 2021-01-27 RX ADMIN — ENOXAPARIN SODIUM 40 MILLIGRAM(S): 100 INJECTION SUBCUTANEOUS at 05:10

## 2021-01-27 RX ADMIN — LIDOCAINE 1 PATCH: 4 CREAM TOPICAL at 12:39

## 2021-01-27 RX ADMIN — SIMETHICONE 80 MILLIGRAM(S): 80 TABLET, CHEWABLE ORAL at 10:08

## 2021-01-27 RX ADMIN — SIMETHICONE 80 MILLIGRAM(S): 80 TABLET, CHEWABLE ORAL at 18:08

## 2021-01-27 RX ADMIN — ATORVASTATIN CALCIUM 20 MILLIGRAM(S): 80 TABLET, FILM COATED ORAL at 21:31

## 2021-01-27 RX ADMIN — Medication 250 MILLIGRAM(S): at 18:02

## 2021-01-27 RX ADMIN — Medication 1 TABLET(S): at 18:02

## 2021-01-27 RX ADMIN — Medication 1 MILLIGRAM(S): at 12:39

## 2021-01-27 RX ADMIN — Medication 1 TABLET(S): at 05:10

## 2021-01-27 RX ADMIN — SENNA PLUS 2 TABLET(S): 8.6 TABLET ORAL at 21:31

## 2021-01-27 RX ADMIN — OXYCODONE HYDROCHLORIDE 10 MILLIGRAM(S): 5 TABLET ORAL at 21:31

## 2021-01-27 RX ADMIN — OXYCODONE HYDROCHLORIDE 10 MILLIGRAM(S): 5 TABLET ORAL at 18:02

## 2021-01-27 RX ADMIN — Medication 200 MILLIGRAM(S): at 16:26

## 2021-01-27 RX ADMIN — POLYETHYLENE GLYCOL 3350 17 GRAM(S): 17 POWDER, FOR SOLUTION ORAL at 12:39

## 2021-01-27 RX ADMIN — OXYCODONE HYDROCHLORIDE 10 MILLIGRAM(S): 5 TABLET ORAL at 10:08

## 2021-01-27 RX ADMIN — Medication 200 MICROGRAM(S): at 05:10

## 2021-01-27 RX ADMIN — OXYCODONE HYDROCHLORIDE 10 MILLIGRAM(S): 5 TABLET ORAL at 05:11

## 2021-01-27 RX ADMIN — Medication 100 MILLIGRAM(S): at 05:10

## 2021-01-27 RX ADMIN — ENOXAPARIN SODIUM 40 MILLIGRAM(S): 100 INJECTION SUBCUTANEOUS at 18:01

## 2021-01-27 NOTE — PROGRESS NOTE ADULT - SUBJECTIVE AND OBJECTIVE BOX
Patient is a 75y old  Female who presents with a chief complaint of Back pain (26 Jan 2021 09:43)      HPI:  75F with past medical history of HTN, HLD, SLE, RA, hypothyroidism, provoked R. popliteal DVT in 2019 on eliquis, chronic low back pain presents to ED for acute on chronic low back pain. Pt has several months of chronic lumbosacral back pain, evaluated by outpatient ortho and referred for physical therapy and pain management. She has been receiving ?vitamin spinal injections. She last received low back inj on 12/16 after which she her low back pain worsened. She took oxycodone 5 mg qd (prescribed in september during a ED visit) and acetaminophen for past three days without relief. She also endorses worsening pain during ambulation with walker and has affected her quality of life. She has constipation, last BM 4 days ago. Otherwise denies fever, chills, N/V, abdominal pain, dysuria, urinary retention, fecal incontinence, saddle anesthesia, fall, LOC, trauma. During ROS she endorsed LLE weakness for several months, no numbness or tingling and has LLE edema >RLE edema, reported undergoing LLE US 4 months ago, was negative for DVT. Currently has 8/10 pain, improved to 5/10 with oxycodone 5 mg x 2 in ED. (28 Dec 2020 11:16)   12/28: Mri T/Ls: IMPRESSION: Degenerative changes  Abnormal signal with associated enhancement is seen involving the endplates adjacent to the L2-3 disc space. There is slight increased T2 prolongation involving the L2-3 disc space with associated widening. While these findings could be compatible with degenerative changes possibly of early discitis and osteomyelitis must be considered.  Patient s/p L1-L5 laminectomy, T10-L5 posterior surgical fusion by Dr. Campos on 1/1/21  HMV x 3 drains. Post op with constipation needing aggressive bowel regimen.     PT/OT and PM&R evaluated patient and recommended Rehab.     Patient medically cleared and admitted to  Rehab on 1/12/21 (12 Jan 2021 15:21)      SUBJECTIVE/INTERVAL EVENTS  Patient seen and assessed at bedside.   Slept well  had BM overnight  Pain well controlled with medication  Denies CP/palpitation/dizziness  Denies dysuria,   Some staples removed yesterday     Vital Signs Last 24 Hrs  T(C): 36.5 (27 Jan 2021 10:37), Max: 36.5 (27 Jan 2021 10:37)  T(F): 97.7 (27 Jan 2021 10:37), Max: 97.7 (27 Jan 2021 10:37)  HR: 71 (27 Jan 2021 10:37) (71 - 80)  BP: 115/60 (27 Jan 2021 10:37) (115/60 - 136/77)  BP(mean): --  RR: 18 (27 Jan 2021 10:37) (15 - 18)  SpO2: 100% (27 Jan 2021 10:37) (98% - 100%)    Gen - NAD, Comfortable  Pulm - CTAB,   Cardiovascular - S1S2,  Abdomen - Soft, NT/ND, +BS  Extremities - No C/C/E, No calf tenderness    Motor -UE 5/5, LE proximal 4/5, distal 5/5     Psychiatric - Mood stable, Affect WNL  Skin: long back incision with staples, c/d/i    Function:  PT: transfers: CGA  Gait: CGA/min assist 55'x2   Commode transfer: min assist   Toileting: mod/max assist     MEDICATIONS  (STANDING):  atorvastatin 20 milliGRAM(s) Oral at bedtime  enoxaparin Injectable 40 milliGRAM(s) SubCutaneous two times a day  folic acid 1 milliGRAM(s) Oral daily  hydroxychloroquine 200 milliGRAM(s) Oral daily  lactobacillus acidophilus 1 Tablet(s) Oral two times a day  levothyroxine 200 MICROGram(s) Oral daily  lidocaine   Patch 1 Patch Transdermal daily  nitrofurantoin monohydrate/macrocrystals (MACROBID) 100 milliGRAM(s) Oral two times a day  oxyCODONE  ER Tablet 10 milliGRAM(s) Oral every 12 hours  polyethylene glycol 3350 17 Gram(s) Oral daily  senna 2 Tablet(s) Oral at bedtime    MEDICATIONS  (PRN):  acetaminophen   Tablet .. 650 milliGRAM(s) Oral every 6 hours PRN Temp greater or equal to 38C (100.4F), Mild Pain (1 - 3)  magnesium hydroxide Suspension 30 milliLiter(s) Oral daily PRN Constipation  melatonin 3 milliGRAM(s) Oral at bedtime PRN Insomnia  oxyCODONE    IR 10 milliGRAM(s) Oral every 4 hours PRN Moderate Pain (4 - 6)  simethicone 80 milliGRAM(s) Chew every 6 hours PRN Upset Stomach          RECENT LABS:    Culture - Urine (01.25.21 @ 04:50)    -  Amikacin: S <=16    -  Amoxicillin/Clavulanic Acid: R >16/8    -  Ampicillin: R >16 These ampicillin results predict results for amoxicillin    -  Ampicillin/Sulbactam: I 16/8 Enterobacter, Citrobacter, and Serratia may develop resistance during prolonged therapy (3-4 days)    -  Aztreonam: S <=4    -  Cefazolin: R >16    -  Cefepime: S <=2    -  Cefoxitin: S <=8    -  Ceftriaxone: S <=1 Enterobacter, Citrobacter, and Serratia may develop resistance during prolonged therapy    -  Ciprofloxacin: S <=0.25    -  Ertapenem: S <=0.5    -  Gentamicin: S <=2    -  Imipenem: S <=1    -  Levofloxacin: S <=0.5    -  Meropenem: S <=1    -  Nitrofurantoin: R >64 Should not be used to treat pyelonephritis    -  Piperacillin/Tazobactam: S <=8    -  Tigecycline: R <=2    -  Tobramycin: S <=2    -  Trimethoprim/Sulfamethoxazole: S <=0.5/9.5    Specimen Source: .Urine Clean Catch (Midstream)    Culture Results:   >100,000 CFU/ml Providencia rettgeri  <10,000 CFU/ml Normal Urogenital aminah present    Organism Identification: Providencia rettgeri    Organism: Providencia rettgeri    Method Type: RUBY                      11.5   6.13  )-----------( 398      ( 25 Jan 2021 07:18 )             37.0     01-25    143  |  105  |  16  ----------------------------<  90  3.7   |  26  |  0.90    Ca    9.4      25 Jan 2021 07:18    TPro  7.4  /  Alb  3.0<L>  /  TBili  0.5  /  DBili  x   /  AST  23  /  ALT  19  /  AlkPhos  147<H>  01-25    LIVER FUNCTIONS - ( 25 Jan 2021 07:18 )  Alb: 3.0 g/dL / Pro: 7.4 g/dL / ALK PHOS: 147 U/L / ALT: 19 U/L / AST: 23 U/L / GGT: x

## 2021-01-27 NOTE — PROGRESS NOTE ADULT - ASSESSMENT
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 Hospital Course complicated by brief SICU stay for mechanical ventilation management, currently extubated. Patient now admitted for a multidisciplinary rehab program on 1/12/21.    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 Jaja Campos  -Continue comprehensive rehab program  -Pain management, bowel regimen per rehab    UTI  - macrobid  - follow up urine culture results    Acute blood loss Anemia, likely from surgery  -H/H stable, continue to monitor   -No overt signs of bleeding    Abdominal pain - likely 2/2 opioid induced constipation   -Bowel regimen per rehab    SLE/RA  -Continue with Plaquenil 200mg po daily    H/o provoked right popliteal, right posterior tibial DVT (7/2019 in setting of hip surgery, already completed >6 months of treatment)  -Follows with cardiologist Dr. Hoyos outpatient  -Treated with Eliquis 5mg po bid chronically -- AC stopped at [facility] as 12/29 LE dopplers neg for DVTs  - currently anticoagulated with lovenox 40mg BID as inpt in acute rehab  - post discharge to continue eliquis as will follow up with Dr. 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 Hospital Course complicated by brief SICU stay for mechanical ventilation management, currently extubated. Patient now admitted for a multidisciplinary rehab program on 1/12/21.    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 Jaja Campos  -Continue comprehensive rehab program  -Pain management, bowel regimen per rehab    UTI  - urine culture shows >100K Providencia Rettgeri - macrobid resistant  - will initiate cipro 250mg q12 x 3 days    Acute blood loss Anemia, likely from surgery  -H/H stable, continue to monitor   -No overt signs of bleeding    Abdominal pain - likely 2/2 opioid induced constipation   -Bowel regimen per rehab    SLE/RA  -Continue with Plaquenil 200mg po daily    H/o provoked right popliteal, right posterior tibial DVT (7/2019 in setting of hip surgery, already completed >6 months of treatment)  -Follows with cardiologist Dr. Hoyos outpatient  -Treated with Eliquis 5mg po bid chronically -- AC stopped at [facility] as 12/29 LE dopplers neg for DVTs  - currently anticoagulated with lovenox 40mg BID as inpt in acute rehab  - post discharge to continue eliquis as will follow up with Dr. Hoyos as outpt    Essential HTN: stable off anti-hypertensives  -Continue to hold home medication Losartan  - VS per rehab protocol    HLD  -Chronic, continue Lipitor    Hypothyroidism  -Chronic, continue Levothyroxine    VTE ppx:   - Lovenox 40mg BID  GI ppx:   - continue PPI

## 2021-01-27 NOTE — PROGRESS NOTE ADULT - ASSESSMENT
Assessment/Plan:  KAI THOMAS is a 75y F with a history of HTN, HLD, SLE, RA, hypothyroidism, provoked R. 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 Hospital Course complicated by brief SICU stay for mechanical ventilation management, now extubated, also c/bpain control and constipation. Patient now admitted for a multidisciplinary rehab program. 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 Jaja Campos  -  Comprehensive Rehab Program of PT/OT- 3 hrs/day 5 days/week  -   SLE/RA:- c/w plaquenil 200mg po daily    h/o Provoked DVT in 2019  - Follows with vascular cardiologist Dr. Hoyos outpatient, recommended c/w Eliquis for now.  - US July 2019:  DVT noted in the right popliteal vein and visualized segment of the right  posterior tibial vein.  - provoked DVT July 2019 in setting of hip surgery, already >6 months of treatment,   - was on eliquis 5mg po bid at home,   - on lovenox for dvt ppx while admitted- Resume eliquis at discharge.  -   HTN:- monitor BP , currently stable off meds   - takes losartan 50mg po qd at home    HLD:- c/w lipitor 20mg po daily    hypothyroidism:- c/w levothyroxine 200mcg po daily    UTI:- C/w Macrobid.( started 1/13) Improved symptomatically .   - UCx: providencia rettgari- resistant to macrobid- will discuss with hospitalist     Sleep:- melatonin 3mg PRN    Pain Mgmt :- Tylenol PRN mild pain,   - Oxycodone 10mg IR PRN moderate 4-7/10 pain  - Oxycodone 15mg IR prn severe 8-10/10 pain  Oxycontin ER BID     GI/Bowel Mgmt/ constipation: -c/w Senna, miralax daily, MOM prn    /Bladder Mgmt: toileting Q2hr when awake    FEN :- Diet - Regular     Precautions / PROPHYLAXIS:   - Falls, Spinal,   - DVT prophylaxis : Lovenox    Hospitalist fabian noted    Disp: ? home 2/2/21. based on progress. Patient lives alone     -

## 2021-01-27 NOTE — PROGRESS NOTE ADULT - SUBJECTIVE AND OBJECTIVE BOX
Patient is a 75y old  Female who presents with a chief complaint of Back pain (2021 11:57)      Patient seen and examined at bedside.    ALLERGIES:  No Known Allergies    MEDICATIONS  (STANDING):  atorvastatin 20 milliGRAM(s) Oral at bedtime  enoxaparin Injectable 40 milliGRAM(s) SubCutaneous two times a day  folic acid 1 milliGRAM(s) Oral daily  hydroxychloroquine 200 milliGRAM(s) Oral daily  lactobacillus acidophilus 1 Tablet(s) Oral two times a day  levothyroxine 200 MICROGram(s) Oral daily  lidocaine   Patch 1 Patch Transdermal daily  nitrofurantoin monohydrate/macrocrystals (MACROBID) 100 milliGRAM(s) Oral two times a day  oxyCODONE  ER Tablet 10 milliGRAM(s) Oral every 12 hours  polyethylene glycol 3350 17 Gram(s) Oral daily  senna 2 Tablet(s) Oral at bedtime    MEDICATIONS  (PRN):  acetaminophen   Tablet .. 650 milliGRAM(s) Oral every 6 hours PRN Temp greater or equal to 38C (100.4F), Mild Pain (1 - 3)  magnesium hydroxide Suspension 30 milliLiter(s) Oral daily PRN Constipation  melatonin 3 milliGRAM(s) Oral at bedtime PRN Insomnia  oxyCODONE    IR 10 milliGRAM(s) Oral every 4 hours PRN Moderate Pain (4 - 6)  simethicone 80 milliGRAM(s) Chew every 6 hours PRN Upset Stomach    Vital Signs Last 24 Hrs  T(F): 97.6 (2021 19:34), Max: 97.6 (2021 19:34)  HR: 80 (2021 19:34) (80 - 80)  BP: 136/77 (2021 19:34) (136/77 - 136/77)  RR: 15 (2021 19:34) (15 - 15)  SpO2: 98% (2021 19:34) (98% - 98%)  I&O's Summary      PHYSICAL EXAM:  General: NAD, A/O x 3  ENT: MMM  Neck: Supple, No JVD  Lungs: Clear to auscultation bilaterally  Cardio: RRR, S1/S2, No murmurs  Abdomen: Soft, Nontender, Nondistended; Bowel sounds present  Extremities: No calf tenderness, No pitting edema    LABS:                        11.5   6.13  )-----------( 398      ( 2021 07:18 )             37.0           143  |  105  |  16  ----------------------------<  90  3.7   |  26  |  0.90    Ca    9.4      2021 07:18    TPro  7.4  /  Alb  3.0  /  TBili  0.5  /  DBili  x   /  AST  23  /  ALT  19  /  AlkPhos  147       eGFR if Non African American: 62 mL/min/1.73M2 (21 @ 07:18)  eGFR if : 72 mL/min/1.73M2 (21 @ 07:18)                               Urinalysis Basic - ( 2021 02:32 )    Color: Yellow / Appearance: Slightly Turbid / S.010 / pH: x  Gluc: x / Ketone: Negative  / Bili: Negative / Urobili: Negative   Blood: x / Protein: 500 mg/dL / Nitrite: Positive   Leuk Esterase: Moderate / RBC: 0-4 /HPF / WBC 11-25 /HPF   Sq Epi: x / Non Sq Epi: Neg.-Few / Bacteria: TNTC /HPF        Culture - Urine (collected 2021 04:50)  Source: .Urine Clean Catch (Midstream)  Final Report (2021 07:10):    >100,000 CFU/ml Providencia rettgeri    <10,000 CFU/ml Normal Urogenital aminah present  Organism: Providencia rettgeri (2021 07:10)  Organism: Providencia rettgeri (2021 07:10)      -  Amikacin: S <=16      -  Amoxicillin/Clavulanic Acid: R >16/8      -  Ampicillin: R >16 These ampicillin results predict results for amoxicillin      -  Ampicillin/Sulbactam: I 16 Enterobacter, Citrobacter, and Serratia may develop resistance during prolonged therapy (3-4 days)      -  Aztreonam: S <=4      -  Cefazolin: R >16      -  Cefepime: S <=2      -  Cefoxitin: S <=8      -  Ceftriaxone: S <=1 Enterobacter, Citrobacter, and Serratia may develop resistance during prolonged therapy      -  Ciprofloxacin: S <=0.25      -  Ertapenem: S <=0.5      -  Gentamicin: S <=2      -  Imipenem: S <=1      -  Levofloxacin: S <=0.5      -  Meropenem: S <=1      -  Nitrofurantoin: R >64 Should not be used to treat pyelonephritis      -  Piperacillin/Tazobactam: S <=8      -  Tigecycline: R <=2      -  Tobramycin: S <=2      -  Trimethoprim/Sulfamethoxazole: S <=0.5/9.5      Method Type: RUBY        RADIOLOGY & ADDITIONAL TESTS:    Care Discussed with Consultants/Other Providers:

## 2021-01-28 ENCOUNTER — TRANSCRIPTION ENCOUNTER (OUTPATIENT)
Age: 76
End: 2021-01-28

## 2021-01-28 LAB
ALBUMIN SERPL ELPH-MCNC: 2.6 G/DL — LOW (ref 3.3–5)
ALP SERPL-CCNC: 131 U/L — HIGH (ref 40–120)
ALT FLD-CCNC: 13 U/L — SIGNIFICANT CHANGE UP (ref 10–45)
ANION GAP SERPL CALC-SCNC: 9 MMOL/L — SIGNIFICANT CHANGE UP (ref 5–17)
AST SERPL-CCNC: 23 U/L — SIGNIFICANT CHANGE UP (ref 10–40)
BASOPHILS # BLD AUTO: 0.08 K/UL — SIGNIFICANT CHANGE UP (ref 0–0.2)
BASOPHILS NFR BLD AUTO: 1.7 % — SIGNIFICANT CHANGE UP (ref 0–2)
BILIRUB SERPL-MCNC: 0.4 MG/DL — SIGNIFICANT CHANGE UP (ref 0.2–1.2)
BUN SERPL-MCNC: 16 MG/DL — SIGNIFICANT CHANGE UP (ref 7–23)
CALCIUM SERPL-MCNC: 9.1 MG/DL — SIGNIFICANT CHANGE UP (ref 8.4–10.5)
CHLORIDE SERPL-SCNC: 107 MMOL/L — SIGNIFICANT CHANGE UP (ref 96–108)
CO2 SERPL-SCNC: 26 MMOL/L — SIGNIFICANT CHANGE UP (ref 22–31)
CREAT SERPL-MCNC: 0.86 MG/DL — SIGNIFICANT CHANGE UP (ref 0.5–1.3)
EOSINOPHIL # BLD AUTO: 0.25 K/UL — SIGNIFICANT CHANGE UP (ref 0–0.5)
EOSINOPHIL NFR BLD AUTO: 5.3 % — SIGNIFICANT CHANGE UP (ref 0–6)
GLUCOSE SERPL-MCNC: 94 MG/DL — SIGNIFICANT CHANGE UP (ref 70–99)
HCT VFR BLD CALC: 34.7 % — SIGNIFICANT CHANGE UP (ref 34.5–45)
HGB BLD-MCNC: 10.8 G/DL — LOW (ref 11.5–15.5)
IMM GRANULOCYTES NFR BLD AUTO: 0.4 % — SIGNIFICANT CHANGE UP (ref 0–1.5)
LYMPHOCYTES # BLD AUTO: 1.13 K/UL — SIGNIFICANT CHANGE UP (ref 1–3.3)
LYMPHOCYTES # BLD AUTO: 24.1 % — SIGNIFICANT CHANGE UP (ref 13–44)
MCHC RBC-ENTMCNC: 29.7 PG — SIGNIFICANT CHANGE UP (ref 27–34)
MCHC RBC-ENTMCNC: 31.1 GM/DL — LOW (ref 32–36)
MCV RBC AUTO: 95.3 FL — SIGNIFICANT CHANGE UP (ref 80–100)
MONOCYTES # BLD AUTO: 0.77 K/UL — SIGNIFICANT CHANGE UP (ref 0–0.9)
MONOCYTES NFR BLD AUTO: 16.4 % — HIGH (ref 2–14)
NEUTROPHILS # BLD AUTO: 2.44 K/UL — SIGNIFICANT CHANGE UP (ref 1.8–7.4)
NEUTROPHILS NFR BLD AUTO: 52.1 % — SIGNIFICANT CHANGE UP (ref 43–77)
NRBC # BLD: 0 /100 WBCS — SIGNIFICANT CHANGE UP (ref 0–0)
PLATELET # BLD AUTO: 329 K/UL — SIGNIFICANT CHANGE UP (ref 150–400)
POTASSIUM SERPL-MCNC: 3.7 MMOL/L — SIGNIFICANT CHANGE UP (ref 3.5–5.3)
POTASSIUM SERPL-SCNC: 3.7 MMOL/L — SIGNIFICANT CHANGE UP (ref 3.5–5.3)
PROT SERPL-MCNC: 6.7 G/DL — SIGNIFICANT CHANGE UP (ref 6–8.3)
RBC # BLD: 3.64 M/UL — LOW (ref 3.8–5.2)
RBC # FLD: 14.5 % — SIGNIFICANT CHANGE UP (ref 10.3–14.5)
SODIUM SERPL-SCNC: 142 MMOL/L — SIGNIFICANT CHANGE UP (ref 135–145)
WBC # BLD: 4.69 K/UL — SIGNIFICANT CHANGE UP (ref 3.8–10.5)
WBC # FLD AUTO: 4.69 K/UL — SIGNIFICANT CHANGE UP (ref 3.8–10.5)

## 2021-01-28 PROCEDURE — 99232 SBSQ HOSP IP/OBS MODERATE 35: CPT

## 2021-01-28 PROCEDURE — 99232 SBSQ HOSP IP/OBS MODERATE 35: CPT | Mod: CS,GC

## 2021-01-28 RX ADMIN — Medication 200 MICROGRAM(S): at 06:12

## 2021-01-28 RX ADMIN — ENOXAPARIN SODIUM 40 MILLIGRAM(S): 100 INJECTION SUBCUTANEOUS at 06:12

## 2021-01-28 RX ADMIN — Medication 250 MILLIGRAM(S): at 17:28

## 2021-01-28 RX ADMIN — ATORVASTATIN CALCIUM 20 MILLIGRAM(S): 80 TABLET, FILM COATED ORAL at 20:12

## 2021-01-28 RX ADMIN — OXYCODONE HYDROCHLORIDE 10 MILLIGRAM(S): 5 TABLET ORAL at 06:12

## 2021-01-28 RX ADMIN — Medication 1 TABLET(S): at 06:12

## 2021-01-28 RX ADMIN — Medication 200 MILLIGRAM(S): at 12:16

## 2021-01-28 RX ADMIN — Medication 250 MILLIGRAM(S): at 06:12

## 2021-01-28 RX ADMIN — OXYCODONE HYDROCHLORIDE 10 MILLIGRAM(S): 5 TABLET ORAL at 22:06

## 2021-01-28 RX ADMIN — SIMETHICONE 80 MILLIGRAM(S): 80 TABLET, CHEWABLE ORAL at 18:38

## 2021-01-28 RX ADMIN — LIDOCAINE 1 PATCH: 4 CREAM TOPICAL at 12:16

## 2021-01-28 RX ADMIN — ENOXAPARIN SODIUM 40 MILLIGRAM(S): 100 INJECTION SUBCUTANEOUS at 17:28

## 2021-01-28 RX ADMIN — Medication 650 MILLIGRAM(S): at 11:14

## 2021-01-28 RX ADMIN — SENNA PLUS 2 TABLET(S): 8.6 TABLET ORAL at 20:12

## 2021-01-28 RX ADMIN — OXYCODONE HYDROCHLORIDE 10 MILLIGRAM(S): 5 TABLET ORAL at 18:01

## 2021-01-28 RX ADMIN — OXYCODONE HYDROCHLORIDE 10 MILLIGRAM(S): 5 TABLET ORAL at 08:34

## 2021-01-28 RX ADMIN — OXYCODONE HYDROCHLORIDE 10 MILLIGRAM(S): 5 TABLET ORAL at 13:03

## 2021-01-28 RX ADMIN — POLYETHYLENE GLYCOL 3350 17 GRAM(S): 17 POWDER, FOR SOLUTION ORAL at 12:14

## 2021-01-28 RX ADMIN — LIDOCAINE 1 PATCH: 4 CREAM TOPICAL at 19:30

## 2021-01-28 RX ADMIN — SIMETHICONE 80 MILLIGRAM(S): 80 TABLET, CHEWABLE ORAL at 13:05

## 2021-01-28 RX ADMIN — LIDOCAINE 1 PATCH: 4 CREAM TOPICAL at 00:25

## 2021-01-28 RX ADMIN — Medication 1 MILLIGRAM(S): at 12:16

## 2021-01-28 RX ADMIN — Medication 1 TABLET(S): at 17:28

## 2021-01-28 NOTE — PROGRESS NOTE ADULT - ASSESSMENT
Assessment/Plan:  KAI THOMAS is a 75y F with a history of HTN, HLD, SLE, RA, hypothyroidism, provoked R. 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 Hospital Course complicated by brief SICU stay for mechanical ventilation management, now extubated, also c/bpain control and constipation. Patient now admitted for a multidisciplinary rehab program. 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 Jaja Campos  -  Comprehensive Rehab Program of PT/OT- 3 hrs/day 5 days/week  -   SLE/RA:- c/w plaquenil 200mg po daily    h/o Provoked DVT in 2019  - Follows with vascular cardiologist Dr. Hoyos outpatient, recommended c/w Eliquis for now.  - US July 2019:  DVT noted in the right popliteal vein and visualized segment of the right  posterior tibial vein.  - provoked DVT July 2019 in setting of hip surgery, already >6 months of treatment,   - was on eliquis 5mg po bid at home,   - on lovenox for dvt ppx while admitted- Resume eliquis at discharge.  -   HTN:- monitor BP , currently stable off meds   - takes losartan 50mg po qd at home    HLD:- c/w lipitor 20mg po daily    hypothyroidism:- c/w levothyroxine 200mcg po daily    UTI:- C/w Macrobid.( started 1/13) Improved symptomatically .   - UCx: providencia rettgari- resistant to macrobid  - Started on Cipro for 3 day course    Sleep:- melatonin 3mg PRN    Pain Mgmt :- Tylenol PRN mild pain,   - Oxycodone 10mg IR PRN moderate 4-7/10 pain  - Oxycodone 15mg IR prn severe 8-10/10 pain  Oxycontin ER BID     GI/Bowel Mgmt/ constipation: -c/w Senna, miralax daily, MOM prn    /Bladder Mgmt: toileting Q2hr when awake    FEN :- Diet - Regular     Precautions / PROPHYLAXIS:   - Falls, Spinal,   - DVT prophylaxis : Lovenox    Hospitalist fabian noted    Dispo discussed 1/28/21: ? home  vs [facility] 2/2/21 based on progress. Patient lives alone   - OT: modA bathing, mod/maxA lower body dressing, maxA toileting, CG/Jill toilet transfers  - PT: CG/Jill due to fatigue, gait 55' RW with cueing for posture  - Barriers: self care, likely close to baseline, but now with spine precautions  - SW had conversation with daughter and patient, both of whom refused [facility] but still given information for [facility]. Patient cannot go home to daughter. SW to have another conversation with daughter regarding [facility] and possible estimate of stay.

## 2021-01-28 NOTE — DISCHARGE NOTE PROVIDER - PROVIDER TOKENS
PROVIDER:[TOKEN:[[id number]]],PROVIDER:[TOKEN:[[id number]]] PROVIDER:[TOKEN:[[id number]]],PROVIDER:[TOKEN:[[id number]],FOLLOWUP:[1 month]],PROVIDER:[TOKEN:[[id number]],FOLLOWUP:[1 month]],PROVIDER:[TOKEN:[[id number]],FOLLOWUP:[2 weeks]]

## 2021-01-28 NOTE — DISCHARGE NOTE PROVIDER - CARE PROVIDER_API CALL
Jaja Campos)  [facility] Neurosurgery  General  [address], [address]  [place] [address]  Phone: [phone]  Fax: [phone]  Follow Up Time:     Chloe Ballard)  Brain Injury Medicine; PhysicalRehab Medicine  [address] [place] [address]  Phone: [phone]  Fax: [phone]  Follow Up Time:    Jaja Campos)  [facility] Neurosurgery  General  [address], [address]  [place] [address]  Phone: [phone]  Fax: [phone]  Follow Up Time:     Cale Mckenzie)  Critical Care Medicine  [address], [address]  [place] [address]  Phone: [phone]  Fax: [phone]  Follow Up Time: 1 month    Cami Brower)  Brain Injury Medicine; PhysicalRehab Medicine  [address] [place] [address]  Phone: [phone]  Fax: [phone]  Follow Up Time: 1 month    Jethro Hoyos (DO)  Cardiovascular Disease; Internal Medicine; Nuclear Cardiology  [address] [place] [address]  Phone: [phone]  Fax: [phone]  Follow Up Time: 2 weeks

## 2021-01-28 NOTE — PROGRESS NOTE ADULT - SUBJECTIVE AND OBJECTIVE BOX
Patient is a 75y old  Female who presents with a chief complaint of Back pain (27 Jan 2021 11:46)      Patient seen and examined at bedside.    ALLERGIES:  No Known Allergies    MEDICATIONS  (STANDING):  atorvastatin 20 milliGRAM(s) Oral at bedtime  ciprofloxacin     Tablet 250 milliGRAM(s) Oral every 12 hours  enoxaparin Injectable 40 milliGRAM(s) SubCutaneous two times a day  folic acid 1 milliGRAM(s) Oral daily  hydroxychloroquine 200 milliGRAM(s) Oral daily  lactobacillus acidophilus 1 Tablet(s) Oral two times a day  levothyroxine 200 MICROGram(s) Oral daily  lidocaine   Patch 1 Patch Transdermal daily  oxyCODONE  ER Tablet 10 milliGRAM(s) Oral every 12 hours  polyethylene glycol 3350 17 Gram(s) Oral daily  senna 2 Tablet(s) Oral at bedtime    MEDICATIONS  (PRN):  acetaminophen   Tablet .. 650 milliGRAM(s) Oral every 6 hours PRN Temp greater or equal to 38C (100.4F), Mild Pain (1 - 3)  magnesium hydroxide Suspension 30 milliLiter(s) Oral daily PRN Constipation  melatonin 3 milliGRAM(s) Oral at bedtime PRN Insomnia  oxyCODONE    IR 10 milliGRAM(s) Oral every 4 hours PRN Moderate Pain (4 - 6)  simethicone 80 milliGRAM(s) Chew every 6 hours PRN Upset Stomach    Vital Signs Last 24 Hrs  T(F): 98.7 (28 Jan 2021 08:58), Max: 98.7 (28 Jan 2021 08:58)  HR: 78 (28 Jan 2021 08:58) (71 - 78)  BP: 128/73 (28 Jan 2021 08:58) (111/66 - 128/73)  RR: 17 (28 Jan 2021 08:58) (17 - 18)  SpO2: 95% (28 Jan 2021 08:58) (95% - 100%)  I&O's Summary      PHYSICAL EXAM:  General: NAD, A/O x 3  ENT: MMM  Neck: Supple, No JVD  Lungs: Clear to auscultation bilaterally  Cardio: RRR, S1/S2, No murmurs  Abdomen: Soft, Nontender, Nondistended; Bowel sounds present  Extremities: No calf tenderness, No pitting edema    LABS:                        10.8   4.69  )-----------( 329      ( 28 Jan 2021 06:15 )             34.7       01-28    142  |  107  |  16  ----------------------------<  94  3.7   |  26  |  0.86    Ca    9.1      28 Jan 2021 06:15    TPro  6.7  /  Alb  2.6  /  TBili  0.4  /  DBili  x   /  AST  23  /  ALT  13  /  AlkPhos  131  01-28     eGFR if Non African American: 65 mL/min/1.73M2 (01-28-21 @ 06:15)  eGFR if African American: 76 mL/min/1.73M2 (01-28-21 @ 06:15)                                   Culture - Urine (collected 25 Jan 2021 04:50)  Source: .Urine Clean Catch (Midstream)  Final Report (27 Jan 2021 07:10):    >100,000 CFU/ml Providencia rettgeri    <10,000 CFU/ml Normal Urogenital aminah present  Organism: Providencia rettgeri (27 Jan 2021 07:10)  Organism: Providencia rettgeri (27 Jan 2021 07:10)      -  Amikacin: S <=16      -  Amoxicillin/Clavulanic Acid: R >16/8      -  Ampicillin: R >16 These ampicillin results predict results for amoxicillin      -  Ampicillin/Sulbactam: I 16/8 Enterobacter, Citrobacter, and Serratia may develop resistance during prolonged therapy (3-4 days)      -  Aztreonam: S <=4      -  Cefazolin: R >16      -  Cefepime: S <=2      -  Cefoxitin: S <=8      -  Ceftriaxone: S <=1 Enterobacter, Citrobacter, and Serratia may develop resistance during prolonged therapy      -  Ciprofloxacin: S <=0.25      -  Ertapenem: S <=0.5      -  Gentamicin: S <=2      -  Imipenem: S <=1      -  Levofloxacin: S <=0.5      -  Meropenem: S <=1      -  Nitrofurantoin: R >64 Should not be used to treat pyelonephritis      -  Piperacillin/Tazobactam: S <=8      -  Tigecycline: R <=2      -  Tobramycin: S <=2      -  Trimethoprim/Sulfamethoxazole: S <=0.5/9.5      Method Type: RUBY        RADIOLOGY & ADDITIONAL TESTS:    Care Discussed with Consultants/Other Providers:

## 2021-01-28 NOTE — PROGRESS NOTE ADULT - SUBJECTIVE AND OBJECTIVE BOX
Patient is a 75y old  Female who presents with a chief complaint of Back pain (28 Jan 2021 09:51)      HPI:  75F with past medical history of HTN, HLD, SLE, RA, hypothyroidism, provoked R. popliteal DVT in 2019 on eliquis, chronic low back pain presents to ED for acute on chronic low back pain. Pt has several months of chronic lumbosacral back pain, evaluated by outpatient ortho and referred for physical therapy and pain management. She has been receiving ?vitamin spinal injections. She last received low back inj on 12/16 after which she her low back pain worsened. She took oxycodone 5 mg qd (prescribed in september during a ED visit) and acetaminophen for past three days without relief. She also endorses worsening pain during ambulation with walker and has affected her quality of life. She has constipation, last BM 4 days ago. Otherwise denies fever, chills, N/V, abdominal pain, dysuria, urinary retention, fecal incontinence, saddle anesthesia, fall, LOC, trauma. During ROS she endorsed LLE weakness for several months, no numbness or tingling and has LLE edema >RLE edema, reported undergoing LLE US 4 months ago, was negative for DVT. Currently has 8/10 pain, improved to 5/10 with oxycodone 5 mg x 2 in ED. (28 Dec 2020 11:16)   12/28: Mri T/Ls: IMPRESSION: Degenerative changes  Abnormal signal with associated enhancement is seen involving the endplates adjacent to the L2-3 disc space. There is slight increased T2 prolongation involving the L2-3 disc space with associated widening. While these findings could be compatible with degenerative changes possibly of early discitis and osteomyelitis must be considered.  Patient s/p L1-L5 laminectomy, T10-L5 posterior surgical fusion by Dr. Campos on 1/1/21  HMV x 3 drains. Post op with constipation needing aggressive bowel regimen.     PT/OT and PM&R evaluated patient and recommended Rehab.     Patient medically cleared and admitted to  Rehab on 1/12/21 (12 Jan 2021 15:21)      SUBJECTIVE/INTERVAL EVENTS  Patient seen and assessed at bedside. No acute events overnight. Patient had BM today. She slept well and pain is overall controlled. Denies radicular pain, headache.      PAST MEDICAL & SURGICAL HISTORY:  HTN (hypertension)    DVT (deep venous thrombosis)    Hypothyroidism    RA (rheumatoid arthritis)    Obesity, Class II, BMI 35-39.9, no comorbidity    Hypothyroid    Benign heart murmur    Hyperlipidemia    Hypertension    H/O degenerative disc disease    Osteoarthritis    SLE (systemic lupus erythematosus)    Rheumatoid arthritis    S/P knee replacement    Status post total hip replacement, right    S/P thyroid surgery  1 lobe removed    Lung cancer  small tumor removed 2015    S/P knee surgery  bilateral    S/P carpal tunnel release  left    S/P eye surgery  child    S/P cataract surgery  left    S/P tonsillectomy        Allergies    No Known Allergies    Intolerances          VITALS  75y  Vital Signs Last 24 Hrs  T(C): 37.1 (28 Jan 2021 08:58), Max: 37.1 (28 Jan 2021 08:58)  T(F): 98.7 (28 Jan 2021 08:58), Max: 98.7 (28 Jan 2021 08:58)  HR: 78 (28 Jan 2021 08:58) (74 - 78)  BP: 128/73 (28 Jan 2021 08:58) (111/66 - 128/73)  BP(mean): --  RR: 17 (28 Jan 2021 08:58) (17 - 17)  SpO2: 95% (28 Jan 2021 08:58) (95% - 98%)  Daily       Gen - NAD, Comfortable  Pulm - CTAB,   Cardiovascular - S1S2,  Abdomen - Soft, NT/ND, +BS  Extremities - No C/C/E, No calf tenderness    Motor -UE 5/5, LE proximal 4/5, distal 5/5     Psychiatric - Mood stable, Affect WNL  Skin: long back incision with staples - some removed, c/d/i      RECENT LABS:                          10.8   4.69  )-----------( 329      ( 28 Jan 2021 06:15 )             34.7     01-28    142  |  107  |  16  ----------------------------<  94  3.7   |  26  |  0.86    Ca    9.1      28 Jan 2021 06:15    TPro  6.7  /  Alb  2.6<L>  /  TBili  0.4  /  DBili  x   /  AST  23  /  ALT  13  /  AlkPhos  131<H>  01-28    LIVER FUNCTIONS - ( 28 Jan 2021 06:15 )  Alb: 2.6 g/dL / Pro: 6.7 g/dL / ALK PHOS: 131 U/L / ALT: 13 U/L / AST: 23 U/L / GGT: x                 Culture - Urine (collected 01-25-21 @ 04:50)  Source: .Urine Clean Catch (Midstream)  Final Report (01-27-21 @ 07:10):    >100,000 CFU/ml Providencia rettgeri    <10,000 CFU/ml Normal Urogenital aminah present  Organism: Providencia rettgeri (01-27-21 @ 07:10)  Organism: Providencia rettgeri (01-27-21 @ 07:10)      -  Amikacin: S <=16      -  Amoxicillin/Clavulanic Acid: R >16/8      -  Ampicillin: R >16 These ampicillin results predict results for amoxicillin      -  Ampicillin/Sulbactam: I 16/8 Enterobacter, Citrobacter, and Serratia may develop resistance during prolonged therapy (3-4 days)      -  Aztreonam: S <=4      -  Cefazolin: R >16      -  Cefepime: S <=2      -  Cefoxitin: S <=8      -  Ceftriaxone: S <=1 Enterobacter, Citrobacter, and Serratia may develop resistance during prolonged therapy      -  Ciprofloxacin: S <=0.25      -  Ertapenem: S <=0.5      -  Gentamicin: S <=2      -  Imipenem: S <=1      -  Levofloxacin: S <=0.5      -  Meropenem: S <=1      -  Nitrofurantoin: R >64 Should not be used to treat pyelonephritis      -  Piperacillin/Tazobactam: S <=8      -  Tigecycline: R <=2      -  Tobramycin: S <=2      -  Trimethoprim/Sulfamethoxazole: S <=0.5/9.5      Method Type: RUBY      MEDICATIONS  (STANDING):  atorvastatin 20 milliGRAM(s) Oral at bedtime  ciprofloxacin     Tablet 250 milliGRAM(s) Oral every 12 hours  enoxaparin Injectable 40 milliGRAM(s) SubCutaneous two times a day  folic acid 1 milliGRAM(s) Oral daily  hydroxychloroquine 200 milliGRAM(s) Oral daily  lactobacillus acidophilus 1 Tablet(s) Oral two times a day  levothyroxine 200 MICROGram(s) Oral daily  lidocaine   Patch 1 Patch Transdermal daily  oxyCODONE  ER Tablet 10 milliGRAM(s) Oral every 12 hours  polyethylene glycol 3350 17 Gram(s) Oral daily  senna 2 Tablet(s) Oral at bedtime    MEDICATIONS  (PRN):  acetaminophen   Tablet .. 650 milliGRAM(s) Oral every 6 hours PRN Temp greater or equal to 38C (100.4F), Mild Pain (1 - 3)  magnesium hydroxide Suspension 30 milliLiter(s) Oral daily PRN Constipation  melatonin 3 milliGRAM(s) Oral at bedtime PRN Insomnia  oxyCODONE    IR 10 milliGRAM(s) Oral every 4 hours PRN Moderate Pain (4 - 6)  simethicone 80 milliGRAM(s) Chew every 6 hours PRN Upset Stomach

## 2021-01-28 NOTE — PROGRESS NOTE ADULT - ATTENDING COMMENTS
Chart reviewed. Patient seen at bedside.  Seated in chair  No new issues overnight  Back pain well controlled.   Back incision - clean - Remove some staples today.   Continue abx for UTI     2. Labs stable    3. Rehab progress reviewed at team conference today: progressing with functional activity, however will need assistance with some ADLs.   TDD 2/2

## 2021-01-28 NOTE — PROGRESS NOTE ADULT - ASSESSMENT
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 Hospital Course complicated by brief SICU stay for mechanical ventilation management, currently extubated. Patient now admitted for a multidisciplinary rehab program on 1/12/21.    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 Jaja Campos  -Continue comprehensive rehab program  -Pain management, bowel regimen per rehab    UTI  - urine culture shows >100K Providencia Rettgeri - macrobid resistant  - will initiate cipro 250mg q12 x 3 days    Acute blood loss Anemia, likely from surgery  -H/H stable, continue to monitor   -No overt signs of bleeding    Abdominal pain - likely 2/2 opioid induced constipation   -Bowel regimen per rehab    SLE/RA  -Continue with Plaquenil 200mg po daily    H/o provoked right popliteal, right posterior tibial DVT (7/2019 in setting of hip surgery, already completed >6 months of treatment)  -Follows with cardiologist Dr. Hoyos outpatient  -Treated with Eliquis 5mg po bid chronically -- AC stopped at [facility] as 12/29 LE dopplers neg for DVTs  - currently anticoagulated with lovenox 40mg BID as inpt in acute rehab  - post discharge to continue eliquis as will follow up with Dr. Hoyos as outpt    Essential HTN: stable off anti-hypertensives  -Continue to hold home medication Losartan  - VS per rehab protocol    HLD  -Chronic, continue Lipitor    Hypothyroidism  -Chronic, continue Levothyroxine    VTE ppx:   - Lovenox 40mg BID  GI ppx:   - continue PPI

## 2021-01-28 NOTE — DISCHARGE NOTE PROVIDER - DETAILS OF MALNUTRITION DIAGNOSIS/DIAGNOSES
This patient has been assessed with a concern for Malnutrition and was treated during this hospitalization for the following Nutrition diagnosis/diagnoses:     -  01/14/2021: Morbid obesity (BMI > 40)   This patient has been assessed with a concern for Malnutrition and was treated during this hospitalization for the following Nutrition diagnosis/diagnoses:     -  01/14/2021: Morbid obesity (BMI > 40)    This patient has been assessed with a concern for Malnutrition and was treated during this hospitalization for the following Nutrition diagnosis/diagnoses:     -  01/14/2021: Morbid obesity (BMI > 40)   This patient has been assessed with a concern for Malnutrition and was treated during this hospitalization for the following Nutrition diagnosis/diagnoses:     -  01/14/2021: Morbid obesity (BMI > 40)    This patient has been assessed with a concern for Malnutrition and was treated during this hospitalization for the following Nutrition diagnosis/diagnoses:     -  01/14/2021: Morbid obesity (BMI > 40)    This patient has been assessed with a concern for Malnutrition and was treated during this hospitalization for the following Nutrition diagnosis/diagnoses:     -  01/14/2021: Morbid obesity (BMI > 40)   This patient has been assessed with a concern for Malnutrition and was treated during this hospitalization for the following Nutrition diagnosis/diagnoses:     -  01/14/2021: Morbid obesity (BMI > 40)    This patient has been assessed with a concern for Malnutrition and was treated during this hospitalization for the following Nutrition diagnosis/diagnoses:     -  01/14/2021: Morbid obesity (BMI > 40)    This patient has been assessed with a concern for Malnutrition and was treated during this hospitalization for the following Nutrition diagnosis/diagnoses:     -  01/14/2021: Morbid obesity (BMI > 40)    This patient has been assessed with a concern for Malnutrition and was treated during this hospitalization for the following Nutrition diagnosis/diagnoses:     -  01/14/2021: Morbid obesity (BMI > 40)   This patient has been assessed with a concern for Malnutrition and was treated during this hospitalization for the following Nutrition diagnosis/diagnoses:     -  01/14/2021: Morbid obesity (BMI > 40)    This patient has been assessed with a concern for Malnutrition and was treated during this hospitalization for the following Nutrition diagnosis/diagnoses:     -  01/14/2021: Morbid obesity (BMI > 40)    This patient has been assessed with a concern for Malnutrition and was treated during this hospitalization for the following Nutrition diagnosis/diagnoses:     -  01/14/2021: Morbid obesity (BMI > 40)    This patient has been assessed with a concern for Malnutrition and was treated during this hospitalization for the following Nutrition diagnosis/diagnoses:     -  01/14/2021: Morbid obesity (BMI > 40)    This patient has been assessed with a concern for Malnutrition and was treated during this hospitalization for the following Nutrition diagnosis/diagnoses:     - 01/14/2021: Morbid obesity (BMI > 40)

## 2021-01-28 NOTE — DISCHARGE NOTE PROVIDER - NSDCMRMEDTOKEN_GEN_ALL_CORE_FT
acetaminophen 500 mg oral tablet: 2 tab(s) orally every 8 hours  apixaban 5 mg oral tablet: 1 tab(s) orally every 12 hours  atorvastatin 20 mg oral tablet: 1 tab(s) orally once a day  folic acid 1 mg oral tablet: 1 tab(s) orally once a day  hydroxychloroquine 200 mg oral tablet: 1 tab(s) orally once a day  losartan 50 mg oral tablet: 1 tab(s) orally once a day  magnesium hydroxide 8% oral suspension: 30 milliliter(s) orally once a day, As needed, Constipation  methotrexate 2.5 mg oral tablet: 6 tab(s) orally once a week on mondays  oxyCODONE 10 mg oral tablet: 1 tab(s) orally every 4 hours, As needed, Severe Pain (7 - 10)  oxyCODONE 5 mg oral capsule: 1 cap(s) orally every 6 hours as needed for severe pain, don&#x27;t drive or drink alcohol while taking this medication. MDD:4  polyethylene glycol 3350 oral powder for reconstitution: 17 gram(s) orally once a day  senna oral tablet: 2 tab(s) orally once a day (at bedtime)  Synthroid 200 mcg (0.2 mg) oral tablet: 1 tab(s) orally once a day   acetaminophen 500 mg oral tablet: 2 tab(s) orally every 8 hours as needed for mild-mod pain (1-6)  apixaban 5 mg oral tablet: 1 tab(s) orally every 12 hours  ascorbic acid 500 mg oral tablet: 1 tab(s) orally once a day  atorvastatin 20 mg oral tablet: 1 tab(s) orally once a day  folic acid 1 mg oral tablet: 1 tab(s) orally once a day  hydroxychloroquine 200 mg oral tablet: 1 tab(s) orally once a day  lidocaine 4% topical film: 1 patch topically once a day to low back as needed for mild-mod pain  magnesium hydroxide 8% oral suspension: 30 milliliter(s) orally once a day, As needed, Constipation  oxyCODONE 5 mg oral tablet: 1 tab(s) orally 2 times a day, As Needed -Severe Pain (7 - 10) MDD:10 mg  senna oral tablet: 1 tab(s) orally once a day (at bedtime)  Synthroid 200 mcg (0.2 mg) oral tablet: 1 tab(s) orally once a day

## 2021-01-28 NOTE — DISCHARGE NOTE PROVIDER - NSDCCAREPROVSEEN_GEN_ALL_CORE_FT
Hannah, Chloe Gimenez   Inocente, Rosie Simms   Inocente, Cami Fritz, Rosie Ocasio, Benignohileonard   Inocente, Cami Fritz, Rosie Ocasio, Kristie Carlin, Al[place]

## 2021-01-28 NOTE — DISCHARGE NOTE PROVIDER - NSDCCPCAREPLAN_GEN_ALL_CORE_FT
PRINCIPAL DISCHARGE DIAGNOSIS  Diagnosis: S/P lumbar laminectomy  Assessment and Plan of Treatment: You were admitted to acute rehab after a T10-L5 spinal fusion and L1-L5 laminectomy. Your staples were removed and steristrips placed. The steristrips will fall off on their own. Continue taking Tylenol as needed for pain control. Follow up with your neurosurgeon, Dr. Campos, upon discharge.      SECONDARY DISCHARGE DIAGNOSES  Diagnosis: Acute UTI  Assessment and Plan of Treatment: You were found to have a UTI, treated with 3 days of Ciprofloxacin.    Diagnosis: Hypothyroidism  Assessment and Plan of Treatment: Continue Synthroid as prescribed. Follow up with your primary care physician.    Diagnosis: HTN (hypertension)  Assessment and Plan of Treatment: Continue Losartan as prescribed. Follow up with your primary care physician.    Diagnosis: DVT, lower extremity  Assessment and Plan of Treatment: You have a history of a blood clot in your leg. Continue Eliquis 5mg twice a day until otherwise instructed by Dr. Hoyos.     PRINCIPAL DISCHARGE DIAGNOSIS  Diagnosis: S/P lumbar laminectomy  Assessment and Plan of Treatment: You were admitted to acute rehab after a T10-L5 spinal fusion and L1-L5 laminectomy. Your staples were removed and steristrips placed. The steristrips will fall off on their own. Continue taking Tylenol as needed for pain control. Follow up with your neurosurgeon, Dr. Campos, upon discharge.      SECONDARY DISCHARGE DIAGNOSES  Diagnosis: Acute UTI  Assessment and Plan of Treatment: You were found to have a UTI. Continue to take antibiotics as prescribed.    Diagnosis: Hypothyroidism  Assessment and Plan of Treatment: Continue Synthroid as prescribed. Follow up with your primary care physician.    Diagnosis: HTN (hypertension)  Assessment and Plan of Treatment: Continue Losartan as prescribed. Follow up with your primary care physician.    Diagnosis: DVT, lower extremity  Assessment and Plan of Treatment: You have a history of a blood clot in your leg. Continue Eliquis 5mg twice a day until otherwise instructed by Dr. Hoyos.

## 2021-01-28 NOTE — DISCHARGE NOTE PROVIDER - HOSPITAL COURSE
HPI:  75F with past medical history of HTN, HLD, SLE, RA, hypothyroidism, provoked R. popliteal DVT in 2019 on eliquis, chronic low back pain presents to ED for acute on chronic low back pain. Pt has several months of chronic lumbosacral back pain, evaluated by outpatient ortho and referred for physical therapy and pain management. She has been receiving ?vitamin spinal injections. She last received low back inj on 12/16 after which she her low back pain worsened. She took oxycodone 5 mg qd (prescribed in september during a ED visit) and acetaminophen for past three days without relief. She also endorses worsening pain during ambulation with walker and has affected her quality of life. She has constipation, last BM 4 days ago. Otherwise denies fever, chills, N/V, abdominal pain, dysuria, urinary retention, fecal incontinence, saddle anesthesia, fall, LOC, trauma. During ROS she endorsed LLE weakness for several months, no numbness or tingling and has LLE edema >RLE edema, reported undergoing LLE US 4 months ago, was negative for DVT. Currently has 8/10 pain, improved to 5/10 with oxycodone 5 mg x 2 in ED. (28 Dec 2020 11:16)   12/28: Mri T/Ls: IMPRESSION: Degenerative changes  Abnormal signal with associated enhancement is seen involving the endplates adjacent to the L2-3 disc space. There is slight increased T2 prolongation involving the L2-3 disc space with associated widening. While these findings could be compatible with degenerative changes possibly of early discitis and osteomyelitis must be considered.  Patient s/p L1-L5 laminectomy, T10-L5 posterior surgical fusion by Dr. Campos on 1/1/21  HMV x 3 drains. Post op with constipation needing aggressive bowel regimen.     PT/OT and PM&R evaluated patient and recommended Rehab.     Patient medically cleared and admitted to  Rehab on 1/12/21 (12 Jan 2021 15:21)    Rehab course significant for pain in back especially with movement. Patient was placed on Oxycontin BID with Oxycodone PRN. Primary team spoke with Dr. Hoyos, patient's vascular cardiologist, who recommended continuing Eliquis 5mg BID until she is able to f/u with him. ~Half of spine staples removed 1/26/21, rest of staples to be removed. Patient c/o dysuria, found to have providencia rettgari UTI resistant to Macrobid. She was treated with 3 day course of Cipro.     All other medical co-morbidities were stable. Patient tolerated course of inpatient PT/OT/SLP rehab with significant improvements and met rehab goals prior to discharge. Patient was medically cleared on ___ for discharge to [facility]. 75F with past medical history of HTN, HLD, SLE, RA, hypothyroidism, provoked R. popliteal DVT in 2019 on eliquis, chronic low back pain presents to ED for acute on chronic low back pain. Pt has several months of chronic lumbosacral back pain, evaluated by outpatient ortho and referred for physical therapy and pain management. She has been receiving ?vitamin spinal injections. She last received low back inj on 12/16 after which she her low back pain worsened. She took oxycodone 5 mg qd (prescribed in september during a ED visit) and acetaminophen for past three days without relief. She also endorses worsening pain during ambulation with walker and has affected her quality of life. She has constipation, last BM 4 days ago. Otherwise denies fever, chills, N/V, abdominal pain, dysuria, urinary retention, fecal incontinence, saddle anesthesia, fall, LOC, trauma. During ROS she endorsed LLE weakness for several months, no numbness or tingling and has LLE edema >RLE edema, reported undergoing LLE US 4 months ago, was negative for DVT. Currently has 8/10 pain, improved to 5/10 with oxycodone 5 mg x 2 in ED.  12/28: Mri T/Ls: IMPRESSION: Degenerative changes Abnormal signal with associated enhancement is seen involving the endplates adjacent to the L2-3 disc space. There is slight increased T2 prolongation involving the L2-3 disc space with associated widening. While these findings could be compatible with degenerative changes possibly of early discitis and osteomyelitis must be considered.  Patient s/p L1-L5 laminectomy, T10-L5 posterior surgical fusion by Dr. Campos on 1/1/21  HMV x 3 drains. Post op with constipation needing aggressive bowel regimen.     Pt was stable upon rehab admission to  [facility]. Admitted with gait instabilty, ADL, and functional impairments.  Dr. Campos updated during rehab stay and cleared patient to liberalize spine precautions.  She make significant functional improvements and pain remained well controlled. Patient prefers to be weaned off of all narcotic medications.   Rehab Course significant for COVID19 infection (first PCR+ on 1/29/21).  CT A/P on 2/5 noted lung base ground glass opacifications. She completed course of remdesevir and decadron with improvement in symptoms.  CTA obtained which was negative for PE.  Patient will follow up with pulmonology outpatient.   Further, patient endorsed urinary symptoms, found to have Ecoli UTI. She was initially treated with Macrobid for three days but had to be discontinued due to GI side effects with nausea and vomiting.  CT was negative for any intrabdominal findings. GI symptoms improved with discontinuation of antibiotics and BMs.  She was started on Rocephin for 5 day course and transitioned to PO on discharge.   For her previous DVT, she was cleared by Dr. Campos to resume Eliquis which was restarted on 2/23.     All other medical co-morbidities were stable.   Pt tolerated course of inpatient PT/OT/SLP rehab with significant functional improvements and met rehab goals prior to discharge.    Pt was medically cleared on ___  for discharged to ___     Pt will follow up with the following:   75F with past medical history of HTN, HLD, SLE, RA, hypothyroidism, provoked R. popliteal DVT in 2019 on eliquis, chronic low back pain presents to ED for acute on chronic low back pain. Pt has several months of chronic lumbosacral back pain, evaluated by outpatient ortho and referred for physical therapy and pain management. She has been receiving ?vitamin spinal injections. She last received low back inj on 12/16 after which she her low back pain worsened. She took oxycodone 5 mg qd (prescribed in september during a ED visit) and acetaminophen for past three days without relief. She also endorses worsening pain during ambulation with walker and has affected her quality of life. She has constipation, last BM 4 days ago. Otherwise denies fever, chills, N/V, abdominal pain, dysuria, urinary retention, fecal incontinence, saddle anesthesia, fall, LOC, trauma. During ROS she endorsed LLE weakness for several months, no numbness or tingling and has LLE edema >RLE edema, reported undergoing LLE US 4 months ago, was negative for DVT. Currently has 8/10 pain, improved to 5/10 with oxycodone 5 mg x 2 in ED.  12/28: Mri T/Ls: IMPRESSION: Degenerative changes Abnormal signal with associated enhancement is seen involving the endplates adjacent to the L2-3 disc space. There is slight increased T2 prolongation involving the L2-3 disc space with associated widening. While these findings could be compatible with degenerative changes possibly of early discitis and osteomyelitis must be considered.  Patient s/p L1-L5 laminectomy, T10-L5 posterior surgical fusion by Dr. Campos on 1/1/21  HMV x 3 drains. Post op with constipation needing aggressive bowel regimen.     Pt was stable upon rehab admission to  [facility]. Admitted with gait instabilty, ADL, and functional impairments.  Dr. Campos updated during rehab stay and cleared patient to liberalize spine precautions.  She make significant functional improvements and pain remained well controlled. Patient prefers to be weaned off of all narcotic medications.   Rehab Course significant for COVID19 infection (first PCR+ on 1/29/21).  CT A/P on 2/5 noted lung base ground glass opacifications. She completed course of remdesevir and decadron with improvement in symptoms.  CTA obtained which was negative for PE.  Patient will follow up with pulmonology outpatient.   Further, patient endorsed urinary symptoms, found to have Ecoli UTI. She was initially treated with Macrobid for three days but had to be discontinued due to GI side effects with nausea and vomiting.  CT was negative for any intrabdominal findings. GI symptoms improved with discontinuation of antibiotics and BMs.  She was started on Rocephin for 5 day course and transitioned to PO on discharge.   For her previous DVT, she was cleared by Dr. Campos to resume Eliquis which was restarted on 2/23.   All other medical co-morbidities were stable.   Pt tolerated course of inpatient PT/OT rehab with significant functional improvements and met rehab goals prior to discharge.  Pt was medically cleared on 3/2 for discharged to home with home PT and OT    Pt will follow up with the following:  Pulmonology, Vascular, Neurosurgery, and physiatry    75F with past medical history of HTN, HLD, SLE, RA, hypothyroidism, provoked R. popliteal DVT in 2019 on eliquis, chronic low back pain presents to ED for acute on chronic low back pain. Pt has several months of chronic lumbosacral back pain, evaluated by outpatient ortho and referred for physical therapy and pain management. She has been receiving ?vitamin spinal injections. She last received low back inj on 12/16 after which she her low back pain worsened. She took oxycodone 5 mg qd (prescribed in september during a ED visit) and acetaminophen for past three days without relief. She also endorses worsening pain during ambulation with walker and has affected her quality of life. She has constipation, last BM 4 days ago. Otherwise denies fever, chills, N/V, abdominal pain, dysuria, urinary retention, fecal incontinence, saddle anesthesia, fall, LOC, trauma. During ROS she endorsed LLE weakness for several months, no numbness or tingling and has LLE edema >RLE edema, reported undergoing LLE US 4 months ago, was negative for DVT. Currently has 8/10 pain, improved to 5/10 with oxycodone 5 mg x 2 in ED.  12/28: Mri T/Ls: IMPRESSION: Degenerative changes Abnormal signal with associated enhancement is seen involving the endplates adjacent to the L2-3 disc space. There is slight increased T2 prolongation involving the L2-3 disc space with associated widening. While these findings could be compatible with degenerative changes possibly of early discitis and osteomyelitis must be considered.  Patient s/p L1-L5 laminectomy, T10-L5 posterior surgical fusion by Dr. Camops on 1/1/21  HMV x 3 drains. Post op with constipation needing aggressive bowel regimen.     Pt was stable upon rehab admission to  [facility]. Admitted with gait instabilty, ADL, and functional impairments.  Dr. Campos updated during rehab stay and cleared patient to liberalize spine precautions.  She make significant functional improvements and pain remained well controlled. Patient prefers to be weaned off of all narcotic medications.   Rehab Course significant for COVID19 infection (first PCR+ on 1/29/21).  CT A/P on 2/5 noted lung base ground glass opacifications. She completed course of remdesevir and decadron with improvement in symptoms.  CTA obtained which was negative for PE.  She is saturating well on RA. Patient will follow up with pulmonology outpatient.       Further, patient endorsed urinary symptoms of frequency and reduced stream, found to have Ecoli UTI. She was initially treated with Macrobid for three days but had to be discontinued due to GI side effects with nausea and vomiting.  CT was negative for any intrabdominal findings. GI symptoms improved with discontinuation of antibiotics and BMs, but then developed recurrence of urinary symptoms without fever.  She was started on Rocephin for 5 day course and transitioned to PO on discharge.   For her previous DVT, she was cleared by Dr. Campos to resume Eliquis which was restarted on 2/23. Hemoglobin and neurological exam has remained stable.  All other medical co-morbidities were stable.   Pt tolerated course of inpatient PT/OT rehab with significant functional improvements and met rehab goals prior to discharge. Pt was medically cleared on 3/2 for discharged to home with home PT and OT. She is set up/supervision ADLs, supervision transfers and ambulation, CG shower transfers (patient reports mostly wearing housedress and no shoes/slippers PTA). She will follow with PCP, pulmonary, NSGY-spine, and PM+R on discharge    Pt will follow up with the following:  Pulmonology, Vascular, Neurosurgery, and physiatry    75F with past medical history of HTN, HLD, SLE, RA, hypothyroidism, provoked R. popliteal DVT in 2019 on eliquis, chronic low back pain presents to ED for acute on chronic low back pain. Pt has several months of chronic lumbosacral back pain, evaluated by outpatient ortho and referred for physical therapy and pain management. She has been receiving ?vitamin spinal injections. She last received low back inj on 12/16 after which she her low back pain worsened. She took oxycodone 5 mg qd (prescribed in september during a ED visit) and acetaminophen for past three days without relief. She also endorses worsening pain during ambulation with walker and has affected her quality of life. She has constipation, last BM 4 days ago. Otherwise denies fever, chills, N/V, abdominal pain, dysuria, urinary retention, fecal incontinence, saddle anesthesia, fall, LOC, trauma. During ROS she endorsed LLE weakness for several months, no numbness or tingling and has LLE edema >RLE edema, reported undergoing LLE US 4 months ago, was negative for DVT. Currently has 8/10 pain, improved to 5/10 with oxycodone 5 mg x 2 in ED.  12/28: Mri T/Ls: IMPRESSION: Degenerative changes Abnormal signal with associated enhancement is seen involving the endplates adjacent to the L2-3 disc space. There is slight increased T2 prolongation involving the L2-3 disc space with associated widening. While these findings could be compatible with degenerative changes possibly of early discitis and osteomyelitis must be considered.  Patient s/p L1-L5 laminectomy, T10-L5 posterior surgical fusion by Dr. Campos on 1/1/21  HMV x 3 drains. Post op with constipation needing aggressive bowel regimen.     Pt was stable upon rehab admission to  [facility]. Admitted with gait instabilty, ADL, and functional impairments.  Dr. Campos updated during rehab stay and cleared patient to liberalize spine precautions.  She make significant functional improvements and pain remained well controlled. Patient prefers to be weaned off of all narcotic medications.   Rehab Course significant for COVID19 infection (first PCR+ on 1/29/21).  CT A/P on 2/5 noted lung base ground glass opacifications. She completed course of remdesevir and decadron with improvement in symptoms.  CTA obtained which was negative for PE.  She is saturating well on RA. Patient will follow up with pulmonology outpatient.       Further, patient endorsed urinary symptoms of frequency and reduced stream, found to have Ecoli UTI. She was initially treated with Macrobid for three days but had to be discontinued due to GI side effects with nausea and vomiting.  CT was negative for any intrabdominal findings. GI symptoms improved with discontinuation of antibiotics and BMs, but then developed recurrence of urinary symptoms without fever.  She was started on Rocephin for 5 day course .   For her previous DVT, she was cleared by Dr. Campos to resume Eliquis which was restarted on 2/23. Hemoglobin and neurological exam has remained stable. Repeat COVID swab PCR 3/2 negative  All other medical co-morbidities were stable.   Pt tolerated course of inpatient PT/OT rehab with significant functional improvements and met rehab goals prior to discharge. Pt was medically cleared on 3/3 for discharged to home with home PT and OT. She is set up/supervision ADLs, supervision transfers and ambulation, CG shower transfers (patient reports mostly wearing housedress and no shoes/slippers PTA). She will follow with PCP, pulmonary, NSGY-spine, and PM+R on discharge. Caregiver education through virtual visit performed 3/2, recommendations for supervision on dc also discussed. Hospital bed and platform walker ordered for dc    Pt will follow up with the following:  Pulmonology, Vascular, Neurosurgery, and physiatry

## 2021-01-28 NOTE — DISCHARGE NOTE PROVIDER - CARE PROVIDERS DIRECT ADDRESSES
,[email or web address]

## 2021-01-28 NOTE — CHART NOTE - NSCHARTNOTEFT_GEN_A_CORE
NUTRITION FOLLOW UP    SOURCE: Patient [X)   Family [ ]    Medical Record (X)    DIET: DASH/TLC    Pt tolerating diet and consuming 100% of most meals. Last BM 1/27- continues on prunes. Reporting mostly daily BM's. Weight management tips reviewed.     CURRENT WEIGHT:    (1/12) 280lbs  Suggest obtain new weight/weekly weights    PERTINENT MEDS:   Pertinent Medications: MEDICATIONS  (STANDING):  atorvastatin 20 milliGRAM(s) Oral at bedtime  ciprofloxacin     Tablet 250 milliGRAM(s) Oral every 12 hours  enoxaparin Injectable 40 milliGRAM(s) SubCutaneous two times a day  folic acid 1 milliGRAM(s) Oral daily  hydroxychloroquine 200 milliGRAM(s) Oral daily  lactobacillus acidophilus 1 Tablet(s) Oral two times a day  levothyroxine 200 MICROGram(s) Oral daily  lidocaine   Patch 1 Patch Transdermal daily  oxyCODONE  ER Tablet 10 milliGRAM(s) Oral every 12 hours  polyethylene glycol 3350 17 Gram(s) Oral daily  senna 2 Tablet(s) Oral at bedtime    MEDICATIONS  (PRN):  acetaminophen   Tablet .. 650 milliGRAM(s) Oral every 6 hours PRN Temp greater or equal to 38C (100.4F), Mild Pain (1 - 3)  magnesium hydroxide Suspension 30 milliLiter(s) Oral daily PRN Constipation  melatonin 3 milliGRAM(s) Oral at bedtime PRN Insomnia  oxyCODONE    IR 10 milliGRAM(s) Oral every 4 hours PRN Moderate Pain (4 - 6)  simethicone 80 milliGRAM(s) Chew every 6 hours PRN Upset Stomach      PERTINENT LABS:  01-28 Na142 mmol/L Glu 94 mg/dL K+ 3.7 mmol/L Cr  0.86 mg/dL BUN 16 mg/dL 01-28 Alb 2.6 g/dL<L>    SKIN:  surgical incision on back  EDEMA: +2 left leg  LAST BM: 1/27  per pt     ESTIMATED NEEDS:   [X] no change since previous assessment  [ ] recalculated:     PREVIOUS NUTRITION DIAGNOSIS:    1. Obesity- DASH/TLC diet. Ongoing diet education    NUTRITION DIAGNOSIS is :  (X)  Ongoing         NEW NUTRITION DIAGNOSIS: N/A    NUTRITION RECOMMENDATIONS:   1. Continue current nutrition plan of care   2. Recommend MVI po daily  3. Ongoing diet education  4. Obtain new weight/weekly weights     MONITORING AND EVALUATION:   1. Tolerance to diet prescription   2. PO intake  3. Weights  4. Labs  5. Follow Up per protocol     RD to remain available   Tanya Martinez RDN   Pager #[phone].

## 2021-01-28 NOTE — DISCHARGE NOTE PROVIDER - NSDCFUSCHEDAPPT_GEN_ALL_CORE_FT
KAI THOMAS ; 02/08/2021 ; [facility] [id number] KAI THOMAS ; 03/08/2021 ; [facility] [id number]

## 2021-01-29 PROCEDURE — 99232 SBSQ HOSP IP/OBS MODERATE 35: CPT

## 2021-01-29 RX ORDER — OXYCODONE HYDROCHLORIDE 5 MG/1
10 TABLET ORAL EVERY 4 HOURS
Refills: 0 | Status: DISCONTINUED | OUTPATIENT
Start: 2021-01-29 | End: 2021-02-05

## 2021-01-29 RX ORDER — OXYCODONE HYDROCHLORIDE 5 MG/1
5 TABLET ORAL EVERY 4 HOURS
Refills: 0 | Status: DISCONTINUED | OUTPATIENT
Start: 2021-01-29 | End: 2021-02-05

## 2021-01-29 RX ORDER — LANOLIN ALCOHOL/MO/W.PET/CERES
3 CREAM (GRAM) TOPICAL ONCE
Refills: 0 | Status: COMPLETED | OUTPATIENT
Start: 2021-01-29 | End: 2021-01-29

## 2021-01-29 RX ADMIN — Medication 650 MILLIGRAM(S): at 12:17

## 2021-01-29 RX ADMIN — LIDOCAINE 1 PATCH: 4 CREAM TOPICAL at 00:58

## 2021-01-29 RX ADMIN — LIDOCAINE 1 PATCH: 4 CREAM TOPICAL at 19:48

## 2021-01-29 RX ADMIN — ATORVASTATIN CALCIUM 20 MILLIGRAM(S): 80 TABLET, FILM COATED ORAL at 21:12

## 2021-01-29 RX ADMIN — Medication 1 TABLET(S): at 17:54

## 2021-01-29 RX ADMIN — Medication 200 MILLIGRAM(S): at 12:14

## 2021-01-29 RX ADMIN — OXYCODONE HYDROCHLORIDE 10 MILLIGRAM(S): 5 TABLET ORAL at 09:37

## 2021-01-29 RX ADMIN — OXYCODONE HYDROCHLORIDE 10 MILLIGRAM(S): 5 TABLET ORAL at 17:54

## 2021-01-29 RX ADMIN — Medication 250 MILLIGRAM(S): at 05:13

## 2021-01-29 RX ADMIN — LIDOCAINE 1 PATCH: 4 CREAM TOPICAL at 12:14

## 2021-01-29 RX ADMIN — ENOXAPARIN SODIUM 40 MILLIGRAM(S): 100 INJECTION SUBCUTANEOUS at 17:54

## 2021-01-29 RX ADMIN — Medication 3 MILLIGRAM(S): at 02:18

## 2021-01-29 RX ADMIN — OXYCODONE HYDROCHLORIDE 10 MILLIGRAM(S): 5 TABLET ORAL at 05:14

## 2021-01-29 RX ADMIN — Medication 200 MICROGRAM(S): at 05:13

## 2021-01-29 RX ADMIN — Medication 3 MILLIGRAM(S): at 21:12

## 2021-01-29 RX ADMIN — Medication 250 MILLIGRAM(S): at 17:54

## 2021-01-29 RX ADMIN — ENOXAPARIN SODIUM 40 MILLIGRAM(S): 100 INJECTION SUBCUTANEOUS at 05:13

## 2021-01-29 RX ADMIN — Medication 1 TABLET(S): at 05:13

## 2021-01-29 RX ADMIN — Medication 1 MILLIGRAM(S): at 12:14

## 2021-01-29 NOTE — PROGRESS NOTE ADULT - ASSESSMENT
Assessment/Plan:  KAI THOMAS is a 75y F with a history of HTN, HLD, SLE, RA, hypothyroidism, provoked R. 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 Hospital Course complicated by brief SICU stay for mechanical ventilation management, now extubated, also c/bpain control and constipation. Patient now admitted for a multidisciplinary rehab program. 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 Jaja Campos  -  Comprehensive Rehab Program of PT/OT- 3 hrs/day 5 days/week  -   SLE/RA:- c/w plaquenil 200mg po daily    h/o Provoked DVT in 2019  - Follows with vascular cardiologist Dr. Hoyos outpatient, recommended c/w Eliquis for now.  - US July 2019:  DVT noted in the right popliteal vein and visualized segment of the right  posterior tibial vein.  - provoked DVT July 2019 in setting of hip surgery, already >6 months of treatment,   - was on eliquis 5mg po bid at home,   - on lovenox for dvt ppx while admitted- Resume eliquis at discharge.  -   HTN:- monitor BP , currently stable off meds   - takes losartan 50mg po qd at home    HLD:- c/w lipitor 20mg po daily    hypothyroidism:- c/w levothyroxine 200mcg po daily    UTI:- C/w Macrobid.( started 1/13) Improved symptomatically .   - UCx: providencia rettgari- resistant to macrobid  - On Cipro for 3 day course    Sleep:- melatonin 3mg PRN    Pain Mgmt :- Tylenol PRN mild pain,   - Oxycodone 10mg IR PRN moderate 4-7/10 pain  - Oxycodone 15mg IR prn severe 8-10/10 pain  Oxycontin ER BID     GI/Bowel Mgmt/ constipation: -c/w Senna, miralax daily, MOM prn    /Bladder Mgmt: toileting Q2hr when awake    FEN :- Diet - Regular     Precautions / PROPHYLAXIS:   - Falls, Spinal,   - DVT prophylaxis : Lovenox    Hospitalist fabian noted    Dispo discussed 1/28/21: ? home  vs SHAZIA 2/2/21 based on progress. Patient lives alone   - OT: modA bathing, mod/maxA lower body dressing, maxA toileting, CG/Jill toilet transfers  - PT: CG/Jill due to fatigue, gait 55' RW with cueing for posture  - Barriers: self care, likely close to baseline, but now with spine precautions  - SW had conversation with daughter and patient, both of whom refused SHAZIA but still given information for SHAZIA. Patient cannot go home to daughter. SW had another conversation with daughter regarding SHAZIA yesterday and gave information regarding SHAZIA. SW to start ROE.        KAI THOMAS is a 75y F with a history of HTN, HLD, SLE, RA, hypothyroidism, provoked R. 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 Hospital Course complicated by brief SICU stay for mechanical ventilation management, now extubated, also c/bpain control and constipation. Patient now admitted for a multidisciplinary rehab program. 01-12-21    #Multilevel Denerative Disc Disease of T and L Spine,   - S/P Posterior T10-L5 instrumented fusion, L1-L5 laminectomies, L L4-L5 foraminotomy 1/1/21 by Dr. Jaja Campos  - continue Comprehensive Rehab Program of PT/OT- 3 hrs/day 5 days/week    #SLE/RA:-   c/w plaquenil 200mg po daily    #h/o Provoked DVT in 2019  - US July 2019:  DVT noted in the right popliteal vein and visualized segment of the right  posterior tibial vein.  - provoked DVT July 2019 in setting of hip surgery, already >6 months of treatment,   - was on eliquis 5mg po bid at home,   - on lovenox for dvt ppx while admitted, to resume eliquis at discharge  -Follows with vascular cardiologist Dr. Hoyos outpatient    #HTN:  - takes losartan 50mg po qd at home  - monitor BP , currently stable off meds     #HLD:  - continue lipitor 20mg po daily    #hypothyroidism:  - continue levothyroxine 200mcg po daily    #EUTI:  - UCx: providencia rettgari- resistant to macrobid  - On Cipro for 3 day course    ##:  - melatonin 3mg PRN    #Pain Mgmt :  - Tylenol PRN mild pain,   - Oxycodone 10mg IR PRN moderate 4-7/10 pain  - Oxycodone 15mg IR prn severe 8-10/10 pain  - Oxycontin ER BID   -currently controlled    #GI/Bowel Mgmt/ constipation:   -conitnue Senna, miralax daily, MOM prn    # /Bladder Mgmt:   toileting Q2hr when awake    #FEN :  - Diet - Regular     # DVT prophylaxis :  - Lovenox    #Dispo discussed 1/28/21: ? home  vs SHAZIA 2/2/21 based on progress. Patient lives alone   - OT: modA bathing, mod/maxA lower body dressing, maxA toileting, CG/Jill toilet transfers  - PT: CG/Jill due to fatigue, gait 55' RW with cueing for posture  - Barriers: self care, likely close to baseline, but now with spine precautions  - SW had conversation with daughter and patient, both of whom refused SHAZIA but still given information for SHAZIA. Patient cannot go home to daughter. SW had another conversation with daughter regarding SHAZIA yesterday and gave information regarding SHAZIA. SW to start ROE    #LABS  CBC BMP 2/1  remove staples 1/29

## 2021-01-29 NOTE — PROGRESS NOTE ADULT - ASSESSMENT
75F with PMH HTN, HLD, SLE, RA, hypothyroidism, provoked right popliteal DVT in 2019, chronic low back pain presents to [facility] for acute on chronic low back pain on Dec 28, 2020, found to have 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 Hospital Course complicated by brief SICU stay for mechanical ventilation management, currently extubated. Patient now admitted for a multidisciplinary rehab program on 1/12/21.    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 Jaja Campos  -Continue comprehensive rehab program  -Pain management, bowel regimen per rehab    UTI  - urine culture shows >100K Providencia Rettgeri - macrobid resistant  - Cipro 250mg q12 x 3 days    Acute blood loss Anemia, likely from surgery  -H/H stable, continue to monitor   -No overt signs of bleeding    Abdominal pain - likely 2/2 opioid induced constipation   -Bowel regimen per rehab    SLE/RA  -Continue with Plaquenil 200mg po daily    H/o provoked right popliteal, right posterior tibial DVT (7/2019 in setting of hip surgery, already completed >6 months of treatment)  -Follows with cardiologist Dr. Hoyos outpatient  -Treated with Eliquis 5mg po bid chronically -- AC stopped at [facility] as 12/29 LE dopplers neg for DVTs  - currently anticoagulated with lovenox 40mg BID as inpt in acute rehab  - post discharge to continue eliquis as will follow up with Dr. Hoyos as outpt    Essential HTN: stable off anti-hypertensives  -Continue to hold home medication Losartan  - VS per rehab protocol    HLD  -Chronic, continue Lipitor    Hypothyroidism  -Chronic, continue Levothyroxine    VTE ppx:   - Lovenox 40mg BID  GI ppx:   - continue PPI

## 2021-01-29 NOTE — PROGRESS NOTE ADULT - SUBJECTIVE AND OBJECTIVE BOX
Patient is a 75y old  Female who presents with a chief complaint of Back pain (29 Jan 2021 11:07)      Patient seen and examined at bedside.    ALLERGIES:  No Known Allergies    MEDICATIONS  (STANDING):  atorvastatin 20 milliGRAM(s) Oral at bedtime  ciprofloxacin     Tablet 250 milliGRAM(s) Oral every 12 hours  enoxaparin Injectable 40 milliGRAM(s) SubCutaneous two times a day  folic acid 1 milliGRAM(s) Oral daily  hydroxychloroquine 200 milliGRAM(s) Oral daily  lactobacillus acidophilus 1 Tablet(s) Oral two times a day  levothyroxine 200 MICROGram(s) Oral daily  lidocaine   Patch 1 Patch Transdermal daily  oxyCODONE  ER Tablet 10 milliGRAM(s) Oral every 12 hours  polyethylene glycol 3350 17 Gram(s) Oral daily  senna 2 Tablet(s) Oral at bedtime    MEDICATIONS  (PRN):  acetaminophen   Tablet .. 650 milliGRAM(s) Oral every 6 hours PRN Temp greater or equal to 38C (100.4F), Mild Pain (1 - 3)  magnesium hydroxide Suspension 30 milliLiter(s) Oral daily PRN Constipation  melatonin 3 milliGRAM(s) Oral at bedtime PRN Insomnia  oxyCODONE    IR 10 milliGRAM(s) Oral every 4 hours PRN Moderate Pain (4 - 6)  simethicone 80 milliGRAM(s) Chew every 6 hours PRN Upset Stomach    Vital Signs Last 24 Hrs  T(F): 98.2 (29 Jan 2021 09:08), Max: 98.8 (28 Jan 2021 19:23)  HR: 97 (29 Jan 2021 09:08) (95 - 97)  BP: 110/65 (29 Jan 2021 09:08) (110/65 - 119/72)  RR: 16 (29 Jan 2021 09:08) (16 - 18)  SpO2: 97% (29 Jan 2021 09:08) (94% - 97%)  I&O's Summary      PHYSICAL EXAM:  General: NAD, A/O x 3  ENT: MMM  Neck: Supple, No JVD  Lungs: Clear to auscultation bilaterally  Cardio: RRR, S1/S2, No murmurs  Abdomen: Soft, Nontender, Nondistended; Bowel sounds present  Extremities: No calf tenderness, No pitting edema    LABS:                        10.8   4.69  )-----------( 329      ( 28 Jan 2021 06:15 )             34.7       01-28    142  |  107  |  16  ----------------------------<  94  3.7   |  26  |  0.86    Ca    9.1      28 Jan 2021 06:15    TPro  6.7  /  Alb  2.6  /  TBili  0.4  /  DBili  x   /  AST  23  /  ALT  13  /  AlkPhos  131  01-28     eGFR if Non African American: 65 mL/min/1.73M2 (01-28-21 @ 06:15)  eGFR if African American: 76 mL/min/1.73M2 (01-28-21 @ 06:15)                                   Culture - Urine (collected 25 Jan 2021 04:50)  Source: .Urine Clean Catch (Midstream)  Final Report (27 Jan 2021 07:10):    >100,000 CFU/ml Providencia rettgeri    <10,000 CFU/ml Normal Urogenital aminah present  Organism: Providencia rettgeri (27 Jan 2021 07:10)  Organism: Providencia rettgeri (27 Jan 2021 07:10)      -  Amikacin: S <=16      -  Amoxicillin/Clavulanic Acid: R >16/8      -  Ampicillin: R >16 These ampicillin results predict results for amoxicillin      -  Ampicillin/Sulbactam: I 16/8 Enterobacter, Citrobacter, and Serratia may develop resistance during prolonged therapy (3-4 days)      -  Aztreonam: S <=4      -  Cefazolin: R >16      -  Cefepime: S <=2      -  Cefoxitin: S <=8      -  Ceftriaxone: S <=1 Enterobacter, Citrobacter, and Serratia may develop resistance during prolonged therapy      -  Ciprofloxacin: S <=0.25      -  Ertapenem: S <=0.5      -  Gentamicin: S <=2      -  Imipenem: S <=1      -  Levofloxacin: S <=0.5      -  Meropenem: S <=1      -  Nitrofurantoin: R >64 Should not be used to treat pyelonephritis      -  Piperacillin/Tazobactam: S <=8      -  Tigecycline: R <=2      -  Tobramycin: S <=2      -  Trimethoprim/Sulfamethoxazole: S <=0.5/9.5      Method Type: RUBY        RADIOLOGY & ADDITIONAL TESTS:    Care Discussed with Consultants/Other Providers:

## 2021-01-29 NOTE — PROGRESS NOTE ADULT - SUBJECTIVE AND OBJECTIVE BOX
Patient is a 75y old  Female who presents with a chief complaint of Back pain (28 Jan 2021 13:56)      HPI:  75F with past medical history of HTN, HLD, SLE, RA, hypothyroidism, provoked R. popliteal DVT in 2019 on eliquis, chronic low back pain presents to ED for acute on chronic low back pain. Pt has several months of chronic lumbosacral back pain, evaluated by outpatient ortho and referred for physical therapy and pain management. She has been receiving ?vitamin spinal injections. She last received low back inj on 12/16 after which she her low back pain worsened. She took oxycodone 5 mg qd (prescribed in september during a ED visit) and acetaminophen for past three days without relief. She also endorses worsening pain during ambulation with walker and has affected her quality of life. She has constipation, last BM 4 days ago. Otherwise denies fever, chills, N/V, abdominal pain, dysuria, urinary retention, fecal incontinence, saddle anesthesia, fall, LOC, trauma. During ROS she endorsed LLE weakness for several months, no numbness or tingling and has LLE edema >RLE edema, reported undergoing LLE US 4 months ago, was negative for DVT. Currently has 8/10 pain, improved to 5/10 with oxycodone 5 mg x 2 in ED. (28 Dec 2020 11:16)   12/28: Mri T/Ls: IMPRESSION: Degenerative changes  Abnormal signal with associated enhancement is seen involving the endplates adjacent to the L2-3 disc space. There is slight increased T2 prolongation involving the L2-3 disc space with associated widening. While these findings could be compatible with degenerative changes possibly of early discitis and osteomyelitis must be considered.  Patient s/p L1-L5 laminectomy, T10-L5 posterior surgical fusion by Dr. Campos on 1/1/21  HMV x 3 drains. Post op with constipation needing aggressive bowel regimen.     PT/OT and PM&R evaluated patient and recommended Rehab.     Patient medically cleared and admitted to  Rehab on 1/12/21 (12 Jan 2021 15:21)      SUBJECTIVE/INTERVAL EVENTS  Patient seen and assessed at bedside. No acute events overnight. Patient was moved to another room late last night and reported poor sleep after that and feeling a little tired today. She denies significant pain in her back and had a BM yesterday.       PAST MEDICAL & SURGICAL HISTORY:  HTN (hypertension)    DVT (deep venous thrombosis)    Hypothyroidism    RA (rheumatoid arthritis)    Obesity, Class II, BMI 35-39.9, no comorbidity    Hypothyroid    Benign heart murmur    Hyperlipidemia    Hypertension    H/O degenerative disc disease    Osteoarthritis    SLE (systemic lupus erythematosus)    Rheumatoid arthritis    S/P knee replacement    Status post total hip replacement, right    S/P thyroid surgery  1 lobe removed    Lung cancer  small tumor removed 2015    S/P knee surgery  bilateral    S/P carpal tunnel release  left    S/P eye surgery  child    S/P cataract surgery  left    S/P tonsillectomy        Allergies    No Known Allergies    Intolerances          VITALS  75y  Vital Signs Last 24 Hrs  T(C): 36.8 (29 Jan 2021 09:08), Max: 37.1 (28 Jan 2021 19:23)  T(F): 98.2 (29 Jan 2021 09:08), Max: 98.8 (28 Jan 2021 19:23)  HR: 97 (29 Jan 2021 09:08) (95 - 97)  BP: 110/65 (29 Jan 2021 09:08) (110/65 - 119/72)  BP(mean): --  RR: 16 (29 Jan 2021 09:08) (16 - 18)  SpO2: 97% (29 Jan 2021 09:08) (94% - 97%)  Daily       Gen - NAD, Comfortable, tired  Pulm - CTAB,   Cardiovascular - S1S2  Abdomen - Soft, NT/ND, +BS  Extremities - No C/C/E, No calf tenderness    Motor -UE 5/5, LE proximal 4/5, distal 5/5     Psychiatric - Mood stable, Affect WNL  Skin: long back incision with staples - some removed, c/d/i        RECENT LABS:                          10.8   4.69  )-----------( 329      ( 28 Jan 2021 06:15 )             34.7     01-28    142  |  107  |  16  ----------------------------<  94  3.7   |  26  |  0.86    Ca    9.1      28 Jan 2021 06:15    TPro  6.7  /  Alb  2.6<L>  /  TBili  0.4  /  DBili  x   /  AST  23  /  ALT  13  /  AlkPhos  131<H>  01-28    LIVER FUNCTIONS - ( 28 Jan 2021 06:15 )  Alb: 2.6 g/dL / Pro: 6.7 g/dL / ALK PHOS: 131 U/L / ALT: 13 U/L / AST: 23 U/L / GGT: x                 Culture - Urine (collected 01-25-21 @ 04:50)  Source: .Urine Clean Catch (Midstream)  Final Report (01-27-21 @ 07:10):    >100,000 CFU/ml Providencia rettgeri    <10,000 CFU/ml Normal Urogenital aminah present  Organism: Providencia rettgeri (01-27-21 @ 07:10)  Organism: Providencia rettgeri (01-27-21 @ 07:10)      -  Amikacin: S <=16      -  Amoxicillin/Clavulanic Acid: R >16/8      -  Ampicillin: R >16 These ampicillin results predict results for amoxicillin      -  Ampicillin/Sulbactam: I 16/8 Enterobacter, Citrobacter, and Serratia may develop resistance during prolonged therapy (3-4 days)      -  Aztreonam: S <=4      -  Cefazolin: R >16      -  Cefepime: S <=2      -  Cefoxitin: S <=8      -  Ceftriaxone: S <=1 Enterobacter, Citrobacter, and Serratia may develop resistance during prolonged therapy      -  Ciprofloxacin: S <=0.25      -  Ertapenem: S <=0.5      -  Gentamicin: S <=2      -  Imipenem: S <=1      -  Levofloxacin: S <=0.5      -  Meropenem: S <=1      -  Nitrofurantoin: R >64 Should not be used to treat pyelonephritis      -  Piperacillin/Tazobactam: S <=8      -  Tigecycline: R <=2      -  Tobramycin: S <=2      -  Trimethoprim/Sulfamethoxazole: S <=0.5/9.5      Method Type: RUBY        CAPILLARY BLOOD GLUCOSE          MEDICATIONS  (STANDING):  atorvastatin 20 milliGRAM(s) Oral at bedtime  ciprofloxacin     Tablet 250 milliGRAM(s) Oral every 12 hours  enoxaparin Injectable 40 milliGRAM(s) SubCutaneous two times a day  folic acid 1 milliGRAM(s) Oral daily  hydroxychloroquine 200 milliGRAM(s) Oral daily  lactobacillus acidophilus 1 Tablet(s) Oral two times a day  levothyroxine 200 MICROGram(s) Oral daily  lidocaine   Patch 1 Patch Transdermal daily  oxyCODONE  ER Tablet 10 milliGRAM(s) Oral every 12 hours  polyethylene glycol 3350 17 Gram(s) Oral daily  senna 2 Tablet(s) Oral at bedtime    MEDICATIONS  (PRN):  acetaminophen   Tablet .. 650 milliGRAM(s) Oral every 6 hours PRN Temp greater or equal to 38C (100.4F), Mild Pain (1 - 3)  magnesium hydroxide Suspension 30 milliLiter(s) Oral daily PRN Constipation  melatonin 3 milliGRAM(s) Oral at bedtime PRN Insomnia  oxyCODONE    IR 10 milliGRAM(s) Oral every 4 hours PRN Moderate Pain (4 - 6)  simethicone 80 milliGRAM(s) Chew every 6 hours PRN Upset Stomach           Patient is a 75y old  Female who presents with a chief complaint of Back pain (28 Jan 2021 13:56)      HPI:  75F with past medical history of HTN, HLD, SLE, RA, hypothyroidism, provoked R. popliteal DVT in 2019 on eliquis, chronic low back pain presents to ED for acute on chronic low back pain. Pt has several months of chronic lumbosacral back pain, evaluated by outpatient ortho and referred for physical therapy and pain management. She has been receiving ?vitamin spinal injections. She last received low back inj on 12/16 after which she her low back pain worsened. She took oxycodone 5 mg qd (prescribed in september during a ED visit) and acetaminophen for past three days without relief. She also endorses worsening pain during ambulation with walker and has affected her quality of life. She has constipation, last BM 4 days ago. Otherwise denies fever, chills, N/V, abdominal pain, dysuria, urinary retention, fecal incontinence, saddle anesthesia, fall, LOC, trauma. During ROS she endorsed LLE weakness for several months, no numbness or tingling and has LLE edema >RLE edema, reported undergoing LLE US 4 months ago, was negative for DVT. Currently has 8/10 pain, improved to 5/10 with oxycodone 5 mg x 2 in ED. (28 Dec 2020 11:16)   12/28: Mri T/Ls: IMPRESSION: Degenerative changes  Abnormal signal with associated enhancement is seen involving the endplates adjacent to the L2-3 disc space. There is slight increased T2 prolongation involving the L2-3 disc space with associated widening. While these findings could be compatible with degenerative changes possibly of early discitis and osteomyelitis must be considered.  Patient s/p L1-L5 laminectomy, T10-L5 posterior surgical fusion by Dr. Campos on 1/1/21  HMV x 3 drains. Post op with constipation needing aggressive bowel regimen.     PT/OT and PM&R evaluated patient and recommended Rehab.     Patient medically cleared and admitted to  Rehab on 1/12/21 (12 Jan 2021 15:21)      SUBJECTIVE/INTERVAL EVENTS  Patient seen and assessed at bedside. No acute events overnight. Patient was moved to another room late last night and reported poor sleep after that and feeling a little tired today. She denies significant pain in her back and had a BM yesterday.     Pain overall controlled, rates as 4/10 on current medications, does not feel pain is keeping her up. Aware that staples will be removed today      PAST MEDICAL & SURGICAL HISTORY:  HTN (hypertension)    DVT (deep venous thrombosis)    Hypothyroidism    RA (rheumatoid arthritis)    Obesity, Class II, BMI 35-39.9, no comorbidity    Hypothyroid    Benign heart murmur    Hyperlipidemia    Hypertension    H/O degenerative disc disease    Osteoarthritis    SLE (systemic lupus erythematosus)    Rheumatoid arthritis    S/P knee replacement    Status post total hip replacement, right    S/P thyroid surgery  1 lobe removed    Lung cancer  small tumor removed 2015    S/P knee surgery  bilateral    S/P carpal tunnel release  left    S/P eye surgery  child    S/P cataract surgery  left    S/P tonsillectomy        Allergies    No Known Allergies    Intolerances          VITALS  75y  Vital Signs Last 24 Hrs  T(C): 36.8 (29 Jan 2021 09:08), Max: 37.1 (28 Jan 2021 19:23)  T(F): 98.2 (29 Jan 2021 09:08), Max: 98.8 (28 Jan 2021 19:23)  HR: 97 (29 Jan 2021 09:08) (95 - 97)  BP: 110/65 (29 Jan 2021 09:08) (110/65 - 119/72)  BP(mean): --  RR: 16 (29 Jan 2021 09:08) (16 - 18)  SpO2: 97% (29 Jan 2021 09:08) (94% - 97%)  Daily         Physical Exam:    Gen - NAD, Comfortable, tired. sitting up EOB with OT. Reduced sitting baalnce, also lean to left for pressure shifting, pain relief    Pulm - CTAB, good effort  Cardiovascular - S1S2  Abdomen - Soft, NT/ND, +BS  Extremities - No C/C/E, No calf tenderness    Motor -UE 5/5, LE proximal 4/5, distal 5/5     Psychiatric - Mood stable, Affect WNL  Skin: long back incision with staples - some removed, c/d/i  no erythema, +intact, dry, no induration  no fluctuance, no TTP        RECENT LABS:                          10.8   4.69  )-----------( 329      ( 28 Jan 2021 06:15 )             34.7     01-28    142  |  107  |  16  ----------------------------<  94  3.7   |  26  |  0.86    Ca    9.1      28 Jan 2021 06:15    TPro  6.7  /  Alb  2.6<L>  /  TBili  0.4  /  DBili  x   /  AST  23  /  ALT  13  /  AlkPhos  131<H>  01-28    LIVER FUNCTIONS - ( 28 Jan 2021 06:15 )  Alb: 2.6 g/dL / Pro: 6.7 g/dL / ALK PHOS: 131 U/L / ALT: 13 U/L / AST: 23 U/L / GGT: x                 Culture - Urine (collected 01-25-21 @ 04:50)  Source: .Urine Clean Catch (Midstream)  Final Report (01-27-21 @ 07:10):    >100,000 CFU/ml Providencia rettgeri    <10,000 CFU/ml Normal Urogenital aminah present  Organism: Providencia rettgeri (01-27-21 @ 07:10)  Organism: Providencia rettgeri (01-27-21 @ 07:10)      -  Amikacin: S <=16      -  Amoxicillin/Clavulanic Acid: R >16/8      -  Ampicillin: R >16 These ampicillin results predict results for amoxicillin      -  Ampicillin/Sulbactam: I 16/8 Enterobacter, Citrobacter, and Serratia may develop resistance during prolonged therapy (3-4 days)      -  Aztreonam: S <=4      -  Cefazolin: R >16      -  Cefepime: S <=2      -  Cefoxitin: S <=8      -  Ceftriaxone: S <=1 Enterobacter, Citrobacter, and Serratia may develop resistance during prolonged therapy      -  Ciprofloxacin: S <=0.25      -  Ertapenem: S <=0.5      -  Gentamicin: S <=2      -  Imipenem: S <=1      -  Levofloxacin: S <=0.5      -  Meropenem: S <=1      -  Nitrofurantoin: R >64 Should not be used to treat pyelonephritis      -  Piperacillin/Tazobactam: S <=8      -  Tigecycline: R <=2      -  Tobramycin: S <=2      -  Trimethoprim/Sulfamethoxazole: S <=0.5/9.5      Method Type: RUBY        CAPILLARY BLOOD GLUCOSE          MEDICATIONS  (STANDING):  atorvastatin 20 milliGRAM(s) Oral at bedtime  ciprofloxacin     Tablet 250 milliGRAM(s) Oral every 12 hours  enoxaparin Injectable 40 milliGRAM(s) SubCutaneous two times a day  folic acid 1 milliGRAM(s) Oral daily  hydroxychloroquine 200 milliGRAM(s) Oral daily  lactobacillus acidophilus 1 Tablet(s) Oral two times a day  levothyroxine 200 MICROGram(s) Oral daily  lidocaine   Patch 1 Patch Transdermal daily  oxyCODONE  ER Tablet 10 milliGRAM(s) Oral every 12 hours  polyethylene glycol 3350 17 Gram(s) Oral daily  senna 2 Tablet(s) Oral at bedtime    MEDICATIONS  (PRN):  acetaminophen   Tablet .. 650 milliGRAM(s) Oral every 6 hours PRN Temp greater or equal to 38C (100.4F), Mild Pain (1 - 3)  magnesium hydroxide Suspension 30 milliLiter(s) Oral daily PRN Constipation  melatonin 3 milliGRAM(s) Oral at bedtime PRN Insomnia  oxyCODONE    IR 10 milliGRAM(s) Oral every 4 hours PRN Moderate Pain (4 - 6)  simethicone 80 milliGRAM(s) Chew every 6 hours PRN Upset Stomach

## 2021-01-30 ENCOUNTER — TRANSCRIPTION ENCOUNTER (OUTPATIENT)
Age: 76
End: 2021-01-30

## 2021-01-30 LAB — SARS-COV-2 RNA SPEC QL NAA+PROBE: DETECTED

## 2021-01-30 PROCEDURE — 99232 SBSQ HOSP IP/OBS MODERATE 35: CPT

## 2021-01-30 RX ADMIN — SIMETHICONE 80 MILLIGRAM(S): 80 TABLET, CHEWABLE ORAL at 13:14

## 2021-01-30 RX ADMIN — Medication 1 TABLET(S): at 17:27

## 2021-01-30 RX ADMIN — Medication 1 TABLET(S): at 06:35

## 2021-01-30 RX ADMIN — Medication 200 MICROGRAM(S): at 06:35

## 2021-01-30 RX ADMIN — ATORVASTATIN CALCIUM 20 MILLIGRAM(S): 80 TABLET, FILM COATED ORAL at 21:34

## 2021-01-30 RX ADMIN — ENOXAPARIN SODIUM 40 MILLIGRAM(S): 100 INJECTION SUBCUTANEOUS at 06:35

## 2021-01-30 RX ADMIN — Medication 250 MILLIGRAM(S): at 06:35

## 2021-01-30 RX ADMIN — LIDOCAINE 1 PATCH: 4 CREAM TOPICAL at 00:58

## 2021-01-30 RX ADMIN — OXYCODONE HYDROCHLORIDE 10 MILLIGRAM(S): 5 TABLET ORAL at 06:35

## 2021-01-30 RX ADMIN — ENOXAPARIN SODIUM 40 MILLIGRAM(S): 100 INJECTION SUBCUTANEOUS at 17:26

## 2021-01-30 RX ADMIN — SIMETHICONE 80 MILLIGRAM(S): 80 TABLET, CHEWABLE ORAL at 18:57

## 2021-01-30 RX ADMIN — LIDOCAINE 1 PATCH: 4 CREAM TOPICAL at 23:34

## 2021-01-30 RX ADMIN — LIDOCAINE 1 PATCH: 4 CREAM TOPICAL at 12:00

## 2021-01-30 RX ADMIN — Medication 650 MILLIGRAM(S): at 18:56

## 2021-01-30 RX ADMIN — Medication 1 MILLIGRAM(S): at 11:59

## 2021-01-30 RX ADMIN — LIDOCAINE 1 PATCH: 4 CREAM TOPICAL at 19:33

## 2021-01-30 RX ADMIN — OXYCODONE HYDROCHLORIDE 10 MILLIGRAM(S): 5 TABLET ORAL at 17:25

## 2021-01-30 RX ADMIN — Medication 200 MILLIGRAM(S): at 11:59

## 2021-01-30 RX ADMIN — OXYCODONE HYDROCHLORIDE 10 MILLIGRAM(S): 5 TABLET ORAL at 23:37

## 2021-01-30 NOTE — PROVIDER CONTACT NOTE (OTHER) - ACTION/TREATMENT ORDERED:
MD. perkins. Will continue to monitor.
LAURIE Dr Salomon notified as well as nursing supervisor Gloria; pt will be transferred to [facility]; other pts in this room are isolated and placed on droplet precaution; repeat covid swab test ordered.
Simethicone PRN as needed, ginger ale given as per patient request

## 2021-01-30 NOTE — PROVIDER CONTACT NOTE (OTHER) - BACKGROUND
Pt was admitted s/p laminectomy; she was swabbed on 01/29/21 for covid (routine weekly test), but she was asymptomatic; VS - WNL.
Back pain, laminectomy

## 2021-01-30 NOTE — PROGRESS NOTE ADULT - SUBJECTIVE AND OBJECTIVE BOX
Patient is a 75y old  Female who presents with a chief complaint of Back pain (29 Jan 2021 12:04)      Patient seen and examined at bedside.  tested COVID positive last night.    ALLERGIES:  No Known Allergies    MEDICATIONS  (STANDING):  atorvastatin 20 milliGRAM(s) Oral at bedtime  enoxaparin Injectable 40 milliGRAM(s) SubCutaneous two times a day  folic acid 1 milliGRAM(s) Oral daily  hydroxychloroquine 200 milliGRAM(s) Oral daily  lactobacillus acidophilus 1 Tablet(s) Oral two times a day  levothyroxine 200 MICROGram(s) Oral daily  lidocaine   Patch 1 Patch Transdermal daily  oxyCODONE  ER Tablet 10 milliGRAM(s) Oral every 12 hours  polyethylene glycol 3350 17 Gram(s) Oral daily  senna 2 Tablet(s) Oral at bedtime    MEDICATIONS  (PRN):  acetaminophen   Tablet .. 650 milliGRAM(s) Oral every 6 hours PRN Temp greater or equal to 38C (100.4F), Mild Pain (1 - 3)  magnesium hydroxide Suspension 30 milliLiter(s) Oral daily PRN Constipation  melatonin 3 milliGRAM(s) Oral at bedtime PRN Insomnia  oxyCODONE    IR 10 milliGRAM(s) Oral every 4 hours PRN Severe Pain (7 - 10)  oxyCODONE    IR 5 milliGRAM(s) Oral every 4 hours PRN Moderate Pain (4 - 6)  simethicone 80 milliGRAM(s) Chew every 6 hours PRN Upset Stomach    Vital Signs Last 24 Hrs  T(F): 97.8 (30 Jan 2021 08:44), Max: 98.2 (29 Jan 2021 21:07)  HR: 74 (30 Jan 2021 08:44) (74 - 74)  BP: 101/60 (30 Jan 2021 08:44) (101/60 - 133/71)  RR: 17 (30 Jan 2021 08:44) (15 - 17)  SpO2: 94% (30 Jan 2021 08:44) (94% - 96%)  I&O's Summary      PHYSICAL EXAM:  General: NAD, A/O x 3  ENT: MMM  Neck: Supple, No JVD  Lungs: Clear to auscultation bilaterally  Cardio: RRR, S1/S2, No murmurs  Abdomen: Soft, Nontender, Nondistended; Bowel sounds present  Extremities: No calf tenderness, No pitting edema    LABS:                        10.8   4.69  )-----------( 329      ( 28 Jan 2021 06:15 )             34.7       01-28    142  |  107  |  16  ----------------------------<  94  3.7   |  26  |  0.86    Ca    9.1      28 Jan 2021 06:15    TPro  6.7  /  Alb  2.6  /  TBili  0.4  /  DBili  x   /  AST  23  /  ALT  13  /  AlkPhos  131  01-28     eGFR if Non African American: 65 mL/min/1.73M2 (01-28-21 @ 06:15)  eGFR if African American: 76 mL/min/1.73M2 (01-28-21 @ 06:15)                                   Culture - Urine (collected 25 Jan 2021 04:50)  Source: .Urine Clean Catch (Midstream)  Final Report (27 Jan 2021 07:10):    >100,000 CFU/ml Providencia rettgeri    <10,000 CFU/ml Normal Urogenital aminah present  Organism: Providencia rettgeri (27 Jan 2021 07:10)  Organism: Providencia rettgeri (27 Jan 2021 07:10)      -  Amikacin: S <=16      -  Amoxicillin/Clavulanic Acid: R >16/8      -  Ampicillin: R >16 These ampicillin results predict results for amoxicillin      -  Ampicillin/Sulbactam: I 16/8 Enterobacter, Citrobacter, and Serratia may develop resistance during prolonged therapy (3-4 days)      -  Aztreonam: S <=4      -  Cefazolin: R >16      -  Cefepime: S <=2      -  Cefoxitin: S <=8      -  Ceftriaxone: S <=1 Enterobacter, Citrobacter, and Serratia may develop resistance during prolonged therapy      -  Ciprofloxacin: S <=0.25      -  Ertapenem: S <=0.5      -  Gentamicin: S <=2      -  Imipenem: S <=1      -  Levofloxacin: S <=0.5      -  Meropenem: S <=1      -  Nitrofurantoin: R >64 Should not be used to treat pyelonephritis      -  Piperacillin/Tazobactam: S <=8      -  Tigecycline: R <=2      -  Tobramycin: S <=2      -  Trimethoprim/Sulfamethoxazole: S <=0.5/9.5      Method Type: RUBY        RADIOLOGY & ADDITIONAL TESTS:    Care Discussed with Consultants/Other Providers:

## 2021-01-30 NOTE — PROGRESS NOTE ADULT - ASSESSMENT
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 Hospital Course complicated by brief SICU stay for mechanical ventilation management, currently extubated. Patient now admitted for a multidisciplinary rehab program on 1/12/21.    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 Jaja Campos  -Continue comprehensive rehab program  -Pain management, bowel regimen per rehab    UTI  - urine culture shows >100K Providencia Rettgeri - macrobid resistant  - Cipro 250mg q12 x 3 days    Acute blood loss Anemia, likely from surgery  -H/H stable, continue to monitor   -No overt signs of bleeding    Abdominal pain - likely 2/2 opioid induced constipation   -Bowel regimen per rehab    SLE/RA  -Continue with Plaquenil 200mg po daily    H/o provoked right popliteal, right posterior tibial DVT (7/2019 in setting of hip surgery, already completed >6 months of treatment)  -Follows with cardiologist Dr. Hoyos outpatient  -Treated with Eliquis 5mg po bid chronically -- AC stopped at [facility] as 12/29 LE dopplers neg for DVTs  - currently anticoagulated with lovenox 40mg BID as inpt in acute rehab  - post discharge to continue eliquis as will follow up with Dr. Hoyos as outpt    Essential HTN: stable off anti-hypertensives  -Continue to hold home medication Losartan  - VS per rehab protocol    HLD  -Chronic, continue Lipitor    Hypothyroidism  -Chronic, continue Levothyroxine    VTE ppx:   - Lovenox 40mg BID  GI ppx:   - continue PPI    Dispo: tested COVID+ last night. pt needs to be moved to isolation. pending infection control

## 2021-01-30 NOTE — DISCHARGE NOTE NURSING/CASE MANAGEMENT/SOCIAL WORK - NSDCFUADDAPPT_GEN_ALL_CORE_FT
Appt with new PCP, Dr. Deb Fowler located at [phone] [place] [address] ([phone])- appointment is scheduled for Monday, 3/8/21 at 3:30 pm. Please arrive 15 minutes early and bring your insurance card, photo ID, medication list.

## 2021-01-30 NOTE — DISCHARGE NOTE NURSING/CASE MANAGEMENT/SOCIAL WORK - PATIENT PORTAL LINK FT
You can access the FollowMyHealth Patient Portal offered by [facility] by registering at the following website: [email or web address]. By joining [employer]’s FollowMyHealth portal, you will also be able to view your health information using other applications (apps) compatible with our system.

## 2021-01-30 NOTE — PROVIDER CONTACT NOTE (OTHER) - ASSESSMENT
NAD, VSS, still complaining slight trembling in the stomach. Patient stated she had chicken which probably making her stomach upset
Pt VS - WNL; no other symptoms are present

## 2021-01-30 NOTE — PROVIDER CONTACT NOTE (OTHER) - RECOMMENDATIONS
Simethicone PRN as needed, ginger ale given as per patient request
Notify provider and nursing supervisor; isolate pt and her roommates; repeat covid swab

## 2021-01-30 NOTE — PROGRESS NOTE ADULT - SUBJECTIVE AND OBJECTIVE BOX
No overnight events.  Slept well.  Reports pain is controlled.  Having intermittent spasm suggested heat pack in lieu of medication given side effects.    REVIEW OF SYSTEMS  Constitutional - No fever,  No fatigue  Musculoskeletal - +joint pain, No joint swelling, +muscle pain    VITALS  T(C): 36.8 (01-29-21 @ 21:07), Max: 36.8 (01-29-21 @ 09:08)  HR: 74 (01-29-21 @ 21:07) (74 - 97)  BP: 133/71 (01-29-21 @ 21:07) (110/65 - 133/71)  RR: 15 (01-29-21 @ 21:07) (15 - 16)  SpO2: 96% (01-29-21 @ 21:07) (96% - 97%)  Wt(kg): --     MEDICATIONS   acetaminophen   Tablet .. 650 milliGRAM(s) every 6 hours PRN  atorvastatin 20 milliGRAM(s) at bedtime  enoxaparin Injectable 40 milliGRAM(s) two times a day  folic acid 1 milliGRAM(s) daily  hydroxychloroquine 200 milliGRAM(s) daily  lactobacillus acidophilus 1 Tablet(s) two times a day  levothyroxine 200 MICROGram(s) daily  lidocaine   Patch 1 Patch daily  magnesium hydroxide Suspension 30 milliLiter(s) daily PRN  melatonin 3 milliGRAM(s) at bedtime PRN  oxyCODONE    IR 10 milliGRAM(s) every 4 hours PRN  oxyCODONE    IR 5 milliGRAM(s) every 4 hours PRN  oxyCODONE  ER Tablet 10 milliGRAM(s) every 12 hours  polyethylene glycol 3350 17 Gram(s) daily  senna 2 Tablet(s) at bedtime  simethicone 80 milliGRAM(s) every 6 hours PRN      RECENT LABS/IMAGING                      ---------  PHYSICAL EXAM  Constitutional - NAD, Comfortable  Pulm - Breathing comfortably, No wheezing  Abd - Nondistended  Extremities - No calf tenderness  Neurologic Exam - Awake, Alert            Motor - Exam unchanged  Psychiatric - Mood WNL     ASSESSMENT/PLAN  75y Female with impairments in mobility and ADLs   - Continue 3hrs a day of comprehensive rehab program  - Continue current medications, medically stable  - DVT prophylaxis - SCD, Lovenox  - Skin - OOB and mobilization daily   - COVID19 PCR + - Pending infection control decision
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
## 2021-01-30 NOTE — DISCHARGE NOTE NURSING/CASE MANAGEMENT/SOCIAL WORK - NSSCTYPOFSERV_GEN_ALL_CORE
Contact Dermatitis (Child)  Contact dermatitis is a skin rash caused by something that touches the skin and makes it irritated and inflamed.  Your child’s skin may be red, swollen, dry, and may be cracked. Blisters may form and ooze. The rash will itch.  Contact dermatitis can form on the face and neck, backs of hands, forearms, genitals, and lower legs. Children may get it from exposure to animals. They may get it from soaps and detergents. And they may get it from plants such as poison ivy, oak, or sumac. Contact dermatitis is not passed from person to person.  Talk with your health care provider about what may be causing your child’s rash. This will help to rule out any serious causes of a skin rash. In some cases, the cause of the dermatitis may not be found.  Treatment is done to relieve itching and prevent the rash from coming back. The rash should go away in a few days to a few weeks.  Home care  The health care provider may prescribe medications to relieve swelling and itching. Follow all instructions when using these medications on your child.  General care:  · Follow your health care provider’s instructions on how to care for your child’s rash.  · Bathe your child in warm (not hot) water with mild soap. Ask your child’s health care provider if you should apply petroleum jelly or cream after bathing.  · Expose the affected skin to the air so that it dries completely. Do not use a hair dryer on the skin.  · Dress your child in loose cotton clothing.  · Make sure your child does not scratch the affected area. This can delay healing. It can also cause a bacterial infection. You may need to use soft “scratch mittens” that cover your child’s hands.  · Apply cold compresses to your child’s sores to help soothe symptoms. Do this for 30 minutes 3 to 4 times a day. You can make a cold compress by soaking a cloth in cold water. Squeeze out excess water. You can add colloidal oatmeal to the water to help reduce  itching.  · You can also help relieve large areas of itching by giving your child a lukewarm bath with colloidal oatmeal added to the water.  · If your child’s rash is caused by a plant, make sure to wash your child’s skin and the clothes he or she was wearing when in contact with the plant. This is to wash away the plant oils that gave your child the rash and prevent more or worse symptoms.  Follow-up care  Follow up with your child’s health care provider. Contact your child’s health care provider if the rash is not better in 2 weeks.  Special note to parents  Wash your hands well with soap and warm water before and after caring for your child.  When to seek medical advice  Call your child's health care provider right away if any of these occur:  · Fever of 100.4°F (38°C) or higher  · Redness or swelling that gets worse  · Pain that gets worse (babies may show pain with fussiness that can’t be soothed)  · Foul-smelling fluid leaking from the skin  · New rash on other parts of the body  © 9604-1717 [employer]. [address] [place] [address]. All rights reserved. This information is not intended as a substitute for professional medical care. Always follow your healthcare professional's instructions.      Avoid use of fabric softener in the laundry for a while.    Dove soap recommended as a skin cleanser.    Moisturizing cream should be used regularly.   RN visit, PT, OT, evaluation for home health aide RN visit, PT, OT, evaluation for home health aide- nurse will visit on 3/4/21. Call the [phone] number in the am if you would like to confirm what time nurse is coming.

## 2021-01-30 NOTE — DISCHARGE NOTE NURSING/CASE MANAGEMENT/SOCIAL WORK - NSDCDMETYPESERV_GEN_ALL_CORE_FT
hospital bed delivered to home; platform walker delivered to bedside hospital bed delivered to home; platforms for walker delivered to bedside

## 2021-01-31 LAB — SARS-COV-2 RNA SPEC QL NAA+PROBE: SIGNIFICANT CHANGE UP

## 2021-01-31 PROCEDURE — 99232 SBSQ HOSP IP/OBS MODERATE 35: CPT | Mod: CS

## 2021-01-31 RX ORDER — ONDANSETRON 8 MG/1
4 TABLET, FILM COATED ORAL EVERY 8 HOURS
Refills: 0 | Status: DISCONTINUED | OUTPATIENT
Start: 2021-01-31 | End: 2021-02-14

## 2021-01-31 RX ADMIN — ONDANSETRON 4 MILLIGRAM(S): 8 TABLET, FILM COATED ORAL at 13:23

## 2021-01-31 RX ADMIN — Medication 1 TABLET(S): at 17:55

## 2021-01-31 RX ADMIN — Medication 3 MILLIGRAM(S): at 01:16

## 2021-01-31 RX ADMIN — Medication 200 MICROGRAM(S): at 05:47

## 2021-01-31 RX ADMIN — Medication 650 MILLIGRAM(S): at 21:40

## 2021-01-31 RX ADMIN — ENOXAPARIN SODIUM 40 MILLIGRAM(S): 100 INJECTION SUBCUTANEOUS at 17:56

## 2021-01-31 RX ADMIN — ENOXAPARIN SODIUM 40 MILLIGRAM(S): 100 INJECTION SUBCUTANEOUS at 05:47

## 2021-01-31 RX ADMIN — ATORVASTATIN CALCIUM 20 MILLIGRAM(S): 80 TABLET, FILM COATED ORAL at 21:31

## 2021-01-31 RX ADMIN — OXYCODONE HYDROCHLORIDE 10 MILLIGRAM(S): 5 TABLET ORAL at 05:47

## 2021-01-31 RX ADMIN — OXYCODONE HYDROCHLORIDE 10 MILLIGRAM(S): 5 TABLET ORAL at 17:55

## 2021-01-31 RX ADMIN — SENNA PLUS 2 TABLET(S): 8.6 TABLET ORAL at 21:31

## 2021-01-31 RX ADMIN — Medication 3 MILLIGRAM(S): at 23:28

## 2021-01-31 RX ADMIN — Medication 1 TABLET(S): at 05:47

## 2021-01-31 RX ADMIN — LIDOCAINE 1 PATCH: 4 CREAM TOPICAL at 21:31

## 2021-01-31 RX ADMIN — Medication 200 MILLIGRAM(S): at 12:14

## 2021-01-31 RX ADMIN — OXYCODONE HYDROCHLORIDE 10 MILLIGRAM(S): 5 TABLET ORAL at 23:28

## 2021-01-31 RX ADMIN — Medication 1 MILLIGRAM(S): at 12:14

## 2021-01-31 NOTE — PROGRESS NOTE ADULT - COMMENTS
Patient was moved up to 3E U after covid + conversion. No fever, no myalgia or chills. +back pain stable, controlled on current medications. Has some mild abdominal discomfort but attributes that to gas and constiaption, no diarrhea. no H/a or congestion, no cough or SOB.    mood fair, expresses stress and frustration with COVID status

## 2021-01-31 NOTE — PROGRESS NOTE ADULT - SUBJECTIVE AND OBJECTIVE BOX
Patient is a 75y old  Female who presents with a chief complaint of Back pain (30 Jan 2021 10:56)      HPI:  75F with past medical history of HTN, HLD, SLE, RA, hypothyroidism, provoked R. popliteal DVT in 2019 on eliquis, chronic low back pain presents to ED for acute on chronic low back pain. Pt has several months of chronic lumbosacral back pain, evaluated by outpatient ortho and referred for physical therapy and pain management. She has been receiving ?vitamin spinal injections. She last received low back inj on 12/16 after which she her low back pain worsened. She took oxycodone 5 mg qd (prescribed in september during a ED visit) and acetaminophen for past three days without relief. She also endorses worsening pain during ambulation with walker and has affected her quality of life. She has constipation, last BM 4 days ago. Otherwise denies fever, chills, N/V, abdominal pain, dysuria, urinary retention, fecal incontinence, saddle anesthesia, fall, LOC, trauma. During ROS she endorsed LLE weakness for several months, no numbness or tingling and has LLE edema >RLE edema, reported undergoing LLE US 4 months ago, was negative for DVT. Currently has 8/10 pain, improved to 5/10 with oxycodone 5 mg x 2 in ED. (28 Dec 2020 11:16)   12/28: Mri T/Ls: IMPRESSION: Degenerative changes  Abnormal signal with associated enhancement is seen involving the endplates adjacent to the L2-3 disc space. There is slight increased T2 prolongation involving the L2-3 disc space with associated widening. While these findings could be compatible with degenerative changes possibly of early discitis and osteomyelitis must be considered.  Patient s/p L1-L5 laminectomy, T10-L5 posterior surgical fusion by Dr. Campos on 1/1/21  HMV x 3 drains. Post op with constipation needing aggressive bowel regimen.     PT/OT and PM&R evaluated patient and recommended Rehab.     Patient medically cleared and admitted to  Rehab on 1/12/21 (12 Jan 2021 15:21)      PAST MEDICAL & SURGICAL HISTORY:  HTN (hypertension)    DVT (deep venous thrombosis)    Hypothyroidism    RA (rheumatoid arthritis)    Obesity, Class II, BMI 35-39.9, no comorbidity    Hypothyroid    Benign heart murmur    Hyperlipidemia    Hypertension    H/O degenerative disc disease    Osteoarthritis    SLE (systemic lupus erythematosus)    Rheumatoid arthritis    S/P knee replacement    Status post total hip replacement, right    S/P thyroid surgery  1 lobe removed    Lung cancer  small tumor removed 2015    S/P knee surgery  bilateral    S/P carpal tunnel release  left    S/P eye surgery  child    S/P cataract surgery  left    S/P tonsillectomy        MEDICATIONS  (STANDING):  atorvastatin 20 milliGRAM(s) Oral at bedtime  enoxaparin Injectable 40 milliGRAM(s) SubCutaneous two times a day  folic acid 1 milliGRAM(s) Oral daily  hydroxychloroquine 200 milliGRAM(s) Oral daily  lactobacillus acidophilus 1 Tablet(s) Oral two times a day  levothyroxine 200 MICROGram(s) Oral daily  lidocaine   Patch 1 Patch Transdermal daily  oxyCODONE  ER Tablet 10 milliGRAM(s) Oral every 12 hours  polyethylene glycol 3350 17 Gram(s) Oral daily  senna 2 Tablet(s) Oral at bedtime    MEDICATIONS  (PRN):  acetaminophen   Tablet .. 650 milliGRAM(s) Oral every 6 hours PRN Temp greater or equal to 38C (100.4F), Mild Pain (1 - 3)  magnesium hydroxide Suspension 30 milliLiter(s) Oral daily PRN Constipation  melatonin 3 milliGRAM(s) Oral at bedtime PRN Insomnia  oxyCODONE    IR 10 milliGRAM(s) Oral every 4 hours PRN Severe Pain (7 - 10)  oxyCODONE    IR 5 milliGRAM(s) Oral every 4 hours PRN Moderate Pain (4 - 6)  simethicone 80 milliGRAM(s) Chew every 6 hours PRN Upset Stomach      Allergies    No Known Allergies    Intolerances          VITALS  75y  Vital Signs Last 24 Hrs  T(C): 36.8 (31 Jan 2021 09:07), Max: 36.8 (30 Jan 2021 22:42)  T(F): 98.2 (31 Jan 2021 09:07), Max: 98.2 (30 Jan 2021 22:42)  HR: 75 (31 Jan 2021 09:07) (75 - 82)  BP: 112/51 (31 Jan 2021 09:07) (112/51 - 130/74)  BP(mean): --  RR: 16 (31 Jan 2021 09:07) (15 - 16)  SpO2: 98% (31 Jan 2021 09:07) (95% - 99%)  Daily     Daily         RECENT LABS:      COVID swab PCR 1/29 POSITIVE                CAPILLARY BLOOD GLUCOSE

## 2021-01-31 NOTE — PROGRESS NOTE ADULT - SUBJECTIVE AND OBJECTIVE BOX
Patient is a 75y old  Female who presents with a chief complaint of Back pain (31 Jan 2021 09:41)      Patient seen and examined at bedside. pt reports pain across the lower abdomen. no N/V/D. no fever, chills. no sob, cp. reports had one BM yesterday.    ALLERGIES:  No Known Allergies    MEDICATIONS  (STANDING):  atorvastatin 20 milliGRAM(s) Oral at bedtime  enoxaparin Injectable 40 milliGRAM(s) SubCutaneous two times a day  folic acid 1 milliGRAM(s) Oral daily  hydroxychloroquine 200 milliGRAM(s) Oral daily  lactobacillus acidophilus 1 Tablet(s) Oral two times a day  levothyroxine 200 MICROGram(s) Oral daily  lidocaine   Patch 1 Patch Transdermal daily  oxyCODONE  ER Tablet 10 milliGRAM(s) Oral every 12 hours  polyethylene glycol 3350 17 Gram(s) Oral daily  senna 2 Tablet(s) Oral at bedtime    MEDICATIONS  (PRN):  acetaminophen   Tablet .. 650 milliGRAM(s) Oral every 6 hours PRN Temp greater or equal to 38C (100.4F), Mild Pain (1 - 3)  magnesium hydroxide Suspension 30 milliLiter(s) Oral daily PRN Constipation  melatonin 3 milliGRAM(s) Oral at bedtime PRN Insomnia  oxyCODONE    IR 10 milliGRAM(s) Oral every 4 hours PRN Severe Pain (7 - 10)  oxyCODONE    IR 5 milliGRAM(s) Oral every 4 hours PRN Moderate Pain (4 - 6)  simethicone 80 milliGRAM(s) Chew every 6 hours PRN Upset Stomach    Vital Signs Last 24 Hrs  T(F): 98.2 (31 Jan 2021 09:07), Max: 98.2 (30 Jan 2021 22:42)  HR: 75 (31 Jan 2021 09:07) (75 - 82)  BP: 112/51 (31 Jan 2021 09:07) (112/51 - 130/74)  RR: 16 (31 Jan 2021 09:07) (15 - 16)  SpO2: 98% (31 Jan 2021 09:07) (95% - 99%)  I&O's Summary    PHYSICAL EXAM:  General: NAD, A/O x 3  ENT: MMM  Neck: Supple, No JVD  Lungs: Clear to auscultation bilaterally  Cardio: RRR, S1/S2, No murmurs  Abdomen: Soft, Nontender, Nondistended; Bowel sounds present  Extremities: No calf tenderness, No pitting edema    LABS:      Culture - Urine (collected 25 Jan 2021 04:50)  Source: .Urine Clean Catch (Midstream)  Final Report (27 Jan 2021 07:10):    >100,000 CFU/ml Providencia rettgeri    <10,000 CFU/ml Normal Urogenital aminah present  Organism: Providencia rettgeri (27 Jan 2021 07:10)  Organism: Providencia rettgeri (27 Jan 2021 07:10)      -  Amikacin: S <=16      -  Amoxicillin/Clavulanic Acid: R >16/8      -  Ampicillin: R >16 These ampicillin results predict results for amoxicillin      -  Ampicillin/Sulbactam: I 16/8 Enterobacter, Citrobacter, and Serratia may develop resistance during prolonged therapy (3-4 days)      -  Aztreonam: S <=4      -  Cefazolin: R >16      -  Cefepime: S <=2      -  Cefoxitin: S <=8      -  Ceftriaxone: S <=1 Enterobacter, Citrobacter, and Serratia may develop resistance during prolonged therapy      -  Ciprofloxacin: S <=0.25      -  Ertapenem: S <=0.5      -  Gentamicin: S <=2      -  Imipenem: S <=1      -  Levofloxacin: S <=0.5      -  Meropenem: S <=1      -  Nitrofurantoin: R >64 Should not be used to treat pyelonephritis      -  Piperacillin/Tazobactam: S <=8      -  Tigecycline: R <=2      -  Tobramycin: S <=2      -  Trimethoprim/Sulfamethoxazole: S <=0.5/9.5      Method Type: RUBY        RADIOLOGY & ADDITIONAL TESTS:      Care Discussed with Consultants/Other Providers: rehab team

## 2021-01-31 NOTE — PROGRESS NOTE ADULT - ASSESSMENT
75F with PMH HTN, HLD, SLE, RA, hypothyroidism, provoked right popliteal DVT in 2019, chronic low back pain presents to [facility] 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 Hospital Course complicated by brief SICU stay for mechanical ventilation management, currently extubated. Patient now admitted for a multidisciplinary rehab program on 1/12/21.    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 Jaja Campos  -Continue comprehensive rehab program  -Pain management, bowel regimen per rehab    COVID  -pt asymptomatic. saturating appropriately on RA. cont to monitor     UTI  - urine culture shows >100K Providencia Rettgeri - macrobid resistant  - completed Cipro 250mg q12 x 3 days on 1/30    Acute blood loss Anemia, likely from surgery  -H/H stable, continue to monitor   -No overt signs of bleeding    Abdominal pain - likely 2/2 opioid induced constipation   -Bowel regimen per rehab  -cont to monitor, if pain persistent, check CT abd/pelvis    SLE/RA  -Continue with Plaquenil 200mg po daily and folic acid    H/o provoked right popliteal, right posterior tibial DVT (7/2019 in setting of hip surgery, already completed >6 months of treatment)  -Follows with cardiologist Dr. Hoyos outpatient  -Treated with Eliquis 5mg po bid chronically -- AC stopped at [facility] as 12/29 LE dopplers neg for DVTs  -currently on DVT ppx with lovenox 40mg BID   -DC Eliquis on discharge    Essential HTN: stable off anti-hypertensives  -Continue to hold home medication Losartan  -VS per rehab protocol    HLD  -Chronic, continue Lipitor    Hypothyroidism  -Chronic, continue Levothyroxine    VTE ppx:   - Lovenox 40mg BID  GI ppx:   - continue PPI

## 2021-01-31 NOTE — PROGRESS NOTE ADULT - ASSESSMENT
KAI THOMAS is a 75y F PMH HTN, HLD, SLE, RA, hypothyroidism, provoked R. 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 Jaja Campos  - continue Comprehensive Rehab Program of PT/OT- 3 hrs/day 5 days/week  -Precautions: cardiac, spinal, RA, contact and droplet precautions    #New COVID conversion  -PCR + 1/29/21. Moved up to 3E RMU for isolation  -repeat Swab drawn 1/31, pending  -psychology for supportive counseling  -continue contact and droplet precautions    #SLE/RA:-   -continue plaquenil 200mg po daily    #h/o Provoked DVT in 2019  - US July 2019:  DVT noted in the right popliteal vein and visualized segment of the right  posterior tibial vein.  - was on eliquis 5mg po bid at home,   - currently on lovenox for dvt ppx, to resume eliquis at discharge  -Follows with vascular cardiologist Dr. Hoyos outpatient    #HTN:  - takes losartan 50mg po qd at home, held  - (112/51 - 130/74) 1/31 stable off meds    #HLD:  - continue lipitor 20mg po daily    #hypothyroidism:  - continue levothyroxine 200mcg po daily    #UTI:  - UCx: providencia rettgari- resistant to macrobid  - On Cipro for 3 day course (completed 1.30)    ##: sleep  - melatonin 3mg PRN    #Pain Mgmt :  - Tylenol PRN mild pain,   - Oxycodone 10mg IR PRN moderate 4-7/10 pain  - Oxycodone 15mg IR prn severe 8-10/10 pain  - Oxycontin ER BID   -currently controlled  -Bowel management    #GI/Bowel Mgmt/ constipation:   -continue Senna, miralax daily, MOM prn  -simethicone PRN gas  -prune juice with meal trays    #FEN :  - Diet - Regular     # DVT prophylaxis :  - Lovenox    #Dispo discussed 1/28/21: ? home  vs [facility] 2/2/21 based on progress. Patient lives alone   - OT: modA bathing, mod/maxA lower body dressing, maxA toileting, CG/Jill toilet transfers  - PT: CG/Jill due to fatigue, gait 55' RW with cueing for posture  - Barriers: self care, likely close to baseline, but now with spine precautions  - SW had conversation with daughter and patient, both of whom refused [facility]AZIA but still given information for [facility]. Patient cannot go home to daughter. SW had another conversation with daughter regarding SHAZIA yesterday and gave information regarding [facility]. SW to start ROE    #LABS  COVID swab 1/31  CBC BMP 2/1

## 2021-02-01 LAB
ALBUMIN SERPL ELPH-MCNC: 2.9 G/DL — LOW (ref 3.3–5)
ALP SERPL-CCNC: 132 U/L — HIGH (ref 40–120)
ALT FLD-CCNC: 22 U/L — SIGNIFICANT CHANGE UP (ref 10–45)
ANION GAP SERPL CALC-SCNC: 10 MMOL/L — SIGNIFICANT CHANGE UP (ref 5–17)
AST SERPL-CCNC: 31 U/L — SIGNIFICANT CHANGE UP (ref 10–40)
BASOPHILS # BLD AUTO: 0.04 K/UL — SIGNIFICANT CHANGE UP (ref 0–0.2)
BASOPHILS NFR BLD AUTO: 1 % — SIGNIFICANT CHANGE UP (ref 0–2)
BILIRUB SERPL-MCNC: 0.4 MG/DL — SIGNIFICANT CHANGE UP (ref 0.2–1.2)
BUN SERPL-MCNC: 16 MG/DL — SIGNIFICANT CHANGE UP (ref 7–23)
CALCIUM SERPL-MCNC: 8.9 MG/DL — SIGNIFICANT CHANGE UP (ref 8.4–10.5)
CHLORIDE SERPL-SCNC: 106 MMOL/L — SIGNIFICANT CHANGE UP (ref 96–108)
CO2 SERPL-SCNC: 26 MMOL/L — SIGNIFICANT CHANGE UP (ref 22–31)
CREAT SERPL-MCNC: 0.79 MG/DL — SIGNIFICANT CHANGE UP (ref 0.5–1.3)
EOSINOPHIL # BLD AUTO: 0.06 K/UL — SIGNIFICANT CHANGE UP (ref 0–0.5)
EOSINOPHIL NFR BLD AUTO: 1.5 % — SIGNIFICANT CHANGE UP (ref 0–6)
GLUCOSE SERPL-MCNC: 96 MG/DL — SIGNIFICANT CHANGE UP (ref 70–99)
HCT VFR BLD CALC: 35.7 % — SIGNIFICANT CHANGE UP (ref 34.5–45)
HGB BLD-MCNC: 11.1 G/DL — LOW (ref 11.5–15.5)
IMM GRANULOCYTES NFR BLD AUTO: 0.2 % — SIGNIFICANT CHANGE UP (ref 0–1.5)
LYMPHOCYTES # BLD AUTO: 0.97 K/UL — LOW (ref 1–3.3)
LYMPHOCYTES # BLD AUTO: 23.7 % — SIGNIFICANT CHANGE UP (ref 13–44)
MCHC RBC-ENTMCNC: 29.4 PG — SIGNIFICANT CHANGE UP (ref 27–34)
MCHC RBC-ENTMCNC: 31.1 GM/DL — LOW (ref 32–36)
MCV RBC AUTO: 94.4 FL — SIGNIFICANT CHANGE UP (ref 80–100)
MONOCYTES # BLD AUTO: 0.88 K/UL — SIGNIFICANT CHANGE UP (ref 0–0.9)
MONOCYTES NFR BLD AUTO: 21.5 % — HIGH (ref 2–14)
NEUTROPHILS # BLD AUTO: 2.13 K/UL — SIGNIFICANT CHANGE UP (ref 1.8–7.4)
NEUTROPHILS NFR BLD AUTO: 52.1 % — SIGNIFICANT CHANGE UP (ref 43–77)
NRBC # BLD: 0 /100 WBCS — SIGNIFICANT CHANGE UP (ref 0–0)
PLATELET # BLD AUTO: 269 K/UL — SIGNIFICANT CHANGE UP (ref 150–400)
POTASSIUM SERPL-MCNC: 3.5 MMOL/L — SIGNIFICANT CHANGE UP (ref 3.5–5.3)
POTASSIUM SERPL-SCNC: 3.5 MMOL/L — SIGNIFICANT CHANGE UP (ref 3.5–5.3)
PROT SERPL-MCNC: 6.9 G/DL — SIGNIFICANT CHANGE UP (ref 6–8.3)
RBC # BLD: 3.78 M/UL — LOW (ref 3.8–5.2)
RBC # FLD: 14.7 % — HIGH (ref 10.3–14.5)
SARS-COV-2 IGG SERPL QL IA: NEGATIVE — SIGNIFICANT CHANGE UP
SARS-COV-2 IGM SERPL IA-ACNC: 0.07 INDEX — SIGNIFICANT CHANGE UP
SARS-COV-2 RNA SPEC QL NAA+PROBE: DETECTED
SODIUM SERPL-SCNC: 142 MMOL/L — SIGNIFICANT CHANGE UP (ref 135–145)
WBC # BLD: 4.09 K/UL — SIGNIFICANT CHANGE UP (ref 3.8–10.5)
WBC # FLD AUTO: 4.09 K/UL — SIGNIFICANT CHANGE UP (ref 3.8–10.5)

## 2021-02-01 PROCEDURE — 99232 SBSQ HOSP IP/OBS MODERATE 35: CPT | Mod: CS

## 2021-02-01 PROCEDURE — 99232 SBSQ HOSP IP/OBS MODERATE 35: CPT | Mod: CS,GC

## 2021-02-01 RX ORDER — BENZOCAINE AND MENTHOL 5; 1 G/100ML; G/100ML
1 LIQUID ORAL
Refills: 0 | Status: DISCONTINUED | OUTPATIENT
Start: 2021-02-01 | End: 2021-02-26

## 2021-02-01 RX ORDER — METHOTREXATE 2.5 MG/1
15 TABLET ORAL
Refills: 0 | Status: DISCONTINUED | OUTPATIENT
Start: 2021-02-02 | End: 2021-02-02

## 2021-02-01 RX ORDER — ASCORBIC ACID 60 MG
500 TABLET,CHEWABLE ORAL DAILY
Refills: 0 | Status: DISCONTINUED | OUTPATIENT
Start: 2021-02-01 | End: 2021-03-03

## 2021-02-01 RX ORDER — HYDROXYCHLOROQUINE SULFATE 200 MG
200 TABLET ORAL
Refills: 0 | Status: DISCONTINUED | OUTPATIENT
Start: 2021-02-02 | End: 2021-03-03

## 2021-02-01 RX ADMIN — LIDOCAINE 1 PATCH: 4 CREAM TOPICAL at 11:30

## 2021-02-01 RX ADMIN — OXYCODONE HYDROCHLORIDE 10 MILLIGRAM(S): 5 TABLET ORAL at 06:25

## 2021-02-01 RX ADMIN — LIDOCAINE 1 PATCH: 4 CREAM TOPICAL at 07:27

## 2021-02-01 RX ADMIN — LIDOCAINE 1 PATCH: 4 CREAM TOPICAL at 19:50

## 2021-02-01 RX ADMIN — Medication 1 TABLET(S): at 06:25

## 2021-02-01 RX ADMIN — Medication 1 MILLIGRAM(S): at 11:24

## 2021-02-01 RX ADMIN — LIDOCAINE 1 PATCH: 4 CREAM TOPICAL at 17:22

## 2021-02-01 RX ADMIN — ENOXAPARIN SODIUM 40 MILLIGRAM(S): 100 INJECTION SUBCUTANEOUS at 17:23

## 2021-02-01 RX ADMIN — BENZOCAINE AND MENTHOL 1 LOZENGE: 5; 1 LIQUID ORAL at 17:27

## 2021-02-01 RX ADMIN — OXYCODONE HYDROCHLORIDE 10 MILLIGRAM(S): 5 TABLET ORAL at 11:28

## 2021-02-01 RX ADMIN — Medication 200 MILLIGRAM(S): at 11:24

## 2021-02-01 RX ADMIN — POLYETHYLENE GLYCOL 3350 17 GRAM(S): 17 POWDER, FOR SOLUTION ORAL at 11:24

## 2021-02-01 RX ADMIN — SENNA PLUS 2 TABLET(S): 8.6 TABLET ORAL at 22:44

## 2021-02-01 RX ADMIN — OXYCODONE HYDROCHLORIDE 10 MILLIGRAM(S): 5 TABLET ORAL at 17:23

## 2021-02-01 RX ADMIN — Medication 500 MILLIGRAM(S): at 14:15

## 2021-02-01 RX ADMIN — Medication 650 MILLIGRAM(S): at 08:19

## 2021-02-01 RX ADMIN — BENZOCAINE AND MENTHOL 1 LOZENGE: 5; 1 LIQUID ORAL at 23:01

## 2021-02-01 RX ADMIN — BENZOCAINE AND MENTHOL 1 LOZENGE: 5; 1 LIQUID ORAL at 13:09

## 2021-02-01 RX ADMIN — Medication 1 TABLET(S): at 17:23

## 2021-02-01 RX ADMIN — Medication 3 MILLIGRAM(S): at 23:56

## 2021-02-01 RX ADMIN — OXYCODONE HYDROCHLORIDE 5 MILLIGRAM(S): 5 TABLET ORAL at 23:58

## 2021-02-01 RX ADMIN — Medication 200 MICROGRAM(S): at 06:24

## 2021-02-01 RX ADMIN — ENOXAPARIN SODIUM 40 MILLIGRAM(S): 100 INJECTION SUBCUTANEOUS at 06:24

## 2021-02-01 RX ADMIN — ATORVASTATIN CALCIUM 20 MILLIGRAM(S): 80 TABLET, FILM COATED ORAL at 22:44

## 2021-02-01 NOTE — PROGRESS NOTE ADULT - ATTENDING COMMENTS
Chart reviewed.   Patient seen at bedside along with resident Dr. Jazlyn Young  Over weekend patient COVID + and transferred to .   Reports some sore throat. Cepacol started today  Patient now reports increased right shoulder pain- states that she was on 2 meds for RA - here only of Plaquenil- Verifed with daughter - Was on methotrexate 2.5mg 6 tabs weekly- Will discuss with surgery about restarting    2. Labs stable     3. Continue rehab program

## 2021-02-01 NOTE — PROGRESS NOTE ADULT - ASSESSMENT
KAI THOMAS is a 75y F PMH HTN, HLD, SLE, RA, hypothyroidism, provoked R. 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 Jaja Campos  - continue Comprehensive Rehab Program of PT/OT- 3 hrs/day 5 days/week  -Precautions: cardiac, spinal, RA, contact and droplet precautions    #New COVID conversion  -PCR + 1/29/21. Moved up to 3E RMU for isolation  - PCR 1/31/21 neg then repeat was positive. Negative test likely false negative. COVID IgG Ab neg.   -psychology for supportive counseling  -continue contact and droplet precautions  - All staples removed by 1/29/21.    #SLE/RA:-   -continue plaquenil 200mg po daily    #h/o Provoked DVT in 2019  - US July 2019:  DVT noted in the right popliteal vein and visualized segment of the right  posterior tibial vein.  - was on eliquis 5mg po bid at home,   - currently on lovenox for dvt ppx, to resume eliquis at discharge  -Follows with vascular cardiologist Dr. Hoyos outpatient    #HTN:  - takes losartan 50mg po qd at home, held  - (115/71 - 116/76) 2/1 stable off meds    #HLD:  - continue lipitor 20mg po daily    #hypothyroidism:  - continue levothyroxine 200mcg po daily    #UTI - resolved  - UCx: providencia rettgari- resistant to macrobid  - On Cipro for 3 day course (completed 1/30)    #: sleep  - melatonin 3mg PRN    #Pain Mgmt :  - Tylenol PRN mild pain,   - Oxycodone 5mg IR PRN moderate 4-7/10 pain  - Oxycodone 10mg IR prn severe 8-10/10 pain  - Oxycontin ER BID   -currently controlled  -Bowel management    #GI/Bowel Mgmt/ constipation:   -continue Senna, miralax daily, MOM prn  -simethicone PRN gas  -prune juice with meal trays    #FEN :  - Diet - Regular     # DVT prophylaxis :  - Lovenox    #Dispo discussed 1/28/21: ? home  vs [facility] 2/2/21 based on progress. Patient lives alone   - OT: modA bathing, mod/maxA lower body dressing, maxA toileting, CG/Jill toilet transfers  - PT: CG/Jill due to fatigue, gait 55' RW with cueing for posture  - Barriers: self care, likely close to baseline, but now with spine precautions  - SW had conversation with daughter and patient, both of whom refused SHAZIA but still given information for SHAZIA. Patient cannot go home to daughter. SW had another conversation with daughter regarding SHAZIA yesterday and gave information regarding SHAZIA. SW to start ROE     KAI THOMAS is a 75y F PMH HTN, HLD, SLE, RA, hypothyroidism, provoked R. 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 Jaja Campos  - continue Comprehensive Rehab Program of PT/OT- 3 hrs/day 5 days/week  -Precautions: cardiac, spinal, RA, contact and droplet precautions    #New COVID conversion  -PCR + 1/29/21. Moved up to 3E RMU for isolation  - PCR 1/31/21 neg then repeat was positive. Negative test likely false negative. COVID IgG Ab neg.   -psychology for supportive counseling  -continue contact and droplet precautions  - All staples removed by 1/29/21.    #SLE/RA:-   -continue plaquenil 200mg po daily    #h/o Provoked DVT in 2019  - US July 2019:  DVT noted in the right popliteal vein and visualized segment of the right  posterior tibial vein.  - was on eliquis 5mg po bid at home,   - currently on lovenox for dvt ppx, to resume eliquis at discharge  -Follows with vascular cardiologist Dr. Hoyos outpatient    #HTN:  - takes losartan 50mg po qd at home, held  - (115/71 - 116/76) 2/1 stable off meds    #HLD:  - continue lipitor 20mg po daily    #hypothyroidism:  - continue levothyroxine 200mcg po daily    #UTI - resolved  - UCx: providencia rettgari- resistant to macrobid  - On Cipro for 3 day course (completed 1/30)    #: sleep  - melatonin 3mg PRN    #Pain Mgmt :  - Tylenol PRN mild pain,   - Oxycodone 5mg IR PRN moderate 4-7/10 pain  - Oxycodone 10mg IR prn severe 8-10/10 pain  - Oxycontin ER BID   -currently controlled  -Bowel management    #GI/Bowel Mgmt/ constipation:   -continue Senna, miralax daily, MOM prn  -simethicone PRN gas  -prune juice with meal trays    #FEN :  - Diet - Regular     # DVT prophylaxis :- Lovenox BID    LE edema: was on diuretic at home ( Losartan 50/HCTZ 12.5) held post surgery due to low BP . ACE wraps LE     #Dispo discussed 1/28/21: ? home  vs [facility] 2/2/21 based on progress. Patient lives alone   - OT: modA bathing, mod/maxA lower body dressing, maxA toileting, CG/Jill toilet transfers  - PT: CG/Jill due to fatigue, gait 55' RW with cueing for posture  - Barriers: self care, likely close to baseline, but now with spine precautions  - SW had conversation with daughter and patient, both of whom refused SHAZIA but still given information for SHAZIA. Patient cannot go home to daughter. SW had another conversation with daughter regarding SHAZIA yesterday and gave information regarding [facility]AZIA. KATLYN to start ROE

## 2021-02-01 NOTE — PROGRESS NOTE ADULT - ASSESSMENT
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 Hospital Course complicated by brief SICU stay for mechanical ventilation management, currently extubated. Patient now admitted for a multidisciplinary rehab program on 1/12/21.    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 Jaja Campos  -Continue comprehensive rehab program  -Pain management, bowel regimen per rehab    COVID  -pt asymptomatic. saturating appropriately on RA. cont to monitor     UTI  - urine culture shows >100K Providencia Rettgeri - macrobid resistant  - completed Cipro 250mg q12 x 3 days on 1/30    Acute blood loss Anemia, likely from surgery  -H/H stable, continue to monitor   -No overt signs of bleeding    Abdominal pain - likely 2/2 opioid induced constipation   -no abd pain today  -Bowel regimen per rehab  -cont to monitor, if pain persistent, check CT abd/pelvis    SLE/RA  -Continue with Plaquenil 200mg po daily and folic acid    H/o provoked right popliteal, right posterior tibial DVT (7/2019 in setting of hip surgery, already completed >6 months of treatment)  -Follows with cardiologist Dr. Hoyos outpatient  -Treated with Eliquis 5mg po bid chronically -- AC stopped at [facility] as 12/29 LE dopplers neg for DVTs  -currently on DVT ppx with lovenox 40mg BID   -DC Eliquis on discharge    Essential HTN: stable off anti-hypertensives  -Continue to hold home medication Losartan  -VS per rehab protocol    HLD  -Chronic, continue Lipitor    Hypothyroidism  -Chronic, continue Levothyroxine    VTE ppx:   - Lovenox 40mg BID  GI ppx:   - continue PPI

## 2021-02-01 NOTE — PROGRESS NOTE ADULT - SUBJECTIVE AND OBJECTIVE BOX
Patient is a 75y old  Female who presents with a chief complaint of Back pain (31 Jan 2021 12:02)      HPI:  75F with past medical history of HTN, HLD, SLE, RA, hypothyroidism, provoked R. popliteal DVT in 2019 on eliquis, chronic low back pain presents to ED for acute on chronic low back pain. Pt has several months of chronic lumbosacral back pain, evaluated by outpatient ortho and referred for physical therapy and pain management. She has been receiving ?vitamin spinal injections. She last received low back inj on 12/16 after which she her low back pain worsened. She took oxycodone 5 mg qd (prescribed in september during a ED visit) and acetaminophen for past three days without relief. She also endorses worsening pain during ambulation with walker and has affected her quality of life. She has constipation, last BM 4 days ago. Otherwise denies fever, chills, N/V, abdominal pain, dysuria, urinary retention, fecal incontinence, saddle anesthesia, fall, LOC, trauma. During ROS she endorsed LLE weakness for several months, no numbness or tingling and has LLE edema >RLE edema, reported undergoing LLE US 4 months ago, was negative for DVT. Currently has 8/10 pain, improved to 5/10 with oxycodone 5 mg x 2 in ED. (28 Dec 2020 11:16)   12/28: Mri T/Ls: IMPRESSION: Degenerative changes  Abnormal signal with associated enhancement is seen involving the endplates adjacent to the L2-3 disc space. There is slight increased T2 prolongation involving the L2-3 disc space with associated widening. While these findings could be compatible with degenerative changes possibly of early discitis and osteomyelitis must be considered.  Patient s/p L1-L5 laminectomy, T10-L5 posterior surgical fusion by Dr. Campos on 1/1/21  HMV x 3 drains. Post op with constipation needing aggressive bowel regimen.     PT/OT and PM&R evaluated patient and recommended Rehab.     Patient medically cleared and admitted to  Rehab on 1/12/21 (12 Jan 2021 15:21)      SUBJECTIVE/INTERVAL EVENTS  Patient seen and assessed at bedside. No acute events overnight. Patient c/o aches and pains on her right side that is similar to her arthritic pains. She c/o Plaquenil was given a few hours later than usual. Back pain is improving. She c/o slight cough but denies fever, chills, n/v/d.       PAST MEDICAL & SURGICAL HISTORY:  HTN (hypertension)    DVT (deep venous thrombosis)    Hypothyroidism    RA (rheumatoid arthritis)    Obesity, Class II, BMI 35-39.9, no comorbidity    Hypothyroid    Benign heart murmur    Hyperlipidemia    Hypertension    H/O degenerative disc disease    Osteoarthritis    SLE (systemic lupus erythematosus)    Rheumatoid arthritis    S/P knee replacement    Status post total hip replacement, right    S/P thyroid surgery  1 lobe removed    Lung cancer  small tumor removed 2015    S/P knee surgery  bilateral    S/P carpal tunnel release  left    S/P eye surgery  child    S/P cataract surgery  left    S/P tonsillectomy        Allergies    No Known Allergies    Intolerances          VITALS  75y  Vital Signs Last 24 Hrs  T(C): 36.6 (01 Feb 2021 08:46), Max: 36.7 (31 Jan 2021 21:30)  T(F): 97.8 (01 Feb 2021 08:46), Max: 98 (31 Jan 2021 21:30)  HR: 65 (01 Feb 2021 08:46) (65 - 76)  BP: 115/71 (01 Feb 2021 08:46) (115/71 - 116/76)  BP(mean): --  RR: 16 (01 Feb 2021 08:46) (15 - 16)  SpO2: 96% (01 Feb 2021 08:46) (96% - 98%)  Daily       Pulm - CTAB, good effort  Cardiovascular - S1S2  Abdomen - Soft, NT/ND, +BS  Extremities - No C/C/E, No calf tenderness    Motor -UE 5/5, LE proximal 4/5, distal 5/5     Psychiatric - Mood stable, Affect WNL  Skin: long back incision, staples removed - c/d/i  no erythema, +intact, dry, no induration  no fluctuance, no TTP        RECENT LABS:                          11.1   4.09  )-----------( 269      ( 01 Feb 2021 06:30 )             35.7     02-01    142  |  106  |  16  ----------------------------<  96  3.5   |  26  |  0.79    Ca    8.9      01 Feb 2021 06:30    TPro  6.9  /  Alb  2.9<L>  /  TBili  0.4  /  DBili  x   /  AST  31  /  ALT  22  /  AlkPhos  132<H>  02-01    LIVER FUNCTIONS - ( 01 Feb 2021 06:30 )  Alb: 2.9 g/dL / Pro: 6.9 g/dL / ALK PHOS: 132 U/L / ALT: 22 U/L / AST: 31 U/L / GGT: x             MEDICATIONS  (STANDING):  atorvastatin 20 milliGRAM(s) Oral at bedtime  enoxaparin Injectable 40 milliGRAM(s) SubCutaneous two times a day  folic acid 1 milliGRAM(s) Oral daily  hydroxychloroquine 200 milliGRAM(s) Oral daily  lactobacillus acidophilus 1 Tablet(s) Oral two times a day  levothyroxine 200 MICROGram(s) Oral daily  lidocaine   Patch 1 Patch Transdermal daily  oxyCODONE  ER Tablet 10 milliGRAM(s) Oral every 12 hours  polyethylene glycol 3350 17 Gram(s) Oral daily  senna 2 Tablet(s) Oral at bedtime    MEDICATIONS  (PRN):  acetaminophen   Tablet .. 650 milliGRAM(s) Oral every 6 hours PRN Temp greater or equal to 38C (100.4F), Mild Pain (1 - 3)  magnesium hydroxide Suspension 30 milliLiter(s) Oral daily PRN Constipation  melatonin 3 milliGRAM(s) Oral at bedtime PRN Insomnia  ondansetron    Tablet 4 milliGRAM(s) Oral every 8 hours PRN Nausea and/or Vomiting  oxyCODONE    IR 10 milliGRAM(s) Oral every 4 hours PRN Severe Pain (7 - 10)  oxyCODONE    IR 5 milliGRAM(s) Oral every 4 hours PRN Moderate Pain (4 - 6)  simethicone 80 milliGRAM(s) Chew every 6 hours PRN Upset Stomach           Patient is a 75y old  Female who presents with a chief complaint of Back pain (31 Jan 2021 12:02)      HPI:  75F with past medical history of HTN, HLD, SLE, RA, hypothyroidism, provoked R. popliteal DVT in 2019 on eliquis, chronic low back pain presents to ED for acute on chronic low back pain. Pt has several months of chronic lumbosacral back pain, evaluated by outpatient ortho and referred for physical therapy and pain management. She has been receiving ?vitamin spinal injections. She last received low back inj on 12/16 after which she her low back pain worsened. She took oxycodone 5 mg qd (prescribed in september during a ED visit) and acetaminophen for past three days without relief. She also endorses worsening pain during ambulation with walker and has affected her quality of life. She has constipation, last BM 4 days ago. Otherwise denies fever, chills, N/V, abdominal pain, dysuria, urinary retention, fecal incontinence, saddle anesthesia, fall, LOC, trauma. During ROS she endorsed LLE weakness for several months, no numbness or tingling and has LLE edema >RLE edema, reported undergoing LLE US 4 months ago, was negative for DVT. Currently has 8/10 pain, improved to 5/10 with oxycodone 5 mg x 2 in ED. (28 Dec 2020 11:16)   12/28: Mri T/Ls: IMPRESSION: Degenerative changes  Abnormal signal with associated enhancement is seen involving the endplates adjacent to the L2-3 disc space. There is slight increased T2 prolongation involving the L2-3 disc space with associated widening. While these findings could be compatible with degenerative changes possibly of early discitis and osteomyelitis must be considered.  Patient s/p L1-L5 laminectomy, T10-L5 posterior surgical fusion by Dr. Campos on 1/1/21  HMV x 3 drains. Post op with constipation needing aggressive bowel regimen.     PT/OT and PM&R evaluated patient and recommended Rehab.     Patient medically cleared and admitted to  Rehab on 1/12/21 (12 Jan 2021 15:21)      SUBJECTIVE/INTERVAL EVENTS  Patient seen and assessed at bedside. No acute events overnight. Patient c/o aches and pains on her right side that is similar to her arthritic pains. She c/o Plaquenil was given a few hours later than usual. Back pain is improving. She c/o slight cough but denies fever, chills, n/v/d.       PAST MEDICAL & SURGICAL HISTORY:  HTN (hypertension)    DVT (deep venous thrombosis)    Hypothyroidism    RA (rheumatoid arthritis)    Obesity, Class II, BMI 35-39.9, no comorbidity    Hypothyroid    Benign heart murmur    Hyperlipidemia    Hypertension    H/O degenerative disc disease    Osteoarthritis    SLE (systemic lupus erythematosus)    Rheumatoid arthritis    S/P knee replacement    Status post total hip replacement, right    S/P thyroid surgery  1 lobe removed    Lung cancer  small tumor removed 2015    S/P knee surgery  bilateral    S/P carpal tunnel release  left    S/P eye surgery  child    S/P cataract surgery  left    S/P tonsillectomy        Allergies    No Known Allergies    Intolerances          VITALS  75y  Vital Signs Last 24 Hrs  T(C): 36.6 (01 Feb 2021 08:46), Max: 36.7 (31 Jan 2021 21:30)  T(F): 97.8 (01 Feb 2021 08:46), Max: 98 (31 Jan 2021 21:30)  HR: 65 (01 Feb 2021 08:46) (65 - 76)  BP: 115/71 (01 Feb 2021 08:46) (115/71 - 116/76)  BP(mean): --  RR: 16 (01 Feb 2021 08:46) (15 - 16)  SpO2: 96% (01 Feb 2021 08:46) (96% - 98%)  Daily       Pulm - CTAB, good effort  Cardiovascular - S1S2  Abdomen - Soft, NT/ND, +BS  Extremities : LE edema +     Motor -UE 5/5, LE proximal 4/5, distal 5/5     Psychiatric - Mood stable, Affect WNL  Skin: long back incision, staples removed - c/d/i  no erythema, +intact, dry, no induration          RECENT LABS:                          11.1   4.09  )-----------( 269      ( 01 Feb 2021 06:30 )             35.7     02-01    142  |  106  |  16  ----------------------------<  96  3.5   |  26  |  0.79    Ca    8.9      01 Feb 2021 06:30    TPro  6.9  /  Alb  2.9<L>  /  TBili  0.4  /  DBili  x   /  AST  31  /  ALT  22  /  AlkPhos  132<H>  02-01    LIVER FUNCTIONS - ( 01 Feb 2021 06:30 )  Alb: 2.9 g/dL / Pro: 6.9 g/dL / ALK PHOS: 132 U/L / ALT: 22 U/L / AST: 31 U/L / GGT: x             MEDICATIONS  (STANDING):  atorvastatin 20 milliGRAM(s) Oral at bedtime  enoxaparin Injectable 40 milliGRAM(s) SubCutaneous two times a day  folic acid 1 milliGRAM(s) Oral daily  hydroxychloroquine 200 milliGRAM(s) Oral daily  lactobacillus acidophilus 1 Tablet(s) Oral two times a day  levothyroxine 200 MICROGram(s) Oral daily  lidocaine   Patch 1 Patch Transdermal daily  oxyCODONE  ER Tablet 10 milliGRAM(s) Oral every 12 hours  polyethylene glycol 3350 17 Gram(s) Oral daily  senna 2 Tablet(s) Oral at bedtime    MEDICATIONS  (PRN):  acetaminophen   Tablet .. 650 milliGRAM(s) Oral every 6 hours PRN Temp greater or equal to 38C (100.4F), Mild Pain (1 - 3)  magnesium hydroxide Suspension 30 milliLiter(s) Oral daily PRN Constipation  melatonin 3 milliGRAM(s) Oral at bedtime PRN Insomnia  ondansetron    Tablet 4 milliGRAM(s) Oral every 8 hours PRN Nausea and/or Vomiting  oxyCODONE    IR 10 milliGRAM(s) Oral every 4 hours PRN Severe Pain (7 - 10)  oxyCODONE    IR 5 milliGRAM(s) Oral every 4 hours PRN Moderate Pain (4 - 6)  simethicone 80 milliGRAM(s) Chew every 6 hours PRN Upset Stomach

## 2021-02-01 NOTE — PROGRESS NOTE ADULT - SUBJECTIVE AND OBJECTIVE BOX
Patient is a 75y old  Female who presents with a chief complaint of Back pain (01 Feb 2021 11:24)      Patient seen and examined at bedside. no abd pain today. + right shoulder arthritic pain    ALLERGIES:  No Known Allergies    MEDICATIONS  (STANDING):  ascorbic acid 500 milliGRAM(s) Oral daily  atorvastatin 20 milliGRAM(s) Oral at bedtime  benzocaine 15 mG/menthol 3.6 mG (Sugar-Free) Lozenge 1 Lozenge Oral four times a day  enoxaparin Injectable 40 milliGRAM(s) SubCutaneous two times a day  folic acid 1 milliGRAM(s) Oral daily  hydroxychloroquine 200 milliGRAM(s) Oral <User Schedule>  lactobacillus acidophilus 1 Tablet(s) Oral two times a day  levothyroxine 200 MICROGram(s) Oral daily  lidocaine   Patch 1 Patch Transdermal daily  oxyCODONE  ER Tablet 10 milliGRAM(s) Oral every 12 hours  polyethylene glycol 3350 17 Gram(s) Oral daily  senna 2 Tablet(s) Oral at bedtime    MEDICATIONS  (PRN):  acetaminophen   Tablet .. 650 milliGRAM(s) Oral every 6 hours PRN Temp greater or equal to 38C (100.4F), Mild Pain (1 - 3)  magnesium hydroxide Suspension 30 milliLiter(s) Oral daily PRN Constipation  melatonin 3 milliGRAM(s) Oral at bedtime PRN Insomnia  ondansetron    Tablet 4 milliGRAM(s) Oral every 8 hours PRN Nausea and/or Vomiting  oxyCODONE    IR 10 milliGRAM(s) Oral every 4 hours PRN Severe Pain (7 - 10)  oxyCODONE    IR 5 milliGRAM(s) Oral every 4 hours PRN Moderate Pain (4 - 6)  simethicone 80 milliGRAM(s) Chew every 6 hours PRN Upset Stomach    Vital Signs Last 24 Hrs  T(F): 97.8 (01 Feb 2021 08:46), Max: 98 (31 Jan 2021 21:30)  HR: 65 (01 Feb 2021 08:46) (65 - 76)  BP: 115/71 (01 Feb 2021 08:46) (115/71 - 116/76)  RR: 16 (01 Feb 2021 08:46) (15 - 16)  SpO2: 96% (01 Feb 2021 08:46) (96% - 98%)  I&O's Summary    31 Jan 2021 07:01  -  01 Feb 2021 07:00  --------------------------------------------------------  IN: 360 mL / OUT: 0 mL / NET: 360 mL      PHYSICAL EXAM:  General: NAD, A/O x 3  ENT: MMM  Neck: Supple, No JVD  Lungs: Clear to auscultation bilaterally  Cardio: RRR, S1/S2, No murmurs  Abdomen: Soft, Nontender, Nondistended; Bowel sounds present  Extremities: No calf tenderness, No pitting edema    LABS:                        11.1   4.09  )-----------( 269      ( 01 Feb 2021 06:30 )             35.7     02-01    142  |  106  |  16  ----------------------------<  96  3.5   |  26  |  0.79    Ca    8.9      01 Feb 2021 06:30    TPro  6.9  /  Alb  2.9  /  TBili  0.4  /  DBili  x   /  AST  31  /  ALT  22  /  AlkPhos  132  02-01    eGFR if Non African American: 73 mL/min/1.73M2 (02-01-21 @ 06:30)  eGFR if : 84 mL/min/1.73M2 (02-01-21 @ 06:30)                                    RADIOLOGY & ADDITIONAL TESTS:    Care Discussed with Consultants/Other Providers: rehab team

## 2021-02-02 PROCEDURE — 99232 SBSQ HOSP IP/OBS MODERATE 35: CPT | Mod: CS

## 2021-02-02 RX ADMIN — ATORVASTATIN CALCIUM 20 MILLIGRAM(S): 80 TABLET, FILM COATED ORAL at 20:40

## 2021-02-02 RX ADMIN — ENOXAPARIN SODIUM 40 MILLIGRAM(S): 100 INJECTION SUBCUTANEOUS at 05:39

## 2021-02-02 RX ADMIN — BENZOCAINE AND MENTHOL 1 LOZENGE: 5; 1 LIQUID ORAL at 12:43

## 2021-02-02 RX ADMIN — Medication 500 MILLIGRAM(S): at 12:47

## 2021-02-02 RX ADMIN — OXYCODONE HYDROCHLORIDE 10 MILLIGRAM(S): 5 TABLET ORAL at 09:38

## 2021-02-02 RX ADMIN — Medication 650 MILLIGRAM(S): at 23:13

## 2021-02-02 RX ADMIN — Medication 1 TABLET(S): at 19:13

## 2021-02-02 RX ADMIN — ENOXAPARIN SODIUM 40 MILLIGRAM(S): 100 INJECTION SUBCUTANEOUS at 17:30

## 2021-02-02 RX ADMIN — BENZOCAINE AND MENTHOL 1 LOZENGE: 5; 1 LIQUID ORAL at 05:40

## 2021-02-02 RX ADMIN — LIDOCAINE 1 PATCH: 4 CREAM TOPICAL at 05:39

## 2021-02-02 RX ADMIN — SIMETHICONE 80 MILLIGRAM(S): 80 TABLET, CHEWABLE ORAL at 20:40

## 2021-02-02 RX ADMIN — Medication 200 MICROGRAM(S): at 05:39

## 2021-02-02 RX ADMIN — Medication 200 MILLIGRAM(S): at 08:08

## 2021-02-02 RX ADMIN — OXYCODONE HYDROCHLORIDE 10 MILLIGRAM(S): 5 TABLET ORAL at 13:44

## 2021-02-02 RX ADMIN — Medication 3 MILLIGRAM(S): at 23:15

## 2021-02-02 RX ADMIN — LIDOCAINE 1 PATCH: 4 CREAM TOPICAL at 20:40

## 2021-02-02 RX ADMIN — BENZOCAINE AND MENTHOL 1 LOZENGE: 5; 1 LIQUID ORAL at 22:56

## 2021-02-02 RX ADMIN — Medication 1 TABLET(S): at 05:39

## 2021-02-02 RX ADMIN — OXYCODONE HYDROCHLORIDE 10 MILLIGRAM(S): 5 TABLET ORAL at 05:39

## 2021-02-02 RX ADMIN — SENNA PLUS 2 TABLET(S): 8.6 TABLET ORAL at 20:40

## 2021-02-02 RX ADMIN — BENZOCAINE AND MENTHOL 1 LOZENGE: 5; 1 LIQUID ORAL at 17:33

## 2021-02-02 RX ADMIN — Medication 650 MILLIGRAM(S): at 09:32

## 2021-02-02 RX ADMIN — OXYCODONE HYDROCHLORIDE 10 MILLIGRAM(S): 5 TABLET ORAL at 17:30

## 2021-02-02 RX ADMIN — Medication 1 MILLIGRAM(S): at 12:46

## 2021-02-02 NOTE — PROGRESS NOTE ADULT - SUBJECTIVE AND OBJECTIVE BOX
HPI:  75F with past medical history of HTN, HLD, SLE, RA, hypothyroidism, provoked R. popliteal DVT in 2019 on eliquis, chronic low back pain presents to ED for acute on chronic low back pain. Pt has several months of chronic lumbosacral back pain, evaluated by outpatient ortho and referred for physical therapy and pain management. She has been receiving ?vitamin spinal injections. She last received low back inj on 12/16 after which she her low back pain worsened. She took oxycodone 5 mg qd (prescribed in september during a ED visit) and acetaminophen for past three days without relief. She also endorses worsening pain during ambulation with walker and has affected her quality of life. She has constipation, last BM 4 days ago. Otherwise denies fever, chills, N/V, abdominal pain, dysuria, urinary retention, fecal incontinence, saddle anesthesia, fall, LOC, trauma. During ROS she endorsed LLE weakness for several months, no numbness or tingling and has LLE edema >RLE edema, reported undergoing LLE US 4 months ago, was negative for DVT. Currently has 8/10 pain, improved to 5/10 with oxycodone 5 mg x 2 in ED. (28 Dec 2020 11:16)   12/28: Mri T/Ls: IMPRESSION: Degenerative changes  Abnormal signal with associated enhancement is seen involving the endplates adjacent to the L2-3 disc space. There is slight increased T2 prolongation involving the L2-3 disc space with associated widening. While these findings could be compatible with degenerative changes possibly of early discitis and osteomyelitis must be considered.  Patient s/p L1-L5 laminectomy, T10-L5 posterior surgical fusion by Dr. Campos on 1/1/21  HMV x 3 drains. Post op with constipation needing aggressive bowel regimen.   PT/OT and PM&R evaluated patient and recommended Rehab.     Patient medically cleared and admitted to  Rehab on 1/12/21 (12 Jan 2021 15:21)      SUBJECTIVE/INTERVAL EVENTS  Patient seen and assessed at bedside. No acute events overnight except poor sleep due to temperature issues in room   Patient c/o aches and pains on her right side that is similar to her arthritic pains. She c/o Plaquenil was given a few hours later than usual.   Has mild right sided HA - relieved with tylenol  Denies CP/palpitations/abdominal pain/nausea  Had small BM yesterday     Vital Signs Last 24 Hrs  T(C): 36.9 (02 Feb 2021 08:11), Max: 36.9 (02 Feb 2021 08:11)  T(F): 98.5 (02 Feb 2021 08:11), Max: 98.5 (02 Feb 2021 08:11)  HR: 72 (02 Feb 2021 08:11) (72 - 82)  BP: 117/60 (02 Feb 2021 08:11) (117/60 - 137/77)  BP(mean): --  RR: 16 (02 Feb 2021 08:11) (14 - 16)  SpO2: 96% (02 Feb 2021 08:11) (95% - 96%)      Pulm - CTAB, good effort  Cardiovascular - S1S2  Abdomen - Soft, NT/ND, +BS  Extremities : LE edema +     Motor -UE 5/5, LE proximal 4/5, distal 5/5     Psychiatric - Mood stable, Affect WNL  Skin: long back incision,- some steristrips on- healing well   no erythema, +intact, dry, no induration          MEDICATIONS  (STANDING):  ascorbic acid 500 milliGRAM(s) Oral daily  atorvastatin 20 milliGRAM(s) Oral at bedtime  benzocaine 15 mG/menthol 3.6 mG (Sugar-Free) Lozenge 1 Lozenge Oral four times a day  enoxaparin Injectable 40 milliGRAM(s) SubCutaneous two times a day  folic acid 1 milliGRAM(s) Oral daily  hydroxychloroquine 200 milliGRAM(s) Oral <User Schedule>  lactobacillus acidophilus 1 Tablet(s) Oral two times a day  levothyroxine 200 MICROGram(s) Oral daily  lidocaine   Patch 1 Patch Transdermal daily  oxyCODONE  ER Tablet 10 milliGRAM(s) Oral every 12 hours  polyethylene glycol 3350 17 Gram(s) Oral daily  senna 2 Tablet(s) Oral at bedtime    MEDICATIONS  (PRN):  acetaminophen   Tablet .. 650 milliGRAM(s) Oral every 6 hours PRN Temp greater or equal to 38C (100.4F), Mild Pain (1 - 3)  magnesium hydroxide Suspension 30 milliLiter(s) Oral daily PRN Constipation  melatonin 3 milliGRAM(s) Oral at bedtime PRN Insomnia  ondansetron    Tablet 4 milliGRAM(s) Oral every 8 hours PRN Nausea and/or Vomiting  oxyCODONE    IR 10 milliGRAM(s) Oral every 4 hours PRN Severe Pain (7 - 10)  oxyCODONE    IR 5 milliGRAM(s) Oral every 4 hours PRN Moderate Pain (4 - 6)  simethicone 80 milliGRAM(s) Chew every 6 hours PRN Upset Stomach            RECENT LABS:                          11.1   4.09  )-----------( 269      ( 01 Feb 2021 06:30 )             35.7     02-01    142  |  106  |  16  ----------------------------<  96  3.5   |  26  |  0.79    Ca    8.9      01 Feb 2021 06:30    TPro  6.9  /  Alb  2.9<L>  /  TBili  0.4  /  DBili  x   /  AST  31  /  ALT  22  /  AlkPhos  132<H>  02-01    LIVER FUNCTIONS - ( 01 Feb 2021 06:30 )  Alb: 2.9 g/dL / Pro: 6.9 g/dL / ALK PHOS: 132 U/L / ALT: 22 U/L / AST: 31 U/L / GGT: x

## 2021-02-02 NOTE — PROVIDER CONTACT NOTE (MEDICATION) - SITUATION
Patient has scheduled oxycodone as well as PRN oxycodone and tylenol. Patient is requesting to receive all of them within the same time frame.

## 2021-02-02 NOTE — PROGRESS NOTE ADULT - ASSESSMENT
KAI THOMAS is a 75y F PMH HTN, HLD, SLE, RA, hypothyroidism, provoked R. 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 Jaja Campos. Staples removed in stages- all removed 1/29  - continue Comprehensive Rehab Program of PT/OT- 3 hrs/day 5 days/week  -Precautions:  spinal, RA, contact and droplet precautions    #New COVID conversion  -PCR + 1/29/21. Moved up to [facility] for isolation  - PCR 1/31/21 neg then repeat was positive. Negative test likely false negative. COVID IgG Ab neg.   -continue contact and droplet precautions    #SLE/RA: -continue plaquenil 200mg po daily  Was on 15mg ( 2.5mg ) weekly- Discussed with rheumatologist - defer for 3 weeks post Covid     #h/o Provoked DVT in 2019  - US July 2019:  DVT noted in the right popliteal vein and visualized segment of the right  posterior tibial vein.  - was on eliquis 5mg po bid at home,   - currently on lovenox for dvt ppx, to resume eliquis at discharge  -Follows with vascular cardiologist Dr. Hoyos outpatient    #HTN:BP stable off meds     #HLD:- continue lipitor 20mg po daily    #hypothyroidism:- continue levothyroxine 200mcg po daily    #UTI - resolved  - UCx: providencia rettgari- resistant to macrobid  - On Cipro for 3 day course (completed 1/30)    #: sleep  - melatonin 3mg PRN    #Pain Mgmt :- Tylenol PRN mild pain,   - Oxycodone 5mg IR PRN moderate 4-7/10 pain  - Oxycodone 10mg IR prn severe 8-10/10 pain  - Oxycontin ER BID   -currently controlled  -Bowel management    #GI/Bowel Mgmt/ constipation:   -continue Senna, miralax daily, MOM prn  -simethicone PRN gas  -prune juice with meal trays    #FEN :  - Diet - Regular     # DVT prophylaxis :- Lovenox BID    LE edema: was on diuretic at home ( Losartan 50/HCTZ 12.5) held post surgery due to low BP . ACE wraps LE     #Dispo discussed 1/28/21: ? home  vs [facility] 2/2/21 based on progress. Patient lives alone   dc planning home versus [facility] based on progress

## 2021-02-02 NOTE — PROVIDER CONTACT NOTE (MEDICATION) - ASSESSMENT
Pt reports pain at level 8/10 in her back. States "I need these medications to help me relieve the pain."

## 2021-02-02 NOTE — PROGRESS NOTE ADULT - SUBJECTIVE AND OBJECTIVE BOX
Patient is a 75y old  Female who presents with a chief complaint of Back pain (01 Feb 2021 16:57)      Patient seen and examined at bedside. + headache. shoulder pain better. No sob, cp, abd pain. + BM this morning    ALLERGIES:  No Known Allergies    MEDICATIONS  (STANDING):  ascorbic acid 500 milliGRAM(s) Oral daily  atorvastatin 20 milliGRAM(s) Oral at bedtime  benzocaine 15 mG/menthol 3.6 mG (Sugar-Free) Lozenge 1 Lozenge Oral four times a day  enoxaparin Injectable 40 milliGRAM(s) SubCutaneous two times a day  folic acid 1 milliGRAM(s) Oral daily  hydroxychloroquine 200 milliGRAM(s) Oral <User Schedule>  lactobacillus acidophilus 1 Tablet(s) Oral two times a day  levothyroxine 200 MICROGram(s) Oral daily  lidocaine   Patch 1 Patch Transdermal daily  oxyCODONE  ER Tablet 10 milliGRAM(s) Oral every 12 hours  polyethylene glycol 3350 17 Gram(s) Oral daily  senna 2 Tablet(s) Oral at bedtime    MEDICATIONS  (PRN):  acetaminophen   Tablet .. 650 milliGRAM(s) Oral every 6 hours PRN Temp greater or equal to 38C (100.4F), Mild Pain (1 - 3)  magnesium hydroxide Suspension 30 milliLiter(s) Oral daily PRN Constipation  melatonin 3 milliGRAM(s) Oral at bedtime PRN Insomnia  ondansetron    Tablet 4 milliGRAM(s) Oral every 8 hours PRN Nausea and/or Vomiting  oxyCODONE    IR 10 milliGRAM(s) Oral every 4 hours PRN Severe Pain (7 - 10)  oxyCODONE    IR 5 milliGRAM(s) Oral every 4 hours PRN Moderate Pain (4 - 6)  simethicone 80 milliGRAM(s) Chew every 6 hours PRN Upset Stomach    Vital Signs Last 24 Hrs  T(F): 98.5 (02 Feb 2021 08:11), Max: 98.5 (02 Feb 2021 08:11)  HR: 72 (02 Feb 2021 08:11) (72 - 82)  BP: 117/60 (02 Feb 2021 08:11) (117/60 - 137/77)  RR: 16 (02 Feb 2021 08:11) (14 - 16)  SpO2: 96% (02 Feb 2021 08:11) (95% - 96%)  I&O's Summary    PHYSICAL EXAM:  General: NAD, awake alert  ENT: MMM  Neck: Supple, No JVD  Lungs: Clear to auscultation bilaterally  Cardio: RRR, S1/S2, + murmurs  Abdomen: Soft, Nontender, Nondistended; Bowel sounds present  Extremities: No calf tenderness, bilateral pitting edema (L>R) + chronic vascular changes    LABS:                        11.1   4.09  )-----------( 269      ( 01 Feb 2021 06:30 )             35.7     02-01    142  |  106  |  16  ----------------------------<  96  3.5   |  26  |  0.79    Ca    8.9      01 Feb 2021 06:30    TPro  6.9  /  Alb  2.9  /  TBili  0.4  /  DBili  x   /  AST  31  /  ALT  22  /  AlkPhos  132  02-01    eGFR if Non African American: 73 mL/min/1.73M2 (02-01-21 @ 06:30)  eGFR if : 84 mL/min/1.73M2 (02-01-21 @ 06:30)      RADIOLOGY & ADDITIONAL TESTS:    Care Discussed with Consultants/Other Providers: rehab team

## 2021-02-02 NOTE — PROGRESS NOTE ADULT - ASSESSMENT
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 Hospital Course complicated by brief SICU stay for mechanical ventilation management, currently extubated. Patient now admitted for a multidisciplinary rehab program on 1/12/21.    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 Jaja Campos  -Continue comprehensive rehab program  -Pain management, bowel regimen per rehab    COVID  -pt asymptomatic. saturating appropriately on RA. cont to monitor     UTI  - urine culture shows >100K Providencia Rettgeri - macrobid resistant  - completed Cipro 250mg q12 x 3 days on 1/30    Acute blood loss Anemia, likely from surgery  -H/H stable, continue to monitor   -No overt signs of bleeding    Abdominal pain - likely 2/2 opioid induced constipation   -no abd pain today  -Bowel regimen per rehab  -cont to monitor, if pain persistent, check CT abd/pelvis    SLE/RA  -Continue with Plaquenil 200mg po daily and folic acid    H/o provoked right popliteal, right posterior tibial DVT (7/2019 in setting of hip surgery, already completed >6 months of treatment)  -Follows with cardiologist Dr. Hoyos outpatient  -Treated with Eliquis 5mg po bid chronically -- AC stopped at [facility] as 12/29 LE dopplers neg for DVTs  -currently on DVT ppx with lovenox 40mg BID   -DC Eliquis on discharge    # Leg edema likely form chronic lymphedema  - encourage leg elevation  - ACE wraps    Essential HTN: stable off anti-hypertensives  -Continue to hold home medication Losartan  -VS per rehab protocol    HLD  -Chronic, continue Lipitor    Hypothyroidism  -Chronic, continue Levothyroxine    VTE ppx:   - Lovenox 40mg BID  GI ppx:   - continue PPI

## 2021-02-03 PROCEDURE — 99232 SBSQ HOSP IP/OBS MODERATE 35: CPT | Mod: CS

## 2021-02-03 PROCEDURE — 96116 NUBHVL XM PHYS/QHP 1ST HR: CPT

## 2021-02-03 RX ADMIN — Medication 1 TABLET(S): at 17:49

## 2021-02-03 RX ADMIN — BENZOCAINE AND MENTHOL 1 LOZENGE: 5; 1 LIQUID ORAL at 23:01

## 2021-02-03 RX ADMIN — Medication 200 MICROGRAM(S): at 05:33

## 2021-02-03 RX ADMIN — BENZOCAINE AND MENTHOL 1 LOZENGE: 5; 1 LIQUID ORAL at 05:33

## 2021-02-03 RX ADMIN — BENZOCAINE AND MENTHOL 1 LOZENGE: 5; 1 LIQUID ORAL at 17:49

## 2021-02-03 RX ADMIN — Medication 1 TABLET(S): at 05:32

## 2021-02-03 RX ADMIN — Medication 200 MILLIGRAM(S): at 07:57

## 2021-02-03 RX ADMIN — OXYCODONE HYDROCHLORIDE 10 MILLIGRAM(S): 5 TABLET ORAL at 12:28

## 2021-02-03 RX ADMIN — BENZOCAINE AND MENTHOL 1 LOZENGE: 5; 1 LIQUID ORAL at 12:22

## 2021-02-03 RX ADMIN — ENOXAPARIN SODIUM 40 MILLIGRAM(S): 100 INJECTION SUBCUTANEOUS at 17:49

## 2021-02-03 RX ADMIN — OXYCODONE HYDROCHLORIDE 10 MILLIGRAM(S): 5 TABLET ORAL at 07:58

## 2021-02-03 RX ADMIN — SENNA PLUS 2 TABLET(S): 8.6 TABLET ORAL at 21:00

## 2021-02-03 RX ADMIN — Medication 500 MILLIGRAM(S): at 12:22

## 2021-02-03 RX ADMIN — Medication 1 MILLIGRAM(S): at 12:22

## 2021-02-03 RX ADMIN — LIDOCAINE 1 PATCH: 4 CREAM TOPICAL at 08:13

## 2021-02-03 RX ADMIN — LIDOCAINE 1 PATCH: 4 CREAM TOPICAL at 09:10

## 2021-02-03 RX ADMIN — ATORVASTATIN CALCIUM 20 MILLIGRAM(S): 80 TABLET, FILM COATED ORAL at 21:00

## 2021-02-03 RX ADMIN — ENOXAPARIN SODIUM 40 MILLIGRAM(S): 100 INJECTION SUBCUTANEOUS at 05:32

## 2021-02-03 RX ADMIN — Medication 650 MILLIGRAM(S): at 11:12

## 2021-02-03 NOTE — CONSULT NOTE ADULT - SUBJECTIVE AND OBJECTIVE BOX
75F with past medical history of HTN, HLD, SLE, RA, hypothyroidism, provoked R. popliteal DVT in 2019 on eliquis, chronic low back pain presents to ED for acute on chronic low back pain. Pt has several months of chronic lumbosacral back pain, evaluated by outpatient ortho and referred for physical therapy and pain management. She has been receiving ?vitamin spinal injections. She last received low back inj on 12/16 after which she her low back pain worsened. She took oxycodone 5 mg qd (prescribed in september during a ED visit) and acetaminophen for past three days without relief. She also endorses worsening pain during ambulation with walker and has affected her quality of life. She has constipation, last BM 4 days ago. Otherwise denies fever, chills, N/V, abdominal pain, dysuria, urinary retention, fecal incontinence, saddle anesthesia, fall, LOC, trauma. During ROS she endorsed LLE weakness for several months, no numbness or tingling and has LLE edema >RLE edema, reported undergoing LLE US 4 months ago, was negative for DVT. Currently has 8/10 pain, improved to 5/10 with oxycodone 5 mg x 2 in ED. (28 Dec 2020 11:16). 12/28: Mri T/Ls: IMPRESSION: Degenerative changes. Abnormal signal with associated enhancement is seen involving the endplates adjacent to the L2-3 disc space. There is slight increased T2 prolongation involving the L2-3 disc space with associated widening. While these findings could be compatible with degenerative changes possibly of early discitis and osteomyelitis must be considered.  Patient s/p L1-L5 laminectomy, T10-L5 posterior surgical fusion by Dr. Campos on 1/1/21  HMV x 3 drains. Post op with constipation needing aggressive bowel regimen. PT/OT and PM&R evaluated patient and recommended Rehab. Patient medically cleared and admitted to  Rehab on 1/12/21. PMHx: As noted above. Current meds: Please see list in Pt’s chart. Social Hx: Pt is a  and has two adult children. She completed two years of college and is a retired [employer] . She lacks hx of mental illness and substance abuse. Pt is Faith. She enjoys going to flea markets, [employer] sales and shopping in general.   Findings: Pt was seen for an initial assessment of her cognitive and emotional functioning. MMSE was administered; Pt’s results (29/30) were in the Normal range. Her scores in a geriatric mood measure suggested minimal to mild levels of depression (GDS = 3/15). Pt was alert, fully Ox3, and cooperative. Attn/Conc: Simple auditory attention - intact.  Concentration - impaired. Working memory for calculations – mildly impaired (3/5 serial 7s); reversed spelling - intact (5/5). Language: Pt’s speech was of normal volume, pitch and pace. Naming - intact. Sentence repetition - intact. Auditory Comprehension - intact. Reading - intact. Writing - intact. Memory: Encoding of 3 words was intact (3/3); short-delayed verbal recall – intact (3/3); long-delayed verbal recall – mildly impaired (2/3, improving to 3/3 with cueing). LTM was intact for [place] presidents (4/4). Visual memory – impaired. Visuospatial: Visuomotor integration – impaired for copy of a 2D figure, sloppiness was noted. Executive Functions: Motor Planning - intact. Organizational skills - moderately impaired. Abstract reasoning - intact. Verbal problem solving – closely intact. Emotional functioning: Affect - constricted. Mood - painful; Pt reported experiencing poor sleep, low energy.. On a mood measure she additionally reported decreased interest and activities, boredom and low energy. Thought processes were slightly circumnstatial. No abnormal thought contents were observed.  Pt denied any AH/VH. Pt also denied SI/HI/I/P. Insight - WFL. Judgment - closely WFL.

## 2021-02-03 NOTE — PROGRESS NOTE ADULT - SUBJECTIVE AND OBJECTIVE BOX
Patient is a 75y old  Female who presents with a chief complaint of Back pain (03 Feb 2021 10:00)      Patient seen and examined at bedside. pt reports no new complaints today.    ALLERGIES:  No Known Allergies    MEDICATIONS  (STANDING):  ascorbic acid 500 milliGRAM(s) Oral daily  atorvastatin 20 milliGRAM(s) Oral at bedtime  benzocaine 15 mG/menthol 3.6 mG (Sugar-Free) Lozenge 1 Lozenge Oral four times a day  enoxaparin Injectable 40 milliGRAM(s) SubCutaneous two times a day  folic acid 1 milliGRAM(s) Oral daily  hydroxychloroquine 200 milliGRAM(s) Oral <User Schedule>  lactobacillus acidophilus 1 Tablet(s) Oral two times a day  levothyroxine 200 MICROGram(s) Oral daily  lidocaine   Patch 1 Patch Transdermal daily  polyethylene glycol 3350 17 Gram(s) Oral daily  senna 2 Tablet(s) Oral at bedtime    MEDICATIONS  (PRN):  acetaminophen   Tablet .. 650 milliGRAM(s) Oral every 6 hours PRN Temp greater or equal to 38C (100.4F), Mild Pain (1 - 3)  magnesium hydroxide Suspension 30 milliLiter(s) Oral daily PRN Constipation  melatonin 3 milliGRAM(s) Oral at bedtime PRN Insomnia  ondansetron    Tablet 4 milliGRAM(s) Oral every 8 hours PRN Nausea and/or Vomiting  oxyCODONE    IR 10 milliGRAM(s) Oral every 4 hours PRN Severe Pain (7 - 10)  oxyCODONE    IR 5 milliGRAM(s) Oral every 4 hours PRN Moderate Pain (4 - 6)  simethicone 80 milliGRAM(s) Chew every 6 hours PRN Upset Stomach    Vital Signs Last 24 Hrs  T(F): 97.9 (03 Feb 2021 08:43), Max: 98.7 (02 Feb 2021 20:36)  HR: 67 (03 Feb 2021 08:43) (67 - 80)  BP: 115/67 (03 Feb 2021 08:43) (115/67 - 126/94)  RR: 15 (03 Feb 2021 08:43) (14 - 15)  SpO2: 95% (03 Feb 2021 08:43) (95% - 98%)  I&O's Summary    PHYSICAL EXAM:  General: NAD, awake alert answering questions appropriately  ENT: MMM  Neck: Supple, No JVD  Lungs: Clear to auscultation bilaterally  Cardio: RRR, S1/S2  Abdomen: Soft, Nontender, Nondistended; Bowel sounds present  Extremities: No calf tenderness, + bilateral pitting edema (L>R) (chronic)    LABS:                        11.1   4.09  )-----------( 269      ( 01 Feb 2021 06:30 )             35.7     02-01    142  |  106  |  16  ----------------------------<  96  3.5   |  26  |  0.79    Ca    8.9      01 Feb 2021 06:30    TPro  6.9  /  Alb  2.9  /  TBili  0.4  /  DBili  x   /  AST  31  /  ALT  22  /  AlkPhos  132  02-01    eGFR if Non African American: 73 mL/min/1.73M2 (02-01-21 @ 06:30)  eGFR if : 84 mL/min/1.73M2 (02-01-21 @ 06:30)          RADIOLOGY & ADDITIONAL TESTS:      Care Discussed with Consultants/Other Providers: rehab team

## 2021-02-03 NOTE — PROGRESS NOTE ADULT - ASSESSMENT
KAI THOMAS is a 75y F PMH HTN, HLD, SLE, RA, hypothyroidism, provoked R. 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 Jaja Campos. Staples removed in stages- all removed 1/29  - continue Comprehensive Rehab Program of PT/OT- 3 hrs/day 5 days/week  -Precautions:  spinal, RA, contact and droplet precautions    #New COVID conversion  -PCR + 1/29/21. Moved up to [facility]E [facility] for isolation  - PCR 1/31/21 neg then repeat was positive. Negative test likely false negative. COVID IgG Ab neg.   -continue contact and droplet precautions    #SLE/RA: -continue plaquenil 200mg po daily  Was on 15mg ( 2.5mg ) weekly- Discussed with rheumatologist - defer for 3 weeks post Covid     #h/o Provoked DVT in 2019  - US July 2019:  DVT noted in the right popliteal vein and visualized segment of the right  posterior tibial vein.  - was on eliquis 5mg po bid at home,   - currently on lovenox for dvt ppx, to resume eliquis at discharge  -Follows with vascular cardiologist Dr. Hoyos outpatient    #HTN:BP stable off meds     #HLD:- continue lipitor 20mg po daily    #hypothyroidism:- continue levothyroxine 200mcg po daily    #UTI - resolved  - UCx: providencia rettgari- resistant to macrobid  - On Cipro for 3 day course (completed 1/30)    #: sleep  - melatonin 3mg PRN    #Pain Mgmt :- Tylenol PRN mild pain,   - Oxycodone 5mg IR PRN moderate 4-7/10 pain  - Oxycodone 10mg IR prn severe 8-10/10 pain  - Oxycontin ER BID   -currently controlled  -Bowel management    #GI/Bowel Mgmt/ constipation:   -continue Senna, miralax daily, MOM prn  -simethicone PRN gas  -prune juice with meal trays    #FEN :  - Diet - Regular     # DVT prophylaxis :- Lovenox BID    LE edema: was on diuretic at home ( Losartan 50/HCTZ 12.5) held post surgery due to low BP . ACE wraps LE     #Dispo discussed 2/3  SW: concerns - lives alone  OT: mod A bathing and with lower body dressing; min a to constant with transfer with 2-3 attempt and lots of coaching  PT: mod A with bed mob; contact - min A transfer; min A with ambulation; Stairs has not been performed  IAZIAH: ???  Home vs [facility] KAI THOMAS is a 75y F PMH HTN, HLD, SLE, RA, hypothyroidism, provoked R. 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 Jaja Campos. Staples removed  - continue Comprehensive Rehab Program of PT/OT- 3 hrs/day 5 days/week  -Precautions:  spinal, RA, cardiac, contact and droplet precautions    #New COVID conversion  -PCR + 1/29/21. Moved up to [facility] for isolation  - PCR 1/31/21 neg then repeat was positive. Negative test likely false negative. COVID IgG Ab neg  -incentive spirometer, deep breathing exercises  -continue contact and droplet precautions    #SLE/RA:   -continue plaquenil 200mg po daily  -(prevously on 15mg ( 2.5mg ) weekly).  Discussed with rheumatologist, defer for 3 weeks post Covid   -stable    #h/o Provoked DVT in 2019  - US July 2019:  DVT noted in the right popliteal vein and visualized segment of the right  posterior tibial vein.  - was on eliquis 5mg po bid at home,   - currently on lovenox for dvt ppx, to resume eliquis at discharge  -Follows with vascular cardiologist Dr. Hoyos outpatient    #HTN:  -BP stable off meds     #HLD:  - continue lipitor 20mg po daily    #hypothyroidism:  - continue levothyroxine 200mcg po daily    #UTI : providencia rettgari  - s/p Cipro x 3 day course (completed 1/30)    #: sleep  - melatonin 3mg PRN    #Pain Mgmt :  - Tylenol PRN mild pain,   - Oxycodone 5mg IR PRN moderate 4-7/10 pain  - Oxycodone 10mg IR prn severe 8-10/10 pain  - Oxycontin ER BID   -currently controlled  -Bowel management as below    #GI/Bowel Mgmt/ constipation:   -continue Senna, miralax daily, MOM prn  -simethicone PRN gas  -prune juice with meal trays    #FEN :  - Diet - Regular     # DVT prophylaxis :  - Lovenox BID  -LE edema:  ACE wraps LE     #Dispo discussed in IDT meeting 2/3  - mod A bathing and with lower body dressing; min a to constant with transfer with 2-3 attempt and lots of coaching, mod A with bed mob; contact - min A transfer sit to stand; min A with ambulation; Stairs have not been performed  -goals: min assist bADLs, min assist transfers and ambulation  -will need to see if family able to provide min assist level at home, target dc 2/12 with 24h our care and home Pt and OT if available, otherwise [facility]    #LABs  CBc BMP 2/4

## 2021-02-03 NOTE — PROGRESS NOTE ADULT - SUBJECTIVE AND OBJECTIVE BOX
HPI:  75F with past medical history of HTN, HLD, SLE, RA, hypothyroidism, provoked R. popliteal DVT in 2019 on eliquis, chronic low back pain presents to ED for acute on chronic low back pain. Pt has several months of chronic lumbosacral back pain, evaluated by outpatient ortho and referred for physical therapy and pain management. She has been receiving ?vitamin spinal injections. She last received low back inj on 12/16 after which she her low back pain worsened. She took oxycodone 5 mg qd (prescribed in september during a ED visit) and acetaminophen for past three days without relief. She also endorses worsening pain during ambulation with walker and has affected her quality of life. She has constipation, last BM 4 days ago. Otherwise denies fever, chills, N/V, abdominal pain, dysuria, urinary retention, fecal incontinence, saddle anesthesia, fall, LOC, trauma. During ROS she endorsed LLE weakness for several months, no numbness or tingling and has LLE edema >RLE edema, reported undergoing LLE US 4 months ago, was negative for DVT. Currently has 8/10 pain, improved to 5/10 with oxycodone 5 mg x 2 in ED. (28 Dec 2020 11:16)   12/28: Mri T/Ls: IMPRESSION: Degenerative changes  Abnormal signal with associated enhancement is seen involving the endplates adjacent to the L2-3 disc space. There is slight increased T2 prolongation involving the L2-3 disc space with associated widening. While these findings could be compatible with degenerative changes possibly of early discitis and osteomyelitis must be considered.  Patient s/p L1-L5 laminectomy, T10-L5 posterior surgical fusion by Dr. Campos on 1/1/21  HMV x 3 drains. Post op with constipation needing aggressive bowel regimen.   PT/OT and PM&R evaluated patient and recommended Rehab.     Patient medically cleared and admitted to  Rehab on 1/12/21 (12 Jan 2021 15:21)      SUBJECTIVE/INTERVAL EVENTS  Patient seen and assessed at bedside sitting up in wheelchair.  No acute event overnight. She feels less constipated - had a good BM yesterday.  Still feel a little bloated.  Back pain was 7-8/10, but was medicated with oxycodone and is now 5/10.  She's tolerating therapy, but is aware that she might not be able to return home due to limitations.        Vital Signs Last 24 Hrs  T(C): 36.6 (03 Feb 2021 08:43), Max: 37.1 (02 Feb 2021 20:36)  T(F): 97.9 (03 Feb 2021 08:43), Max: 98.7 (02 Feb 2021 20:36)  HR: 67 (03 Feb 2021 08:43) (67 - 80)  BP: 115/67 (03 Feb 2021 08:43) (115/67 - 126/94)  BP(mean): --  RR: 15 (03 Feb 2021 08:43) (14 - 15)  SpO2: 95% (03 Feb 2021 08:43) (95% - 98%)    Pulm - CTAB, good effort  Cardiovascular - S1S2  Abdomen - Soft, NT/ND, +BS  Extremities : LE edema +     Motor -UE 5/5, LE proximal 4/5, distal 5/5     Psychiatric - Mood stable, Affect WNL  Skin: long back incision,- some steristrips on- healing well   no erythema, +intact, dry, no induration      MEDICATIONS  (STANDING):  ascorbic acid 500 milliGRAM(s) Oral daily  atorvastatin 20 milliGRAM(s) Oral at bedtime  benzocaine 15 mG/menthol 3.6 mG (Sugar-Free) Lozenge 1 Lozenge Oral four times a day  enoxaparin Injectable 40 milliGRAM(s) SubCutaneous two times a day  folic acid 1 milliGRAM(s) Oral daily  hydroxychloroquine 200 milliGRAM(s) Oral <User Schedule>  lactobacillus acidophilus 1 Tablet(s) Oral two times a day  levothyroxine 200 MICROGram(s) Oral daily  lidocaine   Patch 1 Patch Transdermal daily  polyethylene glycol 3350 17 Gram(s) Oral daily  senna 2 Tablet(s) Oral at bedtime    MEDICATIONS  (PRN):  acetaminophen   Tablet .. 650 milliGRAM(s) Oral every 6 hours PRN Temp greater or equal to 38C (100.4F), Mild Pain (1 - 3)  magnesium hydroxide Suspension 30 milliLiter(s) Oral daily PRN Constipation  melatonin 3 milliGRAM(s) Oral at bedtime PRN Insomnia  ondansetron    Tablet 4 milliGRAM(s) Oral every 8 hours PRN Nausea and/or Vomiting  oxyCODONE    IR 10 milliGRAM(s) Oral every 4 hours PRN Severe Pain (7 - 10)  oxyCODONE    IR 5 milliGRAM(s) Oral every 4 hours PRN Moderate Pain (4 - 6)  simethicone 80 milliGRAM(s) Chew every 6 hours PRN Upset Stomach            RECENT LABS:                          11.1   4.09  )-----------( 269      ( 01 Feb 2021 06:30 )             35.7     02-01    142  |  106  |  16  ----------------------------<  96  3.5   |  26  |  0.79    Ca    8.9      01 Feb 2021 06:30    TPro  6.9  /  Alb  2.9<L>  /  TBili  0.4  /  DBili  x   /  AST  31  /  ALT  22  /  AlkPhos  132<H>  02-01    LIVER FUNCTIONS - ( 01 Feb 2021 06:30 )  Alb: 2.9 g/dL / Pro: 6.9 g/dL / ALK PHOS: 132 U/L / ALT: 22 U/L / AST: 31 U/L / GGT: x                      HPI:  75F with past medical history of HTN, HLD, SLE, RA, hypothyroidism, provoked R. popliteal DVT in 2019 on eliquis, chronic low back pain presents to ED for acute on chronic low back pain. Pt has several months of chronic lumbosacral back pain, evaluated by outpatient ortho and referred for physical therapy and pain management. She has been receiving ?vitamin spinal injections. She last received low back inj on 12/16 after which she her low back pain worsened. She took oxycodone 5 mg qd (prescribed in september during a ED visit) and acetaminophen for past three days without relief. She also endorses worsening pain during ambulation with walker and has affected her quality of life. She has constipation, last BM 4 days ago. Otherwise denies fever, chills, N/V, abdominal pain, dysuria, urinary retention, fecal incontinence, saddle anesthesia, fall, LOC, trauma. During ROS she endorsed LLE weakness for several months, no numbness or tingling and has LLE edema >RLE edema, reported undergoing LLE US 4 months ago, was negative for DVT. Currently has 8/10 pain, improved to 5/10 with oxycodone 5 mg x 2 in ED. (28 Dec 2020 11:16)   12/28: Mri T/Ls: IMPRESSION: Degenerative changes  Abnormal signal with associated enhancement is seen involving the endplates adjacent to the L2-3 disc space. There is slight increased T2 prolongation involving the L2-3 disc space with associated widening. While these findings could be compatible with degenerative changes possibly of early discitis and osteomyelitis must be considered.  Patient s/p L1-L5 laminectomy, T10-L5 posterior surgical fusion by Dr. Campos on 1/1/21  HMV x 3 drains. Post op with constipation needing aggressive bowel regimen.   PT/OT and PM&R evaluated patient and recommended Rehab.     Patient medically cleared and admitted to  Rehab on 1/12/21 (12 Jan 2021 15:21)            Vital Signs Last 24 Hrs  T(C): 36.6 (03 Feb 2021 08:43), Max: 37.1 (02 Feb 2021 20:36)  T(F): 97.9 (03 Feb 2021 08:43), Max: 98.7 (02 Feb 2021 20:36)  HR: 67 (03 Feb 2021 08:43) (67 - 80)  BP: 115/67 (03 Feb 2021 08:43) (115/67 - 126/94)  BP(mean): --  RR: 15 (03 Feb 2021 08:43) (14 - 15)  SpO2: 95% (03 Feb 2021 08:43) (95% - 98%)          MEDICATIONS  (STANDING):  ascorbic acid 500 milliGRAM(s) Oral daily  atorvastatin 20 milliGRAM(s) Oral at bedtime  benzocaine 15 mG/menthol 3.6 mG (Sugar-Free) Lozenge 1 Lozenge Oral four times a day  enoxaparin Injectable 40 milliGRAM(s) SubCutaneous two times a day  folic acid 1 milliGRAM(s) Oral daily  hydroxychloroquine 200 milliGRAM(s) Oral <User Schedule>  lactobacillus acidophilus 1 Tablet(s) Oral two times a day  levothyroxine 200 MICROGram(s) Oral daily  lidocaine   Patch 1 Patch Transdermal daily  polyethylene glycol 3350 17 Gram(s) Oral daily  senna 2 Tablet(s) Oral at bedtime    MEDICATIONS  (PRN):  acetaminophen   Tablet .. 650 milliGRAM(s) Oral every 6 hours PRN Temp greater or equal to 38C (100.4F), Mild Pain (1 - 3)  magnesium hydroxide Suspension 30 milliLiter(s) Oral daily PRN Constipation  melatonin 3 milliGRAM(s) Oral at bedtime PRN Insomnia  ondansetron    Tablet 4 milliGRAM(s) Oral every 8 hours PRN Nausea and/or Vomiting  oxyCODONE    IR 10 milliGRAM(s) Oral every 4 hours PRN Severe Pain (7 - 10)  oxyCODONE    IR 5 milliGRAM(s) Oral every 4 hours PRN Moderate Pain (4 - 6)  simethicone 80 milliGRAM(s) Chew every 6 hours PRN Upset Stomach            RECENT LABS:                          11.1   4.09  )-----------( 269      ( 01 Feb 2021 06:30 )             35.7     02-01    142  |  106  |  16  ----------------------------<  96  3.5   |  26  |  0.79    Ca    8.9      01 Feb 2021 06:30    TPro  6.9  /  Alb  2.9<L>  /  TBili  0.4  /  DBili  x   /  AST  31  /  ALT  22  /  AlkPhos  132<H>  02-01    LIVER FUNCTIONS - ( 01 Feb 2021 06:30 )  Alb: 2.9 g/dL / Pro: 6.9 g/dL / ALK PHOS: 132 U/L / ALT: 22 U/L / AST: 31 U/L / GGT: x

## 2021-02-03 NOTE — CONSULT NOTE ADULT - ASSESSMENT
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 She exhibited difficulties in concentration, short-term memory for verbal information, visuospatial skills and aspects of executive functions (i.e., organizational skills). Her affect is constricted, and she reports a few adjustment symptoms in response to her current medical condition including dysphoric mood, poor sleep, low energy, boredom and reduction of interests and activities. FIM scores: Social Interaction 5; Problem Solving 6; Memory 5.   Plan: Individual supportive psychotherapy to monitor cognition, affect/mood, and behavior. Pt will benefit from optimal pain control as well as comfortable room temperature.  Participation in recreation/art therapy in order to have pleasure and mastery experiences and regain/reestablish a sense of routine.

## 2021-02-03 NOTE — PROGRESS NOTE ADULT - COMMENTS
Patient seen and assessed at bedside sitting up in wheelchair.  No acute event overnight. She feels less constipated - had a good BM yesterday.  Still feel a little bloated.  Back pain was 7-8/10, but was medicated with oxycodone and is now 5/10.  She's tolerating therapy, but is aware that she might not be able to return home due to limitations.      Patient has some anxiety but is responsive to education and support. No fever or chills, no myalgias

## 2021-02-03 NOTE — PROGRESS NOTE ADULT - ASSESSMENT
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 Hospital Course complicated by brief SICU stay for mechanical ventilation management, currently extubated. Patient now admitted for a multidisciplinary rehab program on 1/12/21.    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 Jaja Campos  -Continue comprehensive rehab program  -Pain management, bowel regimen per rehab    COVID  -pt asymptomatic. saturating appropriately on RA. cont to monitor     UTI  - urine culture shows >100K Providencia Rettgeri - macrobid resistant  - completed Cipro 250mg q12 x 3 days on 1/30    Acute blood loss Anemia, likely from surgery  -H/H stable, continue to monitor   -No overt signs of bleeding    Abdominal pain - likely 2/2 opioid induced constipation   -no abd pain today  -Bowel regimen per rehab  -cont to monitor, if pain persistent, check CT abd/pelvis    SLE/RA  -Continue with Plaquenil 200mg po daily and folic acid    H/o provoked right popliteal, right posterior tibial DVT (7/2019 in setting of hip surgery, already completed >6 months of treatment)  -Follows with cardiologist Dr. Hoyos outpatient  -Treated with Eliquis 5mg po bid chronically -- AC stopped at [facility] as 12/29 LE dopplers neg for DVTs  -currently on DVT ppx with lovenox 40mg BID   -DC Eliquis on discharge    # Leg edema likely form chronic lymphedema  - encourage leg elevation  - cont ACE wraps    Essential HTN: stable off anti-hypertensives  -Continue to hold home medication Losartan  -VS per rehab protocol    HLD  -Chronic, continue Lipitor    Hypothyroidism  -Chronic, continue Levothyroxine    VTE ppx:   - Lovenox 40mg BID  GI ppx:   - continue PPI

## 2021-02-04 LAB
ALBUMIN SERPL ELPH-MCNC: 2.4 G/DL — LOW (ref 3.3–5)
ALP SERPL-CCNC: 116 U/L — SIGNIFICANT CHANGE UP (ref 40–120)
ALT FLD-CCNC: 20 U/L — SIGNIFICANT CHANGE UP (ref 10–45)
ANION GAP SERPL CALC-SCNC: 9 MMOL/L — SIGNIFICANT CHANGE UP (ref 5–17)
AST SERPL-CCNC: 33 U/L — SIGNIFICANT CHANGE UP (ref 10–40)
BASOPHILS # BLD AUTO: 0.02 K/UL — SIGNIFICANT CHANGE UP (ref 0–0.2)
BASOPHILS NFR BLD AUTO: 0.6 % — SIGNIFICANT CHANGE UP (ref 0–2)
BILIRUB SERPL-MCNC: 0.3 MG/DL — SIGNIFICANT CHANGE UP (ref 0.2–1.2)
BUN SERPL-MCNC: 16 MG/DL — SIGNIFICANT CHANGE UP (ref 7–23)
CALCIUM SERPL-MCNC: 7.9 MG/DL — LOW (ref 8.4–10.5)
CHLORIDE SERPL-SCNC: 106 MMOL/L — SIGNIFICANT CHANGE UP (ref 96–108)
CO2 SERPL-SCNC: 25 MMOL/L — SIGNIFICANT CHANGE UP (ref 22–31)
CREAT SERPL-MCNC: 0.68 MG/DL — SIGNIFICANT CHANGE UP (ref 0.5–1.3)
EOSINOPHIL # BLD AUTO: 0.06 K/UL — SIGNIFICANT CHANGE UP (ref 0–0.5)
EOSINOPHIL NFR BLD AUTO: 1.8 % — SIGNIFICANT CHANGE UP (ref 0–6)
GLUCOSE SERPL-MCNC: 87 MG/DL — SIGNIFICANT CHANGE UP (ref 70–99)
HCT VFR BLD CALC: 33.9 % — LOW (ref 34.5–45)
HGB BLD-MCNC: 10.8 G/DL — LOW (ref 11.5–15.5)
IMM GRANULOCYTES NFR BLD AUTO: 0.3 % — SIGNIFICANT CHANGE UP (ref 0–1.5)
LYMPHOCYTES # BLD AUTO: 1.26 K/UL — SIGNIFICANT CHANGE UP (ref 1–3.3)
LYMPHOCYTES # BLD AUTO: 38.8 % — SIGNIFICANT CHANGE UP (ref 13–44)
MCHC RBC-ENTMCNC: 29.5 PG — SIGNIFICANT CHANGE UP (ref 27–34)
MCHC RBC-ENTMCNC: 31.9 GM/DL — LOW (ref 32–36)
MCV RBC AUTO: 92.6 FL — SIGNIFICANT CHANGE UP (ref 80–100)
MONOCYTES # BLD AUTO: 0.58 K/UL — SIGNIFICANT CHANGE UP (ref 0–0.9)
MONOCYTES NFR BLD AUTO: 17.8 % — HIGH (ref 2–14)
NEUTROPHILS # BLD AUTO: 1.32 K/UL — LOW (ref 1.8–7.4)
NEUTROPHILS NFR BLD AUTO: 40.7 % — LOW (ref 43–77)
NRBC # BLD: 0 /100 WBCS — SIGNIFICANT CHANGE UP (ref 0–0)
PLATELET # BLD AUTO: 206 K/UL — SIGNIFICANT CHANGE UP (ref 150–400)
POTASSIUM SERPL-MCNC: 3.3 MMOL/L — LOW (ref 3.5–5.3)
POTASSIUM SERPL-SCNC: 3.3 MMOL/L — LOW (ref 3.5–5.3)
PROT SERPL-MCNC: 5.8 G/DL — LOW (ref 6–8.3)
RBC # BLD: 3.66 M/UL — LOW (ref 3.8–5.2)
RBC # FLD: 14.6 % — HIGH (ref 10.3–14.5)
SODIUM SERPL-SCNC: 140 MMOL/L — SIGNIFICANT CHANGE UP (ref 135–145)
WBC # BLD: 3.25 K/UL — LOW (ref 3.8–10.5)
WBC # FLD AUTO: 3.25 K/UL — LOW (ref 3.8–10.5)

## 2021-02-04 PROCEDURE — 99232 SBSQ HOSP IP/OBS MODERATE 35: CPT | Mod: CS

## 2021-02-04 PROCEDURE — 99233 SBSQ HOSP IP/OBS HIGH 50: CPT | Mod: CS

## 2021-02-04 RX ORDER — OXYCODONE HYDROCHLORIDE 5 MG/1
10 TABLET ORAL EVERY 12 HOURS
Refills: 0 | Status: DISCONTINUED | OUTPATIENT
Start: 2021-02-04 | End: 2021-02-10

## 2021-02-04 RX ORDER — POTASSIUM CHLORIDE 20 MEQ
40 PACKET (EA) ORAL ONCE
Refills: 0 | Status: COMPLETED | OUTPATIENT
Start: 2021-02-04 | End: 2021-02-04

## 2021-02-04 RX ADMIN — BENZOCAINE AND MENTHOL 1 LOZENGE: 5; 1 LIQUID ORAL at 12:42

## 2021-02-04 RX ADMIN — Medication 1 TABLET(S): at 06:26

## 2021-02-04 RX ADMIN — ENOXAPARIN SODIUM 40 MILLIGRAM(S): 100 INJECTION SUBCUTANEOUS at 06:26

## 2021-02-04 RX ADMIN — OXYCODONE HYDROCHLORIDE 5 MILLIGRAM(S): 5 TABLET ORAL at 23:45

## 2021-02-04 RX ADMIN — Medication 40 MILLIEQUIVALENT(S): at 13:00

## 2021-02-04 RX ADMIN — OXYCODONE HYDROCHLORIDE 10 MILLIGRAM(S): 5 TABLET ORAL at 16:58

## 2021-02-04 RX ADMIN — Medication 3 MILLIGRAM(S): at 23:45

## 2021-02-04 RX ADMIN — BENZOCAINE AND MENTHOL 1 LOZENGE: 5; 1 LIQUID ORAL at 16:51

## 2021-02-04 RX ADMIN — Medication 200 MILLIGRAM(S): at 08:22

## 2021-02-04 RX ADMIN — Medication 1 TABLET(S): at 16:52

## 2021-02-04 RX ADMIN — Medication 1 MILLIGRAM(S): at 12:41

## 2021-02-04 RX ADMIN — OXYCODONE HYDROCHLORIDE 10 MILLIGRAM(S): 5 TABLET ORAL at 08:34

## 2021-02-04 RX ADMIN — Medication 500 MILLIGRAM(S): at 12:41

## 2021-02-04 RX ADMIN — Medication 200 MICROGRAM(S): at 06:26

## 2021-02-04 RX ADMIN — OXYCODONE HYDROCHLORIDE 10 MILLIGRAM(S): 5 TABLET ORAL at 12:46

## 2021-02-04 RX ADMIN — ATORVASTATIN CALCIUM 20 MILLIGRAM(S): 80 TABLET, FILM COATED ORAL at 23:45

## 2021-02-04 RX ADMIN — ENOXAPARIN SODIUM 40 MILLIGRAM(S): 100 INJECTION SUBCUTANEOUS at 16:54

## 2021-02-04 RX ADMIN — Medication 650 MILLIGRAM(S): at 08:33

## 2021-02-04 RX ADMIN — Medication 3 MILLIGRAM(S): at 01:59

## 2021-02-04 RX ADMIN — BENZOCAINE AND MENTHOL 1 LOZENGE: 5; 1 LIQUID ORAL at 06:27

## 2021-02-04 NOTE — PROGRESS NOTE ADULT - SUBJECTIVE AND OBJECTIVE BOX
Patient is a 75y old  Female who presents with a chief complaint of Back pain (03 Feb 2021 13:44)      HPI:  75F with past medical history of HTN, HLD, SLE, RA, hypothyroidism, provoked R. popliteal DVT in 2019 on eliquis, chronic low back pain presents to ED for acute on chronic low back pain. Pt has several months of chronic lumbosacral back pain, evaluated by outpatient ortho and referred for physical therapy and pain management. She has been receiving ?vitamin spinal injections. She last received low back inj on 12/16 after which she her low back pain worsened. She took oxycodone 5 mg qd (prescribed in september during a ED visit) and acetaminophen for past three days without relief. She also endorses worsening pain during ambulation with walker and has affected her quality of life. She has constipation, last BM 4 days ago. Otherwise denies fever, chills, N/V, abdominal pain, dysuria, urinary retention, fecal incontinence, saddle anesthesia, fall, LOC, trauma. During ROS she endorsed LLE weakness for several months, no numbness or tingling and has LLE edema >RLE edema, reported undergoing LLE US 4 months ago, was negative for DVT. Currently has 8/10 pain, improved to 5/10 with oxycodone 5 mg x 2 in ED. (28 Dec 2020 11:16)   12/28: Mri T/Ls: IMPRESSION: Degenerative changes  Abnormal signal with associated enhancement is seen involving the endplates adjacent to the L2-3 disc space. There is slight increased T2 prolongation involving the L2-3 disc space with associated widening. While these findings could be compatible with degenerative changes possibly of early discitis and osteomyelitis must be considered.  Patient s/p L1-L5 laminectomy, T10-L5 posterior surgical fusion by Dr. Campos on 1/1/21  HMV x 3 drains. Post op with constipation needing aggressive bowel regimen.     PT/OT and PM&R evaluated patient and recommended Rehab.     Patient medically cleared and admitted to  Rehab on 1/12/21 (12 Jan 2021 15:21)      PAST MEDICAL & SURGICAL HISTORY:  HTN (hypertension)    DVT (deep venous thrombosis)    Hypothyroidism    RA (rheumatoid arthritis)    Obesity, Class II, BMI 35-39.9, no comorbidity    Hypothyroid    Benign heart murmur    Hyperlipidemia    Hypertension    H/O degenerative disc disease    Osteoarthritis    SLE (systemic lupus erythematosus)    Rheumatoid arthritis    S/P knee replacement    Status post total hip replacement, right    S/P thyroid surgery  1 lobe removed    Lung cancer  small tumor removed 2015    S/P knee surgery  bilateral    S/P carpal tunnel release  left    S/P eye surgery  child    S/P cataract surgery  left    S/P tonsillectomy        MEDICATIONS  (STANDING):  ascorbic acid 500 milliGRAM(s) Oral daily  atorvastatin 20 milliGRAM(s) Oral at bedtime  benzocaine 15 mG/menthol 3.6 mG (Sugar-Free) Lozenge 1 Lozenge Oral four times a day  enoxaparin Injectable 40 milliGRAM(s) SubCutaneous two times a day  folic acid 1 milliGRAM(s) Oral daily  hydroxychloroquine 200 milliGRAM(s) Oral <User Schedule>  lactobacillus acidophilus 1 Tablet(s) Oral two times a day  levothyroxine 200 MICROGram(s) Oral daily  lidocaine   Patch 1 Patch Transdermal daily  oxyCODONE  ER Tablet 10 milliGRAM(s) Oral every 12 hours  polyethylene glycol 3350 17 Gram(s) Oral daily  senna 2 Tablet(s) Oral at bedtime    MEDICATIONS  (PRN):  acetaminophen   Tablet .. 650 milliGRAM(s) Oral every 6 hours PRN Temp greater or equal to 38C (100.4F), Mild Pain (1 - 3)  magnesium hydroxide Suspension 30 milliLiter(s) Oral daily PRN Constipation  melatonin 3 milliGRAM(s) Oral at bedtime PRN Insomnia  ondansetron    Tablet 4 milliGRAM(s) Oral every 8 hours PRN Nausea and/or Vomiting  oxyCODONE    IR 10 milliGRAM(s) Oral every 4 hours PRN Severe Pain (7 - 10)  oxyCODONE    IR 5 milliGRAM(s) Oral every 4 hours PRN Moderate Pain (4 - 6)  simethicone 80 milliGRAM(s) Chew every 6 hours PRN Upset Stomach      Allergies    No Known Allergies    Intolerances          VITALS  75y  Vital Signs Last 24 Hrs  T(C): 36.7 (04 Feb 2021 08:25), Max: 36.9 (03 Feb 2021 21:00)  T(F): 98.1 (04 Feb 2021 08:25), Max: 98.4 (03 Feb 2021 21:00)  HR: 82 (04 Feb 2021 08:25) (72 - 82)  BP: 116/66 (04 Feb 2021 08:25) (116/66 - 130/95)  BP(mean): --  RR: 16 (04 Feb 2021 08:25) (16 - 16)  SpO2: 93% (04 Feb 2021 08:25) (93% - 97%)  Daily     Daily         RECENT LABS:                          10.8   3.25  )-----------( 206      ( 04 Feb 2021 05:30 )             33.9     02-04    140  |  106  |  16  ----------------------------<  87  3.3<L>   |  25  |  0.68    Ca    7.9<L>      04 Feb 2021 05:30    TPro  5.8<L>  /  Alb  2.4<L>  /  TBili  0.3  /  DBili  x   /  AST  33  /  ALT  20  /  AlkPhos  116  02-04    LIVER FUNCTIONS - ( 04 Feb 2021 05:30 )  Alb: 2.4 g/dL / Pro: 5.8 g/dL / ALK PHOS: 116 U/L / ALT: 20 U/L / AST: 33 U/L / GGT: x                   CAPILLARY BLOOD GLUCOSE              Review of Systems:   · Additional ROS	Patient seen with PT, ambulating in hallway. She is concerned about a number of issues, her pain management once she's out of rehab, and what the plans are for dc. Team meeting results, goals for min assist level on dc from IRF reviewed,     Physical Exam:   · Constitutional	detailed exam  · Constitutional Comments	alert, ambulating with RW in hallway, improved stride length +flexed posture, reduced hip and knee flexion, antalgic gait    O x 3, mildly anxious but responds to education and support  · Respiratory	detailed exam  · Respiratory Details	respirations non-labored; reduced BS bases  · Respiratory Comments	fair+ effort  · Cardiovascular	detailed exam  · Cardiovascular Details	regular rate and rhythm   · Gastrointestinal	detailed exam  · GI Normal	soft; nontender; bowel sounds normal  · Extremities	detailed exam   · Extremities Comments	UE 5/5, LE proximal 4/5, distal 5/5   calves soft, NT   no pedal edema  · Additional PE	Skin: long back incision,- some steristrips on- healing well   no erythema, +intact, dry, no induration

## 2021-02-04 NOTE — CHART NOTE - NSCHARTNOTEFT_GEN_A_CORE
NUTRITION FOLLOW UP    SOURCE: Patient [X)    Medical Record (X)    DIET: regular, DASH/ TLC    PATIENT REPORT [ X] nausea [X ] diarrhea     PO INTAKE:   [ X]   % , as per medical record    Pt reports having constipation for several days before prior to taking stool softeners. Recent C/o nausea and diarrhea due to stool softeners. Discussed consuming more water and binding foods such as bananas and toast. Pt is consuming prunes at meals.  Pt reports fair appetite, not appealed by the food served in stay and reports she is a "picky eater." Pt reports enjoying oatmeal with cranberries, turkey sandwich, plain foods. No weight changes noted.             CURRENT WEIGHT:  2/2 274.4 Lbs, 1/12 280 Lbs    PERTINENT MEDS:   Pertinent Medications: MEDICATIONS  (STANDING):  ascorbic acid 500 milliGRAM(s) Oral daily  atorvastatin 20 milliGRAM(s) Oral at bedtime  benzocaine 15 mG/menthol 3.6 mG (Sugar-Free) Lozenge 1 Lozenge Oral four times a day  enoxaparin Injectable 40 milliGRAM(s) SubCutaneous two times a day  folic acid 1 milliGRAM(s) Oral daily  hydroxychloroquine 200 milliGRAM(s) Oral <User Schedule>  lactobacillus acidophilus 1 Tablet(s) Oral two times a day  levothyroxine 200 MICROGram(s) Oral daily  lidocaine   Patch 1 Patch Transdermal daily  oxyCODONE  ER Tablet 10 milliGRAM(s) Oral every 12 hours  polyethylene glycol 3350 17 Gram(s) Oral daily  senna 2 Tablet(s) Oral at bedtime    MEDICATIONS  (PRN):  acetaminophen   Tablet .. 650 milliGRAM(s) Oral every 6 hours PRN Temp greater or equal to 38C (100.4F), Mild Pain (1 - 3)  magnesium hydroxide Suspension 30 milliLiter(s) Oral daily PRN Constipation  melatonin 3 milliGRAM(s) Oral at bedtime PRN Insomnia  ondansetron    Tablet 4 milliGRAM(s) Oral every 8 hours PRN Nausea and/or Vomiting  oxyCODONE    IR 10 milliGRAM(s) Oral every 4 hours PRN Severe Pain (7 - 10)  oxyCODONE    IR 5 milliGRAM(s) Oral every 4 hours PRN Moderate Pain (4 - 6)  simethicone 80 milliGRAM(s) Chew every 6 hours PRN Upset Stomach      PERTINENT LABS:  02-04 Na140 mmol/L Glu 87 mg/dL K+ 3.3 mmol/L<L> Cr  0.68 mg/dL BUN 16 mg/dL 02-04 Alb 2.4 g/dL<L>    SKIN:  surgical incision on back  EDEMA: 2+ left leg, right leg  LAST BM: 2/4    ESTIMATED NEEDS:   [X] no change since previous assessment    PREVIOUS NUTRITION DIAGNOSIS:  obesity - DASH/TLC     NUTRITION DIAGNOSIS is :  (  X )  Ongoing education         NUTRITION RECOMMENDATIONS:   [X] Continue plan of care  [X] Honor pt's food preference as feasible with diet order  [X] Ongoing nutrition education    MONITORING AND EVALUATION:   1. Tolerance to diet prescription   2. PO intake  3. Weights  4. Labs  5. Follow Up per protocol     RD to remain available     DEBRA [facility] [employer] Dietetic Intern NUTRITION FOLLOW UP    SOURCE: Patient [X)    Medical Record (X)    DIET: regular, DASH/ TLC    PATIENT REPORT [ X] nausea [X ] diarrhea     PO INTAKE:   [ X]   % , as per medical record    Pt reports having constipation for several days before prior to taking stool softeners. Recent C/o nausea and diarrhea due to stool softeners. Discussed consuming more water and binding foods such as bananas and toast. Pt is consuming prunes at meals.  Pt reports fair appetite, not appealed by the food served in stay and reports she is a "picky eater." Pt reports enjoying oatmeal with cranberries, turkey sandwich, plain foods. No weight changes noted.             CURRENT WEIGHT:  2/2 274.4 Lbs, 1/12 280 Lbs    PERTINENT MEDS:   Pertinent Medications: MEDICATIONS  (STANDING):  ascorbic acid 500 milliGRAM(s) Oral daily  atorvastatin 20 milliGRAM(s) Oral at bedtime  benzocaine 15 mG/menthol 3.6 mG (Sugar-Free) Lozenge 1 Lozenge Oral four times a day  enoxaparin Injectable 40 milliGRAM(s) SubCutaneous two times a day  folic acid 1 milliGRAM(s) Oral daily  hydroxychloroquine 200 milliGRAM(s) Oral <User Schedule>  lactobacillus acidophilus 1 Tablet(s) Oral two times a day  levothyroxine 200 MICROGram(s) Oral daily  lidocaine   Patch 1 Patch Transdermal daily  oxyCODONE  ER Tablet 10 milliGRAM(s) Oral every 12 hours  polyethylene glycol 3350 17 Gram(s) Oral daily  senna 2 Tablet(s) Oral at bedtime    MEDICATIONS  (PRN):  acetaminophen   Tablet .. 650 milliGRAM(s) Oral every 6 hours PRN Temp greater or equal to 38C (100.4F), Mild Pain (1 - 3)  magnesium hydroxide Suspension 30 milliLiter(s) Oral daily PRN Constipation  melatonin 3 milliGRAM(s) Oral at bedtime PRN Insomnia  ondansetron    Tablet 4 milliGRAM(s) Oral every 8 hours PRN Nausea and/or Vomiting  oxyCODONE    IR 10 milliGRAM(s) Oral every 4 hours PRN Severe Pain (7 - 10)  oxyCODONE    IR 5 milliGRAM(s) Oral every 4 hours PRN Moderate Pain (4 - 6)  simethicone 80 milliGRAM(s) Chew every 6 hours PRN Upset Stomach      PERTINENT LABS:  02-04 Na140 mmol/L Glu 87 mg/dL K+ 3.3 mmol/L<L> Cr  0.68 mg/dL BUN 16 mg/dL 02-04 Alb 2.4 g/dL<L>    SKIN:  surgical incision on back  EDEMA: 2+ left leg, right leg  LAST BM: 2/4    ESTIMATED NEEDS:   [X] no change since previous assessment    PREVIOUS NUTRITION DIAGNOSIS:  obesity - DASH/TLC     NUTRITION DIAGNOSIS is :  (  X )  Ongoing education         NUTRITION RECOMMENDATIONS:   [X] Continue plan of care. Pt would like to continue prunes.   [X] Honor pt's food preference as feasible with diet order  [X] Ongoing nutrition education  [X] Consider d/c bowel regimen given loose BM's    MONITORING AND EVALUATION:   1. Tolerance to diet prescription   2. PO intake  3. Weights  4. Labs  5. Follow Up per protocol     RD to remain available     DEBRA [facility] [employer] Dietetic Intern

## 2021-02-04 NOTE — PROGRESS NOTE ADULT - ASSESSMENT
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 Hospital Course complicated by brief SICU stay for mechanical ventilation management, currently extubated. Patient now admitted for a multidisciplinary rehab program on 1/12/21.    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 Jaja Campos  -Continue comprehensive rehab program  -Pain management, bowel regimen per rehab    COVID  -pt asymptomatic. saturating appropriately on RA. cont to monitor     UTI  - urine culture shows >100K Providencia Rettgeri - macrobid resistant  - completed Cipro 250mg q12 x 3 days on 1/30    Acute blood loss Anemia, likely from surgery  -H/H stable, continue to monitor   -No overt signs of bleeding    Abdominal pain - likely 2/2 opioid induced constipation   -mild abd pain that comes and goes, tends to go away with BM  -Bowel regimen per rehab  -cont to monitor, if pain persistent, check CT abd/pelvis    SLE/RA  -Continue with Plaquenil 200mg po daily and folic acid    H/o provoked right popliteal, right posterior tibial DVT (7/2019 in setting of hip surgery, already completed >6 months of treatment)  -Follows with cardiologist Dr. Hoyos outpatient  -Treated with Eliquis 5mg po bid chronically -- AC stopped at [facility] as 12/29 LE dopplers neg for DVTs  -currently on DVT ppx with lovenox 40mg BID   -f/u vascular regarding AC on discharge    # Leg edema likely form chronic lymphedema  - encourage leg elevation  - cont ACE wraps    Essential HTN: stable off anti-hypertensives  -Continue to hold home medication Losartan  -VS per rehab protocol    HLD  -Chronic, continue Lipitor    Hypothyroidism  -Chronic, continue Levothyroxine    VTE ppx:   - Lovenox 40mg BID  GI ppx:   - continue PPI

## 2021-02-04 NOTE — PROGRESS NOTE ADULT - ASSESSMENT
KAI THOMAS is a 75y F PMH HTN, HLD, SLE, RA, hypothyroidism, provoked R. 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 Jaja Campos. Staples removed   - continue Comprehensive Rehab Program of PT/OT- 3 hrs/day 5 days/week  - psychological support, counseling  -Precautions:  spinal, RA, cardiac, contact and droplet precautions    #New COVID conversion  -PCR + 1/29/21. Moved up to [facility] for isolation. COVID IgG Ab neg  -incentive spirometer, deep breathing exercises  -continue contact and droplet precautions    #SLE/RA:   -continue plaquenil 200mg po daily  -(prevously on 15mg ( 2.5mg ) weekly).  Discussed with rheumatologist, defer for 3 weeks post Covid   -stable    #h/o Provoked DVT in 2019  - US July 2019:  DVT noted in the right popliteal vein and visualized segment of the right  posterior tibial vein.  - was on eliquis 5mg po bid at home,   - currently on lovenox for dvt ppx, to resume eliquis at discharge. Will discuss with hospitalist whether can be switched back to eliquis as patient does not want injections 2/3  -Follows with vascular cardiologist Dr. Hoyos outpatient    #HTN:  -BP stable off meds   -(116/66 - 130/95) 2/3    #HLD:  - continue lipitor 20mg po daily    #hypothyroidism:  - continue levothyroxine 200mcg po daily    #hypokalemia  -K+ 3.3 2/4. Will-supplement 40 meq x 1 dose  -BMp in AM 2/5    #UTI : providencia rettgari  - s/p Cipro x 3 day course (completed 1/30)    #: sleep  - melatonin 3mg PRN    #Pain Mgmt :  - Tylenol PRN mild pain,   - Oxycodone 5mg IR PRN moderate 4-7/10 pain  - Oxycodone 10mg IR prn severe 8-10/10 pain  - Oxycontin ER BID (renewed 2/4)  -currently controlled. Work on weaning as recovery continues, may need pain management follow up on dc , discussed with patient    #GI/Bowel Mgmt/ constipation:   -continue Senna, miralax daily, MOM prn  -simethicone PRN gas  -prune juice with meal trays    #FEN :  - Diet - Regular     # DVT prophylaxis :  - Lovenox BID; f/u re: eliquis  -LE edema:  ACE wraps LE     #Dispo discussed in IDT meeting 2/3  - mod A bathing and with lower body dressing; min a to constant with transfer with 2-3 attempt and lots of coaching, mod A with bed mob; contact - min A transfer sit to stand; min A with ambulation; Stairs have not been performed  -goals: min assist bADLs, min assist transfers and ambulation  -will need to see if family able to provide min assist level at home, target dc 2/12 with 24h our care and home Pt and OT if available, otherwise SHAZIA    LABs 2/4 reviewed    #LABs  BMP 2/5  CBc BMP 2/8 KAI THOMAS is a 75y F PMH HTN, HLD, SLE, RA, hypothyroidism, provoked R. 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 Jaja Campos. Staples removed   - continue Comprehensive Rehab Program of PT/OT- 3 hrs/day 5 days/week  - psychological support, counseling  -Precautions:  spinal, RA, cardiac, contact and droplet precautions    #New COVID conversion  -PCR + 1/29/21. Moved up to [facility] for isolation. COVID IgG Ab neg  -incentive spirometer, deep breathing exercises  -continue contact and droplet precautions    #SLE/RA:   -continue plaquenil 200mg po daily  -(prevously on 15mg ( 2.5mg ) weekly).  Discussed with rheumatologist, defer for 3 weeks post Covid   -stable    #h/o Provoked DVT in 2019  - US July 2019:  DVT noted in the right popliteal vein and visualized segment of the right  posterior tibial vein.  - was on eliquis 5mg po bid at home,   - currently on lovenox for dvt ppx, to resume eliquis at discharge. Will discuss with hospitalist whether can be switched back to eliquis as patient does not want injections 2/3  -Follows with vascular cardiologist Dr. Hoyos outpatient    #HTN:  -BP stable off meds   -(116/66 - 130/95) 2/3    #HLD:  - continue lipitor 20mg po daily    #hypothyroidism:  - continue levothyroxine 200mcg po daily    #hypokalemia  -K+ 3.3 2/4. Will-supplement 40 meq x 1 dose  -BMp in AM 2/5    #UTI : providencia rettgari  - s/p Cipro x 3 day course (completed 1/30)    #: sleep  - melatonin 3mg PRN    #Pain Mgmt :  - Tylenol PRN mild pain  - Oxycodone 5mg IR PRN moderate 4-7/10 pain  - Oxycodone 10mg IR prn severe 8-10/10 pain  - Oxycontin ER BID (renewed 2/4)  -currently controlled. Work on weaning as recovery continues, may need pain management follow up on dc , discussed with patient    #GI/Bowel Mgmt/ constipation:   -continue Senna, miralax daily, MOM prn  -simethicone PRN gas  -prune juice with meal trays    #FEN :  - Diet - Regular     # DVT prophylaxis :  - Lovenox BID; f/u re: eliquis  -LE edema:  ACE wraps LE     #Dispo discussed in IDT meeting 2/3  - mod A bathing and with lower body dressing; min a to constant with transfer with 2-3 attempt and lots of coaching, mod A with bed mob; contact - min A transfer sit to stand; min A with ambulation; Stairs have not been performed  -goals: min assist bADLs, min assist transfers and ambulation  -will need to see if family able to provide min assist level at home, target dc 2/12 with 24h our care and home Pt and OT if available, otherwise [facility]  Called and discussed case with Adeline [phone] 2/4. Concern for some headaches usually in middle of night around 3 AM, will ensure adequate pain management for symptoms. Recommendations of team and need for min assist; family states that they will not be able to provide. Possible SHAZIA once asymptomatic (possibly end of next week) discussed, will reassess next week.    LABS 2/4 reviewed    #LABs  BMP 2/5  CBc BMP 2/8 KAI THOMAS is a 75y F PMH HTN, HLD, SLE, RA, hypothyroidism, provoked R. 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 Jaja Campos. Staples removed   - continue Comprehensive Rehab Program of PT/OT- 3 hrs/day 5 days/week  - psychological support, counseling  -Precautions:  spinal, RA, cardiac, contact and droplet precautions    #New COVID conversion  -PCR + 1/29/21. Moved up to [facility] for isolation. COVID IgG Ab neg  -incentive spirometer, deep breathing exercises  -continue contact and droplet precautions    #SLE/RA:   -continue plaquenil 200mg po daily  -(prevously on 15mg ( 2.5mg ) weekly).  Discussed with rheumatologist, defer for 3 weeks post Covid   -stable    #h/o Provoked DVT in 2019  - US July 2019:  DVT noted in the right popliteal vein and visualized segment of the right  posterior tibial vein.  - was on eliquis 5mg po bid at home,   - currently on lovenox for dvt ppx, to resume eliquis at discharge. Discussed with hospitalist/previous treating team whether can be switched back to eliquis as patient does not want injections 2/4. Cleared by vascular to resume eliquis, will need clearance from NSGY to restart  -Follows with vascular cardiologist Dr. Hoyos outpatient    #HTN:  -BP stable off meds   -(116/66 - 130/95) 2/4    #HLD:  - continue lipitor 20mg po daily    #hypothyroidism:  - continue levothyroxine 200mcg po daily    #hypokalemia  -K+ 3.3 2/4. Will-supplement 40 meq x 1 dose  -BMp in AM 2/5    #UTI : providencia rettgari  - s/p Cipro x 3 day course (completed 1/30)    #: sleep  - melatonin 3mg PRN    #Pain Mgmt :  - Tylenol PRN mild pain  - Oxycodone 5mg IR PRN moderate 4-7/10 pain  - Oxycodone 10mg IR prn severe 8-10/10 pain  - Oxycontin ER BID (renewed 2/4)  -currently controlled. Work on weaning as recovery continues, may need pain management follow up on dc , discussed with patient    #GI/Bowel Mgmt/ constipation:   -continue Senna, miralax daily, MOM prn  -simethicone PRN gas  -prune juice with meal trays    #FEN :  - Diet - Regular     # DVT prophylaxis :  - Lovenox BID; f/u re: eliquis  -LE edema:  ACE wraps LE     #Dispo discussed in IDT meeting 2/3  - mod A bathing and with lower body dressing; min a to constant with transfer with 2-3 attempt and lots of coaching, mod A with bed mob; contact - min A transfer sit to stand; min A with ambulation; Stairs have not been performed  -goals: min assist bADLs, min assist transfers and ambulation  -will need to see if family able to provide min assist level at home, target dc 2/12 with 24h our care and home Pt and OT if available, otherwise [facility]AZIA  Called and discussed case with daughter Adeline [phone] 2/4. Concern for some headaches usually in middle of night around 3 AM, will ensure adequate pain management for symptoms. Recommendations of team and need for min assist; family states that they will not be able to provide. Possible SHAZIA once asymptomatic (possibly end of next week) discussed, will reassess next week.    LABS 2/4 reviewed    #LABs  f/u NSGY re: clearance to restart eliquis  BMP 2/5  CBc BMP 2/8

## 2021-02-04 NOTE — PROGRESS NOTE ADULT - SUBJECTIVE AND OBJECTIVE BOX
Patient is a 75y old  Female who presents with a chief complaint of Back pain (04 Feb 2021 12:01)      Patient seen and examined at bedside. pt reports no new complaint. +chronic back pain. had BM yesterday    ALLERGIES:  No Known Allergies    MEDICATIONS  (STANDING):  ascorbic acid 500 milliGRAM(s) Oral daily  atorvastatin 20 milliGRAM(s) Oral at bedtime  benzocaine 15 mG/menthol 3.6 mG (Sugar-Free) Lozenge 1 Lozenge Oral four times a day  enoxaparin Injectable 40 milliGRAM(s) SubCutaneous two times a day  folic acid 1 milliGRAM(s) Oral daily  hydroxychloroquine 200 milliGRAM(s) Oral <User Schedule>  lactobacillus acidophilus 1 Tablet(s) Oral two times a day  levothyroxine 200 MICROGram(s) Oral daily  lidocaine   Patch 1 Patch Transdermal daily  oxyCODONE  ER Tablet 10 milliGRAM(s) Oral every 12 hours  polyethylene glycol 3350 17 Gram(s) Oral daily  senna 2 Tablet(s) Oral at bedtime    MEDICATIONS  (PRN):  acetaminophen   Tablet .. 650 milliGRAM(s) Oral every 6 hours PRN Temp greater or equal to 38C (100.4F), Mild Pain (1 - 3)  magnesium hydroxide Suspension 30 milliLiter(s) Oral daily PRN Constipation  melatonin 3 milliGRAM(s) Oral at bedtime PRN Insomnia  ondansetron    Tablet 4 milliGRAM(s) Oral every 8 hours PRN Nausea and/or Vomiting  oxyCODONE    IR 10 milliGRAM(s) Oral every 4 hours PRN Severe Pain (7 - 10)  oxyCODONE    IR 5 milliGRAM(s) Oral every 4 hours PRN Moderate Pain (4 - 6)  simethicone 80 milliGRAM(s) Chew every 6 hours PRN Upset Stomach    Vital Signs Last 24 Hrs  T(F): 98.1 (04 Feb 2021 08:25), Max: 98.4 (03 Feb 2021 21:00)  HR: 82 (04 Feb 2021 08:25) (72 - 82)  BP: 116/66 (04 Feb 2021 08:25) (116/66 - 130/95)  RR: 16 (04 Feb 2021 08:25) (16 - 16)  SpO2: 93% (04 Feb 2021 08:25) (93% - 97%)  I&O's Summary    PHYSICAL EXAM:  General: NAD, awake alert  ENT: MMM  Neck: Supple, No JVD  Lungs: Clear to auscultation bilaterally  Cardio: RRR, S1/S2, No murmurs  Abdomen: Soft, Nontender, Nondistended; Bowel sounds present  Extremities: No calf tenderness, No pitting edema    LABS:                        10.8   3.25  )-----------( 206      ( 04 Feb 2021 05:30 )             33.9     02-04    140  |  106  |  16  ----------------------------<  87  3.3   |  25  |  0.68    Ca    7.9      04 Feb 2021 05:30    TPro  5.8  /  Alb  2.4  /  TBili  0.3  /  DBili  x   /  AST  33  /  ALT  20  /  AlkPhos  116  02-04    eGFR if Non African American: 85 mL/min/1.73M2 (02-04-21 @ 05:30)  eGFR if African American: 98 mL/min/1.73M2 (02-04-21 @ 05:30)            RADIOLOGY & ADDITIONAL TESTS:    Care Discussed with Consultants/Other Providers: Dr. Brower

## 2021-02-05 LAB
ANION GAP SERPL CALC-SCNC: 10 MMOL/L — SIGNIFICANT CHANGE UP (ref 5–17)
BUN SERPL-MCNC: 13 MG/DL — SIGNIFICANT CHANGE UP (ref 7–23)
CALCIUM SERPL-MCNC: 8.1 MG/DL — LOW (ref 8.4–10.5)
CHLORIDE SERPL-SCNC: 107 MMOL/L — SIGNIFICANT CHANGE UP (ref 96–108)
CO2 SERPL-SCNC: 25 MMOL/L — SIGNIFICANT CHANGE UP (ref 22–31)
CREAT SERPL-MCNC: 0.75 MG/DL — SIGNIFICANT CHANGE UP (ref 0.5–1.3)
GLUCOSE SERPL-MCNC: 85 MG/DL — SIGNIFICANT CHANGE UP (ref 70–99)
LIDOCAIN IGE QN: 134 U/L — SIGNIFICANT CHANGE UP (ref 73–393)
POTASSIUM SERPL-MCNC: 3.6 MMOL/L — SIGNIFICANT CHANGE UP (ref 3.5–5.3)
POTASSIUM SERPL-SCNC: 3.6 MMOL/L — SIGNIFICANT CHANGE UP (ref 3.5–5.3)
SODIUM SERPL-SCNC: 142 MMOL/L — SIGNIFICANT CHANGE UP (ref 135–145)

## 2021-02-05 PROCEDURE — 99232 SBSQ HOSP IP/OBS MODERATE 35: CPT | Mod: CS,GC

## 2021-02-05 PROCEDURE — 99232 SBSQ HOSP IP/OBS MODERATE 35: CPT | Mod: CS

## 2021-02-05 PROCEDURE — 74176 CT ABD & PELVIS W/O CONTRAST: CPT | Mod: 26

## 2021-02-05 PROCEDURE — 71045 X-RAY EXAM CHEST 1 VIEW: CPT | Mod: 26

## 2021-02-05 RX ORDER — ENOXAPARIN SODIUM 100 MG/ML
40 INJECTION SUBCUTANEOUS
Refills: 0 | Status: DISCONTINUED | OUTPATIENT
Start: 2021-02-05 | End: 2021-02-22

## 2021-02-05 RX ORDER — REMDESIVIR 5 MG/ML
100 INJECTION INTRAVENOUS EVERY 24 HOURS
Refills: 0 | Status: COMPLETED | OUTPATIENT
Start: 2021-02-06 | End: 2021-02-09

## 2021-02-05 RX ORDER — OXYCODONE HYDROCHLORIDE 5 MG/1
5 TABLET ORAL EVERY 4 HOURS
Refills: 0 | Status: DISCONTINUED | OUTPATIENT
Start: 2021-02-05 | End: 2021-02-10

## 2021-02-05 RX ORDER — REMDESIVIR 5 MG/ML
200 INJECTION INTRAVENOUS EVERY 24 HOURS
Refills: 0 | Status: COMPLETED | OUTPATIENT
Start: 2021-02-05 | End: 2021-02-05

## 2021-02-05 RX ORDER — DEXAMETHASONE 0.5 MG/5ML
6 ELIXIR ORAL DAILY
Refills: 0 | Status: DISCONTINUED | OUTPATIENT
Start: 2021-02-05 | End: 2021-02-12

## 2021-02-05 RX ORDER — REMDESIVIR 5 MG/ML
INJECTION INTRAVENOUS
Refills: 0 | Status: COMPLETED | OUTPATIENT
Start: 2021-02-05 | End: 2021-02-09

## 2021-02-05 RX ORDER — OXYCODONE HYDROCHLORIDE 5 MG/1
10 TABLET ORAL EVERY 4 HOURS
Refills: 0 | Status: DISCONTINUED | OUTPATIENT
Start: 2021-02-05 | End: 2021-02-10

## 2021-02-05 RX ADMIN — Medication 1 MILLIGRAM(S): at 13:12

## 2021-02-05 RX ADMIN — ENOXAPARIN SODIUM 40 MILLIGRAM(S): 100 INJECTION SUBCUTANEOUS at 06:35

## 2021-02-05 RX ADMIN — Medication 650 MILLIGRAM(S): at 07:47

## 2021-02-05 RX ADMIN — LIDOCAINE 1 PATCH: 4 CREAM TOPICAL at 13:19

## 2021-02-05 RX ADMIN — OXYCODONE HYDROCHLORIDE 10 MILLIGRAM(S): 5 TABLET ORAL at 13:18

## 2021-02-05 RX ADMIN — OXYCODONE HYDROCHLORIDE 10 MILLIGRAM(S): 5 TABLET ORAL at 17:13

## 2021-02-05 RX ADMIN — REMDESIVIR 200 MILLIGRAM(S): 5 INJECTION INTRAVENOUS at 16:17

## 2021-02-05 RX ADMIN — OXYCODONE HYDROCHLORIDE 10 MILLIGRAM(S): 5 TABLET ORAL at 07:47

## 2021-02-05 RX ADMIN — SENNA PLUS 2 TABLET(S): 8.6 TABLET ORAL at 23:34

## 2021-02-05 RX ADMIN — LIDOCAINE 1 PATCH: 4 CREAM TOPICAL at 19:30

## 2021-02-05 RX ADMIN — Medication 200 MICROGRAM(S): at 06:34

## 2021-02-05 RX ADMIN — BENZOCAINE AND MENTHOL 1 LOZENGE: 5; 1 LIQUID ORAL at 17:25

## 2021-02-05 RX ADMIN — Medication 200 MILLIGRAM(S): at 07:47

## 2021-02-05 RX ADMIN — BENZOCAINE AND MENTHOL 1 LOZENGE: 5; 1 LIQUID ORAL at 13:38

## 2021-02-05 RX ADMIN — Medication 500 MILLIGRAM(S): at 13:18

## 2021-02-05 RX ADMIN — ENOXAPARIN SODIUM 40 MILLIGRAM(S): 100 INJECTION SUBCUTANEOUS at 17:10

## 2021-02-05 RX ADMIN — OXYCODONE HYDROCHLORIDE 10 MILLIGRAM(S): 5 TABLET ORAL at 06:34

## 2021-02-05 RX ADMIN — ATORVASTATIN CALCIUM 20 MILLIGRAM(S): 80 TABLET, FILM COATED ORAL at 23:34

## 2021-02-05 RX ADMIN — Medication 10 MILLIGRAM(S): at 17:21

## 2021-02-05 NOTE — PROGRESS NOTE ADULT - ATTENDING COMMENTS
Patient seen at bedside this am  Reports LUQ abdominal cramping this am. Had 2 loose stools yesterday. None today.   LUQ tenderness on palpation  CT abdomen ordered by medicine- Patient agreeable    2. Back incision - healing well  Continue rehab program    3 Recent COVID 19 PCR +- no new resp symptoms. Continue monitoring Patient seen at bedside this am  Reports LUQ abdominal cramping this am. Had 2 loose stools yesterday. None today.   LUQ tenderness on palpation  CT abdomen ordered by medicine- Patient agreeable    2. Back incision - healing well  Continue rehab program    3 Recent COVID 19 PCR +- no new resp symptoms. Continue monitoring    4. Case discussed with AMY Covarrubias

## 2021-02-05 NOTE — PROGRESS NOTE ADULT - ASSESSMENT
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 Hospital Course complicated by brief SICU stay for mechanical ventilation management, currently extubated. Patient now admitted for a multidisciplinary rehab program on 1/12/21.    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 Jaja Campos  -Continue comprehensive rehab program  -Pain management, bowel regimen per rehab    COVID  -pt desaturated to 92-93% on RA today  -admits to cough  -start remdesivir and decadron  -monitor Cr and liver function  -O2 supplement as necessary to keep SpO2 >92%    Abdominal pain - suspect 2/2 opioid induced constipation   -pt with persistent abdominal pain that comes and goes  -send for CT abd/pelvis today  -Bowel regimen per rehab    recently treated UTI  - urine culture shows >100K Providencia Rettgeri - macrobid resistant  - completed Cipro 250mg q12 x 3 days on 1/30    Acute blood loss Anemia, likely from surgery  -H/H stable, continue to monitor   -No overt signs of bleeding    SLE/RA  -Continue with Plaquenil 200mg po daily and folic acid    H/o provoked right popliteal, right posterior tibial DVT (7/2019 in setting of hip surgery, already completed >6 months of treatment)  -Follows with cardiologist Dr. Hoyos outpatient  -Treated with Eliquis 5mg po bid chronically -- AC stopped at [facility] as 12/29 LE dopplers neg for DVTs  -currently on DVT ppx with lovenox 40mg BID     # Leg edema likely from chronic lymphedema  - encourage leg elevation  - cont ACE wraps    Essential HTN: stable off anti-hypertensives  -Continue to hold home medication Losartan  -VS per rehab protocol    HLD  -Chronic, continue Lipitor    Hypothyroidism  -Chronic, continue Levothyroxine    VTE ppx:   - Lovenox 40mg BID  GI ppx:   - continue PPI     75F with PMH HTN, HLD, SLE, RA, hypothyroidism, provoked right popliteal DVT in 2019, chronic low back pain presents to [facility] for acute on chronic low back pain on Dec 28, 2020, found to have 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 Hospital Course complicated by brief SICU stay for mechanical ventilation management, currently extubated. Patient now admitted for a multidisciplinary rehab program on 1/12/21.    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 Jaja Campos  -Continue comprehensive rehab program  -Pain management, bowel regimen per rehab    COVID  -pt desaturated to 92-93% on RA today  -admits to cough  -start remdesivir and decadron  -monitor Cr and liver function  -O2 supplement as necessary to keep SpO2 >92%    Intermittent abdominal pain - suspect 2/2 opioid induced constipation r/o gas pain  -pt with persistent abdominal pain that comes and goes  -send for CT abd/pelvis today  -check lipase  -Bowel regimen per rehab  -simethicone prn     recently treated UTI  - urine culture shows >100K Providencia Rettgeri - macrobid resistant  - completed Cipro 250mg q12 x 3 days on 1/30    Acute blood loss Anemia, likely from surgery  -H/H stable, continue to monitor   -No overt signs of bleeding    SLE/RA  -Continue with Plaquenil 200mg po daily and folic acid    H/o provoked right popliteal, right posterior tibial DVT (7/2019 in setting of hip surgery, already completed >6 months of treatment)  -Follows with cardiologist Dr. Hoyos outpatient  -Treated with Eliquis 5mg po bid chronically -- AC stopped at [facility] as 12/29 LE dopplers neg for DVTs  -currently on DVT ppx with lovenox 40mg BID     # Leg edema likely from chronic lymphedema  - encourage leg elevation  - cont ACE wraps    Essential HTN: stable off anti-hypertensives  -Continue to hold home medication Losartan  -VS per rehab protocol    HLD  -Chronic, continue Lipitor    Hypothyroidism  -Chronic, continue Levothyroxine    VTE ppx:   - Lovenox 40mg BID  GI ppx:   - continue PPI

## 2021-02-05 NOTE — PROGRESS NOTE ADULT - SUBJECTIVE AND OBJECTIVE BOX
Patient is a 75y old  Female who presents with a chief complaint of Back pain (04 Feb 2021 13:54)      Patient seen and examined at bedside. c/o persistent intermittent LUQ and epigastric abd pain, pt could not describe the pain. no nausea, vomiting, no constipation. no fever chills. admits to Coughing today and pt sating 92-93% on RA today. No CP    ALLERGIES:  No Known Allergies    MEDICATIONS  (STANDING):  ascorbic acid 500 milliGRAM(s) Oral daily  atorvastatin 20 milliGRAM(s) Oral at bedtime  benzocaine 15 mG/menthol 3.6 mG (Sugar-Free) Lozenge 1 Lozenge Oral four times a day  enoxaparin Injectable 40 milliGRAM(s) SubCutaneous two times a day  folic acid 1 milliGRAM(s) Oral daily  hydroxychloroquine 200 milliGRAM(s) Oral <User Schedule>  levothyroxine 200 MICROGram(s) Oral daily  lidocaine   Patch 1 Patch Transdermal daily  oxyCODONE  ER Tablet 10 milliGRAM(s) Oral every 12 hours  polyethylene glycol 3350 17 Gram(s) Oral daily  senna 2 Tablet(s) Oral at bedtime    MEDICATIONS  (PRN):  acetaminophen   Tablet .. 650 milliGRAM(s) Oral every 6 hours PRN Temp greater or equal to 38C (100.4F), Mild Pain (1 - 3)  magnesium hydroxide Suspension 30 milliLiter(s) Oral daily PRN Constipation  melatonin 3 milliGRAM(s) Oral at bedtime PRN Insomnia  ondansetron    Tablet 4 milliGRAM(s) Oral every 8 hours PRN Nausea and/or Vomiting  oxyCODONE    IR 5 milliGRAM(s) Oral every 4 hours PRN Moderate Pain (4 - 6)  oxyCODONE    IR 10 milliGRAM(s) Oral every 4 hours PRN Severe Pain (7 - 10)  simethicone 80 milliGRAM(s) Chew every 6 hours PRN Upset Stomach    Vital Signs Last 24 Hrs  T(F): 98.1 (05 Feb 2021 08:32), Max: 98.1 (05 Feb 2021 08:32)  HR: 60 (05 Feb 2021 08:32) (60 - 68)  BP: 100/63 (05 Feb 2021 08:32) (100/63 - 119/67)  RR: 16 (05 Feb 2021 08:32) (16 - 16)  SpO2: 95% (05 Feb 2021 08:32) (94% - 95%)  I&O's Summary    PHYSICAL EXAM:  General: NAD, A/O x 3  ENT: MMM  Neck: Supple, No JVD  Lungs: Clear to auscultation bilaterally  Cardio: RRR, S1/S2, No murmurs  Abdomen: Soft, Nontender, Nondistended; Bowel sounds present  Extremities: No calf tenderness, No pitting edema    LABS:                        10.8   3.25  )-----------( 206      ( 04 Feb 2021 05:30 )             33.9     02-05    142  |  107  |  13  ----------------------------<  85  3.6   |  25  |  0.75    Ca    8.1      05 Feb 2021 06:15    TPro  5.8  /  Alb  2.4  /  TBili  0.3  /  DBili  x   /  AST  33  /  ALT  20  /  AlkPhos  116  02-04    eGFR if : 90 mL/min/1.73M2 (02-05-21 @ 06:15)  eGFR if Non African American: 78 mL/min/1.73M2 (02-05-21 @ 06:15)        RADIOLOGY & ADDITIONAL TESTS:      Care Discussed with Consultants/Other Providers: rehab team

## 2021-02-05 NOTE — PROGRESS NOTE ADULT - SUBJECTIVE AND OBJECTIVE BOX
Patient is a 75y old  Female who presents with a chief complaint of Back pain (03 Feb 2021 13:44)      HPI:  75F with past medical history of HTN, HLD, SLE, RA, hypothyroidism, provoked R. popliteal DVT in 2019 on eliquis, chronic low back pain presents to ED for acute on chronic low back pain. Pt has several months of chronic lumbosacral back pain, evaluated by outpatient ortho and referred for physical therapy and pain management. She has been receiving ?vitamin spinal injections. She last received low back inj on 12/16 after which she her low back pain worsened. She took oxycodone 5 mg qd (prescribed in september during a ED visit) and acetaminophen for past three days without relief. She also endorses worsening pain during ambulation with walker and has affected her quality of life. She has constipation, last BM 4 days ago. Otherwise denies fever, chills, N/V, abdominal pain, dysuria, urinary retention, fecal incontinence, saddle anesthesia, fall, LOC, trauma. During ROS she endorsed LLE weakness for several months, no numbness or tingling and has LLE edema >RLE edema, reported undergoing LLE US 4 months ago, was negative for DVT. Currently has 8/10 pain, improved to 5/10 with oxycodone 5 mg x 2 in ED. (28 Dec 2020 11:16)   12/28: Mri T/Ls: IMPRESSION: Degenerative changes  Abnormal signal with associated enhancement is seen involving the endplates adjacent to the L2-3 disc space. There is slight increased T2 prolongation involving the L2-3 disc space with associated widening. While these findings could be compatible with degenerative changes possibly of early discitis and osteomyelitis must be considered.  Patient s/p L1-L5 laminectomy, T10-L5 posterior surgical fusion by Dr. Campos on 1/1/21  HMV x 3 drains. Post op with constipation needing aggressive bowel regimen.     PT/OT and PM&R evaluated patient and recommended Rehab.     Patient medically cleared and admitted to  Rehab on 1/12/21 (12 Jan 2021 15:21)      PAST MEDICAL & SURGICAL HISTORY:  HTN (hypertension)    DVT (deep venous thrombosis)    Hypothyroidism    RA (rheumatoid arthritis)    Obesity, Class II, BMI 35-39.9, no comorbidity    Hypothyroid    Benign heart murmur    Hyperlipidemia    Hypertension    H/O degenerative disc disease    Osteoarthritis    SLE (systemic lupus erythematosus)    Rheumatoid arthritis    S/P knee replacement    Status post total hip replacement, right    S/P thyroid surgery  1 lobe removed    Lung cancer  small tumor removed 2015    S/P knee surgery  bilateral    S/P carpal tunnel release  left    S/P eye surgery  child    S/P cataract surgery  left    S/P tonsillectomy        MEDICATIONS  (STANDING):  ascorbic acid 500 milliGRAM(s) Oral daily  atorvastatin 20 milliGRAM(s) Oral at bedtime  benzocaine 15 mG/menthol 3.6 mG (Sugar-Free) Lozenge 1 Lozenge Oral four times a day  enoxaparin Injectable 40 milliGRAM(s) SubCutaneous two times a day  folic acid 1 milliGRAM(s) Oral daily  hydroxychloroquine 200 milliGRAM(s) Oral <User Schedule>  levothyroxine 200 MICROGram(s) Oral daily  lidocaine   Patch 1 Patch Transdermal daily  oxyCODONE  ER Tablet 10 milliGRAM(s) Oral every 12 hours  polyethylene glycol 3350 17 Gram(s) Oral daily  senna 2 Tablet(s) Oral at bedtime    MEDICATIONS  (PRN):  acetaminophen   Tablet .. 650 milliGRAM(s) Oral every 6 hours PRN Temp greater or equal to 38C (100.4F), Mild Pain (1 - 3)  magnesium hydroxide Suspension 30 milliLiter(s) Oral daily PRN Constipation  melatonin 3 milliGRAM(s) Oral at bedtime PRN Insomnia  ondansetron    Tablet 4 milliGRAM(s) Oral every 8 hours PRN Nausea and/or Vomiting  oxyCODONE    IR 5 milliGRAM(s) Oral every 4 hours PRN Moderate Pain (4 - 6)  oxyCODONE    IR 10 milliGRAM(s) Oral every 4 hours PRN Severe Pain (7 - 10)  simethicone 80 milliGRAM(s) Chew every 6 hours PRN Upset Stomach      Allergies    No Known Allergies    Intolerances          VITALS  75y  Vital Signs Last 24 Hrs  T(C): 36.7 (05 Feb 2021 08:32), Max: 36.7 (04 Feb 2021 22:45)  T(F): 98.1 (05 Feb 2021 08:32), Max: 98.1 (05 Feb 2021 08:32)  HR: 60 (05 Feb 2021 08:32) (60 - 68)  BP: 100/63 (05 Feb 2021 08:32) (100/63 - 119/67)  BP(mean): --  RR: 16 (05 Feb 2021 08:32) (16 - 16)  SpO2: 95% (05 Feb 2021 08:32) (94% - 95%)      RECENT LABS:    LAB                        10.8   3.25  )-----------( 206      ( 04 Feb 2021 05:30 )             33.9     02-05    142  |  107  |  13  ----------------------------<  85  3.6   |  25  |  0.75    Ca    8.1<L>      05 Feb 2021 06:15    TPro  5.8<L>  /  Alb  2.4<L>  /  TBili  0.3  /  DBili  x   /  AST  33  /  ALT  20  /  AlkPhos  116  02-04    LIVER FUNCTIONS - ( 04 Feb 2021 05:30 )  Alb: 2.4 g/dL / Pro: 5.8 g/dL / ALK PHOS: 116 U/L / ALT: 20 U/L / AST: 33 U/L / GGT: x                 Review of Systems:   · Additional ROS	Pt seen at bedside while in wheelchair.  She's c/o abd pain to left upper quadrant.  This has been ongoing for at least 4-5 days - no obvious improvement.  Was initially constipated but has been having BM.  Reports have diarrhea x 2 yesterday. Tolerating therapy with slow progress.  She was noted with low saturation 90/91% on RA, would increase to 96% with deep breathing.  Encouraged incentive spirometer, but said she fail at using it but would try to be compliant with taking deep breaths.     Physical Exam:   · Constitutional	detailed exam  · Constitutional Comments	alert, ambulating with RW in hallway, improved stride length +flexed posture, reduced hip and knee flexion, antalgic gait    O x 3, mildly anxious but responds to education and support  · Respiratory	detailed exam  · Respiratory Details	respirations non-labored; reduced BS bases  · Respiratory Comments	fair+ effort  · Cardiovascular	detailed exam  · Cardiovascular Details	regular rate and rhythm   · Gastrointestinal	detailed exam  · GI Normal	soft; nontender; bowel sounds normal  · Extremities	detailed exam   · Extremities Comments	UE 5/5, LE proximal 4/5, distal 5/5   calves soft, NT   no pedal edema  · Additional PE	Skin: long back incision,- some steristrips on- healing well   no erythema, +intact, dry, no induration

## 2021-02-06 LAB
ALBUMIN SERPL ELPH-MCNC: 2.6 G/DL — LOW (ref 3.3–5)
ALP SERPL-CCNC: 128 U/L — HIGH (ref 40–120)
ALT FLD-CCNC: 15 U/L — SIGNIFICANT CHANGE UP (ref 10–45)
ANION GAP SERPL CALC-SCNC: 12 MMOL/L — SIGNIFICANT CHANGE UP (ref 5–17)
AST SERPL-CCNC: 33 U/L — SIGNIFICANT CHANGE UP (ref 10–40)
BILIRUB SERPL-MCNC: 0.3 MG/DL — SIGNIFICANT CHANGE UP (ref 0.2–1.2)
BUN SERPL-MCNC: 15 MG/DL — SIGNIFICANT CHANGE UP (ref 7–23)
CALCIUM SERPL-MCNC: 8.2 MG/DL — LOW (ref 8.4–10.5)
CHLORIDE SERPL-SCNC: 105 MMOL/L — SIGNIFICANT CHANGE UP (ref 96–108)
CO2 SERPL-SCNC: 23 MMOL/L — SIGNIFICANT CHANGE UP (ref 22–31)
CREAT SERPL-MCNC: 0.67 MG/DL — SIGNIFICANT CHANGE UP (ref 0.5–1.3)
GLUCOSE SERPL-MCNC: 123 MG/DL — HIGH (ref 70–99)
POTASSIUM SERPL-MCNC: 4 MMOL/L — SIGNIFICANT CHANGE UP (ref 3.5–5.3)
POTASSIUM SERPL-SCNC: 4 MMOL/L — SIGNIFICANT CHANGE UP (ref 3.5–5.3)
PROT SERPL-MCNC: 6.4 G/DL — SIGNIFICANT CHANGE UP (ref 6–8.3)
SODIUM SERPL-SCNC: 140 MMOL/L — SIGNIFICANT CHANGE UP (ref 135–145)

## 2021-02-06 PROCEDURE — 99232 SBSQ HOSP IP/OBS MODERATE 35: CPT

## 2021-02-06 PROCEDURE — 99232 SBSQ HOSP IP/OBS MODERATE 35: CPT | Mod: CS

## 2021-02-06 RX ADMIN — Medication 200 MICROGRAM(S): at 05:26

## 2021-02-06 RX ADMIN — LIDOCAINE 1 PATCH: 4 CREAM TOPICAL at 19:30

## 2021-02-06 RX ADMIN — LIDOCAINE 1 PATCH: 4 CREAM TOPICAL at 01:00

## 2021-02-06 RX ADMIN — ATORVASTATIN CALCIUM 20 MILLIGRAM(S): 80 TABLET, FILM COATED ORAL at 21:29

## 2021-02-06 RX ADMIN — LIDOCAINE 1 PATCH: 4 CREAM TOPICAL at 14:15

## 2021-02-06 RX ADMIN — REMDESIVIR 500 MILLIGRAM(S): 5 INJECTION INTRAVENOUS at 12:56

## 2021-02-06 RX ADMIN — OXYCODONE HYDROCHLORIDE 10 MILLIGRAM(S): 5 TABLET ORAL at 17:11

## 2021-02-06 RX ADMIN — Medication 650 MILLIGRAM(S): at 19:34

## 2021-02-06 RX ADMIN — Medication 3 MILLIGRAM(S): at 00:26

## 2021-02-06 RX ADMIN — Medication 200 MILLIGRAM(S): at 08:46

## 2021-02-06 RX ADMIN — ENOXAPARIN SODIUM 40 MILLIGRAM(S): 100 INJECTION SUBCUTANEOUS at 17:11

## 2021-02-06 RX ADMIN — OXYCODONE HYDROCHLORIDE 10 MILLIGRAM(S): 5 TABLET ORAL at 05:26

## 2021-02-06 RX ADMIN — Medication 6 MILLIGRAM(S): at 05:26

## 2021-02-06 RX ADMIN — Medication 500 MILLIGRAM(S): at 12:56

## 2021-02-06 RX ADMIN — Medication 650 MILLIGRAM(S): at 00:26

## 2021-02-06 RX ADMIN — Medication 1 MILLIGRAM(S): at 12:55

## 2021-02-06 NOTE — PROGRESS NOTE ADULT - SUBJECTIVE AND OBJECTIVE BOX
HPI:  75F with past medical history of HTN, HLD, SLE, RA, hypothyroidism, provoked R. popliteal DVT in 2019 on eliquis, chronic low back pain presents to ED for acute on chronic low back pain. Pt has several months of chronic lumbosacral back pain, evaluated by outpatient ortho and referred for physical therapy and pain management. She has been receiving ?vitamin spinal injections. She last received low back inj on 12/16 after which she her low back pain worsened. She took oxycodone 5 mg qd (prescribed in september during a ED visit) and acetaminophen for past three days without relief. She also endorses worsening pain during ambulation with walker and has affected her quality of life. She has constipation, last BM 4 days ago. Otherwise denies fever, chills, N/V, abdominal pain, dysuria, urinary retention, fecal incontinence, saddle anesthesia, fall, LOC, trauma. During ROS she endorsed LLE weakness for several months, no numbness or tingling and has LLE edema >RLE edema, reported undergoing LLE US 4 months ago, was negative for DVT. Currently has 8/10 pain, improved to 5/10 with oxycodone 5 mg x 2 in ED. (28 Dec 2020 11:16)   12/28: Mri T/Ls: IMPRESSION: Degenerative changes  Abnormal signal with associated enhancement is seen involving the endplates adjacent to the L2-3 disc space. There is slight increased T2 prolongation involving the L2-3 disc space with associated widening. While these findings could be compatible with degenerative changes possibly of early discitis and osteomyelitis must be considered.  Patient s/p L1-L5 laminectomy, T10-L5 posterior surgical fusion by Dr. Campos on 1/1/21  HMV x 3 drains. Post op with constipation needing aggressive bowel regimen.     PT/OT and PM&R evaluated patient and recommended Rehab.     Patient medically cleared and admitted to  Rehab on 1/12/21 (12 Jan 2021 15:21)      Subjective          PAST MEDICAL & SURGICAL HISTORY:  HTN (hypertension)    DVT (deep venous thrombosis)    Hypothyroidism    RA (rheumatoid arthritis)    Obesity, Class II, BMI 35-39.9, no comorbidity    Hypothyroid    Benign heart murmur    Hyperlipidemia    Hypertension    H/O degenerative disc disease    Osteoarthritis    SLE (systemic lupus erythematosus)    Rheumatoid arthritis    S/P knee replacement    Status post total hip replacement, right    S/P thyroid surgery  1 lobe removed    Lung cancer  small tumor removed 2015    S/P knee surgery  bilateral    S/P carpal tunnel release  left    S/P eye surgery  child    S/P cataract surgery  left    S/P tonsillectomy        MedsMEDICATIONS  (STANDING):  ascorbic acid 500 milliGRAM(s) Oral daily  atorvastatin 20 milliGRAM(s) Oral at bedtime  benzocaine 15 mG/menthol 3.6 mG (Sugar-Free) Lozenge 1 Lozenge Oral four times a day  dexAMETHasone  Injectable 6 milliGRAM(s) IV Push daily  enoxaparin Injectable 40 milliGRAM(s) SubCutaneous two times a day  folic acid 1 milliGRAM(s) Oral daily  hydroxychloroquine 200 milliGRAM(s) Oral <User Schedule>  levothyroxine 200 MICROGram(s) Oral daily  lidocaine   Patch 1 Patch Transdermal daily  oxyCODONE  ER Tablet 10 milliGRAM(s) Oral every 12 hours  polyethylene glycol 3350 17 Gram(s) Oral daily  remdesivir  IVPB 100 milliGRAM(s) IV Intermittent every 24 hours  remdesivir  IVPB   IV Intermittent   senna 2 Tablet(s) Oral at bedtime    MEDICATIONS  (PRN):  acetaminophen   Tablet .. 650 milliGRAM(s) Oral every 6 hours PRN Temp greater or equal to 38C (100.4F), Mild Pain (1 - 3)  magnesium hydroxide Suspension 30 milliLiter(s) Oral daily PRN Constipation  melatonin 3 milliGRAM(s) Oral at bedtime PRN Insomnia  ondansetron    Tablet 4 milliGRAM(s) Oral every 8 hours PRN Nausea and/or Vomiting  oxyCODONE    IR 10 milliGRAM(s) Oral every 4 hours PRN Severe Pain (7 - 10)  oxyCODONE    IR 5 milliGRAM(s) Oral every 4 hours PRN Moderate Pain (4 - 6)  simethicone 80 milliGRAM(s) Chew every 6 hours PRN Upset Stomach      Vital Signs Last 24 Hrs  T(C): 36.4 (06 Feb 2021 08:47), Max: 36.7 (05 Feb 2021 23:28)  T(F): 97.6 (06 Feb 2021 08:47), Max: 98 (05 Feb 2021 23:28)  HR: 76 (06 Feb 2021 08:47) (69 - 76)  BP: 134/72 (06 Feb 2021 08:47) (123/76 - 134/72)  BP(mean): --  RR: 15 (06 Feb 2021 08:47) (15 - 15)  SpO2: 94% (06 Feb 2021 08:47) (94% - 100%)  I&O's Summary      PHYSICAL EXAM:  GENERAL: NAD  NECK: Supple  NERVOUS SYSTEM:  awake and alert  HEART: S1s2 NL , RRR  CHEST/LUNG: Clear to percussion bilaterally  ABDOMEN: Soft, Nontender, Nondistended; Bowel sounds present  EXTREMITIES:  No edema      LABS:  02-06    140  |  105  |  15  ----------------------------<  123<H>  4.0   |  23  |  0.67    Ca    8.2<L>      06 Feb 2021 07:03    TPro  6.4  /  Alb  2.6<L>  /  TBili  0.3  /  DBili  x   /  AST  33  /  ALT  15  /  AlkPhos  128<H>  02-06          RVP:          Tox:           CAPILLARY BLOOD GLUCOSE          Imaging Personally Reviewed:  [ ] YES  [ ] NO        Care Discussed with Consultants/Other Providers [ x] YES  [ ] NO       HPI:  75F with past medical history of HTN, HLD, SLE, RA, hypothyroidism, provoked R. popliteal DVT in 2019 on eliquis, chronic low back pain presents to ED for acute on chronic low back pain. Pt has several months of chronic lumbosacral back pain, evaluated by outpatient ortho and referred for physical therapy and pain management. She has been receiving ?vitamin spinal injections. She last received low back inj on 12/16 after which she her low back pain worsened. She took oxycodone 5 mg qd (prescribed in september during a ED visit) and acetaminophen for past three days without relief. She also endorses worsening pain during ambulation with walker and has affected her quality of life. She has constipation, last BM 4 days ago. Otherwise denies fever, chills, N/V, abdominal pain, dysuria, urinary retention, fecal incontinence, saddle anesthesia, fall, LOC, trauma. During ROS she endorsed LLE weakness for several months, no numbness or tingling and has LLE edema >RLE edema, reported undergoing LLE US 4 months ago, was negative for DVT. Currently has 8/10 pain, improved to 5/10 with oxycodone 5 mg x 2 in ED. (28 Dec 2020 11:16)   12/28: Mri T/Ls: IMPRESSION: Degenerative changes  Abnormal signal with associated enhancement is seen involving the endplates adjacent to the L2-3 disc space. There is slight increased T2 prolongation involving the L2-3 disc space with associated widening. While these findings could be compatible with degenerative changes possibly of early discitis and osteomyelitis must be considered.  Patient s/p L1-L5 laminectomy, T10-L5 posterior surgical fusion by Dr. Campos on 1/1/21  HMV x 3 drains. Post op with constipation needing aggressive bowel regimen.     PT/OT and PM&R evaluated patient and recommended Rehab.     Patient medically cleared and admitted to  Rehab on 1/12/21 (12 Jan 2021 15:21)      Subjective    Pt inquiring about Eliquis. Pt wants to ambulate in the hallway as not getting PT over the weekend.       PAST MEDICAL & SURGICAL HISTORY:  HTN (hypertension)    DVT (deep venous thrombosis)    Hypothyroidism    RA (rheumatoid arthritis)    Obesity, Class II, BMI 35-39.9, no comorbidity    Hypothyroid    Benign heart murmur    Hyperlipidemia    Hypertension    H/O degenerative disc disease    Osteoarthritis    SLE (systemic lupus erythematosus)    Rheumatoid arthritis    S/P knee replacement    Status post total hip replacement, right    S/P thyroid surgery  1 lobe removed    Lung cancer  small tumor removed 2015    S/P knee surgery  bilateral    S/P carpal tunnel release  left    S/P eye surgery  child    S/P cataract surgery  left    S/P tonsillectomy        MedsMEDICATIONS  (STANDING):  ascorbic acid 500 milliGRAM(s) Oral daily  atorvastatin 20 milliGRAM(s) Oral at bedtime  benzocaine 15 mG/menthol 3.6 mG (Sugar-Free) Lozenge 1 Lozenge Oral four times a day  dexAMETHasone  Injectable 6 milliGRAM(s) IV Push daily  enoxaparin Injectable 40 milliGRAM(s) SubCutaneous two times a day  folic acid 1 milliGRAM(s) Oral daily  hydroxychloroquine 200 milliGRAM(s) Oral <User Schedule>  levothyroxine 200 MICROGram(s) Oral daily  lidocaine   Patch 1 Patch Transdermal daily  oxyCODONE  ER Tablet 10 milliGRAM(s) Oral every 12 hours  polyethylene glycol 3350 17 Gram(s) Oral daily  remdesivir  IVPB 100 milliGRAM(s) IV Intermittent every 24 hours  remdesivir  IVPB   IV Intermittent   senna 2 Tablet(s) Oral at bedtime    MEDICATIONS  (PRN):  acetaminophen   Tablet .. 650 milliGRAM(s) Oral every 6 hours PRN Temp greater or equal to 38C (100.4F), Mild Pain (1 - 3)  magnesium hydroxide Suspension 30 milliLiter(s) Oral daily PRN Constipation  melatonin 3 milliGRAM(s) Oral at bedtime PRN Insomnia  ondansetron    Tablet 4 milliGRAM(s) Oral every 8 hours PRN Nausea and/or Vomiting  oxyCODONE    IR 10 milliGRAM(s) Oral every 4 hours PRN Severe Pain (7 - 10)  oxyCODONE    IR 5 milliGRAM(s) Oral every 4 hours PRN Moderate Pain (4 - 6)  simethicone 80 milliGRAM(s) Chew every 6 hours PRN Upset Stomach      Vital Signs Last 24 Hrs  T(C): 36.4 (06 Feb 2021 08:47), Max: 36.7 (05 Feb 2021 23:28)  T(F): 97.6 (06 Feb 2021 08:47), Max: 98 (05 Feb 2021 23:28)  HR: 76 (06 Feb 2021 08:47) (69 - 76)  BP: 134/72 (06 Feb 2021 08:47) (123/76 - 134/72)  BP(mean): --  RR: 15 (06 Feb 2021 08:47) (15 - 15)  SpO2: 94% (06 Feb 2021 08:47) (94% - 100%)  I&O's Summary      PHYSICAL EXAM:  GENERAL: NAD  NECK: Supple  NERVOUS SYSTEM:  awake and alert  HEART: S1s2 NL , RRR  CHEST/LUNG: Clear to percussion bilaterally  ABDOMEN: Soft, Nontender, Nondistended; Bowel sounds present  EXTREMITIES:  pos edema      LABS:  02-06    140  |  105  |  15  ----------------------------<  123<H>  4.0   |  23  |  0.67    Ca    8.2<L>      06 Feb 2021 07:03    TPro  6.4  /  Alb  2.6<L>  /  TBili  0.3  /  DBili  x   /  AST  33  /  ALT  15  /  AlkPhos  128<H>  02-06

## 2021-02-06 NOTE — PROGRESS NOTE ADULT - SUBJECTIVE AND OBJECTIVE BOX
Cc: Gait dysfunction    HPI: Patient with no new medical issues today.  Patient denies abdominal pain, nausea, or vomiting.  Pain controlled, no chest pain, no N/V, no Fevers/Chills. No other new ROS  Has been tolerating rehabilitation program.    acetaminophen   Tablet .. 650 milliGRAM(s) Oral every 6 hours PRN  ascorbic acid 500 milliGRAM(s) Oral daily  atorvastatin 20 milliGRAM(s) Oral at bedtime  benzocaine 15 mG/menthol 3.6 mG (Sugar-Free) Lozenge 1 Lozenge Oral four times a day  dexAMETHasone  Injectable 6 milliGRAM(s) IV Push daily  enoxaparin Injectable 40 milliGRAM(s) SubCutaneous two times a day  folic acid 1 milliGRAM(s) Oral daily  hydroxychloroquine 200 milliGRAM(s) Oral <User Schedule>  levothyroxine 200 MICROGram(s) Oral daily  lidocaine   Patch 1 Patch Transdermal daily  magnesium hydroxide Suspension 30 milliLiter(s) Oral daily PRN  melatonin 3 milliGRAM(s) Oral at bedtime PRN  ondansetron    Tablet 4 milliGRAM(s) Oral every 8 hours PRN  oxyCODONE    IR 5 milliGRAM(s) Oral every 4 hours PRN  oxyCODONE    IR 10 milliGRAM(s) Oral every 4 hours PRN  oxyCODONE  ER Tablet 10 milliGRAM(s) Oral every 12 hours  polyethylene glycol 3350 17 Gram(s) Oral daily  remdesivir  IVPB   IV Intermittent   remdesivir  IVPB 100 milliGRAM(s) IV Intermittent every 24 hours  senna 2 Tablet(s) Oral at bedtime  simethicone 80 milliGRAM(s) Chew every 6 hours PRN      T(C): 36.4 (02-06-21 @ 08:47), Max: 36.7 (02-05-21 @ 23:28)  HR: 76 (02-06-21 @ 08:47) (69 - 76)  BP: 134/72 (02-06-21 @ 08:47) (123/76 - 134/72)  RR: 15 (02-06-21 @ 08:47) (15 - 15)  SpO2: 94% (02-06-21 @ 08:47) (94% - 100%)    In NAD  HEENT- EOMI  Heart- RRR, S1S2  Lungs- CTA bl.  Abd- + BS, NT  Ext- No calf pain  Neuro- Exam unchanged          Imp: Patient with diagnosis of posterior T10-L5 fusion who has been admitted for acute rehabilitation.    Plan:  - Continue therapies  - DVT prophylaxis - continue with heparin while awaiting permission from [facility] to start eliquis  - XR and CT scans reviewed, patient is afebrile, hemodynamically stable and asymptomatic.  Will continue to monitor and defer to med for further recs.  - Skin- Turn q2h, check skin daily  - Continue current medications; patient medically stable.   - Patient is stable to continue current rehabilitation program.

## 2021-02-07 LAB
ALBUMIN SERPL ELPH-MCNC: 2.4 G/DL — LOW (ref 3.3–5)
ALP SERPL-CCNC: 105 U/L — SIGNIFICANT CHANGE UP (ref 40–120)
ALT FLD-CCNC: 15 U/L — SIGNIFICANT CHANGE UP (ref 10–45)
ANION GAP SERPL CALC-SCNC: 9 MMOL/L — SIGNIFICANT CHANGE UP (ref 5–17)
AST SERPL-CCNC: 26 U/L — SIGNIFICANT CHANGE UP (ref 10–40)
BASOPHILS # BLD AUTO: 0 K/UL — SIGNIFICANT CHANGE UP (ref 0–0.2)
BASOPHILS NFR BLD AUTO: 0 % — SIGNIFICANT CHANGE UP (ref 0–2)
BILIRUB SERPL-MCNC: 0.2 MG/DL — SIGNIFICANT CHANGE UP (ref 0.2–1.2)
BUN SERPL-MCNC: 18 MG/DL — SIGNIFICANT CHANGE UP (ref 7–23)
CALCIUM SERPL-MCNC: 8.1 MG/DL — LOW (ref 8.4–10.5)
CHLORIDE SERPL-SCNC: 108 MMOL/L — SIGNIFICANT CHANGE UP (ref 96–108)
CO2 SERPL-SCNC: 25 MMOL/L — SIGNIFICANT CHANGE UP (ref 22–31)
CREAT SERPL-MCNC: 0.68 MG/DL — SIGNIFICANT CHANGE UP (ref 0.5–1.3)
EOSINOPHIL # BLD AUTO: 0 K/UL — SIGNIFICANT CHANGE UP (ref 0–0.5)
EOSINOPHIL NFR BLD AUTO: 0 % — SIGNIFICANT CHANGE UP (ref 0–6)
GLUCOSE SERPL-MCNC: 130 MG/DL — HIGH (ref 70–99)
HCT VFR BLD CALC: 33.5 % — LOW (ref 34.5–45)
HGB BLD-MCNC: 10.8 G/DL — LOW (ref 11.5–15.5)
IMM GRANULOCYTES NFR BLD AUTO: 0 % — SIGNIFICANT CHANGE UP (ref 0–1.5)
LYMPHOCYTES # BLD AUTO: 0.9 K/UL — LOW (ref 1–3.3)
LYMPHOCYTES # BLD AUTO: 36.3 % — SIGNIFICANT CHANGE UP (ref 13–44)
MCHC RBC-ENTMCNC: 29.8 PG — SIGNIFICANT CHANGE UP (ref 27–34)
MCHC RBC-ENTMCNC: 32.2 GM/DL — SIGNIFICANT CHANGE UP (ref 32–36)
MCV RBC AUTO: 92.5 FL — SIGNIFICANT CHANGE UP (ref 80–100)
MONOCYTES # BLD AUTO: 0.58 K/UL — SIGNIFICANT CHANGE UP (ref 0–0.9)
MONOCYTES NFR BLD AUTO: 23.4 % — HIGH (ref 2–14)
NEUTROPHILS # BLD AUTO: 1 K/UL — LOW (ref 1.8–7.4)
NEUTROPHILS NFR BLD AUTO: 40.3 % — LOW (ref 43–77)
NRBC # BLD: 0 /100 WBCS — SIGNIFICANT CHANGE UP (ref 0–0)
PLATELET # BLD AUTO: 184 K/UL — SIGNIFICANT CHANGE UP (ref 150–400)
POTASSIUM SERPL-MCNC: 3.9 MMOL/L — SIGNIFICANT CHANGE UP (ref 3.5–5.3)
POTASSIUM SERPL-SCNC: 3.9 MMOL/L — SIGNIFICANT CHANGE UP (ref 3.5–5.3)
PROT SERPL-MCNC: 5.7 G/DL — LOW (ref 6–8.3)
RBC # BLD: 3.62 M/UL — LOW (ref 3.8–5.2)
RBC # FLD: 14.4 % — SIGNIFICANT CHANGE UP (ref 10.3–14.5)
SODIUM SERPL-SCNC: 142 MMOL/L — SIGNIFICANT CHANGE UP (ref 135–145)
WBC # BLD: 2.48 K/UL — LOW (ref 3.8–10.5)
WBC # FLD AUTO: 2.48 K/UL — LOW (ref 3.8–10.5)

## 2021-02-07 PROCEDURE — 99232 SBSQ HOSP IP/OBS MODERATE 35: CPT | Mod: CS

## 2021-02-07 PROCEDURE — 99232 SBSQ HOSP IP/OBS MODERATE 35: CPT

## 2021-02-07 RX ADMIN — Medication 1 MILLIGRAM(S): at 12:22

## 2021-02-07 RX ADMIN — LIDOCAINE 1 PATCH: 4 CREAM TOPICAL at 19:10

## 2021-02-07 RX ADMIN — ATORVASTATIN CALCIUM 20 MILLIGRAM(S): 80 TABLET, FILM COATED ORAL at 21:32

## 2021-02-07 RX ADMIN — Medication 6 MILLIGRAM(S): at 05:29

## 2021-02-07 RX ADMIN — Medication 3 MILLIGRAM(S): at 01:11

## 2021-02-07 RX ADMIN — OXYCODONE HYDROCHLORIDE 10 MILLIGRAM(S): 5 TABLET ORAL at 05:30

## 2021-02-07 RX ADMIN — Medication 500 MILLIGRAM(S): at 12:22

## 2021-02-07 RX ADMIN — REMDESIVIR 500 MILLIGRAM(S): 5 INJECTION INTRAVENOUS at 11:48

## 2021-02-07 RX ADMIN — ENOXAPARIN SODIUM 40 MILLIGRAM(S): 100 INJECTION SUBCUTANEOUS at 17:07

## 2021-02-07 RX ADMIN — BENZOCAINE AND MENTHOL 1 LOZENGE: 5; 1 LIQUID ORAL at 23:59

## 2021-02-07 RX ADMIN — Medication 200 MILLIGRAM(S): at 08:04

## 2021-02-07 RX ADMIN — Medication 200 MICROGRAM(S): at 05:30

## 2021-02-07 RX ADMIN — LIDOCAINE 1 PATCH: 4 CREAM TOPICAL at 12:23

## 2021-02-07 RX ADMIN — Medication 650 MILLIGRAM(S): at 09:17

## 2021-02-07 RX ADMIN — BENZOCAINE AND MENTHOL 1 LOZENGE: 5; 1 LIQUID ORAL at 17:07

## 2021-02-07 RX ADMIN — OXYCODONE HYDROCHLORIDE 10 MILLIGRAM(S): 5 TABLET ORAL at 17:07

## 2021-02-07 RX ADMIN — LIDOCAINE 1 PATCH: 4 CREAM TOPICAL at 23:59

## 2021-02-07 RX ADMIN — LIDOCAINE 1 PATCH: 4 CREAM TOPICAL at 02:00

## 2021-02-07 RX ADMIN — BENZOCAINE AND MENTHOL 1 LOZENGE: 5; 1 LIQUID ORAL at 13:02

## 2021-02-07 NOTE — PROGRESS NOTE ADULT - SUBJECTIVE AND OBJECTIVE BOX
Cc: Gait dysfunction    HPI: Patient with no new medical issues today.  No acute events overnight  Pain controlled, no chest pain, no N/V, no Fevers/Chills. No other new ROS  Has been tolerating rehabilitation program.    acetaminophen   Tablet .. 650 milliGRAM(s) Oral every 6 hours PRN  ascorbic acid 500 milliGRAM(s) Oral daily  atorvastatin 20 milliGRAM(s) Oral at bedtime  benzocaine 15 mG/menthol 3.6 mG (Sugar-Free) Lozenge 1 Lozenge Oral four times a day  dexAMETHasone  Injectable 6 milliGRAM(s) IV Push daily  enoxaparin Injectable 40 milliGRAM(s) SubCutaneous two times a day  folic acid 1 milliGRAM(s) Oral daily  hydroxychloroquine 200 milliGRAM(s) Oral <User Schedule>  levothyroxine 200 MICROGram(s) Oral daily  lidocaine   Patch 1 Patch Transdermal daily  magnesium hydroxide Suspension 30 milliLiter(s) Oral daily PRN  melatonin 3 milliGRAM(s) Oral at bedtime PRN  ondansetron    Tablet 4 milliGRAM(s) Oral every 8 hours PRN  oxyCODONE    IR 5 milliGRAM(s) Oral every 4 hours PRN  oxyCODONE    IR 10 milliGRAM(s) Oral every 4 hours PRN  oxyCODONE  ER Tablet 10 milliGRAM(s) Oral every 12 hours  polyethylene glycol 3350 17 Gram(s) Oral daily  remdesivir  IVPB   IV Intermittent   remdesivir  IVPB 100 milliGRAM(s) IV Intermittent every 24 hours  senna 2 Tablet(s) Oral at bedtime  simethicone 80 milliGRAM(s) Chew every 6 hours PRN      T(C): 36.4 (02-06-21 @ 08:47), Max: 36.7 (02-05-21 @ 23:28)  HR: 76 (02-06-21 @ 08:47) (69 - 76)  BP: 134/72 (02-06-21 @ 08:47) (123/76 - 134/72)  RR: 15 (02-06-21 @ 08:47) (15 - 15)  SpO2: 94% (02-06-21 @ 08:47) (94% - 100%)    In NAD  HEENT- EOMI  Heart- RRR, S1S2  Lungs- CTA bl.  Abd- + BS, NT, ND  Ext- No calf pain  Neuro- Exam unchanged          Imp: Patient with diagnosis of posterior T10-L5 fusion who has been admitted for acute rehabilitation.    Plan:  - Continue therapies  - DVT prophylaxis - continue with heparin while awaiting permission from [facility] to start eliquis  - XR and CT scans reviewed, patient is afebrile, hemodynamically stable and asymptomatic.  No symptoms this AM.  Will continue to monitor and defer to med for further recs.  - Skin- Turn q2h, check skin daily  - Continue current medications; patient medically stable.   - Patient is stable to continue current rehabilitation program.

## 2021-02-07 NOTE — PROGRESS NOTE ADULT - ASSESSMENT
Multilevel Degenerative Disc Disease of T and L Spine  S/P Posterior T10-L5 instrumented fusion, L1-L5 laminectomies, L L4-L5 foraminotomy  PT/OT per rehab  Pain control per primary team    COVID 19 with Sat 92-93% on RA  Remdesivir/Decadron DAy #3  Monitor renal/liver functions    Intermittent abdominal pain  CT abd done 2/5-"1. Pattern of lung base groundglass opacification suggests infection including atypical pneumonia/viral infection from agents including COVID-19. 2. Colonic diverticulosis without CT evidence of acute diverticulitis."  Resolved.  Monitor for now    SLE/RA  HCQ/FA    H/o provoked right popliteal, right posterior tibial DVT (7/2019 in setting of hip surgery, already completed >6 months of treatment)  Treated with Eliquis 5mg po bid chronically - AC stopped at [facility] as 12/29 LE dopplers neg for DVTs  Pt wishes to go back on Eliquis and pt been on since 2019 for provoked DVT with recent neg DVT. Pt states she was told by her doctors that she has to be on Eliquis for life-long-?reason  Per primary team-needs clearance from [facility] re: Eliquis  Cont Lovenox for now

## 2021-02-07 NOTE — PROGRESS NOTE ADULT - SUBJECTIVE AND OBJECTIVE BOX
HPI:  75F with past medical history of HTN, HLD, SLE, RA, hypothyroidism, provoked R. popliteal DVT in 2019 on eliquis, chronic low back pain presents to ED for acute on chronic low back pain. Pt has several months of chronic lumbosacral back pain, evaluated by outpatient ortho and referred for physical therapy and pain management. She has been receiving ?vitamin spinal injections. She last received low back inj on 12/16 after which she her low back pain worsened. She took oxycodone 5 mg qd (prescribed in september during a ED visit) and acetaminophen for past three days without relief. She also endorses worsening pain during ambulation with walker and has affected her quality of life. She has constipation, last BM 4 days ago. Otherwise denies fever, chills, N/V, abdominal pain, dysuria, urinary retention, fecal incontinence, saddle anesthesia, fall, LOC, trauma. During ROS she endorsed LLE weakness for several months, no numbness or tingling and has LLE edema >RLE edema, reported undergoing LLE US 4 months ago, was negative for DVT. Currently has 8/10 pain, improved to 5/10 with oxycodone 5 mg x 2 in ED. (28 Dec 2020 11:16)   12/28: Mri T/Ls: IMPRESSION: Degenerative changes  Abnormal signal with associated enhancement is seen involving the endplates adjacent to the L2-3 disc space. There is slight increased T2 prolongation involving the L2-3 disc space with associated widening. While these findings could be compatible with degenerative changes possibly of early discitis and osteomyelitis must be considered.  Patient s/p L1-L5 laminectomy, T10-L5 posterior surgical fusion by Dr. Campos on 1/1/21  HMV x 3 drains. Post op with constipation needing aggressive bowel regimen.     PT/OT and PM&R evaluated patient and recommended Rehab.     Patient medically cleared and admitted to  Rehab on 1/12/21 (12 Jan 2021 15:21)      Subjective    Pos sore throat      PAST MEDICAL & SURGICAL HISTORY:  HTN (hypertension)    DVT (deep venous thrombosis)    Hypothyroidism    RA (rheumatoid arthritis)    Obesity, Class II, BMI 35-39.9, no comorbidity    Hypothyroid    Benign heart murmur    Hyperlipidemia    Hypertension    H/O degenerative disc disease    Osteoarthritis    SLE (systemic lupus erythematosus)    Rheumatoid arthritis    S/P knee replacement    Status post total hip replacement, right    S/P thyroid surgery  1 lobe removed    Lung cancer  small tumor removed 2015    S/P knee surgery  bilateral    S/P carpal tunnel release  left    S/P eye surgery  child    S/P cataract surgery  left    S/P tonsillectomy        MedsMEDICATIONS  (STANDING):  ascorbic acid 500 milliGRAM(s) Oral daily  atorvastatin 20 milliGRAM(s) Oral at bedtime  benzocaine 15 mG/menthol 3.6 mG (Sugar-Free) Lozenge 1 Lozenge Oral four times a day  dexAMETHasone  Injectable 6 milliGRAM(s) IV Push daily  enoxaparin Injectable 40 milliGRAM(s) SubCutaneous two times a day  folic acid 1 milliGRAM(s) Oral daily  hydroxychloroquine 200 milliGRAM(s) Oral <User Schedule>  levothyroxine 200 MICROGram(s) Oral daily  lidocaine   Patch 1 Patch Transdermal daily  oxyCODONE  ER Tablet 10 milliGRAM(s) Oral every 12 hours  polyethylene glycol 3350 17 Gram(s) Oral daily  remdesivir  IVPB   IV Intermittent   remdesivir  IVPB 100 milliGRAM(s) IV Intermittent every 24 hours  senna 2 Tablet(s) Oral at bedtime    MEDICATIONS  (PRN):  acetaminophen   Tablet .. 650 milliGRAM(s) Oral every 6 hours PRN Temp greater or equal to 38C (100.4F), Mild Pain (1 - 3)  magnesium hydroxide Suspension 30 milliLiter(s) Oral daily PRN Constipation  melatonin 3 milliGRAM(s) Oral at bedtime PRN Insomnia  ondansetron    Tablet 4 milliGRAM(s) Oral every 8 hours PRN Nausea and/or Vomiting  oxyCODONE    IR 5 milliGRAM(s) Oral every 4 hours PRN Moderate Pain (4 - 6)  oxyCODONE    IR 10 milliGRAM(s) Oral every 4 hours PRN Severe Pain (7 - 10)  simethicone 80 milliGRAM(s) Chew every 6 hours PRN Upset Stomach      Vital Signs Last 24 Hrs  T(C): 36.3 (07 Feb 2021 08:07), Max: 36.6 (06 Feb 2021 21:25)  T(F): 97.3 (07 Feb 2021 08:07), Max: 97.8 (06 Feb 2021 21:25)  HR: 60 (07 Feb 2021 08:07) (60 - 72)  BP: 112/64 (07 Feb 2021 08:07) (112/64 - 136/78)  BP(mean): --  RR: 15 (07 Feb 2021 08:07) (15 - 15)  SpO2: 98% (07 Feb 2021 08:07) (98% - 98%)  I&O's Summary      PHYSICAL EXAM:  GENERAL: NAD  NECK: Supple  NERVOUS SYSTEM:  awake and alert  HEART: S1s2 NL , RRR  CHEST/LUNG: Clear to percussion bilaterally  ABDOMEN: Soft, Nontender, Nondistended; Bowel sounds present  EXTREMITIES:  pos edema      LABS:(02-07 @ 05:10)                      10.8  2.48 )-----------( 184                 33.5    Neutrophils = 1.00 (40.3%)  Lymphocytes = 0.90 (36.3%)  Eosinophils = 0.00 (0.0%)  Basophils = 0.00 (0.0%)  Monocytes = 0.58 (23.4%)  Bands = --%    02-07    142  |  108  |  18  ----------------------------<  130<H>  3.9   |  25  |  0.68    Ca    8.1<L>      07 Feb 2021 05:10    TPro  5.7<L>  /  Alb  2.4<L>  /  TBili  0.2  /  DBili  x   /  AST  26  /  ALT  15  /  AlkPhos  105  02-07

## 2021-02-08 ENCOUNTER — APPOINTMENT (OUTPATIENT)
Dept: CARDIOLOGY | Facility: CLINIC | Age: 76
End: 2021-02-08

## 2021-02-08 LAB
ALBUMIN SERPL ELPH-MCNC: 2.8 G/DL — LOW (ref 3.3–5)
ALP SERPL-CCNC: 116 U/L — SIGNIFICANT CHANGE UP (ref 40–120)
ALT FLD-CCNC: 16 U/L — SIGNIFICANT CHANGE UP (ref 10–45)
ANION GAP SERPL CALC-SCNC: 12 MMOL/L — SIGNIFICANT CHANGE UP (ref 5–17)
AST SERPL-CCNC: 25 U/L — SIGNIFICANT CHANGE UP (ref 10–40)
BASOPHILS # BLD AUTO: 0 K/UL — SIGNIFICANT CHANGE UP (ref 0–0.2)
BASOPHILS NFR BLD AUTO: 0 % — SIGNIFICANT CHANGE UP (ref 0–2)
BILIRUB SERPL-MCNC: 0.3 MG/DL — SIGNIFICANT CHANGE UP (ref 0.2–1.2)
BUN SERPL-MCNC: 23 MG/DL — SIGNIFICANT CHANGE UP (ref 7–23)
CALCIUM SERPL-MCNC: 8.5 MG/DL — SIGNIFICANT CHANGE UP (ref 8.4–10.5)
CHLORIDE SERPL-SCNC: 109 MMOL/L — HIGH (ref 96–108)
CO2 SERPL-SCNC: 23 MMOL/L — SIGNIFICANT CHANGE UP (ref 22–31)
CREAT SERPL-MCNC: 0.69 MG/DL — SIGNIFICANT CHANGE UP (ref 0.5–1.3)
EOSINOPHIL # BLD AUTO: 0 K/UL — SIGNIFICANT CHANGE UP (ref 0–0.5)
EOSINOPHIL NFR BLD AUTO: 0 % — SIGNIFICANT CHANGE UP (ref 0–6)
GLUCOSE SERPL-MCNC: 110 MG/DL — HIGH (ref 70–99)
HCT VFR BLD CALC: 37.2 % — SIGNIFICANT CHANGE UP (ref 34.5–45)
HGB BLD-MCNC: 11.8 G/DL — SIGNIFICANT CHANGE UP (ref 11.5–15.5)
IMM GRANULOCYTES NFR BLD AUTO: 0 % — SIGNIFICANT CHANGE UP (ref 0–1.5)
LYMPHOCYTES # BLD AUTO: 0.83 K/UL — LOW (ref 1–3.3)
LYMPHOCYTES # BLD AUTO: 30.9 % — SIGNIFICANT CHANGE UP (ref 13–44)
MCHC RBC-ENTMCNC: 29 PG — SIGNIFICANT CHANGE UP (ref 27–34)
MCHC RBC-ENTMCNC: 31.7 GM/DL — LOW (ref 32–36)
MCV RBC AUTO: 91.4 FL — SIGNIFICANT CHANGE UP (ref 80–100)
MONOCYTES # BLD AUTO: 0.61 K/UL — SIGNIFICANT CHANGE UP (ref 0–0.9)
MONOCYTES NFR BLD AUTO: 22.7 % — HIGH (ref 2–14)
NEUTROPHILS # BLD AUTO: 1.25 K/UL — LOW (ref 1.8–7.4)
NEUTROPHILS NFR BLD AUTO: 46.4 % — SIGNIFICANT CHANGE UP (ref 43–77)
NRBC # BLD: 0 /100 WBCS — SIGNIFICANT CHANGE UP (ref 0–0)
PLATELET # BLD AUTO: 224 K/UL — SIGNIFICANT CHANGE UP (ref 150–400)
POTASSIUM SERPL-MCNC: 3.9 MMOL/L — SIGNIFICANT CHANGE UP (ref 3.5–5.3)
POTASSIUM SERPL-SCNC: 3.9 MMOL/L — SIGNIFICANT CHANGE UP (ref 3.5–5.3)
PROT SERPL-MCNC: 6.5 G/DL — SIGNIFICANT CHANGE UP (ref 6–8.3)
RBC # BLD: 4.07 M/UL — SIGNIFICANT CHANGE UP (ref 3.8–5.2)
RBC # FLD: 14.6 % — HIGH (ref 10.3–14.5)
SODIUM SERPL-SCNC: 144 MMOL/L — SIGNIFICANT CHANGE UP (ref 135–145)
WBC # BLD: 2.69 K/UL — LOW (ref 3.8–10.5)
WBC # FLD AUTO: 2.69 K/UL — LOW (ref 3.8–10.5)

## 2021-02-08 PROCEDURE — 99233 SBSQ HOSP IP/OBS HIGH 50: CPT | Mod: CS

## 2021-02-08 PROCEDURE — 99232 SBSQ HOSP IP/OBS MODERATE 35: CPT

## 2021-02-08 RX ADMIN — Medication 650 MILLIGRAM(S): at 00:17

## 2021-02-08 RX ADMIN — LIDOCAINE 1 PATCH: 4 CREAM TOPICAL at 19:34

## 2021-02-08 RX ADMIN — Medication 500 MILLIGRAM(S): at 12:20

## 2021-02-08 RX ADMIN — Medication 1 MILLIGRAM(S): at 12:21

## 2021-02-08 RX ADMIN — BENZOCAINE AND MENTHOL 1 LOZENGE: 5; 1 LIQUID ORAL at 18:25

## 2021-02-08 RX ADMIN — OXYCODONE HYDROCHLORIDE 10 MILLIGRAM(S): 5 TABLET ORAL at 08:25

## 2021-02-08 RX ADMIN — Medication 200 MILLIGRAM(S): at 08:02

## 2021-02-08 RX ADMIN — REMDESIVIR 500 MILLIGRAM(S): 5 INJECTION INTRAVENOUS at 12:20

## 2021-02-08 RX ADMIN — Medication 650 MILLIGRAM(S): at 09:53

## 2021-02-08 RX ADMIN — Medication 6 MILLIGRAM(S): at 05:49

## 2021-02-08 RX ADMIN — Medication 200 MICROGRAM(S): at 05:49

## 2021-02-08 RX ADMIN — BENZOCAINE AND MENTHOL 1 LOZENGE: 5; 1 LIQUID ORAL at 23:27

## 2021-02-08 RX ADMIN — BENZOCAINE AND MENTHOL 1 LOZENGE: 5; 1 LIQUID ORAL at 12:30

## 2021-02-08 RX ADMIN — OXYCODONE HYDROCHLORIDE 10 MILLIGRAM(S): 5 TABLET ORAL at 13:12

## 2021-02-08 RX ADMIN — LIDOCAINE 1 PATCH: 4 CREAM TOPICAL at 12:21

## 2021-02-08 RX ADMIN — OXYCODONE HYDROCHLORIDE 10 MILLIGRAM(S): 5 TABLET ORAL at 18:03

## 2021-02-08 RX ADMIN — Medication 3 MILLIGRAM(S): at 00:17

## 2021-02-08 RX ADMIN — OXYCODONE HYDROCHLORIDE 10 MILLIGRAM(S): 5 TABLET ORAL at 05:49

## 2021-02-08 RX ADMIN — ATORVASTATIN CALCIUM 20 MILLIGRAM(S): 80 TABLET, FILM COATED ORAL at 21:20

## 2021-02-08 RX ADMIN — ENOXAPARIN SODIUM 40 MILLIGRAM(S): 100 INJECTION SUBCUTANEOUS at 18:03

## 2021-02-08 NOTE — PROGRESS NOTE ADULT - SUBJECTIVE AND OBJECTIVE BOX
Patient is a 75y old  Female who presents with a chief complaint of Back pain (08 Feb 2021 12:55)      Patient seen and examined at bedside. No acute overnight events. denies headache, fever, chills, cp, sob, n/v, abd pain    ALLERGIES:  No Known Allergies    MEDICATIONS  (STANDING):  ascorbic acid 500 milliGRAM(s) Oral daily  atorvastatin 20 milliGRAM(s) Oral at bedtime  benzocaine 15 mG/menthol 3.6 mG (Sugar-Free) Lozenge 1 Lozenge Oral four times a day  dexAMETHasone  Injectable 6 milliGRAM(s) IV Push daily  enoxaparin Injectable 40 milliGRAM(s) SubCutaneous two times a day  folic acid 1 milliGRAM(s) Oral daily  hydroxychloroquine 200 milliGRAM(s) Oral <User Schedule>  levothyroxine 200 MICROGram(s) Oral daily  lidocaine   Patch 1 Patch Transdermal daily  oxyCODONE  ER Tablet 10 milliGRAM(s) Oral every 12 hours  polyethylene glycol 3350 17 Gram(s) Oral daily  remdesivir  IVPB 100 milliGRAM(s) IV Intermittent every 24 hours  remdesivir  IVPB   IV Intermittent   senna 2 Tablet(s) Oral at bedtime    MEDICATIONS  (PRN):  acetaminophen   Tablet .. 650 milliGRAM(s) Oral every 6 hours PRN Temp greater or equal to 38C (100.4F), Mild Pain (1 - 3)  magnesium hydroxide Suspension 30 milliLiter(s) Oral daily PRN Constipation  melatonin 3 milliGRAM(s) Oral at bedtime PRN Insomnia  ondansetron    Tablet 4 milliGRAM(s) Oral every 8 hours PRN Nausea and/or Vomiting  oxyCODONE    IR 10 milliGRAM(s) Oral every 4 hours PRN Severe Pain (7 - 10)  oxyCODONE    IR 5 milliGRAM(s) Oral every 4 hours PRN Moderate Pain (4 - 6)  simethicone 80 milliGRAM(s) Chew every 6 hours PRN Upset Stomach    Vital Signs Last 24 Hrs  T(F): 97.4 (08 Feb 2021 07:07), Max: 97.5 (07 Feb 2021 21:30)  HR: 58 (08 Feb 2021 07:07) (58 - 68)  BP: 123/69 (08 Feb 2021 07:07) (110/84 - 123/69)  RR: 15 (08 Feb 2021 07:07) (14 - 15)  SpO2: 95% (08 Feb 2021 07:07) (95% - 98%)  I&O's Summary        PHYSICAL EXAM:  General: NAD, A/O x 3  ENT: MMM, no scleral icterus  Neck: Supple, No JVD  Lungs: Respirations unlabored. Clear to auscultation bilaterally, no wheezes, rales, rhonchi  Cardio: RRR, S1/S2, No murmurs  Abdomen: Soft, Nontender, Nondistended; Bowel sounds present  Extremities: No calf tenderness, LE edema b/l    LABS:                        11.8   2.69  )-----------( 224      ( 08 Feb 2021 07:20 )             37.2       02-08    144  |  109  |  23  ----------------------------<  110  3.9   |  23  |  0.69    Ca    8.5      08 Feb 2021 06:40    TPro  6.5  /  Alb  2.8  /  TBili  0.3  /  DBili  x   /  AST  25  /  ALT  16  /  AlkPhos  116  02-08     eGFR if Non African American: 85 mL/min/1.73M2 (02-08-21 @ 06:40)  eGFR if : 98 mL/min/1.73M2 (02-08-21 @ 06:40)    RADIOLOGY & ADDITIONAL TESTS: reviewed    Care Discussed with Consultants/Other Providers: yes

## 2021-02-08 NOTE — PROGRESS NOTE ADULT - ASSESSMENT
76 y/o F PMH HTN, HLD, SLE, RA, hypothyroidism, provoked right popliteal DVT in 2019, chronic LBP presents to [facility] for acute on chronic LBP 12/28/20, found to have 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 Hospital Course complicated by brief SICU stay for mechanical ventilation management, currently extubated. Patient now admitted for a multidisciplinary rehab program on 1/12/21.    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 Jaja Campos  -Continue comprehensive rehab program  -Pain management, bowel regimen per rehab    #COVID-19  -O2 supplement as necessary to keep SpO2 >92%  -Cont remdesivir total 5 days and decadron  -Monitor Cr and liver function  -Neutropenia noted, likely 2/2 COVID, cont to monitor     #Intermittent abdominal pain - suspect 2/2 opioid induced constipation r/o gas pain  -CT abd/pelvis reviewed, pain resolved, cont to monitor  -Bowel regimen per rehab  -Simethicone prn     #Recently treated UTI  -Ucx >100K Providencia Rettgeri - macrobid resistant, completed Cipro 250mg q12 x 3 days 1/30    #Acute blood loss Anemia, likely from surgery  -H/H stable, continue to monitor   -No overt signs of bleeding    #SLE/RA  -Plaquenil 200mg po daily and folic acid    #H/o provoked right popliteal, right posterior tibial DVT (7/2019 in setting of hip surgery, already completed >6 months of treatment)  -Follows with cardiologist Dr. Hoyos outpatient  -Treated with Eliquis 5mg po bid chronically -- AC stopped at [facility] as 12/29 LE dopplers neg for DVTs  -Primary team to confirm clearance regarding eliquis  -DVT ppx with lovenox 40mg BID for now    #Leg edema likely from chronic lymphedema  -Encourage leg elevation  -ACE wraps    #Essential HTN: stable off anti-hypertensives  -Continue to hold home medication Losartan  -VS per rehab protocol    #HLD  -Lipitor    #Hypothyroidism  -Levothyroxine    #VTE ppx: Lovenox 40mg BID    #GI ppx:  PPI

## 2021-02-08 NOTE — PROGRESS NOTE ADULT - SUBJECTIVE AND OBJECTIVE BOX
Patient is a 75y old  Female who presents with a chief complaint of Back pain (03 Feb 2021 13:44)      HPI:  75F with past medical history of HTN, HLD, SLE, RA, hypothyroidism, provoked R. popliteal DVT in 2019 on eliquis, chronic low back pain presents to ED for acute on chronic low back pain. Pt has several months of chronic lumbosacral back pain, evaluated by outpatient ortho and referred for physical therapy and pain management. She has been receiving ?vitamin spinal injections. She last received low back inj on 12/16 after which she her low back pain worsened. She took oxycodone 5 mg qd (prescribed in september during a ED visit) and acetaminophen for past three days without relief. She also endorses worsening pain during ambulation with walker and has affected her quality of life. She has constipation, last BM 4 days ago. Otherwise denies fever, chills, N/V, abdominal pain, dysuria, urinary retention, fecal incontinence, saddle anesthesia, fall, LOC, trauma. During ROS she endorsed LLE weakness for several months, no numbness or tingling and has LLE edema >RLE edema, reported undergoing LLE US 4 months ago, was negative for DVT. Currently has 8/10 pain, improved to 5/10 with oxycodone 5 mg x 2 in ED. (28 Dec 2020 11:16)   12/28: Mri T/Ls: IMPRESSION: Degenerative changes  Abnormal signal with associated enhancement is seen involving the endplates adjacent to the L2-3 disc space. There is slight increased T2 prolongation involving the L2-3 disc space with associated widening. While these findings could be compatible with degenerative changes possibly of early discitis and osteomyelitis must be considered.  Patient s/p L1-L5 laminectomy, T10-L5 posterior surgical fusion by Dr. Campos on 1/1/21  HMV x 3 drains. Post op with constipation needing aggressive bowel regimen.     PT/OT and PM&R evaluated patient and recommended Rehab.     Patient medically cleared and admitted to  Rehab on 1/12/21 (12 Jan 2021 15:21)      PAST MEDICAL & SURGICAL HISTORY:  HTN (hypertension)    DVT (deep venous thrombosis)    Hypothyroidism    RA (rheumatoid arthritis)    Obesity, Class II, BMI 35-39.9, no comorbidity    Hypothyroid    Benign heart murmur    Hyperlipidemia    Hypertension    H/O degenerative disc disease    Osteoarthritis    SLE (systemic lupus erythematosus)    Rheumatoid arthritis    S/P knee replacement    Status post total hip replacement, right    S/P thyroid surgery  1 lobe removed    Lung cancer  small tumor removed 2015    S/P knee surgery  bilateral    S/P carpal tunnel release  left    S/P eye surgery  child    S/P cataract surgery  left    S/P tonsillectomy      MEDICATIONS  (STANDING):  ascorbic acid 500 milliGRAM(s) Oral daily  atorvastatin 20 milliGRAM(s) Oral at bedtime  benzocaine 15 mG/menthol 3.6 mG (Sugar-Free) Lozenge 1 Lozenge Oral four times a day  dexAMETHasone  Injectable 6 milliGRAM(s) IV Push daily  enoxaparin Injectable 40 milliGRAM(s) SubCutaneous two times a day  folic acid 1 milliGRAM(s) Oral daily  hydroxychloroquine 200 milliGRAM(s) Oral <User Schedule>  levothyroxine 200 MICROGram(s) Oral daily  lidocaine   Patch 1 Patch Transdermal daily  oxyCODONE  ER Tablet 10 milliGRAM(s) Oral every 12 hours  polyethylene glycol 3350 17 Gram(s) Oral daily  remdesivir  IVPB 100 milliGRAM(s) IV Intermittent every 24 hours  remdesivir  IVPB   IV Intermittent   senna 2 Tablet(s) Oral at bedtime    MEDICATIONS  (PRN):  acetaminophen   Tablet .. 650 milliGRAM(s) Oral every 6 hours PRN Temp greater or equal to 38C (100.4F), Mild Pain (1 - 3)  magnesium hydroxide Suspension 30 milliLiter(s) Oral daily PRN Constipation  melatonin 3 milliGRAM(s) Oral at bedtime PRN Insomnia  ondansetron    Tablet 4 milliGRAM(s) Oral every 8 hours PRN Nausea and/or Vomiting  oxyCODONE    IR 10 milliGRAM(s) Oral every 4 hours PRN Severe Pain (7 - 10)  oxyCODONE    IR 5 milliGRAM(s) Oral every 4 hours PRN Moderate Pain (4 - 6)  simethicone 80 milliGRAM(s) Chew every 6 hours PRN Upset Stomach        Allergies    No Known Allergies    Intolerances          VITALS  75y  Vital Signs Last 24 Hrs  Vital Signs Last 24 Hrs  T(C): 36.3 (08 Feb 2021 07:07), Max: 36.4 (07 Feb 2021 21:30)  T(F): 97.4 (08 Feb 2021 07:07), Max: 97.5 (07 Feb 2021 21:30)  HR: 58 (08 Feb 2021 07:07) (58 - 68)  BP: 123/69 (08 Feb 2021 07:07) (110/84 - 123/69)  BP(mean): --  RR: 15 (08 Feb 2021 07:07) (14 - 15)  SpO2: 95% (08 Feb 2021 07:07) (95% - 98%)    RECENT LABS:    LAB                        11.8   2.69  )-----------( 224      ( 08 Feb 2021 07:20 )             37.2     02-08    144  |  109<H>  |  23  ----------------------------<  110<H>  3.9   |  23  |  0.69    Ca    8.5      08 Feb 2021 06:40    TPro  6.5  /  Alb  2.8<L>  /  TBili  0.3  /  DBili  x   /  AST  25  /  ALT  16  /  AlkPhos  116  02-08    LIVER FUNCTIONS - ( 08 Feb 2021 06:40 )  Alb: 2.8 g/dL / Pro: 6.5 g/dL / ALK PHOS: 116 U/L / ALT: 16 U/L / AST: 25 U/L / GGT: x                   Review of Systems:   · Additional ROS	Pt was seen and examine during OT.  She was at 96% during session.  She denies chest and abd pain, however endorses chest soreness/tenderness to touch and left upper quadrant pain.  Recent abdominal CT was unremarkable of abdominal etiology.  Otherwise tolerating therapy without issues, has not needed supplemental O2.  Currently denies cough, SOB, nausea/vomiting, or change in B/B habits.  LBM was yesterday.     Physical Exam:   · Constitutional	detailed exam  · Constitutional Comments	alert, ambulating with RW in hallway, improved stride length +flexed posture, reduced hip and knee flexion, antalgic gait    O x 3, mildly anxious but responds to education and support  · Respiratory	detailed exam  · Respiratory Details	respirations non-labored; reduced BS bases  · Respiratory Comments	fair+ effort  · Cardiovascular	detailed exam  · Cardiovascular Details	regular rate and rhythm   · Gastrointestinal	detailed exam  · GI Normal	soft; nontender; bowel sounds normal  · Extremities	detailed exam   · Extremities Comments	UE 5/5, LE proximal 4/5, distal 5/5   calves soft, NT   no pedal edema  · Additional PE	Skin: long back incision,- some steristrips on- healing well   no erythema, +intact, dry, no induration               Patient is a 75y old  Female who presents with a chief complaint of Back pain (03 Feb 2021 13:44)      HPI:  75F with past medical history of HTN, HLD, SLE, RA, hypothyroidism, provoked R. popliteal DVT in 2019 on eliquis, chronic low back pain presents to ED for acute on chronic low back pain. Pt has several months of chronic lumbosacral back pain, evaluated by outpatient ortho and referred for physical therapy and pain management. She has been receiving ?vitamin spinal injections. She last received low back inj on 12/16 after which she her low back pain worsened. She took oxycodone 5 mg qd (prescribed in september during a ED visit) and acetaminophen for past three days without relief. She also endorses worsening pain during ambulation with walker and has affected her quality of life. She has constipation, last BM 4 days ago. Otherwise denies fever, chills, N/V, abdominal pain, dysuria, urinary retention, fecal incontinence, saddle anesthesia, fall, LOC, trauma. During ROS she endorsed LLE weakness for several months, no numbness or tingling and has LLE edema >RLE edema, reported undergoing LLE US 4 months ago, was negative for DVT. Currently has 8/10 pain, improved to 5/10 with oxycodone 5 mg x 2 in ED. (28 Dec 2020 11:16)   12/28: Mri T/Ls: IMPRESSION: Degenerative changes  Abnormal signal with associated enhancement is seen involving the endplates adjacent to the L2-3 disc space. There is slight increased T2 prolongation involving the L2-3 disc space with associated widening. While these findings could be compatible with degenerative changes possibly of early discitis and osteomyelitis must be considered.  Patient s/p L1-L5 laminectomy, T10-L5 posterior surgical fusion by Dr. Campos on 1/1/21  HMV x 3 drains. Post op with constipation needing aggressive bowel regimen.     PT/OT and PM&R evaluated patient and recommended Rehab.     Patient medically cleared and admitted to  Rehab on 1/12/21 (12 Jan 2021 15:21)      PAST MEDICAL & SURGICAL HISTORY:  HTN (hypertension)    DVT (deep venous thrombosis)    Hypothyroidism    RA (rheumatoid arthritis)    Obesity, Class II, BMI 35-39.9, no comorbidity    Hypothyroid    Benign heart murmur    Hyperlipidemia    Hypertension    H/O degenerative disc disease    Osteoarthritis    SLE (systemic lupus erythematosus)    Rheumatoid arthritis    S/P knee replacement    Status post total hip replacement, right    S/P thyroid surgery  1 lobe removed    Lung cancer  small tumor removed 2015    S/P knee surgery  bilateral    S/P carpal tunnel release  left    S/P eye surgery  child    S/P cataract surgery  left    S/P tonsillectomy      MEDICATIONS  (STANDING):  ascorbic acid 500 milliGRAM(s) Oral daily  atorvastatin 20 milliGRAM(s) Oral at bedtime  benzocaine 15 mG/menthol 3.6 mG (Sugar-Free) Lozenge 1 Lozenge Oral four times a day  dexAMETHasone  Injectable 6 milliGRAM(s) IV Push daily  enoxaparin Injectable 40 milliGRAM(s) SubCutaneous two times a day  folic acid 1 milliGRAM(s) Oral daily  hydroxychloroquine 200 milliGRAM(s) Oral <User Schedule>  levothyroxine 200 MICROGram(s) Oral daily  lidocaine   Patch 1 Patch Transdermal daily  oxyCODONE  ER Tablet 10 milliGRAM(s) Oral every 12 hours  polyethylene glycol 3350 17 Gram(s) Oral daily  remdesivir  IVPB 100 milliGRAM(s) IV Intermittent every 24 hours  remdesivir  IVPB   IV Intermittent   senna 2 Tablet(s) Oral at bedtime    MEDICATIONS  (PRN):  acetaminophen   Tablet .. 650 milliGRAM(s) Oral every 6 hours PRN Temp greater or equal to 38C (100.4F), Mild Pain (1 - 3)  magnesium hydroxide Suspension 30 milliLiter(s) Oral daily PRN Constipation  melatonin 3 milliGRAM(s) Oral at bedtime PRN Insomnia  ondansetron    Tablet 4 milliGRAM(s) Oral every 8 hours PRN Nausea and/or Vomiting  oxyCODONE    IR 10 milliGRAM(s) Oral every 4 hours PRN Severe Pain (7 - 10)  oxyCODONE    IR 5 milliGRAM(s) Oral every 4 hours PRN Moderate Pain (4 - 6)  simethicone 80 milliGRAM(s) Chew every 6 hours PRN Upset Stomach        Allergies    No Known Allergies    Intolerances          VITALS  75y  Vital Signs Last 24 Hrs  Vital Signs Last 24 Hrs  T(C): 36.3 (08 Feb 2021 07:07), Max: 36.4 (07 Feb 2021 21:30)  T(F): 97.4 (08 Feb 2021 07:07), Max: 97.5 (07 Feb 2021 21:30)  HR: 58 (08 Feb 2021 07:07) (58 - 68)  BP: 123/69 (08 Feb 2021 07:07) (110/84 - 123/69)  BP(mean): --  RR: 15 (08 Feb 2021 07:07) (14 - 15)  SpO2: 95% (08 Feb 2021 07:07) (95% - 98%)    RECENT LABS:    LAB                        11.8   2.69  )-----------( 224      ( 08 Feb 2021 07:20 )             37.2     02-08    144  |  109<H>  |  23  ----------------------------<  110<H>  3.9   |  23  |  0.69    Ca    8.5      08 Feb 2021 06:40    TPro  6.5  /  Alb  2.8<L>  /  TBili  0.3  /  DBili  x   /  AST  25  /  ALT  16  /  AlkPhos  116  02-08    LIVER FUNCTIONS - ( 08 Feb 2021 06:40 )  Alb: 2.8 g/dL / Pro: 6.5 g/dL / ALK PHOS: 116 U/L / ALT: 16 U/L / AST: 25 U/L / GGT: x             EXAM:  CT ABDOMEN AND PELVIS      PROCEDURE DATE:  02/05/2021        INTERPRETATION:  CLINICAL INFORMATION: Epigastric abdominal pain    COMPARISON: None.    PROCEDURE:  CT of the Abdomen and Pelvis was performed without intravenous contrast.  Intravenous contrast: None.  Oral contrast: None.  Sagittal and coronal reformats were performed.    FINDINGS:  LOWER CHEST: Lung base groundglass opacities. Aortic valve and mitral annulus calcifications.    Please note that evaluation of the abdominal organs and vascular structures is limited by lack of intravenous contrast.    LIVER: Within normal limits.  BILE DUCTS: Normal caliber.  GALLBLADDER: Within normal limits.  SPLEEN: Within normal limits.  PANCREAS: Atrophic.  ADRENALS: Within normal limits.  KIDNEYS/URETERS: Within normal limits.    BLADDER: Within normal limits.  REPRODUCTIVE ORGANS: Uterus and adnexa within normal limits.    BOWEL: Hiatal hernia. No bowel obstruction. Appendix is normal. Colonic diverticulosis.  PERITONEUM: No ascites. Nonspecific mesenteric fat stranding and prominent lymph nodes.  VESSELS: Atherosclerotic changes.  RETROPERITONEUM/LYMPH NODES: No lymphadenopathy.  ABDOMINAL WALL: Small fat-containing umbilical hernia.  BONES: Degenerative changes. Scoliosis. Right hip arthroplasty. T9-L4 fusion. Posterior right 11th rib fracture deformity.    IMPRESSION:    1. Pattern of lung base groundglass opacification suggests infection including atypical pneumonia/viral infection from agents including COVID-19.  2. Colonic diverticulosis without CT evidence of acute diverticulitis.                QUINN SAINZ MD; Attending Radiologist  This document has been electronically signed. Feb 5 2021  2:19PM          Review of Systems:   · Additional ROS	Pt was seen and examine during OT.  She was at 96% during session.  She denies chest and abd pain, however endorses chest soreness/tenderness to touch and left upper quadrant pain.  Recent abdominal CT was unremarkable of abdominal etiology.  Otherwise tolerating therapy without issues, has not needed supplemental O2.  Currently denies cough, SOB, nausea/vomiting, or change in B/B habits.  LBM was yesterday.     Overall, looks slightly less anxious and in less distress from pain than last week    Physical Exam:   · Constitutional	detailed exam  · Constitutional Comments	alert, O x 3, continues to use UE/flexed posture in standing activities  · Respiratory	detailed exam  · Respiratory Details	respirations non-labored; reduced BS bases  · Respiratory Comments	fair+ effort  · Cardiovascular	+mild pectoral and sternal TTP  · Cardiovascular Details	regular rate and rhythm   · Gastrointestinal	detailed exam  · GI Normal	soft; nontender; bowel sounds normal  · Extremities	detailed exam   · Extremities Comments	UE 5/5, LE proximal 4/5, distal 5/5   calves soft, NT     mild shoulder TTP (h/o chronic OA)    · Additional PE	Skin: long back incision,- some steristrips on- healing well   no erythema, +intact, dry, no induration

## 2021-02-08 NOTE — CONSULT NOTE ADULT - SUBJECTIVE AND OBJECTIVE BOX
HPI:   Patient is a 75y female with a past history of RA managed with plaquenil,HTN,SLE, hypothyroidism, chronic LBP, s/p T10-L5 laminectomy and posterior fusion in early January at [facility], s/p transfer to rehab on , who tested positive  for Covid on .She has been with a paucity of symptoms, a CT scan of A/P showed nonspecific lower lung densities and RDV started on .She has been oxygenating well, above 95% on RA.She has c/o nonspecific abdominal and chest pains.No dyspnea.No fever.She also is receiving decadron.    REVIEW OF SYSTEMS:  All other review of systems negative (Comprehensive ROS): lower extremity edema-chronic    PAST MEDICAL & SURGICAL HISTORY:  HTN (hypertension)    DVT (deep venous thrombosis)    Hypothyroidism    RA (rheumatoid arthritis)    Obesity, Class II, BMI 35-39.9, no comorbidity    Hypothyroid    Benign heart murmur    Hyperlipidemia    Hypertension    H/O degenerative disc disease    Osteoarthritis    SLE (systemic lupus erythematosus)    Rheumatoid arthritis    S/P knee replacement    Status post total hip replacement, right    S/P thyroid surgery  1 lobe removed    Lung cancer  small tumor removed     S/P knee surgery  bilateral    S/P carpal tunnel release  left    S/P eye surgery  child    S/P cataract surgery  left    S/P tonsillectomy        Allergies    No Known Allergies    Intolerances        Antimicrobials Day #  :day   hydroxychloroquine 200 milliGRAM(s) Oral <User Schedule>  remdesivir  IVPB 100 milliGRAM(s) IV Intermittent every 24 hours  remdesivir  IVPB   IV Intermittent     Other Medications:  acetaminophen   Tablet .. 650 milliGRAM(s) Oral every 6 hours PRN  ascorbic acid 500 milliGRAM(s) Oral daily  atorvastatin 20 milliGRAM(s) Oral at bedtime  benzocaine 15 mG/menthol 3.6 mG (Sugar-Free) Lozenge 1 Lozenge Oral four times a day  dexAMETHasone  Injectable 6 milliGRAM(s) IV Push daily  enoxaparin Injectable 40 milliGRAM(s) SubCutaneous two times a day  folic acid 1 milliGRAM(s) Oral daily  levothyroxine 200 MICROGram(s) Oral daily  lidocaine   Patch 1 Patch Transdermal daily  magnesium hydroxide Suspension 30 milliLiter(s) Oral daily PRN  melatonin 3 milliGRAM(s) Oral at bedtime PRN  ondansetron    Tablet 4 milliGRAM(s) Oral every 8 hours PRN  oxyCODONE    IR 10 milliGRAM(s) Oral every 4 hours PRN  oxyCODONE    IR 5 milliGRAM(s) Oral every 4 hours PRN  oxyCODONE  ER Tablet 10 milliGRAM(s) Oral every 12 hours  polyethylene glycol 3350 17 Gram(s) Oral daily  senna 2 Tablet(s) Oral at bedtime  simethicone 80 milliGRAM(s) Chew every 6 hours PRN      FAMILY HISTORY:  FHx: rheumatoid arthritis  father         SOCIAL HISTORY:  Smoking:     ETOH:     Drug Use:     Single     T(F): 97.4 (21 @ 07:07), Max: 97.5 (21 @ 21:30)  HR: 58 (21 @ 07:07)  BP: 123/69 (21 @ 07:07)  RR: 15 (21 @ 07:07)  SpO2: 95% (21 @ 07:07)  Wt(kg): --    PHYSICAL EXAM:  General: alert, no acute distress  Eyes:  anicteric, no conjunctival injection, no discharge  Oropharynx: no lesions or injection 	  Neck: supple, without adenopathy  Lungs: clear to auscultation  Heart: regular rate and rhythm; no murmur, rubs or gallops  Abdomen: soft, nondistended, nontender, without mass or organomegaly  Skin: no lesions  Extremities: no clubbing, cyanosis,+ edema  Neurologic: alert, oriented, moves all extremities  spine incision is C/D/I  LAB RESULTS:                        11.8   2.69  )-----------( 224      ( 2021 07:20 )             37.2     -    144  |  109<H>  |  23  ----------------------------<  110<H>  3.9   |  23  |  0.69    Ca    8.5      2021 06:40    TPro  6.5  /  Alb  2.8<L>  /  TBili  0.3  /  DBili  x   /  AST  25  /  ALT  16  /  AlkPhos  116  02-08    LIVER FUNCTIONS - ( 2021 06:40 )  Alb: 2.8 g/dL / Pro: 6.5 g/dL / ALK PHOS: 116 U/L / ALT: 16 U/L / AST: 25 U/L / GGT: x               MICROBIOLOGY:  RECENT CULTURES:     covid PCR positive in PM   Covid PCR negative in AM    RADIOLOGY REVIEWED:  < from: Xray Chest 1 View- PORTABLE-Urgent (Xray Chest 1 View- PORTABLE-Urgent .) (21 @ 16:50) >  FINDINGS:  The lungs show stable ill-defined opacities LEFT lung base. LEFT upper lobe and RIGHT lung parenchyma clear.. No pneumothorax.    The  heart is enlarged in transverse diameter. No hilar mass.   Visualized osseous structures are intact. Spinal fusion fixation apparatus transfixes the thoracal lumbar spine.        IMPRESSION:   Residual LEFT lower lobe ill-defined  lung base opacities.

## 2021-02-08 NOTE — CONSULT NOTE ADULT - ASSESSMENT
74 yo female with RA/SLE managed with plaquinel,HTN,spine issues, s/p lumbar laminectomy(T120-L5) and posterior fusion who now is being treated for hospital acquired Covid.  She has been able to maintain reasonable O2 sats on RA.  She is on day 4/6 RDV and has tolerated it well.  She is on deacdron as well, would limit to 10 days.  She is on lovenox 40BID for DVT prophylaxis, has a history of a provoked DVT in the past.  She appears clinically stable, CTA of chest pending, suspect it will show typical GG changes of Covid.  Suggest:  1.Await CTA  2.Limit RDV to 5 days  3.limit decadron to 10 days  4.I see no need for additional agents at this time.

## 2021-02-08 NOTE — PROGRESS NOTE ADULT - ASSESSMENT
KAI THOMAS is a 75y F PMH HTN, HLD, SLE, RA, hypothyroidism, provoked R. 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 Jaja Campos. Staples removed   - continue Comprehensive Rehab Program of PT/OT- 3 hrs/day 5 days/week  - psychological support, counseling  -Precautions:  spinal, RA, cardiac, contact and droplet precautions    #New COVID conversion  -PCR + 1/29/21. Moved up to [facility] for isolation. COVID IgG Ab neg  -incentive spirometer, deep breathing exercises  -continue contact and droplet precautions    #abdominal pain  - abdominal CT - unremarkable    #Chest soreness  - likely muscular in nature - likely d/t poor/protracted posture  - Chest Xray(2/5) - The lungs show stable ill-defined opacities LEFT lung base. LEFT upper lobe and RIGHT lung parenchyma clear.. No pneumothorax. The  heart is enlarged in transverse diameter. No hilar mass.  - monitor    #SLE/RA:   -continue plaquenil 200mg po daily  -(prevously on 15mg ( 2.5mg ) weekly).  Discussed with rheumatologist, defer for 3 weeks post Covid   -stable    #h/o Provoked DVT in 2019  - US July 2019:  DVT noted in the right popliteal vein and visualized segment of the right  posterior tibial vein.  - was on eliquis 5mg po bid at home,   - currently on lovenox for dvt ppx, to resume eliquis at discharge. Discussed with hospitalist/previous treating team whether can be switched back to eliquis as patient does not want injections 2/4. Cleared by vascular to resume eliquis, will need clearance from NSGY to restart  -Follows with vascular cardiologist Dr. Hoyos outpatient    #HTN:  -BP stable off meds   - 123/69 2/8    #HLD:  - continue lipitor 20mg po daily    #hypothyroidism:  - continue levothyroxine 200mcg po daily    #hypokalemia - resolved  - 2/4 K 3.3 - supplemented  - K+ 3.9 2/8    #leukocytopenia  - WBC 4.09 2/1 --> 3.25 2/4 --> 2.48 2/7 --> 2.69 2/7      #UTI : providencia rettgari  - s/p Cipro x 3 day course (completed 1/30)    #: sleep  - melatonin 3mg PRN    #Pain Mgmt :  - Tylenol PRN mild pain  - Oxycodone 5mg IR PRN moderate 4-7/10 pain  - Oxycodone 10mg IR prn severe 8-10/10 pain  - Oxycontin ER BID (renewed 2/4)  -currently controlled. Work on weaning as recovery continues, may need pain management follow up on dc , discussed with patient    #GI/Bowel Mgmt/ constipation:   -continue Senna, miralax daily, MOM prn  -simethicone PRN gas  -prune juice with meal trays    #FEN :  - Diet - Regular     # DVT prophylaxis :  - Lovenox BID; f/u re: eliquis  -LE edema:  ACE wraps LE     #Dispo discussed in IDT meeting 2/3  - mod A bathing and with lower body dressing; min a to constant with transfer with 2-3 attempt and lots of coaching, mod A with bed mob; contact - min A transfer sit to stand; min A with ambulation; Stairs have not been performed  -goals: min assist bADLs, min assist transfers and ambulation  -will need to see if family able to provide min assist level at home, target dc 2/12 with 24h our care and home Pt and OT if available, otherwise SHAZIA  Called and discussed case with daughter Adeline [phone] 2/4. Concern for some headaches usually in middle of night around 3 AM, will ensure adequate pain management for symptoms. Recommendations of team and need for min assist; family states that they will not be able to provide. Possible SHAZIA once asymptomatic (possibly end of next week) discussed, will reassess next week.    #LABs  f/u NSGY re: clearance to restart eliquis KAI THOMAS is a 75y F PMH HTN, HLD, SLE, RA, hypothyroidism, provoked R. 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 Jaja Campos. Staples removed   - continue Comprehensive Rehab Program of PT/OT- 3 hrs/day 5 days/week  - psychological support, counseling  -Precautions:  spinal, RA, cardiac, contact and droplet precautions    #New COVID conversion  -PCR + 1/29/21. Moved up to [facility] for isolation. COVID IgG Ab neg  -incentive spirometer, deep breathing exercises  -continue contact and droplet precautions    #abdominal pain  - abdominal CT - unremarkable    #Chest soreness  - likely muscular in nature - likely d/t poor/protracted posture  - Chest Xray(2/5) - The lungs show stable ill-defined opacities LEFT lung base. LEFT upper lobe and RIGHT lung parenchyma clear.. No pneumothorax. The  heart is enlarged in transverse diameter. No hilar mass.  - monitor    #SLE/RA:   -continue plaquenil 200mg po daily  -(prevously on 15mg ( 2.5mg ) weekly).  Discussed with rheumatologist, defer for 3 weeks post Covid   -stable    #h/o Provoked DVT in 2019  - US July 2019:  DVT noted in the right popliteal vein and visualized segment of the right  posterior tibial vein.  - was on eliquis 5mg po bid at home,   - currently on lovenox for dvt ppx, to resume eliquis at discharge. Discussed with hospitalist/previous treating team whether can be switched back to eliquis as patient does not want injections 2/4. Cleared by vascular to resume eliquis, will need clearance from NSGY to restart  -Follows with vascular cardiologist Dr. Hoyos outpatient    #HTN:  -BP stable off meds   - 123/69 2/8    #HLD:  - continue lipitor 20mg po daily    #hypothyroidism:  - continue levothyroxine 200mcg po daily    #hypokalemia - resolved  - 2/4 K 3.3 - supplemented  - K+ 3.9 2/8    #leukocytopenia  - WBC 4.09 2/1 --> 3.25 2/4 --> 2.48 2/7 --> 2.69 2/7  - likely from COVID  - monitor      #UTI : harryncia rettgari  - s/p Cipro x 3 day course (completed 1/30)    #: sleep  - melatonin 3mg PRN    #Pain Mgmt :  - Tylenol PRN mild pain  - Oxycodone 5mg IR PRN moderate 4-7/10 pain  - Oxycodone 10mg IR prn severe 8-10/10 pain  - Oxycontin ER BID (renewed 2/4)  -currently controlled. Work on weaning as recovery continues, may need pain management follow up on dc , discussed with patient    #GI/Bowel Mgmt/ constipation:   -continue Senna, miralax daily, MOM prn  -simethicone PRN gas  -prune juice with meal trays    #FEN :  - Diet - Regular     # DVT prophylaxis :  - Lovenox BID; f/u re: eliquis  -LE edema:  ACE wraps LE     #Dispo discussed in IDT meeting 2/3  - mod A bathing and with lower body dressing; min a to constant with transfer with 2-3 attempt and lots of coaching, mod A with bed mob; contact - min A transfer sit to stand; min A with ambulation; Stairs have not been performed  -goals: min assist bADLs, min assist transfers and ambulation  -will need to see if family able to provide min assist level at home, target dc 2/12 with 24h our care and home Pt and OT if available, otherwise [facility]  Called and discussed case with daughter Adeline [phone] 2/4. Concern for some headaches usually in middle of night around 3 AM, will ensure adequate pain management for symptoms. Recommendations of team and need for min assist; family states that they will not be able to provide. Possible SHAZIA once asymptomatic (possibly end of next week) discussed, will reassess next week.    #LABs  f/u NSGY re: clearance to restart eliquis KAI THOMAS is a 75y F PMH HTN, HLD, SLE, RA, hypothyroidism, provoked R. 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 Jaja Campos. Staples removed   - continue Comprehensive Rehab Program of PT/OT- 3 hrs/day 5 days/week  - psychological support, counseling  -Precautions:  spinal, RA, cardiac, contact and droplet precautions    #New COVID conversion  -PCR + 1/29/21. Moved up to [facility] for isolation. COVID IgG Ab neg  - Ct A/P 2/5: 1. Pattern of lung base groundglass opacification suggests infection including atypical pneumonia/viral infection from agents including COVID-19.  -incentive spirometer, deep breathing exercises  -continue contact and droplet precautions  -remains asymptomatic, comfortable on RA. to dc isolation precautions if remains asymptomatic tomorrow (today is day #10 post dx)    #abdominal pain  - abdominal CT -2/5: Colonic diverticulosis without CT evidence of acute diverticulitis.  -symptoms resolved    #Chest soreness  - likely muscular in nature - likely d/t poor/protracted posture. Reviewed with patient 2/8  - Chest Xray(2/5) - The lungs show stable ill-defined opacities LEFT lung base. LEFT upper lobe and RIGHT lung parenchyma clear.. No pneumothorax. The  heart is enlarged in transverse diameter. No hilar mass.  - monitor, postural exercises, stretch    #SLE/RA:   -continue plaquenil 200mg po daily  -(prevously on 15mg ( 2.5mg ) weekly).  Discussed with rheumatologist, defer for 3 weeks post Covid   -stable    #h/o Provoked DVT in 2019  - US July 2019:  DVT noted in the right popliteal vein and visualized segment of the right  posterior tibial vein.  - was on eliquis 5mg po bid at home,   - currently on lovenox for dvt ppx, to resume eliquis at discharge. Discussed with hospitalist/previous treating team whether can be switched back to eliquis as patient does not want injections 2/4. Cleared by vascular to resume eliquis, will need clearance from NSGY to restart  -call placed to NSGY, awaiting return  -Follows with vascular cardiologist Dr. Hoyos outpatient    #HTN:  -BP stable off meds   - 123/69 2/8    #HLD:  - continue lipitor 20mg po daily    #hypothyroidism:  - continue levothyroxine 200mcg po daily    #hypokalemia - resolved  - 2/4 K 3.3 - supplemented  - K+ 3.9 2/8    #leukocytopenia  - WBC 4.09 2/1 --> 3.25 2/4 --> 2.48 2/7 --> 2.69 2/7  - likely from COVID, discussed with hospitalist  - monitor  -CBC in AM 2/9    #: sleep  - melatonin 3mg PRN    #Pain Mgmt :  - Tylenol PRN mild pain  - Oxycodone 5mg IR PRN moderate 4-7/10 pain  - Oxycodone 10mg IR prn severe 8-10/10 pain  - Oxycontin ER BID (renewed 2/4)  -currently controlled. Work on weaning as recovery continues, may need pain management follow up on dc , discussed with patient    #GI/Bowel Mgmt/ constipation:   -continue Senna, miralax daily, MOM prn  -simethicone PRN gas  -prune juice with meal trays    #FEN :  - Diet - Regular     # DVT prophylaxis :  - Lovenox BID; f/u re: eliquis  -LE edema:  ACE wraps LE     #Dispo discussed in IDT meeting 2/3  - mod A bathing and with lower body dressing; min a to constant with transfer with 2-3 attempt and lots of coaching, mod A with bed mob; contact - min A transfer sit to stand; min A with ambulation; Stairs have not been performed  -goals: min assist bADLs, min assist transfers and ambulation  -will need to see if family able to provide min assist level at home, target dc 2/12 with 24h our care and home Pt and OT if available, otherwise [facility]AZIA  Spoke with daughter Adeline [phone] 2/8.     #LABs  f/u NSGY re: clearance to restart eliquis  CBC 2/9  CBC BMP 2/11 KAI THOMAS is a 75y F PMH HTN, HLD, SLE, RA, hypothyroidism, provoked R. 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 Jaja Campos. Staples removed   - continue Comprehensive Rehab Program of PT/OT- 3 hrs/day 5 days/week  - psychological support, counseling  -Precautions:  spinal, RA, cardiac, contact and droplet precautions    #New COVID conversion  -PCR + 1/29/21. Moved up to [facility] for isolation. COVID IgG Ab neg  - Ct A/P 2/5: 1. Pattern of lung base groundglass opacification suggests infection including atypical pneumonia/viral infection from agents including COVID-19  -started on remdesivir 2/6. LFTs and Cr 2/8 stable  -will request ID consult as discussed with family  -incentive spirometer, deep breathing exercises  -continue contact and droplet precautions    #abdominal pain  - abdominal CT -2/5: Colonic diverticulosis without CT evidence of acute diverticulitis.  -symptoms resolved    #Chest soreness  - likely muscular in nature - likely d/t poor/protracted posture. Reviewed with patient 2/8  - Chest Xray(2/5) - The lungs show stable ill-defined opacities LEFT lung base. LEFT upper lobe and RIGHT lung parenchyma clear.. No pneumothorax. The  heart is enlarged in transverse diameter. No hilar mass.  - monitor, postural exercises, stretch    #SLE/RA:   -continue plaquenil 200mg po daily  -(prevously on 15mg ( 2.5mg ) weekly).  Discussed with rheumatologist, defer for 3 weeks post Covid   -stable    #h/o Provoked DVT in 2019  - US July 2019:  DVT noted in the right popliteal vein and visualized segment of the right  posterior tibial vein.  - was on eliquis 5mg po bid at home,   - currently on lovenox for dvt ppx, to resume eliquis at discharge. Discussed with hospitalist/previous treating team whether can be switched back to eliquis as patient does not want injections 2/4. Cleared by vascular to resume eliquis, will need clearance from NSGY to restart  -call placed to NSGY, awaiting return  -Follows with vascular cardiologist Dr. Hoyos outpatient    #HTN:  -BP stable off meds   - 123/69 2/8    #HLD:  - continue lipitor 20mg po daily    #hypothyroidism:  - continue levothyroxine 200mcg po daily    #hypokalemia - resolved  - 2/4 K 3.3 - supplemented  - K+ 3.9 2/8    #leukocytopenia  - WBC 4.09 2/1 --> 3.25 2/4 --> 2.48 2/7 --> 2.69 2/7  - likely from COVID, discussed with hospitalist  - monitor  -CBC in AM 2/9    #: sleep  - melatonin 3mg PRN    #Pain Mgmt :  - Tylenol PRN mild pain  - Oxycodone 5mg IR PRN moderate 4-7/10 pain  - Oxycodone 10mg IR prn severe 8-10/10 pain  - Oxycontin ER BID (renewed 2/4)  -currently controlled. Work on weaning as recovery continues, may need pain management follow up on dc , discussed with patient    #GI/Bowel Mgmt/ constipation:   -continue Senna, miralax daily, MOM prn  -simethicone PRN gas  -prune juice with meal trays    #FEN :  - Diet - Regular     # DVT prophylaxis :  - Lovenox BID; f/u re: eliquis  -LE edema:  ACE wraps LE     #Dispo discussed in IDT meeting 2/3  - mod A bathing and with lower body dressing; min a to constant with transfer with 2-3 attempt and lots of coaching, mod A with bed mob; contact - min A transfer sit to stand; min A with ambulation; Stairs have not been performed  -goals: min assist bADLs, min assist transfers and ambulation  -will need to see if family able to provide min assist level at home, target dc 2/12 with 24h our care and home Pt and OT if available, otherwise [facility]AZIA  Spoke with daughter Adeline [phone] 2/8.     #LABs  f/u NSGY re: clearance to restart eliquis  ID consult  CBC 2/9  CBC BMP 2/11 KAI THOMAS is a 75y F PMH HTN, HLD, SLE, RA, hypothyroidism, provoked R. 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 Jaja Campos. Staples removed   - continue Comprehensive Rehab Program of PT/OT- 3 hrs/day 5 days/week  - psychological support, counseling  -Precautions:  spinal, RA, cardiac, contact and droplet precautions    #New COVID conversion  -PCR + 1/29/21. Moved up to [facility] for isolation. COVID IgG Ab neg  - Ct A/P 2/5: 1. Pattern of lung base groundglass opacification suggests infection including atypical pneumonia/viral infection from agents including COVID-19  -started on remdesivir 2/6. LFTs and Cr 2/8 stable  -will request ID consult as discussed with family. Likely dedicated CT chest versus CTPA  -incentive spirometer, deep breathing exercises  -continue contact and droplet precautions    #abdominal pain  - abdominal CT -2/5: Colonic diverticulosis without CT evidence of acute diverticulitis.  -symptoms resolved    #Chest soreness  - Chest Xray(2/5) - The lungs show stable ill-defined opacities LEFT lung base. LEFT upper lobe and RIGHT lung parenchyma clear.. No pneumothorax. The  heart is enlarged in transverse diameter. No hilar mass.  - consider CT chest given A/P CT findings, opacities base of lung  -proper posture and reducing UE use in standing activities also discussed  -patient reports that she cannot use incentive spirometer (has tried in past, failed mult education attempts)    #SLE/RA:   -continue plaquenil 200mg po daily  -(prevously on 15mg ( 2.5mg ) weekly).  Discussed with rheumatologist, defer for 3 weeks post Covid   -stable    #h/o Provoked DVT in 2019  - US July 2019:  DVT noted in the right popliteal vein and visualized segment of the right  posterior tibial vein.  - was on eliquis 5mg po bid at home,   - currently on lovenox for dvt ppx, to resume eliquis at discharge. Discussed with hospitalist/previous treating team whether can be switched back to eliquis as patient does not want injections 2/4. Cleared by vascular to resume eliquis, will need clearance from NSGY to restart  -call placed to NSGY, awaiting return  -Follows with vascular cardiologist Dr. Hoyos outpatient    #HTN:  -BP stable off meds   - 123/69 2/8    #HLD:  - continue lipitor 20mg po daily    #hypothyroidism:  - continue levothyroxine 200mcg po daily    #hypokalemia - resolved  - 2/4 K 3.3 - supplemented  - K+ 3.9 2/8    #leukocytopenia  - WBC 4.09 2/1 --> 3.25 2/4 --> 2.48 2/7 --> 2.69 2/7  - likely from COVID, discussed with hospitalist  - monitor  -CBC in AM 2/9    #: sleep  - melatonin 3mg PRN    #Pain Mgmt :  - Tylenol PRN mild pain  - Oxycodone 5mg IR PRN moderate 4-7/10 pain  - Oxycodone 10mg IR prn severe 8-10/10 pain  - Oxycontin ER BID (renewed 2/4)  -currently controlled. Work on weaning as recovery continues, may need pain management follow up on dc , discussed with patient    #GI/Bowel Mgmt/ constipation:   -continue Senna, miralax daily, MOM prn  -simethicone PRN gas  -prune juice with meal trays    #FEN :  - Diet - Regular     # DVT prophylaxis :  - Lovenox BID; f/u re: eliquis  -LE edema:  ACE wraps LE     #Dispo discussed in IDT meeting 2/3  - mod A bathing and with lower body dressing; min a to constant with transfer with 2-3 attempt and lots of coaching, mod A with bed mob; contact - min A transfer sit to stand; min A with ambulation; Stairs have not been performed  -goals: min assist bADLs, min assist transfers and ambulation  -will need to see if family able to provide min assist level at home, target dc 2/12 with 24h our care and home Pt and OT if available, otherwise [facility]AZIA  Spoke with daughter Adeline [phone] 2/8. Reviewed medications, Ct a/P results, plan for ID evaluation    #LABS:  f/u NSGY re: clearance to restart eliquis  ID consult  CBC 2/9  CBC BMP 2/11 KAI THOMAS is a 75y F PMH HTN, HLD, SLE, RA, hypothyroidism, provoked R. 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 Jaja Campos. Staples removed   - continue Comprehensive Rehab Program of PT/OT- 3 hrs/day 5 days/week  - psychological support, counseling  -Precautions:  spinal, RA, cardiac, contact and droplet precautions    #New COVID conversion  -PCR + 1/29/21. Moved up to [facility] for isolation. COVID IgG Ab neg  - Ct A/P 2/5: 1. Pattern of lung base groundglass opacification suggests infection including atypical pneumonia/viral infection from agents including COVID-19  -started on remdesivir 2/6. LFTs and Cr 2/8 stable  -will request ID consult as discussed with family. Likely dedicated CT chest versus CTPA  -incentive spirometer, deep breathing exercises  -continue contact and droplet precautions    #abdominal pain  - abdominal CT -2/5: Colonic diverticulosis without CT evidence of acute diverticulitis.  -symptoms resolved    #Chest soreness  - Chest Xray(2/5) - The lungs show stable ill-defined opacities LEFT lung base. LEFT upper lobe and RIGHT lung parenchyma clear.. No pneumothorax. The  heart is enlarged in transverse diameter. No hilar mass.  - consider CT chest given A/P CT findings, opacities base of lung  -proper posture and reducing UE use in standing activities also discussed  -patient reports that she cannot use incentive spirometer (has tried in past, failed mult education attempts)    #SLE/RA:   -continue plaquenil 200mg po daily  -(prevously on 15mg ( 2.5mg ) weekly).  Discussed with rheumatologist, defer for 3 weeks post Covid   -stable    #h/o Provoked DVT in 2019  - US July 2019:  DVT noted in the right popliteal vein and visualized segment of the right  posterior tibial vein.  - was on eliquis 5mg po bid at home,   - currently on lovenox for dvt ppx, to resume eliquis at discharge. Discussed with hospitalist/previous treating team whether can be switched back to eliquis as patient does not want injections 2/4. Cleared by vascular to resume eliquis, will need clearance from NSGY to restart  -call placed to NSGY, awaiting return  -Follows with vascular cardiologist Dr. Hoyos outpatient    #HTN:  -BP stable off meds   - 123/69 2/8    #HLD:  - continue lipitor 20mg po daily    #hypothyroidism:  - continue levothyroxine 200mcg po daily    #hypokalemia - resolved  - 2/4 K 3.3 - supplemented  - K+ 3.9 2/8    #leukocytopenia  - WBC 4.09 2/1 --> 3.25 2/4 --> 2.48 2/7 --> 2.69 2/7  - likely from COVID, discussed with hospitalist  - monitor  -CBC in AM 2/9    #: sleep  - melatonin 3mg PRN    #Pain Mgmt :  - Tylenol PRN mild pain  - Oxycodone 5mg IR PRN moderate 4-7/10 pain  - Oxycodone 10mg IR prn severe 8-10/10 pain  - Oxycontin ER BID (renewed 2/4)  -currently controlled. Work on weaning as recovery continues, may need pain management follow up on dc , discussed with patient    #GI/Bowel Mgmt/ constipation:   -continue Senna, miralax daily, MOM prn  -simethicone PRN gas  -prune juice with meal trays    #FEN :  - Diet - Regular     # DVT prophylaxis :  - Lovenox BID; f/u re: eliquis  -LE edema:  ACE wraps LE     #Dispo discussed in IDT meeting 2/3  - mod A bathing and with lower body dressing; min a to constant with transfer with 2-3 attempt and lots of coaching, mod A with bed mob; contact - min A transfer sit to stand; min A with ambulation; Stairs have not been performed  -goals: min assist bADLs, min assist transfers and ambulation  -will need to see if family able to provide min assist level at home, target dc 2/12 with 24h our care and home Pt and OT if available, otherwise SHAZIA  Spoke with daughter Adeline [phone] 2/8. Reviewed medications, Ct a/P results, plan for ID evaluation    #LABS:  f/u NSGY re: clearance to restart eliquis  ID consult  CBC 2/9  CBC BMP 2/11    addendum: discussed with hospitalist, recommend CTPA, ordered 2/8, also discussed plan with family who is agreeable, will notify floor/pt

## 2021-02-09 LAB
D DIMER BLD IA.RAPID-MCNC: 476 NG/ML DDU — HIGH
HCT VFR BLD CALC: 40.6 % — SIGNIFICANT CHANGE UP (ref 34.5–45)
HGB BLD-MCNC: 13 G/DL — SIGNIFICANT CHANGE UP (ref 11.5–15.5)
MCHC RBC-ENTMCNC: 29.7 PG — SIGNIFICANT CHANGE UP (ref 27–34)
MCHC RBC-ENTMCNC: 32 GM/DL — SIGNIFICANT CHANGE UP (ref 32–36)
MCV RBC AUTO: 92.7 FL — SIGNIFICANT CHANGE UP (ref 80–100)
NRBC # BLD: 0 /100 WBCS — SIGNIFICANT CHANGE UP (ref 0–0)
PLATELET # BLD AUTO: 257 K/UL — SIGNIFICANT CHANGE UP (ref 150–400)
RBC # BLD: 4.38 M/UL — SIGNIFICANT CHANGE UP (ref 3.8–5.2)
RBC # FLD: 14.5 % — SIGNIFICANT CHANGE UP (ref 10.3–14.5)
WBC # BLD: 3.35 K/UL — LOW (ref 3.8–10.5)
WBC # FLD AUTO: 3.35 K/UL — LOW (ref 3.8–10.5)

## 2021-02-09 PROCEDURE — 99232 SBSQ HOSP IP/OBS MODERATE 35: CPT

## 2021-02-09 PROCEDURE — 99233 SBSQ HOSP IP/OBS HIGH 50: CPT | Mod: CS

## 2021-02-09 PROCEDURE — 71275 CT ANGIOGRAPHY CHEST: CPT | Mod: 26

## 2021-02-09 RX ADMIN — Medication 200 MICROGRAM(S): at 05:49

## 2021-02-09 RX ADMIN — SIMETHICONE 80 MILLIGRAM(S): 80 TABLET, CHEWABLE ORAL at 20:33

## 2021-02-09 RX ADMIN — Medication 3 MILLIGRAM(S): at 23:58

## 2021-02-09 RX ADMIN — OXYCODONE HYDROCHLORIDE 10 MILLIGRAM(S): 5 TABLET ORAL at 05:49

## 2021-02-09 RX ADMIN — LIDOCAINE 1 PATCH: 4 CREAM TOPICAL at 22:00

## 2021-02-09 RX ADMIN — Medication 1 MILLIGRAM(S): at 11:49

## 2021-02-09 RX ADMIN — REMDESIVIR 500 MILLIGRAM(S): 5 INJECTION INTRAVENOUS at 11:49

## 2021-02-09 RX ADMIN — LIDOCAINE 1 PATCH: 4 CREAM TOPICAL at 01:55

## 2021-02-09 RX ADMIN — Medication 500 MILLIGRAM(S): at 11:49

## 2021-02-09 RX ADMIN — OXYCODONE HYDROCHLORIDE 10 MILLIGRAM(S): 5 TABLET ORAL at 10:27

## 2021-02-09 RX ADMIN — BENZOCAINE AND MENTHOL 1 LOZENGE: 5; 1 LIQUID ORAL at 05:53

## 2021-02-09 RX ADMIN — LIDOCAINE 1 PATCH: 4 CREAM TOPICAL at 11:50

## 2021-02-09 RX ADMIN — OXYCODONE HYDROCHLORIDE 10 MILLIGRAM(S): 5 TABLET ORAL at 18:41

## 2021-02-09 RX ADMIN — ATORVASTATIN CALCIUM 20 MILLIGRAM(S): 80 TABLET, FILM COATED ORAL at 20:33

## 2021-02-09 RX ADMIN — ENOXAPARIN SODIUM 40 MILLIGRAM(S): 100 INJECTION SUBCUTANEOUS at 05:54

## 2021-02-09 RX ADMIN — OXYCODONE HYDROCHLORIDE 10 MILLIGRAM(S): 5 TABLET ORAL at 14:49

## 2021-02-09 RX ADMIN — Medication 200 MILLIGRAM(S): at 07:25

## 2021-02-09 RX ADMIN — BENZOCAINE AND MENTHOL 1 LOZENGE: 5; 1 LIQUID ORAL at 17:14

## 2021-02-09 RX ADMIN — Medication 650 MILLIGRAM(S): at 20:33

## 2021-02-09 RX ADMIN — BENZOCAINE AND MENTHOL 1 LOZENGE: 5; 1 LIQUID ORAL at 11:49

## 2021-02-09 RX ADMIN — ENOXAPARIN SODIUM 40 MILLIGRAM(S): 100 INJECTION SUBCUTANEOUS at 17:14

## 2021-02-09 RX ADMIN — Medication 6 MILLIGRAM(S): at 05:54

## 2021-02-09 NOTE — PROGRESS NOTE ADULT - SUBJECTIVE AND OBJECTIVE BOX
Patient is a 75y old  Female who presents with a chief complaint of Back pain (08 Feb 2021 12:55)    Patient seen and examined at bedside. No acute overnight events. denies headache, fever, chills, cp, sob, n/v, abd pain.  +mild rib/msk pain, intermittent.     ALLERGIES:  No Known Allergies    MEDICATIONS  (STANDING):  ascorbic acid 500 milliGRAM(s) Oral daily  atorvastatin 20 milliGRAM(s) Oral at bedtime  benzocaine 15 mG/menthol 3.6 mG (Sugar-Free) Lozenge 1 Lozenge Oral four times a day  dexAMETHasone  Injectable 6 milliGRAM(s) IV Push daily  enoxaparin Injectable 40 milliGRAM(s) SubCutaneous two times a day  folic acid 1 milliGRAM(s) Oral daily  hydroxychloroquine 200 milliGRAM(s) Oral <User Schedule>  levothyroxine 200 MICROGram(s) Oral daily  lidocaine   Patch 1 Patch Transdermal daily  oxyCODONE  ER Tablet 10 milliGRAM(s) Oral every 12 hours  polyethylene glycol 3350 17 Gram(s) Oral daily  remdesivir  IVPB 100 milliGRAM(s) IV Intermittent every 24 hours  remdesivir  IVPB   IV Intermittent   senna 2 Tablet(s) Oral at bedtime    MEDICATIONS  (PRN):  acetaminophen   Tablet .. 650 milliGRAM(s) Oral every 6 hours PRN Temp greater or equal to 38C (100.4F), Mild Pain (1 - 3)  magnesium hydroxide Suspension 30 milliLiter(s) Oral daily PRN Constipation  melatonin 3 milliGRAM(s) Oral at bedtime PRN Insomnia  ondansetron    Tablet 4 milliGRAM(s) Oral every 8 hours PRN Nausea and/or Vomiting  oxyCODONE    IR 10 milliGRAM(s) Oral every 4 hours PRN Severe Pain (7 - 10)  oxyCODONE    IR 5 milliGRAM(s) Oral every 4 hours PRN Moderate Pain (4 - 6)  simethicone 80 milliGRAM(s) Chew every 6 hours PRN Upset Stomach    Vital Signs Last 24 Hrs  T(C): 36.6 (09 Feb 2021 07:23), Max: 36.6 (09 Feb 2021 07:23)  T(F): 97.8 (09 Feb 2021 07:23), Max: 97.8 (09 Feb 2021 07:23)  HR: 55 (09 Feb 2021 07:23) (55 - 57)  BP: 134/78 (09 Feb 2021 07:23) (124/65 - 134/78)  BP(mean): --  RR: 15 (09 Feb 2021 07:23) (15 - 15)  SpO2: 99% (09 Feb 2021 07:23) (97% - 99%)      PHYSICAL EXAM:  General: NAD, A/O x 3  ENT: MMM, no scleral icterus  Neck: Supple, No JVD  Lungs: Respirations unlabored. Clear to auscultation bilaterally, no wheezes, rales, rhonchi  Cardio: RRR, S1/S2, No murmurs  Abdomen: Soft, Nontender, Nondistended; Bowel sounds present  Extremities: No calf tenderness, LE edema b/l    LABS:                        13.0   3.35  )-----------( 257      ( 09 Feb 2021 07:40 )             40.6     02-08    144  |  109<H>  |  23  ----------------------------<  110<H>  3.9   |  23  |  0.69    Ca    8.5      08 Feb 2021 06:40    TPro  6.5  /  Alb  2.8<L>  /  TBili  0.3  /  DBili  x   /  AST  25  /  ALT  16  /  AlkPhos  116  02-08    CAPILLARY BLOOD GLUCOSE    RADIOLOGY & ADDITIONAL TESTS: reviewed    Care Discussed with Consultants/Other Providers: yes

## 2021-02-09 NOTE — PROGRESS NOTE ADULT - ASSESSMENT
75y female with a PMH of RA managed with plaquenil, HTN, SLE, hypothyroidism, chronic LBP, s/p T10-L5 laminectomy and posterior fusion in early January at [facility], s/p transfer to rehab on 1/12/21  She tested positive for Covid on 1/31 --- hospital acquired Covid  She has been with a paucity of symptoms; nonspecific abdominal and chest pains without fevers, cough, SOB or hypoxia  CT scan of A/P showed nonspecific lower lung densities and RDV started on 2/5 along with decadron.  On lovenox 40 BID for DVT prophylaxis, has a history of a provoked DVT in the past.  CTA of chest shows mild bilateral patchy airspace disease compatible with COVID-19 pneumonia.    Suggest:  Complete 5 days of RDV today  Limit decadron to 10 days total  Follow Covid inflammatory markers  Continue anticoagulation

## 2021-02-09 NOTE — PROGRESS NOTE ADULT - SUBJECTIVE AND OBJECTIVE BOX
CC: f/u for    Patient reports    REVIEW OF SYSTEMS:  All other review of systems negative (Comprehensive ROS)    Antimicrobials Day #  :  hydroxychloroquine 200 milliGRAM(s) Oral <User Schedule>    Other Medications Reviewed    T(F): 97.8 (02-09-21 @ 07:23), Max: 97.8 (02-09-21 @ 07:23)  HR: 55 (02-09-21 @ 07:23)  BP: 134/78 (02-09-21 @ 07:23)  RR: 15 (02-09-21 @ 07:23)  SpO2: 99% (02-09-21 @ 07:23)  Wt(kg): --    PHYSICAL EXAM:  General: alert, no acute distress  Eyes:  anicteric, no conjunctival injection, no discharge  Neck: supple  Lungs: clear to auscultation  Heart: regular rate and rhythm; no murmurs  Abdomen: soft, nondistended, nontender  Extremities: Bilateral 2+edema  Neurologic: alert, oriented, moves all extremities    LAB RESULTS:                        13.0   3.35  )-----------( 257      ( 09 Feb 2021 07:40 )             40.6     02-08    144  |  109<H>  |  23  ----------------------------<  110<H>  3.9   |  23  |  0.69    Ca    8.5      08 Feb 2021 06:40    TPro  6.5  /  Alb  2.8<L>  /  TBili  0.3  /  DBili  x   /  AST  25  /  ALT  16  /  AlkPhos  116  02-08    LIVER FUNCTIONS - ( 08 Feb 2021 06:40 )  Alb: 2.8 g/dL / Pro: 6.5 g/dL / ALK PHOS: 116 U/L / ALT: 16 U/L / AST: 25 U/L / GGT: x             MICROBIOLOGY:  RECENT CULTURES:      RADIOLOGY REVIEWED:    < from: CT Angio Chest w/ IV Cont (02.09.21 @ 16:25) >  No PE  Mild bilateral patchy airspace disease compatible with COVID-19 pneumonia.  < end of copied text >

## 2021-02-09 NOTE — PROGRESS NOTE ADULT - SUBJECTIVE AND OBJECTIVE BOX
-----------------------------------------------------------------------  Patient is a 75y old  Female who presents with a chief complaint of Back pain (09 Feb 2021 10:35)      HPI:  75F with past medical history of HTN, HLD, SLE, RA, hypothyroidism, provoked R. popliteal DVT in 2019 on eliquis, chronic low back pain presents to ED for acute on chronic low back pain. Pt has several months of chronic lumbosacral back pain, evaluated by outpatient ortho and referred for physical therapy and pain management. She has been receiving ?vitamin spinal injections. She last received low back inj on 12/16 after which she her low back pain worsened. She took oxycodone 5 mg qd (prescribed in september during a ED visit) and acetaminophen for past three days without relief. She also endorses worsening pain during ambulation with walker and has affected her quality of life. She has constipation, last BM 4 days ago. Otherwise denies fever, chills, N/V, abdominal pain, dysuria, urinary retention, fecal incontinence, saddle anesthesia, fall, LOC, trauma. During ROS she endorsed LLE weakness for several months, no numbness or tingling and has LLE edema >RLE edema, reported undergoing LLE US 4 months ago, was negative for DVT. Currently has 8/10 pain, improved to 5/10 with oxycodone 5 mg x 2 in ED. (28 Dec 2020 11:16)   12/28: Mri T/Ls: IMPRESSION: Degenerative changes  Abnormal signal with associated enhancement is seen involving the endplates adjacent to the L2-3 disc space. There is slight increased T2 prolongation involving the L2-3 disc space with associated widening. While these findings could be compatible with degenerative changes possibly of early discitis and osteomyelitis must be considered.  Patient s/p L1-L5 laminectomy, T10-L5 posterior surgical fusion by Dr. Campos on 1/1/21  HMV x 3 drains. Post op with constipation needing aggressive bowel regimen.     PT/OT and PM&R evaluated patient and recommended Rehab.     Patient medically cleared and admitted to  Rehab on 1/12/21 (12 Jan 2021 15:21)      PAST MEDICAL & SURGICAL HISTORY:  HTN (hypertension)    DVT (deep venous thrombosis)    Hypothyroidism    RA (rheumatoid arthritis)    Obesity, Class II, BMI 35-39.9, no comorbidity    Hypothyroid    Benign heart murmur    Hyperlipidemia    Hypertension    H/O degenerative disc disease    Osteoarthritis    SLE (systemic lupus erythematosus)    Rheumatoid arthritis    S/P knee replacement    Status post total hip replacement, right    S/P thyroid surgery  1 lobe removed    Lung cancer  small tumor removed 2015    S/P knee surgery  bilateral    S/P carpal tunnel release  left    S/P eye surgery  child    S/P cataract surgery  left    S/P tonsillectomy        Allergies    No Known Allergies    Intolerances    -----------------------------------------------------------------------  VITALS  75y  Vital Signs Last 24 Hrs  T(C): 36.6 (09 Feb 2021 07:23), Max: 36.6 (09 Feb 2021 07:23)  T(F): 97.8 (09 Feb 2021 07:23), Max: 97.8 (09 Feb 2021 07:23)  HR: 55 (09 Feb 2021 07:23) (55 - 57)  BP: 134/78 (09 Feb 2021 07:23) (124/65 - 134/78)  BP(mean): --  RR: 15 (09 Feb 2021 07:23) (15 - 15)  SpO2: 99% (09 Feb 2021 07:23) (97% - 99%)  Daily     Daily     -----------------------------------------------------------------------  RECENT LABS:                        13.0   3.35  )-----------( 257      ( 09 Feb 2021 07:40 )             40.6     02-08    144  |  109<H>  |  23  ----------------------------<  110<H>  3.9   |  23  |  0.69    Ca    8.5      08 Feb 2021 06:40    TPro  6.5  /  Alb  2.8<L>  /  TBili  0.3  /  DBili  x   /  AST  25  /  ALT  16  /  AlkPhos  116  02-08    LIVER FUNCTIONS - ( 08 Feb 2021 06:40 )  Alb: 2.8 g/dL / Pro: 6.5 g/dL / ALK PHOS: 116 U/L / ALT: 16 U/L / AST: 25 U/L / GGT: x                 CAPILLARY BLOOD GLUCOSE    IMAGING: none in last 24hr  -----------------------------------------------------------------------  MEDICATIONS  (STANDING):  ascorbic acid 500 milliGRAM(s) Oral daily  atorvastatin 20 milliGRAM(s) Oral at bedtime  benzocaine 15 mG/menthol 3.6 mG (Sugar-Free) Lozenge 1 Lozenge Oral four times a day  dexAMETHasone  Injectable 6 milliGRAM(s) IV Push daily  enoxaparin Injectable 40 milliGRAM(s) SubCutaneous two times a day  folic acid 1 milliGRAM(s) Oral daily  hydroxychloroquine 200 milliGRAM(s) Oral <User Schedule>  levothyroxine 200 MICROGram(s) Oral daily  lidocaine   Patch 1 Patch Transdermal daily  oxyCODONE  ER Tablet 10 milliGRAM(s) Oral every 12 hours  polyethylene glycol 3350 17 Gram(s) Oral daily  senna 2 Tablet(s) Oral at bedtime    MEDICATIONS  (PRN):  acetaminophen   Tablet .. 650 milliGRAM(s) Oral every 6 hours PRN Temp greater or equal to 38C (100.4F), Mild Pain (1 - 3)  magnesium hydroxide Suspension 30 milliLiter(s) Oral daily PRN Constipation  melatonin 3 milliGRAM(s) Oral at bedtime PRN Insomnia  ondansetron    Tablet 4 milliGRAM(s) Oral every 8 hours PRN Nausea and/or Vomiting  oxyCODONE    IR 10 milliGRAM(s) Oral every 4 hours PRN Severe Pain (7 - 10)  oxyCODONE    IR 5 milliGRAM(s) Oral every 4 hours PRN Moderate Pain (4 - 6)  simethicone 80 milliGRAM(s) Chew every 6 hours PRN Upset Stomach    -----------------------------------------------------------------------      Review of Systems:   · Additional ROS	Patient was seen and evaluated this AM. No acute events overnight. No new complaints.    She does complain of chest soreness/tightness associated with breathing. Tolerating therapies. CTA to rule out PE still pending.  She has not needed supplemental O2.      Physical Exam:   · Constitutional	detailed exam  · Constitutional Comments	alert, O x 3, continues to use UE/flexed posture in standing activities  · Respiratory	detailed exam  · Respiratory Details	respirations non-labored; reduced BS bases  · Respiratory Comments	fair+ effort  · Cardiovascular	+mild pectoral and sternal TTP  · Cardiovascular Details	regular rate and rhythm   · Gastrointestinal	detailed exam  · GI Normal	soft; nontender; bowel sounds normal  · Extremities	detailed exam   · Extremities Comments	UE 5/5, LE proximal 4/5, distal 5/5   calves soft, NT     mild shoulder TTP (h/o chronic OA)    · Additional PE	Skin: long back incision,- some steristrips on- healing well   no erythema, +intact, dry, no induration         -----------------------------------------------------------------------  Patient is a 75y old  Female who presents with a chief complaint of Back pain (09 Feb 2021 10:35)      HPI:  75F with past medical history of HTN, HLD, SLE, RA, hypothyroidism, provoked R. popliteal DVT in 2019 on eliquis, chronic low back pain presents to ED for acute on chronic low back pain. Pt has several months of chronic lumbosacral back pain, evaluated by outpatient ortho and referred for physical therapy and pain management. She has been receiving ?vitamin spinal injections. She last received low back inj on 12/16 after which she her low back pain worsened. She took oxycodone 5 mg qd (prescribed in september during a ED visit) and acetaminophen for past three days without relief. She also endorses worsening pain during ambulation with walker and has affected her quality of life. She has constipation, last BM 4 days ago. Otherwise denies fever, chills, N/V, abdominal pain, dysuria, urinary retention, fecal incontinence, saddle anesthesia, fall, LOC, trauma. During ROS she endorsed LLE weakness for several months, no numbness or tingling and has LLE edema >RLE edema, reported undergoing LLE US 4 months ago, was negative for DVT. Currently has 8/10 pain, improved to 5/10 with oxycodone 5 mg x 2 in ED. (28 Dec 2020 11:16)   12/28: Mri T/Ls: IMPRESSION: Degenerative changes  Abnormal signal with associated enhancement is seen involving the endplates adjacent to the L2-3 disc space. There is slight increased T2 prolongation involving the L2-3 disc space with associated widening. While these findings could be compatible with degenerative changes possibly of early discitis and osteomyelitis must be considered.  Patient s/p L1-L5 laminectomy, T10-L5 posterior surgical fusion by Dr. Campos on 1/1/21  HMV x 3 drains. Post op with constipation needing aggressive bowel regimen.     PT/OT and PM&R evaluated patient and recommended Rehab.     Patient medically cleared and admitted to  Rehab on 1/12/21 (12 Jan 2021 15:21)      PAST MEDICAL & SURGICAL HISTORY:  HTN (hypertension)    DVT (deep venous thrombosis)    Hypothyroidism    RA (rheumatoid arthritis)    Obesity, Class II, BMI 35-39.9, no comorbidity    Hypothyroid    Benign heart murmur    Hyperlipidemia    Hypertension    H/O degenerative disc disease    Osteoarthritis    SLE (systemic lupus erythematosus)    Rheumatoid arthritis    S/P knee replacement    Status post total hip replacement, right    S/P thyroid surgery  1 lobe removed    Lung cancer  small tumor removed 2015    S/P knee surgery  bilateral    S/P carpal tunnel release  left    S/P eye surgery  child    S/P cataract surgery  left    S/P tonsillectomy        Allergies    No Known Allergies    Intolerances    -----------------------------------------------------------------------  VITALS  75y  Vital Signs Last 24 Hrs  T(C): 36.6 (09 Feb 2021 07:23), Max: 36.6 (09 Feb 2021 07:23)  T(F): 97.8 (09 Feb 2021 07:23), Max: 97.8 (09 Feb 2021 07:23)  HR: 55 (09 Feb 2021 07:23) (55 - 57)  BP: 134/78 (09 Feb 2021 07:23) (124/65 - 134/78)  BP(mean): --  RR: 15 (09 Feb 2021 07:23) (15 - 15)  SpO2: 99% (09 Feb 2021 07:23) (97% - 99%)  Daily     Daily     -----------------------------------------------------------------------  RECENT LABS:                        13.0   3.35  )-----------( 257      ( 09 Feb 2021 07:40 )             40.6     02-08    144  |  109<H>  |  23  ----------------------------<  110<H>  3.9   |  23  |  0.69    Ca    8.5      08 Feb 2021 06:40    TPro  6.5  /  Alb  2.8<L>  /  TBili  0.3  /  DBili  x   /  AST  25  /  ALT  16  /  AlkPhos  116  02-08    LIVER FUNCTIONS - ( 08 Feb 2021 06:40 )  Alb: 2.8 g/dL / Pro: 6.5 g/dL / ALK PHOS: 116 U/L / ALT: 16 U/L / AST: 25 U/L / GGT: x                 CAPILLARY BLOOD GLUCOSE    IMAGING: none in last 24hr  -----------------------------------------------------------------------  MEDICATIONS  (STANDING):  ascorbic acid 500 milliGRAM(s) Oral daily  atorvastatin 20 milliGRAM(s) Oral at bedtime  benzocaine 15 mG/menthol 3.6 mG (Sugar-Free) Lozenge 1 Lozenge Oral four times a day  dexAMETHasone  Injectable 6 milliGRAM(s) IV Push daily  enoxaparin Injectable 40 milliGRAM(s) SubCutaneous two times a day  folic acid 1 milliGRAM(s) Oral daily  hydroxychloroquine 200 milliGRAM(s) Oral <User Schedule>  levothyroxine 200 MICROGram(s) Oral daily  lidocaine   Patch 1 Patch Transdermal daily  oxyCODONE  ER Tablet 10 milliGRAM(s) Oral every 12 hours  polyethylene glycol 3350 17 Gram(s) Oral daily  senna 2 Tablet(s) Oral at bedtime    MEDICATIONS  (PRN):  acetaminophen   Tablet .. 650 milliGRAM(s) Oral every 6 hours PRN Temp greater or equal to 38C (100.4F), Mild Pain (1 - 3)  magnesium hydroxide Suspension 30 milliLiter(s) Oral daily PRN Constipation  melatonin 3 milliGRAM(s) Oral at bedtime PRN Insomnia  ondansetron    Tablet 4 milliGRAM(s) Oral every 8 hours PRN Nausea and/or Vomiting  oxyCODONE    IR 10 milliGRAM(s) Oral every 4 hours PRN Severe Pain (7 - 10)  oxyCODONE    IR 5 milliGRAM(s) Oral every 4 hours PRN Moderate Pain (4 - 6)  simethicone 80 milliGRAM(s) Chew every 6 hours PRN Upset Stomach    -----------------------------------------------------------------------      Review of Systems:   · Additional ROS	Patient was seen and evaluated this AM. No acute events overnight. No new complaints.    She does complain of chest soreness/tightness associated with breathing. Tolerating therapies. CTA to rule out PE still pending.  She has not needed supplemental O2. Overall has not had progression of chest symptoms    Recommendations of ID, hospitalist reviewed with patient. she is aware that CTA will be ordered for evaluation chest as well as possible PE given h/o clots and COVID. we informed her that we will be speaking with family later as well      Physical Exam:   · Constitutional	detailed exam  · Constitutional Comments	alert, O x 3, appears comfortable, no dyspnea off O2  · Respiratory	detailed exam  · Respiratory Details	respirations non-labored; reduced BS bases bialterally, equal  · Respiratory Comments	fair+ effort  · Cardiovascular	+mild pectoral and sternal TTP  · Cardiovascular Details	regular rate and rhythm   · Gastrointestinal	detailed exam  · GI Normal	soft; nontender; bowel sounds normal  · Extremities	detailed exam   · Extremities Comments	UE 5/5, LE proximal 4/5, distal 5/5   calves soft, NT   +TTP over pectorals, sternal    · Additional PE	Skin: long back incision, healing well ,intact

## 2021-02-09 NOTE — PROGRESS NOTE ADULT - ASSESSMENT
76 y/o F PMH HTN, HLD, SLE, RA, hypothyroidism, provoked right popliteal DVT in 2019, chronic LBP presents to [facility] for acute on chronic LBP 12/28/20, found to have 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 Hospital Course complicated by brief SICU stay for mechanical ventilation management, currently extubated. Patient now admitted for a multidisciplinary rehab program on 1/12/21.    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 Jaja Campos  -Continue comprehensive rehab program  -Pain management, bowel regimen per rehab    #COVID-19  -O2 supplement as necessary to keep SpO2 >92%  -Cont remdesivir (2/5- ) total 5 days and decadron total 10 days  -Monitor Cr and liver function  -Neutropenia noted, likely 2/2 COVID, cont to monitor   -ID appreciated - obtain CTA with contrast to assess for PE     #Intermittent abdominal pain - suspect 2/2 opioid induced constipation r/o gas pain  -CT abd/pelvis reviewed, pain resolved, cont to monitor  -Bowel regimen per rehab  -Simethicone prn     #Recently treated UTI  -Ucx >100K Providencia Rettgeri - macrobid resistant, completed Cipro 250mg q12 x 3 days 1/30    #Acute blood loss Anemia, likely from surgery  -H/H stable, continue to monitor   -No overt signs of bleeding    #SLE/RA  -Plaquenil 200mg po daily and folic acid    #H/o provoked right popliteal, right posterior tibial DVT (7/2019 in setting of hip surgery, already completed >6 months of treatment)  -Follows with cardiologist Dr. Hoyos outpatient  -Treated with Eliquis 5mg po bid chronically -- AC stopped at [facility] as 12/29 LE dopplers neg for DVTs  -Primary team to confirm clearance regarding eliquis  -DVT ppx with lovenox 40mg BID for now    #Leg edema likely from chronic lymphedema  -Encourage leg elevation  -ACE wraps    #Essential HTN: stable off anti-hypertensives  -Continue to hold home medication Losartan  -VS per rehab protocol    #HLD  -Lipitor    #Hypothyroidism  -Levothyroxine    #VTE ppx: Lovenox 40mg BID    #GI ppx:  PPI

## 2021-02-09 NOTE — PROGRESS NOTE ADULT - ASSESSMENT
KAI THOMAS is a 75y F PMH HTN, HLD, SLE, RA, hypothyroidism, provoked R. 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 Jaja Campos. Staples removed   - continue Comprehensive Rehab Program of PT/OT- 3 hrs/day 5 days/week  - psychological support, counseling  - Precautions:  spinal, RA, cardiac, contact and droplet precautions    #New COVID conversion  -PCR + 1/29/21. Moved up to [facility] for isolation. COVID IgG Ab neg  - Ct A/P 2/5: 1. Pattern of lung base groundglass opacification suggests infection including atypical pneumonia/viral infection from agents including COVID-19  - started on remdesivir 2/6. LFTs and Cr 2/8 stable  - will request ID consult as discussed with family. Likely dedicated CT chest versus CTPA - pending to be done today 2/9  -incentive spirometer, deep breathing exercises  -continue contact and droplet precautions    #abdominal pain -- resolved  - abdominal CT -2/5: Colonic diverticulosis without CT evidence of acute diverticulitis.    # Chest soreness  - Chest Xray(2/5) - The lungs show stable ill-defined opacities LEFT lung base. LEFT upper lobe and RIGHT lung parenchyma clear.. No pneumothorax. The  heart is enlarged in transverse diameter. No hilar mass.  -proper posture and reducing UE use in standing activities also discussed  -patient reports that she cannot use incentive spirometer (has tried in past, failed mult education attempts)  - Pending CTA to r/o PE    #SLE/RA  -continue plaquenil 200mg po daily  -(prevously on 15mg ( 2.5mg ) weekly).  Discussed with rheumatologist, defer for 3 weeks post Covid     #h/o Provoked DVT in 2019  - US July 2019:  DVT noted in the right popliteal vein and visualized segment of the right  posterior tibial vein.  - was on eliquis 5mg po bid at home,   - currently on lovenox for dvt ppx, to resume eliquis at discharge. Discussed with hospitalist/previous treating team whether can be switched back to eliquis as patient does not want injections 2/4. Cleared by vascular to resume eliquis, will need clearance from NSGY to restart  - call placed to NSGY, awaiting return -- will follow up again pending on results from CTA  -Follows with vascular cardiologist Dr. Hoyos outpatient    #HTN:  - BP stable off meds     #HLD:  - continue lipitor 20mg po daily    #hypothyroidism:  - continue levothyroxine 200mcg po daily    #hypokalemia - resolved  - 2/4 K 3.3 - supplemented  - K+ 3.9 2/8    # leukocytopenia  - WBC 4.09 2/1 --> 3.25 2/4 --> 2.48 2/7 --> 2.69 2/7 --> 3.35 2/9  - likely from COVID, discussed with hospitalist  - monitor  - CBC in AM 2/9    # sleep  - melatonin 3mg PRN    #Pain Mgmt :  - Tylenol PRN mild pain  - Oxycodone 5mg IR PRN moderate 4-7/10 pain  - Oxycodone 10mg IR prn severe 8-10/10 pain  - Oxycontin ER BID (renewed 2/4)  - currently controlled. Work on weaning as recovery continues, may need pain management follow up on dc , discussed with patient    #GI/Bowel Mgmt/ constipation:   -continue Senna, miralax daily, MOM prn  -simethicone PRN gas  -prune juice with meal trays    #FEN :  - Diet - Regular     # DVT prophylaxis :  - Lovenox BID; f/u re: eliquis  -LE edema:  ACE wraps LE     #Dispo discussed in IDT meeting 2/3  - mod A bathing and with lower body dressing; min a to constant with transfer with 2-3 attempt and lots of coaching, mod A with bed mob; contact - min A transfer sit to stand; min A with ambulation; Stairs have not been performed  -goals: min assist bADLs, min assist transfers and ambulation  -will need to see if family able to provide min assist level at home, target dc 2/12 with 24h our care and home Pt and OT if available, otherwise [facility]  Spoke with daughter Adeline [phone] 2/8. Reviewed medications, Ct a/P results, plan for ID evaluation    #LABS:  f/u NSGY re: clearance to restart eliquis  CBC BMP 2/11     KAI THOMAS is a 75y F PMH HTN, HLD, SLE, RA, hypothyroidism, provoked R. 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 Jaja Campos. Staples removed   - continue Comprehensive Rehab Program of PT/OT- 3 hrs/day 5 days/week  - psychological support, counseling  - Precautions:  spinal, RA, cardiac, contact and droplet precautions    #New COVID conversion  -PCR + 1/29/21. Moved up to [facility] for isolation. COVID IgG Ab neg  - Ct A/P 2/5: 1. Pattern of lung base groundglass opacification suggests infection including atypical pneumonia/viral infection from agents including COVID-19  - started on remdesivir 2/6 Day #4/6 2/9. LFTs and Cr 2/8 stable  - ID consult appreciated 2/8. Recommendations reviewed with patient , 6 day course remdesivir and 10 day decadron   -CTA pending as below  -deep breathing exercises. Patient refuses incentive spirometer  -continue contact and droplet precautions    #abdominal pain -- resolved  - abdominal CT -2/5: Colonic diverticulosis without CT evidence of acute diverticulitis.    # Chest soreness  - Chest Xray(2/5) - The lungs show stable ill-defined opacities LEFT lung base. LEFT upper lobe and RIGHT lung parenchyma clear.. No pneumothorax. The  heart is enlarged in transverse diameter. No hilar mass.  -proper posture and reducing UE use in standing activities also discussed  - Pending CTA to r/o PE. Patient and family aware    #SLE/RA  -continue plaquenil 200mg po daily  -(prevously on 15mg ( 2.5mg ) weekly).  Discussed with rheumatologist, defer for 3 weeks post Covid     #h/o Provoked DVT in 2019  - US July 2019:  DVT noted in the right popliteal vein and visualized segment of the right  posterior tibial vein.  - was on eliquis 5mg po bid at home,   - currently on lovenox for dvt ppx, to resume eliquis at discharge. Discussed with hospitalist/previous treating team whether can be switched back to eliquis as patient does not want injections 2/4. Cleared by vascular to resume eliquis, will need clearance from NSGY to restart  - call placed to NSGY, awaiting return -- will follow up again pending on results from CTA  -Follows with vascular cardiologist Dr. Hoyos outpatient    #HTN:  - BP stable off meds     #HLD:  - continue lipitor 20mg po daily    #hypothyroidism:  - continue levothyroxine 200mcg po daily    #hypokalemia - resolved  - 2/4 K 3.3 - supplemented  - K+ 3.9 2/8    # leukocytopenia  - WBC 4.09 2/1 --> 3.25 2/4 --> 2.48 2/7 --> 2.69 2/7 --> 3.35 2/9 improved  - likely from COVID, discussed with hospitalist  - monitor  - CBC in AM 2/11    # sleep  - melatonin 3mg PRN    #Pain Mgmt :   - Tylenol PRN mild pain  - Oxycodone 5mg IR PRN moderate 4-7/10 pain  - Oxycodone 10mg IR prn severe 8-10/10 pain  - Oxycontin ER BID (renewed 2/4)  - currently controlled 2/9    #GI/Bowel Mgmt/ constipation:   -continue Senna, miralax daily, MOM prn  -simethicone PRN gas  -prune juice with meal trays    #FEN :  - Diet - Regular     # DVT prophylaxis :  - Lovenox BID; f/u re: eliquis  -LE edema:  ACE wraps LE     #Dispo discussed in IDT meeting 2/3  - mod A bathing and with lower body dressing; min a to constant with transfer with 2-3 attempt and lots of coaching, mod A with bed mob; contact - min A transfer sit to stand; min A with ambulation; Stairs have not been performed  -goals: min assist bADLs, min assist transfers and ambulation  -will need to see if family able to provide min assist level at home, target dc 2/12 with 24h our care and home Pt and OT if available, otherwise [facility]  Spoke with daughter Adeline [phone] 2/8. Reviewed medications, Ct a/P results, plan for ID evaluation    #LABS:  CTA, changed to urgent 2/9  f/u NSGY re: clearance to restart eliquis  CBC BMP 2/11     KAI THOMAS is a 75y F PMH HTN, HLD, SLE, RA, hypothyroidism, provoked R. 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 Jaja Campos. Staples removed   - continue Comprehensive Rehab Program of PT/OT- 3 hrs/day 5 days/week  - psychological support, counseling  - Precautions:  spinal, RA, cardiac, contact and droplet precautions    #New COVID conversion  -PCR + 1/29/21. Moved up to [facility] for isolation. COVID IgG Ab neg  - Ct A/P 2/5: 1. Pattern of lung base groundglass opacification suggests infection including atypical pneumonia/viral infection from agents including COVID-19  - started on remdesivir 2/6 Day #4/6 2/9. LFTs and Cr 2/8 stable  - ID consult appreciated 2/8. Recommendations reviewed with patient , 6 day course remdesivir and 10 day decadron   -CTA pending as below  -deep breathing exercises. Patient refuses incentive spirometer  -continue contact and droplet precautions    #abdominal pain -- resolved  - abdominal CT -2/5: Colonic diverticulosis without CT evidence of acute diverticulitis.    # Chest soreness  - Chest Xray(2/5) - The lungs show stable ill-defined opacities LEFT lung base. LEFT upper lobe and RIGHT lung parenchyma clear.. No pneumothorax. The  heart is enlarged in transverse diameter. No hilar mass.  -proper posture and reducing UE use in standing activities also discussed  - Pending CTA to r/o PE. Patient and family aware    #SLE/RA  -continue plaquenil 200mg po daily  -(prevously on 15mg ( 2.5mg ) weekly).  Discussed with rheumatologist, defer for 3 weeks post Covid     #h/o Provoked DVT in 2019  - US July 2019:  DVT noted in the right popliteal vein and visualized segment of the right  posterior tibial vein.  - was on eliquis 5mg po bid at home,   - currently on lovenox for dvt ppx, to resume eliquis at discharge. Discussed with hospitalist/previous treating team whether can be switched back to eliquis as patient does not want injections 2/4. Cleared by vascular to resume eliquis, will need clearance from NSGY to restart  - call placed to NS[facility], awaiting return -- will follow up again pending on results from CTA  -Follows with vascular cardiologist Dr. Hoyos outpatient    #HTN:  - BP stable off meds     #HLD:  - continue lipitor 20mg po daily    #hypothyroidism:  - continue levothyroxine 200mcg po daily    #hypokalemia - resolved  - 2/4 K 3.3 - supplemented  - K+ 3.9 2/8    # leukocytopenia  - WBC 4.09 2/1 --> 3.25 2/4 --> 2.48 2/7 --> 2.69 2/7 --> 3.35 2/9 improved  - likely from COVID, discussed with hospitalist  - monitor  - CBC in AM 2/11    # sleep  - melatonin 3mg PRN    #Pain Mgmt :   - Tylenol PRN mild pain  - Oxycodone 5mg IR PRN moderate 4-7/10 pain  - Oxycodone 10mg IR prn severe 8-10/10 pain  - Oxycontin ER BID (renewed 2/4)  - currently controlled 2/9    #GI/Bowel Mgmt/ constipation:   -continue Senna, miralax daily, MOM prn  -simethicone PRN gas  -prune juice with meal trays    #FEN :  - Diet - Regular     # DVT prophylaxis :  - Lovenox BID; f/u re: eliquis  -LE edema:  ACE wraps LE     #Dispo discussed in IDT meeting 2/3  - mod A bathing and with lower body dressing; min a to constant with transfer with 2-3 attempt and lots of coaching, mod A with bed mob; contact - min A transfer sit to stand; min A with ambulation; Stairs have not been performed  -goals: min assist bADLs, min assist transfers and ambulation  -will need to see if family able to provide min assist level at home, target dc 2/12 with 24h our care and home Pt and OT if available, otherwise [facility]  Spoke with daughter Adeline [phone] 2/8. Reviewed medications, Ct a/P results, plan for ID evaluation    #LABS:  CTA, changed to urgent 2/9  f/u NSGY re: clearance to restart eliquis  CBC BMP 2/11    addendum 5:02 PM:   CTA performed 4:25 PM, awaiting read. Discussed with daughter 5:00PM KAI THOMAS is a 75y F PMH HTN, HLD, SLE, RA, hypothyroidism, provoked R. 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 Jaja Campos. Staples removed   - continue Comprehensive Rehab Program of PT/OT- 3 hrs/day 5 days/week  - psychological support, counseling  - Precautions:  spinal, RA, cardiac, contact and droplet precautions    #New COVID conversion  -PCR + 1/29/21. Moved up to [facility] for isolation. COVID IgG Ab neg  - Ct A/P 2/5: 1. Pattern of lung base groundglass opacification suggests infection including atypical pneumonia/viral infection from agents including COVID-19  - started on remdesivir 2/6 Day #4/6 2/9. LFTs and Cr 2/8 stable  - ID consult appreciated 2/8. Recommendations reviewed with patient , 6 day course remdesivir and 10 day decadron   -CTA pending as below  -deep breathing exercises. Patient refuses incentive spirometer  -continue contact and droplet precautions    #abdominal pain -- resolved  - abdominal CT -2/5: Colonic diverticulosis without CT evidence of acute diverticulitis.    # Chest soreness  - Chest Xray(2/5) - The lungs show stable ill-defined opacities LEFT lung base. LEFT upper lobe and RIGHT lung parenchyma clear.. No pneumothorax. The  heart is enlarged in transverse diameter. No hilar mass.  -proper posture and reducing UE use in standing activities also discussed  - Pending CTA to r/o PE. Patient and family aware    #SLE/RA  -continue plaquenil 200mg po daily  -(prevously on 15mg ( 2.5mg ) weekly).  Discussed with rheumatologist, defer for 3 weeks post Covid     #h/o Provoked DVT in 2019  - US July 2019:  DVT noted in the right popliteal vein and visualized segment of the right  posterior tibial vein.  - was on eliquis 5mg po bid at home,   - currently on lovenox for dvt ppx, to resume eliquis at discharge. Discussed with hospitalist/previous treating team whether can be switched back to eliquis as patient does not want injections 2/4. Cleared by vascular to resume eliquis, will need clearance from NSGY to restart  - call placed to NSGY, awaiting return -- will follow up again pending on results from CTA  -Follows with vascular cardiologist Dr. Hoyos outpatient    #HTN:  - BP stable off meds     #HLD:  - continue lipitor 20mg po daily    #hypothyroidism:  - continue levothyroxine 200mcg po daily    #hypokalemia - resolved  - 2/4 K 3.3 - supplemented  - K+ 3.9 2/8    # leukocytopenia  - WBC 4.09 2/1 --> 3.25 2/4 --> 2.48 2/7 --> 2.69 2/7 --> 3.35 2/9 improved  - likely from COVID, discussed with hospitalist  - monitor  - CBC in AM 2/11    # sleep  - melatonin 3mg PRN    #Pain Mgmt :   - Tylenol PRN mild pain  - Oxycodone 5mg IR PRN moderate 4-7/10 pain  - Oxycodone 10mg IR prn severe 8-10/10 pain  - Oxycontin ER BID (renewed 2/4)  - currently controlled 2/9    #GI/Bowel Mgmt/ constipation:   -continue Senna, miralax daily, MOM prn  -simethicone PRN gas  -prune juice with meal trays    #FEN :  - Diet - Regular     # DVT prophylaxis :  - Lovenox BID; f/u re: eliquis  -LE edema:  ACE wraps LE     #Dispo discussed in IDT meeting 2/3  - mod A bathing and with lower body dressing; min a to constant with transfer with 2-3 attempt and lots of coaching, mod A with bed mob; contact - min A transfer sit to stand; min A with ambulation; Stairs have not been performed  -goals: min assist bADLs, min assist transfers and ambulation  -will need to see if family able to provide min assist level at home, target dc 2/12 with 24h our care and home Pt and OT if available, otherwise [facility]  Spoke with daughter Adeline [phone] 2/8. Reviewed medications, Ct a/P results, plan for ID evaluation    #LABS:  CTA, changed to urgent 2/9  f/u NSGY re: clearance to restart eliquis  CBC BMP 2/11    addendum 5:02 PM:   CTA performed 4:25 PM, awaiting read. Discussed with daughter 5:00PM    addendum 5:16 PM:    EXAM:  CT ANGIO CHEST (W)AW IC      PROCEDURE DATE:  02/09/2021        INTERPRETATION:  CLINICAL INFORMATION: Chest pain    COMPARISON: Chest x-ray 2/5/2021    PROCEDURE:  CT Angiography of the Chest.  90 ml of Omnipaque 350 was injected intravenously. 10 ml were discarded.  Sagittal and coronal reformats were performed as well as 3D (MIP) reconstructions.    FINDINGS:    PULMONARY ARTERIES: No pulmonary embolism to the segmental branches. Limited visualization of subsegmental branches secondary to a combination of respiratory motion and suboptimal bolus timing.    LUNGS AND LARGE AIRWAYS: The central airways are patent. Mild bilateral patchy airspace disease compatible with COVID-19 pneumonia. Status post left lung wedge resection.  PLEURA: No pleural abnormality.  VESSELS: Normal caliber aorta.  HEART: Cardiomegaly. No pericardial effusion. Coronary artery calcifications are present.  MEDIASTINUM AND LIZABETH: No adenopathy.  CHEST WALL AND LOWER NECK: No masses.  VISUALIZED UPPER ABDOMEN: Small hiatal hernia.  BONES: No acute bony abnormality. Spinal fusion hardware noted.    IMPRESSION:  *  No pulmonary embolism to the segmental branches. Limited visualization of subsegmental branches secondary to a combination of respiratory motion and suboptimal bolus timing.  *  Mild bilateral patchy airspace disease compatible with COVID-19 pneumonia.        NISHANT HART MD; Attending Radiologist  This document has been electronically signed. Feb 9 2021  5:02PM    Reviewed in detail with family 5:32 PM KAI THOMAS is a 75y F PMH HTN, HLD, SLE, RA, hypothyroidism, provoked R. 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 Jaja Campos. Staples removed   - continue Comprehensive Rehab Program of PT/OT- 3 hrs/day 5 days/week  - psychological support, counseling  - Precautions:  spinal, RA, cardiac, contact and droplet precautions    #New COVID conversion  -PCR + 1/29/21. Moved up to [facility] for isolation. COVID IgG Ab neg  - Ct A/P 2/5: 1. Pattern of lung base groundglass opacification suggests infection including atypical pneumonia/viral infection from agents including COVID-19  - started on remdesivir 2/6 Day #4/5 2/9. LFTs and Cr 2/8 stable  - ID consult appreciated 2/8. Recommendations reviewed with patient , 5 day course remdesivir and 10 day decadron   -CTA pending as below  -deep breathing exercises. Patient refuses incentive spirometer  -continue contact and droplet precautions    #abdominal pain -- resolved  - abdominal CT -2/5: Colonic diverticulosis without CT evidence of acute diverticulitis.    # Chest soreness  - Chest Xray(2/5) - The lungs show stable ill-defined opacities LEFT lung base. LEFT upper lobe and RIGHT lung parenchyma clear.. No pneumothorax. The  heart is enlarged in transverse diameter. No hilar mass.  -proper posture and reducing UE use in standing activities also discussed  - Pending CTA to r/o PE. Patient and family aware    #SLE/RA  -continue plaquenil 200mg po daily  -(prevously on 15mg ( 2.5mg ) weekly).  Discussed with rheumatologist, defer for 3 weeks post Covid     #h/o Provoked DVT in 2019  - US July 2019:  DVT noted in the right popliteal vein and visualized segment of the right  posterior tibial vein.  - was on eliquis 5mg po bid at home,   - currently on lovenox for dvt ppx, to resume eliquis at discharge. Discussed with hospitalist/previous treating team whether can be switched back to eliquis as patient does not want injections 2/4. Cleared by vascular to resume eliquis, will need clearance from NSGY to restart  - call placed to NSGY, awaiting return -- will follow up again pending on results from CTA  -Follows with vascular cardiologist Dr. Hoyos outpatient    #HTN:  - BP stable off meds     #HLD:  - continue lipitor 20mg po daily    #hypothyroidism:  - continue levothyroxine 200mcg po daily    #hypokalemia - resolved  - 2/4 K 3.3 - supplemented  - K+ 3.9 2/8    # leukocytopenia  - WBC 4.09 2/1 --> 3.25 2/4 --> 2.48 2/7 --> 2.69 2/7 --> 3.35 2/9 improved  - likely from COVID, discussed with hospitalist  - monitor  - CBC in AM 2/11    # sleep  - melatonin 3mg PRN    #Pain Mgmt :   - Tylenol PRN mild pain  - Oxycodone 5mg IR PRN moderate 4-7/10 pain  - Oxycodone 10mg IR prn severe 8-10/10 pain  - Oxycontin ER BID (renewed 2/4)  - currently controlled 2/9    #GI/Bowel Mgmt/ constipation:   -continue Senna, miralax daily, MOM prn  -simethicone PRN gas  -prune juice with meal trays    #FEN :  - Diet - Regular     # DVT prophylaxis :  - Lovenox BID; f/u re: eliquis  -LE edema:  ACE wraps LE     #Dispo discussed in IDT meeting 2/3  - mod A bathing and with lower body dressing; min a to constant with transfer with 2-3 attempt and lots of coaching, mod A with bed mob; contact - min A transfer sit to stand; min A with ambulation; Stairs have not been performed  -goals: min assist bADLs, min assist transfers and ambulation  -will need to see if family able to provide min assist level at home, target dc 2/12 with 24h our care and home Pt and OT if available, otherwise [facility]  Spoke with daughter Adeline [phone] 2/8. Reviewed medications, Ct a/P results, plan for ID evaluation    #LABS:  CTA, changed to urgent 2/9  f/u NSGY re: clearance to restart eliquis  CBC BMP 2/11    addendum 5:02 PM:   CTA performed 4:25 PM, awaiting read. Discussed with daughter 5:00PM    addendum 5:16 PM:    EXAM:  CT ANGIO CHEST (W)AW IC      PROCEDURE DATE:  02/09/2021        INTERPRETATION:  CLINICAL INFORMATION: Chest pain    COMPARISON: Chest x-ray 2/5/2021    PROCEDURE:  CT Angiography of the Chest.  90 ml of Omnipaque 350 was injected intravenously. 10 ml were discarded.  Sagittal and coronal reformats were performed as well as 3D (MIP) reconstructions.    FINDINGS:    PULMONARY ARTERIES: No pulmonary embolism to the segmental branches. Limited visualization of subsegmental branches secondary to a combination of respiratory motion and suboptimal bolus timing.    LUNGS AND LARGE AIRWAYS: The central airways are patent. Mild bilateral patchy airspace disease compatible with COVID-19 pneumonia. Status post left lung wedge resection.  PLEURA: No pleural abnormality.  VESSELS: Normal caliber aorta.  HEART: Cardiomegaly. No pericardial effusion. Coronary artery calcifications are present.  MEDIASTINUM AND LIZABETH: No adenopathy.  CHEST WALL AND LOWER NECK: No masses.  VISUALIZED UPPER ABDOMEN: Small hiatal hernia.  BONES: No acute bony abnormality. Spinal fusion hardware noted.    IMPRESSION:  *  No pulmonary embolism to the segmental branches. Limited visualization of subsegmental branches secondary to a combination of respiratory motion and suboptimal bolus timing.  *  Mild bilateral patchy airspace disease compatible with COVID-19 pneumonia.        NISHANT HART MD; Attending Radiologist  This document has been electronically signed. Feb 9 2021  5:02PM    Reviewed in detail with family 5:32 PM

## 2021-02-10 LAB
ALBUMIN SERPL ELPH-MCNC: 2.6 G/DL — LOW (ref 3.3–5)
ALP SERPL-CCNC: 108 U/L — SIGNIFICANT CHANGE UP (ref 40–120)
ALT FLD-CCNC: 15 U/L — SIGNIFICANT CHANGE UP (ref 10–45)
ANION GAP SERPL CALC-SCNC: 8 MMOL/L — SIGNIFICANT CHANGE UP (ref 5–17)
APPEARANCE UR: ABNORMAL
AST SERPL-CCNC: 21 U/L — SIGNIFICANT CHANGE UP (ref 10–40)
BACTERIA # UR AUTO: ABNORMAL /HPF
BILIRUB SERPL-MCNC: 0.3 MG/DL — SIGNIFICANT CHANGE UP (ref 0.2–1.2)
BILIRUB UR-MCNC: NEGATIVE — SIGNIFICANT CHANGE UP
BUN SERPL-MCNC: 25 MG/DL — HIGH (ref 7–23)
CALCIUM SERPL-MCNC: 8.5 MG/DL — SIGNIFICANT CHANGE UP (ref 8.4–10.5)
CHLORIDE SERPL-SCNC: 110 MMOL/L — HIGH (ref 96–108)
CO2 SERPL-SCNC: 25 MMOL/L — SIGNIFICANT CHANGE UP (ref 22–31)
COLOR SPEC: YELLOW — SIGNIFICANT CHANGE UP
COMMENT - URINE: SIGNIFICANT CHANGE UP
CREAT SERPL-MCNC: 0.74 MG/DL — SIGNIFICANT CHANGE UP (ref 0.5–1.3)
DIFF PNL FLD: NEGATIVE — SIGNIFICANT CHANGE UP
EPI CELLS # UR: ABNORMAL
FERRITIN SERPL-MCNC: 161 NG/ML — HIGH (ref 15–150)
GLUCOSE SERPL-MCNC: 111 MG/DL — HIGH (ref 70–99)
GLUCOSE UR QL: NEGATIVE — SIGNIFICANT CHANGE UP
HCT VFR BLD CALC: 38.4 % — SIGNIFICANT CHANGE UP (ref 34.5–45)
HGB BLD-MCNC: 12.2 G/DL — SIGNIFICANT CHANGE UP (ref 11.5–15.5)
KETONES UR-MCNC: NEGATIVE — SIGNIFICANT CHANGE UP
LEUKOCYTE ESTERASE UR-ACNC: ABNORMAL
MCHC RBC-ENTMCNC: 29.5 PG — SIGNIFICANT CHANGE UP (ref 27–34)
MCHC RBC-ENTMCNC: 31.8 GM/DL — LOW (ref 32–36)
MCV RBC AUTO: 92.8 FL — SIGNIFICANT CHANGE UP (ref 80–100)
NITRITE UR-MCNC: NEGATIVE — SIGNIFICANT CHANGE UP
NRBC # BLD: 0 /100 WBCS — SIGNIFICANT CHANGE UP (ref 0–0)
PH UR: 6 — SIGNIFICANT CHANGE UP (ref 5–8)
PLATELET # BLD AUTO: 215 K/UL — SIGNIFICANT CHANGE UP (ref 150–400)
POTASSIUM SERPL-MCNC: 4.1 MMOL/L — SIGNIFICANT CHANGE UP (ref 3.5–5.3)
POTASSIUM SERPL-SCNC: 4.1 MMOL/L — SIGNIFICANT CHANGE UP (ref 3.5–5.3)
PROT SERPL-MCNC: 6 G/DL — SIGNIFICANT CHANGE UP (ref 6–8.3)
PROT UR-MCNC: 30 MG/DL
RBC # BLD: 4.14 M/UL — SIGNIFICANT CHANGE UP (ref 3.8–5.2)
RBC # FLD: 14.4 % — SIGNIFICANT CHANGE UP (ref 10.3–14.5)
RBC CASTS # UR COMP ASSIST: NEGATIVE /HPF — SIGNIFICANT CHANGE UP (ref 0–4)
SODIUM SERPL-SCNC: 143 MMOL/L — SIGNIFICANT CHANGE UP (ref 135–145)
SP GR SPEC: 1.02 — SIGNIFICANT CHANGE UP (ref 1.01–1.02)
UROBILINOGEN FLD QL: NEGATIVE — SIGNIFICANT CHANGE UP
WBC # BLD: 2.92 K/UL — LOW (ref 3.8–10.5)
WBC # FLD AUTO: 2.92 K/UL — LOW (ref 3.8–10.5)
WBC UR QL: ABNORMAL /HPF (ref 0–5)

## 2021-02-10 PROCEDURE — 99232 SBSQ HOSP IP/OBS MODERATE 35: CPT

## 2021-02-10 PROCEDURE — 99232 SBSQ HOSP IP/OBS MODERATE 35: CPT | Mod: CS

## 2021-02-10 RX ORDER — OXYCODONE HYDROCHLORIDE 5 MG/1
10 TABLET ORAL EVERY 4 HOURS
Refills: 0 | Status: DISCONTINUED | OUTPATIENT
Start: 2021-02-10 | End: 2021-02-16

## 2021-02-10 RX ORDER — OXYCODONE HYDROCHLORIDE 5 MG/1
10 TABLET ORAL EVERY 12 HOURS
Refills: 0 | Status: DISCONTINUED | OUTPATIENT
Start: 2021-02-10 | End: 2021-02-17

## 2021-02-10 RX ORDER — OXYCODONE HYDROCHLORIDE 5 MG/1
5 TABLET ORAL EVERY 4 HOURS
Refills: 0 | Status: DISCONTINUED | OUTPATIENT
Start: 2021-02-10 | End: 2021-02-16

## 2021-02-10 RX ADMIN — LIDOCAINE 1 PATCH: 4 CREAM TOPICAL at 12:30

## 2021-02-10 RX ADMIN — Medication 1 MILLIGRAM(S): at 12:30

## 2021-02-10 RX ADMIN — Medication 200 MILLIGRAM(S): at 07:25

## 2021-02-10 RX ADMIN — LIDOCAINE 1 PATCH: 4 CREAM TOPICAL at 00:28

## 2021-02-10 RX ADMIN — ENOXAPARIN SODIUM 40 MILLIGRAM(S): 100 INJECTION SUBCUTANEOUS at 17:38

## 2021-02-10 RX ADMIN — Medication 500 MILLIGRAM(S): at 12:30

## 2021-02-10 RX ADMIN — BENZOCAINE AND MENTHOL 1 LOZENGE: 5; 1 LIQUID ORAL at 12:30

## 2021-02-10 RX ADMIN — LIDOCAINE 1 PATCH: 4 CREAM TOPICAL at 22:19

## 2021-02-10 RX ADMIN — OXYCODONE HYDROCHLORIDE 10 MILLIGRAM(S): 5 TABLET ORAL at 18:00

## 2021-02-10 RX ADMIN — BENZOCAINE AND MENTHOL 1 LOZENGE: 5; 1 LIQUID ORAL at 05:36

## 2021-02-10 RX ADMIN — OXYCODONE HYDROCHLORIDE 10 MILLIGRAM(S): 5 TABLET ORAL at 05:33

## 2021-02-10 RX ADMIN — OXYCODONE HYDROCHLORIDE 10 MILLIGRAM(S): 5 TABLET ORAL at 13:19

## 2021-02-10 RX ADMIN — ENOXAPARIN SODIUM 40 MILLIGRAM(S): 100 INJECTION SUBCUTANEOUS at 05:33

## 2021-02-10 RX ADMIN — OXYCODONE HYDROCHLORIDE 10 MILLIGRAM(S): 5 TABLET ORAL at 08:41

## 2021-02-10 RX ADMIN — OXYCODONE HYDROCHLORIDE 10 MILLIGRAM(S): 5 TABLET ORAL at 17:38

## 2021-02-10 RX ADMIN — ATORVASTATIN CALCIUM 20 MILLIGRAM(S): 80 TABLET, FILM COATED ORAL at 22:15

## 2021-02-10 RX ADMIN — Medication 200 MICROGRAM(S): at 05:33

## 2021-02-10 RX ADMIN — Medication 650 MILLIGRAM(S): at 14:25

## 2021-02-10 RX ADMIN — Medication 6 MILLIGRAM(S): at 05:34

## 2021-02-10 NOTE — PROGRESS NOTE ADULT - SUBJECTIVE AND OBJECTIVE BOX
Patient is a 75y old  Female who presents with a chief complaint of Back pain (10 Feb 2021 11:06)    Patient seen and examined at bedside. denies headache, fever, chills, sob, n/v, abd pain. continues with intermittent discomfort under left breast      ALLERGIES:  No Known Allergies    MEDICATIONS  (STANDING):  ascorbic acid 500 milliGRAM(s) Oral daily  atorvastatin 20 milliGRAM(s) Oral at bedtime  benzocaine 15 mG/menthol 3.6 mG (Sugar-Free) Lozenge 1 Lozenge Oral four times a day  dexAMETHasone  Injectable 6 milliGRAM(s) IV Push daily  enoxaparin Injectable 40 milliGRAM(s) SubCutaneous two times a day  folic acid 1 milliGRAM(s) Oral daily  hydroxychloroquine 200 milliGRAM(s) Oral <User Schedule>  levothyroxine 200 MICROGram(s) Oral daily  lidocaine   Patch 1 Patch Transdermal daily  oxyCODONE  ER Tablet 10 milliGRAM(s) Oral every 12 hours  polyethylene glycol 3350 17 Gram(s) Oral daily  senna 2 Tablet(s) Oral at bedtime    MEDICATIONS  (PRN):  acetaminophen   Tablet .. 650 milliGRAM(s) Oral every 6 hours PRN Temp greater or equal to 38C (100.4F), Mild Pain (1 - 3)  magnesium hydroxide Suspension 30 milliLiter(s) Oral daily PRN Constipation  melatonin 3 milliGRAM(s) Oral at bedtime PRN Insomnia  ondansetron    Tablet 4 milliGRAM(s) Oral every 8 hours PRN Nausea and/or Vomiting  oxyCODONE    IR 5 milliGRAM(s) Oral every 4 hours PRN Moderate Pain (4 - 6)  oxyCODONE    IR 10 milliGRAM(s) Oral every 4 hours PRN Severe Pain (7 - 10)  simethicone 80 milliGRAM(s) Chew every 6 hours PRN Upset Stomach    Vital Signs Last 24 Hrs  T(F): 97.7 (10 Feb 2021 07:23), Max: 97.7 (10 Feb 2021 07:23)  HR: 55 (10 Feb 2021 07:23) (55 - 55)  BP: 128/76 (10 Feb 2021 07:23) (128/76 - 128/76)  RR: 15 (10 Feb 2021 07:23) (15 - 15)  SpO2: 96% (10 Feb 2021 07:23) (96% - 96%)  I&O's Summary      PHYSICAL EXAM:  General: NAD, A/O x 3  ENT: MMM, no scleral icterus  Neck: Supple, No JVD  Lungs: Respirations unlabored. Clear to auscultation bilaterally, no wheezes, rales, rhonchi  Cardio: RRR, S1/S2, No murmurs  Abdomen: Soft, Nontender, Nondistended; Bowel sounds present  Extremities: No calf tenderness, + LE edema b/l    LABS:                        12.2   2.92  )-----------( 215      ( 10 Feb 2021 05:45 )             38.4       02-10    143  |  110  |  25  ----------------------------<  111  4.1   |  25  |  0.74    Ca    8.5      10 Feb 2021 05:45    TPro  6.0  /  Alb  2.6  /  TBili  0.3  /  DBili  x   /  AST  21  /  ALT  15  /  AlkPhos  108  02-10     eGFR if African American: 92 mL/min/1.73M2 (02-10-21 @ 05:45)  eGFR if Non African American: 79 mL/min/1.73M2 (02-10-21 @ 05:45)    RADIOLOGY & ADDITIONAL TESTS: reviewed    Care Discussed with Consultants/Other Providers: yes

## 2021-02-10 NOTE — PROGRESS NOTE ADULT - ASSESSMENT
KAI THOMAS is a 75y F PMH HTN, HLD, SLE, RA, hypothyroidism, provoked R. 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 Jaja Campos. Staples removed   - continue Comprehensive Rehab Program of PT/OT- 3 hrs/day 5 days/week  - psychological support, counseling  - Precautions:  spinal, RA, cardiac, contact and droplet precautions    #New COVID conversion  -PCR + 1/29/21. Moved up to [facility] for isolation. COVID IgG Ab neg  - Ct A/P 2/5: 1. Pattern of lung base groundglass opacification suggests infection including atypical pneumonia/viral infection from agents including COVID-19  - completed remdesivir.  LFTs and Cr 2/10 stable  - ID consult appreciated 2/8. Recommendations reviewed with patient , 5 day course remdesivir and 10 day decadron   -CTA neg for DVT  -deep breathing exercises. Patient refuses incentive spirometer  -continue contact and droplet precautions    #abdominal pain -- resolved  - abdominal CT -2/5: Colonic diverticulosis without CT evidence of acute diverticulitis.    # Chest soreness  - Chest Xray(2/5) - The lungs show stable ill-defined opacities LEFT lung base. LEFT upper lobe and RIGHT lung parenchyma clear.. No pneumothorax. The  heart is enlarged in transverse diameter. No hilar mass.  -proper posture and reducing UE use in standing activities also discussed  - CTA neg for DVT    #SLE/RA  -continue plaquenil 200mg po daily  -(prevously on 15mg ( 2.5mg ) weekly).  Discussed with rheumatologist, defer for 3 weeks post Covid     #h/o Provoked DVT in 2019  - US July 2019:  DVT noted in the right popliteal vein and visualized segment of the right  posterior tibial vein.  - was on eliquis 5mg po bid at home,   - currently on lovenox for dvt ppx, to resume eliquis at discharge. Discussed with hospitalist/previous treating team whether can be switched back to eliquis as patient does not want injections 2/4. Cleared by vascular to resume eliquis, will need clearance from NSGY to restart  - call placed to NSGY, awaiting return   -Follows with vascular cardiologist Dr. Hoyos outpatient    #HTN:  - BP stable off meds     #HLD:  - continue lipitor 20mg po daily    #hypothyroidism:  - continue levothyroxine 200mcg po daily    #hypokalemia - resolved  - 2/4 K 3.3 - supplemented  - K+ 3.9 2/8    # leukocytopenia  - WBC 4.09 2/1 --> 3.25 2/4 --> 2.48 2/7 --> 2.69 2/7 --> 3.35 --> 2.92 2/10   - likely from COVID, discussed with hospitalist  - monitor  - CBC in AM 2/11    # sleep  - melatonin 3mg PRN    #Pain Mgmt :   - Tylenol PRN mild pain  - Oxycodone 5mg IR PRN moderate 4-7/10 pain  - Oxycodone 10mg IR prn severe 8-10/10 pain  - Oxycontin ER BID (renewed 2/4)  - currently controlled 2/9    #GI/Bowel Mgmt/ constipation:   -continue Senna, miralax daily, MOM prn  -simethicone PRN gas  -prune juice with meal trays    #FEN :  - Diet - Regular     # DVT prophylaxis :  - Lovenox BID; f/u re: eliquis  -LE edema:  ACE wraps LE     #Dispo discussed in IDT meeting 2/3  - mod A bathing and with lower body dressing; min a to constant with transfer with 2-3 attempt and lots of coaching, mod A with bed mob; contact - min A transfer sit to stand; min A with ambulation; Stairs have not been performed  -goals: min assist bADLs, min assist transfers and ambulation  -will need to see if family able to provide min assist level at home, target dc 2/12 with 24h our care and home Pt and OT if available, otherwise [facility]  Spoke with daughter Adeline [phone] 2/8. Reviewed medications, Ct a/P results, plan for ID evaluation    #LABS:  f/u NSGY re: clearance to restart eliquis  CBC BMP 2/11       KAI THOMAS is a 75y F PMH HTN, HLD, SLE, RA, hypothyroidism, provoked R. 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 Jaja Campos. Staples removed   - continue Comprehensive Rehab Program of PT/OT- 3 hrs/day 5 days/week  - psychological support, counseling  - Precautions:  spinal, RA, cardiac, contact and droplet precautions    #New COVID conversion  -PCR + 1/29/21. Moved up to [facility] for isolation. COVID IgG Ab neg  - Ct A/P 2/5: 1. Pattern of lung base groundglass opacification suggests infection including atypical pneumonia/viral infection from agents including COVID-19  - completed remdesivir.  LFTs and Cr 2/10 stable  - ID consult appreciated 2/8. Recommendations reviewed with patient , 5 day course remdesivir and 10 day decadron   -CTA neg for DVT  -deep breathing exercises. Patient refuses incentive spirometer  -continue contact and droplet precautions    #abdominal pain -- resolved  - abdominal CT -2/5: Colonic diverticulosis without CT evidence of acute diverticulitis.    # Chest soreness  - Chest Xray(2/5) - The lungs show stable ill-defined opacities LEFT lung base. LEFT upper lobe and RIGHT lung parenchyma clear.. No pneumothorax. The  heart is enlarged in transverse diameter. No hilar mass.  -proper posture and reducing UE use in standing activities also discussed  - CTA neg for DVT    #SLE/RA  -continue plaquenil 200mg po daily  -(prevously on 15mg ( 2.5mg ) weekly).  Discussed with rheumatologist, defer for 3 weeks post Covid     #h/o Provoked DVT in 2019  - US July 2019:  DVT noted in the right popliteal vein and visualized segment of the right  posterior tibial vein.  - was on eliquis 5mg po bid at home,   - currently on lovenox for dvt ppx, to resume eliquis at discharge. Discussed with hospitalist/previous treating team whether can be switched back to eliquis as patient does not want injections 2/4. Cleared by vascular to resume eliquis, will need clearance from NSGY to restart  - call placed to NSGY, awaiting return   -Follows with vascular cardiologist Dr. Hoyos outpatient    #HTN:  - BP stable off meds     #HLD:  - continue lipitor 20mg po daily    #hypothyroidism:  - continue levothyroxine 200mcg po daily    #hypokalemia - resolved  - 2/4 K 3.3 - supplemented  - K+ 3.9 2/8    # leukocytopenia  - WBC 4.09 2/1 --> 3.25 2/4 --> 2.48 2/7 --> 2.69 2/7 --> 3.35 --> 2.92 2/10   - likely from COVID, discussed with hospitalist  - monitor  - CBC in AM 2/11    # sleep  - melatonin 3mg PRN    #Pain Mgmt :   - Tylenol PRN mild pain  - Oxycodone 5mg IR PRN moderate 4-7/10 pain  - Oxycodone 10mg IR prn severe 8-10/10 pain  - Oxycontin ER BID (renewed 2/4)  - currently controlled 2/9    #GI/Bowel Mgmt/ constipation:   -continue Senna, miralax daily, MOM prn  -simethicone PRN gas  -prune juice with meal trays    #FEN :  - Diet - Regular     # DVT prophylaxis :  - Lovenox BID; f/u re: eliquis  -LE edema:  ACE wraps LE     #Dispo discussed in IDT meeting 2/10  - mod A bathing, toileting, setup UBD, min + coaching with LBD; min a to CG with transfer; supervision + constant coaching with transfer, 100ft RQ with supervision  -goals: min assist ADLs, min assist transfers and ambulation  -will need to see if family able to provide min assist level at home, target dc 2/16 with 24h our care and home Pt and OT if available, otherwise [facility]  Spoke with daughter Adeline [phone] 2/8. Reviewed medications, Ct a/P results, plan for ID evaluation    #LABS:  f/u NSGY re: clearance to restart eliquis  CBC BMP 2/11       KAI THOMAS is a 75y F PMH HTN, HLD, SLE, RA, hypothyroidism, provoked R. 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 Jaja Campos. Staples removed   - continue Comprehensive Rehab Program of PT/OT- 3 hrs/day 5 days/week  - psychological support, counseling  - Precautions:  spinal, RA, cardiac, contact and droplet precautions    #New COVID conversion  -PCR + 1/29/21. Moved up to [facility] for isolation. COVID IgG Ab neg  - Ct A/P 2/5: 1. Pattern of lung base groundglass opacification suggests infection including atypical pneumonia/viral infection from agents including COVID-19  - completed remdesivir.  LFTs and Cr 2/10 stable  - ID consult appreciated 2/10. Completed 5 day course remdesivir, on day 6/ 10 day decadron   -CTA 2/9:  No pulmonary embolism to the segmental branches. Limited visualization of subsegmental branches secondary to a combination of respiratory motion and suboptimal bolus timing. Mild bilateral patchy airspace disease compatible with COVID-19 pneumonia. Reviewed with patient and family  -deep breathing exercises. Patient refuses incentive spirometer  -continue contact and droplet precautions    #abdominal pain  resolved  - abdominal CT -2/5: Colonic diverticulosis without CT evidence of acute diverticulitis.    # Chest soreness  - Chest Xray(2/5) - The lungs show stable ill-defined opacities LEFT lung base. LEFT upper lobe and RIGHT lung parenchyma clear.. No pneumothorax. The  heart is enlarged in transverse diameter. No hilar mass.  - CTA 2/9 neg for PE  -proper posture and reducing UE use in standing activities also discussed. Improved posture with use of platform walker in therapy 2/10    #SLE/RA  -continue plaquenil 200mg po daily  -(previously on 15mg ( 2.5mg ) weekly).  Discussed with rheumatologist, defer for 3 weeks post Covid     #h/o Provoked DVT in 2019  - US July 2019:  DVT noted in the right popliteal vein and visualized segment of the right  posterior tibial vein.  - was on eliquis 5mg po bid at home,   - currently on lovenox for dvt ppx, to resume eliquis at discharge. Discussed with hospitalist/previous treating team whether can be switched back to eliquis as patient does not want injections 2/4. Cleared by vascular to resume eliquis, will need clearance from NSGY to restart  -NSGY clearance to restart eliquis pending  -Follows with vascular cardiologist Dr. Hoyos outpatient    #HTN:  - BP stable off meds     #HLD:  - continue lipitor 20mg po daily    #hypothyroidism:  - continue levothyroxine 200mcg po daily    #hypokalemia - resolved  - 2/4 K 3.3 - supplemented  - K+ 3.9 2/8    # leukocytopenia  - WBC 4.09 2/1 --> 3.25 2/4 --> 2.48 2/7 --> 2.69 2/7 --> 3.35 --> 2.92 2/10   - likely from COVID, discussed with hospitalist  - monitor  - CBC in AM 2/11    # sleep  - melatonin 3mg PRN    #Pain Mgmt :   - Tylenol PRN mild pain  - Oxycodone 5mg IR PRN moderate 4-7/10 pain  - Oxycodone 10mg IR prn severe 8-10/10 pain  - Oxycontin ER BID (renewed 2/4)  - currently controlled 2/9    #GI/Bowel Mgmt/ constipation:   -continue Senna, miralax daily, MOM prn  -simethicone PRN gas  -prune juice with meal trays    #FEN :  - Diet - Regular     # DVT prophylaxis :  - Lovenox BID; f/u re: eliquis  -LE edema:  ACE wraps LE     #Dispo discussed in IDT meeting 2/10  - mod A bathing, toileting, setup UBD, min + coaching with LBD; min a to CG with transfer; supervision + constant coaching with transfer, 100ft RQ with supervision  -goals: min assist ADLs, min assist transfers and ambulation  -will need to see if family able to provide min assist level at home, target dc 2/16 with 24h our care and home Pt and OT if available, otherwise [facility]    Spoke with daughter Adeline [phone] 2/9. Reviewed  CTA results, ID recs    #LABS:  f/u NSGY re: clearance to restart eliquis  CBC BMP 2/11       KAI THOMAS is a 75y F PMH HTN, HLD, SLE, RA, hypothyroidism, provoked R. 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 Jaja Campos. Staples removed   - continue Comprehensive Rehab Program of PT/OT- 3 hrs/day 5 days/week  - psychological support, counseling  - Precautions:  spinal, RA, cardiac, contact and droplet precautions    #New COVID conversion  -PCR + 1/29/21. Moved up to [facility] for isolation. COVID IgG Ab neg  - Ct A/P 2/5: 1. Pattern of lung base groundglass opacification suggests infection including atypical pneumonia/viral infection from agents including COVID-19  - completed remdesivir.  LFTs and Cr 2/10 stable  - ID consult appreciated 2/10. Completed 5 day course remdesivir, on day 6/ 10 day decadron   -CTA 2/9:  No pulmonary embolism to the segmental branches. Limited visualization of subsegmental branches secondary to a combination of respiratory motion and suboptimal bolus timing. Mild bilateral patchy airspace disease compatible with COVID-19 pneumonia. Reviewed with patient and family  -deep breathing exercises. Patient refuses incentive spirometer  -continue contact and droplet precautions    #abdominal pain  resolved  - abdominal CT -2/5: Colonic diverticulosis without CT evidence of acute diverticulitis.    # Chest soreness  - Chest Xray(2/5) - The lungs show stable ill-defined opacities LEFT lung base. LEFT upper lobe and RIGHT lung parenchyma clear.. No pneumothorax. The  heart is enlarged in transverse diameter. No hilar mass.  - CTA 2/9 neg for PE  -proper posture and reducing UE use in standing activities also discussed. Improved posture with use of platform walker in therapy 2/10    #SLE/RA  -continue plaquenil 200mg po daily  -(previously on 15mg ( 2.5mg ) weekly).  Discussed with rheumatologist, defer for 3 weeks post Covid     #h/o Provoked DVT in 2019  - US July 2019:  DVT noted in the right popliteal vein and visualized segment of the right  posterior tibial vein.  - was on eliquis 5mg po bid at home,   - currently on lovenox for dvt ppx, to resume eliquis at discharge. Discussed with hospitalist/previous treating team whether can be switched back to eliquis as patient does not want injections 2/4. Cleared by vascular to resume eliquis, will need clearance from NSGY to restart  -NSGY clearance to restart eliquis pending  -Follows with vascular cardiologist Dr. Hoyos outpatient    #HTN:  - BP stable off meds     #HLD:  - continue lipitor 20mg po daily    #hypothyroidism:  - continue levothyroxine 200mcg po daily    #hypokalemia - resolved  - 2/4 K 3.3 - supplemented  - K+ 3.9 2/8    # leukocytopenia  - WBC 4.09 2/1 --> 3.25 2/4 --> 2.48 2/7 --> 2.69 2/7 --> 3.35 --> 2.92 2/10   - likely from COVID, discussed with hospitalist  - monitor  - CBC in AM 2/11    # sleep  - melatonin 3mg PRN    #Pain Mgmt :   - Tylenol PRN mild pain  - Oxycodone 5mg IR PRN moderate 4-7/10 pain  - Oxycodone 10mg IR prn severe 8-10/10 pain  - Oxycontin ER BID (renewed 2/4)  - currently controlled 2/9    #GI/Bowel Mgmt/ constipation:   -continue Senna, miralax daily, MOM prn  -simethicone PRN gas  -prune juice with meal trays    #  - patient endorsed urinary frequency with PT. will send UA and C+S 2/10    #FEN :  - Diet - Regular     # DVT prophylaxis :  - Lovenox BID; f/u re: eliquis  -LE edema:  ACE wraps LE     #Dispo discussed in IDT meeting 2/10  - mod A bathing, toileting, setup UBD, min + coaching with LBD; min a to CG with transfer; supervision + constant coaching with transfer, 100ft RQ with supervision  -goals: min assist ADLs, min assist transfers and ambulation  -will need to see if family able to provide min assist level at home, target dc 2/16 with 24h our care and home Pt and OT if available, otherwise [facility]    Spoke with daughter Adeline [phone] 2/9. Reviewed  CTA results, ID recs    #LABS:  f/u NSGY re: clearance to restart eliquis  CBC BMP 2/11

## 2021-02-10 NOTE — PROGRESS NOTE ADULT - SUBJECTIVE AND OBJECTIVE BOX
-----------------------------------------------------------------------  Patient is a 75y old  Female who presents with a chief complaint of Back pain (09 Feb 2021 10:35)      HPI:  75F with past medical history of HTN, HLD, SLE, RA, hypothyroidism, provoked R. popliteal DVT in 2019 on eliquis, chronic low back pain presents to ED for acute on chronic low back pain. Pt has several months of chronic lumbosacral back pain, evaluated by outpatient ortho and referred for physical therapy and pain management. She has been receiving ?vitamin spinal injections. She last received low back inj on 12/16 after which she her low back pain worsened. She took oxycodone 5 mg qd (prescribed in september during a ED visit) and acetaminophen for past three days without relief. She also endorses worsening pain during ambulation with walker and has affected her quality of life. She has constipation, last BM 4 days ago. Otherwise denies fever, chills, N/V, abdominal pain, dysuria, urinary retention, fecal incontinence, saddle anesthesia, fall, LOC, trauma. During ROS she endorsed LLE weakness for several months, no numbness or tingling and has LLE edema >RLE edema, reported undergoing LLE US 4 months ago, was negative for DVT. Currently has 8/10 pain, improved to 5/10 with oxycodone 5 mg x 2 in ED. (28 Dec 2020 11:16)   12/28: Mri T/Ls: IMPRESSION: Degenerative changes  Abnormal signal with associated enhancement is seen involving the endplates adjacent to the L2-3 disc space. There is slight increased T2 prolongation involving the L2-3 disc space with associated widening. While these findings could be compatible with degenerative changes possibly of early discitis and osteomyelitis must be considered.  Patient s/p L1-L5 laminectomy, T10-L5 posterior surgical fusion by Dr. Campos on 1/1/21  HMV x 3 drains. Post op with constipation needing aggressive bowel regimen.     PT/OT and PM&R evaluated patient and recommended Rehab.     Patient medically cleared and admitted to  Rehab on 1/12/21 (12 Jan 2021 15:21)      PAST MEDICAL & SURGICAL HISTORY:  HTN (hypertension)    DVT (deep venous thrombosis)    Hypothyroidism    RA (rheumatoid arthritis)    Obesity, Class II, BMI 35-39.9, no comorbidity    Hypothyroid    Benign heart murmur    Hyperlipidemia    Hypertension    H/O degenerative disc disease    Osteoarthritis    SLE (systemic lupus erythematosus)    Rheumatoid arthritis    S/P knee replacement    Status post total hip replacement, right    S/P thyroid surgery  1 lobe removed    Lung cancer  small tumor removed 2015    S/P knee surgery  bilateral    S/P carpal tunnel release  left    S/P eye surgery  child    S/P cataract surgery  left    S/P tonsillectomy        Allergies    No Known Allergies    Intolerances    -----------------------------------------------------------------------  VITALS  75y  Vital Signs Last 24 Hrs  T(C): 36.5 (10 Feb 2021 07:23), Max: 36.5 (10 Feb 2021 07:23)  T(F): 97.7 (10 Feb 2021 07:23), Max: 97.7 (10 Feb 2021 07:23)  HR: 55 (10 Feb 2021 07:23) (55 - 55)  BP: 128/76 (10 Feb 2021 07:23) (128/76 - 128/76)  BP(mean): --  RR: 15 (10 Feb 2021 07:23) (15 - 15)  SpO2: 96% (10 Feb 2021 07:23) (96% - 96%)  -----------------------------------------------------------------------  RECENT LABS:             LAB                        12.2   2.92  )-----------( 215      ( 10 Feb 2021 05:45 )             38.4     02-10    143  |  110<H>  |  25<H>  ----------------------------<  111<H>  4.1   |  25  |  0.74    Ca    8.5      10 Feb 2021 05:45    TPro  6.0  /  Alb  2.6<L>  /  TBili  0.3  /  DBili  x   /  AST  21  /  ALT  15  /  AlkPhos  108  02-10    LIVER FUNCTIONS - ( 10 Feb 2021 05:45 )  Alb: 2.6 g/dL / Pro: 6.0 g/dL / ALK PHOS: 108 U/L / ALT: 15 U/L / AST: 21 U/L / GGT: x             IMAGING: none in last 24hr  -----------------------------------------------------------------------  MEDICATIONS  (STANDING):  ascorbic acid 500 milliGRAM(s) Oral daily  atorvastatin 20 milliGRAM(s) Oral at bedtime  benzocaine 15 mG/menthol 3.6 mG (Sugar-Free) Lozenge 1 Lozenge Oral four times a day  dexAMETHasone  Injectable 6 milliGRAM(s) IV Push daily  enoxaparin Injectable 40 milliGRAM(s) SubCutaneous two times a day  folic acid 1 milliGRAM(s) Oral daily  hydroxychloroquine 200 milliGRAM(s) Oral <User Schedule>  levothyroxine 200 MICROGram(s) Oral daily  lidocaine   Patch 1 Patch Transdermal daily  oxyCODONE  ER Tablet 10 milliGRAM(s) Oral every 12 hours  polyethylene glycol 3350 17 Gram(s) Oral daily  senna 2 Tablet(s) Oral at bedtime    MEDICATIONS  (PRN):  acetaminophen   Tablet .. 650 milliGRAM(s) Oral every 6 hours PRN Temp greater or equal to 38C (100.4F), Mild Pain (1 - 3)  magnesium hydroxide Suspension 30 milliLiter(s) Oral daily PRN Constipation  melatonin 3 milliGRAM(s) Oral at bedtime PRN Insomnia  ondansetron    Tablet 4 milliGRAM(s) Oral every 8 hours PRN Nausea and/or Vomiting  oxyCODONE    IR 10 milliGRAM(s) Oral every 4 hours PRN Severe Pain (7 - 10)  oxyCODONE    IR 5 milliGRAM(s) Oral every 4 hours PRN Moderate Pain (4 - 6)  simethicone 80 milliGRAM(s) Chew every 6 hours PRN Upset Stomach    -----------------------------------------------------------------------      Review of Systems:   · Additional ROS	Patient was seen and evaluated this AM. No acute events overnight. No new complaints.    She does complain of chest soreness/tightness associated with breathing. Tolerating therapies. CTA to rule out PE still pending.  She has not needed supplemental O2. Overall has not had progression of chest symptoms    Recommendations of ID, hospitalist reviewed with patient. she is aware that CTA will be ordered for evaluation chest as well as possible PE given h/o clots and COVID. we informed her that we will be speaking with family later as well      Physical Exam:   · Constitutional	detailed exam  · Constitutional Comments	alert, O x 3, appears comfortable, no dyspnea off O2  · Respiratory	detailed exam  · Respiratory Details	respirations non-labored; reduced BS bases bialterally, equal  · Respiratory Comments	fair+ effort  · Cardiovascular	+mild pectoral and sternal TTP  · Cardiovascular Details	regular rate and rhythm   · Gastrointestinal	detailed exam  · GI Normal	soft; nontender; bowel sounds normal  · Extremities	detailed exam   · Extremities Comments	UE 5/5, LE proximal 4/5, distal 5/5   calves soft, NT   +TTP over pectorals, sternal    · Additional PE	Skin: long back incision, healing well ,intact         -----------------------------------------------------------------------  Patient is a 75y old  Female who presents with a chief complaint of Back pain (09 Feb 2021 10:35)      HPI:  75F with past medical history of HTN, HLD, SLE, RA, hypothyroidism, provoked R. popliteal DVT in 2019 on eliquis, chronic low back pain presents to ED for acute on chronic low back pain. Pt has several months of chronic lumbosacral back pain, evaluated by outpatient ortho and referred for physical therapy and pain management. She has been receiving ?vitamin spinal injections. She last received low back inj on 12/16 after which she her low back pain worsened. She took oxycodone 5 mg qd (prescribed in september during a ED visit) and acetaminophen for past three days without relief. She also endorses worsening pain during ambulation with walker and has affected her quality of life. She has constipation, last BM 4 days ago. Otherwise denies fever, chills, N/V, abdominal pain, dysuria, urinary retention, fecal incontinence, saddle anesthesia, fall, LOC, trauma. During ROS she endorsed LLE weakness for several months, no numbness or tingling and has LLE edema >RLE edema, reported undergoing LLE US 4 months ago, was negative for DVT. Currently has 8/10 pain, improved to 5/10 with oxycodone 5 mg x 2 in ED. (28 Dec 2020 11:16)   12/28: Mri T/Ls: IMPRESSION: Degenerative changes  Abnormal signal with associated enhancement is seen involving the endplates adjacent to the L2-3 disc space. There is slight increased T2 prolongation involving the L2-3 disc space with associated widening. While these findings could be compatible with degenerative changes possibly of early discitis and osteomyelitis must be considered.  Patient s/p L1-L5 laminectomy, T10-L5 posterior surgical fusion by Dr. Campos on 1/1/21  HMV x 3 drains. Post op with constipation needing aggressive bowel regimen.     PT/OT and PM&R evaluated patient and recommended Rehab.     Patient medically cleared and admitted to  Rehab on 1/12/21 (12 Jan 2021 15:21)      PAST MEDICAL & SURGICAL HISTORY:  HTN (hypertension)    DVT (deep venous thrombosis)    Hypothyroidism    RA (rheumatoid arthritis)    Obesity, Class II, BMI 35-39.9, no comorbidity    Hypothyroid    Benign heart murmur    Hyperlipidemia    Hypertension    H/O degenerative disc disease    Osteoarthritis    SLE (systemic lupus erythematosus)    Rheumatoid arthritis    S/P knee replacement    Status post total hip replacement, right    S/P thyroid surgery  1 lobe removed    Lung cancer  small tumor removed 2015    S/P knee surgery  bilateral    S/P carpal tunnel release  left    S/P eye surgery  child    S/P cataract surgery  left    S/P tonsillectomy        Allergies    No Known Allergies    Intolerances    -----------------------------------------------------------------------  VITALS  75y  Vital Signs Last 24 Hrs  T(C): 36.5 (10 Feb 2021 07:23), Max: 36.5 (10 Feb 2021 07:23)  T(F): 97.7 (10 Feb 2021 07:23), Max: 97.7 (10 Feb 2021 07:23)  HR: 55 (10 Feb 2021 07:23) (55 - 55)  BP: 128/76 (10 Feb 2021 07:23) (128/76 - 128/76)  BP(mean): --  RR: 15 (10 Feb 2021 07:23) (15 - 15)  SpO2: 96% (10 Feb 2021 07:23) (96% - 96%)  -----------------------------------------------------------------------  RECENT LABS:             LAB                        12.2   2.92  )-----------( 215      ( 10 Feb 2021 05:45 )             38.4     02-10    143  |  110<H>  |  25<H>  ----------------------------<  111<H>  4.1   |  25  |  0.74    Ca    8.5      10 Feb 2021 05:45    TPro  6.0  /  Alb  2.6<L>  /  TBili  0.3  /  DBili  x   /  AST  21  /  ALT  15  /  AlkPhos  108  02-10    LIVER FUNCTIONS - ( 10 Feb 2021 05:45 )  Alb: 2.6 g/dL / Pro: 6.0 g/dL / ALK PHOS: 108 U/L / ALT: 15 U/L / AST: 21 U/L / GGT: x             IMAGING: none in last 24hr  -----------------------------------------------------------------------  MEDICATIONS  (STANDING):  ascorbic acid 500 milliGRAM(s) Oral daily  atorvastatin 20 milliGRAM(s) Oral at bedtime  benzocaine 15 mG/menthol 3.6 mG (Sugar-Free) Lozenge 1 Lozenge Oral four times a day  dexAMETHasone  Injectable 6 milliGRAM(s) IV Push daily  enoxaparin Injectable 40 milliGRAM(s) SubCutaneous two times a day  folic acid 1 milliGRAM(s) Oral daily  hydroxychloroquine 200 milliGRAM(s) Oral <User Schedule>  levothyroxine 200 MICROGram(s) Oral daily  lidocaine   Patch 1 Patch Transdermal daily  oxyCODONE  ER Tablet 10 milliGRAM(s) Oral every 12 hours  polyethylene glycol 3350 17 Gram(s) Oral daily  senna 2 Tablet(s) Oral at bedtime    MEDICATIONS  (PRN):  acetaminophen   Tablet .. 650 milliGRAM(s) Oral every 6 hours PRN Temp greater or equal to 38C (100.4F), Mild Pain (1 - 3)  magnesium hydroxide Suspension 30 milliLiter(s) Oral daily PRN Constipation  melatonin 3 milliGRAM(s) Oral at bedtime PRN Insomnia  ondansetron    Tablet 4 milliGRAM(s) Oral every 8 hours PRN Nausea and/or Vomiting  oxyCODONE    IR 10 milliGRAM(s) Oral every 4 hours PRN Severe Pain (7 - 10)  oxyCODONE    IR 5 milliGRAM(s) Oral every 4 hours PRN Moderate Pain (4 - 6)  simethicone 80 milliGRAM(s) Chew every 6 hours PRN Upset Stomach    -----------------------------------------------------------------------      Review of Systems:   · Additional ROS	Patient was seen and evaluated in room while with therapy - ambulating using platform walker.  She notes progress with therapy - has not required supplemental O2.  She continues to c/o tenderness under left breast and upper intercostal region of chest - rated at 4/10.  Was informed of workup with unremarkable findings.        Physical Exam:   · Constitutional	detailed exam  · Constitutional Comments	alert, O x 3, appears comfortable, no dyspnea off O2  · Respiratory	detailed exam  · Respiratory Details	respirations non-labored; reduced BS bases bilaterally, equal  · Respiratory Comments	fair+ effort  · Cardiovascular	+mild pectoral and sternal TTP  · Cardiovascular Details	regular rate and rhythm   · Gastrointestinal	detailed exam  · GI Normal	soft; nontender; bowel sounds normal  · Extremities	detailed exam   · Extremities Comments	UE 5/5, LE proximal 4/5, distal 5/5   calves soft, NT   +TTP over pectorals, sternal    · Additional PE	Skin: long back incision, healing well ,intact.  No superficial skin irritation to left breast/under and to chest and sternum         -----------------------------------------------------------------------  Patient is a 75y old  Female who presents with a chief complaint of Back pain (09 Feb 2021 10:35)      HPI:  75F with past medical history of HTN, HLD, SLE, RA, hypothyroidism, provoked R. popliteal DVT in 2019 on eliquis, chronic low back pain presents to ED for acute on chronic low back pain. Pt has several months of chronic lumbosacral back pain, evaluated by outpatient ortho and referred for physical therapy and pain management. She has been receiving ?vitamin spinal injections. She last received low back inj on 12/16 after which she her low back pain worsened. She took oxycodone 5 mg qd (prescribed in september during a ED visit) and acetaminophen for past three days without relief. She also endorses worsening pain during ambulation with walker and has affected her quality of life. She has constipation, last BM 4 days ago. Otherwise denies fever, chills, N/V, abdominal pain, dysuria, urinary retention, fecal incontinence, saddle anesthesia, fall, LOC, trauma. During ROS she endorsed LLE weakness for several months, no numbness or tingling and has LLE edema >RLE edema, reported undergoing LLE US 4 months ago, was negative for DVT. Currently has 8/10 pain, improved to 5/10 with oxycodone 5 mg x 2 in ED. (28 Dec 2020 11:16)   12/28: Mri T/Ls: IMPRESSION: Degenerative changes  Abnormal signal with associated enhancement is seen involving the endplates adjacent to the L2-3 disc space. There is slight increased T2 prolongation involving the L2-3 disc space with associated widening. While these findings could be compatible with degenerative changes possibly of early discitis and osteomyelitis must be considered.  Patient s/p L1-L5 laminectomy, T10-L5 posterior surgical fusion by Dr. Campos on 1/1/21  HMV x 3 drains. Post op with constipation needing aggressive bowel regimen.     PT/OT and PM&R evaluated patient and recommended Rehab.     Patient medically cleared and admitted to  Rehab on 1/12/21 (12 Jan 2021 15:21)      PAST MEDICAL & SURGICAL HISTORY:  HTN (hypertension)    DVT (deep venous thrombosis)    Hypothyroidism    RA (rheumatoid arthritis)    Obesity, Class II, BMI 35-39.9, no comorbidity    Hypothyroid    Benign heart murmur    Hyperlipidemia    Hypertension    H/O degenerative disc disease    Osteoarthritis    SLE (systemic lupus erythematosus)    Rheumatoid arthritis    S/P knee replacement    Status post total hip replacement, right    S/P thyroid surgery  1 lobe removed    Lung cancer  small tumor removed 2015    S/P knee surgery  bilateral    S/P carpal tunnel release  left    S/P eye surgery  child    S/P cataract surgery  left    S/P tonsillectomy        Allergies    No Known Allergies    Intolerances    -----------------------------------------------------------------------  VITALS  75y  Vital Signs Last 24 Hrs  T(C): 36.5 (10 Feb 2021 07:23), Max: 36.5 (10 Feb 2021 07:23)  T(F): 97.7 (10 Feb 2021 07:23), Max: 97.7 (10 Feb 2021 07:23)  HR: 55 (10 Feb 2021 07:23) (55 - 55)  BP: 128/76 (10 Feb 2021 07:23) (128/76 - 128/76)  BP(mean): --  RR: 15 (10 Feb 2021 07:23) (15 - 15)  SpO2: 96% (10 Feb 2021 07:23) (96% - 96%)  -----------------------------------------------------------------------  RECENT LABS:             LAB                        12.2   2.92  )-----------( 215      ( 10 Feb 2021 05:45 )             38.4     02-10    143  |  110<H>  |  25<H>  ----------------------------<  111<H>  4.1   |  25  |  0.74    Ca    8.5      10 Feb 2021 05:45    TPro  6.0  /  Alb  2.6<L>  /  TBili  0.3  /  DBili  x   /  AST  21  /  ALT  15  /  AlkPhos  108  02-10    LIVER FUNCTIONS - ( 10 Feb 2021 05:45 )  Alb: 2.6 g/dL / Pro: 6.0 g/dL / ALK PHOS: 108 U/L / ALT: 15 U/L / AST: 21 U/L / GGT: x             IMAGING: none in last 24hr  -----------------------------------------------------------------------  MEDICATIONS  (STANDING):  ascorbic acid 500 milliGRAM(s) Oral daily  atorvastatin 20 milliGRAM(s) Oral at bedtime  benzocaine 15 mG/menthol 3.6 mG (Sugar-Free) Lozenge 1 Lozenge Oral four times a day  dexAMETHasone  Injectable 6 milliGRAM(s) IV Push daily  enoxaparin Injectable 40 milliGRAM(s) SubCutaneous two times a day  folic acid 1 milliGRAM(s) Oral daily  hydroxychloroquine 200 milliGRAM(s) Oral <User Schedule>  levothyroxine 200 MICROGram(s) Oral daily  lidocaine   Patch 1 Patch Transdermal daily  oxyCODONE  ER Tablet 10 milliGRAM(s) Oral every 12 hours  polyethylene glycol 3350 17 Gram(s) Oral daily  senna 2 Tablet(s) Oral at bedtime    MEDICATIONS  (PRN):  acetaminophen   Tablet .. 650 milliGRAM(s) Oral every 6 hours PRN Temp greater or equal to 38C (100.4F), Mild Pain (1 - 3)  magnesium hydroxide Suspension 30 milliLiter(s) Oral daily PRN Constipation  melatonin 3 milliGRAM(s) Oral at bedtime PRN Insomnia  ondansetron    Tablet 4 milliGRAM(s) Oral every 8 hours PRN Nausea and/or Vomiting  oxyCODONE    IR 10 milliGRAM(s) Oral every 4 hours PRN Severe Pain (7 - 10)  oxyCODONE    IR 5 milliGRAM(s) Oral every 4 hours PRN Moderate Pain (4 - 6)  simethicone 80 milliGRAM(s) Chew every 6 hours PRN Upset Stomach    -----------------------------------------------------------------------      Review of Systems:   · Additional ROS	Patient was seen and evaluated in room while with therapy - ambulating using platform walker.  She notes progress with therapy - has not required supplemental O2.  She continues to c/o tenderness under left breast and upper intercostal region of chest - rated at 4/10.  Was informed of workup with unremarkable findings.      CTA results also discussed in detail wtih family. Patient overall looks improved, pain better controlled. no coughing. No respiratory distress on RA      Physical Exam:   · Constitutional	detailed exam  · Constitutional Comments	alert, O x 3, appears comfortable, no dyspnea off O2  · Respiratory	detailed exam  · Respiratory Details	respirations non-labored; reduced BS bases bilaterally, equal  · Respiratory Comments	fair+ effort  · Cardiovascular	+mild pectoral and sternal TTP, improved  · Cardiovascular Details	regular rate and rhythm   · Gastrointestinal	detailed exam  · GI Normal	soft; nontender; bowel sounds normal  · Extremities	detailed exam   · Extremities Comments	  calves soft, NT +bialteral LE ACE wraps      · Additional PE	Skin: long back incision, healing well ,intact.  No superficial skin irritation to left breast/under and to chest and sternum

## 2021-02-10 NOTE — PROGRESS NOTE ADULT - SUBJECTIVE AND OBJECTIVE BOX
CC: f/u for Covid    Patient reports: she c/o vague left sided chest pain, mild dyspnea with exertion.    REVIEW OF SYSTEMS:  All other review of systems negative (Comprehensive ROS)    Antimicrobials Day #  :  hydroxychloroquine 200 milliGRAM(s) Oral <User Schedule>    Other Medications Reviewed  MEDICATIONS  (STANDING):  ascorbic acid 500 milliGRAM(s) Oral daily  atorvastatin 20 milliGRAM(s) Oral at bedtime  benzocaine 15 mG/menthol 3.6 mG (Sugar-Free) Lozenge 1 Lozenge Oral four times a day  dexAMETHasone  Injectable 6 milliGRAM(s) IV Push daily  enoxaparin Injectable 40 milliGRAM(s) SubCutaneous two times a day  folic acid 1 milliGRAM(s) Oral daily  hydroxychloroquine 200 milliGRAM(s) Oral <User Schedule>  levothyroxine 200 MICROGram(s) Oral daily  lidocaine   Patch 1 Patch Transdermal daily  oxyCODONE  ER Tablet 10 milliGRAM(s) Oral every 12 hours  polyethylene glycol 3350 17 Gram(s) Oral daily  senna 2 Tablet(s) Oral at bedtime    T(F): 97.7 (02-10-21 @ 07:23), Max: 97.7 (02-10-21 @ 07:23)  HR: 55 (02-10-21 @ 07:23)  BP: 128/76 (02-10-21 @ 07:23)  RR: 15 (02-10-21 @ 07:23)  SpO2: 96% (02-10-21 @ 07:23)  Wt(kg): --    PHYSICAL EXAM:  General: alert, no acute distress, obese  Eyes:  anicteric, no conjunctival injection, no discharge  Oropharynx: no lesions or injection 	  Neck: supple, without adenopathy  Lungs: clear to auscultation  Heart: regular rate and rhythm; no murmur, rubs or gallops  Abdomen: soft, nondistended, nontender, without mass or organomegaly  Skin: no lesions  Extremities: no clubbing, cyanosis,+ edema, legs are wrapped  Neurologic: alert, oriented, moves all extremities    LAB RESULTS:                        12.2   2.92  )-----------( 215      ( 10 Feb 2021 05:45 )             38.4     02-10    143  |  110<H>  |  25<H>  ----------------------------<  111<H>  4.1   |  25  |  0.74    Ca    8.5      10 Feb 2021 05:45    TPro  6.0  /  Alb  2.6<L>  /  TBili  0.3  /  DBili  x   /  AST  21  /  ALT  15  /  AlkPhos  108  02-10    LIVER FUNCTIONS - ( 10 Feb 2021 05:45 )  Alb: 2.6 g/dL / Pro: 6.0 g/dL / ALK PHOS: 108 U/L / ALT: 15 U/L / AST: 21 U/L / GGT: x             MICROBIOLOGY:  RECENT CULTURES:      RADIOLOGY REVIEWED:    < from: CT Angio Chest w/ IV Cont (02.09.21 @ 16:25) >  IMPRESSION:  *  No pulmonary embolism to the segmental branches. Limited visualization of subsegmental branches secondary to a combination of respiratory motionand suboptimal bolus timing.  *  Mild bilateral patchy airspace disease compatible with COVID-19 pneumonia.

## 2021-02-10 NOTE — PROGRESS NOTE ADULT - ASSESSMENT
75y female with a PMH of RA managed with plaquenil, HTN, SLE, hypothyroidism, chronic LBP, s/p T10-L5 laminectomy and posterior fusion in early January at [facility], s/p transfer to rehab on 1/12/21  She tested positive for Covid on 1/31 --- hospital acquired Covid  She has been with a paucity of symptoms; nonspecific abdominal and chest pains without fevers, cough, SOB or hypoxia  CT scan of A/P showed nonspecific lower lung densities and RDV started on 2/5 along with decadron.  On lovenox 40 BID for DVT prophylaxis, has a history of a provoked DVT in the past.  CTA of chest shows mild bilateral patchy airspace disease compatible with COVID-19 pneumonia.  No evidence of a new PE.RDV completed 2/9.  Suggest:  Limit decadron to 10 days total, day 6/10  Follow Covid inflammatory markers  Continue anticoagulation   Etiology of chest pain is not clear

## 2021-02-10 NOTE — PROGRESS NOTE ADULT - ASSESSMENT
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 Hospital Course complicated by brief SICU stay for mechanical ventilation management, currently extubated. Patient now admitted for a multidisciplinary rehab program on 1/12/21.    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 Jaja Campos  -Continue comprehensive rehab program  -Pain management, bowel regimen per rehab    #COVID-19  -O2 supplement as necessary to keep SpO2 >92%  -Cont remdesivir (2/5- ) total 5 days and decadron total 10 days  -Monitor Cr and liver function  -Neutropenia noted, likely 2/2 COVID, cont to monitor   -ID appreciated - CTA reviewed - neg for PE    #Intermittent abdominal pain - suspect 2/2 opioid induced constipation r/o gas pain  -CT abd/pelvis reviewed, pain resolved, cont to monitor  -Bowel regimen per rehab  -Simethicone prn     #Recently treated UTI  -Ucx >100K Providencia Rettgeri - macrobid resistant, completed Cipro 250mg q12 x 3 days 1/30    #Acute blood loss Anemia, likely from surgery  -H/H stable, continue to monitor   -No overt signs of bleeding    #SLE/RA  -Plaquenil 200mg po daily and folic acid    #H/o provoked right popliteal, right posterior tibial DVT (7/2019 in setting of hip surgery, already completed >6 months of treatment)  -Follows with cardiologist Dr. Hoyos outpatient  -Treated with Eliquis 5mg po bid chronically -- AC stopped at [facility] as 12/29 LE dopplers neg for DVTs  -Primary team to confirm clearance regarding eliquis  -DVT ppx with lovenox 40mg BID for now    #Leg edema likely from chronic lymphedema  -Encourage leg elevation  -ACE wraps    #Essential HTN: stable off anti-hypertensives  -Continue to hold home medication Losartan  -VS per rehab protocol    #HLD  -Lipitor    #Hypothyroidism  -Levothyroxine    #VTE ppx: Lovenox 40mg BID    #GI ppx:  PPI 76 y/o F PMH HTN, HLD, SLE, RA, hypothyroidism, provoked right popliteal DVT in 2019, chronic LBP presents to [facility] for acute on chronic LBP 12/28/20, found to have 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 Hospital Course complicated by brief SICU stay for mechanical ventilation management, currently extubated. Patient now admitted for a multidisciplinary rehab program on 1/12/21.    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 Jaja Campos  -Continue comprehensive rehab program  -Pain management, bowel regimen per rehab    #COVID-19  -O2 supplement as necessary to keep SpO2 >92%  -Cont remdesivir (2/5- ) total 5 days and decadron total 10 days  -Monitor Cr and liver function  -Neutropenia noted, likely 2/2 COVID, cont to monitor   -ID appreciated - CTA reviewed - neg for PE    #Intermittent abdominal pain - suspect 2/2 opioid induced constipation r/o gas pain  -CT abd/pelvis reviewed, pain resolved, cont to monitor  -Bowel regimen per rehab  -Simethicone prn     #Recently treated UTI  -Ucx >100K Providencia Rettgeri - macrobid resistant, completed Cipro 250mg q12 x 3 days 1/30  -2/10 c/o urinary frequency - UA in prognosis    #Acute blood loss Anemia, likely from surgery  -H/H stable, continue to monitor   -No overt signs of bleeding    #SLE/RA  -Plaquenil 200mg po daily and folic acid    #H/o provoked right popliteal, right posterior tibial DVT (7/2019 in setting of hip surgery, already completed >6 months of treatment)  -Follows with cardiologist Dr. Hoyos outpatient  -Treated with Eliquis 5mg po bid chronically -- AC stopped at [facility] as 12/29 LE dopplers neg for DVTs  -Primary team to confirm clearance regarding eliquis  -DVT ppx with lovenox 40mg BID for now    #Leg edema likely from chronic lymphedema  -Encourage leg elevation  -ACE wraps    #Essential HTN: stable off anti-hypertensives  -Continue to hold home medication Losartan  -VS per rehab protocol    #HLD  -Lipitor    #Hypothyroidism  -Levothyroxine    #VTE ppx: Lovenox 40mg BID    #GI ppx:  PPI

## 2021-02-11 LAB
ALBUMIN SERPL ELPH-MCNC: 2.9 G/DL — LOW (ref 3.3–5)
ALP SERPL-CCNC: 113 U/L — SIGNIFICANT CHANGE UP (ref 40–120)
ALT FLD-CCNC: 17 U/L — SIGNIFICANT CHANGE UP (ref 10–45)
ANION GAP SERPL CALC-SCNC: 8 MMOL/L — SIGNIFICANT CHANGE UP (ref 5–17)
APPEARANCE UR: ABNORMAL
AST SERPL-CCNC: 19 U/L — SIGNIFICANT CHANGE UP (ref 10–40)
BACTERIA # UR AUTO: ABNORMAL /HPF
BASOPHILS # BLD AUTO: 0 K/UL — SIGNIFICANT CHANGE UP (ref 0–0.2)
BASOPHILS NFR BLD AUTO: 0 % — SIGNIFICANT CHANGE UP (ref 0–2)
BILIRUB SERPL-MCNC: 0.4 MG/DL — SIGNIFICANT CHANGE UP (ref 0.2–1.2)
BILIRUB UR-MCNC: NEGATIVE — SIGNIFICANT CHANGE UP
BUN SERPL-MCNC: 27 MG/DL — HIGH (ref 7–23)
CALCIUM SERPL-MCNC: 8.7 MG/DL — SIGNIFICANT CHANGE UP (ref 8.4–10.5)
CHLORIDE SERPL-SCNC: 107 MMOL/L — SIGNIFICANT CHANGE UP (ref 96–108)
CO2 SERPL-SCNC: 26 MMOL/L — SIGNIFICANT CHANGE UP (ref 22–31)
COLOR SPEC: YELLOW — SIGNIFICANT CHANGE UP
COMMENT - URINE: SIGNIFICANT CHANGE UP
CREAT SERPL-MCNC: 0.78 MG/DL — SIGNIFICANT CHANGE UP (ref 0.5–1.3)
CRP SERPL-MCNC: 0.15 MG/DL — SIGNIFICANT CHANGE UP (ref 0–0.4)
DIFF PNL FLD: NEGATIVE — SIGNIFICANT CHANGE UP
EOSINOPHIL # BLD AUTO: 0 K/UL — SIGNIFICANT CHANGE UP (ref 0–0.5)
EOSINOPHIL NFR BLD AUTO: 0 % — SIGNIFICANT CHANGE UP (ref 0–6)
EPI CELLS # UR: ABNORMAL
GLUCOSE SERPL-MCNC: 133 MG/DL — HIGH (ref 70–99)
GLUCOSE UR QL: NEGATIVE — SIGNIFICANT CHANGE UP
HCT VFR BLD CALC: 41.9 % — SIGNIFICANT CHANGE UP (ref 34.5–45)
HGB BLD-MCNC: 13.3 G/DL — SIGNIFICANT CHANGE UP (ref 11.5–15.5)
IMM GRANULOCYTES NFR BLD AUTO: 0.5 % — SIGNIFICANT CHANGE UP (ref 0–1.5)
KETONES UR-MCNC: NEGATIVE — SIGNIFICANT CHANGE UP
LEUKOCYTE ESTERASE UR-ACNC: ABNORMAL
LYMPHOCYTES # BLD AUTO: 0.55 K/UL — LOW (ref 1–3.3)
LYMPHOCYTES # BLD AUTO: 12.6 % — LOW (ref 13–44)
MCHC RBC-ENTMCNC: 29.4 PG — SIGNIFICANT CHANGE UP (ref 27–34)
MCHC RBC-ENTMCNC: 31.7 GM/DL — LOW (ref 32–36)
MCV RBC AUTO: 92.7 FL — SIGNIFICANT CHANGE UP (ref 80–100)
MONOCYTES # BLD AUTO: 0.47 K/UL — SIGNIFICANT CHANGE UP (ref 0–0.9)
MONOCYTES NFR BLD AUTO: 10.8 % — SIGNIFICANT CHANGE UP (ref 2–14)
NEUTROPHILS # BLD AUTO: 3.32 K/UL — SIGNIFICANT CHANGE UP (ref 1.8–7.4)
NEUTROPHILS NFR BLD AUTO: 76.1 % — SIGNIFICANT CHANGE UP (ref 43–77)
NITRITE UR-MCNC: POSITIVE
NRBC # BLD: 0 /100 WBCS — SIGNIFICANT CHANGE UP (ref 0–0)
PH UR: 5 — SIGNIFICANT CHANGE UP (ref 5–8)
PLATELET # BLD AUTO: 264 K/UL — SIGNIFICANT CHANGE UP (ref 150–400)
POTASSIUM SERPL-MCNC: 4 MMOL/L — SIGNIFICANT CHANGE UP (ref 3.5–5.3)
POTASSIUM SERPL-SCNC: 4 MMOL/L — SIGNIFICANT CHANGE UP (ref 3.5–5.3)
PROT SERPL-MCNC: 6.3 G/DL — SIGNIFICANT CHANGE UP (ref 6–8.3)
PROT UR-MCNC: 30 MG/DL
RBC # BLD: 4.52 M/UL — SIGNIFICANT CHANGE UP (ref 3.8–5.2)
RBC # FLD: 14.3 % — SIGNIFICANT CHANGE UP (ref 10.3–14.5)
RBC CASTS # UR COMP ASSIST: SIGNIFICANT CHANGE UP /HPF (ref 0–4)
SODIUM SERPL-SCNC: 141 MMOL/L — SIGNIFICANT CHANGE UP (ref 135–145)
SP GR SPEC: 1.02 — SIGNIFICANT CHANGE UP (ref 1.01–1.02)
UROBILINOGEN FLD QL: NEGATIVE — SIGNIFICANT CHANGE UP
WBC # BLD: 4.36 K/UL — SIGNIFICANT CHANGE UP (ref 3.8–10.5)
WBC # FLD AUTO: 4.36 K/UL — SIGNIFICANT CHANGE UP (ref 3.8–10.5)
WBC UR QL: ABNORMAL /HPF (ref 0–5)

## 2021-02-11 PROCEDURE — 99232 SBSQ HOSP IP/OBS MODERATE 35: CPT | Mod: CS

## 2021-02-11 RX ADMIN — LIDOCAINE 1 PATCH: 4 CREAM TOPICAL at 23:56

## 2021-02-11 RX ADMIN — LIDOCAINE 1 PATCH: 4 CREAM TOPICAL at 11:45

## 2021-02-11 RX ADMIN — SENNA PLUS 2 TABLET(S): 8.6 TABLET ORAL at 21:37

## 2021-02-11 RX ADMIN — Medication 200 MICROGRAM(S): at 07:12

## 2021-02-11 RX ADMIN — Medication 650 MILLIGRAM(S): at 21:37

## 2021-02-11 RX ADMIN — BENZOCAINE AND MENTHOL 1 LOZENGE: 5; 1 LIQUID ORAL at 07:14

## 2021-02-11 RX ADMIN — BENZOCAINE AND MENTHOL 1 LOZENGE: 5; 1 LIQUID ORAL at 00:51

## 2021-02-11 RX ADMIN — OXYCODONE HYDROCHLORIDE 5 MILLIGRAM(S): 5 TABLET ORAL at 11:42

## 2021-02-11 RX ADMIN — POLYETHYLENE GLYCOL 3350 17 GRAM(S): 17 POWDER, FOR SOLUTION ORAL at 11:45

## 2021-02-11 RX ADMIN — Medication 200 MILLIGRAM(S): at 08:54

## 2021-02-11 RX ADMIN — BENZOCAINE AND MENTHOL 1 LOZENGE: 5; 1 LIQUID ORAL at 17:33

## 2021-02-11 RX ADMIN — OXYCODONE HYDROCHLORIDE 10 MILLIGRAM(S): 5 TABLET ORAL at 16:05

## 2021-02-11 RX ADMIN — Medication 6 MILLIGRAM(S): at 07:12

## 2021-02-11 RX ADMIN — LIDOCAINE 1 PATCH: 4 CREAM TOPICAL at 19:28

## 2021-02-11 RX ADMIN — LIDOCAINE 1 PATCH: 4 CREAM TOPICAL at 00:32

## 2021-02-11 RX ADMIN — Medication 1 MILLIGRAM(S): at 11:47

## 2021-02-11 RX ADMIN — ENOXAPARIN SODIUM 40 MILLIGRAM(S): 100 INJECTION SUBCUTANEOUS at 17:34

## 2021-02-11 RX ADMIN — Medication 500 MILLIGRAM(S): at 11:45

## 2021-02-11 RX ADMIN — OXYCODONE HYDROCHLORIDE 5 MILLIGRAM(S): 5 TABLET ORAL at 00:50

## 2021-02-11 RX ADMIN — ENOXAPARIN SODIUM 40 MILLIGRAM(S): 100 INJECTION SUBCUTANEOUS at 07:13

## 2021-02-11 RX ADMIN — ATORVASTATIN CALCIUM 20 MILLIGRAM(S): 80 TABLET, FILM COATED ORAL at 21:36

## 2021-02-11 RX ADMIN — OXYCODONE HYDROCHLORIDE 5 MILLIGRAM(S): 5 TABLET ORAL at 07:12

## 2021-02-11 NOTE — CHART NOTE - NSCHARTNOTEFT_GEN_A_CORE
Assessment:       Patient states having a good appetite; denies any diarrhea, nausea, or vomiting but complains of constipation. Writer obtained patients food preferences and collaborated with diet office staff to include these in her future meals. Recommended patient consume more fluids, fiber-rich foods, and prunes at meals. Last BM noted was on 2-7-21; +1 edema (left leg) present; skin intact. Educated patient on a general healthful diet by watching portion sizes and increasing her intake of fruits, vegetables, whole-grains and lean protein.    Factors impacting intake: [ ] none [ ] nausea  [ ] vomiting [ ] diarrhea [X] constipation  [ ]chewing problems [ ] swallowing issues  [ ] other:     Diet Presciption: Diet, DASH/TLC:   Sodium & Cholesterol Restricted (01-14-21 @ 11:26)    Intake: Patient states that she has a good appetite and consumes almost all of her meals. Flowsheets indicate PO intakes of ~85%.    Current Weight: 274.4 lb (2-2-21)  Last documented weight: 280 lb (1-12-21)  -Recommend obtaining daily weights    Pertinent Medications: MEDICATIONS  (STANDING):  ascorbic acid 500 milliGRAM(s) Oral daily  atorvastatin 20 milliGRAM(s) Oral at bedtime  benzocaine 15 mG/menthol 3.6 mG (Sugar-Free) Lozenge 1 Lozenge Oral four times a day  dexAMETHasone  Injectable 6 milliGRAM(s) IV Push daily  enoxaparin Injectable 40 milliGRAM(s) SubCutaneous two times a day  folic acid 1 milliGRAM(s) Oral daily  hydroxychloroquine 200 milliGRAM(s) Oral <User Schedule>  levothyroxine 200 MICROGram(s) Oral daily  lidocaine   Patch 1 Patch Transdermal daily  oxyCODONE  ER Tablet 10 milliGRAM(s) Oral every 12 hours  polyethylene glycol 3350 17 Gram(s) Oral daily  senna 2 Tablet(s) Oral at bedtime    MEDICATIONS  (PRN):  acetaminophen   Tablet .. 650 milliGRAM(s) Oral every 6 hours PRN Temp greater or equal to 38C (100.4F), Mild Pain (1 - 3)  magnesium hydroxide Suspension 30 milliLiter(s) Oral daily PRN Constipation  melatonin 3 milliGRAM(s) Oral at bedtime PRN Insomnia  ondansetron    Tablet 4 milliGRAM(s) Oral every 8 hours PRN Nausea and/or Vomiting  oxyCODONE    IR 10 milliGRAM(s) Oral every 4 hours PRN Severe Pain (7 - 10)  oxyCODONE    IR 5 milliGRAM(s) Oral every 4 hours PRN Moderate Pain (4 - 6)  simethicone 80 milliGRAM(s) Chew every 6 hours PRN Upset Stomach    Pertinent Labs: 02-11 Na141 mmol/L Glu 133 mg/dL<H> K+ 4.0 mmol/L Cr  0.78 mg/dL BUN 27 mg/dL<H> 02-11 Alb 2.9 g/dL<L>     CAPILLARY BLOOD GLUCOSE      Skin: Intact      Estimated Needs:   [X] no change since previous assessment  [ ] recalculated:     Previous Nutrition Diagnosis:    [X] Overweight/Obesity     Nutrition Diagnosis is [X] ongoing  [ ] resolved [ ] not applicable     New Nutrition Diagnosis: [X] not applicable       Interventions:   Recommend patient increase fluid and dietary fiber intake  Recommend weight trend by obtaining daily weights  Recommend honoring patients daily food preferences for menu selections      Monitoring and Evaluation:   [ x ] PO intake  [ x ] Tolerance to diet prescription  [ x ] weights  [ x ] labs  [ x ] follow up per protocol

## 2021-02-11 NOTE — PROGRESS NOTE ADULT - ASSESSMENT
75y female with a PMH of RA managed with plaquenil, HTN, SLE, hypothyroidism, chronic LBP, s/p T10-L5 laminectomy and posterior fusion in early January at [facility], s/p transfer to rehab on 1/12/21  She tested positive for Covid on 1/31 --- hospital acquired Covid  She has been with a paucity of symptoms; nonspecific abdominal and chest pains without fevers, cough, SOB or hypoxia  CT scan of A/P showed nonspecific lower lung densities and RDV started on 2/5 along with decadron.  On lovenox 40 BID for DVT prophylaxis, has a history of a provoked DVT in the past.  CTA of chest shows mild bilateral patchy airspace disease compatible with COVID-19 pneumonia.  No evidence of a new PE.RDV completed 2/9.  Day 7/10 decadron  Now with frequency, UTI being ruled out  Comfortable on RA.  Suggest:  Limit decadron to 10 days total, day 7/10  Await urine culture before any additional antibiotic therapy  Continue anticoagulation   Etiology of chest pain is not clear, appears stable as does respiratory status.

## 2021-02-11 NOTE — PROGRESS NOTE ADULT - SUBJECTIVE AND OBJECTIVE BOX
Patient is a 75y old  Female who presents with a chief complaint of Back pain (10 Feb 2021 15:46)      HPI:  75F with past medical history of HTN, HLD, SLE, RA, hypothyroidism, provoked R. popliteal DVT in 2019 on eliquis, chronic low back pain presents to ED for acute on chronic low back pain. Pt has several months of chronic lumbosacral back pain, evaluated by outpatient ortho and referred for physical therapy and pain management. She has been receiving ?vitamin spinal injections. She last received low back inj on  after which she her low back pain worsened. She took oxycodone 5 mg qd (prescribed in september during a ED visit) and acetaminophen for past three days without relief. She also endorses worsening pain during ambulation with walker and has affected her quality of life. She has constipation, last BM 4 days ago. Otherwise denies fever, chills, N/V, abdominal pain, dysuria, urinary retention, fecal incontinence, saddle anesthesia, fall, LOC, trauma. During ROS she endorsed LLE weakness for several months, no numbness or tingling and has LLE edema >RLE edema, reported undergoing LLE US 4 months ago, was negative for DVT. Currently has 8/10 pain, improved to 5/10 with oxycodone 5 mg x 2 in ED. (28 Dec 2020 11:16)   : Mri T/Ls: IMPRESSION: Degenerative changes  Abnormal signal with associated enhancement is seen involving the endplates adjacent to the L2-3 disc space. There is slight increased T2 prolongation involving the L2-3 disc space with associated widening. While these findings could be compatible with degenerative changes possibly of early discitis and osteomyelitis must be considered.  Patient s/p L1-L5 laminectomy, T10-L5 posterior surgical fusion by Dr. Campos on 21  HMV x 3 drains. Post op with constipation needing aggressive bowel regimen.     PT/OT and PM&R evaluated patient and recommended Rehab.     Patient medically cleared and admitted to  Rehab on 21 (2021 15:21)      PAST MEDICAL & SURGICAL HISTORY:  HTN (hypertension)    DVT (deep venous thrombosis)    Hypothyroidism    RA (rheumatoid arthritis)    Obesity, Class II, BMI 35-39.9, no comorbidity    Hypothyroid    Benign heart murmur    Hyperlipidemia    Hypertension    H/O degenerative disc disease    Osteoarthritis    SLE (systemic lupus erythematosus)    Rheumatoid arthritis    S/P knee replacement    Status post total hip replacement, right    S/P thyroid surgery  1 lobe removed    Lung cancer  small tumor removed     S/P knee surgery  bilateral    S/P carpal tunnel release  left    S/P eye surgery  child    S/P cataract surgery  left    S/P tonsillectomy        MEDICATIONS  (STANDING):  ascorbic acid 500 milliGRAM(s) Oral daily  atorvastatin 20 milliGRAM(s) Oral at bedtime  benzocaine 15 mG/menthol 3.6 mG (Sugar-Free) Lozenge 1 Lozenge Oral four times a day  dexAMETHasone  Injectable 6 milliGRAM(s) IV Push daily  enoxaparin Injectable 40 milliGRAM(s) SubCutaneous two times a day  folic acid 1 milliGRAM(s) Oral daily  hydroxychloroquine 200 milliGRAM(s) Oral <User Schedule>  levothyroxine 200 MICROGram(s) Oral daily  lidocaine   Patch 1 Patch Transdermal daily  oxyCODONE  ER Tablet 10 milliGRAM(s) Oral every 12 hours  polyethylene glycol 3350 17 Gram(s) Oral daily  senna 2 Tablet(s) Oral at bedtime    MEDICATIONS  (PRN):  acetaminophen   Tablet .. 650 milliGRAM(s) Oral every 6 hours PRN Temp greater or equal to 38C (100.4F), Mild Pain (1 - 3)  magnesium hydroxide Suspension 30 milliLiter(s) Oral daily PRN Constipation  melatonin 3 milliGRAM(s) Oral at bedtime PRN Insomnia  ondansetron    Tablet 4 milliGRAM(s) Oral every 8 hours PRN Nausea and/or Vomiting  oxyCODONE    IR 10 milliGRAM(s) Oral every 4 hours PRN Severe Pain (7 - 10)  oxyCODONE    IR 5 milliGRAM(s) Oral every 4 hours PRN Moderate Pain (4 - 6)  simethicone 80 milliGRAM(s) Chew every 6 hours PRN Upset Stomach      Allergies    No Known Allergies    Intolerances          VITALS  75y  Vital Signs Last 24 Hrs  T(C): 36.6 (10 Feb 2021 22:00), Max: 36.6 (10 Feb 2021 22:00)  T(F): 97.8 (10 Feb 2021 22:00), Max: 97.8 (10 Feb 2021 22:00)  HR: 59 (10 Feb 2021 22:00) (59 - 59)  BP: 127/74 (10 Feb 2021 22:00) (127/74 - 127/74)  BP(mean): --  RR: 18 (10 Feb 2021 22:00) (18 - 18)  SpO2: 98% (10 Feb 2021 22:00) (98% - 98%)  Daily     Daily         RECENT LABS:                          13.3   4.36  )-----------( 264      ( 2021 09:05 )             41.9     -    141  |  107  |  27<H>  ----------------------------<  133<H>  4.0   |  26  |  0.78    Ca    8.7      2021 09:05    TPro  6.3  /  Alb  2.9<L>  /  TBili  0.4  /  DBili  x   /  AST  19  /  ALT  17  /  AlkPhos  113  11    LIVER FUNCTIONS - ( 2021 09:05 )  Alb: 2.9 g/dL / Pro: 6.3 g/dL / ALK PHOS: 113 U/L / ALT: 17 U/L / AST: 19 U/L / GGT: x             Urinalysis Basic - ( 10 Feb 2021 17:59 )    Color: Yellow / Appearance: Slightly Turbid / S.025 / pH: x  Gluc: x / Ketone: Negative  / Bili: Negative / Urobili: Negative   Blood: x / Protein: 30 mg/dL / Nitrite: Positive   Leuk Esterase: Small / RBC: 0-4 /HPF / WBC 11-25 /HPF   Sq Epi: x / Non Sq Epi: Moderate / Bacteria: TNTC /HPF          CAPILLARY BLOOD GLUCOSE        Review of Systems:   · Additional ROS	Patient denies dysuria or lower abdominal pain, however noted to have frequent urination. UA +, urine Cx pending    afebrile, no leukocytosis. Pain in chest and abdomen improving, rates 4/10. no fever or new issues overnight. Mood stable, says she feels she is improving here      Physical Exam:   · Constitutional	detailed exam  · Constitutional Comments	alert, O x 3, stable on RA  · Respiratory	detailed exam  · Respiratory Details	respirations non-labored; reduced BS bases bilaterally, equal  no wheezing or rhonchi  +mild TTP along left T8-9 intercostal, no mass, no redness, reduced  · Respiratory Comments	fair+ effort  · Cardiovascular	+mild pectoral and sternal TTP, improved  · Cardiovascular Details	regular rate and rhythm   · Gastrointestinal	detailed exam  · GI Normal	soft; nontender; bowel sounds normal  · Extremities	detailed exam   · Extremities Comments	  calves soft, NT +bialteral LE ACE wraps  chronic vascular changes    · Additional PE	Skin: long back incision, C/D/I

## 2021-02-11 NOTE — PROGRESS NOTE ADULT - ASSESSMENT
KAI THOMAS is a 75y F PMH HTN, HLD, SLE, RA, hypothyroidism, provoked R. 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 Jaja Campos. Staples removed   - continue Comprehensive Rehab Program of PT/OT- 3 hrs/day 5 days/week  - psychological support, counseling  - Precautions:  spinal, RA, cardiac, contact and droplet precautions    #New COVID conversion  -PCR + 1/29/21. Moved up to 3E [facility] for isolation. COVID IgG Ab neg  - Ct A/P 2/5: 1. Pattern of lung base groundglass opacification suggests infection including atypical pneumonia/viral infection from agents including COVID-19   -CTA 2/9:  No pulmonary embolism to the segmental branches. Mild bilateral patchy airspace disease compatible with COVID-19 pneumonia. Reviewed with patient and family  - completed remdesivir.   AST  19  /  ALT  17  /  AlkPhos  113   Cr 0.78 2/11 stable  - ID consult appreciated. Completed 5 day course remdesivir, on day 7/ 10 day decadron  -deep breathing exercises. Patient refuses incentive spirometer  -continue contact and droplet precautions    #abdominal pain  resolved  - abdominal CT -2/5: Colonic diverticulosis without CT evidence of acute diverticulitis    #urinary frequency  - UA +. Will hold off on Abx as no fever or leukocytosis pending urine Cx  - Urine Cx pending    # Chest soreness  - CTA 2/9 neg for PE  -proper posture and reducing UE use in standing activities also discussed. Improved posture with use of platform walker in therapy 2/10    #SLE/RA  -continue plaquenil 200mg po daily  -(previously on 15mg ( 2.5mg ) weekly).  Discussed with rheumatologist, defer for 3 weeks post Covid     #h/o Provoked DVT in 2019  - US July 2019:  DVT noted in the right popliteal vein and visualized segment of the right  posterior tibial vein.  - was on eliquis 5mg po bid at home,   - currently on lovenox for dvt ppx, to resume eliquis at discharge. Discussed with hospitalist/previous treating team whether can be switched back to eliquis as patient does not want injections 2/4. Cleared by vascular to resume eliquis, will need clearance from NSGY to restart  -NSGY clearance to restart eliquis pending  -Follows with vascular cardiologist Dr. Hoyos outpatient    #HTN:  - BP stable off meds     #HLD:  - continue lipitor 20mg po daily    #hypothyroidism:  - continue levothyroxine 200mcg po daily    # leukocytopenia secondary to COVID  - WBC 4.09 2/1 --> 3.25 2/4 --> 2.48 2/7 --> 2.69 2/7 --> 3.35 --> 2.92 2/10--> 4.36 2/11 resolving  - monitor  - CBC in AM 2/15    # sleep  - melatonin 3mg PRN    #Pain Mgmt :   - Tylenol PRN mild pain  - Oxycodone 5mg IR PRN moderate 4-7/10 pain  - Oxycodone 10mg IR prn severe 8-10/10 pain  - Oxycontin ER BID (renewed 2/4)  - currently controlled 2/9    #GI/Bowel Mgmt/ constipation:   -continue Senna, miralax daily, MOM prn  -simethicone PRN gas  -prune juice with meal trays    #FEN :  - Diet - Regular     # DVT prophylaxis :  - Lovenox BID; f/u re: eliquis clearance from NSGY  -LE edema:  ACE wraps LE     #Dispo discussed in IDT meeting 2/10  - mod A bathing, toileting, setup UBD, min + coaching with LBD; min a to CG with transfer; supervision + constant coaching with transfer, 100ft RQ with supervision  -goals: min assist ADLs, min assist transfers and ambulation  -will need to see if family able to provide min assist level at home, target dc 2/16 with 24h our care and home Pt and OT if available, otherwise SHAZIA    Spoke with daughter Adeline [phone] 2/9. Reviewed  CTA results, ID recs    #LABS:  f/u NSGY re: clearance to restart eliquis   urine Cx  CBC BMP 2/15

## 2021-02-11 NOTE — PROGRESS NOTE ADULT - SUBJECTIVE AND OBJECTIVE BOX
Patient is a 75y old  Female who presents with a chief complaint of Back pain (2021 11:15)      Patient seen and examined at bedside. No overnight events.     ALLERGIES:  No Known Allergies    MEDICATIONS  (STANDING):  ascorbic acid 500 milliGRAM(s) Oral daily  atorvastatin 20 milliGRAM(s) Oral at bedtime  benzocaine 15 mG/menthol 3.6 mG (Sugar-Free) Lozenge 1 Lozenge Oral four times a day  dexAMETHasone  Injectable 6 milliGRAM(s) IV Push daily  enoxaparin Injectable 40 milliGRAM(s) SubCutaneous two times a day  folic acid 1 milliGRAM(s) Oral daily  hydroxychloroquine 200 milliGRAM(s) Oral <User Schedule>  levothyroxine 200 MICROGram(s) Oral daily  lidocaine   Patch 1 Patch Transdermal daily  oxyCODONE  ER Tablet 10 milliGRAM(s) Oral every 12 hours  polyethylene glycol 3350 17 Gram(s) Oral daily  senna 2 Tablet(s) Oral at bedtime    MEDICATIONS  (PRN):  acetaminophen   Tablet .. 650 milliGRAM(s) Oral every 6 hours PRN Temp greater or equal to 38C (100.4F), Mild Pain (1 - 3)  magnesium hydroxide Suspension 30 milliLiter(s) Oral daily PRN Constipation  melatonin 3 milliGRAM(s) Oral at bedtime PRN Insomnia  ondansetron    Tablet 4 milliGRAM(s) Oral every 8 hours PRN Nausea and/or Vomiting  oxyCODONE    IR 10 milliGRAM(s) Oral every 4 hours PRN Severe Pain (7 - 10)  oxyCODONE    IR 5 milliGRAM(s) Oral every 4 hours PRN Moderate Pain (4 - 6)  simethicone 80 milliGRAM(s) Chew every 6 hours PRN Upset Stomach    Vital Signs Last 24 Hrs  T(F): 97.8 (10 Feb 2021 22:00), Max: 97.8 (10 Feb 2021 22:00)  HR: 59 (10 Feb 2021 22:00) (59 - 59)  BP: 127/74 (10 Feb 2021 22:00) (127/74 - 127/74)  RR: 18 (10 Feb 2021 22:00) (18 - 18)  SpO2: 98% (10 Feb 2021 22:00) (98% - 98%)  I&O's Summary        PHYSICAL EXAM:  General: NAD, A/O x 3  ENT: MMM, no scleral icterus  Neck: Supple, No JVD  Lungs: Clear to auscultation bilaterally, no wheezes, rales, rhonchi  Cardio: RRR, S1/S2, No murmurs  Abdomen: Soft, Nontender, Nondistended; Bowel sounds present  Extremities: chronic venous stasis changes, lower extremities ace wrapped    LABS:                        13.3   4.36  )-----------( 264      ( 2021 09:05 )             41.9           141  |  107  |  27  ----------------------------<  133  4.0   |  26  |  0.78    Ca    8.7      2021 09:05    TPro  6.3  /  Alb  2.9  /  TBili  0.4  /  DBili  x   /  AST  19  /  ALT  17  /  AlkPhos  113       eGFR if Non African American: 74 mL/min/1.73M2 (21 @ 09:05)  eGFR if African American: 86 mL/min/1.73M2 (21 @ 09:05)               Urinalysis Basic - ( 10 Feb 2021 17:59 )    Color: Yellow / Appearance: Slightly Turbid / S.025 / pH: x  Gluc: x / Ketone: Negative  / Bili: Negative / Urobili: Negative   Blood: x / Protein: 30 mg/dL / Nitrite: Positive   Leuk Esterase: Small / RBC: 0-4 /HPF / WBC 11-25 /HPF   Sq Epi: x / Non Sq Epi: Moderate / Bacteria: TNTC /HPF          RADIOLOGY & ADDITIONAL TESTS:    Care Discussed with Consultants/Other Providers: Dr. Brower (physiatry)

## 2021-02-11 NOTE — PROGRESS NOTE ADULT - SUBJECTIVE AND OBJECTIVE BOX
CC: f/u for Covid    Patient reports: no dyspnea, still c/o pain under left breast and frequency    REVIEW OF SYSTEMS:  All other review of systems negative (Comprehensive ROS)    Antimicrobials Day #  :  hydroxychloroquine 200 milliGRAM(s) Oral <User Schedule>    Other Medications Reviewed  MEDICATIONS  (STANDING):  ascorbic acid 500 milliGRAM(s) Oral daily  atorvastatin 20 milliGRAM(s) Oral at bedtime  benzocaine 15 mG/menthol 3.6 mG (Sugar-Free) Lozenge 1 Lozenge Oral four times a day  dexAMETHasone  Injectable 6 milliGRAM(s) IV Push daily  enoxaparin Injectable 40 milliGRAM(s) SubCutaneous two times a day  folic acid 1 milliGRAM(s) Oral daily  hydroxychloroquine 200 milliGRAM(s) Oral <User Schedule>  levothyroxine 200 MICROGram(s) Oral daily  lidocaine   Patch 1 Patch Transdermal daily  oxyCODONE  ER Tablet 10 milliGRAM(s) Oral every 12 hours  polyethylene glycol 3350 17 Gram(s) Oral daily  senna 2 Tablet(s) Oral at bedtime    T(F): 97.8 (02-10-21 @ 22:00), Max: 97.8 (02-10-21 @ 22:00)  HR: 59 (02-10-21 @ 22:00)  BP: 127/74 (02-10-21 @ 22:00)  RR: 18 (02-10-21 @ 22:00)  SpO2: 98% (02-10-21 @ 22:00)  Wt(kg): --    PHYSICAL EXAM:  General: alert, no acute distress  Eyes:  anicteric, no conjunctival injection, no discharge  Oropharynx: no lesions or injection 	  Neck: supple, without adenopathy  Lungs: clear to auscultation  Heart: regular rate and rhythm; no murmur, rubs or gallops  Abdomen: soft, nondistended, nontender, without mass or organomegaly  Skin: no lesions, no rash under left breast  Extremities: no clubbing, cyanosis, + edema  Neurologic: alert, oriented, moves all extremities    LAB RESULTS:                        13.3   4.36  )-----------( 264      ( 2021 09:05 )             41.9     02-11    141  |  107  |  27<H>  ----------------------------<  133<H>  4.0   |  26  |  0.78    Ca    8.7      2021 09:05    TPro  6.3  /  Alb  2.9<L>  /  TBili  0.4  /  DBili  x   /  AST  19  /  ALT  17  /  AlkPhos  113  02-11    LIVER FUNCTIONS - ( 2021 09:05 )  Alb: 2.9 g/dL / Pro: 6.3 g/dL / ALK PHOS: 113 U/L / ALT: 17 U/L / AST: 19 U/L / GGT: x           Urinalysis Basic - ( 10 Feb 2021 17:59 )    Color: Yellow / Appearance: Slightly Turbid / S.025 / pH: x  Gluc: x / Ketone: Negative  / Bili: Negative / Urobili: Negative   Blood: x / Protein: 30 mg/dL / Nitrite: Positive   Leuk Esterase: Small / RBC: 0-4 /HPF / WBC 11-25 /HPF   Sq Epi: x / Non Sq Epi: Moderate / Bacteria: TNTC /HPF      MICROBIOLOGY:  RECENT CULTURES:      RADIOLOGY REVIEWED:

## 2021-02-11 NOTE — PROGRESS NOTE ADULT - ASSESSMENT
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 Hospital Course complicated by brief SICU stay for mechanical ventilation management, successfully extubated. Patient now admitted to [facility] for a multidisciplinary rehab program on 1/12/21.    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 Jaja Campos  -Continue comprehensive rehab program  -Pain management, bowel regimen per rehab    #Viral PNA 2/2 to COVID 19  #Neutropenia - resolved  -Spo2 appropriate on RA  -Continue Remdesivir x 5 days. Continue Decadron x 10 days  -Monitor O2 with continous pulse ox; monitor respiratory status closely  -Maintain strict isolation precautions, use proper PPE.  -Provide Acetaminophen 650 mg PO q6  -May use Albuterol inhaler PRN  -ID consulted, recommendations appreciated    #hx of UTI  -Ucx >100K Providencia Rettgeri - macrobid resistant, completed Cipro 250mg q12 x 3 days 1/30    #Acute blood loss Anemia, likely from surgery - resolved    #SLE/RA  -Chronic, continue Plaquenil 200mg po daily and folic acid    #H/o provoked right popliteal, right posterior tibial DVT (7/2019 in setting of hip surgery, already completed >6 months of treatment)  -Follows with cardiologist Dr. Hoyos outpatient  -Treated with Eliquis 5mg po bid chronically -- AC stopped at [facility] as 12/29 LE dopplers neg for DVTs  -Primary team to confirm clearance regarding restarting Eliquis  -DVT ppx with Lovenox 40mg BID for now    #Leg edema likely from chronic lymphedema  -Encourage leg elevation  -ACE wraps    #HTN: stable off anti-hypertensives  -Continue to hold home medication Losartan  -Monitor vitals    #HLD  -Chronic, continue Lipitor    #Hypothyroidism  -Chronic, continue Levothyroxine    #Prophylactic Measure  DVT prophylaxis: Lovenox BID

## 2021-02-12 PROCEDURE — 99232 SBSQ HOSP IP/OBS MODERATE 35: CPT | Mod: CS

## 2021-02-12 RX ORDER — SACCHAROMYCES BOULARDII 250 MG
250 POWDER IN PACKET (EA) ORAL
Refills: 0 | Status: COMPLETED | OUTPATIENT
Start: 2021-02-12 | End: 2021-02-17

## 2021-02-12 RX ORDER — NITROFURANTOIN MACROCRYSTAL 50 MG
100 CAPSULE ORAL
Refills: 0 | Status: DISCONTINUED | OUTPATIENT
Start: 2021-02-12 | End: 2021-02-14

## 2021-02-12 RX ADMIN — LIDOCAINE 1 PATCH: 4 CREAM TOPICAL at 11:57

## 2021-02-12 RX ADMIN — LIDOCAINE 1 PATCH: 4 CREAM TOPICAL at 23:11

## 2021-02-12 RX ADMIN — Medication 6 MILLIGRAM(S): at 06:38

## 2021-02-12 RX ADMIN — ATORVASTATIN CALCIUM 20 MILLIGRAM(S): 80 TABLET, FILM COATED ORAL at 21:53

## 2021-02-12 RX ADMIN — Medication 500 MILLIGRAM(S): at 11:57

## 2021-02-12 RX ADMIN — OXYCODONE HYDROCHLORIDE 10 MILLIGRAM(S): 5 TABLET ORAL at 13:16

## 2021-02-12 RX ADMIN — LIDOCAINE 1 PATCH: 4 CREAM TOPICAL at 19:30

## 2021-02-12 RX ADMIN — OXYCODONE HYDROCHLORIDE 10 MILLIGRAM(S): 5 TABLET ORAL at 06:38

## 2021-02-12 RX ADMIN — SENNA PLUS 2 TABLET(S): 8.6 TABLET ORAL at 21:53

## 2021-02-12 RX ADMIN — ENOXAPARIN SODIUM 40 MILLIGRAM(S): 100 INJECTION SUBCUTANEOUS at 06:38

## 2021-02-12 RX ADMIN — BENZOCAINE AND MENTHOL 1 LOZENGE: 5; 1 LIQUID ORAL at 12:01

## 2021-02-12 RX ADMIN — Medication 5 MILLIGRAM(S): at 17:08

## 2021-02-12 RX ADMIN — Medication 250 MILLIGRAM(S): at 17:08

## 2021-02-12 RX ADMIN — ENOXAPARIN SODIUM 40 MILLIGRAM(S): 100 INJECTION SUBCUTANEOUS at 17:10

## 2021-02-12 RX ADMIN — Medication 1 MILLIGRAM(S): at 11:57

## 2021-02-12 RX ADMIN — Medication 200 MILLIGRAM(S): at 09:09

## 2021-02-12 RX ADMIN — Medication 200 MICROGRAM(S): at 06:38

## 2021-02-12 RX ADMIN — BENZOCAINE AND MENTHOL 1 LOZENGE: 5; 1 LIQUID ORAL at 17:09

## 2021-02-12 RX ADMIN — Medication 100 MILLIGRAM(S): at 17:08

## 2021-02-12 RX ADMIN — OXYCODONE HYDROCHLORIDE 10 MILLIGRAM(S): 5 TABLET ORAL at 18:24

## 2021-02-12 NOTE — PROGRESS NOTE ADULT - SUBJECTIVE AND OBJECTIVE BOX
-----------------------------------------------------------------------  Patient is a 75y old  Female who presents with a chief complaint of Back pain (2021 09:29)      HPI:  75F with past medical history of HTN, HLD, SLE, RA, hypothyroidism, provoked R. popliteal DVT in 2019 on eliquis, chronic low back pain presents to ED for acute on chronic low back pain. Pt has several months of chronic lumbosacral back pain, evaluated by outpatient ortho and referred for physical therapy and pain management. She has been receiving ?vitamin spinal injections. She last received low back inj on  after which she her low back pain worsened. She took oxycodone 5 mg qd (prescribed in september during a ED visit) and acetaminophen for past three days without relief. She also endorses worsening pain during ambulation with walker and has affected her quality of life. She has constipation, last BM 4 days ago. Otherwise denies fever, chills, N/V, abdominal pain, dysuria, urinary retention, fecal incontinence, saddle anesthesia, fall, LOC, trauma. During ROS she endorsed LLE weakness for several months, no numbness or tingling and has LLE edema >RLE edema, reported undergoing LLE US 4 months ago, was negative for DVT. Currently has 8/10 pain, improved to 5/10 with oxycodone 5 mg x 2 in ED. (28 Dec 2020 11:16)   : Mri T/Ls: IMPRESSION: Degenerative changes  Abnormal signal with associated enhancement is seen involving the endplates adjacent to the L2-3 disc space. There is slight increased T2 prolongation involving the L2-3 disc space with associated widening. While these findings could be compatible with degenerative changes possibly of early discitis and osteomyelitis must be considered.  Patient s/p L1-L5 laminectomy, T10-L5 posterior surgical fusion by Dr. Campos on 21  HMV x 3 drains. Post op with constipation needing aggressive bowel regimen.     PT/OT and PM&R evaluated patient and recommended Rehab.     Patient medically cleared and admitted to  Rehab on 21 (2021 15:21)      PAST MEDICAL & SURGICAL HISTORY:  HTN (hypertension)    DVT (deep venous thrombosis)    Hypothyroidism    RA (rheumatoid arthritis)    Obesity, Class II, BMI 35-39.9, no comorbidity    Hypothyroid    Benign heart murmur    Hyperlipidemia    Hypertension    H/O degenerative disc disease    Osteoarthritis    SLE (systemic lupus erythematosus)    Rheumatoid arthritis    S/P knee replacement    Status post total hip replacement, right    S/P thyroid surgery  1 lobe removed    Lung cancer  small tumor removed     S/P knee surgery  bilateral    S/P carpal tunnel release  left    S/P eye surgery  child    S/P cataract surgery  left    S/P tonsillectomy        Allergies    No Known Allergies    Intolerances  -----------------------------------------------------------------------  VITALS  75y  Vital Signs Last 24 Hrs  T(C): 36.6 (2021 09:08), Max: 36.6 (2021 09:08)  T(F): 97.8 (2021 09:08), Max: 97.8 (2021 09:08)  HR: 65 (2021 09:08) (55 - 65)  BP: 115/75 (2021 09:08) (115/75 - 124/78)  BP(mean): --  RR: 17 (2021 09:08) (16 - 18)  SpO2: 97% (2021 09:08) (97% - 98%)  Daily     Daily       -----------------------------------------------------------------------  RECENT LABS:                        13.3   4.36  )-----------( 264      ( 2021 09:05 )             41.9     02-11    141  |  107  |  27<H>  ----------------------------<  133<H>  4.0   |  26  |  0.78    Ca    8.7      2021 09:05    TPro  6.3  /  Alb  2.9<L>  /  TBili  0.4  /  DBili  x   /  AST  19  /  ALT  17  /  AlkPhos  113  02-11    LIVER FUNCTIONS - ( 2021 09:05 )  Alb: 2.9 g/dL / Pro: 6.3 g/dL / ALK PHOS: 113 U/L / ALT: 17 U/L / AST: 19 U/L / GGT: x             Urinalysis Basic - ( 10 Feb 2021 17:59 )    Color: Yellow / Appearance: Slightly Turbid / S.025 / pH: x  Gluc: x / Ketone: Negative  / Bili: Negative / Urobili: Negative   Blood: x / Protein: 30 mg/dL / Nitrite: Positive   Leuk Esterase: Small / RBC: 0-4 /HPF / WBC 11-25 /HPF   Sq Epi: x / Non Sq Epi: Moderate / Bacteria: TNTC /HPF    Urine Culture - 48 Hour Hold (collected 02-10-21 @ 16:00)  Source: .Urine  Preliminary Report (21 @ 06:19):    >100,000 CFU/ml Escherichia coli        CAPILLARY BLOOD GLUCOSE          IMAGING: none in last 24hr  -----------------------------------------------------------------------  MEDICATIONS  (STANDING):  ascorbic acid 500 milliGRAM(s) Oral daily  atorvastatin 20 milliGRAM(s) Oral at bedtime  benzocaine 15 mG/menthol 3.6 mG (Sugar-Free) Lozenge 1 Lozenge Oral four times a day  enoxaparin Injectable 40 milliGRAM(s) SubCutaneous two times a day  folic acid 1 milliGRAM(s) Oral daily  hydroxychloroquine 200 milliGRAM(s) Oral <User Schedule>  levothyroxine 200 MICROGram(s) Oral daily  lidocaine   Patch 1 Patch Transdermal daily  oxyCODONE  ER Tablet 10 milliGRAM(s) Oral every 12 hours  polyethylene glycol 3350 17 Gram(s) Oral daily  senna 2 Tablet(s) Oral at bedtime    MEDICATIONS  (PRN):  acetaminophen   Tablet .. 650 milliGRAM(s) Oral every 6 hours PRN Temp greater or equal to 38C (100.4F), Mild Pain (1 - 3)  magnesium hydroxide Suspension 30 milliLiter(s) Oral daily PRN Constipation  melatonin 3 milliGRAM(s) Oral at bedtime PRN Insomnia  ondansetron    Tablet 4 milliGRAM(s) Oral every 8 hours PRN Nausea and/or Vomiting  oxyCODONE    IR 10 milliGRAM(s) Oral every 4 hours PRN Severe Pain (7 - 10)  oxyCODONE    IR 5 milliGRAM(s) Oral every 4 hours PRN Moderate Pain (4 - 6)  simethicone 80 milliGRAM(s) Chew every 6 hours PRN Upset Stomach    -----------------------------------------------------------------------      Review of Systems:   · Additional ROS	Patient seen and evaluated this AM. No acute issues overnight. Patient states she hasn't had a BM in two days despite stool softeneras and prune juice. She is agreeable to suppository later this afternoon if no BM.     Chest wall/breast pain improves but still present intermittently with deep breaths. No O2 requirment and comfortable.    complains of mild urinary symptoms with hesitancy and frequency. No fevers.      Physical Exam:   · Constitutional	detailed exam  · Constitutional Comments	alert, O x 3, stable on RA  · Respiratory	detailed exam  · Respiratory Details	respirations non-labored; reduced BS bases bilaterally, equal  no wheezing or rhonchi  +mild TTP along left T8-9 intercostal, no mass, no redness, reduced  · Respiratory Comments	fair+ effort  · Cardiovascular	+mild pectoral and sternal TTP, improved  · Cardiovascular Details	regular rate and rhythm   · Gastrointestinal	detailed exam  · GI Normal	soft; nontender; bowel sounds normal  · Extremities	detailed exam   · Extremities Comments	  calves soft, NT +bialteral LE ACE wraps  chronic vascular changes    · Additional PE	Skin: long back incision, C/D/I           -----------------------------------------------------------------------  Patient is a 75y old  Female who presents with a chief complaint of Back pain (2021 09:29)      HPI:  75F with past medical history of HTN, HLD, SLE, RA, hypothyroidism, provoked R. popliteal DVT in 2019 on eliquis, chronic low back pain presents to ED for acute on chronic low back pain. Pt has several months of chronic lumbosacral back pain, evaluated by outpatient ortho and referred for physical therapy and pain management. She has been receiving ?vitamin spinal injections. She last received low back inj on  after which she her low back pain worsened. She took oxycodone 5 mg qd (prescribed in september during a ED visit) and acetaminophen for past three days without relief. She also endorses worsening pain during ambulation with walker and has affected her quality of life. She has constipation, last BM 4 days ago. Otherwise denies fever, chills, N/V, abdominal pain, dysuria, urinary retention, fecal incontinence, saddle anesthesia, fall, LOC, trauma. During ROS she endorsed LLE weakness for several months, no numbness or tingling and has LLE edema >RLE edema, reported undergoing LLE US 4 months ago, was negative for DVT. Currently has 8/10 pain, improved to 5/10 with oxycodone 5 mg x 2 in ED. (28 Dec 2020 11:16)   : Mri T/Ls: IMPRESSION: Degenerative changes  Abnormal signal with associated enhancement is seen involving the endplates adjacent to the L2-3 disc space. There is slight increased T2 prolongation involving the L2-3 disc space with associated widening. While these findings could be compatible with degenerative changes possibly of early discitis and osteomyelitis must be considered.  Patient s/p L1-L5 laminectomy, T10-L5 posterior surgical fusion by Dr. Campos on 21  HMV x 3 drains. Post op with constipation needing aggressive bowel regimen.     PT/OT and PM&R evaluated patient and recommended Rehab.     Patient medically cleared and admitted to  Rehab on 21 (2021 15:21)      PAST MEDICAL & SURGICAL HISTORY:  HTN (hypertension)    DVT (deep venous thrombosis)    Hypothyroidism    RA (rheumatoid arthritis)    Obesity, Class II, BMI 35-39.9, no comorbidity    Hypothyroid    Benign heart murmur    Hyperlipidemia    Hypertension    H/O degenerative disc disease    Osteoarthritis    SLE (systemic lupus erythematosus)    Rheumatoid arthritis    S/P knee replacement    Status post total hip replacement, right    S/P thyroid surgery  1 lobe removed    Lung cancer  small tumor removed     S/P knee surgery  bilateral    S/P carpal tunnel release  left    S/P eye surgery  child    S/P cataract surgery  left    S/P tonsillectomy        Allergies    No Known Allergies    Intolerances  -----------------------------------------------------------------------  VITALS  75y  Vital Signs Last 24 Hrs  T(C): 36.6 (2021 09:08), Max: 36.6 (2021 09:08)  T(F): 97.8 (2021 09:08), Max: 97.8 (2021 09:08)  HR: 65 (2021 09:08) (55 - 65)  BP: 115/75 (2021 09:08) (115/75 - 124/78)  BP(mean): --  RR: 17 (2021 09:08) (16 - 18)  SpO2: 97% (2021 09:08) (97% - 98%)  Daily     Daily       -----------------------------------------------------------------------  RECENT LABS:                        13.3   4.36  )-----------( 264      ( 2021 09:05 )             41.9     02-11    141  |  107  |  27<H>  ----------------------------<  133<H>  4.0   |  26  |  0.78    Ca    8.7      2021 09:05    TPro  6.3  /  Alb  2.9<L>  /  TBili  0.4  /  DBili  x   /  AST  19  /  ALT  17  /  AlkPhos  113  02-11    LIVER FUNCTIONS - ( 2021 09:05 )  Alb: 2.9 g/dL / Pro: 6.3 g/dL / ALK PHOS: 113 U/L / ALT: 17 U/L / AST: 19 U/L / GGT: x             Urinalysis Basic - ( 10 Feb 2021 17:59 )    Color: Yellow / Appearance: Slightly Turbid / S.025 / pH: x  Gluc: x / Ketone: Negative  / Bili: Negative / Urobili: Negative   Blood: x / Protein: 30 mg/dL / Nitrite: Positive   Leuk Esterase: Small / RBC: 0-4 /HPF / WBC 11-25 /HPF   Sq Epi: x / Non Sq Epi: Moderate / Bacteria: TNTC /HPF    Urine Culture - 48 Hour Hold (collected 02-10-21 @ 16:00)  Source: .Urine  Preliminary Report (21 @ 06:19):    >100,000 CFU/ml Escherichia coli        CAPILLARY BLOOD GLUCOSE          IMAGING: none in last 24hr  -----------------------------------------------------------------------  MEDICATIONS  (STANDING):  ascorbic acid 500 milliGRAM(s) Oral daily  atorvastatin 20 milliGRAM(s) Oral at bedtime  benzocaine 15 mG/menthol 3.6 mG (Sugar-Free) Lozenge 1 Lozenge Oral four times a day  enoxaparin Injectable 40 milliGRAM(s) SubCutaneous two times a day  folic acid 1 milliGRAM(s) Oral daily  hydroxychloroquine 200 milliGRAM(s) Oral <User Schedule>  levothyroxine 200 MICROGram(s) Oral daily  lidocaine   Patch 1 Patch Transdermal daily  oxyCODONE  ER Tablet 10 milliGRAM(s) Oral every 12 hours  polyethylene glycol 3350 17 Gram(s) Oral daily  senna 2 Tablet(s) Oral at bedtime    MEDICATIONS  (PRN):  acetaminophen   Tablet .. 650 milliGRAM(s) Oral every 6 hours PRN Temp greater or equal to 38C (100.4F), Mild Pain (1 - 3)  magnesium hydroxide Suspension 30 milliLiter(s) Oral daily PRN Constipation  melatonin 3 milliGRAM(s) Oral at bedtime PRN Insomnia  ondansetron    Tablet 4 milliGRAM(s) Oral every 8 hours PRN Nausea and/or Vomiting  oxyCODONE    IR 10 milliGRAM(s) Oral every 4 hours PRN Severe Pain (7 - 10)  oxyCODONE    IR 5 milliGRAM(s) Oral every 4 hours PRN Moderate Pain (4 - 6)  simethicone 80 milliGRAM(s) Chew every 6 hours PRN Upset Stomach    -----------------------------------------------------------------------      Review of Systems:   · Additional ROS	Patient seen and evaluated this AM. No acute issues overnight. Patient states she hasn't had a BM in two days despite stool softeneras and prune juice. She is agreeable to suppository later this afternoon if no BM.     Chest wall/breast pain improves but still present intermittently with deep breaths. No O2 requirment and comfortable.    complains of mild urinary symptoms with hesitancy and frequency. No fevers. Reduced urine output when she micturates. PT also reports that she seemed to be relying on upper body more during ambulation today than previously. No respiratory distress      Physical Exam:   · Constitutional	detailed exam  · Constitutional Comments	alert, O x 3, stable on RA. Mood stable this AM  · Respiratory	detailed exam  · Respiratory Details	respirations non-labored; reduced BS bases bilaterally, equal    · Respiratory Comments	fair+ effort  · Cardiovascular	+mild pectoral and sternal TTP, improved  · Cardiovascular Details	regular rate and rhythm   · Gastrointestinal	detailed exam  · GI Normal	soft; nontender; bowel sounds normal  · Extremities	detailed exam   · Extremities Comments	  calves soft, NT +bialteral LE ACE wraps  chronic vascular changes    · Additional PE	Skin: long back incision, C/D/I

## 2021-02-12 NOTE — PROGRESS NOTE ADULT - ASSESSMENT
75y female with a PMH of RA managed with plaquenil, HTN, SLE, hypothyroidism, chronic LBP, s/p T10-L5 laminectomy and posterior fusion in early January at [facility]-, s/p transfer to rehab on 1/12/21  She tested positive for Covid on 1/31 --- hospital acquired Covid  She has been with a paucity of symptoms; nonspecific abdominal and chest pains without fevers, cough, SOB or hypoxia  CT scan of A/P showed nonspecific lower lung densities and RDV started on 2/5 along with decadron.  On lovenox 40 BID for DVT prophylaxis, has a history of a provoked DVT in the past.  CTA of chest shows mild bilateral patchy airspace disease compatible with COVID-19 pneumonia.  No evidence of a new PE.RDV completed 2/9.  Day 8/10 decadron  Now with frequency, UTI possible with E Coli in the urine, frequency bit no dysuria  Comfortable on RA.  Suggest:  Limit decadron to 10 days total, day 8/10  Continue anticoagulation   Etiology of chest pain is not clear, appears stable as does respiratory status.? Musculoskeletal.  She has E Coli in urine, hard to differentiate colonization from infection.Sensitivities are pending.I am not sure if she needs treatment.However if isolate is treated I would consider nitrofurantoin 100 BID x 5 days and if resistant to this drug a 3 gram packet of fosfomycin.Both options would have little systemic absorption  of antibiotics.

## 2021-02-12 NOTE — PROGRESS NOTE ADULT - ASSESSMENT
KAI THOMAS is a 75y F PMH HTN, HLD, SLE, RA, hypothyroidism, provoked R. 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 Jaja Campos. Staples removed   - continue Comprehensive Rehab Program of PT/OT- 3 hrs/day 5 days/week  - psychological support, counseling  - Precautions:  spinal, RA, cardiac, contact and droplet precautions    #COVID19 infection  -PCR + 1/29/21. Moved up to [facility] for isolation. COVID IgG Ab neg  - Ct A/P 2/5: 1. Pattern of lung base groundglass opacification suggests infection including atypical pneumonia/viral infection from agents including COVID-19   -CTA 2/9:  No pulmonary embolism to the segmental branches. Mild bilateral patchy airspace disease compatible with COVID-19 pneumonia. Reviewed with patient and family  - completed remdesivir.   AST  19  /  ALT  17  /  AlkPhos  113   Cr 0.78 2/11 stable  - ID consult appreciated. Completed 5 day course remdesivir, on day 8/ 10 day decadron  -deep breathing exercises. Patient refuses incentive spirometer  -continue contact and droplet precautions    #abdominal pain  resolved  - abdominal CT -2/5: Colonic diverticulosis without CT evidence of acute diverticulitis    #urinary frequency  - UA +. Will hold off on Abx as no fever or leukocytosis pending urine Cx. Although has mild urinary sx with hesitency and frequency.   - Urine Cx pending - >100k Ecoli however may be contaminated. Pending sensitivities.   - Infectious disease following: monitor for now. May treat depending on sensitivities. Follow up recs  - resolve constipation and reevaluate urine symptoms    # Chest soreness  - CTA 2/9 neg for PE  -proper posture and reducing UE use in standing activities also discussed. Improved posture with use of platform walker in therapy 2/10    #SLE/RA  -continue plaquenil 200mg po daily  -(previously on 15mg ( 2.5mg ) weekly).  Discussed with rheumatologist, defer for 3 weeks post Covid     #h/o Provoked DVT in 2019  - US July 2019:  DVT noted in the right popliteal vein and visualized segment of the right  posterior tibial vein.  - was on eliquis 5mg po bid at home,   - currently on lovenox for dvt ppx, to resume eliquis at discharge. Discussed with hospitalist/previous treating team whether can be switched back to eliquis as patient does not want injections 2/4. Cleared by vascular to resume eliquis, will need clearance from NSGY to restart  - NSGY clearance to restart eliquis pending  - Follows with vascular cardiologist Dr. Hoyos outpatient    #HTN:  - BP stable off meds     #HLD:  - continue lipitor 20mg po daily    #hypothyroidism:  - continue levothyroxine 200mcg po daily    # leukocytopenia secondary to COVID  - WBC 4.09 2/1 --> 3.25 2/4 --> 2.48 2/7 --> 2.69 2/7 --> 3.35 --> 2.92 2/10--> 4.36 2/11 resolving  - monitor  - CBC in AM 2/15    # sleep  - melatonin 3mg PRN    #Pain Mgmt :   - Tylenol PRN mild pain  - Oxycodone 5mg IR PRN moderate 4-7/10 pain  - Oxycodone 10mg IR prn severe 8-10/10 pain  - Oxycontin ER BID (renewed 2/4)  - currently controlled 2/9    #GI/Bowel Mgmt/ constipation:   -continue Senna, miralax daily, MOM prn  -simethicone PRN gas  -prune juice with meal trays  - constipation 2/12 - despite oral laxitives. Recommended suppository if no BM this afternoon, patient agreeable.     #FEN :  - Diet - Regular     # DVT prophylaxis :  - Lovenox BID; f/u re: eliquis clearance from NSGY  -LE edema:  ACE wraps LE     #Dispo discussed in IDT meeting 2/10  - mod A bathing, toileting, setup UBD, min + coaching with LBD; min a to CG with transfer; supervision + constant coaching with transfer, 100ft RQ with supervision  -goals: min assist ADLs, min assist transfers and ambulation  -will need to see if family able to provide min assist level at home, target dc 2/16 with 24h our care and home Pt and OT if available, otherwise SHAZIA    Spoke with daughter Adeline [phone] 2/9. Reviewed  CTA results, ID recs    #LABS:  f/u NSGY re: clearance to restart eliquis   urine sensitivities. follow up with ID if needed to treat?  CBC BMP 2/15  BM      KAI THOMAS is a 75y F PMH HTN, HLD, SLE, RA, hypothyroidism, provoked R. 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 Jaja Campos. Staples removed   - continue Comprehensive Rehab Program of PT/OT- 3 hrs/day 5 days/week  - psychological support, counseling  - Precautions:  spinal, RA, cardiac, contact and droplet precautions    #COVID19 infection  -PCR + 1/29/21. Moved up to [facility] for isolation. COVID IgG Ab neg  - Ct A/P 2/5: 1. Pattern of lung base groundglass opacification suggests infection including atypical pneumonia/viral infection from agents including COVID-19   -CTA 2/9:  No pulmonary embolism to the segmental branches. Mild bilateral patchy airspace disease compatible with COVID-19 pneumonia.  - completed remdesivir.   AST  19  /  ALT  17  /  AlkPhos  113   Cr 0.78 2/11 stable  - ID consult appreciated. Completed 5 day course remdesivir, on day 8/ 10 day decadron 2/12  -deep breathing exercises. Patient refuses incentive spirometer  -continue contact and droplet precautions    #abdominal pain  resolved  - abdominal CT -2/5: Colonic diverticulosis without CT evidence of acute diverticulitis    #urinary frequency  - UA +  - Urine Cx  - >100k Ecoli pending sensitivities  - Infectious disease following, recommendation appreciated.  - Not sure if Rx needed, no fever or leukocytosis, although has mild urinary sx with hesitancy and frequency and decline in PT. will start macrobid 100 bid x 5 days, florastor 2/12  - resolve constipation and reevaluate urine symptoms    # Chest soreness  - CTA 2/9 neg for PE  -proper posture and reducing UE use in standing activities also discussed. Improved posture with use of platform walker in therapy 2/10    #SLE/RA  -continue plaquenil 200mg po daily  -(previously on 15mg ( 2.5mg ) weekly).  Discussed with rheumatologist, defer for 3 weeks post Covid     #h/o Provoked DVT in 2019  - US July 2019:  DVT noted in the right popliteal vein and visualized segment of the right  posterior tibial vein.  - was on eliquis 5mg po bid at home,   - currently on lovenox for dvt ppx, to resume eliquis at discharge. Discussed with hospitalist/previous treating team whether can be switched back to eliquis as patient does not want injections 2/4. Cleared by vascular to resume eliquis, will need clearance from NSGY to restart  - NSGY clearance to restart eliquis pending  - Follows with vascular cardiologist Dr. Hoyos outpatient    #HTN:  - BP stable off meds     #HLD:  - continue lipitor 20mg po daily    #hypothyroidism:  - continue levothyroxine 200mcg po daily    # leukocytopenia secondary to COVID  - 4.36 2/11 resolved  - CBC in AM 2/15    # sleep  - melatonin 3mg PRN    #Pain Mgmt :   - Tylenol PRN mild pain  - Oxycodone 5mg IR PRN moderate 4-7/10 pain  - Oxycodone 10mg IR prn severe 8-10/10 pain  - Oxycontin ER BID (renewed 2/4)  - currently controlled 2/9    #GI/Bowel Mgmt/ constipation:   -continue Senna, miralax daily, MOM prn  -simethicone PRN gas  -prune juice with meal trays  - constipation 2/12 - despite oral laxitives. Recommended suppository if no BM this afternoon, patient agreeable.     #FEN :  - Diet - Regular     # DVT prophylaxis :  - Lovenox BID; f/u re: eliquis clearance from NSGY  -LE edema:  ACE wraps LE     #Dispo discussed in IDT meeting 2/10  - mod A bathing, toileting, setup UBD, min + coaching with LBD; min a to CG with transfer; supervision + constant coaching with transfer, 100ft RQ with supervision  -goals: min assist ADLs, min assist transfers and ambulation  -will need to see if family able to provide min assist level at home, target dc 2/16 with 24h our care and home Pt and OT if available, otherwise SHAZIA    Spoke with daughter Adeline [phone] 2/9. Reviewed  CTA results, ID recs    #LABS:  f/u NSGY re: clearance to restart eliquis  UCX sensitivities  CBC BMP 2/15  BM

## 2021-02-12 NOTE — PROGRESS NOTE ADULT - SUBJECTIVE AND OBJECTIVE BOX
CC: f/u for covid    Patient reports: no dyspnea, stable left chest pain, no dysuria but urinary frequency    REVIEW OF SYSTEMS:  All other review of systems negative (Comprehensive ROS)    Antimicrobials Day #  :  hydroxychloroquine 200 milliGRAM(s) Oral <User Schedule>    Other Medications Reviewed  MEDICATIONS  (STANDING):  ascorbic acid 500 milliGRAM(s) Oral daily  atorvastatin 20 milliGRAM(s) Oral at bedtime  benzocaine 15 mG/menthol 3.6 mG (Sugar-Free) Lozenge 1 Lozenge Oral four times a day  dexAMETHasone  Injectable 6 milliGRAM(s) IV Push daily  enoxaparin Injectable 40 milliGRAM(s) SubCutaneous two times a day  folic acid 1 milliGRAM(s) Oral daily  hydroxychloroquine 200 milliGRAM(s) Oral <User Schedule>  levothyroxine 200 MICROGram(s) Oral daily  lidocaine   Patch 1 Patch Transdermal daily  oxyCODONE  ER Tablet 10 milliGRAM(s) Oral every 12 hours  polyethylene glycol 3350 17 Gram(s) Oral daily  senna 2 Tablet(s) Oral at bedtime    T(F): 97.7 (21 @ 21:34), Max: 97.7 (21 @ 21:34)  HR: 62 (21 @ 21:34)  BP: 124/78 (21 @ 21:34)  RR: 16 (21 @ 21:34)  SpO2: 98% (21 @ 21:34)  Wt(kg): --    PHYSICAL EXAM:  General: alert, no acute distress  Eyes:  anicteric, no conjunctival injection, no discharge  Oropharynx: no lesions or injection 	  Neck: supple, without adenopathy  Lungs: clear to auscultation  Heart: regular rate and rhythm; no murmur, rubs or gallops  Abdomen: soft, nondistended, nontender, without mass or organomegaly  Skin: no lesions  Extremities: no clubbing, cyanosis, + edema, left greater than rt  Neurologic: alert, oriented, moves all extremities    LAB RESULTS:                        13.3   4.36  )-----------( 264      ( 2021 09:05 )             41.9         141  |  107  |  27<H>  ----------------------------<  133<H>  4.0   |  26  |  0.78    Ca    8.7      2021 09:05    TPro  6.3  /  Alb  2.9<L>  /  TBili  0.4  /  DBili  x   /  AST  19  /  ALT  17  /  AlkPhos  113  02-11    LIVER FUNCTIONS - ( 2021 09:05 )  Alb: 2.9 g/dL / Pro: 6.3 g/dL / ALK PHOS: 113 U/L / ALT: 17 U/L / AST: 19 U/L / GGT: x           Urinalysis Basic - ( 10 Feb 2021 17:59 )    Color: Yellow / Appearance: Slightly Turbid / S.025 / pH: x  Gluc: x / Ketone: Negative  / Bili: Negative / Urobili: Negative   Blood: x / Protein: 30 mg/dL / Nitrite: Positive   Leuk Esterase: Small / RBC: 0-4 /HPF / WBC 11-25 /HPF   Sq Epi: x / Non Sq Epi: Moderate / Bacteria: TNTC /HPF      MICROBIOLOGY:  RECENT CULTURES:  02-10 @ 16:00 .Urine     >100,000 CFU/ml Escherichia coli          RADIOLOGY REVIEWED:    < from: CT Angio Chest w/ IV Cont (21 @ 16:25) >  IMPRESSION:  *  No pulmonary embolism to the segmental branches. Limited visualization of subsegmental branches secondary to a combination of respiratory motionand suboptimal bolus timing.  *  Mild bilateral patchy airspace disease compatible with COVID-19 pneumonia.      < end of copied text >

## 2021-02-12 NOTE — PROGRESS NOTE ADULT - ASSESSMENT
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 Hospital Course complicated by brief SICU stay for mechanical ventilation management, successfully extubated. Patient now admitted to [facility] for a multidisciplinary rehab program on 1/12/21.    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 Jaja Campos  -Continue comprehensive rehab program  -Pain management, bowel regimen per rehab    #Viral PNA 2/2 to COVID 19  #Neutropenia - resolved  -Spo2 appropriate on RA  -Completed Remdesivir x 5 days. Per [facility] guidelines, patient SpO2 appropriate on RA, does not need further Decadron. D/C Decadron - completed 7/10 days.   -Monitor O2 with continuos pulse ox; monitor respiratory status closely  -Maintain strict isolation precautions, use proper PPE.  -Provide Acetaminophen 650 mg PO q6  -May use Albuterol inhaler PRN  -ID consulted, recommendations appreciated    #hx of UTI  -Repeat UCx >100K E coli, patient denies urinary symptoms. ?Colonization. Afebrile, no leukocytosis. Continue to observe off abx  -ID consulted, recommendations appreciated  -Ucx >100K Providencia Rettgeri - Macrobid resistant, completed Cipro 250mg q12 x 3 days 1/30    #Acute blood loss Anemia, likely from surgery - resolved    #SLE/RA  -Chronic, continue Plaquenil 200mg po daily and folic acid    #H/o provoked right popliteal, right posterior tibial DVT (7/2019 in setting of hip surgery, already completed >6 months of treatment)  -Follows with cardiologist Dr. Hoyos outpatient  -Treated with Eliquis 5mg po bid chronically -- AC stopped at [facility] as 12/29 LE dopplers neg for DVTs  -Primary team to confirm clearance regarding restarting Eliquis  -DVT ppx with Lovenox 40mg BID for now    #Leg edema likely from chronic lymphedema - improving  -Encourage leg elevation  -ACE wraps    #HTN: stable off anti-hypertensives  -Continue to hold home medication Losartan  -Monitor vitals    #HLD  -Chronic, continue Lipitor    #Hypothyroidism  -Chronic, continue Levothyroxine    #Prophylactic Measure  DVT prophylaxis: Lovenox BID

## 2021-02-12 NOTE — PROGRESS NOTE ADULT - SUBJECTIVE AND OBJECTIVE BOX
Patient is a 75y old  Female who presents with a chief complaint of Back pain (2021 08:25)      Patient seen and examined at bedside. No overnight events. Eating well. Feels mildly constipated, improving with prune juice.     ALLERGIES:  No Known Allergies    MEDICATIONS  (STANDING):  ascorbic acid 500 milliGRAM(s) Oral daily  atorvastatin 20 milliGRAM(s) Oral at bedtime  benzocaine 15 mG/menthol 3.6 mG (Sugar-Free) Lozenge 1 Lozenge Oral four times a day  dexAMETHasone  Injectable 6 milliGRAM(s) IV Push daily  enoxaparin Injectable 40 milliGRAM(s) SubCutaneous two times a day  folic acid 1 milliGRAM(s) Oral daily  hydroxychloroquine 200 milliGRAM(s) Oral <User Schedule>  levothyroxine 200 MICROGram(s) Oral daily  lidocaine   Patch 1 Patch Transdermal daily  oxyCODONE  ER Tablet 10 milliGRAM(s) Oral every 12 hours  polyethylene glycol 3350 17 Gram(s) Oral daily  senna 2 Tablet(s) Oral at bedtime    MEDICATIONS  (PRN):  acetaminophen   Tablet .. 650 milliGRAM(s) Oral every 6 hours PRN Temp greater or equal to 38C (100.4F), Mild Pain (1 - 3)  magnesium hydroxide Suspension 30 milliLiter(s) Oral daily PRN Constipation  melatonin 3 milliGRAM(s) Oral at bedtime PRN Insomnia  ondansetron    Tablet 4 milliGRAM(s) Oral every 8 hours PRN Nausea and/or Vomiting  oxyCODONE    IR 10 milliGRAM(s) Oral every 4 hours PRN Severe Pain (7 - 10)  oxyCODONE    IR 5 milliGRAM(s) Oral every 4 hours PRN Moderate Pain (4 - 6)  simethicone 80 milliGRAM(s) Chew every 6 hours PRN Upset Stomach    Vital Signs Last 24 Hrs  T(F): 97.8 (2021 09:08), Max: 97.8 (2021 09:08)  HR: 65 (2021 09:08) (55 - 65)  BP: 115/75 (2021 09:08) (115/75 - 124/78)  RR: 17 (2021 09:08) (16 - 18)  SpO2: 97% (2021 09:08) (97% - 98%)  I&O's Summary        PHYSICAL EXAM:  General: NAD  ENT: MMM, no scleral icterus  Neck: Supple, No JVD  Lungs: Clear to auscultation bilaterally, no wheezes, rales, rhonchi  Cardio: RRR, S1/S2, No murmurs  Abdomen: Soft, Nontender, Nondistended; Bowel sounds present  Extremities: No calf tenderness, No pitting edema  Neuro: moves all extremities, A&Ox3    LABS:                        13.3   4.36  )-----------( 264      ( 2021 09:05 )             41.9           141  |  107  |  27  ----------------------------<  133  4.0   |  26  |  0.78    Ca    8.7      2021 09:05    TPro  6.3  /  Alb  2.9  /  TBili  0.4  /  DBili  x   /  AST  19  /  ALT  17  /  AlkPhos  113       eGFR if Non African American: 74 mL/min/1.73M2 (21 @ 09:05)  eGFR if African American: 86 mL/min/1.73M2 (21 @ 09:05)                   Urinalysis Basic - ( 10 Feb 2021 17:59 )    Color: Yellow / Appearance: Slightly Turbid / S.025 / pH: x  Gluc: x / Ketone: Negative  / Bili: Negative / Urobili: Negative   Blood: x / Protein: 30 mg/dL / Nitrite: Positive   Leuk Esterase: Small / RBC: 0-4 /HPF / WBC 11-25 /HPF   Sq Epi: x / Non Sq Epi: Moderate / Bacteria: TNTC /HPF        Urine Culture - 48 Hour Hold (collected 10 Feb 2021 16:00)  Source: .Urine  Preliminary Report (2021 06:19):    >100,000 CFU/ml Escherichia coli        RADIOLOGY & ADDITIONAL TESTS:    Care Discussed with Consultants/Other Providers: Dr. Brower (physiatry)

## 2021-02-13 PROCEDURE — 99232 SBSQ HOSP IP/OBS MODERATE 35: CPT | Mod: CS

## 2021-02-13 RX ORDER — POLYETHYLENE GLYCOL 3350 17 G/17G
17 POWDER, FOR SOLUTION ORAL DAILY
Refills: 0 | Status: DISCONTINUED | OUTPATIENT
Start: 2021-02-13 | End: 2021-02-25

## 2021-02-13 RX ADMIN — POLYETHYLENE GLYCOL 3350 17 GRAM(S): 17 POWDER, FOR SOLUTION ORAL at 05:45

## 2021-02-13 RX ADMIN — ATORVASTATIN CALCIUM 20 MILLIGRAM(S): 80 TABLET, FILM COATED ORAL at 20:23

## 2021-02-13 RX ADMIN — ENOXAPARIN SODIUM 40 MILLIGRAM(S): 100 INJECTION SUBCUTANEOUS at 17:11

## 2021-02-13 RX ADMIN — Medication 650 MILLIGRAM(S): at 15:59

## 2021-02-13 RX ADMIN — Medication 1 MILLIGRAM(S): at 11:46

## 2021-02-13 RX ADMIN — Medication 100 MILLIGRAM(S): at 17:11

## 2021-02-13 RX ADMIN — SENNA PLUS 2 TABLET(S): 8.6 TABLET ORAL at 20:23

## 2021-02-13 RX ADMIN — ONDANSETRON 4 MILLIGRAM(S): 8 TABLET, FILM COATED ORAL at 08:52

## 2021-02-13 RX ADMIN — Medication 5 MILLIGRAM(S): at 05:46

## 2021-02-13 RX ADMIN — Medication 200 MILLIGRAM(S): at 08:23

## 2021-02-13 RX ADMIN — OXYCODONE HYDROCHLORIDE 10 MILLIGRAM(S): 5 TABLET ORAL at 05:45

## 2021-02-13 RX ADMIN — Medication 250 MILLIGRAM(S): at 17:12

## 2021-02-13 RX ADMIN — Medication 200 MICROGRAM(S): at 05:45

## 2021-02-13 RX ADMIN — ENOXAPARIN SODIUM 40 MILLIGRAM(S): 100 INJECTION SUBCUTANEOUS at 05:46

## 2021-02-13 RX ADMIN — Medication 100 MILLIGRAM(S): at 05:46

## 2021-02-13 RX ADMIN — Medication 250 MILLIGRAM(S): at 05:45

## 2021-02-13 RX ADMIN — Medication 500 MILLIGRAM(S): at 11:48

## 2021-02-13 NOTE — PROGRESS NOTE ADULT - SUBJECTIVE AND OBJECTIVE BOX
Patient is a 75y old  Female who presents with a chief complaint of Back pain (12 Feb 2021 12:27)      SUBJECTIVE / OVERNIGHT EVENTS:  Pt seen and examined at bedside. No acute events overnight.  Pt denies cp, palpitations, sob, abd pain, N/V, fever, chills.    ROS:  All other review of systems negative    Allergies    No Known Allergies    Intolerances        MEDICATIONS  (STANDING):  ascorbic acid 500 milliGRAM(s) Oral daily  atorvastatin 20 milliGRAM(s) Oral at bedtime  benzocaine 15 mG/menthol 3.6 mG (Sugar-Free) Lozenge 1 Lozenge Oral four times a day  enoxaparin Injectable 40 milliGRAM(s) SubCutaneous two times a day  folic acid 1 milliGRAM(s) Oral daily  hydroxychloroquine 200 milliGRAM(s) Oral <User Schedule>  levothyroxine 200 MICROGram(s) Oral daily  lidocaine   Patch 1 Patch Transdermal daily  nitrofurantoin monohydrate/macrocrystals (MACROBID) 100 milliGRAM(s) Oral two times a day  oxyCODONE  ER Tablet 10 milliGRAM(s) Oral every 12 hours  polyethylene glycol 3350 17 Gram(s) Oral daily  saccharomyces boulardii 250 milliGRAM(s) Oral two times a day  senna 2 Tablet(s) Oral at bedtime    MEDICATIONS  (PRN):  acetaminophen   Tablet .. 650 milliGRAM(s) Oral every 6 hours PRN Temp greater or equal to 38C (100.4F), Mild Pain (1 - 3)  bisacodyl 5 milliGRAM(s) Oral every 12 hours PRN Constipation  magnesium hydroxide Suspension 30 milliLiter(s) Oral daily PRN Constipation  melatonin 3 milliGRAM(s) Oral at bedtime PRN Insomnia  ondansetron    Tablet 4 milliGRAM(s) Oral every 8 hours PRN Nausea and/or Vomiting  oxyCODONE    IR 10 milliGRAM(s) Oral every 4 hours PRN Severe Pain (7 - 10)  oxyCODONE    IR 5 milliGRAM(s) Oral every 4 hours PRN Moderate Pain (4 - 6)  simethicone 80 milliGRAM(s) Chew every 6 hours PRN Upset Stomach      Vital Signs Last 24 Hrs  T(C): 36.8 (13 Feb 2021 08:28), Max: 36.8 (13 Feb 2021 08:28)  T(F): 98.2 (13 Feb 2021 08:28), Max: 98.2 (13 Feb 2021 08:28)  HR: 77 (13 Feb 2021 08:28) (72 - 77)  BP: 135/74 (13 Feb 2021 08:28) (124/82 - 135/74)  BP(mean): --  RR: 15 (13 Feb 2021 08:28) (15 - 15)  SpO2: 98% (13 Feb 2021 08:28) (98% - 98%)  CAPILLARY BLOOD GLUCOSE        I&O's Summary      PHYSICAL EXAM:  GENERAL: NAD  CHEST/LUNG: Clear to auscultation bilaterally; No wheeze  HEART: Regular rate and rhythm; No murmurs, rubs, or gallops  ABDOMEN: Soft, Nontender, Nondistended; Bowel sounds present  EXTREMITIES:  2+ Peripheral Pulses, No clubbing, cyanosis, or edema  NEUROLOGY: AAOx3  PSYCH: calm    LABS:                    RADIOLOGY & ADDITIONAL TESTS:  Results Reviewed:   Imaging Personally Reviewed:  Electrocardiogram Personally Reviewed:    COORDINATION OF CARE:  Care Discussed with Consultants/Other Providers [Y/N]:  Prior or Outpatient Records Reviewed [Y/N]:

## 2021-02-13 NOTE — PROGRESS NOTE ADULT - SUBJECTIVE AND OBJECTIVE BOX
-----------------------------------------------------------------------  Patient is a 75y old  Female who presents with a chief complaint of Back pain (2021 09:29)      HPI:  75F with past medical history of HTN, HLD, SLE, RA, hypothyroidism, provoked R. popliteal DVT in 2019 on eliquis, chronic low back pain presents to ED for acute on chronic low back pain. Pt has several months of chronic lumbosacral back pain, evaluated by outpatient ortho and referred for physical therapy and pain management. She has been receiving ?vitamin spinal injections. She last received low back inj on  after which she her low back pain worsened. She took oxycodone 5 mg qd (prescribed in september during a ED visit) and acetaminophen for past three days without relief. She also endorses worsening pain during ambulation with walker and has affected her quality of life. She has constipation, last BM 4 days ago. Otherwise denies fever, chills, N/V, abdominal pain, dysuria, urinary retention, fecal incontinence, saddle anesthesia, fall, LOC, trauma. During ROS she endorsed LLE weakness for several months, no numbness or tingling and has LLE edema >RLE edema, reported undergoing LLE US 4 months ago, was negative for DVT. Currently has 8/10 pain, improved to 5/10 with oxycodone 5 mg x 2 in ED. (28 Dec 2020 11:16)   : Mri T/Ls: IMPRESSION: Degenerative changes  Abnormal signal with associated enhancement is seen involving the endplates adjacent to the L2-3 disc space. There is slight increased T2 prolongation involving the L2-3 disc space with associated widening. While these findings could be compatible with degenerative changes possibly of early discitis and osteomyelitis must be considered.  Patient s/p L1-L5 laminectomy, T10-L5 posterior surgical fusion by Dr. Campos on 21  HMV x 3 drains. Post op with constipation needing aggressive bowel regimen.     PT/OT and PM&R evaluated patient and recommended Rehab.     Patient medically cleared and admitted to [facility] on 21 (2021 15:21)      PAST MEDICAL & SURGICAL HISTORY:  HTN (hypertension)    DVT (deep venous thrombosis)    Hypothyroidism    RA (rheumatoid arthritis)    Obesity, Class II, BMI 35-39.9, no comorbidity    Hypothyroid    Benign heart murmur    Hyperlipidemia    Hypertension    H/O degenerative disc disease    Osteoarthritis    SLE (systemic lupus erythematosus)    Rheumatoid arthritis    S/P knee replacement    Status post total hip replacement, right    S/P thyroid surgery  1 lobe removed    Lung cancer  small tumor removed     S/P knee surgery  bilateral    S/P carpal tunnel release  left    S/P eye surgery  child    S/P cataract surgery  left    S/P tonsillectomy        Allergies    No Known Allergies    Intolerances    Subjective/ROS:  Patient seen and examined this morning. Patient in bed in NAD, no SOB, (+) nausea after oxycodone use this morning. Pain controlled.  -----------------------------------------------------------------------  VITALS  Vital Signs Last 24 Hrs  T(C): 36.8 (2021 08:28), Max: 36.8 (2021 08:28)  T(F): 98.2 (2021 08:28), Max: 98.2 (2021 08:28)  HR: 77 (2021 08:28) (72 - 77)  BP: 135/74 (2021 08:28) (124/82 - 135/74)  BP(mean): --  RR: 15 (2021 08:28) (15 - 15)  SpO2: 98% (2021 08:28) (98% - 98%)      Physical Exam:   · Constitutional	detailed exam  · Constitutional Comments	alert, O x 3, stable on RA. Mood stable this AM  · Respiratory	detailed exam  · Respiratory Details	respirations non-labored; reduced BS bases bilaterally, equal    · Respiratory Comments	fair+ effort  · Cardiovascular	+mild pectoral and sternal TTP, improved  · Cardiovascular Details	regular rate and rhythm   · Gastrointestinal	detailed exam  · GI Normal	soft; nontender; bowel sounds normal  · Extremities	detailed exam   · Extremities Comments	  calves soft, NT +bialteral LE ACE wraps  chronic vascular changes    · Additional PE	Skin: long back incision, C/D/I        -----------------------------------------------------------------------  RECENT LABS:                        13.3   4.36  )-----------( 264      ( 2021 09:05 )             41.9         141  |  107  |  27<H>  ----------------------------<  133<H>  4.0   |  26  |  0.78    Ca    8.7      2021 09:05    TPro  6.3  /  Alb  2.9<L>  /  TBili  0.4  /  DBili  x   /  AST  19  /  ALT  17  /  AlkPhos  113      LIVER FUNCTIONS - ( 2021 09:05 )  Alb: 2.9 g/dL / Pro: 6.3 g/dL / ALK PHOS: 113 U/L / ALT: 17 U/L / AST: 19 U/L / GGT: x             Urinalysis Basic - ( 10 Feb 2021 17:59 )    Color: Yellow / Appearance: Slightly Turbid / S.025 / pH: x  Gluc: x / Ketone: Negative  / Bili: Negative / Urobili: Negative   Blood: x / Protein: 30 mg/dL / Nitrite: Positive   Leuk Esterase: Small / RBC: 0-4 /HPF / WBC 11-25 /HPF   Sq Epi: x / Non Sq Epi: Moderate / Bacteria: TNTC /HPF    Urine Culture - 48 Hour Hold (collected 02-10-21 @ 16:00)  Source: .Urine  Preliminary Report (21 @ 06:19):    >100,000 CFU/ml Escherichia coli        CAPILLARY BLOOD GLUCOSE          IMAGING: none in last 24hr  -----------------------------------------------------------------------  MEDICATIONS  (STANDING):  ascorbic acid 500 milliGRAM(s) Oral daily  atorvastatin 20 milliGRAM(s) Oral at bedtime  benzocaine 15 mG/menthol 3.6 mG (Sugar-Free) Lozenge 1 Lozenge Oral four times a day  enoxaparin Injectable 40 milliGRAM(s) SubCutaneous two times a day  folic acid 1 milliGRAM(s) Oral daily  hydroxychloroquine 200 milliGRAM(s) Oral <User Schedule>  levothyroxine 200 MICROGram(s) Oral daily  lidocaine   Patch 1 Patch Transdermal daily  oxyCODONE  ER Tablet 10 milliGRAM(s) Oral every 12 hours  polyethylene glycol 3350 17 Gram(s) Oral daily  senna 2 Tablet(s) Oral at bedtime    MEDICATIONS  (PRN):  acetaminophen   Tablet .. 650 milliGRAM(s) Oral every 6 hours PRN Temp greater or equal to 38C (100.4F), Mild Pain (1 - 3)  magnesium hydroxide Suspension 30 milliLiter(s) Oral daily PRN Constipation  melatonin 3 milliGRAM(s) Oral at bedtime PRN Insomnia  ondansetron    Tablet 4 milliGRAM(s) Oral every 8 hours PRN Nausea and/or Vomiting  oxyCODONE    IR 10 milliGRAM(s) Oral every 4 hours PRN Severe Pain (7 - 10)  oxyCODONE    IR 5 milliGRAM(s) Oral every 4 hours PRN Moderate Pain (4 - 6)  simethicone 80 milliGRAM(s) Chew every 6 hours PRN Upset Stomach    -----------------------------------------------------------------------

## 2021-02-13 NOTE — PROGRESS NOTE ADULT - ASSESSMENT
KAI THOMAS is a 75y F PMH HTN, HLD, SLE, RA, hypothyroidism, provoked R. 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 Jaja Campos. Staples removed   - continue Comprehensive Rehab Program of PT/OT- 3 hrs/day 5 days/week  - psychological support, counseling  - Precautions:  spinal, RA, cardiac, contact and droplet precautions    #COVID19 infection  -PCR + 1/29/21. Moved up to 3E [facility] for isolation. COVID IgG Ab neg  - Ct A/P 2/5: 1. Pattern of lung base groundglass opacification suggests infection including atypical pneumonia/viral infection from agents including COVID-19   -CTA 2/9:  No pulmonary embolism to the segmental branches. Mild bilateral patchy airspace disease compatible with COVID-19 pneumonia.  - completed remdesivir.   AST  19  /  ALT  17  /  AlkPhos  113   Cr 0.78 2/11 stable  - ID consult appreciated. Completed 5 day course remdesivir, on day 8/ 10 day decadron 2/12  -deep breathing exercises. Patient refuses incentive spirometer  -continue contact and droplet precautions    #abdominal pain  resolved  - abdominal CT -2/5: Colonic diverticulosis without CT evidence of acute diverticulitis    #urinary frequency  - UA +  - Urine Cx  - >100k Ecoli pending sensitivities  - Infectious disease following, recommendation appreciated.  - Not sure if Rx needed, no fever or leukocytosis, although has mild urinary sx with hesitancy and frequency and decline in PT.  macrobid 100 bid x 5 days, florastor 2/12  - resolve constipation and reevaluate urine symptoms    # Chest soreness  - CTA 2/9 neg for PE  -proper posture and reducing UE use in standing activities also discussed. Improved posture with use of platform walker in therapy 2/10    #SLE/RA  -continue plaquenil 200mg po daily  -(previously on 15mg ( 2.5mg ) weekly).  Discussed with rheumatologist, defer for 3 weeks post Covid     #h/o Provoked DVT in 2019  - US July 2019:  DVT noted in the right popliteal vein and visualized segment of the right  posterior tibial vein.  - was on eliquis 5mg po bid at home,   - currently on lovenox for dvt ppx, to resume eliquis at discharge. Cleared by vascular to resume eliquis, will need clearance from NSGY to restart  - NSGY clearance to restart eliquis pending  - Follows with vascular cardiologist Dr. Hoyos outpatient    #HTN:  - BP stable off meds     #HLD:  - continue lipitor 20mg po daily    #hypothyroidism:  - continue levothyroxine 200mcg po daily    # leukocytopenia secondary to COVID  - 4.36 2/11 resolved  - CBC in AM 2/15    # sleep  - melatonin 3mg PRN    #Pain Mgmt : (+)nausea with oxycodone  - Tylenol PRN mild pain  - Oxycodone 5mg IR PRN moderate 4-7/10 pain  - Oxycodone 10mg IR prn severe 8-10/10 pain  - Oxycontin ER BID (renewed 2/4)  - currently controlled 2/9  - Zofran Q8hr PRN    #GI/Bowel Mgmt/ constipation:   -continue Senna, miralax daily, MOM prn  -simethicone PRN gas  -prune juice with meal trays    #FEN :  - Diet - Regular     # DVT prophylaxis :  - Lovenox BID; f/u re: eliquis clearance from NSGY  -LE edema:  ACE wraps LE     #LABS:  f/u NSGY re: clearance to restart eliquis  UCX sensitivities  CBC BMP 2/15  BM

## 2021-02-13 NOTE — PROGRESS NOTE ADULT - SUBJECTIVE AND OBJECTIVE BOX
CC: f/u for    Patient reports    REVIEW OF SYSTEMS:  All other review of systems negative (Comprehensive ROS)    Antimicrobials Day #  :  hydroxychloroquine 200 milliGRAM(s) Oral <User Schedule>  nitrofurantoin monohydrate/macrocrystals (MACROBID) 100 milliGRAM(s) Oral two times a day day 2/3    Other Medications Reviewed    T(F): 98.2 (02-13-21 @ 08:28), Max: 98.2 (02-13-21 @ 08:28)  HR: 77 (02-13-21 @ 08:28)  BP: 135/74 (02-13-21 @ 08:28)  RR: 15 (02-13-21 @ 08:28)  SpO2: 98% (02-13-21 @ 08:28)  Wt(kg): --    PHYSICAL EXAM:  General: alert, no acute distress  Eyes:  anicteric, no conjunctival injection, no discharge  Oropharynx: no lesions or injection 	  Neck: supple, without adenopathy  Lungs: clear to auscultation  Heart: regular rate and rhythm; no murmur, rubs or gallops  Abdomen: soft, nondistended, nontender, without mass or organomegaly  Skin: no lesions  Extremities: no clubbing, cyanosis, or edema  Neurologic: alert, oriented, moves all extremities    LAB RESULTS:              MICROBIOLOGY:  RECENT CULTURES:  02-10 @ 16:00 .Urine Escherichia coli    >100,000 CFU/ml Escherichia coli          RADIOLOGY REVIEWED:              Assessment:  75y female with a PMH of RA managed with plaquenil, HTN, SLE, hypothyroidism, chronic LBP, s/p T10-L5 laminectomy and posterior fusion in early January at [facility], s/p transfer to rehab on 1/12/21  She tested positive for Covid on 1/31 --- hospital acquired Covid  She has been with a paucity of symptoms; nonspecific abdominal and chest pains without fevers, cough, SOB or hypoxia  CT scan of A/P showed nonspecific lower lung densities and RDV started on 2/5 along with decadron.  On lovenox 40 BID for DVT prophylaxis, has a history of a provoked DVT in the past.  CTA of chest shows mild bilateral patchy airspace disease compatible with COVID-19 pneumonia.  No evidence of a new PE.RDV completed 2/9.  Day 8/10 decadron  Now with frequency, UTI possible with E Coli in the urine, frequency bit no dysuria  Comfortable on RA.  Suggest:  Limit decadron to 10 days total, day 9/10  Continue anticoagulation   Etiology of chest pain is not clear, appears stable as does respiratory status.? Musculoskeletal.  She has E Coli in urine, hard to differentiate colonization from infection, now on macrobid day 2/3 CC: f/u for    Patient reports    REVIEW OF SYSTEMS:  All other review of systems negative (Comprehensive ROS)    Antimicrobials Day #  :  hydroxychloroquine 200 milliGRAM(s) Oral <User Schedule>  nitrofurantoin monohydrate/macrocrystals (MACROBID) 100 milliGRAM(s) Oral two times a day day 2/3    Other Medications Reviewed    T(F): 98.2 (02-13-21 @ 08:28), Max: 98.2 (02-13-21 @ 08:28)  HR: 77 (02-13-21 @ 08:28)  BP: 135/74 (02-13-21 @ 08:28)  RR: 15 (02-13-21 @ 08:28)  SpO2: 98% (02-13-21 @ 08:28)  Wt(kg): --    PHYSICAL EXAM:  General: alert, no acute distress  Eyes:  anicteric, no conjunctival injection, no discharge  Oropharynx: no lesions or injection 	  Neck: supple, without adenopathy  Lungs: clear to auscultation  Heart: regular rate and rhythm; no murmur, rubs or gallops  Abdomen: soft, nondistended, nontender, without mass or organomegaly  Skin: no lesions  Extremities: no clubbing, cyanosis, or edema  Neurologic: alert, oriented, moves all extremities    LAB RESULTS:              MICROBIOLOGY:  RECENT CULTURES:  02-10 @ 16:00 .Urine Escherichia coli    >100,000 CFU/ml Escherichia coli          RADIOLOGY REVIEWED:              Assessment:  75y female with a PMH of RA managed with plaquenil, HTN, SLE, hypothyroidism, chronic LBP, s/p T10-L5 laminectomy and posterior fusion in early January at [facility], s/p transfer to rehab on 1/12/21  She tested positive for Covid on 1/31 --- hospital acquired Covid  She has been with a paucity of symptoms; nonspecific abdominal and chest pains without fevers, cough, SOB or hypoxia  CT scan of A/P showed nonspecific lower lung densities and RDV started on 2/5 along with decadron.  On lovenox 40 BID for DVT prophylaxis, has a history of a provoked DVT in the past.  CTA of chest shows mild bilateral patchy airspace disease compatible with COVID-19 pneumonia.  No evidence of a new PE.RDV completed 2/9.  Day 8/10 decadron  Now with frequency, UTI possible with E Coli in the urine, frequency bit no dysuria  Comfortable on RA.  Suggest:  Limit decadron to 10 days total, day 9/10  Continue dvt prophylaxis   Etiology of chest pain is not clear, appears stable as does respiratory status.? Musculoskeletal.  She has E Coli in urine, hard to differentiate colonization from infection, now on macrobid day 2/3 CC: f/u for uti and covid    Patient reports bad constipation  and some pain under left breast  REVIEW OF SYSTEMS:  All other review of systems negative (Comprehensive ROS)    Antimicrobials Day #  :  hydroxychloroquine 200 milliGRAM(s) Oral <User Schedule>  nitrofurantoin monohydrate/macrocrystals (MACROBID) 100 milliGRAM(s) Oral two times a day day 2/3    Other Medications Reviewed    T(F): 98.2 (02-13-21 @ 08:28), Max: 98.2 (02-13-21 @ 08:28)  HR: 77 (02-13-21 @ 08:28)  BP: 135/74 (02-13-21 @ 08:28)  RR: 15 (02-13-21 @ 08:28)  SpO2: 98% (02-13-21 @ 08:28)  Wt(kg): --    PHYSICAL EXAM:  General: alert, no acute distress  Eyes:  anicteric, no conjunctival injection, no discharge  Oropharynx: no lesions or injection 	  Neck: supple, without adenopathy  Lungs: clear to auscultation  Heart: regular rate and rhythm; no murmur, rubs or gallops  Abdomen: soft, nondistended, nontender, without mass or organomegaly  Skin: no lesions  Extremities: no clubbing, cyanosis, . some edema  Neurologic: alert, oriented, moves all extremities  wound intact  LAB RESULTS:              MICROBIOLOGY:  RECENT CULTURES:  02-10 @ 16:00 .Urine Escherichia coli    >100,000 CFU/ml Escherichia coli          RADIOLOGY REVIEWED:    < from: CT Angio Chest w/ IV Cont (02.09.21 @ 16:25) >  EXAM:  CT ANGIO CHEST (W)AW IC      PROCEDURE DATE:  02/09/2021        INTERPRETATION:  CLINICAL INFORMATION: Chest pain    COMPARISON: Chest x-ray 2/5/2021    PROCEDURE:  CT Angiography of the Chest.  90 ml of Omnipaque 350 was injected intravenously. 10 ml were discarded.  Sagittal and coronal reformats were performed as well as 3D (MIP) reconstructions.    FINDINGS:    PULMONARY ARTERIES: No pulmonary embolism to the segmental branches. Limited visualization of subsegmental branches secondary to a combination of respiratory motion and suboptimal bolus timing.    LUNGS AND LARGE AIRWAYS: The central airways are patent. Mild bilateral patchy airspace disease compatible with COVID-19 pneumonia. Status post left lung wedge resection.  PLEURA: No pleural abnormality.  VESSELS: Normal caliber aorta.  HEART: Cardiomegaly. No pericardial effusion. Coronary artery calcifications are present.  MEDIASTINUM AND LIZABETH: No adenopathy.  CHEST WALL AND LOWER NECK: No masses.  VISUALIZED UPPER ABDOMEN: Small hiatal hernia.  BONES: No acute bony abnormality. Spinal fusion hardware noted.    IMPRESSION:  *  No pulmonary embolism to the segmental branches. Limited visualization of subsegmental branches secondary to a combination of respiratory motionand suboptimal bolus timing.  *  Mild bilateral patchy airspace disease compatible with COVID-19 pneumonia.                < end of copied text >            Assessment:  75y female with a PMH of RA managed with plaquenil, HTN, SLE, hypothyroidism, chronic LBP, s/p T10-L5 laminectomy and posterior fusion in early January at [facility], s/p transfer to rehab on 1/12/21  She tested positive for Covid on 1/31 --- hospital acquired Covid  She has been with a paucity of symptoms; nonspecific abdominal and chest pains without fevers, cough, SOB or hypoxia  CT scan of A/P showed nonspecific lower lung densities and RDV started on 2/5 along with decadron.  On lovenox 40 BID for DVT prophylaxis, has a history of a provoked DVT in the past.  CTA of chest shows mild bilateral patchy airspace disease compatible with COVID-19 pneumonia.  No evidence of a new PE.RDV completed 2/9.  Day 8/10 decadron  Now with frequency, UTI possible with E Coli in the urine, frequency bit no dysuria  Comfortable on RA.  Suggest:  Limit decadron to 10 days total, day 9/10  Continue dvt prophylaxis   Etiology of chest pain is not clear, appears stable as does respiratory status.? Musculoskeletal.  She has E Coli in urine, hard to differentiate colonization from infection, now on macrobid day 2/3

## 2021-02-13 NOTE — PROGRESS NOTE ADULT - ASSESSMENT
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 Hospital Course complicated by brief SICU stay for mechanical ventilation management, successfully extubated. Patient now admitted to [facility] for a multidisciplinary rehab program on 1/12/21.    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 Jaja Campos  -Continue comprehensive rehab program  -Pain management, bowel regimen per rehab    #COVID 19  #Neutropenia - resolved  -Spo2 appropriate on RA  -Completed Remdesivir and Decadron  -Monitor O2 with continuos pulse ox; monitor respiratory status closely  -Maintain strict isolation precautions, use proper PPE.  -May use Albuterol inhaler PRN    #UTI  -Macrobid x 5 days    #Acute blood loss Anemia, likely from surgery - resolved    #SLE/RA  -Chronic, continue Plaquenil 200mg po daily and folic acid    #H/o provoked right popliteal, right posterior tibial DVT (7/2019 in setting of hip surgery, already completed >6 months of treatment)  -Follows with cardiologist Dr. Hoyos outpatient  -Treated with Eliquis 5mg po bid chronically -- AC stopped at [facility] as 12/29 LE dopplers neg for DVTs  -Primary team to confirm clearance regarding restarting Eliquis  -DVT ppx with Lovenox 40mg BID for now    #Leg edema likely from chronic lymphedema - improving  -Encourage leg elevation  -ACE wraps    #HTN: stable off anti-hypertensives  -Continue to hold home medication Losartan  -Monitor vitals    #HLD  -Chronic, continue Lipitor    #Hypothyroidism  -Chronic, continue Levothyroxine    #Prophylactic Measure  DVT prophylaxis: Lovenox BID

## 2021-02-14 LAB
ALBUMIN SERPL ELPH-MCNC: 2.4 G/DL — LOW (ref 3.3–5)
ALP SERPL-CCNC: 103 U/L — SIGNIFICANT CHANGE UP (ref 40–120)
ALT FLD-CCNC: 11 U/L — SIGNIFICANT CHANGE UP (ref 10–45)
ANION GAP SERPL CALC-SCNC: 8 MMOL/L — SIGNIFICANT CHANGE UP (ref 5–17)
AST SERPL-CCNC: 18 U/L — SIGNIFICANT CHANGE UP (ref 10–40)
BILIRUB SERPL-MCNC: 0.8 MG/DL — SIGNIFICANT CHANGE UP (ref 0.2–1.2)
BUN SERPL-MCNC: 17 MG/DL — SIGNIFICANT CHANGE UP (ref 7–23)
CALCIUM SERPL-MCNC: 8.2 MG/DL — LOW (ref 8.4–10.5)
CHLORIDE SERPL-SCNC: 104 MMOL/L — SIGNIFICANT CHANGE UP (ref 96–108)
CO2 SERPL-SCNC: 25 MMOL/L — SIGNIFICANT CHANGE UP (ref 22–31)
CREAT SERPL-MCNC: 0.67 MG/DL — SIGNIFICANT CHANGE UP (ref 0.5–1.3)
GLUCOSE SERPL-MCNC: 101 MG/DL — HIGH (ref 70–99)
LIDOCAIN IGE QN: 69 U/L — LOW (ref 73–393)
POTASSIUM SERPL-MCNC: 3.7 MMOL/L — SIGNIFICANT CHANGE UP (ref 3.5–5.3)
POTASSIUM SERPL-SCNC: 3.7 MMOL/L — SIGNIFICANT CHANGE UP (ref 3.5–5.3)
PROT SERPL-MCNC: 5.5 G/DL — LOW (ref 6–8.3)
SODIUM SERPL-SCNC: 137 MMOL/L — SIGNIFICANT CHANGE UP (ref 135–145)

## 2021-02-14 PROCEDURE — 74018 RADEX ABDOMEN 1 VIEW: CPT | Mod: 26

## 2021-02-14 PROCEDURE — 99232 SBSQ HOSP IP/OBS MODERATE 35: CPT | Mod: CS

## 2021-02-14 PROCEDURE — 99233 SBSQ HOSP IP/OBS HIGH 50: CPT | Mod: CS

## 2021-02-14 RX ORDER — ONDANSETRON 8 MG/1
4 TABLET, FILM COATED ORAL EVERY 6 HOURS
Refills: 0 | Status: DISCONTINUED | OUTPATIENT
Start: 2021-02-14 | End: 2021-02-26

## 2021-02-14 RX ORDER — CEPHALEXIN 500 MG
500 CAPSULE ORAL EVERY 12 HOURS
Refills: 0 | Status: DISCONTINUED | OUTPATIENT
Start: 2021-02-14 | End: 2021-02-14

## 2021-02-14 RX ADMIN — OXYCODONE HYDROCHLORIDE 10 MILLIGRAM(S): 5 TABLET ORAL at 17:11

## 2021-02-14 RX ADMIN — ENOXAPARIN SODIUM 40 MILLIGRAM(S): 100 INJECTION SUBCUTANEOUS at 04:11

## 2021-02-14 RX ADMIN — Medication 200 MICROGRAM(S): at 04:11

## 2021-02-14 RX ADMIN — ONDANSETRON 4 MILLIGRAM(S): 8 TABLET, FILM COATED ORAL at 08:24

## 2021-02-14 RX ADMIN — Medication 1 ENEMA: at 04:11

## 2021-02-14 RX ADMIN — Medication 100 MILLIGRAM(S): at 04:12

## 2021-02-14 RX ADMIN — SENNA PLUS 2 TABLET(S): 8.6 TABLET ORAL at 23:02

## 2021-02-14 RX ADMIN — Medication 250 MILLIGRAM(S): at 17:11

## 2021-02-14 RX ADMIN — Medication 500 MILLIGRAM(S): at 13:12

## 2021-02-14 RX ADMIN — Medication 200 MILLIGRAM(S): at 08:24

## 2021-02-14 RX ADMIN — Medication 250 MILLIGRAM(S): at 04:11

## 2021-02-14 RX ADMIN — ATORVASTATIN CALCIUM 20 MILLIGRAM(S): 80 TABLET, FILM COATED ORAL at 23:02

## 2021-02-14 RX ADMIN — ENOXAPARIN SODIUM 40 MILLIGRAM(S): 100 INJECTION SUBCUTANEOUS at 17:11

## 2021-02-14 RX ADMIN — Medication 1 MILLIGRAM(S): at 12:06

## 2021-02-14 NOTE — PROGRESS NOTE ADULT - SUBJECTIVE AND OBJECTIVE BOX
Patient is a 75y old  Female who presents with a chief complaint of Back pain (14 Feb 2021 09:28)      SUBJECTIVE / OVERNIGHT EVENTS:  Pt seen and examined at bedside. No acute events overnight. C/o nausea and vomiting this AM  Pt denies cp, palpitations, sob, abd pain, fever, chills.    ROS:  All other review of systems negative    Allergies    No Known Allergies    Intolerances        MEDICATIONS  (STANDING):  ascorbic acid 500 milliGRAM(s) Oral daily  atorvastatin 20 milliGRAM(s) Oral at bedtime  benzocaine 15 mG/menthol 3.6 mG (Sugar-Free) Lozenge 1 Lozenge Oral four times a day  enoxaparin Injectable 40 milliGRAM(s) SubCutaneous two times a day  folic acid 1 milliGRAM(s) Oral daily  hydroxychloroquine 200 milliGRAM(s) Oral <User Schedule>  levothyroxine 200 MICROGram(s) Oral daily  lidocaine   Patch 1 Patch Transdermal daily  nitrofurantoin monohydrate/macrocrystals (MACROBID) 100 milliGRAM(s) Oral two times a day  oxyCODONE  ER Tablet 10 milliGRAM(s) Oral every 12 hours  polyethylene glycol 3350 17 Gram(s) Oral daily  saccharomyces boulardii 250 milliGRAM(s) Oral two times a day  senna 2 Tablet(s) Oral at bedtime    MEDICATIONS  (PRN):  acetaminophen   Tablet .. 650 milliGRAM(s) Oral every 6 hours PRN Temp greater or equal to 38C (100.4F), Mild Pain (1 - 3)  bisacodyl 5 milliGRAM(s) Oral every 12 hours PRN Constipation  magnesium hydroxide Suspension 30 milliLiter(s) Oral daily PRN Constipation  melatonin 3 milliGRAM(s) Oral at bedtime PRN Insomnia  ondansetron   Disintegrating Tablet 4 milliGRAM(s) Oral every 6 hours PRN Nausea and/or Vomiting  oxyCODONE    IR 10 milliGRAM(s) Oral every 4 hours PRN Severe Pain (7 - 10)  oxyCODONE    IR 5 milliGRAM(s) Oral every 4 hours PRN Moderate Pain (4 - 6)  simethicone 80 milliGRAM(s) Chew every 6 hours PRN Upset Stomach      Vital Signs Last 24 Hrs  T(C): 37 (14 Feb 2021 07:32), Max: 37 (14 Feb 2021 07:32)  T(F): 98.6 (14 Feb 2021 07:32), Max: 98.6 (14 Feb 2021 07:32)  HR: 74 (14 Feb 2021 07:32) (72 - 74)  BP: 125/71 (14 Feb 2021 07:32) (125/71 - 128/76)  BP(mean): --  RR: 15 (14 Feb 2021 07:32) (14 - 15)  SpO2: 91% (14 Feb 2021 07:32) (91% - 98%)  CAPILLARY BLOOD GLUCOSE        I&O's Summary      PHYSICAL EXAM:  GENERAL: NAD  CHEST/LUNG: Clear to auscultation bilaterally; No wheeze  HEART: Regular rate and rhythm; No murmurs, rubs, or gallops  ABDOMEN: Soft, Nontender, Nondistended; Bowel sounds present  EXTREMITIES:  2+ Peripheral Pulses, No clubbing, cyanosis, or edema  NEUROLOGY: AAOx3  PSYCH: calm    LABS:                    RADIOLOGY & ADDITIONAL TESTS:  Results Reviewed:   Imaging Personally Reviewed:  Electrocardiogram Personally Reviewed:    COORDINATION OF CARE:  Care Discussed with Consultants/Other Providers [Y/N]:  Prior or Outpatient Records Reviewed [Y/N]:

## 2021-02-14 NOTE — PROGRESS NOTE ADULT - ASSESSMENT
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 Hospital Course complicated by brief SICU stay for mechanical ventilation management, successfully extubated. Patient now admitted to [facility] for a multidisciplinary rehab program on 1/12/21.    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 Jaja Campos  -Continue comprehensive rehab program  -Pain management, bowel regimen per rehab    #COVID 19  #Neutropenia - resolved  -Spo2 appropriate on RA  -Completed Remdesivir and Decadron  -Monitor O2 with continuos pulse ox; monitor respiratory status closely  -Maintain strict isolation precautions, use proper PPE.  -May use Albuterol inhaler PRN    #Nausea/Vomiting  -Likely Macrobid induced  -Discontinue and start Keflex  -Zofran ODT prn     #E coli UTI  -Macrobid x 2 days  -Switch to Keflex x 1 more day to complete 3 day course    #Acute blood loss Anemia, likely from surgery - resolved    #SLE/RA  -Chronic, continue Plaquenil 200mg po daily and folic acid    #H/o provoked right popliteal, right posterior tibial DVT (7/2019 in setting of hip surgery, already completed >6 months of treatment)  -Follows with cardiologist Dr. Hoyos outpatient  -Treated with Eliquis 5mg po bid chronically -- AC stopped at [facility] as 12/29 LE dopplers neg for DVTs  -Primary team to confirm clearance regarding restarting Eliquis  -DVT ppx with Lovenox 40mg BID for now    #Leg edema likely from chronic lymphedema - improving  -Encourage leg elevation  -ACE wraps    #HTN: stable off anti-hypertensives  -Continue to hold home medication Losartan  -Monitor vitals    #HLD  -Chronic, continue Lipitor    #Hypothyroidism  -Chronic, continue Levothyroxine    #Prophylactic Measure  DVT prophylaxis: Lovenox BID

## 2021-02-14 NOTE — PROGRESS NOTE ADULT - ASSESSMENT
KAI THOMAS is a 75y F PMH HTN, HLD, SLE, RA, hypothyroidism, provoked R. 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 Jaja Campos. Staples removed   - continue Comprehensive Rehab Program of PT/OT- 3 hrs/day 5 days/week  - psychological support, counseling  - Precautions:  spinal, RA, cardiac, contact and droplet precautions    #COVID19 infection  -PCR + 1/29/21. Moved up to 3E [facility] for isolation. COVID IgG Ab neg  - Ct A/P 2/5: 1. Pattern of lung base groundglass opacification suggests infection including atypical pneumonia/viral infection from agents including COVID-19   -CTA 2/9:  No pulmonary embolism to the segmental branches. Mild bilateral patchy airspace disease compatible with COVID-19 pneumonia.  - completed remdesivir.   AST  19  /  ALT  17  /  AlkPhos  113   Cr 0.78 2/11 stable  - ID note appreciated. Completed 5 day course remdesivir, on day 9/ 10 day decadron   -continue contact and droplet precautions    #abdominal pain  resolved.  (+)constipation, last BM 2/11  - abdominal CT -2/5: Colonic diverticulosis without CT evidence of acute diverticulitis  -miralax given yesterday, enema this morning    #urinary frequency  - UA +  - Urine Cx  - >100k Ecoli   - Infectious disease following.  - macrobid 100 bid x 5 days,   - monitor    # Chest soreness  - CTA 2/9 neg for PE  -proper posture and reducing UE use in standing activities. Improved posture with use of platform walker in therapy 2/10    #SLE/RA  -continue plaquenil 200mg po daily  -(previously on 15mg ( 2.5mg ) weekly).  Discussed with rheumatologist, defer for 3 weeks post Covid     #h/o Provoked DVT in 2019  - US July 2019:  DVT noted in the right popliteal vein and visualized segment of the right  posterior tibial vein.  - was on eliquis 5mg po bid at home,   - currently on lovenox for dvt ppx, to resume eliquis at discharge. Cleared by vascular to resume eliquis, will need clearance from NSGY to restart  - NSGY clearance to restart eliquis pending  - Follows with vascular cardiologist Dr. Hoyos outpatient    #HTN:  - BP stable off meds     #HLD:  - continue lipitor 20mg po daily    #hypothyroidism:  - continue levothyroxine 200mcg po daily    # leukocytopenia secondary to COVID  - 4.36 2/11 resolved  - CBC in AM 2/15    # sleep  - melatonin 3mg PRN    #Pain Mgmt :   - Tylenol PRN mild pain  - Oxycodone 5mg IR PRN moderate 4-7/10 pain  - Oxycodone 10mg IR prn severe 8-10/10 pain  - Oxycontin ER BID (renewed 2/4)  - currently controlled 2/9  - Zofran Q8hr PRN    #GI/Bowel Mgmt/ constipation:   -continue Senna, miralax daily, MOM prn  -simethicone PRN gas  -prune juice with meal trays    #FEN :  - Diet - Regular     # DVT prophylaxis :  - Lovenox BID; f/u re: eliquis clearance from NSGY  -LE edema:  ACE wraps LE     #LABS:  f/u NSGY re: clearance to restart eliquis  CBC BMP 2/15

## 2021-02-14 NOTE — PROGRESS NOTE ADULT - SUBJECTIVE AND OBJECTIVE BOX
-----------------------------------------------------------------------  Patient is a 75y old  Female who presents with a chief complaint of Back pain (2021 09:29)      HPI:  75F with past medical history of HTN, HLD, SLE, RA, hypothyroidism, provoked R. popliteal DVT in 2019 on eliquis, chronic low back pain presents to ED for acute on chronic low back pain. Pt has several months of chronic lumbosacral back pain, evaluated by outpatient ortho and referred for physical therapy and pain management. She has been receiving ?vitamin spinal injections. She last received low back inj on  after which she her low back pain worsened. She took oxycodone 5 mg qd (prescribed in september during a ED visit) and acetaminophen for past three days without relief. She also endorses worsening pain during ambulation with walker and has affected her quality of life. She has constipation, last BM 4 days ago. Otherwise denies fever, chills, N/V, abdominal pain, dysuria, urinary retention, fecal incontinence, saddle anesthesia, fall, LOC, trauma. During ROS she endorsed LLE weakness for several months, no numbness or tingling and has LLE edema >RLE edema, reported undergoing LLE US 4 months ago, was negative for DVT. Currently has 8/10 pain, improved to 5/10 with oxycodone 5 mg x 2 in ED. (28 Dec 2020 11:16)   : Mri T/Ls: IMPRESSION: Degenerative changes  Abnormal signal with associated enhancement is seen involving the endplates adjacent to the L2-3 disc space. There is slight increased T2 prolongation involving the L2-3 disc space with associated widening. While these findings could be compatible with degenerative changes possibly of early discitis and osteomyelitis must be considered.  Patient s/p L1-L5 laminectomy, T10-L5 posterior surgical fusion by Dr. Campos on 21  HMV x 3 drains. Post op with constipation needing aggressive bowel regimen.     PT/OT and PM&R evaluated patient and recommended Rehab.     Patient medically cleared and admitted to  Rehab on 21 (2021 15:21)      PAST MEDICAL & SURGICAL HISTORY:  HTN (hypertension)    DVT (deep venous thrombosis)    Hypothyroidism    RA (rheumatoid arthritis)    Obesity, Class II, BMI 35-39.9, no comorbidity    Hypothyroid    Benign heart murmur    Hyperlipidemia    Hypertension    H/O degenerative disc disease    Osteoarthritis    SLE (systemic lupus erythematosus)    Rheumatoid arthritis    S/P knee replacement    Status post total hip replacement, right    S/P thyroid surgery  1 lobe removed    Lung cancer  small tumor removed     S/P knee surgery  bilateral    S/P carpal tunnel release  left    S/P eye surgery  child    S/P cataract surgery  left    S/P tonsillectomy        Allergies    No Known Allergies    Intolerances    Subjective/ROS:  Patient seen and examined this morning. Patient in bed in NAD, no SOB. Patient reports nausea from yesterday much improved after Zofran. (+)constipation.  Pain controlled which wraps around to left breast.  -----------------------------------------------------------------------  VITALS  Vital Signs Last 24 Hrs  T(C): 36.7 (2021 20:17), Max: 36.8 (2021 08:28)  T(F): 98.1 (2021 20:17), Max: 98.2 (2021 08:28)  HR: 72 (2021 20:17) (72 - 77)  BP: 128/76 (2021 20:17) (128/76 - 135/74)  BP(mean): --  RR: 14 (2021 20:17) (14 - 15)  SpO2: 98% (2021 20:17) (98% - 98%)    PHYSICAL EXAM:  General: alert, no acute distress  Eyes:  anicteric, no conjunctival injection	  Neck: supple, without adenopathy  Lungs: clear to auscultation  Heart: regular rate and rhythm; no murmur  Abdomen: soft, nondistended, nontender  Skin: no lesions  Extremities: no clubbing, cyanosis, min pedal edema  Neurologic: alert, oriented, moves all extremities  wound: back incision intact            -----------------------------------------------------------------------  RECENT LABS:                        13.3   4.36  )-----------( 264      ( 2021 09:05 )             41.9         141  |  107  |  27<H>  ----------------------------<  133<H>  4.0   |  26  |  0.78    Ca    8.7      2021 09:05    TPro  6.3  /  Alb  2.9<L>  /  TBili  0.4  /  DBili  x   /  AST  19  /  ALT  17  /  AlkPhos  113      LIVER FUNCTIONS - ( 2021 09:05 )  Alb: 2.9 g/dL / Pro: 6.3 g/dL / ALK PHOS: 113 U/L / ALT: 17 U/L / AST: 19 U/L / GGT: x             Urinalysis Basic - ( 10 Feb 2021 17:59 )    Color: Yellow / Appearance: Slightly Turbid / S.025 / pH: x  Gluc: x / Ketone: Negative  / Bili: Negative / Urobili: Negative   Blood: x / Protein: 30 mg/dL / Nitrite: Positive   Leuk Esterase: Small / RBC: 0-4 /HPF / WBC 11-25 /HPF   Sq Epi: x / Non Sq Epi: Moderate / Bacteria: TNTC /HPF    Urine Culture - 48 Hour Hold (collected 02-10-21 @ 16:00)  Source: .Urine  Preliminary Report (21 @ 06:19):    >100,000 CFU/ml Escherichia coli        CAPILLARY BLOOD GLUCOSE          IMAGING: none in last 24hr  -----------------------------------------------------------------------  MEDICATIONS  (STANDING):  ascorbic acid 500 milliGRAM(s) Oral daily  atorvastatin 20 milliGRAM(s) Oral at bedtime  benzocaine 15 mG/menthol 3.6 mG (Sugar-Free) Lozenge 1 Lozenge Oral four times a day  enoxaparin Injectable 40 milliGRAM(s) SubCutaneous two times a day  folic acid 1 milliGRAM(s) Oral daily  hydroxychloroquine 200 milliGRAM(s) Oral <User Schedule>  levothyroxine 200 MICROGram(s) Oral daily  lidocaine   Patch 1 Patch Transdermal daily  oxyCODONE  ER Tablet 10 milliGRAM(s) Oral every 12 hours  polyethylene glycol 3350 17 Gram(s) Oral daily  senna 2 Tablet(s) Oral at bedtime    MEDICATIONS  (PRN):  acetaminophen   Tablet .. 650 milliGRAM(s) Oral every 6 hours PRN Temp greater or equal to 38C (100.4F), Mild Pain (1 - 3)  magnesium hydroxide Suspension 30 milliLiter(s) Oral daily PRN Constipation  melatonin 3 milliGRAM(s) Oral at bedtime PRN Insomnia  ondansetron    Tablet 4 milliGRAM(s) Oral every 8 hours PRN Nausea and/or Vomiting  oxyCODONE    IR 10 milliGRAM(s) Oral every 4 hours PRN Severe Pain (7 - 10)  oxyCODONE    IR 5 milliGRAM(s) Oral every 4 hours PRN Moderate Pain (4 - 6)  simethicone 80 milliGRAM(s) Chew every 6 hours PRN Upset Stomach    -----------------------------------------------------------------------

## 2021-02-14 NOTE — PROGRESS NOTE ADULT - SUBJECTIVE AND OBJECTIVE BOX
CC: f/u for    Patient reports    REVIEW OF SYSTEMS:  All other review of systems negative (Comprehensive ROS)    Antimicrobials Day #  :  hydroxychloroquine 200 milliGRAM(s) Oral <User Schedule>  nitrofurantoin monohydrate/macrocrystals (MACROBID) 100 milliGRAM(s) Oral two times a day    Other Medications Reviewed    T(F): 98.6 (02-14-21 @ 07:32), Max: 98.6 (02-14-21 @ 07:32)  HR: 74 (02-14-21 @ 07:32)  BP: 125/71 (02-14-21 @ 07:32)  RR: 15 (02-14-21 @ 07:32)  SpO2: 91% (02-14-21 @ 07:32)  Wt(kg): --    PHYSICAL EXAM:  General: alert, no acute distress  Eyes:  anicteric, no conjunctival injection, no discharge  Oropharynx: no lesions or injection 	  Neck: supple, without adenopathy  Lungs: clear to auscultation  Heart: regular rate and rhythm; no murmur, rubs or gallops  Abdomen: soft, nondistended, nontender, without mass or organomegaly  Skin: no lesions  Extremities: no clubbing, cyanosis, or edema  Neurologic: alert, oriented, moves all extremities    LAB RESULTS:              MICROBIOLOGY:  RECENT CULTURES:  02-10 @ 16:00 .Urine Escherichia coli    >100,000 CFU/ml Escherichia coli          RADIOLOGY REVIEWED:   from: CT Angio Chest w/ IV Cont (02.09.21 @ 16:25) >  EXAM:  CT ANGIO CHEST (W)AW IC      PROCEDURE DATE:  02/09/2021        INTERPRETATION:  CLINICAL INFORMATION: Chest pain    COMPARISON: Chest x-ray 2/5/2021    PROCEDURE:  CT Angiography of the Chest.  90 ml of Omnipaque 350 was injected intravenously. 10 ml were discarded.  Sagittal and coronal reformats were performed as well as 3D (MIP) reconstructions.    FINDINGS:    PULMONARY ARTERIES: No pulmonary embolism to the segmental branches. Limited visualization of subsegmental branches secondary to a combination of respiratory motion and suboptimal bolus timing.    LUNGS AND LARGE AIRWAYS: The central airways are patent. Mild bilateral patchy airspace disease compatible with COVID-19 pneumonia. Status post left lung wedge resection.  PLEURA: No pleural abnormality.  VESSELS: Normal caliber aorta.  HEART: Cardiomegaly. No pericardial effusion. Coronary artery calcifications are present.  MEDIASTINUM AND LIZABETH: No adenopathy.  CHEST WALL AND LOWER NECK: No masses.  VISUALIZED UPPER ABDOMEN: Small hiatal hernia.  BONES: No acute bony abnormality. Spinal fusion hardware noted.    IMPRESSION:  *  No pulmonary embolism to the segmental branches. Limited visualization of subsegmental branches secondary to a combination of respiratory motionand suboptimal bolus timing.  *  Mild bilateral patchy airspace disease compatible with COVID-19 pneumonia.                < end of copied text >            Assessment:  75y female with a PMH of RA managed with plaquenil, HTN, SLE, hypothyroidism, chronic LBP, s/p T10-L5 laminectomy and posterior fusion in early January at [facility], s/p transfer to rehab on 1/12/21  She tested positive for Covid on 1/31 --- hospital acquired Covid  She has been with a paucity of symptoms; nonspecific abdominal and chest pains without fevers, cough, SOB or hypoxia  CT scan of A/P showed nonspecific lower lung densities and RDV started on 2/5 along with decadron.  On lovenox 40 BID for DVT prophylaxis, has a history of a provoked DVT in the past.  CTA of chest shows mild bilateral patchy airspace disease compatible with COVID-19 pneumonia.  No evidence of a new PE.RDV completed 2/9.  Day 8/10 decadron  Now with frequency, UTI possible with E Coli in the urine, frequency bit no dysuria  Comfortable on RA.  Suggest:  Limit decadron to 10 days total, day 9/10  Continue dvt prophylaxis   Etiology of chest pain is not clear, appears stable as does respiratory status.? Musculoskeletal.  She has E Coli in urine, hard to differentiate colonization from infection, now on macrobid day 2/3            Assessment:    Plan: CC: f/u for uti and covid    Patient reports she had a bm so feels better but still vomiting    REVIEW OF SYSTEMS:  All other review of systems negative (Comprehensive ROS)    Antimicrobials Day #  :  hydroxychloroquine 200 milliGRAM(s) Oral <User Schedule>  nitrofurantoin monohydrate/macrocrystals (MACROBID) 100 milliGRAM(s) Oral two times a day 3    Other Medications Reviewed    T(F): 98.6 (02-14-21 @ 07:32), Max: 98.6 (02-14-21 @ 07:32)  HR: 74 (02-14-21 @ 07:32)  BP: 125/71 (02-14-21 @ 07:32)  RR: 15 (02-14-21 @ 07:32)  SpO2: 91% (02-14-21 @ 07:32)  Wt(kg): --    PHYSICAL EXAM:  General: alert, sucking on her sheet, looks miserable with green vomit on her gowns  Eyes:  anicteric, no conjunctival injection, no discharge  Oropharynx: no lesions or injection 	  Neck: supple, without adenopathy  Lungs: clear to auscultation  Heart: regular rate and rhythm; no murmur, rubs or gallops  Abdomen: soft, nondistended, nontender, without mass or organomegaly  Skin: no lesions  Extremities: no clubbing, cyanosis, or edema  Neurologic: alert, oriented, moves all extremities    LAB RESULTS:  Comprehensive Metabolic Panel (02.11.21 @ 09:05)   Sodium, Serum: 141 mmol/L   Potassium, Serum: 4.0 mmol/L   Chloride, Serum: 107 mmol/L   Carbon Dioxide, Serum: 26 mmol/L   Anion Gap, Serum: 8 mmol/L   Blood Urea Nitrogen, Serum: 27 mg/dL   Creatinine, Serum: 0.78 mg/dL   Glucose, Serum: 133 mg/dL   Calcium, Total Serum: 8.7 mg/dL   Protein Total, Serum: 6.3 g/dL   Albumin, Serum: 2.9 g/dL   Bilirubin Total, Serum: 0.4 mg/dL   Alkaline Phosphatase, Serum: 113 U/L   Aspartate Aminotransferase (AST/SGOT): 19 U/L   Alanine Aminotransferase (ALT/SGPT): 17 U/L             MICROBIOLOGY:  RECENT CULTURES:  02-10 @ 16:00 .Urine Escherichia coli    >100,000 CFU/ml Escherichia coli          RADIOLOGY REVIEWED:   from: CT Angio Chest w/ IV Cont (02.09.21 @ 16:25) >  EXAM:  CT ANGIO CHEST (W)AW IC      PROCEDURE DATE:  02/09/2021        INTERPRETATION:  CLINICAL INFORMATION: Chest pain    COMPARISON: Chest x-ray 2/5/2021    PROCEDURE:  CT Angiography of the Chest.  90 ml of Omnipaque 350 was injected intravenously. 10 ml were discarded.  Sagittal and coronal reformats were performed as well as 3D (MIP) reconstructions.    FINDINGS:    PULMONARY ARTERIES: No pulmonary embolism to the segmental branches. Limited visualization of subsegmental branches secondary to a combination of respiratory motion and suboptimal bolus timing.    LUNGS AND LARGE AIRWAYS: The central airways are patent. Mild bilateral patchy airspace disease compatible with COVID-19 pneumonia. Status post left lung wedge resection.  PLEURA: No pleural abnormality.  VESSELS: Normal caliber aorta.  HEART: Cardiomegaly. No pericardial effusion. Coronary artery calcifications are present.  MEDIASTINUM AND LIZABETH: No adenopathy.  CHEST WALL AND LOWER NECK: No masses.  VISUALIZED UPPER ABDOMEN: Small hiatal hernia.  BONES: No acute bony abnormality. Spinal fusion hardware noted.    IMPRESSION:  *  No pulmonary embolism to the segmental branches. Limited visualization of subsegmental branches secondary to a combination of respiratory motionand suboptimal bolus timing.  *  Mild bilateral patchy airspace disease compatible with COVID-19 pneumonia.                < end of copied text >            Assessment:  75y female with a PMH of RA managed with plaquenil, HTN, SLE, hypothyroidism, chronic LBP, s/p T10-L5 laminectomy and posterior fusion in early January at [facility], s/p transfer to rehab on 1/12/21  She tested positive for Covid on 1/31 --- hospital acquired Covid  She has been with a paucity of symptoms; nonspecific abdominal and chest pains without fevers, cough, SOB or hypoxia  CT scan of A/P showed nonspecific lower lung densities and RDV started on 2/5 along with decadron.  On lovenox 40 BID for DVT prophylaxis, has a history of a provoked DVT in the past.  CTA of chest showed mild bilateral patchy airspace disease compatible with COVID-19 pneumonia.  No evidence of a new PE.RDV completed 2/9.  Day 10/10 decadron  had some frequency, UTI possible with E Coli in the urine, frequency bit no dysuria  Comfortable on RA.  Suggest:  Limit decadron to 10 days total, day 10/10  Continue dvt prophylaxis   Etiology of chest pain is not clear, appears stable as does respiratory status.? Musculoskeletal.  She has E Coli in urine, hard to differentiate colonization from infection, now on macrobid day 3 but maybe it is contributing to nausea so will stop  check lft and lipase            Assessment:    Plan: CC: f/u for uti and covid    Patient reports she had a bm so feels better but still vomiting    REVIEW OF SYSTEMS:  All other review of systems negative (Comprehensive ROS)    Antimicrobials Day #  :  hydroxychloroquine 200 milliGRAM(s) Oral <User Schedule>  nitrofurantoin monohydrate/macrocrystals (MACROBID) 100 milliGRAM(s) Oral two times a day 3    Other Medications Reviewed    T(F): 98.6 (02-14-21 @ 07:32), Max: 98.6 (02-14-21 @ 07:32)  HR: 74 (02-14-21 @ 07:32)  BP: 125/71 (02-14-21 @ 07:32)  RR: 15 (02-14-21 @ 07:32)  SpO2: 91% (02-14-21 @ 07:32)  Wt(kg): --    PHYSICAL EXAM:  General: alert, sucking on her sheet, looks miserable with green vomit on her gowns  Eyes:  anicteric, no conjunctival injection, no discharge  Oropharynx: no lesions or injection 	  Neck: supple, without adenopathy  Lungs: clear to auscultation  Heart: regular rate and rhythm; no murmur, rubs or gallops  Abdomen: soft, nondistended, nontender, without mass or organomegaly  Skin: no lesions  Extremities: no clubbing, cyanosis, or edema  Neurologic: alert, oriented, moves all extremities    LAB RESULTS:  Comprehensive Metabolic Panel (02.11.21 @ 09:05)   Sodium, Serum: 141 mmol/L   Potassium, Serum: 4.0 mmol/L   Chloride, Serum: 107 mmol/L   Carbon Dioxide, Serum: 26 mmol/L   Anion Gap, Serum: 8 mmol/L   Blood Urea Nitrogen, Serum: 27 mg/dL   Creatinine, Serum: 0.78 mg/dL   Glucose, Serum: 133 mg/dL   Calcium, Total Serum: 8.7 mg/dL   Protein Total, Serum: 6.3 g/dL   Albumin, Serum: 2.9 g/dL   Bilirubin Total, Serum: 0.4 mg/dL   Alkaline Phosphatase, Serum: 113 U/L   Aspartate Aminotransferase (AST/SGOT): 19 U/L   Alanine Aminotransferase (ALT/SGPT): 17 U/L             MICROBIOLOGY:  RECENT CULTURES:  02-10 @ 16:00 .Urine Escherichia coli    >100,000 CFU/ml Escherichia coli          RADIOLOGY REVIEWED:   from: CT Angio Chest w/ IV Cont (02.09.21 @ 16:25) >  EXAM:  CT ANGIO CHEST (W)AW IC      PROCEDURE DATE:  02/09/2021        INTERPRETATION:  CLINICAL INFORMATION: Chest pain    COMPARISON: Chest x-ray 2/5/2021    PROCEDURE:  CT Angiography of the Chest.  90 ml of Omnipaque 350 was injected intravenously. 10 ml were discarded.  Sagittal and coronal reformats were performed as well as 3D (MIP) reconstructions.    FINDINGS:    PULMONARY ARTERIES: No pulmonary embolism to the segmental branches. Limited visualization of subsegmental branches secondary to a combination of respiratory motion and suboptimal bolus timing.    LUNGS AND LARGE AIRWAYS: The central airways are patent. Mild bilateral patchy airspace disease compatible with COVID-19 pneumonia. Status post left lung wedge resection.  PLEURA: No pleural abnormality.  VESSELS: Normal caliber aorta.  HEART: Cardiomegaly. No pericardial effusion. Coronary artery calcifications are present.  MEDIASTINUM AND LIZABETH: No adenopathy.  CHEST WALL AND LOWER NECK: No masses.  VISUALIZED UPPER ABDOMEN: Small hiatal hernia.  BONES: No acute bony abnormality. Spinal fusion hardware noted.    IMPRESSION:  *  No pulmonary embolism to the segmental branches. Limited visualization of subsegmental branches secondary to a combination of respiratory motionand suboptimal bolus timing.  *  Mild bilateral patchy airspace disease compatible with COVID-19 pneumonia.                < end of copied text >            Assessment:  75y female with a PMH of RA managed with plaquenil, HTN, SLE, hypothyroidism, chronic LBP, s/p T10-L5 laminectomy and posterior fusion in early January at [facility], s/p transfer to rehab on 1/12/21  She tested positive for Covid on 1/31 --- hospital acquired Covid  She has been with a paucity of symptoms; nonspecific abdominal and chest pains without fevers, cough, SOB or hypoxia  CT scan of A/P showed nonspecific lower lung densities and RDV started on 2/5 along with decadron.  On lovenox 40 BID for DVT prophylaxis, has a history of a provoked DVT in the past.  CTA of chest showed mild bilateral patchy airspace disease compatible with COVID-19 pneumonia.  No evidence of a new PE.RDV completed 2/9.  Day 10/10 decadron  had some frequency, UTI possible with E Coli in the urine, frequency bit no dysuria  Comfortable on RA.  Suggest:  Limit decadron to 10 days total, day 10/10  Continue dvt prophylaxis   Etiology of chest pain is not clear, appears stable as does respiratory status.? Musculoskeletal.  She has E Coli in urine, hard to differentiate colonization from infection, now on macrobid day 3 but maybe it is contributing to nausea so will stop and favor to monitor off antibiotics so stopped kelflex too  check lft and lipase  consider gi consuld                Plan:

## 2021-02-15 LAB
ALBUMIN SERPL ELPH-MCNC: 2.1 G/DL — LOW (ref 3.3–5)
ALP SERPL-CCNC: 100 U/L — SIGNIFICANT CHANGE UP (ref 40–120)
ALT FLD-CCNC: 12 U/L — SIGNIFICANT CHANGE UP (ref 10–45)
ANION GAP SERPL CALC-SCNC: 11 MMOL/L — SIGNIFICANT CHANGE UP (ref 5–17)
APPEARANCE UR: ABNORMAL
AST SERPL-CCNC: 22 U/L — SIGNIFICANT CHANGE UP (ref 10–40)
BACTERIA # UR AUTO: ABNORMAL /HPF
BASOPHILS # BLD AUTO: 0.01 K/UL — SIGNIFICANT CHANGE UP (ref 0–0.2)
BASOPHILS NFR BLD AUTO: 0.1 % — SIGNIFICANT CHANGE UP (ref 0–2)
BILIRUB SERPL-MCNC: 0.7 MG/DL — SIGNIFICANT CHANGE UP (ref 0.2–1.2)
BILIRUB UR-MCNC: NEGATIVE — SIGNIFICANT CHANGE UP
BUN SERPL-MCNC: 17 MG/DL — SIGNIFICANT CHANGE UP (ref 7–23)
CALCIUM SERPL-MCNC: 8.4 MG/DL — SIGNIFICANT CHANGE UP (ref 8.4–10.5)
CHLORIDE SERPL-SCNC: 103 MMOL/L — SIGNIFICANT CHANGE UP (ref 96–108)
CO2 SERPL-SCNC: 24 MMOL/L — SIGNIFICANT CHANGE UP (ref 22–31)
COLOR SPEC: YELLOW — SIGNIFICANT CHANGE UP
CREAT SERPL-MCNC: 0.59 MG/DL — SIGNIFICANT CHANGE UP (ref 0.5–1.3)
DIFF PNL FLD: NEGATIVE — SIGNIFICANT CHANGE UP
EOSINOPHIL # BLD AUTO: 0.04 K/UL — SIGNIFICANT CHANGE UP (ref 0–0.5)
EOSINOPHIL NFR BLD AUTO: 0.4 % — SIGNIFICANT CHANGE UP (ref 0–6)
EPI CELLS # UR: SIGNIFICANT CHANGE UP
GLUCOSE SERPL-MCNC: 58 MG/DL — LOW (ref 70–99)
GLUCOSE UR QL: NEGATIVE — SIGNIFICANT CHANGE UP
HCT VFR BLD CALC: 35.3 % — SIGNIFICANT CHANGE UP (ref 34.5–45)
HGB BLD-MCNC: 11.2 G/DL — LOW (ref 11.5–15.5)
IMM GRANULOCYTES NFR BLD AUTO: 0.6 % — SIGNIFICANT CHANGE UP (ref 0–1.5)
KETONES UR-MCNC: ABNORMAL
LEUKOCYTE ESTERASE UR-ACNC: ABNORMAL
LYMPHOCYTES # BLD AUTO: 0.9 K/UL — LOW (ref 1–3.3)
LYMPHOCYTES # BLD AUTO: 8.5 % — LOW (ref 13–44)
MCHC RBC-ENTMCNC: 29 PG — SIGNIFICANT CHANGE UP (ref 27–34)
MCHC RBC-ENTMCNC: 31.7 GM/DL — LOW (ref 32–36)
MCV RBC AUTO: 91.5 FL — SIGNIFICANT CHANGE UP (ref 80–100)
MONOCYTES # BLD AUTO: 1.32 K/UL — HIGH (ref 0–0.9)
MONOCYTES NFR BLD AUTO: 12.4 % — SIGNIFICANT CHANGE UP (ref 2–14)
NEUTROPHILS # BLD AUTO: 8.32 K/UL — HIGH (ref 1.8–7.4)
NEUTROPHILS NFR BLD AUTO: 78 % — HIGH (ref 43–77)
NITRITE UR-MCNC: POSITIVE
NRBC # BLD: 0 /100 WBCS — SIGNIFICANT CHANGE UP (ref 0–0)
PH UR: 6.5 — SIGNIFICANT CHANGE UP (ref 5–8)
PLATELET # BLD AUTO: 224 K/UL — SIGNIFICANT CHANGE UP (ref 150–400)
POTASSIUM SERPL-MCNC: 3.6 MMOL/L — SIGNIFICANT CHANGE UP (ref 3.5–5.3)
POTASSIUM SERPL-SCNC: 3.6 MMOL/L — SIGNIFICANT CHANGE UP (ref 3.5–5.3)
PROT SERPL-MCNC: 5.5 G/DL — LOW (ref 6–8.3)
PROT UR-MCNC: 30 MG/DL
RBC # BLD: 3.86 M/UL — SIGNIFICANT CHANGE UP (ref 3.8–5.2)
RBC # FLD: 14.6 % — HIGH (ref 10.3–14.5)
SODIUM SERPL-SCNC: 138 MMOL/L — SIGNIFICANT CHANGE UP (ref 135–145)
SP GR SPEC: 1.01 — SIGNIFICANT CHANGE UP (ref 1.01–1.02)
UROBILINOGEN FLD QL: 1
WBC # BLD: 10.65 K/UL — HIGH (ref 3.8–10.5)
WBC # FLD AUTO: 10.65 K/UL — HIGH (ref 3.8–10.5)
WBC UR QL: ABNORMAL /HPF (ref 0–5)

## 2021-02-15 PROCEDURE — 99232 SBSQ HOSP IP/OBS MODERATE 35: CPT | Mod: CS

## 2021-02-15 RX ADMIN — Medication 250 MILLIGRAM(S): at 05:32

## 2021-02-15 RX ADMIN — Medication 500 MILLIGRAM(S): at 11:43

## 2021-02-15 RX ADMIN — OXYCODONE HYDROCHLORIDE 10 MILLIGRAM(S): 5 TABLET ORAL at 08:52

## 2021-02-15 RX ADMIN — OXYCODONE HYDROCHLORIDE 10 MILLIGRAM(S): 5 TABLET ORAL at 22:37

## 2021-02-15 RX ADMIN — Medication 200 MILLIGRAM(S): at 07:45

## 2021-02-15 RX ADMIN — ATORVASTATIN CALCIUM 20 MILLIGRAM(S): 80 TABLET, FILM COATED ORAL at 22:37

## 2021-02-15 RX ADMIN — Medication 1 MILLIGRAM(S): at 11:43

## 2021-02-15 RX ADMIN — ENOXAPARIN SODIUM 40 MILLIGRAM(S): 100 INJECTION SUBCUTANEOUS at 17:13

## 2021-02-15 RX ADMIN — Medication 250 MILLIGRAM(S): at 17:13

## 2021-02-15 RX ADMIN — ENOXAPARIN SODIUM 40 MILLIGRAM(S): 100 INJECTION SUBCUTANEOUS at 05:32

## 2021-02-15 RX ADMIN — Medication 200 MICROGRAM(S): at 05:32

## 2021-02-15 RX ADMIN — Medication 650 MILLIGRAM(S): at 11:43

## 2021-02-15 NOTE — PROGRESS NOTE ADULT - SUBJECTIVE AND OBJECTIVE BOX
Patient is a 75y old  Female who presents with a chief complaint of Back pain (15 Feb 2021 09:35)      SUBJECTIVE / OVERNIGHT EVENTS:  Pt seen and examined at bedside. No acute events overnight.  Pt denies cp, palpitations, sob, abd pain, N/V, fever, chills.    ROS:  All other review of systems negative    Allergies    No Known Allergies    Intolerances        MEDICATIONS  (STANDING):  ascorbic acid 500 milliGRAM(s) Oral daily  atorvastatin 20 milliGRAM(s) Oral at bedtime  benzocaine 15 mG/menthol 3.6 mG (Sugar-Free) Lozenge 1 Lozenge Oral four times a day  enoxaparin Injectable 40 milliGRAM(s) SubCutaneous two times a day  folic acid 1 milliGRAM(s) Oral daily  hydroxychloroquine 200 milliGRAM(s) Oral <User Schedule>  levothyroxine 200 MICROGram(s) Oral daily  lidocaine   Patch 1 Patch Transdermal daily  oxyCODONE  ER Tablet 10 milliGRAM(s) Oral every 12 hours  polyethylene glycol 3350 17 Gram(s) Oral daily  saccharomyces boulardii 250 milliGRAM(s) Oral two times a day  senna 2 Tablet(s) Oral at bedtime    MEDICATIONS  (PRN):  acetaminophen   Tablet .. 650 milliGRAM(s) Oral every 6 hours PRN Temp greater or equal to 38C (100.4F), Mild Pain (1 - 3)  bisacodyl 5 milliGRAM(s) Oral every 12 hours PRN Constipation  magnesium hydroxide Suspension 30 milliLiter(s) Oral daily PRN Constipation  melatonin 3 milliGRAM(s) Oral at bedtime PRN Insomnia  ondansetron   Disintegrating Tablet 4 milliGRAM(s) Oral every 6 hours PRN Nausea and/or Vomiting  oxyCODONE    IR 10 milliGRAM(s) Oral every 4 hours PRN Severe Pain (7 - 10)  oxyCODONE    IR 5 milliGRAM(s) Oral every 4 hours PRN Moderate Pain (4 - 6)  simethicone 80 milliGRAM(s) Chew every 6 hours PRN Upset Stomach      Vital Signs Last 24 Hrs  T(C): 36.6 (15 Feb 2021 07:42), Max: 36.8 (14 Feb 2021 22:20)  T(F): 97.9 (15 Feb 2021 07:42), Max: 98.2 (14 Feb 2021 22:20)  HR: 65 (15 Feb 2021 07:42) (65 - 72)  BP: 122/71 (15 Feb 2021 07:42) (120/78 - 122/71)  BP(mean): --  RR: 15 (15 Feb 2021 07:42) (14 - 15)  SpO2: 92% (15 Feb 2021 07:42) (92% - 98%)  CAPILLARY BLOOD GLUCOSE        I&O's Summary      PHYSICAL EXAM:  GENERAL: NAD  CHEST/LUNG: Clear to auscultation bilaterally; No wheeze  HEART: Regular rate and rhythm; No murmurs, rubs, or gallops  ABDOMEN: Soft, Nontender, Nondistended; Bowel sounds present  EXTREMITIES:  2+ Peripheral Pulses, No clubbing, cyanosis, or edema  NEUROLOGY: AAOx3  PSYCH: calm    LABS:                        11.2   10.65 )-----------( 224      ( 15 Feb 2021 07:49 )             35.3     02-15    138  |  103  |  17  ----------------------------<  58<L>  3.6   |  24  |  0.59    Ca    8.4      15 Feb 2021 07:51    TPro  5.5<L>  /  Alb  2.1<L>  /  TBili  0.7  /  DBili  x   /  AST  22  /  ALT  12  /  AlkPhos  100  02-15              RADIOLOGY & ADDITIONAL TESTS:  Results Reviewed:   Imaging Personally Reviewed:  Electrocardiogram Personally Reviewed:    COORDINATION OF CARE:  Care Discussed with Consultants/Other Providers [Y/N]:  Prior or Outpatient Records Reviewed [Y/N]:

## 2021-02-15 NOTE — PROGRESS NOTE ADULT - SUBJECTIVE AND OBJECTIVE BOX
-----------------------------------------------------------------------  Patient is a 75y old  Female who presents with a chief complaint of Back pain (2021 09:29)      HPI:  75F with past medical history of HTN, HLD, SLE, RA, hypothyroidism, provoked R. popliteal DVT in 2019 on eliquis, chronic low back pain presents to ED for acute on chronic low back pain. Pt has several months of chronic lumbosacral back pain, evaluated by outpatient ortho and referred for physical therapy and pain management. She has been receiving ?vitamin spinal injections. She last received low back inj on  after which she her low back pain worsened. She took oxycodone 5 mg qd (prescribed in september during a ED visit) and acetaminophen for past three days without relief. She also endorses worsening pain during ambulation with walker and has affected her quality of life. She has constipation, last BM 4 days ago. Otherwise denies fever, chills, N/V, abdominal pain, dysuria, urinary retention, fecal incontinence, saddle anesthesia, fall, LOC, trauma. During ROS she endorsed LLE weakness for several months, no numbness or tingling and has LLE edema >RLE edema, reported undergoing LLE US 4 months ago, was negative for DVT. Currently has 8/10 pain, improved to 5/10 with oxycodone 5 mg x 2 in ED. (28 Dec 2020 11:16)   : Mri T/Ls: IMPRESSION: Degenerative changes  Abnormal signal with associated enhancement is seen involving the endplates adjacent to the L2-3 disc space. There is slight increased T2 prolongation involving the L2-3 disc space with associated widening. While these findings could be compatible with degenerative changes possibly of early discitis and osteomyelitis must be considered.  Patient s/p L1-L5 laminectomy, T10-L5 posterior surgical fusion by Dr. Campos on 21  HMV x 3 drains. Post op with constipation needing aggressive bowel regimen.     PT/OT and PM&R evaluated patient and recommended Rehab.     Patient medically cleared and admitted to  Rehab on 21 (2021 15:21)      PAST MEDICAL & SURGICAL HISTORY:  HTN (hypertension)    DVT (deep venous thrombosis)    Hypothyroidism    RA (rheumatoid arthritis)    Obesity, Class II, BMI 35-39.9, no comorbidity    Hypothyroid    Benign heart murmur    Hyperlipidemia    Hypertension    H/O degenerative disc disease    Osteoarthritis    SLE (systemic lupus erythematosus)    Rheumatoid arthritis    S/P knee replacement    Status post total hip replacement, right    S/P thyroid surgery  1 lobe removed    Lung cancer  small tumor removed     S/P knee surgery  bilateral    S/P carpal tunnel release  left    S/P eye surgery  child    S/P cataract surgery  left    S/P tonsillectomy        Allergies    No Known Allergies    Intolerances    Subjective/ROS:  Patient seen and examined this morning. Patient in bed in NAD, no SOB. Patient reports no nausea this morning. Patient had BM yesterday. Pain controlled/improved which wraps around to left breast.  -----------------------------------------------------------------------  VITALS  Vital Signs Last 24 Hrs  T(C): 36.6 (15 Feb 2021 07:42), Max: 36.8 (2021 22:20)  T(F): 97.9 (15 Feb 2021 07:42), Max: 98.2 (2021 22:20)  HR: 65 (15 Feb 2021 07:42) (65 - 72)  BP: 122/71 (15 Feb 2021 07:42) (120/78 - 122/71)  BP(mean): --  RR: 15 (15 Feb 2021 07:42) (14 - 15)  SpO2: 92% (15 Feb 2021 07:42) (92% - 98%)    PHYSICAL EXAM:  General: alert, no acute distress  Eyes:  anicteric, no conjunctival injection	  Neck: supple  Lungs: clear to auscultation  Heart: regular rate and rhythm  Abdomen: soft, nondistended, nontender  Skin: no lesions  Extremities: no clubbing, cyanosis, min pedal edema  Neurologic: alert, oriented, moves all extremities  wound: back incision intact            -----------------------------------------------------------------------  RECENT LABS:     LABS:                        11.2   10.65 )-----------( 224      ( 15 Feb 2021 07:49 )             35.3     15 Feb 2021 07:51    138    |  103    |  17     ----------------------------<  58     3.6     |  24     |  0.59     Ca    8.4        15 Feb 2021 07:51    TPro  5.5    /  Alb  2.1    /  TBili  0.7    /  DBili  x      /  AST  22     /  ALT  12     /  AlkPhos  100    15 Feb 2021 07:51                       13.3   4.36  )-----------( 264      ( 2021 09:05 )             41.9     02-11    141  |  107  |  27<H>  ----------------------------<  133<H>  4.0   |  26  |  0.78    Ca    8.7      2021 09:05    TPro  6.3  /  Alb  2.9<L>  /  TBili  0.4  /  DBili  x   /  AST  19  /  ALT  17  /  AlkPhos  113  02-11    LIVER FUNCTIONS - ( 2021 09:05 )  Alb: 2.9 g/dL / Pro: 6.3 g/dL / ALK PHOS: 113 U/L / ALT: 17 U/L / AST: 19 U/L / GGT: x             Urinalysis Basic - ( 10 Feb 2021 17:59 )    Color: Yellow / Appearance: Slightly Turbid / S.025 / pH: x  Gluc: x / Ketone: Negative  / Bili: Negative / Urobili: Negative   Blood: x / Protein: 30 mg/dL / Nitrite: Positive   Leuk Esterase: Small / RBC: 0-4 /HPF / WBC 11-25 /HPF   Sq Epi: x / Non Sq Epi: Moderate / Bacteria: TNTC /HPF    Urine Culture - 48 Hour Hold (collected 02-10-21 @ 16:00)  Source: .Urine  Preliminary Report (21 @ 06:19):    >100,000 CFU/ml Escherichia coli        CAPILLARY BLOOD GLUCOSE          IMAGING: none in last 24hr  -----------------------------------------------------------------------  MEDICATIONS  (STANDING):  ascorbic acid 500 milliGRAM(s) Oral daily  atorvastatin 20 milliGRAM(s) Oral at bedtime  benzocaine 15 mG/menthol 3.6 mG (Sugar-Free) Lozenge 1 Lozenge Oral four times a day  enoxaparin Injectable 40 milliGRAM(s) SubCutaneous two times a day  folic acid 1 milliGRAM(s) Oral daily  hydroxychloroquine 200 milliGRAM(s) Oral <User Schedule>  levothyroxine 200 MICROGram(s) Oral daily  lidocaine   Patch 1 Patch Transdermal daily  oxyCODONE  ER Tablet 10 milliGRAM(s) Oral every 12 hours  polyethylene glycol 3350 17 Gram(s) Oral daily  senna 2 Tablet(s) Oral at bedtime    MEDICATIONS  (PRN):  acetaminophen   Tablet .. 650 milliGRAM(s) Oral every 6 hours PRN Temp greater or equal to 38C (100.4F), Mild Pain (1 - 3)  magnesium hydroxide Suspension 30 milliLiter(s) Oral daily PRN Constipation  melatonin 3 milliGRAM(s) Oral at bedtime PRN Insomnia  ondansetron    Tablet 4 milliGRAM(s) Oral every 8 hours PRN Nausea and/or Vomiting  oxyCODONE    IR 10 milliGRAM(s) Oral every 4 hours PRN Severe Pain (7 - 10)  oxyCODONE    IR 5 milliGRAM(s) Oral every 4 hours PRN Moderate Pain (4 - 6)  simethicone 80 milliGRAM(s) Chew every 6 hours PRN Upset Stomach    -----------------------------------------------------------------------

## 2021-02-15 NOTE — PROGRESS NOTE ADULT - ASSESSMENT
KAI THOMAS is a 75y F PMH HTN, HLD, SLE, RA, hypothyroidism, provoked R. 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 Jaja Campos. Staples removed   - continue Comprehensive Rehab Program of PT/OT- 3 hrs/day 5 days/week  - psychological support, counseling  - Precautions:  spinal, RA, cardiac, contact and droplet precautions    #COVID19 infection  -PCR + 1/29/21. Moved up to 3E [facility] for isolation. COVID IgG Ab neg  - Ct A/P 2/5: 1. Pattern of lung base groundglass opacification suggests infection including atypical pneumonia/viral infection from agents including COVID-19   -CTA 2/9:  No pulmonary embolism to the segmental branches. Mild bilateral patchy airspace disease compatible with COVID-19 pneumonia.  - completed remdesivir.   AST  19  /  ALT  17  /  AlkPhos  113   Cr 0.78 2/11 stable  - ID note appreciated.  Completed 5 day course remdesivir, off decadron   -continue contact and droplet precautions    #nausea/vomiting  improved this AM,  last BM 2/14  - abdominal CT -2/5: Colonic diverticulosis without CT evidence of acute diverticulitis  -abd xray (-) 2/14  - possible 2/2 macrobid use. ID note appreciate, off antibiotics/decadron  - CBC in AM    #urinary frequency  - will repeat U/A  - Urine Cx  - >100k Ecoli   - Infectious disease following.  - macrobid 100 bid x 5 days,   - monitor    # Chest soreness  - CTA 2/9 neg for PE  -proper posture and reducing UE use in standing activities. Improved posture with use of platform walker in therapy 2/10    #SLE/RA  -continue plaquenil 200mg po daily  -(previously on 15mg ( 2.5mg ) weekly).  Discussed with rheumatologist, defer for 3 weeks post Covid     #h/o Provoked DVT in 2019  - US July 2019:  DVT noted in the right popliteal vein and visualized segment of the right  posterior tibial vein.  - was on eliquis 5mg po bid at home,   - currently on lovenox for dvt ppx, to resume eliquis at discharge. Cleared by vascular to resume eliquis, will need clearance from NSGY to restart  - NSGY clearance to restart eliquis pending  - Follows with vascular cardiologist Dr. Hoyos outpatient    #HTN:  - BP stable off meds     #HLD:  - continue lipitor 20mg po daily    #hypothyroidism:  - continue levothyroxine 200mcg po daily    # leukocytopenia secondary to COVID  - 4.36 2/11 resolved    # sleep  - melatonin 3mg PRN    #Pain Mgmt :   - Tylenol PRN mild pain  - Oxycodone 5mg IR PRN moderate 4-7/10 pain  - Oxycodone 10mg IR prn severe 8-10/10 pain  - Oxycontin ER BID (renewed 2/4)  - currently controlled 2/9  - Zofran Q8hr PRN    #GI/Bowel Mgmt/ constipation:   -continue Senna, miralax daily, MOM prn  -simethicone PRN gas  -prune juice with meal trays    #FEN :  - Diet - Regular     # DVT prophylaxis :  - Lovenox BID; f/u re: eliquis clearance from NSGY  -LE edema:  ACE wraps LE     #LABS:  f/u NSGY re: clearance to restart eliquis  CBC 2/16

## 2021-02-15 NOTE — PROGRESS NOTE ADULT - ASSESSMENT
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 Hospital Course complicated by brief SICU stay for mechanical ventilation management, successfully extubated. Patient now admitted to [facility] for a multidisciplinary rehab program on 1/12/21.    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 Jaja Campos  -Continue comprehensive rehab program  -Pain management, bowel regimen per rehab    #COVID 19  #Neutropenia - resolved  -Spo2 appropriate on RA  -Completed Remdesivir and Decadron  -Monitor O2 with continuos pulse ox; monitor respiratory status closely  -Maintain strict isolation precautions, use proper PPE.  -May use Albuterol inhaler PRN    #Nausea/Vomiting  -Resolved  -Zofran ODT prn     #E coli UTI  -Completed abx    #Acute blood loss Anemia, likely from surgery - resolved    #SLE/RA  -Chronic, continue Plaquenil 200mg po daily and folic acid    #H/o provoked right popliteal, right posterior tibial DVT (7/2019 in setting of hip surgery, already completed >6 months of treatment)  -Follows with cardiologist Dr. Hoyos outpatient  -Treated with Eliquis 5mg po bid chronically -- AC stopped at [facility] as 12/29 LE dopplers neg for DVTs  -Primary team to confirm clearance regarding restarting Eliquis  -DVT ppx with Lovenox 40mg BID for now    #Leg edema likely from chronic lymphedema - improving  -Encourage leg elevation  -ACE wraps    #HTN: stable off anti-hypertensives  -Continue to hold home medication Losartan  -Monitor vitals    #HLD  -Chronic, continue Lipitor    #Hypothyroidism  -Chronic, continue Levothyroxine    #Prophylactic Measure  DVT prophylaxis: Lovenox BID

## 2021-02-15 NOTE — PROGRESS NOTE ADULT - SUBJECTIVE AND OBJECTIVE BOX
CC: f/u for Covid and E Coli UTI    Patient reports: no shortness of breath, no longer experiencing chest pain, incontinent of urine, no abdominal pain, macrobid stopped last night    REVIEW OF SYSTEMS:  All other review of systems negative (Comprehensive ROS)    Antimicrobials Day #  :  hydroxychloroquine 200 milliGRAM(s) Oral <User Schedule>    Other Medications Reviewed  MEDICATIONS  (STANDING):  ascorbic acid 500 milliGRAM(s) Oral daily  atorvastatin 20 milliGRAM(s) Oral at bedtime  benzocaine 15 mG/menthol 3.6 mG (Sugar-Free) Lozenge 1 Lozenge Oral four times a day  enoxaparin Injectable 40 milliGRAM(s) SubCutaneous two times a day  folic acid 1 milliGRAM(s) Oral daily  hydroxychloroquine 200 milliGRAM(s) Oral <User Schedule>  levothyroxine 200 MICROGram(s) Oral daily  lidocaine   Patch 1 Patch Transdermal daily  oxyCODONE  ER Tablet 10 milliGRAM(s) Oral every 12 hours  polyethylene glycol 3350 17 Gram(s) Oral daily  saccharomyces boulardii 250 milliGRAM(s) Oral two times a day  senna 2 Tablet(s) Oral at bedtime    T(F): 97.9 (02-15-21 @ 07:42), Max: 98.2 (02-14-21 @ 22:20)  HR: 65 (02-15-21 @ 07:42)  BP: 122/71 (02-15-21 @ 07:42)  RR: 15 (02-15-21 @ 07:42)  SpO2: 92% (02-15-21 @ 07:42)  Wt(kg): --    PHYSICAL EXAM:  General: alert, no acute distress  Eyes:  anicteric, no conjunctival injection, no discharge  Oropharynx: no lesions or injection 	  Neck: supple, without adenopathy  Lungs: clear to auscultation  Heart: regular rate and rhythm; no murmur, rubs or gallops  Abdomen: soft, nondistended, nontender, without mass or organomegaly  Skin: venous stasis changes  Extremities: no clubbing, cyanosis, + edema  Neurologic: alert, oriented, moves all extremities    LAB RESULTS:                        11.2   10.65 )-----------( 224      ( 15 Feb 2021 07:49 )             35.3     02-14    137  |  104  |  17  ----------------------------<  101<H>  3.7   |  25  |  0.67    Ca    8.2<L>      14 Feb 2021 11:35    TPro  5.5<L>  /  Alb  2.4<L>  /  TBili  0.8  /  DBili  x   /  AST  18  /  ALT  11  /  AlkPhos  103  02-14    LIVER FUNCTIONS - ( 14 Feb 2021 11:35 )  Alb: 2.4 g/dL / Pro: 5.5 g/dL / ALK PHOS: 103 U/L / ALT: 11 U/L / AST: 18 U/L / GGT: x           Lipase wnl  MICROBIOLOGY:  RECENT CULTURES:  02-10 @ 16:00 .Urine Escherichia coli    >100,000 CFU/ml Escherichia coli          RADIOLOGY REVIEWED:

## 2021-02-15 NOTE — PROGRESS NOTE ADULT - ASSESSMENT
75y female with a PMH of RA managed with plaquenil, HTN, SLE, hypothyroidism, chronic LBP, s/p T10-L5 laminectomy and posterior fusion in early January at [facility], s/p transfer to rehab on 1/12/21  She tested positive for Covid on 1/31 --- hospital acquired Covid  She has been with a paucity of symptoms; nonspecific abdominal and chest pains without fevers, cough, SOB or hypoxia  CT scan of A/P showed nonspecific lower lung densities and RDV started on 2/5 along with decadron.  On lovenox 40 BID for DVT prophylaxis, has a history of a provoked DVT in the past.  CTA of chest shows mild bilateral patchy airspace disease compatible with COVID-19 pneumonia.  No evidence of a new PE.RDV completed 2/9.  Decadron course completed  Now with incontinence, s/p 3 day course of macrobid, stopped last night with concerns it was contributing to her nausea.  Comfortable on RA.  Suggest:  Observe off antibiotics and steroids  Continue anticoagulation  as per primary service  Stable appearance from ID viewpoint  With no additional ID w/u planned we will stop actively following, please call  if ID issues arise

## 2021-02-16 LAB
BASOPHILS # BLD AUTO: 0 K/UL — SIGNIFICANT CHANGE UP (ref 0–0.2)
BASOPHILS NFR BLD AUTO: 0 % — SIGNIFICANT CHANGE UP (ref 0–2)
EOSINOPHIL # BLD AUTO: 0.14 K/UL — SIGNIFICANT CHANGE UP (ref 0–0.5)
EOSINOPHIL NFR BLD AUTO: 2 % — SIGNIFICANT CHANGE UP (ref 0–6)
HCT VFR BLD CALC: 35.7 % — SIGNIFICANT CHANGE UP (ref 34.5–45)
HGB BLD-MCNC: 11.5 G/DL — SIGNIFICANT CHANGE UP (ref 11.5–15.5)
LYMPHOCYTES # BLD AUTO: 0.64 K/UL — LOW (ref 1–3.3)
LYMPHOCYTES # BLD AUTO: 9 % — LOW (ref 13–44)
MCHC RBC-ENTMCNC: 29.4 PG — SIGNIFICANT CHANGE UP (ref 27–34)
MCHC RBC-ENTMCNC: 32.2 GM/DL — SIGNIFICANT CHANGE UP (ref 32–36)
MCV RBC AUTO: 91.3 FL — SIGNIFICANT CHANGE UP (ref 80–100)
MONOCYTES # BLD AUTO: 0.42 K/UL — SIGNIFICANT CHANGE UP (ref 0–0.9)
MONOCYTES NFR BLD AUTO: 6 % — SIGNIFICANT CHANGE UP (ref 2–14)
NEUTROPHILS # BLD AUTO: 5.86 K/UL — SIGNIFICANT CHANGE UP (ref 1.8–7.4)
NEUTROPHILS NFR BLD AUTO: 83 % — HIGH (ref 43–77)
PLATELET # BLD AUTO: 211 K/UL — SIGNIFICANT CHANGE UP (ref 150–400)
RBC # BLD: 3.91 M/UL — SIGNIFICANT CHANGE UP (ref 3.8–5.2)
RBC # FLD: 14.5 % — SIGNIFICANT CHANGE UP (ref 10.3–14.5)
WBC # BLD: 7.06 K/UL — SIGNIFICANT CHANGE UP (ref 3.8–10.5)
WBC # FLD AUTO: 7.06 K/UL — SIGNIFICANT CHANGE UP (ref 3.8–10.5)

## 2021-02-16 PROCEDURE — 99232 SBSQ HOSP IP/OBS MODERATE 35: CPT | Mod: CS

## 2021-02-16 RX ORDER — OXYCODONE HYDROCHLORIDE 5 MG/1
5 TABLET ORAL EVERY 6 HOURS
Refills: 0 | Status: DISCONTINUED | OUTPATIENT
Start: 2021-02-16 | End: 2021-02-23

## 2021-02-16 RX ADMIN — Medication 650 MILLIGRAM(S): at 02:06

## 2021-02-16 RX ADMIN — ENOXAPARIN SODIUM 40 MILLIGRAM(S): 100 INJECTION SUBCUTANEOUS at 07:28

## 2021-02-16 RX ADMIN — Medication 200 MILLIGRAM(S): at 07:28

## 2021-02-16 RX ADMIN — ONDANSETRON 4 MILLIGRAM(S): 8 TABLET, FILM COATED ORAL at 02:45

## 2021-02-16 RX ADMIN — ATORVASTATIN CALCIUM 20 MILLIGRAM(S): 80 TABLET, FILM COATED ORAL at 22:15

## 2021-02-16 RX ADMIN — Medication 250 MILLIGRAM(S): at 07:28

## 2021-02-16 RX ADMIN — Medication 3 MILLIGRAM(S): at 02:45

## 2021-02-16 RX ADMIN — Medication 250 MILLIGRAM(S): at 18:20

## 2021-02-16 RX ADMIN — BENZOCAINE AND MENTHOL 1 LOZENGE: 5; 1 LIQUID ORAL at 22:16

## 2021-02-16 RX ADMIN — OXYCODONE HYDROCHLORIDE 10 MILLIGRAM(S): 5 TABLET ORAL at 22:15

## 2021-02-16 RX ADMIN — OXYCODONE HYDROCHLORIDE 10 MILLIGRAM(S): 5 TABLET ORAL at 07:54

## 2021-02-16 RX ADMIN — Medication 500 MILLIGRAM(S): at 11:19

## 2021-02-16 RX ADMIN — OXYCODONE HYDROCHLORIDE 5 MILLIGRAM(S): 5 TABLET ORAL at 11:19

## 2021-02-16 RX ADMIN — ENOXAPARIN SODIUM 40 MILLIGRAM(S): 100 INJECTION SUBCUTANEOUS at 17:02

## 2021-02-16 RX ADMIN — Medication 1 MILLIGRAM(S): at 11:19

## 2021-02-16 RX ADMIN — SENNA PLUS 2 TABLET(S): 8.6 TABLET ORAL at 22:15

## 2021-02-16 RX ADMIN — Medication 200 MICROGRAM(S): at 07:28

## 2021-02-16 NOTE — PROGRESS NOTE ADULT - SUBJECTIVE AND OBJECTIVE BOX
Patient is a 75y old  Female who presents with a chief complaint of Back pain (2021 07:48)      HPI:  75F with past medical history of HTN, HLD, SLE, RA, hypothyroidism, provoked R. popliteal DVT in 2019 on eliquis, chronic low back pain presents to ED for acute on chronic low back pain. Pt has several months of chronic lumbosacral back pain, evaluated by outpatient ortho and referred for physical therapy and pain management. She has been receiving ?vitamin spinal injections. She last received low back inj on  after which she her low back pain worsened. She took oxycodone 5 mg qd (prescribed in september during a ED visit) and acetaminophen for past three days without relief. She also endorses worsening pain during ambulation with walker and has affected her quality of life. She has constipation, last BM 4 days ago. Otherwise denies fever, chills, N/V, abdominal pain, dysuria, urinary retention, fecal incontinence, saddle anesthesia, fall, LOC, trauma. During ROS she endorsed LLE weakness for several months, no numbness or tingling and has LLE edema >RLE edema, reported undergoing LLE US 4 months ago, was negative for DVT. Currently has 8/10 pain, improved to 5/10 with oxycodone 5 mg x 2 in ED. (28 Dec 2020 11:16)   : Mri T/Ls: IMPRESSION: Degenerative changes  Abnormal signal with associated enhancement is seen involving the endplates adjacent to the L2-3 disc space. There is slight increased T2 prolongation involving the L2-3 disc space with associated widening. While these findings could be compatible with degenerative changes possibly of early discitis and osteomyelitis must be considered.  Patient s/p L1-L5 laminectomy, T10-L5 posterior surgical fusion by Dr. Campos on 21  HMV x 3 drains. Post op with constipation needing aggressive bowel regimen.     PT/OT and PM&R evaluated patient and recommended Rehab.     Patient medically cleared and admitted to  Rehab on 21 (2021 15:21)      PAST MEDICAL & SURGICAL HISTORY:  HTN (hypertension)    DVT (deep venous thrombosis)    Hypothyroidism    RA (rheumatoid arthritis)    Obesity, Class II, BMI 35-39.9, no comorbidity    Hypothyroid    Benign heart murmur    Hyperlipidemia    Hypertension    H/O degenerative disc disease    Osteoarthritis    SLE (systemic lupus erythematosus)    Rheumatoid arthritis    S/P knee replacement    Status post total hip replacement, right    S/P thyroid surgery  1 lobe removed    Lung cancer  small tumor removed     S/P knee surgery  bilateral    S/P carpal tunnel release  left    S/P eye surgery  child    S/P cataract surgery  left    S/P tonsillectomy        MEDICATIONS  (STANDING):  ascorbic acid 500 milliGRAM(s) Oral daily  atorvastatin 20 milliGRAM(s) Oral at bedtime  benzocaine 15 mG/menthol 3.6 mG (Sugar-Free) Lozenge 1 Lozenge Oral four times a day  enoxaparin Injectable 40 milliGRAM(s) SubCutaneous two times a day  folic acid 1 milliGRAM(s) Oral daily  hydroxychloroquine 200 milliGRAM(s) Oral <User Schedule>  levothyroxine 200 MICROGram(s) Oral daily  lidocaine   Patch 1 Patch Transdermal daily  oxyCODONE  ER Tablet 10 milliGRAM(s) Oral every 12 hours  polyethylene glycol 3350 17 Gram(s) Oral daily  saccharomyces boulardii 250 milliGRAM(s) Oral two times a day  senna 2 Tablet(s) Oral at bedtime    MEDICATIONS  (PRN):  acetaminophen   Tablet .. 650 milliGRAM(s) Oral every 6 hours PRN Temp greater or equal to 38C (100.4F), Mild Pain (1 - 3)  bisacodyl 5 milliGRAM(s) Oral every 12 hours PRN Constipation  magnesium hydroxide Suspension 30 milliLiter(s) Oral daily PRN Constipation  melatonin 3 milliGRAM(s) Oral at bedtime PRN Insomnia  ondansetron   Disintegrating Tablet 4 milliGRAM(s) Oral every 6 hours PRN Nausea and/or Vomiting  oxyCODONE    IR 10 milliGRAM(s) Oral every 4 hours PRN Severe Pain (7 - 10)  oxyCODONE    IR 5 milliGRAM(s) Oral every 4 hours PRN Moderate Pain (4 - 6)  simethicone 80 milliGRAM(s) Chew every 6 hours PRN Upset Stomach      Allergies    No Known Allergies    Intolerances          VITALS  75y  Vital Signs Last 24 Hrs  T(C): 36.4 (2021 07:45), Max: 36.8 (2021 07:26)  T(F): 97.5 (2021 07:45), Max: 98.2 (2021 07:26)  HR: 56 (2021 07:45) (56 - 72)  BP: 134/76 (2021 07:45) (117/65 - 134/76)  BP(mean): --  RR: 15 (2021 07:45) (14 - 15)  SpO2: 96% (2021 07:45) (96% - 96%)  Daily     Daily         RECENT LABS:                          11.5   7.06  )-----------( 211      ( 2021 06:15 )             35.7     02-15    138  |  103  |  17  ----------------------------<  58<L>  3.6   |  24  |  0.59    Ca    8.4      15 Feb 2021 07:51    TPro  5.5<L>  /  Alb  2.1<L>  /  TBili  0.7  /  DBili  x   /  AST  22  /  ALT  12  /  AlkPhos  100  02-15    LIVER FUNCTIONS - ( 15 Feb 2021 07:51 )  Alb: 2.1 g/dL / Pro: 5.5 g/dL / ALK PHOS: 100 U/L / ALT: 12 U/L / AST: 22 U/L / GGT: x             Urinalysis Basic - ( 15 Feb 2021 17:30 )    Color: Yellow / Appearance: Slightly Turbid / S.015 / pH: x  Gluc: x / Ketone: Small  / Bili: Negative / Urobili: 1   Blood: x / Protein: 30 mg/dL / Nitrite: Positive   Leuk Esterase: Small / RBC: x / WBC 6-10 /HPF   Sq Epi: x / Non Sq Epi: Neg.-Few / Bacteria: TNTC /HPF          CAPILLARY BLOOD GLUCOSE

## 2021-02-16 NOTE — PROGRESS NOTE ADULT - SUBJECTIVE AND OBJECTIVE BOX
Patient is a 75y old  Female who presents with a chief complaint of Back pain (2021 07:48)      Patient seen and examined at bedside. Vomited overnight and during the weekend, symptoms currently resolved.     ALLERGIES:  No Known Allergies    MEDICATIONS  (STANDING):  ascorbic acid 500 milliGRAM(s) Oral daily  atorvastatin 20 milliGRAM(s) Oral at bedtime  benzocaine 15 mG/menthol 3.6 mG (Sugar-Free) Lozenge 1 Lozenge Oral four times a day  enoxaparin Injectable 40 milliGRAM(s) SubCutaneous two times a day  folic acid 1 milliGRAM(s) Oral daily  hydroxychloroquine 200 milliGRAM(s) Oral <User Schedule>  levothyroxine 200 MICROGram(s) Oral daily  lidocaine   Patch 1 Patch Transdermal daily  oxyCODONE  ER Tablet 10 milliGRAM(s) Oral every 12 hours  polyethylene glycol 3350 17 Gram(s) Oral daily  saccharomyces boulardii 250 milliGRAM(s) Oral two times a day  senna 2 Tablet(s) Oral at bedtime    MEDICATIONS  (PRN):  acetaminophen   Tablet .. 650 milliGRAM(s) Oral every 6 hours PRN Temp greater or equal to 38C (100.4F), Mild Pain (1 - 3)  bisacodyl 5 milliGRAM(s) Oral every 12 hours PRN Constipation  magnesium hydroxide Suspension 30 milliLiter(s) Oral daily PRN Constipation  melatonin 3 milliGRAM(s) Oral at bedtime PRN Insomnia  ondansetron   Disintegrating Tablet 4 milliGRAM(s) Oral every 6 hours PRN Nausea and/or Vomiting  oxyCODONE    IR 10 milliGRAM(s) Oral every 4 hours PRN Severe Pain (7 - 10)  oxyCODONE    IR 5 milliGRAM(s) Oral every 4 hours PRN Moderate Pain (4 - 6)  simethicone 80 milliGRAM(s) Chew every 6 hours PRN Upset Stomach    Vital Signs Last 24 Hrs  T(F): 97.5 (2021 07:45), Max: 98.2 (2021 07:26)  HR: 56 (2021 07:45) (56 - 72)  BP: 134/76 (2021 07:45) (117/65 - 134/76)  RR: 15 (2021 07:45) (14 - 15)  SpO2: 96% (2021 07:45) (96% - 96%)  I&O's Summary    15 Feb 2021 07:01  -  2021 07:00  --------------------------------------------------------  IN: 0 mL / OUT: 875 mL / NET: -875 mL          PHYSICAL EXAM:  General: NAD, A/O x 3  ENT: MMM, no scleral icterus  Neck: Supple, No JVD  Lungs: Clear to auscultation bilaterally, no wheezes, rales, rhonchi  Cardio: RRR, S1/S2, No murmurs  Abdomen: Soft, Nontender, Nondistended; Bowel sounds present  Extremities: No calf tenderness, No pitting edema    LABS:                        11.5   7.06  )-----------( 211      ( 2021 06:15 )             35.7       02-15    138  |  103  |  17  ----------------------------<  58  3.6   |  24  |  0.59    Ca    8.4      15 Feb 2021 07:51    TPro  5.5  /  Alb  2.1  /  TBili  0.7  /  DBili  x   /  AST  22  /  ALT  12  /  AlkPhos  100  02-15     eGFR if Non African American: 89 mL/min/1.73M2 (02-15-21 @ 07:51)  eGFR if : 103 mL/min/1.73M2 (02-15-21 @ 07:51)                               Urinalysis Basic - ( 15 Feb 2021 17:30 )    Color: Yellow / Appearance: Slightly Turbid / S.015 / pH: x  Gluc: x / Ketone: Small  / Bili: Negative / Urobili: 1   Blood: x / Protein: 30 mg/dL / Nitrite: Positive   Leuk Esterase: Small / RBC: x / WBC 6-10 /HPF   Sq Epi: x / Non Sq Epi: Neg.-Few / Bacteria: TNTC /HPF        Urine Culture - 48 Hour Hold (collected 10 Feb 2021 16:00)  Source: .Urine  Final Report (2021 19:46):    >100,000 CFU/ml Escherichia coli  Organism: Escherichia coli (2021 19:46)  Organism: Escherichia coli (2021 19:46)      -  Amikacin: S <=16      -  Amoxicillin/Clavulanic Acid: S <=8/4      -  Ampicillin: R >16 These ampicillin results predict results for amoxicillin      -  Ampicillin/Sulbactam: I 16/8 Enterobacter, Citrobacter, and Serratia may develop resistance during prolonged therapy (3-4 days)      -  Aztreonam: S <=4      -  Cefazolin: S <=2 ([id number]) For uncomplicated UTI with K. pneumoniae, E. coli, or P. mirablis: RUBY <=16 is sensitive and RUBY >=32 is resistant. This also predicts results for oral agents cefaclor, cefdinir, cefpodoxime, cefprozil, cefuroxime axetil, cephalexin and locarbef for uncomplicated UTI. Note that some isolates may be susceptible to these agents while testing resistant to cefazolin.      -  Cefepime: S <=2      -  Cefoxitin: S <=8      -  Ceftriaxone: S <=1 Enterobacter, Citrobacter, and Serratia may develop resistance during prolonged therapy      -  Ciprofloxacin: R >2      -  Ertapenem: S <=0.5      -  Gentamicin: S <=2      -  Imipenem: S <=1      -  Levofloxacin: R >4      -  Meropenem: S <=1      -  Nitrofurantoin: S <=32 Should not be used to treat pyelonephritis      -  Piperacillin/Tazobactam: S <=8      -  Tigecycline: S <=2      -  Tobramycin: S <=2      -  Trimethoprim/Sulfamethoxazole: S /      Method Type: RUBY        RADIOLOGY & ADDITIONAL TESTS:    Care Discussed with Consultants/Other Providers: Dr. Brower (physiatry)

## 2021-02-16 NOTE — PROGRESS NOTE ADULT - COMMENTS
Patient still complains of vague discomfort under left breast, as well as nausea. No vomiting, no diarrhea or constipation currently, no fever. Had A/P CT during stay without significant acute findings.     ID and hospitalist appreciated, possibly due to Abx for UTI, discontinued after 3 days macrobid, discussed with patient. On florastor, patient also working to wean from opioids.

## 2021-02-16 NOTE — PROGRESS NOTE ADULT - ASSESSMENT
KAI THOMAS is a 75y F PMH HTN, HLD, SLE, RA, hypothyroidism, provoked R. 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 Jaja Campos. Staples removed   - continue Comprehensive Rehab Program of PT/OT- 3 hrs/day 5 days/week  - psychological support, counseling  - Precautions:  spinal, RA, cardiac, contact and droplet precautions    #COVID19 infection  -PCR + 1/29/21  - Ct A/P 2/5: 1. Pattern of lung base groundglass opacification   -CTA 2/9:  No pulmonary embolism to the segmental branches. Mild bilateral patchy airspace disease compatible with COVID-19 pneumonia.  - ID note appreciated.  Completed 5 day course remdesivir, off decadron   -continue contact and droplet precautions    #nausea/vomiting  improved 2/16  - abdominal CT -2/5: Colonic diverticulosis without CT evidence of acute diverticulitis  -abd xray (-) 2/14  - possible 2/2 macrobid use. ID note appreciate, ID appreciated, off antibiotics/decadron   -continue florastor. Maalox added 2/16  -   TBili  0.7  /  DBili  x   /  AST  22  /  ALT  12  /  AlkPhos  100  02-15  - WBC 7.06 12/16    #urinary frequency  - Urine Cx  - >100k Ecoli   - s/p macrobid 100 bid x 3 days    # Chest soreness  - CTA 2/9 neg for PE  -proper posture and reducing UE use in standing activities. Improved posture with use of platform walker in therapy 2/10    #SLE/RA  -continue plaquenil 200mg po daily  -(previously on 15mg ( 2.5mg ) weekly).  Discussed with rheumatologist, defer for 3 weeks post Covid     #h/o Provoked DVT in 2019  - US July 2019:  DVT noted in the right popliteal vein and visualized segment of the right  posterior tibial vein.  - was on eliquis 5mg po bid at home,   - currently on lovenox for dvt ppx, NSGY clearance to restart eliquis pending  - Follows with vascular cardiologist Dr. Hoyos outpatient    #HTN:  - BP stable off meds   -(117/65 - 134/76) 2/16    #HLD:  - continue lipitor 20mg po daily    #hypothyroidism:  - continue levothyroxine 200mcg po daily    # sleep  - melatonin 3mg PRN    #Pain Mgmt :   - Tylenol PRN mild pain  - Oxycodone 5mg IR PRN severe pain , weaned again 2/16  - Oxycodone 10mg IR prn severe 8-10/10 nayeli- Oxycontin ER BID (renewed 2/4)    #GI/Bowel Mgmt/ constipation:   -continue Senna, miralax daily, MOM prn  -simethicone PRN gas  - zofran, maalox for Nausea, dyspepsia PRN  -prune juice with meal trays    #FEN :  - Diet - Regular     # DVT prophylaxis :  - Lovenox BID; f/u re: eliquis clearance from NSGY  -LE edema:  ACE wraps LE     #LABS:  f/u NSGY re: clearance to restart eliquis  CBC BMp 2/18        KAI THOMAS is a 75y F PMH HTN, HLD, SLE, RA, hypothyroidism, provoked R. 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 Jaja Campos. Staples removed   - continue Comprehensive Rehab Program of PT/OT- 3 hrs/day 5 days/week  - psychological support, counseling  - Precautions:  spinal, RA, cardiac, contact and droplet precautions    #COVID19 infection  -PCR + 1/29/21  - Ct A/P 2/5: 1. Pattern of lung base groundglass opacification   -CTA 2/9:  No pulmonary embolism to the segmental branches. Mild bilateral patchy airspace disease compatible with COVID-19 pneumonia.  - ID note appreciated.  Completed 5 day course remdesivir, off decadron   -continue contact and droplet precautions    #nausea/vomiting  improved 2/16  - abdominal CT -2/5: Colonic diverticulosis without CT evidence of acute diverticulitis  -abd xray (-) 2/14  - possible 2/2 macrobid use. ID note appreciate, ID appreciated, off antibiotics/decadron   -continue florastor. Maalox added 2/16  -   TBili  0.7  /  DBili  x   /  AST  22  /  ALT  12  /  AlkPhos  100  02-15  - WBC 7.06 12/16    #urinary frequency  - Urine Cx  - >100k Ecoli   - s/p macrobid 100 bid x 3 days    # Chest soreness  - CTA 2/9 neg for PE  -proper posture and reducing UE use in standing activities. Improved posture with use of platform walker in therapy 2/10    #SLE/RA  -continue plaquenil 200mg po daily  -(previously on 15mg ( 2.5mg ) weekly).  Discussed with rheumatologist, defer for 3 weeks post Covid     #h/o Provoked DVT in 2019  - US July 2019:  DVT noted in the right popliteal vein and visualized segment of the right  posterior tibial vein.  - was on eliquis 5mg po bid at home,   - currently on lovenox for dvt ppx, NSGY clearance to restart eliquis pending  - Follows with vascular cardiologist Dr. Hoyos outpatient    #HTN:  - BP stable off meds   -(117/65 - 134/76) 2/16    #HLD:  - continue lipitor 20mg po daily    #hypothyroidism:  - continue levothyroxine 200mcg po daily    # sleep  - melatonin 3mg PRN    #Pain Mgmt :   - Tylenol PRN mild pain  - Oxycodone 5mg IR PRN severe pain , weaned again 2/16  - Oxycodone 10mg IR prn severe 8-10/10 nayeli- Oxycontin ER BID (renewed 2/4)    #GI/Bowel Mgmt/ constipation:   -continue Senna, miralax daily, MOM prn  -simethicone PRN gas  - zofran, maalox for Nausea, dyspepsia PRN  -prune juice with meal trays    #FEN :  - Diet - Regular     # DVT prophylaxis :  - Lovenox BID; f/u re: eliquis clearance from NSGY  -LE edema:  ACE wraps LE     Case discussed in detail with daughter Adeline 2/16    #LABS:  f/u NSGY re: clearance to restart eliquis  CBC BMp 2/18

## 2021-02-16 NOTE — PROGRESS NOTE ADULT - ASSESSMENT
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 Hospital Course complicated by brief SICU stay for mechanical ventilation management, successfully extubated. Patient now admitted to [facility] for a multidisciplinary rehab program on 1/12/21.    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 Jaja Campos  -Continue comprehensive rehab program  -Pain management, bowel regimen per rehab    #Viral PNA 2/2 to COVID 19  #Neutropenia - resolved  -Spo2 appropriate on RA  -Completed Remdesivir and Decadron  -Monitor O2 with continuos pulse ox; monitor respiratory status closely  -Maintain strict isolation precautions, use proper PPE.  -May use Albuterol inhaler PRN  -ID consulted, recommendations appreciated    #hx of UTI  -Repeat UCx >100K E coli, patient denies urinary symptoms. ?Colonization. Afebrile, no leukocytosis. Completed course of Macrobid per primary  -ID consulted, recommendations appreciated    #Acute blood loss Anemia, likely from surgery - resolved    #SLE/RA  -Chronic, continue Plaquenil 200mg po daily and folic acid    #H/o provoked right popliteal, right posterior tibial DVT (7/2019 in setting of hip surgery, already completed >6 months of treatment)  -Follows with cardiologist Dr. Hoyos outpatient  -Treated with Eliquis 5mg po bid chronically -- AC stopped at [facility] as 12/29 LE dopplers neg for DVTs  -Primary team to confirm clearance regarding restarting Eliquis  -DVT ppx with Lovenox 40mg BID for now    #HTN: stable off anti-hypertensives  -Continue to hold home medication Losartan  -Monitor vitals    #HLD  -Chronic, continue Lipitor    #Hypothyroidism  -Chronic, continue Levothyroxine    #Prophylactic Measure  DVT prophylaxis: Lovenox BID     75F PMH HTN, HLD, SLE, RA, hypothyroidism, provoked right popliteal DVT in 2019, chronic LBP presents to [facility] for acute on chronic LBP 12/28/20, found to have 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 Hospital Course complicated by brief SICU stay for mechanical ventilation management, successfully extubated. Patient now admitted to [facility] for a multidisciplinary rehab program on 1/12/21.    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 Jaja Campos  -Continue comprehensive rehab program  -Pain management, bowel regimen per rehab    #Viral PNA 2/2 to COVID 19  #Neutropenia - resolved  -Spo2 appropriate on RA  -Completed Remdesivir and Decadron  -Monitor O2 with continuos pulse ox; monitor respiratory status closely  -Maintain strict isolation precautions, use proper PPE.  -May use Albuterol inhaler PRN  -ID consulted, recommendations appreciated    #hx of UTI  -Repeat UCx >100K E coli, patient denies urinary symptoms. ?Colonization. Afebrile, no leukocytosis. Started on Macrobid per primary, discontinued as thought to be contributing to symptoms of vomiting.   -ID consulted, recommendations appreciated    #Acute blood loss Anemia, likely from surgery - resolved    #SLE/RA  -Chronic, continue Plaquenil 200mg po daily and folic acid    #H/o provoked right popliteal, right posterior tibial DVT (7/2019 in setting of hip surgery, already completed >6 months of treatment)  -Follows with cardiologist Dr. Hoyos outpatient  -Treated with Eliquis 5mg po bid chronically -- AC stopped at [facility] as 12/29 LE dopplers neg for DVTs  -Primary team to confirm clearance regarding restarting Eliquis  -DVT ppx with Lovenox 40mg BID for now    #HTN: stable off anti-hypertensives  -Continue to hold home medication Losartan  -Monitor vitals    #HLD  -Chronic, continue Lipitor    #Hypothyroidism  -Chronic, continue Levothyroxine    #Prophylactic Measure  DVT prophylaxis: Lovenox BID

## 2021-02-17 PROCEDURE — 99232 SBSQ HOSP IP/OBS MODERATE 35: CPT | Mod: CS

## 2021-02-17 RX ORDER — OXYCODONE HYDROCHLORIDE 5 MG/1
10 TABLET ORAL EVERY 12 HOURS
Refills: 0 | Status: DISCONTINUED | OUTPATIENT
Start: 2021-02-17 | End: 2021-02-23

## 2021-02-17 RX ADMIN — BENZOCAINE AND MENTHOL 1 LOZENGE: 5; 1 LIQUID ORAL at 05:38

## 2021-02-17 RX ADMIN — ENOXAPARIN SODIUM 40 MILLIGRAM(S): 100 INJECTION SUBCUTANEOUS at 17:09

## 2021-02-17 RX ADMIN — BENZOCAINE AND MENTHOL 1 LOZENGE: 5; 1 LIQUID ORAL at 21:12

## 2021-02-17 RX ADMIN — OXYCODONE HYDROCHLORIDE 10 MILLIGRAM(S): 5 TABLET ORAL at 05:41

## 2021-02-17 RX ADMIN — OXYCODONE HYDROCHLORIDE 5 MILLIGRAM(S): 5 TABLET ORAL at 09:50

## 2021-02-17 RX ADMIN — Medication 500 MILLIGRAM(S): at 12:07

## 2021-02-17 RX ADMIN — ATORVASTATIN CALCIUM 20 MILLIGRAM(S): 80 TABLET, FILM COATED ORAL at 21:12

## 2021-02-17 RX ADMIN — Medication 650 MILLIGRAM(S): at 00:45

## 2021-02-17 RX ADMIN — ENOXAPARIN SODIUM 40 MILLIGRAM(S): 100 INJECTION SUBCUTANEOUS at 05:39

## 2021-02-17 RX ADMIN — Medication 200 MILLIGRAM(S): at 08:00

## 2021-02-17 RX ADMIN — Medication 1 MILLIGRAM(S): at 12:07

## 2021-02-17 RX ADMIN — SENNA PLUS 2 TABLET(S): 8.6 TABLET ORAL at 21:12

## 2021-02-17 RX ADMIN — Medication 250 MILLIGRAM(S): at 05:38

## 2021-02-17 RX ADMIN — Medication 650 MILLIGRAM(S): at 14:37

## 2021-02-17 RX ADMIN — Medication 200 MICROGRAM(S): at 05:39

## 2021-02-17 NOTE — PROGRESS NOTE ADULT - SUBJECTIVE AND OBJECTIVE BOX
Patient is a 75y old  Female who presents with a chief complaint of Back pain (2021 07:45)      HPI:  75F with past medical history of HTN, HLD, SLE, RA, hypothyroidism, provoked R. popliteal DVT in 2019 on eliquis, chronic low back pain presents to ED for acute on chronic low back pain. Pt has several months of chronic lumbosacral back pain, evaluated by outpatient ortho and referred for physical therapy and pain management. She has been receiving ?vitamin spinal injections. She last received low back inj on  after which she her low back pain worsened. She took oxycodone 5 mg qd (prescribed in september during a ED visit) and acetaminophen for past three days without relief. She also endorses worsening pain during ambulation with walker and has affected her quality of life. She has constipation, last BM 4 days ago. Otherwise denies fever, chills, N/V, abdominal pain, dysuria, urinary retention, fecal incontinence, saddle anesthesia, fall, LOC, trauma. During ROS she endorsed LLE weakness for several months, no numbness or tingling and has LLE edema >RLE edema, reported undergoing LLE US 4 months ago, was negative for DVT. Currently has 8/10 pain, improved to 5/10 with oxycodone 5 mg x 2 in ED. (28 Dec 2020 11:16)   : Mri T/Ls: IMPRESSION: Degenerative changes  Abnormal signal with associated enhancement is seen involving the endplates adjacent to the L2-3 disc space. There is slight increased T2 prolongation involving the L2-3 disc space with associated widening. While these findings could be compatible with degenerative changes possibly of early discitis and osteomyelitis must be considered.  Patient s/p L1-L5 laminectomy, T10-L5 posterior surgical fusion by Dr. Campos on 21  HMV x 3 drains. Post op with constipation needing aggressive bowel regimen.     PT/OT and PM&R evaluated patient and recommended Rehab.     Patient medically cleared and admitted to  Rehab on 21 (2021 15:21)      PAST MEDICAL & SURGICAL HISTORY:  HTN (hypertension)    DVT (deep venous thrombosis)    Hypothyroidism    RA (rheumatoid arthritis)    Obesity, Class II, BMI 35-39.9, no comorbidity    Hypothyroid    Benign heart murmur    Hyperlipidemia    Hypertension    H/O degenerative disc disease    Osteoarthritis    SLE (systemic lupus erythematosus)    Rheumatoid arthritis    S/P knee replacement    Status post total hip replacement, right    S/P thyroid surgery  1 lobe removed    Lung cancer  small tumor removed     S/P knee surgery  bilateral    S/P carpal tunnel release  left    S/P eye surgery  child    S/P cataract surgery  left    S/P tonsillectomy        MEDICATIONS  (STANDING):  ascorbic acid 500 milliGRAM(s) Oral daily  atorvastatin 20 milliGRAM(s) Oral at bedtime  benzocaine 15 mG/menthol 3.6 mG (Sugar-Free) Lozenge 1 Lozenge Oral four times a day  enoxaparin Injectable 40 milliGRAM(s) SubCutaneous two times a day  folic acid 1 milliGRAM(s) Oral daily  hydroxychloroquine 200 milliGRAM(s) Oral <User Schedule>  levothyroxine 200 MICROGram(s) Oral daily  lidocaine   Patch 1 Patch Transdermal daily  oxyCODONE  ER Tablet 10 milliGRAM(s) Oral every 12 hours  polyethylene glycol 3350 17 Gram(s) Oral daily  senna 2 Tablet(s) Oral at bedtime    MEDICATIONS  (PRN):  acetaminophen   Tablet .. 650 milliGRAM(s) Oral every 6 hours PRN Temp greater or equal to 38C (100.4F), Mild Pain (1 - 3)  aluminum hydroxide/magnesium hydroxide/simethicone Suspension 30 milliLiter(s) Oral every 4 hours PRN Dyspepsia  bisacodyl 5 milliGRAM(s) Oral every 12 hours PRN Constipation  magnesium hydroxide Suspension 30 milliLiter(s) Oral daily PRN Constipation  melatonin 3 milliGRAM(s) Oral at bedtime PRN Insomnia  ondansetron   Disintegrating Tablet 4 milliGRAM(s) Oral every 6 hours PRN Nausea and/or Vomiting  oxyCODONE    IR 5 milliGRAM(s) Oral every 6 hours PRN Severe Pain (7 - 10)  simethicone 80 milliGRAM(s) Chew every 6 hours PRN Upset Stomach      Allergies    No Known Allergies    Intolerances          VITALS  75y  Vital Signs Last 24 Hrs  T(C): 36.6 (2021 07:30), Max: 36.6 (2021 03:30)  T(F): 97.8 (2021 07:30), Max: 97.8 (2021 03:30)  HR: 56 (2021 07:30) (56 - 99)  BP: 121/60 (2021 07:30) (108/55 - 121/60)  BP(mean): --  RR: 18 (2021 07:30) (18 - 18)  SpO2: 98% (2021 07:30) (92% - 98%)  Daily     Daily         RECENT LABS:                          11.5   7.06  )-----------( 211      ( 2021 06:15 )             35.7               Urinalysis Basic - ( 15 Feb 2021 17:30 )    Color: Yellow / Appearance: Slightly Turbid / S.015 / pH: x  Gluc: x / Ketone: Small  / Bili: Negative / Urobili: 1   Blood: x / Protein: 30 mg/dL / Nitrite: Positive   Leuk Esterase: Small / RBC: x / WBC 6-10 /HPF   Sq Epi: x / Non Sq Epi: Neg.-Few / Bacteria: TNTC /HPF          CAPILLARY BLOOD GLUCOSE                   Patient is a 75y old  Female who presents with a chief complaint of Back pain (2021 07:45)      HPI:  75F with past medical history of HTN, HLD, SLE, RA, hypothyroidism, provoked R. popliteal DVT in 2019 on eliquis, chronic low back pain presents to ED for acute on chronic low back pain. Pt has several months of chronic lumbosacral back pain, evaluated by outpatient ortho and referred for physical therapy and pain management. She has been receiving ?vitamin spinal injections. She last received low back inj on  after which she her low back pain worsened. She took oxycodone 5 mg qd (prescribed in september during a ED visit) and acetaminophen for past three days without relief. She also endorses worsening pain during ambulation with walker and has affected her quality of life. She has constipation, last BM 4 days ago. Otherwise denies fever, chills, N/V, abdominal pain, dysuria, urinary retention, fecal incontinence, saddle anesthesia, fall, LOC, trauma. During ROS she endorsed LLE weakness for several months, no numbness or tingling and has LLE edema >RLE edema, reported undergoing LLE US 4 months ago, was negative for DVT. Currently has 8/10 pain, improved to 5/10 with oxycodone 5 mg x 2 in ED. (28 Dec 2020 11:16)   : Mri T/Ls: IMPRESSION: Degenerative changes  Abnormal signal with associated enhancement is seen involving the endplates adjacent to the L2-3 disc space. There is slight increased T2 prolongation involving the L2-3 disc space with associated widening. While these findings could be compatible with degenerative changes possibly of early discitis and osteomyelitis must be considered.  Patient s/p L1-L5 laminectomy, T10-L5 posterior surgical fusion by Dr. Campos on 21  HMV x 3 drains. Post op with constipation needing aggressive bowel regimen.     PT/OT and PM&R evaluated patient and recommended Rehab.     Patient medically cleared and admitted to  Rehab on 21 (2021 15:21)      PAST MEDICAL & SURGICAL HISTORY:  HTN (hypertension)    DVT (deep venous thrombosis)    Hypothyroidism    RA (rheumatoid arthritis)    Obesity, Class II, BMI 35-39.9, no comorbidity    Hypothyroid    Benign heart murmur    Hyperlipidemia    Hypertension    H/O degenerative disc disease    Osteoarthritis    SLE (systemic lupus erythematosus)    Rheumatoid arthritis    S/P knee replacement    Status post total hip replacement, right    S/P thyroid surgery  1 lobe removed    Lung cancer  small tumor removed     S/P knee surgery  bilateral    S/P carpal tunnel release  left    S/P eye surgery  child    S/P cataract surgery  left    S/P tonsillectomy        MEDICATIONS  (STANDING):  ascorbic acid 500 milliGRAM(s) Oral daily  atorvastatin 20 milliGRAM(s) Oral at bedtime  benzocaine 15 mG/menthol 3.6 mG (Sugar-Free) Lozenge 1 Lozenge Oral four times a day  enoxaparin Injectable 40 milliGRAM(s) SubCutaneous two times a day  folic acid 1 milliGRAM(s) Oral daily  hydroxychloroquine 200 milliGRAM(s) Oral <User Schedule>  levothyroxine 200 MICROGram(s) Oral daily  lidocaine   Patch 1 Patch Transdermal daily  oxyCODONE  ER Tablet 10 milliGRAM(s) Oral every 12 hours  polyethylene glycol 3350 17 Gram(s) Oral daily  senna 2 Tablet(s) Oral at bedtime    MEDICATIONS  (PRN):  acetaminophen   Tablet .. 650 milliGRAM(s) Oral every 6 hours PRN Temp greater or equal to 38C (100.4F), Mild Pain (1 - 3)  aluminum hydroxide/magnesium hydroxide/simethicone Suspension 30 milliLiter(s) Oral every 4 hours PRN Dyspepsia  bisacodyl 5 milliGRAM(s) Oral every 12 hours PRN Constipation  magnesium hydroxide Suspension 30 milliLiter(s) Oral daily PRN Constipation  melatonin 3 milliGRAM(s) Oral at bedtime PRN Insomnia  ondansetron   Disintegrating Tablet 4 milliGRAM(s) Oral every 6 hours PRN Nausea and/or Vomiting  oxyCODONE    IR 5 milliGRAM(s) Oral every 6 hours PRN Severe Pain (7 - 10)  simethicone 80 milliGRAM(s) Chew every 6 hours PRN Upset Stomach      Allergies    No Known Allergies    Intolerances          VITALS  75y  Vital Signs Last 24 Hrs  T(C): 36.6 (2021 07:30), Max: 36.6 (2021 03:30)  T(F): 97.8 (2021 07:30), Max: 97.8 (2021 03:30)  HR: 56 (2021 07:30) (56 - 99)  BP: 121/60 (2021 07:30) (108/55 - 121/60)  BP(mean): --  RR: 18 (2021 07:30) (18 - 18)  SpO2: 98% (2021 07:30) (92% - 98%)  Daily     Daily         RECENT LABS:                          11.5   7.06  )-----------( 211      ( 2021 06:15 )             35.7               Urinalysis Basic - ( 15 Feb 2021 17:30 )    Color: Yellow / Appearance: Slightly Turbid / S.015 / pH: x  Gluc: x / Ketone: Small  / Bili: Negative / Urobili: 1   Blood: x / Protein: 30 mg/dL / Nitrite: Positive   Leuk Esterase: Small / RBC: x / WBC 6-10 /HPF   Sq Epi: x / Non Sq Epi: Neg.-Few / Bacteria: TNTC /HPF          CAPILLARY BLOOD GLUCOSE      Review of Systems:   · Additional ROS	Patient still complains of discomfort left UQ, mild nausea but improving. Also still constipated, last BM this weekend; nervous about laxatives because had copious, explosive BM after multiple bowel meds this weekend    Upset because there was a mixup in her shower schedule and delay in receiving pain medications this morning prior to therapies. Addressed with staff, will monitor    Physical Exam:   · Constitutional	detailed exam  · Constitutional Comments	alert, O x 3 mood fair  · Respiratory	detailed exam  · Respiratory Details	breath sounds equal; good air movement; respirations non-labored  · Respiratory Comments	O2 sat 92-98% RA  · Cardiovascular	detailed exam  · Cardiovascular Details	regular rate and rhythm  · Gastrointestinal	detailed exam  · GI Normal	soft; nontender; no rebound tenderness; no guarding; no rigidity  +BS  · Extremities	detailed exam  · Extremities Comments	bilateral calves chronic vascular changes, no erythema or warmth

## 2021-02-17 NOTE — PROGRESS NOTE ADULT - ASSESSMENT
KAI THOMAS is a 75y F PMH HTN, HLD, SLE, RA, hypothyroidism, provoked R. 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 Jaja Campos. Staples removed   - continue Comprehensive Rehab Program of PT/OT- 3 hrs/day 5 days/week  - psychological support, counseling  - Precautions:  spinal, RA, cardiac, contact and droplet precautions    #COVID19 infection  -PCR + 1/29/21  - Ct A/P 2/5: 1. Pattern of lung base groundglass opacification   -CTA 2/9:  No pulmonary embolism to the segmental branches. Mild bilateral patchy airspace disease compatible with COVID-19 pneumonia.  - ID note appreciated.  Completed 5 day course remdesivir, off decadron   -continue contact and droplet precautions    #nausea/vomiting  improved 2/16  - abdominal CT -2/5: Colonic diverticulosis without CT evidence of acute diverticulitis  -abd xray (-) 2/14  - possible 2/2 macrobid use. ID note appreciate, ID appreciated, off antibiotics/decadron   -continue florastor. Maalox added 2/16  -   TBili  0.7  /  DBili  x   /  AST  22  /  ALT  12  /  AlkPhos  100  02-15  - WBC 7.06 12/16    #urinary frequency  - Urine Cx  - >100k Ecoli   - s/p macrobid 100 bid x 3 days    # Chest soreness  - CTA 2/9 neg for PE  -proper posture and reducing UE use in standing activities. Improved posture with use of platform walker in therapy 2/10    #SLE/RA  -continue plaquenil 200mg po daily  -(previously on 15mg ( 2.5mg ) weekly).  Discussed with rheumatologist, defer for 3 weeks post Covid     #h/o Provoked DVT in 2019  - US July 2019:  DVT noted in the right popliteal vein and visualized segment of the right  posterior tibial vein.  - was on eliquis 5mg po bid at home,   - currently on lovenox for dvt ppx, NSGY clearance to restart eliquis pending  - Follows with vascular cardiologist Dr. Hoyos outpatient    #HTN:  - BP stable off meds   -(117/65 - 134/76) 2/16    #HLD:  - continue lipitor 20mg po daily    #hypothyroidism:  - continue levothyroxine 200mcg po daily    # sleep  - melatonin 3mg PRN    #Pain Mgmt :   - Tylenol PRN mild pain  - Oxycodone 5mg IR PRN severe pain , weaned again 2/16  - Oxycodone 10mg IR prn severe 8-10/10 nayeli- Oxycontin ER BID (renewed 2/4)    #GI/Bowel Mgmt/ constipation:   -continue Senna, miralax daily, MOM prn  -simethicone PRN gas  - zofran, maalox for Nausea, dyspepsia PRN  -prune juice with meal trays    #FEN :  - Diet - Regular     # DVT prophylaxis :  - Lovenox BID; f/u re: eliquis clearance from NSGY  -LE edema:  ACE wraps LE     Case discussed in detail with daughter Adeline 2/16    #LABS:  f/u NSGY re: clearance to restart eliquis  CBC BMp 2/18        KAI THOMAS is a 75y F PMH HTN, HLD, SLE, RA, hypothyroidism, provoked R. 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 Jaja Campos. Staples removed   - continue Comprehensive Rehab Program of PT/OT- 3 hrs/day 5 days/week  - Precautions:  spinal, RA, cardiac, contact and droplet precautions    #COVID19 infection  -PCR + 1/29/21  - Ct A/P 2/5: 1. Pattern of lung base groundglass opacification   -CTA 2/9:  No pulmonary embolism to the segmental branches. Mild bilateral patchy airspace disease compatible with COVID-19 pneumonia.  - ID note appreciated.  Completed 5 day course remdesivir, off decadron     #nausea/vomiting; improving  - abdominal CT -2/5: Colonic diverticulosis without CT evidence of acute diverticulitis  - AXR (-) 2/14  - possible 2/2 macrobid use. ID note appreciate, ID appreciated, off antibiotics/decadron  - continue florastor. Maalox added 2/16  -  TBili  0.7  /  DBili  x   /  AST  22  /  ALT  12  /  AlkPhos  100  02-15; WBC 7.06 12/16  - CBC CMP 2/18    #urinary frequency  - Urine Cx  - >100k Ecoli   - s/p macrobid 100 bid x 3 days  - repeat UA with bacteruria +nitrites. ?colonization. ID and hospitalist appreciate, no Abx for now, monitor    # Chest soreness: resolved  - CTA 2/9 neg for PE    #SLE/RA  -continue plaquenil 200mg po daily    #h/o Provoked DVT in 2019  - US July 2019:  DVT noted in the right popliteal vein and visualized segment of the right  posterior tibial vein.  - currently on lovenox for dvt ppx, NSGY clearance to restart eliquis pending (was on 5 bid at home). Emailed this weekend, will call again  - Follows with vascular cardiologist Dr. Hoyos outpatient    #HTN:  - BP stable off meds     #HLD:  - continue lipitor 20mg po daily    #hypothyroidism:  - continue levothyroxine 200mcg po daily    # sleep  - melatonin 3mg PRN    #Pain Mgmt :   - Tylenol PRN mild pain  - Oxycodone 5mg IR PRN severe pain , weaned again 2/16  -  Oxycontin ER BID (renewed 2/4)    #GI/Bowel Mgmt/ constipation:   -continue Senna, miralax daily, MOM prn  - dulcolax suppository if no BM with MOM  -simethicone PRN gas  - zofran, maalox for Nausea, dyspepsia PRN  -prune juice with meal trays    #FEN :  - Diet - Regular     # DVT prophylaxis :  - Lovenox BID; f/u re: eliquis  -CELESTINA edema:  ACE wraps LE     #Case discussed in IDT rounds 2/17:  -ambulates 75 feet with RW and CG, fluctuating to 50 feet CG, CG transfers, mod assist LB dressing set up UB dressing, min assist hygiene a  -goals: min assist LB dressing, min assist toileting, supervision transfers, CG /CS ambulation  -SHAZIA when bed available/authorization obtained    Case discussed in detail with daughter Adeline 2/16    #LABS:  NSGY re: clearance to restart eliquis  CBC [facility] 2/18

## 2021-02-17 NOTE — PROGRESS NOTE ADULT - SUBJECTIVE AND OBJECTIVE BOX
Patient is a 75y old  Female who presents with a chief complaint of Back pain (2021 07:45)      Patient seen and examined at bedside. No overnight events.    ALLERGIES:  No Known Allergies    MEDICATIONS  (STANDING):  ascorbic acid 500 milliGRAM(s) Oral daily  atorvastatin 20 milliGRAM(s) Oral at bedtime  benzocaine 15 mG/menthol 3.6 mG (Sugar-Free) Lozenge 1 Lozenge Oral four times a day  enoxaparin Injectable 40 milliGRAM(s) SubCutaneous two times a day  folic acid 1 milliGRAM(s) Oral daily  hydroxychloroquine 200 milliGRAM(s) Oral <User Schedule>  levothyroxine 200 MICROGram(s) Oral daily  lidocaine   Patch 1 Patch Transdermal daily  oxyCODONE  ER Tablet 10 milliGRAM(s) Oral every 12 hours  polyethylene glycol 3350 17 Gram(s) Oral daily  senna 2 Tablet(s) Oral at bedtime    MEDICATIONS  (PRN):  acetaminophen   Tablet .. 650 milliGRAM(s) Oral every 6 hours PRN Temp greater or equal to 38C (100.4F), Mild Pain (1 - 3)  aluminum hydroxide/magnesium hydroxide/simethicone Suspension 30 milliLiter(s) Oral every 4 hours PRN Dyspepsia  bisacodyl 5 milliGRAM(s) Oral every 12 hours PRN Constipation  magnesium hydroxide Suspension 30 milliLiter(s) Oral daily PRN Constipation  melatonin 3 milliGRAM(s) Oral at bedtime PRN Insomnia  ondansetron   Disintegrating Tablet 4 milliGRAM(s) Oral every 6 hours PRN Nausea and/or Vomiting  oxyCODONE    IR 5 milliGRAM(s) Oral every 6 hours PRN Severe Pain (7 - 10)  simethicone 80 milliGRAM(s) Chew every 6 hours PRN Upset Stomach    Vital Signs Last 24 Hrs  T(F): 97.8 (2021 07:30), Max: 97.8 (2021 03:30)  HR: 56 (2021 07:30) (56 - 99)  BP: 121/60 (2021 07:30) (108/55 - 121/60)  RR: 18 (2021 07:30) (18 - 18)  SpO2: 98% (2021 07:30) (92% - 98%)  I&O's Summary      PHYSICAL EXAM:  General: NAD, A/O x 3  ENT: MMM, no scleral icterus  Neck: Supple, No JVD  Lungs: Clear to auscultation bilaterally, no wheezes, rales, rhonchi  Cardio: RRR, S1/S2, No murmurs  Abdomen: Soft, Nontender, Nondistended; Bowel sounds present  Extremities: No calf tenderness, No pitting edema      LABS:                        11.5   7.06  )-----------( 211      ( 2021 06:15 )             35.7       02-15    138  |  103  |  17  ----------------------------<  58  3.6   |  24  |  0.59    Ca    8.4      15 Feb 2021 07:51    TPro  5.5  /  Alb  2.1  /  TBili  0.7  /  DBili  x   /  AST  22  /  ALT  12  /  AlkPhos  100  02-15     eGFR if Non African American: 89 mL/min/1.73M2 (02-15-21 @ 07:51)  eGFR if : 103 mL/min/1.73M2 (02-15-21 @ 07:51)           Urinalysis Basic - ( 15 Feb 2021 17:30 )    Color: Yellow / Appearance: Slightly Turbid / S.015 / pH: x  Gluc: x / Ketone: Small  / Bili: Negative / Urobili: 1   Blood: x / Protein: 30 mg/dL / Nitrite: Positive   Leuk Esterase: Small / RBC: x / WBC 6-10 /HPF   Sq Epi: x / Non Sq Epi: Neg.-Few / Bacteria: TNTC /HPF        Urine Culture - 48 Hour Hold (collected 10 Feb 2021 16:00)  Source: .Urine  Final Report (2021 19:46):    >100,000 CFU/ml Escherichia coli  Organism: Escherichia coli (2021 19:46)  Organism: Escherichia coli (2021 19:46)      -  Amikacin: S <=16      -  Amoxicillin/Clavulanic Acid: S <=8/4      -  Ampicillin: R >16 These ampicillin results predict results for amoxicillin      -  Ampicillin/Sulbactam: I 16 Enterobacter, Citrobacter, and Serratia may develop resistance during prolonged therapy (3-4 days)      -  Aztreonam: S <=4      -  Cefazolin: S <=2 ([id number]) For uncomplicated UTI with K. pneumoniae, E. coli, or P. mirablis: RUBY <=16 is sensitive and RUBY >=32 is resistant. This also predicts results for oral agents cefaclor, cefdinir, cefpodoxime, cefprozil, cefuroxime axetil, cephalexin and locarbef for uncomplicated UTI. Note that some isolates may be susceptible to these agents while testing resistant to cefazolin.      -  Cefepime: S <=2      -  Cefoxitin: S <=8      -  Ceftriaxone: S <=1 Enterobacter, Citrobacter, and Serratia may develop resistance during prolonged therapy      -  Ciprofloxacin: R >2      -  Ertapenem: S <=0.5      -  Gentamicin: S <=2      -  Imipenem: S <=1      -  Levofloxacin: R >4      -  Meropenem: S <=1      -  Nitrofurantoin: S <=32 Should not be used to treat pyelonephritis      -  Piperacillin/Tazobactam: S <=8      -  Tigecycline: S <=2      -  Tobramycin: S <=2      -  Trimethoprim/Sulfamethoxazole: S       Method Type: RUBY        RADIOLOGY & ADDITIONAL TESTS:    Care Discussed with Consultants/Other Providers: Dr. Brower (physiatry)

## 2021-02-17 NOTE — PROGRESS NOTE ADULT - ASSESSMENT
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 Hospital Course complicated by brief SICU stay for mechanical ventilation management, successfully extubated. Patient now admitted to [facility] for a multidisciplinary rehab program on 1/12/21.    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 Jaja Campos  -Continue comprehensive rehab program  -Pain management, bowel regimen per rehab    #Viral PNA 2/2 to COVID 19  #Neutropenia - resolved  -Spo2 appropriate on RA  -Completed Remdesivir and Decadron  -Monitor O2 with continuos pulse ox; monitor respiratory status closely  -Maintain strict isolation precautions, use proper PPE.  -May use Albuterol inhaler PRN  -ID consulted, recommendations appreciated    #hx of UTI  -Repeat UCx >100K E coli, patient denies urinary symptoms. ?Colonization. Afebrile, no leukocytosis.   -ID consulted, recommendations appreciated    #Acute blood loss Anemia, likely from surgery - resolved    #SLE/RA  -Chronic, continue Plaquenil 200mg po daily and folic acid    #H/o provoked right popliteal, right posterior tibial DVT (7/2019 in setting of hip surgery, already completed >6 months of treatment)  -Follows with cardiologist Dr. Hoyos outpatient  -Treated with Eliquis 5mg po bid chronically -- AC stopped at [facility] as 12/29 LE dopplers neg for DVTs  -Primary team to confirm clearance regarding restarting Eliquis  -DVT ppx with Lovenox 40mg BID for now    #HTN  -Continue to hold home medication Losartan  -Monitor vitals - stable, appropriate     #HLD  -Chronic, continue Lipitor    #Hypothyroidism  -Chronic, continue Levothyroxine    #Prophylactic Measure  DVT prophylaxis: Lovenox BID

## 2021-02-18 PROCEDURE — 99232 SBSQ HOSP IP/OBS MODERATE 35: CPT | Mod: CS

## 2021-02-18 RX ADMIN — OXYCODONE HYDROCHLORIDE 5 MILLIGRAM(S): 5 TABLET ORAL at 09:09

## 2021-02-18 RX ADMIN — Medication 200 MICROGRAM(S): at 05:14

## 2021-02-18 RX ADMIN — Medication 500 MILLIGRAM(S): at 12:33

## 2021-02-18 RX ADMIN — ENOXAPARIN SODIUM 40 MILLIGRAM(S): 100 INJECTION SUBCUTANEOUS at 16:46

## 2021-02-18 RX ADMIN — Medication 650 MILLIGRAM(S): at 23:19

## 2021-02-18 RX ADMIN — OXYCODONE HYDROCHLORIDE 5 MILLIGRAM(S): 5 TABLET ORAL at 19:06

## 2021-02-18 RX ADMIN — BENZOCAINE AND MENTHOL 1 LOZENGE: 5; 1 LIQUID ORAL at 05:15

## 2021-02-18 RX ADMIN — Medication 1 MILLIGRAM(S): at 12:33

## 2021-02-18 RX ADMIN — MAGNESIUM HYDROXIDE 30 MILLILITER(S): 400 TABLET, CHEWABLE ORAL at 18:35

## 2021-02-18 RX ADMIN — SENNA PLUS 2 TABLET(S): 8.6 TABLET ORAL at 21:19

## 2021-02-18 RX ADMIN — ENOXAPARIN SODIUM 40 MILLIGRAM(S): 100 INJECTION SUBCUTANEOUS at 05:15

## 2021-02-18 RX ADMIN — ATORVASTATIN CALCIUM 20 MILLIGRAM(S): 80 TABLET, FILM COATED ORAL at 21:19

## 2021-02-18 RX ADMIN — Medication 200 MILLIGRAM(S): at 09:09

## 2021-02-18 RX ADMIN — POLYETHYLENE GLYCOL 3350 17 GRAM(S): 17 POWDER, FOR SOLUTION ORAL at 15:42

## 2021-02-18 NOTE — PROGRESS NOTE ADULT - ASSESSMENT
KAI THOMAS is a 75y F PMH HTN, HLD, SLE, RA, hypothyroidism, provoked R. 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 Jaja Campos. Staples removed   - continue Comprehensive Rehab Program of PT/OT- 3 hrs/day 5 days/week  - Precautions:  spinal, RA, cardiac, contact and droplet precautions    #COVID19 infection  -PCR + 1/29/21  - Ct A/P 2/5: 1. Pattern of lung base groundglass opacification   -CTA 2/9:  No pulmonary embolism to the segmental branches. Mild bilateral patchy airspace disease compatible with COVID-19 pneumonia.  - ID note appreciated.  Completed 5 day course remdesivir, off decadron     #nausea/vomiting; improving  - abdominal CT -2/5: Colonic diverticulosis without CT evidence of acute diverticulitis  - AXR (-) 2/14  - possible 2/2 macrobid use. ID note appreciate, ID appreciated, off antibiotics/decadron  - continue florastor. Maalox added 2/16  -  TBili  0.7  /  DBili  x   /  AST  22  /  ALT  12  /  AlkPhos  100  02-15; WBC 7.06 12/16  - CBC CMP 2/18 - pending    # Constipation  - bowel regimen: Senna / Miralax daily  - educated to limit use of narcotic medications if possible  - suppository today     #urinary frequency  - Urine Cx  - >100k Ecoli   - s/p macrobid 100 bid x 3 days  - repeat UA with bacteruria +nitrites. ?colonization. ID and hospitalist appreciate, no Abx for now, monitor  - possibly due to constipation --> follow up after BM    # Chest soreness: improving  - CTA 2/9 neg for PE  - encourage IS    #SLE/RA  -continue plaquenil 200mg po daily    #h/o Provoked DVT in 2019  - US July 2019:  DVT noted in the right popliteal vein and visualized segment of the right  posterior tibial vein.  - currently on lovenox for dvt ppx, NSGY clearance to restart eliquis pending (was on 5 bid at home). Emailed this weekend, will call again  - Follows with vascular cardiologist Dr. Hoyos outpatient    #HTN:  - BP stable off meds     #HLD:  - continue lipitor 20mg po daily    #hypothyroidism:  - continue levothyroxine 200mcg po daily    # sleep  - melatonin 3mg PRN    #Pain Mgmt :   - Tylenol PRN mild pain  - Oxycodone 5mg IR PRN severe pain , weaned again 2/16  -  Oxycontin ER BID (renewed 2/4)    #GI/Bowel Mgmt/ constipation:   -continue Senna, miralax daily, MOM prn  - dulcolax suppository if no BM with MOM  -simethicone PRN gas  - zofran, maalox for Nausea, dyspepsia PRN  -prune juice with meal trays    #FEN :  - Diet - Regular     # DVT prophylaxis :  - Lovenox BID; f/u re: eliquis  -LE edema:  ACE wraps LE     #Case discussed in IDT rounds 2/17:  -ambulates 75 feet with RW and CG, fluctuating to 50 feet CG, CG transfers, mod assist LB dressing set up UB dressing, min assist hygiene a  -goals: min assist LB dressing, min assist toileting, supervision transfers, CG /CS ambulation  -SHAZIA when bed available/authorization obtained    Case discussed in detail with daughter Adeline 2/16    #LABS:  NSGY re: clearance to restart eliquis  CBC BMp 2/18 - pending  Bowel movement        KAI THOMAS is a 75y F PMH HTN, HLD, SLE, RA, hypothyroidism, provoked R. 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 Jaja Campos. Staples removed   - continue Comprehensive Rehab Program of PT/OT- 3 hrs/day 5 days/week  - Precautions:  spinal, RA, cardiac, contact and droplet precautions    #COVID19 infection  -PCR + 1/29/21  - Ct A/P 2/5: 1. Pattern of lung base groundglass opacification   -CTA 2/9:  No pulmonary embolism to the segmental branches. Mild bilateral patchy airspace disease compatible with COVID-19 pneumonia.  - ID note appreciated.  Completed 5 day course remdesivir, off decadron   - deep breathing exercises (refuses IS), increased sitting to address atelectasis discussed    #nausea/vomiting; improving  - abdominal CT -2/5: Colonic diverticulosis without CT evidence of acute diverticulitis  - AXR (-) 2/14  - possible 2/2 macrobid use. ID note appreciate, ID appreciated, off antibiotics/decadron  - continue florastor. Maalox added 2/16  -  TBili  0.7  /  DBili  x   /  AST  22  /  ALT  12  /  AlkPhos  100  02-15; WBC 7.06 12/16  - CBC CMP reordered for 2/19    # Constipation: no BM since weekend  - bowel regimen: Senna / Miralax daily  - educated to limit use of narcotic medications if possible  - suppository today 2/18 after therapies    #urinary frequency  - Urine Cx  - >100k Ecoli   - s/p macrobid 100 bid x 3 days  - repeat UA with bacteruria +nitrites. ?colonization. ID and hospitalist appreciate, no Abx for now, monitor  - possibly due to constipation --> follow up after BM    # Chest soreness: improving  - CTA 2/9 neg for PE  - encourage IS    #SLE/RA  -continue plaquenil 200mg po daily    #h/o Provoked DVT in 2019  - US July 2019:  DVT noted in the right popliteal vein and visualized segment of the right  posterior tibial vein.  - currently on lovenox for dvt ppx, NSGY clearance to restart eliquis pending (was on 5 bid at home). Emailed this weekend, will call again  - Follows with vascular cardiologist Dr. Hoyos outpatient    #HTN:  - BP stable off meds     #HLD:  - continue lipitor 20mg po daily    #hypothyroidism:  - continue levothyroxine 200mcg po daily    # sleep  - melatonin 3mg PRN    #Pain Mgmt :   - Tylenol PRN mild pain  - Oxycodone 5mg IR PRN severe pain , weaned again 2/16  -  Oxycontin ER BID (renewed 2/4)    #GI/Bowel Mgmt/ constipation:   -continue Senna, miralax daily, MOM prn  - dulcolax suppository if no BM with MOM  -simethicone PRN gas  - zofran, maalox for Nausea, dyspepsia PRN  -prune juice with meal trays    #FEN :  - Diet - Regular     # DVT prophylaxis :  - Lovenox BID  -LE edema:  ACE wraps LE     #Case discussed in IDT rounds 2/17:  -ambulates 75 feet with RW and CG, fluctuating to 50 feet CG, CG transfers, mod assist LB dressing set up UB dressing, min assist hygiene a  -goals: min assist LB dressing, min assist toileting, supervision transfers, CG /CS ambulation  -SHAZIA when bed available/authorization obtained    Case discussed in detail with daughter Adeline 2/16    #LABS:  NSGY re: clearance to restart eliquis. Continue lovenox for now pending recs  CBC BMp 2/19  Bowel movement

## 2021-02-18 NOTE — PROGRESS NOTE ADULT - SUBJECTIVE AND OBJECTIVE BOX
Patient is a 75y old  Female who presents with a chief complaint of Back pain (2021 12:45)      Patient seen and examined at bedside. No overnight events. Admits to constipation. Participating in therapy.    ALLERGIES:  No Known Allergies    MEDICATIONS  (STANDING):  ascorbic acid 500 milliGRAM(s) Oral daily  atorvastatin 20 milliGRAM(s) Oral at bedtime  benzocaine 15 mG/menthol 3.6 mG (Sugar-Free) Lozenge 1 Lozenge Oral four times a day  enoxaparin Injectable 40 milliGRAM(s) SubCutaneous two times a day  folic acid 1 milliGRAM(s) Oral daily  hydroxychloroquine 200 milliGRAM(s) Oral <User Schedule>  levothyroxine 200 MICROGram(s) Oral daily  lidocaine   Patch 1 Patch Transdermal daily  oxyCODONE  ER Tablet 10 milliGRAM(s) Oral every 12 hours  polyethylene glycol 3350 17 Gram(s) Oral daily  senna 2 Tablet(s) Oral at bedtime    MEDICATIONS  (PRN):  acetaminophen   Tablet .. 650 milliGRAM(s) Oral every 6 hours PRN Temp greater or equal to 38C (100.4F), Mild Pain (1 - 3)  aluminum hydroxide/magnesium hydroxide/simethicone Suspension 30 milliLiter(s) Oral every 4 hours PRN Dyspepsia  bisacodyl Suppository 10 milliGRAM(s) Rectal daily PRN Constipation  magnesium hydroxide Suspension 30 milliLiter(s) Oral daily PRN Constipation  melatonin 3 milliGRAM(s) Oral at bedtime PRN Insomnia  ondansetron   Disintegrating Tablet 4 milliGRAM(s) Oral every 6 hours PRN Nausea and/or Vomiting  oxyCODONE    IR 5 milliGRAM(s) Oral every 6 hours PRN Severe Pain (7 - 10)  simethicone 80 milliGRAM(s) Chew every 6 hours PRN Upset Stomach    Vital Signs Last 24 Hrs  T(F): 98.2 (2021 08:38), Max: 98.2 (2021 08:38)  HR: 74 (2021 08:38) (74 - 77)  BP: 121/61 (2021 08:38) (121/61 - 130/82)  RR: 16 (2021 08:38) (16 - 18)  SpO2: 95% (2021 08:38) (95% - 100%)  I&O's Summary        PHYSICAL EXAM:  General: NAD, A/O x 3  ENT: MMM, no scleral icterus  Neck: Supple, No JVD  Lungs: Clear to auscultation bilaterally, no wheezes, rales, rhonchi  Cardio: RRR, S1/S2, No murmurs  Abdomen: Soft, Nontender, Nondistended; Bowel sounds present  Extremities: No calf tenderness, No pitting edema; legs wrapped in ace wraps    LABS:                        11.5   7.06  )-----------( 211      ( 2021 06:15 )             35.7                    Urinalysis Basic - ( 15 Feb 2021 17:30 )    Color: Yellow / Appearance: Slightly Turbid / S.015 / pH: x  Gluc: x / Ketone: Small  / Bili: Negative / Urobili: 1   Blood: x / Protein: 30 mg/dL / Nitrite: Positive   Leuk Esterase: Small / RBC: x / WBC 6-10 /HPF   Sq Epi: x / Non Sq Epi: Neg.-Few / Bacteria: TNTC /HPF          RADIOLOGY & ADDITIONAL TESTS:    Care Discussed with Consultants/Other Providers: Dr. Brower (physiatry)

## 2021-02-18 NOTE — PROGRESS NOTE ADULT - SUBJECTIVE AND OBJECTIVE BOX
-----------------------------------------------------------------------  Patient is a 75y old  Female who presents with a chief complaint of Back pain (18 Feb 2021 10:30)      HPI:  75F with past medical history of HTN, HLD, SLE, RA, hypothyroidism, provoked R. popliteal DVT in 2019 on eliquis, chronic low back pain presents to ED for acute on chronic low back pain. Pt has several months of chronic lumbosacral back pain, evaluated by outpatient ortho and referred for physical therapy and pain management. She has been receiving ?vitamin spinal injections. She last received low back inj on 12/16 after which she her low back pain worsened. She took oxycodone 5 mg qd (prescribed in september during a ED visit) and acetaminophen for past three days without relief. She also endorses worsening pain during ambulation with walker and has affected her quality of life. She has constipation, last BM 4 days ago. Otherwise denies fever, chills, N/V, abdominal pain, dysuria, urinary retention, fecal incontinence, saddle anesthesia, fall, LOC, trauma. During ROS she endorsed LLE weakness for several months, no numbness or tingling and has LLE edema >RLE edema, reported undergoing LLE US 4 months ago, was negative for DVT. Currently has 8/10 pain, improved to 5/10 with oxycodone 5 mg x 2 in ED. (28 Dec 2020 11:16)   12/28: Mri T/Ls: IMPRESSION: Degenerative changes  Abnormal signal with associated enhancement is seen involving the endplates adjacent to the L2-3 disc space. There is slight increased T2 prolongation involving the L2-3 disc space with associated widening. While these findings could be compatible with degenerative changes possibly of early discitis and osteomyelitis must be considered.  Patient s/p L1-L5 laminectomy, T10-L5 posterior surgical fusion by Dr. Campos on 1/1/21  HMV x 3 drains. Post op with constipation needing aggressive bowel regimen.     PT/OT and PM&R evaluated patient and recommended Rehab.     Patient medically cleared and admitted to  Rehab on 1/12/21 (12 Jan 2021 15:21)      PAST MEDICAL & SURGICAL HISTORY:  HTN (hypertension)    DVT (deep venous thrombosis)    Hypothyroidism    RA (rheumatoid arthritis)    Obesity, Class II, BMI 35-39.9, no comorbidity    Hypothyroid    Benign heart murmur    Hyperlipidemia    Hypertension    H/O degenerative disc disease    Osteoarthritis    SLE (systemic lupus erythematosus)    Rheumatoid arthritis    S/P knee replacement    Status post total hip replacement, right    S/P thyroid surgery  1 lobe removed    Lung cancer  small tumor removed 2015    S/P knee surgery  bilateral    S/P carpal tunnel release  left    S/P eye surgery  child    S/P cataract surgery  left    S/P tonsillectomy        Allergies    No Known Allergies    Intolerances          -----------------------------------------------------------------------  VITALS  75y  Vital Signs Last 24 Hrs  T(C): 36.8 (18 Feb 2021 08:38), Max: 36.8 (18 Feb 2021 08:38)  T(F): 98.2 (18 Feb 2021 08:38), Max: 98.2 (18 Feb 2021 08:38)  HR: 74 (18 Feb 2021 08:38) (74 - 77)  BP: 121/61 (18 Feb 2021 08:38) (121/61 - 130/82)  BP(mean): --  RR: 16 (18 Feb 2021 08:38) (16 - 18)  SpO2: 95% (18 Feb 2021 08:38) (95% - 100%)  Daily     Daily     -----------------------------------------------------------------------  RECENT LABS:      Pending              CAPILLARY BLOOD GLUCOSE          IMAGING: none in last 24hr  -----------------------------------------------------------------------  MEDICATIONS  (STANDING):  ascorbic acid 500 milliGRAM(s) Oral daily  atorvastatin 20 milliGRAM(s) Oral at bedtime  benzocaine 15 mG/menthol 3.6 mG (Sugar-Free) Lozenge 1 Lozenge Oral four times a day  enoxaparin Injectable 40 milliGRAM(s) SubCutaneous two times a day  folic acid 1 milliGRAM(s) Oral daily  hydroxychloroquine 200 milliGRAM(s) Oral <User Schedule>  levothyroxine 200 MICROGram(s) Oral daily  lidocaine   Patch 1 Patch Transdermal daily  oxyCODONE  ER Tablet 10 milliGRAM(s) Oral every 12 hours  polyethylene glycol 3350 17 Gram(s) Oral daily  senna 2 Tablet(s) Oral at bedtime    MEDICATIONS  (PRN):  acetaminophen   Tablet .. 650 milliGRAM(s) Oral every 6 hours PRN Temp greater or equal to 38C (100.4F), Mild Pain (1 - 3)  aluminum hydroxide/magnesium hydroxide/simethicone Suspension 30 milliLiter(s) Oral every 4 hours PRN Dyspepsia  bisacodyl Suppository 10 milliGRAM(s) Rectal daily PRN Constipation  magnesium hydroxide Suspension 30 milliLiter(s) Oral daily PRN Constipation  melatonin 3 milliGRAM(s) Oral at bedtime PRN Insomnia  ondansetron   Disintegrating Tablet 4 milliGRAM(s) Oral every 6 hours PRN Nausea and/or Vomiting  oxyCODONE    IR 5 milliGRAM(s) Oral every 6 hours PRN Severe Pain (7 - 10)  simethicone 80 milliGRAM(s) Chew every 6 hours PRN Upset Stomach    -----------------------------------------------------------------------      Review of Systems:   · Additional ROS	  Patient seen and evaluated this AM at bedside. No acute events overnight. Patient continues to endorse constipation. No BM since the weekend despite PO meds. Patient agreeable to suppository this afternoon after therapies. No nausea/ vomiting or abdominal pain.     Still with slight left breast/ chest wall discomfort. Educated patient about the importance of taking deep breaths given atelectasis noted on CT in that area of pain.      Physical Exam:   · Constitutional	detailed exam  · Constitutional Comments	alert, O x 3 mood fair  · Respiratory	detailed exam  · Respiratory Details	breath sounds equal; good air movement; respirations non-labored  · Respiratory Comments	O2 sat 92-98% RA  · Cardiovascular	detailed exam  · Cardiovascular Details	regular rate and rhythm  · Gastrointestinal	detailed exam  · GI Normal	soft; nontender; no rebound tenderness; no guarding; no rigidity  +BS  · Extremities	detailed exam  · Extremities Comments	bilateral calves chronic vascular changes, no erythema or warmth            -----------------------------------------------------------------------  Patient is a 75y old  Female who presents with a chief complaint of Back pain (18 Feb 2021 10:30)      HPI:  75F with past medical history of HTN, HLD, SLE, RA, hypothyroidism, provoked R. popliteal DVT in 2019 on eliquis, chronic low back pain presents to ED for acute on chronic low back pain. Pt has several months of chronic lumbosacral back pain, evaluated by outpatient ortho and referred for physical therapy and pain management. She has been receiving ?vitamin spinal injections. She last received low back inj on 12/16 after which she her low back pain worsened. She took oxycodone 5 mg qd (prescribed in september during a ED visit) and acetaminophen for past three days without relief. She also endorses worsening pain during ambulation with walker and has affected her quality of life. She has constipation, last BM 4 days ago. Otherwise denies fever, chills, N/V, abdominal pain, dysuria, urinary retention, fecal incontinence, saddle anesthesia, fall, LOC, trauma. During ROS she endorsed LLE weakness for several months, no numbness or tingling and has LLE edema >RLE edema, reported undergoing LLE US 4 months ago, was negative for DVT. Currently has 8/10 pain, improved to 5/10 with oxycodone 5 mg x 2 in ED. (28 Dec 2020 11:16)   12/28: Mri T/Ls: IMPRESSION: Degenerative changes  Abnormal signal with associated enhancement is seen involving the endplates adjacent to the L2-3 disc space. There is slight increased T2 prolongation involving the L2-3 disc space with associated widening. While these findings could be compatible with degenerative changes possibly of early discitis and osteomyelitis must be considered.  Patient s/p L1-L5 laminectomy, T10-L5 posterior surgical fusion by Dr. Campos on 1/1/21  HMV x 3 drains. Post op with constipation needing aggressive bowel regimen.     PT/OT and PM&R evaluated patient and recommended Rehab.     Patient medically cleared and admitted to  Rehab on 1/12/21 (12 Jan 2021 15:21)      PAST MEDICAL & SURGICAL HISTORY:  HTN (hypertension)    DVT (deep venous thrombosis)    Hypothyroidism    RA (rheumatoid arthritis)    Obesity, Class II, BMI 35-39.9, no comorbidity    Hypothyroid    Benign heart murmur    Hyperlipidemia    Hypertension    H/O degenerative disc disease    Osteoarthritis    SLE (systemic lupus erythematosus)    Rheumatoid arthritis    S/P knee replacement    Status post total hip replacement, right    S/P thyroid surgery  1 lobe removed    Lung cancer  small tumor removed 2015    S/P knee surgery  bilateral    S/P carpal tunnel release  left    S/P eye surgery  child    S/P cataract surgery  left    S/P tonsillectomy        Allergies    No Known Allergies    Intolerances          -----------------------------------------------------------------------  VITALS  75y  Vital Signs Last 24 Hrs  T(C): 36.8 (18 Feb 2021 08:38), Max: 36.8 (18 Feb 2021 08:38)  T(F): 98.2 (18 Feb 2021 08:38), Max: 98.2 (18 Feb 2021 08:38)  HR: 74 (18 Feb 2021 08:38) (74 - 77)  BP: 121/61 (18 Feb 2021 08:38) (121/61 - 130/82)  BP(mean): --  RR: 16 (18 Feb 2021 08:38) (16 - 18)  SpO2: 95% (18 Feb 2021 08:38) (95% - 100%)  Daily     Daily     -----------------------------------------------------------------------  RECENT LABS:      Pending              CAPILLARY BLOOD GLUCOSE          IMAGING: none in last 24hr  -----------------------------------------------------------------------  MEDICATIONS  (STANDING):  ascorbic acid 500 milliGRAM(s) Oral daily  atorvastatin 20 milliGRAM(s) Oral at bedtime  benzocaine 15 mG/menthol 3.6 mG (Sugar-Free) Lozenge 1 Lozenge Oral four times a day  enoxaparin Injectable 40 milliGRAM(s) SubCutaneous two times a day  folic acid 1 milliGRAM(s) Oral daily  hydroxychloroquine 200 milliGRAM(s) Oral <User Schedule>  levothyroxine 200 MICROGram(s) Oral daily  lidocaine   Patch 1 Patch Transdermal daily  oxyCODONE  ER Tablet 10 milliGRAM(s) Oral every 12 hours  polyethylene glycol 3350 17 Gram(s) Oral daily  senna 2 Tablet(s) Oral at bedtime    MEDICATIONS  (PRN):  acetaminophen   Tablet .. 650 milliGRAM(s) Oral every 6 hours PRN Temp greater or equal to 38C (100.4F), Mild Pain (1 - 3)  aluminum hydroxide/magnesium hydroxide/simethicone Suspension 30 milliLiter(s) Oral every 4 hours PRN Dyspepsia  bisacodyl Suppository 10 milliGRAM(s) Rectal daily PRN Constipation  magnesium hydroxide Suspension 30 milliLiter(s) Oral daily PRN Constipation  melatonin 3 milliGRAM(s) Oral at bedtime PRN Insomnia  ondansetron   Disintegrating Tablet 4 milliGRAM(s) Oral every 6 hours PRN Nausea and/or Vomiting  oxyCODONE    IR 5 milliGRAM(s) Oral every 6 hours PRN Severe Pain (7 - 10)  simethicone 80 milliGRAM(s) Chew every 6 hours PRN Upset Stomach    -----------------------------------------------------------------------      Review of Systems:   · Additional ROS	  Patient seen and evaluated this AM at bedside. No acute events overnight. Patient continues to endorse constipation. No BM since the weekend despite PO meds. Patient agreeable to suppository this afternoon after therapies. No nausea/ vomiting or abdominal pain; still has mild left lower breast discomfort.      Educated patient about the importance of taking deep breaths given likely atelectasis.      Physical Exam:   · Constitutional	detailed exam  · Constitutional Comments	alert, O x 3 mood fair  · Respiratory	detailed exam  · Respiratory Details	breath sounds equal; good air movement; respirations non-labored  · Respiratory Comments	O2 sat %  · Cardiovascular	detailed exam  · Cardiovascular Details	regular rate and rhythm  · Gastrointestinal	detailed exam  · GI Normal	soft; nontender; no rebound tenderness; no guarding; no rigidity  +BS  · Extremities	detailed exam  · Extremities Comments	bilateral calves chronic vascular changes, no erythema or warmth

## 2021-02-18 NOTE — PROGRESS NOTE ADULT - ASSESSMENT
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 Hospital Course complicated by brief SICU stay for mechanical ventilation management, successfully extubated. Patient now admitted to [facility] for a multidisciplinary rehab program on 1/12/21.    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 Jaja Campos  -Continue comprehensive rehab program  -Pain well controlled with current medication management  -Bowel regimen while on opioids. Patient requesting suppository today after therapy.    #Viral PNA 2/2 to COVID 19  -Spo2 appropriate on RA  -Completed Remdesivir and Decadron  -Monitor O2 with continuos pulse ox; monitor respiratory status closely  -Maintain strict isolation precautions, use proper PPE.  -Albuterol inhaler PRN    #hx of UTI  -Repeat UCx >100K E coli, patient denies urinary symptoms. ?Colonization. Afebrile, no leukocytosis.   -ID consulted, recommendations appreciated    #Acute blood loss Anemia, likely from surgery - resolved    #SLE/RA  -Chronic, continue Plaquenil 200mg po daily and folic acid    #H/o provoked right popliteal, right posterior tibial DVT (7/2019 in setting of hip surgery, already completed >6 months of treatment)  -Follows with cardiologist Dr. Hoyos outpatient  -Treated with Eliquis 5mg po bid chronically -- AC stopped at [facility] as 12/29 LE dopplers neg for DVTs  -Primary team to confirm clearance regarding restarting Eliquis  -DVT ppx with Lovenox 40mg BID for now    #HTN  -Continue to hold home medication Losartan  -Monitor vitals - stable      #HLD  -Chronic, continue Lipitor    #Hypothyroidism  -Chronic, continue Levothyroxine    #Prophylactic Measure  DVT prophylaxis: Lovenox BID     75F PMH HTN, HLD, SLE, RA, hypothyroidism, provoked right popliteal DVT in 2019, chronic LBP presents to [facility] for acute on chronic LBP 12/28/20, found to have 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 Hospital Course complicated by brief SICU stay for mechanical ventilation management, successfully extubated. Patient now admitted to [facility] for a multidisciplinary rehab program on 1/12/21.    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 Jaja Campos  -Continue comprehensive rehab program  -Pain well controlled with current medication management  -Bowel regimen while on opioids.    #Viral PNA 2/2 to COVID 19  -Spo2 appropriate on RA  -Completed Remdesivir and Decadron  -Monitor O2 with continuos pulse ox; monitor respiratory status closely  -Maintain strict isolation precautions, use proper PPE.  -Albuterol inhaler PRN    #hx of UTI  -Repeat UCx >100K E coli, patient denies urinary symptoms. ?Colonization. Afebrile, no leukocytosis.   -ID consulted, recommendations appreciated    #Acute blood loss Anemia, likely from surgery - resolved    #SLE/RA  -Chronic, continue Plaquenil 200mg po daily and folic acid    #H/o provoked right popliteal, right posterior tibial DVT (7/2019 in setting of hip surgery, already completed >6 months of treatment)  -Follows with cardiologist Dr. Hoyos outpatient  -Treated with Eliquis 5mg po bid chronically -- AC stopped at [facility] as 12/29 LE dopplers neg for DVTs  -Primary team to confirm clearance regarding restarting Eliquis  -DVT ppx with Lovenox 40mg BID for now    #HTN  -Continue to hold home medication Losartan  -Monitor vitals - stable      #HLD  -Chronic, continue Lipitor    #Hypothyroidism  -Chronic, continue Levothyroxine    #Prophylactic Measure  DVT prophylaxis: Lovenox BID

## 2021-02-18 NOTE — CHART NOTE - NSCHARTNOTEFT_GEN_A_CORE
NUTRITION FOLLOW UP    SOURCE: Patient [X)   Family [ ]    Medical Record (X)    DIET: DASH/TLC    Pt tolerating diet and eating well. Consuming % of meals. DASH/TLC diet education reinforced. Last BM 2/15. Continues on prunes. Encourage oral hydration.     CURRENT WEIGHT:   (2/2) 274.4lbs  Recommend obtain new weight    PERTINENT MEDS:   Pertinent Medications: MEDICATIONS  (STANDING):  ascorbic acid 500 milliGRAM(s) Oral daily  atorvastatin 20 milliGRAM(s) Oral at bedtime  benzocaine 15 mG/menthol 3.6 mG (Sugar-Free) Lozenge 1 Lozenge Oral four times a day  enoxaparin Injectable 40 milliGRAM(s) SubCutaneous two times a day  folic acid 1 milliGRAM(s) Oral daily  hydroxychloroquine 200 milliGRAM(s) Oral <User Schedule>  levothyroxine 200 MICROGram(s) Oral daily  lidocaine   Patch 1 Patch Transdermal daily  oxyCODONE  ER Tablet 10 milliGRAM(s) Oral every 12 hours  polyethylene glycol 3350 17 Gram(s) Oral daily  senna 2 Tablet(s) Oral at bedtime    MEDICATIONS  (PRN):  acetaminophen   Tablet .. 650 milliGRAM(s) Oral every 6 hours PRN Temp greater or equal to 38C (100.4F), Mild Pain (1 - 3)  aluminum hydroxide/magnesium hydroxide/simethicone Suspension 30 milliLiter(s) Oral every 4 hours PRN Dyspepsia  bisacodyl Suppository 10 milliGRAM(s) Rectal daily PRN Constipation  magnesium hydroxide Suspension 30 milliLiter(s) Oral daily PRN Constipation  melatonin 3 milliGRAM(s) Oral at bedtime PRN Insomnia  ondansetron   Disintegrating Tablet 4 milliGRAM(s) Oral every 6 hours PRN Nausea and/or Vomiting  oxyCODONE    IR 5 milliGRAM(s) Oral every 6 hours PRN Severe Pain (7 - 10)  simethicone 80 milliGRAM(s) Chew every 6 hours PRN Upset Stomach      PERTINENT LABS:   02-15 Alb 2.1 g/dL<L>        SKIN:  no pressure ulcers  EDEMA: +2 bilat legs/feet  LAST BM: 2/15    ESTIMATED NEEDS:   [X] no change since previous assessment  [ ] recalculated:     PREVIOUS NUTRITION DIAGNOSIS:    1. Overweight/obesity- DASH/TLC diet. Ongoing diet education.     NUTRITION DIAGNOSIS is :  (X)  Ongoing         NEW NUTRITION DIAGNOSIS: N/A    NUTRITION RECOMMENDATIONS:   1. Continue plan of care  2. Prunes + increased oral hydration  3. Weekly weights    MONITORING AND EVALUATION:   1. Tolerance to diet prescription   2. PO intake  3. Weights  4. Labs  5. Follow Up per protocol     RD to remain available   Tanya Martinez RDN   Pager #[phone]

## 2021-02-19 LAB
ANION GAP SERPL CALC-SCNC: 7 MMOL/L — SIGNIFICANT CHANGE UP (ref 5–17)
BUN SERPL-MCNC: 11 MG/DL — SIGNIFICANT CHANGE UP (ref 7–23)
CALCIUM SERPL-MCNC: 8.6 MG/DL — SIGNIFICANT CHANGE UP (ref 8.4–10.5)
CHLORIDE SERPL-SCNC: 107 MMOL/L — SIGNIFICANT CHANGE UP (ref 96–108)
CO2 SERPL-SCNC: 27 MMOL/L — SIGNIFICANT CHANGE UP (ref 22–31)
CREAT SERPL-MCNC: 0.62 MG/DL — SIGNIFICANT CHANGE UP (ref 0.5–1.3)
GLUCOSE SERPL-MCNC: 93 MG/DL — SIGNIFICANT CHANGE UP (ref 70–99)
HCT VFR BLD CALC: 34.4 % — LOW (ref 34.5–45)
HGB BLD-MCNC: 11 G/DL — LOW (ref 11.5–15.5)
MCHC RBC-ENTMCNC: 29.4 PG — SIGNIFICANT CHANGE UP (ref 27–34)
MCHC RBC-ENTMCNC: 32 GM/DL — SIGNIFICANT CHANGE UP (ref 32–36)
MCV RBC AUTO: 92 FL — SIGNIFICANT CHANGE UP (ref 80–100)
NRBC # BLD: 0 /100 WBCS — SIGNIFICANT CHANGE UP (ref 0–0)
PLATELET # BLD AUTO: 204 K/UL — SIGNIFICANT CHANGE UP (ref 150–400)
POTASSIUM SERPL-MCNC: 3.5 MMOL/L — SIGNIFICANT CHANGE UP (ref 3.5–5.3)
POTASSIUM SERPL-SCNC: 3.5 MMOL/L — SIGNIFICANT CHANGE UP (ref 3.5–5.3)
RBC # BLD: 3.74 M/UL — LOW (ref 3.8–5.2)
RBC # FLD: 14.5 % — SIGNIFICANT CHANGE UP (ref 10.3–14.5)
SODIUM SERPL-SCNC: 141 MMOL/L — SIGNIFICANT CHANGE UP (ref 135–145)
WBC # BLD: 5.54 K/UL — SIGNIFICANT CHANGE UP (ref 3.8–10.5)
WBC # FLD AUTO: 5.54 K/UL — SIGNIFICANT CHANGE UP (ref 3.8–10.5)

## 2021-02-19 PROCEDURE — 99232 SBSQ HOSP IP/OBS MODERATE 35: CPT | Mod: CS

## 2021-02-19 RX ADMIN — Medication 200 MILLIGRAM(S): at 08:03

## 2021-02-19 RX ADMIN — Medication 200 MICROGRAM(S): at 06:01

## 2021-02-19 RX ADMIN — ENOXAPARIN SODIUM 40 MILLIGRAM(S): 100 INJECTION SUBCUTANEOUS at 17:11

## 2021-02-19 RX ADMIN — Medication 1 MILLIGRAM(S): at 12:05

## 2021-02-19 RX ADMIN — Medication 650 MILLIGRAM(S): at 12:07

## 2021-02-19 RX ADMIN — SENNA PLUS 2 TABLET(S): 8.6 TABLET ORAL at 22:05

## 2021-02-19 RX ADMIN — Medication 650 MILLIGRAM(S): at 06:01

## 2021-02-19 RX ADMIN — Medication 650 MILLIGRAM(S): at 23:45

## 2021-02-19 RX ADMIN — ATORVASTATIN CALCIUM 20 MILLIGRAM(S): 80 TABLET, FILM COATED ORAL at 22:05

## 2021-02-19 RX ADMIN — Medication 500 MILLIGRAM(S): at 12:05

## 2021-02-19 RX ADMIN — OXYCODONE HYDROCHLORIDE 5 MILLIGRAM(S): 5 TABLET ORAL at 08:03

## 2021-02-19 RX ADMIN — ENOXAPARIN SODIUM 40 MILLIGRAM(S): 100 INJECTION SUBCUTANEOUS at 06:01

## 2021-02-19 NOTE — PROGRESS NOTE ADULT - ASSESSMENT
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 Hospital Course complicated by brief SICU stay for mechanical ventilation management, successfully extubated. Patient now admitted to [facility] for a multidisciplinary rehab program on 1/12/21.    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 Jaja Campos  -Continue comprehensive rehab program  -Pain management per primary  -Bowel regimen while on opioids.    #Viral PNA 2/2 to COVID 19  -Spo2 appropriate on RA  -Completed Remdesivir and Decadron  -Monitor O2 with continuos pulse ox; monitor respiratory status closely  -Maintain strict isolation precautions, use proper PPE.  -Albuterol inhaler PRN    #hx of UTI  -Repeat UCx >100K E coli, patient denies urinary symptoms. ?Colonization. Afebrile, no leukocytosis.   -ID consulted, recommendations appreciated    #Acute blood loss Anemia, likely from surgery - resolved    #SLE/RA  -Chronic, continue Plaquenil 200mg po daily and folic acid    #H/o provoked right popliteal, right posterior tibial DVT (7/2019 in setting of hip surgery, already completed >6 months of treatment)  -Follows with cardiologist Dr. Hoyos outpatient  -Treated with Eliquis 5mg po bid chronically -- AC stopped at [facility] as 12/29 LE dopplers neg for DVTs  -Primary team to confirm clearance regarding restarting Eliquis    #HTN  -Continue to hold home medication Losartan  -Monitor vitals - stable      #HLD  -Chronic, continue Lipitor    #Hypothyroidism  -Chronic, continue Levothyroxine    #Prophylactic Measure  DVT prophylaxis: Lovenox BID

## 2021-02-19 NOTE — PROGRESS NOTE ADULT - COMMENTS
Patient working with rec therapy. Still has some lower abdominal pain but states trying to do occasional breathing exercises. Had BM yesterday, feels not emptied completely, will take another MOM dose today. No N/V, good appetite    Patient has persistent urinary frequency, but no fever, no leukocyotsis, no dysuria, no hesitancy or retention. denies any change in color or odor of urine

## 2021-02-19 NOTE — PROGRESS NOTE ADULT - SUBJECTIVE AND OBJECTIVE BOX
Patient is a 75y old  Female who presents with a chief complaint of Back pain (19 Feb 2021 08:07)      HPI:  75F with past medical history of HTN, HLD, SLE, RA, hypothyroidism, provoked R. popliteal DVT in 2019 on eliquis, chronic low back pain presents to ED for acute on chronic low back pain. Pt has several months of chronic lumbosacral back pain, evaluated by outpatient ortho and referred for physical therapy and pain management. She has been receiving ?vitamin spinal injections. She last received low back inj on 12/16 after which she her low back pain worsened. She took oxycodone 5 mg qd (prescribed in september during a ED visit) and acetaminophen for past three days without relief. She also endorses worsening pain during ambulation with walker and has affected her quality of life. She has constipation, last BM 4 days ago. Otherwise denies fever, chills, N/V, abdominal pain, dysuria, urinary retention, fecal incontinence, saddle anesthesia, fall, LOC, trauma. During ROS she endorsed LLE weakness for several months, no numbness or tingling and has LLE edema >RLE edema, reported undergoing LLE US 4 months ago, was negative for DVT. Currently has 8/10 pain, improved to 5/10 with oxycodone 5 mg x 2 in ED. (28 Dec 2020 11:16)   12/28: Mri T/Ls: IMPRESSION: Degenerative changes  Abnormal signal with associated enhancement is seen involving the endplates adjacent to the L2-3 disc space. There is slight increased T2 prolongation involving the L2-3 disc space with associated widening. While these findings could be compatible with degenerative changes possibly of early discitis and osteomyelitis must be considered.  Patient s/p L1-L5 laminectomy, T10-L5 posterior surgical fusion by Dr. Campos on 1/1/21  HMV x 3 drains. Post op with constipation needing aggressive bowel regimen.     PT/OT and PM&R evaluated patient and recommended Rehab.     Patient medically cleared and admitted to  Rehab on 1/12/21 (12 Jan 2021 15:21)      PAST MEDICAL & SURGICAL HISTORY:  HTN (hypertension)    DVT (deep venous thrombosis)    Hypothyroidism    RA (rheumatoid arthritis)    Obesity, Class II, BMI 35-39.9, no comorbidity    Hypothyroid    Benign heart murmur    Hyperlipidemia    Hypertension    H/O degenerative disc disease    Osteoarthritis    SLE (systemic lupus erythematosus)    Rheumatoid arthritis    S/P knee replacement    Status post total hip replacement, right    S/P thyroid surgery  1 lobe removed    Lung cancer  small tumor removed 2015    S/P knee surgery  bilateral    S/P carpal tunnel release  left    S/P eye surgery  child    S/P cataract surgery  left    S/P tonsillectomy        MEDICATIONS  (STANDING):  ascorbic acid 500 milliGRAM(s) Oral daily  atorvastatin 20 milliGRAM(s) Oral at bedtime  benzocaine 15 mG/menthol 3.6 mG (Sugar-Free) Lozenge 1 Lozenge Oral four times a day  enoxaparin Injectable 40 milliGRAM(s) SubCutaneous two times a day  folic acid 1 milliGRAM(s) Oral daily  hydroxychloroquine 200 milliGRAM(s) Oral <User Schedule>  levothyroxine 200 MICROGram(s) Oral daily  lidocaine   Patch 1 Patch Transdermal daily  oxyCODONE  ER Tablet 10 milliGRAM(s) Oral every 12 hours  polyethylene glycol 3350 17 Gram(s) Oral daily  senna 2 Tablet(s) Oral at bedtime    MEDICATIONS  (PRN):  acetaminophen   Tablet .. 650 milliGRAM(s) Oral every 6 hours PRN Temp greater or equal to 38C (100.4F), Mild Pain (1 - 3)  aluminum hydroxide/magnesium hydroxide/simethicone Suspension 30 milliLiter(s) Oral every 4 hours PRN Dyspepsia  bisacodyl Suppository 10 milliGRAM(s) Rectal daily PRN Constipation  magnesium hydroxide Suspension 30 milliLiter(s) Oral daily PRN Constipation  melatonin 3 milliGRAM(s) Oral at bedtime PRN Insomnia  ondansetron   Disintegrating Tablet 4 milliGRAM(s) Oral every 6 hours PRN Nausea and/or Vomiting  oxyCODONE    IR 5 milliGRAM(s) Oral every 6 hours PRN Severe Pain (7 - 10)  simethicone 80 milliGRAM(s) Chew every 6 hours PRN Upset Stomach      Allergies    No Known Allergies    Intolerances          VITALS  75y  Vital Signs Last 24 Hrs  T(C): 36.6 (19 Feb 2021 08:12), Max: 36.7 (18 Feb 2021 21:17)  T(F): 97.9 (19 Feb 2021 08:12), Max: 98.1 (18 Feb 2021 21:17)  HR: 66 (19 Feb 2021 08:12) (66 - 73)  BP: 140/74 (19 Feb 2021 08:12) (118/72 - 140/74)  BP(mean): --  RR: 15 (19 Feb 2021 08:12) (15 - 15)  SpO2: 94% (19 Feb 2021 08:12) (94% - 97%)  Daily     Daily         RECENT LABS:                          11.0   5.54  )-----------( 204      ( 19 Feb 2021 07:00 )             34.4     02-19    141  |  107  |  11  ----------------------------<  93  3.5   |  27  |  0.62    Ca    8.6      19 Feb 2021 07:00                CAPILLARY BLOOD GLUCOSE

## 2021-02-19 NOTE — PROGRESS NOTE ADULT - SUBJECTIVE AND OBJECTIVE BOX
Patient is a 75y old  Female who presents with a chief complaint of Back pain (18 Feb 2021 11:19)      Patient seen and examined at bedside. No overnight events.    ALLERGIES:  No Known Allergies    MEDICATIONS  (STANDING):  ascorbic acid 500 milliGRAM(s) Oral daily  atorvastatin 20 milliGRAM(s) Oral at bedtime  benzocaine 15 mG/menthol 3.6 mG (Sugar-Free) Lozenge 1 Lozenge Oral four times a day  enoxaparin Injectable 40 milliGRAM(s) SubCutaneous two times a day  folic acid 1 milliGRAM(s) Oral daily  hydroxychloroquine 200 milliGRAM(s) Oral <User Schedule>  levothyroxine 200 MICROGram(s) Oral daily  lidocaine   Patch 1 Patch Transdermal daily  oxyCODONE  ER Tablet 10 milliGRAM(s) Oral every 12 hours  polyethylene glycol 3350 17 Gram(s) Oral daily  senna 2 Tablet(s) Oral at bedtime    MEDICATIONS  (PRN):  acetaminophen   Tablet .. 650 milliGRAM(s) Oral every 6 hours PRN Temp greater or equal to 38C (100.4F), Mild Pain (1 - 3)  aluminum hydroxide/magnesium hydroxide/simethicone Suspension 30 milliLiter(s) Oral every 4 hours PRN Dyspepsia  bisacodyl Suppository 10 milliGRAM(s) Rectal daily PRN Constipation  magnesium hydroxide Suspension 30 milliLiter(s) Oral daily PRN Constipation  melatonin 3 milliGRAM(s) Oral at bedtime PRN Insomnia  ondansetron   Disintegrating Tablet 4 milliGRAM(s) Oral every 6 hours PRN Nausea and/or Vomiting  oxyCODONE    IR 5 milliGRAM(s) Oral every 6 hours PRN Severe Pain (7 - 10)  simethicone 80 milliGRAM(s) Chew every 6 hours PRN Upset Stomach    Vital Signs Last 24 Hrs  T(F): 98.1 (18 Feb 2021 21:17), Max: 98.2 (18 Feb 2021 08:38)  HR: 73 (18 Feb 2021 21:17) (73 - 74)  BP: 118/72 (18 Feb 2021 21:17) (118/72 - 121/61)  RR: 15 (18 Feb 2021 21:17) (15 - 16)  SpO2: 97% (18 Feb 2021 21:17) (95% - 97%)  I&O's Summary    18 Feb 2021 07:01  -  19 Feb 2021 07:00  --------------------------------------------------------  IN: 0 mL / OUT: 600 mL / NET: -600 mL        PHYSICAL EXAM:  General: NAD, A/O x 3  ENT: MMM, no scleral icterus  Neck: Supple, No JVD  Lungs: Clear to auscultation bilaterally, no wheezes, rales, rhonchi  Cardio: RRR, S1/S2, No murmurs  Abdomen: Soft, Nontender, Nondistended; Bowel sounds present  Extremities: No calf tenderness, No pitting edema      LABS:                        11.0   5.54  )-----------( 204      ( 19 Feb 2021 07:00 )             34.4       02-19    141  |  107  |  11  ----------------------------<  93  3.5   |  27  |  0.62    Ca    8.6      19 Feb 2021 07:00       eGFR if Non African American: 88 mL/min/1.73M2 (02-19-21 @ 07:00)  eGFR if : 102 mL/min/1.73M2 (02-19-21 @ 07:00)               RADIOLOGY & ADDITIONAL TESTS:    Care Discussed with Consultants/Other Providers: Dr. Brower (physiatry)   Patient is a 75y old  Female who presents with a chief complaint of Back pain (18 Feb 2021 11:19)      Patient seen and examined at bedside. No overnight events. Had BM, reports relief of constipation.    ALLERGIES:  No Known Allergies    MEDICATIONS  (STANDING):  ascorbic acid 500 milliGRAM(s) Oral daily  atorvastatin 20 milliGRAM(s) Oral at bedtime  benzocaine 15 mG/menthol 3.6 mG (Sugar-Free) Lozenge 1 Lozenge Oral four times a day  enoxaparin Injectable 40 milliGRAM(s) SubCutaneous two times a day  folic acid 1 milliGRAM(s) Oral daily  hydroxychloroquine 200 milliGRAM(s) Oral <User Schedule>  levothyroxine 200 MICROGram(s) Oral daily  lidocaine   Patch 1 Patch Transdermal daily  oxyCODONE  ER Tablet 10 milliGRAM(s) Oral every 12 hours  polyethylene glycol 3350 17 Gram(s) Oral daily  senna 2 Tablet(s) Oral at bedtime    MEDICATIONS  (PRN):  acetaminophen   Tablet .. 650 milliGRAM(s) Oral every 6 hours PRN Temp greater or equal to 38C (100.4F), Mild Pain (1 - 3)  aluminum hydroxide/magnesium hydroxide/simethicone Suspension 30 milliLiter(s) Oral every 4 hours PRN Dyspepsia  bisacodyl Suppository 10 milliGRAM(s) Rectal daily PRN Constipation  magnesium hydroxide Suspension 30 milliLiter(s) Oral daily PRN Constipation  melatonin 3 milliGRAM(s) Oral at bedtime PRN Insomnia  ondansetron   Disintegrating Tablet 4 milliGRAM(s) Oral every 6 hours PRN Nausea and/or Vomiting  oxyCODONE    IR 5 milliGRAM(s) Oral every 6 hours PRN Severe Pain (7 - 10)  simethicone 80 milliGRAM(s) Chew every 6 hours PRN Upset Stomach    Vital Signs Last 24 Hrs  T(F): 98.1 (18 Feb 2021 21:17), Max: 98.2 (18 Feb 2021 08:38)  HR: 73 (18 Feb 2021 21:17) (73 - 74)  BP: 118/72 (18 Feb 2021 21:17) (118/72 - 121/61)  RR: 15 (18 Feb 2021 21:17) (15 - 16)  SpO2: 97% (18 Feb 2021 21:17) (95% - 97%)  I&O's Summary    18 Feb 2021 07:01  -  19 Feb 2021 07:00  --------------------------------------------------------  IN: 0 mL / OUT: 600 mL / NET: -600 mL        PHYSICAL EXAM:  General: NAD, A/O x 3  ENT: MMM, no scleral icterus  Neck: Supple, No JVD  Lungs: Clear to auscultation bilaterally, no wheezes, rales, rhonchi  Cardio: RRR, S1/S2, No murmurs  Abdomen: Soft, Nontender, Nondistended; Bowel sounds present  Extremities: No calf tenderness, No pitting edema      LABS:                        11.0   5.54  )-----------( 204      ( 19 Feb 2021 07:00 )             34.4       02-19    141  |  107  |  11  ----------------------------<  93  3.5   |  27  |  0.62    Ca    8.6      19 Feb 2021 07:00       eGFR if Non African American: 88 mL/min/1.73M2 (02-19-21 @ 07:00)  eGFR if : 102 mL/min/1.73M2 (02-19-21 @ 07:00)               RADIOLOGY & ADDITIONAL TESTS:    Care Discussed with Consultants/Other Providers: Dr. Brower (physiatry)

## 2021-02-19 NOTE — PROGRESS NOTE ADULT - ASSESSMENT
KAI THOMAS is a 75y F PMH HTN, HLD, SLE, RA, hypothyroidism, provoked R. 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 Jaja Campos. Staples removed   - continue Comprehensive Rehab Program of PT/OT- 3 hrs/day 5 days/week  - Precautions:  spinal, RA, cardiac, contact and droplet precautions    #COVID19 infection  -PCR + 1/29/21  - Ct A/P 2/5: 1. Pattern of lung base groundglass opacification   -CTA 2/9:  No pulmonary embolism to the segmental branches. Mild bilateral patchy airspace disease compatible with COVID-19 pneumonia.  - ID note appreciated.  Completed 5 day course remdesivir, off decadron   - deep breathing exercises (refuses IS), increased sitting to address atelectasis discussed    #nausea/vomiting; improving  - abdominal CT -2/5: Colonic diverticulosis without CT evidence of acute diverticulitis  - AXR (-) 2/14  - possible 2/2 macrobid use. ID note appreciate, ID appreciated, off antibiotics/decadron  - continue florastor. Maalox added 2/16  -  TBili  0.7  /  DBili  x   /  AST  22  /  ALT  12  /  AlkPhos  100  02-15; WBC 7.06 12/16    # Constipation:   - bowel regimen: Senna / Miralax daily  - MOM and dulcolax PRN,   - +BM 2/18    #urinary frequency  - Urine Cx  - >100k Ecoli   - s/p macrobid 100 bid x 3 days  - repeat UA with bacteruria +nitrites. ?colonization  -discussed with hospitalist 2/19, continue to monitor off Abx     #SLE/RA  -continue plaquenil 200mg po daily    #h/o Provoked DVT in 2019  - US July 2019:  DVT noted in the right popliteal vein and visualized segment of the right  posterior tibial vein.  - currently on lovenox for dvt ppx  - Follows with vascular cardiologist Dr. Hoyos outpatient    #HTN:  - BP stable off meds     #HLD:  - continue lipitor 20mg po daily    #hypothyroidism:  - continue levothyroxine 200mcg po daily    # sleep  - melatonin 3mg PRN    #Pain Mgmt :   - Tylenol PRN mild pain  - Oxycodone 5mg IR PRN severe pain , weaned again 2/16  -  Oxycontin ER BID (renewed 2/4)    #GI/Bowel Mgmt/ constipation:   -continue Senna, miralax daily, MOM prn  - dulcolax suppository if no BM with MOM  -simethicone PRN gas  - zofran, maalox for Nausea, dyspepsia PRN  -prune juice with meal trays    #FEN :  - Diet - Regular     # DVT prophylaxis :  - Lovenox BID  -LE edema:  ACE wraps LE     #Case discussed in IDT rounds 2/17:  -ambulates 75 feet with RW and CG, fluctuating to 50 feet CG, CG transfers, mod assist LB dressing set up UB dressing, min assist hygiene a  -goals: min assist LB dressing, min assist toileting, supervision transfers, CG /CS ambulation  -SHAZIA when bed available/authorization obtained    Case discussed in detail with daughter Adeline 2/16    #LABS:  NSGY re: clearance to restart eliquis. Continue lovenox for now pending recs  CBC BMP 2/22

## 2021-02-20 PROCEDURE — 99232 SBSQ HOSP IP/OBS MODERATE 35: CPT

## 2021-02-20 PROCEDURE — 99233 SBSQ HOSP IP/OBS HIGH 50: CPT

## 2021-02-20 RX ADMIN — Medication 650 MILLIGRAM(S): at 12:32

## 2021-02-20 RX ADMIN — Medication 650 MILLIGRAM(S): at 23:12

## 2021-02-20 RX ADMIN — Medication 200 MILLIGRAM(S): at 07:47

## 2021-02-20 RX ADMIN — Medication 200 MICROGRAM(S): at 05:57

## 2021-02-20 RX ADMIN — ATORVASTATIN CALCIUM 20 MILLIGRAM(S): 80 TABLET, FILM COATED ORAL at 21:10

## 2021-02-20 RX ADMIN — Medication 1 MILLIGRAM(S): at 12:30

## 2021-02-20 RX ADMIN — Medication 500 MILLIGRAM(S): at 12:30

## 2021-02-20 RX ADMIN — ENOXAPARIN SODIUM 40 MILLIGRAM(S): 100 INJECTION SUBCUTANEOUS at 17:01

## 2021-02-20 RX ADMIN — MAGNESIUM HYDROXIDE 30 MILLILITER(S): 400 TABLET, CHEWABLE ORAL at 16:09

## 2021-02-20 RX ADMIN — ENOXAPARIN SODIUM 40 MILLIGRAM(S): 100 INJECTION SUBCUTANEOUS at 05:57

## 2021-02-20 NOTE — PROGRESS NOTE ADULT - SUBJECTIVE AND OBJECTIVE BOX
Patient is a 75y old  Female who presents with a chief complaint of Back pain (19 Feb 2021 11:51)    Patient examined and interviewed for follow up.    There were no major issues reported overnight and patient is without new complaints.    Participating in therapies.  Back pain is controlled.    Denies fever, chills, myalgias, cough, shortness of breath  but does have mild dyspnea w/ exertion;  all other systems were reviewed and were negative unless otherwise specified above    MEDICATIONS  (STANDING):  ascorbic acid 500 milliGRAM(s) Oral daily  atorvastatin 20 milliGRAM(s) Oral at bedtime  benzocaine 15 mG/menthol 3.6 mG (Sugar-Free) Lozenge 1 Lozenge Oral four times a day  enoxaparin Injectable 40 milliGRAM(s) SubCutaneous two times a day  folic acid 1 milliGRAM(s) Oral daily  hydroxychloroquine 200 milliGRAM(s) Oral <User Schedule>  levothyroxine 200 MICROGram(s) Oral daily  lidocaine   Patch 1 Patch Transdermal daily  oxyCODONE  ER Tablet 10 milliGRAM(s) Oral every 12 hours  polyethylene glycol 3350 17 Gram(s) Oral daily  senna 2 Tablet(s) Oral at bedtime    MEDICATIONS  (PRN):  acetaminophen   Tablet .. 650 milliGRAM(s) Oral every 6 hours PRN Temp greater or equal to 38C (100.4F), Mild Pain (1 - 3)  aluminum hydroxide/magnesium hydroxide/simethicone Suspension 30 milliLiter(s) Oral every 4 hours PRN Dyspepsia  bisacodyl Suppository 10 milliGRAM(s) Rectal daily PRN Constipation  magnesium hydroxide Suspension 30 milliLiter(s) Oral daily PRN Constipation  melatonin 3 milliGRAM(s) Oral at bedtime PRN Insomnia  ondansetron   Disintegrating Tablet 4 milliGRAM(s) Oral every 6 hours PRN Nausea and/or Vomiting  oxyCODONE    IR 5 milliGRAM(s) Oral every 6 hours PRN Severe Pain (7 - 10)  simethicone 80 milliGRAM(s) Chew every 6 hours PRN Upset Stomach    Vital Signs Last 24 Hrs  T(C): 36.5 (20 Feb 2021 07:49), Max: 36.6 (19 Feb 2021 22:04)  T(F): 97.7 (20 Feb 2021 07:49), Max: 97.9 (19 Feb 2021 22:04)  HR: 89 (20 Feb 2021 07:49) (71 - 89)  BP: 129/83 (20 Feb 2021 07:49) (129/83 - 132/78)  BP(mean): --  RR: 15 (20 Feb 2021 07:49) (15 - 15)  SpO2: 95% (20 Feb 2021 07:49) (95% - 96%)  CAPILLARY BLOOD GLUCOSE    PHYSICAL EXAM:  GENERAL: well-developed, well nourished in no acute distress  HEAD:  Atraumatic, Normocephalic  EYES: EOMI, PERRLA, conjunctiva and sclera clear  ENMT: No tonsillar erythema, exudates, or enlargement; Moist mucous membranes, Good dentition, No lesions  NECK: Supple, No JVD, No ONDINA  CHEST/LUNG: Clear to percussion bilaterally; No rales, rhonchi, wheezing, or rubs  HEART: Regular rate and rhythm; No murmurs, rubs, or gallops  ABDOMEN: Soft, Nontender, Nondistended; Bowel sounds present  EXTREMITIES:  2+ Peripheral Pulses, No clubbing, or cyanosis, but tr edema left LE > right LE with venous stasis changes left LE.  Healed scar left knee  LYMPH: No lymphadenopathy noted  SKIN: No rashes or lesions  NERVOUS SYSTEM:  Alert & Oriented X3, no new neurologic deficits ;     LABS:                        11.0   5.54  )-----------( 204      ( 19 Feb 2021 07:00 )             34.4     02-19    141  |  107  |  11  ----------------------------<  93  3.5   |  27  |  0.62    Ca    8.6      19 Feb 2021 07:00            COVID-19 PCR: Detected (31 Jan 2021 14:00)  COVID-19 PCR: NotDetec (31 Jan 2021 07:10)  COVID-19 PCR: Detected (29 Jan 2021 20:30)  COVID-19 PCR: NotDetec (13 Jan 2021 22:00)  COVID-19 PCR: NotDetec (11 Jan 2021 17:07)  COVID-19 PCR: NotDetec (27 Dec 2020 20:59)

## 2021-02-20 NOTE — PROGRESS NOTE ADULT - ASSESSMENT
76 yo M  admitted to acute Rehabilitation with Thoracolumbar Disc disease s/p T10-L5 PIF, L1-5 laminectomy, Left L4-5 Foraminotomy.     Pt. Stable  Active Issues/Medication changes:    COVID-19--stable on RA,  cont. albuterol     SLE/RA  -Chronic, continue Plaquenil 200mg po daily and folic acid    H/o provoked right popliteal, right posterior tibial DVT (7/2019 in setting of hip surgery, already completed >6 months of treatment)    -Primary team to confirm clearance regarding restarting Eliquis    DVT ppx: lovenox      Cont. comprehensive inpatient PT, OT and Speech    No acute issues,   Cont. current plan of care and medications as per primary team

## 2021-02-20 NOTE — PROGRESS NOTE ADULT - ATTENDING COMMENTS
I have personally interviewed and examined this patient, reviewed pertinent clinical information, and performed the evaluation and management services provided at today's visit for inpatient medical follow up    I am available to discuss any issues related to the medical care of this patient on the unit, or by phone at [phone]

## 2021-02-20 NOTE — PROGRESS NOTE ADULT - ASSESSMENT
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 Hospital Course complicated by brief SICU stay for mechanical ventilation management, successfully extubated. Patient now admitted to [facility] for a multidisciplinary rehab program on 1/12/21.    DDD T and L Spine s/p Posterior T10-L5 instrumented fusion, L1-L5 laminectomies, L L4-L5 foraminotomy 1/1/21   -Continue comprehensive rehab program  -Pain management per primary  -Bowel regimen while on opioids.    Viral PNA 2/2 to COVID 19  -Spo2 appropriate on RA  -Completed Remdesivir and Decadron  -Monitor O2 with continuos pulse ox; monitor respiratory status closely  -Maintain strict isolation precautions, use proper PPE.  -Albuterol inhaler PRN  - encouraged patient to practice deep breathing as she has trouble using incentive spirometer    hx of UTI  -Repeat UCx >100K E coli, patient denies urinary symptoms likely represents chronic Colonization   - monitor off antibiotics    Acute blood loss Anemia, likely from surgery - resolved  - hemodynamically stable    SLE/RA  -Chronic, continue Plaquenil 200mg po daily and folic acid    H/o provoked right popliteal, right posterior tibial DVT (7/2019 in setting of hip surgery, already completed >6 months of treatment)  -Follows with cardiologist Dr. Hoyos outpatient  -Treated with Eliquis 5mg po bid chronically -- AC stopped at [facility] as 12/29 LE dopplers neg for DVTs  -Primary team to confirm clearance regarding restarting Eliquis    Essential HTN  -Continue to hold home medication Losartan  -Monitor vitals - stable      Mixed HLD  -Chronic, continue Lipitor    Hypothyroidism  -Chronic, continue Levothyroxine    VTE prophylaxis:   - Lovenox BID as pt has history of prior DVT and is obese

## 2021-02-21 PROCEDURE — 99232 SBSQ HOSP IP/OBS MODERATE 35: CPT

## 2021-02-21 RX ADMIN — SENNA PLUS 2 TABLET(S): 8.6 TABLET ORAL at 21:37

## 2021-02-21 RX ADMIN — Medication 650 MILLIGRAM(S): at 23:25

## 2021-02-21 RX ADMIN — Medication 200 MILLIGRAM(S): at 08:06

## 2021-02-21 RX ADMIN — ATORVASTATIN CALCIUM 20 MILLIGRAM(S): 80 TABLET, FILM COATED ORAL at 21:37

## 2021-02-21 RX ADMIN — Medication 500 MILLIGRAM(S): at 12:26

## 2021-02-21 RX ADMIN — ENOXAPARIN SODIUM 40 MILLIGRAM(S): 100 INJECTION SUBCUTANEOUS at 17:04

## 2021-02-21 RX ADMIN — ENOXAPARIN SODIUM 40 MILLIGRAM(S): 100 INJECTION SUBCUTANEOUS at 05:39

## 2021-02-21 RX ADMIN — Medication 200 MICROGRAM(S): at 05:38

## 2021-02-21 RX ADMIN — Medication 1 MILLIGRAM(S): at 12:26

## 2021-02-21 NOTE — PROGRESS NOTE ADULT - SUBJECTIVE AND OBJECTIVE BOX
Subjective: Pain controlled.  No SOB or breathing issues.  Pt. upset over nursing issues.      VITALS  T(C): 36.6 (02-21-21 @ 07:10), Max: 36.6 (02-20-21 @ 21:06)  HR: 70 (02-21-21 @ 07:10) (70 - 74)  BP: 149/79 (02-21-21 @ 07:10) (128/78 - 149/79)  RR: 14 (02-21-21 @ 07:10) (14 - 14)  SpO2: 97% (02-21-21 @ 07:10) (97% - 97%)  Wt(kg): --    REVIEW OF SYSTEMS  -Pertinent in last 24 h: neg      Physical Exam:  Constitutional - NAD, Comfortable  HEENT - NCAT,  Chest - CTA bilaterally,   Cardiovascular - RRR, S1S2,   Abdomen - BS+, Soft, NTND    Neurologic Exam -    Stable                Cognitive - Awake, Alert, AAO to self, place, date, year, situation     Cranial Nerves - CN 2-12 intact     Motor - no new deficit      Sensory - Intact to LT  Psychiatric - Mood stable, Affect WNL  Skin-- Back incision healed      RECENT LABS/IMAGING                  MEDICATIONS   acetaminophen   Tablet .. 650 milliGRAM(s) every 6 hours PRN  aluminum hydroxide/magnesium hydroxide/simethicone Suspension 30 milliLiter(s) every 4 hours PRN  ascorbic acid 500 milliGRAM(s) daily  atorvastatin 20 milliGRAM(s) at bedtime  benzocaine 15 mG/menthol 3.6 mG (Sugar-Free) Lozenge 1 Lozenge four times a day  bisacodyl Suppository 10 milliGRAM(s) daily PRN  enoxaparin Injectable 40 milliGRAM(s) two times a day  folic acid 1 milliGRAM(s) daily  hydroxychloroquine 200 milliGRAM(s) <User Schedule>  levothyroxine 200 MICROGram(s) daily  lidocaine   Patch 1 Patch daily  magnesium hydroxide Suspension 30 milliLiter(s) daily PRN  melatonin 3 milliGRAM(s) at bedtime PRN  ondansetron   Disintegrating Tablet 4 milliGRAM(s) every 6 hours PRN  oxyCODONE    IR 5 milliGRAM(s) every 6 hours PRN  oxyCODONE  ER Tablet 10 milliGRAM(s) every 12 hours  polyethylene glycol 3350 17 Gram(s) daily  senna 2 Tablet(s) at bedtime  simethicone 80 milliGRAM(s) every 6 hours PRN      --------------------------------------------------------------------

## 2021-02-21 NOTE — PROGRESS NOTE ADULT - ASSESSMENT
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 Hospital Course complicated by brief SICU stay for mechanical ventilation management, successfully extubated. Patient now admitted to [facility] for a multidisciplinary rehab program on 1/12/21.    DDD T and L Spine s/p Posterior T10-L5 instrumented fusion, L1-L5 laminectomies, L L4-L5 foraminotomy 1/1/21   -Continue comprehensive rehab program  -Pain management per primary  -Bowel regimen while on opioids.    Viral PNA 2/2 to COVID 19: currently stable  -Spo2 appropriate on RA  -Completed Remdesivir and Decadron  -Monitor O2 with continuos pulse ox; monitor respiratory status closely  -Maintain strict isolation precautions, use proper PPE.  -Albuterol inhaler PRN  - encouraged patient to practice deep breathing as she has trouble using incentive spirometer    hx of UTI  -Repeat UCx >100K E coli, patient denies urinary symptoms likely represents chronic Colonization   - monitor off antibiotics    Acute blood loss Anemia, likely from surgery - resolved  - hemodynamically stable    SLE/RA: chronic condition, stable  -Chronic, continue Plaquenil 200mg po daily and folic acid    H/o provoked right popliteal, right posterior tibial DVT (7/2019 in setting of hip surgery, already completed >6 months of treatment)  -Follows with cardiologist Dr. Hoyos outpatient  -Treated with Eliquis 5mg po bid chronically -- AC stopped at [facility] as 12/29 LE dopplers neg for DVTs  -Primary team to confirm clearance regarding restarting Eliquis    Essential HTN  -Continue to hold home medication Losartan  -Monitor vitals - stable      Mixed HLD  -Chronic, continue Lipitor    Hypothyroidism  -Chronic, continue Levothyroxine    VTE prophylaxis:   - Lovenox BID as pt has history of prior DVT and is obese

## 2021-02-21 NOTE — PROGRESS NOTE ADULT - ATTENDING COMMENTS
I have personally interviewed and examined this patient, reviewed pertinent clinical information, and performed the evaluation and management services provided at today's visit for inpatient medical follow up    I am available to discuss any issues related to the medical care of this patient on the unit, or by phone at [phone]

## 2021-02-21 NOTE — PROGRESS NOTE ADULT - SUBJECTIVE AND OBJECTIVE BOX
Patient is a 75y old  Female who presents with a chief complaint of Back pain (20 Feb 2021 19:38)      Patient seen and examined at bedside.  There were no problems overnight and patient is without complaints this AM.  Denies cough, shortness of breath, fever, chills, myalgias or arthralgias and comprehensive review of systems was performed and all other systems were reviewed as negative      ALLERGIES:  No Known Allergies    MEDICATIONS  (STANDING):  ascorbic acid 500 milliGRAM(s) Oral daily  atorvastatin 20 milliGRAM(s) Oral at bedtime  benzocaine 15 mG/menthol 3.6 mG (Sugar-Free) Lozenge 1 Lozenge Oral four times a day  enoxaparin Injectable 40 milliGRAM(s) SubCutaneous two times a day  folic acid 1 milliGRAM(s) Oral daily  hydroxychloroquine 200 milliGRAM(s) Oral <User Schedule>  levothyroxine 200 MICROGram(s) Oral daily  lidocaine   Patch 1 Patch Transdermal daily  oxyCODONE  ER Tablet 10 milliGRAM(s) Oral every 12 hours  polyethylene glycol 3350 17 Gram(s) Oral daily  senna 2 Tablet(s) Oral at bedtime    MEDICATIONS  (PRN):  acetaminophen   Tablet .. 650 milliGRAM(s) Oral every 6 hours PRN Temp greater or equal to 38C (100.4F), Mild Pain (1 - 3)  aluminum hydroxide/magnesium hydroxide/simethicone Suspension 30 milliLiter(s) Oral every 4 hours PRN Dyspepsia  bisacodyl Suppository 10 milliGRAM(s) Rectal daily PRN Constipation  magnesium hydroxide Suspension 30 milliLiter(s) Oral daily PRN Constipation  melatonin 3 milliGRAM(s) Oral at bedtime PRN Insomnia  ondansetron   Disintegrating Tablet 4 milliGRAM(s) Oral every 6 hours PRN Nausea and/or Vomiting  oxyCODONE    IR 5 milliGRAM(s) Oral every 6 hours PRN Severe Pain (7 - 10)  simethicone 80 milliGRAM(s) Chew every 6 hours PRN Upset Stomach    Vital Signs Last 24 Hrs  T(F): 97.8 (21 Feb 2021 07:10), Max: 97.8 (20 Feb 2021 21:06)  HR: 70 (21 Feb 2021 07:10) (70 - 74)  BP: 149/79 (21 Feb 2021 07:10) (128/78 - 149/79)  RR: 14 (21 Feb 2021 07:10) (14 - 14)  SpO2: 97% (21 Feb 2021 07:10) (97% - 97%)    PHYSICAL EXAM:  General: NAD, A/O x 3  ENT: MMM  Neck: Supple, No JVD  Lungs: Clear to auscultation bilaterally  Cardio: RRR, S1/S2, No murmurs  Abdomen: Soft, Nontender, Nondistended; Bowel sounds present  Extremities: No calf tenderness, Venous stasis changes Left > right LE  Neuro: no new deficits    LABS:                        11.0   5.54  )-----------( 204      ( 19 Feb 2021 07:00 )             34.4       02-19    141  |  107  |  11  ----------------------------<  93  3.5   |  27  |  0.62    Ca    8.6      19 Feb 2021 07:00       eGFR if Non African American: 88 mL/min/1.73M2 (02-19-21 @ 07:00)  eGFR if : 102 mL/min/1.73M2 (02-19-21 @ 07:00)

## 2021-02-22 LAB
ANION GAP SERPL CALC-SCNC: 9 MMOL/L — SIGNIFICANT CHANGE UP (ref 5–17)
BUN SERPL-MCNC: 12 MG/DL — SIGNIFICANT CHANGE UP (ref 7–23)
CALCIUM SERPL-MCNC: 9.2 MG/DL — SIGNIFICANT CHANGE UP (ref 8.4–10.5)
CHLORIDE SERPL-SCNC: 106 MMOL/L — SIGNIFICANT CHANGE UP (ref 96–108)
CO2 SERPL-SCNC: 28 MMOL/L — SIGNIFICANT CHANGE UP (ref 22–31)
CREAT SERPL-MCNC: 0.67 MG/DL — SIGNIFICANT CHANGE UP (ref 0.5–1.3)
GLUCOSE SERPL-MCNC: 87 MG/DL — SIGNIFICANT CHANGE UP (ref 70–99)
HCT VFR BLD CALC: 36 % — SIGNIFICANT CHANGE UP (ref 34.5–45)
HGB BLD-MCNC: 11.3 G/DL — LOW (ref 11.5–15.5)
MCHC RBC-ENTMCNC: 28.5 PG — SIGNIFICANT CHANGE UP (ref 27–34)
MCHC RBC-ENTMCNC: 31.4 GM/DL — LOW (ref 32–36)
MCV RBC AUTO: 90.9 FL — SIGNIFICANT CHANGE UP (ref 80–100)
NRBC # BLD: 0 /100 WBCS — SIGNIFICANT CHANGE UP (ref 0–0)
PLATELET # BLD AUTO: 250 K/UL — SIGNIFICANT CHANGE UP (ref 150–400)
POTASSIUM SERPL-MCNC: 3.4 MMOL/L — LOW (ref 3.5–5.3)
POTASSIUM SERPL-SCNC: 3.4 MMOL/L — LOW (ref 3.5–5.3)
RBC # BLD: 3.96 M/UL — SIGNIFICANT CHANGE UP (ref 3.8–5.2)
RBC # FLD: 14.4 % — SIGNIFICANT CHANGE UP (ref 10.3–14.5)
SODIUM SERPL-SCNC: 143 MMOL/L — SIGNIFICANT CHANGE UP (ref 135–145)
WBC # BLD: 4.69 K/UL — SIGNIFICANT CHANGE UP (ref 3.8–10.5)
WBC # FLD AUTO: 4.69 K/UL — SIGNIFICANT CHANGE UP (ref 3.8–10.5)

## 2021-02-22 PROCEDURE — 99232 SBSQ HOSP IP/OBS MODERATE 35: CPT | Mod: CS

## 2021-02-22 RX ORDER — POTASSIUM CHLORIDE 20 MEQ
40 PACKET (EA) ORAL ONCE
Refills: 0 | Status: COMPLETED | OUTPATIENT
Start: 2021-02-22 | End: 2021-02-22

## 2021-02-22 RX ORDER — APIXABAN 2.5 MG/1
5 TABLET, FILM COATED ORAL EVERY 12 HOURS
Refills: 0 | Status: DISCONTINUED | OUTPATIENT
Start: 2021-02-23 | End: 2021-03-03

## 2021-02-22 RX ADMIN — Medication 650 MILLIGRAM(S): at 10:55

## 2021-02-22 RX ADMIN — Medication 200 MICROGRAM(S): at 06:10

## 2021-02-22 RX ADMIN — Medication 40 MILLIEQUIVALENT(S): at 12:38

## 2021-02-22 RX ADMIN — Medication 200 MILLIGRAM(S): at 07:47

## 2021-02-22 RX ADMIN — ATORVASTATIN CALCIUM 20 MILLIGRAM(S): 80 TABLET, FILM COATED ORAL at 20:11

## 2021-02-22 RX ADMIN — Medication 500 MILLIGRAM(S): at 12:38

## 2021-02-22 RX ADMIN — OXYCODONE HYDROCHLORIDE 5 MILLIGRAM(S): 5 TABLET ORAL at 08:07

## 2021-02-22 RX ADMIN — Medication 1 MILLIGRAM(S): at 12:38

## 2021-02-22 RX ADMIN — ENOXAPARIN SODIUM 40 MILLIGRAM(S): 100 INJECTION SUBCUTANEOUS at 06:10

## 2021-02-22 NOTE — PROGRESS NOTE ADULT - COMMENTS
PT seen and examined while with therapy.  She notes progress of her functional status.  O2 at 97-99% on RA at rest.  Decreases to 88-90% with ambulation - but may be d/t acrylic nail+dark polish, because would increase to 97% immediately after sitting.  Pain is tolerable, has only been taking oxycodone twice daily.  Denies constipation, LBM 2/22. PT seen and examined while with therapy.  She notes progress of her functional status.  O2 at 97-99% on RA at rest.  Decreases to 88-90% with ambulation - but may be d/t acrylic nail+dark polish, because would increase to 97% immediately after sitting.  Pain is tolerable, has only been taking oxycodone twice daily, usually right before therapy and in mid afternoon, has stopped bedtime dose.  Denies constipation, LBM 2/22.    Overall, looks improved, denies any N/V or worsening urinary symptoms

## 2021-02-22 NOTE — PROGRESS NOTE ADULT - SUBJECTIVE AND OBJECTIVE BOX
Patient is a 75y old  Female who presents with a chief complaint of Back pain (21 Feb 2021 15:25)      Patient seen and examined at bedside. No overnight events.    ALLERGIES:  No Known Allergies    MEDICATIONS  (STANDING):  ascorbic acid 500 milliGRAM(s) Oral daily  atorvastatin 20 milliGRAM(s) Oral at bedtime  benzocaine 15 mG/menthol 3.6 mG (Sugar-Free) Lozenge 1 Lozenge Oral four times a day  enoxaparin Injectable 40 milliGRAM(s) SubCutaneous two times a day  folic acid 1 milliGRAM(s) Oral daily  hydroxychloroquine 200 milliGRAM(s) Oral <User Schedule>  levothyroxine 200 MICROGram(s) Oral daily  lidocaine   Patch 1 Patch Transdermal daily  oxyCODONE  ER Tablet 10 milliGRAM(s) Oral every 12 hours  polyethylene glycol 3350 17 Gram(s) Oral daily  senna 2 Tablet(s) Oral at bedtime    MEDICATIONS  (PRN):  acetaminophen   Tablet .. 650 milliGRAM(s) Oral every 6 hours PRN Temp greater or equal to 38C (100.4F), Mild Pain (1 - 3)  aluminum hydroxide/magnesium hydroxide/simethicone Suspension 30 milliLiter(s) Oral every 4 hours PRN Dyspepsia  bisacodyl Suppository 10 milliGRAM(s) Rectal daily PRN Constipation  magnesium hydroxide Suspension 30 milliLiter(s) Oral daily PRN Constipation  melatonin 3 milliGRAM(s) Oral at bedtime PRN Insomnia  ondansetron   Disintegrating Tablet 4 milliGRAM(s) Oral every 6 hours PRN Nausea and/or Vomiting  oxyCODONE    IR 5 milliGRAM(s) Oral every 6 hours PRN Severe Pain (7 - 10)  simethicone 80 milliGRAM(s) Chew every 6 hours PRN Upset Stomach    Vital Signs Last 24 Hrs  T(F): 97.8 (21 Feb 2021 21:30), Max: 97.8 (21 Feb 2021 21:30)  HR: 72 (21 Feb 2021 21:30) (72 - 72)  BP: 134/80 (21 Feb 2021 21:30) (134/80 - 134/80)  RR: 15 (21 Feb 2021 21:30) (15 - 15)  SpO2: 98% (21 Feb 2021 21:30) (98% - 98%)  I&O's Summary    21 Feb 2021 07:01  -  22 Feb 2021 07:00  --------------------------------------------------------  IN: 0 mL / OUT: 600 mL / NET: -600 mL          PHYSICAL EXAM:  General: NAD, A/O x 3  ENT: MMM, no scleral icterus  Neck: Supple, No JVD  Lungs: Clear to auscultation bilaterally, no wheezes, rales, rhonchi  Cardio: RRR, S1/S2, No murmurs  Abdomen: Soft, Nontender, Nondistended; Bowel sounds present  Extremities: No calf tenderness, No pitting edema; lower extremity venous stasis changes    LABS:                                RADIOLOGY & ADDITIONAL TESTS:    Care Discussed with Consultants/Other Providers: Dr. Brower (physiatry)   Patient is a 75y old  Female who presents with a chief complaint of Back pain (21 Feb 2021 15:25)      Patient seen and examined at bedside. No overnight events. Had BM.    ALLERGIES:  No Known Allergies    MEDICATIONS  (STANDING):  ascorbic acid 500 milliGRAM(s) Oral daily  atorvastatin 20 milliGRAM(s) Oral at bedtime  benzocaine 15 mG/menthol 3.6 mG (Sugar-Free) Lozenge 1 Lozenge Oral four times a day  enoxaparin Injectable 40 milliGRAM(s) SubCutaneous two times a day  folic acid 1 milliGRAM(s) Oral daily  hydroxychloroquine 200 milliGRAM(s) Oral <User Schedule>  levothyroxine 200 MICROGram(s) Oral daily  lidocaine   Patch 1 Patch Transdermal daily  oxyCODONE  ER Tablet 10 milliGRAM(s) Oral every 12 hours  polyethylene glycol 3350 17 Gram(s) Oral daily  senna 2 Tablet(s) Oral at bedtime    MEDICATIONS  (PRN):  acetaminophen   Tablet .. 650 milliGRAM(s) Oral every 6 hours PRN Temp greater or equal to 38C (100.4F), Mild Pain (1 - 3)  aluminum hydroxide/magnesium hydroxide/simethicone Suspension 30 milliLiter(s) Oral every 4 hours PRN Dyspepsia  bisacodyl Suppository 10 milliGRAM(s) Rectal daily PRN Constipation  magnesium hydroxide Suspension 30 milliLiter(s) Oral daily PRN Constipation  melatonin 3 milliGRAM(s) Oral at bedtime PRN Insomnia  ondansetron   Disintegrating Tablet 4 milliGRAM(s) Oral every 6 hours PRN Nausea and/or Vomiting  oxyCODONE    IR 5 milliGRAM(s) Oral every 6 hours PRN Severe Pain (7 - 10)  simethicone 80 milliGRAM(s) Chew every 6 hours PRN Upset Stomach    Vital Signs Last 24 Hrs  T(F): 97.8 (21 Feb 2021 21:30), Max: 97.8 (21 Feb 2021 21:30)  HR: 72 (21 Feb 2021 21:30) (72 - 72)  BP: 134/80 (21 Feb 2021 21:30) (134/80 - 134/80)  RR: 15 (21 Feb 2021 21:30) (15 - 15)  SpO2: 98% (21 Feb 2021 21:30) (98% - 98%)  I&O's Summary    21 Feb 2021 07:01  -  22 Feb 2021 07:00  --------------------------------------------------------  IN: 0 mL / OUT: 600 mL / NET: -600 mL          PHYSICAL EXAM:  General: NAD, A/O x 3  ENT: MMM, no scleral icterus  Neck: Supple, No JVD  Lungs: Clear to auscultation bilaterally, no wheezes, rales, rhonchi  Cardio: RRR, S1/S2, No murmurs  Abdomen: Soft, Nontender, Nondistended; Bowel sounds present  Extremities: No calf tenderness, No pitting edema; lower extremity venous stasis changes    LABS:                              11.3   4.69  )-----------( 250      ( 22 Feb 2021 06:30 )             36.0       02-22    143  |  106  |  12  ----------------------------<  87  3.4   |  28  |  0.67    Ca    9.2      22 Feb 2021 06:30            RADIOLOGY & ADDITIONAL TESTS:    Care Discussed with Consultants/Other Providers: Dr. Brower (physiatry)

## 2021-02-22 NOTE — PROGRESS NOTE ADULT - RESPIRATORY COMMENTS
no cough, no phlegm
breath sounds equal; respirations non-labored; clear to auscultation bilaterally; reduced BS bases bilaterally
reduced BS bases bilaterally
fair+ effort, reduced sounds bases

## 2021-02-22 NOTE — PROGRESS NOTE ADULT - GASTROINTESTINAL COMMENTS
no suprapubid pain
soft; nontender; bowel sounds normal; no rebound tenderness; no TTP along ribs; no suprapubic pain

## 2021-02-22 NOTE — PROGRESS NOTE ADULT - SUBJECTIVE AND OBJECTIVE BOX
Patient is a 75y old  Female who presents with a chief complaint of Back pain (19 Feb 2021 08:07)      HPI:  75F with past medical history of HTN, HLD, SLE, RA, hypothyroidism, provoked R. popliteal DVT in 2019 on eliquis, chronic low back pain presents to ED for acute on chronic low back pain. Pt has several months of chronic lumbosacral back pain, evaluated by outpatient ortho and referred for physical therapy and pain management. She has been receiving ?vitamin spinal injections. She last received low back inj on 12/16 after which she her low back pain worsened. She took oxycodone 5 mg qd (prescribed in september during a ED visit) and acetaminophen for past three days without relief. She also endorses worsening pain during ambulation with walker and has affected her quality of life. She has constipation, last BM 4 days ago. Otherwise denies fever, chills, N/V, abdominal pain, dysuria, urinary retention, fecal incontinence, saddle anesthesia, fall, LOC, trauma. During ROS she endorsed LLE weakness for several months, no numbness or tingling and has LLE edema >RLE edema, reported undergoing LLE US 4 months ago, was negative for DVT. Currently has 8/10 pain, improved to 5/10 with oxycodone 5 mg x 2 in ED. (28 Dec 2020 11:16)   12/28: Mri T/Ls: IMPRESSION: Degenerative changes  Abnormal signal with associated enhancement is seen involving the endplates adjacent to the L2-3 disc space. There is slight increased T2 prolongation involving the L2-3 disc space with associated widening. While these findings could be compatible with degenerative changes possibly of early discitis and osteomyelitis must be considered.  Patient s/p L1-L5 laminectomy, T10-L5 posterior surgical fusion by Dr. Campos on 1/1/21  HMV x 3 drains. Post op with constipation needing aggressive bowel regimen.     PT/OT and PM&R evaluated patient and recommended Rehab.     Patient medically cleared and admitted to  Rehab on 1/12/21 (12 Jan 2021 15:21)      PAST MEDICAL & SURGICAL HISTORY:  HTN (hypertension)    DVT (deep venous thrombosis)    Hypothyroidism    RA (rheumatoid arthritis)    Obesity, Class II, BMI 35-39.9, no comorbidity    Hypothyroid    Benign heart murmur    Hyperlipidemia    Hypertension    H/O degenerative disc disease    Osteoarthritis    SLE (systemic lupus erythematosus)    Rheumatoid arthritis    S/P knee replacement    Status post total hip replacement, right    S/P thyroid surgery  1 lobe removed    Lung cancer  small tumor removed 2015    S/P knee surgery  bilateral    S/P carpal tunnel release  left    S/P eye surgery  child    S/P cataract surgery  left    S/P tonsillectomy    MEDICATIONS  (STANDING):  ascorbic acid 500 milliGRAM(s) Oral daily  atorvastatin 20 milliGRAM(s) Oral at bedtime  benzocaine 15 mG/menthol 3.6 mG (Sugar-Free) Lozenge 1 Lozenge Oral four times a day  enoxaparin Injectable 40 milliGRAM(s) SubCutaneous two times a day  folic acid 1 milliGRAM(s) Oral daily  hydroxychloroquine 200 milliGRAM(s) Oral <User Schedule>  levothyroxine 200 MICROGram(s) Oral daily  lidocaine   Patch 1 Patch Transdermal daily  oxyCODONE  ER Tablet 10 milliGRAM(s) Oral every 12 hours  polyethylene glycol 3350 17 Gram(s) Oral daily  potassium chloride    Tablet ER 40 milliEquivalent(s) Oral once  senna 2 Tablet(s) Oral at bedtime    MEDICATIONS  (PRN):  acetaminophen   Tablet .. 650 milliGRAM(s) Oral every 6 hours PRN Temp greater or equal to 38C (100.4F), Mild Pain (1 - 3)  aluminum hydroxide/magnesium hydroxide/simethicone Suspension 30 milliLiter(s) Oral every 4 hours PRN Dyspepsia  bisacodyl Suppository 10 milliGRAM(s) Rectal daily PRN Constipation  magnesium hydroxide Suspension 30 milliLiter(s) Oral daily PRN Constipation  melatonin 3 milliGRAM(s) Oral at bedtime PRN Insomnia  ondansetron   Disintegrating Tablet 4 milliGRAM(s) Oral every 6 hours PRN Nausea and/or Vomiting  oxyCODONE    IR 5 milliGRAM(s) Oral every 6 hours PRN Severe Pain (7 - 10)  simethicone 80 milliGRAM(s) Chew every 6 hours PRN Upset Stomach      Allergies    No Known Allergies    Intolerances          VITALS  75y  Vital Signs Last 24 Hrs  T(C): 36.6 (22 Feb 2021 07:46), Max: 36.6 (21 Feb 2021 21:30)  T(F): 97.8 (22 Feb 2021 07:46), Max: 97.8 (21 Feb 2021 21:30)  HR: 70 (22 Feb 2021 07:46) (70 - 72)  BP: 154/74 (22 Feb 2021 07:46) (134/80 - 154/74)  BP(mean): --  RR: 15 (22 Feb 2021 07:46) (15 - 15)  SpO2: 97% (22 Feb 2021 07:46) (97% - 98%)  Daily         RECENT LABS:               LAB                        11.3   4.69  )-----------( 250      ( 22 Feb 2021 06:30 )             36.0     02-22    143  |  106  |  12  ----------------------------<  87  3.4<L>   |  28  |  0.67    Ca    9.2      22 Feb 2021 06:30                           75F with past medical history of HTN, HLD, SLE, RA, hypothyroidism, provoked R. popliteal DVT in 2019 on eliquis, chronic low back pain presents to ED for acute on chronic low back pain. Pt has several months of chronic lumbosacral back pain, evaluated by outpatient ortho and referred for physical therapy and pain management. She has been receiving ?vitamin spinal injections. She last received low back inj on 12/16 after which she her low back pain worsened. She took oxycodone 5 mg qd (prescribed in september during a ED visit) and acetaminophen for past three days without relief. She also endorses worsening pain during ambulation with walker and has affected her quality of life. She has constipation, last BM 4 days ago. Otherwise denies fever, chills, N/V, abdominal pain, dysuria, urinary retention, fecal incontinence, saddle anesthesia, fall, LOC, trauma. During ROS she endorsed LLE weakness for several months, no numbness or tingling and has LLE edema >RLE edema, reported undergoing LLE US 4 months ago, was negative for DVT. Currently has 8/10 pain, improved to 5/10 with oxycodone 5 mg x 2 in ED. (28 Dec 2020 11:16)   12/28: Mri T/Ls: IMPRESSION: Degenerative changes    Abnormal signal with associated enhancement is seen involving the endplates adjacent to the L2-3 disc space. There is slight increased T2 prolongation involving the L2-3 disc space with associated widening. While these findings could be compatible with degenerative changes possibly of early discitis and osteomyelitis must be considered.  Patient s/p L1-L5 laminectomy, T10-L5 posterior surgical fusion by Dr. Campos on 1/1/21  HMV x 3 drains. Post op with constipation needing aggressive bowel regimen.     PT/OT and PM&R evaluated patient and recommended Rehab.     Patient medically cleared and admitted to  Rehab on 1/12/21 (12 Jan 2021 15:21)      PAST MEDICAL & SURGICAL HISTORY:  HTN (hypertension)    DVT (deep venous thrombosis)    Hypothyroidism    RA (rheumatoid arthritis)    Obesity, Class II, BMI 35-39.9, no comorbidity    Hypothyroid    Benign heart murmur    Hyperlipidemia    Hypertension    H/O degenerative disc disease    Osteoarthritis    SLE (systemic lupus erythematosus)    Rheumatoid arthritis    S/P knee replacement    Status post total hip replacement, right    S/P thyroid surgery  1 lobe removed    Lung cancer  small tumor removed 2015    S/P knee surgery  bilateral    S/P carpal tunnel release  left    S/P eye surgery  child    S/P cataract surgery  left    S/P tonsillectomy    MEDICATIONS  (STANDING):  ascorbic acid 500 milliGRAM(s) Oral daily  atorvastatin 20 milliGRAM(s) Oral at bedtime  benzocaine 15 mG/menthol 3.6 mG (Sugar-Free) Lozenge 1 Lozenge Oral four times a day  enoxaparin Injectable 40 milliGRAM(s) SubCutaneous two times a day  folic acid 1 milliGRAM(s) Oral daily  hydroxychloroquine 200 milliGRAM(s) Oral <User Schedule>  levothyroxine 200 MICROGram(s) Oral daily  lidocaine   Patch 1 Patch Transdermal daily  oxyCODONE  ER Tablet 10 milliGRAM(s) Oral every 12 hours  polyethylene glycol 3350 17 Gram(s) Oral daily  potassium chloride    Tablet ER 40 milliEquivalent(s) Oral once  senna 2 Tablet(s) Oral at bedtime    MEDICATIONS  (PRN):  acetaminophen   Tablet .. 650 milliGRAM(s) Oral every 6 hours PRN Temp greater or equal to 38C (100.4F), Mild Pain (1 - 3)  aluminum hydroxide/magnesium hydroxide/simethicone Suspension 30 milliLiter(s) Oral every 4 hours PRN Dyspepsia  bisacodyl Suppository 10 milliGRAM(s) Rectal daily PRN Constipation  magnesium hydroxide Suspension 30 milliLiter(s) Oral daily PRN Constipation  melatonin 3 milliGRAM(s) Oral at bedtime PRN Insomnia  ondansetron   Disintegrating Tablet 4 milliGRAM(s) Oral every 6 hours PRN Nausea and/or Vomiting  oxyCODONE    IR 5 milliGRAM(s) Oral every 6 hours PRN Severe Pain (7 - 10)  simethicone 80 milliGRAM(s) Chew every 6 hours PRN Upset Stomach      Allergies    No Known Allergies    Intolerances          VITALS  75y  Vital Signs Last 24 Hrs  T(C): 36.6 (22 Feb 2021 07:46), Max: 36.6 (21 Feb 2021 21:30)  T(F): 97.8 (22 Feb 2021 07:46), Max: 97.8 (21 Feb 2021 21:30)  HR: 70 (22 Feb 2021 07:46) (70 - 72)  BP: 154/74 (22 Feb 2021 07:46) (134/80 - 154/74)  BP(mean): --  RR: 15 (22 Feb 2021 07:46) (15 - 15)  SpO2: 97% (22 Feb 2021 07:46) (97% - 98%)  Daily         RECENT LABS:               LAB                        11.3   4.69  )-----------( 250      ( 22 Feb 2021 06:30 )             36.0     02-22    143  |  106  |  12  ----------------------------<  87  3.4<L>   |  28  |  0.67    Ca    9.2      22 Feb 2021 06:30

## 2021-02-22 NOTE — PROGRESS NOTE ADULT - ASSESSMENT
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 Hospital Course complicated by brief SICU stay for mechanical ventilation management, successfully extubated. Patient now admitted to [facility] for a multidisciplinary rehab program on 1/12/21.    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 Jaja Campos  -Continue comprehensive rehab program  -Pain management per primary  -Bowel regimen while on opioids    #Viral PNA 2/2 to COVID 19 - on RA  -Completed Remdesivir and Decadron  -Monitor O2 with continuos pulse ox; monitor respiratory status closely  -Maintain strict isolation precautions, use proper PPE  -Albuterol inhaler PRN    #SLE/RA  -Chronic, continue Plaquenil 200mg po daily and folic acid    #H/o provoked right popliteal, right posterior tibial DVT (7/2019 in setting of hip surgery, already completed >6 months of treatment)  -Follows with cardiologist Dr. Hoyos outpatient  -Treated with Eliquis 5mg po bid chronically -- AC stopped at [facility] as 12/29 LE dopplers neg for DVTs  -Primary team to confirm clearance regarding restarting Eliquis    #HTN  -Continue to hold home medication Losartan  -Monitor vitals - stable off anti-hypertensives    #hx of UTI  -Repeat UCx >100K E coli, patient denies urinary symptoms. ?Colonization. Afebrile, no leukocytosis.   -ID consulted, recommendations appreciated    #HLD  -Chronic, continue Lipitor    #Hypothyroidism  -Chronic, continue Levothyroxine    #Prophylactic Measure  DVT prophylaxis: Lovenox BID   75F PMH HTN, HLD, SLE, RA, hypothyroidism, provoked right popliteal DVT in 2019, chronic LBP presents to [facility] for acute on chronic LBP 12/28/20, found to have 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 Hospital Course complicated by brief SICU stay for mechanical ventilation management, successfully extubated. Patient now admitted to [facility] for a multidisciplinary rehab program on 1/12/21.    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 Jaja Campos  -Continue comprehensive rehab program  -Pain management per primary  -Bowel regimen while on opioids    #Viral PNA 2/2 to COVID 19 - on RA  -Completed Remdesivir and Decadron  -Monitor O2 with continuos pulse ox; monitor respiratory status closely  -Maintain strict isolation precautions, use proper PPE  -Albuterol inhaler PRN    #Hypokalemia  -Replete KCl 40mEq x 1  -Follow up routine BMP    #SLE/RA  -Chronic, continue Plaquenil and folic acid    #H/o provoked right popliteal, right posterior tibial DVT (7/2019 in setting of hip surgery, already completed >6 months of treatment)  -Follows with cardiologist Dr. Hoyos outpatient  -Treated with Eliquis 5mg po bid chronically -- AC stopped at [facility] as 12/29 LE dopplers neg for DVTs  -Primary team to confirm clearance regarding restarting Eliquis    #HTN  -Continue to hold home medication Losartan  -Monitor vitals - stable off anti-hypertensives    #hx of UTI  -Repeat UCx >100K E coli, patient denies urinary symptoms. ?Colonization. Afebrile, no leukocytosis.   -ID consulted, recommendations appreciated    #HLD  -Chronic, continue Lipitor    #Hypothyroidism  -Chronic, continue Levothyroxine    #Prophylactic Measure  DVT prophylaxis: Lovenox BID

## 2021-02-23 LAB
ANION GAP SERPL CALC-SCNC: 6 MMOL/L — SIGNIFICANT CHANGE UP (ref 5–17)
BUN SERPL-MCNC: 17 MG/DL — SIGNIFICANT CHANGE UP (ref 7–23)
CALCIUM SERPL-MCNC: 8.4 MG/DL — SIGNIFICANT CHANGE UP (ref 8.4–10.5)
CHLORIDE SERPL-SCNC: 107 MMOL/L — SIGNIFICANT CHANGE UP (ref 96–108)
CO2 SERPL-SCNC: 28 MMOL/L — SIGNIFICANT CHANGE UP (ref 22–31)
CREAT SERPL-MCNC: 0.75 MG/DL — SIGNIFICANT CHANGE UP (ref 0.5–1.3)
GLUCOSE SERPL-MCNC: 93 MG/DL — SIGNIFICANT CHANGE UP (ref 70–99)
POTASSIUM SERPL-MCNC: 4.2 MMOL/L — SIGNIFICANT CHANGE UP (ref 3.5–5.3)
POTASSIUM SERPL-SCNC: 4.2 MMOL/L — SIGNIFICANT CHANGE UP (ref 3.5–5.3)
SARS-COV-2 RNA SPEC QL NAA+PROBE: SIGNIFICANT CHANGE UP
SODIUM SERPL-SCNC: 141 MMOL/L — SIGNIFICANT CHANGE UP (ref 135–145)

## 2021-02-23 PROCEDURE — 99232 SBSQ HOSP IP/OBS MODERATE 35: CPT | Mod: CS

## 2021-02-23 RX ORDER — OXYCODONE HYDROCHLORIDE 5 MG/1
5 TABLET ORAL EVERY 6 HOURS
Refills: 0 | Status: DISCONTINUED | OUTPATIENT
Start: 2021-02-24 | End: 2021-03-03

## 2021-02-23 RX ORDER — OXYCODONE HYDROCHLORIDE 5 MG/1
10 TABLET ORAL EVERY 12 HOURS
Refills: 0 | Status: DISCONTINUED | OUTPATIENT
Start: 2021-02-23 | End: 2021-02-24

## 2021-02-23 RX ADMIN — Medication 500 MILLIGRAM(S): at 11:55

## 2021-02-23 RX ADMIN — Medication 200 MICROGRAM(S): at 05:39

## 2021-02-23 RX ADMIN — APIXABAN 5 MILLIGRAM(S): 2.5 TABLET, FILM COATED ORAL at 17:31

## 2021-02-23 RX ADMIN — OXYCODONE HYDROCHLORIDE 5 MILLIGRAM(S): 5 TABLET ORAL at 13:42

## 2021-02-23 RX ADMIN — POLYETHYLENE GLYCOL 3350 17 GRAM(S): 17 POWDER, FOR SOLUTION ORAL at 11:56

## 2021-02-23 RX ADMIN — Medication 200 MILLIGRAM(S): at 08:19

## 2021-02-23 RX ADMIN — Medication 1 MILLIGRAM(S): at 11:55

## 2021-02-23 RX ADMIN — LIDOCAINE 1 PATCH: 4 CREAM TOPICAL at 11:55

## 2021-02-23 RX ADMIN — LIDOCAINE 1 PATCH: 4 CREAM TOPICAL at 22:59

## 2021-02-23 RX ADMIN — ATORVASTATIN CALCIUM 20 MILLIGRAM(S): 80 TABLET, FILM COATED ORAL at 20:55

## 2021-02-23 RX ADMIN — BENZOCAINE AND MENTHOL 1 LOZENGE: 5; 1 LIQUID ORAL at 17:31

## 2021-02-23 RX ADMIN — BENZOCAINE AND MENTHOL 1 LOZENGE: 5; 1 LIQUID ORAL at 11:55

## 2021-02-23 RX ADMIN — APIXABAN 5 MILLIGRAM(S): 2.5 TABLET, FILM COATED ORAL at 05:39

## 2021-02-23 RX ADMIN — OXYCODONE HYDROCHLORIDE 10 MILLIGRAM(S): 5 TABLET ORAL at 08:20

## 2021-02-23 RX ADMIN — LIDOCAINE 1 PATCH: 4 CREAM TOPICAL at 19:30

## 2021-02-23 RX ADMIN — SENNA PLUS 2 TABLET(S): 8.6 TABLET ORAL at 20:55

## 2021-02-23 NOTE — PROGRESS NOTE ADULT - ASSESSMENT
KAI THOMAS is a 75y F PMH HTN, HLD, SLE, RA, hypothyroidism, provoked R. 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 Jaja Campos. Staples removed   - Cleared by NSGY to resume eliquis 5 mg bid . Discussed with patient, lovenox dc'd  - continue Comprehensive Rehab Program of PT/OT- 3 hrs/day 5 days/week  - Precautions:  spinal, RA, cardiac, contact and droplet precautions    #COVID19 infection  -PCR + 1/29/21. Repeat COVID swab 2/22 NEGATIVE, patient informed  - Ct A/P 2/5: 1. Pattern of lung base groundglass opacification   -CTA 2/9:  No pulmonary embolism to the segmental branches. Mild bilateral patchy airspace disease compatible with COVID-19 pneumonia.  - s/p 5 day course remdesivir, completed decadron     # h/o nausea/vomiting:  - abdominal CT -2/5: Colonic diverticulosis without CT evidence of acute diverticulitis  - AXR (-) 2/14  - resolved    #hypokalemia  - K 3.4 (2/22) - 40meq x 1  - K+ 4.2 2/23    #h/o Ecoli UTI  - s/p macrobid 100 bid x 3 days    #SLE/RA  -continue plaquenil 200mg po daily    #h/o Provoked DVT in 2019  - US July 2019:  DVT noted in the right popliteal vein and visualized segment of the right  posterior tibial vein.  - eliquis restarted 2/23 after clearance by NSGY  - Follows with vascular cardiologist Dr. Hoyos outpatient    #HTN:  - BP stable off meds     #HLD:  - continue lipitor 20mg po daily    #hypothyroidism:  - continue levothyroxine 200mcg po daily    # sleep  - melatonin 3mg PRN    #Pain Mgmt :   - Tylenol PRN mild pain  - Oxycodone 5mg IR PRN severe pain. Continue weaning  - Oxycontin ER BID (renewed 2/4)    #GI/Bowel Mgmt/ constipation:   - simethicone PRN gas  - bowel regimen: Senna / Miralax daily  - MOM and dulcolax PRN,   - BM 2/22    #FEN :  - Diet - Regular     # DVT prophylaxis :  - Lovenox BID  - LE edema:  ACE wraps LE     #Case discussed in IDT rounds 2/17:  -ambulates 75 feet with RW and CG, fluctuating to 50 feet CG, CG transfers, mod assist LB dressing set up UB dressing, min assist hygiene a  -goals: min assist LB dressing, min assist toileting, supervision transfers, CG /CS ambulation  -[facility] when bed available/authorization obtained  - will discuss with NSGY whether     #LABS:  CBC BMP 2/25       KAI THOMAS is a 75y F PMH HTN, HLD, SLE, RA, hypothyroidism, provoked R. 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 Jaja Campos. Staples removed   - Cleared by NSGY to resume eliquis 5 mg bid . Discussed with patient, lovenox dc'd  - cleared by NSGY to lift spine precautions as of 2/23  - continue Comprehensive Rehab Program of PT/OT- 3 hrs/day 5 days/week  - Precautions:  RA, cardiac, contact and droplet precautions    #COVID19 infection  -PCR + 1/29/21. Repeat COVID swab 2/22 NEGATIVE, patient informed  - Ct A/P 2/5: 1. Pattern of lung base groundglass opacification   -CTA 2/9:  No pulmonary embolism to the segmental branches. Mild bilateral patchy airspace disease compatible with COVID-19 pneumonia.  - s/p 5 day course remdesivir, completed decadron     # h/o nausea/vomiting:  - abdominal CT -2/5: Colonic diverticulosis without CT evidence of acute diverticulitis  - AXR (-) 2/14  - resolved    #hypokalemia  - K 3.4 (2/22) - 40meq x 1  - K+ 4.2 2/23    #h/o Ecoli UTI  - s/p macrobid 100 bid x 3 days    #SLE/RA  -continue plaquenil 200mg po daily    #h/o Provoked DVT in 2019  - US July 2019:  DVT noted in the right popliteal vein and visualized segment of the right  posterior tibial vein.  - eliquis restarted 2/23 after clearance by NSGY  - Follows with vascular cardiologist Dr. Hoyos outpatient    #HTN:  - BP stable off meds     #HLD:  - continue lipitor 20mg po daily    #hypothyroidism:  - continue levothyroxine 200mcg po daily    # sleep  - melatonin 3mg PRN    #Pain Mgmt :   - Tylenol PRN mild pain  - Oxycodone 5mg IR PRN severe pain. Continue weaning  - Oxycontin ER BID (renewed 2/4)    #GI/Bowel Mgmt/ constipation:   - simethicone PRN gas  - bowel regimen: Senna / Miralax daily  - MOM and dulcolax PRN,   - BM 2/22    #FEN :  - Diet - Regular     # DVT prophylaxis :  - Lovenox BID  - LE edema:  ACE wraps LE     #Case discussed in IDT rounds 2/17:  -ambulates 75 feet with RW and CG, fluctuating to 50 feet CG, CG transfers, mod assist LB dressing set up UB dressing, min assist hygiene a  -goals: min assist LB dressing, min assist toileting, supervision transfers, CG /CS ambulation  -[facility] when bed available/authorization obtained    #LABS:  CBC BMP 2/25

## 2021-02-23 NOTE — PROGRESS NOTE ADULT - ASSESSMENT
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 Hospital Course complicated by brief SICU stay for mechanical ventilation management, successfully extubated. Patient now admitted to [facility] for a multidisciplinary rehab program on 1/12/21.    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 Jaja Campos  -Continue comprehensive rehab program  -Pain management per primary  -Bowel regimen    #Viral PNA 2/2 to COVID 19 - SpO2 appropriate on RA  -Completed Remdesivir and Decadron  -Monitor O2 with continuos pulse ox; monitor respiratory status closely  -Maintain strict isolation precautions, use proper PPE  -Albuterol inhaler PRN    #Hypokalemia - resolved    #SLE/RA  -Chronic, continue Plaquenil and folic acid    #H/o provoked right popliteal, right posterior tibial DVT (7/2019 in setting of hip surgery, already completed >6 months of treatment)  -Follows with cardiologist Dr. Hoyos outpatient  -Treated with Eliquis 5mg po bid chronically -- AC stopped at [facility] as 12/29 LE dopplers neg for DVTs  -Restarted on Eliquis 5mg BID 2/23    #HTN  -Continue to hold home medication Losartan  -Monitor vitals - stable, appropriate off anti-hypertensives    #hx of UTI  -Repeat UCx >100K E coli, patient denies urinary symptoms. ?Colonization. Afebrile, no leukocytosis.   -ID consulted, recommendations appreciated    #HLD  -Chronic, continue Lipitor    #Hypothyroidism  -Chronic, continue Levothyroxine    #Prophylactic Measure  DVT prophylaxis: Eliquis 5mg BID

## 2021-02-23 NOTE — PROGRESS NOTE ADULT - SUBJECTIVE AND OBJECTIVE BOX
Patient is a 75y old  Female who presents with a chief complaint of Back pain (22 Feb 2021 12:31)      Patient seen and examined at bedside.    ALLERGIES:  No Known Allergies    MEDICATIONS  (STANDING):  apixaban 5 milliGRAM(s) Oral every 12 hours  ascorbic acid 500 milliGRAM(s) Oral daily  atorvastatin 20 milliGRAM(s) Oral at bedtime  benzocaine 15 mG/menthol 3.6 mG (Sugar-Free) Lozenge 1 Lozenge Oral four times a day  folic acid 1 milliGRAM(s) Oral daily  hydroxychloroquine 200 milliGRAM(s) Oral <User Schedule>  levothyroxine 200 MICROGram(s) Oral daily  lidocaine   Patch 1 Patch Transdermal daily  oxyCODONE  ER Tablet 10 milliGRAM(s) Oral every 12 hours  polyethylene glycol 3350 17 Gram(s) Oral daily  senna 2 Tablet(s) Oral at bedtime    MEDICATIONS  (PRN):  acetaminophen   Tablet .. 650 milliGRAM(s) Oral every 6 hours PRN Temp greater or equal to 38C (100.4F), Mild Pain (1 - 3)  aluminum hydroxide/magnesium hydroxide/simethicone Suspension 30 milliLiter(s) Oral every 4 hours PRN Dyspepsia  bisacodyl Suppository 10 milliGRAM(s) Rectal daily PRN Constipation  magnesium hydroxide Suspension 30 milliLiter(s) Oral daily PRN Constipation  melatonin 3 milliGRAM(s) Oral at bedtime PRN Insomnia  ondansetron   Disintegrating Tablet 4 milliGRAM(s) Oral every 6 hours PRN Nausea and/or Vomiting  oxyCODONE    IR 5 milliGRAM(s) Oral every 6 hours PRN Severe Pain (7 - 10)  simethicone 80 milliGRAM(s) Chew every 6 hours PRN Upset Stomach    Vital Signs Last 24 Hrs  T(F): 97.7 (23 Feb 2021 07:41), Max: 97.8 (22 Feb 2021 20:09)  HR: 67 (23 Feb 2021 07:41) (64 - 67)  BP: 147/75 (23 Feb 2021 07:41) (119/65 - 147/75)  RR: 14 (23 Feb 2021 07:41) (14 - 15)  SpO2: 98% (23 Feb 2021 07:41) (98% - 99%)  I&O's Summary    22 Feb 2021 07:01  -  23 Feb 2021 07:00  --------------------------------------------------------  IN: 0 mL / OUT: 200 mL / NET: -200 mL          PHYSICAL EXAM:  General: NAD, A/O x 3  ENT: MMM, no scleral icterus  Neck: Supple, No JVD  Lungs: Clear to auscultation bilaterally, no wheezes, rales, rhonchi  Cardio: RRR, S1/S2, No murmurs  Abdomen: Soft, Nontender, Nondistended; Bowel sounds present  Extremities: No calf tenderness, No pitting edema    LABS:                        11.3   4.69  )-----------( 250      ( 22 Feb 2021 06:30 )             36.0       02-23    141  |  107  |  17  ----------------------------<  93  4.2   |  28  |  0.75    Ca    8.4      23 Feb 2021 05:30       eGFR if Non African American: 77 mL/min/1.73M2 (02-23-21 @ 05:30)  eGFR if : 89 mL/min/1.73M2 (02-23-21 @ 05:30)                                     RADIOLOGY & ADDITIONAL TESTS:    Care Discussed with Consultants/Other Providers: Dr. Brower (physiatry)

## 2021-02-23 NOTE — PROGRESS NOTE ADULT - SUBJECTIVE AND OBJECTIVE BOX
Patient is a 75y old  Female who presents with a chief complaint of Back pain (23 Feb 2021 11:18)      HPI:  75F with past medical history of HTN, HLD, SLE, RA, hypothyroidism, provoked R. popliteal DVT in 2019 on eliquis, chronic low back pain presents to ED for acute on chronic low back pain. Pt has several months of chronic lumbosacral back pain, evaluated by outpatient ortho and referred for physical therapy and pain management. She has been receiving ?vitamin spinal injections. She last received low back inj on 12/16 after which she her low back pain worsened. She took oxycodone 5 mg qd (prescribed in september during a ED visit) and acetaminophen for past three days without relief. She also endorses worsening pain during ambulation with walker and has affected her quality of life. She has constipation, last BM 4 days ago. Otherwise denies fever, chills, N/V, abdominal pain, dysuria, urinary retention, fecal incontinence, saddle anesthesia, fall, LOC, trauma. During ROS she endorsed LLE weakness for several months, no numbness or tingling and has LLE edema >RLE edema, reported undergoing LLE US 4 months ago, was negative for DVT. Currently has 8/10 pain, improved to 5/10 with oxycodone 5 mg x 2 in ED. (28 Dec 2020 11:16)   12/28: Mri T/Ls: IMPRESSION: Degenerative changes  Abnormal signal with associated enhancement is seen involving the endplates adjacent to the L2-3 disc space. There is slight increased T2 prolongation involving the L2-3 disc space with associated widening. While these findings could be compatible with degenerative changes possibly of early discitis and osteomyelitis must be considered.  Patient s/p L1-L5 laminectomy, T10-L5 posterior surgical fusion by Dr. Campos on 1/1/21  HMV x 3 drains. Post op with constipation needing aggressive bowel regimen.     PT/OT and PM&R evaluated patient and recommended Rehab.     Patient medically cleared and admitted to  Rehab on 1/12/21 (12 Jan 2021 15:21)      PAST MEDICAL & SURGICAL HISTORY:  HTN (hypertension)    DVT (deep venous thrombosis)    Hypothyroidism    RA (rheumatoid arthritis)    Obesity, Class II, BMI 35-39.9, no comorbidity    Hypothyroid    Benign heart murmur    Hyperlipidemia    Hypertension    H/O degenerative disc disease    Osteoarthritis    SLE (systemic lupus erythematosus)    Rheumatoid arthritis    S/P knee replacement    Status post total hip replacement, right    S/P thyroid surgery  1 lobe removed    Lung cancer  small tumor removed 2015    S/P knee surgery  bilateral    S/P carpal tunnel release  left    S/P eye surgery  child    S/P cataract surgery  left    S/P tonsillectomy        MEDICATIONS  (STANDING):  apixaban 5 milliGRAM(s) Oral every 12 hours  ascorbic acid 500 milliGRAM(s) Oral daily  atorvastatin 20 milliGRAM(s) Oral at bedtime  benzocaine 15 mG/menthol 3.6 mG (Sugar-Free) Lozenge 1 Lozenge Oral four times a day  folic acid 1 milliGRAM(s) Oral daily  hydroxychloroquine 200 milliGRAM(s) Oral <User Schedule>  levothyroxine 200 MICROGram(s) Oral daily  lidocaine   Patch 1 Patch Transdermal daily  oxyCODONE  ER Tablet 10 milliGRAM(s) Oral every 12 hours  polyethylene glycol 3350 17 Gram(s) Oral daily  senna 2 Tablet(s) Oral at bedtime    MEDICATIONS  (PRN):  acetaminophen   Tablet .. 650 milliGRAM(s) Oral every 6 hours PRN Temp greater or equal to 38C (100.4F), Mild Pain (1 - 3)  aluminum hydroxide/magnesium hydroxide/simethicone Suspension 30 milliLiter(s) Oral every 4 hours PRN Dyspepsia  bisacodyl Suppository 10 milliGRAM(s) Rectal daily PRN Constipation  magnesium hydroxide Suspension 30 milliLiter(s) Oral daily PRN Constipation  melatonin 3 milliGRAM(s) Oral at bedtime PRN Insomnia  ondansetron   Disintegrating Tablet 4 milliGRAM(s) Oral every 6 hours PRN Nausea and/or Vomiting  oxyCODONE    IR 5 milliGRAM(s) Oral every 6 hours PRN Severe Pain (7 - 10)  simethicone 80 milliGRAM(s) Chew every 6 hours PRN Upset Stomach      Allergies    No Known Allergies    Intolerances          VITALS  75y  Vital Signs Last 24 Hrs  T(C): 36.5 (23 Feb 2021 07:41), Max: 36.6 (22 Feb 2021 20:09)  T(F): 97.7 (23 Feb 2021 07:41), Max: 97.8 (22 Feb 2021 20:09)  HR: 67 (23 Feb 2021 07:41) (64 - 67)  BP: 147/75 (23 Feb 2021 07:41) (119/65 - 147/75)  BP(mean): --  RR: 14 (23 Feb 2021 07:41) (14 - 15)  SpO2: 98% (23 Feb 2021 07:41) (98% - 99%)  Daily     Daily         RECENT LABS:                          11.3   4.69  )-----------( 250      ( 22 Feb 2021 06:30 )             36.0     02-23    141  |  107  |  17  ----------------------------<  93  4.2   |  28  |  0.75    Ca    8.4      23 Feb 2021 05:30                CAPILLARY BLOOD GLUCOSE          Review of Systems:   · Additional ROS	PT seen and examined in OT. working on LB dressing techniques. Limitation due to spinal precautions as well for dressing tasks; patient also continues to require  assistance in toileting /ADL tasks, which are barrier for return to community as patient lives alone    Clearance by NSGY for eliquis       Physical Exam:   · Constitutional	detailed exam  · Constitutional Comments	less anxious, although upset at idea that she may need to go to [facility]. O x 3  · Respiratory	detailed exam  · Respiratory Comments	breath sounds equal; respirations non-labored; reduced BS and fair effort  · Cardiovascular	detailed exam  · Cardiovascular Details	regular rate and rhythm  · Gastrointestinal	detailed exam  · Gastrointestinal Comments	soft; nontender; bowel sounds normal; no rebound tenderness; no TTP along ribs; no suprapubic pain  · Extremities	detailed exam  · Extremities Comments	improved softness BLE, no pitting edema  no erythema or warmth  no TTP bilateral calves

## 2021-02-24 PROCEDURE — 99232 SBSQ HOSP IP/OBS MODERATE 35: CPT | Mod: CS

## 2021-02-24 RX ADMIN — Medication 650 MILLIGRAM(S): at 17:40

## 2021-02-24 RX ADMIN — Medication 1 MILLIGRAM(S): at 12:28

## 2021-02-24 RX ADMIN — APIXABAN 5 MILLIGRAM(S): 2.5 TABLET, FILM COATED ORAL at 17:40

## 2021-02-24 RX ADMIN — Medication 500 MILLIGRAM(S): at 12:28

## 2021-02-24 RX ADMIN — APIXABAN 5 MILLIGRAM(S): 2.5 TABLET, FILM COATED ORAL at 06:37

## 2021-02-24 RX ADMIN — ATORVASTATIN CALCIUM 20 MILLIGRAM(S): 80 TABLET, FILM COATED ORAL at 20:20

## 2021-02-24 RX ADMIN — Medication 200 MICROGRAM(S): at 06:37

## 2021-02-24 RX ADMIN — LIDOCAINE 1 PATCH: 4 CREAM TOPICAL at 12:30

## 2021-02-24 RX ADMIN — SENNA PLUS 2 TABLET(S): 8.6 TABLET ORAL at 20:20

## 2021-02-24 RX ADMIN — LIDOCAINE 1 PATCH: 4 CREAM TOPICAL at 19:32

## 2021-02-24 RX ADMIN — Medication 200 MILLIGRAM(S): at 07:40

## 2021-02-24 NOTE — PROGRESS NOTE ADULT - ASSESSMENT
KAI THOMAS is a 75y F PMH HTN, HLD, SLE, RA, hypothyroidism, provoked R. 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 Jaja Campos. Staples removed   - Cleared by NSGY to resume eliquis 5 mg bid . Discussed with patient, lovenox dc'd  - cleared by NSGY to lift spine precautions as of 2/23  - continue Comprehensive Rehab Program of PT/OT- 3 hrs/day 5 days/week  - f/u on Dr Campos on dc  - Precautions:  RA, cardiac, contact and droplet precautions    #COVID19 infection  -PCR + 1/29/21. COVID swab 2/22 NEGATIVE  - Ct A/P 2/5: 1. Pattern of lung base groundglass opacification   -CTA 2/9:  No pulmonary embolism to the segmental branches. Mild bilateral patchy airspace disease compatible with COVID-19 pneumonia.  - s/p 5 day course remdesivir, completed decadron     # h/o nausea/vomiting:  - abdominal CT -2/5: Colonic diverticulosis without CT evidence of acute diverticulitis  - AXR (-) 2/14  - resolved    #h/o Ecoli UTI  - s/p macrobid 100 bid x 3 days    #SLE/RA  -continue plaquenil 200mg po daily    #h/o Provoked DVT in 2019  - US July 2019:  DVT noted in the right popliteal vein and visualized segment of the right  posterior tibial vein.  - eliquis restarted 2/23 after clearance by NSGY  - Follows with vascular cardiologist Dr. Hoyos outpatient    #HTN:  - BP stable off meds     #HLD:  - continue lipitor 20mg po daily    #hypothyroidism:  - continue levothyroxine 200mcg po daily    # sleep  - melatonin 3mg PRN    #Pain Mgmt :   - Tylenol PRN mild pain  - Oxycodone 5mg IR q6h PRN severe pain. Continue weaning  - dc oxycontin ER 2/24 patient agreeable    #GI/Bowel Mgmt/ constipation:   - simethicone PRN gas  - bowel regimen: Senna / Miralax daily  - MOM and dulcolax PRN,     #FEN :  - Diet - Regular     # DVT prophylaxis :  - Lovenox BID  - LE edema:  ACE wraps LE     #Case discussed in IDT rounds 2/24:  - CS sit to stand transfers, ambualtes 130 feet with platform walker and CS, supervision toileting , supervision toilet transfers, min assist LB dressing, min assist sock/shoe don and doff  - goals: set up/supervision ADLs, supervision trasnfers and ambulation, CG shower transfers (patient reports mostly wearing housedress and no shoes/slippers PTA)  - at this time, patient's LTG are felt to be at the supervision-CG level, and team is recommending assistance at home for these tasks. Will discuss with family private hire aide, as she has a history of recurring falls pre-op, and this will not significantly change even with [facility] stay  - target dc home 3/2 with home PT and OT and caregiver support/supervision    #LABS:  CBC BMP 2/25

## 2021-02-24 NOTE — PROGRESS NOTE ADULT - SUBJECTIVE AND OBJECTIVE BOX
Patient is a 75y old  Female who presents with a chief complaint of Back pain (24 Feb 2021 08:08)      HPI:  75F with past medical history of HTN, HLD, SLE, RA, hypothyroidism, provoked R. popliteal DVT in 2019 on eliquis, chronic low back pain presents to ED for acute on chronic low back pain. Pt has several months of chronic lumbosacral back pain, evaluated by outpatient ortho and referred for physical therapy and pain management. She has been receiving ?vitamin spinal injections. She last received low back inj on 12/16 after which she her low back pain worsened. She took oxycodone 5 mg qd (prescribed in september during a ED visit) and acetaminophen for past three days without relief. She also endorses worsening pain during ambulation with walker and has affected her quality of life. She has constipation, last BM 4 days ago. Otherwise denies fever, chills, N/V, abdominal pain, dysuria, urinary retention, fecal incontinence, saddle anesthesia, fall, LOC, trauma. During ROS she endorsed LLE weakness for several months, no numbness or tingling and has LLE edema >RLE edema, reported undergoing LLE US 4 months ago, was negative for DVT. Currently has 8/10 pain, improved to 5/10 with oxycodone 5 mg x 2 in ED. (28 Dec 2020 11:16)   12/28: Mri T/Ls: IMPRESSION: Degenerative changes  Abnormal signal with associated enhancement is seen involving the endplates adjacent to the L2-3 disc space. There is slight increased T2 prolongation involving the L2-3 disc space with associated widening. While these findings could be compatible with degenerative changes possibly of early discitis and osteomyelitis must be considered.  Patient s/p L1-L5 laminectomy, T10-L5 posterior surgical fusion by Dr. Campos on 1/1/21  HMV x 3 drains. Post op with constipation needing aggressive bowel regimen.     PT/OT and PM&R evaluated patient and recommended Rehab.     Patient medically cleared and admitted to  Rehab on 1/12/21 (12 Jan 2021 15:21)      PAST MEDICAL & SURGICAL HISTORY:  HTN (hypertension)    DVT (deep venous thrombosis)    Hypothyroidism    RA (rheumatoid arthritis)    Obesity, Class II, BMI 35-39.9, no comorbidity    Hypothyroid    Benign heart murmur    Hyperlipidemia    Hypertension    H/O degenerative disc disease    Osteoarthritis    SLE (systemic lupus erythematosus)    Rheumatoid arthritis    S/P knee replacement    Status post total hip replacement, right    S/P thyroid surgery  1 lobe removed    Lung cancer  small tumor removed 2015    S/P knee surgery  bilateral    S/P carpal tunnel release  left    S/P eye surgery  child    S/P cataract surgery  left    S/P tonsillectomy        MEDICATIONS  (STANDING):  apixaban 5 milliGRAM(s) Oral every 12 hours  ascorbic acid 500 milliGRAM(s) Oral daily  atorvastatin 20 milliGRAM(s) Oral at bedtime  benzocaine 15 mG/menthol 3.6 mG (Sugar-Free) Lozenge 1 Lozenge Oral four times a day  folic acid 1 milliGRAM(s) Oral daily  hydroxychloroquine 200 milliGRAM(s) Oral <User Schedule>  levothyroxine 200 MICROGram(s) Oral daily  lidocaine   Patch 1 Patch Transdermal daily  polyethylene glycol 3350 17 Gram(s) Oral daily  senna 2 Tablet(s) Oral at bedtime    MEDICATIONS  (PRN):  acetaminophen   Tablet .. 650 milliGRAM(s) Oral every 6 hours PRN Temp greater or equal to 38C (100.4F), Mild Pain (1 - 3)  aluminum hydroxide/magnesium hydroxide/simethicone Suspension 30 milliLiter(s) Oral every 4 hours PRN Dyspepsia  bisacodyl Suppository 10 milliGRAM(s) Rectal daily PRN Constipation  magnesium hydroxide Suspension 30 milliLiter(s) Oral daily PRN Constipation  melatonin 3 milliGRAM(s) Oral at bedtime PRN Insomnia  ondansetron   Disintegrating Tablet 4 milliGRAM(s) Oral every 6 hours PRN Nausea and/or Vomiting  oxyCODONE    IR 5 milliGRAM(s) Oral every 6 hours PRN Severe Pain (7 - 10)  simethicone 80 milliGRAM(s) Chew every 6 hours PRN Upset Stomach      Allergies    No Known Allergies    Intolerances          VITALS  75y  Vital Signs Last 24 Hrs  T(C): 36.4 (24 Feb 2021 07:48), Max: 36.7 (23 Feb 2021 20:16)  T(F): 97.6 (24 Feb 2021 07:48), Max: 98 (23 Feb 2021 20:16)  HR: 70 (24 Feb 2021 07:48) (70 - 71)  BP: 130/78 (24 Feb 2021 07:48) (130/78 - 134/76)  BP(mean): --  RR: 16 (24 Feb 2021 07:48) (15 - 16)  SpO2: 99% (24 Feb 2021 07:48) (99% - 99%)  Daily     Daily         RECENT LABS:      02-23    141  |  107  |  17  ----------------------------<  93  4.2   |  28  |  0.75    Ca    8.4      23 Feb 2021 05:30              Review of Systems:   · Additional ROS	Pt seen with PT. ambulation with platform walker and wheelchair follow. Stefanie states she has been refusing oxycontin and only taking once a day. Will switch over to IR form to continue weaning 5 mg q6PRN severe pain, p[atient agreeable    Patient does not wish to go to [facility], wishes to be dc home from IRF      Physical Exam:   · Constitutional	detailed exam  · Constitutional Comments	appears comfortable, no coughing or respiratory distress during conversation O x 3 midly anxious but stable  · Respiratory	detailed exam  · Respiratory Comments	reduced inspiratory effort no coughing  no R/R/W   · Cardiovascular	detailed exam  · Cardiovascular Details	regular rate and rhythm  · Gastrointestinal	detailed exam  · Gastrointestinal Comments	soft; nontender; bowel sounds normal; no rebound tenderness; no TTP along ribs; no suprapubic pain  · Extremities	detailed exam  · Extremities Comments	chronic skin changes and non pitting edema  no erythema or warmth or TTP          CAPILLARY BLOOD GLUCOSE

## 2021-02-24 NOTE — PROGRESS NOTE ADULT - ASSESSMENT
75F PMH HTN, HLD, SLE, RA, hypothyroidism, provoked right popliteal DVT in 2019, chronic LBP presents to 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 Hospital Course complicated by brief SICU stay for mechanical ventilation management, successfully extubated. Patient now admitted to [facility] for a multidisciplinary rehab program on 1/12/21.    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 Jaja Campos  -Continue comprehensive rehab program  -Pain management per primary  -Bowel regimen    #Viral PNA 2/2 to COVID 19 - Spo2 98-99% on RA  COVID PCR negative 2/22  -Completed Remdesivir and Decadron  -Monitor respiratory status closely  -Maintain strict isolation precautions, use proper PPE  -Albuterol inhaler PRN    #SLE/RA  -Chronic, continue Plaquenil and folic acid    #H/o provoked right popliteal, right posterior tibial DVT (7/2019 in setting of hip surgery, already completed >6 months of treatment)  -Follows with cardiologist Dr. Hoyos outpatient  -Treated with Eliquis 5mg po bid chronically -- AC stopped at [facility] as 12/29 LE dopplers neg for DVTs  -Restarted on Eliquis 5mg BID 2/23    #HTN  -Continue to hold home medication Losartan  -Monitor vitals - stable, appropriate off anti-hypertensives    #hx of UTI  -Repeat UCx >100K E coli, patient denies urinary symptoms. ?Colonization. Afebrile, no leukocytosis. s/p Macrobid 100mg BID x 3 days  -ID consulted, recommendations appreciated    #HLD  -Chronic, continue Lipitor    #Hypothyroidism  -Chronic, continue Levothyroxine    #Prophylactic Measure  DVT prophylaxis: Eliquis 5mg BID

## 2021-02-24 NOTE — PROGRESS NOTE ADULT - SUBJECTIVE AND OBJECTIVE BOX
Patient is a 75y old  Female who presents with a chief complaint of Back pain (23 Feb 2021 12:14)      Patient seen and examined at bedside. No overnight events.   Having breakfast. Participating in therapy - continues to require assistance with activities.     ALLERGIES:  No Known Allergies    MEDICATIONS  (STANDING):  apixaban 5 milliGRAM(s) Oral every 12 hours  ascorbic acid 500 milliGRAM(s) Oral daily  atorvastatin 20 milliGRAM(s) Oral at bedtime  benzocaine 15 mG/menthol 3.6 mG (Sugar-Free) Lozenge 1 Lozenge Oral four times a day  folic acid 1 milliGRAM(s) Oral daily  hydroxychloroquine 200 milliGRAM(s) Oral <User Schedule>  levothyroxine 200 MICROGram(s) Oral daily  lidocaine   Patch 1 Patch Transdermal daily  oxyCODONE  ER Tablet 10 milliGRAM(s) Oral every 12 hours  polyethylene glycol 3350 17 Gram(s) Oral daily  senna 2 Tablet(s) Oral at bedtime    MEDICATIONS  (PRN):  acetaminophen   Tablet .. 650 milliGRAM(s) Oral every 6 hours PRN Temp greater or equal to 38C (100.4F), Mild Pain (1 - 3)  aluminum hydroxide/magnesium hydroxide/simethicone Suspension 30 milliLiter(s) Oral every 4 hours PRN Dyspepsia  bisacodyl Suppository 10 milliGRAM(s) Rectal daily PRN Constipation  magnesium hydroxide Suspension 30 milliLiter(s) Oral daily PRN Constipation  melatonin 3 milliGRAM(s) Oral at bedtime PRN Insomnia  ondansetron   Disintegrating Tablet 4 milliGRAM(s) Oral every 6 hours PRN Nausea and/or Vomiting  oxyCODONE    IR 5 milliGRAM(s) Oral every 6 hours PRN Severe Pain (7 - 10)  simethicone 80 milliGRAM(s) Chew every 6 hours PRN Upset Stomach    Vital Signs Last 24 Hrs  T(F): 97.6 (24 Feb 2021 07:48), Max: 98 (23 Feb 2021 20:16)  HR: 70 (24 Feb 2021 07:48) (70 - 71)  BP: 130/78 (24 Feb 2021 07:48) (130/78 - 134/76)  RR: 16 (24 Feb 2021 07:48) (15 - 16)  SpO2: 99% (24 Feb 2021 07:48) (99% - 99%)  I&O's Summary        PHYSICAL EXAM:  General: NAD, A/O x 3  ENT: MMM, no scleral icterus  Neck: Supple, No JVD  Lungs: Clear to auscultation bilaterally, no wheezes, rales, rhonchi  Cardio: RRR, S1/S2, No murmurs  Abdomen: Soft, Nontender, Nondistended; Bowel sounds present  Extremities: No calf tenderness, No pitting edema    LABS:                        11.3   4.69  )-----------( 250      ( 22 Feb 2021 06:30 )             36.0       02-23    141  |  107  |  17  ----------------------------<  93  4.2   |  28  |  0.75    Ca    8.4      23 Feb 2021 05:30       eGFR if Non African American: 77 mL/min/1.73M2 (02-23-21 @ 05:30)  eGFR if : 89 mL/min/1.73M2 (02-23-21 @ 05:30)                 RADIOLOGY & ADDITIONAL TESTS:    Care Discussed with Consultants/Other Providers: Dr. Brower (physiatry)

## 2021-02-25 LAB
ANION GAP SERPL CALC-SCNC: 10 MMOL/L — SIGNIFICANT CHANGE UP (ref 5–17)
BUN SERPL-MCNC: 16 MG/DL — SIGNIFICANT CHANGE UP (ref 7–23)
CALCIUM SERPL-MCNC: 8.9 MG/DL — SIGNIFICANT CHANGE UP (ref 8.4–10.5)
CHLORIDE SERPL-SCNC: 105 MMOL/L — SIGNIFICANT CHANGE UP (ref 96–108)
CO2 SERPL-SCNC: 25 MMOL/L — SIGNIFICANT CHANGE UP (ref 22–31)
CREAT SERPL-MCNC: 0.75 MG/DL — SIGNIFICANT CHANGE UP (ref 0.5–1.3)
GLUCOSE SERPL-MCNC: 105 MG/DL — HIGH (ref 70–99)
HCT VFR BLD CALC: 40.4 % — SIGNIFICANT CHANGE UP (ref 34.5–45)
HGB BLD-MCNC: 12.9 G/DL — SIGNIFICANT CHANGE UP (ref 11.5–15.5)
MCHC RBC-ENTMCNC: 29.1 PG — SIGNIFICANT CHANGE UP (ref 27–34)
MCHC RBC-ENTMCNC: 31.9 GM/DL — LOW (ref 32–36)
MCV RBC AUTO: 91 FL — SIGNIFICANT CHANGE UP (ref 80–100)
NRBC # BLD: 0 /100 WBCS — SIGNIFICANT CHANGE UP (ref 0–0)
PLATELET # BLD AUTO: 240 K/UL — SIGNIFICANT CHANGE UP (ref 150–400)
POTASSIUM SERPL-MCNC: 3.8 MMOL/L — SIGNIFICANT CHANGE UP (ref 3.5–5.3)
POTASSIUM SERPL-SCNC: 3.8 MMOL/L — SIGNIFICANT CHANGE UP (ref 3.5–5.3)
RBC # BLD: 4.44 M/UL — SIGNIFICANT CHANGE UP (ref 3.8–5.2)
RBC # FLD: 14.6 % — HIGH (ref 10.3–14.5)
SODIUM SERPL-SCNC: 140 MMOL/L — SIGNIFICANT CHANGE UP (ref 135–145)
WBC # BLD: 5.61 K/UL — SIGNIFICANT CHANGE UP (ref 3.8–10.5)
WBC # FLD AUTO: 5.61 K/UL — SIGNIFICANT CHANGE UP (ref 3.8–10.5)

## 2021-02-25 PROCEDURE — 99232 SBSQ HOSP IP/OBS MODERATE 35: CPT | Mod: CS

## 2021-02-25 RX ADMIN — ATORVASTATIN CALCIUM 20 MILLIGRAM(S): 80 TABLET, FILM COATED ORAL at 21:01

## 2021-02-25 RX ADMIN — LIDOCAINE 1 PATCH: 4 CREAM TOPICAL at 20:59

## 2021-02-25 RX ADMIN — Medication 650 MILLIGRAM(S): at 14:45

## 2021-02-25 RX ADMIN — LIDOCAINE 1 PATCH: 4 CREAM TOPICAL at 01:11

## 2021-02-25 RX ADMIN — APIXABAN 5 MILLIGRAM(S): 2.5 TABLET, FILM COATED ORAL at 05:54

## 2021-02-25 RX ADMIN — Medication 1 MILLIGRAM(S): at 11:43

## 2021-02-25 RX ADMIN — Medication 500 MILLIGRAM(S): at 11:43

## 2021-02-25 RX ADMIN — LIDOCAINE 1 PATCH: 4 CREAM TOPICAL at 17:26

## 2021-02-25 RX ADMIN — APIXABAN 5 MILLIGRAM(S): 2.5 TABLET, FILM COATED ORAL at 17:26

## 2021-02-25 RX ADMIN — Medication 200 MICROGRAM(S): at 05:54

## 2021-02-25 RX ADMIN — OXYCODONE HYDROCHLORIDE 5 MILLIGRAM(S): 5 TABLET ORAL at 06:35

## 2021-02-25 RX ADMIN — SENNA PLUS 2 TABLET(S): 8.6 TABLET ORAL at 21:01

## 2021-02-25 RX ADMIN — Medication 200 MILLIGRAM(S): at 08:06

## 2021-02-25 NOTE — PROGRESS NOTE ADULT - SUBJECTIVE AND OBJECTIVE BOX
Patient is a 75y old  Female who presents with a chief complaint of Back pain (24 Feb 2021 11:25)      Patient seen and examined at bedside.    ALLERGIES:  No Known Allergies    MEDICATIONS  (STANDING):  apixaban 5 milliGRAM(s) Oral every 12 hours  ascorbic acid 500 milliGRAM(s) Oral daily  atorvastatin 20 milliGRAM(s) Oral at bedtime  benzocaine 15 mG/menthol 3.6 mG (Sugar-Free) Lozenge 1 Lozenge Oral four times a day  folic acid 1 milliGRAM(s) Oral daily  hydroxychloroquine 200 milliGRAM(s) Oral <User Schedule>  levothyroxine 200 MICROGram(s) Oral daily  lidocaine   Patch 1 Patch Transdermal daily  polyethylene glycol 3350 17 Gram(s) Oral daily  senna 2 Tablet(s) Oral at bedtime    MEDICATIONS  (PRN):  acetaminophen   Tablet .. 650 milliGRAM(s) Oral every 6 hours PRN Temp greater or equal to 38C (100.4F), Mild Pain (1 - 3)  aluminum hydroxide/magnesium hydroxide/simethicone Suspension 30 milliLiter(s) Oral every 4 hours PRN Dyspepsia  bisacodyl Suppository 10 milliGRAM(s) Rectal daily PRN Constipation  magnesium hydroxide Suspension 30 milliLiter(s) Oral daily PRN Constipation  melatonin 3 milliGRAM(s) Oral at bedtime PRN Insomnia  ondansetron   Disintegrating Tablet 4 milliGRAM(s) Oral every 6 hours PRN Nausea and/or Vomiting  oxyCODONE    IR 5 milliGRAM(s) Oral every 6 hours PRN Severe Pain (7 - 10)  simethicone 80 milliGRAM(s) Chew every 6 hours PRN Upset Stomach    Vital Signs Last 24 Hrs  T(F): 97.5 (25 Feb 2021 08:08), Max: 97.5 (24 Feb 2021 20:26)  HR: 85 (25 Feb 2021 08:08) (73 - 85)  BP: 110/74 (25 Feb 2021 08:08) (110/74 - 111/75)  RR: 16 (25 Feb 2021 08:08) (16 - 16)  SpO2: 96% (25 Feb 2021 08:08) (96% - 96%)  I&O's Summary    24 Feb 2021 07:01  -  25 Feb 2021 07:00  --------------------------------------------------------  IN: 0 mL / OUT: 700 mL / NET: -700 mL        PHYSICAL EXAM:  General: NAD, A/O x 3  ENT: MMM  Neck: Supple, No JVD  Lungs: Clear to auscultation bilaterally  Cardio: RRR, S1/S2, No murmurs  Abdomen: Soft, Nontender, Nondistended; Bowel sounds present  Extremities: No calf tenderness, No pitting edema    LABS:                        12.9   5.61  )-----------( 240      ( 25 Feb 2021 08:00 )             40.4       02-25    140  |  105  |  16  ----------------------------<  105  3.8   |  25  |  0.75    Ca    8.9      25 Feb 2021 08:00       eGFR if Non African American: 77 mL/min/1.73M2 (02-25-21 @ 08:00)  eGFR if : 90 mL/min/1.73M2 (02-25-21 @ 08:00)                                     RADIOLOGY & ADDITIONAL TESTS:    Care Discussed with Consultants/Other Providers:

## 2021-02-25 NOTE — PROGRESS NOTE ADULT - ASSESSMENT
KAI THOMAS is a 75y F PMH HTN, HLD, SLE, RA, hypothyroidism, provoked R. 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 Jaja Campos. Staples removed   - Cleared by NSGY to resume eliquis 5 mg bid . Discussed with patient, lovenox dc'd  - cleared by NSGY to lift spine precautions as of 2/23  - continue Comprehensive Rehab Program of PT/OT- 3 hrs/day 5 days/week  - f/u on Dr Campos on dc  - Precautions:  RA, cardiac, contact and droplet precautions    #COVID19 infection  -PCR + 1/29/21. COVID swab 2/22 NEGATIVE  - Ct A/P 2/5: 1. Pattern of lung base groundglass opacification   -CTA 2/9:  No pulmonary embolism to the segmental branches. Mild bilateral patchy airspace disease compatible with COVID-19 pneumonia.  - s/p 5 day course remdesivir, completed decadron     # h/o nausea/vomiting:  - abdominal CT -2/5: Colonic diverticulosis without CT evidence of acute diverticulitis  - AXR (-) 2/14  - resolved    #h/o Ecoli UTI  - s/p macrobid 100 bid x 3 days    #SLE/RA  -continue plaquenil 200mg po daily    #h/o Provoked DVT in 2019  - US July 2019:  DVT noted in the right popliteal vein and visualized segment of the right  posterior tibial vein.  - eliquis restarted 2/23 after clearance by NSGY  - Follows with vascular cardiologist Dr. Hoyos outpatient    #HTN:  - BP stable off meds     #HLD:  - continue lipitor 20mg po daily    #hypothyroidism:  - continue levothyroxine 200mcg po daily    # sleep  - melatonin 3mg PRN    #Pain Mgmt :   - Tylenol PRN mild pain  - Oxycodone 5mg IR q6h PRN severe pain. Continue weaning  - off oxycontin ER 2/24 patient agreeable    #GI/Bowel Mgmt/ constipation:   - simethicone PRN gas  - bowel regimen: Senna. DC miralax daily, increased frequency of stool with tapering narcotic pain medications 2/25  - MOM and dulcolax PRN  - Last BM 2/25    #FEN :  - Diet - Regular     # DVT prophylaxis :  - Lovenox BID  - LE edema:  ACE wraps LE     #Case discussed in IDT rounds 2/24:  - CS sit to stand transfers, ambualtes 130 feet with platform walker and CS, supervision toileting , supervision toilet transfers, min assist LB dressing, min assist sock/shoe don and doff  - goals: set up/supervision ADLs, supervision trasnfers and ambulation, CG shower transfers (patient reports mostly wearing housedress and no shoes/slippers PTA)  - at this time, patient's LTG are felt to be at the supervision-CG level, and team is recommending assistance at home for these tasks. Will discuss with family private hire aide, as she has a history of recurring falls pre-op, and this will not significantly change even with [facility] stay  - target dc home 3/2 with home PT and OT and caregiver support/supervision. Discussed with patient    #LABS 2/25 reviewed, stable    #LABS:  CBC BMP 3/2       KAI THOMAS is a 75y F PMH HTN, HLD, SLE, RA, hypothyroidism, provoked R. 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 Jaja Campos. Staples removed   - cleared by NSGY to lift spine precautions as of 2/23  - continue Comprehensive Rehab Program of PT/OT- 3 hrs/day 5 days/week  - f/u on Dr Campos on dc  - Precautions:  RA, cardiac, contact and droplet precautions    #COVID19 infection  -PCR + 1/29/21. COVID swab 2/22 NEGATIVE  - Ct A/P 2/5: 1. Pattern of lung base groundglass opacification   -CTA 2/9:  No pulmonary embolism to the segmental branches. Mild bilateral patchy airspace disease compatible with COVID-19 pneumonia.  - s/p 5 day course remdesivir, completed decadron     # h/o nausea/vomiting:  - abdominal CT -2/5: Colonic diverticulosis without CT evidence of acute diverticulitis  - AXR (-) 2/14  - resolved    #h/o Ecoli UTI  - s/p macrobid 100 bid x 3 days    #SLE/RA  -continue plaquenil 200mg po daily    #h/o Provoked DVT in 2019  - US July 2019:  DVT noted in the right popliteal vein and visualized segment of the right  posterior tibial vein.  - eliquis restarted 2/23, cleared by NSGY  - Follows with vascular cardiologist Dr. Hoyos outpatient    #HTN:  - BP stable off meds   - controlled 2/25    #HLD:  - continue lipitor 20mg po daily    #hypothyroidism:  - continue levothyroxine 200mcg po daily    # sleep  - melatonin 3mg PRN    #Pain Mgmt :   - Tylenol PRN mild pain  - Oxycodone 5mg IR q6h PRN severe pain. Continue weaning  - off oxycontin ER 2/24 patient agreeable    #GI/Bowel Mgmt/ constipation:   - simethicone PRN gas  - bowel regimen: Senna. DC miralax daily, increased frequency of stool with tapering narcotic pain medications 2/25  - MOM and dulcolax PRN  - Last BM 2/25    #FEN :  - Diet - Regular     # DVT prophylaxis :  - Lovenox BID  - LE edema:  ACE wraps LE     #Case discussed in IDT rounds 2/24:  - CS sit to stand transfers, ambualtes 130 feet with platform walker and CS, supervision toileting , supervision toilet transfers, min assist LB dressing, min assist sock/shoe don and doff  - goals: set up/supervision ADLs, supervision trasnfers and ambulation, CG shower transfers (patient reports mostly wearing housedress and no shoes/slippers PTA)  - at this time, patient's LTG are felt to be at the supervision-CG level, and team is recommending assistance at home for these tasks. Will discuss with family private hire aide, as she has a history of recurring falls pre-op, and this will not significantly change even with [facility] stay  - target dc home 3/2 with home PT and OT and caregiver support/supervision. Discussed with patient    #LABS 2/25 reviewed, stable    #LABS:  CBC BMP 3/2

## 2021-02-25 NOTE — PROGRESS NOTE ADULT - ASSESSMENT
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 Hospital Course complicated by brief SICU stay for mechanical ventilation management, successfully extubated. Patient now admitted to [facility] for a multidisciplinary rehab program on 1/12/21.    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 Jaja Campos  -Continue comprehensive rehab program  -Pain management per primary  -Bowel regimen    #Viral PNA 2/2 to COVID 19 - Spo2 98-99% on RA  COVID PCR negative 2/22  -Completed Remdesivir and Decadron  -Monitor respiratory status closely  -Maintain strict isolation precautions, use proper PPE  -Albuterol inhaler PRN    #SLE/RA  -Chronic, continue Plaquenil and folic acid    #H/o provoked right popliteal, right posterior tibial DVT (7/2019 in setting of hip surgery, already completed >6 months of treatment)  -Follows with cardiologist Dr. Hoyos outpatient  -Treated with Eliquis 5mg po bid chronically -- AC stopped at [facility] as 12/29 LE dopplers neg for DVTs  -Restarted on Eliquis 5mg BID 2/23    #HTN  -hold home medication Losartan  -Monitor vitals - stable, appropriate off anti-hypertensives    #hx of UTI  -Repeat UCx >100K E coli, patient denies urinary symptoms. ?Colonization. Afebrile, no leukocytosis. s/p Macrobid 100mg BID x 3 days  -ID consulted, recommendations appreciated    #HLD  -Chronic, continue Lipitor    #Hypothyroidism  -Chronic, continue Levothyroxine    #Prophylactic Measure  DVT prophylaxis: Eliquis 5mg BID

## 2021-02-25 NOTE — CHART NOTE - NSCHARTNOTEFT_GEN_A_CORE
NUTRITION FOLLOW UP    SOURCE: Patient [X)   Family [ ]    Medical Record (X)    DIET: DASH/TLC    Pt tolerating diet and eating well. Consuming % of meals. DASH/TLC diet education reinforced. Last BM 2/24. Continues on prunes. Encourage oral hydration.     CURRENT WEIGHT:   (2/2) 274.4lbs  Recommend obtain new weight    PERTINENT MEDS:   Pertinent Medications: MEDICATIONS  (STANDING):  apixaban 5 milliGRAM(s) Oral every 12 hours  ascorbic acid 500 milliGRAM(s) Oral daily  atorvastatin 20 milliGRAM(s) Oral at bedtime  benzocaine 15 mG/menthol 3.6 mG (Sugar-Free) Lozenge 1 Lozenge Oral four times a day  folic acid 1 milliGRAM(s) Oral daily  hydroxychloroquine 200 milliGRAM(s) Oral <User Schedule>  levothyroxine 200 MICROGram(s) Oral daily  lidocaine   Patch 1 Patch Transdermal daily  senna 2 Tablet(s) Oral at bedtime    MEDICATIONS  (PRN):  acetaminophen   Tablet .. 650 milliGRAM(s) Oral every 6 hours PRN Temp greater or equal to 38C (100.4F), Mild Pain (1 - 3)  aluminum hydroxide/magnesium hydroxide/simethicone Suspension 30 milliLiter(s) Oral every 4 hours PRN Dyspepsia  bisacodyl Suppository 10 milliGRAM(s) Rectal daily PRN Constipation  magnesium hydroxide Suspension 30 milliLiter(s) Oral daily PRN Constipation  melatonin 3 milliGRAM(s) Oral at bedtime PRN Insomnia  ondansetron   Disintegrating Tablet 4 milliGRAM(s) Oral every 6 hours PRN Nausea and/or Vomiting  oxyCODONE    IR 5 milliGRAM(s) Oral every 6 hours PRN Severe Pain (7 - 10)  simethicone 80 milliGRAM(s) Chew every 6 hours PRN Upset Stomach      PERTINENT LABS:  02-25 Na140 mmol/L Glu 105 mg/dL<H> K+ 3.8 mmol/L Cr  0.75 mg/dL BUN 16 mg/dL    SKIN:  no pressure ulcers  EDEMA: +1 bilat legs/feet  LAST BM: 2/24    ESTIMATED NEEDS:   [X] no change since previous assessment  [ ] recalculated:     PREVIOUS NUTRITION DIAGNOSIS:    1. Overweight/obesity- DASH/TLC diet. Ongoing diet education.     NUTRITION DIAGNOSIS is :  (X)  Ongoing         NEW NUTRITION DIAGNOSIS: N/A    NUTRITION RECOMMENDATIONS:   1. Continue plan of care  2. Prunes + increased oral hydration  3. Weekly weights    MONITORING AND EVALUATION:   1. Tolerance to diet prescription   2. PO intake  3. Weights  4. Labs  5. Follow Up per protocol     RD to remain available   Tayna Martinez RDN   Pager #[phone].

## 2021-02-25 NOTE — PROGRESS NOTE ADULT - SUBJECTIVE AND OBJECTIVE BOX
Patient is a 75y old  Female who presents with a chief complaint of Back pain (25 Feb 2021 09:03)      HPI:  75F with past medical history of HTN, HLD, SLE, RA, hypothyroidism, provoked R. popliteal DVT in 2019 on eliquis, chronic low back pain presents to ED for acute on chronic low back pain. Pt has several months of chronic lumbosacral back pain, evaluated by outpatient ortho and referred for physical therapy and pain management. She has been receiving ?vitamin spinal injections. She last received low back inj on 12/16 after which she her low back pain worsened. She took oxycodone 5 mg qd (prescribed in september during a ED visit) and acetaminophen for past three days without relief. She also endorses worsening pain during ambulation with walker and has affected her quality of life. She has constipation, last BM 4 days ago. Otherwise denies fever, chills, N/V, abdominal pain, dysuria, urinary retention, fecal incontinence, saddle anesthesia, fall, LOC, trauma. During ROS she endorsed LLE weakness for several months, no numbness or tingling and has LLE edema >RLE edema, reported undergoing LLE US 4 months ago, was negative for DVT. Currently has 8/10 pain, improved to 5/10 with oxycodone 5 mg x 2 in ED. (28 Dec 2020 11:16)   12/28: Mri T/Ls: IMPRESSION: Degenerative changes  Abnormal signal with associated enhancement is seen involving the endplates adjacent to the L2-3 disc space. There is slight increased T2 prolongation involving the L2-3 disc space with associated widening. While these findings could be compatible with degenerative changes possibly of early discitis and osteomyelitis must be considered.  Patient s/p L1-L5 laminectomy, T10-L5 posterior surgical fusion by Dr. Campos on 1/1/21  HMV x 3 drains. Post op with constipation needing aggressive bowel regimen.     PT/OT and PM&R evaluated patient and recommended Rehab.     Patient medically cleared and admitted to  Rehab on 1/12/21 (12 Jan 2021 15:21)      PAST MEDICAL & SURGICAL HISTORY:  HTN (hypertension)    DVT (deep venous thrombosis)    Hypothyroidism    RA (rheumatoid arthritis)    Obesity, Class II, BMI 35-39.9, no comorbidity    Hypothyroid    Benign heart murmur    Hyperlipidemia    Hypertension    H/O degenerative disc disease    Osteoarthritis    SLE (systemic lupus erythematosus)    Rheumatoid arthritis    S/P knee replacement    Status post total hip replacement, right    S/P thyroid surgery  1 lobe removed    Lung cancer  small tumor removed 2015    S/P knee surgery  bilateral    S/P carpal tunnel release  left    S/P eye surgery  child    S/P cataract surgery  left    S/P tonsillectomy        MEDICATIONS  (STANDING):  apixaban 5 milliGRAM(s) Oral every 12 hours  ascorbic acid 500 milliGRAM(s) Oral daily  atorvastatin 20 milliGRAM(s) Oral at bedtime  benzocaine 15 mG/menthol 3.6 mG (Sugar-Free) Lozenge 1 Lozenge Oral four times a day  folic acid 1 milliGRAM(s) Oral daily  hydroxychloroquine 200 milliGRAM(s) Oral <User Schedule>  levothyroxine 200 MICROGram(s) Oral daily  lidocaine   Patch 1 Patch Transdermal daily  senna 2 Tablet(s) Oral at bedtime    MEDICATIONS  (PRN):  acetaminophen   Tablet .. 650 milliGRAM(s) Oral every 6 hours PRN Temp greater or equal to 38C (100.4F), Mild Pain (1 - 3)  aluminum hydroxide/magnesium hydroxide/simethicone Suspension 30 milliLiter(s) Oral every 4 hours PRN Dyspepsia  bisacodyl Suppository 10 milliGRAM(s) Rectal daily PRN Constipation  magnesium hydroxide Suspension 30 milliLiter(s) Oral daily PRN Constipation  melatonin 3 milliGRAM(s) Oral at bedtime PRN Insomnia  ondansetron   Disintegrating Tablet 4 milliGRAM(s) Oral every 6 hours PRN Nausea and/or Vomiting  oxyCODONE    IR 5 milliGRAM(s) Oral every 6 hours PRN Severe Pain (7 - 10)  simethicone 80 milliGRAM(s) Chew every 6 hours PRN Upset Stomach      Allergies    No Known Allergies    Intolerances          VITALS  75y  Vital Signs Last 24 Hrs  T(C): 36.4 (25 Feb 2021 08:08), Max: 36.4 (24 Feb 2021 20:26)  T(F): 97.5 (25 Feb 2021 08:08), Max: 97.5 (24 Feb 2021 20:26)  HR: 85 (25 Feb 2021 08:08) (73 - 85)  BP: 110/74 (25 Feb 2021 08:08) (110/74 - 111/75)  BP(mean): --  RR: 16 (25 Feb 2021 08:08) (16 - 16)  SpO2: 96% (25 Feb 2021 08:08) (96% - 96%)  Daily     Daily         RECENT LABS:                          12.9   5.61  )-----------( 240      ( 25 Feb 2021 08:00 )             40.4     02-25    140  |  105  |  16  ----------------------------<  105<H>  3.8   |  25  |  0.75    Ca    8.9      25 Feb 2021 08:00                CAPILLARY BLOOD GLUCOSE                   Patient is a 75y old  Female who presents with a chief complaint of Back pain (25 Feb 2021 09:03)      HPI:  75F with past medical history of HTN, HLD, SLE, RA, hypothyroidism, provoked R. popliteal DVT in 2019 on eliquis, chronic low back pain presents to ED for acute on chronic low back pain. Pt has several months of chronic lumbosacral back pain, evaluated by outpatient ortho and referred for physical therapy and pain management. She has been receiving ?vitamin spinal injections. She last received low back inj on 12/16 after which she her low back pain worsened. She took oxycodone 5 mg qd (prescribed in september during a ED visit) and acetaminophen for past three days without relief. She also endorses worsening pain during ambulation with walker and has affected her quality of life. She has constipation, last BM 4 days ago. Otherwise denies fever, chills, N/V, abdominal pain, dysuria, urinary retention, fecal incontinence, saddle anesthesia, fall, LOC, trauma. During ROS she endorsed LLE weakness for several months, no numbness or tingling and has LLE edema >RLE edema, reported undergoing LLE US 4 months ago, was negative for DVT. Currently has 8/10 pain, improved to 5/10 with oxycodone 5 mg x 2 in ED. (28 Dec 2020 11:16)   12/28: Mri T/Ls: IMPRESSION: Degenerative changes  Abnormal signal with associated enhancement is seen involving the endplates adjacent to the L2-3 disc space. There is slight increased T2 prolongation involving the L2-3 disc space with associated widening. While these findings could be compatible with degenerative changes possibly of early discitis and osteomyelitis must be considered.  Patient s/p L1-L5 laminectomy, T10-L5 posterior surgical fusion by Dr. Campos on 1/1/21  HMV x 3 drains. Post op with constipation needing aggressive bowel regimen.     PT/OT and PM&R evaluated patient and recommended Rehab.     Patient medically cleared and admitted to  Rehab on 1/12/21 (12 Jan 2021 15:21)      PAST MEDICAL & SURGICAL HISTORY:  HTN (hypertension)    DVT (deep venous thrombosis)    Hypothyroidism    RA (rheumatoid arthritis)    Obesity, Class II, BMI 35-39.9, no comorbidity    Hypothyroid    Benign heart murmur    Hyperlipidemia    Hypertension    H/O degenerative disc disease    Osteoarthritis    SLE (systemic lupus erythematosus)    Rheumatoid arthritis    S/P knee replacement    Status post total hip replacement, right    S/P thyroid surgery  1 lobe removed    Lung cancer  small tumor removed 2015    S/P knee surgery  bilateral    S/P carpal tunnel release  left    S/P eye surgery  child    S/P cataract surgery  left    S/P tonsillectomy        MEDICATIONS  (STANDING):  apixaban 5 milliGRAM(s) Oral every 12 hours  ascorbic acid 500 milliGRAM(s) Oral daily  atorvastatin 20 milliGRAM(s) Oral at bedtime  benzocaine 15 mG/menthol 3.6 mG (Sugar-Free) Lozenge 1 Lozenge Oral four times a day  folic acid 1 milliGRAM(s) Oral daily  hydroxychloroquine 200 milliGRAM(s) Oral <User Schedule>  levothyroxine 200 MICROGram(s) Oral daily  lidocaine   Patch 1 Patch Transdermal daily  senna 2 Tablet(s) Oral at bedtime    MEDICATIONS  (PRN):  acetaminophen   Tablet .. 650 milliGRAM(s) Oral every 6 hours PRN Temp greater or equal to 38C (100.4F), Mild Pain (1 - 3)  aluminum hydroxide/magnesium hydroxide/simethicone Suspension 30 milliLiter(s) Oral every 4 hours PRN Dyspepsia  bisacodyl Suppository 10 milliGRAM(s) Rectal daily PRN Constipation  magnesium hydroxide Suspension 30 milliLiter(s) Oral daily PRN Constipation  melatonin 3 milliGRAM(s) Oral at bedtime PRN Insomnia  ondansetron   Disintegrating Tablet 4 milliGRAM(s) Oral every 6 hours PRN Nausea and/or Vomiting  oxyCODONE    IR 5 milliGRAM(s) Oral every 6 hours PRN Severe Pain (7 - 10)  simethicone 80 milliGRAM(s) Chew every 6 hours PRN Upset Stomach      Allergies    No Known Allergies    Intolerances          VITALS  75y  Vital Signs Last 24 Hrs  T(C): 36.4 (25 Feb 2021 08:08), Max: 36.4 (24 Feb 2021 20:26)  T(F): 97.5 (25 Feb 2021 08:08), Max: 97.5 (24 Feb 2021 20:26)  HR: 85 (25 Feb 2021 08:08) (73 - 85)  BP: 110/74 (25 Feb 2021 08:08) (110/74 - 111/75)  BP(mean): --  RR: 16 (25 Feb 2021 08:08) (16 - 16)  SpO2: 96% (25 Feb 2021 08:08) (96% - 96%)  Daily     Daily         RECENT LABS:                          12.9   5.61  )-----------( 240      ( 25 Feb 2021 08:00 )             40.4     02-25    140  |  105  |  16  ----------------------------<  105<H>  3.8   |  25  |  0.75    Ca    8.9      25 Feb 2021 08:00                CAPILLARY BLOOD GLUCOSE            Review of Systems:   · Additional ROS	Pt had BM this morning. Feels BM are more frequent, not loose, no N/V. May be associated with reduction of narcotic use, will also reduce bowel regimen accordingly, patient agreeable.    Only required 1 IR oxy yesterday. Team recommendations for assist at home for showering, toileting and supervision tx/ambualtion discussed. SW to meet with patient and family for possible private hire options      Physical Exam:   · Constitutional	detailed exam  · Constitutional Comments	alert, mood greatly improved NAD no dyspnea or cough  · Respiratory	detailed exam  · Respiratory Comments	fair effort , clear  no R/R/W   · Cardiovascular	detailed exam  · Cardiovascular Details	regular rate and rhythm  · Gastrointestinal	detailed exam  · Gastrointestinal Comments	soft; nontender; bowel sounds normal; no rebound tenderness; no TTP along ribs; no suprapubic pain  · Extremities	detailed exam  · Extremities Comments	chronic skin changes and non pitting edema  no erythema or warmth or TTP

## 2021-02-26 PROCEDURE — 99232 SBSQ HOSP IP/OBS MODERATE 35: CPT | Mod: CS

## 2021-02-26 RX ORDER — SENNA PLUS 8.6 MG/1
1 TABLET ORAL AT BEDTIME
Refills: 0 | Status: DISCONTINUED | OUTPATIENT
Start: 2021-02-26 | End: 2021-03-03

## 2021-02-26 RX ADMIN — Medication 200 MICROGRAM(S): at 05:58

## 2021-02-26 RX ADMIN — Medication 1 MILLIGRAM(S): at 12:00

## 2021-02-26 RX ADMIN — APIXABAN 5 MILLIGRAM(S): 2.5 TABLET, FILM COATED ORAL at 17:12

## 2021-02-26 RX ADMIN — OXYCODONE HYDROCHLORIDE 5 MILLIGRAM(S): 5 TABLET ORAL at 07:41

## 2021-02-26 RX ADMIN — Medication 650 MILLIGRAM(S): at 10:03

## 2021-02-26 RX ADMIN — APIXABAN 5 MILLIGRAM(S): 2.5 TABLET, FILM COATED ORAL at 05:57

## 2021-02-26 RX ADMIN — LIDOCAINE 1 PATCH: 4 CREAM TOPICAL at 05:32

## 2021-02-26 RX ADMIN — LIDOCAINE 1 PATCH: 4 CREAM TOPICAL at 19:30

## 2021-02-26 RX ADMIN — LIDOCAINE 1 PATCH: 4 CREAM TOPICAL at 12:00

## 2021-02-26 RX ADMIN — ATORVASTATIN CALCIUM 20 MILLIGRAM(S): 80 TABLET, FILM COATED ORAL at 21:20

## 2021-02-26 RX ADMIN — Medication 500 MILLIGRAM(S): at 12:00

## 2021-02-26 RX ADMIN — Medication 200 MILLIGRAM(S): at 07:41

## 2021-02-26 RX ADMIN — SENNA PLUS 1 TABLET(S): 8.6 TABLET ORAL at 21:20

## 2021-02-26 NOTE — PROGRESS NOTE ADULT - SUBJECTIVE AND OBJECTIVE BOX
Patient is a 75y old  Female who presents with a chief complaint of Back pain (26 Feb 2021 09:03)      HPI:  75F with past medical history of HTN, HLD, SLE, RA, hypothyroidism, provoked R. popliteal DVT in 2019 on eliquis, chronic low back pain presents to ED for acute on chronic low back pain. Pt has several months of chronic lumbosacral back pain, evaluated by outpatient ortho and referred for physical therapy and pain management. She has been receiving ?vitamin spinal injections. She last received low back inj on 12/16 after which she her low back pain worsened. She took oxycodone 5 mg qd (prescribed in september during a ED visit) and acetaminophen for past three days without relief. She also endorses worsening pain during ambulation with walker and has affected her quality of life. She has constipation, last BM 4 days ago. Otherwise denies fever, chills, N/V, abdominal pain, dysuria, urinary retention, fecal incontinence, saddle anesthesia, fall, LOC, trauma. During ROS she endorsed LLE weakness for several months, no numbness or tingling and has LLE edema >RLE edema, reported undergoing LLE US 4 months ago, was negative for DVT. Currently has 8/10 pain, improved to 5/10 with oxycodone 5 mg x 2 in ED. (28 Dec 2020 11:16)   12/28: Mri T/Ls: IMPRESSION: Degenerative changes  Abnormal signal with associated enhancement is seen involving the endplates adjacent to the L2-3 disc space. There is slight increased T2 prolongation involving the L2-3 disc space with associated widening. While these findings could be compatible with degenerative changes possibly of early discitis and osteomyelitis must be considered.  Patient s/p L1-L5 laminectomy, T10-L5 posterior surgical fusion by Dr. Campos on 1/1/21  HMV x 3 drains. Post op with constipation needing aggressive bowel regimen.     PT/OT and PM&R evaluated patient and recommended Rehab.     Patient medically cleared and admitted to  Rehab on 1/12/21 (12 Jan 2021 15:21)      PAST MEDICAL & SURGICAL HISTORY:  HTN (hypertension)    DVT (deep venous thrombosis)    Hypothyroidism    RA (rheumatoid arthritis)    Obesity, Class II, BMI 35-39.9, no comorbidity    Hypothyroid    Benign heart murmur    Hyperlipidemia    Hypertension    H/O degenerative disc disease    Osteoarthritis    SLE (systemic lupus erythematosus)    Rheumatoid arthritis    S/P knee replacement    Status post total hip replacement, right    S/P thyroid surgery  1 lobe removed    Lung cancer  small tumor removed 2015    S/P knee surgery  bilateral    S/P carpal tunnel release  left    S/P eye surgery  child    S/P cataract surgery  left    S/P tonsillectomy        MEDICATIONS  (STANDING):  apixaban 5 milliGRAM(s) Oral every 12 hours  ascorbic acid 500 milliGRAM(s) Oral daily  atorvastatin 20 milliGRAM(s) Oral at bedtime  benzocaine 15 mG/menthol 3.6 mG (Sugar-Free) Lozenge 1 Lozenge Oral four times a day  folic acid 1 milliGRAM(s) Oral daily  hydroxychloroquine 200 milliGRAM(s) Oral <User Schedule>  levothyroxine 200 MICROGram(s) Oral daily  lidocaine   Patch 1 Patch Transdermal daily  senna 2 Tablet(s) Oral at bedtime    MEDICATIONS  (PRN):  acetaminophen   Tablet .. 650 milliGRAM(s) Oral every 6 hours PRN Temp greater or equal to 38C (100.4F), Mild Pain (1 - 3)  aluminum hydroxide/magnesium hydroxide/simethicone Suspension 30 milliLiter(s) Oral every 4 hours PRN Dyspepsia  bisacodyl Suppository 10 milliGRAM(s) Rectal daily PRN Constipation  magnesium hydroxide Suspension 30 milliLiter(s) Oral daily PRN Constipation  melatonin 3 milliGRAM(s) Oral at bedtime PRN Insomnia  ondansetron   Disintegrating Tablet 4 milliGRAM(s) Oral every 6 hours PRN Nausea and/or Vomiting  oxyCODONE    IR 5 milliGRAM(s) Oral every 6 hours PRN Severe Pain (7 - 10)  simethicone 80 milliGRAM(s) Chew every 6 hours PRN Upset Stomach      Allergies    No Known Allergies    Intolerances          VITALS  75y  Vital Signs Last 24 Hrs  T(C): 36.7 (26 Feb 2021 07:40), Max: 36.7 (26 Feb 2021 07:40)  T(F): 98.1 (26 Feb 2021 07:40), Max: 98.1 (26 Feb 2021 07:40)  HR: 86 (26 Feb 2021 07:40) (65 - 86)  BP: 121/76 (26 Feb 2021 07:40) (106/68 - 121/76)  BP(mean): --  RR: 16 (26 Feb 2021 07:40) (16 - 16)  SpO2: 95% (26 Feb 2021 07:40) (95% - 95%)  Daily     Daily         RECENT LABS:                          12.9   5.61  )-----------( 240      ( 25 Feb 2021 08:00 )             40.4     02-25    140  |  105  |  16  ----------------------------<  105<H>  3.8   |  25  |  0.75    Ca    8.9      25 Feb 2021 08:00          Review of Systems:   · Additional ROS	Patient reports having BM this morning as well, still loose, would like to go down to 1 senna instead of 2 but not be off bowel regimen completely    Doing well, mood improved, and states left lower chest pain on underside breast nearly resolved. Trying to work on breathing exercises      Physical Exam:   · Constitutional	detailed exam  · Constitutional Comments	alert, brighter affect O x 3  · Respiratory	detailed exam  · Respiratory Comments	fair effort , clear  no R/R/W   improved overall inspiration effort although still reduced  better BS bilateral bases  · Cardiovascular	detailed exam  · Cardiovascular Details	regular rate and rhythm  · Gastrointestinal	detailed exam  · Gastrointestinal Comments	soft; nontender; bowel sounds normal; no rebound tenderness; no TTP along ribs; no suprapubic pain  · Extremities	detailed exam  · Extremities Comments	chronic skin changes and non pitting edema  no erythema or warmth or TTP    back incision well healed, lidoderm patch in place mid=back            CAPILLARY BLOOD GLUCOSE

## 2021-02-26 NOTE — PROGRESS NOTE ADULT - ASSESSMENT
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 Hospital Course complicated by brief SICU stay for mechanical ventilation management, successfully extubated. Patient now admitted to [facility] for a multidisciplinary rehab program on 1/12/21.    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 Jaja Campos  -Continue comprehensive rehab program  -Pain management per primary  -Bowel regimen    #Viral PNA 2/2 to COVID 19 - Spo2 98-99% on RA  COVID PCR negative 2/22  -Completed Remdesivir and Decadron  -Monitor respiratory status closely  -Maintain strict isolation precautions, use proper PPE  -Albuterol inhaler PRN    #SLE/RA  -Chronic, continue Plaquenil and folic acid    #H/o provoked right popliteal, right posterior tibial DVT (7/2019 in setting of hip surgery, already completed >6 months of treatment)  -Follows with cardiologist Dr. Hoyos outpatient  -Treated with Eliquis 5mg po bid chronically -- AC stopped at [facility] as 12/29 LE dopplers neg for DVTs  -Restarted on Eliquis 5mg BID 2/23    #HTN  -hold home medication Losartan  -Monitor vitals - stable, appropriate off anti-hypertensives    #hx of UTI  -Repeat UCx >100K E coli, patient denies urinary symptoms. ?Colonization. Afebrile, no leukocytosis. s/p Macrobid 100mg BID x 3 days  -ID consulted, recommendations appreciated    #HLD  -Chronic, continue Lipitor    #Hypothyroidism  -Chronic, continue Levothyroxine    #Prophylactic Measure  DVT prophylaxis: Eliquis 5mg BID

## 2021-02-26 NOTE — PROGRESS NOTE ADULT - ASSESSMENT
KAI THOMAS is a 75y F PMH HTN, HLD, SLE, RA, hypothyroidism, provoked R. 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 Jaja Campos. Staples removed   - cleared by NSGY to lift spine precautions as of 2/23  - continue Comprehensive Rehab Program of PT/OT- 3 hrs/day 5 days/week  - f/u on Dr Campos on dc  - Precautions:  RA, cardiac, contact and droplet precautions    #COVID19 infection  -PCR + 1/29/21. COVID swab 2/22 NEGATIVE  - Ct A/P 2/5: 1. Pattern of lung base groundglass opacification   -CTA 2/9:  No pulmonary embolism to the segmental branches. Mild bilateral patchy airspace disease compatible with COVID-19 pneumonia.  - s/p 5 day course remdesivir, completed decadron   - dc cepacol 2/26  - O2 sat 95% on RA    # h/o nausea/vomiting:  - abdominal CT -2/5: Colonic diverticulosis without CT evidence of acute diverticulitis  - AXR (-) 2/14  - resolved. zofran dc 2/26    #h/o Ecoli UTI  - s/p macrobid 100 bid x 3 days    #SLE/RA  -continue plaquenil 200mg po daily    #h/o Provoked DVT in 2019  - US July 2019:  DVT noted in the right popliteal vein and visualized segment of the right  posterior tibial vein.  - eliquis restarted 2/23  - Follows with vascular cardiologist Dr. Hoyos outpatient    #HTN:  - stable off meds     #HLD:  - continue lipitor 20mg po daily    #hypothyroidism:  - continue levothyroxine 200mcg po daily    # sleep  - melatonin 3mg PRN    #Pain Mgmt :   - Tylenol PRN mild pain  - Oxycodone 5mg IR q6h PRN severe pain. Continue weaning  - lidoderm patch daily PRN    #GI/Bowel Mgmt/ constipation:   - simethicone PRN gas  - bowel regimen: DC miralax 2/25, reduced senna 1 tab qhs 2/26, patient agreeable  - MOM and dulcolax PRN    #FEN :  - Diet - Regular     # DVT prophylaxis :  - Lovenox BID  - LE edema:  ACE wraps LE     #Case discussed in IDT rounds 2/24:  - CS sit to stand transfers, ambualtes 130 feet with platform walker and CS, supervision toileting , supervision toilet transfers, min assist LB dressing, min assist sock/shoe don and doff  - goals: set up/supervision ADLs, supervision trasnfers and ambulation, CG shower transfers (patient reports mostly wearing housedress and no shoes/slippers PTA)  - at this time, patient's LTG are felt to be at the supervision-CG level, and team is recommending assistance at home for these tasks. Will discuss with family private hire aide, as she has a history of recurring falls pre-op, and this will not significantly change even with [facility] stay  - target dc home 3/2 with home PT and OT and caregiver support/supervision. Discussed with patient      #LABS:  CBC BMP 3/1       KAI THOMAS is a 75y F PMH HTN, HLD, SLE, RA, hypothyroidism, provoked R. 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 Jaja Campos. Staples removed   - cleared by NSGY to lift spine precautions as of 2/23  - continue Comprehensive Rehab Program of PT/OT- 3 hrs/day 5 days/week  - f/u on Dr Campos on dc  - Precautions:  RA, cardiac, contact and droplet precautions    #COVID19 infection  -PCR + 1/29/21. COVID swab 2/22 NEGATIVE  - Ct A/P 2/5: 1. Pattern of lung base groundglass opacification   -CTA 2/9:  No pulmonary embolism to the segmental branches. Mild bilateral patchy airspace disease compatible with COVID-19 pneumonia.  - s/p 5 day course remdesivir, completed decadron   - dc cepacol 2/26  - O2 sat 95% on RA    # h/o nausea/vomiting:  - abdominal CT -2/5: Colonic diverticulosis without CT evidence of acute diverticulitis  - AXR (-) 2/14  - resolved. zofran dc 2/26    #h/o Ecoli UTI  - s/p macrobid 100 bid x 3 days    #SLE/RA  -continue plaquenil 200mg po daily    #h/o Provoked DVT in 2019  - US July 2019:  DVT noted in the right popliteal vein and visualized segment of the right  posterior tibial vein.  - eliquis restarted 2/23  - Follows with vascular cardiologist Dr. Hoyos outpatient    #HTN:  - stable off meds     #HLD:  - continue lipitor 20mg po daily    #hypothyroidism:  - continue levothyroxine 200mcg po daily    # sleep  - melatonin 3mg PRN    #Pain Mgmt :   - Tylenol PRN mild pain  - Oxycodone 5mg IR q6h PRN severe pain. Continue weaning  - lidoderm patch daily PRN    #GI/Bowel Mgmt/ constipation:   - simethicone PRN gas  - bowel regimen: DC miralax 2/25, reduced senna 1 tab qhs 2/26, patient agreeable  - MOM and dulcolax PRN    #FEN :  - Diet - Regular     # DVT prophylaxis :  - Lovenox BID  - LE edema:  ACE wraps LE     #Case discussed in IDT rounds 2/24:  - CS sit to stand transfers, ambualtes 130 feet with platform walker and CS, supervision toileting , supervision toilet transfers, min assist LB dressing, min assist sock/shoe don and doff  - goals: set up/supervision ADLs, supervision trasnfers and ambulation, CG shower transfers (patient reports mostly wearing housedress and no shoes/slippers PTA)  - at this time, patient's LTG are felt to be at the supervision-CG level, and team is recommending assistance at home for these tasks. Will discuss with family private hire aide, as she has a history of recurring falls pre-op, and this will not significantly change even with [facility] stay  - target dc home 3/2 with home PT and OT and caregiver support/supervision. Discussed with patient  - Patient would benefit from hospital bed given her recent history of lumbar spine surgery, body habitus, BMI 43.8, unable to transfer from supine to sit without HOB elevated or perform transfers in and out of bed in regular bed without assistance       #LABS:  CBC BMP 3/1

## 2021-02-26 NOTE — PROGRESS NOTE ADULT - SUBJECTIVE AND OBJECTIVE BOX
Patient is a 75y old  Female who presents with a chief complaint of Back pain (25 Feb 2021 10:41)      Patient seen and examined at bedside.    ALLERGIES:  No Known Allergies    MEDICATIONS  (STANDING):  apixaban 5 milliGRAM(s) Oral every 12 hours  ascorbic acid 500 milliGRAM(s) Oral daily  atorvastatin 20 milliGRAM(s) Oral at bedtime  benzocaine 15 mG/menthol 3.6 mG (Sugar-Free) Lozenge 1 Lozenge Oral four times a day  folic acid 1 milliGRAM(s) Oral daily  hydroxychloroquine 200 milliGRAM(s) Oral <User Schedule>  levothyroxine 200 MICROGram(s) Oral daily  lidocaine   Patch 1 Patch Transdermal daily  senna 2 Tablet(s) Oral at bedtime    MEDICATIONS  (PRN):  acetaminophen   Tablet .. 650 milliGRAM(s) Oral every 6 hours PRN Temp greater or equal to 38C (100.4F), Mild Pain (1 - 3)  aluminum hydroxide/magnesium hydroxide/simethicone Suspension 30 milliLiter(s) Oral every 4 hours PRN Dyspepsia  bisacodyl Suppository 10 milliGRAM(s) Rectal daily PRN Constipation  magnesium hydroxide Suspension 30 milliLiter(s) Oral daily PRN Constipation  melatonin 3 milliGRAM(s) Oral at bedtime PRN Insomnia  ondansetron   Disintegrating Tablet 4 milliGRAM(s) Oral every 6 hours PRN Nausea and/or Vomiting  oxyCODONE    IR 5 milliGRAM(s) Oral every 6 hours PRN Severe Pain (7 - 10)  simethicone 80 milliGRAM(s) Chew every 6 hours PRN Upset Stomach    Vital Signs Last 24 Hrs  T(F): 98.1 (26 Feb 2021 07:40), Max: 98.1 (26 Feb 2021 07:40)  HR: 86 (26 Feb 2021 07:40) (65 - 86)  BP: 121/76 (26 Feb 2021 07:40) (106/68 - 121/76)  RR: 16 (26 Feb 2021 07:40) (16 - 16)  SpO2: 95% (26 Feb 2021 07:40) (95% - 95%)  I&O's Summary      PHYSICAL EXAM:  General: NAD, A/O x 3  ENT: MMM  Neck: Supple, No JVD  Lungs: Clear to auscultation bilaterally  Cardio: RRR, S1/S2, No murmurs  Abdomen: Soft, Nontender, Nondistended; Bowel sounds present  Extremities: No calf tenderness, No pitting edema    LABS:                        12.9   5.61  )-----------( 240      ( 25 Feb 2021 08:00 )             40.4       02-25    140  |  105  |  16  ----------------------------<  105  3.8   |  25  |  0.75    Ca    8.9      25 Feb 2021 08:00       eGFR if Non African American: 77 mL/min/1.73M2 (02-25-21 @ 08:00)  eGFR if : 90 mL/min/1.73M2 (02-25-21 @ 08:00)                                     RADIOLOGY & ADDITIONAL TESTS:    Care Discussed with Consultants/Other Providers:

## 2021-02-26 NOTE — CHART NOTE - NSCHARTNOTEFT_GEN_A_CORE
Brief nutrition note: diet education reviewed in depth with pt's daughter and son via telephone. RD to remain available/follow up per protocol. Tanya Martinez RDN Pager #[phone].

## 2021-02-27 LAB
APPEARANCE UR: ABNORMAL
BILIRUB UR-MCNC: NEGATIVE — SIGNIFICANT CHANGE UP
COLOR SPEC: YELLOW — SIGNIFICANT CHANGE UP
DIFF PNL FLD: ABNORMAL
GLUCOSE UR QL: NEGATIVE — SIGNIFICANT CHANGE UP
KETONES UR-MCNC: NEGATIVE — SIGNIFICANT CHANGE UP
LEUKOCYTE ESTERASE UR-ACNC: ABNORMAL
NITRITE UR-MCNC: POSITIVE
PH UR: 6.5 — SIGNIFICANT CHANGE UP (ref 5–8)
PROT UR-MCNC: NEGATIVE — SIGNIFICANT CHANGE UP
SP GR SPEC: 1.01 — SIGNIFICANT CHANGE UP (ref 1.01–1.02)
UROBILINOGEN FLD QL: NEGATIVE — SIGNIFICANT CHANGE UP

## 2021-02-27 PROCEDURE — 99233 SBSQ HOSP IP/OBS HIGH 50: CPT | Mod: CS

## 2021-02-27 PROCEDURE — 99232 SBSQ HOSP IP/OBS MODERATE 35: CPT

## 2021-02-27 RX ORDER — CEFTRIAXONE 500 MG/1
1000 INJECTION, POWDER, FOR SOLUTION INTRAMUSCULAR; INTRAVENOUS EVERY 24 HOURS
Refills: 0 | Status: DISCONTINUED | OUTPATIENT
Start: 2021-02-27 | End: 2021-03-03

## 2021-02-27 RX ADMIN — Medication 200 MICROGRAM(S): at 06:23

## 2021-02-27 RX ADMIN — Medication 650 MILLIGRAM(S): at 07:36

## 2021-02-27 RX ADMIN — CEFTRIAXONE 100 MILLIGRAM(S): 500 INJECTION, POWDER, FOR SOLUTION INTRAMUSCULAR; INTRAVENOUS at 17:16

## 2021-02-27 RX ADMIN — SENNA PLUS 1 TABLET(S): 8.6 TABLET ORAL at 21:31

## 2021-02-27 RX ADMIN — APIXABAN 5 MILLIGRAM(S): 2.5 TABLET, FILM COATED ORAL at 06:23

## 2021-02-27 RX ADMIN — LIDOCAINE 1 PATCH: 4 CREAM TOPICAL at 00:12

## 2021-02-27 RX ADMIN — APIXABAN 5 MILLIGRAM(S): 2.5 TABLET, FILM COATED ORAL at 17:16

## 2021-02-27 RX ADMIN — Medication 1 MILLIGRAM(S): at 11:59

## 2021-02-27 RX ADMIN — Medication 200 MILLIGRAM(S): at 07:36

## 2021-02-27 RX ADMIN — Medication 500 MILLIGRAM(S): at 11:59

## 2021-02-27 RX ADMIN — ATORVASTATIN CALCIUM 20 MILLIGRAM(S): 80 TABLET, FILM COATED ORAL at 21:31

## 2021-02-27 NOTE — PROGRESS NOTE ADULT - SUBJECTIVE AND OBJECTIVE BOX
Hospitalist: Candy Blanton DO    CHIEF COMPLAINT: Patient is a 75y old  female who presents with a chief complaint of Back pain (2021 10:01)    SUBJECTIVE / OVERNIGHT EVENTS: Patient seen and examined. No acute events overnight. Pain well controlled and patient without any complaints.    MEDICATIONS  (STANDING):  apixaban 5 milliGRAM(s) Oral every 12 hours  ascorbic acid 500 milliGRAM(s) Oral daily  atorvastatin 20 milliGRAM(s) Oral at bedtime  folic acid 1 milliGRAM(s) Oral daily  hydroxychloroquine 200 milliGRAM(s) Oral <User Schedule>  levothyroxine 200 MICROGram(s) Oral daily  lidocaine   Patch 1 Patch Transdermal daily  senna 1 Tablet(s) Oral at bedtime    MEDICATIONS  (PRN):  acetaminophen   Tablet .. 650 milliGRAM(s) Oral every 6 hours PRN Temp greater or equal to 38C (100.4F), Mild Pain (1 - 3)  aluminum hydroxide/magnesium hydroxide/simethicone Suspension 30 milliLiter(s) Oral every 4 hours PRN Dyspepsia  bisacodyl Suppository 10 milliGRAM(s) Rectal daily PRN Constipation  magnesium hydroxide Suspension 30 milliLiter(s) Oral daily PRN Constipation  melatonin 3 milliGRAM(s) Oral at bedtime PRN Insomnia  oxyCODONE    IR 5 milliGRAM(s) Oral every 6 hours PRN Severe Pain (7 - 10)  simethicone 80 milliGRAM(s) Chew every 6 hours PRN Upset Stomach      VITALS:  T(F): 98 (21 @ 07:43), Max: 98 (21 @ 21:15)  HR: 75 (21 @ 07:43) (74 - 75)  BP: 134/71 (21 @ 07:43) (118/78 - 134/71)  RR: 15 (21 @ 07:43) (15 - 15)  SpO2: 98% (21 @ 07:43)      REVIEW OF SYSTEMS:  For ROV please refer back to H&P     PHYSICAL EXAM:  General: NAD, A/O x 3  ENT: MMM  Neck: Supple, No JVD  Lungs: Clear to auscultation bilaterally  Cardio: RRR, S1/S2, No murmurs  Abdomen: Soft, Nontender, Nondistended; Bowel sounds present  Extremities: No calf tenderness, No pitting edema    Urinalysis Basic - ( 2021 10:42 )    Color: Yellow / Appearance: Slightly Turbid / S.010 / pH: x  Gluc: x / Ketone: Negative  / Bili: Negative / Urobili: Negative   Blood: x / Protein: Negative / Nitrite: Positive   Leuk Esterase: Small / RBC: 11-25 /HPF / WBC 26-50 /HPF   Sq Epi: x / Non Sq Epi: Many / Bacteria: Many /HPF    CAPILLARY BLOOD GLUCOSE            MICROBIOLOGY:  Urinalysis Basic - ( 2021 10:42 )    Color: Yellow / Appearance: Slightly Turbid / S.010 / pH: x  Gluc: x / Ketone: Negative  / Bili: Negative / Urobili: Negative   Blood: x / Protein: Negative / Nitrite: Positive   Leuk Esterase: Small / RBC: 11-25 /HPF / WBC 26-50 /HPF   Sq Epi: x / Non Sq Epi: Many / Bacteria: Many /HPF            RADIOLOGY & ADDITIONAL TESTS:    Imaging Personally Reviewed:    [X] Consultant(s) Notes Reviewed:  [X] Care Discussed with Consultants/Other Providers:

## 2021-02-27 NOTE — PROGRESS NOTE ADULT - ASSESSMENT
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 Hospital Course complicated by brief SICU stay for mechanical ventilation management, successfully extubated. Patient now admitted to [facility] for a multidisciplinary rehab program on 1/12/21.    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 Jaja Campos  -Continue comprehensive rehab program  -Pain management per primary  -Bowel regimen    #Viral PNA 2/2 to COVID 19 - Spo2 98-99% on RA  COVID PCR negative 2/22  -Completed Remdesivir and Decadron  -Monitor respiratory status closely  -Maintain strict isolation precautions, use proper PPE  -Albuterol inhaler PRN    #SLE/RA  -Chronic, continue Plaquenil and folic acid    #H/o provoked right popliteal, right posterior tibial DVT (7/2019 in setting of hip surgery, already completed >6 months of treatment)  -Follows with cardiologist Dr. Hoyos outpatient  -Treated with Eliquis 5mg po bid chronically -- AC stopped at [facility] as 12/29 LE dopplers neg for DVTs  -Restarted on Eliquis 5mg BID 2/23    #HTN  -hold home medication Losartan  -Monitor vitals - stable, appropriate off anti-hypertensives    #hx of UTI  -Repeat UCx >100K E coli, patient denies urinary symptoms. ?Colonization. Afebrile, no leukocytosis. 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 Hospital Course complicated by brief SICU stay for mechanical ventilation management, successfully extubated. Patient now admitted to [facility] for a multidisciplinary rehab program on 1/12/21; hospital course complicated with positive U/A and urnary frequency     #Multilevel Degenerative Disc Disease of T and L Spine  #S/P Posterior T10-L5 instrumented fusion, L1-L5 laminectomies, L L4-L5 foraminotomy 1/1/21 by Dr. Jaja Campos  -Continue comprehensive rehab program  -Pain management per primary  -Bowel regimen    #: Concern for UTI  - Will start Ceftriaxone given U/A, 1gm PB QD  - Follow up urine Cx  - Urine cx from 02/10 was sensitive to it    #Viral PNA 2/2 to COVID 19 - Spo2 98-99% on RA  COVID PCR negative 2/22  -Completed Remdesivir and Decadron  -Monitor respiratory status closely  -Maintain strict isolation precautions, use proper PPE  -Albuterol inhaler PRN    #SLE/RA  -Chronic, continue Plaquenil and folic acid    #H/o provoked right popliteal, right posterior tibial DVT (7/2019 in setting of hip surgery, already completed >6 months of treatment)  -Follows with cardiologist Dr. Hoyos outpatient  -Treated with Eliquis 5mg po bid chronically -- AC stopped at [facility] as 12/29 LE dopplers neg for DVTs  -Restarted on Eliquis 5mg BID 2/23    #HTN  -hold home medication Losartan  -Monitor vitals - stable, appropriate off anti-hypertensives    #hx of UTI  -Repeat UCx >100K E coli, patient denies urinary symptoms. ?Colonization. Afebrile, no leukocytosis. s/p Macrobid 100mg BID x 3 days  -ID consulted, recommendations appreciated    #HLD  -Chronic, continue Lipitor    #Hypothyroidism  -Chronic, continue Levothyroxine    #Prophylactic Measure  DVT prophylaxis: Eliquis 5mg BID

## 2021-02-27 NOTE — PROGRESS NOTE ADULT - SUBJECTIVE AND OBJECTIVE BOX
Patient is a 75y old  Female who presents with a chief complaint of Back pain (26 Feb 2021 11:04)      HPI:  75F with past medical history of HTN, HLD, SLE, RA, hypothyroidism, provoked R. popliteal DVT in 2019 on eliquis, chronic low back pain presents to ED for acute on chronic low back pain. Pt has several months of chronic lumbosacral back pain, evaluated by outpatient ortho and referred for physical therapy and pain management. She has been receiving ?vitamin spinal injections. She last received low back inj on 12/16 after which she her low back pain worsened. She took oxycodone 5 mg qd (prescribed in september during a ED visit) and acetaminophen for past three days without relief. She also endorses worsening pain during ambulation with walker and has affected her quality of life. She has constipation, last BM 4 days ago. Otherwise denies fever, chills, N/V, abdominal pain, dysuria, urinary retention, fecal incontinence, saddle anesthesia, fall, LOC, trauma. During ROS she endorsed LLE weakness for several months, no numbness or tingling and has LLE edema >RLE edema, reported undergoing LLE US 4 months ago, was negative for DVT. Currently has 8/10 pain, improved to 5/10 with oxycodone 5 mg x 2 in ED. (28 Dec 2020 11:16)   12/28: Mri T/Ls: IMPRESSION: Degenerative changes  Abnormal signal with associated enhancement is seen involving the endplates adjacent to the L2-3 disc space. There is slight increased T2 prolongation involving the L2-3 disc space with associated widening. While these findings could be compatible with degenerative changes possibly of early discitis and osteomyelitis must be considered.  Patient s/p L1-L5 laminectomy, T10-L5 posterior surgical fusion by Dr. Campos on 1/1/21  HMV x 3 drains. Post op with constipation needing aggressive bowel regimen.     PT/OT and PM&R evaluated patient and recommended Rehab.     Patient medically cleared and admitted to  Rehab on 1/12/21 (12 Jan 2021 15:21)    Patient reports burning and increased frequency of urination. UA pending    REVIEW OF SYSTEMS  Constitutional - No fever, No weight loss, No fatigue  HEENT - No eye pain, No visual disturbances, No difficulty hearing, No tinnitus, No vertigo, No neck pain  Respiratory - No cough, No wheezing, No shortness of breath  Cardiovascular - No chest pain, No palpitations  Gastrointestinal - No abdominal pain, No nausea, No vomiting, No diarrhea, No constipation  Genitourinary - + dysuria, + frequency, No hematuria, No incontinence  Neurological - No headaches, No memory loss, + loss of strength, No numbness, No tremors  Skin - No itching, No rashes, No lesions   Endocrine - No temperature intolerance  Musculoskeletal - No joint pain, No joint swelling, No muscle pain  Psychiatric - No depression, No anxiety    PAST MEDICAL & SURGICAL HISTORY  HTN (hypertension)    DVT (deep venous thrombosis)    Hypothyroidism    RA (rheumatoid arthritis)    Obesity, Class II, BMI 35-39.9, no comorbidity    Hypothyroid    Benign heart murmur    Hyperlipidemia    Hypertension    H/O degenerative disc disease    Osteoarthritis    SLE (systemic lupus erythematosus)    Rheumatoid arthritis    S/P knee replacement    Status post total hip replacement, right    S/P thyroid surgery    Lung cancer    S/P knee surgery    S/P carpal tunnel release    S/P eye surgery    S/P cataract surgery    S/P tonsillectomy        FAMILY HISTORY   FHx: rheumatoid arthritis        RECENT LABS/IMAGING  none new today  ua pending      VITALS  T(C): 36.7 (02-27-21 @ 07:43), Max: 36.7 (02-26-21 @ 21:15)  HR: 75 (02-27-21 @ 07:43) (74 - 75)  BP: 134/71 (02-27-21 @ 07:43) (118/78 - 134/71)  RR: 15 (02-27-21 @ 07:43) (15 - 15)  SpO2: 98% (02-27-21 @ 07:43) (98% - 98%)  Wt(kg): --    ALLERGIES  No Known Allergies      MEDICATIONS   acetaminophen   Tablet .. 650 milliGRAM(s) Oral every 6 hours PRN  aluminum hydroxide/magnesium hydroxide/simethicone Suspension 30 milliLiter(s) Oral every 4 hours PRN  apixaban 5 milliGRAM(s) Oral every 12 hours  ascorbic acid 500 milliGRAM(s) Oral daily  atorvastatin 20 milliGRAM(s) Oral at bedtime  bisacodyl Suppository 10 milliGRAM(s) Rectal daily PRN  folic acid 1 milliGRAM(s) Oral daily  hydroxychloroquine 200 milliGRAM(s) Oral <User Schedule>  levothyroxine 200 MICROGram(s) Oral daily  lidocaine   Patch 1 Patch Transdermal daily  magnesium hydroxide Suspension 30 milliLiter(s) Oral daily PRN  melatonin 3 milliGRAM(s) Oral at bedtime PRN  oxyCODONE    IR 5 milliGRAM(s) Oral every 6 hours PRN  senna 1 Tablet(s) Oral at bedtime  simethicone 80 milliGRAM(s) Chew every 6 hours PRN      ----------------------------------------------------------------------------------------  PHYSICAL EXAM  Constitutional - NAD, Comfortable, sitting in wheelchair  HEENT - NCAT    Chest -no respiratory distress  Cardiovascular - RRR, S1S2   Abdomen -  Soft, NTND  Extremities -  No calf tenderness   Neurologic Exam -                    Cognitive - Awake, Alert, AAO to self, place, date, year, situation     Communication - Fluent, No dysarthria      Motor -                      LEFT    UE - 5/5                    RIGHT UE - 5/5                    LEFT    LE - 3/5                    RIGHT LE - 4/5     Sensory - Intact to LT       Balance - WNL Static  Psychiatric - Mood stable, Affect WNL  ----------------------------------------------------------------------------------------  ASSESSMENT/PLAN   KAI THOMAS is a 75y F PMH HTN, HLD, SLE, RA, hypothyroidism, provoked R. 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 Jaja Campos. Staples removed   - cleared by NSGY to lift spine precautions as of 2/23  - continue Comprehensive Rehab Program of PT/OT- 3 hrs/day 5 days/week  - f/u on Dr Campos on dc  - Precautions:  RA, cardiac, contact and droplet precautions    #COVID19 infection  -PCR + 1/29/21. COVID swab 2/22 NEGATIVE  - Ct A/P 2/5: 1. Pattern of lung base groundglass opacification   -CTA 2/9:  No pulmonary embolism to the segmental branches. Mild bilateral patchy airspace disease compatible with COVID-19 pneumonia.  - s/p 5 day course remdesivir, completed decadron   - O2 sat 98% on RA    # h/o nausea/vomiting: resolved  - abdominal CT -2/5: Colonic diverticulosis without CT evidence of acute diverticulitis  - AXR (-) 2/14    #h/o Ecoli UTI  - s/p macrobid 100 bid x 3 days  repeat ua, pt with new urinary frequency and dysuria 2/27    #SLE/RA  -continue plaquenil 200mg po daily    #h/o Provoked DVT in 2019  - US July 2019:  DVT noted in the right popliteal vein and visualized segment of the right  posterior tibial vein.  - eliquis restarted 2/23  - Follows with vascular cardiologist Dr. Hoyos outpatient    #HTN:  - stable off meds     #HLD:  - continue lipitor 20mg po daily    #hypothyroidism:  - continue levothyroxine 200mcg po daily    # sleep  - melatonin 3mg PRN    #Pain Mgmt :   - Tylenol PRN mild pain  - Oxycodone 5mg IR q6h PRN severe pain. Continue weaning  - lidoderm patch daily PRN    #GI/Bowel Mgmt/ constipation:   - simethicone PRN gas  - bowel regimen: DC miralax 2/25,  senna 1 tab qhs    - MOM and dulcolax PRN    #FEN :  - Diet - Regular     # DVT prophylaxis :  - Lovenox BID  - LE edema:  ACE wraps LE      #LABS:  CBC BMP 3/1

## 2021-02-28 PROCEDURE — 99233 SBSQ HOSP IP/OBS HIGH 50: CPT | Mod: CS

## 2021-02-28 RX ADMIN — Medication 200 MICROGRAM(S): at 06:23

## 2021-02-28 RX ADMIN — Medication 500 MILLIGRAM(S): at 11:57

## 2021-02-28 RX ADMIN — Medication 1 MILLIGRAM(S): at 11:57

## 2021-02-28 RX ADMIN — Medication 200 MILLIGRAM(S): at 07:56

## 2021-02-28 RX ADMIN — CEFTRIAXONE 100 MILLIGRAM(S): 500 INJECTION, POWDER, FOR SOLUTION INTRAMUSCULAR; INTRAVENOUS at 15:46

## 2021-02-28 RX ADMIN — ATORVASTATIN CALCIUM 20 MILLIGRAM(S): 80 TABLET, FILM COATED ORAL at 21:31

## 2021-02-28 RX ADMIN — Medication 650 MILLIGRAM(S): at 10:05

## 2021-02-28 RX ADMIN — SENNA PLUS 1 TABLET(S): 8.6 TABLET ORAL at 21:31

## 2021-02-28 RX ADMIN — APIXABAN 5 MILLIGRAM(S): 2.5 TABLET, FILM COATED ORAL at 16:40

## 2021-02-28 RX ADMIN — APIXABAN 5 MILLIGRAM(S): 2.5 TABLET, FILM COATED ORAL at 06:23

## 2021-02-28 RX ADMIN — Medication 650 MILLIGRAM(S): at 00:44

## 2021-02-28 RX ADMIN — MAGNESIUM HYDROXIDE 30 MILLILITER(S): 400 TABLET, CHEWABLE ORAL at 17:57

## 2021-02-28 NOTE — PROGRESS NOTE ADULT - SUBJECTIVE AND OBJECTIVE BOX
Hospitalist: Candy Blanton DO    CHIEF COMPLAINT: Patient is a 75y old  female who presents with a chief complaint of Back pain (2021 15:46)    SUBJECTIVE / OVERNIGHT EVENTS: Patient seen and examined. No acute events overnight. Pain well controlled and patient without any complaints.    MEDICATIONS  (STANDING):  apixaban 5 milliGRAM(s) Oral every 12 hours  ascorbic acid 500 milliGRAM(s) Oral daily  atorvastatin 20 milliGRAM(s) Oral at bedtime  cefTRIAXone   IVPB 1000 milliGRAM(s) IV Intermittent every 24 hours  folic acid 1 milliGRAM(s) Oral daily  hydroxychloroquine 200 milliGRAM(s) Oral <User Schedule>  levothyroxine 200 MICROGram(s) Oral daily  lidocaine   Patch 1 Patch Transdermal daily  senna 1 Tablet(s) Oral at bedtime    MEDICATIONS  (PRN):  acetaminophen   Tablet .. 650 milliGRAM(s) Oral every 6 hours PRN Temp greater or equal to 38C (100.4F), Mild Pain (1 - 3)  aluminum hydroxide/magnesium hydroxide/simethicone Suspension 30 milliLiter(s) Oral every 4 hours PRN Dyspepsia  bisacodyl Suppository 10 milliGRAM(s) Rectal daily PRN Constipation  magnesium hydroxide Suspension 30 milliLiter(s) Oral daily PRN Constipation  melatonin 3 milliGRAM(s) Oral at bedtime PRN Insomnia  oxyCODONE    IR 5 milliGRAM(s) Oral every 6 hours PRN Severe Pain (7 - 10)  simethicone 80 milliGRAM(s) Chew every 6 hours PRN Upset Stomach      VITALS:  T(F): 97.9 (21 @ 07:59), Max: 98 (21 @ 21:29)  HR: 60 (21 @ 07:59) (60 - 78)  BP: 151/81 (21 @ 07:59) (128/76 - 151/81)  RR: 15 (21 @ 07:59) (15 - 15)  SpO2: 96% (21 @ 07:59)      REVIEW OF SYSTEMS:  For ROV please refer back to H&P     PHYSICAL EXAM:  General: NAD, A/O x 3  ENT: MMM  Neck: Supple, No JVD  Lungs: Clear to auscultation bilaterally  Cardio: RRR, S1/S2, No murmurs  Abdomen: Soft, Nontender, Nondistended; Bowel sounds present  Extremities: No calf tenderness, No pitting edema  Urinalysis Basic - ( 2021 10:42 )    Color: Yellow / Appearance: Slightly Turbid / S.010 / pH: x  Gluc: x / Ketone: Negative  / Bili: Negative / Urobili: Negative   Blood: x / Protein: Negative / Nitrite: Positive   Leuk Esterase: Small / RBC: 11-25 /HPF / WBC 26-50 /HPF   Sq Epi: x / Non Sq Epi: Many / Bacteria: Many /HPF     CAPILLARY BLOOD GLUCOSE            MICROBIOLOGY:  Urinalysis Basic - ( 2021 10:42 )    Color: Yellow / Appearance: Slightly Turbid / S.010 / pH: x  Gluc: x / Ketone: Negative  / Bili: Negative / Urobili: Negative   Blood: x / Protein: Negative / Nitrite: Positive   Leuk Esterase: Small / RBC: 11-25 /HPF / WBC 26-50 /HPF   Sq Epi: x / Non Sq Epi: Many / Bacteria: Many /HPF            RADIOLOGY & ADDITIONAL TESTS:    Imaging Personally Reviewed:    [X] Consultant(s) Notes Reviewed:  [X] Care Discussed with Consultants/Other Providers:

## 2021-02-28 NOTE — PROGRESS NOTE ADULT - ASSESSMENT
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 Hospital Course complicated by brief SICU stay for mechanical ventilation management, successfully extubated. Patient now admitted to [facility] for a multidisciplinary rehab program on 1/12/21; hospital course complicated with positive U/A and urnary frequency     #Multilevel Degenerative Disc Disease of T and L Spine  #S/P Posterior T10-L5 instrumented fusion, L1-L5 laminectomies, L L4-L5 foraminotomy 1/1/21 by Dr. Jaja Campos  -Continue comprehensive rehab program  -Pain management per primary  -Bowel regimen    #: Concern for UTI  - C/w Ceftriaxone given U/A, 1gm PB QD  - Follow up urine Cx  - Urine cx from 02/10 was sensitive to it  - urinary symptoms have improved     #Viral PNA 2/2 to COVID 19 - Spo2 98-99% on RA  COVID PCR negative 2/22  -Completed Remdesivir and Decadron  -Monitor respiratory status closely  -Maintain strict isolation precautions, use proper PPE  -Albuterol inhaler PRN    #SLE/RA  -Chronic, continue Plaquenil and folic acid    #H/o provoked right popliteal, right posterior tibial DVT (7/2019 in setting of hip surgery, already completed >6 months of treatment)  -Follows with cardiologist Dr. Hoyos outpatient  -Treated with Eliquis 5mg po bid chronically -- AC stopped at [facility] as 12/29 LE dopplers neg for DVTs  -Restarted on Eliquis 5mg BID 2/23    #HTN  -hold home medication Losartan  -Monitor vitals - stable, appropriate off anti-hypertensives    #hx of UTI  -Repeat UCx >100K E coli, patient denies urinary symptoms. ?Colonization. Afebrile, no leukocytosis. s/p Macrobid 100mg BID x 3 days  -ID consulted, recommendations appreciated    #HLD  -Chronic, continue Lipitor    #Hypothyroidism  -Chronic, continue Levothyroxine    #Prophylactic Measure  DVT prophylaxis: Eliquis 5mg BID

## 2021-03-01 LAB
ALBUMIN SERPL ELPH-MCNC: 2.5 G/DL — LOW (ref 3.3–5)
ALP SERPL-CCNC: 101 U/L — SIGNIFICANT CHANGE UP (ref 40–120)
ALT FLD-CCNC: 19 U/L — SIGNIFICANT CHANGE UP (ref 10–45)
ANION GAP SERPL CALC-SCNC: 8 MMOL/L — SIGNIFICANT CHANGE UP (ref 5–17)
AST SERPL-CCNC: 22 U/L — SIGNIFICANT CHANGE UP (ref 10–40)
BILIRUB SERPL-MCNC: 0.4 MG/DL — SIGNIFICANT CHANGE UP (ref 0.2–1.2)
BUN SERPL-MCNC: 11 MG/DL — SIGNIFICANT CHANGE UP (ref 7–23)
CALCIUM SERPL-MCNC: 9.2 MG/DL — SIGNIFICANT CHANGE UP (ref 8.4–10.5)
CHLORIDE SERPL-SCNC: 107 MMOL/L — SIGNIFICANT CHANGE UP (ref 96–108)
CO2 SERPL-SCNC: 28 MMOL/L — SIGNIFICANT CHANGE UP (ref 22–31)
CREAT SERPL-MCNC: 0.62 MG/DL — SIGNIFICANT CHANGE UP (ref 0.5–1.3)
GLUCOSE SERPL-MCNC: 91 MG/DL — SIGNIFICANT CHANGE UP (ref 70–99)
HCT VFR BLD CALC: 38.5 % — SIGNIFICANT CHANGE UP (ref 34.5–45)
HGB BLD-MCNC: 12 G/DL — SIGNIFICANT CHANGE UP (ref 11.5–15.5)
MCHC RBC-ENTMCNC: 28.8 PG — SIGNIFICANT CHANGE UP (ref 27–34)
MCHC RBC-ENTMCNC: 31.2 GM/DL — LOW (ref 32–36)
MCV RBC AUTO: 92.3 FL — SIGNIFICANT CHANGE UP (ref 80–100)
NRBC # BLD: 0 /100 WBCS — SIGNIFICANT CHANGE UP (ref 0–0)
PLATELET # BLD AUTO: 315 K/UL — SIGNIFICANT CHANGE UP (ref 150–400)
POTASSIUM SERPL-MCNC: 3.8 MMOL/L — SIGNIFICANT CHANGE UP (ref 3.5–5.3)
POTASSIUM SERPL-SCNC: 3.8 MMOL/L — SIGNIFICANT CHANGE UP (ref 3.5–5.3)
PROT SERPL-MCNC: 6.2 G/DL — SIGNIFICANT CHANGE UP (ref 6–8.3)
RBC # BLD: 4.17 M/UL — SIGNIFICANT CHANGE UP (ref 3.8–5.2)
RBC # FLD: 14.6 % — HIGH (ref 10.3–14.5)
SODIUM SERPL-SCNC: 143 MMOL/L — SIGNIFICANT CHANGE UP (ref 135–145)
WBC # BLD: 4.92 K/UL — SIGNIFICANT CHANGE UP (ref 3.8–10.5)
WBC # FLD AUTO: 4.92 K/UL — SIGNIFICANT CHANGE UP (ref 3.8–10.5)

## 2021-03-01 PROCEDURE — 99232 SBSQ HOSP IP/OBS MODERATE 35: CPT | Mod: CS

## 2021-03-01 RX ADMIN — SENNA PLUS 1 TABLET(S): 8.6 TABLET ORAL at 21:06

## 2021-03-01 RX ADMIN — APIXABAN 5 MILLIGRAM(S): 2.5 TABLET, FILM COATED ORAL at 17:29

## 2021-03-01 RX ADMIN — OXYCODONE HYDROCHLORIDE 5 MILLIGRAM(S): 5 TABLET ORAL at 08:04

## 2021-03-01 RX ADMIN — Medication 650 MILLIGRAM(S): at 14:54

## 2021-03-01 RX ADMIN — Medication 200 MILLIGRAM(S): at 07:25

## 2021-03-01 RX ADMIN — Medication 500 MILLIGRAM(S): at 11:37

## 2021-03-01 RX ADMIN — APIXABAN 5 MILLIGRAM(S): 2.5 TABLET, FILM COATED ORAL at 06:07

## 2021-03-01 RX ADMIN — Medication 650 MILLIGRAM(S): at 06:07

## 2021-03-01 RX ADMIN — CEFTRIAXONE 100 MILLIGRAM(S): 500 INJECTION, POWDER, FOR SOLUTION INTRAMUSCULAR; INTRAVENOUS at 17:29

## 2021-03-01 RX ADMIN — Medication 200 MICROGRAM(S): at 06:07

## 2021-03-01 RX ADMIN — ATORVASTATIN CALCIUM 20 MILLIGRAM(S): 80 TABLET, FILM COATED ORAL at 21:06

## 2021-03-01 RX ADMIN — Medication 1 MILLIGRAM(S): at 11:37

## 2021-03-01 NOTE — PROGRESS NOTE ADULT - COMMENTS
Patient reports discomfort recurring again left side upper quadrant, under breast . Had BM yesterday, stable with senna and MOM PRN. Scheduled for dc home this week, possibly tomorrow or Wed. Patient reports not having PCP, will need to establish. f/u with pulmonary for COVID also discussed    Has continued to wean down oxycodone, did not require over weekend. took one prior to therapies this morning

## 2021-03-01 NOTE — PROGRESS NOTE ADULT - RS GEN PE MLT RESP DETAILS PC
respirations non-labored/clear to auscultation bilaterally
breath sounds equal/good air movement/respirations non-labored/clear to auscultation bilaterally
good effort/respirations non-labored/clear to auscultation bilaterally/no rales/no rhonchi/no wheezes
breath sounds equal/respirations non-labored/clear to auscultation bilaterally
respirations non-labored/clear to auscultation bilaterally

## 2021-03-01 NOTE — PROGRESS NOTE ADULT - GASTROINTESTINAL DETAILS
no TTP along ribs/soft/nontender/bowel sounds normal/no rebound tenderness
distended, obese, soft/soft/nontender/bowel sounds normal/no rebound tenderness/no guarding
soft/nontender/bowel sounds normal
soft/nontender/bowel sounds normal
soft/nontender/no rebound tenderness/no guarding/no rigidity

## 2021-03-01 NOTE — PROGRESS NOTE ADULT - ASSESSMENT
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 Hospital Course complicated by brief SICU stay for mechanical ventilation management, successfully extubated. 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 Jaja Campos  -Continue comprehensive rehab program  -Pain management per primary  -Bowel regimen    #: Concern for UTI  - C/w Ceftriaxone given U/A, 1gm PB QD - total of 5 days - last day 3/4/21  - Follow up urine Cx sensitivities  - Urine cx from 02/10 was sensitive to CTX  - urinary symptoms have improved    #Viral PNA 2/2 to COVID 19 - Spo2 98-99% on RA  COVID PCR negative 2/22  -Completed Remdesivir and Decadron  -Monitor respiratory status closely  -Maintain strict isolation precautions, use proper PPE  -Albuterol inhaler PRN    #SLE/RA  -Chronic, continue Plaquenil and folic acid    #H/o provoked right popliteal, right posterior tibial DVT (7/2019 in setting of hip surgery, already completed >6 months of treatment)  -Follows with cardiologist Dr. Hoyos outpatient  -Treated with Eliquis 5mg po bid chronically -- AC stopped at [facility] as 12/29 LE dopplers neg for DVTs  -Restarted on Eliquis 5mg BID 2/23    #HTN  -hold home medication Losartan  -Monitor vitals - stable, appropriate off anti-hypertensives    #hx of UTI  -Repeat UCx >100K E coli, patient denies urinary symptoms. ?Colonization. Afebrile, no leukocytosis. s/p Macrobid 100mg BID x 3 days  -ID consulted, recommendations appreciated    #HLD  -Chronic, continue Lipitor    #Hypothyroidism  -Chronic, continue Levothyroxine    #Prophylactic Measure  DVT prophylaxis: Eliquis 5mg BID

## 2021-03-01 NOTE — PROGRESS NOTE ADULT - SUBJECTIVE AND OBJECTIVE BOX
Patient is a 75y old  Female who presents with a chief complaint of Back pain (01 Mar 2021 09:31)      HPI:  75F with past medical history of HTN, HLD, SLE, RA, hypothyroidism, provoked R. popliteal DVT in 2019 on eliquis, chronic low back pain presents to ED for acute on chronic low back pain. Pt has several months of chronic lumbosacral back pain, evaluated by outpatient ortho and referred for physical therapy and pain management. She has been receiving ?vitamin spinal injections. She last received low back inj on 12/16 after which she her low back pain worsened. She took oxycodone 5 mg qd (prescribed in september during a ED visit) and acetaminophen for past three days without relief. She also endorses worsening pain during ambulation with walker and has affected her quality of life. She has constipation, last BM 4 days ago. Otherwise denies fever, chills, N/V, abdominal pain, dysuria, urinary retention, fecal incontinence, saddle anesthesia, fall, LOC, trauma. During ROS she endorsed LLE weakness for several months, no numbness or tingling and has LLE edema >RLE edema, reported undergoing LLE US 4 months ago, was negative for DVT. Currently has 8/10 pain, improved to 5/10 with oxycodone 5 mg x 2 in ED. (28 Dec 2020 11:16)   12/28: Mri T/Ls: IMPRESSION: Degenerative changes  Abnormal signal with associated enhancement is seen involving the endplates adjacent to the L2-3 disc space. There is slight increased T2 prolongation involving the L2-3 disc space with associated widening. While these findings could be compatible with degenerative changes possibly of early discitis and osteomyelitis must be considered.  Patient s/p L1-L5 laminectomy, T10-L5 posterior surgical fusion by Dr. Campos on 1/1/21  HMV x 3 drains. Post op with constipation needing aggressive bowel regimen.     PT/OT and PM&R evaluated patient and recommended Rehab.     Patient medically cleared and admitted to  Rehab on 1/12/21 (12 Jan 2021 15:21)      PAST MEDICAL & SURGICAL HISTORY:  HTN (hypertension)    DVT (deep venous thrombosis)    Hypothyroidism    RA (rheumatoid arthritis)    Obesity, Class II, BMI 35-39.9, no comorbidity    Hypothyroid    Benign heart murmur    Hyperlipidemia    Hypertension    H/O degenerative disc disease    Osteoarthritis    SLE (systemic lupus erythematosus)    Rheumatoid arthritis    S/P knee replacement    Status post total hip replacement, right    S/P thyroid surgery  1 lobe removed    Lung cancer  small tumor removed 2015    S/P knee surgery  bilateral    S/P carpal tunnel release  left    S/P eye surgery  child    S/P cataract surgery  left    S/P tonsillectomy        MEDICATIONS  (STANDING):  apixaban 5 milliGRAM(s) Oral every 12 hours  ascorbic acid 500 milliGRAM(s) Oral daily  atorvastatin 20 milliGRAM(s) Oral at bedtime  cefTRIAXone   IVPB 1000 milliGRAM(s) IV Intermittent every 24 hours  folic acid 1 milliGRAM(s) Oral daily  hydroxychloroquine 200 milliGRAM(s) Oral <User Schedule>  levothyroxine 200 MICROGram(s) Oral daily  lidocaine   Patch 1 Patch Transdermal daily  senna 1 Tablet(s) Oral at bedtime    MEDICATIONS  (PRN):  acetaminophen   Tablet .. 650 milliGRAM(s) Oral every 6 hours PRN Temp greater or equal to 38C (100.4F), Mild Pain (1 - 3)  aluminum hydroxide/magnesium hydroxide/simethicone Suspension 30 milliLiter(s) Oral every 4 hours PRN Dyspepsia  bisacodyl Suppository 10 milliGRAM(s) Rectal daily PRN Constipation  magnesium hydroxide Suspension 30 milliLiter(s) Oral daily PRN Constipation  melatonin 3 milliGRAM(s) Oral at bedtime PRN Insomnia  oxyCODONE    IR 5 milliGRAM(s) Oral every 6 hours PRN Severe Pain (7 - 10)  simethicone 80 milliGRAM(s) Chew every 6 hours PRN Upset Stomach      Allergies    No Known Allergies    Intolerances          VITALS  75y  Vital Signs Last 24 Hrs  T(C): 36.6 (01 Mar 2021 07:27), Max: 36.6 (28 Feb 2021 21:30)  T(F): 97.8 (01 Mar 2021 07:27), Max: 97.8 (28 Feb 2021 21:30)  HR: 70 (01 Mar 2021 07:27) (70 - 71)  BP: 138/81 (01 Mar 2021 07:27) (132/74 - 138/81)  BP(mean): --  RR: 16 (01 Mar 2021 07:27) (15 - 16)  SpO2: 98% (01 Mar 2021 07:27) (98% - 98%)  Daily     Daily         RECENT LABS:                          12.0   4.92  )-----------( 315      ( 01 Mar 2021 07:01 )             38.5     03-01    143  |  107  |  11  ----------------------------<  91  3.8   |  28  |  0.62    Ca    9.2      01 Mar 2021 07:01    TPro  6.2  /  Alb  2.5<L>  /  TBili  0.4  /  DBili  x   /  AST  22  /  ALT  19  /  AlkPhos  101  03-01    LIVER FUNCTIONS - ( 01 Mar 2021 07:01 )  Alb: 2.5 g/dL / Pro: 6.2 g/dL / ALK PHOS: 101 U/L / ALT: 19 U/L / AST: 22 U/L / GGT: x                 Culture - Urine (collected 02-27-21 @ 10:40)  Source: .Urine Clean Catch (Midstream)  Preliminary Report (03-01-21 @ 11:26):    >100,000 CFU/ml Escherichia coli        CAPILLARY BLOOD GLUCOSE

## 2021-03-01 NOTE — PROGRESS NOTE ADULT - SUBJECTIVE AND OBJECTIVE BOX
Patient is a 75y old  Female who presents with a chief complaint of Back pain (2021 10:46)      Patient seen and examined at bedside.    ALLERGIES:  No Known Allergies    MEDICATIONS  (STANDING):  apixaban 5 milliGRAM(s) Oral every 12 hours  ascorbic acid 500 milliGRAM(s) Oral daily  atorvastatin 20 milliGRAM(s) Oral at bedtime  cefTRIAXone   IVPB 1000 milliGRAM(s) IV Intermittent every 24 hours  folic acid 1 milliGRAM(s) Oral daily  hydroxychloroquine 200 milliGRAM(s) Oral <User Schedule>  levothyroxine 200 MICROGram(s) Oral daily  lidocaine   Patch 1 Patch Transdermal daily  senna 1 Tablet(s) Oral at bedtime    MEDICATIONS  (PRN):  acetaminophen   Tablet .. 650 milliGRAM(s) Oral every 6 hours PRN Temp greater or equal to 38C (100.4F), Mild Pain (1 - 3)  aluminum hydroxide/magnesium hydroxide/simethicone Suspension 30 milliLiter(s) Oral every 4 hours PRN Dyspepsia  bisacodyl Suppository 10 milliGRAM(s) Rectal daily PRN Constipation  magnesium hydroxide Suspension 30 milliLiter(s) Oral daily PRN Constipation  melatonin 3 milliGRAM(s) Oral at bedtime PRN Insomnia  oxyCODONE    IR 5 milliGRAM(s) Oral every 6 hours PRN Severe Pain (7 - 10)  simethicone 80 milliGRAM(s) Chew every 6 hours PRN Upset Stomach    Vital Signs Last 24 Hrs  T(F): 97.8 (01 Mar 2021 07:27), Max: 97.8 (2021 21:30)  HR: 70 (01 Mar 2021 07:27) (70 - 71)  BP: 138/81 (01 Mar 2021 07:27) (132/74 - 138/81)  RR: 16 (01 Mar 2021 07:27) (15 - 16)  SpO2: 98% (01 Mar 2021 07:27) (98% - 98%)  I&O's Summary    2021 07:01  -  01 Mar 2021 07:00  --------------------------------------------------------  IN: 0 mL / OUT: 300 mL / NET: -300 mL        PHYSICAL EXAM:  General: NAD, A/O x 3  ENT: MMM  Neck: Supple, No JVD  Lungs: Clear to auscultation bilaterally  Cardio: RRR, S1/S2, No murmurs  Abdomen: Soft, Nontender, Nondistended; Bowel sounds present  Extremities: No calf tenderness, No pitting edema    LABS:                        12.0   4.92  )-----------( 315      ( 01 Mar 2021 07:01 )             38.5           143  |  107  |  11  ----------------------------<  91  3.8   |  28  |  0.62    Ca    9.2      01 Mar 2021 07:01    TPro  6.2  /  Alb  2.5  /  TBili  0.4  /  DBili  x   /  AST  22  /  ALT  19  /  AlkPhos  101       eGFR if Non African American: 87 mL/min/1.73M2 (21 @ 07:01)  eGFR if African American: 101 mL/min/1.73M2 (21 @ 07:01)                               Urinalysis Basic - ( 2021 10:42 )    Color: Yellow / Appearance: Slightly Turbid / S.010 / pH: x  Gluc: x / Ketone: Negative  / Bili: Negative / Urobili: Negative   Blood: x / Protein: Negative / Nitrite: Positive   Leuk Esterase: Small / RBC: 11-25 /HPF / WBC 26-50 /HPF   Sq Epi: x / Non Sq Epi: Many / Bacteria: Many /HPF        Culture - Urine (collected 2021 10:40)  Source: .Urine Clean Catch (Midstream)  Preliminary Report (01 Mar 2021 08:29):    >100,000 CFU/ml Gram Negative Rods        RADIOLOGY & ADDITIONAL TESTS:    Care Discussed with Consultants/Other Providers:

## 2021-03-01 NOTE — PROGRESS NOTE ADULT - ASSESSMENT
KAI THOMAS is a 75y F PMH HTN, HLD, SLE, RA, hypothyroidism, provoked R. 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 Jaja Campos. Staples removed   - cleared by NSGY to lift spine precautions as of 2/23  - continue Comprehensive Rehab Program of PT/OT- 3 hrs/day 5 days/week  - f/u on Dr Campos on dc  - Precautions:  RA, cardiac, contact and droplet precautions    #COVID19 infection  -PCR + 1/29/21. COVID swab 2/22 NEGATIVE  - Ct A/P 2/5: 1. Pattern of lung base groundglass opacification   -CTA 2/9:  No pulmonary embolism to the segmental branches. Mild bilateral patchy airspace disease compatible with COVID-19 pneumonia.  - s/p 5 day course remdesivir, completed decadron   - pulmonary f/u on dc. Reviewed with patient    #E coli UTI >100k  - f/u sensitivities  - previously Rx macrobid 100 bi d x 3 days  - C/w Ceftriaxone given U/A, 1gm PB QD - total of 5 days - last day 3/4/21  - plan to switch to oral Abx on dc if not completed    #HTN:  - stable off meds     #HLD:  - continue lipitor 20mg po daily    #hypothyroidism:  - continue levothyroxine 200mcg po daily    #SLE/RA  -continue plaquenil 200mg po daily    #h/o Provoked DVT in 2019  - US July 2019:  DVT noted in the right popliteal vein and visualized segment of the right  posterior tibial vein.  - eliquis restarted 2/23  - Follows with vascular cardiologist Dr. Hoyos outpatient    # h/o nausea/vomiting:  - abdominal CT -2/5: Colonic diverticulosis without CT evidence of acute diverticulitis  - AXR (-) 2/14  - resolved. zofran dc 2/26    # sleep  - melatonin 3mg PRN    #Pain Mgmt :   - Tylenol PRN mild pain  - Oxycodone 5mg IR q6h PRN severe pain. Continue weaning  - lidoderm patch daily PRN    #GI/Bowel Mgmt/ constipation:   - simethicone PRN gas  - senna 1 tab qhs  - MOM and dulcolax PRN    #FEN :  - Diet - Regular     # DVT prophylaxis :  - Lovenox BID  - LE edema:  ACE wraps LE     #Case discussed in IDT rounds 2/24:  - CS sit to stand transfers, ambualtes 130 feet with platform walker and CS, supervision toileting , supervision toilet transfers, min assist LB dressing, min assist sock/shoe don and doff  - goals: set up/supervision ADLs, supervision trasnfers and ambulation, CG shower transfers (patient reports mostly wearing housedress and no shoes/slippers PTA)  - at this time, patient's LTG are felt to be at the supervision-CG level, and team is recommending assistance at home for these tasks. Will discuss with family private hire aide, as she has a history of recurring falls pre-op, and this will not significantly change even with SHAZIA stay  - target dc home 3/2 with home PT and OT and caregiver support/supervision. Discussed with patient  - Patient would benefit from hospital bed given her recent history of lumbar spine surgery, body habitus, BMI 43.8, unable to transfer from supine to sit without HOB elevated or perform transfers in and out of bed in regular bed without assistance       #LABS:  CBC BMP 3/1: reviewed, stable  f/u urine Cx sensitivities  will need PCP, pulmonary, vascular, PM+R, NSGY/spine f/u on dc  Pharmacy info

## 2021-03-02 VITALS
DIASTOLIC BLOOD PRESSURE: 83 MMHG | RESPIRATION RATE: 16 BRPM | OXYGEN SATURATION: 97 % | TEMPERATURE: 98 F | SYSTOLIC BLOOD PRESSURE: 136 MMHG | HEART RATE: 92 BPM

## 2021-03-02 LAB
-  AMIKACIN: SIGNIFICANT CHANGE UP
-  AMOXICILLIN/CLAVULANIC ACID: SIGNIFICANT CHANGE UP
-  AMPICILLIN/SULBACTAM: SIGNIFICANT CHANGE UP
-  AMPICILLIN: SIGNIFICANT CHANGE UP
-  AZTREONAM: SIGNIFICANT CHANGE UP
-  CEFAZOLIN: SIGNIFICANT CHANGE UP
-  CEFEPIME: SIGNIFICANT CHANGE UP
-  CEFOXITIN: SIGNIFICANT CHANGE UP
-  CEFTRIAXONE: SIGNIFICANT CHANGE UP
-  CIPROFLOXACIN: SIGNIFICANT CHANGE UP
-  ERTAPENEM: SIGNIFICANT CHANGE UP
-  GENTAMICIN: SIGNIFICANT CHANGE UP
-  IMIPENEM: SIGNIFICANT CHANGE UP
-  LEVOFLOXACIN: SIGNIFICANT CHANGE UP
-  MEROPENEM: SIGNIFICANT CHANGE UP
-  NITROFURANTOIN: SIGNIFICANT CHANGE UP
-  PIPERACILLIN/TAZOBACTAM: SIGNIFICANT CHANGE UP
-  TIGECYCLINE: SIGNIFICANT CHANGE UP
-  TOBRAMYCIN: SIGNIFICANT CHANGE UP
-  TRIMETHOPRIM/SULFAMETHOXAZOLE: SIGNIFICANT CHANGE UP
CULTURE RESULTS: SIGNIFICANT CHANGE UP
METHOD TYPE: SIGNIFICANT CHANGE UP
ORGANISM # SPEC MICROSCOPIC CNT: SIGNIFICANT CHANGE UP
ORGANISM # SPEC MICROSCOPIC CNT: SIGNIFICANT CHANGE UP
SARS-COV-2 RNA SPEC QL NAA+PROBE: SIGNIFICANT CHANGE UP
SPECIMEN SOURCE: SIGNIFICANT CHANGE UP

## 2021-03-02 PROCEDURE — 99232 SBSQ HOSP IP/OBS MODERATE 35: CPT | Mod: CS

## 2021-03-02 RX ADMIN — CEFTRIAXONE 100 MILLIGRAM(S): 500 INJECTION, POWDER, FOR SOLUTION INTRAMUSCULAR; INTRAVENOUS at 17:03

## 2021-03-02 RX ADMIN — SENNA PLUS 1 TABLET(S): 8.6 TABLET ORAL at 21:21

## 2021-03-02 RX ADMIN — Medication 1 MILLIGRAM(S): at 11:53

## 2021-03-02 RX ADMIN — Medication 200 MILLIGRAM(S): at 08:01

## 2021-03-02 RX ADMIN — OXYCODONE HYDROCHLORIDE 5 MILLIGRAM(S): 5 TABLET ORAL at 08:31

## 2021-03-02 RX ADMIN — LIDOCAINE 1 PATCH: 4 CREAM TOPICAL at 21:21

## 2021-03-02 RX ADMIN — Medication 500 MILLIGRAM(S): at 11:53

## 2021-03-02 RX ADMIN — ATORVASTATIN CALCIUM 20 MILLIGRAM(S): 80 TABLET, FILM COATED ORAL at 21:21

## 2021-03-02 RX ADMIN — APIXABAN 5 MILLIGRAM(S): 2.5 TABLET, FILM COATED ORAL at 17:03

## 2021-03-02 RX ADMIN — Medication 200 MICROGRAM(S): at 06:26

## 2021-03-02 RX ADMIN — APIXABAN 5 MILLIGRAM(S): 2.5 TABLET, FILM COATED ORAL at 06:27

## 2021-03-02 NOTE — PROGRESS NOTE ADULT - SUBJECTIVE AND OBJECTIVE BOX
Patient is a 75y old  Female who presents with a chief complaint of Back pain (01 Mar 2021 11:46)      Patient seen and examined at bedside.    ALLERGIES:  No Known Allergies    MEDICATIONS  (STANDING):  apixaban 5 milliGRAM(s) Oral every 12 hours  ascorbic acid 500 milliGRAM(s) Oral daily  atorvastatin 20 milliGRAM(s) Oral at bedtime  cefTRIAXone   IVPB 1000 milliGRAM(s) IV Intermittent every 24 hours  folic acid 1 milliGRAM(s) Oral daily  hydroxychloroquine 200 milliGRAM(s) Oral <User Schedule>  levothyroxine 200 MICROGram(s) Oral daily  lidocaine   Patch 1 Patch Transdermal daily  senna 1 Tablet(s) Oral at bedtime    MEDICATIONS  (PRN):  acetaminophen   Tablet .. 650 milliGRAM(s) Oral every 6 hours PRN Temp greater or equal to 38C (100.4F), Mild Pain (1 - 3)  aluminum hydroxide/magnesium hydroxide/simethicone Suspension 30 milliLiter(s) Oral every 4 hours PRN Dyspepsia  bisacodyl Suppository 10 milliGRAM(s) Rectal daily PRN Constipation  magnesium hydroxide Suspension 30 milliLiter(s) Oral daily PRN Constipation  melatonin 3 milliGRAM(s) Oral at bedtime PRN Insomnia  oxyCODONE    IR 5 milliGRAM(s) Oral every 6 hours PRN Severe Pain (7 - 10)  simethicone 80 milliGRAM(s) Chew every 6 hours PRN Upset Stomach    Vital Signs Last 24 Hrs  T(F): 98 (02 Mar 2021 08:00), Max: 98 (02 Mar 2021 08:00)  HR: 75 (02 Mar 2021 08:00) (72 - 75)  BP: 137/84 (02 Mar 2021 08:00) (132/80 - 137/84)  RR: 16 (02 Mar 2021 08:00) (15 - 16)  SpO2: 94% (02 Mar 2021 08:00) (94% - 99%)  I&O's Summary    01 Mar 2021 07:01  -  02 Mar 2021 07:00  --------------------------------------------------------  IN: 0 mL / OUT: 400 mL / NET: -400 mL        PHYSICAL EXAM:  General: NAD, A/O x 3  ENT: MMM  Neck: Supple, No JVD  Lungs: Clear to auscultation bilaterally  Cardio: RRR, S1/S2, No murmurs  Abdomen: Soft, Nontender, Nondistended; Bowel sounds present  Extremities: No calf tenderness, No pitting edema    LABS:                        12.0   4.92  )-----------( 315      ( 01 Mar 2021 07:01 )             38.5           143  |  107  |  11  ----------------------------<  91  3.8   |  28  |  0.62    Ca    9.2      01 Mar 2021 07:01    TPro  6.2  /  Alb  2.5  /  TBili  0.4  /  DBili  x   /  AST  22  /  ALT  19  /  AlkPhos  101  -     eGFR if Non African American: 87 mL/min/1.73M2 (21 @ 07:01)  eGFR if African American: 101 mL/min/1.73M2 (21 @ 07:01)                               Urinalysis Basic - ( 2021 10:42 )    Color: Yellow / Appearance: Slightly Turbid / S.010 / pH: x  Gluc: x / Ketone: Negative  / Bili: Negative / Urobili: Negative   Blood: x / Protein: Negative / Nitrite: Positive   Leuk Esterase: Small / RBC: 11-25 /HPF / WBC 26-50 /HPF   Sq Epi: x / Non Sq Epi: Many / Bacteria: Many /HPF        Culture - Urine (collected 2021 10:40)  Source: .Urine Clean Catch (Midstream)  Preliminary Report (01 Mar 2021 11:26):    >100,000 CFU/ml Escherichia coli        RADIOLOGY & ADDITIONAL TESTS:    Care Discussed with Consultants/Other Providers:

## 2021-03-02 NOTE — CHART NOTE - NSCHARTNOTEFT_GEN_A_CORE
Assessment:   Patient seen for: f/u     Source: [x] medical review, [x] patient    Factors impacting intake: [x] none    Intake: 75% or >    Diet Prescription: DASH/ TLC     Current Weight: 2/02: 274.4 Lbs  01/12: 280 Lbs    % Weight Change: decreased 5.6 Lbs x 20 days, desirable wt loss.     Pt reports good appetite. PO intake 75% or >. Pt feed self, reports no chewing/swallowing difficulties with current diet. Denies N/V/C/D. Last BM: 03/02.    Pertinent Medications: MEDICATIONS  (STANDING):  apixaban 5 milliGRAM(s) Oral every 12 hours  ascorbic acid 500 milliGRAM(s) Oral daily  atorvastatin 20 milliGRAM(s) Oral at bedtime  cefTRIAXone   IVPB 1000 milliGRAM(s) IV Intermittent every 24 hours  folic acid 1 milliGRAM(s) Oral daily  hydroxychloroquine 200 milliGRAM(s) Oral <User Schedule>  levothyroxine 200 MICROGram(s) Oral daily  lidocaine   Patch 1 Patch Transdermal daily  senna 1 Tablet(s) Oral at bedtime    MEDICATIONS  (PRN):  acetaminophen   Tablet .. 650 milliGRAM(s) Oral every 6 hours PRN Temp greater or equal to 38C (100.4F), Mild Pain (1 - 3)  aluminum hydroxide/magnesium hydroxide/simethicone Suspension 30 milliLiter(s) Oral every 4 hours PRN Dyspepsia  bisacodyl Suppository 10 milliGRAM(s) Rectal daily PRN Constipation  magnesium hydroxide Suspension 30 milliLiter(s) Oral daily PRN Constipation  melatonin 3 milliGRAM(s) Oral at bedtime PRN Insomnia  oxyCODONE    IR 5 milliGRAM(s) Oral every 6 hours PRN Severe Pain (7 - 10)  simethicone 80 milliGRAM(s) Chew every 6 hours PRN Upset Stomach    Pertinent Labs: 03-01 Na143 mmol/L Glu 91 mg/dL K+ 3.8 mmol/L Cr  0.62 mg/dL BUN 11 mg/dL 03-01 Alb 2.5 g/dL<L>     CAPILLARY BLOOD GLUCOSE      Skin: WDL except sx incision in the back    Edema: bilateral leg +2    Estimated Needs:   [x] no change since previous assessment    Previous Nutrition Diagnosis:   [x] Overweight/Obesity     Nutrition Diagnosis is [x] ongoing    New Nutrition Diagnosis: [x] not applicable     Interventions:   Recommend  [x] Continue with current diet: DASH/ TLC   [x] Nutrition Support: Honor pt's food preferences as feasible with prescribed diet.   [x] Other: Obtain and record PO intake and weights daily     Monitoring and Evaluation:   Continue to monitor Nutritional intake, Tolerance to diet prescription, weights, labs, skin integrity.  other:  RD remains available upon request and will follow up per protocol.

## 2021-03-02 NOTE — PROGRESS NOTE ADULT - ASSESSMENT
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 Hospital Course complicated by brief SICU stay for mechanical ventilation management, successfully extubated. Patient now admitted to [facility] for a multidisciplinary rehab program on 1/12/21; hospital course complicated with positive U/A and urnary frequency     #Multilevel Degenerative Disc Disease of T and L Spine  #S/P Posterior T10-L5 instrumented fusion, L1-L5 laminectomies, L L4-L5 foraminotomy 1/1/21 by Dr. Jaja Campos  -Continue comprehensive rehab program  -Pain management per primary  -Bowel regimen    #UTI  - C/w Ceftriaxone given U/A, 1gm PB QD - total of 5 days - last day 3/4/21  - Follow up urine Cx sensitivities - will switch to PO prior to DC  - Urine cx from 02/10 was sensitive to CTX  - urinary symptoms have improved    #Viral PNA 2/2 to COVID 19 - Spo2 98-99% on RA  COVID PCR negative 2/22  -Completed Remdesivir and Decadron  -Monitor respiratory status closely  -Maintain strict isolation precautions, use proper PPE  -Albuterol inhaler PRN    #SLE/RA  -Chronic, continue Plaquenil and folic acid    #H/o provoked right popliteal, right posterior tibial DVT (7/2019 in setting of hip surgery, already completed >6 months of treatment)  -Follows with cardiologist Dr. Hoyos outpatient  -Treated with Eliquis 5mg po bid chronically -- AC stopped at [facility] as 12/29 LE dopplers neg for DVTs  -Restarted on Eliquis 5mg BID 2/23    #HTN  -hold home medication Losartan  -Monitor vitals - stable, appropriate off anti-hypertensives    #hx of UTI  -Repeat UCx >100K E coli, patient denies urinary symptoms. ?Colonization. Afebrile, no leukocytosis. s/p Macrobid 100mg BID x 3 days  -ID consulted, recommendations appreciated    #HLD  -Chronic, continue Lipitor    #Hypothyroidism  -Chronic, continue Levothyroxine    #Prophylactic Measure  DVT prophylaxis: Eliquis 5mg BID

## 2021-03-02 NOTE — PROGRESS NOTE ADULT - SUBJECTIVE AND OBJECTIVE BOX
-----------------------------------------------------------------------  Patient is a 75y old  Female who presents with a chief complaint of Back pain (02 Mar 2021 08:36)      HPI:  75F with past medical history of HTN, HLD, SLE, RA, hypothyroidism, provoked R. popliteal DVT in 2019 on eliquis, chronic low back pain presents to ED for acute on chronic low back pain. Pt has several months of chronic lumbosacral back pain, evaluated by outpatient ortho and referred for physical therapy and pain management. She has been receiving ?vitamin spinal injections. She last received low back inj on 12/16 after which she her low back pain worsened. She took oxycodone 5 mg qd (prescribed in september during a ED visit) and acetaminophen for past three days without relief. She also endorses worsening pain during ambulation with walker and has affected her quality of life. She has constipation, last BM 4 days ago. Otherwise denies fever, chills, N/V, abdominal pain, dysuria, urinary retention, fecal incontinence, saddle anesthesia, fall, LOC, trauma. During ROS she endorsed LLE weakness for several months, no numbness or tingling and has LLE edema >RLE edema, reported undergoing LLE US 4 months ago, was negative for DVT. Currently has 8/10 pain, improved to 5/10 with oxycodone 5 mg x 2 in ED. (28 Dec 2020 11:16)   12/28: Mri T/Ls: IMPRESSION: Degenerative changes  Abnormal signal with associated enhancement is seen involving the endplates adjacent to the L2-3 disc space. There is slight increased T2 prolongation involving the L2-3 disc space with associated widening. While these findings could be compatible with degenerative changes possibly of early discitis and osteomyelitis must be considered.  Patient s/p L1-L5 laminectomy, T10-L5 posterior surgical fusion by Dr. Campos on 1/1/21  HMV x 3 drains. Post op with constipation needing aggressive bowel regimen.     PT/OT and PM&R evaluated patient and recommended Rehab.     Patient medically cleared and admitted to  Rehab on 1/12/21 (12 Jan 2021 15:21)      PAST MEDICAL & SURGICAL HISTORY:  HTN (hypertension)    DVT (deep venous thrombosis)  Hypothyroidism  RA (rheumatoid arthritis)  Obesity, Class II, BMI 35-39.9, no comorbidity  Hypothyroid    Benign heart murmur    Hyperlipidemia    Hypertension    H/O degenerative disc disease    Osteoarthritis    SLE (systemic lupus erythematosus)    Rheumatoid arthritis    S/P knee replacement    Status post total hip replacement, right    S/P thyroid surgery  1 lobe removed    Lung cancer  small tumor removed 2015    S/P knee surgery  bilateral    S/P carpal tunnel release  left    S/P eye surgery  child    S/P cataract surgery  left    S/P tonsillectomy        Allergies    No Known Allergies    Intolerances          -----------------------------------------------------------------------  VITALS  75y  Vital Signs Last 24 Hrs  T(C): 36.7 (02 Mar 2021 08:00), Max: 36.7 (02 Mar 2021 08:00)  T(F): 98 (02 Mar 2021 08:00), Max: 98 (02 Mar 2021 08:00)  HR: 75 (02 Mar 2021 08:00) (72 - 75)  BP: 137/84 (02 Mar 2021 08:00) (132/80 - 137/84)  BP(mean): --  RR: 16 (02 Mar 2021 08:00) (15 - 16)  SpO2: 94% (02 Mar 2021 08:00) (94% - 99%)  Daily     Daily     -----------------------------------------------------------------------  RECENT LABS:                        12.0   4.92  )-----------( 315      ( 01 Mar 2021 07:01 )             38.5     03-01    143  |  107  |  11  ----------------------------<  91  3.8   |  28  |  0.62    Ca    9.2      01 Mar 2021 07:01    TPro  6.2  /  Alb  2.5<L>  /  TBili  0.4  /  DBili  x   /  AST  22  /  ALT  19  /  AlkPhos  101  03-01    LIVER FUNCTIONS - ( 01 Mar 2021 07:01 )  Alb: 2.5 g/dL / Pro: 6.2 g/dL / ALK PHOS: 101 U/L / ALT: 19 U/L / AST: 22 U/L / GGT: x                 Culture - Urine (collected 02-27-21 @ 10:40)  Source: .Urine Clean Catch (Midstream)  Final Report (03-02-21 @ 09:44):    >100,000 CFU/ml Escherichia coli  Organism: Escherichia coli (03-02-21 @ 09:44)  Organism: Escherichia coli (03-02-21 @ 09:44)      -  Amikacin: S <=16      -  Amoxicillin/Clavulanic Acid: S <=8/4      -  Ampicillin: R >16 These ampicillin results predict results for amoxicillin      -  Ampicillin/Sulbactam: R >16/8 Enterobacter, Citrobacter, and Serratia may develop resistance during prolonged therapy (3-4 days)      -  Aztreonam: S <=4      -  Cefazolin: S 4 (MIC_CL_COM_ENTERIC_CEFAZU) For uncomplicated UTI with K. pneumoniae, E. coli, or P. mirablis: RUBY <=16 is sensitive and RUBY >=32 is resistant. This also predicts results for oral agents cefaclor, cefdinir, cefpodoxime, cefprozil, cefuroxime axetil, cephalexin and locarbef for uncomplicated UTI. Note that some isolates may be susceptible to these agents while testing resistant to cefazolin.      -  Cefepime: S <=2      -  Cefoxitin: S <=8      -  Ceftriaxone: S <=1 Enterobacter, Citrobacter, and Serratia may develop resistance during prolonged therapy      -  Ciprofloxacin: R >2      -  Ertapenem: S <=0.5      -  Gentamicin: S <=2      -  Imipenem: S <=1      -  Levofloxacin: R >4      -  Meropenem: S <=1      -  Nitrofurantoin: S <=32 Should not be used to treat pyelonephritis      -  Piperacillin/Tazobactam: S <=8      -  Tigecycline: S <=2      -  Tobramycin: S <=2      -  Trimethoprim/Sulfamethoxazole: S 2/38      Method Type: RUBY        CAPILLARY BLOOD GLUCOSE          IMAGING: none in last 24hr  -----------------------------------------------------------------------  MEDICATIONS  (STANDING):  apixaban 5 milliGRAM(s) Oral every 12 hours  ascorbic acid 500 milliGRAM(s) Oral daily  atorvastatin 20 milliGRAM(s) Oral at bedtime  cefTRIAXone   IVPB 1000 milliGRAM(s) IV Intermittent every 24 hours  folic acid 1 milliGRAM(s) Oral daily  hydroxychloroquine 200 milliGRAM(s) Oral <User Schedule>  levothyroxine 200 MICROGram(s) Oral daily  lidocaine   Patch 1 Patch Transdermal daily  senna 1 Tablet(s) Oral at bedtime    MEDICATIONS  (PRN):  acetaminophen   Tablet .. 650 milliGRAM(s) Oral every 6 hours PRN Temp greater or equal to 38C (100.4F), Mild Pain (1 - 3)  aluminum hydroxide/magnesium hydroxide/simethicone Suspension 30 milliLiter(s) Oral every 4 hours PRN Dyspepsia  bisacodyl Suppository 10 milliGRAM(s) Rectal daily PRN Constipation  magnesium hydroxide Suspension 30 milliLiter(s) Oral daily PRN Constipation  melatonin 3 milliGRAM(s) Oral at bedtime PRN Insomnia  oxyCODONE    IR 5 milliGRAM(s) Oral every 6 hours PRN Severe Pain (7 - 10)  simethicone 80 milliGRAM(s) Chew every 6 hours PRN Upset Stomach    -----------------------------------------------------------------------      Review of Systems:   · Additional ROS	Patient seen and evaluated this AM.   Patient overall feeling well. She is tolerating therapies.     She is feeling anxious about going home and would like to consider SHAZIA options. She is concerned that she will be a burden for her daughter and does not want to end up falling.    Urinary symptoms improving.    Physical Exam:   · Constitutional	detailed exam  · Constitutional Comments	alert, pleasant NAD, anxiety signfiicantly improved.  · Respiratory	detailed exam  · Respiratory Details	respirations non-labored; clear to auscultation bilaterally  · Cardiovascular	detailed exam  · Cardiovascular Details	regular rate and rhythm  · Cardiovascular Comments	improved effort, improved BS bilateral bases  · Gastrointestinal	detailed exam  · GI Normal	soft; nontender; bowel sounds normal  · Extremities	detailed exam  · Extremities Comments	bilateral calves soft, NT  · Additional PE	back incision well healed, no warmth or TTP, no swelling     -----------------------------------------------------------------------  Patient is a 75y old  Female who presents with a chief complaint of Back pain (02 Mar 2021 08:36)      HPI:  75F with past medical history of HTN, HLD, SLE, RA, hypothyroidism, provoked R. popliteal DVT in 2019 on eliquis, chronic low back pain presents to ED for acute on chronic low back pain. Pt has several months of chronic lumbosacral back pain, evaluated by outpatient ortho and referred for physical therapy and pain management. She has been receiving ?vitamin spinal injections. She last received low back inj on 12/16 after which she her low back pain worsened. She took oxycodone 5 mg qd (prescribed in september during a ED visit) and acetaminophen for past three days without relief. She also endorses worsening pain during ambulation with walker and has affected her quality of life. She has constipation, last BM 4 days ago. Otherwise denies fever, chills, N/V, abdominal pain, dysuria, urinary retention, fecal incontinence, saddle anesthesia, fall, LOC, trauma. During ROS she endorsed LLE weakness for several months, no numbness or tingling and has LLE edema >RLE edema, reported undergoing LLE US 4 months ago, was negative for DVT. Currently has 8/10 pain, improved to 5/10 with oxycodone 5 mg x 2 in ED. (28 Dec 2020 11:16)   12/28: Mri T/Ls: IMPRESSION: Degenerative changes  Abnormal signal with associated enhancement is seen involving the endplates adjacent to the L2-3 disc space. There is slight increased T2 prolongation involving the L2-3 disc space with associated widening. While these findings could be compatible with degenerative changes possibly of early discitis and osteomyelitis must be considered.  Patient s/p L1-L5 laminectomy, T10-L5 posterior surgical fusion by Dr. Campos on 1/1/21  HMV x 3 drains. Post op with constipation needing aggressive bowel regimen.     PT/OT and PM&R evaluated patient and recommended Rehab.     Patient medically cleared and admitted to  Rehab on 1/12/21 (12 Jan 2021 15:21)      PAST MEDICAL & SURGICAL HISTORY:  HTN (hypertension)    DVT (deep venous thrombosis)  Hypothyroidism  RA (rheumatoid arthritis)  Obesity, Class II, BMI 35-39.9, no comorbidity  Hypothyroid    Benign heart murmur    Hyperlipidemia    Hypertension    H/O degenerative disc disease    Osteoarthritis    SLE (systemic lupus erythematosus)    Rheumatoid arthritis    S/P knee replacement    Status post total hip replacement, right    S/P thyroid surgery  1 lobe removed    Lung cancer  small tumor removed 2015    S/P knee surgery  bilateral    S/P carpal tunnel release  left    S/P eye surgery  child    S/P cataract surgery  left    S/P tonsillectomy        Allergies    No Known Allergies    Intolerances          -----------------------------------------------------------------------  VITALS  75y  Vital Signs Last 24 Hrs  T(C): 36.7 (02 Mar 2021 08:00), Max: 36.7 (02 Mar 2021 08:00)  T(F): 98 (02 Mar 2021 08:00), Max: 98 (02 Mar 2021 08:00)  HR: 75 (02 Mar 2021 08:00) (72 - 75)  BP: 137/84 (02 Mar 2021 08:00) (132/80 - 137/84)  BP(mean): --  RR: 16 (02 Mar 2021 08:00) (15 - 16)  SpO2: 94% (02 Mar 2021 08:00) (94% - 99%)  Daily     Daily     -----------------------------------------------------------------------  RECENT LABS:                        12.0   4.92  )-----------( 315      ( 01 Mar 2021 07:01 )             38.5     03-01    143  |  107  |  11  ----------------------------<  91  3.8   |  28  |  0.62    Ca    9.2      01 Mar 2021 07:01    TPro  6.2  /  Alb  2.5<L>  /  TBili  0.4  /  DBili  x   /  AST  22  /  ALT  19  /  AlkPhos  101  03-01    LIVER FUNCTIONS - ( 01 Mar 2021 07:01 )  Alb: 2.5 g/dL / Pro: 6.2 g/dL / ALK PHOS: 101 U/L / ALT: 19 U/L / AST: 22 U/L / GGT: x                 Culture - Urine (collected 02-27-21 @ 10:40)  Source: .Urine Clean Catch (Midstream)  Final Report (03-02-21 @ 09:44):    >100,000 CFU/ml Escherichia coli  Organism: Escherichia coli (03-02-21 @ 09:44)  Organism: Escherichia coli (03-02-21 @ 09:44)      -  Amikacin: S <=16      -  Amoxicillin/Clavulanic Acid: S <=8/4      -  Ampicillin: R >16 These ampicillin results predict results for amoxicillin      -  Ampicillin/Sulbactam: R >16/8 Enterobacter, Citrobacter, and Serratia may develop resistance during prolonged therapy (3-4 days)      -  Aztreonam: S <=4      -  Cefazolin: S 4 (MIC_CL_COM_ENTERIC_CEFAZU) For uncomplicated UTI with K. pneumoniae, E. coli, or P. mirablis: RUBY <=16 is sensitive and RUBY >=32 is resistant. This also predicts results for oral agents cefaclor, cefdinir, cefpodoxime, cefprozil, cefuroxime axetil, cephalexin and locarbef for uncomplicated UTI. Note that some isolates may be susceptible to these agents while testing resistant to cefazolin.      -  Cefepime: S <=2      -  Cefoxitin: S <=8      -  Ceftriaxone: S <=1 Enterobacter, Citrobacter, and Serratia may develop resistance during prolonged therapy      -  Ciprofloxacin: R >2      -  Ertapenem: S <=0.5      -  Gentamicin: S <=2      -  Imipenem: S <=1      -  Levofloxacin: R >4      -  Meropenem: S <=1      -  Nitrofurantoin: S <=32 Should not be used to treat pyelonephritis      -  Piperacillin/Tazobactam: S <=8      -  Tigecycline: S <=2      -  Tobramycin: S <=2      -  Trimethoprim/Sulfamethoxazole: S 2/38      Method Type: RUBY        CAPILLARY BLOOD GLUCOSE          IMAGING: none in last 24hr  -----------------------------------------------------------------------  MEDICATIONS  (STANDING):  apixaban 5 milliGRAM(s) Oral every 12 hours  ascorbic acid 500 milliGRAM(s) Oral daily  atorvastatin 20 milliGRAM(s) Oral at bedtime  cefTRIAXone   IVPB 1000 milliGRAM(s) IV Intermittent every 24 hours  folic acid 1 milliGRAM(s) Oral daily  hydroxychloroquine 200 milliGRAM(s) Oral <User Schedule>  levothyroxine 200 MICROGram(s) Oral daily  lidocaine   Patch 1 Patch Transdermal daily  senna 1 Tablet(s) Oral at bedtime    MEDICATIONS  (PRN):  acetaminophen   Tablet .. 650 milliGRAM(s) Oral every 6 hours PRN Temp greater or equal to 38C (100.4F), Mild Pain (1 - 3)  aluminum hydroxide/magnesium hydroxide/simethicone Suspension 30 milliLiter(s) Oral every 4 hours PRN Dyspepsia  bisacodyl Suppository 10 milliGRAM(s) Rectal daily PRN Constipation  magnesium hydroxide Suspension 30 milliLiter(s) Oral daily PRN Constipation  melatonin 3 milliGRAM(s) Oral at bedtime PRN Insomnia  oxyCODONE    IR 5 milliGRAM(s) Oral every 6 hours PRN Severe Pain (7 - 10)  simethicone 80 milliGRAM(s) Chew every 6 hours PRN Upset Stomach    -----------------------------------------------------------------------      Review of Systems:   · Additional ROS	Patient seen and evaluated this AM.   Patient overall feeling well. She is tolerating therapies.     She is feeling anxious about going home and would like to consider [facility] options. She is concerned that she will be a burden for her daughter and does not want to end up falling. She is also concerned about the cost of private hiring aides for recommended supervision. Janett criteria reviewed; likely need for supervision despite JANETT also discussed    Urinary symptoms improving.    Daughter requests repeat COVID testing; Current guidelines re: quarantine and limited information of repeat swab once COVID+ given viral shedding reviewed    Physical Exam:   · Constitutional	detailed exam  · Constitutional Comments	alert, pleasant mildly anxious re: dc  · Respiratory	detailed exam  · Respiratory Details	respirations non-labored; clear to auscultation bilaterally  · Cardiovascular	detailed exam  · Cardiovascular Details	regular rate and rhythm  · Cardiovascular Comments	improved effort, improved BS bilateral bases  · Gastrointestinal	detailed exam  · GI Normal	soft; nontender; bowel sounds normal  · Extremities	detailed exam  · Extremities Comments	bilateral calves soft, NT  · Additional PE	back incision well healed, no warmth or TTP, no swelling

## 2021-03-02 NOTE — PROGRESS NOTE ADULT - ASSESSMENT
KAI THOMAS is a 75y F PMH HTN, HLD, SLE, RA, hypothyroidism, provoked R. 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 Jaja Campos. Staples removed   - cleared by NSGY to lift spine precautions as of 2/23  - continue Comprehensive Rehab Program of PT/OT- 3 hrs/day 5 days/week  - f/u on Dr Campos on dc  - Precautions:  RA, cardiac, contact and droplet precautions    #COVID19 infection  -PCR + 1/29/21. COVID swab 2/22 NEGATIVE  - Ct A/P 2/5: 1. Pattern of lung base groundglass opacification   -CTA 2/9:  No pulmonary embolism to the segmental branches. Mild bilateral patchy airspace disease compatible with COVID-19 pneumonia.  - s/p 5 day course remdesivir, completed decadron   - pulmonary f/u on dc. Reviewed with patient    #E coli UTI >100k  - f/u sensitivities -- Sensitive to Rocephin. Resistant to Cipro  - previously Rx macrobid 100 bi d x 3 days  - C/w Ceftriaxone given U/A, 1gm PB QD - total of 5 days - last day 3/4/21  - plan to switch to oral Abx on dc if not completed    #HTN:  - stable off meds     #HLD:  - continue lipitor 20mg po daily    #hypothyroidism:  - continue levothyroxine 200mcg po daily    #SLE/RA  -continue plaquenil 200mg po daily    #h/o Provoked DVT in 2019  - US July 2019:  DVT noted in the right popliteal vein and visualized segment of the right  posterior tibial vein.  - eliquis restarted 2/23  - Follows with vascular cardiologist Dr. Hoyos outpatient    # h/o nausea/vomiting:  - abdominal CT -2/5: Colonic diverticulosis without CT evidence of acute diverticulitis  - AXR (-) 2/14  - resolved. zofran dc 2/26    # sleep  - melatonin 3mg PRN    #Pain Mgmt :   - Tylenol PRN mild pain  - Oxycodone 5mg IR q6h PRN severe pain. Continue weaning  - lidoderm patch daily PRN    #GI/Bowel Mgmt/ constipation:   - simethicone PRN gas  - senna 1 tab qhs  - MOM and dulcolax PRN    #FEN :  - Diet - Regular     # DVT prophylaxis :  - Lovenox BID  - LE edema:  ACE wraps LE     #Case discussed in IDT rounds 2/24:  - CS sit to stand transfers, ambualtes 130 feet with platform walker and CS, supervision toileting , supervision toilet transfers, min assist LB dressing, min assist sock/shoe don and doff  - goals: set up/supervision ADLs, supervision trasnfers and ambulation, CG shower transfers (patient reports mostly wearing housedress and no shoes/slippers PTA)  - at this time, patient's LTG are felt to be at the supervision-CG level, and team is recommending assistance at home for these tasks. Will discuss with family private hire aide, as she has a history of recurring falls pre-op, and this will not significantly change even with [facility] stay  - target dc home 3/2 with home PT and OT and caregiver support/supervision. Discussed with patient  - Patient would benefit from hospital bed given her recent history of lumbar spine surgery, body habitus, BMI 43.8, unable to transfer from supine to sit without HOB elevated or perform transfers in and out of bed in regular bed without assistance       #LABS:  transition to PO antibiotics   will need PCP, pulmonary, vascular, PM+R, NSGY/spine f/u on dc  Pharmacy info  COVID PCR and SHAZIA options       KAI THOMAS is a 75y F PMH HTN, HLD, SLE, RA, hypothyroidism, provoked R. 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 Jaja Campos. Staples removed   - cleared by NSGY to lift spine precautions as of 2/23  - continue Comprehensive Rehab Program of PT/OT- 3 hrs/day 5 days/week  - f/u on Dr Campos on dc  - Precautions:  RA, cardiac    #COVID19 infection  -PCR + 1/29/21. COVID swab 2/22 NEGATIVE  - Ct A/P 2/5: 1. Pattern of lung base groundglass opacification   -CTA 2/9:  No pulmonary embolism to the segmental branches. Mild bilateral patchy airspace disease compatible with COVID-19 pneumonia.  - s/p 5 day course remdesivir, completed decadron   -- repeat COVID swab ordered 3/2 per family request. Patient is cleared to be off isolation, completed quarantine and asymptomatic  - pulmonary f/u on dc    #E coli UTI >100k  - Sensitive to Rocephin. Resistant to Cipro  - previously Rx macrobid 100 bi d x 3 days  - C/w Ceftriaxone given U/A, 1gm PB QD  x 5 days - Last day 3/4/21  - plan to switch to oral Abx on dc if not completed    #HTN:  - stable off meds     #HLD:  - continue lipitor 20mg po daily    #hypothyroidism:  - continue levothyroxine 200mcg po daily    #SLE/RA  -continue plaquenil 200mg po daily    #h/o Provoked DVT in 2019  - US July 2019:  DVT noted in the right popliteal vein and visualized segment of the right  posterior tibial vein.  - eliquis restarted 2/23  - Follows with vascular cardiologist Dr. Hoyos outpatient    # h/o nausea/vomiting:  - abdominal CT -2/5: Colonic diverticulosis without CT evidence of acute diverticulitis  - AXR (-) 2/14  - resolved. zofran dc 2/26    # sleep  - melatonin 3mg PRN    #Pain Mgmt :   - Tylenol PRN mild pain  - Oxycodone 5mg IR q6h PRN severe pain. Continue weaning  - lidoderm patch daily PRN    #GI/Bowel Mgmt/ constipation:   - simethicone PRN gas  - senna 1 tab qhs  - MOM and dulcolax PRN    #FEN :  - Diet - Regular     # DVT prophylaxis :  - Lovenox BID  - LE edema:  ACE wraps LE     #Case discussed in IDT rounds 2/24:  - CS sit to stand transfers, ambualtes 130 feet with platform walker and CS, supervision toileting , supervision toilet transfers, min assist LB dressing, min assist sock/shoe don and doff  - goals: set up/supervision ADLs, supervision trasnfers and ambulation, CG shower transfers (patient reports mostly wearing housedress and no shoes/slippers PTA)  - at this time, patient's LTG are felt to be at the supervision-CG level, and team is recommending assistance at home for these tasks. Will discuss with family private hire aide, as she has a history of recurring falls pre-op, and this will not significantly change even with SHAZIA stay  - Patient now wishes for SHAZIA. Requirements for SHAZIA including achievable rehab goals discussed 3/2  - Patient would benefit from hospital bed given her recent history of lumbar spine surgery, body habitus, BMI 43.8, unable to transfer from supine to sit without HOB elevated or perform transfers in and out of bed in regular bed without assistance   - Patient previously had medications through [facility] in [place]    #LABS:  will need PCP, pulmonary, vascular, PM+R, NSGY/spine f/u on dc  COVID PCR   CBC BMp 3/4

## 2021-03-02 NOTE — PROGRESS NOTE ADULT - NSHPATTENDINGPLANDISCUSS_GEN_ALL_CORE
Rosie Fritz, DO; patient; family; hospitalist
dagmar Joseph, today over phone.
Corinne Covarurbias NP; patient; Sw, RN, PT, OT, hospitalist
daughter over phone
daughter over phone
daughter, Opal over phone
Jazlyn Young, DO; patient; OT
Rosie Fritz DO; patient; hospitalist; RN. SW
Rosie Fritz, DO; patient
Rosie Fritz, ; patient; hospitalist; SW, RN, PT
Corinne Covarrubias NP; patient; hospitalist; RN, SW, OT
Corinne Covarrubias NP; patient; hospitalist; ALE. SW
Corinne Covarrubias NP; patient; hospitalist; RN. Sw, PT, OT

## 2021-03-02 NOTE — CHART NOTE - NSCHARTNOTESELECT_GEN_ALL_CORE
Nutrition Services
Event Note
Follow-up/Nutrition Services
Nutrition Services
Plan of Care/Event Note

## 2021-03-03 PROCEDURE — 74176 CT ABD & PELVIS W/O CONTRAST: CPT

## 2021-03-03 PROCEDURE — 97535 SELF CARE MNGMENT TRAINING: CPT

## 2021-03-03 PROCEDURE — 86769 SARS-COV-2 COVID-19 ANTIBODY: CPT

## 2021-03-03 PROCEDURE — 85379 FIBRIN DEGRADATION QUANT: CPT

## 2021-03-03 PROCEDURE — 83690 ASSAY OF LIPASE: CPT

## 2021-03-03 PROCEDURE — 87086 URINE CULTURE/COLONY COUNT: CPT

## 2021-03-03 PROCEDURE — 99232 SBSQ HOSP IP/OBS MODERATE 35: CPT

## 2021-03-03 PROCEDURE — 87186 SC STD MICRODIL/AGAR DIL: CPT

## 2021-03-03 PROCEDURE — 86140 C-REACTIVE PROTEIN: CPT

## 2021-03-03 PROCEDURE — 80048 BASIC METABOLIC PNL TOTAL CA: CPT

## 2021-03-03 PROCEDURE — 80053 COMPREHEN METABOLIC PANEL: CPT

## 2021-03-03 PROCEDURE — 74018 RADEX ABDOMEN 1 VIEW: CPT

## 2021-03-03 PROCEDURE — 97110 THERAPEUTIC EXERCISES: CPT

## 2021-03-03 PROCEDURE — 97116 GAIT TRAINING THERAPY: CPT

## 2021-03-03 PROCEDURE — 99239 HOSP IP/OBS DSCHRG MGMT >30: CPT | Mod: CS

## 2021-03-03 PROCEDURE — 97163 PT EVAL HIGH COMPLEX 45 MIN: CPT

## 2021-03-03 PROCEDURE — 85027 COMPLETE CBC AUTOMATED: CPT

## 2021-03-03 PROCEDURE — 97112 NEUROMUSCULAR REEDUCATION: CPT

## 2021-03-03 PROCEDURE — 36415 COLL VENOUS BLD VENIPUNCTURE: CPT

## 2021-03-03 PROCEDURE — 87077 CULTURE AEROBIC IDENTIFY: CPT

## 2021-03-03 PROCEDURE — 81001 URINALYSIS AUTO W/SCOPE: CPT

## 2021-03-03 PROCEDURE — 71275 CT ANGIOGRAPHY CHEST: CPT

## 2021-03-03 PROCEDURE — 71045 X-RAY EXAM CHEST 1 VIEW: CPT

## 2021-03-03 PROCEDURE — U0005: CPT

## 2021-03-03 PROCEDURE — 97167 OT EVAL HIGH COMPLEX 60 MIN: CPT

## 2021-03-03 PROCEDURE — U0003: CPT

## 2021-03-03 PROCEDURE — 85025 COMPLETE CBC W/AUTO DIFF WBC: CPT

## 2021-03-03 PROCEDURE — 82728 ASSAY OF FERRITIN: CPT

## 2021-03-03 PROCEDURE — 97530 THERAPEUTIC ACTIVITIES: CPT

## 2021-03-03 RX ORDER — FOLIC ACID 0.8 MG
1 TABLET ORAL
Qty: 30 | Refills: 0
Start: 2021-03-03 | End: 2021-04-01

## 2021-03-03 RX ORDER — ASCORBIC ACID 60 MG
1 TABLET,CHEWABLE ORAL
Qty: 30 | Refills: 0
Start: 2021-03-03 | End: 2021-04-01

## 2021-03-03 RX ORDER — ATORVASTATIN CALCIUM 80 MG/1
1 TABLET, FILM COATED ORAL
Qty: 30 | Refills: 0
Start: 2021-03-03 | End: 2021-04-01

## 2021-03-03 RX ORDER — MAGNESIUM HYDROXIDE 400 MG/1
30 TABLET, CHEWABLE ORAL
Qty: 0 | Refills: 0 | DISCHARGE
Start: 2021-03-03

## 2021-03-03 RX ORDER — ATORVASTATIN CALCIUM 80 MG/1
1 TABLET, FILM COATED ORAL
Qty: 0 | Refills: 0 | DISCHARGE

## 2021-03-03 RX ORDER — OXYCODONE HYDROCHLORIDE 5 MG/1
1 TABLET ORAL
Qty: 14 | Refills: 0
Start: 2021-03-03 | End: 2021-03-09

## 2021-03-03 RX ORDER — LIDOCAINE 4 G/100G
1 CREAM TOPICAL
Qty: 30 | Refills: 0
Start: 2021-03-03 | End: 2021-04-01

## 2021-03-03 RX ORDER — METHOTREXATE 2.5 MG/1
6 TABLET ORAL
Qty: 0 | Refills: 0 | DISCHARGE

## 2021-03-03 RX ORDER — SENNA PLUS 8.6 MG/1
1 TABLET ORAL
Qty: 30 | Refills: 0
Start: 2021-03-03 | End: 2021-04-01

## 2021-03-03 RX ORDER — CEFTRIAXONE 500 MG/1
1000 INJECTION, POWDER, FOR SOLUTION INTRAMUSCULAR; INTRAVENOUS EVERY 24 HOURS
Refills: 0 | Status: COMPLETED | OUTPATIENT
Start: 2021-03-03 | End: 2021-03-03

## 2021-03-03 RX ORDER — FOLIC ACID 0.8 MG
1 TABLET ORAL
Qty: 0 | Refills: 0 | DISCHARGE

## 2021-03-03 RX ORDER — LEVOTHYROXINE SODIUM 125 MCG
1 TABLET ORAL
Qty: 30 | Refills: 0
Start: 2021-03-03 | End: 2021-04-01

## 2021-03-03 RX ORDER — APIXABAN 2.5 MG/1
1 TABLET, FILM COATED ORAL
Qty: 60 | Refills: 0
Start: 2021-03-03 | End: 2021-04-01

## 2021-03-03 RX ORDER — LEVOTHYROXINE SODIUM 125 MCG
1 TABLET ORAL
Qty: 0 | Refills: 0 | DISCHARGE

## 2021-03-03 RX ORDER — HYDROXYCHLOROQUINE SULFATE 200 MG
1 TABLET ORAL
Qty: 30 | Refills: 0
Start: 2021-03-03 | End: 2021-04-01

## 2021-03-03 RX ORDER — HYDROXYCHLOROQUINE SULFATE 200 MG
1 TABLET ORAL
Qty: 0 | Refills: 0 | DISCHARGE

## 2021-03-03 RX ADMIN — OXYCODONE HYDROCHLORIDE 5 MILLIGRAM(S): 5 TABLET ORAL at 08:33

## 2021-03-03 RX ADMIN — LIDOCAINE 1 PATCH: 4 CREAM TOPICAL at 08:47

## 2021-03-03 RX ADMIN — LIDOCAINE 1 PATCH: 4 CREAM TOPICAL at 07:48

## 2021-03-03 RX ADMIN — CEFTRIAXONE 100 MILLIGRAM(S): 500 INJECTION, POWDER, FOR SOLUTION INTRAMUSCULAR; INTRAVENOUS at 11:38

## 2021-03-03 RX ADMIN — APIXABAN 5 MILLIGRAM(S): 2.5 TABLET, FILM COATED ORAL at 06:14

## 2021-03-03 RX ADMIN — Medication 1 MILLIGRAM(S): at 11:38

## 2021-03-03 RX ADMIN — Medication 200 MICROGRAM(S): at 06:14

## 2021-03-03 RX ADMIN — LIDOCAINE 1 PATCH: 4 CREAM TOPICAL at 11:38

## 2021-03-03 RX ADMIN — Medication 500 MILLIGRAM(S): at 11:38

## 2021-03-03 RX ADMIN — Medication 200 MILLIGRAM(S): at 07:48

## 2021-03-03 RX ADMIN — OXYCODONE HYDROCHLORIDE 5 MILLIGRAM(S): 5 TABLET ORAL at 09:24

## 2021-03-03 NOTE — PROGRESS NOTE ADULT - SUBJECTIVE AND OBJECTIVE BOX
Patient is a 75y old  Female who presents with a chief complaint of Back pain (02 Mar 2021 11:43)      Patient seen and examined at bedside.    ALLERGIES:  No Known Allergies    MEDICATIONS  (STANDING):  apixaban 5 milliGRAM(s) Oral every 12 hours  ascorbic acid 500 milliGRAM(s) Oral daily  atorvastatin 20 milliGRAM(s) Oral at bedtime  cefTRIAXone   IVPB 1000 milliGRAM(s) IV Intermittent every 24 hours  folic acid 1 milliGRAM(s) Oral daily  hydroxychloroquine 200 milliGRAM(s) Oral <User Schedule>  levothyroxine 200 MICROGram(s) Oral daily  lidocaine   Patch 1 Patch Transdermal daily  senna 1 Tablet(s) Oral at bedtime    MEDICATIONS  (PRN):  acetaminophen   Tablet .. 650 milliGRAM(s) Oral every 6 hours PRN Temp greater or equal to 38C (100.4F), Mild Pain (1 - 3)  aluminum hydroxide/magnesium hydroxide/simethicone Suspension 30 milliLiter(s) Oral every 4 hours PRN Dyspepsia  bisacodyl Suppository 10 milliGRAM(s) Rectal daily PRN Constipation  magnesium hydroxide Suspension 30 milliLiter(s) Oral daily PRN Constipation  melatonin 3 milliGRAM(s) Oral at bedtime PRN Insomnia  oxyCODONE    IR 5 milliGRAM(s) Oral every 6 hours PRN Severe Pain (7 - 10)  simethicone 80 milliGRAM(s) Chew every 6 hours PRN Upset Stomach    Vital Signs Last 24 Hrs  T(F): 97.8 (02 Mar 2021 21:35), Max: 97.8 (02 Mar 2021 21:35)  HR: 92 (02 Mar 2021 21:35) (92 - 92)  BP: 136/83 (02 Mar 2021 21:35) (136/83 - 136/83)  RR: 16 (02 Mar 2021 21:35) (16 - 16)  SpO2: 97% (02 Mar 2021 21:35) (97% - 97%)  I&O's Summary        PHYSICAL EXAM:  General: NAD, A/O x 3  ENT: MMM, no scleral icterus  Neck: Supple, No JVD  Lungs: Clear to auscultation bilaterally, no wheezes, rales, rhonchi  Cardio: RRR, S1/S2, No murmurs  Abdomen: Soft, Nontender, Nondistended; Bowel sounds present  Extremities: No calf tenderness, No pitting edema    LABS:                        12.0   4.92  )-----------( 315      ( 01 Mar 2021 07:01 )             38.5       03-01    143  |  107  |  11  ----------------------------<  91  3.8   |  28  |  0.62    Ca    9.2      01 Mar 2021 07:01    TPro  6.2  /  Alb  2.5  /  TBili  0.4  /  DBili  x   /  AST  22  /  ALT  19  /  AlkPhos  101  03-01     eGFR if Non African American: 87 mL/min/1.73M2 (03-01-21 @ 07:01)  eGFR if African American: 101 mL/min/1.73M2 (03-01-21 @ 07:01)                                   Culture - Urine (collected 27 Feb 2021 10:40)  Source: .Urine Clean Catch (Midstream)  Final Report (02 Mar 2021 09:44):    >100,000 CFU/ml Escherichia coli  Organism: Escherichia coli (02 Mar 2021 09:44)  Organism: Escherichia coli (02 Mar 2021 09:44)      -  Amikacin: S <=16      -  Amoxicillin/Clavulanic Acid: S <=8/4      -  Ampicillin: R >16 These ampicillin results predict results for amoxicillin      -  Ampicillin/Sulbactam: R >16/8 Enterobacter, Citrobacter, and Serratia may develop resistance during prolonged therapy (3-4 days)      -  Aztreonam: S <=4      -  Cefazolin: S 4 ([email or web address]) For uncomplicated UTI with K. pneumoniae, E. coli, or P. mirablis: RUBY <=16 is sensitive and RUBY >=32 is resistant. This also predicts results for oral agents cefaclor, cefdinir, cefpodoxime, cefprozil, cefuroxime axetil, cephalexin and locarbef for uncomplicated UTI. Note that some isolates may be susceptible to these agents while testing resistant to cefazolin.      -  Cefepime: S <=2      -  Cefoxitin: S <=8      -  Ceftriaxone: S <=1 Enterobacter, Citrobacter, and Serratia may develop resistance during prolonged therapy      -  Ciprofloxacin: R >2      -  Ertapenem: S <=0.5      -  Gentamicin: S <=2      -  Imipenem: S <=1      -  Levofloxacin: R >4      -  Meropenem: S <=1      -  Nitrofurantoin: S <=32 Should not be used to treat pyelonephritis      -  Piperacillin/Tazobactam: S <=8      -  Tigecycline: S <=2      -  Tobramycin: S <=2      -  Trimethoprim/Sulfamethoxazole: S 2/38      Method Type: RUBY        RADIOLOGY & ADDITIONAL TESTS:    Care Discussed with Consultants/Other Providers:    Patient is a 75y old  Female who presents with a chief complaint of Back pain (02 Mar 2021 11:43)      Patient seen and examined at bedside. mildly anxious about dispo planning. otherwise no acute complaints.     ALLERGIES:  No Known Allergies    MEDICATIONS  (STANDING):  apixaban 5 milliGRAM(s) Oral every 12 hours  ascorbic acid 500 milliGRAM(s) Oral daily  atorvastatin 20 milliGRAM(s) Oral at bedtime  cefTRIAXone   IVPB 1000 milliGRAM(s) IV Intermittent every 24 hours  folic acid 1 milliGRAM(s) Oral daily  hydroxychloroquine 200 milliGRAM(s) Oral <User Schedule>  levothyroxine 200 MICROGram(s) Oral daily  lidocaine   Patch 1 Patch Transdermal daily  senna 1 Tablet(s) Oral at bedtime    MEDICATIONS  (PRN):  acetaminophen   Tablet .. 650 milliGRAM(s) Oral every 6 hours PRN Temp greater or equal to 38C (100.4F), Mild Pain (1 - 3)  aluminum hydroxide/magnesium hydroxide/simethicone Suspension 30 milliLiter(s) Oral every 4 hours PRN Dyspepsia  bisacodyl Suppository 10 milliGRAM(s) Rectal daily PRN Constipation  magnesium hydroxide Suspension 30 milliLiter(s) Oral daily PRN Constipation  melatonin 3 milliGRAM(s) Oral at bedtime PRN Insomnia  oxyCODONE    IR 5 milliGRAM(s) Oral every 6 hours PRN Severe Pain (7 - 10)  simethicone 80 milliGRAM(s) Chew every 6 hours PRN Upset Stomach    Vital Signs Last 24 Hrs  T(F): 97.8 (02 Mar 2021 21:35), Max: 97.8 (02 Mar 2021 21:35)  HR: 92 (02 Mar 2021 21:35) (92 - 92)  BP: 136/83 (02 Mar 2021 21:35) (136/83 - 136/83)  RR: 16 (02 Mar 2021 21:35) (16 - 16)  SpO2: 97% (02 Mar 2021 21:35) (97% - 97%)  I&O's Summary        PHYSICAL EXAM:  General: NAD, A/O x 3, obese  ENT: MMM, no scleral icterus  Neck: Supple, No JVD  Lungs: Respirations unlabored. Clear to auscultation bilaterally, no wheezes, rales, rhonchi  Cardio: RRR, S1/S2  Abdomen: Soft, Nontender, Nondistended; Bowel sounds present  Extremities: No calf tenderness, No pitting edema b/l     LABS:                        12.0   4.92  )-----------( 315      ( 01 Mar 2021 07:01 )             38.5       03-01    143  |  107  |  11  ----------------------------<  91  3.8   |  28  |  0.62    Ca    9.2      01 Mar 2021 07:01    TPro  6.2  /  Alb  2.5  /  TBili  0.4  /  DBili  x   /  AST  22  /  ALT  19  /  AlkPhos  101  03-01     eGFR if Non African American: 87 mL/min/1.73M2 (03-01-21 @ 07:01)  eGFR if African American: 101 mL/min/1.73M2 (03-01-21 @ 07:01)    Culture - Urine (collected 27 Feb 2021 10:40)  Source: .Urine Clean Catch (Midstream)  Final Report (02 Mar 2021 09:44):    >100,000 CFU/ml Escherichia coli  Organism: Escherichia coli (02 Mar 2021 09:44)  Organism: Escherichia coli (02 Mar 2021 09:44)      -  Amikacin: S <=16      -  Amoxicillin/Clavulanic Acid: S <=8/4      -  Ampicillin: R >16 These ampicillin results predict results for amoxicillin      -  Ampicillin/Sulbactam: R >16/8 Enterobacter, Citrobacter, and Serratia may develop resistance during prolonged therapy (3-4 days)      -  Aztreonam: S <=4      -  Cefazolin: S 4 ([email or web address]) For uncomplicated UTI with K. pneumoniae, E. coli, or P. mirablis: RUBY <=16 is sensitive and RUBY >=32 is resistant. This also predicts results for oral agents cefaclor, cefdinir, cefpodoxime, cefprozil, cefuroxime axetil, cephalexin and locarbef for uncomplicated UTI. Note that some isolates may be susceptible to these agents while testing resistant to cefazolin.      -  Cefepime: S <=2      -  Cefoxitin: S <=8      -  Ceftriaxone: S <=1 Enterobacter, Citrobacter, and Serratia may develop resistance during prolonged therapy      -  Ciprofloxacin: R >2      -  Ertapenem: S <=0.5      -  Gentamicin: S <=2      -  Imipenem: S <=1      -  Levofloxacin: R >4      -  Meropenem: S <=1      -  Nitrofurantoin: S <=32 Should not be used to treat pyelonephritis      -  Piperacillin/Tazobactam: S <=8      -  Tigecycline: S <=2      -  Tobramycin: S <=2      -  Trimethoprim/Sulfamethoxazole: S 2/38      Method Type: RUBY    RADIOLOGY & ADDITIONAL TESTS: reviewed    Care Discussed with Consultants/Other Providers: yes

## 2021-03-03 NOTE — PROGRESS NOTE ADULT - ASSESSMENT
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 Hospital Course complicated by brief SICU stay for mechanical ventilation management, successfully extubated. 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 Jaja Campos  -Continue comprehensive rehab program  -Pain management per primary  -Bowel regimen    #UTI  - UA 2/27 positive, UCX > 100k ecoli, started on CFTX x 5 days, last day 3/4/21  - If discharging today can discharge on PO Ceftin 500mg BID to complete 5 days total antibiotics    #Viral PNA 2/2 to COVID 19 - Spo2 98-99% on RA  -COVID PCR negative 2/22, 3/2    -Completed Remdesivir and Decadron  -Monitor respiratory status closely  -Maintain strict isolation precautions, use proper PPE  -Albuterol inhaler PRN    #SLE/RA  -Chronic, continue Plaquenil and folic acid    #H/o provoked right popliteal, right posterior tibial DVT (7/2019 in setting of hip surgery, already completed >6 months of treatment)  -Follows with cardiologist Dr. Hoyos outpatient  -Treated with Eliquis 5mg po bid chronically -- AC stopped at [facility] as 12/29 LE dopplers neg for DVTs  -Restarted on Eliquis 5mg BID 2/23    #HTN  -hold home medication Losartan  -Monitor vitals - stable, appropriate off anti-hypertensives    #HLD  -Chronic, continue Lipitor    #Hypothyroidism  -Chronic, continue Levothyroxine    DVT ppx - Eliquis 5mg BID    Patient is medically optimized for discharge 74 y/o F PMH HTN, HLD, SLE, RA, hypothyroidism, provoked right popliteal DVT in 2019, chronic LBP presents to [facility] for acute on chronic LBP 12/28/20, found to have 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 Hospital Course complicated by brief SICU stay for mechanical ventilation management, successfully extubated. Patient now admitted to [facility] for a multidisciplinary rehab program on 1/12/21; hospital course complicated with positive U/A and urinary frequency     #Multilevel Degenerative Disc Disease of T and L Spine  #S/P Posterior T10-L5 instrumented fusion, L1-L5 laminectomies, L L4-L5 foraminotomy 1/1/21 by Dr. Jaja Campos  -Continue comprehensive rehab program  -Pain management per primary  -Bowel regimen    #UTI  - UA 2/27 positive, UCX > 100k ecoli, started on CFTX x 5 days, last day 3/4/21  - If discharging today can discharge on PO Ceftin 500mg BID to complete 5 days total antibiotics    #Viral PNA 2/2 to COVID 19 - Spo2 98-99% on RA  -COVID PCR negative 2/22, 3/2    -Completed Remdesivir and Decadron  -Monitor respiratory status closely  -Maintain strict isolation precautions, use proper PPE  -Albuterol inhaler PRN    #SLE/RA  -Chronic, continue Plaquenil and folic acid    #H/o provoked right popliteal, right posterior tibial DVT (7/2019 in setting of hip surgery, already completed >6 months of treatment)  -Follows with cardiologist Dr. Hoyos outpatient  -Treated with Eliquis 5mg po bid chronically -- AC stopped at [facility] as 12/29 LE dopplers neg for DVTs  -Restarted on Eliquis 5mg BID 2/23    #HTN  -hold home medication Losartan  -Monitor vitals - stable, appropriate off anti-hypertensives    #HLD  -Chronic, continue Lipitor    #Hypothyroidism  -Chronic, continue Levothyroxine    DVT ppx - Eliquis 5mg BID    Patient is medically optimized for discharge

## 2021-03-03 NOTE — PROGRESS NOTE ADULT - ASSESSMENT
KAI THOMAS is a 75y F PMH HTN, HLD, SLE, RA, hypothyroidism, provoked R. 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 Jaja Campos. Staples removed   - for home care referral with home Pt, OT, VNS on dc. Reviewed with patient  - f/u on Dr Campos on dc  - Precautions:  RA, cardiac, off spine precautions as of 2/23    #COVID19 infection  -PCR + 1/29/21. COVID swab 2/22, 3/2 NEGATIVE  - Ct A/P 2/5: 1. Pattern of lung base groundglass opacification   -CTA 2/9:  No pulmonary embolism to the segmental branches. Mild bilateral patchy airspace disease compatible with COVID-19 pneumonia.  - s/p 5 day course remdesivir, completed decadron   - pulmonary f/u on dc    #E coli UTI >100k  - Sensitive to Rocephin. Resistant to Cipro  - previously Rx macrobid 100 bi d x 3 days  - Rx Ceftriaxone given U/A, 1gm PB QD     #HTN:  - stable off meds     #HLD:  - continue lipitor 20mg po daily    #hypothyroidism:  - continue levothyroxine 200mcg po daily    #SLE/RA  -continue plaquenil 200mg po daily    #h/o Provoked DVT in 2019  - 2019:  DVT noted in the right popliteal vein and visualized segment of the right  posterior tibial vein.  - eliquis restarted 2/23  - Follows with vascular cardiologist Dr. Hoyos outpatient    # h/o nausea/vomiting:  - abdominal CT -2/5: Colonic diverticulosis without CT evidence of acute diverticulitis  - AXR (-) 2/14  - resolved    # sleep  - melatonin 3mg PRN    #Pain Mgmt :   - Tylenol PRN mild pain  - Oxycodone 5mg IR q6h PRN severe pain. Continue weaning  - lidoderm patch daily PRN    #GI/Bowel Mgmt/ constipation:   - simethicone PRN gas  - senna 1 tab qhs  - MOM and dulcolax PRN    #FEN :  - Diet - Regular     # DVT prophylaxis :  - Lovenox BID  - LE edema:  ACE wraps LE     #Case discussed in IDT rounds 3/1:  - supervision advanced transfers and ambulation with platform walker, mod independent/supervision transfers and bADLS      Medically cleared for dc home today. Caregiver training updated and performed 3/2 with daughter, COVID swab PCR negative 3/2. car transfers performed yesterday. Team recommending supervision at baseline, feel patient is at/better than pre-surgical baseline, but due to fluctuations in endurance and pre-morbid history of falls recommend supervision level for mobility. Patient and family aware, resources for private hire have been distributed by [employer]. home care referral, home PT and OT (goal to advance to standard walker), bath aide, made. PCP, pulmonary, vascular, NSGY and PM+R follow up on dc. Time spent coordinating dc and for education >30min     KAI THOMAS is a 75y F PMH HTN, HLD, SLE, RA, hypothyroidism, provoked R. 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 Jaja Campos. Staples removed   - for home care referral with home Pt, OT, VNS on dc. Reviewed with patient  - f/u on Dr Campos on dc  - Precautions:  RA, cardiac, off spine precautions as of 2/23    #COVID19 infection  -PCR + 1/29/21. COVID swab 2/22, 3/2 NEGATIVE  - Ct A/P 2/5: 1. Pattern of lung base groundglass opacification   -CTA 2/9:  No pulmonary embolism to the segmental branches. Mild bilateral patchy airspace disease compatible with COVID-19 pneumonia.  - s/p 5 day course remdesivir, completed decadron   - pulmonary f/u on dc    #E coli UTI >100k  - Sensitive to Rocephin. Resistant to Cipro  - previously Rx macrobid 100 bi d x 3 days  - Rx Ceftriaxone given U/A, 1gm PB QD     #HTN:  - stable off meds     #HLD:  - continue lipitor 20mg po daily    #hypothyroidism:  - continue levothyroxine 200mcg po daily    #SLE/RA  -continue plaquenil 200mg po daily    #h/o Provoked DVT in 2019  - 2019:  DVT noted in the right popliteal vein and visualized segment of the right  posterior tibial vein.  - eliquis restarted 2/23  - Follows with vascular cardiologist Dr. Hoyos outpatient    # h/o nausea/vomiting:  - abdominal CT -2/5: Colonic diverticulosis without CT evidence of acute diverticulitis  - AXR (-) 2/14  - resolved    # sleep  - melatonin 3mg PRN    #Pain Mgmt :   - Tylenol PRN mild pain  - Oxycodone 5mg IR q6h PRN severe pain. Continue weaning  - lidoderm patch daily PRN    #GI/Bowel Mgmt/ constipation:   - simethicone PRN gas  - senna 1 tab qhs  - MOM and dulcolax PRN    #FEN :  - Diet - Regular     # DVT prophylaxis :  - Lovenox BID  - LE edema:  ACE wraps LE     #Case discussed in IDT rounds 3/3:  - supervision advanced transfers and ambulation with platform walker, mod independent/supervision transfers and bADLS      Medically cleared for dc home today. Caregiver training updated and performed 3/2 with daughter, COVID swab PCR negative 3/2. car transfers performed yesterday. Team recommending supervision at baseline, feel patient is at/better than pre-surgical baseline, but due to fluctuations in endurance and pre-morbid history of falls recommend supervision level for mobility. Patient and family aware, resources for private hire have been distributed by [employer]. home care referral, home PT and OT (goal to advance to standard walker), bath aide, made. PCP, pulmonary, vascular, NSGY and PM+R follow up on dc. Time spent coordinating dc and for education >30min

## 2021-03-03 NOTE — PROGRESS NOTE ADULT - NUTRITIONAL ASSESSMENT
This patient has been assessed with a concern for Malnutrition and has been determined to have a diagnosis/diagnoses of Morbid obesity (BMI > 40).    This patient is being managed with:   Diet DASH/TLC-  Sodium & Cholesterol Restricted  Entered: Jan 14 2021 11:26AM    

## 2021-03-03 NOTE — PROGRESS NOTE ADULT - REASON FOR ADMISSION
Back pain

## 2021-03-03 NOTE — PROGRESS NOTE ADULT - SUBJECTIVE AND OBJECTIVE BOX
Patient is a 75y old  Female who presents with a chief complaint of Back pain (03 Mar 2021 09:13)      HPI:  75F with past medical history of HTN, HLD, SLE, RA, hypothyroidism, provoked R. popliteal DVT in 2019 on eliquis, chronic low back pain presents to ED for acute on chronic low back pain. Pt has several months of chronic lumbosacral back pain, evaluated by outpatient ortho and referred for physical therapy and pain management. She has been receiving ?vitamin spinal injections. She last received low back inj on 12/16 after which she her low back pain worsened. She took oxycodone 5 mg qd (prescribed in september during a ED visit) and acetaminophen for past three days without relief. She also endorses worsening pain during ambulation with walker and has affected her quality of life. She has constipation, last BM 4 days ago. Otherwise denies fever, chills, N/V, abdominal pain, dysuria, urinary retention, fecal incontinence, saddle anesthesia, fall, LOC, trauma. During ROS she endorsed LLE weakness for several months, no numbness or tingling and has LLE edema >RLE edema, reported undergoing LLE US 4 months ago, was negative for DVT. Currently has 8/10 pain, improved to 5/10 with oxycodone 5 mg x 2 in ED. (28 Dec 2020 11:16)   12/28: Mri T/Ls: IMPRESSION: Degenerative changes  Abnormal signal with associated enhancement is seen involving the endplates adjacent to the L2-3 disc space. There is slight increased T2 prolongation involving the L2-3 disc space with associated widening. While these findings could be compatible with degenerative changes possibly of early discitis and osteomyelitis must be considered.  Patient s/p L1-L5 laminectomy, T10-L5 posterior surgical fusion by Dr. Campos on 1/1/21  HMV x 3 drains. Post op with constipation needing aggressive bowel regimen.     PT/OT and PM&R evaluated patient and recommended Rehab.     Patient medically cleared and admitted to  Rehab on 1/12/21 (12 Jan 2021 15:21)      PAST MEDICAL & SURGICAL HISTORY:  HTN (hypertension)    DVT (deep venous thrombosis)    Hypothyroidism    RA (rheumatoid arthritis)    Obesity, Class II, BMI 35-39.9, no comorbidity    Hypothyroid    Benign heart murmur    Hyperlipidemia    Hypertension    H/O degenerative disc disease    Osteoarthritis    SLE (systemic lupus erythematosus)    Rheumatoid arthritis    S/P knee replacement    Status post total hip replacement, right    S/P thyroid surgery  1 lobe removed    Lung cancer  small tumor removed 2015    S/P knee surgery  bilateral    S/P carpal tunnel release  left    S/P eye surgery  child    S/P cataract surgery  left    S/P tonsillectomy        MEDICATIONS  (STANDING):  apixaban 5 milliGRAM(s) Oral every 12 hours  ascorbic acid 500 milliGRAM(s) Oral daily  atorvastatin 20 milliGRAM(s) Oral at bedtime  cefTRIAXone   IVPB 1000 milliGRAM(s) IV Intermittent every 24 hours  folic acid 1 milliGRAM(s) Oral daily  hydroxychloroquine 200 milliGRAM(s) Oral <User Schedule>  levothyroxine 200 MICROGram(s) Oral daily  lidocaine   Patch 1 Patch Transdermal daily  senna 1 Tablet(s) Oral at bedtime    MEDICATIONS  (PRN):  acetaminophen   Tablet .. 650 milliGRAM(s) Oral every 6 hours PRN Temp greater or equal to 38C (100.4F), Mild Pain (1 - 3)  aluminum hydroxide/magnesium hydroxide/simethicone Suspension 30 milliLiter(s) Oral every 4 hours PRN Dyspepsia  bisacodyl Suppository 10 milliGRAM(s) Rectal daily PRN Constipation  magnesium hydroxide Suspension 30 milliLiter(s) Oral daily PRN Constipation  melatonin 3 milliGRAM(s) Oral at bedtime PRN Insomnia  oxyCODONE    IR 5 milliGRAM(s) Oral every 6 hours PRN Severe Pain (7 - 10)  simethicone 80 milliGRAM(s) Chew every 6 hours PRN Upset Stomach      Allergies    No Known Allergies    Intolerances          VITALS  75y  Vital Signs Last 24 Hrs  T(C): 36.6 (02 Mar 2021 21:35), Max: 36.6 (02 Mar 2021 21:35)  T(F): 97.8 (02 Mar 2021 21:35), Max: 97.8 (02 Mar 2021 21:35)  HR: 92 (02 Mar 2021 21:35) (92 - 92)  BP: 136/83 (02 Mar 2021 21:35) (136/83 - 136/83)  BP(mean): --  RR: 16 (02 Mar 2021 21:35) (16 - 16)  SpO2: 97% (02 Mar 2021 21:35) (97% - 97%) on RA  Daily     Daily         RECENT LABS:                    Culture - Urine (collected 02-27-21 @ 10:40)  Source: .Urine Clean Catch (Midstream)  Final Report (03-02-21 @ 09:44):    >100,000 CFU/ml Escherichia coli  Organism: Escherichia coli (03-02-21 @ 09:44)  Organism: Escherichia coli (03-02-21 @ 09:44)      -  Amikacin: S <=16      -  Amoxicillin/Clavulanic Acid: S <=8/4      -  Ampicillin: R >16 These ampicillin results predict results for amoxicillin      -  Ampicillin/Sulbactam: R >16/8 Enterobacter, Citrobacter, and Serratia may develop resistance during prolonged therapy (3-4 days)      -  Aztreonam: S <=4      -  Cefazolin: S 4 (MIC_CL_COM_ENTERIC_CEFAZU) For uncomplicated UTI with K. pneumoniae, E. coli, or P. mirablis: RUBY <=16 is sensitive and RUBY >=32 is resistant. This also predicts results for oral agents cefaclor, cefdinir, cefpodoxime, cefprozil, cefuroxime axetil, cephalexin and locarbef for uncomplicated UTI. Note that some isolates may be susceptible to these agents while testing resistant to cefazolin.      -  Cefepime: S <=2      -  Cefoxitin: S <=8      -  Ceftriaxone: S <=1 Enterobacter, Citrobacter, and Serratia may develop resistance during prolonged therapy      -  Ciprofloxacin: R >2      -  Ertapenem: S <=0.5      -  Gentamicin: S <=2      -  Imipenem: S <=1      -  Levofloxacin: R >4      -  Meropenem: S <=1      -  Nitrofurantoin: S <=32 Should not be used to treat pyelonephritis      -  Piperacillin/Tazobactam: S <=8      -  Tigecycline: S <=2      -  Tobramycin: S <=2      -  Trimethoprim/Sulfamethoxazole: S 2/38      Method Type: RUBY        CAPILLARY BLOOD GLUCOSE            Review of Systems:   · Additional ROS	Patient seen this morning.. had family training yesterday through virtual session. OT and PT met with patient to review HEP as well, set up platform walker and review hospital bed settings.    Patient without fever, cough, finishing ceftriaxone today, no urinary complaints. Repeat COVID PCR 3/2 negative, patient aware. Medical follow up, medications and home care referral reviewed with patient. Recommendations for supervision on dc reinforced with patient    Physical Exam:   · Constitutional	detailed exam  · Constitutional Comments	alert, O x 3.   · Respiratory	detailed exam  · Respiratory Details	clear bilaterally, fair+ effort improving, no wheezing  · Cardiovascular	detailed exam  · Cardiovascular Details	regular rate and rhythm  · Cardiovascular Comments	improved effort, improved BS bilateral bases  · Gastrointestinal	detailed exam  · GI Normal	soft; nontender; bowel sounds normal  · Extremities	detailed exam  · Extremities Comments	bilateral calves soft, NT, chronic vascular changes, stable  · Additional PE	back incision well healed, no warmth or TTP, no swelling  no redness

## 2021-03-06 NOTE — DISCHARGE NOTE NURSING/CASE MANAGEMENT/SOCIAL WORK - NSDCPEELIQUISFU_GEN_ALL_CORE
7
Go for blood tests as directed. Your doctor will do lab tests at regular visits to monitor the effects of this medicine. Please follow up with your doctor and keep your health care provider appointments.

## 2021-03-08 ENCOUNTER — APPOINTMENT (OUTPATIENT)
Dept: CARDIOLOGY | Facility: CLINIC | Age: 76
End: 2021-03-08
Payer: MEDICARE

## 2021-03-08 ENCOUNTER — NON-APPOINTMENT (OUTPATIENT)
Age: 76
End: 2021-03-08

## 2021-03-08 ENCOUNTER — APPOINTMENT (OUTPATIENT)
Dept: INTERNAL MEDICINE | Facility: CLINIC | Age: 76
End: 2021-03-08
Payer: MEDICARE

## 2021-03-08 ENCOUNTER — LABORATORY RESULT (OUTPATIENT)
Age: 76
End: 2021-03-08

## 2021-03-08 VITALS
TEMPERATURE: 96.9 F | OXYGEN SATURATION: 93 % | HEIGHT: 67 IN | HEART RATE: 115 BPM | DIASTOLIC BLOOD PRESSURE: 83 MMHG | SYSTOLIC BLOOD PRESSURE: 152 MMHG | RESPIRATION RATE: 14 BRPM

## 2021-03-08 DIAGNOSIS — T14.8XXA OTHER INJURY OF UNSPECIFIED BODY REGION, INITIAL ENCOUNTER: ICD-10-CM

## 2021-03-08 DIAGNOSIS — R26.81 UNSTEADINESS ON FEET: ICD-10-CM

## 2021-03-08 PROCEDURE — 93000 ELECTROCARDIOGRAM COMPLETE: CPT

## 2021-03-08 PROCEDURE — 99214 OFFICE O/P EST MOD 30 MIN: CPT

## 2021-03-08 RX ORDER — METRONIDAZOLE 7.5 MG/G
0.75 CREAM TOPICAL
Qty: 45 | Refills: 0 | Status: DISCONTINUED | COMMUNITY
Start: 2020-11-25

## 2021-03-08 RX ORDER — SODIUM SULFACETAMIDE 100 MG/ML
10 LOTION TOPICAL
Qty: 118 | Refills: 0 | Status: DISCONTINUED | COMMUNITY
Start: 2020-11-25

## 2021-03-08 NOTE — HISTORY OF PRESENT ILLNESS
[FreeTextEntry1] : Marely denies any chest pain, palpitations or dizziness. She is s/p spine surgery in January and has been off losartan since that time. F/u visit on Wednesday. Home PT

## 2021-03-08 NOTE — DISCUSSION/SUMMARY
[___ Week(s)] : [unfilled] week(s) [FreeTextEntry1] : The patient is a 75-year-old obese female history of rheumatoid arthritis,  lupus, hypothyroidism, hyperlipidemia, hypertension, lower extremity edema ,RBBB, s/p left lobectomy for cancer s/p rt hip replacement complicated by DVT s/p left knee replacement s/p spine surgery with possible atrial flutter.\par #1 RA- on MTX and plaquenil, ambulates with walker\par #2 Htn- hold losartan 50mg\par #3 Lipids- on atorvastatin therapeutic\par #4 Hypothyroidism- continue levothyroxine, check TSH today\par #5 CV- no angina or HF, ECG concerning for atrial flutter, add toprol 25mg, holter and f/u 2 weeks\par #6 Ortho- s/p right hip replacement and left knee replacement, now s/p spine surgery, continue eliquis bid\par

## 2021-03-08 NOTE — REASON FOR VISIT
[Hypertension] : hypertension [Follow-Up - From Hospitalization] : follow-up of a recent hospitalization for

## 2021-03-09 LAB
ALBUMIN SERPL ELPH-MCNC: 3.7 G/DL
ALP BLD-CCNC: 123 U/L
ALT SERPL-CCNC: 15 U/L
ANION GAP SERPL CALC-SCNC: 13 MMOL/L
AST SERPL-CCNC: 27 U/L
BASOPHILS # BLD AUTO: 0.08 K/UL
BASOPHILS NFR BLD AUTO: 1.3 %
BILIRUB SERPL-MCNC: 0.4 MG/DL
BUN SERPL-MCNC: 20 MG/DL
CALCIUM SERPL-MCNC: 9.5 MG/DL
CHLORIDE SERPL-SCNC: 105 MMOL/L
CO2 SERPL-SCNC: 25 MMOL/L
CREAT SERPL-MCNC: 0.71 MG/DL
EOSINOPHIL # BLD AUTO: 0.19 K/UL
EOSINOPHIL NFR BLD AUTO: 3.2 %
GLUCOSE SERPL-MCNC: 101 MG/DL
HCT VFR BLD CALC: 42.4 %
HGB BLD-MCNC: 12.8 G/DL
IMM GRANULOCYTES NFR BLD AUTO: 0.2 %
LYMPHOCYTES # BLD AUTO: 0.95 K/UL
LYMPHOCYTES NFR BLD AUTO: 16 %
MAN DIFF?: NORMAL
MCHC RBC-ENTMCNC: 28.7 PG
MCHC RBC-ENTMCNC: 30.2 GM/DL
MCV RBC AUTO: 95.1 FL
MONOCYTES # BLD AUTO: 0.76 K/UL
MONOCYTES NFR BLD AUTO: 12.8 %
NEUTROPHILS # BLD AUTO: 3.95 K/UL
NEUTROPHILS NFR BLD AUTO: 66.5 %
PLATELET # BLD AUTO: 329 K/UL
POTASSIUM SERPL-SCNC: 4.3 MMOL/L
PROT SERPL-MCNC: 6.5 G/DL
RBC # BLD: 4.46 M/UL
RBC # FLD: 14.9 %
SODIUM SERPL-SCNC: 142 MMOL/L
TSH SERPL-ACNC: 0.03 UIU/ML
WBC # FLD AUTO: 5.94 K/UL

## 2021-03-11 ENCOUNTER — APPOINTMENT (OUTPATIENT)
Dept: NEUROSURGERY | Facility: CLINIC | Age: 76
End: 2021-03-11
Payer: MEDICARE

## 2021-03-11 VITALS — HEIGHT: 66 IN | HEART RATE: 74 BPM | DIASTOLIC BLOOD PRESSURE: 52 MMHG | SYSTOLIC BLOOD PRESSURE: 97 MMHG

## 2021-03-11 VITALS — TEMPERATURE: 98.4 F

## 2021-03-11 PROCEDURE — 99024 POSTOP FOLLOW-UP VISIT: CPT

## 2021-03-11 NOTE — REASON FOR VISIT
[Follow-Up: _____] : a [unfilled] follow-up visit [FreeTextEntry1] : 75 F with past medical history of HTN, HLD, SLE, RA, hypothyroidism, provoked R popliteal DVT in 2019 on Eliquis, chronic low back pain presents to ED for acute on chronic low back pain for several months. Was sen by ortho, had PT and received injections in the past.  MRI T/L spine showed Degenerative changes, abnormal signal with associated enhancement  involving the endplates adjacent to the L2-3 disc space,  slight increased T2 prolongation involving the L2-3 disc space with associated widening. While these findings could be compatible with degenerative changes possibly of early discitis and osteomyelitis must be considered. \par \par Patient underwent posterior T10-L5 instrumented fusion, L1-L5 laminectomies, L L4-L5 foraminotomy 1/1/21. PT/OT and PM&R evaluated patient and recommended Rehab. Rehab course complicated by COVID s/p 5 day course Remdesivir, completed Decadron. Repeat COVID swab ordered 3/2 per family request. Patient is cleared to be off isolation, completed quarantine and asymptomatic. Also with UTI s/p antibiotics. Eliquis restarted 2/23. \par \par \par \par \par \par \par \par \par

## 2021-03-11 NOTE — ASSESSMENT
[FreeTextEntry1] : 75 year old female with PMH of HTN, HLD, SLE, RA, hypothyroidism, provoked R popliteal DVT in 2019 on Eliquis, chronic low back pain presents to ED for acute on chronic low back pain for several months. MRI T/L spine showed degenerative changes, abnormal signal with associated enhancement  involving the endplates adjacent to the L2-3 disc space, slight increased T2 prolongation involving the L2-3 disc space with associated widening, while these findings could be compatible with degenerative changes possibly of early discitis and osteomyelitis must be considered. Patient is s/p posterior T10-L5 instrumented fusion, L1-L5 laminectomies, L L4-L5 foraminotomy 1/1/21. Discharged to rehab, rehab course complicated by COVID infection s/p 5 day course of remdesivir,  completed Decadron. Repeat COVID swab ordered 3/2 per family request. Patient is cleared to be off isolation, completed quarantine and asymptomatic. Also with UTI s/p antibiotics. Eliquis restarted 2/23. \par \par Patient presents for postop follow up, incision has healed well, using a wheelchair and stand-up walker to ambulate, required two person assist/walker. Patient will require aggressive PT, encouraged to walk with supervision as much as she can. \par \par - Aggressive PT to help with ambulation \par - Motrin as needed for pain \par - CT Thoracic and Lumbar spine \par - RTC after CT \par

## 2021-03-11 NOTE — PHYSICAL EXAM
[General Appearance - Alert] : alert [General Appearance - In No Acute Distress] : in no acute distress [Person] : oriented to person [Place] : oriented to place [Time] : oriented to time [Cranial Nerves Optic (II)] : visual acuity intact bilaterally,  pupils equal round and reactive to light [Cranial Nerves Oculomotor (III)] : extraocular motion intact [Cranial Nerves Trigeminal (V)] : facial sensation intact symmetrically [Cranial Nerves Facial (VII)] : face symmetrical [Cranial Nerves Vestibulocochlear (VIII)] : hearing was intact bilaterally [Cranial Nerves Accessory (XI - Cranial And Spinal)] : head turning and shoulder shrug symmetric [Cranial Nerves Hypoglossal (XII)] : there was no tongue deviation with protrusion [Involuntary Movements] : no involuntary movements were seen [FreeTextEntry1] : Incision healed well, clean, dry and intact  [FreeTextEntry5] : Strength: Moving all extremities, uses standup walker, required two person assist

## 2021-03-15 ENCOUNTER — APPOINTMENT (OUTPATIENT)
Dept: INTERNAL MEDICINE | Facility: CLINIC | Age: 76
End: 2021-03-15
Payer: MEDICARE

## 2021-03-15 VITALS
DIASTOLIC BLOOD PRESSURE: 82 MMHG | OXYGEN SATURATION: 92 % | TEMPERATURE: 97.2 F | HEART RATE: 75 BPM | RESPIRATION RATE: 14 BRPM | SYSTOLIC BLOOD PRESSURE: 138 MMHG | HEIGHT: 66 IN

## 2021-03-15 DIAGNOSIS — R79.89 OTHER SPECIFIED ABNORMAL FINDINGS OF BLOOD CHEMISTRY: ICD-10-CM

## 2021-03-15 PROCEDURE — 99214 OFFICE O/P EST MOD 30 MIN: CPT

## 2021-03-15 NOTE — PHYSICAL EXAM
[No Acute Distress] : no acute distress [Well Nourished] : well nourished [Well Developed] : well developed [Well-Appearing] : well-appearing [Normal Sclera/Conjunctiva] : normal sclera/conjunctiva [PERRL] : pupils equal round and reactive to light [EOMI] : extraocular movements intact [Normal Outer Ear/Nose] : the outer ears and nose were normal in appearance [Normal Oropharynx] : the oropharynx was normal [No JVD] : no jugular venous distention [No Lymphadenopathy] : no lymphadenopathy [Supple] : supple [Thyroid Normal, No Nodules] : the thyroid was normal and there were no nodules present [No Respiratory Distress] : no respiratory distress  [No Accessory Muscle Use] : no accessory muscle use [Clear to Auscultation] : lungs were clear to auscultation bilaterally [Normal Rate] : normal rate  [Regular Rhythm] : with a regular rhythm [Normal S1, S2] : normal S1 and S2 [No Murmur] : no murmur heard [Pedal Pulses Present] : the pedal pulses are present [No Edema] : there was no peripheral edema [No Extremity Clubbing/Cyanosis] : no extremity clubbing/cyanosis [Soft] : abdomen soft [Non Tender] : non-tender [Non-distended] : non-distended [No Masses] : no abdominal mass palpated [No HSM] : no HSM [Normal Bowel Sounds] : normal bowel sounds [Normal Posterior Cervical Nodes] : no posterior cervical lymphadenopathy [Normal Anterior Cervical Nodes] : no anterior cervical lymphadenopathy [No CVA Tenderness] : no CVA  tenderness [No Spinal Tenderness] : no spinal tenderness [No Joint Swelling] : no joint swelling [No Rash] : no rash [Coordination Grossly Intact] : coordination grossly intact [No Focal Deficits] : no focal deficits [Normal Affect] : the affect was normal [Normal Insight/Judgement] : insight and judgment were intact [de-identified] : small fading bruise left upper buttocks area [de-identified] : ambulating with seat rolling walker

## 2021-03-15 NOTE — ASSESSMENT
[FreeTextEntry1] : bruise on left buttocks s/p fall\par pt may continue home PT- clearance letter written and given to pt

## 2021-03-15 NOTE — HISTORY OF PRESENT ILLNESS
[de-identified] : Ms. KAI THOMAS is a 75 year old female, with hx of Lupus, RA, HTN, HLD, DVT on Eliquis, hypothyroid, OA,  presents for follow up visit\par She is s/p  back surgery January 1st. followed by rehab. She was discharged from rehab on March 3rd\par Pt states she has been getting home PT\par She fell yesterday on her buttocks and now she has a bruise there. Says she had PT today and PT wants clearance from pt's MD in order to continue PT\par Denies SOB, chest pain, abdominal pain, N/V/D, leg swelling, headache, dizziness \par \par \par

## 2021-03-19 ENCOUNTER — NON-APPOINTMENT (OUTPATIENT)
Age: 76
End: 2021-03-19

## 2021-03-21 NOTE — ASSESSMENT
[FreeTextEntry1] : I reviewed lab results that pt had with cardiology and discussed results with pt and her daughter\par \par abnormal TSH, pt with hx of hypothyroid\par we'll decrease Synthroid to 150mcg daily\par we'll recheck TSH/T4 in 4 weeks

## 2021-03-21 NOTE — PHYSICAL EXAM
[No Acute Distress] : no acute distress [Well Nourished] : well nourished [Well Developed] : well developed [Well-Appearing] : well-appearing [Normal Sclera/Conjunctiva] : normal sclera/conjunctiva [PERRL] : pupils equal round and reactive to light [EOMI] : extraocular movements intact [Normal Outer Ear/Nose] : the outer ears and nose were normal in appearance [Normal Oropharynx] : the oropharynx was normal [No JVD] : no jugular venous distention [No Lymphadenopathy] : no lymphadenopathy [Supple] : supple [Thyroid Normal, No Nodules] : the thyroid was normal and there were no nodules present [No Respiratory Distress] : no respiratory distress  [No Accessory Muscle Use] : no accessory muscle use [Clear to Auscultation] : lungs were clear to auscultation bilaterally [Normal Rate] : normal rate  [Regular Rhythm] : with a regular rhythm [Normal S1, S2] : normal S1 and S2 [No Murmur] : no murmur heard [Pedal Pulses Present] : the pedal pulses are present [No Edema] : there was no peripheral edema [No Extremity Clubbing/Cyanosis] : no extremity clubbing/cyanosis [Soft] : abdomen soft [Non Tender] : non-tender [Non-distended] : non-distended [No Masses] : no abdominal mass palpated [No HSM] : no HSM [Normal Bowel Sounds] : normal bowel sounds [Normal Posterior Cervical Nodes] : no posterior cervical lymphadenopathy [Normal Anterior Cervical Nodes] : no anterior cervical lymphadenopathy [No CVA Tenderness] : no CVA  tenderness [No Spinal Tenderness] : no spinal tenderness [No Joint Swelling] : no joint swelling [No Rash] : no rash [Coordination Grossly Intact] : coordination grossly intact [No Focal Deficits] : no focal deficits [Normal Affect] : the affect was normal [Normal Insight/Judgement] : insight and judgment were intact [de-identified] : ambulating with seat rolling walker

## 2021-03-22 ENCOUNTER — NON-APPOINTMENT (OUTPATIENT)
Age: 76
End: 2021-03-22

## 2021-03-22 ENCOUNTER — APPOINTMENT (OUTPATIENT)
Dept: CARDIOLOGY | Facility: CLINIC | Age: 76
End: 2021-03-22
Payer: MEDICARE

## 2021-03-22 VITALS
WEIGHT: 240 LBS | RESPIRATION RATE: 14 BRPM | TEMPERATURE: 97.3 F | DIASTOLIC BLOOD PRESSURE: 71 MMHG | HEIGHT: 66 IN | SYSTOLIC BLOOD PRESSURE: 127 MMHG | OXYGEN SATURATION: 98 % | BODY MASS INDEX: 38.57 KG/M2 | HEART RATE: 84 BPM

## 2021-03-22 PROCEDURE — 99214 OFFICE O/P EST MOD 30 MIN: CPT

## 2021-03-22 RX ORDER — LEVOTHYROXINE SODIUM 200 UG/1
200 TABLET ORAL
Qty: 90 | Refills: 0 | Status: DISCONTINUED | COMMUNITY
Start: 2021-03-04

## 2021-03-22 NOTE — HISTORY OF PRESENT ILLNESS
[FreeTextEntry1] : Marely denies any chest pain, palpitations or dizziness. She is s/p spine surgery in January and has been off losartan since that time. Home PT. Ambulating better.

## 2021-03-22 NOTE — DISCUSSION/SUMMARY
[___ Month(s)] : [unfilled] month(s) [FreeTextEntry1] : The patient is a 75-year-old obese female history of rheumatoid arthritis,  lupus, hypothyroidism, hyperlipidemia, hypertension, lower extremity edema ,RBBB, s/p left lobectomy for cancer s/p rt hip replacement complicated by DVT s/p left knee replacement s/p spine surgery whose heart rate is much improved\par #1 RA- on MTX and plaquenil, ambulates with walker\par #2 Htn- continue to hold losartan 50mg\par #3 Lipids- continue  atorvastatin therapeutic\par #4 Hypothyroidism- decreased levothyroxine to 150mcg, check TSH next visit\par #5 CV- no angina or HF, monitor with a lot of ectopy and short atrial tachycardia probably provoked by hyperthyroidism from levothyroxine 200mcg, continue toprol 25mg, ECHO to assess MR at next visit\par #6 Ortho- s/p right hip replacement and left knee replacement,  s/p spine surgery with home PT, continue eliquis bid\par

## 2021-03-30 ENCOUNTER — APPOINTMENT (OUTPATIENT)
Dept: PULMONOLOGY | Facility: CLINIC | Age: 76
End: 2021-03-30
Payer: MEDICARE

## 2021-03-30 ENCOUNTER — APPOINTMENT (OUTPATIENT)
Dept: CT IMAGING | Facility: HOSPITAL | Age: 76
End: 2021-03-30
Payer: MEDICARE

## 2021-03-30 ENCOUNTER — RESULT REVIEW (OUTPATIENT)
Age: 76
End: 2021-03-30

## 2021-03-30 ENCOUNTER — OUTPATIENT (OUTPATIENT)
Dept: OUTPATIENT SERVICES | Facility: HOSPITAL | Age: 76
LOS: 1 days | End: 2021-03-30
Payer: MEDICARE

## 2021-03-30 VITALS
DIASTOLIC BLOOD PRESSURE: 80 MMHG | OXYGEN SATURATION: 97 % | SYSTOLIC BLOOD PRESSURE: 130 MMHG | HEART RATE: 86 BPM | HEIGHT: 66 IN | BODY MASS INDEX: 44.2 KG/M2 | WEIGHT: 275 LBS

## 2021-03-30 DIAGNOSIS — Z98.49 CATARACT EXTRACTION STATUS, UNSPECIFIED EYE: Chronic | ICD-10-CM

## 2021-03-30 DIAGNOSIS — Z98.890 OTHER SPECIFIED POSTPROCEDURAL STATES: Chronic | ICD-10-CM

## 2021-03-30 DIAGNOSIS — Z20.822 CONTACT WITH AND (SUSPECTED) EXPOSURE TO COVID-19: ICD-10-CM

## 2021-03-30 DIAGNOSIS — Z90.89 ACQUIRED ABSENCE OF OTHER ORGANS: Chronic | ICD-10-CM

## 2021-03-30 DIAGNOSIS — Z96.641 PRESENCE OF RIGHT ARTIFICIAL HIP JOINT: Chronic | ICD-10-CM

## 2021-03-30 DIAGNOSIS — C34.90 MALIGNANT NEOPLASM OF UNSPECIFIED PART OF UNSPECIFIED BRONCHUS OR LUNG: Chronic | ICD-10-CM

## 2021-03-30 DIAGNOSIS — Z96.659 PRESENCE OF UNSPECIFIED ARTIFICIAL KNEE JOINT: Chronic | ICD-10-CM

## 2021-03-30 PROCEDURE — G1004: CPT

## 2021-03-30 PROCEDURE — 71250 CT THORAX DX C-: CPT | Mod: 26,MG

## 2021-03-30 PROCEDURE — 99204 OFFICE O/P NEW MOD 45 MIN: CPT | Mod: CS

## 2021-03-30 PROCEDURE — 71250 CT THORAX DX C-: CPT

## 2021-03-30 NOTE — PHYSICAL EXAM
[No Acute Distress] : no acute distress [Normal Oropharynx] : normal oropharynx [Normal Appearance] : normal appearance [No Neck Mass] : no neck mass [Normal Rate/Rhythm] : normal rate/rhythm [Normal S1, S2] : normal s1, s2 [No Murmurs] : no murmurs [No Resp Distress] : no resp distress [Clear to Auscultation Bilaterally] : clear to auscultation bilaterally [No Abnormalities] : no abnormalities [Benign] : benign [No Clubbing] : no clubbing [No Cyanosis] : no cyanosis [No Edema] : no edema [FROM] : FROM [Normal Color/ Pigmentation] : normal color/ pigmentation [No Focal Deficits] : no focal deficits [Oriented x3] : oriented x3 [Normal Affect] : normal affect [TextBox_99] : walks with aid of walker

## 2021-03-30 NOTE — ASSESSMENT
[FreeTextEntry1] : Most likely ct results were from covid pneumonitis.  Repeat ct ordered.  Reviewed withpatients daughter

## 2021-03-30 NOTE — REASON FOR VISIT
[Follow-Up - From Hospitalization] : a follow-up visit after a recent hospitalization [Family Member] : family member [TextBox_44] : covid pneumonia

## 2021-04-01 ENCOUNTER — APPOINTMENT (OUTPATIENT)
Dept: PHYSICAL MEDICINE AND REHAB | Facility: CLINIC | Age: 76
End: 2021-04-01
Payer: MEDICARE

## 2021-04-01 VITALS
OXYGEN SATURATION: 97 % | DIASTOLIC BLOOD PRESSURE: 67 MMHG | RESPIRATION RATE: 16 BRPM | HEART RATE: 97 BPM | WEIGHT: 275 LBS | SYSTOLIC BLOOD PRESSURE: 103 MMHG | BODY MASS INDEX: 44.39 KG/M2 | TEMPERATURE: 97.3 F

## 2021-04-01 DIAGNOSIS — M48.062 SPINAL STENOSIS, LUMBAR REGION WITH NEUROGENIC CLAUDICATION: ICD-10-CM

## 2021-04-01 DIAGNOSIS — R29.6 REPEATED FALLS: ICD-10-CM

## 2021-04-01 PROCEDURE — 99214 OFFICE O/P EST MOD 30 MIN: CPT

## 2021-04-01 NOTE — ASSESSMENT
[FreeTextEntry1] : Patient s/p L1-L5 laminectomy, T10-L5 posterior surgical fusion by Dr. Campos on 1/1/21 complicated by COVID-19 and respiratory failure requiring remdesivir and decadron and eliquis for h/o DVT\par \par 1. PT 2 x week x 8 weeks bilateral HF, quad, ham and ankle strengthening, transfer to standing, standing balance and tolerance, progressive ambulation. MHP PRN bilateral gluteals, stretch, , ambulation and stairs\par \par 2. Options for possible home care through [employer] form discussed for household chores. Will f/u with SW re: eligibility. Discussed with family in detail\par \par 3. Patient had f/u NSGY 3/2021, will follow up in 2-3 months (calling to make appointment)\par \par 4. Proper equipment including elevated chairs with arm rests discussed, will f/u OT for supply stores\par \par 5. platform walker needs 2 rubber feet for front. Will fax request to [phone] (medical equipment supplier)\par \par 6. f/u 2-1/2 months. Patient and family agreeable\par

## 2021-04-01 NOTE — HISTORY OF PRESENT ILLNESS
[FreeTextEntry1] : Patient is 75F with past medical history of HTN, HLD, SLE, RA, hypothyroidism, provoked R. \par popliteal DVT in 2019 on eliquis, chronic low back pain presents to ED for acute on chronic lumbosacral \par back pain, She also endorses worsening pain during ambulation with walker and has affected her quality of life.  12/28: Mri T/Ls: IMPRESSION: Degenerative changes Abnormal signal with associated enhancement is seen \par involving the endplates adjacent to the L2-3 disc space. There is slight increased T2 prolongation involving the L2-3 disc space with associated widening. While these findings could be compatible with degenerative changes \par possibly of early discitis and osteomyelitis must be considered.  Patient s/p L1-L5 laminectomy, T10-L5 posterior surgical fusion by Dr. Campos on 1/1/21 \par \par Rehab Course significant for COVID19 infection (first PCR+ on 1/29/21).  CT A/P on 2/5 noted lung base ground glass opacifications. She completed course of remdesevir and decadron with improvement in symptoms.  CTA obtained which was negative for PE.  She is saturating well on RA. Patient will follow up with pulmonology outpatient. \par \par  Further, patient endorsed urinary symptoms of frequency and reduced stream, found to have Ecoli UTI. She was initially treated with Macrobid for three days but had to be discontinued due to GI side effects with nausea and vomiting.  CT was negative for any intrabdominal findings. GI symptoms improved with \par discontinuation of antibiotics and BMs, but then developed recurrence of urinary symptoms without fever.  She was started on Rocephin for 5 day course . For her previous DVT, she was cleared by Dr. Campos to resume Eliquis which was restarted on 2/23. Hemoglobin and neurological exam has remained stable. \par \par Repeat COVID swab PCR 3/2 negative Pt was medically cleared on 3/3 for discharged to home with home PT and OT. She is set up/supervision ADLs, supervision transfers and ambulation, CG shower transfers (patient \par reports mostly wearing housedress and no shoes/slippers PTA). \par \par Patient states she completed home care services last Friday. Started outpatient therapy. \par \par Patient's family initially said that they did not want [facility] but after discharge home they were concerned about her status and thought she would have .\par \par Patient states that she has difficulty . Family reports that she has fallen while transferring out from chair , frequency about once a week. Two of the incidences include transfer from reclining chair into standing, landing on backside. Also had rollator which family member bought while going to [employer] and slid from bench seat. No head strike, no neck pain. Saw EMS and PCP twice, ecchymoses right gluteal , minor. Was cleared to continue therapy at that time. Patient states that overall she has improved in distance ambulation and overall pain. \par \par \par

## 2021-04-01 NOTE — REVIEW OF SYSTEMS
[FreeTextEntry2] : Patient has difficulty in household activities such as cooking. Wears wide leg, pull up sweats . Have been able to get shoes on. [FreeTextEntry1] : difficulty carrying food especially with using platform walker. Difficulty in transferring from sit to stand. can't do laundry, can't bring food from containers to table from fridge

## 2021-04-01 NOTE — PHYSICAL EXAM
[FreeTextEntry1] : /67 O2 sat RA 97%\par \par Patient presents with daughter in exam \par \par ext: back incision well healed, no erythema, no dehisence, dry, no TTP no swelling\par no gluteal ecchymoses mils PSIS TTP left\par bilateral calves+soft, NT no erythema or warmth\par non pitting edema LE stable\par left HF 3/5 quad 4+/5 ham 4/5 ankle PF and DF 5-/5\par right HF 3-/5 quad 3/5 ham 4-/5 ankle PF and DF 4+/5\par negative SLR\par no sensory deficits LT\par \par ambulates with antalgic gait, flexed posture, ER RLE, reduced hip nad knee flexion +funcitonal leg length discrepancy with listing gait ot right

## 2021-04-02 ENCOUNTER — APPOINTMENT (OUTPATIENT)
Dept: CARDIOLOGY | Facility: CLINIC | Age: 76
End: 2021-04-02
Payer: MEDICARE

## 2021-04-02 VITALS
DIASTOLIC BLOOD PRESSURE: 73 MMHG | HEART RATE: 80 BPM | OXYGEN SATURATION: 94 % | TEMPERATURE: 97.1 F | RESPIRATION RATE: 16 BRPM | HEIGHT: 66 IN | BODY MASS INDEX: 44.2 KG/M2 | WEIGHT: 275 LBS | SYSTOLIC BLOOD PRESSURE: 124 MMHG

## 2021-04-02 PROCEDURE — 99214 OFFICE O/P EST MOD 30 MIN: CPT

## 2021-04-02 NOTE — HISTORY OF PRESENT ILLNESS
[FreeTextEntry1] : 75 F w/ obesity, provoked DVT in the setting of orthopedic surgery present for follow-up. \par \par Pt was hospitalized in Jan for back surgery with hospital course c/b nosocomial covid infection. Never required oxygen. Still taking eliquis 5mg BID.\par \par Mobility still limited, ambulates with walker when she can. Started PT last week. \par \par Notes skin changes in her legs, denies any open wounds.\par \par No chest pain, sob, palpitations, lightheadedness, or syncope. \par \par Attempting to lose weight, stopped drinking soda and sugary snacks. Last c-scope ~3 years ago reportedly normal. Had mammogram done September 2020.

## 2021-04-02 NOTE — PHYSICAL EXAM
[General Appearance - Well Developed] : well developed [Normal Appearance] : normal appearance [Well Groomed] : well groomed [General Appearance - Well Nourished] : well nourished [No Deformities] : no deformities [General Appearance - In No Acute Distress] : no acute distress [Normal Conjunctiva] : the conjunctiva exhibited no abnormalities [Eyelids - No Xanthelasma] : the eyelids demonstrated no xanthelasmas [Normal Oral Mucosa] : normal oral mucosa [No Oral Pallor] : no oral pallor [No Oral Cyanosis] : no oral cyanosis [Normal Jugular Venous A Waves Present] : normal jugular venous A waves present [Normal Jugular Venous V Waves Present] : normal jugular venous V waves present [No Jugular Venous Valerio A Waves] : no jugular venous valerio A waves [Respiration, Rhythm And Depth] : normal respiratory rhythm and effort [Exaggerated Use Of Accessory Muscles For Inspiration] : no accessory muscle use [Auscultation Breath Sounds / Voice Sounds] : lungs were clear to auscultation bilaterally [Heart Rate And Rhythm] : heart rate and rhythm were normal [Heart Sounds] : normal S1 and S2 [Murmurs] : no murmurs present [Abdomen Soft] : soft [Abdomen Tenderness] : non-tender [Abdomen Mass (___ Cm)] : no abdominal mass palpated [Nail Clubbing] : no clubbing of the fingernails [Cyanosis, Localized] : no localized cyanosis [Petechial Hemorrhages (___cm)] : no petechial hemorrhages [Skin Color & Pigmentation] : normal skin color and pigmentation [] : no rash [No Venous Stasis] : no venous stasis [Skin Lesions] : no skin lesions [No Skin Ulcers] : no skin ulcer [No Xanthoma] : no  xanthoma was observed [Oriented To Time, Place, And Person] : oriented to person, place, and time [Affect] : the affect was normal [Mood] : the mood was normal [No Anxiety] : not feeling anxious [FreeTextEntry1] : 1 + edema bilaterally L>R.    Stemmers sign, hyperpigmentation, lipodermatosclerosis.

## 2021-04-02 NOTE — ASSESSMENT
[FreeTextEntry1] : Assessment:\par 1.  R Pop DVT\par - provoked\par - July 20th 2019\par - on Eliquis 5mg BID\par 2.  R THP\par 3.  Lymphedema\par \par Plan\par 1.  Continue eliquis 5mg BID for now. Repeat LE duplex, if DVT resolved, will decrease eliquis to 2.5mg BID\par 2.  Encouraged pt to restart pneumatic pump BID   - once at 1PM and once at 7PM\par 3.  Compression garments as tolerated  \par 4.  Still with immobility, will continue eliquis for now. PT as tolerated/  Hope to reduce dose\par 5.  Leg elevation.\par 6. PCP f/u for age appropriate cancer screening. Last c-scope ~3 years ago, mammogram september 2020. \par 7.  Return  3 months\par

## 2021-04-02 NOTE — REASON FOR VISIT
[Follow-Up - Clinic] : a clinic follow-up of [FreeTextEntry1] : 75 F w/ obesity, provoked DVT in the setting of orthopedic surgery present for follow-up.

## 2021-04-08 ENCOUNTER — NON-APPOINTMENT (OUTPATIENT)
Age: 76
End: 2021-04-08

## 2021-05-05 NOTE — PATIENT PROFILE ADULT - PRO INTERPRETER NEED 2
MEDICATION REQUESTED: Levothyroxine     Last office visit: 9/15/20    Next office visit: 9/14/2021    LAST REFILL: 4/9/21 #90 and 1 refill    Labs: 4/8/21    RX denied- there should be refills still on file   English

## 2021-05-12 ENCOUNTER — APPOINTMENT (OUTPATIENT)
Dept: NEUROSURGERY | Facility: CLINIC | Age: 76
End: 2021-05-12
Payer: MEDICARE

## 2021-05-12 ENCOUNTER — APPOINTMENT (OUTPATIENT)
Dept: CT IMAGING | Facility: CLINIC | Age: 76
End: 2021-05-12
Payer: MEDICARE

## 2021-05-12 ENCOUNTER — OUTPATIENT (OUTPATIENT)
Dept: OUTPATIENT SERVICES | Facility: HOSPITAL | Age: 76
LOS: 1 days | End: 2021-05-12
Payer: MEDICARE

## 2021-05-12 ENCOUNTER — RESULT REVIEW (OUTPATIENT)
Age: 76
End: 2021-05-12

## 2021-05-12 VITALS
WEIGHT: 215 LBS | DIASTOLIC BLOOD PRESSURE: 83 MMHG | HEART RATE: 78 BPM | BODY MASS INDEX: 34.55 KG/M2 | SYSTOLIC BLOOD PRESSURE: 125 MMHG | HEIGHT: 66 IN

## 2021-05-12 VITALS — TEMPERATURE: 97.5 F

## 2021-05-12 DIAGNOSIS — Z98.890 OTHER SPECIFIED POSTPROCEDURAL STATES: Chronic | ICD-10-CM

## 2021-05-12 DIAGNOSIS — Z90.89 ACQUIRED ABSENCE OF OTHER ORGANS: Chronic | ICD-10-CM

## 2021-05-12 DIAGNOSIS — Z98.1 ARTHRODESIS STATUS: ICD-10-CM

## 2021-05-12 DIAGNOSIS — Z96.641 PRESENCE OF RIGHT ARTIFICIAL HIP JOINT: Chronic | ICD-10-CM

## 2021-05-12 DIAGNOSIS — Z98.49 CATARACT EXTRACTION STATUS, UNSPECIFIED EYE: Chronic | ICD-10-CM

## 2021-05-12 DIAGNOSIS — C34.90 MALIGNANT NEOPLASM OF UNSPECIFIED PART OF UNSPECIFIED BRONCHUS OR LUNG: Chronic | ICD-10-CM

## 2021-05-12 DIAGNOSIS — Z96.659 PRESENCE OF UNSPECIFIED ARTIFICIAL KNEE JOINT: Chronic | ICD-10-CM

## 2021-05-12 PROCEDURE — 76376 3D RENDER W/INTRP POSTPROCES: CPT | Mod: 26

## 2021-05-12 PROCEDURE — 72128 CT CHEST SPINE W/O DYE: CPT

## 2021-05-12 PROCEDURE — 72131 CT LUMBAR SPINE W/O DYE: CPT | Mod: 26,ME

## 2021-05-12 PROCEDURE — G1004: CPT

## 2021-05-12 PROCEDURE — 72128 CT CHEST SPINE W/O DYE: CPT | Mod: 26,ME

## 2021-05-12 PROCEDURE — 99212 OFFICE O/P EST SF 10 MIN: CPT

## 2021-05-12 PROCEDURE — 72131 CT LUMBAR SPINE W/O DYE: CPT

## 2021-05-12 PROCEDURE — 76376 3D RENDER W/INTRP POSTPROCES: CPT

## 2021-05-12 NOTE — ASSESSMENT
[FreeTextEntry1] : 75 year old female with past medical history of HTN, HLD, SLE, RA, hypothyroidism, provoked R popliteal DVT in 2019 on Eliquis, chronic low back pain presented to ED for acute on chronic lumbosacral  back pain, worsening pain during ambulation with walker.  Patient underwent  L1-L5 laminectomy, T10-L5 posterior surgical fusion on 1/1/21. Discharged to Rehab. Rehab Course significant for COVID19 infection, completed course of Remdesevir and Decadron with improvement in symptoms, UTI treated with antibiotics. Resumed Eliquis on 2/23/21. \par \par Presents today for postop follow up, patient is doing PT twice a week, walking with a walker, incision healed well, takes Motrin and Tylenol for mild pain, denies weakness or numbness.  CT T/L spine completed today, reviewed with patient and daughter, stable, intact hardware, fusing well. \par \par Plan:\par - follow up in 6 months\par - Motrin/Tylenol prn pain \par - continue PT

## 2021-05-12 NOTE — PHYSICAL EXAM
[General Appearance - Alert] : alert [General Appearance - In No Acute Distress] : in no acute distress [Clean] : clean [Dry] : dry [Well-Healed] : well-healed [Intact] : intact [No Drainage] : without drainage [Oriented To Time, Place, And Person] : oriented to person, place, and time [Cranial Nerves Optic (II)] : visual acuity intact bilaterally,  pupils equal round and reactive to light [Cranial Nerves Oculomotor (III)] : extraocular motion intact [Cranial Nerves Trigeminal (V)] : facial sensation intact symmetrically [Cranial Nerves Facial (VII)] : face symmetrical [Cranial Nerves Vestibulocochlear (VIII)] : hearing was intact bilaterally [Cranial Nerves Accessory (XI - Cranial And Spinal)] : head turning and shoulder shrug symmetric [Cranial Nerves Hypoglossal (XII)] : there was no tongue deviation with protrusion [Motor Tone] : muscle tone was normal in all four extremities [Motor Strength] : muscle strength was normal in all four extremities [Sensation Tactile Decrease] : light touch was intact [Abnormal Walk] : normal gait [Balance] : balance was intact

## 2021-05-12 NOTE — HISTORY OF PRESENT ILLNESS
[FreeTextEntry1] : 75 year old female with past medical history of HTN, HLD, SLE, RA, hypothyroidism, provoked R. \par popliteal DVT in 2019 on Eliquis, chronic low back pain presented to ED for acute on chronic lumbosacral \par back pain, worsening pain during ambulation with walker.  MRI T/L spine on 12/28 showed degenerative changes, abnormal signal with associated enhancement is seen involving the endplates adjacent to the L2-3 disc space. There is slight increased T2 prolongation involving the L2-3 disc space with associated widening. While these findings could be compatible with degenerative changes possibly of early discitis and osteomyelitis must be considered Patient underwent  L1-L5 laminectomy, T10-L5 posterior surgical fusion on 1/1/21. Discharged to Rehab. Rehab Course significant for COVID19 infection, completed course of Remdesevir and Decadron with improvement in symptoms, UTI treated with antibiotics. Resumed Eliquis on 2/23/21. \par \par \par \par \par \par \par

## 2021-05-13 ENCOUNTER — APPOINTMENT (OUTPATIENT)
Dept: INTERNAL MEDICINE | Facility: CLINIC | Age: 76
End: 2021-05-13
Payer: MEDICARE

## 2021-05-13 VITALS
OXYGEN SATURATION: 96 % | WEIGHT: 216 LBS | DIASTOLIC BLOOD PRESSURE: 74 MMHG | SYSTOLIC BLOOD PRESSURE: 140 MMHG | HEART RATE: 86 BPM | BODY MASS INDEX: 34.72 KG/M2 | HEIGHT: 66 IN | RESPIRATION RATE: 16 BRPM | TEMPERATURE: 97 F

## 2021-05-13 PROCEDURE — 99214 OFFICE O/P EST MOD 30 MIN: CPT

## 2021-05-13 RX ORDER — CEPHALEXIN 500 MG/1
500 CAPSULE ORAL TWICE DAILY
Qty: 10 | Refills: 0 | Status: ACTIVE | COMMUNITY
Start: 2021-05-13 | End: 1900-01-01

## 2021-05-17 RX ORDER — AMOXICILLIN 500 MG/1
500 CAPSULE ORAL
Qty: 20 | Refills: 4 | Status: DISCONTINUED | COMMUNITY
Start: 2020-02-03 | End: 2021-05-17

## 2021-05-17 RX ORDER — AMOXICILLIN AND CLAVULANATE POTASSIUM 875; 125 MG/1; MG/1
875-125 TABLET, COATED ORAL
Qty: 10 | Refills: 0 | Status: DISCONTINUED | COMMUNITY
Start: 2019-07-01 | End: 2021-05-17

## 2021-05-17 NOTE — HISTORY OF PRESENT ILLNESS
[de-identified] : 75 year old female presents with dysuria symptoms > + burning with urination, + increase urinary frequency . She denies fever, chills, blood in urine

## 2021-05-17 NOTE — PHYSICAL EXAM
[No Acute Distress] : no acute distress [Well Nourished] : well nourished [Well Developed] : well developed [Well-Appearing] : well-appearing [Normal Sclera/Conjunctiva] : normal sclera/conjunctiva [EOMI] : extraocular movements intact [Normal Outer Ear/Nose] : the outer ears and nose were normal in appearance [Normal Oropharynx] : the oropharynx was normal [Normal TMs] : both tympanic membranes were normal [No JVD] : no jugular venous distention [No Lymphadenopathy] : no lymphadenopathy [Supple] : supple [No Respiratory Distress] : no respiratory distress  [No Accessory Muscle Use] : no accessory muscle use [Clear to Auscultation] : lungs were clear to auscultation bilaterally [Normal Rate] : normal rate  [Regular Rhythm] : with a regular rhythm [Normal S1, S2] : normal S1 and S2 [No Murmur] : no murmur heard [Pedal Pulses Present] : the pedal pulses are present [No Edema] : there was no peripheral edema [Soft] : abdomen soft [Non Tender] : non-tender [Non-distended] : non-distended [Normal Bowel Sounds] : normal bowel sounds [Normal Posterior Cervical Nodes] : no posterior cervical lymphadenopathy [Normal Anterior Cervical Nodes] : no anterior cervical lymphadenopathy [No CVA Tenderness] : no CVA  tenderness [No Spinal Tenderness] : no spinal tenderness [No Joint Swelling] : no joint swelling [Grossly Normal Strength/Tone] : grossly normal strength/tone [No Rash] : no rash [No Focal Deficits] : no focal deficits [Deep Tendon Reflexes (DTR)] : deep tendon reflexes were 2+ and symmetric [Normal Affect] : the affect was normal [Normal Insight/Judgement] : insight and judgment were intact [de-identified] : ambulates with walker

## 2021-05-17 NOTE — PLAN
[FreeTextEntry1] : 1. see plan\par 2. DVT \par on Eliquis \par 3. Htn/ Hypothyroidism / Hld/ RA\par continue current meds

## 2021-05-18 LAB
APPEARANCE: CLEAR
BACTERIA UR CULT: ABNORMAL
BACTERIA: NEGATIVE
BILIRUBIN URINE: NEGATIVE
BLOOD URINE: NEGATIVE
COLOR: NORMAL
GLUCOSE QUALITATIVE U: NEGATIVE
HYALINE CASTS: 1 /LPF
KETONES URINE: NEGATIVE
LEUKOCYTE ESTERASE URINE: NEGATIVE
MICROSCOPIC-UA: NORMAL
NITRITE URINE: NEGATIVE
PH URINE: 6.5
PROTEIN URINE: NEGATIVE
RED BLOOD CELLS URINE: 4 /HPF
SPECIFIC GRAVITY URINE: 1.02
SQUAMOUS EPITHELIAL CELLS: 3 /HPF
UROBILINOGEN URINE: NORMAL
WHITE BLOOD CELLS URINE: 4 /HPF

## 2021-05-18 RX ORDER — LEVOFLOXACIN 500 MG/1
500 TABLET, FILM COATED ORAL DAILY
Qty: 5 | Refills: 0 | Status: ACTIVE | COMMUNITY
Start: 2021-05-18 | End: 1900-01-01

## 2021-06-04 ENCOUNTER — APPOINTMENT (OUTPATIENT)
Dept: CARDIOLOGY | Facility: CLINIC | Age: 76
End: 2021-06-04
Payer: MEDICARE

## 2021-06-04 VITALS
DIASTOLIC BLOOD PRESSURE: 67 MMHG | RESPIRATION RATE: 15 BRPM | SYSTOLIC BLOOD PRESSURE: 129 MMHG | WEIGHT: 213 LBS | OXYGEN SATURATION: 96 % | BODY MASS INDEX: 34.38 KG/M2 | HEART RATE: 61 BPM | TEMPERATURE: 98.7 F

## 2021-06-04 PROCEDURE — 99214 OFFICE O/P EST MOD 30 MIN: CPT

## 2021-06-04 NOTE — ASSESSMENT
[FreeTextEntry1] : Assessment:\par 1.  R Pop DVT\par - provoked\par - July 20th 2019\par - on Eliquis 5mg BID, now on Eliquis 2.5mg BID \par 2.  R THP\par 3.  Lymphedema\par \par Plan\par 1.  Continue eliquis to 2.5mg BID\par 2.  Pneumatic pump BID   - once at 1PM and once at 7PM\par 3.  Compression garments- rx given\par 4.  Still with immobility, will continue eliquis for now. PT as tolerated\par 5.  Leg elevation.\par 6. PCP f/u for age appropriate cancer screening. Last c-scope ~3 years ago, mammogram september 2020. \par 7.  Repeat venous duplex for surveillance while on half dose a/c\par 8.  Return in 6 months. \par

## 2021-06-04 NOTE — HISTORY OF PRESENT ILLNESS
[FreeTextEntry1] :  6/4/2021\par \par Doing well\par She is losing weight\par Here with a walker. \par Doing well with reduced dose eliquis 2.5mg BID\par Back is hurting her some, but improving\par Seen by dr. Campos - all good.\par Using pneumatic pump BID. \par Holter in march did not show any afib/flutter

## 2021-06-23 ENCOUNTER — APPOINTMENT (OUTPATIENT)
Dept: CARDIOLOGY | Facility: CLINIC | Age: 76
End: 2021-06-23
Payer: MEDICARE

## 2021-06-23 ENCOUNTER — NON-APPOINTMENT (OUTPATIENT)
Age: 76
End: 2021-06-23

## 2021-06-23 VITALS
SYSTOLIC BLOOD PRESSURE: 130 MMHG | HEIGHT: 66 IN | HEART RATE: 56 BPM | WEIGHT: 211 LBS | BODY MASS INDEX: 33.91 KG/M2 | OXYGEN SATURATION: 98 % | DIASTOLIC BLOOD PRESSURE: 71 MMHG

## 2021-06-23 PROCEDURE — 93306 TTE W/DOPPLER COMPLETE: CPT

## 2021-06-23 PROCEDURE — 99214 OFFICE O/P EST MOD 30 MIN: CPT

## 2021-06-23 PROCEDURE — 93000 ELECTROCARDIOGRAM COMPLETE: CPT

## 2021-06-23 RX ORDER — APIXABAN 5 MG/1
5 TABLET, FILM COATED ORAL
Qty: 180 | Refills: 0 | Status: DISCONTINUED | COMMUNITY
Start: 2020-08-13 | End: 2021-06-23

## 2021-06-23 NOTE — DISCUSSION/SUMMARY
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 Hoyos, continue weight loss\par

## 2021-06-23 NOTE — HISTORY OF PRESENT ILLNESS
[FreeTextEntry1] : Marely continue to lose weight. STill recovering from back surgery and ambulates with walker. No lightheadedness or dizziness.

## 2021-06-23 NOTE — REVIEW OF SYSTEMS
[Weight Loss (___ Lbs)] : [unfilled] ~Ulb weight loss [SOB] : shortness of breath [Dyspnea on exertion] : dyspnea during exertion [Chest Discomfort] : no chest discomfort [Leg Claudication] : no intermittent leg claudication [Lower Ext Edema] : no extremity edema [Palpitations] : no palpitations [Syncope] : no syncope [Orthopnea] : no orthopnea [Negative] : Heme/Lymph

## 2021-06-23 NOTE — PHYSICAL EXAM
[Well Developed] : well developed [Well Nourished] : well nourished [No Acute Distress] : no acute distress [Normal Conjunctiva] : normal conjunctiva [Normal Venous Pressure] : normal venous pressure [No Carotid Bruit] : no carotid bruit [Normal S1, S2] : normal S1, S2 [No Murmur] : no murmur [No Rub] : no rub [Clear Lung Fields] : clear lung fields [No Gallop] : no gallop [Good Air Entry] : good air entry [No Respiratory Distress] : no respiratory distress  [Soft] : abdomen soft [Non Tender] : non-tender [No Masses/organomegaly] : no masses/organomegaly [Normal Bowel Sounds] : normal bowel sounds [Normal Gait] : normal gait [No Edema] : no edema [No Cyanosis] : no cyanosis [No Clubbing] : no clubbing [No Varicosities] : no varicosities [No Rash] : no rash [No Skin Lesions] : no skin lesions [Moves all extremities] : moves all extremities [No Focal Deficits] : no focal deficits [Normal Speech] : normal speech [Alert and Oriented] : alert and oriented [Normal memory] : normal memory

## 2021-07-12 ENCOUNTER — APPOINTMENT (OUTPATIENT)
Dept: UROLOGY | Facility: CLINIC | Age: 76
End: 2021-07-12
Payer: MEDICARE

## 2021-07-12 ENCOUNTER — APPOINTMENT (OUTPATIENT)
Dept: PULMONOLOGY | Facility: CLINIC | Age: 76
End: 2021-07-12
Payer: MEDICARE

## 2021-07-12 VITALS
TEMPERATURE: 97.3 F | DIASTOLIC BLOOD PRESSURE: 88 MMHG | OXYGEN SATURATION: 98 % | HEART RATE: 85 BPM | RESPIRATION RATE: 14 BRPM | SYSTOLIC BLOOD PRESSURE: 133 MMHG | WEIGHT: 210 LBS | HEIGHT: 66 IN | BODY MASS INDEX: 33.75 KG/M2

## 2021-07-12 DIAGNOSIS — Z87.440 PERSONAL HISTORY OF URINARY (TRACT) INFECTIONS: ICD-10-CM

## 2021-07-12 PROCEDURE — 94060 EVALUATION OF WHEEZING: CPT

## 2021-07-12 PROCEDURE — 99204 OFFICE O/P NEW MOD 45 MIN: CPT

## 2021-07-12 PROCEDURE — 99204 OFFICE O/P NEW MOD 45 MIN: CPT | Mod: 25

## 2021-07-12 PROCEDURE — 94729 DIFFUSING CAPACITY: CPT

## 2021-07-12 PROCEDURE — 94726 PLETHYSMOGRAPHY LUNG VOLUMES: CPT

## 2021-07-12 PROCEDURE — 95012 NITRIC OXIDE EXP GAS DETER: CPT

## 2021-07-12 NOTE — ASSESSMENT
[FreeTextEntry1] : Very pleasant 75-year-old woman who presents for evaluation of recurrent urinary tract infections\par -Prior urine culture reviewed demonstrating urinary tract infection\par -Repeat urine culture today\par -We discussed general preventative strategies for urinary tract infections\par -Start Azo cranberry\par -Start Hip-Elias\par -Based on results of urine culture will prescribe antibiotics if she has another urinary tract infection\par -Follow-up in 1 month

## 2021-07-12 NOTE — HISTORY OF PRESENT ILLNESS
[FreeTextEntry1] : Very pleasant 75-year-old woman who presents for evaluation of recurrent urinary tract infections.  She reports that she was recently admitted to the hospital approximately 6 months ago and developed a urinary tract infection.  She reports to urinary tract infections while she was in the hospital.  She subsequently was discharged and again began to experience dysuria.  Urine culture demonstrated another urinary tract infection.  She reports dysuria at this time as well as suprapubic abdominal pain.  She believes she may have another urinary tract infection.  No history of urinary tract infections prior to this.  She recently underwent back surgery in January 2021.

## 2021-07-13 PROBLEM — Z87.440 HISTORY OF URINARY TRACT INFECTION: Status: RESOLVED | Noted: 2019-07-01 | Resolved: 2020-12-21

## 2021-07-13 NOTE — HISTORY OF PRESENT ILLNESS
[Former] : former [Never] : never [TextBox_4] : Patient is a 75-year-old female past medical history significant for hypothyroidism, lung cancer status post resection, hyperlipidemia, lupus who was recently admitted to [facility] and found to have COVID-19 pneumonia.  The patient presents today for posthospitalization care.  Currently complains of occasional cough and shortness of breath.She denies any recent chest pain, fevers, chills SOB or hemoptysis

## 2021-07-13 NOTE — ASSESSMENT
[FreeTextEntry1] : In summary the patient is a 75-year-old female with past medical history significant for lung cancer status post resection, hypothyroidism, hypertension who recently was diagnosed with COVID-19 pneumonia.  The patient was never intubated.  Physical exam is significant for mild scattered wheeze.\par \par The patient's pulmonary function test revealed mild restriction with an  elevated FeNO\par Patient started on Trelegy and instructed to follow up in 3 months

## 2021-07-13 NOTE — REVIEW OF SYSTEMS
[Hemoptysis] : no hemoptysis [Dyspnea] : dyspnea [SOB on Exertion] : sob on exertion [Negative] : Endocrine

## 2021-07-15 ENCOUNTER — NON-APPOINTMENT (OUTPATIENT)
Age: 76
End: 2021-07-15

## 2021-07-16 LAB — BACTERIA UR CULT: ABNORMAL

## 2021-07-16 RX ORDER — AMOXICILLIN AND CLAVULANATE POTASSIUM 875; 125 MG/1; MG/1
875-125 TABLET, COATED ORAL TWICE DAILY
Qty: 10 | Refills: 0 | Status: ACTIVE | COMMUNITY
Start: 2021-07-16 | End: 1900-01-01

## 2021-07-22 ENCOUNTER — APPOINTMENT (OUTPATIENT)
Dept: PHYSICAL MEDICINE AND REHAB | Facility: CLINIC | Age: 76
End: 2021-07-22
Payer: MEDICARE

## 2021-07-22 ENCOUNTER — NON-APPOINTMENT (OUTPATIENT)
Age: 76
End: 2021-07-22

## 2021-07-22 VITALS
BODY MASS INDEX: 33.27 KG/M2 | WEIGHT: 207 LBS | HEIGHT: 66 IN | RESPIRATION RATE: 14 BRPM | DIASTOLIC BLOOD PRESSURE: 67 MMHG | HEART RATE: 71 BPM | OXYGEN SATURATION: 96 % | SYSTOLIC BLOOD PRESSURE: 110 MMHG | TEMPERATURE: 97.6 F

## 2021-07-22 PROCEDURE — 99214 OFFICE O/P EST MOD 30 MIN: CPT

## 2021-07-22 NOTE — PHYSICAL EXAM
[FreeTextEntry1] : PE: ambulates with rollator and platform attachments, less flexed posture, decreased antalgic gait, reduced stride length and flexion in right hip and knee\par \par ext: right HF 3-/5 quad 3/5 \par left HF 4/5 quad 4+/5\par right hamstring 4/5 left 4+/5\par bilateral ankle PF and DF 5/5\par \par +bilateral knee crepitus, no knee effusion\par +ext lag right quad -25 degrees full extension passively\par \par no calf swelling +soft, NT\par incision healing well no swelling or erythema\par requires CS sit to stand with rollator

## 2021-07-22 NOTE — ASSESSMENT
[FreeTextEntry1] : s/p L1-L5 laminectomy, T10-L5 posterior surgical fusion by Dr. Campos on 1/1/21 complicated by COVID-19 and respiratory failure requiring remdesivir and decadron, DVT on eliquis, GGO on CT. Patient has residual BLE weakness, reduced endurance and balance, exacerbated by deconditioning and bilateral knee OA right > left. Currently saturating well on RA\par \par \par 1. Patient has shown improvement in LE strength, transfers, static standing with use rollator and ambulation distance, but would benefit from additional therapy services to work on continued endurance, dynamic standing balance, gait, and steps in home, possible advancement from platform rollator to rollator and eventual WBQC. Patient has been receiving home therapy in past and is considered homebound due to fall risk/unsteady gait, fatigue with minimal ambulation\par \par  PT 2 x week x 12 weeks for dynamic balance, LE strengthening especially gluteal, quad, hamstring , standing tolerance , evaluate for possible progressive ambulation from walker to WBQC to improve mobility within home, steps\par \par 2. Reviewed with patient and daughter in detail who are agreeable\par \par 3. f/u 3 months

## 2021-07-22 NOTE — HISTORY OF PRESENT ILLNESS
[FreeTextEntry1] : Patient is 75F with past medical history of HTN, HLD, SLE, RA, hypothyroidism, provoked R. \par popliteal DVT in 2019 on eliquis, chronic low back pain who underwent  L1-L5 laminectomy, T10-L5 posterior surgical fusion by Dr. Campos on 1/1/21 complicated by COVID-19 and respiratory failure requiring remdesivir and decadron and eliquis for h/o DVT. Patient also developed difficulties with recurrent UTI and urinary frequency and reduced stream, and was discharged home with home care referal and home PT and OT services. Patient was last seen 4/1, at which time she had persistent antlagic gait, with left HF 3/5 quad 4+/5 ham 4/5 ankle PF and DF 5-/5, and right HF 3-/5 quad 3/5 ham 4-/5 ankle PF and DF 4+/5 on exam\par \par \par Since her last visit, she was seen by cardiologist, sheduled for pharmacologic stress test due to PVCs but asymptomatic.  Possible reductionin synthroid further due to weight loos, last TSH in [facility] record 0.03 3/21.Saw Dr Campos who would like her progressing to cane. Currently uses rollator indoors, has rollator with bilateral UE platform attachments for longer ambulation and outdoors, Has not had falls since last visit.

## 2021-08-02 ENCOUNTER — RX RENEWAL (OUTPATIENT)
Age: 76
End: 2021-08-02

## 2021-08-02 NOTE — ASSESSMENT
[FreeTextEntry1] : Assessment:\par 1.  R Pop DVT\par - provoked\par - July 20th 2019\par - on Eliquis 5mg BID\par 2.  R THP\par 3.  needs L TKR\par 4.  Lymphedema\par \par Plan\par 1. There is no DVT on duplex, I have no objection to proceeding with TKR. \par 2.  Would hold Eliquis 2-3 days prior to TKR, and restart Eliquis day afterwards for another 3 months\par 3.  While off anticoagulation, would make sure to don pneumatic compression\par 4.  No need for IVC filter\par 5.  Patient has primary lymphedema and has tried conservative therapies such as compression stockings and leg elevation for over one month, but still remains symptomatic.  There is hyperpigmentation and lipodermatosclerosis on exam.  Will arrange for a pneumatic pump.\par 6.  Return after 3 months. \par \par [place] [id number]
[phone]

## 2021-08-18 ENCOUNTER — NON-APPOINTMENT (OUTPATIENT)
Age: 76
End: 2021-08-18

## 2021-08-18 ENCOUNTER — APPOINTMENT (OUTPATIENT)
Dept: CARDIOLOGY | Facility: CLINIC | Age: 76
End: 2021-08-18
Payer: MEDICARE

## 2021-08-18 VITALS
RESPIRATION RATE: 14 BRPM | OXYGEN SATURATION: 96 % | BODY MASS INDEX: 33.59 KG/M2 | WEIGHT: 209 LBS | HEIGHT: 66 IN | SYSTOLIC BLOOD PRESSURE: 132 MMHG | HEART RATE: 68 BPM | DIASTOLIC BLOOD PRESSURE: 71 MMHG | TEMPERATURE: 97.8 F

## 2021-08-18 PROCEDURE — 93000 ELECTROCARDIOGRAM COMPLETE: CPT

## 2021-08-18 PROCEDURE — 99214 OFFICE O/P EST MOD 30 MIN: CPT

## 2021-08-18 NOTE — HISTORY OF PRESENT ILLNESS
[FreeTextEntry1] : Marely denies any palpitations, lightheadedness or dizziness. Continues to walk with walker.

## 2021-08-18 NOTE — DISCUSSION/SUMMARY
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 Hoyos, continue weight loss\par

## 2021-08-18 NOTE — REVIEW OF SYSTEMS
[Weight Loss (___ Lbs)] : [unfilled] ~Ulb weight loss [SOB] : shortness of breath [Dyspnea on exertion] : dyspnea during exertion [Negative] : Heme/Lymph [Chest Discomfort] : no chest discomfort [Lower Ext Edema] : no extremity edema [Leg Claudication] : no intermittent leg claudication [Palpitations] : no palpitations [Orthopnea] : no orthopnea [Syncope] : no syncope

## 2021-08-19 NOTE — PROGRESS NOTE ADULT - SUBJECTIVE AND OBJECTIVE BOX
PLEASE FOLLOW UP WITH YOUR PRIMARY CARE PHYSICIAN IN REGARD TO THIS URGENT CARE VISIT.    TREATMENT PLAN FOR TODAY'S VISIT:  Reflux symptoms    1.  Pepcid 20 mg once daily    2.  TUMS 1 to 4 tablets as symptoms occur; maximum up to 2 weeks only  OR ROLAIDS:  Oral:  2-4 tablets between meals, at bedtime.  Gaviscon:  Chew 2 to 4 tablets 4 times a day after meals and at bedtime or as needed.  Follow with half a glass of water or other liquid.  Do NOT swallow the tablets whole.    3.  Avoid all spicy, oily and high acid foods (tomatoes, oranges, etc).  Avoid beverages with caffeine, like coffee, tea.  Avoid soda and alcohol.    4.  Avoid all NSAIDS.  For fever/pain management.  Acetaminophen 500 mg 1-2 tablets every 6 hours as needed (max. 3000 mg/daily).    5.  Sleep with the head of the bed slightly elevated.    TREATMENT PLAN FOR TODAY'S VISIT:  Possible UTI    1.  Patient was informed that this clinic would contact her with the microbiology results within 48-72 hours.  At that time, recommendations in regard to further treatment would be discussed.  Patient is agreeable with this plan.    2.  Macrobid 1 capsule twice daily x5 days.  To prevent antibiotic-induced diarrhea (or a yeast infection for women), take OTC acidophilus or lactobacillus 1 capsule twice daily x10 days.  Alternatively, you can take Activia yogurt (or other yogurt that contains probiotics) twice daily x10 days (do not take simultaneously with antibiotics).    Since this patient's symptoms are stable, the patient was informed that if she did not notice an improvement in her symptoms, within the discussed amount of time, she is to go to the nearest emergency room for evaluation.  Patient acknowledged shared decision making at the end of our conversation.        Reflux Esophagitis    What is reflux esophagitis?   Reflux esophagitis is inflammation of the lower part of the esophagus. The esophagus is the tube that carries food from your throat to your  stomach. Esophagitis causes heartburn and pain in the area under the breastbone.   Over time reflux esophagitis can cause changes in the lining cells of the esophagus, causing a condition called Dong's esophagus. These changes can increase your risk of developing cancer of the esophagus.    How does it occur?   Reflux esophagitis happens when the acid contents of the stomach flow back into your esophagus and cause heartburn. This back flow of acid is called reflux or gastroesophageal reflux. Your esophagus may become red and inflamed if the reflux of acid happens often.  Reflux esophagitis can occur with:  • Overweight   • Pregnancy   • Hiatal hernia, which is a condition in which part of the stomach protrudes through the diaphragm up into the chest   • Frequent vomiting   • Nasogastric tubes, which are tubes passed through your nose down into your stomach for treatment of some medical problems   • Eating large meals or eating close to bedtime   • Lying down right after you eat   • Scleroderma (a disease that causes thickening and tightness of the skin)    What are the symptoms?   Symptoms include:  • Heartburn   • Cramping, severe pain, or pressure below the breastbone   • Pain, often in the chest   • Acid taste, especially at night   • Coughing   • Shortness of breath   Symptoms may occur when you lie down after eating and may be relieved when you sit upright. Heartburn, the most common symptom, usually occurs 30 to 60 minutes after you eat and may be severe. The pain may spread to your neck, jaw, arms, and back. It may be hard to tell it from a heart attack. Get emergency care if your heartburn does not get better within 15 minutes after treatment or if you have chest discomfort (pressure, fullness, squeezing or pain) that goes away and comes back or chest discomfort that goes to your arms, neck, jaw or back. These symptoms may be signs of a heart attack.    How is it diagnosed?   Your healthcare provider will  review your symptoms and examine you. Your provider may order the following tests:  • A barium swallow x-ray, which can show swallowing problems or signs of abnormal areas in the esophagus   • Esophageal manometry (a test to measure pressure in the esophagus)   • Endoscopy (a way for your provider to look at your esophagus and stomach with a scope)  Endoscopy is a procedure in which a thin flexible tube with a tiny camera is placed in your mouth and down into your stomach so your provider can see your esophagus and stomach. If the endoscopy shows abnormal areas on the esophagus walls, samples of tissue (biopsies) may be taken. The samples will be examined in the lab for infection, cancer, or precancerous changes.  Often, no tests are necessary. Instead, your provider may first see if taking medicine relieves your symptoms. In some cases, depending on your medical history and your symptoms, you may need tests to make sure the pain is not caused by heart disease.    How is it treated?   Your healthcare provider may recommend or prescribe:  • Antacids to take after meals and at bedtime   • Medicine that decreases the amount of acid your stomach makes   • Medicine that helps food and acid move down through your digestive tract   • Weight loss to decrease the pressure on your stomach   • Eating smaller meals   • Avoiding late-evening snacks or meals before bedtime   • Raising the head of your bed about 4 to 6 inches to help the acid stay in your stomach.   Repeated inflammation and scarring may make your esophagus become narrower. If this happens, your healthcare provider may:  • Dilate (widen) your esophagus   • Use surgery to repair a hiatal hernia if you have one   • Use surgery to create a new segment of esophagus   In severe cases of esophagitis, when symptoms continue in spite of treatment, a surgical procedure called fundoplication may be considered. This surgery makes the sphincter between the esophagus and  Patient is a 75y old  Female who presents with a chief complaint of LBP    SUBJECTIVE / OVERNIGHT EVENTS:    No CP, SOB, f/c/n/v  Reports drank the moviprep and finally had a BM  Back pain is 3/10 better since not moving around much, no numbness, weakness    MEDICATIONS  (STANDING):  atorvastatin 20 milliGRAM(s) Oral at bedtime  enoxaparin Injectable 40 milliGRAM(s) SubCutaneous at bedtime  folic acid 1 milliGRAM(s) Oral daily  hydroxychloroquine 200 milliGRAM(s) Oral daily  influenza  Vaccine (HIGH DOSE) 0.7 milliLiter(s) IntraMuscular once  levothyroxine 200 MICROGram(s) Oral daily  losartan 50 milliGRAM(s) Oral daily  polyethylene glycol 3350 17 Gram(s) Oral daily  senna 2 Tablet(s) Oral at bedtime    MEDICATIONS  (PRN):  acetaminophen   Tablet .. 650 milliGRAM(s) Oral every 6 hours PRN Mild Pain (1 - 3)  magnesium hydroxide Suspension 30 milliLiter(s) Oral daily PRN Constipation  oxycodone    5 mG/acetaminophen 325 mG 2 Tablet(s) Oral every 6 hours PRN Severe Pain (7 - 10)  oxycodone    5 mG/acetaminophen 325 mG 1 Tablet(s) Oral every 6 hours PRN Moderate Pain (4 - 6)    T(C): 36.6 (12-31-20 @ 09:11), Max: 37.2 (12-31-20 @ 01:52)  HR: 68 (12-31-20 @ 09:11) (63 - 73)  BP: 131/55 (12-31-20 @ 09:11) (123/61 - 146/69)  RR: 18 (12-31-20 @ 09:11) (16 - 18)  SpO2: 98% (12-31-20 @ 09:11) (95% - 100%)    I&O's Summary    30 Dec 2020 07:01  -  31 Dec 2020 07:00  --------------------------------------------------------  IN: 580 mL / OUT: 1570 mL / NET: -990 mL    31 Dec 2020 07:01  -  31 Dec 2020 11:25  --------------------------------------------------------  IN: 0 mL / OUT: 400 mL / NET: -400 mL    PHYSICAL EXAM:  GENERAL: NAD, obese   HEAD:  Atraumatic, Normocephalic  CHEST/LUNG: Clear to auscultation bilaterally; No wheeze  HEART: Regular rate and rhythm; SEJM  ABDOMEN: obese, soft, Nontender, Nondistended; Bowel sounds present  EXTREMITIES:   warm and well perfused, No clubbing, cyanosis, or edema  PSYCH: AAOx3  NEUROLOGY: non-focal  SKIN: healed l knee scar     LABS:  no new labs    Microbiology: Culture Results:   No growth to date. (12-28 @ 07:14)  Culture Results:   No growth to date. (12-28 @ 01:50)    CXR (12/30): Clear lungs    TTE (12/30):   CONCLUSIONS:  1. Mitral annular calcification, otherwise normal mitral  valve. Mild mitral regurgitation.  2. Aortic valve leaflet morphology not well visualized.  The valve is calcified. Peak transaortic valve gradient  equals 31 mm Hg, mean transaortic valve gradient equals 18  mm Hg, estimated aortic valve area equals 1.2 sqcm (by  continuity equation), consistent with moderate aortic  stenosis.  3. Endocardium not well visualized; grossly normal left  ventricular systolic function.  4. Mild diastolic dysfunction (Stage I).  5. The right ventricle is not well visualized; grossly  normal right ventricular systolic function.    Consultant(s) Notes Reviewed:    Care Discussed with Consultants/Other Providers:   Patient is a 75y old  Female who presents with a chief complaint of LBP    SUBJECTIVE / OVERNIGHT EVENTS:    No CP, SOB, f/c/n/v  Reports drank the moviprep and finally had a BM  Back pain is 3/10 better since not moving around much, no numbness, weakness    MEDICATIONS  (STANDING):  atorvastatin 20 milliGRAM(s) Oral at bedtime  enoxaparin Injectable 40 milliGRAM(s) SubCutaneous at bedtime  folic acid 1 milliGRAM(s) Oral daily  hydroxychloroquine 200 milliGRAM(s) Oral daily  influenza  Vaccine (HIGH DOSE) 0.7 milliLiter(s) IntraMuscular once  levothyroxine 200 MICROGram(s) Oral daily  losartan 50 milliGRAM(s) Oral daily  polyethylene glycol 3350 17 Gram(s) Oral daily  senna 2 Tablet(s) Oral at bedtime    MEDICATIONS  (PRN):  acetaminophen   Tablet .. 650 milliGRAM(s) Oral every 6 hours PRN Mild Pain (1 - 3)  magnesium hydroxide Suspension 30 milliLiter(s) Oral daily PRN Constipation  oxycodone    5 mG/acetaminophen 325 mG 2 Tablet(s) Oral every 6 hours PRN Severe Pain (7 - 10)  oxycodone    5 mG/acetaminophen 325 mG 1 Tablet(s) Oral every 6 hours PRN Moderate Pain (4 - 6)    T(C): 36.6 (12-31-20 @ 09:11), Max: 37.2 (12-31-20 @ 01:52)  HR: 68 (12-31-20 @ 09:11) (63 - 73)  BP: 131/55 (12-31-20 @ 09:11) (123/61 - 146/69)  RR: 18 (12-31-20 @ 09:11) (16 - 18)  SpO2: 98% (12-31-20 @ 09:11) (95% - 100%)    I&O's Summary    30 Dec 2020 07:01  -  31 Dec 2020 07:00  --------------------------------------------------------  IN: 580 mL / OUT: 1570 mL / NET: -990 mL    31 Dec 2020 07:01  -  31 Dec 2020 11:25  --------------------------------------------------------  IN: 0 mL / OUT: 400 mL / NET: -400 mL    PHYSICAL EXAM:  GENERAL: NAD, obese   HEAD:  Atraumatic, Normocephalic  CHEST/LUNG: Clear to auscultation bilaterally; No wheeze  HEART: Regular rate and rhythm; SEJM  ABDOMEN: obese, soft, Nontender, Nondistended; Bowel sounds present  EXTREMITIES:   warm and well perfused, No clubbing, cyanosis, or edema  PSYCH: AAOx3  NEUROLOGY: non-focal  SKIN: healed l knee scar     LABS:  no new labs    Microbiology: Culture Results:   No growth to date. (12-28 @ 07:14)  Culture Results:   No growth to date. (12-28 @ 01:50)    CXR (12/30): Clear lungs    TTE (12/30):   CONCLUSIONS:  1. Mitral annular calcification, otherwise normal mitral  valve. Mild mitral regurgitation.  2. Aortic valve leaflet morphology not well visualized.  The valve is calcified. Peak transaortic valve gradient  equals 31 mm Hg, mean transaortic valve gradient equals 18  mm Hg, estimated aortic valve area equals 1.2 sqcm (by  continuity equation), consistent with moderate aortic  stenosis.  3. Endocardium not well visualized; grossly normal left  ventricular systolic function.  4. Mild diastolic dysfunction (Stage I).  5. The right ventricle is not well visualized; grossly  normal right ventricular systolic function.    Consultant(s) Notes Reviewed:    Care Discussed with Consultants/Other Providers: [id number]

## 2021-08-23 ENCOUNTER — APPOINTMENT (OUTPATIENT)
Dept: UROLOGY | Facility: CLINIC | Age: 76
End: 2021-08-23
Payer: MEDICARE

## 2021-08-23 ENCOUNTER — RX RENEWAL (OUTPATIENT)
Age: 76
End: 2021-08-23

## 2021-08-23 VITALS
BODY MASS INDEX: 33.91 KG/M2 | OXYGEN SATURATION: 99 % | SYSTOLIC BLOOD PRESSURE: 153 MMHG | HEART RATE: 64 BPM | DIASTOLIC BLOOD PRESSURE: 77 MMHG | WEIGHT: 211 LBS | HEIGHT: 66 IN | TEMPERATURE: 97.7 F | RESPIRATION RATE: 14 BRPM

## 2021-08-23 PROCEDURE — 99214 OFFICE O/P EST MOD 30 MIN: CPT

## 2021-08-23 NOTE — PHYSICAL EXAM
[General Appearance - Well Nourished] : well nourished [General Appearance - Well Developed] : well developed [Normal Appearance] : normal appearance [Well Groomed] : well groomed [General Appearance - In No Acute Distress] : no acute distress [Abdomen Soft] : soft [Abdomen Tenderness] : non-tender [Costovertebral Angle Tenderness] : no ~M costovertebral angle tenderness [Urinary Bladder Findings] : the bladder was normal on palpation [Edema] : no peripheral edema [] : no respiratory distress [Respiration, Rhythm And Depth] : normal respiratory rhythm and effort [Oriented To Time, Place, And Person] : oriented to person, place, and time [Exaggerated Use Of Accessory Muscles For Inspiration] : no accessory muscle use [Affect] : the affect was normal [Not Anxious] : not anxious [Mood] : the mood was normal [FreeTextEntry1] : Walks with a walker [No Focal Deficits] : no focal deficits [No Palpable Adenopathy] : no palpable adenopathy

## 2021-08-23 NOTE — ASSESSMENT
[FreeTextEntry1] : Very pleasant 75-year-old woman presents for follow-up of increased urinary frequency, urinary urgency, urinary tract infection\par -Continue Hip-Elias for urinary tract infection prevention\par -Continue cranberry pills for urinary tract infection prevention\par -Trial of Ditropan for urinary urgency\par -I discussed the risks, benefits, alternatives, and possible side effects of Ditropan (oxybutynin) therapy with the patient, including but not limited to urinary retention, dry mouth, dry eyes, constipation, and confusion.  Patient understands that he must call immediately should any of these symptoms occur\par -Follow-up in 1 month

## 2021-08-23 NOTE — HISTORY OF PRESENT ILLNESS
[FreeTextEntry1] : Very pleasant 75-year-old woman presents for follow-up of recurrent urinary tract infections with new complaint of increased urinary frequency and urinary urgency.  She reports that this is been present for some time.  She recently completed a course of antibiotics for urinary tract infection.  Her symptoms improved remarkably.  She still reports increased urinary frequency and urinary urgency every 5 minutes.  This is bothersome to her.  No other complaints.

## 2021-08-29 ENCOUNTER — RESULT REVIEW (OUTPATIENT)
Age: 76
End: 2021-08-29

## 2021-09-11 NOTE — DISCUSSION/SUMMARY
[] : The components of the vaccine(s) to be administered today are listed in the plan of care. The disease(s) for which the vaccine(s) are intended to prevent and the risks have been discussed with the caretaker.  The risks are also included in the appropriate vaccination information statements which have been provided to the patient's caregiver.  The caregiver has given consent to vaccinate. [___ Month(s)] : [unfilled] month(s) [FreeTextEntry1] : The patient is a 74-year-old obese female history of rheumatoid arthritis,  lupus, hypothyroidism, hyperlipidemia, hypertension, lower extremity edema ,RBBB, s/p left lobectomy for cancer s/p rt hip replacement complicated by DVT s/p left knee replacement who is doing well. \par #1 RA- on MTX and plaquenil, ambulates with walker\par #2 Htn- decrease to losartan 50mg\par #3 Lipids- on atorvastatin therapeutic\par #4 Hypothyroidism- on levothyroxine\par #5 CV- no angina or HF, ECG with RBBB unchanged\par #6 Ortho- s/p right hip replacement and left knee replacement, she is holding eliquis from today for hip injection on Wednesday.\par

## 2021-09-20 ENCOUNTER — APPOINTMENT (OUTPATIENT)
Dept: UROLOGY | Facility: CLINIC | Age: 76
End: 2021-09-20
Payer: MEDICARE

## 2021-09-20 VITALS
SYSTOLIC BLOOD PRESSURE: 127 MMHG | TEMPERATURE: 98.2 F | HEART RATE: 79 BPM | DIASTOLIC BLOOD PRESSURE: 62 MMHG | OXYGEN SATURATION: 97 % | RESPIRATION RATE: 14 BRPM | HEIGHT: 66 IN | BODY MASS INDEX: 30.05 KG/M2 | WEIGHT: 187 LBS

## 2021-09-20 PROCEDURE — 99214 OFFICE O/P EST MOD 30 MIN: CPT

## 2021-09-20 NOTE — HISTORY OF PRESENT ILLNESS
[FreeTextEntry1] : Very pleasant 75-year-old woman who presents for follow-up of recurrent urinary tract infections, increased urinary frequency and urinary urgency.  Recently she presented to the office complaining of increased urinary frequency and urinary urgency every 5 minutes.  This was very bothersome.  Because of this she was started on Ditropan.\par \par Reports that urgency and frequency did not improve. She also now reports dysuria which started 1 week ago. No fevers or chills.

## 2021-09-20 NOTE — ASSESSMENT
[FreeTextEntry1] : Very pleasant 75-year-old woman presents for follow-up of increased urinary frequency, urinary urgency, urinary tract infections\par -Continue Hip-Elias for urinary tract infection prevention\par -Continue cranberry pills for urinary tract infection prevention\par -given improvement with ditropan per patient report we will continue\par -urine culture\par -renal US\par -cystoscopy\par -start Augmentin given concern for UTI

## 2021-09-20 NOTE — PHYSICAL EXAM
[General Appearance - Well Developed] : well developed [General Appearance - Well Nourished] : well nourished [Normal Appearance] : normal appearance [Well Groomed] : well groomed [General Appearance - In No Acute Distress] : no acute distress [Abdomen Soft] : soft [Abdomen Tenderness] : non-tender [Costovertebral Angle Tenderness] : no ~M costovertebral angle tenderness [Urinary Bladder Findings] : the bladder was normal on palpation [Edema] : no peripheral edema [] : no respiratory distress [Respiration, Rhythm And Depth] : normal respiratory rhythm and effort [Exaggerated Use Of Accessory Muscles For Inspiration] : no accessory muscle use [Oriented To Time, Place, And Person] : oriented to person, place, and time [Affect] : the affect was normal [Mood] : the mood was normal [Not Anxious] : not anxious [No Focal Deficits] : no focal deficits [No Palpable Adenopathy] : no palpable adenopathy [FreeTextEntry1] : Walks with a walker

## 2021-09-25 LAB — BACTERIA UR CULT: ABNORMAL

## 2021-09-29 ENCOUNTER — APPOINTMENT (OUTPATIENT)
Dept: CARDIOLOGY | Facility: CLINIC | Age: 76
End: 2021-09-29
Payer: MEDICARE

## 2021-09-29 PROCEDURE — A9500: CPT

## 2021-09-29 PROCEDURE — 93015 CV STRESS TEST SUPVJ I&R: CPT

## 2021-09-29 PROCEDURE — 78452 HT MUSCLE IMAGE SPECT MULT: CPT

## 2021-10-01 NOTE — PROGRESS NOTE ADULT - ADDITIONAL PE
[facility] ambulatory encounter  ENDOCRINOLOGY DIABETES      CC:   Chief Complaint   Patient presents with   • Other     6 month T2DM follow up,      SUBJECTIVE:  Carolyn Malave is a 47 year old female who is here in follow up for Diabetes Mellitus Type 2.  Also with Hypothyroidism, Obesity, PCOS, Hyperlipidemia, Hypertension, and Vit D deficiency.    Historically followed with Dr. March, endocrinology at [facility].  Transferred care to our service at [facility] June 2019.      DMT2 at least since 2010 when A1c of 7.2% (Care Everywhere).  She underwent sleeve gastrectomy 2013 with temporary improvement in DMT2.  A1c increased to high of 10.1% 2016 with improvement to 6.7% with regimen changes.   A1c has trended 7-8% range then 9% (highest in 3 years) in Aug 2020 - (Note: Covid+ mid July 2020).    Disease complications hypertension, hyperlipidemia, hypothyroidism, obesity, JORDY (CPAP usage) and sedentary lifestyle.     No microvascular or macrovascular complications.  History of hospitalizations with diabetic complications or DKA: []  YES    [x]  NO    Last seen March 2021.    INTERVAL HISTORY:  Not regularly checking blood sugars  Admits to not eating well lately; weight is up 5# ~ struggling with sweets at times  Also notes low overall activity compounded by work stress    Diabetic Regimen:  Current regimen is Amaryl 1 mg BID  Jardiance 25 mg daily  Metformin 500 mg ER ii tabs BID  Trulicity 1.5 mg weekly (q Sat).    (Note: Amaryl reduced and Jardiance added Oct 2020; Jardiance increased March 2021)    She stopped taking Nature Made MVI Diabetes Health Pack ~ continues on traditional MVI.  Blood sugar checks 0-1 times per day (Av 2-3 times/wk). Majority FBS when testing. Likely not capturing the higher results.    No meter brought for review. Self estimates bg in 140's    Hypoglycemic events?  []  YES    [x]  NO  Compliant with her diet and exercise plan? []  YES    [x]  NO Barriers reviewed  Has met with DE in 2019.  Aware 
back incision: staples out, no redness, intact, no oozing/drainage
of dietary recommendations. DE refresher was offered again today, but declines.  Note: Lactose intolerant.  Has been inactive, no regular exercise.    Diabetes Health Maintenance  Date of last Ophthalmology exam Summer 2021 ~No   Is patient on an ACE/ARB?  [x]  YES    []  NO  []  Refused/Contraindicated ~ Losartan  Is patient on Statin/Fibrate therapy? [x]  YES    []  NO  []  Refused/Contraindicated ~ Atorvastatin  Is patient on Aspirin therapy? []  YES    [x]  NO  []  Refused/Contraindicated  Has patient had foot care education? [x]  YES    []  NO  Nutrition/Diet/DM education within the last 2 year(s)?  [x]  YES    []  NO  Is patient up-to-date with immunizations including Influenza and Pneumococcal vaccinations? [x]  YES    []  NO  Has a Glucagon kit/other methods of treating hypoglycemia?  [x]  YES    []  NO  Does the patient have a Medical ID?  []  YES    []  NO   Has she been informed of its benefit.  [x]  YES    []  NO    Patient also has hyperlipidemia.  Patient LDL (51) is at goal;   Patient currently on Lipitor 20 mg daily.    Patient also has hypertension. Currently controlled.  Current Medications: Losartan 50 mg daily.    Patient also has Primary Hypothyroidism.  Patient reports the following: feeling well, denies complaints other than some brittle/peeling nails  Also seeing Dermatology for psoriasis ~ skin and nails affected  Patient currently taking generic levothyroxine 125 mcg daily ~ good compliance  Takes every morning on empty stomach and waits 30 min before eating.  Updated TSH 0.865 (March 2021).  MVI is  and taken at night.    Patient also has PCOS since age 25 with hirsutism.  She has had oligomenorrhea; continues on Nuvaring contraception taken continuously for 3 months followed by withdrawal bleeding q 3 mo.  She mechanically removes facial hair (shaves q am) and prefers continued self management at this time.  Previous trials include: Vaniqa and Spironolactone.    Patient 
also has Obesity.  S/P sleeve gastrectomy 2013 with an estimated weight loss of nearly 60#  Pre procedure weight near: 330#  Best weight post op weight recalled: 280#  Had weight loss plateau followed by regain    Wt Readings from Last 6 Encounters:   10/01/21 130.4 kg (287 lb 6.4 oz)   09/15/21 128.3 kg (282 lb 13.6 oz)   08/04/21 128.3 kg (282 lb 13.6 oz)   07/20/21 128.3 kg (282 lb 13.6 oz)   06/21/21 127 kg (279 lb 15.8 oz)   05/19/21 127 kg (279 lb 15.8 oz)     Patient also has Vitamin D insufficiency. Last level   Vitamin D, 25-Hydroxy (ng/mL)   Date Value   03/15/2021 43.6   Patient continues on 2,000 iu daily.      REVIEW OF SYSTEMS  All other systems are reviewed and are negative except as documented in the history of present illness.    OBJECTIVE:  PAST HISTORIES:  I have reviewed the past medical history, family history, social history, medications and allergies listed in the medical record as obtained by my nursing staff and support staff and agree with their documentation.    SOCIAL HISTORY: Works in lab    FAMILY HISTORY: Positive for type 2 and type 1 diabetes (uncle and cousin have T1 DM) and hypothyroidism and also McLean's disease in one of her remote relatives.     PHYSICAL EXAM  VITAL SIGNS:    Visit Vitals  /77 (BP Location: RUE - Right upper extremity, Patient Position: Sitting)   Pulse 95   Resp 16   Ht 5' 8\" (1.727 m)   Wt 130.4 kg (287 lb 6.4 oz)   LMP 09/05/2021 (Approximate)   BMI 43.70 kg/m²     Body mass index is 43.7 kg/m².    WEIGHTS:   Wt Readings from Last 4 Encounters:   10/01/21 130.4 kg (287 lb 6.4 oz)   09/15/21 128.3 kg (282 lb 13.6 oz)   08/04/21 128.3 kg (282 lb 13.6 oz)   07/20/21 128.3 kg (282 lb 13.6 oz)     GENERAL:  Very pleasant, conversant, appears near stated age. Oriented x 3. Obese. NAD.  SKIN: Warm, dry and intact. No bruises, lesions or acanthosis nigricans. Psoriatic areas to elbows/forearms. Scattered patches on lower extremities.  EYES: Conjunctivae 
normal, anicteric. No lid lag. Negative exophthalmos.   NECK: No cervical masses or lymphadenopathy. No thyromegaly and Trachea is midline. No acanthosis.  LUNGS: Non-labored respirations.  ABDOMEN: Soft, obese, non tender.  EXTREMITIES: Normal range of motion x 4. No edema or tenderness  NEURO: Speech clear, no motor deficits.  PSYCHIATRIC: Normal mood and affect.    Diabetic Foot Exam Documentation     Normal Bilateral Foot Exam:  Skin integrity is normal. Dryness of heels. Nails trimmed, fading polish on few toes. No open areas or signs of infection. Temperature sensation intact. Dorsalis pedis and posterior tibial pulses are present.  Pressure sensation using the Hurley-Rebeca monofilament is present.    LAB RESULTS    Glucose (mg/dL)   Date Value   05/28/2021 179 (H)     Hemoglobin A1C (%)   Date Value   09/30/2021 7.6 (H)     LDL (mg/dL)   Date Value   03/15/2021 51       HDL (mg/dL)   Date Value   03/15/2021 49 (L)       Triglycerides (mg/dL)   Date Value   03/15/2021 198 (H)       Cholesterol (mg/dL)   Date Value   03/15/2021 140     Creatinine (mg/dL)   Date Value   05/28/2021 0.82      HGB (g/dL)   Date Value   05/28/2021 14.5      HCT (%)   Date Value   05/28/2021 46.3       GPT/ALT (Units/L)   Date Value   05/28/2021 54       GOT/AST (Units/L)   Date Value   05/28/2021 52 (H)      Microalbumin/ Creatinine Ratio (mg/g)   Date Value   03/15/2021 4.7       TSH (mcUnits/mL)   Date Value   03/15/2021 0.865       Vitamin D, 25-Hydroxy (ng/mL)   Date Value   03/15/2021 43.6     ASSESSMENT  1.  Diabetes Mellitus Type 2 without complication  2.  Obesity Body mass index is 43.7 kg/m².  3.  PCOS/Hirsutism  4.  Hyperlipidemia  5.  Hypertension  6.  Hypothyroidism  7.  Vit D deficiency    PLAN  #DMT2  A1c has risen slightly 7.3%-->7.6% ~ pt attributes to diet and lack of BG monitoring. A1c goal <7% without hypoglycemia.  No glucometer for review    Counseling points: A1c target, DM routines/modifications, dietary 
influences, role of activity, weight favorable regimens, weight loss, increasing blood glucose monitoring for better awareness/feedback.    Recommend:  · Continue Amaryl 1 mg BID  · Continue Jardiance 25 mg daily  · Continue Metformin 500 mg ER ii tabs BID  · Continue Trulicity 1.5 mg weekly (q Sat)  · Continue lifestyle efforts for diet, activity and weight loss; strategies discussed  · Check BG 1-2x/day - fasting every other day; alternate predinner, 2 hours post dinner and HS to better understand overall trends  · Repeat A1c in 3 mo    Health Maintenance:  Eye and foot exams up to date  Labs up to date  Repeat A1c, MA, FLP, TSH in March 2021    #Obesity ~ S/P sleeve gastrectomy 2013 with weight regain.  Weight is up 5# from last visit. Discussed 5% weight loss goal at this time. She has done HMR in the past.  She did DE visits in 2019. Offered return to visit to DE to address diet but pt declines at this time. Food journal to be reconsidered. Increase activity ~ strategies discussed.    #PCOS currently on Nuvaring with withdrawal bleed q 3 mo.  Hirsutism is self managed.  Pt has not been interested in pharmacological therapy.    #Hypertension currently controlled on Losartan, continue current management.    #Hyperlipidemia/hypertriglyceridemia (LDL 51) at goal with Lipitor, continue current management. TG elevated at 198.  Discussed dietary considerations and Omega 3 OTC daily last visit.    #Hypothyroidism currently controlled on generic levothyroxine 125 mcg daily; continue current management.     #Vitamin D deficiency (Last 43.6 March 2021); Continue Vit D 2,000 iu daily.    Follow-up - 6 months with interval A1c for review in 3 months.    Poly Navarro NP  
back incision well healed, no warmth or TTP, no swelling
Skin: long back incision,- some steristrips on- healing well   no erythema, +intact, dry, no induration

## 2021-10-06 ENCOUNTER — APPOINTMENT (OUTPATIENT)
Dept: PULMONOLOGY | Facility: CLINIC | Age: 76
End: 2021-10-06
Payer: MEDICARE

## 2021-10-06 VITALS
SYSTOLIC BLOOD PRESSURE: 152 MMHG | HEART RATE: 53 BPM | DIASTOLIC BLOOD PRESSURE: 73 MMHG | OXYGEN SATURATION: 99 % | RESPIRATION RATE: 12 BRPM | TEMPERATURE: 98 F | HEIGHT: 66 IN

## 2021-10-06 DIAGNOSIS — R93.89 ABNORMAL FINDINGS ON DIAGNOSTIC IMAGING OF OTHER SPECIFIED BODY STRUCTURES: ICD-10-CM

## 2021-10-06 DIAGNOSIS — E03.9 HYPOTHYROIDISM, UNSPECIFIED: ICD-10-CM

## 2021-10-06 PROCEDURE — 99213 OFFICE O/P EST LOW 20 MIN: CPT

## 2021-10-06 NOTE — HISTORY OF PRESENT ILLNESS
[Never] : never [TextBox_4] : Is a 75-year-old female with past medical history significant for lupus erythematosus, history of lung cancer, hypothyroidism status post COVID-19 pneumonia who presents today for follow-up.  Patient states that her shortness of breath and fatigue have improved.  Currently denies fevers chills chest pain weight loss or hemoptysis.  She does complain of significant fatigue

## 2021-10-06 NOTE — ASSESSMENT
[FreeTextEntry1] : In summary the patient is a 75-year-old female with past medical history significant for lung cancer status post resection, hypothyroidism, hypertension who recently was diagnosed with COVID-19 pneumonia.  The patient was never intubated.  Physical exam is significant for mild scattered wheeze.  Is currently instructed to continue current medications and follow-up in 3 months

## 2021-10-08 ENCOUNTER — APPOINTMENT (OUTPATIENT)
Dept: UROLOGY | Facility: CLINIC | Age: 76
End: 2021-10-08
Payer: MEDICARE

## 2021-10-08 PROCEDURE — 52000 CYSTOURETHROSCOPY: CPT

## 2021-10-08 PROCEDURE — 99214 OFFICE O/P EST MOD 30 MIN: CPT | Mod: 25

## 2021-10-08 NOTE — HISTORY OF PRESENT ILLNESS
[FreeTextEntry1] : Very pleasant 75-year-old woman who presents for follow-up of recurrent urinary tract infections and dysuria.  She recently underwent a renal ultrasound which demonstrates no kidney stones, hydronephrosis, renal masses.  She underwent a cystoscopy today which demonstrated no urothelial lesions.  She reports that her symptoms improved after taking antibiotics.  She continues to take Hip-Elias and cranberry pills.

## 2021-10-08 NOTE — ASSESSMENT
[FreeTextEntry1] : Very pleasant 75-year-old woman who presents for follow-up of recurrent urinary tract infections, dysuria, urinary urgency\par -Ultrasound images from [facility] radiology reviewed demonstrating no kidney stones, hydronephrosis, renal masses\par -Cystoscopy today demonstrates no urothelial lesions\par -Continue Hip-Elias–refill sent to the pharmacy\par -Continue cranberry supplements\par -Follow-up in 3 months

## 2021-10-18 ENCOUNTER — RX RENEWAL (OUTPATIENT)
Age: 76
End: 2021-10-18

## 2021-11-05 ENCOUNTER — APPOINTMENT (OUTPATIENT)
Dept: CARDIOLOGY | Facility: CLINIC | Age: 76
End: 2021-11-05
Payer: MEDICARE

## 2021-11-05 VITALS
DIASTOLIC BLOOD PRESSURE: 82 MMHG | RESPIRATION RATE: 14 BRPM | TEMPERATURE: 97.3 F | BODY MASS INDEX: 32.62 KG/M2 | OXYGEN SATURATION: 97 % | SYSTOLIC BLOOD PRESSURE: 130 MMHG | WEIGHT: 203 LBS | HEART RATE: 55 BPM | HEIGHT: 66 IN

## 2021-11-05 DIAGNOSIS — S89.92XA UNSPECIFIED INJURY OF LEFT LOWER LEG, INITIAL ENCOUNTER: ICD-10-CM

## 2021-11-05 PROCEDURE — 99214 OFFICE O/P EST MOD 30 MIN: CPT

## 2021-11-05 RX ORDER — IBUPROFEN 600 MG/1
600 TABLET, FILM COATED ORAL 3 TIMES DAILY
Qty: 90 | Refills: 0 | Status: DISCONTINUED | COMMUNITY
Start: 2021-03-11 | End: 2021-11-05

## 2021-11-05 NOTE — HISTORY OF PRESENT ILLNESS
[FreeTextEntry1] : 11/5/2021\par  \par Venous duplex June 2021:  [facility] :  No DVT\par Followed by urology for UTis\par Having trouble getting compression garment - surgical supply store - some clerical issues.  \par She has lost a lot of weight\par \par ***She has on the L shin sharp, lanny prominence.  \par \par Needs to see her orthopedist for this. \par  She is using her pneuamtic pump\par Remains on eliquis 2.5mg BID\par Mobility is "on and off"

## 2021-11-05 NOTE — ASSESSMENT
[FreeTextEntry1] : Assessment:\par 1.  R Pop DVT\par - provoked\par - July 20th 2019\par - on Eliquis 5mg BID, now on Eliquis 2.5mg BID \par 2.  R THP\par 3.  Lymphedema\par \par Plan\par 1.  Continue eliquis to 2.5mg BID\par 2.  Pneumatic pump BID   - once at 1PM and once at 7PM\par 3.  Compression garments - will need to work with store to get this for her\par 4.  Still with immobility, will continue eliquis for now. PT as tolerated\par 5.  Leg elevation.\par 6.  Will continue eliquis 2.5mg BID in anticipation for R knee surgery (if needed, hold 3 days prior and then resume afterwards)\par 7.  She has atypical lanny, sharp prominence on the LEFT shin.  I have ordered an xray of the leg to see what this is.  She needs to see orthopedics for this.

## 2021-11-11 ENCOUNTER — APPOINTMENT (OUTPATIENT)
Dept: PHYSICAL MEDICINE AND REHAB | Facility: CLINIC | Age: 76
End: 2021-11-11
Payer: MEDICARE

## 2021-11-11 PROCEDURE — 99213 OFFICE O/P EST LOW 20 MIN: CPT

## 2021-11-11 NOTE — ASSESSMENT
[FreeTextEntry1] : \par s/p L1-L5 laminectomy, T10-L5 posterior surgical fusion by Dr. Campos on 1/1/21 complicated by COVID-19 and respiratory failure requiring remdesivir and decadron, DVT on eliquis, GGO on CT. Patient has residual BLE weakness, reduced endurance and balance, exacerbated by deconditioning and bilateral knee OA right > left. \par Completed outpatient therapies\par \par \par 1. For cataract surgery 11/15 right eye\par \par 2. Continue HEP including \par \par 3. Ortho follow up next year scheduled for right knee OA and possible TKA. \par \par 4. f/u PRN. Patient and family agreeable. Importance of compliance with HEP to improve endurance as well as increase LE strength, radha pre-op for TKA and overall function and mobility, discussed

## 2021-11-11 NOTE — PHYSICAL EXAM
[FreeTextEntry1] : PE: alert, pleasant, O x 3 Appears much more comfortable, increased sitting and mobility, moving while talking, improved posture with ambulation, much less antalgic gait\par \par lungs: kyphotic posture, clear bilaterally good effort\par cor: NL S1 and S2 reg\par back: incision well healed, no hypertrophy scar, no TTP no redness\par \par ext: +crepitus, reduced ROM 15 to 95 degrees\par no effusion +medial joint line TTP\par no instability with med/lat stress, negative anterior drawer\par right HF 4+/5 quad 4+/5 ham 4-/5\par ankle PF and DF 5/5\par left HF 5-/5 quad 5/5 ham 5/5 ankle PF and DF 5/5\par ROm left knee 0-100\par

## 2021-11-11 NOTE — HISTORY OF PRESENT ILLNESS
[FreeTextEntry1] : \par \par Patient is 75 year old s/p L1-L5 laminectomy, T10-L5 posterior surgical fusion by Dr. Campos on 1/1/21 complicated by COVID-19 and respiratory failure requiring remdesivir and decadron, DVT on eliquis, GGO on CT. Patient has residual BLE weakness, reduced endurance and balance, exacerbated by deconditioning and bilateral knee OA right > left. Currently saturating well on RA\par \par Patient states that she still has pain 2/10 takes tylenol 1-2 times a day with control pain. Ambulating with RW, denies any falls or near falls ojhn last visit. Ambulating outdoors up to 2-3 blocks at time. Has history of right knee pain and OA which she needs TKA for, both pain and near buckling +swelling. Able to do step over step but slowly and in general tries to avoid. \par \par No new medical issues since last hospitalization

## 2021-11-18 NOTE — PROGRESS NOTE ADULT - PROBLEM SELECTOR PLAN 3
Joshua Wilson SUBJECTIVE:  Keesha David is a 39year old female who presents for a consultation at the request of, and a copy of this note will be sent to, Dr. Dee Dee Wade, for evaluation of left incidental hydroureter.  She states that the problem is unchanged nausea, vomiting, diarrhea, constipation, heartburn or indigestion, abdominal pains, bloody or tarry stools. GENERAL: Denies:  weight gain, weight loss, fever, night sweats, bone pain, malaise and fatigue.  Positive for:  None  All other review of systems Follows with vascular cardiologist Dr. Hoyos outpatient.  US July 2019:  DVT noted in the right popliteal vein and visualized segment of the right  posterior tibial vein.  - provoked DVT July 2019 in setting of hip surgery, already >6 months of treatment, hold eliquis, LE dopplers on 12/29 neg  - lovenox for dvt ppx

## 2021-12-03 ENCOUNTER — APPOINTMENT (OUTPATIENT)
Dept: CARDIOLOGY | Facility: CLINIC | Age: 76
End: 2021-12-03

## 2021-12-14 ENCOUNTER — APPOINTMENT (OUTPATIENT)
Dept: ORTHOPEDIC SURGERY | Facility: CLINIC | Age: 76
End: 2021-12-14
Payer: MEDICARE

## 2021-12-14 ENCOUNTER — NON-APPOINTMENT (OUTPATIENT)
Age: 76
End: 2021-12-14

## 2021-12-14 VITALS
BODY MASS INDEX: 32.3 KG/M2 | SYSTOLIC BLOOD PRESSURE: 110 MMHG | HEART RATE: 61 BPM | DIASTOLIC BLOOD PRESSURE: 53 MMHG | HEIGHT: 67 IN | WEIGHT: 205.8 LBS

## 2021-12-14 DIAGNOSIS — M25.561 PAIN IN RIGHT KNEE: ICD-10-CM

## 2021-12-14 PROCEDURE — 99214 OFFICE O/P EST MOD 30 MIN: CPT

## 2021-12-14 PROCEDURE — 73562 X-RAY EXAM OF KNEE 3: CPT | Mod: RT

## 2021-12-14 RX ORDER — BISACODYL 5 MG/1
5 TABLET ORAL
Qty: 4 | Refills: 0 | Status: DISCONTINUED | COMMUNITY
Start: 2021-06-09 | End: 2021-12-14

## 2021-12-14 RX ORDER — POLYETHYLENE GLYCOL-3350 AND ELECTROLYTES WITH FLAVOR PACK 240; 5.84; 2.98; 6.72; 22.72 G/278.26G; G/278.26G; G/278.26G; G/278.26G; G/278.26G
240 POWDER, FOR SOLUTION ORAL
Qty: 4000 | Refills: 0 | Status: DISCONTINUED | COMMUNITY
Start: 2021-06-09 | End: 2021-12-14

## 2021-12-14 RX ORDER — POLYETHYLENE GLYCOL-3350 AND ELECTROLYTES 236; 6.74; 5.86; 2.97; 22.74 G/274.31G; G/274.31G; G/274.31G; G/274.31G; G/274.31G
236 POWDER, FOR SOLUTION ORAL
Qty: 4000 | Refills: 0 | Status: DISCONTINUED | COMMUNITY
Start: 2021-06-21 | End: 2021-12-14

## 2021-12-14 RX ORDER — CIPROFLOXACIN HYDROCHLORIDE 500 MG/1
500 TABLET, FILM COATED ORAL
Qty: 10 | Refills: 0 | Status: DISCONTINUED | COMMUNITY
Start: 2021-05-18 | End: 2021-12-14

## 2021-12-28 ENCOUNTER — NON-APPOINTMENT (OUTPATIENT)
Age: 76
End: 2021-12-28

## 2022-01-03 ENCOUNTER — RX RENEWAL (OUTPATIENT)
Age: 77
End: 2022-01-03

## 2022-01-10 ENCOUNTER — APPOINTMENT (OUTPATIENT)
Dept: UROLOGY | Facility: CLINIC | Age: 77
End: 2022-01-10
Payer: MEDICARE

## 2022-01-10 VITALS
BODY MASS INDEX: 32.18 KG/M2 | WEIGHT: 205 LBS | DIASTOLIC BLOOD PRESSURE: 71 MMHG | RESPIRATION RATE: 14 BRPM | OXYGEN SATURATION: 97 % | HEIGHT: 67 IN | HEART RATE: 57 BPM | TEMPERATURE: 97.5 F | SYSTOLIC BLOOD PRESSURE: 121 MMHG

## 2022-01-10 DIAGNOSIS — N95.2 POSTMENOPAUSAL ATROPHIC VAGINITIS: ICD-10-CM

## 2022-01-10 PROCEDURE — 99214 OFFICE O/P EST MOD 30 MIN: CPT

## 2022-01-10 NOTE — HISTORY OF PRESENT ILLNESS
[FreeTextEntry1] : Very pleasant 76-year-old woman who presents for follow-up of recurrent urinary tract infections, urinary urgency, urinary frequency.  She reports that Vesicare significantly improves her urinary symptoms.  She reports she continues to take methenamine hippurate.  She does not drink a lot of water.  She reports dysuria at this time.  She believes she may have another urinary tract infection.\par \par Patient reports no personal or family history of gynecologic malignancy.  She reports a DVT approximately 4 to 5 years ago after an orthopedic surgery

## 2022-01-10 NOTE — PHYSICAL EXAM
[General Appearance - Well Developed] : well developed [General Appearance - Well Nourished] : well nourished [Normal Appearance] : normal appearance [Well Groomed] : well groomed [General Appearance - In No Acute Distress] : no acute distress [Abdomen Soft] : soft [Abdomen Tenderness] : non-tender [Costovertebral Angle Tenderness] : no ~M costovertebral angle tenderness [Urinary Bladder Findings] : the bladder was normal on palpation [Edema] : no peripheral edema [] : no respiratory distress [Respiration, Rhythm And Depth] : normal respiratory rhythm and effort [Exaggerated Use Of Accessory Muscles For Inspiration] : no accessory muscle use [Oriented To Time, Place, And Person] : oriented to person, place, and time [Affect] : the affect was normal [Not Anxious] : not anxious [Mood] : the mood was normal [No Focal Deficits] : no focal deficits [FreeTextEntry1] : Walks with a walker [No Palpable Adenopathy] : no palpable adenopathy

## 2022-01-10 NOTE — ASSESSMENT
[FreeTextEntry1] : Very pleasant 76-year-old woman who presents for follow-up of recurrent urinary tract infections, urinary urgency, urinary frequency\par -Continue Vesicare–refill sent to the pharmacy\par -Continue Hip-Elias–refill sent to the pharmacy\par -Urine culture\par -We discussed additional options for management.  We discussed the option for Estrace vaginal cream.  We discussed the risks of this, including increased risk of malignancy, especially gynecologic malignancies, as well as the potential for cardiovascular events including a recurrent DVT\par -I will discuss her plan of care with her cardiologist to discuss whether it is safe to start Estrace vaginal cream at this time.  If so she would like to start the medication\par -Follow-up in 1 month

## 2022-01-12 ENCOUNTER — NON-APPOINTMENT (OUTPATIENT)
Age: 77
End: 2022-01-12

## 2022-01-12 ENCOUNTER — APPOINTMENT (OUTPATIENT)
Dept: CARDIOLOGY | Facility: CLINIC | Age: 77
End: 2022-01-12
Payer: MEDICARE

## 2022-01-12 ENCOUNTER — APPOINTMENT (OUTPATIENT)
Dept: PULMONOLOGY | Facility: CLINIC | Age: 77
End: 2022-01-12
Payer: MEDICARE

## 2022-01-12 VITALS
SYSTOLIC BLOOD PRESSURE: 100 MMHG | DIASTOLIC BLOOD PRESSURE: 65 MMHG | RESPIRATION RATE: 14 BRPM | WEIGHT: 205 LBS | BODY MASS INDEX: 32.11 KG/M2 | TEMPERATURE: 97.3 F | HEART RATE: 62 BPM | OXYGEN SATURATION: 96 %

## 2022-01-12 VITALS — DIASTOLIC BLOOD PRESSURE: 70 MMHG | SYSTOLIC BLOOD PRESSURE: 110 MMHG

## 2022-01-12 DIAGNOSIS — R05.9 COUGH, UNSPECIFIED: ICD-10-CM

## 2022-01-12 DIAGNOSIS — M06.9 RHEUMATOID ARTHRITIS, UNSPECIFIED: ICD-10-CM

## 2022-01-12 PROCEDURE — 99214 OFFICE O/P EST MOD 30 MIN: CPT

## 2022-01-12 PROCEDURE — 93000 ELECTROCARDIOGRAM COMPLETE: CPT

## 2022-01-12 RX ORDER — LEVOTHYROXINE SODIUM 175 UG/1
175 TABLET ORAL
Qty: 30 | Refills: 0 | Status: DISCONTINUED | COMMUNITY
Start: 2021-06-21 | End: 2022-01-12

## 2022-01-12 NOTE — ASSESSMENT
[FreeTextEntry1] : In summary the patient is a 76-year-old female with past medical history significant for lung cancer status post resection, hypothyroidism, hypertension who recently was diagnosed with COVID-19 pneumonia.  The patient was never intubated.  Patient's physical exam was significant for good air entry bilaterally.  Prescription renewal performed.  Patient instructed to continue current medications and to follow-up in 3 months

## 2022-01-12 NOTE — HISTORY OF PRESENT ILLNESS
[FreeTextEntry1] : Marely has been feeling well. No CP, palpitations, dizziness or SOB. She is limited with activity secondary to arthritis.

## 2022-01-12 NOTE — HISTORY OF PRESENT ILLNESS
[Never] : never [TextBox_4] : Patient is a 76-year-old female past medical history significant for hypertension, thyroidism, dyslipidemia, lung cancer, status post COVID-19 who presents today for follow-up.  Patient states that her cough shortness of breath and dyspnea on exertion have improved.  She currently denies fevers chills chest pain weight loss or hemoptysis

## 2022-01-12 NOTE — DISCUSSION/SUMMARY
[___ Month(s)] : in [unfilled] month(s) [FreeTextEntry1] : The patient is a 76-year-old obese female history of rheumatoid arthritis,  lupus, hypothyroidism, hyperlipidemia, hypertension, lower extremity edema ,RBBB, s/p left lobectomy for cancer s/p rt hip replacement complicated by DVT s/p left knee replacement s/p spine surgery whose PVC are under control.\par #1 RA- on MTX and plaquenil, ambulates with walker\par #2 Htn- continue  losartan 50mg\par #3 Lipids- continue atorvastatin \par #4 Hypothyroidism- continue levothyroxine 150mcg\par #5 CV- pharm stress negative, PVC controlled with metoprolol\par #6 Ortho- s/p right hip replacement and left knee replacement,  s/p spine surgery with home PT,  eliquis bid as per Dr. Hoyos, continue weight loss\par

## 2022-01-13 LAB
25(OH)D3 SERPL-MCNC: 48 NG/ML
ALBUMIN SERPL ELPH-MCNC: 3.8 G/DL
ALP BLD-CCNC: 94 U/L
ALT SERPL-CCNC: 15 U/L
ANION GAP SERPL CALC-SCNC: 14 MMOL/L
AST SERPL-CCNC: 24 U/L
BASOPHILS # BLD AUTO: 0.07 K/UL
BASOPHILS NFR BLD AUTO: 1.1 %
BILIRUB SERPL-MCNC: 0.5 MG/DL
BUN SERPL-MCNC: 20 MG/DL
CALCIUM SERPL-MCNC: 9.6 MG/DL
CHLORIDE SERPL-SCNC: 103 MMOL/L
CHOLEST SERPL-MCNC: 157 MG/DL
CO2 SERPL-SCNC: 24 MMOL/L
CREAT SERPL-MCNC: 0.76 MG/DL
EOSINOPHIL # BLD AUTO: 0.11 K/UL
EOSINOPHIL NFR BLD AUTO: 1.8 %
ESTIMATED AVERAGE GLUCOSE: 105 MG/DL
GLUCOSE SERPL-MCNC: 88 MG/DL
HBA1C MFR BLD HPLC: 5.3 %
HCT VFR BLD CALC: 46.2 %
HDLC SERPL-MCNC: 62 MG/DL
HGB BLD-MCNC: 14.2 G/DL
IMM GRANULOCYTES NFR BLD AUTO: 0.2 %
LDLC SERPL CALC-MCNC: 77 MG/DL
LYMPHOCYTES # BLD AUTO: 1.76 K/UL
LYMPHOCYTES NFR BLD AUTO: 28.4 %
MAN DIFF?: NORMAL
MCHC RBC-ENTMCNC: 30.7 GM/DL
MCHC RBC-ENTMCNC: 31.6 PG
MCV RBC AUTO: 102.9 FL
MONOCYTES # BLD AUTO: 0.73 K/UL
MONOCYTES NFR BLD AUTO: 11.8 %
NEUTROPHILS # BLD AUTO: 3.52 K/UL
NEUTROPHILS NFR BLD AUTO: 56.7 %
NONHDLC SERPL-MCNC: 94 MG/DL
PLATELET # BLD AUTO: 274 K/UL
POTASSIUM SERPL-SCNC: 4.6 MMOL/L
PROT SERPL-MCNC: 6.7 G/DL
RBC # BLD: 4.49 M/UL
RBC # FLD: 14.6 %
SODIUM SERPL-SCNC: 141 MMOL/L
TRIGL SERPL-MCNC: 86 MG/DL
TSH SERPL-ACNC: 2.1 UIU/ML
WBC # FLD AUTO: 6.2 K/UL

## 2022-01-14 LAB — BACTERIA UR CULT: ABNORMAL

## 2022-01-14 RX ORDER — AMOXICILLIN AND CLAVULANATE POTASSIUM 875; 125 MG/1; MG/1
875-125 TABLET, COATED ORAL TWICE DAILY
Qty: 6 | Refills: 0 | Status: ACTIVE | COMMUNITY
Start: 2022-01-14 | End: 1900-01-01

## 2022-01-18 ENCOUNTER — RX RENEWAL (OUTPATIENT)
Age: 77
End: 2022-01-18

## 2022-02-14 ENCOUNTER — APPOINTMENT (OUTPATIENT)
Dept: UROLOGY | Facility: CLINIC | Age: 77
End: 2022-02-14
Payer: MEDICARE

## 2022-02-14 VITALS
OXYGEN SATURATION: 97 % | BODY MASS INDEX: 32.18 KG/M2 | SYSTOLIC BLOOD PRESSURE: 138 MMHG | TEMPERATURE: 96.3 F | DIASTOLIC BLOOD PRESSURE: 72 MMHG | RESPIRATION RATE: 12 BRPM | HEART RATE: 61 BPM | WEIGHT: 205 LBS | HEIGHT: 67 IN

## 2022-02-14 PROCEDURE — 99214 OFFICE O/P EST MOD 30 MIN: CPT

## 2022-02-14 RX ORDER — CEFUROXIME AXETIL 250 MG/1
250 TABLET ORAL
Qty: 20 | Refills: 0 | Status: ACTIVE | COMMUNITY
Start: 2021-09-27

## 2022-02-14 RX ORDER — LOTEPREDNOL ETABONATE 3.8 MG/G
0.38 GEL OPHTHALMIC
Qty: 5 | Refills: 0 | Status: ACTIVE | COMMUNITY
Start: 2021-10-13

## 2022-02-14 RX ORDER — CIPROFLOXACIN 3 MG/ML
0.3 SOLUTION OPHTHALMIC
Qty: 10 | Refills: 0 | Status: ACTIVE | COMMUNITY
Start: 2021-10-13

## 2022-02-14 RX ORDER — CIPROFLOXACIN HYDROCHLORIDE 500 MG/1
500 TABLET, FILM COATED ORAL TWICE DAILY
Qty: 14 | Refills: 0 | Status: ACTIVE | COMMUNITY
Start: 2022-02-14 | End: 1900-01-01

## 2022-02-14 RX ORDER — DOXYCYCLINE HYCLATE 100 MG/1
100 CAPSULE ORAL
Qty: 14 | Refills: 0 | Status: ACTIVE | COMMUNITY
Start: 2021-10-18

## 2022-02-14 NOTE — ASSESSMENT
[FreeTextEntry1] : Very pleasant 76-year-old woman who presents for follow-up of recurrent urinary tract infections, urinary frequency, urinary urgency\par -Continue Vesicare\par -Continue Hip-Elias\par -Continue Estrace vaginal cream\par -We again discussed the risks and benefits of Estrace vaginal cream at length including the need to follow-up with her gynecologist at regular intervals for screening for gynecologic malignancy\par -Urine culture demonstrates an E. coli urinary tract infection sensitive to Augmentin which she completed\par -Start Cipro given persistent symptoms of a UTI\par -Follow-up in 1 month

## 2022-02-14 NOTE — HISTORY OF PRESENT ILLNESS
[FreeTextEntry1] : Very pleasant 76-year-old woman who presents for follow-up of recurrent urinary tract infections, urinary urgency, urinary frequency.  She reports that Vesicare continues to significantly improve her urinary symptoms.  She reports she continues to take methenamine hippurate for prevention of UTIs as well.  She does not drink a lot of water.  She was recently diagnosed with another urinary tract infection and completed a 3-day course of Augmentin.  She recently started Estrace vaginal cream. She still reports dysuria. She reports that this improveed with Augmentin but did not completely subside.

## 2022-02-23 ENCOUNTER — RESULT REVIEW (OUTPATIENT)
Age: 77
End: 2022-02-23

## 2022-02-23 ENCOUNTER — APPOINTMENT (OUTPATIENT)
Dept: CT IMAGING | Facility: CLINIC | Age: 77
End: 2022-02-23
Payer: MEDICARE

## 2022-02-23 ENCOUNTER — APPOINTMENT (OUTPATIENT)
Dept: NEUROSURGERY | Facility: CLINIC | Age: 77
End: 2022-02-23
Payer: MEDICARE

## 2022-02-23 ENCOUNTER — APPOINTMENT (OUTPATIENT)
Dept: CT IMAGING | Facility: CLINIC | Age: 77
End: 2022-02-23

## 2022-02-23 VITALS
SYSTOLIC BLOOD PRESSURE: 143 MMHG | BODY MASS INDEX: 32.18 KG/M2 | HEIGHT: 67 IN | HEART RATE: 49 BPM | WEIGHT: 205 LBS | DIASTOLIC BLOOD PRESSURE: 62 MMHG | OXYGEN SATURATION: 98 %

## 2022-02-23 DIAGNOSIS — Z98.1 ARTHRODESIS STATUS: ICD-10-CM

## 2022-02-23 PROCEDURE — 72128 CT CHEST SPINE W/O DYE: CPT | Mod: ME

## 2022-02-23 PROCEDURE — 76377 3D RENDER W/INTRP POSTPROCES: CPT | Mod: 76

## 2022-02-23 PROCEDURE — G1004: CPT

## 2022-02-23 PROCEDURE — 99212 OFFICE O/P EST SF 10 MIN: CPT

## 2022-02-23 NOTE — ASSESSMENT
[FreeTextEntry1] : ASSESSMENT:\par 76 year old female with multiple medical problems including DVT on Eliquis, worsening lower back pain s/p  L1-L5 laminectomy, T10-L5 posterior fusion on 1/1/21. Last visit reported doing well, was getting PT twice a week, walking with a walker, incision healed well, takes Motrin and Tylenol for mild pain, no weakness or numbness. \par Last CT T/L spine showed intact hardware, fusing well. Here for 6 months follow up. She reports intermittent lower back pain, no weakness or numbness, no bladder or bowel dysfunction. Noted to be bradycardic to 48, rechecked in the 50s. Patient asymptomatic. \par \par PLAN:\par - CT T/L spine without\par - follow up after CT \par - follow up with cardiology

## 2022-02-23 NOTE — HISTORY OF PRESENT ILLNESS
[FreeTextEntry1] : 76 year old female with PMH of HTN, HLD, SLE, RA, hypothyroidism, provoked R popliteal DVT in 2019 on Eliquis, chronic low back pain presented to ED for acute on chronic lumbosacral  back pain, worsening pain during ambulation with walker.  Patient underwent  L1-L5 laminectomy, T10-L5 posterior fusion on 1/1/21. Discharged to Rehab. Rehab Course significant for COVID19 infection, completed course of Remdesevir and Decadron with improvement in symptoms, UTI treated with antibiotics. Resumed Eliquis on 2/23/21. \par \par Patient report intermittent lower back pain, no weakness, numbness, bladder or bowel dysfunction. She uses a rolling walker to ambulate. \par \par \par \par \par \par \par

## 2022-02-23 NOTE — PHYSICAL EXAM
[General Appearance - Alert] : alert [General Appearance - Well Nourished] : well nourished [General Appearance - In No Acute Distress] : in no acute distress [General Appearance - Well Developed] : well developed [General Appearance - Well-Appearing] : healthy appearing [Oriented To Time, Place, And Person] : oriented to person, place, and time [Impaired Insight] : insight and judgment were intact [Mood] : the mood was normal [Affect] : the affect was normal [Person] : oriented to person [Place] : oriented to place [Time] : oriented to time [Cranial Nerves Optic (II)] : visual acuity intact bilaterally,  pupils equal round and reactive to light [Cranial Nerves Oculomotor (III)] : extraocular motion intact [Cranial Nerves Trigeminal (V)] : facial sensation intact symmetrically [Cranial Nerves Facial (VII)] : face symmetrical [Cranial Nerves Vestibulocochlear (VIII)] : hearing was intact bilaterally [Cranial Nerves Glossopharyngeal (IX)] : tongue and palate midline [Cranial Nerves Accessory (XI - Cranial And Spinal)] : head turning and shoulder shrug symmetric [Cranial Nerves Hypoglossal (XII)] : there was no tongue deviation with protrusion [Motor Tone] : muscle tone was normal in all four extremities [Motor Strength] : muscle strength was normal in all four extremities [Sensation Tactile Decrease] : light touch was intact [Balance] : balance was intact [Able to toe walk] : the patient was able to toe walk [Able to heel walk] : the patient was able to heel walk [Sclera] : the sclera and conjunctiva were normal [PERRL With Normal Accommodation] : pupils were equal in size, round, reactive to light, with normal accommodation [Extraocular Movements] : extraocular movements were intact [Outer Ear] : the ears and nose were normal in appearance [Hearing Threshold Finger Rub Not [place]] : hearing was normal [Neck Appearance] : the appearance of the neck was normal [] : no respiratory distress [Exaggerated Use Of Accessory Muscles For Inspiration] : no accessory muscle use [Edema] : there was no peripheral edema [No CVA Tenderness] : no ~M costovertebral angle tenderness [Abnormal Walk] : normal gait [Skin Color & Pigmentation] : normal skin color and pigmentation [Straight-Leg Raise Test - Left] : straight leg raise of the left leg was negative [Straight-Leg Raise Test - Right] : straight leg raise  of the right leg was negative

## 2022-03-14 ENCOUNTER — APPOINTMENT (OUTPATIENT)
Dept: UROLOGY | Facility: CLINIC | Age: 77
End: 2022-03-14
Payer: MEDICARE

## 2022-03-14 VITALS
RESPIRATION RATE: 14 BRPM | HEART RATE: 63 BPM | BODY MASS INDEX: 32.18 KG/M2 | SYSTOLIC BLOOD PRESSURE: 145 MMHG | DIASTOLIC BLOOD PRESSURE: 78 MMHG | WEIGHT: 205 LBS | OXYGEN SATURATION: 99 % | TEMPERATURE: 97.1 F | HEIGHT: 67 IN

## 2022-03-14 DIAGNOSIS — N39.0 URINARY TRACT INFECTION, SITE NOT SPECIFIED: ICD-10-CM

## 2022-03-14 DIAGNOSIS — R31.29 OTHER MICROSCOPIC HEMATURIA: ICD-10-CM

## 2022-03-14 PROCEDURE — 99214 OFFICE O/P EST MOD 30 MIN: CPT

## 2022-03-14 NOTE — HISTORY OF PRESENT ILLNESS
[FreeTextEntry1] : Very pleasant 76-year-old woman who presents for follow-up of recurrent urinary tract infections, urinary urgency, urinary frequency.  She reports that Vesicare continues to significantly improve her urinary symptoms of urinary frequency and urinary urgency.  She reports she continues to take methenamine hippurate for prevention of UTIs as well.  She does not drink a lot of water.  She was recently diagnosed with another urinary tract infection and completed a course of Augmentin and subsequently Cipro.  She recently started Estrace vaginal cream. No problems with this. Still reports persistent but improved dysuria

## 2022-03-14 NOTE — ASSESSMENT
[FreeTextEntry1] : Very pleasant 76-year-old woman who presents for follow-up of recurrent urinary tract infections, increased urinary frequency, urinary urgency, microscopic hematuria\par -UA demonstrates 4 RBC/hpf\par -Continue Vesicare\par -Continue Hip-Elias\par -Continue Estrace vaginal cream\par -Urine culture today\par -CT urogram\par -cystoscopy demonstrates no urothelial lesions\par -F/U in 2-3 weeks

## 2022-03-20 LAB — BACTERIA UR CULT: ABNORMAL

## 2022-03-20 RX ORDER — SULFAMETHOXAZOLE AND TRIMETHOPRIM 800; 160 MG/1; MG/1
800-160 TABLET ORAL
Qty: 10 | Refills: 0 | Status: ACTIVE | COMMUNITY
Start: 2021-07-16 | End: 1900-01-01

## 2022-04-04 ENCOUNTER — APPOINTMENT (OUTPATIENT)
Dept: UROLOGY | Facility: CLINIC | Age: 77
End: 2022-04-04
Payer: MEDICARE

## 2022-04-04 VITALS
TEMPERATURE: 97.1 F | WEIGHT: 205 LBS | HEART RATE: 73 BPM | BODY MASS INDEX: 32.18 KG/M2 | RESPIRATION RATE: 14 BRPM | OXYGEN SATURATION: 97 % | SYSTOLIC BLOOD PRESSURE: 131 MMHG | HEIGHT: 67 IN | DIASTOLIC BLOOD PRESSURE: 71 MMHG

## 2022-04-04 PROCEDURE — 99214 OFFICE O/P EST MOD 30 MIN: CPT

## 2022-04-04 NOTE — HISTORY OF PRESENT ILLNESS
[FreeTextEntry1] : Very pleasant 76-year-old woman who presents for follow-up of recurrent urinary tract infections, urinary urgency, urinary frequency.  She reports that Vesicare continues to significantly improve her urinary symptoms of urinary frequency and urinary urgency.  She reports she continues to take methenamine hippurate for prevention of UTIs as well.  She does not drink a lot of water.  She was recently diagnosed with another urinary tract infection and completed a course of Augmentin and subsequently Cipro.  She recently started Estrace vaginal cream. No problems with this. Still reports persistent but improved dysuria\par \par Repeat urine culture E. coli sensitive to Bactrim which she completed\par CT urogram demonstrates no kidney stones, hydronephrosis, renal masses but does demonstrate diverticulosis without evidence of diverticulitis

## 2022-04-04 NOTE — ASSESSMENT
[FreeTextEntry1] : Very pleasant 76-year-old woman presents for follow-up of recurrent urinary tract infections, urinary urgency, urinary frequency\par -Continue Estrace vaginal cream\par -Continue Hip-Elias\par -Continue Vesicare\par -CT images reviewed demonstrating no kidney stones, hydronephrosis, renal masses but does demonstrate diverticulosis without evidence of diverticulitis\par -Urine culture demonstrates a UTI sensitive to Bactrim which she completed\par -Follow-up in 6 months\par -

## 2022-04-18 ENCOUNTER — RX RENEWAL (OUTPATIENT)
Age: 77
End: 2022-04-18

## 2022-04-18 DIAGNOSIS — K21.9 GASTRO-ESOPHAGEAL REFLUX DISEASE W/OUT ESOPHAGITIS: ICD-10-CM

## 2022-04-25 ENCOUNTER — APPOINTMENT (OUTPATIENT)
Dept: CARDIOLOGY | Facility: CLINIC | Age: 77
End: 2022-04-25
Payer: MEDICARE

## 2022-04-25 ENCOUNTER — APPOINTMENT (OUTPATIENT)
Dept: PULMONOLOGY | Facility: CLINIC | Age: 77
End: 2022-04-25
Payer: MEDICARE

## 2022-04-25 ENCOUNTER — NON-APPOINTMENT (OUTPATIENT)
Age: 77
End: 2022-04-25

## 2022-04-25 ENCOUNTER — APPOINTMENT (OUTPATIENT)
Dept: VASCULAR SURGERY | Facility: CLINIC | Age: 77
End: 2022-04-25

## 2022-04-25 VITALS
BODY MASS INDEX: 33.11 KG/M2 | SYSTOLIC BLOOD PRESSURE: 136 MMHG | DIASTOLIC BLOOD PRESSURE: 78 MMHG | OXYGEN SATURATION: 98 % | HEIGHT: 66 IN | WEIGHT: 206 LBS | TEMPERATURE: 97.5 F | HEART RATE: 60 BPM | RESPIRATION RATE: 14 BRPM

## 2022-04-25 DIAGNOSIS — U09.9 POST COVID-19 CONDITION, UNSPECIFIED: ICD-10-CM

## 2022-04-25 DIAGNOSIS — Z82.49 FAMILY HISTORY OF ISCHEMIC HEART DISEASE AND OTHER DISEASES OF THE CIRCULATORY SYSTEM: ICD-10-CM

## 2022-04-25 DIAGNOSIS — Z01.818 ENCOUNTER FOR OTHER PREPROCEDURAL EXAMINATION: ICD-10-CM

## 2022-04-25 DIAGNOSIS — C34.90 MALIGNANT NEOPLASM OF UNSPECIFIED PART OF UNSPECIFIED BRONCHUS OR LUNG: ICD-10-CM

## 2022-04-25 DIAGNOSIS — Z78.9 OTHER SPECIFIED HEALTH STATUS: ICD-10-CM

## 2022-04-25 PROCEDURE — 99214 OFFICE O/P EST MOD 30 MIN: CPT | Mod: 25

## 2022-04-25 PROCEDURE — 99214 OFFICE O/P EST MOD 30 MIN: CPT

## 2022-04-25 PROCEDURE — 94060 EVALUATION OF WHEEZING: CPT

## 2022-04-25 PROCEDURE — 94726 PLETHYSMOGRAPHY LUNG VOLUMES: CPT

## 2022-04-25 PROCEDURE — 93000 ELECTROCARDIOGRAM COMPLETE: CPT

## 2022-04-25 NOTE — HISTORY OF PRESENT ILLNESS
[Preoperative Visit] : for a medical evaluation prior to surgery [Scheduled Procedure ___] : a [unfilled] [Date of Surgery ___] : on [unfilled] [Surgeon Name ___] : surgeon: [unfilled] [FreeTextEntry1] : Marely has been frustrated waiting for knee surgery. Difficult to get around. No CP, palpitations, dizziness or SOB.

## 2022-04-25 NOTE — REASON FOR VISIT
[Other: _____] : [unfilled] [Pre-op Risk Stratification] : pre-op risk stratification [TextBox_44] : Total knee replacement

## 2022-04-25 NOTE — HISTORY OF PRESENT ILLNESS
[Never] : never [TextBox_4] : Patient is a 76-year-old female with past medical history significant for hypertension, GERD history of DVT dyslipidemia hypothyroidism history of lung cancer and lupus erythematosus who presents for preoperative clearance.  Patient is scheduled for total knee replacement.  She offers no complaints

## 2022-04-25 NOTE — DISCUSSION/SUMMARY
[Procedure Intermediate Risk] : the procedure risk is intermediate [Patient Intermediate Risk] : the patient is an intermediate risk [Optimized for Surgery] : the patient is optimized for surgery [FreeTextEntry1] : The patient is a 76-year-old obese female history of rheumatoid arthritis,  lupus, hypothyroidism, hyperlipidemia, hypertension, lower extremity edema ,RBBB, s/p left lobectomy for cancer s/p rt hip replacement complicated by DVT s/p left knee replacement s/p spine surgery whose PVC are under control now awaiting Rt TKR. \par #1 RA- on MTX and plaquenil, ambulates with walker\par #2 Htn- continue  losartan 50mg\par #3 Lipids- continue atorvastatin \par #4 Hypothyroidism- continue levothyroxine 150mcg\par #5 CV- pharm stress negative, PVC controlled with metoprolol, mod AS 1.5, EF= 60%\par #6 Ortho- s/p right hip replacement and left knee replacement,  s/p spine surgery with home PT,  eliquis bid as per Dr. Hoyos, \par There are no cardiac contraindications to proceeding with right TKR off eliquis two days prior. \par

## 2022-04-25 NOTE — ASSESSMENT
[FreeTextEntry1] : In summary the patient is a 76-year-old female with past medical history significant for hypertension and GERD DVT dyslipidemia hypothyroidism history of lung cancer history of SLE who presents today for pulmonary consult.  Patient's physical exam is within normal limits.\par \par The patient underwent a pulmonary function test which revealed restrictive lung disease.  Her FEV1 was 1.76 L.\par \par Patient is currently in her usual state of health and is medically cleared for this elective surgical procedure

## 2022-04-25 NOTE — REVIEW OF SYSTEMS
[Negative] : Heme/Lymph [SOB] : no shortness of breath [Dyspnea on exertion] : not dyspnea during exertion [Chest Discomfort] : no chest discomfort [Lower Ext Edema] : no extremity edema [Leg Claudication] : no intermittent leg claudication

## 2022-04-26 ENCOUNTER — APPOINTMENT (OUTPATIENT)
Dept: VASCULAR SURGERY | Facility: CLINIC | Age: 77
End: 2022-04-26
Payer: MEDICARE

## 2022-04-26 ENCOUNTER — OUTPATIENT (OUTPATIENT)
Dept: OUTPATIENT SERVICES | Facility: HOSPITAL | Age: 77
LOS: 1 days | End: 2022-04-26
Payer: MEDICARE

## 2022-04-26 VITALS
HEART RATE: 56 BPM | SYSTOLIC BLOOD PRESSURE: 137 MMHG | DIASTOLIC BLOOD PRESSURE: 71 MMHG | TEMPERATURE: 97 F | HEIGHT: 64 IN | WEIGHT: 216.93 LBS | OXYGEN SATURATION: 97 % | RESPIRATION RATE: 14 BRPM

## 2022-04-26 VITALS
DIASTOLIC BLOOD PRESSURE: 74 MMHG | SYSTOLIC BLOOD PRESSURE: 121 MMHG | BODY MASS INDEX: 33.11 KG/M2 | TEMPERATURE: 95.9 F | WEIGHT: 206 LBS | HEIGHT: 66 IN | HEART RATE: 71 BPM

## 2022-04-26 DIAGNOSIS — Z98.890 OTHER SPECIFIED POSTPROCEDURAL STATES: Chronic | ICD-10-CM

## 2022-04-26 DIAGNOSIS — M25.561 PAIN IN RIGHT KNEE: ICD-10-CM

## 2022-04-26 DIAGNOSIS — Z79.01 LONG TERM (CURRENT) USE OF ANTICOAGULANTS: ICD-10-CM

## 2022-04-26 DIAGNOSIS — Z01.818 ENCOUNTER FOR OTHER PREPROCEDURAL EXAMINATION: ICD-10-CM

## 2022-04-26 DIAGNOSIS — C34.90 MALIGNANT NEOPLASM OF UNSPECIFIED PART OF UNSPECIFIED BRONCHUS OR LUNG: Chronic | ICD-10-CM

## 2022-04-26 DIAGNOSIS — Z98.49 CATARACT EXTRACTION STATUS, UNSPECIFIED EYE: Chronic | ICD-10-CM

## 2022-04-26 DIAGNOSIS — Z91.89 OTHER SPECIFIED PERSONAL RISK FACTORS, NOT ELSEWHERE CLASSIFIED: ICD-10-CM

## 2022-04-26 DIAGNOSIS — Z96.659 PRESENCE OF UNSPECIFIED ARTIFICIAL KNEE JOINT: Chronic | ICD-10-CM

## 2022-04-26 DIAGNOSIS — Z96.641 PRESENCE OF RIGHT ARTIFICIAL HIP JOINT: Chronic | ICD-10-CM

## 2022-04-26 DIAGNOSIS — M17.11 UNILATERAL PRIMARY OSTEOARTHRITIS, RIGHT KNEE: ICD-10-CM

## 2022-04-26 DIAGNOSIS — Z90.89 ACQUIRED ABSENCE OF OTHER ORGANS: Chronic | ICD-10-CM

## 2022-04-26 LAB
A1C WITH ESTIMATED AVERAGE GLUCOSE RESULT: 5.5 % — SIGNIFICANT CHANGE UP (ref 4–5.6)
ALBUMIN SERPL ELPH-MCNC: 3.1 G/DL — LOW (ref 3.3–5)
ALP SERPL-CCNC: 100 U/L — SIGNIFICANT CHANGE UP (ref 30–120)
ALT FLD-CCNC: 16 U/L DA — SIGNIFICANT CHANGE UP (ref 10–60)
ANION GAP SERPL CALC-SCNC: 7 MMOL/L — SIGNIFICANT CHANGE UP (ref 5–17)
APTT BLD: 32.3 SEC — SIGNIFICANT CHANGE UP (ref 27.5–35.5)
AST SERPL-CCNC: 22 U/L — SIGNIFICANT CHANGE UP (ref 10–40)
BILIRUB SERPL-MCNC: 0.4 MG/DL — SIGNIFICANT CHANGE UP (ref 0.2–1.2)
BUN SERPL-MCNC: 28 MG/DL — HIGH (ref 7–23)
CALCIUM SERPL-MCNC: 9.2 MG/DL — SIGNIFICANT CHANGE UP (ref 8.4–10.5)
CHLORIDE SERPL-SCNC: 107 MMOL/L — SIGNIFICANT CHANGE UP (ref 96–108)
CO2 SERPL-SCNC: 30 MMOL/L — SIGNIFICANT CHANGE UP (ref 22–31)
CREAT SERPL-MCNC: 0.85 MG/DL — SIGNIFICANT CHANGE UP (ref 0.5–1.3)
EGFR: 71 ML/MIN/1.73M2 — SIGNIFICANT CHANGE UP
ESTIMATED AVERAGE GLUCOSE: 111 MG/DL — SIGNIFICANT CHANGE UP (ref 68–114)
GLUCOSE SERPL-MCNC: 101 MG/DL — HIGH (ref 70–99)
HCT VFR BLD CALC: 43.2 % — SIGNIFICANT CHANGE UP (ref 34.5–45)
HGB BLD-MCNC: 13.8 G/DL — SIGNIFICANT CHANGE UP (ref 11.5–15.5)
INR BLD: 1.08 RATIO — SIGNIFICANT CHANGE UP (ref 0.88–1.16)
MCHC RBC-ENTMCNC: 31.8 PG — SIGNIFICANT CHANGE UP (ref 27–34)
MCHC RBC-ENTMCNC: 31.9 GM/DL — LOW (ref 32–36)
MCV RBC AUTO: 99.5 FL — SIGNIFICANT CHANGE UP (ref 80–100)
MRSA PCR RESULT.: SIGNIFICANT CHANGE UP
NRBC # BLD: 0 /100 WBCS — SIGNIFICANT CHANGE UP (ref 0–0)
PLATELET # BLD AUTO: 248 K/UL — SIGNIFICANT CHANGE UP (ref 150–400)
POTASSIUM SERPL-MCNC: 4.8 MMOL/L — SIGNIFICANT CHANGE UP (ref 3.5–5.3)
POTASSIUM SERPL-SCNC: 4.8 MMOL/L — SIGNIFICANT CHANGE UP (ref 3.5–5.3)
PROT SERPL-MCNC: 6.6 G/DL — SIGNIFICANT CHANGE UP (ref 6–8.3)
PROTHROM AB SERPL-ACNC: 12.8 SEC — SIGNIFICANT CHANGE UP (ref 10.5–13.4)
RBC # BLD: 4.34 M/UL — SIGNIFICANT CHANGE UP (ref 3.8–5.2)
RBC # FLD: 13.8 % — SIGNIFICANT CHANGE UP (ref 10.3–14.5)
S AUREUS DNA NOSE QL NAA+PROBE: SIGNIFICANT CHANGE UP
SODIUM SERPL-SCNC: 144 MMOL/L — SIGNIFICANT CHANGE UP (ref 135–145)
WBC # BLD: 5.78 K/UL — SIGNIFICANT CHANGE UP (ref 3.8–10.5)
WBC # FLD AUTO: 5.78 K/UL — SIGNIFICANT CHANGE UP (ref 3.8–10.5)

## 2022-04-26 PROCEDURE — 93922 UPR/L XTREMITY ART 2 LEVELS: CPT

## 2022-04-26 PROCEDURE — 99203 OFFICE O/P NEW LOW 30 MIN: CPT

## 2022-04-26 PROCEDURE — G0463: CPT

## 2022-04-26 NOTE — H&P PST ADULT - NSICDXPASTMEDICALHX_GEN_ALL_CORE_FT
PAST MEDICAL HISTORY:  Benign heart murmur     DVT (deep venous thrombosis) during left knee replacement 2019    H/O degenerative disc disease     Hyperlipidemia     Hypertension     Hypothyroid     Lab test positive for detection of COVID-19 virus 12/20    Obesity, Class II, BMI 35-39.9, no comorbidity     Osteoarthritis     Overactive bladder     Rheumatoid arthritis     SLE (systemic lupus erythematosus)

## 2022-04-26 NOTE — HISTORY OF PRESENT ILLNESS
[FreeTextEntry1] : KAI THOMAS is a 76 year old female who presents for evaluation prior to right knee replacement. \par \par Referred by Dr. Castillo. \par \par Patient has a history of left knee replacement in 2017 with provoked DVT on eliquis 2.5mg twice daily. She presents today for evaluation prior to her right knee placement. She has previously been followed by Dr. Jethro Hoyos (vascular cardiology).\par \par + PMH: rheumatoid arthritis, lupus, hypothyroidism, hyperlipidemia, hypertension, lymphedema\par + PSH: left knee replacement, right hip replacement, spine surgery\par + FH: CAD\par + SH: never smoker, limited etoh use\par + Aller: NKDA\par \par PCP is Dr. Scott Parra

## 2022-04-26 NOTE — H&P PST ADULT - HISTORY OF PRESENT ILLNESS
th;is is a 75 y/o female who has had right knee pain for yrs, has great difficulty walking, uses walker, tests show bone on bone; to have right knee replacement

## 2022-04-26 NOTE — H&P PST ADULT - PROBLEM SELECTOR PLAN 1
right total knee replacement, covid appt 5/13-23-91 [facility], [facility], [phone], preop instructions given, went to pulm and cardio clearance, to go for vascualr and medical clearance, ekg done by cardio

## 2022-04-26 NOTE — H&P PST ADULT - NSICDXFAMILYHX_GEN_ALL_CORE_FT
FAMILY HISTORY:  FHx: rheumatoid arthritis, father ,  of MI age 61    Mother  Still living? No  FH: sudden death, Age at diagnosis: Age Unknown

## 2022-04-26 NOTE — H&P PST ADULT - NSICDXPASTSURGICALHX_GEN_ALL_CORE_FT
PAST SURGICAL HISTORY:  Lung cancer small tumor removed 2015-no chemo, no radiation    S/P carpal tunnel release left    S/P cataract surgery left, right    S/P eye surgery child    S/P knee replacement left    S/P knee surgery bilateral    S/P lumbar spine operation 12/20    S/P thyroid surgery 1 lobe removed    S/P tonsillectomy     Status post total hip replacement, right

## 2022-04-26 NOTE — ASSESSMENT
[FreeTextEntry1] : KAI THOMAS is a 76 year old female here for evaluation prior to right knee replacement.\par \par – Reviewed results of study w/ patient. Bilateral ankle-brachial indices are within normal limits.\par – No no additional vascular surgery work-up or intervention is needed prior to to surgery.\par – Agree with Dr. Ramirez, stop Eliquis 2 days prior to procedure and resume after procedure.

## 2022-04-26 NOTE — PHYSICAL EXAM
[2+] : right 2+ [1+] : left 1+ [Ankle Swelling (On Exam)] : present [Ankle Swelling Bilaterally] : bilaterally  [] : bilaterally [Ankle Swelling On The Right] : mild [Calm] : calm [Varicose Veins Of Lower Extremities] : not present [de-identified] : Well-appearing  [de-identified] : EOMI, anicteric  [de-identified] : Soft, nontender, nondistended  [de-identified] : motor and sensation intact in all four extremities  [de-identified] : no wounds bilateral feet [de-identified] : A&Ox4

## 2022-05-13 PROBLEM — U07.1 COVID-19: Chronic | Status: ACTIVE | Noted: 2022-04-26

## 2022-05-13 PROBLEM — N32.81 OVERACTIVE BLADDER: Chronic | Status: ACTIVE | Noted: 2022-04-26

## 2022-05-13 PROBLEM — I82.409 ACUTE EMBOLISM AND THROMBOSIS OF UNSPECIFIED DEEP VEINS OF UNSPECIFIED LOWER EXTREMITY: Chronic | Status: ACTIVE | Noted: 2020-09-25

## 2022-05-13 NOTE — PATIENT PROFILE ADULT - FALL HARM RISK - RISK INTERVENTIONS
Assistance OOB with selected safe patient handling equipment/Assistance with ambulation/Communicate Fall Risk and Risk Factors to all staff, patient, and family/Discuss with provider need for PT consult/Monitor gait and stability/Provide patient with walking aids - walker, cane, crutches/Reinforce activity limits and safety measures with patient and family/Sit up slowly, dangle for a short time, stand at bedside before walking/Use of alarms - bed, chair and/or voice tab/Visual Cue: Yellow wristband/Bed in lowest position, wheels locked, appropriate side rails in place/Call bell, personal items and telephone in reach/Instruct patient to call for assistance before getting out of bed or chair/Non-slip footwear when patient is out of bed/[place] to call system/Physically safe environment - no spills, clutter or unnecessary equipment/Purposeful Proactive Rounding/Room/bathroom lighting operational, light cord in reach

## 2022-05-16 ENCOUNTER — TRANSCRIPTION ENCOUNTER (OUTPATIENT)
Age: 77
End: 2022-05-16

## 2022-05-16 ENCOUNTER — INPATIENT (INPATIENT)
Facility: HOSPITAL | Age: 77
LOS: 1 days | Discharge: NURSING, REHAB OR LONG-TERM CARE | DRG: 470 | End: 2022-05-18
Attending: ORTHOPAEDIC SURGERY | Admitting: ORTHOPAEDIC SURGERY
Payer: MEDICARE

## 2022-05-16 ENCOUNTER — APPOINTMENT (OUTPATIENT)
Dept: ORTHOPEDIC SURGERY | Facility: HOSPITAL | Age: 77
End: 2022-05-16

## 2022-05-16 ENCOUNTER — RESULT REVIEW (OUTPATIENT)
Age: 77
End: 2022-05-16

## 2022-05-16 VITALS
SYSTOLIC BLOOD PRESSURE: 114 MMHG | HEIGHT: 66 IN | HEART RATE: 58 BPM | DIASTOLIC BLOOD PRESSURE: 61 MMHG | RESPIRATION RATE: 20 BRPM | OXYGEN SATURATION: 100 % | WEIGHT: 223.77 LBS | TEMPERATURE: 98 F

## 2022-05-16 DIAGNOSIS — M19.90 UNSPECIFIED OSTEOARTHRITIS, UNSPECIFIED SITE: ICD-10-CM

## 2022-05-16 DIAGNOSIS — Z98.890 OTHER SPECIFIED POSTPROCEDURAL STATES: Chronic | ICD-10-CM

## 2022-05-16 DIAGNOSIS — Z90.89 ACQUIRED ABSENCE OF OTHER ORGANS: Chronic | ICD-10-CM

## 2022-05-16 DIAGNOSIS — E03.9 HYPOTHYROIDISM, UNSPECIFIED: ICD-10-CM

## 2022-05-16 DIAGNOSIS — R00.1 BRADYCARDIA, UNSPECIFIED: ICD-10-CM

## 2022-05-16 DIAGNOSIS — Z96.659 PRESENCE OF UNSPECIFIED ARTIFICIAL KNEE JOINT: Chronic | ICD-10-CM

## 2022-05-16 DIAGNOSIS — Z98.49 CATARACT EXTRACTION STATUS, UNSPECIFIED EYE: Chronic | ICD-10-CM

## 2022-05-16 DIAGNOSIS — M06.9 RHEUMATOID ARTHRITIS, UNSPECIFIED: ICD-10-CM

## 2022-05-16 DIAGNOSIS — C34.90 MALIGNANT NEOPLASM OF UNSPECIFIED PART OF UNSPECIFIED BRONCHUS OR LUNG: Chronic | ICD-10-CM

## 2022-05-16 DIAGNOSIS — Z96.641 PRESENCE OF RIGHT ARTIFICIAL HIP JOINT: Chronic | ICD-10-CM

## 2022-05-16 DIAGNOSIS — M17.11 UNILATERAL PRIMARY OSTEOARTHRITIS, RIGHT KNEE: ICD-10-CM

## 2022-05-16 DIAGNOSIS — I10 ESSENTIAL (PRIMARY) HYPERTENSION: ICD-10-CM

## 2022-05-16 LAB
ANION GAP SERPL CALC-SCNC: 5 MMOL/L — SIGNIFICANT CHANGE UP (ref 5–17)
BUN SERPL-MCNC: 22 MG/DL — SIGNIFICANT CHANGE UP (ref 7–23)
CALCIUM SERPL-MCNC: 8.6 MG/DL — SIGNIFICANT CHANGE UP (ref 8.4–10.5)
CHLORIDE SERPL-SCNC: 107 MMOL/L — SIGNIFICANT CHANGE UP (ref 96–108)
CO2 SERPL-SCNC: 26 MMOL/L — SIGNIFICANT CHANGE UP (ref 22–31)
CREAT SERPL-MCNC: 0.91 MG/DL — SIGNIFICANT CHANGE UP (ref 0.5–1.3)
EGFR: 65 ML/MIN/1.73M2 — SIGNIFICANT CHANGE UP
GLUCOSE SERPL-MCNC: 122 MG/DL — HIGH (ref 70–99)
HCT VFR BLD CALC: 39.1 % — SIGNIFICANT CHANGE UP (ref 34.5–45)
HGB BLD-MCNC: 12.4 G/DL — SIGNIFICANT CHANGE UP (ref 11.5–15.5)
MCHC RBC-ENTMCNC: 31.7 GM/DL — LOW (ref 32–36)
MCHC RBC-ENTMCNC: 31.9 PG — SIGNIFICANT CHANGE UP (ref 27–34)
MCV RBC AUTO: 100.5 FL — HIGH (ref 80–100)
NRBC # BLD: 0 /100 WBCS — SIGNIFICANT CHANGE UP (ref 0–0)
PLATELET # BLD AUTO: 201 K/UL — SIGNIFICANT CHANGE UP (ref 150–400)
POTASSIUM SERPL-MCNC: 4.3 MMOL/L — SIGNIFICANT CHANGE UP (ref 3.5–5.3)
POTASSIUM SERPL-SCNC: 4.3 MMOL/L — SIGNIFICANT CHANGE UP (ref 3.5–5.3)
RBC # BLD: 3.89 M/UL — SIGNIFICANT CHANGE UP (ref 3.8–5.2)
RBC # FLD: 14.1 % — SIGNIFICANT CHANGE UP (ref 10.3–14.5)
SODIUM SERPL-SCNC: 138 MMOL/L — SIGNIFICANT CHANGE UP (ref 135–145)
WBC # BLD: 9.64 K/UL — SIGNIFICANT CHANGE UP (ref 3.8–10.5)
WBC # FLD AUTO: 9.64 K/UL — SIGNIFICANT CHANGE UP (ref 3.8–10.5)

## 2022-05-16 PROCEDURE — 99223 1ST HOSP IP/OBS HIGH 75: CPT

## 2022-05-16 PROCEDURE — 73560 X-RAY EXAM OF KNEE 1 OR 2: CPT | Mod: 26,RT

## 2022-05-16 PROCEDURE — 93010 ELECTROCARDIOGRAM REPORT: CPT

## 2022-05-16 PROCEDURE — 27447 TOTAL KNEE ARTHROPLASTY: CPT | Mod: RT

## 2022-05-16 PROCEDURE — 88305 TISSUE EXAM BY PATHOLOGIST: CPT | Mod: 26

## 2022-05-16 PROCEDURE — 88311 DECALCIFY TISSUE: CPT | Mod: 26

## 2022-05-16 DEVICE — COMP FEM NON POROUS SZ 7 RT: Type: IMPLANTABLE DEVICE | Status: FUNCTIONAL

## 2022-05-16 DEVICE — IMPLANTABLE DEVICE: Type: IMPLANTABLE DEVICE | Status: FUNCTIONAL

## 2022-05-16 DEVICE — STEM MOD UNIV TIB 14X40MM: Type: IMPLANTABLE DEVICE | Status: FUNCTIONAL

## 2022-05-16 DEVICE — CEMENT BONE COBALT G-HV: Type: IMPLANTABLE DEVICE | Status: FUNCTIONAL

## 2022-05-16 DEVICE — INSERT TIB NONPOROUS UNIV SZ 7 RT: Type: IMPLANTABLE DEVICE | Status: FUNCTIONAL

## 2022-05-16 DEVICE — COMP PATELLA TRI-PEG E-PLUS POLY 9X35MM: Type: IMPLANTABLE DEVICE | Status: FUNCTIONAL

## 2022-05-16 DEVICE — BONE WAX 2.5GM: Type: IMPLANTABLE DEVICE | Status: FUNCTIONAL

## 2022-05-16 RX ORDER — SENNA PLUS 8.6 MG/1
2 TABLET ORAL AT BEDTIME
Refills: 0 | Status: DISCONTINUED | OUTPATIENT
Start: 2022-05-16 | End: 2022-05-18

## 2022-05-16 RX ORDER — PANTOPRAZOLE SODIUM 20 MG/1
40 TABLET, DELAYED RELEASE ORAL
Refills: 0 | Status: DISCONTINUED | OUTPATIENT
Start: 2022-05-16 | End: 2022-05-18

## 2022-05-16 RX ORDER — OXYCODONE HYDROCHLORIDE 5 MG/1
5 TABLET ORAL
Refills: 0 | Status: DISCONTINUED | OUTPATIENT
Start: 2022-05-16 | End: 2022-05-18

## 2022-05-16 RX ORDER — POLYETHYLENE GLYCOL 3350 17 G/17G
17 POWDER, FOR SOLUTION ORAL AT BEDTIME
Refills: 0 | Status: DISCONTINUED | OUTPATIENT
Start: 2022-05-16 | End: 2022-05-18

## 2022-05-16 RX ORDER — TRANEXAMIC ACID 100 MG/ML
1000 INJECTION, SOLUTION INTRAVENOUS ONCE
Refills: 0 | Status: COMPLETED | OUTPATIENT
Start: 2022-05-16 | End: 2022-05-16

## 2022-05-16 RX ORDER — OXYCODONE HYDROCHLORIDE 5 MG/1
10 TABLET ORAL
Refills: 0 | Status: DISCONTINUED | OUTPATIENT
Start: 2022-05-16 | End: 2022-05-18

## 2022-05-16 RX ORDER — CHLORHEXIDINE GLUCONATE 213 G/1000ML
1 SOLUTION TOPICAL ONCE
Refills: 0 | Status: COMPLETED | OUTPATIENT
Start: 2022-05-16 | End: 2022-05-16

## 2022-05-16 RX ORDER — HYDROMORPHONE HYDROCHLORIDE 2 MG/ML
0.5 INJECTION INTRAMUSCULAR; INTRAVENOUS; SUBCUTANEOUS
Refills: 0 | Status: DISCONTINUED | OUTPATIENT
Start: 2022-05-16 | End: 2022-05-18

## 2022-05-16 RX ORDER — OMEPRAZOLE 10 MG/1
1 CAPSULE, DELAYED RELEASE ORAL
Qty: 30 | Refills: 1
Start: 2022-05-16 | End: 2022-07-14

## 2022-05-16 RX ORDER — ACETAMINOPHEN 500 MG
1000 TABLET ORAL EVERY 8 HOURS
Refills: 0 | Status: DISCONTINUED | OUTPATIENT
Start: 2022-05-17 | End: 2022-05-18

## 2022-05-16 RX ORDER — ACETAMINOPHEN 500 MG
1000 TABLET ORAL ONCE
Refills: 0 | Status: COMPLETED | OUTPATIENT
Start: 2022-05-16 | End: 2022-05-16

## 2022-05-16 RX ORDER — DIPHENHYDRAMINE HCL 50 MG
25 CAPSULE ORAL ONCE
Refills: 0 | Status: COMPLETED | OUTPATIENT
Start: 2022-05-16 | End: 2022-05-16

## 2022-05-16 RX ORDER — SODIUM CHLORIDE 9 MG/ML
1000 INJECTION, SOLUTION INTRAVENOUS
Refills: 0 | Status: DISCONTINUED | OUTPATIENT
Start: 2022-05-16 | End: 2022-05-16

## 2022-05-16 RX ORDER — SODIUM CHLORIDE 9 MG/ML
500 INJECTION INTRAMUSCULAR; INTRAVENOUS; SUBCUTANEOUS ONCE
Refills: 0 | Status: COMPLETED | OUTPATIENT
Start: 2022-05-16 | End: 2022-05-16

## 2022-05-16 RX ORDER — CELECOXIB 200 MG/1
1 CAPSULE ORAL
Qty: 60 | Refills: 0
Start: 2022-05-16 | End: 2022-06-14

## 2022-05-16 RX ORDER — ONDANSETRON 8 MG/1
4 TABLET, FILM COATED ORAL ONCE
Refills: 0 | Status: DISCONTINUED | OUTPATIENT
Start: 2022-05-16 | End: 2022-05-16

## 2022-05-16 RX ORDER — APREPITANT 80 MG/1
40 CAPSULE ORAL ONCE
Refills: 0 | Status: COMPLETED | OUTPATIENT
Start: 2022-05-16 | End: 2022-05-16

## 2022-05-16 RX ORDER — APIXABAN 2.5 MG/1
2.5 TABLET, FILM COATED ORAL
Refills: 0 | Status: COMPLETED | OUTPATIENT
Start: 2022-05-17 | End: 2022-05-17

## 2022-05-16 RX ORDER — LEVOTHYROXINE SODIUM 125 MCG
150 TABLET ORAL DAILY
Refills: 0 | Status: DISCONTINUED | OUTPATIENT
Start: 2022-05-16 | End: 2022-05-18

## 2022-05-16 RX ORDER — ATORVASTATIN CALCIUM 80 MG/1
20 TABLET, FILM COATED ORAL AT BEDTIME
Refills: 0 | Status: DISCONTINUED | OUTPATIENT
Start: 2022-05-16 | End: 2022-05-18

## 2022-05-16 RX ORDER — APIXABAN 2.5 MG/1
2.5 TABLET, FILM COATED ORAL EVERY 12 HOURS
Refills: 0 | Status: DISCONTINUED | OUTPATIENT
Start: 2022-05-18 | End: 2022-05-18

## 2022-05-16 RX ORDER — CEFAZOLIN SODIUM 1 G
2000 VIAL (EA) INJECTION ONCE
Refills: 0 | Status: COMPLETED | OUTPATIENT
Start: 2022-05-16 | End: 2022-05-16

## 2022-05-16 RX ORDER — APIXABAN 2.5 MG/1
1 TABLET, FILM COATED ORAL
Qty: 23 | Refills: 0
Start: 2022-05-16 | End: 2022-05-27

## 2022-05-16 RX ORDER — ONDANSETRON 8 MG/1
4 TABLET, FILM COATED ORAL EVERY 6 HOURS
Refills: 0 | Status: DISCONTINUED | OUTPATIENT
Start: 2022-05-16 | End: 2022-05-18

## 2022-05-16 RX ORDER — DEXAMETHASONE 0.5 MG/5ML
8 ELIXIR ORAL ONCE
Refills: 0 | Status: COMPLETED | OUTPATIENT
Start: 2022-05-17 | End: 2022-05-17

## 2022-05-16 RX ORDER — SODIUM CHLORIDE 9 MG/ML
1000 INJECTION, SOLUTION INTRAVENOUS
Refills: 0 | Status: DISCONTINUED | OUTPATIENT
Start: 2022-05-16 | End: 2022-05-18

## 2022-05-16 RX ORDER — CELECOXIB 200 MG/1
100 CAPSULE ORAL EVERY 12 HOURS
Refills: 0 | Status: DISCONTINUED | OUTPATIENT
Start: 2022-05-17 | End: 2022-05-18

## 2022-05-16 RX ORDER — ASPIRIN/CALCIUM CARB/MAGNESIUM 324 MG
1 TABLET ORAL
Qty: 56 | Refills: 0
Start: 2022-05-16 | End: 2022-06-12

## 2022-05-16 RX ORDER — MAGNESIUM HYDROXIDE 400 MG/1
30 TABLET, CHEWABLE ORAL DAILY
Refills: 0 | Status: DISCONTINUED | OUTPATIENT
Start: 2022-05-16 | End: 2022-05-18

## 2022-05-16 RX ORDER — HYDROMORPHONE HYDROCHLORIDE 2 MG/ML
0.5 INJECTION INTRAMUSCULAR; INTRAVENOUS; SUBCUTANEOUS
Refills: 0 | Status: DISCONTINUED | OUTPATIENT
Start: 2022-05-16 | End: 2022-05-16

## 2022-05-16 RX ORDER — CEFAZOLIN SODIUM 1 G
2000 VIAL (EA) INJECTION EVERY 8 HOURS
Refills: 0 | Status: COMPLETED | OUTPATIENT
Start: 2022-05-16 | End: 2022-05-17

## 2022-05-16 RX ADMIN — Medication 1000 MILLIGRAM(S): at 23:12

## 2022-05-16 RX ADMIN — SODIUM CHLORIDE 1000 MILLILITER(S): 9 INJECTION INTRAMUSCULAR; INTRAVENOUS; SUBCUTANEOUS at 13:49

## 2022-05-16 RX ADMIN — SODIUM CHLORIDE 75 MILLILITER(S): 9 INJECTION, SOLUTION INTRAVENOUS at 12:32

## 2022-05-16 RX ADMIN — Medication 1000 MILLIGRAM(S): at 19:05

## 2022-05-16 RX ADMIN — Medication 25 MILLIGRAM(S): at 12:34

## 2022-05-16 RX ADMIN — APREPITANT 40 MILLIGRAM(S): 80 CAPSULE ORAL at 07:40

## 2022-05-16 RX ADMIN — CHLORHEXIDINE GLUCONATE 1 APPLICATION(S): 213 SOLUTION TOPICAL at 07:41

## 2022-05-16 RX ADMIN — Medication 400 MILLIGRAM(S): at 17:10

## 2022-05-16 RX ADMIN — Medication 400 MILLIGRAM(S): at 23:12

## 2022-05-16 RX ADMIN — Medication 100 MILLIGRAM(S): at 17:25

## 2022-05-16 RX ADMIN — SODIUM CHLORIDE 1000 MILLILITER(S): 9 INJECTION INTRAMUSCULAR; INTRAVENOUS; SUBCUTANEOUS at 21:37

## 2022-05-16 RX ADMIN — SODIUM CHLORIDE 75 MILLILITER(S): 9 INJECTION, SOLUTION INTRAVENOUS at 16:18

## 2022-05-16 NOTE — PHYSICAL THERAPY INITIAL EVALUATION ADULT - GAIT DEVIATIONS NOTED, PT EVAL
decreased wai/decreased velocity of limb motion/decreased step length/decreased weight-shifting ability

## 2022-05-16 NOTE — DISCHARGE NOTE PROVIDER - CARE PROVIDERS DIRECT ADDRESSES
,[email or web address],DirectAddress_Unknown,[email or web address]

## 2022-05-16 NOTE — PHYSICAL THERAPY INITIAL EVALUATION ADULT - ADDITIONAL COMMENTS
Pt lives in private home alone, without any assistance available. Pt uses RW at baseline. Has ramp to enter home and no steps inside.

## 2022-05-16 NOTE — BRIEF OPERATIVE NOTE - OPERATION/FINDINGS
Severe DJD all surfaces; XRay with stable implant placement, No FB, No Fx;   Lower pole incision with fragile Subcutaneous layer.

## 2022-05-16 NOTE — DISCHARGE NOTE PROVIDER - HOSPITAL COURSE
This patient was admitted to [facility] with a history of severe degenerative joint disease of the right knee.  Patient underwent Pre-Surgical Testing and was medically cleared to undergo elective procedure. Patient underwent right TKR by Dr. Mansoor Castillo on 5/16/22. Procedure was well tolerated.  No operative or irineo-operative complications arose during patient's hospital course.  Patient received antibiotic according to SCIP guidelines for infection prevention.  Eliquis 2.5mg q 12 h was given for DVT prophylaxis, in addition to the use of SCDs.  Anesthesia, Medical Hospitalist, Physical Therapy and Occupational Therapy were consulted. Patient is stable for discharge with a good prognosis.  Appropriate discharge instructions and medications are provided in this document. This patient was admitted to [facility] with a history of severe degenerative joint disease of the right knee.  Patient underwent Pre-Surgical Testing and was medically cleared to undergo elective procedure. Patient underwent right total knee replacement by Dr. Mansoor Castillo on 5/16/22. Procedure was well tolerated.  No operative or irineo-operative complications arose during patient's hospital course.  Patient received antibiotic according to SCIP guidelines for infection prevention.  Eliquis 2.5mg q 12 h was given for DVT prophylaxis, in addition to the use of SCDs.  Anesthesia, Medical Hospitalist, Physical Therapy and Occupational Therapy were consulted. Patient is stable for discharge to Rehab with a good prognosis.  Appropriate discharge instructions and medications are provided in this document.

## 2022-05-16 NOTE — DISCHARGE NOTE PROVIDER - PROVIDER TOKENS
PROVIDER:[TOKEN:[[id number]],SCHEDULEDAPPT:[05/31/2022],SCHEDULEDAPPTTIME:[09:00 AM],ESTABLISHEDPATIENT:[T]] PROVIDER:[TOKEN:[[id number]],SCHEDULEDAPPT:[05/31/2022],SCHEDULEDAPPTTIME:[09:00 AM],ESTABLISHEDPATIENT:[T]],PROVIDER:[TOKEN:[[id number]]],PROVIDER:[TOKEN:[[id number]]]

## 2022-05-16 NOTE — CONSULT NOTE ADULT - PROBLEM SELECTOR RECOMMENDATION 9
Pain Management: acceptable- continue current care Tylenol ATC/Celebrex ATC/ Oxycodone PRN  Continue PT/OT  DVT proph: [  ] low risk - Aspirin  [  ] high risk -Lovenox [ x ] high risk - Eliquis [  ] other:_________  DC plan:  [  ] Home with HC  [  ] Rehab   [ x ] TBD  [  ]other:___________

## 2022-05-16 NOTE — CONSULT NOTE ADULT - SUBJECTIVE AND OBJECTIVE BOX
History of Present Illness: The patient is a 76 year old female with a history of HTN, HL, RA, DVT, SLE, lung cancer s/p lobectomy who is admitted s/p TKR. She was noted to be bradycardic to the 30s post-operatively. She denies dizziness, palpitations, chest pain, shortness of breath. She is on metoprolol for PVCs.    Past Medical/Surgical History:  HTN, HL, RA, DVT, SLE, lung cancer s/p lobectomy    Medications:  Home Medications:  acetaminophen 500 mg oral tablet: 2 tab(s) orally every 8 hours as needed for mild-mod pain (1-6) (16 May 2022 07:39)  methenamine hippurate 1 g oral tablet: 1 tab(s) orally 2 times a day (16 May 2022 07:39)  methotrexate 2.5 mg oral tablet: 4 tab(s) orally every 7 days (16 May 2022 07:39)  metoprolol tartrate 25 mg oral tablet: 1 tab(s) orally 2 times a day (16 May 2022 07:39)  Pepcid 20 mg oral tablet: 1 tab(s) orally 2 times a day (16 May 2022 07:39)  solifenacin 10 mg oral tablet: 1 tab(s) orally once a day (16 May 2022 07:39)  Synthroid 150 mcg (0.15 mg) oral tablet: 1 tab(s) orally once a day (16 May 2022 07:39)      Family History: Non-contributory family history of premature cardiovascular atherosclerotic disease    Social History: No tobacco, alcohol or drug use    Review of Systems:  General: No fevers, chills, weight gain  Skin: No rashes, color changes  Cardiovascular: No chest pain, orthopnea  Respiratory: No shortness of breath, cough  Gastrointestinal: No nausea, abdominal pain  Genitourinary: No incontinence, pain with urination  Musculoskeletal: No pain, swelling, decreased range of motion  Neurological: No headache, weakness  Psychiatric: No depression, anxiety  Endocrine: No weight gain, increased thirst  All other systems are comprehensively negative.    Physical Exam:  Vitals:        Vital Signs Last 24 Hrs  T(C): 36.4 (16 May 2022 11:21), Max: 36.7 (16 May 2022 07:15)  T(F): 97.6 (16 May 2022 11:21), Max: 98 (16 May 2022 07:15)  HR: 45 (16 May 2022 15:30) (39 - 63)  BP: 123/66 (16 May 2022 15:30) (103/51 - 130/61)  BP(mean): --  RR: 20 (16 May 2022 15:30) (10 - 20)  SpO2: 100% (16 May 2022 15:30) (100% - 100%)  General: NAD  HEENT: MMM  Neck: No JVD, no carotid bruit  Lungs: CTAB  CV: RRR, nl S1/S2, no M/R/G  Abdomen: S/NT/ND, +BS  Extremities: No LE edema, no cyanosis  Neuro: AAOx3, non-focal  Skin: No rash    Labs:                  ECG: Sinus bradycardia, competing junctional bradycardia, blocked PACs, RBBB    Telemetry: Sinus bradycardia, blocked PACs, Mobitz 1
Patient is a 76y old  Female who presents with a chief complaint of Right TKR for severe right knee OA.  (16 May 2022 12:06)      HPI: 76F presented with progressively worsening right knee pain for years.  She has had great difficulty walking, uses walker.   Called to see patient in PACU due to bradycardia.  She denies cardiac symptoms (No CP, SOB, lightheadedness, palpitations) but does report full bladder and is unable to void.  Her right leg feels "wooden" and heavy.  no other complaints.     REVIEW OF SYSTEMS:  CONSTITUTIONAL: No fever, weight loss, or fatigue  EYES: No eye pain, visual disturbances, or discharge  ENMT:  No difficulty hearing, tinnitus, vertigo; No sinus or throat pain  NECK: No pain or stiffness  BREASTS: No pain, masses, or nipple discharge  RESPIRATORY: No cough, wheezing, chills or hemoptysis; No shortness of breath  CARDIOVASCULAR: No chest pain, palpitations, dizziness, or leg swelling  GASTROINTESTINAL: No abdominal or epigastric pain. No nausea, vomiting, or hematemesis; No diarrhea or constipation. No melena or hematochezia.  GENITOURINARY: see hpi  NEUROLOGICAL: No headaches, memory loss, loss of strength, numbness, or tremors  SKIN: No itching, burning, rashes, or lesions   LYMPH NODES: No enlarged glands  ENDOCRINE: No heat or cold intolerance; No hair loss  MUSCULOSKELETAL: No muscle or back pain  PSYCHIATRIC: No depression, anxiety, mood swings, or difficulty sleeping  HEME/LYMPH: No easy bruising, or bleeding gums  ALLERGY AND IMMUNOLOGIC: No hives or eczema    PAST MEDICAL & SURGICAL HISTORY:  Rheumatoid arthritis  SLE (systemic lupus erythematosus)  Osteoarthritis  H/O degenerative disc disease  Hypertension  Hyperlipidemia  Benign heart murmur  Hypothyroid  Obesity, Class II, BMI 35-39.9, no comorbidity  DVT (deep venous thrombosis): during left knee replacement   Overactive bladder  Lab test positive for detection of COVID-19 virus:   S/P tonsillectomy  S/P cataract surgery: left, right  S/P eye surgery: child  S/P carpal tunnel release: left  S/P knee surgery: bilateral  Lung cancer: small tumor removed -no chemo, no radiation  S/P thyroid surgery: 1 lobe removed  Status post total hip replacement, right  S/P knee replacement: left  S/P lumbar spine operation:     SOCIAL HISTORY:  Residence: [ ] halfway  [ ] SNF  [x ] Community  [ ] Substance abuse: denies  [ ] Tobacco: denies  [ ] Alcohol use: rare glass of wine    Allergies  No Known Allergies    Intolerances        MEDICATIONS  (STANDING):  lactated ringers. 1000 milliLiter(s) (75 mL/Hr) IV Continuous <Continuous>    MEDICATIONS  (PRN):  HYDROmorphone  Injectable 0.5 milliGRAM(s) IV Push every 10 minutes PRN Moderate Pain (4 - 6)  ondansetron Injectable 4 milliGRAM(s) IV Push once PRN Nausea and/or Vomiting      FAMILY HISTORY:  FHx: rheumatoid arthritis  father ,  of MI age 61    FH: sudden death (Mother)  age 58        Vital Signs Last 24 Hrs  T(C): 36.4 (16 May 2022 11:21), Max: 36.7 (16 May 2022 07:15)  T(F): 97.6 (16 May 2022 11:21), Max: 98 (16 May 2022 07:15)  HR: 45 (16 May 2022 15:30) (39 - 63)  BP: 123/66 (16 May 2022 15:30) (103/51 - 130/61)  BP(mean): --  RR: 20 (16 May 2022 15:30) (10 - 20)  SpO2: 100% (16 May 2022 15:30) (100% - 100%)    PHYSICAL EXAM:    GENERAL: NAD, well-groomed, well-developed  HEAD:  Atraumatic, Normocephalic  EYES:  conjunctiva and sclera clear  ENMT: Moist mucous membranes  NECK: Supple, No JVD  NERVOUS SYSTEM:  Alert & Oriented X3, Good concentration; Moving all 4 extremities; No gross sensory deficits  CHEST/LUNG: Clear to auscultation bilaterally; No rales, rhonchi, wheezing, or rubs  HEART: bradycardic s1s2  ABDOMEN: Soft, Nontender, Nondistended; Bowel sounds present  EXTREMITIES:  2+ Peripheral Pulses, No clubbing, cyanosis, or edema  LYMPH: No lymphadenopathy noted  /RECTAL: Not examined  BREAST: Not examined  SKIN: No rashes or lesions  INCISION: ACE wrap dry and intact    LABS:              CAPILLARY BLOOD GLUCOSE          RADIOLOGY & ADDITIONAL STUDIES:    EKG & Tele:  reviewed together. patient with marked sinus bradycardia with junctional escape beats. rbbb   Personally Reviewed:  [x] YES     Imaging:  TKR in place  Personally Reviewed:  [ x] YES     Consultant(s) Notes Reviewed:  pre-op med, cardio, vascular, pulm clearances reviewed    Care Discussed with Consultants/Other Providers: Dr Chambers

## 2022-05-16 NOTE — CONSULT NOTE ADULT - ASSESSMENT
The patient is a 76 year old female with a history of HTN, HL, RA, DVT, SLE, lung cancer s/p lobectomy who is admitted s/p TKR.    Plan:  - Post-operative ECG with competing sinus bradycardia and junctional rhythm  - Telemetry with similar although noted are blocked PACs and intermittent Mobitz 1  - The patient had similar conduction issues after her prior TKR  - Bradycardia may in part be due to prior sedation and increased vagal tone from urinary retention  - Currently asymptomatic from a rhythm perspective  - Hold metoprolol - if bradycardia persists may need to discontinue  - On apixaban 2.5 mg bid for prior DVT and DVT prophylaxis  - Continue atorvastatin 20 mg daily
76F s/p TKR - right

## 2022-05-16 NOTE — DISCHARGE NOTE PROVIDER - NSDCMRMEDTOKEN_GEN_ALL_CORE_FT
acetaminophen 500 mg oral tablet: 2 tab(s) orally every 8 hours as needed for mild-mod pain (1-6)  ascorbic acid 500 mg oral tablet: 1 tab(s) orally once a day  atorvastatin 20 mg oral tablet: 1 tab(s) orally once a day  folic acid 1 mg oral tablet: 1 tab(s) orally once a day  methenamine hippurate 1 g oral tablet: 1 tab(s) orally 2 times a day  methotrexate 2.5 mg oral tablet: 4 tab(s) orally every 7 days  metoprolol tartrate 25 mg oral tablet: 1 tab(s) orally 2 times a day  Pepcid 20 mg oral tablet: 1 tab(s) orally 2 times a day  solifenacin 10 mg oral tablet: 1 tab(s) orally once a day  Synthroid 150 mcg (0.15 mg) oral tablet: 1 tab(s) orally once a day   acetaminophen 500 mg oral tablet: 2 tab(s) orally every 8 hours  ascorbic acid 500 mg oral tablet: 1 tab(s) orally once a day  atorvastatin 20 mg oral tablet: 1 tab(s) orally once a day  celecoxib 100 mg oral capsule: 1 cap(s) orally every 12 hours for 14 days  Eliquis 2.5 mg oral tablet: 1 tab(s) orally 2 times a day  folic acid 1 mg oral tablet: 1 tab(s) orally once a day  methenamine hippurate 1 g oral tablet: 1 tab(s) orally 2 times a day  methotrexate 2.5 mg oral tablet: 4 tab(s) orally every 7 days  metoprolol tartrate 25 mg oral tablet: 1 tab(s) orally 2 times a day  oxyCODONE 10 mg oral tablet: 1 tab(s) orally every 4 hours, As Needed - for severe pain  oxyCODONE 5 mg oral tablet: 1 tab(s) orally every 4 hours, As Needed - for moderate pain  pantoprazole 40 mg oral delayed release tablet: 1 tab(s) orally once a day (before a meal)  polyethylene glycol 3350 oral powder for reconstitution: 17 gram(s) orally once a day (at bedtime)  senna oral tablet: 2 tab(s) orally once a day (at bedtime)  solifenacin 10 mg oral tablet: 1 tab(s) orally once a day  Synthroid 150 mcg (0.15 mg) oral tablet: 1 tab(s) orally once a day   acetaminophen 500 mg oral tablet: 2 tab(s) orally every 8 hours  ascorbic acid 500 mg oral tablet: 1 tab(s) orally once a day  atorvastatin 20 mg oral tablet: 1 tab(s) orally once a day  celecoxib 100 mg oral capsule: 1 cap(s) orally every 12 hours for 14 days  Eliquis 2.5 mg oral tablet: 1 tab(s) orally 2 times a day  folic acid 1 mg oral tablet: 1 tab(s) orally once a day  methenamine hippurate 1 g oral tablet: 1 tab(s) orally 2 times a day  methotrexate 2.5 mg oral tablet: 4 tab(s) orally every 7 days  oxyCODONE 10 mg oral tablet: 1 tab(s) orally every 4 hours, As Needed - for severe pain  oxyCODONE 5 mg oral tablet: 1 tab(s) orally every 4 hours, As Needed - for moderate pain  pantoprazole 40 mg oral delayed release tablet: 1 tab(s) orally once a day (before a meal)  polyethylene glycol 3350 oral powder for reconstitution: 17 gram(s) orally once a day (at bedtime)  senna oral tablet: 2 tab(s) orally once a day (at bedtime)  solifenacin 10 mg oral tablet: 1 tab(s) orally once a day  Synthroid 150 mcg (0.15 mg) oral tablet: 1 tab(s) orally once a day

## 2022-05-16 NOTE — DISCHARGE NOTE PROVIDER - CARE PROVIDER_API CALL
Mansoor Castillo)  Orthopedic Surgery  [address] [facility] [address], [address]  [place] [address]  Phone: [phone]  Fax: [phone]  Established Patient  Scheduled Appointment: 05/31/2022 09:00 AM   Mansoor Castillo)  Orthopedic Surgery  [address], [address]  [place] [address]  Phone: [phone]  Fax: [phone]  Established Patient  Scheduled Appointment: 05/31/2022 09:00 AM    Scott Dan  [facility] MEDICINE  [address] [place] [address]  Phone: [phone]  Fax: [phone]  Follow Up Time:     Josselin Ramirez)  Cardiovascular Disease; Interventional Cardiology  [address] [place] [address]  Phone: [phone]  Fax: [phone]  Follow Up Time:

## 2022-05-16 NOTE — DISCHARGE NOTE PROVIDER - NSDCCPCAREPLAN_GEN_ALL_CORE_FT
PRINCIPAL DISCHARGE DIAGNOSIS  Diagnosis: Primary osteoarthritis of right knee  Assessment and Plan of Treatment: For your total knee replacement:  Physical Therapy/Occupational Therapy for: ambulation, transfers, stairs, ADL's (activities of daily living), range of motion exercises, and isometrics  -Activity  • Weight Bearing as tolerated with rolling walker.  • Cryo/cuff 20 minutes several times daily with at least a 1 hour break in between icing sessions  • Take short, frequent walks increasing the distance that you walk each day as tolerated.  • Change your position every hour to decrease pain and stiffness.  • Continue the exercises taught to you by your physical therapist.  • No driving until cleared by the doctor.  • No tub baths, hot tubs, or swimming pools until instructed by your doctor.  • Do not squat down on the floor.  • Do not kneel or twist your knee.  • Range of Motion Goals: Flexion= 120 degrees, Extension = 0 degrees  You have a NELLY dressing. You may shower. Disconnect NELLY battery prior to showering. Reconnect battery after showering and press orange button to resume NELLY power. Remove NELLY dressing on post-op day #7.  Keep incision clean. DO NOT APPLY ANYTHING to incision site (salves/ointments/creams). Do not scrub incision site. Pat dry after shower.  Suture removal 2 weeks after surgery at Surgeon's office.       PRINCIPAL DISCHARGE DIAGNOSIS  Diagnosis: Primary osteoarthritis of right knee  Assessment and Plan of Treatment: For your total knee replacement:  Physical Therapy/Occupational Therapy for: ambulation, transfers, stairs, ADL's (activities of daily living), range of motion exercises, and isometrics  -Activity  • Weight Bearing as tolerated with rolling walker.  • Cryo/cuff 20 minutes several times daily with at least a 1 hour break in between icing sessions  • Take short, frequent walks increasing the distance that you walk each day as tolerated.  • Change your position every hour to decrease pain and stiffness.  • Continue the exercises taught to you by your physical therapist.  • No driving until cleared by the doctor.  • No tub baths, hot tubs, or swimming pools until instructed by your doctor.  • Do not squat down on the floor.  • Do not kneel or twist your knee.  • Range of Motion Goals: Flexion= 120 degrees, Extension = 0 degrees  You have a NELLY dressing. You may shower. Disconnect NELLY battery prior to showering. Reconnect battery after showering and press orange button to resume NELLY power. Remove NELLY dressing on post-op day #7.  Keep incision clean. DO NOT APPLY ANYTHING to incision site (salves/ointments/creams). Do not scrub incision site. Pat dry after shower.  Suture removal 2 weeks after surgery at Surgeon's office.      SECONDARY DISCHARGE DIAGNOSES  Diagnosis: Bradycardia  Assessment and Plan of Treatment: metorpolol stopped due to low HR.   F/u with Dr Ramirez post daphney from rehab.

## 2022-05-16 NOTE — DISCHARGE NOTE PROVIDER - NSDCFUSCHEDAPPT_GEN_ALL_CORE_FT
[facility]  ORTHOSURG [address] [place] [address]  Scheduled Appointment: 05/31/2022    Mansoor Castillo  [facility]  ORTHOSUR[address]  Scheduled Appointment: 08/02/2022

## 2022-05-16 NOTE — CONSULT NOTE ADULT - PROBLEM SELECTOR RECOMMENDATION 2
with history of bradycardia and 1 deg AV block.   possible increase vagal tone with extended bladder and unable to void - will place velasquez for now.  DC metoprolol and avoid AV eliz blockers.  monitor on remote tele  Dr Chambers will see pt soon.

## 2022-05-16 NOTE — CONSULT NOTE ADULT - PROBLEM SELECTOR RECOMMENDATION 3
patient did not contact her rheumatologist about methotrexate instructions.    will d/w clinical pharmacist to address in AM.

## 2022-05-17 LAB
ANION GAP SERPL CALC-SCNC: 5 MMOL/L — SIGNIFICANT CHANGE UP (ref 5–17)
BUN SERPL-MCNC: 23 MG/DL — SIGNIFICANT CHANGE UP (ref 7–23)
CALCIUM SERPL-MCNC: 8.6 MG/DL — SIGNIFICANT CHANGE UP (ref 8.4–10.5)
CHLORIDE SERPL-SCNC: 106 MMOL/L — SIGNIFICANT CHANGE UP (ref 96–108)
CO2 SERPL-SCNC: 28 MMOL/L — SIGNIFICANT CHANGE UP (ref 22–31)
CREAT SERPL-MCNC: 0.87 MG/DL — SIGNIFICANT CHANGE UP (ref 0.5–1.3)
EGFR: 69 ML/MIN/1.73M2 — SIGNIFICANT CHANGE UP
GLUCOSE SERPL-MCNC: 102 MG/DL — HIGH (ref 70–99)
HCT VFR BLD CALC: 37.6 % — SIGNIFICANT CHANGE UP (ref 34.5–45)
HGB BLD-MCNC: 12.1 G/DL — SIGNIFICANT CHANGE UP (ref 11.5–15.5)
MCHC RBC-ENTMCNC: 32 PG — SIGNIFICANT CHANGE UP (ref 27–34)
MCHC RBC-ENTMCNC: 32.2 GM/DL — SIGNIFICANT CHANGE UP (ref 32–36)
MCV RBC AUTO: 99.5 FL — SIGNIFICANT CHANGE UP (ref 80–100)
NRBC # BLD: 0 /100 WBCS — SIGNIFICANT CHANGE UP (ref 0–0)
PLATELET # BLD AUTO: 210 K/UL — SIGNIFICANT CHANGE UP (ref 150–400)
POTASSIUM SERPL-MCNC: 4.5 MMOL/L — SIGNIFICANT CHANGE UP (ref 3.5–5.3)
POTASSIUM SERPL-SCNC: 4.5 MMOL/L — SIGNIFICANT CHANGE UP (ref 3.5–5.3)
RBC # BLD: 3.78 M/UL — LOW (ref 3.8–5.2)
RBC # FLD: 14 % — SIGNIFICANT CHANGE UP (ref 10.3–14.5)
SODIUM SERPL-SCNC: 139 MMOL/L — SIGNIFICANT CHANGE UP (ref 135–145)
WBC # BLD: 8.79 K/UL — SIGNIFICANT CHANGE UP (ref 3.8–10.5)
WBC # FLD AUTO: 8.79 K/UL — SIGNIFICANT CHANGE UP (ref 3.8–10.5)

## 2022-05-17 PROCEDURE — 99232 SBSQ HOSP IP/OBS MODERATE 35: CPT

## 2022-05-17 RX ORDER — PANTOPRAZOLE SODIUM 20 MG/1
1 TABLET, DELAYED RELEASE ORAL
Qty: 0 | Refills: 0 | DISCHARGE
Start: 2022-05-17

## 2022-05-17 RX ORDER — OXYCODONE HYDROCHLORIDE 5 MG/1
1 TABLET ORAL
Qty: 0 | Refills: 0 | DISCHARGE
Start: 2022-05-17

## 2022-05-17 RX ORDER — CELECOXIB 200 MG/1
1 CAPSULE ORAL
Qty: 0 | Refills: 0 | DISCHARGE
Start: 2022-05-17

## 2022-05-17 RX ORDER — SENNA PLUS 8.6 MG/1
2 TABLET ORAL
Qty: 0 | Refills: 0 | DISCHARGE
Start: 2022-05-17

## 2022-05-17 RX ORDER — ACETAMINOPHEN 500 MG
2 TABLET ORAL
Qty: 0 | Refills: 0 | DISCHARGE
Start: 2022-05-17

## 2022-05-17 RX ORDER — POLYETHYLENE GLYCOL 3350 17 G/17G
17 POWDER, FOR SOLUTION ORAL
Qty: 0 | Refills: 0 | DISCHARGE
Start: 2022-05-17

## 2022-05-17 RX ORDER — METHOTREXATE 2.5 MG/1
4 TABLET ORAL
Qty: 0 | Refills: 0 | DISCHARGE

## 2022-05-17 RX ORDER — FAMOTIDINE 10 MG/ML
1 INJECTION INTRAVENOUS
Qty: 0 | Refills: 0 | DISCHARGE

## 2022-05-17 RX ADMIN — ATORVASTATIN CALCIUM 20 MILLIGRAM(S): 80 TABLET, FILM COATED ORAL at 21:11

## 2022-05-17 RX ADMIN — Medication 150 MICROGRAM(S): at 05:07

## 2022-05-17 RX ADMIN — Medication 1000 MILLIGRAM(S): at 15:40

## 2022-05-17 RX ADMIN — APIXABAN 2.5 MILLIGRAM(S): 2.5 TABLET, FILM COATED ORAL at 21:11

## 2022-05-17 RX ADMIN — Medication 101.6 MILLIGRAM(S): at 05:07

## 2022-05-17 RX ADMIN — Medication 1000 MILLIGRAM(S): at 05:06

## 2022-05-17 RX ADMIN — OXYCODONE HYDROCHLORIDE 5 MILLIGRAM(S): 5 TABLET ORAL at 09:44

## 2022-05-17 RX ADMIN — PANTOPRAZOLE SODIUM 40 MILLIGRAM(S): 20 TABLET, DELAYED RELEASE ORAL at 05:07

## 2022-05-17 RX ADMIN — SENNA PLUS 2 TABLET(S): 8.6 TABLET ORAL at 21:12

## 2022-05-17 RX ADMIN — CELECOXIB 100 MILLIGRAM(S): 200 CAPSULE ORAL at 09:14

## 2022-05-17 RX ADMIN — CELECOXIB 100 MILLIGRAM(S): 200 CAPSULE ORAL at 09:44

## 2022-05-17 RX ADMIN — CELECOXIB 100 MILLIGRAM(S): 200 CAPSULE ORAL at 21:15

## 2022-05-17 RX ADMIN — Medication 1000 MILLIGRAM(S): at 15:10

## 2022-05-17 RX ADMIN — Medication 100 MILLIGRAM(S): at 00:53

## 2022-05-17 RX ADMIN — APIXABAN 2.5 MILLIGRAM(S): 2.5 TABLET, FILM COATED ORAL at 09:14

## 2022-05-17 RX ADMIN — Medication 1000 MILLIGRAM(S): at 21:15

## 2022-05-17 RX ADMIN — Medication 1000 MILLIGRAM(S): at 21:12

## 2022-05-17 RX ADMIN — Medication 1000 MILLIGRAM(S): at 05:12

## 2022-05-17 RX ADMIN — OXYCODONE HYDROCHLORIDE 5 MILLIGRAM(S): 5 TABLET ORAL at 09:14

## 2022-05-17 RX ADMIN — CELECOXIB 100 MILLIGRAM(S): 200 CAPSULE ORAL at 21:12

## 2022-05-17 NOTE — PROGRESS NOTE ADULT - PROBLEM SELECTOR PLAN 1
Pain Management: acceptable- continue current care Tylenol ATC/Celebrex ATC/ Oxycodone PRN  Continue PT/OT  DVT proph: [  ] low risk - Aspirin  [  ] high risk -Lovenox [ x ] high risk - Eliquis [  ] other:_________  DC plan:  Acute rehab tomorrow

## 2022-05-17 NOTE — OCCUPATIONAL THERAPY INITIAL EVALUATION ADULT - DIAGNOSIS, OT EVAL
Pt with impaired ROM, strength, balance, endurance impacting pt's ability to complete ADLs, IADLs, functional mobility/transfers.

## 2022-05-17 NOTE — OCCUPATIONAL THERAPY INITIAL EVALUATION ADULT - LIVES WITH, PROFILE
Pt lives alone in a private home, ramp to enter with 0 steps inside. Pt has a tub with a tub transfer bench. Pt utilized a rollator for ADLs, functional mobility/transfers prior to admission./alone

## 2022-05-17 NOTE — PROGRESS NOTE ADULT - PROBLEM SELECTOR PLAN 2
possible increase vagal tone with extended bladder and unable to void   DC metoprolol and avoid AV eliz blockers.  monitor on remote tele  Dr Chambers follow up appreciated

## 2022-05-18 ENCOUNTER — TRANSCRIPTION ENCOUNTER (OUTPATIENT)
Age: 77
End: 2022-05-18

## 2022-05-18 ENCOUNTER — INPATIENT (INPATIENT)
Facility: HOSPITAL | Age: 77
LOS: 9 days | Discharge: HOME HEALTH | DRG: 950 | End: 2022-05-28
Attending: PHYSICAL MEDICINE & REHABILITATION | Admitting: PHYSICAL MEDICINE & REHABILITATION
Payer: MEDICARE

## 2022-05-18 VITALS
DIASTOLIC BLOOD PRESSURE: 60 MMHG | TEMPERATURE: 98 F | HEART RATE: 54 BPM | SYSTOLIC BLOOD PRESSURE: 124 MMHG | OXYGEN SATURATION: 96 % | RESPIRATION RATE: 18 BRPM

## 2022-05-18 VITALS
DIASTOLIC BLOOD PRESSURE: 60 MMHG | HEART RATE: 66 BPM | TEMPERATURE: 98 F | RESPIRATION RATE: 15 BRPM | OXYGEN SATURATION: 98 % | HEIGHT: 66.5 IN | WEIGHT: 210.1 LBS | SYSTOLIC BLOOD PRESSURE: 117 MMHG

## 2022-05-18 DIAGNOSIS — Z96.659 PRESENCE OF UNSPECIFIED ARTIFICIAL KNEE JOINT: Chronic | ICD-10-CM

## 2022-05-18 DIAGNOSIS — Z79.01 LONG TERM (CURRENT) USE OF ANTICOAGULANTS: ICD-10-CM

## 2022-05-18 DIAGNOSIS — E78.5 HYPERLIPIDEMIA, UNSPECIFIED: ICD-10-CM

## 2022-05-18 DIAGNOSIS — R00.1 BRADYCARDIA, UNSPECIFIED: ICD-10-CM

## 2022-05-18 DIAGNOSIS — M17.11 UNILATERAL PRIMARY OSTEOARTHRITIS, RIGHT KNEE: ICD-10-CM

## 2022-05-18 DIAGNOSIS — R26.9 UNSPECIFIED ABNORMALITIES OF GAIT AND MOBILITY: ICD-10-CM

## 2022-05-18 DIAGNOSIS — Z98.890 OTHER SPECIFIED POSTPROCEDURAL STATES: Chronic | ICD-10-CM

## 2022-05-18 DIAGNOSIS — Z96.651 PRESENCE OF RIGHT ARTIFICIAL KNEE JOINT: ICD-10-CM

## 2022-05-18 DIAGNOSIS — Z96.659 PRESENCE OF UNSPECIFIED ARTIFICIAL KNEE JOINT: ICD-10-CM

## 2022-05-18 DIAGNOSIS — E03.9 HYPOTHYROIDISM, UNSPECIFIED: ICD-10-CM

## 2022-05-18 DIAGNOSIS — Z96.641 PRESENCE OF RIGHT ARTIFICIAL HIP JOINT: Chronic | ICD-10-CM

## 2022-05-18 DIAGNOSIS — Z51.89 ENCOUNTER FOR OTHER SPECIFIED AFTERCARE: ICD-10-CM
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
## 2022-05-18 DIAGNOSIS — M06.9 RHEUMATOID ARTHRITIS, UNSPECIFIED: ICD-10-CM

## 2022-05-18 DIAGNOSIS — S80.221A BLISTER (NONTHERMAL), RIGHT KNEE, INITIAL ENCOUNTER: ICD-10-CM

## 2022-05-18 DIAGNOSIS — C34.90 MALIGNANT NEOPLASM OF UNSPECIFIED PART OF UNSPECIFIED BRONCHUS OR LUNG: Chronic | ICD-10-CM
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
## 2022-05-18 DIAGNOSIS — Z90.89 ACQUIRED ABSENCE OF OTHER ORGANS: Chronic | ICD-10-CM

## 2022-05-18 DIAGNOSIS — I10 ESSENTIAL (PRIMARY) HYPERTENSION: ICD-10-CM

## 2022-05-18 DIAGNOSIS — Z85.118 PERSONAL HISTORY OF OTHER MALIGNANT NEOPLASM OF BRONCHUS AND LUNG: ICD-10-CM

## 2022-05-18 DIAGNOSIS — Z20.822 CONTACT WITH AND (SUSPECTED) EXPOSURE TO COVID-19: ICD-10-CM

## 2022-05-18 DIAGNOSIS — Z86.718 PERSONAL HISTORY OF OTHER VENOUS THROMBOSIS AND EMBOLISM: ICD-10-CM

## 2022-05-18 DIAGNOSIS — I44.1 ATRIOVENTRICULAR BLOCK, SECOND DEGREE: ICD-10-CM

## 2022-05-18 DIAGNOSIS — K59.00 CONSTIPATION, UNSPECIFIED: ICD-10-CM

## 2022-05-18 DIAGNOSIS — Z47.1 AFTERCARE FOLLOWING JOINT REPLACEMENT SURGERY: ICD-10-CM

## 2022-05-18 DIAGNOSIS — M32.9 SYSTEMIC LUPUS ERYTHEMATOSUS, UNSPECIFIED: ICD-10-CM

## 2022-05-18 DIAGNOSIS — Z98.49 CATARACT EXTRACTION STATUS, UNSPECIFIED EYE: Chronic | ICD-10-CM

## 2022-05-18 DIAGNOSIS — R60.0 LOCALIZED EDEMA: ICD-10-CM

## 2022-05-18 LAB
ANION GAP SERPL CALC-SCNC: 7 MMOL/L — SIGNIFICANT CHANGE UP (ref 5–17)
BUN SERPL-MCNC: 24 MG/DL — HIGH (ref 7–23)
CALCIUM SERPL-MCNC: 9.3 MG/DL — SIGNIFICANT CHANGE UP (ref 8.4–10.5)
CHLORIDE SERPL-SCNC: 107 MMOL/L — SIGNIFICANT CHANGE UP (ref 96–108)
CO2 SERPL-SCNC: 26 MMOL/L — SIGNIFICANT CHANGE UP (ref 22–31)
CREAT SERPL-MCNC: 0.86 MG/DL — SIGNIFICANT CHANGE UP (ref 0.5–1.3)
EGFR: 70 ML/MIN/1.73M2 — SIGNIFICANT CHANGE UP
GLUCOSE SERPL-MCNC: 125 MG/DL — HIGH (ref 70–99)
MAGNESIUM SERPL-MCNC: 2.2 MG/DL — SIGNIFICANT CHANGE UP (ref 1.6–2.6)
PHOSPHATE SERPL-MCNC: 3.8 MG/DL — SIGNIFICANT CHANGE UP (ref 2.5–4.5)
POTASSIUM SERPL-MCNC: 4.4 MMOL/L — SIGNIFICANT CHANGE UP (ref 3.5–5.3)
POTASSIUM SERPL-SCNC: 4.4 MMOL/L — SIGNIFICANT CHANGE UP (ref 3.5–5.3)
SARS-COV-2 RNA SPEC QL NAA+PROBE: SIGNIFICANT CHANGE UP
SARS-COV-2 RNA SPEC QL NAA+PROBE: SIGNIFICANT CHANGE UP
SODIUM SERPL-SCNC: 140 MMOL/L — SIGNIFICANT CHANGE UP (ref 135–145)

## 2022-05-18 PROCEDURE — 97161 PT EVAL LOW COMPLEX 20 MIN: CPT

## 2022-05-18 PROCEDURE — 99223 1ST HOSP IP/OBS HIGH 75: CPT | Mod: GC

## 2022-05-18 PROCEDURE — 94664 DEMO&/EVAL PT USE INHALER: CPT

## 2022-05-18 PROCEDURE — 88305 TISSUE EXAM BY PATHOLOGIST: CPT

## 2022-05-18 PROCEDURE — 85027 COMPLETE CBC AUTOMATED: CPT

## 2022-05-18 PROCEDURE — 97165 OT EVAL LOW COMPLEX 30 MIN: CPT

## 2022-05-18 PROCEDURE — C1889: CPT

## 2022-05-18 PROCEDURE — 99232 SBSQ HOSP IP/OBS MODERATE 35: CPT

## 2022-05-18 PROCEDURE — 36415 COLL VENOUS BLD VENIPUNCTURE: CPT

## 2022-05-18 PROCEDURE — 97110 THERAPEUTIC EXERCISES: CPT

## 2022-05-18 PROCEDURE — 93005 ELECTROCARDIOGRAM TRACING: CPT

## 2022-05-18 PROCEDURE — C1713: CPT

## 2022-05-18 PROCEDURE — 97116 GAIT TRAINING THERAPY: CPT

## 2022-05-18 PROCEDURE — 87635 SARS-COV-2 COVID-19 AMP PRB: CPT

## 2022-05-18 PROCEDURE — 73560 X-RAY EXAM OF KNEE 1 OR 2: CPT

## 2022-05-18 PROCEDURE — 80048 BASIC METABOLIC PNL TOTAL CA: CPT

## 2022-05-18 PROCEDURE — 88311 DECALCIFY TISSUE: CPT

## 2022-05-18 PROCEDURE — 84100 ASSAY OF PHOSPHORUS: CPT

## 2022-05-18 PROCEDURE — C1776: CPT

## 2022-05-18 PROCEDURE — 83735 ASSAY OF MAGNESIUM: CPT

## 2022-05-18 PROCEDURE — 93010 ELECTROCARDIOGRAM REPORT: CPT

## 2022-05-18 RX ORDER — ACETAMINOPHEN 500 MG
975 TABLET ORAL EVERY 8 HOURS
Refills: 0 | Status: DISCONTINUED | OUTPATIENT
Start: 2022-05-18 | End: 2022-05-28

## 2022-05-18 RX ORDER — APIXABAN 2.5 MG/1
2.5 TABLET, FILM COATED ORAL EVERY 12 HOURS
Refills: 0 | Status: DISCONTINUED | OUTPATIENT
Start: 2022-05-18 | End: 2022-05-28

## 2022-05-18 RX ORDER — SENNA PLUS 8.6 MG/1
2 TABLET ORAL AT BEDTIME
Refills: 0 | Status: DISCONTINUED | OUTPATIENT
Start: 2022-05-18 | End: 2022-05-28

## 2022-05-18 RX ORDER — OXYCODONE HYDROCHLORIDE 5 MG/1
10 TABLET ORAL
Refills: 0 | Status: DISCONTINUED | OUTPATIENT
Start: 2022-05-18 | End: 2022-05-25

## 2022-05-18 RX ORDER — METOPROLOL TARTRATE 50 MG
1 TABLET ORAL
Qty: 0 | Refills: 0 | DISCHARGE

## 2022-05-18 RX ORDER — LEVOTHYROXINE SODIUM 125 MCG
150 TABLET ORAL DAILY
Refills: 0 | Status: DISCONTINUED | OUTPATIENT
Start: 2022-05-19 | End: 2022-05-28

## 2022-05-18 RX ORDER — OXYCODONE HYDROCHLORIDE 5 MG/1
5 TABLET ORAL
Refills: 0 | Status: DISCONTINUED | OUTPATIENT
Start: 2022-05-18 | End: 2022-05-25

## 2022-05-18 RX ORDER — PANTOPRAZOLE SODIUM 20 MG/1
40 TABLET, DELAYED RELEASE ORAL
Refills: 0 | Status: DISCONTINUED | OUTPATIENT
Start: 2022-05-19 | End: 2022-05-28

## 2022-05-18 RX ORDER — CELECOXIB 200 MG/1
100 CAPSULE ORAL EVERY 12 HOURS
Refills: 0 | Status: DISCONTINUED | OUTPATIENT
Start: 2022-05-18 | End: 2022-05-28

## 2022-05-18 RX ORDER — NYSTATIN CREAM 100000 [USP'U]/G
1 CREAM TOPICAL
Refills: 0 | Status: DISCONTINUED | OUTPATIENT
Start: 2022-05-18 | End: 2022-05-28

## 2022-05-18 RX ORDER — POLYETHYLENE GLYCOL 3350 17 G/17G
17 POWDER, FOR SOLUTION ORAL AT BEDTIME
Refills: 0 | Status: DISCONTINUED | OUTPATIENT
Start: 2022-05-18 | End: 2022-05-28

## 2022-05-18 RX ORDER — MAGNESIUM HYDROXIDE 400 MG/1
30 TABLET, CHEWABLE ORAL DAILY
Refills: 0 | Status: DISCONTINUED | OUTPATIENT
Start: 2022-05-18 | End: 2022-05-28

## 2022-05-18 RX ORDER — ACETAMINOPHEN 500 MG
1000 TABLET ORAL EVERY 8 HOURS
Refills: 0 | Status: DISCONTINUED | OUTPATIENT
Start: 2022-05-18 | End: 2022-05-18

## 2022-05-18 RX ORDER — ATORVASTATIN CALCIUM 80 MG/1
20 TABLET, FILM COATED ORAL AT BEDTIME
Refills: 0 | Status: DISCONTINUED | OUTPATIENT
Start: 2022-05-18 | End: 2022-05-28

## 2022-05-18 RX ADMIN — ATORVASTATIN CALCIUM 20 MILLIGRAM(S): 80 TABLET, FILM COATED ORAL at 21:26

## 2022-05-18 RX ADMIN — APIXABAN 2.5 MILLIGRAM(S): 2.5 TABLET, FILM COATED ORAL at 17:48

## 2022-05-18 RX ADMIN — Medication 1000 MILLIGRAM(S): at 06:12

## 2022-05-18 RX ADMIN — PANTOPRAZOLE SODIUM 40 MILLIGRAM(S): 20 TABLET, DELAYED RELEASE ORAL at 06:16

## 2022-05-18 RX ADMIN — OXYCODONE HYDROCHLORIDE 5 MILLIGRAM(S): 5 TABLET ORAL at 00:55

## 2022-05-18 RX ADMIN — OXYCODONE HYDROCHLORIDE 5 MILLIGRAM(S): 5 TABLET ORAL at 13:14

## 2022-05-18 RX ADMIN — Medication 1000 MILLIGRAM(S): at 13:14

## 2022-05-18 RX ADMIN — Medication 975 MILLIGRAM(S): at 21:25

## 2022-05-18 RX ADMIN — CELECOXIB 100 MILLIGRAM(S): 200 CAPSULE ORAL at 09:15

## 2022-05-18 RX ADMIN — Medication 1000 MILLIGRAM(S): at 06:15

## 2022-05-18 RX ADMIN — CELECOXIB 100 MILLIGRAM(S): 200 CAPSULE ORAL at 17:48

## 2022-05-18 RX ADMIN — SENNA PLUS 2 TABLET(S): 8.6 TABLET ORAL at 21:25

## 2022-05-18 RX ADMIN — CELECOXIB 100 MILLIGRAM(S): 200 CAPSULE ORAL at 18:25

## 2022-05-18 RX ADMIN — OXYCODONE HYDROCHLORIDE 5 MILLIGRAM(S): 5 TABLET ORAL at 09:16

## 2022-05-18 RX ADMIN — OXYCODONE HYDROCHLORIDE 5 MILLIGRAM(S): 5 TABLET ORAL at 09:46

## 2022-05-18 RX ADMIN — CELECOXIB 100 MILLIGRAM(S): 200 CAPSULE ORAL at 09:45

## 2022-05-18 RX ADMIN — OXYCODONE HYDROCHLORIDE 5 MILLIGRAM(S): 5 TABLET ORAL at 00:10

## 2022-05-18 RX ADMIN — Medication 150 MICROGRAM(S): at 06:11

## 2022-05-18 RX ADMIN — APIXABAN 2.5 MILLIGRAM(S): 2.5 TABLET, FILM COATED ORAL at 09:15

## 2022-05-18 NOTE — H&P ADULT - NSHPPHYSICALEXAM_GEN_ALL_CORE
PHYSICAL EXAMINATION   VItals: T(C): 36.9 (05-18-22 @ 14:10), Max: 36.9 (05-18-22 @ 14:10)  HR: 66 (05-18-22 @ 14:10) (54 - 68)  BP: 117/60 (05-18-22 @ 14:10) (115/56 - 131/49)  RR: 15 (05-18-22 @ 14:10) (15 - 18)  SpO2: 98% (05-18-22 @ 14:10) (93% - 98%)  General: NAD, Resting Comfortable,                                  HEENT: NC/AT, EOM I, PERRLA, Normal Conjunctivae  Cardio: RRR, Normal S1-S2, No M/G/R                              Pulm: No Respiratory Distress,  Lungs CTAB                        Abdomen: ND/NT, Soft, BS+                                                MSK: Right Knee ROM 0-80 passively                                       Ext: +1 edema Right LE, No calf tenderness,  stasis changes distally LLE  Skin:  Left facial erythema  Right TKR with NELLY/Ace- Noted drainage on NELLY                                                               Wounds: none  Decubitus Ulcers: None Present     Neurological Examination    Cognitive: AAO x 3                                                                         Attention: Intact   Judgment: Good evidence of judgement                               Memory: Recall 3 objects immediate and 3 min later      Mood/Affect: wnl                                                                           Communication:  Fluent,  No dysarthria   Swallow: intact  CN II - XII  intact                                                                             Sensory: Intact to light touch                                                                                           Tone: normal Throughout   Balance-impaired      Motor    LEFT    UE: SF [5/5], EF [5/5], EE [5/5], WE [5/5],  [wnl]  RIGHT UE: SF [5/5], EF [5/5], EE [5/5], WE [5/5],  [wnl]  LEFT    LE:  HF [4+/5], KE [5/5], DF [5/5], EHL [5/5],  PF [5/5]  RIGHT LE:  HF [2/5], KE [NT], DF [5/5], EHL [5/5],  PF [5/5]      Reflex:  2 + thoroughout, Crawford/Babinski negative

## 2022-05-18 NOTE — H&P ADULT - HISTORY OF PRESENT ILLNESS
This is a 75 YO female with PMH of SLE, lung cancer (s/p tumor removal, no chemo no radiation) OA, DVT, DJD, HTN, HLD, Hypothyroidism, covid-19, obesity, heart murmur, left knee replacement, lumbar spine surgery 12/20, cataract s/p surgery, tonsillectomy who presented to [facility] for elective Right knee replacement done on 5/16  by Dr. Mansoor Castillo. Eliquis 2.5mg q 12 h was given for DVT prophylaxis. Patient was evaluated by PM&R and therapy for functional deficits, gait/ADL impairments and acute rehabilitation was recommended. Patient was medically optimized for discharge to [facility] IRU on 5/18/22. This is a 77 YO female with PMH of SLE, lung cancer (s/p tumor removal, no chemo no radiation) OA, DVT, DJD, HTN, HLD, Hypothyroidism, covid-19, obesity, heart murmur, left knee replacement, lumbar spine surgery 12/20, cataract s/p surgery, tonsillectomy who presented to [facility] for elective Right knee replacement done on 5/16  by Dr. Mansoor Castillo. Eliquis 2.5mg q 12 h was given for DVT prophylaxis/on long term AC secondary to DVT .Post Op course on telemetry secondary to bradycardia-beta blocker held. Patient was evaluated by PM&R and therapy for functional deficits, gait/ADL impairments and acute rehabilitation was recommended. Patient was medically optimized for discharge to [facility] IRU on 5/18/22.

## 2022-05-18 NOTE — PROGRESS NOTE ADULT - ASSESSMENT
The patient is a 76 year old female with a history of HTN, HL, RA, DVT, SLE, lung cancer s/p lobectomy who is admitted s/p TKR.    Plan:  - Post-operative ECG with competing sinus bradycardia and junctional rhythm  - Telemetry with similar although noted are blocked PACs and intermittent Mobitz 1  - The patient had similar conduction issues after her prior TKR  - Bradycardia may in part be due to prior sedation and increased vagal tone from urinary retention - has improved since yesterday  - Currently asymptomatic from a rhythm perspective  - Hold metoprolol  - On apixaban 2.5 mg bid for prior DVT and DVT prophylaxis  - Continue atorvastatin 20 mg daily  - PT
The patient is a 76 year old female with a history of HTN, HL, RA, DVT, SLE, lung cancer s/p lobectomy who is admitted s/p TKR.    Plan:  - Post-operative ECG with competing sinus bradycardia and junctional rhythm  - Telemetry with similar although noted are blocked PACs and intermittent Mobitz 1  - The patient had similar conduction issues after her prior TKR  - Bradycardia may in part be due to prior sedation and increased vagal tone from urinary retention - has improved since yesterday  - Currently asymptomatic from a rhythm perspective  - Hold metoprolol due to bradycardia  - On apixaban 2.5 mg bid for prior DVT and DVT prophylaxis  - Continue atorvastatin 20 mg daily  - Discontinue telemetry  - Discharge planning
76F s/p TKR - right

## 2022-05-18 NOTE — H&P ADULT - NSHPLABSRESULTS_GEN_ALL_CORE
RECENT LABS/IMAGING                        12.1   8.79  )-----------( 210      ( 17 May 2022 08:44 )             37.6     05-18    140  |  107  |  24<H>  ----------------------------<  125<H>  4.4   |  26  |  0.86    Ca    9.3      18 May 2022 08:04  Phos  3.8     05-18  Mg     2.2     05-18    < from: CT 3D Reconstruct w/o Workstation (05.12.21 @ 12:24) >    IMPRESSION:  1.  Patient status post instrumented posterior fusion of the thoracolumbar spine spanning from the T9-L4 levels. There is 1 mm circumferential lucency about the left L4 pedicle screws concerning for early loosening. Placement of multiple pedicle screws as further described above.  2.  Multilevel spondylosis of the lumbar spine, as described above.    < end of copied text >    < from: Xray Knee 1 or 2 Views, Right (05.16.22 @ 11:24) >      FINDINGS:  Total knee replacement noted in gross anatomic alignment with post   operative changes.    < end of copied text > RECENT LABS/IMAGING                        12.1   8.79  )-----------( 210      ( 17 May 2022 08:44 )             37.6     05-18    140  |  107  |  24<H>  ----------------------------<  125<H>  4.4   |  26  |  0.86    Ca    9.3      18 May 2022 08:04  Phos  3.8     05-18  Mg     2.2     05-18    CT 3D Reconstruct w/o Workstation (05.12.21 @ 12:24)     IMPRESSION:  1.  Patient status post instrumented posterior fusion of the thoracolumbar spine spanning from the T9-L4 levels. There is 1 mm circumferential lucency about the left L4 pedicle screws concerning for early loosening. Placement of multiple pedicle screws as further described above.  2.  Multilevel spondylosis of the lumbar spine, as described above.    X-ray Knee 1 or 2 Views, Right (05.16.22 @ 11:24)     FINDINGS:  Total knee replacement noted in gross anatomic alignment with post   operative changes.

## 2022-05-18 NOTE — DISCHARGE NOTE NURSING/CASE MANAGEMENT/SOCIAL WORK - PATIENT PORTAL LINK FT
You can access the FollowMyHealth Patient Portal offered by [facility] by registering at the following website: [email or web address]. By joining [employer]’s FollowMyHealth portal, you will also be able to view your health information using other applications (apps) compatible with our system.

## 2022-05-18 NOTE — DISCHARGE NOTE NURSING/CASE MANAGEMENT/SOCIAL WORK - NSDCPEFALRISK_GEN_ALL_CORE
For information on Fall & Injury Prevention, visit: [email or web address] OR  [email or web address] OR  https://www.cdc.gov/steadi/patient.html

## 2022-05-18 NOTE — PATIENT PROFILE ADULT - FALL HARM RISK - RISK INTERVENTIONS
Assistance OOB with selected safe patient handling equipment/Assistance with ambulation/Communicate Fall Risk and Risk Factors to all staff, patient, and family/Discuss with provider need for PT consult/Monitor gait and stability/Provide patient with walking aids - walker, cane, crutches/Reinforce activity limits and safety measures with patient and family/Sit up slowly, dangle for a short time, stand at bedside before walking/Use of alarms - bed, chair and/or voice tab/Visual Cue: Yellow wristband/Bed in lowest position, wheels locked, appropriate side rails in place/Call bell, personal items and telephone in reach/Instruct patient to call for assistance before getting out of bed or chair/Non-slip footwear when patient is out of bed/[place] to call system/Physically safe environment - no spills, clutter or unnecessary equipment/Purposeful Proactive Rounding/Room/bathroom lighting operational, light cord in reach

## 2022-05-18 NOTE — DISCHARGE NOTE NURSING/CASE MANAGEMENT/SOCIAL WORK - NSDPFAC_GEN_ALL_CORE
[facility] [address] [facility] via ambulette with Ambuln  1pm

## 2022-05-18 NOTE — H&P ADULT - ASSESSMENT
ASSESSMENT/PLAN  This is a 75 YO female with PMH of SLE, lung cancer (s/p tumor removal, no chemo no radiation) OA, DVT, DJD, HTN, HLD, Hypothyroidism, covid-19, obesity, heart murmur, left knee replacement, lumbar spine surgery 12/20, cataract s/p surgery, tonsillectomy who presented to [facility] for elective Right knee replacement done on 5/16  by Dr. Mansoor Castillo. Patient now with gait Instability, ADL impairments and Functional impairments.    - Start Comprehensive Rehab Program: PT/OT/ST, 3hours daily and 5 days weekly  - PT: Focused on improving strength, endurance, coordination, balance, functional mobility, and transfers  - OT: Focused on improving strength, fine motor skills, coordination, posture and ADLs.      #Right TKR  - Elective Right knee replacement done on 5/16  by Dr. Mansoor Castillo  - Remove NELLY dressing on post-op day 7   - celebrex BID x 14 days  - WB Status: WBAT    #HTN  - Monitor    #HLD  - Lipitor 20mg daily    #Hypothyroidism  - Synthroid 150mcg daily    #Pain management  - Tylenol PRN, Oxycodone PRN    #DVT ppx  - Eliquis 2.5mg BID, SCD, TEDs    #GI ppx  - Protonix 40mg    #Bowel Regimen  - Senna, miralax PRN    #Bladder management  - BS on admission, and q 8 hours (SC if > 400)  - Monitor UO    #FEN   - Diet: Regular    #Skin:  - No active issues at this time  - Skin on admission: ***  - Pressure injury/Skin: Turn Q2hrs while in bed, OOB to Chair, PT/OT     #Sleep:   - Maintain quiet hours and low stim environment.    #Precaution  - Fall, Aspiration    Outpatient Follow-up (Specialty/Name of physician):  Mansoor Castillo (MD)  Orthopedic Surgery  [address], [address]  [place] [address]  Phone: [phone]  Fax: [phone]  Scheduled Appointment: 05/31/2022 09:00 AM    Scott Dan  [facility] MEDICINE  [address], [place] [address]  Phone: [phone]  Fax: [phone]    Josselin Ramirez)  Cardiovascular Disease; Interventional Cardiology  [address] [place] [address]  Phone: [phone]  Fax: [phone]    MEDICAL PROGNOSIS: GOOD            REHAB POTENTIAL: GOOD             ESTIMATED DISPOSITION: HOME WITH HOME CARE            ELOS: 10-14 Days   EXPECTED THERAPY:     P.T. 1hr/day       O.T. 1hr/day          P&O Unnecessary     EXP FREQUENCY: 5 days per 7 day period     PRESCREEN COMPARISON:   I have reviewed the prescreen information and I have found no relevant changes between the preadmission screening and my post admission evaluation     RATIONALE FOR INPATIENT ADMISSION - Patient demonstrates the following: (check all that apply)  [X] Medically appropriate for rehabilitation admission  [X] Has attainable rehab goals with an appropriate initial discharge plan  [X] Has rehabilitation potential (expected to make a significant improvement within a reasonable period of time)   [X] Requires close medical management by a rehab physician, rehab nursing care, Hospitalist and comprehensive interdisciplinary team (including PT, OT, & or SLP, Prosthetics and Orthotics)   ASSESSMENT/PLAN  This is a 77 YO female with PMH of SLE,Hx RA, lung cancer (s/p tumor removal, no chemo no radiation) OA, DVT, DJD, HTN, HLD, Hypothyroidism, covid-19, obesity, heart murmur, left knee replacement, lumbar spine surgery 12/20, cataract s/p surgery, tonsillectomy who presented to [facility] for elective Right knee replacement done on 5/16  by Dr. Mansoor Castillo. Patient now with gait Instability, ADL impairments and Functional impairments.    - Start Comprehensive Rehab Program: PT/OT/ST, 3hours daily and 5 days weekly  - PT: Focused on improving strength, endurance, coordination, balance, functional mobility, and transfers  - OT: Focused on improving strength, fine motor skills, coordination, posture and ADLs.      #Right TKR  - Elective Right knee replacement done on 5/16  by Dr. Mansoor Castillo  - Remove NELLY dressing on post-op day 7 (5/23)  - celebrex BID x 14 days  - WB Status: WBAT    #HTN  - Monitor    #HLD  - Lipitor 20mg daily    #Hypothyroidism  - Synthroid 150mcg daily    # Post OP bradycardia  junctional rhythm  blocked APCs  Mobitz type 1  Off betablockers    #Pain management  - Tylenol PRN, Oxycodone PRN    #DVT ppx  - Eliquis 2.5mg BID, SCD, TEDs    #GI ppx  - Protonix 40mg    #Bowel Regimen  - Senna, miralax   MOM PRN    #Bladder management  - BS on admission, and q 8 hours (SC if > 400)  - Monitor UO  primafit HS  continue vessicare, methenamine    #FEN   - Diet: Regular    #Skin:  - Skin on admission: Right Knee with NELLY dressing- +drainage on dressing  - Pressure injury/Skin: Turn Q2hrs while in bed, OOB to Chair, PT/OT   -Left facial erythema- ? Rosacea- will discuss with hospitalist    #Sleep:   - Maintain quiet hours and low stim environment.    #Precaution  - Fall, Aspiration    Outpatient Follow-up (Specialty/Name of physician):  Mansoor Castillo)  Orthopedic Surgery  [address], [address]  [place] [address]  Phone: [phone]  Fax: [phone]  Scheduled Appointment: 05/31/2022 09:00 AM    Scott Dan  FAMILY MEDICINE  [address], [place] [address]  Phone: [phone]  Fax: [phone]    Josselin Ramirez)  Cardiovascular Disease; Interventional Cardiology  [address] [place] [address]  Phone: [phone]  Fax: [phone]    MEDICAL PROGNOSIS: GOOD            REHAB POTENTIAL: GOOD             ESTIMATED DISPOSITION: HOME WITH HOME CARE            ELOS: 10-14 Days   EXPECTED THERAPY:     P.T. 2hr/day       O.T. 1hr/day          P&O Unnecessary     EXP FREQUENCY: 5 days per 7 day period     PRESCREEN COMPARISON:   I have reviewed the prescreen information and I have found no relevant changes between the preadmission screening and my post admission evaluation     RATIONALE FOR INPATIENT ADMISSION - Patient demonstrates the following: (check all that apply)  [X] Medically appropriate for rehabilitation admission  [X] Has attainable rehab goals with an appropriate initial discharge plan  [X] Has rehabilitation potential (expected to make a significant improvement within a reasonable period of time)   [X] Requires close medical management by a rehab physician, rehab nursing care, Hospitalist and comprehensive interdisciplinary team (including PT, OT, & or SLP, Prosthetics and Orthotics)

## 2022-05-18 NOTE — H&P ADULT - NSHPSOCIALHISTORY_GEN_ALL_CORE
Smoking - Denied  EtOH - Denied   Drugs - Denied     Patient lives   PTA: Independent in ADLs and ambulation     CURRENT FUNCTIONAL STATUS  Bed Mobility:   Transfers:   Gait: Smoking - Denied  EtOH - Denied   Drugs - Denied     Marital Status:      Patient lives in private home alone, without any assistance available. Pt uses RW at baseline. Has ramp to enter home and no steps inside.  PTA: Independent in ADLs and ambulation     CURRENT FUNCTIONAL STATUS 5/18  Bed Mobility:   Transfers: MIN A, 1 PERSON  Gait: MIN A,1 PERSON 60FT + RW

## 2022-05-18 NOTE — PROGRESS NOTE ADULT - SUBJECTIVE AND OBJECTIVE BOX
Post Op     KAI THOMAS      76y        Female                                                                                                                 T(C): 36.4 (05-16-22 @ 11:21), Max: 36.7 (05-16-22 @ 07:15)  HR: 47 (05-16-22 @ 11:51) (39 - 63)  BP: 113/56 (05-16-22 @ 11:51) (103/51 - 130/61)  RR: 10 (05-16-22 @ 11:51) (10 - 20)  SpO2: 100% (05-16-22 @ 11:51) (100% - 100%)  Wt(kg): --    S/P   total knee replacement    Patient denies shortness of breath, chest pain, dyspnea, No complaints  Pain is 3 /10    Physical Exam    Extremity: Bilaterally:  No holmon                                           No Cord                                          PAS on b/l                                           Neurovascular intact                                          Motor intact EHL/FHL                                          Sensation intact                                          Pulses intact DP/PT                                         Calves Soft                                         Dressing Clean / Dry / Intact                                         Capillary refill with 5 seconds                A/P  -- S/P total knee replacement    -  Medicine To Follow   - DVT prophylaxis PAS eliquis  - PT & OT   - Analagesia  - Incentive Spirometry  - Discharge Planning  - Safety Precautions  -  CBC , BMP daily    
                                                                               ORTHOPEDIC PA PROGRESS NOTE  KAI THOMAS      76y Female                                 SY 2WST 216 01                                                                                                                           POD #    1d    STATUS POST:       Procedure: Total replacement of right knee joint               Patient seen and examined at bedside.      Current Pain Management:    acetaminophen     Tablet .. 1000 milliGRAM(s) Oral every 8 hours  celecoxib 100 milliGRAM(s) Oral every 12 hours  HYDROmorphone  Injectable 0.5 milliGRAM(s) IV Push every 3 hours PRN  ondansetron Injectable 4 milliGRAM(s) IV Push every 6 hours PRN  oxyCODONE    IR 5 milliGRAM(s) Oral every 3 hours PRN  oxyCODONE    IR 10 milliGRAM(s) Oral every 3 hours PRN      T(F): 98.6  HR: 57  BP: 129/69  RR: 17  SpO2: 95%                         12.4   9.64  )-----------( 201      ( 16 May 2022 19:16 )             39.1         05-16    138  |  107  |  22  ----------------------------<  122<H>  4.3   |  26  |  0.91    Ca    8.6      16 May 2022 19:16      05-16-22 @ 07:01  -  05-17-22 @ 07:00  --------------------------------------------------------  IN:    IV PiggyBack: 150 mL    Lactated Ringers: 1800 mL    Sodium Chloride 0.9% Bolus: 500 mL  Total IN: 2450 mL    OUT:    Indwelling Catheter - Urethral (mL): 1450 mL  Total OUT: 1450 mL    Total NET: 1000 mL        Physical Exam :    -   Dressing C/D/I. Mirna intact and working  -   Distal Neurvascular status intact grossly.   -   Warm well perfused; capillary refill <3 seconds   -   (+)EHL/FHL   -   (+) Sensation to light touch  -   (-) Calf tenderness Bilaterally      A/P: 76y Female s/p Total replacement of right knee joint       -   Ortho Stable  -   Pain control:  acetaminophen     Tablet .. 1000 milliGRAM(s) Oral every 8 hours  celecoxib 100 milliGRAM(s) Oral every 12 hours  HYDROmorphone  Injectable 0.5 milliGRAM(s) IV Push every 3 hours PRN  ondansetron Injectable 4 milliGRAM(s) IV Push every 6 hours PRN  oxyCODONE    IR 5 milliGRAM(s) Oral every 3 hours PRN  oxyCODONE    IR 10 milliGRAM(s) Oral every 3 hours PRN    -   Medicine to follow  -   DVT ppx:    PAS +  Eliquis  -   PT/OT OOB,  Weight bearing status: WBAT   -  Dispo:  Pt requesting [facility]  -   Prescribed Medications:  ascorbic acid 500 mg oral tablet: 1 tab(s) orally once a day  atorvastatin 20 mg oral tablet: 1 tab(s) orally once a day  folic acid 1 mg oral tablet: 1 tab(s) orally once a day      
Discharge medication calendar:  (Eliquis 2.5mg q12h preop)  Eliquis 2.5mg q12h  APAP 1000mg q8h x 2-3 weeks  Celecoxib 100mg q12h x 2-3 weeks  Omeprazole 20mg QAM x 6 weeks (hold famotidine)  Narcotic PRN  Docusate 100mg TID while taking narcotic  Miralax, Senna, or Bisacodyl PRN for treatment of constipation  
Orthopedic P.A.- POD# 2 - s/p Right TKR    Patient alert and comfortable in bed with raol meds for pain control.  Denies knee pain or nausea.    Vital Signs Last 24 Hrs  T(C): 36.6 (18 May 2022 07:41), Max: 36.8 (17 May 2022 15:30)  T(F): 97.8 (18 May 2022 07:41), Max: 98.2 (17 May 2022 15:30)  HR: 54 (18 May 2022 07:41) (54 - 68)  BP: 124/60 (18 May 2022 07:41) (115/56 - 131/49)  BP(mean): --  RR: 18 (18 May 2022 07:41) (18 - 18)  SpO2: 96% (18 May 2022 07:41) (93% - 98%)         I&O's Detail    17 May 2022 07:01  -  18 May 2022 07:00  --------------------------------------------------------  IN:  Total IN: 0 mL    OUT:    Voided (mL): 1800 mL  Total OUT: 1800 mL    Total NET: -1800 mL                     Labs:                                                                           18 May 2022 08:04    140    |  107    |  24<H>  ----------------------------<  125<H>  4.4     |  26     |  0.86     Ca    9.3        18 May 2022 08:04  Phos  3.8       18 May 2022 08:04  Mg     2.2       18 May 2022 08:04        MEDICATIONS:acetaminophen     Tablet .. 1000 milliGRAM(s) Oral every 8 hours  aluminum hydroxide/magnesium hydroxide/simethicone Suspension 30 milliLiter(s) Oral four times a day PRN  apixaban 2.5 milliGRAM(s) Oral every 12 hours  atorvastatin 20 milliGRAM(s) Oral at bedtime  bisacodyl Suppository 10 milliGRAM(s) Rectal once PRN  celecoxib 100 milliGRAM(s) Oral every 12 hours  HYDROmorphone  Injectable 0.5 milliGRAM(s) IV Push every 3 hours PRN  lactated ringers. 1000 milliLiter(s) IV Continuous <Continuous>  levothyroxine 150 MICROGram(s) Oral daily  magnesium hydroxide Suspension 30 milliLiter(s) Oral daily PRN  ondansetron Injectable 4 milliGRAM(s) IV Push every 6 hours PRN  oxyCODONE    IR 5 milliGRAM(s) Oral every 3 hours PRN  oxyCODONE    IR 10 milliGRAM(s) Oral every 3 hours PRN  pantoprazole    Tablet 40 milliGRAM(s) Oral before breakfast  polyethylene glycol 3350 17 Gram(s) Oral at bedtime  senna 2 Tablet(s) Oral at bedtime    Anticoagulation:  apixaban 2.5 milliGRAM(s) Oral every 12 hours          Pain medications:   acetaminophen     Tablet .. 1000 milliGRAM(s) Oral every 8 hours  celecoxib 100 milliGRAM(s) Oral every 12 hours  HYDROmorphone  Injectable 0.5 milliGRAM(s) IV Push every 3 hours PRN  ondansetron Injectable 4 milliGRAM(s) IV Push every 6 hours PRN  oxyCODONE    IR 5 milliGRAM(s) Oral every 3 hours PRN  oxyCODONE    IR 10 milliGRAM(s) Oral every 3 hours PRN                                      Physical Exam:  Right knee- Primary surgical bandage/NELLY patent and with some bloody drainage noted.  Neurovascular grossly intact LE's.  PAS on LE's.  Calves soft and non-tender.                                                                                                                                                          A/P:  Orthopedically stable.  -Continue pain management with above plan  -DVT prophylaxis with 12 days of Eliquis and 28 additional days of Ecotrin 81mg po every 12 hours  -Increase ambulation and ROM rightknee with PT/OT  -Dr. Jackson for continued medical care today  -Discharge planning for inpatient Rehab when bed avaialble  -Further plan as per attendings.               
Chief Complaint: TKR    Interval Events: No events overnight.    Review of Systems:  General: No fevers, chills, weight gain  Skin: No rashes, color changes  Cardiovascular: No chest pain, orthopnea  Respiratory: No shortness of breath, cough  Gastrointestinal: No nausea, abdominal pain  Genitourinary: No incontinence, pain with urination  Musculoskeletal: No pain, swelling, decreased range of motion  Neurological: No headache, weakness  Psychiatric: No depression, anxiety  Endocrine: No weight gain, increased thirst  All other systems are comprehensively negative.    Physical Exam:  Vital Signs Last 24 Hrs  T(C): 36.6 (18 May 2022 07:41), Max: 36.8 (17 May 2022 15:30)  T(F): 97.8 (18 May 2022 07:41), Max: 98.2 (17 May 2022 15:30)  HR: 54 (18 May 2022 07:41) (54 - 68)  BP: 124/60 (18 May 2022 07:41) (115/56 - 131/49)  BP(mean): --  RR: 18 (18 May 2022 07:41) (18 - 18)  SpO2: 96% (18 May 2022 07:41) (93% - 98%)  General: NAD  HEENT: MMM  Neck: No JVD, no carotid bruit  Lungs: CTAB  CV: RRR, nl S1/S2, no M/R/G  Abdomen: S/NT/ND, +BS  Extremities: No LE edema, no cyanosis  Neuro: AAOx3, non-focal  Skin: No rash    Labs:    05-18    140  |  107  |  24<H>  ----------------------------<  125<H>  4.4   |  26  |  0.86    Ca    9.3      18 May 2022 08:04  Phos  3.8     05-18  Mg     2.2     05-18                          12.1   8.79  )-----------( 210      ( 17 May 2022 08:44 )             37.6         Telemetry: Sinus rhythm, blocked PACs, Mobitz 1
Chief Complaint: TKR    Interval Events: No events overnight. Sleeping.    Review of Systems:  General: No fevers, chills, weight gain  Skin: No rashes, color changes  Cardiovascular: No chest pain, orthopnea  Respiratory: No shortness of breath, cough  Gastrointestinal: No nausea, abdominal pain  Genitourinary: No incontinence, pain with urination  Musculoskeletal: No pain, swelling, decreased range of motion  Neurological: No headache, weakness  Psychiatric: No depression, anxiety  Endocrine: No weight gain, increased thirst  All other systems are comprehensively negative.    Physical Exam:  Vitals:        Vital Signs Last 24 Hrs  T(C): 36.9 (17 May 2022 07:53), Max: 37 (17 May 2022 03:30)  T(F): 98.4 (17 May 2022 07:53), Max: 98.6 (17 May 2022 03:30)  HR: 59 (17 May 2022 07:53) (39 - 62)  BP: 124/66 (17 May 2022 07:53) (97/53 - 144/64)  BP(mean): --  RR: 18 (17 May 2022 07:53) (10 - 20)  SpO2: 97% (17 May 2022 07:53) (94% - 100%)  General: NAD  HEENT: MMM  Neck: No JVD, no carotid bruit  Lungs: CTAB  CV: RRR, nl S1/S2, no M/R/G  Abdomen: S/NT/ND, +BS  Extremities: No LE edema, no cyanosis  Neuro: AAOx3, non-focal  Skin: No rash    Labs:                        12.1   8.79  )-----------( 210      ( 17 May 2022 08:44 )             37.6     05-16    138  |  107  |  22  ----------------------------<  122<H>  4.3   |  26  |  0.91    Ca    8.6      16 May 2022 19:16              Telemetry: Sinus rhythm, blocked PACs, Mobitz 1
Patient is a 76y old  Female who presents with a chief complaint of Right TKR for severe right knee OA.  (16 May 2022 12:06)      INTERVAL HPI/OVERNIGHT EVENTS:  feeling well, pain controlled. +flatus, no BM.       MEDICATIONS  (STANDING):  acetaminophen     Tablet .. 1000 milliGRAM(s) Oral every 8 hours  apixaban 2.5 milliGRAM(s) Oral <User Schedule>  atorvastatin 20 milliGRAM(s) Oral at bedtime  celecoxib 100 milliGRAM(s) Oral every 12 hours  lactated ringers. 1000 milliLiter(s) (100 mL/Hr) IV Continuous <Continuous>  levothyroxine 150 MICROGram(s) Oral daily  pantoprazole    Tablet 40 milliGRAM(s) Oral before breakfast  polyethylene glycol 3350 17 Gram(s) Oral at bedtime  senna 2 Tablet(s) Oral at bedtime    MEDICATIONS  (PRN):  aluminum hydroxide/magnesium hydroxide/simethicone Suspension 30 milliLiter(s) Oral four times a day PRN Indigestion  HYDROmorphone  Injectable 0.5 milliGRAM(s) IV Push every 3 hours PRN breakthrough pain  magnesium hydroxide Suspension 30 milliLiter(s) Oral daily PRN Constipation  ondansetron Injectable 4 milliGRAM(s) IV Push every 6 hours PRN Nausea and/or Vomiting  oxyCODONE    IR 5 milliGRAM(s) Oral every 3 hours PRN Moderate Pain (4 - 6)  oxyCODONE    IR 10 milliGRAM(s) Oral every 3 hours PRN Severe Pain (7 - 10)      Allergies  No Known Allergies      REVIEW OF SYSTEMS:  CONSTITUTIONAL: No fever, weight loss, or fatigue  EYES: No eye pain, visual disturbances, or discharge  ENMT:  No difficulty hearing, tinnitus, vertigo; No sinus or throat pain  NECK: No pain or stiffness  BREASTS: No pain, masses, or nipple discharge  RESPIRATORY: No cough, wheezing, chills or hemoptysis; No shortness of breath  CARDIOVASCULAR: No chest pain, palpitations, or lightheadedness  GASTROINTESTINAL: No abdominal or epigastric pain. No nausea, vomiting, or hematemesis; No diarrhea or constipation. No melena or hematochezia.  GENITOURINARY: No dysuria, frequency, hematuria, or incontinence  NEUROLOGICAL: No headaches, vertigo, memory loss, loss of strength, numbness, or tremors  SKIN: No itching, burning, rashes, or lesions   LYMPH NODES: No enlarged glands  ENDOCRINE: No heat or cold intolerance; No hair loss; No polydipsia or polyuria  MUSCULOSKELETAL: No back pain  PSYCHIATRIC: No depression, anxiety, or mood swings  HEME/LYMPH: No easy bruising, or bleeding gums  ALLERGY AND IMMUNOLOGIC: No hives or eczema    Vital Signs Last 24 Hrs  T(C): 36.9 (17 May 2022 07:53), Max: 37 (17 May 2022 03:30)  T(F): 98.4 (17 May 2022 07:53), Max: 98.6 (17 May 2022 03:30)  HR: 59 (17 May 2022 07:53) (39 - 62)  BP: 124/66 (17 May 2022 07:53) (97/53 - 144/64)  BP(mean): --  RR: 18 (17 May 2022 07:53) (10 - 20)  SpO2: 97% (17 May 2022 07:53) (94% - 100%)    PHYSICAL EXAM:  GENERAL: NAD, well-groomed, well-developed  HEAD:  Atraumatic, Normocephalic  EYES:  conjunctiva and sclera clear  ENMT: Moist mucous membranes  NECK: Supple, No JVD  NERVOUS SYSTEM:  Alert & Oriented X3, Good concentration; Bilateral LE mobile, sensation to light touch intact  CHEST/LUNG: Clear to auscultation bilaterally; No rales, rhonchi, wheezing, or rubs  HEART: Regular rate and rhythm; No murmurs, rubs, or gallops  ABDOMEN: Soft, Nontender, Nondistended; Bowel sounds present  EXTREMITIES:  2+ Peripheral Pulses, No clubbing or cyanosis  LYMPH: No lymphadenopathy noted  SKIN: No rashes or lesions  INCISION:  Dressing dry and intact    LABS:                        12.1   8.79  )-----------( 210      ( 17 May 2022 08:44 )             37.6     17 May 2022 08:44    139    |  106    |  23     ----------------------------<  102    4.5     |  28     |  0.87     Ca    8.6        17 May 2022 08:44          CAPILLARY BLOOD GLUCOSE          RADIOLOGY & ADDITIONAL TESTS:    Imaging Personally Reviewed:      [ ] Consultant(s) Notes Reviewed  [x] Care Discussed with Consultants/Other Providers:  Ortho PA- plan of care

## 2022-05-18 NOTE — H&P ADULT - NS ATTEND AMEND GEN_ALL_CORE FT
Rehab Attending- Patient seen and examined by me- Case discussed, above note reviewed by me with modifications made    Rehab Right TKR- good candidate for intensive rehab - will tolerate three hours rehab daily  Senna,miralax for constipation- MOM prn- last BM 5/23  Urge incontinence- post op retention- check PVR- back on vessicare, Methenamine  Hx DVT/DVT Proph- continue eliquis  pain management- tylenol, oxycodone PRN, celebrex x 14 days post op

## 2022-05-18 NOTE — H&P ADULT - NSHPREVIEWOFSYSTEMS_GEN_ALL_CORE
REVIEW OF SYSTEMS  Constitutional: No fever, No Chills, No fatigue  HEENT: No eye pain, No visual disturbances, No difficulty hearing, hx mehdi cataracts  Pulm: No cough,  No shortness of breath  Cardio: No chest pain, No palpitations, + occ low heart rate  GI:  No abdominal pain, No nausea, No vomiting, No diarrhea, +constipation- last BM 5/15  : No dysuria, + frequency, No hematuria +urge  incontinence  Neuro: No headaches, No memory loss, No loss of strength, No numbness, No tremors  Skin: No itching, facial reddness, No lesions   Endo: No temperature intolerance  MSK: Right knee pain, No joint swelling, No muscle pain, No Neck pain, occ back pain  Psych:  No depression, No anxiety

## 2022-05-19 LAB
ALBUMIN SERPL ELPH-MCNC: 2.2 G/DL — LOW (ref 3.3–5)
ALP SERPL-CCNC: 83 U/L — SIGNIFICANT CHANGE UP (ref 40–120)
ALT FLD-CCNC: 18 U/L — SIGNIFICANT CHANGE UP (ref 10–45)
ANION GAP SERPL CALC-SCNC: 6 MMOL/L — SIGNIFICANT CHANGE UP (ref 5–17)
AST SERPL-CCNC: 20 U/L — SIGNIFICANT CHANGE UP (ref 10–40)
BASOPHILS # BLD AUTO: 0.01 K/UL — SIGNIFICANT CHANGE UP (ref 0–0.2)
BASOPHILS NFR BLD AUTO: 0.1 % — SIGNIFICANT CHANGE UP (ref 0–2)
BILIRUB SERPL-MCNC: 0.3 MG/DL — SIGNIFICANT CHANGE UP (ref 0.2–1.2)
BUN SERPL-MCNC: 22 MG/DL — SIGNIFICANT CHANGE UP (ref 7–23)
CALCIUM SERPL-MCNC: 8.2 MG/DL — LOW (ref 8.4–10.5)
CHLORIDE SERPL-SCNC: 109 MMOL/L — HIGH (ref 96–108)
CO2 SERPL-SCNC: 27 MMOL/L — SIGNIFICANT CHANGE UP (ref 22–31)
CREAT SERPL-MCNC: 0.72 MG/DL — SIGNIFICANT CHANGE UP (ref 0.5–1.3)
EGFR: 86 ML/MIN/1.73M2 — SIGNIFICANT CHANGE UP
EOSINOPHIL # BLD AUTO: 0.02 K/UL — SIGNIFICANT CHANGE UP (ref 0–0.5)
EOSINOPHIL NFR BLD AUTO: 0.2 % — SIGNIFICANT CHANGE UP (ref 0–6)
GLUCOSE SERPL-MCNC: 113 MG/DL — HIGH (ref 70–99)
HCT VFR BLD CALC: 34.1 % — LOW (ref 34.5–45)
HGB BLD-MCNC: 11.1 G/DL — LOW (ref 11.5–15.5)
IMM GRANULOCYTES NFR BLD AUTO: 0.6 % — SIGNIFICANT CHANGE UP (ref 0–1.5)
LYMPHOCYTES # BLD AUTO: 1.42 K/UL — SIGNIFICANT CHANGE UP (ref 1–3.3)
LYMPHOCYTES # BLD AUTO: 12.8 % — LOW (ref 13–44)
MCHC RBC-ENTMCNC: 32.1 PG — SIGNIFICANT CHANGE UP (ref 27–34)
MCHC RBC-ENTMCNC: 32.6 GM/DL — SIGNIFICANT CHANGE UP (ref 32–36)
MCV RBC AUTO: 98.6 FL — SIGNIFICANT CHANGE UP (ref 80–100)
MONOCYTES # BLD AUTO: 1.33 K/UL — HIGH (ref 0–0.9)
MONOCYTES NFR BLD AUTO: 11.9 % — SIGNIFICANT CHANGE UP (ref 2–14)
NEUTROPHILS # BLD AUTO: 8.28 K/UL — HIGH (ref 1.8–7.4)
NEUTROPHILS NFR BLD AUTO: 74.4 % — SIGNIFICANT CHANGE UP (ref 43–77)
NRBC # BLD: 0 /100 WBCS — SIGNIFICANT CHANGE UP (ref 0–0)
PLATELET # BLD AUTO: 219 K/UL — SIGNIFICANT CHANGE UP (ref 150–400)
POTASSIUM SERPL-MCNC: 4.4 MMOL/L — SIGNIFICANT CHANGE UP (ref 3.5–5.3)
POTASSIUM SERPL-SCNC: 4.4 MMOL/L — SIGNIFICANT CHANGE UP (ref 3.5–5.3)
PROT SERPL-MCNC: 5.6 G/DL — LOW (ref 6–8.3)
RBC # BLD: 3.46 M/UL — LOW (ref 3.8–5.2)
RBC # FLD: 14.2 % — SIGNIFICANT CHANGE UP (ref 10.3–14.5)
SODIUM SERPL-SCNC: 142 MMOL/L — SIGNIFICANT CHANGE UP (ref 135–145)
WBC # BLD: 11.13 K/UL — HIGH (ref 3.8–10.5)
WBC # FLD AUTO: 11.13 K/UL — HIGH (ref 3.8–10.5)

## 2022-05-19 PROCEDURE — 99223 1ST HOSP IP/OBS HIGH 75: CPT

## 2022-05-19 PROCEDURE — 99232 SBSQ HOSP IP/OBS MODERATE 35: CPT | Mod: GC

## 2022-05-19 RX ADMIN — SENNA PLUS 2 TABLET(S): 8.6 TABLET ORAL at 22:05

## 2022-05-19 RX ADMIN — APIXABAN 2.5 MILLIGRAM(S): 2.5 TABLET, FILM COATED ORAL at 06:02

## 2022-05-19 RX ADMIN — PANTOPRAZOLE SODIUM 40 MILLIGRAM(S): 20 TABLET, DELAYED RELEASE ORAL at 06:03

## 2022-05-19 RX ADMIN — CELECOXIB 100 MILLIGRAM(S): 200 CAPSULE ORAL at 18:22

## 2022-05-19 RX ADMIN — ATORVASTATIN CALCIUM 20 MILLIGRAM(S): 80 TABLET, FILM COATED ORAL at 22:05

## 2022-05-19 RX ADMIN — NYSTATIN CREAM 1 APPLICATION(S): 100000 CREAM TOPICAL at 06:07

## 2022-05-19 RX ADMIN — Medication 975 MILLIGRAM(S): at 22:25

## 2022-05-19 RX ADMIN — CELECOXIB 100 MILLIGRAM(S): 200 CAPSULE ORAL at 09:30

## 2022-05-19 RX ADMIN — CELECOXIB 100 MILLIGRAM(S): 200 CAPSULE ORAL at 17:44

## 2022-05-19 RX ADMIN — Medication 975 MILLIGRAM(S): at 07:49

## 2022-05-19 RX ADMIN — Medication 975 MILLIGRAM(S): at 23:05

## 2022-05-19 RX ADMIN — CELECOXIB 100 MILLIGRAM(S): 200 CAPSULE ORAL at 08:54

## 2022-05-19 RX ADMIN — Medication 975 MILLIGRAM(S): at 06:06

## 2022-05-19 RX ADMIN — Medication 975 MILLIGRAM(S): at 22:05

## 2022-05-19 RX ADMIN — NYSTATIN CREAM 1 APPLICATION(S): 100000 CREAM TOPICAL at 17:46

## 2022-05-19 RX ADMIN — APIXABAN 2.5 MILLIGRAM(S): 2.5 TABLET, FILM COATED ORAL at 17:44

## 2022-05-19 RX ADMIN — Medication 975 MILLIGRAM(S): at 14:30

## 2022-05-19 RX ADMIN — OXYCODONE HYDROCHLORIDE 10 MILLIGRAM(S): 5 TABLET ORAL at 09:30

## 2022-05-19 RX ADMIN — Medication 975 MILLIGRAM(S): at 13:33

## 2022-05-19 RX ADMIN — Medication 150 MICROGRAM(S): at 06:02

## 2022-05-19 RX ADMIN — OXYCODONE HYDROCHLORIDE 10 MILLIGRAM(S): 5 TABLET ORAL at 08:31

## 2022-05-19 NOTE — PROGRESS NOTE ADULT - SUBJECTIVE AND OBJECTIVE BOX
CHIEF COMPLAINT: s/p right TKR      HISTORY OF PRESENT ILLNESS  This is a 75 YO female with PMH of SLE, lung cancer (s/p tumor removal, no chemo no radiation) OA, DVT, DJD, HTN, HLD, Hypothyroidism, covid-19, obesity, heart murmur, left knee replacement, lumbar spine surgery 12/20, cataract s/p surgery, tonsillectomy who presented to [facility] for elective Right knee replacement done on 5/16  by Dr. Mansoor Castillo. Eliquis 2.5mg q 12 h was given for DVT prophylaxis/on long term AC secondary to DVT .Post Op course on telemetry secondary to bradycardia-beta blocker held. Patient was evaluated by PM&R and therapy for functional deficits, gait/ADL impairments and acute rehabilitation was recommended. Patient was medically optimized for discharge to [facility] IRU on 5/18/22.     ROS/Subjective: NAD in chair, night uneventful, no chest pain, no sob. Constipated/MOM after therapy. Voids freely. Knee pain reports well controlled.       MEDICATIONS  (STANDING):  acetaminophen     Tablet .. 975 milliGRAM(s) Oral every 8 hours  apixaban 2.5 milliGRAM(s) Oral every 12 hours  atorvastatin 20 milliGRAM(s) Oral at bedtime  celecoxib 100 milliGRAM(s) Oral every 12 hours  levothyroxine 150 MICROGram(s) Oral daily  nystatin Powder 1 Application(s) Topical two times a day  pantoprazole    Tablet 40 milliGRAM(s) Oral before breakfast  polyethylene glycol 3350 17 Gram(s) Oral at bedtime  senna 2 Tablet(s) Oral at bedtime    MEDICATIONS  (PRN):  aluminum hydroxide/magnesium hydroxide/simethicone Suspension 30 milliLiter(s) Oral four times a day PRN Indigestion  magnesium hydroxide Suspension 30 milliLiter(s) Oral daily PRN Constipation  oxyCODONE    IR 5 milliGRAM(s) Oral every 3 hours PRN Moderate Pain (4 - 6)  oxyCODONE    IR 10 milliGRAM(s) Oral every 3 hours PRN Severe Pain (7 - 10)                            11.1   11.13 )-----------( 219      ( 19 May 2022 06:20 )             34.1     05-19    142  |  109<H>  |  22  ----------------------------<  113<H>  4.4   |  27  |  0.72    Ca    8.2<L>      19 May 2022 06:20  Phos  3.8     05-18  Mg     2.2     05-18    TPro  5.6<L>  /  Alb  2.2<L>  /  TBili  0.3  /  DBili  x   /  AST  20  /  ALT  18  /  AlkPhos  83  05-19      Vital Signs Last 24 Hrs  T(C): 36.8 (19 May 2022 08:11), Max: 36.9 (18 May 2022 14:10)  T(F): 98.2 (19 May 2022 08:11), Max: 98.4 (18 May 2022 14:10)  HR: 57 (19 May 2022 08:11) (57 - 66)  BP: 128/70 (19 May 2022 08:11) (117/60 - 145/58)  BP(mean): --  RR: 15 (19 May 2022 08:11) (15 - 15)  SpO2: 99% (19 May 2022 08:11) (98% - 99%)    Physical Exam:  General: NAD                                 HEENT: NC/AT  Cardio: RR, fan, Normal S1-S2, No M/G/R                              Pulm: No Respiratory Distress,  Lungs CTAB                        Abdomen: ND/NT, Soft, BS+                                                MSK: Right Knee ROM 0-80 passively                                       Ext: +1 edema Right LE, No calf tenderness,  stasis changes distally LLE  Skin:  Left facial erythema  Right TKR with NELLY/Ace- Noted drainage on NELLY                                                               Wounds: none  Decubitus Ulcers: None Present     Neurological Examination    Cognitive: AAO x 3                                                                         Attention: Intact   Judgment: Good evidence of judgement                               Memory: Recall 3 objects immediate and 3 min later      Mood/Affect: wnl                                                                           Communication:  Fluent,  No dysarthria   Swallow: intact  CN II - XII  intact                                                                             Sensory: Intact to light touch                                                                                           Tone: normal Throughout   Balance-impaired      Motor    LEFT    UE: SF [5/5], EF [5/5], EE [5/5], WE [5/5],  [wnl]  RIGHT UE: SF [5/5], EF [5/5], EE [5/5], WE [5/5],  [wnl]  LEFT    LE:  HF [4+/5], KE [5/5], DF [5/5], EHL [5/5],  PF [5/5]  RIGHT LE:  HF [2/5], KE [NT], DF [5/5], EHL [5/5],  PF [5/5]      Reflex:  2 +          Continue comprehensive acute rehab program consisting of 3hrs/day of OT/PT. CHIEF COMPLAINT: s/p right TKR      HISTORY OF PRESENT ILLNESS  This is a 75 YO female with PMH of SLE, lung cancer (s/p tumor removal, no chemo no radiation) OA, DVT, DJD, HTN, HLD, Hypothyroidism, covid-19, obesity, heart murmur, left knee replacement, lumbar spine surgery 12/20, cataract s/p surgery, tonsillectomy who presented to [facility] for elective Right knee replacement done on 5/16  by Dr. Mansoor Castillo. Eliquis 2.5mg q 12 h was given for DVT prophylaxis/on long term AC secondary to DVT .Post Op course on telemetry secondary to bradycardia-beta blocker held. Patient was evaluated by PM&R and therapy for functional deficits, gait/ADL impairments and acute rehabilitation was recommended. Patient was medically optimized for discharge to [facility] IRU on 5/18/22.     ROS/Subjective: NAD in chair, night uneventful, no chest pain, no sob. Constipated/MOM after therapy. Voids freely. Knee pain reports well controlled.       MEDICATIONS  (STANDING):  acetaminophen     Tablet .. 975 milliGRAM(s) Oral every 8 hours  apixaban 2.5 milliGRAM(s) Oral every 12 hours  atorvastatin 20 milliGRAM(s) Oral at bedtime  celecoxib 100 milliGRAM(s) Oral every 12 hours  levothyroxine 150 MICROGram(s) Oral daily  nystatin Powder 1 Application(s) Topical two times a day  pantoprazole    Tablet 40 milliGRAM(s) Oral before breakfast  polyethylene glycol 3350 17 Gram(s) Oral at bedtime  senna 2 Tablet(s) Oral at bedtime    MEDICATIONS  (PRN):  aluminum hydroxide/magnesium hydroxide/simethicone Suspension 30 milliLiter(s) Oral four times a day PRN Indigestion  magnesium hydroxide Suspension 30 milliLiter(s) Oral daily PRN Constipation  oxyCODONE    IR 5 milliGRAM(s) Oral every 3 hours PRN Moderate Pain (4 - 6)  oxyCODONE    IR 10 milliGRAM(s) Oral every 3 hours PRN Severe Pain (7 - 10)                            11.1   11.13 )-----------( 219      ( 19 May 2022 06:20 )             34.1     05-19    142  |  109<H>  |  22  ----------------------------<  113<H>  4.4   |  27  |  0.72    Ca    8.2<L>      19 May 2022 06:20  Phos  3.8     05-18  Mg     2.2     05-18    TPro  5.6<L>  /  Alb  2.2<L>  /  TBili  0.3  /  DBili  x   /  AST  20  /  ALT  18  /  AlkPhos  83  05-19      Vital Signs Last 24 Hrs  T(C): 36.8 (19 May 2022 08:11), Max: 36.9 (18 May 2022 14:10)  T(F): 98.2 (19 May 2022 08:11), Max: 98.4 (18 May 2022 14:10)  HR: 57 (19 May 2022 08:11) (57 - 66)  BP: 128/70 (19 May 2022 08:11) (117/60 - 145/58)  BP(mean): --  RR: 15 (19 May 2022 08:11) (15 - 15)  SpO2: 99% (19 May 2022 08:11) (98% - 99%)    Physical Exam:  General: NAD                                 HEENT: NC/AT  Cardio: RR, fan, Normal S1-S2, No M/G/R                              Pulm: No Respiratory Distress,  Lungs CTAB                        Abdomen: ND/NT, Soft, BS+                                                MSK: Right Knee ROM 0-80 passively                                       Ext: +1 edema Right LE, No calf tenderness,  stasis changes distally LLE  Skin:  Left facial erythema  Right TKR with NELLY/Ace- Noted drainage on NELLY                                                               Wounds: none  Decubitus Ulcers: None Present     Neurological Examination    Cognitive: AAO x 3                                                                         Attention: Intact   CN II - XII  intact                                                                             Sensory: Intact to light touch                                                                                           Tone: normal Throughout   Balance-impaired      Motor    LEFT    UE: SF [5/5], EF [5/5], EE [5/5], WE [5/5],  [wnl]  RIGHT UE: SF [5/5], EF [5/5], EE [5/5], WE [5/5],  [wnl]  LEFT    LE:  HF [4+/5], KE [5/5], DF [5/5], EHL [5/5],  PF [5/5]  RIGHT LE:  HF [2/5], KE [NT], DF [5/5], EHL [5/5],  PF [5/5]      Reflex:  2 +          Continue comprehensive acute rehab program consisting of 3hrs/day of OT/PT.

## 2022-05-19 NOTE — PROGRESS NOTE ADULT - ASSESSMENT
This is a 75 YO female with PMH of SLE,Hx RA, lung cancer (s/p tumor removal, no chemo no radiation) OA, DVT, DJD, HTN, HLD, Hypothyroidism, covid-19, obesity, heart murmur, left knee replacement, lumbar spine surgery 12/20, cataract s/p surgery, tonsillectomy who presented to [facility] for elective Right knee replacement done on 5/16  by Dr. Mansoor Castilol. Patient now with gait Instability, ADL impairments and Functional impairments.    - Start Comprehensive Rehab Program: PT/OT/ST, 3hours daily and 5 days weekly  - PT: Focused on improving strength, endurance, coordination, balance, functional mobility, and transfers  - OT: Focused on improving strength, fine motor skills, coordination, posture and ADLs.      #Right TKR  - Elective Right knee replacement done on 5/16  by Dr. Mansoor Castillo  - Remove NELLY dressing on post-op day 7 (5/23)  - celebrex BID x 14 days  - WB Status: WBAT    #HTN  - Monitor    #HLD  - Lipitor 20mg daily    #Hypothyroidism  - Synthroid 150mcg daily    # Post OP bradycardia  junctional rhythm  blocked APCs  Mobitz type 1  Off betablockers    #Pain management  - Tylenol PRN, Oxycodone PRN    #DVT ppx  - Eliquis 2.5mg BID, SCD, TEDs    #GI ppx  - Protonix 40mg    #Bowel Regimen  - Senna, miralax   MOM PRN    #Bladder management  - BS on admission, and q 8 hours (SC if > 400)  - Monitor UO  primafit HS  continue vessicare, methenamine    #FEN   - Diet: Regular    #Skin:  - Skin on admission: Right Knee with NELLY dressing- +drainage on dressing  - Pressure injury/Skin: Turn Q2hrs while in bed, OOB to Chair, PT/OT   -Left facial erythema- ? Rosacea- will discuss with hospitalist    #Sleep:   - Maintain quiet hours and low stim environment.    #Precaution  - Fall, Aspiration    Outpatient Follow-up (Specialty/Name of physician):  Mansoor Castillo)  Orthopedic Surgery  [address], [address]  [place] [address]  Phone: [phone]  Fax: [phone]  Scheduled Appointment: 05/31/2022 09:00 AM    Scott Dan  FAMILY MEDICINE  [address], [place] [address]  Phone: [phone]  Fax: [phone]    Josselin Ramirez)  Cardiovascular Disease; Interventional Cardiology  [address] [place] [address]  Phone: [phone]  Fax: [phone]   This is a 75 YO female with PMH of SLE,Hx RA, lung cancer (s/p tumor removal, no chemo no radiation) OA, DVT, DJD, HTN, HLD, Hypothyroidism, covid-19, obesity, heart murmur, left knee replacement, lumbar spine surgery 12/20, cataract s/p surgery, tonsillectomy who presented to [facility] for elective Right knee replacement done on 5/16  by Dr. Mansoor Castillo. Patient now with gait Instability, ADL impairments and Functional impairments.    REhab of Right TKR  - Comprehensive Rehab Program: PT/OT/ST, 3hours daily and 5 days weekly  - PT: Focused on improving strength, endurance, coordination, balance, functional mobility, and transfers  - OT: Focused on improving strength, fine motor skills, coordination, posture and ADLs.      #Right TKR  - Elective Right knee replacement done on 5/16  by Dr. Mansoor Castillo  - Remove NELLY dressing on post-op day 7 (5/23)  - celebrex BID x 14 days  - WB Status: WBAT    #HTN  - Monitor    #HLD  - Lipitor 20mg daily    #Hypothyroidism  - Synthroid 150mcg daily    # Post OP bradycardia  junctional rhythm  blocked APCs  Mobitz type 1  Off betablockers    #Pain management  - Tylenol PRN, Oxycodone PRN    #DVT ppx  - Eliquis 2.5mg BID, SCD, TEDs    #GI ppx  - Protonix 40mg    #Bowel Regimen  - Senna, miralax   MOM PRN    #Bladder management  - BS on admission, and q 8 hours (SC if > 400)  - Monitor UO  primafit HS  continue vessicare, methenamine    #FEN   - Diet: Regular    #Skin:  - Skin on admission: Right Knee with NELLY dressing- +drainage on dressing  - Pressure injury/Skin: Turn Q2hrs while in bed, OOB to Chair, PT/OT   -Left facial erythema- ? Rosacea- will discuss with hospitalist    #Sleep:   - Maintain quiet hours and low stim environment.    #Precaution  - Fall, Aspiration    Outpatient Follow-up (Specialty/Name of physician):  Mansoor Castillo)  Orthopedic Surgery  [address], [address]  [place] [address]  Phone: [phone]  Fax: [phone]  Scheduled Appointment: 05/31/2022 09:00 AM    Scott Dan  FAMILY MEDICINE  [address] [facility], [place] [address]  Phone: [phone]  Fax: [phone]    Josselin Ramirez)  Cardiovascular Disease; Interventional Cardiology  [address] [place] [address]  Phone: [phone]  Fax: [phone]

## 2022-05-19 NOTE — PROGRESS NOTE ADULT - ASSESSMENT
a 76 year old female with a history of HTN, HL, RA, DVT, SLE, lung cancer s/p lobectomy who is admitted s/p TKR. She was noted to be bradycardic to the 30s post-operatively.    # S/p TKR  # Osteoarthritis  - PT/OT  - dvt ppx  - pain control    # Bradycardia   - seen by Cardiology at OSH, thought to be due to prior sedation, inc vagal tone from urinary retention  - intermittent Mobitz 1  - metoprolol held, avoid AV eliz blockers    # HTN  - losartan w/ hold parameter    # HLD  - atorvastatin    # Rheumatoid arthritis  - methotrexate    # hypothyroidism  - synthroid    # Hx of DVT  - eliquis     dvt ppx: eliquis       76 year old female with a history of HTN, HLD, RA, DVT, SLE, hypothyroidism, lung cancer s/p lobectomy who presented to [facility] for elective right knee replacement on 5/16. She was noted to be bradycardic to the 30s post-operatively. Now admitted to [facility].    # S/p R. TKR  # Osteoarthritis  - elective right knee replacement on 5/16  - WBAT  - PT/OT  - dvt ppx  - pain control, bowel regimen.    # Bradycardia   - ECG from 5/18 reviewed by me with type 1 AVB and a PVC.   - seen by Cardiology at OSH, thought to be due to prior sedation, inc vagal tone from urinary retention  - intermittent Mobitz 1  - metoprolol held, avoid AV eliz blockers    # HTN  - losartan w/ hold parameter    # HLD  - atorvastatin    # Rheumatoid arthritis  - methotrexate    # hypothyroidism  - synthroid    # Hx of DVT  - pt reports a hx of a provoked Dvt after getting left knee replaced.  - eliquis   - consider outpatient hematology f/u    # Hx of malignant lung nodule s/p left partial lobectomy  - outpatient management    dvt ppx: eliquis

## 2022-05-19 NOTE — PROGRESS NOTE ADULT - SUBJECTIVE AND OBJECTIVE BOX
Patient is a 76y old  Female who presents with a chief complaint of Right TKR (18 May 2022 13:04)    HPI:  This is a 77 YO female with PMH of SLE, lung cancer (s/p tumor removal, no chemo no radiation) OA, DVT, DJD, HTN, HLD, Hypothyroidism, covid-19, obesity, heart murmur, left knee replacement, lumbar spine surgery 12/20, cataract s/p surgery, tonsillectomy who presented to [facility] for elective Right knee replacement done on 5/16  by Dr. Mansoor Castillo. Eliquis 2.5mg q 12 h was given for DVT prophylaxis/on long term AC secondary to DVT .Post Op course on telemetry secondary to bradycardia-beta blocker held. Patient was evaluated by PM&R and therapy for functional deficits, gait/ADL impairments and acute rehabilitation was recommended. Patient was medically optimized for discharge to [facility] IRU on 5/18/22. (18 May 2022 13:04)    PAST MEDICAL & SURGICAL HISTORY:  Rheumatoid arthritis      SLE (systemic lupus erythematosus)      Osteoarthritis      H/O degenerative disc disease      Hypertension      Hyperlipidemia      Benign heart murmur      Hypothyroid      Obesity, Class II, BMI 35-39.9, no comorbidity      DVT (deep venous thrombosis)  during left knee replacement 2019      Overactive bladder      Lab test positive for detection of COVID-19 virus  12/20      S/P tonsillectomy      S/P cataract surgery  left, right      S/P eye surgery  child      S/P carpal tunnel release  left      S/P knee surgery  bilateral      Lung cancer  small tumor removed 2015-no chemo, no radiation      S/P thyroid surgery  1 lobe removed      Status post total hip replacement, right      S/P knee replacement  left      S/P lumbar spine operation  12/20        SOCIAL HISTORY:  FAMILY HISTORY:    ALLERGIES:  No Known Allergies    MEDICATIONS  (STANDING):  acetaminophen     Tablet .. 975 milliGRAM(s) Oral every 8 hours  apixaban 2.5 milliGRAM(s) Oral every 12 hours  atorvastatin 20 milliGRAM(s) Oral at bedtime  celecoxib 100 milliGRAM(s) Oral every 12 hours  levothyroxine 150 MICROGram(s) Oral daily  nystatin Powder 1 Application(s) Topical two times a day  pantoprazole    Tablet 40 milliGRAM(s) Oral before breakfast  polyethylene glycol 3350 17 Gram(s) Oral at bedtime  senna 2 Tablet(s) Oral at bedtime    MEDICATIONS  (PRN):  aluminum hydroxide/magnesium hydroxide/simethicone Suspension 30 milliLiter(s) Oral four times a day PRN Indigestion  magnesium hydroxide Suspension 30 milliLiter(s) Oral daily PRN Constipation  oxyCODONE    IR 5 milliGRAM(s) Oral every 3 hours PRN Moderate Pain (4 - 6)  oxyCODONE    IR 10 milliGRAM(s) Oral every 3 hours PRN Severe Pain (7 - 10)    Review of Systems: Refer to HPI for pertinent positives and negatives. All other ROS reviewed and negative except as otherwise stated above.    Vital Signs Last 24 Hrs  T(F): 97.7 (18 May 2022 20:02), Max: 98.4 (18 May 2022 14:10)  HR: 64 (18 May 2022 20:02) (54 - 66)  BP: 145/58 (18 May 2022 20:02) (117/60 - 145/58)  RR: 15 (18 May 2022 20:02) (15 - 18)  SpO2: 98% (18 May 2022 20:02) (96% - 98%)  I&O's Summary    PHYSICAL EXAM:  GENERAL: NAD, well-groomed, well-developed  HEAD:  Atraumatic, Normocephalic  EYES: EOMI, PERRL, conjunctiva and sclera clear  ENMT: Moist mucous membranes, Good dentition  NECK: Supple, No JVD  CHEST/LUNG: Clear to auscultation bilaterally, non-labored breathing, good air entry  HEART: RRR; S1/S2, No murmur  ABDOMEN: Soft, Nontender, Nondistended; Bowel sounds present  VASCULAR: Normal pulses, Normal capillary refill  EXTREMITIES: No cyanosis, No edema  LYMPH: No lymphadenopathy noted  SKIN: Warm, Intact  PSYCH: Normal mood and affect  NERVOUS SYSTEM:  A/O x3, Good concentration; CN 2-12 intact, No focal deficits, moves all extremities    LABS:                        11.1   11.13 )-----------( 219      ( 19 May 2022 06:20 )             34.1     05-19    142  |  109  |  22  ----------------------------<  113  4.4   |  27  |  0.72    Ca    8.2      19 May 2022 06:20  Phos  3.8     05-18  Mg     2.2     05-18    TPro  5.6  /  Alb  2.2  /  TBili  0.3  /  DBili  x   /  AST  20  /  ALT  18  /  AlkPhos  83  05-19        COVID-19 PCR: NotDetec (05-18-22 @ 15:30)  COVID-19 PCR: NotDetec (05-18-22 @ 08:06)  COVID-19 PCR: NotDetec (05-13-22 @ 11:01)    RADIOLOGY & ADDITIONAL TESTS:    Care Discussed with Consultants/Other Providers: Patient is a 76y old  Female who presents with a chief complaint of Right TKR (18 May 2022 13:04)    HPI:  This is a 75 YO female with PMH of SLE, lung cancer (s/p tumor removal, no chemo no radiation) OA, DVT, DJD, HTN, HLD, Hypothyroidism, covid-19, obesity, heart murmur, left knee replacement, lumbar spine surgery , cataract s/p surgery, tonsillectomy who presented to [facility] for elective Right knee replacement done on   by Dr. Mansoor Castillo. Eliquis 2.5mg q 12 h was given for DVT prophylaxis/on long term AC secondary to DVT .Post Op course on telemetry secondary to bradycardia-beta blocker held. Patient was evaluated by PM&R and therapy for functional deficits, gait/ADL impairments and acute rehabilitation was recommended. Patient was medically optimized for discharge to [facility] IRU on 22. (18 May 2022 13:04)    Pt currently endorses constipation and right knee pain when she stands. She denies HA, f/c/cp/palpitations/sob/cough/n/v/d    PAST MEDICAL & SURGICAL HISTORY:  Rheumatoid arthritis      SLE (systemic lupus erythematosus)      Osteoarthritis      H/O degenerative disc disease      Hypertension      Hyperlipidemia      Benign heart murmur      Hypothyroid      Obesity, Class II, BMI 35-39.9, no comorbidity      DVT (deep venous thrombosis)  during left knee replacement       Overactive bladder      Lab test positive for detection of COVID-19 virus        S/P tonsillectomy      S/P cataract surgery  left, right      S/P eye surgery  child      S/P carpal tunnel release  left      S/P knee surgery  bilateral      Lung cancer  small tumor removed -no chemo, no radiation      S/P thyroid surgery  1 lobe removed      Status post total hip replacement, right      S/P knee replacement  left      S/P lumbar spine operation          SOCIAL HISTORY: pt denies tobacco, alcohol and drug use  FAMILY HISTORY: mom: heart failure,  at 52; father heart failure  at 61    ALLERGIES:  No Known Allergies    MEDICATIONS  (STANDING):  acetaminophen     Tablet .. 975 milliGRAM(s) Oral every 8 hours  apixaban 2.5 milliGRAM(s) Oral every 12 hours  atorvastatin 20 milliGRAM(s) Oral at bedtime  celecoxib 100 milliGRAM(s) Oral every 12 hours  levothyroxine 150 MICROGram(s) Oral daily  nystatin Powder 1 Application(s) Topical two times a day  pantoprazole    Tablet 40 milliGRAM(s) Oral before breakfast  polyethylene glycol 3350 17 Gram(s) Oral at bedtime  senna 2 Tablet(s) Oral at bedtime    MEDICATIONS  (PRN):  aluminum hydroxide/magnesium hydroxide/simethicone Suspension 30 milliLiter(s) Oral four times a day PRN Indigestion  magnesium hydroxide Suspension 30 milliLiter(s) Oral daily PRN Constipation  oxyCODONE    IR 5 milliGRAM(s) Oral every 3 hours PRN Moderate Pain (4 - 6)  oxyCODONE    IR 10 milliGRAM(s) Oral every 3 hours PRN Severe Pain (7 - 10)    Review of Systems: Refer to HPI for pertinent positives and negatives. All other ROS reviewed and negative except as otherwise stated above.    Vital Signs Last 24 Hrs  T(F): 97.7 (18 May 2022 20:02), Max: 98.4 (18 May 2022 14:10)  HR: 64 (18 May 2022 20:02) (54 - 66)  BP: 145/58 (18 May 2022 20:02) (117/60 - 145/58)  RR: 15 (18 May 2022 20:02) (15 - 18)  SpO2: 98% (18 May 2022 20:02) (96% - 98%)  I&O's Summary    PHYSICAL EXAM:  GENERAL: NAD, well-groomed, well-developed  HEAD:  Atraumatic, Normocephalic  EYES: EOMI, PERRL, conjunctiva and sclera clear  ENMT: Moist mucous membranes, Good dentition  NECK: Supple, No JVD  CHEST/LUNG: Clear to auscultation bilaterally, non-labored breathing, good air entry  HEART: RRR; S1/S2, No murmur  ABDOMEN: Soft, Nontender, Nondistended; Bowel sounds present  VASCULAR: Normal pulses, Normal capillary refill  EXTREMITIES: No cyanosis, No edema  LYMPH: No lymphadenopathy noted  SKIN: Warm, Intact  PSYCH: Normal mood and affect  NERVOUS SYSTEM:  A/O x3, Good concentration; CN 2-12 intact, No focal deficits, moves all extremities    LABS:                        11.1   11.13 )-----------( 219      ( 19 May 2022 06:20 )             34.1     05-19    142  |  109  |  22  ----------------------------<  113  4.4   |  27  |  0.72    Ca    8.2      19 May 2022 06:20  Phos  3.8       Mg     2.2         TPro  5.6  /  Alb  2.2  /  TBili  0.3  /  DBili  x   /  AST  20  /  ALT  18  /  AlkPhos  83          COVID- PCR: NotDetec (22 @ 15:30)  COVID- PCR: NotDetec (22 @ 08:06)  COVID- PCR: NotDetec (22 @ 11:01)    RADIOLOGY & ADDITIONAL TESTS:    Care Discussed with Consultants/Other Providers:

## 2022-05-19 NOTE — CONSULT NOTE ADULT - ASSESSMENT
76 year old female with a history of HTN, HLD, RA, DVT, SLE, hypothyroidism, lung cancer s/p lobectomy who presented to [facility] for elective right knee replacement on 5/16. She was noted to be bradycardic to the 30s post-operatively. Now admitted to [facility].    # S/p R. TKR  # Osteoarthritis  - elective right knee replacement on 5/16  - WBAT  - PT/OT  - dvt ppx  - pain control, bowel regimen.  - remove NELLY dressing on day POD 7, suture removal 2 weeks after surgery    # Bradycardia   - ECG from 5/18 reviewed by me with type 1 AVB and a PVC.   - seen by Cardiology at OSH, thought to be due to prior sedation, inc vagal tone from urinary retention  - intermittent Mobitz 1  - metoprolol held, avoid AV eliz blockers    # HTN  - losartan w/ hold parameter    # HLD  - atorvastatin    # Rheumatoid arthritis  - home meds: methotrexate 2.5 mg, 4 tabs every 7 days --> d/w Surgeon when pt can restart in setting of recent surgery.  - folic acid w/ methotrexate    # hypothyroidism  - synthroid    # Hx of DVT  - pt reports a hx of a provoked Dvt after getting left knee replaced.  - eliquis   - consider outpatient hematology f/u    # Hx of malignant lung nodule s/p left partial lobectomy  - outpatient management    dvt ppx: Per Ortho note: 12 days of Eliquis and 28 additional days of Ecotrin 81mg po every 12 hours

## 2022-05-19 NOTE — CONSULT NOTE ADULT - SUBJECTIVE AND OBJECTIVE BOX
Patient is a 76y old  Female who presents with a chief complaint of Right TKR (18 May 2022 13:04)    HPI:  This is a 75 YO female with PMH of SLE, lung cancer (s/p tumor removal, no chemo no radiation) OA, DVT, DJD, HTN, HLD, Hypothyroidism, covid-19, obesity, heart murmur, left knee replacement, lumbar spine surgery , cataract s/p surgery, tonsillectomy who presented to [facility] for elective Right knee replacement done on   by Dr. Mansoor Castillo. Eliquis 2.5mg q 12 h was given for DVT prophylaxis/on long term AC secondary to DVT .Post Op course on telemetry secondary to bradycardia-beta blocker held. Patient was evaluated by PM&R and therapy for functional deficits, gait/ADL impairments and acute rehabilitation was recommended. Patient was medically optimized for discharge to [facility] IRU on 22. (18 May 2022 13:04)    Pt currently endorses constipation and right knee pain when she stands. She denies HA, f/c/cp/palpitations/sob/cough/n/v/d    PAST MEDICAL & SURGICAL HISTORY:  Rheumatoid arthritis      SLE (systemic lupus erythematosus)      Osteoarthritis      H/O degenerative disc disease      Hypertension      Hyperlipidemia      Benign heart murmur      Hypothyroid      Obesity, Class II, BMI 35-39.9, no comorbidity      DVT (deep venous thrombosis)  during left knee replacement       Overactive bladder      Lab test positive for detection of COVID-19 virus        S/P tonsillectomy      S/P cataract surgery  left, right      S/P eye surgery  child      S/P carpal tunnel release  left      S/P knee surgery  bilateral      Lung cancer  small tumor removed -no chemo, no radiation      S/P thyroid surgery  1 lobe removed      Status post total hip replacement, right      S/P knee replacement  left      S/P lumbar spine operation          SOCIAL HISTORY: pt denies tobacco, alcohol and drug use  FAMILY HISTORY: mom: heart failure,  at 52; father heart failure  at 61    ALLERGIES:  No Known Allergies    MEDICATIONS  (STANDING):  acetaminophen     Tablet .. 975 milliGRAM(s) Oral every 8 hours  apixaban 2.5 milliGRAM(s) Oral every 12 hours  atorvastatin 20 milliGRAM(s) Oral at bedtime  celecoxib 100 milliGRAM(s) Oral every 12 hours  levothyroxine 150 MICROGram(s) Oral daily  nystatin Powder 1 Application(s) Topical two times a day  pantoprazole    Tablet 40 milliGRAM(s) Oral before breakfast  polyethylene glycol 3350 17 Gram(s) Oral at bedtime  senna 2 Tablet(s) Oral at bedtime    MEDICATIONS  (PRN):  aluminum hydroxide/magnesium hydroxide/simethicone Suspension 30 milliLiter(s) Oral four times a day PRN Indigestion  magnesium hydroxide Suspension 30 milliLiter(s) Oral daily PRN Constipation  oxyCODONE    IR 5 milliGRAM(s) Oral every 3 hours PRN Moderate Pain (4 - 6)  oxyCODONE    IR 10 milliGRAM(s) Oral every 3 hours PRN Severe Pain (7 - 10)    Review of Systems: Refer to HPI for pertinent positives and negatives. All other ROS reviewed and negative except as otherwise stated above.    Vital Signs Last 24 Hrs  T(F): 97.7 (18 May 2022 20:02), Max: 98.4 (18 May 2022 14:10)  HR: 64 (18 May 2022 20:02) (54 - 66)  BP: 145/58 (18 May 2022 20:02) (117/60 - 145/58)  RR: 15 (18 May 2022 20:02) (15 - 18)  SpO2: 98% (18 May 2022 20:02) (96% - 98%)  I&O's Summary    PHYSICAL EXAM:  GENERAL: NAD, well-groomed, well-developed  HEAD:  Atraumatic, Normocephalic  EYES: EOMI, PERRL, conjunctiva and sclera clear  ENMT: Moist mucous membranes, Good dentition  NECK: Supple, No JVD  CHEST/LUNG: Clear to auscultation bilaterally, non-labored breathing, good air entry  HEART: RRR; S1/S2, No murmur  ABDOMEN: Soft, Nontender, Nondistended; Bowel sounds present  VASCULAR: Normal pulses, Normal capillary refill  EXTREMITIES: No cyanosis, 1+ pitting edema in b/l LEs with blood on dressing.  LYMPH: No lymphadenopathy noted  SKIN: Warm, stasis changes on LEs distally.  PSYCH: Normal mood and affect  NERVOUS SYSTEM:  A/O x3, Good concentration; CN 2-12 intact, No focal deficits, moves all extremities.    LABS:                        11.1   11.13 )-----------( 219      ( 19 May 2022 06:20 )             34.1         142  |  109  |  22  ----------------------------<  113  4.4   |  27  |  0.72    Ca    8.2      19 May 2022 06:20  Phos  3.8       Mg     2.2         TPro  5.6  /  Alb  2.2  /  TBili  0.3  /  DBili  x   /  AST  20  /  ALT  18  /  AlkPhos  83          COVID- PCR: NotDetec (22 @ 15:30)  COVID- PCR: NotDetec (22 @ 08:06)  COVID- PCR: NotDetec (22 @ 11:01)    RADIOLOGY & ADDITIONAL TESTS:  < from: Xray Knee 1 or 2 Views, Right (22 @ 11:24) >  IMPRESSION:  Post operative changes.    < end of copied text >        Care Discussed with Consultants/Other Providers:

## 2022-05-19 NOTE — PROGRESS NOTE ADULT - NS ATTEND AMEND GEN_ALL_CORE FT
Rehab Attending- Patient seen and examined by me- Case discussed, above note reviewed by me with modifications made    Rehab Right TKR- good candidate for intensive rehab - will tolerate three hours rehab daily  Senna,miralax for constipation- MOM prn- last BM 5/23  Urge incontinence- post op retention- check PVR- back on vessicare, Methenamine  Hx DVT/DVT Proph- continue eliquis  pain management- tylenol, oxycodone PRN, celebrex x 14 days post op Rehab Attending- Patient seen and examined by me- Case discussed, above note reviewed by me with modifications made    No BM overnight- To try MOM later in day if needed  voiding well PVR 35 cc this AM- PVRS DCd  labs reviewed by Me- WBC 11.13  will check CBC in AM- no apparent source of infection  to continue intensive rehab program

## 2022-05-20 LAB
HCT VFR BLD CALC: 38.5 % — SIGNIFICANT CHANGE UP (ref 34.5–45)
HGB BLD-MCNC: 12.7 G/DL — SIGNIFICANT CHANGE UP (ref 11.5–15.5)
MCHC RBC-ENTMCNC: 32.9 PG — SIGNIFICANT CHANGE UP (ref 27–34)
MCHC RBC-ENTMCNC: 33 GM/DL — SIGNIFICANT CHANGE UP (ref 32–36)
MCV RBC AUTO: 99.7 FL — SIGNIFICANT CHANGE UP (ref 80–100)
NRBC # BLD: 0 /100 WBCS — SIGNIFICANT CHANGE UP (ref 0–0)
PLATELET # BLD AUTO: 259 K/UL — SIGNIFICANT CHANGE UP (ref 150–400)
RBC # BLD: 3.86 M/UL — SIGNIFICANT CHANGE UP (ref 3.8–5.2)
RBC # FLD: 14.2 % — SIGNIFICANT CHANGE UP (ref 10.3–14.5)
SARS-COV-2 RNA SPEC QL NAA+PROBE: SIGNIFICANT CHANGE UP
WBC # BLD: 9.09 K/UL — SIGNIFICANT CHANGE UP (ref 3.8–10.5)
WBC # FLD AUTO: 9.09 K/UL — SIGNIFICANT CHANGE UP (ref 3.8–10.5)

## 2022-05-20 PROCEDURE — 99232 SBSQ HOSP IP/OBS MODERATE 35: CPT

## 2022-05-20 PROCEDURE — 99232 SBSQ HOSP IP/OBS MODERATE 35: CPT | Mod: GC

## 2022-05-20 RX ORDER — FOLIC ACID 0.8 MG
1 TABLET ORAL DAILY
Refills: 0 | Status: DISCONTINUED | OUTPATIENT
Start: 2022-05-20 | End: 2022-05-28

## 2022-05-20 RX ADMIN — CELECOXIB 100 MILLIGRAM(S): 200 CAPSULE ORAL at 17:32

## 2022-05-20 RX ADMIN — NYSTATIN CREAM 1 APPLICATION(S): 100000 CREAM TOPICAL at 17:36

## 2022-05-20 RX ADMIN — OXYCODONE HYDROCHLORIDE 5 MILLIGRAM(S): 5 TABLET ORAL at 06:00

## 2022-05-20 RX ADMIN — CELECOXIB 100 MILLIGRAM(S): 200 CAPSULE ORAL at 05:30

## 2022-05-20 RX ADMIN — OXYCODONE HYDROCHLORIDE 5 MILLIGRAM(S): 5 TABLET ORAL at 18:10

## 2022-05-20 RX ADMIN — Medication 975 MILLIGRAM(S): at 14:52

## 2022-05-20 RX ADMIN — OXYCODONE HYDROCHLORIDE 5 MILLIGRAM(S): 5 TABLET ORAL at 05:29

## 2022-05-20 RX ADMIN — NYSTATIN CREAM 1 APPLICATION(S): 100000 CREAM TOPICAL at 05:35

## 2022-05-20 RX ADMIN — OXYCODONE HYDROCHLORIDE 5 MILLIGRAM(S): 5 TABLET ORAL at 17:48

## 2022-05-20 RX ADMIN — PANTOPRAZOLE SODIUM 40 MILLIGRAM(S): 20 TABLET, DELAYED RELEASE ORAL at 07:10

## 2022-05-20 RX ADMIN — CELECOXIB 100 MILLIGRAM(S): 200 CAPSULE ORAL at 06:30

## 2022-05-20 RX ADMIN — APIXABAN 2.5 MILLIGRAM(S): 2.5 TABLET, FILM COATED ORAL at 05:30

## 2022-05-20 RX ADMIN — Medication 150 MICROGRAM(S): at 05:30

## 2022-05-20 RX ADMIN — Medication 975 MILLIGRAM(S): at 15:30

## 2022-05-20 RX ADMIN — SENNA PLUS 2 TABLET(S): 8.6 TABLET ORAL at 22:58

## 2022-05-20 RX ADMIN — ATORVASTATIN CALCIUM 20 MILLIGRAM(S): 80 TABLET, FILM COATED ORAL at 22:59

## 2022-05-20 RX ADMIN — Medication 975 MILLIGRAM(S): at 05:30

## 2022-05-20 RX ADMIN — Medication 975 MILLIGRAM(S): at 22:59

## 2022-05-20 RX ADMIN — APIXABAN 2.5 MILLIGRAM(S): 2.5 TABLET, FILM COATED ORAL at 17:36

## 2022-05-20 RX ADMIN — CELECOXIB 100 MILLIGRAM(S): 200 CAPSULE ORAL at 18:09

## 2022-05-20 RX ADMIN — Medication 975 MILLIGRAM(S): at 06:30

## 2022-05-20 NOTE — PROGRESS NOTE ADULT - ASSESSMENT
This is a 77 YO female with PMH of SLE,Hx RA, lung cancer (s/p tumor removal, no chemo no radiation) OA, DVT, DJD, HTN, HLD, Hypothyroidism, covid-19, obesity, heart murmur, left knee replacement, lumbar spine surgery 12/20, cataract s/p surgery, tonsillectomy who presented to [facility] for elective Right knee replacement done on 5/16  by Dr. Mansoor Castillo. Patient now with gait Instability, ADL impairments and Functional impairments.    REhab of Right TKR  - Comprehensive Rehab Program: PT/OT/ST, 3hours daily and 5 days weekly  - PT: Focused on improving strength, endurance, coordination, balance, functional mobility, and transfers  - OT: Focused on improving strength, fine motor skills, coordination, posture and ADLs.      #Right TKR  - Elective Right knee replacement done on 5/16  by Dr. Mansoor Castillo  - Remove NELLY dressing on post-op day 7 (5/23)  - celebrex BID x 14 days  - WB Status: WBAT    #HTN  - Monitor    #HLD  - Lipitor 20mg daily    #Hypothyroidism  - Synthroid 150mcg daily    # Post OP bradycardia  junctional rhythm  blocked APCs  Mobitz type 1  Off betablockers    #Pain management  - Tylenol PRN, Oxycodone PRN    #DVT ppx  - Eliquis 2.5mg BID    #GI ppx  - Protonix 40mg    #Bowel Regimen  - Senna, miralax, moved BMs  MOM PRN    #Bladder management  - voids freely  primafit HS  continue vessicare, methenamine    #FEN   - Diet: Regular    #Skin:  - Skin on admission: Right Knee with NELLY dressing- +drainage on dressing  - Pressure injury/Skin: Turn Q2hrs while in bed, OOB to Chair, PT/OT   -Left facial erythema- improved    #Precaution  - Fall, Aspiration    Outpatient Follow-up (Specialty/Name of physician):  Mansoor Castillo)  Orthopedic Surgery  [address], [address]  [place] [address]  Phone: [phone]  Fax: [phone]  Scheduled Appointment: 05/31/2022 09:00 AM    Scott Dan  [facility]  [address], [place] [address]  Phone: [phone]  Fax: [phone]    Josselin Ramirez)  Cardiovascular Disease; Interventional Cardiology  [address] [place] [address]  Phone: [phone]  Fax: [phone]

## 2022-05-20 NOTE — PROGRESS NOTE ADULT - SUBJECTIVE AND OBJECTIVE BOX
Patient is a 76y old  Female who presents with a chief complaint of Right TKR (20 May 2022 11:12)      24 HOUR EVENTS:  No overnight events reported.     SUBJECTIVE:  Patient seen and examined while at PT. No acute complaints. feels well. therapy going well.    ALLERGIES:  No Known Allergies    MEDICATIONS  (STANDING):  acetaminophen     Tablet .. 975 milliGRAM(s) Oral every 8 hours  apixaban 2.5 milliGRAM(s) Oral every 12 hours  atorvastatin 20 milliGRAM(s) Oral at bedtime  celecoxib 100 milliGRAM(s) Oral every 12 hours  levothyroxine 150 MICROGram(s) Oral daily  nystatin Powder 1 Application(s) Topical two times a day  pantoprazole    Tablet 40 milliGRAM(s) Oral before breakfast  polyethylene glycol 3350 17 Gram(s) Oral at bedtime  senna 2 Tablet(s) Oral at bedtime    MEDICATIONS  (PRN):  aluminum hydroxide/magnesium hydroxide/simethicone Suspension 30 milliLiter(s) Oral four times a day PRN Indigestion  magnesium hydroxide Suspension 30 milliLiter(s) Oral daily PRN Constipation  oxyCODONE    IR 5 milliGRAM(s) Oral every 3 hours PRN Moderate Pain (4 - 6)  oxyCODONE    IR 10 milliGRAM(s) Oral every 3 hours PRN Severe Pain (7 - 10)    Vital Signs Last 24 Hrs  T(F): 98.1 (20 May 2022 08:34), Max: 98.1 (20 May 2022 08:34)  HR: 56 (20 May 2022 08:34) (56 - 62)  BP: 125/58 (20 May 2022 08:34) (112/71 - 125/58)  RR: 15 (20 May 2022 08:34) (15 - 15)  SpO2: 98% (20 May 2022 08:34) (95% - 98%)  I&O's Summary    PHYSICAL EXAM:  General: NAD, A/O x 3  ENT: Moist mucous membranes, no thrush  Neck: Supple, No JVD  Lungs: Clear to auscultation bilaterally, good air entry, non-labored breathing  Cardio: RRR, S1/S2, No murmur  Abdomen: Soft, Nontender, Nondistended; Bowel sounds present  Extremities: No calf tenderness, 1+ bilateral pitting edema.     LABS:                        12.7   9.09  )-----------( 259      ( 20 May 2022 12:30 )             38.5     05-19    142  |  109  |  22  ----------------------------<  113  4.4   |  27  |  0.72    Ca    8.2      19 May 2022 06:20  Phos  3.8     05-18  Mg     2.2     05-18    TPro  5.6  /  Alb  2.2  /  TBili  0.3  /  DBili  x   /  AST  20  /  ALT  18  /  AlkPhos  83  05-19                COVID-19 PCR: NotDetec (05-20-22 @ 12:30)  COVID-19 PCR: NotDetec (05-18-22 @ 15:30)  COVID-19 PCR: NotDetec (05-18-22 @ 08:06)  COVID-19 PCR: NotDetec (05-13-22 @ 11:01)    RADIOLOGY & ADDITIONAL TESTS:    Care Discussed with Consultants/Other Providers:         -DVT prophylaxis with 12 days of Eliquis and 28 additional days of Ecotrin 81mg po every 12 hours  -Increase ambulation and ROM rightknee with PT/OT

## 2022-05-20 NOTE — PROGRESS NOTE ADULT - SUBJECTIVE AND OBJECTIVE BOX
CHIEF COMPLAINT: s/p right TKR      HISTORY OF PRESENT ILLNESS  This is a 77 YO female with PMH of SLE, lung cancer (s/p tumor removal, no chemo no radiation) OA, DVT, DJD, HTN, HLD, Hypothyroidism, covid-19, obesity, heart murmur, left knee replacement, lumbar spine surgery 12/20, cataract s/p surgery, tonsillectomy who presented to [facility] for elective Right knee replacement done on 5/16  by Dr. Mansoor Castillo. Eliquis 2.5mg q 12 h was given for DVT prophylaxis/on long term AC secondary to DVT .Post Op course on telemetry secondary to bradycardia-beta blocker held. Patient was evaluated by PM&R and therapy for functional deficits, gait/ADL impairments and acute rehabilitation was recommended. Patient was medically optimized for discharge to [facility] IRU on 5/18/22.       ROS/Subjective: NAD in therapy, night uneventful, no chest pain, no sob. Had BM. Voids freely. Knee pain reports well controlled.       MEDICATIONS  (STANDING):  acetaminophen     Tablet . 975 milliGRAM(s) Oral every 8 hours.  apixaban 2.5 milliGRAM(s) Oral every 12 hours  atorvastatin 20 milliGRAM(s) Oral at bedtime  celecoxib 100 milliGRAM(s) Oral every 12 hours  levothyroxine 150 MICROGram(s) Oral daily  nystatin Powder 1 Application(s) Topical two times a day  pantoprazole    Tablet 40 milliGRAM(s) Oral before breakfast  polyethylene glycol 3350 17 Gram(s) Oral at bedtime  senna 2 Tablet(s) Oral at bedtime      MEDICATIONS  (PRN):  aluminum hydroxide/magnesium hydroxide/simethicone Suspension 30 milliLiter(s) Oral four times a day PRN Indigestion  magnesium hydroxide Suspension 30 milliLiter(s) Oral daily PRN Constipation  oxyCODONE    IR 5 milliGRAM(s) Oral every 3 hours PRN Moderate Pain (4 - 6)  oxyCODONE    IR 10 milliGRAM(s) Oral every 3 hours PRN Severe Pain (7 - 10)                            11.1   11.13 )-----------( 219      ( 19 May 2022 06:20 )             34.1     05-19    142  |  109<H>  |  22  ----------------------------<  113<H>  4.4   |  27  |  0.72    Ca    8.2<L>      19 May 2022 06:20    TPro  5.6<L>  /  Alb  2.2<L>  /  TBili  0.3  /  DBili  x   /  AST  20  /  ALT  18  /  AlkPhos  83  05-19      Vital Signs Last 24 Hrs  T(C): 36.7 (20 May 2022 08:34), Max: 36.7 (20 May 2022 08:34)  T(F): 98.1 (20 May 2022 08:34), Max: 98.1 (20 May 2022 08:34)  HR: 56 (20 May 2022 08:34) (56 - 62)  BP: 125/58 (20 May 2022 08:34) (112/71 - 125/58)  BP(mean): --  RR: 15 (20 May 2022 08:34) (15 - 15)  SpO2: 98% (20 May 2022 08:34) (95% - 98%)        Physical Exam:  General: NAD                                 HEENT: NC/AT  Cardio: RR, fan, Normal S1-S2, No M/G/R                              Pulm: No Respiratory Distress,  Lungs CTAB                        Abdomen: ND/NT, Soft, BS+                                                MSK: Right Knee ROM 0-80 passively                                       Ext: +1 edema Right LE, No calf tenderness,  stasis changes distally LLE  Skin:  Left facial erythema  Right TKR with NELLY/Ace- Noted drainage on NELLY                                                               Wounds: none  Decubitus Ulcers: None Present     Neurological Examination    Cognitive: AAO x 3                                                                         Attention: Intact   CN II - XII  intact                                                                             Sensory: Intact to light touch                                                                                           Tone: normal Throughout   Balance-impaired      Motor    LEFT    UE: SF [5/5], EF [5/5], EE [5/5], WE [5/5],  [wnl]  RIGHT UE: SF [5/5], EF [5/5], EE [5/5], WE [5/5],  [wnl]  LEFT    LE:  HF [4+/5], KE [5/5], DF [5/5], EHL [5/5],  PF [5/5]  RIGHT LE:  HF [2/5], KE [NT], DF [5/5], EHL [5/5],  PF [5/5]      Reflex:  2 +      Continue comprehensive acute rehab program consisting of 3hrs/day of OT/PT. CHIEF COMPLAINT: s/p right TKR      HISTORY OF PRESENT ILLNESS  This is a 77 YO female with PMH of SLE, lung cancer (s/p tumor removal, no chemo no radiation) OA, DVT, DJD, HTN, HLD, Hypothyroidism, covid-19, obesity, heart murmur, left knee replacement, lumbar spine surgery 12/20, cataract s/p surgery, tonsillectomy who presented to [facility] for elective Right knee replacement done on 5/16  by Dr. Mansoor Castillo. Eliquis 2.5mg q 12 h was given for DVT prophylaxis/on long term AC secondary to DVT .Post Op course on telemetry secondary to bradycardia-beta blocker held. Patient was evaluated by PM&R and therapy for functional deficits, gait/ADL impairments and acute rehabilitation was recommended. Patient was medically optimized for discharge to [facility] IRU on 5/18/22.       ROS/Subjective: NAD in therapy, night uneventful, no chest pain, no sob. Had BM. Voids freely. Knee pain reports well controlled.       MEDICATIONS  (STANDING):  acetaminophen     Tablet . 975 milliGRAM(s) Oral every 8 hours.  apixaban 2.5 milliGRAM(s) Oral every 12 hours  atorvastatin 20 milliGRAM(s) Oral at bedtime  celecoxib 100 milliGRAM(s) Oral every 12 hours  levothyroxine 150 MICROGram(s) Oral daily  nystatin Powder 1 Application(s) Topical two times a day  pantoprazole    Tablet 40 milliGRAM(s) Oral before breakfast  polyethylene glycol 3350 17 Gram(s) Oral at bedtime  senna 2 Tablet(s) Oral at bedtime      MEDICATIONS  (PRN):  aluminum hydroxide/magnesium hydroxide/simethicone Suspension 30 milliLiter(s) Oral four times a day PRN Indigestion  magnesium hydroxide Suspension 30 milliLiter(s) Oral daily PRN Constipation  oxyCODONE    IR 5 milliGRAM(s) Oral every 3 hours PRN Moderate Pain (4 - 6)  oxyCODONE    IR 10 milliGRAM(s) Oral every 3 hours PRN Severe Pain (7 - 10)                                12.7   9.09  )-----------( 259      ( 20 May 2022 12:30 )             38.5                     11.1   11.13 )-----------( 219      ( 19 May 2022 06:20 )             34.1     05-19    142  |  109<H>  |  22  ----------------------------<  113<H>  4.4   |  27  |  0.72    Ca    8.2<L>      19 May 2022 06:20    TPro  5.6<L>  /  Alb  2.2<L>  /  TBili  0.3  /  DBili  x   /  AST  20  /  ALT  18  /  AlkPhos  83  05-19      Vital Signs Last 24 Hrs  T(C): 36.7 (20 May 2022 08:34), Max: 36.7 (20 May 2022 08:34)  T(F): 98.1 (20 May 2022 08:34), Max: 98.1 (20 May 2022 08:34)  HR: 56 (20 May 2022 08:34) (56 - 62)  BP: 125/58 (20 May 2022 08:34) (112/71 - 125/58)  BP(mean): --  RR: 15 (20 May 2022 08:34) (15 - 15)  SpO2: 98% (20 May 2022 08:34) (95% - 98%)        Physical Exam:  General: NAD                                 HEENT: NC/AT  Cardio: RR, fan, Normal S1-S2, No M/G/R                              Pulm: No Respiratory Distress,  Lungs CTAB                        Abdomen: ND/NT, Soft, BS+                                                MSK: Right Knee ROM 0-80 passively                                       Ext: +1 edema Right LE, No calf tenderness,  stasis changes distally LLE  Skin:  Left facial erythema  Right TKR with NELLY/Ace- Noted drainage on NELLY, small blister lat aspect knee under dressing                                                               Wounds: none  Decubitus Ulcers: None Present     Neurological Examination    Cognitive: AAO x 3                                                                         Attention: Intact   CN II - XII  intact                                                                             Sensory: Intact to light touch                                                                                           Tone: normal Throughout   Balance-impaired      Motor    LEFT    UE: SF [5/5], EF [5/5], EE [5/5], WE [5/5],  [wnl]  RIGHT UE: SF [5/5], EF [5/5], EE [5/5], WE [5/5],  [wnl]  LEFT    LE:  HF [4+/5], KE [5/5], DF [5/5], EHL [5/5],  PF [5/5]  RIGHT LE:  HF [2/5], KE [NT], DF [5/5], EHL [5/5],  PF [5/5]      Reflex:  2 +      Continue comprehensive acute rehab program consisting of 3hrs/day of OT/PT.

## 2022-05-20 NOTE — PROGRESS NOTE ADULT - ASSESSMENT
-DVT prophylaxis with 12 days of Eliquis and 28 additional days of Ecotrin 81mg po every 12 hours  -Increase ambulation and ROM rightknee with PT/OT     76 year old female with a history of HTN, HLD, RA, DVT, SLE, hypothyroidism, lung cancer s/p lobectomy who presented to [facility] for elective right knee replacement on 5/16. She was noted to be bradycardic to the 30s post-operatively. Now admitted to [facility].    # S/p R. TKR  # Osteoarthritis  - elective right knee replacement on 5/16  - WBAT  - PT/OT  - dvt ppx  - pain control, bowel regimen.  - remove NELLY dressing on day POD 7, suture removal 2 weeks after surgery.  - small blister present underneath NELLY dressing - monitor after dressing removal    # Bradycardia - asymptomatic  - seen by Cardiology at OSH, thought to be due to prior sedation, inc vagal tone from urinary retention  - intermittent Mobitz 1  - metoprolol held, avoid AV eliz blockers    # HTN  - losartan w/ hold parameter    # HLD  - atorvastatin    # Rheumatoid arthritis  - home meds: methotrexate 2.5 mg, 4 tabs every 7 days --> d/w Surgeon when pt can restart in setting of recent surgery.  - folic acid w/ methotrexate    # hypothyroidism  - synthroid    # Hx of DVT  - pt reports a hx of a provoked Dvt after getting left knee replaced.  - eliquis   - consider outpatient hematology f/u    # Hx of malignant lung nodule s/p left partial lobectomy  - outpatient management    dvt ppx: Per Ortho note: 12 days of Eliquis and 28 additional days of Ecotrin 81mg po every 12 hours

## 2022-05-20 NOTE — PROGRESS NOTE ADULT - NS ATTEND AMEND GEN_ALL_CORE FT
Rehab Attending- Patient seen and examined by me- Case discussed, above note reviewed by me with modifications made    No BM overnight- To try MOM later in day if needed  voiding well PVR 35 cc this AM- PVRS DCd  labs reviewed by Me- WBC 11.13  will check CBC in AM- no apparent source of infection  to continue intensive rehab program Rehab Attending- Patient seen and examined by me- Case discussed, above note reviewed by me with modifications made    +BM without MOM  Covid exposure from Roommate- on droplet isolation  Small blister lat aspect under tape from NELLY dressing in right knee - to observe  repeat CBC- WBC WNL  to continue intensive rehab program

## 2022-05-21 PROCEDURE — 99231 SBSQ HOSP IP/OBS SF/LOW 25: CPT

## 2022-05-21 RX ADMIN — NYSTATIN CREAM 1 APPLICATION(S): 100000 CREAM TOPICAL at 06:54

## 2022-05-21 RX ADMIN — Medication 975 MILLIGRAM(S): at 22:24

## 2022-05-21 RX ADMIN — OXYCODONE HYDROCHLORIDE 5 MILLIGRAM(S): 5 TABLET ORAL at 10:48

## 2022-05-21 RX ADMIN — SENNA PLUS 2 TABLET(S): 8.6 TABLET ORAL at 22:24

## 2022-05-21 RX ADMIN — Medication 975 MILLIGRAM(S): at 14:58

## 2022-05-21 RX ADMIN — APIXABAN 2.5 MILLIGRAM(S): 2.5 TABLET, FILM COATED ORAL at 17:58

## 2022-05-21 RX ADMIN — CELECOXIB 100 MILLIGRAM(S): 200 CAPSULE ORAL at 06:54

## 2022-05-21 RX ADMIN — NYSTATIN CREAM 1 APPLICATION(S): 100000 CREAM TOPICAL at 22:25

## 2022-05-21 RX ADMIN — Medication 975 MILLIGRAM(S): at 07:48

## 2022-05-21 RX ADMIN — OXYCODONE HYDROCHLORIDE 5 MILLIGRAM(S): 5 TABLET ORAL at 12:00

## 2022-05-21 RX ADMIN — Medication 975 MILLIGRAM(S): at 14:01

## 2022-05-21 RX ADMIN — CELECOXIB 100 MILLIGRAM(S): 200 CAPSULE ORAL at 17:59

## 2022-05-21 RX ADMIN — CELECOXIB 100 MILLIGRAM(S): 200 CAPSULE ORAL at 17:57

## 2022-05-21 RX ADMIN — CELECOXIB 100 MILLIGRAM(S): 200 CAPSULE ORAL at 07:48

## 2022-05-21 RX ADMIN — APIXABAN 2.5 MILLIGRAM(S): 2.5 TABLET, FILM COATED ORAL at 06:53

## 2022-05-21 RX ADMIN — Medication 1 MILLIGRAM(S): at 10:48

## 2022-05-21 RX ADMIN — Medication 150 MICROGRAM(S): at 06:53

## 2022-05-21 RX ADMIN — ATORVASTATIN CALCIUM 20 MILLIGRAM(S): 80 TABLET, FILM COATED ORAL at 22:24

## 2022-05-21 RX ADMIN — Medication 975 MILLIGRAM(S): at 06:53

## 2022-05-21 RX ADMIN — PANTOPRAZOLE SODIUM 40 MILLIGRAM(S): 20 TABLET, DELAYED RELEASE ORAL at 06:54

## 2022-05-21 NOTE — PROGRESS NOTE ADULT - SUBJECTIVE AND OBJECTIVE BOX
Cc: Gait dysfunction    HPI: Patient with no new medical issues today.  On droplet isolation  No acute med complaint   Rt knee pain controlled,    ROS: mild erythema around surgical area,, no chest pain, no N/V, no Fevers/Chills. No other new ROS    Has been tolerating rehabilitation program.    MEDICATIONS  (STANDING):  acetaminophen     Tablet .. 975 milliGRAM(s) Oral every 8 hours  apixaban 2.5 milliGRAM(s) Oral every 12 hours  atorvastatin 20 milliGRAM(s) Oral at bedtime  celecoxib 100 milliGRAM(s) Oral every 12 hours  folic acid 1 milliGRAM(s) Oral daily  levothyroxine 150 MICROGram(s) Oral daily  nystatin Powder 1 Application(s) Topical two times a day  pantoprazole    Tablet 40 milliGRAM(s) Oral before breakfast  polyethylene glycol 3350 17 Gram(s) Oral at bedtime  senna 2 Tablet(s) Oral at bedtime    MEDICATIONS  (PRN):  aluminum hydroxide/magnesium hydroxide/simethicone Suspension 30 milliLiter(s) Oral four times a day PRN Indigestion  magnesium hydroxide Suspension 30 milliLiter(s) Oral daily PRN Constipation  oxyCODONE    IR 5 milliGRAM(s) Oral every 3 hours PRN Moderate Pain (4 - 6)  oxyCODONE    IR 10 milliGRAM(s) Oral every 3 hours PRN Severe Pain (7 - 10)      Vital Signs Last 24 Hrs  T(C): 36.6 (21 May 2022 08:32), Max: 36.6 (21 May 2022 08:32)  T(F): 97.9 (21 May 2022 08:32), Max: 97.9 (21 May 2022 08:32)  HR: 58 (21 May 2022 08:32) (58 - 64)  BP: 151/60 (21 May 2022 08:32) (133/77 - 151/60)  BP(mean): --  RR: 14 (21 May 2022 08:32) (14 - 16)  SpO2: 96% (21 May 2022 08:32) (96% - 96%)    In NAD  HEENT- EOMI  Heart- RRR, S1S2  Lungs- CTA bl.  Abd- + BS, NT  Ext- No calf pain    Neuro- Exam AAO X 3                      12.7   9.09  )-----------( 259      ( 20 May 2022 12:30 )             38.5       CAPILLARY BLOOD GLUCOSE    Imp: Patient with diagnosis of Rt Total Knee replacement admitted for comprehensive acute rehabilitation.    Plan:  - Continue therapies PT/OT  - DVT prophylaxis c/w apixiban as per surgical recs  --Rt knee wound care as recs by surgery  - Continue current medications; patient medically stable.   - Patient is stable to continue current rehabilitation program.

## 2022-05-22 RX ADMIN — Medication 975 MILLIGRAM(S): at 22:00

## 2022-05-22 RX ADMIN — Medication 150 MICROGRAM(S): at 06:23

## 2022-05-22 RX ADMIN — APIXABAN 2.5 MILLIGRAM(S): 2.5 TABLET, FILM COATED ORAL at 06:22

## 2022-05-22 RX ADMIN — CELECOXIB 100 MILLIGRAM(S): 200 CAPSULE ORAL at 06:23

## 2022-05-22 RX ADMIN — CELECOXIB 100 MILLIGRAM(S): 200 CAPSULE ORAL at 17:19

## 2022-05-22 RX ADMIN — ATORVASTATIN CALCIUM 20 MILLIGRAM(S): 80 TABLET, FILM COATED ORAL at 21:21

## 2022-05-22 RX ADMIN — Medication 975 MILLIGRAM(S): at 13:09

## 2022-05-22 RX ADMIN — NYSTATIN CREAM 1 APPLICATION(S): 100000 CREAM TOPICAL at 06:23

## 2022-05-22 RX ADMIN — Medication 975 MILLIGRAM(S): at 06:23

## 2022-05-22 RX ADMIN — Medication 1 MILLIGRAM(S): at 13:10

## 2022-05-22 RX ADMIN — Medication 975 MILLIGRAM(S): at 13:11

## 2022-05-22 RX ADMIN — APIXABAN 2.5 MILLIGRAM(S): 2.5 TABLET, FILM COATED ORAL at 17:17

## 2022-05-22 RX ADMIN — Medication 975 MILLIGRAM(S): at 21:20

## 2022-05-22 RX ADMIN — NYSTATIN CREAM 1 APPLICATION(S): 100000 CREAM TOPICAL at 21:20

## 2022-05-22 RX ADMIN — PANTOPRAZOLE SODIUM 40 MILLIGRAM(S): 20 TABLET, DELAYED RELEASE ORAL at 06:32

## 2022-05-22 RX ADMIN — CELECOXIB 100 MILLIGRAM(S): 200 CAPSULE ORAL at 17:17

## 2022-05-22 NOTE — DIETITIAN INITIAL EVALUATION ADULT - ORAL INTAKE PTA/DIET HISTORY
Pt reports good PO intake PTA. Eats 3 meals/day. Diet recall included yogurt, dried fruit/fresh fruit, meat, vegetables. Pt dislikes potatoes and cabbage. Pt stopped drinking soda, does not monitor anything specifically in her diet PTA. No known food allergies per pt. Pt was taking a MVI PTA.

## 2022-05-22 NOTE — DIETITIAN INITIAL EVALUATION ADULT - OTHER INFO
77 YO female with PMH of SLE,Hx RA, lung cancer (s/p tumor removal, no chemo no radiation) OA, DVT, DJD, HTN, HLD, Hypothyroidism, covid-19, obesity, heart murmur, left knee replacement, lumbar spine surgery 12/20, cataract s/p surgery, tonsillectomy who presented to [facility] for elective Right knee replacement done on 5/16  by Dr. Mansoor Castillo. Patient now with gait Instability, ADL impairments and Functional impairments.

## 2022-05-22 NOTE — DIETITIAN INITIAL EVALUATION ADULT - PERTINENT MEDS FT
MEDICATIONS  (STANDING):  acetaminophen     Tablet .. 975 milliGRAM(s) Oral every 8 hours  apixaban 2.5 milliGRAM(s) Oral every 12 hours  atorvastatin 20 milliGRAM(s) Oral at bedtime  celecoxib 100 milliGRAM(s) Oral every 12 hours  folic acid 1 milliGRAM(s) Oral daily  levothyroxine 150 MICROGram(s) Oral daily  nystatin Powder 1 Application(s) Topical two times a day  pantoprazole    Tablet 40 milliGRAM(s) Oral before breakfast  polyethylene glycol 3350 17 Gram(s) Oral at bedtime  senna 2 Tablet(s) Oral at bedtime    MEDICATIONS  (PRN):  aluminum hydroxide/magnesium hydroxide/simethicone Suspension 30 milliLiter(s) Oral four times a day PRN Indigestion  magnesium hydroxide Suspension 30 milliLiter(s) Oral daily PRN Constipation  oxyCODONE    IR 5 milliGRAM(s) Oral every 3 hours PRN Moderate Pain (4 - 6)  oxyCODONE    IR 10 milliGRAM(s) Oral every 3 hours PRN Severe Pain (7 - 10)

## 2022-05-23 LAB
ALBUMIN SERPL ELPH-MCNC: 2.2 G/DL — LOW (ref 3.3–5)
ALP SERPL-CCNC: 97 U/L — SIGNIFICANT CHANGE UP (ref 40–120)
ALT FLD-CCNC: 22 U/L — SIGNIFICANT CHANGE UP (ref 10–45)
ANION GAP SERPL CALC-SCNC: 5 MMOL/L — SIGNIFICANT CHANGE UP (ref 5–17)
AST SERPL-CCNC: 24 U/L — SIGNIFICANT CHANGE UP (ref 10–40)
BASOPHILS # BLD AUTO: 0.04 K/UL — SIGNIFICANT CHANGE UP (ref 0–0.2)
BASOPHILS NFR BLD AUTO: 0.7 % — SIGNIFICANT CHANGE UP (ref 0–2)
BILIRUB SERPL-MCNC: 0.6 MG/DL — SIGNIFICANT CHANGE UP (ref 0.2–1.2)
BUN SERPL-MCNC: 25 MG/DL — HIGH (ref 7–23)
CALCIUM SERPL-MCNC: 8.5 MG/DL — SIGNIFICANT CHANGE UP (ref 8.4–10.5)
CHLORIDE SERPL-SCNC: 108 MMOL/L — SIGNIFICANT CHANGE UP (ref 96–108)
CO2 SERPL-SCNC: 28 MMOL/L — SIGNIFICANT CHANGE UP (ref 22–31)
CREAT SERPL-MCNC: 0.74 MG/DL — SIGNIFICANT CHANGE UP (ref 0.5–1.3)
EGFR: 83 ML/MIN/1.73M2 — SIGNIFICANT CHANGE UP
EOSINOPHIL # BLD AUTO: 0.25 K/UL — SIGNIFICANT CHANGE UP (ref 0–0.5)
EOSINOPHIL NFR BLD AUTO: 4.1 % — SIGNIFICANT CHANGE UP (ref 0–6)
GLUCOSE SERPL-MCNC: 95 MG/DL — SIGNIFICANT CHANGE UP (ref 70–99)
HCT VFR BLD CALC: 34 % — LOW (ref 34.5–45)
HGB BLD-MCNC: 11.1 G/DL — LOW (ref 11.5–15.5)
IMM GRANULOCYTES NFR BLD AUTO: 0.5 % — SIGNIFICANT CHANGE UP (ref 0–1.5)
LYMPHOCYTES # BLD AUTO: 1.6 K/UL — SIGNIFICANT CHANGE UP (ref 1–3.3)
LYMPHOCYTES # BLD AUTO: 26.1 % — SIGNIFICANT CHANGE UP (ref 13–44)
MCHC RBC-ENTMCNC: 31.5 PG — SIGNIFICANT CHANGE UP (ref 27–34)
MCHC RBC-ENTMCNC: 32.6 GM/DL — SIGNIFICANT CHANGE UP (ref 32–36)
MCV RBC AUTO: 96.6 FL — SIGNIFICANT CHANGE UP (ref 80–100)
MONOCYTES # BLD AUTO: 0.85 K/UL — SIGNIFICANT CHANGE UP (ref 0–0.9)
MONOCYTES NFR BLD AUTO: 13.9 % — SIGNIFICANT CHANGE UP (ref 2–14)
NEUTROPHILS # BLD AUTO: 3.35 K/UL — SIGNIFICANT CHANGE UP (ref 1.8–7.4)
NEUTROPHILS NFR BLD AUTO: 54.7 % — SIGNIFICANT CHANGE UP (ref 43–77)
NRBC # BLD: 0 /100 WBCS — SIGNIFICANT CHANGE UP (ref 0–0)
PLATELET # BLD AUTO: 253 K/UL — SIGNIFICANT CHANGE UP (ref 150–400)
POTASSIUM SERPL-MCNC: 4.5 MMOL/L — SIGNIFICANT CHANGE UP (ref 3.5–5.3)
POTASSIUM SERPL-SCNC: 4.5 MMOL/L — SIGNIFICANT CHANGE UP (ref 3.5–5.3)
PROT SERPL-MCNC: 5.7 G/DL — LOW (ref 6–8.3)
RBC # BLD: 3.52 M/UL — LOW (ref 3.8–5.2)
RBC # FLD: 13.8 % — SIGNIFICANT CHANGE UP (ref 10.3–14.5)
SODIUM SERPL-SCNC: 141 MMOL/L — SIGNIFICANT CHANGE UP (ref 135–145)
WBC # BLD: 6.12 K/UL — SIGNIFICANT CHANGE UP (ref 3.8–10.5)
WBC # FLD AUTO: 6.12 K/UL — SIGNIFICANT CHANGE UP (ref 3.8–10.5)

## 2022-05-23 PROCEDURE — 99232 SBSQ HOSP IP/OBS MODERATE 35: CPT | Mod: GC

## 2022-05-23 PROCEDURE — 99233 SBSQ HOSP IP/OBS HIGH 50: CPT

## 2022-05-23 RX ADMIN — APIXABAN 2.5 MILLIGRAM(S): 2.5 TABLET, FILM COATED ORAL at 05:20

## 2022-05-23 RX ADMIN — OXYCODONE HYDROCHLORIDE 10 MILLIGRAM(S): 5 TABLET ORAL at 08:30

## 2022-05-23 RX ADMIN — NYSTATIN CREAM 1 APPLICATION(S): 100000 CREAM TOPICAL at 05:22

## 2022-05-23 RX ADMIN — SENNA PLUS 2 TABLET(S): 8.6 TABLET ORAL at 21:02

## 2022-05-23 RX ADMIN — ATORVASTATIN CALCIUM 20 MILLIGRAM(S): 80 TABLET, FILM COATED ORAL at 21:41

## 2022-05-23 RX ADMIN — Medication 975 MILLIGRAM(S): at 21:01

## 2022-05-23 RX ADMIN — Medication 975 MILLIGRAM(S): at 13:30

## 2022-05-23 RX ADMIN — CELECOXIB 100 MILLIGRAM(S): 200 CAPSULE ORAL at 18:24

## 2022-05-23 RX ADMIN — CELECOXIB 100 MILLIGRAM(S): 200 CAPSULE ORAL at 17:52

## 2022-05-23 RX ADMIN — Medication 975 MILLIGRAM(S): at 05:20

## 2022-05-23 RX ADMIN — CELECOXIB 100 MILLIGRAM(S): 200 CAPSULE ORAL at 05:19

## 2022-05-23 RX ADMIN — Medication 975 MILLIGRAM(S): at 22:01

## 2022-05-23 RX ADMIN — OXYCODONE HYDROCHLORIDE 10 MILLIGRAM(S): 5 TABLET ORAL at 07:26

## 2022-05-23 RX ADMIN — Medication 975 MILLIGRAM(S): at 12:27

## 2022-05-23 RX ADMIN — APIXABAN 2.5 MILLIGRAM(S): 2.5 TABLET, FILM COATED ORAL at 17:52

## 2022-05-23 RX ADMIN — Medication 150 MICROGRAM(S): at 05:21

## 2022-05-23 RX ADMIN — Medication 1 MILLIGRAM(S): at 12:27

## 2022-05-23 RX ADMIN — NYSTATIN CREAM 1 APPLICATION(S): 100000 CREAM TOPICAL at 17:53

## 2022-05-23 RX ADMIN — PANTOPRAZOLE SODIUM 40 MILLIGRAM(S): 20 TABLET, DELAYED RELEASE ORAL at 05:20

## 2022-05-23 NOTE — PROGRESS NOTE ADULT - SUBJECTIVE AND OBJECTIVE BOX
75 YO female with PMH of SLE, lung cancer (s/p tumor removal, no chemo no radiation) OA, DVT, DJD, HTN, HLD, Hypothyroidism, covid-19, obesity, heart murmur, left knee replacement, lumbar spine surgery 12/20, cataract s/p surgery, tonsillectomy who presented to [facility] for elective Right knee replacement done on 5/16  by Dr. Mansoor Castillo. Eliquis 2.5mg q 12 h was given for DVT prophylaxis/on long term AC secondary to DVT .Post Op course on telemetry secondary to bradycardia-beta blocker held.    seen at the bedside, c/o pain in the right knee 2/10 today, intermittent, relieved with pa in meds, and rest. no n/v, no sob    Vital Signs Last 24 Hrs  T(C): 36.8 (23 May 2022 09:15), Max: 36.8 (23 May 2022 09:15)  T(F): 98.2 (23 May 2022 09:15), Max: 98.2 (23 May 2022 09:15)  HR: 59 (23 May 2022 09:15) (59 - 63)  BP: 136/60 (23 May 2022 09:15) (136/60 - 136/67)  BP(mean): --  RR: 15 (23 May 2022 09:15) (15 - 15)  SpO2: 98% (23 May 2022 09:15) (98% - 98%)    GENERAL- NAD  EAR/NOSE/MOUTH/THROAT - no pharyngeal exudates, no oral leisions,  MMM  EYES- PERCY, conjunctiva and Sclera clear  NECK- supple  RESPIRATORY-  clear to auscultation bilaterally, non laboured breathing  CARDIOVASCULAR - SIS2, RRR  GI - soft NT BS present  EXTREMITIES- b/l LE edema  NEUROLOGY- no gross focal deficits  PSYCHIATRY- AAO X 3                    11.1                 141  | 28   | 25           6.12  >-----------< 253     ------------------------< 95                    34.0                 4.5  | 108  | 0.74                                         Ca 8.5   Mg x     Ph x            MEDICATIONS  (STANDING):  acetaminophen     Tablet .. 975 milliGRAM(s) Oral every 8 hours  apixaban 2.5 milliGRAM(s) Oral every 12 hours  atorvastatin 20 milliGRAM(s) Oral at bedtime  celecoxib 100 milliGRAM(s) Oral every 12 hours  folic acid 1 milliGRAM(s) Oral daily  levothyroxine 150 MICROGram(s) Oral daily  nystatin Powder 1 Application(s) Topical two times a day  pantoprazole    Tablet 40 milliGRAM(s) Oral before breakfast  polyethylene glycol 3350 17 Gram(s) Oral at bedtime  senna 2 Tablet(s) Oral at bedtime    MEDICATIONS  (PRN):  aluminum hydroxide/magnesium hydroxide/simethicone Suspension 30 milliLiter(s) Oral four times a day PRN Indigestion  magnesium hydroxide Suspension 30 milliLiter(s) Oral daily PRN Constipation  oxyCODONE    IR 5 milliGRAM(s) Oral every 3 hours PRN Moderate Pain (4 - 6)  oxyCODONE    IR 10 milliGRAM(s) Oral every 3 hours PRN Severe Pain (7 - 10)

## 2022-05-23 NOTE — PROGRESS NOTE ADULT - SUBJECTIVE AND OBJECTIVE BOX
CHIEF COMPLAINT: s/p right TKR      HISTORY OF PRESENT ILLNESS  This is a 75 YO female with PMH of SLE, lung cancer (s/p tumor removal, no chemo no radiation) OA, DVT, DJD, HTN, HLD, Hypothyroidism, covid-19, obesity, heart murmur, left knee replacement, lumbar spine surgery 12/20, cataract s/p surgery, tonsillectomy who presented to [facility] for elective Right knee replacement done on 5/16  by Dr. Mansoor Castillo. Eliquis 2.5mg q 12 h was given for DVT prophylaxis/on long term AC secondary to DVT .Post Op course on telemetry secondary to bradycardia-beta blocker held. Patient was evaluated by PM&R and therapy for functional deficits, gait/ADL impairments and acute rehabilitation was recommended. Patient was medically optimized for discharge to [facility] IRU on 5/18/22.       ROS/Subjective: NAD in therapy, weekend uneventful,   no chest pain, no sob.  no nausea, no vomiting  no dizziness , no headaches  BM this AM -Voiding well.   Knee pain reports well controlled.     MEDICATIONS  (STANDING):  acetaminophen     Tablet .. 975 milliGRAM(s) Oral every 8 hours  apixaban 2.5 milliGRAM(s) Oral every 12 hours  atorvastatin 20 milliGRAM(s) Oral at bedtime  celecoxib 100 milliGRAM(s) Oral every 12 hours  folic acid 1 milliGRAM(s) Oral daily  levothyroxine 150 MICROGram(s) Oral daily  nystatin Powder 1 Application(s) Topical two times a day  pantoprazole    Tablet 40 milliGRAM(s) Oral before breakfast  polyethylene glycol 3350 17 Gram(s) Oral at bedtime  senna 2 Tablet(s) Oral at bedtime    MEDICATIONS  (PRN):  aluminum hydroxide/magnesium hydroxide/simethicone Suspension 30 milliLiter(s) Oral four times a day PRN Indigestion  magnesium hydroxide Suspension 30 milliLiter(s) Oral daily PRN Constipation  oxyCODONE    IR 5 milliGRAM(s) Oral every 3 hours PRN Moderate Pain (4 - 6)  oxyCODONE    IR 10 milliGRAM(s) Oral every 3 hours PRN Severe Pain (7 - 10)                            11.1   6.12  )-----------( 253      ( 23 May 2022 05:40 )             34.0     05-23    141  |  108  |  25<H>  ----------------------------<  95  4.5   |  28  |  0.74    Ca    8.5      23 May 2022 05:40    TPro  5.7<L>  /  Alb  2.2<L>  /  TBili  0.6  /  DBili  x   /  AST  24  /  ALT  22  /  AlkPhos  97  05-23        CAPILLARY BLOOD GLUCOSE          Vital Signs Last 24 Hrs  T(C): 36.8 (23 May 2022 09:15), Max: 36.8 (23 May 2022 09:15)  T(F): 98.2 (23 May 2022 09:15), Max: 98.2 (23 May 2022 09:15)  HR: 59 (23 May 2022 09:15) (59 - 63)  BP: 136/60 (23 May 2022 09:15) (136/60 - 136/67)  BP(mean): --  RR: 15 (23 May 2022 09:15) (15 - 15)  SpO2: 98% (23 May 2022 09:15) (98% - 98%)          Physical Exam:  General: NAD                                 HEENT: NC/AT  Cardio: RR, fan, Normal S1-S2, No M/G/R                              Pulm: No Respiratory Distress,  Lungs CTAB                        Abdomen: ND/NT, Soft, BS+                                                MSK: Right Knee ROM 0-80 passively                                       Ext: +2 edema Right LE, No calf tenderness,  stasis changes distally LLE  Skin:  Left facial erythema  Right TKR with NELLY/Ace- Noted drainage on NELLY,NELLY DCd- Small blister open Lat Knee  incision sutured- sl bleeding inferior suture line- otherwise intact- no signs of infection                                                               Wounds: none  Decubitus Ulcers: None Present     Neurological Examination    Cognitive: AAO x 3                                                                         Attention: Intact   CN II - XII  intact                                                                             Sensory: Intact to light touch                                                                                           Tone: normal Throughout   Balance-impaired      Motor    LEFT    UE: SF [5/5], EF [5/5], EE [5/5], WE [5/5],  [wnl]  RIGHT UE: SF [5/5], EF [5/5], EE [5/5], WE [5/5],  [wnl]  LEFT    LE:  HF [4+/5], KE [5/5], DF [5/5], EHL [5/5],  PF [5/5]  RIGHT LE:  HF [2/5], KE [NT], DF [5/5], EHL [5/5],  PF [5/5]      Reflex:  2 +      Continue comprehensive acute rehab program consisting of 3hrs/day of OT/PT. CHIEF COMPLAINT: s/p right TKR      HISTORY OF PRESENT ILLNESS  This is a 75 YO female with PMH of SLE, lung cancer (s/p tumor removal, no chemo no radiation) OA, DVT, DJD, HTN, HLD, Hypothyroidism, covid-19, obesity, heart murmur, left knee replacement, lumbar spine surgery 12/20, cataract s/p surgery, tonsillectomy who presented to [facility] for elective Right knee replacement done on 5/16  by Dr. Mansoor Castillo. Eliquis 2.5mg q 12 h was given for DVT prophylaxis/on long term AC secondary to DVT .Post Op course on telemetry secondary to bradycardia-beta blocker held. Patient was evaluated by PM&R and therapy for functional deficits, gait/ADL impairments and acute rehabilitation was recommended. Patient was medically optimized for discharge to [facility] IRU on 5/18/22.       ROS/Subjective: NAD in therapy, weekend uneventful,   no chest pain, no sob.  no nausea, no vomiting  no dizziness , no headaches  BM this AM -Voiding well.   Knee pain reports well controlled.     MEDICATIONS  (STANDING):  acetaminophen     Tablet .. 975 milliGRAM(s) Oral every 8 hours  apixaban 2.5 milliGRAM(s) Oral every 12 hours  atorvastatin 20 milliGRAM(s) Oral at bedtime  celecoxib 100 milliGRAM(s) Oral every 12 hours  folic acid 1 milliGRAM(s) Oral daily  levothyroxine 150 MICROGram(s) Oral daily  nystatin Powder 1 Application(s) Topical two times a day  pantoprazole    Tablet 40 milliGRAM(s) Oral before breakfast  polyethylene glycol 3350 17 Gram(s) Oral at bedtime  senna 2 Tablet(s) Oral at bedtime    MEDICATIONS  (PRN):  aluminum hydroxide/magnesium hydroxide/simethicone Suspension 30 milliLiter(s) Oral four times a day PRN Indigestion  magnesium hydroxide Suspension 30 milliLiter(s) Oral daily PRN Constipation  oxyCODONE    IR 5 milliGRAM(s) Oral every 3 hours PRN Moderate Pain (4 - 6)  oxyCODONE    IR 10 milliGRAM(s) Oral every 3 hours PRN Severe Pain (7 - 10)                            11.1   6.12  )-----------( 253      ( 23 May 2022 05:40 )             34.0     05-23    141  |  108  |  25<H>  ----------------------------<  95  4.5   |  28  |  0.74    Ca    8.5      23 May 2022 05:40    TPro  5.7<L>  /  Alb  2.2<L>  /  TBili  0.6  /  DBili  x   /  AST  24  /  ALT  22  /  AlkPhos  97  05-23        CAPILLARY BLOOD GLUCOSE          Vital Signs Last 24 Hrs  T(C): 36.8 (23 May 2022 09:15), Max: 36.8 (23 May 2022 09:15)  T(F): 98.2 (23 May 2022 09:15), Max: 98.2 (23 May 2022 09:15)  HR: 59 (23 May 2022 09:15) (59 - 63)  BP: 136/60 (23 May 2022 09:15) (136/60 - 136/67)  BP(mean): --  RR: 15 (23 May 2022 09:15) (15 - 15)  SpO2: 98% (23 May 2022 09:15) (98% - 98%)          Physical Exam:  General: NAD                                 HEENT: NC/AT  Cardio: RR, fan, Normal S1-S2, No M/G/R                              Pulm: No Respiratory Distress,  Lungs CTAB                        Abdomen: ND/NT, Soft, BS+                                                MSK: Right Knee ROM 0-80 passively                                       Ext: +2 edema Right LE, No calf tenderness,  stasis changes distally LLE  Skin:  Left facial erythema  Right TKR with NELLY/Ace- Noted drainage on NELLY,NELLY DCd- Small blister open Lat Knee  incision sutured- sl bleeding inferior suture line- otherwise intact- no signs of infection                                                               Wounds: none  Decubitus Ulcers: None Present     Neurological Examination    Cognitive: AAO x 3                                                                         Attention: Intact   CN II - XII  intact                                                                             Sensory: Intact to light touch                                                                                           Tone: normal Throughout   Balance-impaired      Motor    LEFT    UE: SF [5/5], EF [5/5], EE [5/5], WE [5/5],  [wnl]  RIGHT UE: SF [5/5], EF [5/5], EE [5/5], WE [5/5],  [wnl]  LEFT    LE:  HF [4+/5], KE [5/5], DF [5/5], EHL [5/5],  PF [5/5]  RIGHT LE:  HF [2/5], KE [NT], DF [5/5], EHL [5/5],  PF [5/5]      Reflex:  2 +      IDT meeting on 5/23    SW: PH, ramp, used RW prior, dtr near.    OT:  eating indep  grooming set up  UBD/LBD set up  toileting CG/CS  tub/shower CG  bathing set up/CG    PT:  amb 100ft min A RW  transfers CG  stairs na    SLP na    tentative dc home on 5/27, HC.

## 2022-05-23 NOTE — PROGRESS NOTE ADULT - ASSESSMENT
This is a 77 YO female with PMH of SLE,Hx RA, lung cancer (s/p tumor removal, no chemo no radiation) OA, DVT, DJD, HTN, HLD, Hypothyroidism, covid-19, obesity, heart murmur, left knee replacement, lumbar spine surgery 12/20, cataract s/p surgery, tonsillectomy who presented to [facility] for elective Right knee replacement done on 5/16  by Dr. Mansoor Castillo. Patient now with gait Instability, ADL impairments and Functional impairments.    REhab of Right TKR  - Comprehensive Rehab Program: PT/OT/ST, 3hours daily and 5 days weekly  - PT: Focused on improving strength, endurance, coordination, balance, functional mobility, and transfers  - OT: Focused on improving strength, fine motor skills, coordination, posture and ADLs.      #Right TKR  - Elective Right knee replacement done on 5/16  by Dr. Mansoor Castillo  -NELLY Dcd- Sl bleeding inferior knee-will cover with telfa/abd pad/ ace toes to above knee  - celebrex BID x 14 days 6/1 last day    - WB Status: WBAT    #HTN  - Monitor    #HLD  - Lipitor 20mg daily    #Hypothyroidism  - Synthroid 150mcg daily    # Post OP bradycardia  junctional rhythm  blocked APCs  Mobitz type 1  Off betablockers    #Pain management  - Tylenol PRN, Oxycodone PRN    #DVT ppx  - Eliquis 2.5mg BID    #GI ppx  - Protonix 40mg    #Bowel Regimen  - Senna, miralax, moved BMs  MOM PRN    #Bladder management  - voids freely  primafit HS  continue vessicare, methenamine    #FEN   - Diet: Regular    #Skin:  - Skin on admission: Right Knee with sutures - small blister lat knee- cavilon to blister/telfa/ abd pad/ ace toes to above knee daily  - Pressure injury/Skin: Turn Q2hrs while in bed, OOB to Chair, PT/OT   -Left facial erythema- improved    #Precaution  - Fall, Aspiration    Outpatient Follow-up (Specialty/Name of physician):  Mansoor Castillo)  Orthopedic Surgery  [address], [address]  [place] [address]  Phone: [phone]  Fax: [phone]  Scheduled Appointment: 05/31/2022 09:00 AM    Scott Dan  FAMILY MEDICINE  [address], [place] [address]  Phone: [phone]  Fax: [phone]    Josselin Ramirez)  Cardiovascular Disease; Interventional Cardiology  [address] [place] [address]  Phone: [phone]  Fax: [phone]

## 2022-05-23 NOTE — PROGRESS NOTE ADULT - ASSESSMENT
This is a 75 YO female with PMH of SLE,Hx RA, lung cancer (s/p tumor removal, no chemo no radiation) OA, DVT, DJD, HTN, HLD, Hypothyroidism, covid-19, obesity, heart murmur, left knee replacement, lumbar spine surgery 12/20, cataract s/p surgery, tonsillectomy who presented to [facility] for elective Right knee replacement done on 5/16  by Dr. Mansoor Castillo. Patient now with gait Instability, ADL impairments and Functional impairments- pt/ot/dvt ppx    Right TKR- pt/ot/dvt ppx/ pain meds  celebrex BID x 14 days 6/1 last day    #HTN  - Monitor off meds    #HLD  - Lipitor     #Hypothyroidism  - Synthroid     # Post OP bradycardia  junctional rhythm  blocked APCs  Mobitz type 1  Off beta blockers    #DVT ppx  - Eliquis 2.5mg BID    #GI ppx  - Protonix     will follow  d/w dr. mcrae

## 2022-05-24 PROCEDURE — 99233 SBSQ HOSP IP/OBS HIGH 50: CPT

## 2022-05-24 PROCEDURE — 99232 SBSQ HOSP IP/OBS MODERATE 35: CPT | Mod: GC

## 2022-05-24 RX ADMIN — Medication 975 MILLIGRAM(S): at 14:00

## 2022-05-24 RX ADMIN — CELECOXIB 100 MILLIGRAM(S): 200 CAPSULE ORAL at 17:28

## 2022-05-24 RX ADMIN — NYSTATIN CREAM 1 APPLICATION(S): 100000 CREAM TOPICAL at 17:29

## 2022-05-24 RX ADMIN — APIXABAN 2.5 MILLIGRAM(S): 2.5 TABLET, FILM COATED ORAL at 05:20

## 2022-05-24 RX ADMIN — Medication 975 MILLIGRAM(S): at 22:04

## 2022-05-24 RX ADMIN — CELECOXIB 100 MILLIGRAM(S): 200 CAPSULE ORAL at 06:05

## 2022-05-24 RX ADMIN — Medication 1 MILLIGRAM(S): at 11:39

## 2022-05-24 RX ADMIN — CELECOXIB 100 MILLIGRAM(S): 200 CAPSULE ORAL at 18:28

## 2022-05-24 RX ADMIN — SENNA PLUS 2 TABLET(S): 8.6 TABLET ORAL at 22:04

## 2022-05-24 RX ADMIN — Medication 975 MILLIGRAM(S): at 06:30

## 2022-05-24 RX ADMIN — Medication 150 MICROGRAM(S): at 05:20

## 2022-05-24 RX ADMIN — OXYCODONE HYDROCHLORIDE 10 MILLIGRAM(S): 5 TABLET ORAL at 07:35

## 2022-05-24 RX ADMIN — Medication 975 MILLIGRAM(S): at 23:04

## 2022-05-24 RX ADMIN — APIXABAN 2.5 MILLIGRAM(S): 2.5 TABLET, FILM COATED ORAL at 17:29

## 2022-05-24 RX ADMIN — ATORVASTATIN CALCIUM 20 MILLIGRAM(S): 80 TABLET, FILM COATED ORAL at 22:04

## 2022-05-24 RX ADMIN — NYSTATIN CREAM 1 APPLICATION(S): 100000 CREAM TOPICAL at 05:24

## 2022-05-24 RX ADMIN — PANTOPRAZOLE SODIUM 40 MILLIGRAM(S): 20 TABLET, DELAYED RELEASE ORAL at 06:44

## 2022-05-24 RX ADMIN — OXYCODONE HYDROCHLORIDE 10 MILLIGRAM(S): 5 TABLET ORAL at 06:30

## 2022-05-24 RX ADMIN — CELECOXIB 100 MILLIGRAM(S): 200 CAPSULE ORAL at 05:19

## 2022-05-24 RX ADMIN — Medication 975 MILLIGRAM(S): at 05:20

## 2022-05-24 RX ADMIN — Medication 975 MILLIGRAM(S): at 13:11

## 2022-05-24 NOTE — PROGRESS NOTE ADULT - ASSESSMENT
This is a 77 YO female with PMH of SLE,Hx RA, lung cancer (s/p tumor removal, no chemo no radiation) OA, DVT, DJD, HTN, HLD, Hypothyroidism, covid-19, obesity, heart murmur, left knee replacement, lumbar spine surgery 12/20, cataract s/p surgery, tonsillectomy who presented to [facility] for elective Right knee replacement done on 5/16  by Dr. Mansoor Castillo. Patient now with gait Instability, ADL impairments and Functional impairments- pt/ot/dvt ppx    Right TKR- pt/ot/dvt ppx/ pain meds  celebrex BID x 14 days 6/1 last day    #HTN  - Monitor off meds    #HLD  - Lipitor     #Hypothyroidism  - Synthroid     # Post OP bradycardia  junctional rhythm  blocked APCs  Mobitz type 1  Off beta blockers    #DVT ppx  - Eliquis 2.5mg BID    #GI ppx  - Protonix     will follow  d/w dr. mcrae

## 2022-05-24 NOTE — PROGRESS NOTE ADULT - ASSESSMENT
This is a 77 YO female with PMH of SLE,Hx RA, lung cancer (s/p tumor removal, no chemo no radiation) OA, DVT, DJD, HTN, HLD, Hypothyroidism, covid-19, obesity, heart murmur, left knee replacement, lumbar spine surgery 12/20, cataract s/p surgery, tonsillectomy who presented to [facility] for elective Right knee replacement done on 5/16  by Dr. Mansoor Castillo. Patient now with gait Instability, ADL impairments and Functional impairments.    REhab of Right TKR  - Comprehensive Rehab Program: PT/OT/ST, 3hours daily and 5 days weekly  - PT: Focused on improving strength, endurance, coordination, balance, functional mobility, and transfers  - OT: Focused on improving strength, fine motor skills, coordination, posture and ADLs.      #Right TKR  - Elective Right knee replacement done on 5/16  by Dr. Mansoor Castillo  - celebrex BID x 14 days 6/1 last day  - WB Status: WBAT, pain well controlled, tolerating therapy    #HTN  - Monitor    #HLD  - Lipitor 20mg daily    #Hypothyroidism  - Synthroid 150mcg daily    # Post OP bradycardia  junctional rhythm  blocked APCs  Mobitz type 1  Off betablockers    #Pain management  - Tylenol PRN, Oxycodone PRN    #DVT ppx/Hx DVT  - Eliquis 2.5mg BID    #GI ppx  - Protonix 40mg    #Bowel Regimen  - Senna, miralax, moved BMs  MOM PRN    #Bladder management  - voids freely  primafit HS  continue vessicare, methenamine    #FEN   - Diet: Regular    #Skin:  - Skin on admission: Right Knee with sutures  - Pressure injury/Skin: Turn Q2hrs while in bed, OOB to Chair, PT/OT   -Left facial erythema- improved    #Precaution  - Fall, Aspiration    Outpatient Follow-up (Specialty/Name of physician):  Mansoor Castillo (MD)  Orthopedic Surgery  [address], [address]  [place] [address]  Phone: [phone]  Fax: [phone]  Scheduled Appointment: 05/31/2022 09:00 AM    Scott Dan  [facility]  [address] [place] [address]  Phone: [phone]  Fax: [phone]    Josselin Ramirez)  Cardiovascular Disease; Interventional Cardiology  [address] [place] [address]  Phone: [phone]  Fax: [phone]

## 2022-05-24 NOTE — PROGRESS NOTE ADULT - SUBJECTIVE AND OBJECTIVE BOX
77 YO female with PMH of SLE, lung cancer (s/p tumor removal, no chemo no radiation) OA, DVT, DJD, HTN, HLD, Hypothyroidism, covid-19, obesity, heart murmur, left knee replacement, lumbar spine surgery 12/20, cataract s/p surgery, tonsillectomy who presented to [facility] for elective Right knee replacement done on 5/16  by Dr. Mansoor Castillo. Eliquis 2.5mg q 12 h was given for DVT prophylaxis/on long term AC secondary to DVT .Post Op course on telemetry secondary to bradycardia-beta blocker held.    seen at the bedside, c/o pain in the right knee 4/10 today, intermittent, relieved with pain meds, and rest. no n/v, no sob    Vital Signs Last 24 Hrs  T(C): 36.8 (24 May 2022 08:35), Max: 36.8 (23 May 2022 21:07)  T(F): 98.3 (24 May 2022 08:35), Max: 98.3 (24 May 2022 08:35)  HR: 68 (24 May 2022 08:35) (66 - 68)  BP: 133/66 (24 May 2022 08:35) (133/66 - 138/71)  BP(mean): --  RR: 16 (24 May 2022 08:35) (16 - 16)  SpO2: 98% (24 May 2022 08:35) (97% - 98%)        GENERAL- NAD  EAR/NOSE/MOUTH/THROAT - no pharyngeal exudates, no oral leisions,  MMM  EYES- PERCY, conjunctiva and Sclera clear  NECK- supple  RESPIRATORY-  clear to auscultation bilaterally, non laboured breathing  CARDIOVASCULAR - SIS2, RRR  GI - soft NT BS present  EXTREMITIES- b/l LE edema  NEUROLOGY- no gross focal deficits  PSYCHIATRY- AAO X 3                       11.1                 141  | 28   | 25           6.12  >-----------< 253     ------------------------< 95                    34.0                 4.5  | 108  | 0.74                                         Ca 8.5   Mg x     Ph x        MEDICATIONS  (STANDING):  acetaminophen     Tablet .. 975 milliGRAM(s) Oral every 8 hours  apixaban 2.5 milliGRAM(s) Oral every 12 hours  atorvastatin 20 milliGRAM(s) Oral at bedtime  celecoxib 100 milliGRAM(s) Oral every 12 hours  folic acid 1 milliGRAM(s) Oral daily  levothyroxine 150 MICROGram(s) Oral daily  nystatin Powder 1 Application(s) Topical two times a day  pantoprazole    Tablet 40 milliGRAM(s) Oral before breakfast  polyethylene glycol 3350 17 Gram(s) Oral at bedtime  senna 2 Tablet(s) Oral at bedtime    MEDICATIONS  (PRN):  aluminum hydroxide/magnesium hydroxide/simethicone Suspension 30 milliLiter(s) Oral four times a day PRN Indigestion  magnesium hydroxide Suspension 30 milliLiter(s) Oral daily PRN Constipation  oxyCODONE    IR 5 milliGRAM(s) Oral every 3 hours PRN Moderate Pain (4 - 6)  oxyCODONE    IR 10 milliGRAM(s) Oral every 3 hours PRN Severe Pain (7 - 10)

## 2022-05-24 NOTE — PROGRESS NOTE ADULT - NS ATTEND AMEND GEN_ALL_CORE FT
Rehab Attending- Patient seen and examined by me - Case discussed, above note reviewed by me with modifications made    doing well  right knee dressing dry- blister with cavilon  edema RLE less with ACE  Moved bowels  To Continue intensive rehab program

## 2022-05-24 NOTE — PROGRESS NOTE ADULT - SUBJECTIVE AND OBJECTIVE BOX
CHIEF COMPLAINT: s/p right TKR      HISTORY OF PRESENT ILLNESS  This is a 75 YO female with PMH of SLE, lung cancer (s/p tumor removal, no chemo no radiation) OA, DVT, DJD, HTN, HLD, Hypothyroidism, covid-19, obesity, heart murmur, left knee replacement, lumbar spine surgery 12/20, cataract s/p surgery, tonsillectomy who presented to [facility] for elective Right knee replacement done on 5/16  by Dr. Mansoor Castillo. Eliquis 2.5mg q 12 h was given for DVT prophylaxis/on long term AC secondary to DVT .Post Op course on telemetry secondary to bradycardia-beta blocker held. Patient was evaluated by PM&R and therapy for functional deficits, gait/ADL impairments and acute rehabilitation was recommended. Patient was medically optimized for discharge to [facility] IRU on 5/18/22.       ROS/Subjective: NAD in therapy  no chest pain, no sob.  no nausea, no vomiting  no dizziness , no headaches  BM daily -Voiding well.   Knee pain reports well controlled.       MEDICATIONS  (STANDING):  acetaminophen     Tablet .. 975 milliGRAM(s) Oral every 8 hours  apixaban 2.5 milliGRAM(s) Oral every 12 hours  atorvastatin 20 milliGRAM(s) Oral at bedtime  celecoxib 100 milliGRAM(s) Oral every 12 hours  folic acid 1 milliGRAM(s) Oral daily  levothyroxine 150 MICROGram(s) Oral daily  nystatin Powder 1 Application(s) Topical two times a day  pantoprazole    Tablet 40 milliGRAM(s) Oral before breakfast  polyethylene glycol 3350 17 Gram(s) Oral at bedtime  senna 2 Tablet(s) Oral at bedtime    MEDICATIONS  (PRN):  aluminum hydroxide/magnesium hydroxide/simethicone Suspension 30 milliLiter(s) Oral four times a day PRN Indigestion  magnesium hydroxide Suspension 30 milliLiter(s) Oral daily PRN Constipation  oxyCODONE    IR 5 milliGRAM(s) Oral every 3 hours PRN Moderate Pain (4 - 6)  oxyCODONE    IR 10 milliGRAM(s) Oral every 3 hours PRN Severe Pain (7 - 10)                            11.1   6.12  )-----------( 253      ( 23 May 2022 05:40 )             34.0     05-23    141  |  108  |  25<H>  ----------------------------<  95  4.5   |  28  |  0.74    Ca    8.5      23 May 2022 05:40    TPro  5.7<L>  /  Alb  2.2<L>  /  TBili  0.6  /  DBili  x   /  AST  24  /  ALT  22  /  AlkPhos  97  05-23      Vital Signs Last 24 Hrs  T(C): 36.8 (24 May 2022 08:35), Max: 36.8 (23 May 2022 21:07)  T(F): 98.3 (24 May 2022 08:35), Max: 98.3 (24 May 2022 08:35)  HR: 68 (24 May 2022 08:35) (66 - 68)  BP: 133/66 (24 May 2022 08:35) (133/66 - 138/71)  BP(mean): --  RR: 16 (24 May 2022 08:35) (16 - 16)  SpO2: 98% (24 May 2022 08:35) (97% - 98%)          Physical Exam:  General: NAD                                 HEENT: NC/AT  Cardio: RR, fan, Normal S1-S2, No M/G/R                              Pulm: No Respiratory Distress,  Lungs CTAB                        Abdomen: ND/NT, Soft, BS+                                                MSK: Right Knee ROM 0-80 passively                                       Ext: +2 edema Right LE, No calf tenderness,  stasis changes distally LLE  Right TKR with NELLY/Ace- Noted drainage on NELLY,NELLY DCd- Small blister open Lat Knee  incision sutured                                                               Decubitus Ulcers: None Present     Neurological Examination    Cognitive: AAO x 3                                                                         Attention: Intact   CN II - XII  intact                                                                             Sensory: Intact to light touch                                                                                           Tone: normal Throughout   Balance-impaired      Motor    LEFT    UE: SF [5/5], EF [5/5], EE [5/5], WE [5/5],  [wnl]  RIGHT UE: SF [5/5], EF [5/5], EE [5/5], WE [5/5],  [wnl]  LEFT    LE:  HF [4+/5], KE [5/5], DF [5/5], EHL [5/5],  PF [5/5]  RIGHT LE:  HF [2/5], KE [NT], DF [5/5], EHL [5/5],  PF [5/5]      Reflex:  2 +      IDT meeting on 5/23    SW: PH, ramp, used RW prior, dtr near.    OT:  eating indep  grooming set up  UBD/LBD set up  toileting CG/CS  tub/shower CG  bathing set up/CG    PT:  amb 100ft min A RW  transfers CG  stairs na    SLP na    tentative dc home on 5/27, HC.    Continue comprehensive acute rehab program consisting of 3hrs/day of OT/PT.

## 2022-05-25 ENCOUNTER — TRANSCRIPTION ENCOUNTER (OUTPATIENT)
Age: 77
End: 2022-05-25

## 2022-05-25 LAB — SARS-COV-2 RNA SPEC QL NAA+PROBE: SIGNIFICANT CHANGE UP

## 2022-05-25 PROCEDURE — 99233 SBSQ HOSP IP/OBS HIGH 50: CPT

## 2022-05-25 PROCEDURE — 99232 SBSQ HOSP IP/OBS MODERATE 35: CPT | Mod: GC

## 2022-05-25 RX ORDER — ATORVASTATIN CALCIUM 80 MG/1
1 TABLET, FILM COATED ORAL
Qty: 0 | Refills: 0 | DISCHARGE
Start: 2022-05-25

## 2022-05-25 RX ORDER — SENNA PLUS 8.6 MG/1
2 TABLET ORAL
Qty: 0 | Refills: 0 | DISCHARGE
Start: 2022-05-25

## 2022-05-25 RX ORDER — LEVOTHYROXINE SODIUM 125 MCG
1 TABLET ORAL
Qty: 0 | Refills: 0 | DISCHARGE
Start: 2022-05-25

## 2022-05-25 RX ORDER — ACETAMINOPHEN 500 MG
3 TABLET ORAL
Qty: 0 | Refills: 0 | DISCHARGE
Start: 2022-05-25

## 2022-05-25 RX ORDER — CELECOXIB 200 MG/1
1 CAPSULE ORAL
Qty: 0 | Refills: 0 | DISCHARGE
Start: 2022-05-25

## 2022-05-25 RX ORDER — SOLIFENACIN SUCCINATE 10 MG/1
1 TABLET ORAL
Qty: 0 | Refills: 0 | DISCHARGE

## 2022-05-25 RX ORDER — PANTOPRAZOLE SODIUM 20 MG/1
1 TABLET, DELAYED RELEASE ORAL
Qty: 0 | Refills: 0 | DISCHARGE
Start: 2022-05-25

## 2022-05-25 RX ORDER — LEVOTHYROXINE SODIUM 125 MCG
1 TABLET ORAL
Qty: 0 | Refills: 0 | DISCHARGE

## 2022-05-25 RX ORDER — APIXABAN 2.5 MG/1
1 TABLET, FILM COATED ORAL
Qty: 0 | Refills: 0 | DISCHARGE
Start: 2022-05-25

## 2022-05-25 RX ORDER — POLYETHYLENE GLYCOL 3350 17 G/17G
17 POWDER, FOR SOLUTION ORAL
Qty: 0 | Refills: 0 | DISCHARGE
Start: 2022-05-25

## 2022-05-25 RX ORDER — APIXABAN 2.5 MG/1
1 TABLET, FILM COATED ORAL
Qty: 0 | Refills: 0 | DISCHARGE

## 2022-05-25 RX ORDER — METHENAMINE MANDELATE 1 G
1 TABLET ORAL
Qty: 0 | Refills: 0 | DISCHARGE

## 2022-05-25 RX ADMIN — APIXABAN 2.5 MILLIGRAM(S): 2.5 TABLET, FILM COATED ORAL at 05:53

## 2022-05-25 RX ADMIN — CELECOXIB 100 MILLIGRAM(S): 200 CAPSULE ORAL at 18:30

## 2022-05-25 RX ADMIN — Medication 1 MILLIGRAM(S): at 11:49

## 2022-05-25 RX ADMIN — Medication 975 MILLIGRAM(S): at 23:20

## 2022-05-25 RX ADMIN — Medication 975 MILLIGRAM(S): at 22:50

## 2022-05-25 RX ADMIN — NYSTATIN CREAM 1 APPLICATION(S): 100000 CREAM TOPICAL at 05:54

## 2022-05-25 RX ADMIN — Medication 975 MILLIGRAM(S): at 14:30

## 2022-05-25 RX ADMIN — Medication 150 MICROGRAM(S): at 05:53

## 2022-05-25 RX ADMIN — APIXABAN 2.5 MILLIGRAM(S): 2.5 TABLET, FILM COATED ORAL at 17:37

## 2022-05-25 RX ADMIN — ATORVASTATIN CALCIUM 20 MILLIGRAM(S): 80 TABLET, FILM COATED ORAL at 22:51

## 2022-05-25 RX ADMIN — OXYCODONE HYDROCHLORIDE 5 MILLIGRAM(S): 5 TABLET ORAL at 13:34

## 2022-05-25 RX ADMIN — CELECOXIB 100 MILLIGRAM(S): 200 CAPSULE ORAL at 17:35

## 2022-05-25 RX ADMIN — OXYCODONE HYDROCHLORIDE 10 MILLIGRAM(S): 5 TABLET ORAL at 09:30

## 2022-05-25 RX ADMIN — NYSTATIN CREAM 1 APPLICATION(S): 100000 CREAM TOPICAL at 17:36

## 2022-05-25 RX ADMIN — OXYCODONE HYDROCHLORIDE 10 MILLIGRAM(S): 5 TABLET ORAL at 08:33

## 2022-05-25 RX ADMIN — Medication 975 MILLIGRAM(S): at 05:54

## 2022-05-25 RX ADMIN — OXYCODONE HYDROCHLORIDE 5 MILLIGRAM(S): 5 TABLET ORAL at 14:28

## 2022-05-25 RX ADMIN — PANTOPRAZOLE SODIUM 40 MILLIGRAM(S): 20 TABLET, DELAYED RELEASE ORAL at 06:04

## 2022-05-25 RX ADMIN — Medication 975 MILLIGRAM(S): at 13:34

## 2022-05-25 RX ADMIN — CELECOXIB 100 MILLIGRAM(S): 200 CAPSULE ORAL at 05:53

## 2022-05-25 NOTE — DISCHARGE NOTE PROVIDER - NSDCCPCAREPLAN_GEN_ALL_CORE_FT
PRINCIPAL DISCHARGE DIAGNOSIS  Diagnosis: H/O total knee replacement, right  Assessment and Plan of Treatment:       SECONDARY DISCHARGE DIAGNOSES  Diagnosis: Rheumatoid arthritis  Assessment and Plan of Treatment:     Diagnosis: Bradycardia  Assessment and Plan of Treatment:      PRINCIPAL DISCHARGE DIAGNOSIS  Diagnosis: H/O total knee replacement, right  Assessment and Plan of Treatment: Tylenol and oxycodone for knee pain as needed. Celebrex completes in 3 days. Follow up ortho in 1 week.      SECONDARY DISCHARGE DIAGNOSES  Diagnosis: Rheumatoid arthritis  Assessment and Plan of Treatment: Follow up your Rheumatologist. Resume Methotraxate per rheumatologist.    Diagnosis: Bradycardia  Assessment and Plan of Treatment: Follow up Cardiology in 1 week.

## 2022-05-25 NOTE — DISCHARGE NOTE PROVIDER - PROVIDER TOKENS
PROVIDER:[TOKEN:[[id number]],FOLLOWUP:[1 week]],PROVIDER:[TOKEN:[[id number]],FOLLOWUP:[1 week]],PROVIDER:[TOKEN:[[id number]],FOLLOWUP:[1 week]],PROVIDER:[TOKEN:[[id number]],FOLLOWUP:[1 month]]

## 2022-05-25 NOTE — PROGRESS NOTE ADULT - ASSESSMENT
This is a 75 YO female with PMH of SLE,Hx RA, lung cancer (s/p tumor removal, no chemo no radiation) OA, DVT, DJD, HTN, HLD, Hypothyroidism, covid-19, obesity, heart murmur, left knee replacement, lumbar spine surgery 12/20, cataract s/p surgery, tonsillectomy who presented to [facility] for elective Right knee replacement done on 5/16  by Dr. Mansoor Castillo. Patient now with gait Instability, ADL impairments and Functional impairments- pt/ot/dvt ppx    Right TKR- pt/ot/dvt ppx/ pain meds  celebrex BID x 14 days 6/1 last day    #HTN  - Monitor off meds    #HLD  - Lipitor     #Hypothyroidism  - Synthroid     # Post OP bradycardia  junctional rhythm  blocked APCs  Mobitz type 1  Off beta blockers    #DVT ppx  - Eliquis 2.5mg BID    #GI ppx  - Protonix     will follow  d/w dr. mcrae

## 2022-05-25 NOTE — CHART NOTE - NSCHARTNOTEFT_GEN_A_CORE
IDT meeting on 5/23    SW: PH, ramp, used RW prior, dtr near.    OT:  eating indep  grooming set up  UBD/LBD set up  toileting CG/CS  tub/shower CG  bathing set up/CG    PT:  amb 100ft min A RW  transfers CG  stairs na    SLP na    tentative dc home on 5/27, 
Nutrition Follow Up Note  Hospital Course   (Per Electronic Medical Record)    Source:  Patient [X]  Medical Record [X]      Diet:   Regular Diet (IDDSI Level 7) w/ Thin Liquids (IDDSI Level 0) - Declines Therapeutic Diet Despite PMHx   Tolerates Diet Consistency Well  No Chewing/Swallowing Difficulties  No Recent Nausea, Vomiting, Diarrhea or Constipation (as Per Patient)  Consumes % of Meals (as Per Documentation) - States Fair Intake (Per Patient)     Enteral/Parenteral Nutrition: Not Applicable    Current Weight: 229.2lb on 5/21  Obtain New Weight to Confirm  Obtain Weights Weekly     Pertinent Medications: MEDICATIONS  (STANDING):  acetaminophen     Tablet .. 975 milliGRAM(s) Oral every 8 hours  apixaban 2.5 milliGRAM(s) Oral every 12 hours  atorvastatin 20 milliGRAM(s) Oral at bedtime  celecoxib 100 milliGRAM(s) Oral every 12 hours  folic acid 1 milliGRAM(s) Oral daily  levothyroxine 150 MICROGram(s) Oral daily  nystatin Powder 1 Application(s) Topical two times a day  pantoprazole    Tablet 40 milliGRAM(s) Oral before breakfast  polyethylene glycol 3350 17 Gram(s) Oral at bedtime  senna 2 Tablet(s) Oral at bedtime    MEDICATIONS  (PRN):  aluminum hydroxide/magnesium hydroxide/simethicone Suspension 30 milliLiter(s) Oral four times a day PRN Indigestion  magnesium hydroxide Suspension 30 milliLiter(s) Oral daily PRN Constipation  oxyCODONE    IR 5 milliGRAM(s) Oral every 3 hours PRN Moderate Pain (4 - 6)  oxyCODONE    IR 10 milliGRAM(s) Oral every 3 hours PRN Severe Pain (7 - 10)    Pertinent Labs:  05-23 Na141 mmol/L Glu 95 mg/dL K+ 4.5 mmol/L Cr  0.74 mg/dL BUN 25 mg/dL<H> 05-23 Alb 2.2 g/dL<L>    Skin: No Pressure Ulcers     Edema: +2 Edema Noted to Feet  (as Per Documentation)     Last Bowel Movement: on 5/23    Estimated Needs:   [X] No Change Since Previous Assessment    Previous Nutrition Diagnosis:   Obese    Nutrition Diagnosis is [X] Ongoing - Continue Nutrition Plan of Care     New Nutrition Diagnosis: [X] Not Applicable    Interventions:   1. Recommend Continue Nutrition Plan of Care     Monitoring & Evaluation:   [X] Weights   [X] PO Intake   [X] Skin Integrity   [X] Follow Up (Per Protocol)  [X] Tolerance to Diet Prescription   [X] Other: Labs     Registered Dietitian/Nutritionist Remains Available.  Reinaldo George RDN    Pager #[phone]  Phone# [phone]
[facility] Inter[facility] Plan of Care    REHABILITATION DIAGNOSIS:  Artificial knee joint present          COMORBIDITIES/COMPLICATING CONDITIONS IMPACTING REHABILITATION:  HEALTH ISSUES - PROBLEM Dx:  Post op pain  RA/SLE  LBP      PAST MEDICAL & SURGICAL HISTORY:  Rheumatoid arthritis      SLE (systemic lupus erythematosus)      Osteoarthritis      H/O degenerative disc disease      Hypertension      Hyperlipidemia      Benign heart murmur      Hypothyroid      Obesity, Class II, BMI 35-39.9, no comorbidity      DVT (deep venous thrombosis)  during left knee replacement 2019      Overactive bladder      Lab test positive for detection of COVID-19 virus  12/20      S/P tonsillectomy      S/P cataract surgery  left, right      S/P eye surgery  child      S/P carpal tunnel release  left      S/P knee surgery  bilateral      Lung cancer  small tumor removed 2015-no chemo, no radiation      S/P thyroid surgery  1 lobe removed      Status post total hip replacement, right      S/P knee replacement  left      S/P lumbar spine operation  12/20          Based upon consideration of the patient's impairments, functional status, complicating conditions and any other contributing factors and after information garnered from the assessments of all therapy disciplines involved in treating the patient and other pertinent clinicians:    INTERDISCIPLINARY REHABILITATION INTERVENTIONS:    [ X  ] Transfer Training  [ X  ] Bed Mobility  [ X  ] Therapeutic Exercise  [ X ] Balance/Coordination Exercises  [ X ] Locomotion retraining  [ X  ] Stairs  [  X ] Functional Transfer Training  [  x ] Bowel/Bladder program  [  x] Pain Management  [  x ] Skin/Wound Care  [   ] Visual/Perceptual Training  [   ] Therapeutic Recreation Activities  [   ] Neuromuscular Re-education  [ X  ] Activities of Daily Living  [   ] Speech Exercise  [   ] Swallowing Exercises  [   ] Vital Stim  [   ] Dietary Supplements  [   ] Calorie Count  [   ] Cognitive Exercises  [   ] Congnitive/Linguistic Treatment  [   ] Behavior Program  [   ] Neuropsych Therapy  [ X  ] Patient/Family Counseling  [ X ] Family Training  [ X  ] Community Re-entry  [   ] Orthotic Evaluation  [   ] Prosthetic Eval/Training    MEDICAL PROGNOSIS:  good    REHAB POTENTIAL:  good  EXPECTED DAILY THERAPY:         PT:2hr       OT:1hr       ST:       P&O:    EXPECTED INTENSITY OF PROGRAM:  3 hrs / Day    EXPECTED FREQUENCY OF PROGRAM: 5 Days/ Week    ESTIMATED LOS:  [  ] 5-7 Days  [  ] 7-10 Days  [ x ] 10- 14 Days  [  ] 14- 18 Days  [  ] 18- 21 Days    ESTIMATED DISPOSITION:  [  ] Home   [  ] Home with Outpatient Therapies  [ x ] Home with Home Therapies  [  ] Assisted Living  [  ] Nursing Home  [  ] Long Term Acute Care    INTERDISCIPLINARY FUNCTIONAL OUTCOMES/GOALS:         Gait/Mobility:6       Transfers:6       ADLs:6       Functional Transfers:6       Medication Management:7       Communication:NA       Cognitive:NA       Dysphagia:NA       Bladder7       Bowel:7     Functional Independent Measures:   7 = Independent  6 = Modified Independent  5 = Supervision  4 = Minimal Assist/ Contact Guard  3 = Moderate Assistance  2 = Maximum Assistance  1 = Total Assistance  0 = Unable to assess

## 2022-05-25 NOTE — DISCHARGE NOTE PROVIDER - CARE PROVIDER_API CALL
Mansoor Castillo)  Orthopedic Surgery  [address]  [place] [address]  Phone: [phone]  Fax: [phone]  Follow Up Time: 1 week    Josselin Ramirez)  Cardiovascular Disease; Interventional Cardiology  [address] [place] [address]  Phone: [phone]  Fax: [phone]  Follow Up Time: 1 week    Scott Dan  [facility] MEDICINE  [address] [place] [address]  Phone: [phone]  Fax: [phone]  Follow Up Time: 1 week    Charlie Palmer)  PhysicalRehab Medicine  [address] [place] [address]  Phone: [phone]  Fax: [phone]  Follow Up Time: 1 month

## 2022-05-25 NOTE — DISCHARGE NOTE PROVIDER - NSDCFUSCHEDAPPT_GEN_ALL_CORE_FT
[facility]  ORTHOSURG [address] [place] [address]  Scheduled Appointment: 05/31/2022    Mansoor Castillo  [facility]  ORTHOSUR[address]  Scheduled Appointment: 08/02/2022

## 2022-05-25 NOTE — PROGRESS NOTE ADULT - SUBJECTIVE AND OBJECTIVE BOX
CHIEF COMPLAINT: s/p right TKR      HISTORY OF PRESENT ILLNESS  This is a 75 YO female with PMH of SLE, lung cancer (s/p tumor removal, no chemo no radiation) OA, DVT, DJD, HTN, HLD, Hypothyroidism, covid-19, obesity, heart murmur, left knee replacement, lumbar spine surgery 12/20, cataract s/p surgery, tonsillectomy who presented to [facility] for elective Right knee replacement done on 5/16  by Dr. Mansoor Castillo. Eliquis 2.5mg q 12 h was given for DVT prophylaxis/on long term AC secondary to DVT .Post Op course on telemetry secondary to bradycardia-beta blocker held. Patient was evaluated by PM&R and therapy for functional deficits, gait/ADL impairments and acute rehabilitation was recommended. Patient was medically optimized for discharge to [facility] IRU on 5/18/22.       ROS/Subjective: NAD in therapy  no chest pain, no sob.  no nausea, no vomiting  no dizziness , no headaches  BM daily -Voiding well.   Knee pain reports well controlled.   reports      MEDICATIONS  (STANDING):  acetaminophen     Tablet .. 975 milliGRAM(s) Oral every 8 hours  apixaban 2.5 milliGRAM(s) Oral every 12 hours  atorvastatin 20 milliGRAM(s) Oral at bedtime  celecoxib 100 milliGRAM(s) Oral every 12 hours  folic acid 1 milliGRAM(s) Oral daily  levothyroxine 150 MICROGram(s) Oral daily  nystatin Powder 1 Application(s) Topical two times a day  pantoprazole    Tablet 40 milliGRAM(s) Oral before breakfast  polyethylene glycol 3350 17 Gram(s) Oral at bedtime  senna 2 Tablet(s) Oral at bedtime    MEDICATIONS  (PRN):  aluminum hydroxide/magnesium hydroxide/simethicone Suspension 30 milliLiter(s) Oral four times a day PRN Indigestion  magnesium hydroxide Suspension 30 milliLiter(s) Oral daily PRN Constipation  oxyCODONE    IR 5 milliGRAM(s) Oral every 3 hours PRN Moderate Pain (4 - 6)  oxyCODONE    IR 10 milliGRAM(s) Oral every 3 hours PRN Severe Pain (7 - 10)                            11.1   6.12  )-----------( 253      ( 23 May 2022 05:40 )             34.0     05-23    141  |  108  |  25<H>  ----------------------------<  95  4.5   |  28  |  0.74    Ca    8.5      23 May 2022 05:40    TPro  5.7<L>  /  Alb  2.2<L>  /  TBili  0.6  /  DBili  x   /  AST  24  /  ALT  22  /  AlkPhos  97  05-23      Vital Signs Last 24 Hrs  T(C): 36.8 (25 May 2022 08:41), Max: 36.8 (25 May 2022 08:41)  T(F): 98.2 (25 May 2022 08:41), Max: 98.2 (25 May 2022 08:41)  HR: 67 (25 May 2022 08:41) (66 - 67)  BP: 113/77 (25 May 2022 08:41) (113/77 - 128/60)  BP(mean): --  RR: 16 (25 May 2022 08:41) (16 - 16)  SpO2: 95% (25 May 2022 08:41) (95% - 100%)        Physical Exam:  General: NAD                                 HEENT: NC/AT  Cardio: RR, fan, Normal S1-S2, No M/G/R                              Pulm: No Respiratory Distress,  Lungs CTAB                        Abdomen: ND/NT, Soft, BS+                                                MSK: Right Knee ROM 0-90 passively                                       Ext: +2 edema Right LE, No calf tenderness,  stasis changes distally LLE  Right TKR with NELLY/Ace- Small blister Lat Knee dry- incision dry  incision sutured                                                               Decubitus Ulcers: None Present     Neurological Examination    Cognitive: AAO x 3                                                                         Attention: Intact   CN II - XII  intact                                                                             Sensory: Intact to light touch                                                                                           Tone: normal Throughout   Balance-impaired      Motor    LEFT    UE: SF [5/5], EF [5/5], EE [5/5], WE [5/5],  [wnl]  RIGHT UE: SF [5/5], EF [5/5], EE [5/5], WE [5/5],  [wnl]  LEFT    LE:  HF [4+/5], KE [5/5], DF [5/5], EHL [5/5],  PF [5/5]  RIGHT LE:  HF [3/5], KE [3/5], DF [5/5], EHL [5/5],  PF [5/5]      Reflex:  2 +      IDT meeting on 5/23    SW: PH, ramp, used RW prior, dtr near.    OT:  eating indep  grooming set up  UBD/LBD set up  toileting CG/CS  tub/shower CG  bathing set up/CG    PT:  amb 100ft min A RW  transfers CG  stairs na    SLP na    tentative dc home on 5/27, HC.    Continue comprehensive acute rehab program consisting of 3hrs/day of OT/PT.

## 2022-05-25 NOTE — DISCHARGE NOTE PROVIDER - CARE PROVIDERS DIRECT ADDRESSES
,[email or web address],DirectAddress_Unknown,DirectAddress_Unknown

## 2022-05-25 NOTE — PROGRESS NOTE ADULT - SUBJECTIVE AND OBJECTIVE BOX
77 YO female with PMH of SLE, lung cancer (s/p tumor removal, no chemo no radiation) OA, DVT, DJD, HTN, HLD, Hypothyroidism, covid-19, obesity, heart murmur, left knee replacement, lumbar spine surgery 12/20, cataract s/p surgery, tonsillectomy who presented to [facility] for elective Right knee replacement done on 5/16  by Dr. Mansoor Castillo. Eliquis 2.5mg q 12 h was given for DVT prophylaxis/on long term AC secondary to DVT .Post Op course on telemetry secondary to bradycardia-beta blocker held.    seen at the bedside, sitting in a chair by the bedside, c/o 2/10 intermittent, pain in the right knee today, relieved with pain meds, and rest. no n/v, no sob      Vital Signs Last 24 Hrs  T(C): 36.8 (25 May 2022 08:41), Max: 36.8 (25 May 2022 08:41)  T(F): 98.2 (25 May 2022 08:41), Max: 98.2 (25 May 2022 08:41)  HR: 67 (25 May 2022 08:41) (66 - 67)  BP: 113/77 (25 May 2022 08:41) (113/77 - 128/60)  BP(mean): --  RR: 16 (25 May 2022 08:41) (16 - 16)  SpO2: 95% (25 May 2022 08:41) (95% - 100%)      GENERAL- NAD  EAR/NOSE/MOUTH/THROAT - no pharyngeal exudates, no oral lesion's  MMM  EYES- PERCY, conjunctiva and Sclera clear  NECK- supple  RESPIRATORY-  clear to auscultation bilaterally, non laboured breathing  CARDIOVASCULAR - SIS2, RRR  GI - soft NT BS present  EXTREMITIES- b/l LE edema  NEUROLOGY- no gross focal deficits  PSYCHIATRY- AAO X 3          MEDICATIONS  (STANDING):  acetaminophen     Tablet .. 975 milliGRAM(s) Oral every 8 hours  apixaban 2.5 milliGRAM(s) Oral every 12 hours  atorvastatin 20 milliGRAM(s) Oral at bedtime  celecoxib 100 milliGRAM(s) Oral every 12 hours  folic acid 1 milliGRAM(s) Oral daily  levothyroxine 150 MICROGram(s) Oral daily  nystatin Powder 1 Application(s) Topical two times a day  pantoprazole    Tablet 40 milliGRAM(s) Oral before breakfast  polyethylene glycol 3350 17 Gram(s) Oral at bedtime  senna 2 Tablet(s) Oral at bedtime    MEDICATIONS  (PRN):  aluminum hydroxide/magnesium hydroxide/simethicone Suspension 30 milliLiter(s) Oral four times a day PRN Indigestion  magnesium hydroxide Suspension 30 milliLiter(s) Oral daily PRN Constipation  oxyCODONE    IR 5 milliGRAM(s) Oral every 3 hours PRN Moderate Pain (4 - 6)  oxyCODONE    IR 10 milliGRAM(s) Oral every 3 hours PRN Severe Pain (7 - 10)

## 2022-05-25 NOTE — PROGRESS NOTE ADULT - ASSESSMENT
This is a 75 YO female with PMH of SLE,Hx RA, lung cancer (s/p tumor removal, no chemo no radiation) OA, DVT, DJD, HTN, HLD, Hypothyroidism, covid-19, obesity, heart murmur, left knee replacement, lumbar spine surgery 12/20, cataract s/p surgery, tonsillectomy who presented to [facility] for elective Right knee replacement done on 5/16  by Dr. Mansoor Castillo. Patient now with gait Instability, ADL impairments and Functional impairments.    REhab of Right TKR  - Comprehensive Rehab Program: PT/OT/ST, 3hours daily and 5 days weekly  - PT: Focused on improving strength, endurance, coordination, balance, functional mobility, and transfers  - OT: Focused on improving strength, fine motor skills, coordination, posture and ADLs.      #Right TKR  - Elective Right knee replacement done on 5/16  by Dr. Mansoor Castillo  - celebrex BID x 14 days 6/1 last day  - WB Status: WBAT, pain well controlled, tolerating therapy  TEDS LES- can place abd pad over knee to protect suture line    #HTN  - Monitor    #HLD  - Lipitor 20mg daily    #Hypothyroidism  - Synthroid 150mcg daily    # Post OP bradycardia  junctional rhythm  blocked APCs  Mobitz type 1  Off betablockers    #Pain management  - Tylenol PRN, Oxycodone PRN    #DVT ppx/Hx DVT  - Eliquis 2.5mg BID    #GI ppx  - Protonix 40mg    #Bowel Regimen  - Senna, miralax, moved BMs  MOM PRN    #Bladder management  - voids freely  primafit HS  continue vessicare, methenamine    #FEN   - Diet: Regular    #Skin:  - Skin on admission: Right Knee with sutures  - Pressure injury/Skin: Turn Q2hrs while in bed, OOB to Chair, PT/OT   -Left facial erythema- improved    #Precaution  - Fall, Aspiration    Outpatient Follow-up (Specialty/Name of physician):  Mansoor Castillo)  Orthopedic Surgery  [address] [facility] [address]  [place] [address]  Phone: [phone]  Fax: [phone]  Scheduled Appointment: 05/31/2022 09:00 AM    Scott Dna  [facility]  [address] [place] [address]  Phone: [phone]  Fax: [phone]    Josselin Ramirez)  Cardiovascular Disease; Interventional Cardiology  [address] [place] [address]  Phone: [phone]  Fax: [phone]

## 2022-05-25 NOTE — DISCHARGE NOTE PROVIDER - HOSPITAL COURSE
pr 77 YO female with PMH of SLE, lung cancer (s/p tumor removal, no chemo no radiation) OA, DVT, DJD, HTN, HLD, Hypothyroidism, covid-19, obesity, heart murmur, left knee replacement, lumbar spine surgery 12/20, cataract s/p surgery, tonsillectomy who presented to [facility] for elective Right knee replacement done on 5/16  by Dr. Mansoor Castillo. Eliquis 2.5mg q 12 h was given for DVT prophylaxis/on long term AC secondary to DVT .Post Op course on telemetry secondary to bradycardia-beta blocker held. Patient was evaluated by PM&R and therapy for functional deficits, gait/ADL impairments and acute rehabilitation was recommended. Patient was medically optimized for discharge to [facility] IRU on 5/18/22. At [facility] rehab patient completed comprehensive PT OT program and reached her rehab goals on 5/28. While at rehab evaluated by medicine. Stable course, covid PCR neg, completed 10d quarantine. Cleared for dc on 5/28.

## 2022-05-25 NOTE — DISCHARGE NOTE PROVIDER - NSDCMRMEDTOKEN_GEN_ALL_CORE_FT
acetaminophen 325 mg oral tablet: 3 tab(s) orally every 8 hours  apixaban 2.5 mg oral tablet: 1 tab(s) orally every 12 hours  atorvastatin 20 mg oral tablet: 1 tab(s) orally once a day (at bedtime)  celecoxib 100 mg oral capsule: 1 cap(s) orally every 12 hours  levothyroxine 150 mcg (0.15 mg) oral tablet: 1 tab(s) orally once a day  methotrexate 2.5 mg oral tablet: 4 tab(s) orally every 7 days  pantoprazole 40 mg oral delayed release tablet: 1 tab(s) orally once a day (before a meal)  polyethylene glycol 3350 oral powder for reconstitution: 17 gram(s) orally once a day (at bedtime)  senna oral tablet: 2 tab(s) orally once a day (at bedtime)   acetaminophen 325 mg oral tablet: 3 tab(s) orally every 8 hours, As Needed  apixaban 2.5 mg oral tablet: 1 tab(s) orally every 12 hours  atorvastatin 20 mg oral tablet: 1 tab(s) orally once a day (at bedtime)  celecoxib 100 mg oral capsule: 1 cap(s) orally every 12 hours  levothyroxine 150 mcg (0.15 mg) oral tablet: 1 tab(s) orally once a day  oxyCODONE 5 mg oral tablet: 1 tab(s) orally 2 times a day, As Needed -Severe Pain (7 - 10) MDD:2 tabs  pantoprazole 40 mg oral delayed release tablet: 1 tab(s) orally once a day (before a meal)  polyethylene glycol 3350 oral powder for reconstitution: 17 gram(s) orally once a day (at bedtime)  senna oral tablet: 2 tab(s) orally once a day (at bedtime), As Needed   acetaminophen 325 mg oral tablet: 3 tab(s) orally every 8 hours, As Needed  apixaban 2.5 mg oral tablet: 1 tab(s) orally every 12 hours  atorvastatin 20 mg oral tablet: 1 tab(s) orally once a day (at bedtime)  celecoxib 100 mg oral capsule: 1 cap(s) orally every 12 hours  levothyroxine 150 mcg (0.15 mg) oral tablet: 1 tab(s) orally once a day  melatonin 3 mg oral tablet: 1 tab(s) orally once a day (at bedtime)  oxyCODONE 5 mg oral tablet: 1 tab(s) orally 2 times a day, As Needed -Severe Pain (7 - 10) MDD:2 tabs  pantoprazole 40 mg oral delayed release tablet: 1 tab(s) orally once a day (before a meal)  polyethylene glycol 3350 oral powder for reconstitution: 17 gram(s) orally once a day (at bedtime)  senna oral tablet: 2 tab(s) orally once a day (at bedtime), As Needed

## 2022-05-26 LAB
ALBUMIN SERPL ELPH-MCNC: 2.6 G/DL — LOW (ref 3.3–5)
ALP SERPL-CCNC: 113 U/L — SIGNIFICANT CHANGE UP (ref 40–120)
ALT FLD-CCNC: 27 U/L — SIGNIFICANT CHANGE UP (ref 10–45)
ANION GAP SERPL CALC-SCNC: 3 MMOL/L — LOW (ref 5–17)
AST SERPL-CCNC: 28 U/L — SIGNIFICANT CHANGE UP (ref 10–40)
BASOPHILS # BLD AUTO: 0.06 K/UL — SIGNIFICANT CHANGE UP (ref 0–0.2)
BASOPHILS NFR BLD AUTO: 1 % — SIGNIFICANT CHANGE UP (ref 0–2)
BILIRUB SERPL-MCNC: 0.5 MG/DL — SIGNIFICANT CHANGE UP (ref 0.2–1.2)
BUN SERPL-MCNC: 29 MG/DL — HIGH (ref 7–23)
CALCIUM SERPL-MCNC: 9 MG/DL — SIGNIFICANT CHANGE UP (ref 8.4–10.5)
CHLORIDE SERPL-SCNC: 110 MMOL/L — HIGH (ref 96–108)
CO2 SERPL-SCNC: 30 MMOL/L — SIGNIFICANT CHANGE UP (ref 22–31)
CREAT SERPL-MCNC: 0.81 MG/DL — SIGNIFICANT CHANGE UP (ref 0.5–1.3)
EGFR: 75 ML/MIN/1.73M2 — SIGNIFICANT CHANGE UP
EOSINOPHIL # BLD AUTO: 0.22 K/UL — SIGNIFICANT CHANGE UP (ref 0–0.5)
EOSINOPHIL NFR BLD AUTO: 3.8 % — SIGNIFICANT CHANGE UP (ref 0–6)
GLUCOSE SERPL-MCNC: 94 MG/DL — SIGNIFICANT CHANGE UP (ref 70–99)
HCT VFR BLD CALC: 37.2 % — SIGNIFICANT CHANGE UP (ref 34.5–45)
HGB BLD-MCNC: 11.8 G/DL — SIGNIFICANT CHANGE UP (ref 11.5–15.5)
IMM GRANULOCYTES NFR BLD AUTO: 0.3 % — SIGNIFICANT CHANGE UP (ref 0–1.5)
LYMPHOCYTES # BLD AUTO: 1.35 K/UL — SIGNIFICANT CHANGE UP (ref 1–3.3)
LYMPHOCYTES # BLD AUTO: 23.1 % — SIGNIFICANT CHANGE UP (ref 13–44)
MCHC RBC-ENTMCNC: 31.1 PG — SIGNIFICANT CHANGE UP (ref 27–34)
MCHC RBC-ENTMCNC: 31.7 GM/DL — LOW (ref 32–36)
MCV RBC AUTO: 98.2 FL — SIGNIFICANT CHANGE UP (ref 80–100)
MONOCYTES # BLD AUTO: 0.85 K/UL — SIGNIFICANT CHANGE UP (ref 0–0.9)
MONOCYTES NFR BLD AUTO: 14.6 % — HIGH (ref 2–14)
NEUTROPHILS # BLD AUTO: 3.34 K/UL — SIGNIFICANT CHANGE UP (ref 1.8–7.4)
NEUTROPHILS NFR BLD AUTO: 57.2 % — SIGNIFICANT CHANGE UP (ref 43–77)
NRBC # BLD: 0 /100 WBCS — SIGNIFICANT CHANGE UP (ref 0–0)
PLATELET # BLD AUTO: 308 K/UL — SIGNIFICANT CHANGE UP (ref 150–400)
POTASSIUM SERPL-MCNC: 4.6 MMOL/L — SIGNIFICANT CHANGE UP (ref 3.5–5.3)
POTASSIUM SERPL-SCNC: 4.6 MMOL/L — SIGNIFICANT CHANGE UP (ref 3.5–5.3)
PROT SERPL-MCNC: 6.4 G/DL — SIGNIFICANT CHANGE UP (ref 6–8.3)
RBC # BLD: 3.79 M/UL — LOW (ref 3.8–5.2)
RBC # FLD: 13.9 % — SIGNIFICANT CHANGE UP (ref 10.3–14.5)
SODIUM SERPL-SCNC: 143 MMOL/L — SIGNIFICANT CHANGE UP (ref 135–145)
WBC # BLD: 5.84 K/UL — SIGNIFICANT CHANGE UP (ref 3.8–10.5)
WBC # FLD AUTO: 5.84 K/UL — SIGNIFICANT CHANGE UP (ref 3.8–10.5)

## 2022-05-26 PROCEDURE — 99232 SBSQ HOSP IP/OBS MODERATE 35: CPT | Mod: GC

## 2022-05-26 PROCEDURE — 99232 SBSQ HOSP IP/OBS MODERATE 35: CPT

## 2022-05-26 RX ORDER — ATORVASTATIN CALCIUM 80 MG/1
1 TABLET, FILM COATED ORAL
Qty: 30 | Refills: 0
Start: 2022-05-26 | End: 2022-06-24

## 2022-05-26 RX ORDER — LANOLIN ALCOHOL/MO/W.PET/CERES
3 CREAM (GRAM) TOPICAL AT BEDTIME
Refills: 0 | Status: DISCONTINUED | OUTPATIENT
Start: 2022-05-26 | End: 2022-05-28

## 2022-05-26 RX ORDER — PANTOPRAZOLE SODIUM 20 MG/1
1 TABLET, DELAYED RELEASE ORAL
Qty: 30 | Refills: 0
Start: 2022-05-26 | End: 2022-06-24

## 2022-05-26 RX ORDER — LEVOTHYROXINE SODIUM 125 MCG
1 TABLET ORAL
Qty: 30 | Refills: 0
Start: 2022-05-26 | End: 2022-06-24

## 2022-05-26 RX ORDER — APIXABAN 2.5 MG/1
1 TABLET, FILM COATED ORAL
Qty: 0 | Refills: 0 | DISCHARGE
Start: 2022-05-26

## 2022-05-26 RX ORDER — ATORVASTATIN CALCIUM 80 MG/1
1 TABLET, FILM COATED ORAL
Qty: 0 | Refills: 0 | DISCHARGE
Start: 2022-05-26

## 2022-05-26 RX ORDER — CELECOXIB 200 MG/1
1 CAPSULE ORAL
Qty: 0 | Refills: 0 | DISCHARGE
Start: 2022-05-26

## 2022-05-26 RX ORDER — LANOLIN ALCOHOL/MO/W.PET/CERES
1 CREAM (GRAM) TOPICAL
Qty: 0 | Refills: 0 | DISCHARGE
Start: 2022-05-26

## 2022-05-26 RX ORDER — OXYCODONE HYDROCHLORIDE 5 MG/1
1 TABLET ORAL
Qty: 10 | Refills: 0
Start: 2022-05-26 | End: 2022-05-30

## 2022-05-26 RX ORDER — CELECOXIB 200 MG/1
1 CAPSULE ORAL
Qty: 6 | Refills: 0
Start: 2022-05-26 | End: 2022-05-28

## 2022-05-26 RX ORDER — OXYCODONE HYDROCHLORIDE 5 MG/1
5 TABLET ORAL EVERY 6 HOURS
Refills: 0 | Status: DISCONTINUED | OUTPATIENT
Start: 2022-05-26 | End: 2022-05-28

## 2022-05-26 RX ORDER — LEVOTHYROXINE SODIUM 125 MCG
1 TABLET ORAL
Qty: 0 | Refills: 0 | DISCHARGE
Start: 2022-05-26

## 2022-05-26 RX ORDER — APIXABAN 2.5 MG/1
1 TABLET, FILM COATED ORAL
Qty: 60 | Refills: 0
Start: 2022-05-26 | End: 2022-06-24

## 2022-05-26 RX ORDER — PANTOPRAZOLE SODIUM 20 MG/1
1 TABLET, DELAYED RELEASE ORAL
Qty: 0 | Refills: 0 | DISCHARGE
Start: 2022-05-26

## 2022-05-26 RX ADMIN — CELECOXIB 100 MILLIGRAM(S): 200 CAPSULE ORAL at 18:06

## 2022-05-26 RX ADMIN — CELECOXIB 100 MILLIGRAM(S): 200 CAPSULE ORAL at 18:47

## 2022-05-26 RX ADMIN — Medication 975 MILLIGRAM(S): at 21:18

## 2022-05-26 RX ADMIN — CELECOXIB 100 MILLIGRAM(S): 200 CAPSULE ORAL at 06:02

## 2022-05-26 RX ADMIN — Medication 975 MILLIGRAM(S): at 18:05

## 2022-05-26 RX ADMIN — SENNA PLUS 2 TABLET(S): 8.6 TABLET ORAL at 21:18

## 2022-05-26 RX ADMIN — CELECOXIB 100 MILLIGRAM(S): 200 CAPSULE ORAL at 05:32

## 2022-05-26 RX ADMIN — Medication 975 MILLIGRAM(S): at 06:00

## 2022-05-26 RX ADMIN — OXYCODONE HYDROCHLORIDE 5 MILLIGRAM(S): 5 TABLET ORAL at 16:25

## 2022-05-26 RX ADMIN — APIXABAN 2.5 MILLIGRAM(S): 2.5 TABLET, FILM COATED ORAL at 05:32

## 2022-05-26 RX ADMIN — Medication 975 MILLIGRAM(S): at 22:18

## 2022-05-26 RX ADMIN — ATORVASTATIN CALCIUM 20 MILLIGRAM(S): 80 TABLET, FILM COATED ORAL at 21:18

## 2022-05-26 RX ADMIN — Medication 1 MILLIGRAM(S): at 18:06

## 2022-05-26 RX ADMIN — OXYCODONE HYDROCHLORIDE 5 MILLIGRAM(S): 5 TABLET ORAL at 15:46

## 2022-05-26 RX ADMIN — APIXABAN 2.5 MILLIGRAM(S): 2.5 TABLET, FILM COATED ORAL at 18:06

## 2022-05-26 RX ADMIN — NYSTATIN CREAM 1 APPLICATION(S): 100000 CREAM TOPICAL at 05:33

## 2022-05-26 RX ADMIN — Medication 150 MICROGRAM(S): at 05:32

## 2022-05-26 RX ADMIN — Medication 975 MILLIGRAM(S): at 05:32

## 2022-05-26 RX ADMIN — OXYCODONE HYDROCHLORIDE 5 MILLIGRAM(S): 5 TABLET ORAL at 09:00

## 2022-05-26 RX ADMIN — Medication 3 MILLIGRAM(S): at 21:18

## 2022-05-26 RX ADMIN — OXYCODONE HYDROCHLORIDE 5 MILLIGRAM(S): 5 TABLET ORAL at 09:50

## 2022-05-26 RX ADMIN — Medication 975 MILLIGRAM(S): at 19:04

## 2022-05-26 NOTE — PROGRESS NOTE ADULT - ASSESSMENT
75 y/o F with PMH of SLE,Hx RA, lung cancer (s/p tumor removal, no chemo no radiation) OA, DVT, DJD, HTN, HLD, Hypothyroidism, covid-19, obesity, heart murmur, left knee replacement, lumbar spine surgery 12/20, cataract s/p surgery, tonsillectomy who presented to [facility] for elective Right knee replacement done on 5/16  by Dr. Mansoor Castillo. Patient now with gait Instability, ADL impairments and Functional impairments- pt/ot/dvt ppx    #s/p Elective Right TKR 5/16/22  #Hx of DVT  -Celebrex BID x 14 days 6/1 last day  -Continue comprehensive rehab program -PT/OT/SLP per rehab team  -Pain management, bowel regimen per rehab   -DVT ppx - Eliquis 2.5mg BID    #Post Op bradycardia, junctional rhythm, blocked APCs  -Mobitz type 1  -Off beta blockers  -Outpatient follow up    #Rheumatoid arthritis  - home meds: methotrexate 2.5 mg, 4 tabs every 7 days --> d/w Surgeon when pt can restart in setting of recent surgery.  - folic acid w/ methotrexate    #HTN  - Monitor off meds    #HLD  - Lipitor     #Hypothyroidism  - Synthroid     # Hx of malignant lung nodule s/p left partial lobectomy  - outpatient management    #GI ppx - Protonix

## 2022-05-26 NOTE — PROGRESS NOTE ADULT - SUBJECTIVE AND OBJECTIVE BOX
Patient is a 76y old  Female who presents with a chief complaint of Right TKR (25 May 2022 15:22)      Patient seen and examined at bedside. denies acute medical complaints. denies headache, fever, chills, cp, sob, n/v, abd pain. pain controlled.      ALLERGIES:  No Known Allergies    MEDICATIONS  (STANDING):  acetaminophen     Tablet .. 975 milliGRAM(s) Oral every 8 hours  apixaban 2.5 milliGRAM(s) Oral every 12 hours  atorvastatin 20 milliGRAM(s) Oral at bedtime  celecoxib 100 milliGRAM(s) Oral every 12 hours  folic acid 1 milliGRAM(s) Oral daily  levothyroxine 150 MICROGram(s) Oral daily  nystatin Powder 1 Application(s) Topical two times a day  pantoprazole    Tablet 40 milliGRAM(s) Oral before breakfast  polyethylene glycol 3350 17 Gram(s) Oral at bedtime  senna 2 Tablet(s) Oral at bedtime    MEDICATIONS  (PRN):  aluminum hydroxide/magnesium hydroxide/simethicone Suspension 30 milliLiter(s) Oral four times a day PRN Indigestion  magnesium hydroxide Suspension 30 milliLiter(s) Oral daily PRN Constipation  oxyCODONE    IR 5 milliGRAM(s) Oral every 6 hours PRN Severe Pain (7 - 10)    Vital Signs Last 24 Hrs  T(F): 97.4 (26 May 2022 07:56), Max: 98 (25 May 2022 20:31)  HR: 60 (26 May 2022 07:56) (60 - 68)  BP: 115/48 (26 May 2022 07:56) (115/48 - 141/71)  RR: 16 (26 May 2022 07:56) (16 - 16)  SpO2: 93% (26 May 2022 07:56) (93% - 100%)  I&O's Summary        PHYSICAL EXAM:  General: NAD, A/O x 3  ENT: MMM, no scleral icterus  Neck: Supple, No JVD  Lungs: Clear to auscultation bilaterally, no wheezes, rales, rhonchi  Cardio: RRR, S1/S2, No murmurs  Abdomen: Soft, Nontender, Nondistended; Bowel sounds present  Extremities: No calf tenderness, No pitting edema  Skin: warm, dry     LABS:                        11.8   5.84  )-----------( 308      ( 26 May 2022 06:55 )             37.2       05-26    143  |  110  |  29  ----------------------------<  94  4.6   |  30  |  0.81    Ca    9.0      26 May 2022 06:55    TPro  6.4  /  Alb  2.6  /  TBili  0.5  /  DBili  x   /  AST  28  /  ALT  27  /  AlkPhos  113  05-26     COVID-19 PCR: NotDetec (05-25-22 @ 08:00)  COVID-19 PCR: NotDetec (05-20-22 @ 12:30)  COVID-19 PCR: NotDetec (05-18-22 @ 15:30)  COVID-19 PCR: NotDetec (05-18-22 @ 08:06)  COVID-19 PCR: NotDetec (05-13-22 @ 11:01)    RADIOLOGY & ADDITIONAL TESTS: reviewed    Care Discussed with Consultants/Other Providers: yes

## 2022-05-26 NOTE — PROGRESS NOTE ADULT - SUBJECTIVE AND OBJECTIVE BOX
CHIEF COMPLAINT: s/p right TKR      HISTORY OF PRESENT ILLNESS  This is a 77 YO female with PMH of SLE, lung cancer (s/p tumor removal, no chemo no radiation) OA, DVT, DJD, HTN, HLD, Hypothyroidism, covid-19, obesity, heart murmur, left knee replacement, lumbar spine surgery 12/20, cataract s/p surgery, tonsillectomy who presented to [facility] for elective Right knee replacement done on 5/16  by Dr. Mansoor Castillo. Eliquis 2.5mg q 12 h was given for DVT prophylaxis/on long term AC secondary to DVT .Post Op course on telemetry secondary to bradycardia-beta blocker held. Patient was evaluated by PM&R and therapy for functional deficits, gait/ADL impairments and acute rehabilitation was recommended. Patient was medically optimized for discharge to [facility] IRU on 5/18/22.       ROS/Subjective: NAD in therapy  no chest pain, no sob.  no nausea, no vomiting  no dizziness , no headaches  BM daily as per patient- last documented BM in Chart 5/23- MOM on board if needed  Knee pain reports well controlled.           MEDICATIONS  (STANDING):  acetaminophen     Tablet .. 975 milliGRAM(s) Oral every 8 hours  apixaban 2.5 milliGRAM(s) Oral every 12 hours  atorvastatin 20 milliGRAM(s) Oral at bedtime  celecoxib 100 milliGRAM(s) Oral every 12 hours  folic acid 1 milliGRAM(s) Oral daily  levothyroxine 150 MICROGram(s) Oral daily  nystatin Powder 1 Application(s) Topical two times a day  pantoprazole    Tablet 40 milliGRAM(s) Oral before breakfast  polyethylene glycol 3350 17 Gram(s) Oral at bedtime  senna 2 Tablet(s) Oral at bedtime    MEDICATIONS  (PRN):  aluminum hydroxide/magnesium hydroxide/simethicone Suspension 30 milliLiter(s) Oral four times a day PRN Indigestion  magnesium hydroxide Suspension 30 milliLiter(s) Oral daily PRN Constipation  oxyCODONE    IR 5 milliGRAM(s) Oral every 6 hours PRN Severe Pain (7 - 10)                            11.8   5.84  )-----------( 308      ( 26 May 2022 06:55 )             37.2     05-26    143  |  110<H>  |  29<H>  ----------------------------<  94  4.6   |  30  |  0.81    Ca    9.0      26 May 2022 06:55    TPro  6.4  /  Alb  2.6<L>  /  TBili  0.5  /  DBili  x   /  AST  28  /  ALT  27  /  AlkPhos  113  05-26        CAPILLARY BLOOD GLUCOSE          Vital Signs Last 24 Hrs  T(C): 36.3 (26 May 2022 07:56), Max: 36.7 (25 May 2022 20:31)  T(F): 97.4 (26 May 2022 07:56), Max: 98 (25 May 2022 20:31)  HR: 60 (26 May 2022 07:56) (60 - 68)  BP: 115/48 (26 May 2022 07:56) (115/48 - 141/71)  BP(mean): --  RR: 16 (26 May 2022 07:56) (16 - 16)  SpO2: 93% (26 May 2022 07:56) (93% - 100%)      Physical Exam:  General: NAD                                 HEENT: NC/AT  Cardio: RR, fan, Normal S1-S2, No M/G/R                              Pulm: No Respiratory Distress,  Lungs CTAB                        Abdomen: ND/NT, Soft, BS+                                                MSK: Right Knee ROM 0-90 passively                                       Ext: +2 edema Right LE, No calf tenderness,  stasis changes distally LLE  Right TKR with NELLY/Ace- Small blister Lat Knee   healed- incision dry- Dressing DCd  incision sutured                                                               Decubitus Ulcers: None Present     Neurological Examination    Cognitive: AAO x 3                                                                         Attention: Intact   CN II - XII  intact                                                                             Sensory: Intact to light touch                                                                                           Tone: normal Throughout   Balance-impaired      Motor    LEFT    UE: SF [5/5], EF [5/5], EE [5/5], WE [5/5],  [wnl]  RIGHT UE: SF [5/5], EF [5/5], EE [5/5], WE [5/5],  [wnl]  LEFT    LE:  HF [4+/5], KE [5/5], DF [5/5], EHL [5/5],  PF [5/5]  RIGHT LE:  HF [3/5], KE [3/5], DF [5/5], EHL [5/5],  PF [5/5]      Reflex:  2 +      IDT meeting on 5/23    SW: PH, ramp, used RW prior, dtr near.    OT:  eating indep  grooming set up  UBD/LBD set up  toileting CG/CS  tub/shower CG  bathing set up/CG    PT:  amb 100ft min A RW  transfers CG  stairs na    SLP na    tentative dc home on 5/27, HC.    Continue comprehensive acute rehab program consisting of 3hrs/day of OT/PT.

## 2022-05-26 NOTE — PROGRESS NOTE ADULT - ASSESSMENT
This is a 75 YO female with PMH of SLE,Hx RA, lung cancer (s/p tumor removal, no chemo no radiation) OA, DVT, DJD, HTN, HLD, Hypothyroidism, covid-19, obesity, heart murmur, left knee replacement, lumbar spine surgery 12/20, cataract s/p surgery, tonsillectomy who presented to [facility] for elective Right knee replacement done on 5/16  by Dr. Mansoor Castillo. Patient now with gait Instability, ADL impairments and Functional impairments.    REhab of Right TKR  - Comprehensive Rehab Program: PT/OT/ST, 3hours daily and 5 days weekly  - PT: Focused on improving strength, endurance, coordination, balance, functional mobility, and transfers  - OT: Focused on improving strength, fine motor skills, coordination, posture and ADLs.      #Right TKR  - Elective Right knee replacement done on 5/16  by Dr. Mansoor Castillo  - celebrex BID x 14 days 6/1 last day  - WB Status: WBAT, pain well controlled, tolerating therapy  TEDS LES- can place abd pad over knee to protect suture line    #HTN  - Monitor    #HLD  - Lipitor 20mg daily    #Hypothyroidism  - Synthroid 150mcg daily    # Post OP bradycardia  junctional rhythm  blocked APCs  Mobitz type 1  Off betablockers    #Pain management  - Tylenol PRN, Oxycodone PRN    #DVT ppx/Hx DVT  - Eliquis 2.5mg BID- To continue On DC- On Eliquis prior to OR    #GI ppx  - Protonix 40mg    #Bowel Regimen  - Senna, miralax, moved BMs  MOM PRN    #Bladder management  - voids freely  primafit HS  continue vessicare, methenamine    #FEN   - Diet: Regular    #Skin:  - Skin on admission: Right Knee with sutures- DC at orthopedics next week  - Pressure injury/Skin: Turn Q2hrs while in bed, OOB to Chair, PT/OT   -Left facial erythema- improved    #Precaution  - Fall, Aspiration    Outpatient Follow-up (Specialty/Name of physician):  Mansoor Castillo)  Orthopedic Surgery  [address] [facility], [address]  [place] [address]  Phone: [phone]  Fax: [phone]  Scheduled Appointment: 05/31/2022 09:00 AM    Scott Dan  [facility]  [address] [facility], [place] [address]  Phone: [phone]  Fax: [phone]    Josselin Ramirez)  Cardiovascular Disease; Interventional Cardiology  [address] [place] [address]  Phone: [phone]  Fax: [phone]

## 2022-05-27 ENCOUNTER — TRANSCRIPTION ENCOUNTER (OUTPATIENT)
Age: 77
End: 2022-05-27

## 2022-05-27 PROCEDURE — 99232 SBSQ HOSP IP/OBS MODERATE 35: CPT

## 2022-05-27 PROCEDURE — 99232 SBSQ HOSP IP/OBS MODERATE 35: CPT | Mod: GC

## 2022-05-27 RX ADMIN — OXYCODONE HYDROCHLORIDE 5 MILLIGRAM(S): 5 TABLET ORAL at 08:18

## 2022-05-27 RX ADMIN — NYSTATIN CREAM 1 APPLICATION(S): 100000 CREAM TOPICAL at 18:17

## 2022-05-27 RX ADMIN — Medication 975 MILLIGRAM(S): at 16:25

## 2022-05-27 RX ADMIN — Medication 975 MILLIGRAM(S): at 07:10

## 2022-05-27 RX ADMIN — CELECOXIB 100 MILLIGRAM(S): 200 CAPSULE ORAL at 06:24

## 2022-05-27 RX ADMIN — Medication 975 MILLIGRAM(S): at 15:29

## 2022-05-27 RX ADMIN — Medication 150 MICROGRAM(S): at 06:24

## 2022-05-27 RX ADMIN — Medication 975 MILLIGRAM(S): at 06:23

## 2022-05-27 RX ADMIN — Medication 1 MILLIGRAM(S): at 15:29

## 2022-05-27 RX ADMIN — CELECOXIB 100 MILLIGRAM(S): 200 CAPSULE ORAL at 19:04

## 2022-05-27 RX ADMIN — ATORVASTATIN CALCIUM 20 MILLIGRAM(S): 80 TABLET, FILM COATED ORAL at 20:37

## 2022-05-27 RX ADMIN — CELECOXIB 100 MILLIGRAM(S): 200 CAPSULE ORAL at 18:16

## 2022-05-27 RX ADMIN — Medication 3 MILLIGRAM(S): at 20:37

## 2022-05-27 RX ADMIN — OXYCODONE HYDROCHLORIDE 5 MILLIGRAM(S): 5 TABLET ORAL at 07:22

## 2022-05-27 RX ADMIN — PANTOPRAZOLE SODIUM 40 MILLIGRAM(S): 20 TABLET, DELAYED RELEASE ORAL at 06:25

## 2022-05-27 RX ADMIN — Medication 975 MILLIGRAM(S): at 21:37

## 2022-05-27 RX ADMIN — CELECOXIB 100 MILLIGRAM(S): 200 CAPSULE ORAL at 07:10

## 2022-05-27 RX ADMIN — Medication 975 MILLIGRAM(S): at 20:37

## 2022-05-27 RX ADMIN — APIXABAN 2.5 MILLIGRAM(S): 2.5 TABLET, FILM COATED ORAL at 06:25

## 2022-05-27 RX ADMIN — APIXABAN 2.5 MILLIGRAM(S): 2.5 TABLET, FILM COATED ORAL at 18:16

## 2022-05-27 RX ADMIN — SENNA PLUS 2 TABLET(S): 8.6 TABLET ORAL at 20:37

## 2022-05-27 NOTE — DISCHARGE NOTE NURSING/CASE MANAGEMENT/SOCIAL WORK - NSDCPETBCESMAN_GEN_ALL_CORE
No pulse noted, CPR restarted .      MARKO Williamson  11/03/18 6500 If you are a smoker, it is important for your health to stop smoking. Please be aware that second hand smoke is also harmful.

## 2022-05-27 NOTE — DISCHARGE NOTE NURSING/CASE MANAGEMENT/SOCIAL WORK - NSDCFUADDAPPT_GEN_ALL_CORE_FT
Dr Palmer's office will contact you to schedule follow up appointment within 4-6 weeks.     Follow up with PCP in 2 weeks.

## 2022-05-27 NOTE — PROGRESS NOTE ADULT - ASSESSMENT
77 y/o F with PMH of SLE,Hx RA, lung cancer (s/p tumor removal, no chemo no radiation) OA, DVT, DJD, HTN, HLD, Hypothyroidism, covid-19, obesity, heart murmur, left knee replacement, lumbar spine surgery 12/20, cataract s/p surgery, tonsillectomy who presented to [facility] for elective Right knee replacement done on 5/16  by Dr. Mansoor Castillo. Patient now with gait Instability, ADL impairments and Functional impairments- pt/ot/dvt ppx    #s/p Elective Right TKR 5/16/22  #Hx of DVT  -Celebrex BID x 14 days 6/1 last day  -Continue comprehensive rehab program -PT/OT/SLP per rehab team  -Pain management, bowel regimen per rehab   -DVT ppx - Eliquis 2.5mg BID    #Post Op bradycardia, junctional rhythm, blocked APCs  -Mobitz type 1  -Off beta blockers  -Outpatient follow up    #Rheumatoid arthritis  - home meds: methotrexate 2.5 mg, 4 tabs every 7 days --> d/w Surgeon when pt can restart in setting of recent surgery.  - folic acid w/ methotrexate    #HTN  - Monitor off meds    #HLD  - Lipitor     #Hypothyroidism  - Synthroid     # Hx of malignant lung nodule s/p left partial lobectomy  - outpatient management    #GI ppx - Protonix

## 2022-05-27 NOTE — DISCHARGE NOTE NURSING/CASE MANAGEMENT/SOCIAL WORK - PATIENT PORTAL LINK FT
You can access the FollowMyHealth Patient Portal offered by [facility] by registering at the following website: [email or web address]. By joining [facility]’s FollowMyHealth portal, you will also be able to view your health information using other applications (apps) compatible with our system.

## 2022-05-27 NOTE — PROGRESS NOTE ADULT - SUBJECTIVE AND OBJECTIVE BOX
Patient is a 76y old  Female who presents with a chief complaint of Right TKR (25 May 2022 15:22)    Patient seen and examined at bedside. no acute complaints. denies headache, fever, chills, cp, sob, n/v, abd pain    ALLERGIES:  No Known Allergies    MEDICATIONS  (STANDING):  acetaminophen     Tablet .. 975 milliGRAM(s) Oral every 8 hours  apixaban 2.5 milliGRAM(s) Oral every 12 hours  atorvastatin 20 milliGRAM(s) Oral at bedtime  celecoxib 100 milliGRAM(s) Oral every 12 hours  folic acid 1 milliGRAM(s) Oral daily  levothyroxine 150 MICROGram(s) Oral daily  nystatin Powder 1 Application(s) Topical two times a day  pantoprazole    Tablet 40 milliGRAM(s) Oral before breakfast  polyethylene glycol 3350 17 Gram(s) Oral at bedtime  senna 2 Tablet(s) Oral at bedtime    MEDICATIONS  (PRN):  aluminum hydroxide/magnesium hydroxide/simethicone Suspension 30 milliLiter(s) Oral four times a day PRN Indigestion  magnesium hydroxide Suspension 30 milliLiter(s) Oral daily PRN Constipation  oxyCODONE    IR 5 milliGRAM(s) Oral every 6 hours PRN Severe Pain (7 - 10)    Vital Signs Last 24 Hrs  T(C): 36.9 (27 May 2022 07:23), Max: 36.9 (27 May 2022 07:23)  T(F): 98.4 (27 May 2022 07:23), Max: 98.4 (27 May 2022 07:23)  HR: 59 (27 May 2022 07:23) (59 - 68)  BP: 120/58 (27 May 2022 07:23) (120/58 - 120/78)  BP(mean): --  RR: 16 (27 May 2022 07:23) (15 - 16)  SpO2: 99% (27 May 2022 07:23) (95% - 99%)  I&O's Summary        PHYSICAL EXAM:  General: NAD, A/O x 3  ENT: MMM, no scleral icterus  Neck: Supple, No JVD  Lungs: Clear to auscultation bilaterally, no wheezes, rales, rhonchi  Cardio: RRR, S1/S2  Abdomen: Soft, Nontender, Nondistended; Bowel sounds present  Extremities: No calf tenderness, No pitting edema  Skin: warm, dry     LABS:                        11.8   5.84  )-----------( 308      ( 26 May 2022 06:55 )             37.2       05-26    143  |  110  |  29  ----------------------------<  94  4.6   |  30  |  0.81    Ca    9.0      26 May 2022 06:55    TPro  6.4  /  Alb  2.6  /  TBili  0.5  /  DBili  x   /  AST  28  /  ALT  27  /  AlkPhos  113  05-26     COVID-19 PCR: NotDetec (05-25-22 @ 08:00)  COVID-19 PCR: NotDetec (05-20-22 @ 12:30)  COVID-19 PCR: NotDetec (05-18-22 @ 15:30)  COVID-19 PCR: NotDetec (05-18-22 @ 08:06)  COVID-19 PCR: NotDetec (05-13-22 @ 11:01)    RADIOLOGY & ADDITIONAL TESTS: reviewed    Care Discussed with Consultants/Other Providers: yes

## 2022-05-27 NOTE — DISCHARGE NOTE NURSING/CASE MANAGEMENT/SOCIAL WORK - NSDCPEFALRISK_GEN_ALL_CORE
For information on Fall & Injury Prevention, visit: [email or web address] OR  [email or web address] OR  https://www.cdc.gov/steadi/patient.html

## 2022-05-27 NOTE — PROGRESS NOTE ADULT - SUBJECTIVE AND OBJECTIVE BOX
CHIEF COMPLAINT: s/p right TKR      HISTORY OF PRESENT ILLNESS  This is a 75 YO female with PMH of SLE, lung cancer (s/p tumor removal, no chemo no radiation) OA, DVT, DJD, HTN, HLD, Hypothyroidism, covid-19, obesity, heart murmur, left knee replacement, lumbar spine surgery 12/20, cataract s/p surgery, tonsillectomy who presented to [facility] for elective Right knee replacement done on 5/16  by Dr. Mansoor Castillo. Eliquis 2.5mg q 12 h was given for DVT prophylaxis/on long term AC secondary to DVT .Post Op course on telemetry secondary to bradycardia-beta blocker held. Patient was evaluated by PM&R and therapy for functional deficits, gait/ADL impairments and acute rehabilitation was recommended. Patient was medically optimized for discharge to [facility] IRU on 5/18/22.       ROS/Subjective: NAD in therapy  no chest pain, no sob.  no nausea, no vomiting  no dizziness , no headaches  Moved bowels today june patient  Knee pain - taking Oxycodone before therapy    MEDICATIONS  (STANDING):  acetaminophen     Tablet .. 975 milliGRAM(s) Oral every 8 hours  apixaban 2.5 milliGRAM(s) Oral every 12 hours  atorvastatin 20 milliGRAM(s) Oral at bedtime  celecoxib 100 milliGRAM(s) Oral every 12 hours  folic acid 1 milliGRAM(s) Oral daily  levothyroxine 150 MICROGram(s) Oral daily  melatonin 3 milliGRAM(s) Oral at bedtime  nystatin Powder 1 Application(s) Topical two times a day  pantoprazole    Tablet 40 milliGRAM(s) Oral before breakfast  polyethylene glycol 3350 17 Gram(s) Oral at bedtime  senna 2 Tablet(s) Oral at bedtime    MEDICATIONS  (PRN):  aluminum hydroxide/magnesium hydroxide/simethicone Suspension 30 milliLiter(s) Oral four times a day PRN Indigestion  magnesium hydroxide Suspension 30 milliLiter(s) Oral daily PRN Constipation  oxyCODONE    IR 5 milliGRAM(s) Oral every 6 hours PRN Severe Pain (7 - 10)                            11.8   5.84  )-----------( 308      ( 26 May 2022 06:55 )             37.2     05-26    143  |  110<H>  |  29<H>  ----------------------------<  94  4.6   |  30  |  0.81    Ca    9.0      26 May 2022 06:55    TPro  6.4  /  Alb  2.6<L>  /  TBili  0.5  /  DBili  x   /  AST  28  /  ALT  27  /  AlkPhos  113  05-26        CAPILLARY BLOOD GLUCOSE          Vital Signs Last 24 Hrs  T(C): 36.9 (27 May 2022 07:23), Max: 36.9 (27 May 2022 07:23)  T(F): 98.4 (27 May 2022 07:23), Max: 98.4 (27 May 2022 07:23)  HR: 59 (27 May 2022 07:23) (59 - 68)  BP: 120/58 (27 May 2022 07:23) (120/58 - 120/78)  BP(mean): --  RR: 16 (27 May 2022 07:23) (15 - 16)  SpO2: 99% (27 May 2022 07:23) (95% - 99%)            Physical Exam:  General: NAD                                 HEENT: NC/AT  Cardio: RR, fan, Normal S1-S2, No M/G/R                              Pulm: No Respiratory Distress,  Lungs CTAB                        Abdomen: ND/NT, Soft, BS+                                                MSK: Right Knee ROM 0-90 passively                                       Ext: +2 edema Right LE, No calf tenderness,  stasis changes distally LLE  Right TKR blister healed   incision dry- sutures intact                                                            Decubitus Ulcers: None Present     Neurological Examination    Cognitive: AAO x 3                                                                         Attention: Intact   CN II - XII  intact                                                                             Sensory: Intact to light touch                                                                                           Tone: normal Throughout   Balance-impaired      Motor    LEFT    UE: SF [5/5], EF [5/5], EE [5/5], WE [5/5],  [wnl]  RIGHT UE: SF [5/5], EF [5/5], EE [5/5], WE [5/5],  [wnl]  LEFT    LE:  HF [4+/5], KE [5/5], DF [5/5], EHL [5/5],  PF [5/5]  RIGHT LE:  HF [3/5], KE [3/5], DF [5/5], EHL [5/5],  PF [5/5]      Reflex:  2 +      IDT meeting on 5/23    SW: PH, ramp, used RW prior, dtr near.    OT:  eating indep  grooming set up  UBD/LBD set up  toileting CG/CS  tub/shower CG  bathing set up/CG    PT:  amb 100ft min A RW  transfers CG  stairs na    SLP na    tentative dc home on 5/27, HC.    Continue comprehensive acute rehab program consisting of 3hrs/day of OT/PT.

## 2022-05-27 NOTE — PROGRESS NOTE ADULT - ASSESSMENT
This is a 75 YO female with PMH of SLE,Hx RA, lung cancer (s/p tumor removal, no chemo no radiation) OA, DVT, DJD, HTN, HLD, Hypothyroidism, covid-19, obesity, heart murmur, left knee replacement, lumbar spine surgery 12/20, cataract s/p surgery, tonsillectomy who presented to [facility] for elective Right knee replacement done on 5/16  by Dr. Mansoor Castillo. Patient now with gait Instability, ADL impairments and Functional impairments.    REhab of Right TKR  - Comprehensive Rehab Program: PT/OT/ST, 3hours daily and 5 days weekly  - PT: Focused on improving strength, endurance, coordination, balance, functional mobility, and transfers  - OT: Focused on improving strength, fine motor skills, coordination, posture and ADLs.      #Right TKR  - Elective Right knee replacement done on 5/16  by Dr. Mansoor Castillo  - celebrex BID x 14 days 6/1 last day  - WB Status: WBAT, pain well controlled, tolerating therapy  TEDS off patient refuses- ACE wrap Off patient refusing- Elevate RLE HS  ANTICIPATE DC IN AM    #HTN  - Monitor    #HLD  - Lipitor 20mg daily    #Hypothyroidism  - Synthroid 150mcg daily    # Post OP bradycardia  junctional rhythm  blocked APCs  Mobitz type 1  Off betablockers    #Pain management  - Tylenol PRN, Oxycodone PRN    #DVT ppx/Hx DVT  - Eliquis 2.5mg BID- To continue On DC- On Eliquis prior to OR    #GI ppx  - Protonix 40mg    #Bowel Regimen  - Senna, miralax, moved BMs  MOM PRN    #Bladder management  - voids freely  primafit HS  continue vessicare, methenamine    #FEN   - Diet: Regular    #Skin:  - Skin on admission: Right Knee with sutures- DC at orthopedics next week  - Pressure injury/Skin: Turn Q2hrs while in bed, OOB to Chair, PT/OT   -Left facial erythema- improved    #Precaution  - Fall, Aspiration    Outpatient Follow-up (Specialty/Name of physician):  Mansoor Castillo)  Orthopedic Surgery  [address] [facility], [address]  [place] [address]  Phone: [phone]  Fax: [phone]  Scheduled Appointment: 05/31/2022 09:00 AM    Scott Dan  FAMILY MEDICINE  [address] [facility], [place] [address]  Phone: [phone]  Fax: [phone]    Josselin Ramirez)  Cardiovascular Disease; Interventional Cardiology  [address] [place] [address]  Phone: [phone]  Fax: [phone]

## 2022-05-28 VITALS
DIASTOLIC BLOOD PRESSURE: 75 MMHG | TEMPERATURE: 98 F | SYSTOLIC BLOOD PRESSURE: 118 MMHG | RESPIRATION RATE: 15 BRPM | OXYGEN SATURATION: 98 % | HEART RATE: 60 BPM

## 2022-05-28 DIAGNOSIS — I10 ESSENTIAL (PRIMARY) HYPERTENSION: ICD-10-CM

## 2022-05-28 DIAGNOSIS — E78.5 HYPERLIPIDEMIA, UNSPECIFIED: ICD-10-CM

## 2022-05-28 DIAGNOSIS — M06.9 RHEUMATOID ARTHRITIS, UNSPECIFIED: ICD-10-CM

## 2022-05-28 DIAGNOSIS — Z96.651 PRESENCE OF RIGHT ARTIFICIAL KNEE JOINT: ICD-10-CM

## 2022-05-28 LAB — SARS-COV-2 RNA SPEC QL NAA+PROBE: SIGNIFICANT CHANGE UP

## 2022-05-28 PROCEDURE — 99232 SBSQ HOSP IP/OBS MODERATE 35: CPT

## 2022-05-28 PROCEDURE — 99238 HOSP IP/OBS DSCHRG MGMT 30/<: CPT | Mod: GC

## 2022-05-28 RX ADMIN — CELECOXIB 100 MILLIGRAM(S): 200 CAPSULE ORAL at 05:54

## 2022-05-28 RX ADMIN — Medication 1 MILLIGRAM(S): at 12:16

## 2022-05-28 RX ADMIN — APIXABAN 2.5 MILLIGRAM(S): 2.5 TABLET, FILM COATED ORAL at 05:53

## 2022-05-28 RX ADMIN — PANTOPRAZOLE SODIUM 40 MILLIGRAM(S): 20 TABLET, DELAYED RELEASE ORAL at 05:54

## 2022-05-28 RX ADMIN — Medication 150 MICROGRAM(S): at 05:55

## 2022-05-28 RX ADMIN — NYSTATIN CREAM 1 APPLICATION(S): 100000 CREAM TOPICAL at 05:55

## 2022-05-28 RX ADMIN — Medication 975 MILLIGRAM(S): at 05:53

## 2022-05-28 RX ADMIN — Medication 975 MILLIGRAM(S): at 06:53

## 2022-05-28 NOTE — PROGRESS NOTE ADULT - REASON FOR ADMISSION
Right TKR

## 2022-05-28 NOTE — PROGRESS NOTE ADULT - ASSESSMENT
This is a 75 YO female with PMH of SLE,Hx RA, lung cancer (s/p tumor removal, no chemo no radiation) OA, DVT, DJD, HTN, HLD, Hypothyroidism, covid-19, obesity, heart murmur, left knee replacement, lumbar spine surgery 12/20, cataract s/p surgery, tonsillectomy who presented to [facility] for elective Right knee replacement done on 5/16  by Dr. Mansoor Larson. Patient now with gait Instability, ADL impairments and Functional impairments.    REhab of Right TKR  - Comprehensive Rehab Program: PT/OT/ST, 3hours daily and 5 days weekly  - PT: Focused on improving strength, endurance, coordination, balance, functional mobility, and transfers  - OT: Focused on improving strength, fine motor skills, coordination, posture and ADLs.      #Right TKR  - Elective Right knee replacement done on 5/16  by Dr. Mansoor Larson  - celebrex BID x 14 days 6/1 last day  - WB Status: WBAT, pain well controlled, tolerating therapy  TEDS off patient refuses- ACE wrap Off patient refusing- Elevate RLE HS  Dc to home with Vn services  FU ortho Dr larson- has appt 5/31- to Dc sutures   elevate or ACE wrap RLE to decrease swelling  FU PMD 1-2 weeks  FU PMR Dr Palmer 4-6 weeks    #HTN  - Monitor    #HLD  - Lipitor 20mg daily    #Hypothyroidism  - Synthroid 150mcg daily    # Post OP bradycardia  junctional rhythm  blocked APCs  Mobitz type 1  Off betablockers    #Pain management  - Tylenol PRN, Oxycodone PRN    #DVT ppx/Hx DVT  - Eliquis 2.5mg BID- To continue On DC- On Eliquis prior to OR    #GI ppx  - Protonix 40mg    #Bowel Regimen  - Senna, miralax, moved BMs  MOM PRN    #Bladder management  - voids freely  primafit HS  continue vessicare, methenamine    #FEN   - Diet: Regular    #Skin:  - Skin on admission: Right Knee with sutures- DC at orthopedics next week  - Pressure injury/Skin: Turn Q2hrs while in bed, OOB to Chair, PT/OT   -Left facial erythema- improved    #Precaution  - Fall, Aspiration    Outpatient Follow-up (Specialty/Name of physician):  Mansoor Larson)  Orthopedic Surgery  [address] [facility], [address]  [place] [address]  Phone: [phone]  Fax: [phone]  Scheduled Appointment: 05/31/2022 09:00 AM    Scott Dan  FAMILY MEDICINE  [address] [place] [address]  Phone: [phone]  Fax: [phone]    Josselin Ramirez)  Cardiovascular Disease; Interventional Cardiology  [address] [place] [address]  Phone: [phone]  Fax: [phone]

## 2022-05-28 NOTE — PROGRESS NOTE ADULT - SUBJECTIVE AND OBJECTIVE BOX
CHIEF COMPLAINT: s/p right TKR      HISTORY OF PRESENT ILLNESS  This is a 77 YO female with PMH of SLE, lung cancer (s/p tumor removal, no chemo no radiation) OA, DVT, DJD, HTN, HLD, Hypothyroidism, covid-19, obesity, heart murmur, left knee replacement, lumbar spine surgery 12/20, cataract s/p surgery, tonsillectomy who presented to [facility] for elective Right knee replacement done on 5/16  by Dr. Mansoor Castillo. Eliquis 2.5mg q 12 h was given for DVT prophylaxis/on long term AC secondary to DVT .Post Op course on telemetry secondary to bradycardia-beta blocker held. Patient was evaluated by PM&R and therapy for functional deficits, gait/ADL impairments and acute rehabilitation was recommended. Patient was medically optimized for discharge to [facility] IRU on 5/18/22.       ROS/Subjective: ready for dc  no chest pain, no sob.  no nausea, no vomiting  no dizziness , no headaches  Moved bowels   Knee pain -occ taking Oxycodone before therapy    MEDICATIONS  (STANDING):  acetaminophen     Tablet .. 975 milliGRAM(s) Oral every 8 hours  apixaban 2.5 milliGRAM(s) Oral every 12 hours  atorvastatin 20 milliGRAM(s) Oral at bedtime  celecoxib 100 milliGRAM(s) Oral every 12 hours  folic acid 1 milliGRAM(s) Oral daily  levothyroxine 150 MICROGram(s) Oral daily  melatonin 3 milliGRAM(s) Oral at bedtime  nystatin Powder 1 Application(s) Topical two times a day  pantoprazole    Tablet 40 milliGRAM(s) Oral before breakfast  polyethylene glycol 3350 17 Gram(s) Oral at bedtime  senna 2 Tablet(s) Oral at bedtime    MEDICATIONS  (PRN):  aluminum hydroxide/magnesium hydroxide/simethicone Suspension 30 milliLiter(s) Oral four times a day PRN Indigestion  magnesium hydroxide Suspension 30 milliLiter(s) Oral daily PRN Constipation  oxyCODONE    IR 5 milliGRAM(s) Oral every 6 hours PRN Severe Pain (7 - 10)                            11.8   5.84  )-----------( 308      ( 26 May 2022 06:55 )             37.2     05-26    143  |  110<H>  |  29<H>  ----------------------------<  94  4.6   |  30  |  0.81    Ca    9.0      26 May 2022 06:55    TPro  6.4  /  Alb  2.6<L>  /  TBili  0.5  /  DBili  x   /  AST  28  /  ALT  27  /  AlkPhos  113  05-26        CAPILLARY BLOOD GLUCOSE          Vital Signs Last 24 Hrs  T(C): 36.7 (28 May 2022 08:33), Max: 36.8 (27 May 2022 20:35)  T(F): 98.1 (28 May 2022 08:33), Max: 98.2 (27 May 2022 20:35)  HR: 60 (28 May 2022 08:33) (60 - 64)  BP: 118/75 (28 May 2022 08:33) (118/68 - 118/75)  BP(mean): --  RR: 15 (28 May 2022 08:33) (15 - 15)  SpO2: 98% (28 May 2022 08:33) (98% - 98%)          Physical Exam:  General: NAD                                 HEENT: NC/AT  Cardio: RR, fan, Normal S1-S2, No M/G/R                              Pulm: No Respiratory Distress,  Lungs CTAB                        Abdomen: ND/NT, Soft, BS+                                                MSK: Right Knee ROM 0-90 passively                                       Ext: +2 edema Right LE, No calf tenderness,  stasis changes distally LLE  Right TKR blister healed   incision dry- sutures intact                                                            Decubitus Ulcers: None Present     Neurological Examination    Cognitive: AAO x 3                                                                         Attention: Intact   CN II - XII  intact                                                                             Sensory: Intact to light touch                                                                                           Tone: normal Throughout   Balance-impaired      Motor    LEFT    UE: SF [5/5], EF [5/5], EE [5/5], WE [5/5],  [wnl]  RIGHT UE: SF [5/5], EF [5/5], EE [5/5], WE [5/5],  [wnl]  LEFT    LE:  HF [4+/5], KE [5/5], DF [5/5], EHL [5/5],  PF [5/5]  RIGHT LE:  HF [3/5], KE [3/5], DF [5/5], EHL [5/5],  PF [5/5]      Reflex:  2 +      IDT meeting on 5/23    SW: PH, ramp, used RW prior, dtr near.    OT:  eating indep  grooming set up  UBD/LBD set up  toileting CG/CS  tub/shower CG  bathing set up/CG    PT:  amb 100ft min A RW  transfers CG  stairs na    SLP na    tentative dc home on 5/27, HC.    Continue comprehensive acute rehab program consisting of 3hrs/day of OT/PT.

## 2022-05-28 NOTE — PROGRESS NOTE ADULT - ASSESSMENT
75 y/o F with PMH of SLE,Hx RA, lung cancer (s/p tumor removal, no chemo no radiation) OA, DVT, DJD, HTN, HLD, Hypothyroidism, covid-19, obesity, heart murmur, left knee replacement, lumbar spine surgery 12/20, cataract s/p surgery, tonsillectomy who presented to [facility] for elective Right knee replacement done on 5/16  by Dr. Mansoor Castillo. Patient now with gait Instability, ADL impairments and Functional impairments- pt/ot/dvt ppx    #s/p Elective Right TKR 5/16/22  #Hx of DVT  -Celebrex BID x 14 days 6/1 last day  - completed comprehensive rehab program and medically stable for discharge  -Pain management, bowel regimen per rehab   -DVT ppx - Eliquis 2.5mg BID  - f/u w Dr Castillo as instructed postop    #Post Op bradycardia, junctional rhythm, blocked APCs  -Mobitz type 1  -Off beta blockers  -Outpatient follow up    #Rheumatoid arthritis  - home meds: methotrexate 2.5 mg, 4 tabs every 7 days --> d/w Surgeon when pt can restart in setting of recent surgery.  - folic acid w/ methotrexate    #HTN  - Monitor off meds    #HLD  - Lipitor     #Hypothyroidism  - Synthroid     # Hx of malignant lung nodule s/p left partial lobectomy  - outpatient management    #GI ppx - Protonix

## 2022-05-28 NOTE — PROGRESS NOTE ADULT - NSPROGADDITIONALINFOA_GEN_ALL_CORE
I have personally interviewed and examined this patient, reviewed pertinent clinical information, and performed the evaluation and management services provided at today's visit for inpatient medical follow up    I am available to discuss any issues related to the medical care of this patient on the unit, or by phone at [phone]    pt is medically stable for discharge  Advised follow up with primary care MD and Dr Castillo /Ortho team

## 2022-05-28 NOTE — PROGRESS NOTE ADULT - PROVIDER SPECIALTY LIST ADULT
Rehab Medicine
Hospitalist
Physiatry
Hospitalist
Physiatry
Hospitalist
Hospitalist
Physiatry
Physiatry

## 2022-05-28 NOTE — PROGRESS NOTE ADULT - SUBJECTIVE AND OBJECTIVE BOX
Patient is a 76y old  Female who presents with a chief complaint of Right TKR (27 May 2022 12:51)      History, interim events and clinically pertinent issues reviewed; patient interviewed and examined.   She has no new complaints this AM. Pain is controlled.  REVIEW OF SYMPTOMS: patient denies HA's, CP, palpitations, shortness of breath or upper respiratory symptoms, nausea, vomiting, diarrhea, constipation, dysuria, bruising/bleeding and all other systems were reviewed as negative    ALLERGIES:  No Known Allergies    MEDICATIONS  (STANDING):  acetaminophen     Tablet .. 975 milliGRAM(s) Oral every 8 hours  apixaban 2.5 milliGRAM(s) Oral every 12 hours  atorvastatin 20 milliGRAM(s) Oral at bedtime  celecoxib 100 milliGRAM(s) Oral every 12 hours  folic acid 1 milliGRAM(s) Oral daily  levothyroxine 150 MICROGram(s) Oral daily  melatonin 3 milliGRAM(s) Oral at bedtime  nystatin Powder 1 Application(s) Topical two times a day  pantoprazole    Tablet 40 milliGRAM(s) Oral before breakfast  polyethylene glycol 3350 17 Gram(s) Oral at bedtime  senna 2 Tablet(s) Oral at bedtime    MEDICATIONS  (PRN):  aluminum hydroxide/magnesium hydroxide/simethicone Suspension 30 milliLiter(s) Oral four times a day PRN Indigestion  magnesium hydroxide Suspension 30 milliLiter(s) Oral daily PRN Constipation  oxyCODONE    IR 5 milliGRAM(s) Oral every 6 hours PRN Severe Pain (7 - 10)    Vital Signs Last 24 Hrs  T(F): 98.1 (28 May 2022 08:33), Max: 98.2 (27 May 2022 20:35)  HR: 60 (28 May 2022 08:33) (60 - 64)  BP: 118/75 (28 May 2022 08:33) (118/68 - 118/75)  RR: 15 (28 May 2022 08:33) (15 - 15)  SpO2: 98% (28 May 2022 08:33) (98% - 98%)  I&O's Summary              PHYSICAL EXAM:  General: NAD, A/O x 3  ENT: MMM  Neck: Supple, No JVD  Lungs: Clear to auscultation bilaterally  Cardio: RRR, S1/S2, No murmurs  Abdomen: Soft, Nontender, Nondistended; Bowel sounds present  Extremities: No calf tenderness, No pitting edema Rt knee inc c/d/i      LABS:                        11.8   5.84  )-----------( 308      ( 26 May 2022 06:55 )             37.2       05-26    143  |  110  |  29  ----------------------------<  94  4.6   |  30  |  0.81    Ca    9.0      26 May 2022 06:55    TPro  6.4  /  Alb  2.6  /  TBili  0.5  /  DBili  x   /  AST  28  /  ALT  27  /  AlkPhos  113  05-26                                COVID-19 PCR: NotDetec (05-25-22 @ 08:00)  COVID-19 PCR: NotDetec (05-20-22 @ 12:30)  COVID-19 PCR: NotDetec (05-18-22 @ 15:30)  COVID-19 PCR: NotDetec (05-18-22 @ 08:06)  COVID-19 PCR: NotDetec (05-13-22 @ 11:01)

## 2022-05-31 ENCOUNTER — APPOINTMENT (OUTPATIENT)
Dept: ORTHOPEDIC SURGERY | Facility: CLINIC | Age: 77
End: 2022-05-31
Payer: MEDICARE

## 2022-05-31 VITALS — SYSTOLIC BLOOD PRESSURE: 131 MMHG | DIASTOLIC BLOOD PRESSURE: 73 MMHG | HEART RATE: 83 BPM

## 2022-05-31 PROCEDURE — 73562 X-RAY EXAM OF KNEE 3: CPT | Mod: 26,RT

## 2022-05-31 PROCEDURE — 99024 POSTOP FOLLOW-UP VISIT: CPT

## 2022-05-31 RX ORDER — AMOXICILLIN 500 MG/1
500 CAPSULE ORAL
Qty: 20 | Refills: 4 | Status: ACTIVE | COMMUNITY
Start: 2022-05-31 | End: 1900-01-01

## 2022-05-31 NOTE — HISTORY OF PRESENT ILLNESS
[___ Weeks Post Op] : [unfilled] weeks post op [3] : the patient reports pain that is 3/10 in severity [Clean/Dry/Intact] : clean, dry and intact [Healed] : healed [Swelling] : swollen [Neuro Intact] : an unremarkable neurological exam [Vascular Intact] : ~T peripheral vascular exam normal [Negative Bobo's] : maneuvers demonstrated a negative Bobo's sign [Xray (Date:___)] : [unfilled] Xray -  [Hardware in Good Position] : hardware in good position [Doing Well] : is doing well [No Sign of Infection] : is showing no signs of infection [Adequate Pain Control] : has adequate pain control [Sutures Removed] : sutures were removed [Steri-Strips Removed & Replaced] : steri-strips removed and replaced [Chills] : no chills [Constipation] : no constipation [Diarrhea] : no diarrhea [Dysuria] : no dysuria [Fever] : no fever [Nausea] : no nausea [Vomiting] : no vomiting [Erythema] : not erythematous [Discharge] : absent of discharge [Dehiscence] : not dehisced [de-identified] : 5/16/22 Rt TKR  [de-identified] : Patient's first postop appointment status post right total knee replacement patient was recently released from rehab is receiving physical therapy at home started the previous Sunday the.  The patient is having some significant swelling in the dorsum of the right foot despite elevation sensation and pulses are good in the distal extremity range of motion shows full extension and flexion of about 95 degrees [de-identified] : The wound is well-healed, neurovascular status is intact. Good sensation and pulses distally good strength against resistance and EHL and dorsiflexion.\par There is no anterior drawer. There is no extensor lag or AP laxity. There is no significant mediolateral instability. Extension is the midline. Calf is nontender\par The range of motion is 0-95\par  [de-identified] : Radiographs of the [ RT  ] knee were performed today. There are stable interfaces between bone, cement, and implant in all 3 components. There is stable positioning of the femoral, tibial, and patellar components. There is equidistant spacing on each side of the tibial bearing. There is no fracture, foreign body, subsidement, osteolysis, or notable effusion. The patellar component is tracking centrally in the trochlear groove of the femoral component.\par \par \par \par  [de-identified] : Patient using approximately 1 oxycodone a day lying on Celebrex and Tylenol.  The patient is taking Eliquis because of the prior history of the DVT [de-identified] : Patient will follow up with Dr. Castillo in approximately 6 weeks time she was advised on range of motion and exercise to tolerance

## 2022-06-06 ENCOUNTER — RX RENEWAL (OUTPATIENT)
Age: 77
End: 2022-06-06

## 2022-06-06 ENCOUNTER — APPOINTMENT (OUTPATIENT)
Dept: CARDIOLOGY | Facility: CLINIC | Age: 77
End: 2022-06-06
Payer: MEDICARE

## 2022-06-06 ENCOUNTER — NON-APPOINTMENT (OUTPATIENT)
Age: 77
End: 2022-06-06

## 2022-06-06 VITALS
DIASTOLIC BLOOD PRESSURE: 70 MMHG | HEIGHT: 66 IN | RESPIRATION RATE: 12 BRPM | HEART RATE: 86 BPM | SYSTOLIC BLOOD PRESSURE: 135 MMHG | OXYGEN SATURATION: 97 %

## 2022-06-06 PROCEDURE — 99213 OFFICE O/P EST LOW 20 MIN: CPT

## 2022-06-06 PROCEDURE — 93000 ELECTROCARDIOGRAM COMPLETE: CPT

## 2022-06-06 RX ORDER — FAMOTIDINE 20 MG/1
20 TABLET, FILM COATED ORAL
Qty: 60 | Refills: 0 | Status: ACTIVE | COMMUNITY
Start: 2022-01-03 | End: 1900-01-01

## 2022-06-08 NOTE — HISTORY OF PRESENT ILLNESS
[FreeTextEntry1] : Marely did well after surgery but feels like her right leg is a log. Started home PT which is helping. No events post procedure.

## 2022-06-08 NOTE — DISCUSSION/SUMMARY
[___ Month(s)] : in [unfilled] month(s) [FreeTextEntry1] : The patient is a 76-year-old obese female history of rheumatoid arthritis,  lupus, hypothyroidism, hyperlipidemia, hypertension, lower extremity edema ,RBBB, s/p left lobectomy for cancer s/p rt hip replacement complicated by DVT s/p left knee replacement s/p spine surgery whose PVC are under control .. \par #1 RA- on MTX and plaquenil, ambulates with walker\par #2 Htn- continue  losartan 50mg\par #3 Lipids- continue atorvastatin \par #4 Hypothyroidism- continue levothyroxine 150mcg\par #5 CV- pharm stress negative, PVC controlled with metoprolol, mod AS 1.5, EF= 60%\par #6 Ortho- s/p right hip replacement and Lt TKR  s/p spine surgery and rt TKR with home PT,  eliquis bid as per Dr. Hoyos, but will discuss if can d/c \par \par

## 2022-06-13 PROCEDURE — 97530 THERAPEUTIC ACTIVITIES: CPT

## 2022-06-13 PROCEDURE — 97167 OT EVAL HIGH COMPLEX 60 MIN: CPT

## 2022-06-13 PROCEDURE — 97010 HOT OR COLD PACKS THERAPY: CPT

## 2022-06-13 PROCEDURE — 85027 COMPLETE CBC AUTOMATED: CPT

## 2022-06-13 PROCEDURE — 36415 COLL VENOUS BLD VENIPUNCTURE: CPT

## 2022-06-13 PROCEDURE — 97535 SELF CARE MNGMENT TRAINING: CPT

## 2022-06-13 PROCEDURE — U0005: CPT

## 2022-06-13 PROCEDURE — 87635 SARS-COV-2 COVID-19 AMP PRB: CPT

## 2022-06-13 PROCEDURE — 97163 PT EVAL HIGH COMPLEX 45 MIN: CPT

## 2022-06-13 PROCEDURE — 97110 THERAPEUTIC EXERCISES: CPT

## 2022-06-13 PROCEDURE — 97116 GAIT TRAINING THERAPY: CPT

## 2022-06-13 PROCEDURE — U0003: CPT

## 2022-06-13 PROCEDURE — 80053 COMPREHEN METABOLIC PANEL: CPT

## 2022-06-13 PROCEDURE — 85025 COMPLETE CBC W/AUTO DIFF WBC: CPT

## 2022-06-13 NOTE — PROGRESS NOTE ADULT - EXTREMITIES COMMENTS
13-Jun-2022 16:23
bilateral calves soft, NT
bilateral calves chronic vascular changes, no erythema or warmth +proximal calves distensible
improved softness BLE, no pitting edema  no erythema or warmth  no TTP bilateral calves
improved softness BLE, no pitting edema  no erythema or warmth  no TTP bilateral calves
no calf swelling +soft, NT no pedal edema no erythema or warmth bilaterally
UE 5/5, LE proximal 4/5, distal 5/5   calves soft, NT no erythema orw armth

## 2022-07-20 ENCOUNTER — APPOINTMENT (OUTPATIENT)
Dept: PHYSICAL MEDICINE AND REHAB | Facility: CLINIC | Age: 77
End: 2022-07-20

## 2022-07-20 VITALS
SYSTOLIC BLOOD PRESSURE: 119 MMHG | DIASTOLIC BLOOD PRESSURE: 70 MMHG | TEMPERATURE: 98 F | WEIGHT: 206 LBS | BODY MASS INDEX: 33.11 KG/M2 | HEIGHT: 66 IN | OXYGEN SATURATION: 98 % | HEART RATE: 62 BPM

## 2022-07-20 DIAGNOSIS — Z96.652 PRESENCE OF LEFT ARTIFICIAL KNEE JOINT: ICD-10-CM

## 2022-07-20 DIAGNOSIS — M17.11 UNILATERAL PRIMARY OSTEOARTHRITIS, RIGHT KNEE: ICD-10-CM

## 2022-07-20 PROCEDURE — 99212 OFFICE O/P EST SF 10 MIN: CPT

## 2022-07-20 NOTE — ASSESSMENT
[FreeTextEntry1] : 75 yo female Sp Right TKR- doing well\par Continue home PT via [facility]- try to advance to SA cane if feasible- may be necessary to continue with walker 2nd fear of falling and hx of impaired balance\par ecchymoses on forearm likely related to eliquis use-- discussed with patient to contact cardiology as well as PMD regarding need for continued Eliquis now 2 years post DVT after left TKR\par FU PMD - LE Edema?\par FU with me as needed

## 2022-07-20 NOTE — PHYSICAL EXAM
[FreeTextEntry1] : General: NAD, Resting Comfortable,                                \par HEENT: NC/AT, EOM I, PERRLA, Normal Conjunctivae\par Cardio: RRR, Normal S1-S2, No M/G/R                            \par Pulm: No Respiratory Distress,  Lungs CTAB                      \par Abdomen: ND/NT, Soft, BS+                                              \par MSK:ROM Right knee 0-110, Left knee 0-120                                     \par Ext: 1-2+ edema, No calf tenderness \par small amt ecchymoses noted Left forearm\par \par Neurological Examination  \par Cognitive: AAO x 3                                                                       \par CN II - XII  intact                                                                         \par Sensory: Intact to light touch, PP and Vibration                                                                                           \par Tone: normal Throughout \par Balance: impaired\par \par Motor  \par LEFT    UE: SF [5/5], EF [5/5], EE [5/5], WE [5/5],  [wnl]\par RIGHT UE: SF [5/5], EF [5/5], EE [5/5], WE [5/5],  [wnl]\par LEFT    LE:  HF [5/5], KE [5/5], DF [5/5], EHL [5/5],  PF [5/5]\par RIGHT LE:  HF [5/5], KE [4+/5], DF [5/5], EHL [5/5],  PF [5/5]  \par \par Reflex:  intact\par transfers independently- few steps no device one arm assist- balance impaired

## 2022-07-20 NOTE — REVIEW OF SYSTEMS
[Lower Ext Edema] : lower extremity edema [Joint Pain] : joint pain [Joint Stiffness] : joint stiffness [Negative] : Heme/Lymph

## 2022-07-20 NOTE — HISTORY OF PRESENT ILLNESS
[FreeTextEntry1] : This is a 75 YO female with PMH of SLE, lung cancer (s/p tumor removal, no chemo no radiation) OA, DVT, DJD, HTN, HLD, Hypothyroidism, covid-19, obesity, heart murmur, left knee replacement, lumbar spine surgery 12/20, cataract s/p surgery, tonsillectomy who presented to [facility] for elective Right knee replacement done on 5/16  by Dr. Mansoor Castillo. Eliquis 2.5mg q 12 h was given for DVT prophylaxis/on long term AC secondary to DVT .Post Op course on telemetry secondary to bradycardia-beta blocker held. Patient was evaluated by PM&R and therapy for functional deficits, gait/ADL impairments and acute rehabilitation was recommended. Patient was medically optimized for discharge to [facility] IRU on 5/18/22.- patient now seen in office for Follow up\par \par Patient discharged with VN PT- transitioned to home PT with [facility] 2x/week. patient feels right knee recovery much slower than left. patient seen by PMD 2 weeks after DC- On antihypertensives. Seen by ortho on follow up with next visit on 8/3. Afraid to progress to cane secondary to impaired balance. Daughter assists with groceries and some ADL but basically functionally independent with walker at home.\par \par Pain right knee reported as 1-2/10- occ takes tylenol

## 2022-08-02 ENCOUNTER — APPOINTMENT (OUTPATIENT)
Dept: ORTHOPEDIC SURGERY | Facility: CLINIC | Age: 77
End: 2022-08-02

## 2022-08-02 VITALS — HEIGHT: 66 IN

## 2022-08-02 DIAGNOSIS — Z96.651 PRESENCE OF RIGHT ARTIFICIAL KNEE JOINT: ICD-10-CM

## 2022-08-02 DIAGNOSIS — Z96.652 PRESENCE OF LEFT ARTIFICIAL KNEE JOINT: ICD-10-CM

## 2022-08-02 PROCEDURE — 99024 POSTOP FOLLOW-UP VISIT: CPT

## 2022-08-02 PROCEDURE — 73562 X-RAY EXAM OF KNEE 3: CPT | Mod: 50

## 2022-08-24 ENCOUNTER — RX RENEWAL (OUTPATIENT)
Age: 77
End: 2022-08-24

## 2022-08-29 NOTE — PROGRESS NOTE ADULT - SUBJECTIVE AND OBJECTIVE BOX
Subjective: No new complaints or overnight issues, pain controlled, breathing comfortably, no SOB, +BM    VITALS  T(C): 36.5 (02-20-21 @ 07:49), Max: 36.6 (02-19-21 @ 22:04)  HR: 89 (02-20-21 @ 07:49) (71 - 89)  BP: 129/83 (02-20-21 @ 07:49) (129/83 - 132/78)  RR: 15 (02-20-21 @ 07:49) (15 - 15)  SpO2: 95% (02-20-21 @ 07:49) (95% - 96%)  Wt(kg): --    REVIEW OF SYSTEMS  Pertinent in last 24 h: neg      Physical Exam:  Constitutional - NAD, Comfortable  HEENT - NCAT,  Chest - CTA bilaterally,   Cardiovascular - RRR, S1S2,   Abdomen - BS+, Soft, NTND    Neurologic Exam -    Stable                Cognitive - Awake, Alert, AAO to self, place, date, year, situation     Cranial Nerves - CN 2-12 intact     Motor - no new deficit      Sensory - Intact to LT  Psychiatric - Mood stable, Affect WNL  Skin-- Back incision healed  -    RECENT LABS/IMAGING                        11.0   5.54  )-----------( 204      ( 19 Feb 2021 07:00 )             34.4     02-19    141  |  107  |  11  ----------------------------<  93  3.5   |  27  |  0.62    Ca    8.6      19 Feb 2021 07:00              MEDICATIONS   acetaminophen   Tablet .. 650 milliGRAM(s) every 6 hours PRN  aluminum hydroxide/magnesium hydroxide/simethicone Suspension 30 milliLiter(s) every 4 hours PRN  ascorbic acid 500 milliGRAM(s) daily  atorvastatin 20 milliGRAM(s) at bedtime  benzocaine 15 mG/menthol 3.6 mG (Sugar-Free) Lozenge 1 Lozenge four times a day  bisacodyl Suppository 10 milliGRAM(s) daily PRN  enoxaparin Injectable 40 milliGRAM(s) two times a day  folic acid 1 milliGRAM(s) daily  hydroxychloroquine 200 milliGRAM(s) <User Schedule>  levothyroxine 200 MICROGram(s) daily  lidocaine   Patch 1 Patch daily  magnesium hydroxide Suspension 30 milliLiter(s) daily PRN  melatonin 3 milliGRAM(s) at bedtime PRN  ondansetron   Disintegrating Tablet 4 milliGRAM(s) every 6 hours PRN  oxyCODONE    IR 5 milliGRAM(s) every 6 hours PRN  oxyCODONE  ER Tablet 10 milliGRAM(s) every 12 hours  polyethylene glycol 3350 17 Gram(s) daily  senna 2 Tablet(s) at bedtime  simethicone 80 milliGRAM(s) every 6 hours PRN      --------------------------------------------------------------------   Plastic Surgeon Procedure Text (C): After obtaining clear surgical margins the patient was sent to plastics for surgical repair.  The patient understands they will receive post-surgical care and follow-up from the referring physician's office.

## 2022-08-30 ENCOUNTER — RX RENEWAL (OUTPATIENT)
Age: 77
End: 2022-08-30

## 2022-09-12 ENCOUNTER — APPOINTMENT (OUTPATIENT)
Dept: CARDIOLOGY | Facility: CLINIC | Age: 77
End: 2022-09-12
Payer: MEDICARE

## 2022-09-12 ENCOUNTER — NON-APPOINTMENT (OUTPATIENT)
Age: 77
End: 2022-09-12

## 2022-09-12 VITALS
HEIGHT: 66 IN | DIASTOLIC BLOOD PRESSURE: 68 MMHG | HEART RATE: 67 BPM | OXYGEN SATURATION: 99 % | SYSTOLIC BLOOD PRESSURE: 142 MMHG | RESPIRATION RATE: 14 BRPM | TEMPERATURE: 96.4 F

## 2022-09-12 DIAGNOSIS — I82.409 ACUTE EMBOLISM AND THROMBOSIS OF UNSPECIFIED DEEP VEINS OF UNSPECIFIED LOWER EXTREMITY: ICD-10-CM

## 2022-09-12 PROCEDURE — 99213 OFFICE O/P EST LOW 20 MIN: CPT

## 2022-09-12 PROCEDURE — 93000 ELECTROCARDIOGRAM COMPLETE: CPT

## 2022-09-12 NOTE — HISTORY OF PRESENT ILLNESS
[FreeTextEntry1] : Marely is ambulating with walker. Trying to get more PT covered but denied by medicare. No HA, palpitations or SOB.

## 2022-09-12 NOTE — DISCUSSION/SUMMARY
[FreeTextEntry1] : The patient is a 76-year-old obese female history of rheumatoid arthritis,  lupus, hypothyroidism, hyperlipidemia, hypertension, lower extremity edema ,RBBB, s/p left lobectomy for cancer s/p rt hip replacement complicated by DVT s/p left knee replacement s/p spine surgery whose PVC are under control .. \par #1 RA- on MTX and plaquenil, ambulates with walker\par #2 Htn- continue  losartan 50mg\par #3 Lipids- continue atorvastatin \par #4 Hypothyroidism- continue levothyroxine 150mcg\par #5 CV- pharm stress negative, PVC controlled with metoprolol, mod AS 1.5, EF= 60%\par #6 Ortho- s/p right hip replacement and Lt TKR  s/p spine surgery and rt TK, c/w  eliquis bid\par \par  [EKG obtained to assist in diagnosis and management of assessed problem(s)] : EKG obtained to assist in diagnosis and management of assessed problem(s)

## 2022-10-10 ENCOUNTER — APPOINTMENT (OUTPATIENT)
Dept: UROLOGY | Facility: CLINIC | Age: 77
End: 2022-10-10

## 2022-10-10 VITALS
DIASTOLIC BLOOD PRESSURE: 60 MMHG | HEART RATE: 78 BPM | TEMPERATURE: 97.3 F | OXYGEN SATURATION: 99 % | SYSTOLIC BLOOD PRESSURE: 143 MMHG | RESPIRATION RATE: 14 BRPM

## 2022-10-10 PROCEDURE — 99214 OFFICE O/P EST MOD 30 MIN: CPT

## 2022-10-10 RX ORDER — SOLIFENACIN SUCCINATE 10 MG/1
10 TABLET ORAL
Qty: 90 | Refills: 1 | Status: ACTIVE | COMMUNITY
Start: 2021-08-23 | End: 1900-01-01

## 2022-10-10 RX ORDER — ESTRADIOL 0.1 MG/G
0.1 CREAM VAGINAL
Qty: 1 | Refills: 1 | Status: ACTIVE | COMMUNITY
Start: 2022-01-10 | End: 1900-01-01

## 2022-10-10 RX ORDER — METHENAMINE HIPPURATE 1 G/1
1 TABLET ORAL TWICE DAILY
Qty: 180 | Refills: 1 | Status: ACTIVE | COMMUNITY
Start: 2021-07-12 | End: 1900-01-01

## 2022-10-10 NOTE — HISTORY OF PRESENT ILLNESS
[FreeTextEntry1] : Very pleasant 76-year-old woman who presents for follow-up of recurrent urinary tract infections, urinary urgency, urinary frequency.  She reports that Vesicare continues to significantly improve her urinary symptoms of urinary frequency and urinary urgency.  She reports she continues to take methenamine hippurate for prevention of UTIs as well.  She does not drink a lot of water.  She continues to use Estrace vaginal cream. No problems with this.\par \par \par CT urogram previously demonstrated no kidney stones, hydronephrosis, renal masses.

## 2022-10-10 NOTE — ASSESSMENT
[FreeTextEntry1] : Very pleasant 76-year-old woman who presents for follow-up of recurrent urinary tract infections, urinary urgency\par -Continue Vesicare–refill sent to the pharmacy\par -Continue Estrace cream, refill sent to pharmacy\par -Continue Hip-Elias–refill sent to the pharmacy\par -Follow-up in 6 months

## 2022-10-16 RX ADMIN — CELECOXIB 100 MILLIGRAM(S): 200 CAPSULE ORAL at 18:52

## 2022-10-18 ENCOUNTER — INPATIENT (INPATIENT)
Facility: HOSPITAL | Age: 77
LOS: 0 days | Discharge: ANOTHER HOSPITAL | End: 2022-10-19
Attending: ORTHOPAEDIC SURGERY | Admitting: ORTHOPAEDIC SURGERY

## 2022-10-18 VITALS
RESPIRATION RATE: 18 BRPM | HEIGHT: 66.5 IN | SYSTOLIC BLOOD PRESSURE: 160 MMHG | OXYGEN SATURATION: 100 % | HEART RATE: 74 BPM | TEMPERATURE: 97 F | DIASTOLIC BLOOD PRESSURE: 68 MMHG

## 2022-10-18 DIAGNOSIS — Z98.890 OTHER SPECIFIED POSTPROCEDURAL STATES: Chronic | ICD-10-CM

## 2022-10-18 DIAGNOSIS — S72.009A FRACTURE OF UNSPECIFIED PART OF NECK OF UNSPECIFIED FEMUR, INITIAL ENCOUNTER FOR CLOSED FRACTURE: ICD-10-CM

## 2022-10-18 DIAGNOSIS — Z96.659 PRESENCE OF UNSPECIFIED ARTIFICIAL KNEE JOINT: Chronic | ICD-10-CM

## 2022-10-18 DIAGNOSIS — C34.90 MALIGNANT NEOPLASM OF UNSPECIFIED PART OF UNSPECIFIED BRONCHUS OR LUNG: Chronic | ICD-10-CM

## 2022-10-18 DIAGNOSIS — Z98.49 CATARACT EXTRACTION STATUS, UNSPECIFIED EYE: Chronic | ICD-10-CM

## 2022-10-18 DIAGNOSIS — Z90.89 ACQUIRED ABSENCE OF OTHER ORGANS: Chronic | ICD-10-CM

## 2022-10-18 DIAGNOSIS — Z96.641 PRESENCE OF RIGHT ARTIFICIAL HIP JOINT: Chronic | ICD-10-CM

## 2022-10-18 LAB
ALBUMIN SERPL ELPH-MCNC: 3.7 G/DL — SIGNIFICANT CHANGE UP (ref 3.3–5)
ALP SERPL-CCNC: 125 U/L — HIGH (ref 40–120)
ALT FLD-CCNC: 17 U/L — SIGNIFICANT CHANGE UP (ref 4–33)
ANION GAP SERPL CALC-SCNC: 14 MMOL/L — SIGNIFICANT CHANGE UP (ref 7–14)
APTT BLD: 22.1 SEC — LOW (ref 27–36.3)
AST SERPL-CCNC: 31 U/L — SIGNIFICANT CHANGE UP (ref 4–32)
BASE EXCESS BLDV CALC-SCNC: 2 MMOL/L — SIGNIFICANT CHANGE UP (ref -2–3)
BASOPHILS # BLD AUTO: 0.06 K/UL — SIGNIFICANT CHANGE UP (ref 0–0.2)
BASOPHILS NFR BLD AUTO: 0.4 % — SIGNIFICANT CHANGE UP (ref 0–2)
BILIRUB SERPL-MCNC: 0.5 MG/DL — SIGNIFICANT CHANGE UP (ref 0.2–1.2)
BLD GP AB SCN SERPL QL: NEGATIVE — SIGNIFICANT CHANGE UP
BLOOD GAS VENOUS COMPREHENSIVE RESULT: SIGNIFICANT CHANGE UP
BUN SERPL-MCNC: 21 MG/DL — SIGNIFICANT CHANGE UP (ref 7–23)
CALCIUM SERPL-MCNC: 9.2 MG/DL — SIGNIFICANT CHANGE UP (ref 8.4–10.5)
CHLORIDE BLDV-SCNC: 103 MMOL/L — SIGNIFICANT CHANGE UP (ref 96–108)
CHLORIDE SERPL-SCNC: 103 MMOL/L — SIGNIFICANT CHANGE UP (ref 98–107)
CO2 BLDV-SCNC: 30.3 MMOL/L — HIGH (ref 22–26)
CO2 SERPL-SCNC: 23 MMOL/L — SIGNIFICANT CHANGE UP (ref 22–31)
CREAT SERPL-MCNC: 0.68 MG/DL — SIGNIFICANT CHANGE UP (ref 0.5–1.3)
EGFR: 90 ML/MIN/1.73M2 — SIGNIFICANT CHANGE UP
EOSINOPHIL # BLD AUTO: 0.01 K/UL — SIGNIFICANT CHANGE UP (ref 0–0.5)
EOSINOPHIL NFR BLD AUTO: 0.1 % — SIGNIFICANT CHANGE UP (ref 0–6)
FLUAV AG NPH QL: SIGNIFICANT CHANGE UP
FLUBV AG NPH QL: SIGNIFICANT CHANGE UP
GAS PNL BLDV: 137 MMOL/L — SIGNIFICANT CHANGE UP (ref 136–145)
GLUCOSE BLDV-MCNC: 153 MG/DL — HIGH (ref 70–99)
GLUCOSE SERPL-MCNC: 142 MG/DL — HIGH (ref 70–99)
HCO3 BLDV-SCNC: 29 MMOL/L — SIGNIFICANT CHANGE UP (ref 22–29)
HCT VFR BLD CALC: 42 % — SIGNIFICANT CHANGE UP (ref 34.5–45)
HCT VFR BLDA CALC: 41 % — SIGNIFICANT CHANGE UP (ref 34.5–46.5)
HGB BLD CALC-MCNC: 13.7 G/DL — SIGNIFICANT CHANGE UP (ref 11.5–15.5)
HGB BLD-MCNC: 13.3 G/DL — SIGNIFICANT CHANGE UP (ref 11.5–15.5)
IANC: 11.51 K/UL — HIGH (ref 1.8–7.4)
IMM GRANULOCYTES NFR BLD AUTO: 0.2 % — SIGNIFICANT CHANGE UP (ref 0–0.9)
INR BLD: 1.14 RATIO — SIGNIFICANT CHANGE UP (ref 0.88–1.16)
LACTATE BLDV-MCNC: 2.9 MMOL/L — HIGH (ref 0.5–2)
LYMPHOCYTES # BLD AUTO: 1.18 K/UL — SIGNIFICANT CHANGE UP (ref 1–3.3)
LYMPHOCYTES # BLD AUTO: 8.7 % — LOW (ref 13–44)
MCHC RBC-ENTMCNC: 30.3 PG — SIGNIFICANT CHANGE UP (ref 27–34)
MCHC RBC-ENTMCNC: 31.7 GM/DL — LOW (ref 32–36)
MCV RBC AUTO: 95.7 FL — SIGNIFICANT CHANGE UP (ref 80–100)
MONOCYTES # BLD AUTO: 0.81 K/UL — SIGNIFICANT CHANGE UP (ref 0–0.9)
MONOCYTES NFR BLD AUTO: 6 % — SIGNIFICANT CHANGE UP (ref 2–14)
NEUTROPHILS # BLD AUTO: 11.51 K/UL — HIGH (ref 1.8–7.4)
NEUTROPHILS NFR BLD AUTO: 84.6 % — HIGH (ref 43–77)
NRBC # BLD: 0 /100 WBCS — SIGNIFICANT CHANGE UP (ref 0–0)
NRBC # FLD: 0 K/UL — SIGNIFICANT CHANGE UP (ref 0–0)
PCO2 BLDV: 52 MMHG — HIGH (ref 39–42)
PH BLDV: 7.35 — SIGNIFICANT CHANGE UP (ref 7.32–7.43)
PLATELET # BLD AUTO: 132 K/UL — LOW (ref 150–400)
PO2 BLDV: 25 MMHG — SIGNIFICANT CHANGE UP
POTASSIUM BLDV-SCNC: 4 MMOL/L — SIGNIFICANT CHANGE UP (ref 3.5–5.1)
POTASSIUM SERPL-MCNC: 4.3 MMOL/L — SIGNIFICANT CHANGE UP (ref 3.5–5.3)
POTASSIUM SERPL-SCNC: 4.3 MMOL/L — SIGNIFICANT CHANGE UP (ref 3.5–5.3)
PROT SERPL-MCNC: 6.7 G/DL — SIGNIFICANT CHANGE UP (ref 6–8.3)
PROTHROM AB SERPL-ACNC: 13.2 SEC — SIGNIFICANT CHANGE UP (ref 10.5–13.4)
RBC # BLD: 4.39 M/UL — SIGNIFICANT CHANGE UP (ref 3.8–5.2)
RBC # FLD: 14.1 % — SIGNIFICANT CHANGE UP (ref 10.3–14.5)
RH IG SCN BLD-IMP: POSITIVE — SIGNIFICANT CHANGE UP
RSV RNA NPH QL NAA+NON-PROBE: SIGNIFICANT CHANGE UP
SAO2 % BLDV: 41.4 % — SIGNIFICANT CHANGE UP
SARS-COV-2 RNA SPEC QL NAA+PROBE: SIGNIFICANT CHANGE UP
SODIUM SERPL-SCNC: 140 MMOL/L — SIGNIFICANT CHANGE UP (ref 135–145)
TROPONIN T, HIGH SENSITIVITY RESULT: 9 NG/L — SIGNIFICANT CHANGE UP
WBC # BLD: 13.6 K/UL — HIGH (ref 3.8–10.5)
WBC # FLD AUTO: 13.6 K/UL — HIGH (ref 3.8–10.5)

## 2022-10-18 PROCEDURE — 71045 X-RAY EXAM CHEST 1 VIEW: CPT | Mod: 26

## 2022-10-18 PROCEDURE — 99285 EMERGENCY DEPT VISIT HI MDM: CPT

## 2022-10-18 PROCEDURE — 73503 X-RAY EXAM HIP UNI 4/> VIEWS: CPT | Mod: 26,RT

## 2022-10-18 PROCEDURE — 73552 X-RAY EXAM OF FEMUR 2/>: CPT | Mod: 26,RT

## 2022-10-18 RX ORDER — MORPHINE SULFATE 50 MG/1
4 CAPSULE, EXTENDED RELEASE ORAL ONCE
Refills: 0 | Status: DISCONTINUED | OUTPATIENT
Start: 2022-10-18 | End: 2022-10-18

## 2022-10-18 RX ORDER — ACETAMINOPHEN 500 MG
975 TABLET ORAL ONCE
Refills: 0 | Status: COMPLETED | OUTPATIENT
Start: 2022-10-18 | End: 2022-10-18

## 2022-10-18 RX ORDER — MORPHINE SULFATE 50 MG/1
2 CAPSULE, EXTENDED RELEASE ORAL ONCE
Refills: 0 | Status: DISCONTINUED | OUTPATIENT
Start: 2022-10-18 | End: 2022-10-18

## 2022-10-18 RX ADMIN — MORPHINE SULFATE 2 MILLIGRAM(S): 50 CAPSULE, EXTENDED RELEASE ORAL at 22:31

## 2022-10-18 RX ADMIN — Medication 975 MILLIGRAM(S): at 17:57

## 2022-10-18 RX ADMIN — MORPHINE SULFATE 4 MILLIGRAM(S): 50 CAPSULE, EXTENDED RELEASE ORAL at 20:04

## 2022-10-18 NOTE — H&P ADULT - NSHPLABSRESULTS_GEN_ALL_CORE
13.3     13.60 )-----------( 132      ( 18 Oct 2022 16:50 )             42.0     10-18    140  |  103  |  21  ----------------------------<  142<H>  4.3   |  23  |  0.68    Ca    9.2      18 Oct 2022 16:50    TPro  6.7  /  Alb  3.7  /  TBili  0.5  /  DBili  x   /  AST  31  /  ALT  17  /  AlkPhos  125<H>  10-18    PT/INR - ( 18 Oct 2022 16:50 )   PT: 13.2 sec;   INR: 1.14 ratio         PTT - ( 18 Oct 2022 16:50 )  PTT:22.1 sec    Imaging:  XR demonstrating a right periprosthetic femur fracture

## 2022-10-18 NOTE — ED PROVIDER NOTE - PHYSICAL EXAMINATION
GENERAL: no acute distress, non-toxic appearing  HEAD: normocephalic, atraumatic  HEENT: PERRLA, EOMI, normal conjunctiva  CARDIAC: regular rate and rhythm, systolic murmur  PULM: clear to ascultation bilaterally  GI: abdomen nondistended, soft, nontender  NEURO: alert and oriented x 3, normal speech, no gross neurologic deficit  MSK: no visible deformities, unable to lift right leg due to pain, no pain with left leg movement, full ROM at ankle, no pain to palpation of lower leg, ankle, or foot, pain to palpation at right hip  SKIN: no visible rashes, dry, well-perfused  PSYCH: appropriate mood and affect

## 2022-10-18 NOTE — ED PROVIDER NOTE - ATTENDING CONTRIBUTION TO CARE
The patient is a 76y Female who has a past medical and surgery history of Rheumatoid arthritis SLE degenerative disc disease Hypertension Hyperlipidemia no chemo/rtx thyroid lobectomy S/P knee replacement Rt TKR P lumbar spine sx PTED after  "lost her footing" and falling down the stairs. with severe pain and an obvious deformity to prox RLE.  Patient AO$ no other complaints   Vital Signs Last 24 Hrs  T(F): 97 HR: 74 BP: 160/68 RR: 18 SpO2: 100% (18 Oct 2022 15:53)   PE: as described; my additions and exceptions are noted in the chart    DATA:  EKG: pending at time of evaluation  LAB: Pending at time of evaluation    IMPRESSION/RISK:  Dx=Proximal hip fx right     Plan  preop labs   analgesics  ortho consult  TBA ? of transfer as pt ortho at Christmas will check bed status given LIJED hold status at this time

## 2022-10-18 NOTE — ED PROVIDER NOTE - DISPOSITION TYPE

## 2022-10-18 NOTE — H&P ADULT - ASSESSMENT
76y Female with a right femur FELICITAS periprosthetic fracture  - Admit to Dr. Worthy, plan for possible transfer to [place] with Dr. Sin Leach for surgical management  - Pain control  - NPO/IVF  - CBC/BMP/Coags/UA/T+S x2  - EKG/CXR  - Medical clearance  - Plan for OR 10/19 for revision FELICITAS

## 2022-10-18 NOTE — ED ADULT NURSE NOTE - NSIMPLEMENTINTERV_GEN_ALL_ED
Implemented All Fall Risk Interventions:  [place] to call system. Call bell, personal items and telephone within reach. Instruct patient to call for assistance. Room bathroom lighting operational. Non-slip footwear when patient is off stretcher. Physically safe environment: no spills, clutter or unnecessary equipment. Stretcher in lowest position, wheels locked, appropriate side rails in place. Provide visual cue, wrist band, yellow gown, etc. Monitor gait and stability. Monitor for mental status changes and reorient to person, place, and time. Review medications for side effects contributing to fall risk. Reinforce activity limits and safety measures with patient and family.

## 2022-10-18 NOTE — ED PROVIDER NOTE - OBJECTIVE STATEMENT
Patient is a 76y F PMHx HTN, SLE, RA, thyroid CA s/p resection, DVT (on eliquis), s/p hip replacement, p/w right hip pain after fall. Patient walks with walker. Fall witnessed by family member. Patient states she went down a step, lost balance and fell back landing on right hip. Patient felt shaky after fall but no symptoms prior to fall. Now with CP. Denies LOC, head trauma, neck pain, SOB, palpitations.

## 2022-10-18 NOTE — ED PROVIDER NOTE - CLINICAL SUMMARY MEDICAL DECISION MAKING FREE TEXT BOX
Patient is a 76y F PMHx HTN, SLE, RA, thyroid CA s/p resection, DVT (on eliquis), s/p hip replacement, p/w right hip pain after fall. Concern for irineo-prosthetic hip fracture. Will get pre-op labs, Xrays. Pain control. Will get ekg, CXR, trop, r/o ACS given CP after fall.

## 2022-10-18 NOTE — H&P ADULT - NSHPPHYSICALEXAM_GEN_ALL_CORE
T(C): 36.8 (10-19-22 @ 01:51), Max: 36.9 (10-18-22 @ 23:54)  HR: 103 (10-19-22 @ 01:51) (60 - 103)  BP: 142/72 (10-19-22 @ 01:51) (129/58 - 160/68)  RR: 20 (10-19-22 @ 01:51) (18 - 20)  SpO2: 99% (10-19-22 @ 01:51) (96% - 100%)  Wt(kg): --    PE   RLE:  Skin intact; No ecchymosis/soft tissue swelling  Compartments soft; + TTP about hip. No TTP to knee/leg/ankle/foot   ROM limited 2/2 pain   Unable to SLR; + Log Roll/Heel Strike  Motor intact GS/TA/FHL/EHL  SILT L2-S1  DP/PT pulses 2+    LLE/BUE:   No bony TTP; Good ROM w/o pain; Exam Unremarkable

## 2022-10-18 NOTE — H&P ADULT - HISTORY OF PRESENT ILLNESS
76y Female PMH HTN, SLE, RA, thyroid CA s/p resection, DVT (on eliquis), R FELICITAS (Jonathan, '19), L TKA (Jonathan, '20), R FELICITAS (Jonathan, '22) presents s/p mechanical fall with severe right hip pain and inability to ambulate. States she was walking over an area of her house where constructing new tile floors when she slipped and fell. Patient denies headstrike or LOC. Patient denies radiation of pain. Patient denies numbness/tingling/burning in the _LE. No other bone/joint complaints. Patient is a community ambulator at baseline with/without assistive devices.    PAST MEDICAL & SURGICAL HISTORY:  Rheumatoid arthritis      SLE (systemic lupus erythematosus)      Osteoarthritis      H/O degenerative disc disease      Hypertension      Hyperlipidemia      Benign heart murmur      Hypothyroid      Obesity, Class II, BMI 35-39.9, no comorbidity      DVT (deep venous thrombosis)  during left knee replacement 2019      Overactive bladder      Lab test positive for detection of COVID-19 virus  12/20      S/P tonsillectomy      S/P cataract surgery  left, right      S/P eye surgery  child      S/P carpal tunnel release  left      S/P knee surgery  bilateral      Lung cancer  small tumor removed 2015-no chemo, no radiation      S/P thyroid surgery  1 lobe removed      Status post total hip replacement, right      S/P knee replacement  left      S/P lumbar spine operation  12/20        MEDICATIONS  (STANDING):  lactated ringers. 1000 milliLiter(s) (100 mL/Hr) IV Continuous <Continuous>  senna 2 Tablet(s) Oral at bedtime    MEDICATIONS  (PRN):  acetaminophen     Tablet .. 975 milliGRAM(s) Oral every 8 hours PRN Mild Pain (1 - 3)  magnesium hydroxide Suspension 30 milliLiter(s) Oral daily PRN Constipation  oxyCODONE    IR 2.5 milliGRAM(s) Oral every 4 hours PRN Moderate Pain (4 - 6)  oxyCODONE    IR 5 milliGRAM(s) Oral every 4 hours PRN Severe Pain (7 - 10)    Allergies    No Known Allergies    Intolerances                              13.3   13.60 )-----------( 132      ( 18 Oct 2022 16:50 )             42.0     10-18    140  |  103  |  21  ----------------------------<  142<H>  4.3   |  23  |  0.68    Ca    9.2      18 Oct 2022 16:50    TPro  6.7  /  Alb  3.7  /  TBili  0.5  /  DBili  x   /  AST  31  /  ALT  17  /  AlkPhos  125<H>  10-18    PT/INR - ( 18 Oct 2022 16:50 )   PT: 13.2 sec;   INR: 1.14 ratio         PTT - ( 18 Oct 2022 16:50 )  PTT:22.1 sec    T(C): 36.8 (10-19-22 @ 01:51), Max: 36.9 (10-18-22 @ 23:54)  HR: 103 (10-19-22 @ 01:51) (60 - 103)  BP: 142/72 (10-19-22 @ 01:51) (129/58 - 160/68)  RR: 20 (10-19-22 @ 01:51) (18 - 20)  SpO2: 99% (10-19-22 @ 01:51) (96% - 100%)  Wt(kg): --    PE   _LE:  Skin intact; No ecchymosis/soft tissue swelling  Compartments soft; + TTP about hip. No TTP to knee/leg/ankle/foot   ROM limited 2/2 pain   Unable to SLR; + Log Roll/Heel Strike  Motor intact GS/TA/FHL/EHL  SILT L2-S1  DP/PT pulses 2+    _LE/BUE:   No bony TTP; Good ROM w/o pain; Exam Unremarkable    Imaging:  XR demonstrating _ femoral neck/intertroch/subtroch fracture    76y Female with femoral neck intertroch subtroch fracture  - Pain control  - NPO/IVF  - Simmons catheter  - CBC/BMP/Coags/UA/T+S x2  - EKG/CXR  - Medical clearance  - Plan for OR for ORIF 76y Female PMH HTN, SLE, RA, thyroid CA s/p resection, DVT (on eliquis), R FELICITAS (Jonathan, '19), L TKA (Jonathan, '20), R FELICITAS (Jonathan, '22) presents s/p mechanical fall with severe right hip pain and inability to ambulate. States she was walking over an area of her house where constructing new tile floors when she slipped and fell. Patient denies headstrike or LOC. Patient denies radiation of pain. Patient denies numbness/tingling/burning in the RLE. No other bone/joint complaints. Patient is a community ambulator at baseline with a walker.

## 2022-10-18 NOTE — ED PROVIDER NOTE - PROGRESS NOTE DETAILS
Thania Dalton MD PGY2: Periprosthetic femur fracture. ortho consulted. Family updated. WILLIE Hill states to admit to Dr. Worthy. Will admit at this time.

## 2022-10-18 NOTE — ED ADULT TRIAGE NOTE - CHIEF COMPLAINT QUOTE
pt coming from home.  pt "lost her footing" and fell on the stairs.  presents to ED with obvious deformity to RLE.  pt c/o right hip/pelvis pain

## 2022-10-19 ENCOUNTER — TRANSCRIPTION ENCOUNTER (OUTPATIENT)
Age: 77
End: 2022-10-19

## 2022-10-19 ENCOUNTER — INPATIENT (INPATIENT)
Facility: HOSPITAL | Age: 77
LOS: 2 days | Discharge: ANOTHER HOSPITAL | DRG: 467 | End: 2022-10-22
Attending: INTERNAL MEDICINE | Admitting: INTERNAL MEDICINE
Payer: MEDICARE

## 2022-10-19 VITALS
HEART RATE: 70 BPM | DIASTOLIC BLOOD PRESSURE: 64 MMHG | OXYGEN SATURATION: 95 % | TEMPERATURE: 99 F | SYSTOLIC BLOOD PRESSURE: 129 MMHG | RESPIRATION RATE: 17 BRPM

## 2022-10-19 VITALS
HEART RATE: 82 BPM | DIASTOLIC BLOOD PRESSURE: 50 MMHG | OXYGEN SATURATION: 98 % | RESPIRATION RATE: 17 BRPM | SYSTOLIC BLOOD PRESSURE: 118 MMHG | TEMPERATURE: 98 F

## 2022-10-19 DIAGNOSIS — I10 ESSENTIAL (PRIMARY) HYPERTENSION: ICD-10-CM

## 2022-10-19 DIAGNOSIS — Z96.659 PRESENCE OF UNSPECIFIED ARTIFICIAL KNEE JOINT: Chronic | ICD-10-CM

## 2022-10-19 DIAGNOSIS — Z01.818 ENCOUNTER FOR OTHER PREPROCEDURAL EXAMINATION: ICD-10-CM

## 2022-10-19 DIAGNOSIS — M97.8XXA PERIPROSTHETIC FRACTURE AROUND OTHER INTERNAL PROSTHETIC JOINT, INITIAL ENCOUNTER: ICD-10-CM

## 2022-10-19 DIAGNOSIS — Z90.89 ACQUIRED ABSENCE OF OTHER ORGANS: Chronic | ICD-10-CM

## 2022-10-19 DIAGNOSIS — E78.5 HYPERLIPIDEMIA, UNSPECIFIED: ICD-10-CM

## 2022-10-19 DIAGNOSIS — M32.9 SYSTEMIC LUPUS ERYTHEMATOSUS, UNSPECIFIED: ICD-10-CM

## 2022-10-19 DIAGNOSIS — M84.353A STRESS FRACTURE, UNSPECIFIED FEMUR, INITIAL ENCOUNTER FOR FRACTURE: ICD-10-CM

## 2022-10-19 DIAGNOSIS — Z96.641 PRESENCE OF RIGHT ARTIFICIAL HIP JOINT: Chronic | ICD-10-CM

## 2022-10-19 DIAGNOSIS — E03.9 HYPOTHYROIDISM, UNSPECIFIED: ICD-10-CM

## 2022-10-19 DIAGNOSIS — Z98.49 CATARACT EXTRACTION STATUS, UNSPECIFIED EYE: Chronic | ICD-10-CM

## 2022-10-19 DIAGNOSIS — M06.9 RHEUMATOID ARTHRITIS, UNSPECIFIED: ICD-10-CM

## 2022-10-19 DIAGNOSIS — Z98.890 OTHER SPECIFIED POSTPROCEDURAL STATES: Chronic | ICD-10-CM

## 2022-10-19 DIAGNOSIS — Z29.9 ENCOUNTER FOR PROPHYLACTIC MEASURES, UNSPECIFIED: ICD-10-CM

## 2022-10-19 DIAGNOSIS — S72.90XA UNSPECIFIED FRACTURE OF UNSPECIFIED FEMUR, INITIAL ENCOUNTER FOR CLOSED FRACTURE: ICD-10-CM

## 2022-10-19 DIAGNOSIS — C34.90 MALIGNANT NEOPLASM OF UNSPECIFIED PART OF UNSPECIFIED BRONCHUS OR LUNG: Chronic | ICD-10-CM

## 2022-10-19 DIAGNOSIS — I82.409 ACUTE EMBOLISM AND THROMBOSIS OF UNSPECIFIED DEEP VEINS OF UNSPECIFIED LOWER EXTREMITY: ICD-10-CM

## 2022-10-19 LAB
ANION GAP SERPL CALC-SCNC: 11 MMOL/L — SIGNIFICANT CHANGE UP (ref 7–14)
BLD GP AB SCN SERPL QL: NEGATIVE — SIGNIFICANT CHANGE UP
BUN SERPL-MCNC: 25 MG/DL — HIGH (ref 7–23)
CALCIUM SERPL-MCNC: 8.9 MG/DL — SIGNIFICANT CHANGE UP (ref 8.4–10.5)
CHLORIDE SERPL-SCNC: 104 MMOL/L — SIGNIFICANT CHANGE UP (ref 98–107)
CO2 SERPL-SCNC: 24 MMOL/L — SIGNIFICANT CHANGE UP (ref 22–31)
CREAT SERPL-MCNC: 0.7 MG/DL — SIGNIFICANT CHANGE UP (ref 0.5–1.3)
EGFR: 89 ML/MIN/1.73M2 — SIGNIFICANT CHANGE UP
GLUCOSE SERPL-MCNC: 129 MG/DL — HIGH (ref 70–99)
POTASSIUM SERPL-MCNC: 4.4 MMOL/L — SIGNIFICANT CHANGE UP (ref 3.5–5.3)
POTASSIUM SERPL-SCNC: 4.4 MMOL/L — SIGNIFICANT CHANGE UP (ref 3.5–5.3)
RH IG SCN BLD-IMP: POSITIVE — SIGNIFICANT CHANGE UP
SODIUM SERPL-SCNC: 139 MMOL/L — SIGNIFICANT CHANGE UP (ref 135–145)

## 2022-10-19 PROCEDURE — 99221 1ST HOSP IP/OBS SF/LOW 40: CPT

## 2022-10-19 PROCEDURE — 73700 CT LOWER EXTREMITY W/O DYE: CPT | Mod: 26,RT

## 2022-10-19 PROCEDURE — 99223 1ST HOSP IP/OBS HIGH 75: CPT | Mod: AI

## 2022-10-19 RX ORDER — SODIUM CHLORIDE 9 MG/ML
1000 INJECTION, SOLUTION INTRAVENOUS
Refills: 0 | Status: DISCONTINUED | OUTPATIENT
Start: 2022-10-19 | End: 2022-10-22

## 2022-10-19 RX ORDER — POLYETHYLENE GLYCOL 3350 17 G/17G
17 POWDER, FOR SOLUTION ORAL AT BEDTIME
Refills: 0 | Status: DISCONTINUED | OUTPATIENT
Start: 2022-10-19 | End: 2022-10-19

## 2022-10-19 RX ORDER — OXYCODONE HYDROCHLORIDE 5 MG/1
5 TABLET ORAL
Refills: 0 | Status: DISCONTINUED | OUTPATIENT
Start: 2022-10-19 | End: 2022-10-20

## 2022-10-19 RX ORDER — ACETAMINOPHEN 500 MG
650 TABLET ORAL EVERY 8 HOURS
Refills: 0 | Status: DISCONTINUED | OUTPATIENT
Start: 2022-10-19 | End: 2022-10-20

## 2022-10-19 RX ORDER — HYDROMORPHONE HYDROCHLORIDE 2 MG/ML
0.5 INJECTION INTRAMUSCULAR; INTRAVENOUS; SUBCUTANEOUS
Refills: 0 | Status: DISCONTINUED | OUTPATIENT
Start: 2022-10-19 | End: 2022-10-20

## 2022-10-19 RX ORDER — ACETAMINOPHEN 500 MG
3 TABLET ORAL
Qty: 0 | Refills: 0 | DISCHARGE
Start: 2022-10-19

## 2022-10-19 RX ORDER — LEVOTHYROXINE SODIUM 125 MCG
150 TABLET ORAL DAILY
Refills: 0 | Status: DISCONTINUED | OUTPATIENT
Start: 2022-10-19 | End: 2022-10-19

## 2022-10-19 RX ORDER — INFLUENZA VIRUS VACCINE 15; 15; 15; 15 UG/.5ML; UG/.5ML; UG/.5ML; UG/.5ML
0.7 SUSPENSION INTRAMUSCULAR ONCE
Refills: 0 | Status: DISCONTINUED | OUTPATIENT
Start: 2022-10-19 | End: 2022-10-22

## 2022-10-19 RX ORDER — ACETAMINOPHEN 500 MG
975 TABLET ORAL EVERY 8 HOURS
Refills: 0 | Status: DISCONTINUED | OUTPATIENT
Start: 2022-10-19 | End: 2022-10-19

## 2022-10-19 RX ORDER — OXYCODONE HYDROCHLORIDE 5 MG/1
2.5 TABLET ORAL EVERY 4 HOURS
Refills: 0 | Status: DISCONTINUED | OUTPATIENT
Start: 2022-10-19 | End: 2022-10-19

## 2022-10-19 RX ORDER — POLYETHYLENE GLYCOL 3350 17 G/17G
17 POWDER, FOR SOLUTION ORAL
Qty: 0 | Refills: 0 | DISCHARGE
Start: 2022-10-19

## 2022-10-19 RX ORDER — PANTOPRAZOLE SODIUM 20 MG/1
40 TABLET, DELAYED RELEASE ORAL
Refills: 0 | Status: DISCONTINUED | OUTPATIENT
Start: 2022-10-19 | End: 2022-10-19

## 2022-10-19 RX ORDER — ATORVASTATIN CALCIUM 80 MG/1
20 TABLET, FILM COATED ORAL AT BEDTIME
Refills: 0 | Status: DISCONTINUED | OUTPATIENT
Start: 2022-10-19 | End: 2022-10-19

## 2022-10-19 RX ORDER — ATORVASTATIN CALCIUM 80 MG/1
20 TABLET, FILM COATED ORAL AT BEDTIME
Refills: 0 | Status: DISCONTINUED | OUTPATIENT
Start: 2022-10-19 | End: 2022-10-20

## 2022-10-19 RX ORDER — SENNA PLUS 8.6 MG/1
2 TABLET ORAL AT BEDTIME
Refills: 0 | Status: DISCONTINUED | OUTPATIENT
Start: 2022-10-19 | End: 2022-10-19

## 2022-10-19 RX ORDER — LANOLIN ALCOHOL/MO/W.PET/CERES
3 CREAM (GRAM) TOPICAL AT BEDTIME
Refills: 0 | Status: DISCONTINUED | OUTPATIENT
Start: 2022-10-19 | End: 2022-10-19

## 2022-10-19 RX ORDER — ONDANSETRON 8 MG/1
4 TABLET, FILM COATED ORAL EVERY 6 HOURS
Refills: 0 | Status: DISCONTINUED | OUTPATIENT
Start: 2022-10-19 | End: 2022-10-20

## 2022-10-19 RX ORDER — SODIUM CHLORIDE 9 MG/ML
1000 INJECTION, SOLUTION INTRAVENOUS
Refills: 0 | Status: DISCONTINUED | OUTPATIENT
Start: 2022-10-19 | End: 2022-10-19

## 2022-10-19 RX ORDER — MAGNESIUM HYDROXIDE 400 MG/1
30 TABLET, CHEWABLE ORAL DAILY
Refills: 0 | Status: DISCONTINUED | OUTPATIENT
Start: 2022-10-19 | End: 2022-10-19

## 2022-10-19 RX ORDER — HEPARIN SODIUM 5000 [USP'U]/ML
5000 INJECTION INTRAVENOUS; SUBCUTANEOUS EVERY 8 HOURS
Refills: 0 | Status: COMPLETED | OUTPATIENT
Start: 2022-10-19 | End: 2022-10-19

## 2022-10-19 RX ORDER — LEVOTHYROXINE SODIUM 125 MCG
150 TABLET ORAL DAILY
Refills: 0 | Status: DISCONTINUED | OUTPATIENT
Start: 2022-10-19 | End: 2022-10-20

## 2022-10-19 RX ORDER — OXYCODONE HYDROCHLORIDE 5 MG/1
1 TABLET ORAL
Qty: 0 | Refills: 0 | DISCHARGE
Start: 2022-10-19

## 2022-10-19 RX ORDER — OXYCODONE HYDROCHLORIDE 5 MG/1
5 TABLET ORAL EVERY 4 HOURS
Refills: 0 | Status: DISCONTINUED | OUTPATIENT
Start: 2022-10-19 | End: 2022-10-19

## 2022-10-19 RX ADMIN — MORPHINE SULFATE 2 MILLIGRAM(S): 50 CAPSULE, EXTENDED RELEASE ORAL at 00:29

## 2022-10-19 RX ADMIN — OXYCODONE HYDROCHLORIDE 5 MILLIGRAM(S): 5 TABLET ORAL at 19:29

## 2022-10-19 RX ADMIN — OXYCODONE HYDROCHLORIDE 5 MILLIGRAM(S): 5 TABLET ORAL at 03:09

## 2022-10-19 RX ADMIN — OXYCODONE HYDROCHLORIDE 5 MILLIGRAM(S): 5 TABLET ORAL at 20:15

## 2022-10-19 RX ADMIN — OXYCODONE HYDROCHLORIDE 5 MILLIGRAM(S): 5 TABLET ORAL at 17:48

## 2022-10-19 RX ADMIN — HEPARIN SODIUM 5000 UNIT(S): 5000 INJECTION INTRAVENOUS; SUBCUTANEOUS at 21:25

## 2022-10-19 RX ADMIN — Medication 650 MILLIGRAM(S): at 21:25

## 2022-10-19 RX ADMIN — OXYCODONE HYDROCHLORIDE 5 MILLIGRAM(S): 5 TABLET ORAL at 13:09

## 2022-10-19 RX ADMIN — Medication 650 MILLIGRAM(S): at 16:44

## 2022-10-19 RX ADMIN — OXYCODONE HYDROCHLORIDE 5 MILLIGRAM(S): 5 TABLET ORAL at 16:48

## 2022-10-19 RX ADMIN — OXYCODONE HYDROCHLORIDE 5 MILLIGRAM(S): 5 TABLET ORAL at 13:39

## 2022-10-19 RX ADMIN — Medication 650 MILLIGRAM(S): at 21:29

## 2022-10-19 RX ADMIN — OXYCODONE HYDROCHLORIDE 5 MILLIGRAM(S): 5 TABLET ORAL at 08:20

## 2022-10-19 RX ADMIN — HEPARIN SODIUM 5000 UNIT(S): 5000 INJECTION INTRAVENOUS; SUBCUTANEOUS at 16:44

## 2022-10-19 RX ADMIN — SODIUM CHLORIDE 60 MILLILITER(S): 9 INJECTION, SOLUTION INTRAVENOUS at 16:43

## 2022-10-19 RX ADMIN — OXYCODONE HYDROCHLORIDE 5 MILLIGRAM(S): 5 TABLET ORAL at 07:05

## 2022-10-19 RX ADMIN — ATORVASTATIN CALCIUM 20 MILLIGRAM(S): 80 TABLET, FILM COATED ORAL at 21:25

## 2022-10-19 RX ADMIN — SODIUM CHLORIDE 100 MILLILITER(S): 9 INJECTION, SOLUTION INTRAVENOUS at 02:21

## 2022-10-19 RX ADMIN — OXYCODONE HYDROCHLORIDE 5 MILLIGRAM(S): 5 TABLET ORAL at 02:06

## 2022-10-19 NOTE — PROGRESS NOTE ADULT - SUBJECTIVE AND OBJECTIVE BOX
HPI  76yFemale w/ history of RA, SLE, RTHA (2019) c/o R hip pain s/p mechanical fall. Patient ambulates with a walker at baseline. She was unable to bear weight in the RLE since the fall. Denies headstrike or LOC. Denies numbness/tingling in the RLE. Denies any other trauma/injuries at this time. She was taken to [facility] and found to have a periprosthetic R proximal femur fracture. She was transferred to [facility] for further care.    ROS  Negative unless otherwise specified in HPI.    PAST MEDICAL & SURGICAL Hx  PAST MEDICAL & SURGICAL HISTORY:  Rheumatoid arthritis      SLE (systemic lupus erythematosus)      Osteoarthritis      H/O degenerative disc disease      Hypertension      Hyperlipidemia      Benign heart murmur      Hypothyroid      Obesity, Class II, BMI 35-39.9, no comorbidity      DVT (deep venous thrombosis)  during left knee replacement       Overactive bladder      Lab test positive for detection of COVID-19 virus        S/P tonsillectomy      S/P cataract surgery  left, right      S/P eye surgery  child      S/P carpal tunnel release  left      S/P knee surgery  bilateral      Lung cancer  small tumor removed -no chemo, no radiation      S/P thyroid surgery  1 lobe removed      Status post total hip replacement, right      S/P knee replacement  left      S/P lumbar spine operation            MEDICATIONS  Home Medications:  acetaminophen 325 mg oral tablet: 3 tab(s) orally every 8 hours, As needed, Mild Pain (1 - 3) (19 Oct 2022 11:31)  atorvastatin 20 mg oral tablet: 1 tab(s) orally once a day (at bedtime) (26 May 2022 13:51)  levothyroxine 150 mcg (0.15 mg) oral tablet: 1 tab(s) orally once a day (26 May 2022 13:51)  melatonin 3 mg oral tablet: 1 tab(s) orally once a day (at bedtime) (26 May 2022 14:29)  oxyCODONE 5 mg oral tablet: 1 tab(s) orally every 4 hours, As needed, Severe Pain (7 - 10) (19 Oct 2022 11:31)  pantoprazole 40 mg oral delayed release tablet: 1 tab(s) orally once a day (before a meal) (26 May 2022 13:51)  polyethylene glycol 3350 oral powder for reconstitution: 17 gram(s) orally once a day (at bedtime) (19 Oct 2022 11:31)  senna oral tablet: 2 tab(s) orally once a day (at bedtime), As Needed (26 May 2022 13:26)      ALLERGIES  No Known Allergies      FAMILY Hx  FAMILY HISTORY:  FHx: rheumatoid arthritis  father ,  of MI age 61    FH: sudden death (Mother)  age 58        SOCIAL Hx  Social History:      VITALS  Vital Signs Last 24 Hrs  T(C): 36.6 (19 Oct 2022 13:22), Max: 36.9 (18 Oct 2022 23:54)  T(F): 97.9 (19 Oct 2022 13:22), Max: 98.4 (18 Oct 2022 23:54)  HR: 82 (19 Oct 2022 13:22) (60 - 103)  BP: 118/50 (19 Oct 2022 13:22) (111/57 - 160/68)  BP(mean): 74 (18 Oct 2022 23:54) (74 - 74)  RR: 17 (19 Oct 2022 13:22) (17 - 20)  SpO2: 98% (19 Oct 2022 13:22) (96% - 100%)        PHYSICAL EXAM  Gen: Lying in bed, NAD  Resp: No increased WOB  RLE:  Skin intact, shortened and externally rotated, +edema and +ecchymosis over R hip. Well healed posterior based incision  +TTP over R hip, no TTP along remainder of extremity; compartments soft  Limited ROM at hip 2/2 pain  +Log roll test  +Pain with axial loading  Motor: TA/EHL/GS/FHL intact  Sensory: DP/SP/Tib/Lexii/Saph SILT  +DP pulse, WWP    Secondary survey:  No TTP along spine or other extremities, pelvis grossly stable, SILT and compartments soft throughout    LABS                        13.3   13.60 )-----------( 132      ( 18 Oct 2022 16:50 )             42.0     10-    139  |  104  |  25<H>  ----------------------------<  129<H>  4.4   |  24  |  0.70    Ca    8.9      19 Oct 2022 05:55    TPro  6.7  /  Alb  3.7  /  TBili  0.5  /  DBili  x   /  AST  31  /  ALT  17  /  AlkPhos  125<H>  10-18    PT/INR - ( 18 Oct 2022 16:50 )   PT: 13.2 sec;   INR: 1.14 ratio         PTT - ( 18 Oct 2022 16:50 )  PTT:22.1 sec    IMAGING  XRs: Periprosthetic R proximal femur fracture with extension from on growth surface of stem distal to stem tip. Calcar fractured and osteopenia on x-ray.    ASSESSMENT/ PLAN  The patient is a 76F w/ R periprosthetic proximal femur fracture with a past medical history as above sustained above injury following mechanical fall. Admitted to the hospital for medical optimization. Physical exam reveals closed injury and intact neurovascular exam. Radiographs demonstrate fracture comminution and osteoporosis. Based on injury pattern and desire to promote mobilization and decrease pain, surgical intervention recommended. The risks, benefits, alternatives of various treatment options were discussed with the patient and family.    Non-operative treatment:   Benefit - No surgical/anesthetic risk  Risks - Persistent pain, malunion/nonunion, delayed mobilization/ prolonged immobility and attendant medical risks.    Open reduction internal fixation with revision hip arthroplasty.  Benefit - higher rate of fracture union, better chance for more anatomic alignment of fracture vs nonop; immediate weight bearing and mobilization  Risks - malunion, nonunion, periprosthetic fracture, general risks of surgery (infection, wound healing problems, persistent pain, neurovascular injury, need for further surgery), increased blood loss, general perioperative medical risks (cardiac, pulmonary, renal, GI, as well as VTE)    After discussion with the patient and family, would recommend open reduction internal fixation with revision total hip arthroplasty. Patient and family agree w/ plan, and informed consent obtained.    The nature and purpose of the total hip replacement, alternative method(s) of treatment, the material risks involved, and the possibility of complications were fully explained to the patient. The patient was told the most common risks and complications associated with a total hip replacement include, but are not limited to blood clots in the leg, fatal pulmonary embolism, dislocation of the prosthesis, intraoperative and postoperative fractures of the femur or aceabulum, infection, failure of the prosthesis or grafting materials, complications from anesthesia, reactions to blood transfusions, postoperative leg length inequality, instability of the hip replacement, nerve damage or injury, vascular injury, delayed wound healing, infections, other injury or even death. Also, the patient was told that after undergoing a total hip replacement there may still be pain or disability. The patient was informed that the success of this operation in part depends upon the mechanical devices which are going to be implanted and that these devices can fail or malfunction, and may need to be repaired or replaced and there are no guarantees as to the longevity of this device or its part and that it or its parts could fail prematurely. Finally, the patient was asked to follow completely and fully with all advice and recommended treatments, and that recovery and ultimate outcome are affected by their compliance with recommended treatment.     ASSESSMENT & PLAN  In brief, this is a 76yFemale w/ Right periprosthetic proximal femur fracture.   -NWB RLE, bedrest  -OR on Thursday, 10/20  -f/u preop: CBC, BMP, coags, T&S x2, CXR, EKG, COVID, hCG [premenopausal women only]  -NPO past midnight, IVF  -hold chemical DVT ppx for OR at midnight; SCDs OK  -Medical clearance/optimization for OR  -pain control  -ice/cold compress  -obtain Vit D level. Supplementation with daily Ca/Vit D  -Outpt osteoporosis workup

## 2022-10-19 NOTE — CONSULT NOTE ADULT - ASSESSMENT
76y Female PMH HTN, SLE, RA, thyroid CA s/p resection, DVT (on eliquis), R FELICITAS (Jonathan, '19), L TKA (Jonathan, '20), R FELICITAS (Jonathan, '22) presents with acute comminuted periprosthetic fracture of R femur. Medicine consulted for medical optimization.

## 2022-10-19 NOTE — PATIENT PROFILE ADULT - FALL HARM RISK - HARM RISK INTERVENTIONS

## 2022-10-19 NOTE — PATIENT PROFILE ADULT - FALL HARM RISK - RISK INTERVENTIONS

## 2022-10-19 NOTE — DISCHARGE NOTE PROVIDER - NSDCFUSCHEDAPPT_GEN_ALL_CORE_FT
Josselin Ramirez Physician [facility]  CARDIOLOGY [id number]  Scheduled Appointment: 12/19/2022

## 2022-10-19 NOTE — DISCHARGE NOTE NURSING/CASE MANAGEMENT/SOCIAL WORK - NSDCPEFALRISK_GEN_ALL_CORE
For information on Fall & Injury Prevention, visit: [email or web address] OR  [email or web address] OR  https://www.cdc.gov/steadi/patient.html

## 2022-10-19 NOTE — CONSULT NOTE ADULT - SUBJECTIVE AND OBJECTIVE BOX
HPI:  76y Female PMH HTN, SLE, RA, thyroid CA s/p resection, DVT (on eliquis), R FELICITAS (Jonathan, ), L TKA (Jonathan, ), R FELICITAS (Jonathan, ) presents s/p mechanical fall with severe right hip pain and inability to ambulate. States she was walking over an area of her house where constructing new tile floors when she slipped and fell. Patient denies headstrike or LOC. Recently in her usual state of health without functional dyspnea or chest pain. Patient denies radiation of pain. Patient denies numbness/tingling/burning in the LE. She takes celecoxib occasionally for knee pain and does not take methotrexate or prednisone at home. Of note, patient had hx of DVT once post knee surgery 4 years ago but was told to be on Eliquis ever since. Does have a hematologist but unsure if she had any hypercoagulable work up. Mom had DVT after child birth. Ambulates with walker at home.  In the ED, VSS. Wbc 13.6, lactate 2.9. Acute comminuted periprosthetic fracture of right proximal femur. Medicine consulted for medical optimization.    PAST MEDICAL & SURGICAL HISTORY:  Rheumatoid arthritis    SLE (systemic lupus erythematosus)    Osteoarthritis    H/O degenerative disc disease    Hypertension    Hyperlipidemia    Benign heart murmur    Hypothyroid    Obesity, Class II, BMI 35-39.9, no comorbidity      DVT (deep venous thrombosis)  during left knee replacement       Overactive bladder      Lab test positive for detection of COVID-19 virus        S/P tonsillectomy      S/P cataract surgery  left, right      S/P eye surgery  child      S/P carpal tunnel release  left      S/P knee surgery  bilateral      Lung cancer  small tumor removed -no chemo, no radiation      S/P thyroid surgery  1 lobe removed      Status post total hip replacement, right      S/P knee replacement  left      S/P lumbar spine operation          MEDICATIONS  (STANDING):  lactated ringers. 1000 milliLiter(s) (100 mL/Hr) IV Continuous <Continuous>  senna 2 Tablet(s) Oral at bedtime    MEDICATIONS  (PRN):  acetaminophen     Tablet .. 975 milliGRAM(s) Oral every 8 hours PRN Mild Pain (1 - 3)  magnesium hydroxide Suspension 30 milliLiter(s) Oral daily PRN Constipation  oxyCODONE    IR 2.5 milliGRAM(s) Oral every 4 hours PRN Moderate Pain (4 - 6)  oxyCODONE    IR 5 milliGRAM(s) Oral every 4 hours PRN Severe Pain (7 - 10)    Allergies    No Known Allergies    Intolerances                        13.3   13.60 )-----------( 132      ( 18 Oct 2022 16:50 )             42.0     10-18    140  |  103  |  21  ----------------------------<  142<H>  4.3   |  23  |  0.68    Ca    9.2      18 Oct 2022 16:50    TPro  6.7  /  Alb  3.7  /  TBili  0.5  /  DBili  x   /  AST  31  /  ALT  17  /  AlkPhos  125<H>  10-18    PT/INR - ( 18 Oct 2022 16:50 )   PT: 13.2 sec;   INR: 1.14 ratio         PTT - ( 18 Oct 2022 16:50 )  PTT:22.1 sec    T(C): 36.8 (10-19-22 @ 01:51), Max: 36.9 (10-18-22 @ 23:54)  HR: 103 (10-19-22 @ 01:51) (60 - 103)  BP: 142/72 (10-19-22 @ 01:51) (129/58 - 160/68)  RR: 20 (10-19-22 @ 01:51) (18 - 20)  SpO2: 99% (10-19-22 @ 01:51) (96% - 100%)  Wt(kg): --    PHYSICAL EXAM:  GENERAL: NAD, comfortable appearing  HEAD:  Atraumatic, Normocephalic  EYES: EOMI, PERRL, conjunctiva and sclera clear  NECK: Supple, No JVD  CHEST/LUNG: Clear to auscultation bilaterally; No wheezes, rales or rhonchi  HEART: Regular rate and rhythm; No murmurs, rubs, or gallops, (+)S1, S2  ABDOMEN: Soft, Nontender, Nondistended; Normal Bowel sounds   EXTREMITIES:  Unable to SLR; + Log Roll/Heel Strike  PSYCH: normal mood and affect  NEUROLOGY: AAOx3, non-focal  SKIN: No rashes or lesions    Imaging:  XR demonstrating _ femoral neck/intertroch/subtroch fracture    Review of Systems:   CONSTITUTIONAL: No fever, weight loss, or fatigue  EYES: No eye pain, visual disturbances, or discharge  ENMT:  No difficulty hearing, tinnitus, vertigo; No sinus or throat pain  NECK: No pain or stiffness  BREASTS: No pain, masses, or nipple discharge  RESPIRATORY: No cough, wheezing, chills or hemoptysis; No shortness of breath  CARDIOVASCULAR: No chest pain, palpitations, dizziness, or leg swelling  GASTROINTESTINAL: No abdominal or epigastric pain. No nausea, vomiting, or hematemesis; No diarrhea or constipation. No melena or hematochezia.  GENITOURINARY: No dysuria, frequency, hematuria, or incontinence  NEUROLOGICAL: No headaches, memory loss, loss of strength, numbness, or tremors  SKIN: No itching, burning, rashes, or lesions   LYMPH NODES: No enlarged glands  ENDOCRINE: No heat or cold intolerance; No hair loss  MUSCULOSKELETAL: No joint pain or swelling; No muscle, back, or extremity pain  PSYCHIATRIC: No depression, anxiety, mood swings, or difficulty sleeping  HEME/LYMPH: No easy bruising, or bleeding gums  ALLERY AND IMMUNOLOGIC: No hives or eczema    Allergies    No Known Allergies    Intolerances        Social History:     FAMILY HISTORY:  FHx: rheumatoid arthritis  father ,  of MI age 61    FH: sudden death (Mother)  age 58        MEDICATIONS  (STANDING):  lactated ringers. 1000 milliLiter(s) (100 mL/Hr) IV Continuous <Continuous>  senna 2 Tablet(s) Oral at bedtime    MEDICATIONS  (PRN):  acetaminophen     Tablet .. 975 milliGRAM(s) Oral every 8 hours PRN Mild Pain (1 - 3)  magnesium hydroxide Suspension 30 milliLiter(s) Oral daily PRN Constipation  oxyCODONE    IR 2.5 milliGRAM(s) Oral every 4 hours PRN Moderate Pain (4 - 6)  oxyCODONE    IR 5 milliGRAM(s) Oral every 4 hours PRN Severe Pain (7 - 10)        CAPILLARY BLOOD GLUCOSE        I&O's Summary      PHYSICAL EXAM:  GENERAL: NAD, well-developed  HEAD:  Atraumatic, Normocephalic  EYES: EOMI, PERRLA, conjunctiva and sclera clear  NECK: Supple, No JVD  CHEST/LUNG: Clear to auscultation bilaterally; No wheeze  HEART: Regular rate and rhythm; No murmurs, rubs, or gallops  ABDOMEN: Soft, Nontender, Nondistended; Bowel sounds present  EXTREMITIES:  2+ Peripheral Pulses, No clubbing, cyanosis, or edema  PSYCH: AAOx3  NEUROLOGY: non-focal  SKIN: No rashes or lesions    LABS:                        13.3   13.60 )-----------( 132      ( 18 Oct 2022 16:50 )             42.0     10    139  |  104  |  25<H>  ----------------------------<  129<H>  4.4   |  24  |  0.70    Ca    8.9      19 Oct 2022 05:55    TPro  6.7  /  Alb  3.7  /  TBili  0.5  /  DBili  x   /  AST  31  /  ALT  17  /  AlkPhos  125<H>  10-18    PT/INR - ( 18 Oct 2022 16:50 )   PT: 13.2 sec;   INR: 1.14 ratio         PTT - ( 18 Oct 2022 16:50 )  PTT:22.1 sec          RADIOLOGY & ADDITIONAL TESTS:    Imaging Personally Reviewed:    Consultant(s) Notes Reviewed:      Care Discussed with Consultants/Other Providers:

## 2022-10-19 NOTE — DISCHARGE NOTE NURSING/CASE MANAGEMENT/SOCIAL WORK - PATIENT PORTAL LINK FT
You can access the FollowMyHealth Patient Portal offered by [facility] by registering at the following website: [email or web address]. By joining [facility]’s FollowMyHealth portal, you will also be able to view your health information using other applications (apps) compatible with our system.

## 2022-10-19 NOTE — CONSULT NOTE ADULT - PROBLEM SELECTOR RECOMMENDATION 2
S/Overnight events:  No events overnight       HPI:  22 y/o female pmhx PCOS, asthma, and lumbar HNP 2017 treated w/ conservative managemetn presents to [facility] ED today after being discharged early from OSH with continued LBP radiating to the RLE and now to the LLE. Patient was admitted and worked up for cauda equina at [facility] and treated for L5S1 HNP with LICHA, steroids, gabapentin and percocet. She was discharged home today but is still unable to walk without assistance. She was scheduled to start outpatient PT but the pain and weakness is worsening. She also reports numbness to the RLE down to the foot now with tingling sensation in the LLE. Denies any recent trauma, falls, denies any recent illness, fever or chills. Denies saddle anesthesia, denies b/b dysfunction.    Hospital Course:  8/15: Patient was admitted and worked up for cauda equina at [facility] and treated for L5-S1 HNP with LICHA, steroids, gabapentin and percocet, transferred here for further workup  8/16: LAZARO overnight. Neuro exam stable.   8/17 LAZARO overnight, Neuro exam stable  8/18: LAZARO overnight. Neuro exam stable. Plan for OR tomorrow   8/19: POD# 0 S/P L5-S1 microdiscectomy. Pt seen and examined at bedside postop. States mild incision site pain. Denies worsening weakness/loss of sensation of extremities.  8/20: LAZARO overnight, neuro stable. Patient c/o LLE weakness and numbness post op overnight   8/21: LAZARO overnight, continued new LLE weakness  8/22: LAZARO overnight. Neuro consulted requested per Dr. Denise for lower extremity weakness/"coldness"  8/23: LAZARO. Overnight. Neurology consulted, labs requested are ordered.   8/24. No events overnight. F/u morning labs for free T4 per Neurology, f/u EMG per Neurology         Vital Signs Last 24 Hrs  T(C): 36.8 (23 Aug 2020 20:52), Max: 37 (23 Aug 2020 08:05)  T(F): 98.3 (23 Aug 2020 20:52), Max: 98.6 (23 Aug 2020 08:05)  HR: 70 (23 Aug 2020 20:52) (59 - 74)  BP: 102/66 (23 Aug 2020 20:52) (92/52 - 110/69)  BP(mean): --  RR: 16 (23 Aug 2020 20:52) (16 - 17)  SpO2: 97% (23 Aug 2020 20:52) (95% - 97%)    I&O's Detail    22 Aug 2020 07:01  -  23 Aug 2020 07:00  --------------------------------------------------------  IN:    Oral Fluid: 960 mL  Total IN: 960 mL    OUT:    Voided: 1100 mL  Total OUT: 1100 mL    Total NET: -140 mL        I&O's Summary    22 Aug 2020 07:01  -  23 Aug 2020 07:00  --------------------------------------------------------  IN: 960 mL / OUT: 1100 mL / NET: -140 mL      PHYSICAL EXAM:  Gen: NAD  HEENT: PERRL. EOMI b/l  Neck: Supple   Lungs: CTAB  Heart: S1, S2. RRR  Abd: Soft, NT ND  Exts: 2+ pulses throughout  Neuro: AAO x 3.  full 5/5 strength uppers. LLE - HF 3+/5, knee flex 3+/5, DF PF 4/5. RLE - 2+/5, knee flex 2+/5, DF PF 4/5      TUBES/LINES:  [] CVC  [] A-line  [] Lumbar Drain  [] Ventriculostomy  [] Other    DIET:  [] NPO  [] Mechanical  [] Tube feeds    LABS:                        13.4   15.69 )-----------( 350      ( 23 Aug 2020 07:28 )             41.2     08-23    136  |  100  |  24<H>  ----------------------------<  95  4.7   |  28  |  0.62    Ca    9.1      23 Aug 2020 07:28              CAPILLARY BLOOD GLUCOSE          Drug Levels: [] N/A    CSF Analysis: [] N/A      Allergies    No Known Allergies    Intolerances      MEDICATIONS:  Antibiotics:    Neuro:  acetaminophen   Tablet .. 650 milliGRAM(s) Oral every 6 hours PRN  diazepam    Tablet 5 milliGRAM(s) Oral every 8 hours PRN  gabapentin 300 milliGRAM(s) Oral three times a day  HYDROmorphone  Injectable 0.5 milliGRAM(s) IV Push every 3 hours PRN  ondansetron Injectable 4 milliGRAM(s) IV Push every 6 hours PRN  oxycodone    5 mG/acetaminophen 325 mG 1 Tablet(s) Oral every 4 hours PRN  oxycodone    5 mG/acetaminophen 325 mG 2 Tablet(s) Oral every 4 hours PRN    Anticoagulation:  enoxaparin Injectable 40 milliGRAM(s) SubCutaneous at bedtime    OTHER:  ALBUTerol    90 MICROgram(s) HFA Inhaler 2 Puff(s) Inhalation every 6 hours PRN  albuterol/ipratropium for Nebulization. 3 milliLiter(s) Nebulizer every 6 hours PRN  bisacodyl 5 milliGRAM(s) Oral at bedtime  BUpivacaine liposome 1.3% Injectable (no eMAR) 20 milliLiter(s) Local Injection once  dexAMETHasone     Tablet   Oral   dexAMETHasone     Tablet 2 milliGRAM(s) Oral every 8 hours  dexAMETHasone     Tablet 2 milliGRAM(s) Oral two times a day  pantoprazole    Tablet 40 milliGRAM(s) Oral before breakfast  senna 2 Tablet(s) Oral at bedtime    IVF:  multivitamin 1 Tablet(s) Oral daily    CULTURES:    RADIOLOGY & ADDITIONAL TESTS:      LUMBAR RADICULOPATHY  No pertinent family history in first degree relatives  Handoff  MEWS Score  Lumbar radiculopathy  PCOS (polycystic ovarian syndrome)  Asthma  Lumbar radiculopathy  PCOS (polycystic ovarian syndrome)  Asthma  Cauda equina syndrome  Lumbar herniated disc  Cauda equina syndrome  Lumbar herniated disc  Lumbar radiculopathy  Other elevated white blood cell (WBC) count  Postoperative state  Preoperative clearance  Mild intermittent asthma without complication  Lumbar radiculopathy  Discectomy, spine, lumbar, 1 level, minimally invasive  No significant past surgical history  BACK PAIN  Lower extremity weakness  [email or web address]      PLAN:  Neuro and vital checks  Pain control PRN  Normotensive BP control  PPI for GI ppx    F/u neurology for free T4 level and EMG/NCS  Plan plan for acute rehab     Plan d/w Dr. Denise        Assessment:  Present when checked    []  GCS  E   V  M     Heart Failure: []Acute, [] acute on chronic , []chronic  Heart Failure:  [] Diastolic (HFpEF), [] Systolic (HFrEF), []Combined (HFpEF and HFrEF), [] RHF, [] Pulm HTN, [] Other    [] ANDREA, [] ATN, [] AIN, [] other  [] CKD1, [] CKD2, [] CKD 3, [] CKD 4, [] CKD 5, []ESRD    Encephalopathy: [] Metabolic, [] Hepatic, [] toxic, [] Neurological, [] Other    Abnormal Nurtitional Status: [] malnurtition (see nutrition note), [ ]underweight: BMI < 19, [] morbid obesity: BMI >40, [] Cachexia    [] Sepsis  [] hypovolemic shock,[] cardiogenic shock, [] hemorrhagic shock, [] neuogenic shock  [] Acute Respiratory Failure  []Cerebral edema, [] Brain compression/ herniation,   [] Functional quadriplegia  [] Acute blood loss anemia Hx is strange but patient states had hx 4 years ago post surgery but kept on Eliquis by her hematologist  -Will need hematology consult to weigh in on post-surgical continuation of DVT  -For now, will continue heparin gtt irineo-op and hold Eliquis

## 2022-10-19 NOTE — H&P ADULT - ASSESSMENT
76F PMH HTN, HLD, heart murmur, SLE, RA, thyroid CA s/p resection, DVT (on eliquis), lung cancer (s/p tumor removal, no chemo no radiation), Right FELICITAS (Jonathan, '19), Left TKA (Jonathan, '20), Right FELICITAS (Jonathan, '22) presented to [facility] ED s/p mechanical fall with severe right hip pain and inability to ambulate

## 2022-10-19 NOTE — H&P ADULT - PROBLEM SELECTOR PLAN 5
h/o DVT on lifelong prevention with Eliquis 2.5mg BID  Surgery will be >48 hours post last dose of Eliquis  will get heparin SQ x2 doses then stop for OR  restart Eliquis 2.5mg BID POD #1

## 2022-10-19 NOTE — H&P ADULT - NSHPPHYSICALEXAM_GEN_ALL_CORE
PHYSICAL EXAM:  Vital Signs Last 24 Hrs  T(C): 36.6 (19 Oct 2022 13:22), Max: 36.9 (18 Oct 2022 23:54)  T(F): 97.9 (19 Oct 2022 13:22), Max: 98.4 (18 Oct 2022 23:54)  HR: 82 (19 Oct 2022 13:22) (60 - 103)  BP: 118/50 (19 Oct 2022 13:22) (111/57 - 142/72)  BP(mean): 74 (18 Oct 2022 23:54) (74 - 74)  RR: 17 (19 Oct 2022 13:22) (17 - 20)  SpO2: 98% (19 Oct 2022 13:22) (96% - 100%)    GENERAL: NAD, well-groomed, well-developed  HEAD:  Atraumatic, Normocephalic  EYES:  conjunctiva and sclera clear  ENMT: Moist mucous membranes  NECK: Supple, No JVD  NERVOUS SYSTEM:  Alert & Oriented X3, Good concentration; Motor Strength 5/5 B/L upper and lower extremities;  CHEST/LUNG: Clear to auscultation bilaterally; No rales, rhonchi, wheezing, or rubs  HEART: Regular rate and rhythm; No murmurs, rubs, or gallops  ABDOMEN: Soft, Nontender, Nondistended; Bowel sounds present  EXTREMITIES:  2+ Peripheral Pulses, No clubbing, cyanosis, or edema; right leg externally rotated and shortened  LYMPH: No lymphadenopathy noted  SKIN: No rashes or lesions

## 2022-10-19 NOTE — PROGRESS NOTE ADULT - SUBJECTIVE AND OBJECTIVE BOX
Patient seen and evaluated this AM.  Pain controlled at rest.  Denies any LE numbness/tingling.  Has been NPO.  Aware of plan to either stay or be transferred for OR.    ICU Vital Signs Last 24 Hrs  T(C): 36.8 (19 Oct 2022 01:51), Max: 36.9 (18 Oct 2022 23:54)  T(F): 98.3 (19 Oct 2022 01:51), Max: 98.4 (18 Oct 2022 23:54)  HR: 103 (19 Oct 2022 01:51) (60 - 103)  BP: 142/72 (19 Oct 2022 01:51) (129/58 - 160/68)  BP(mean): 74 (18 Oct 2022 23:54) (74 - 74)  ABP: --  ABP(mean): --  RR: 20 (19 Oct 2022 01:51) (18 - 20)  SpO2: 99% (19 Oct 2022 01:51) (96% - 100%)      Exam:  Gen: NAD, resting comfortably    RLE:  Skin intact; No ecchymosis/soft tissue swelling  Compartments soft; + TTP about hip. No TTP to knee/leg/ankle/foot   Motor intact GS/TA/FHL/EHL  SILT L2-S1  DP/PT pulses 2+      Assessment: 76F w/ [place] B2 irineo-prosthetic FELICITAS Fx.    Plan:  - Pain control PRN  - NWB RLE  - Continue NPO  - Preop labs  - Transfer to primary surgeon care  - Patient and primary surgeon aware of and in agreement w/ plan

## 2022-10-19 NOTE — DISCHARGE NOTE PROVIDER - NSDCMRMEDTOKEN_GEN_ALL_CORE_FT
acetaminophen 325 mg oral tablet: 3 tab(s) orally every 8 hours, As needed, Mild Pain (1 - 3)  atorvastatin 20 mg oral tablet: 1 tab(s) orally once a day (at bedtime)  levothyroxine 150 mcg (0.15 mg) oral tablet: 1 tab(s) orally once a day  melatonin 3 mg oral tablet: 1 tab(s) orally once a day (at bedtime)  oxyCODONE 5 mg oral tablet: 1 tab(s) orally every 4 hours, As needed, Severe Pain (7 - 10)  pantoprazole 40 mg oral delayed release tablet: 1 tab(s) orally once a day (before a meal)  polyethylene glycol 3350 oral powder for reconstitution: 17 gram(s) orally once a day (at bedtime)  senna oral tablet: 2 tab(s) orally once a day (at bedtime), As Needed

## 2022-10-19 NOTE — PATIENT PROFILE ADULT - VISION (WITH CORRECTIVE LENSES IF THE PATIENT USUALLY WEARS THEM):
"Requested Prescriptions   Pending Prescriptions Disp Refills     DULoxetine (CYMBALTA) 30 MG EC capsule  Last Written Prescription Date:  9/6/2017  Last Fill Quantity: 90 capsule,  # refills: 1   Last office visit: 10/26/2017 with prescribing provider:  Weiler   Future Office Visit:       90 capsule 1     Sig: Take 1 capsule (30 mg) by mouth daily    Serotonin-Norepinephrine Reuptake Inhibitors  Failed    4/30/2018  2:04 PM       Failed - PHQ-9 score of less than 5 in past 6 months    Please review last PHQ-9 score.     PHQ-9 SCORE 6/1/2017 8/24/2017 10/25/2017   Total Score - - -   Total Score MyChart 10 (Moderate depression) - -   Total Score 10 5 22     SELENA-7 SCORE 1/6/2016 6/1/2017 8/24/2017   Total Score - - -   Total Score - 0 (minimal anxiety) -   Total Score 6 8 11          Failed - Recent (6 mo) or future (30 days) visit within the authorizing provider's specialty    Patient had office visit in the last 6 months or has a visit in the next 30 days with authorizing provider or within the authorizing provider's specialty.  See \"Patient Info\" tab in inbasket, or \"Choose Columns\" in Meds & Orders section of the refill encounter.           Passed - Blood pressure under 140/90 in past 12 months    BP Readings from Last 3 Encounters:   10/26/17 104/70   10/22/17 124/79   10/21/17 120/80            Passed - Patient is age 18 or older       Passed - No active pregnancy on record       Passed - No positive pregnancy test in past 12 months          " Normal vision: sees adequately in most situations; can see medication labels, newsprint

## 2022-10-19 NOTE — DISCHARGE NOTE PROVIDER - HOSPITAL COURSE
77 yo f to be transferred to [facility] under Sin Leach MD. Pt hx R FELICITAS/ R TKA by Dr Castillo.  Pt sustained mechanical fall last night, XR reveal right periprosthetic femur fracture.  Pt is medically stable for transfer to [facility]

## 2022-10-19 NOTE — H&P ADULT - NSHPLABSRESULTS_GEN_ALL_CORE
Labs:                          13.3   13.60 )-----------( 132      ( 18 Oct 2022 16:50 )             42.0       10-19    139  |  104  |  25<H>  ----------------------------<  129<H>  4.4   |  24  |  0.70    Ca    8.9      19 Oct 2022 05:55    TPro  6.7  /  Alb  3.7  /  TBili  0.5  /  DBili  x   /  AST  31  /  ALT  17  /  AlkPhos  125<H>  10-18          PT/INR - ( 18 Oct 2022 16:50 )   PT: 13.2 sec;   INR: 1.14 ratio    PTT - ( 18 Oct 2022 16:50 )  PTT:22.1 sec    Lactate Trend      CAPILLARY BLOOD GLUCOSE          EKG:  none available  Personally Reviewed:  [ ] YES     Imaging:   CXR: chronic changes, nothing acute  Femur xray: femur fracture  Personally Reviewed:  [ ] YES

## 2022-10-19 NOTE — H&P ADULT - NSHPSOCIALHISTORY_GEN_ALL_CORE
Residence: [ ] [facility]  [ ] SNF  [x ] Community  [ ] Substance abuse: denies  [ ] Tobacco: denies  [ ] Alcohol use: rare glass of wine

## 2022-10-19 NOTE — DISCHARGE NOTE NURSING/CASE MANAGEMENT/SOCIAL WORK - NSDCPECAREGIVERED_GEN_ALL_CORE
Medline and carenotes for Hip Fracture, Pain management, Oxy IR, Fall Prevention,  as well as DC Medications and side effects literature for patient reference.

## 2022-10-19 NOTE — H&P ADULT - HISTORY OF PRESENT ILLNESS
76F PMH HTN, HLD, heart murmur, SLE, RA, thyroid CA s/p resection, DVT (on eliquis), lung cancer (s/p tumor removal, no chemo no radiation), Right FELICITAS (Jonathan, '19), Left TKA (Jonathan, '20), Right FELICITAS (Jonathan, '22) presented to [facility] ED s/p mechanical fall with severe right hip pain and inability to ambulate. States she was walking over an area of her house where constructing new tile floors when she slipped and fell. Patient denies hitting her head or LOC. Patient denies numbness/tingling/burning in the RLE. No other bone/joint complaints. Patient is a community ambulator at baseline with a walker.  Patient transferred to [place] for repair of right periprosthetic proximal right femur fracture.   Patient mostly notes she has pain across her right groin around her leg.  Also reports she was nauseated earlier and had some minimal vomitus of mucus since she hasnt eaten anything.   Last dose of Eliquis was yesterday (10/18) AM.

## 2022-10-19 NOTE — DISCHARGE NOTE PROVIDER - CARE PROVIDER_API CALL
WILLY ACUNA  Orthopaedic Surgery  [address], [address]  [place] [address]  Phone: ([phone]  Fax: ()-  Established Patient  Follow Up Time:

## 2022-10-19 NOTE — H&P ADULT - PROBLEM SELECTOR PLAN 1
Discussed with Dr Leach   plan is for OR tomorrow midday  Pain control - pt reports she wants oxycodone 5mg, will also have Tylenol ATC and dilaudid IV prn  Check Vitamin D level

## 2022-10-19 NOTE — DISCHARGE NOTE PROVIDER - NSDCCPCAREPLAN_GEN_ALL_CORE_FT
PRINCIPAL DISCHARGE DIAGNOSIS  Diagnosis: Fracture, proximal femur  Assessment and Plan of Treatment:

## 2022-10-19 NOTE — CONSULT NOTE ADULT - PROBLEM SELECTOR RECOMMENDATION 9
Will need ORIF  -RCRI score 0, low risk  -EKG: RBBB [id number], PVCa, unchanged from prior  -Patient medically optimized for ORIF, however would likely want 48 hours post-Eliquis. Last taken AM 10/18/2022  -Will need hematology consult to weigh in on post-surgical continuation of DVT  -For now, will continue heparin gtt irineo-op and hold Eliquis

## 2022-10-19 NOTE — H&P ADULT - PROBLEM SELECTOR PLAN 6
Patient with bradycardia on last admission and PVC/s Couplets on strip by paramedics during transfer.  Clearances from April/May 2022 reviewed  will obtain EKG/ Echo and Cardiology evaluation - D/w Dr Chambers.

## 2022-10-20 ENCOUNTER — TRANSCRIPTION ENCOUNTER (OUTPATIENT)
Age: 77
End: 2022-10-20

## 2022-10-20 ENCOUNTER — RESULT REVIEW (OUTPATIENT)
Age: 77
End: 2022-10-20

## 2022-10-20 LAB
24R-OH-CALCIDIOL SERPL-MCNC: 31.2 NG/ML — SIGNIFICANT CHANGE UP (ref 30–80)
ALBUMIN SERPL ELPH-MCNC: 2.2 G/DL — LOW (ref 3.3–5)
ANION GAP SERPL CALC-SCNC: 9 MMOL/L — SIGNIFICANT CHANGE UP (ref 5–17)
BLD GP AB SCN SERPL QL: SIGNIFICANT CHANGE UP
BUN SERPL-MCNC: 18 MG/DL — SIGNIFICANT CHANGE UP (ref 7–23)
CALCIUM SERPL-MCNC: 7.6 MG/DL — LOW (ref 8.4–10.5)
CHLORIDE SERPL-SCNC: 106 MMOL/L — SIGNIFICANT CHANGE UP (ref 96–108)
CO2 SERPL-SCNC: 27 MMOL/L — SIGNIFICANT CHANGE UP (ref 22–31)
CREAT SERPL-MCNC: 0.82 MG/DL — SIGNIFICANT CHANGE UP (ref 0.5–1.3)
EGFR: 74 ML/MIN/1.73M2 — SIGNIFICANT CHANGE UP
GLUCOSE SERPL-MCNC: 137 MG/DL — HIGH (ref 70–99)
HCT VFR BLD CALC: 30.1 % — LOW (ref 34.5–45)
HGB BLD-MCNC: 9.7 G/DL — LOW (ref 11.5–15.5)
MCHC RBC-ENTMCNC: 31.2 PG — SIGNIFICANT CHANGE UP (ref 27–34)
MCHC RBC-ENTMCNC: 32.2 GM/DL — SIGNIFICANT CHANGE UP (ref 32–36)
MCV RBC AUTO: 96.8 FL — SIGNIFICANT CHANGE UP (ref 80–100)
NRBC # BLD: 0 /100 WBCS — SIGNIFICANT CHANGE UP (ref 0–0)
PLATELET # BLD AUTO: 178 K/UL — SIGNIFICANT CHANGE UP (ref 150–400)
POTASSIUM SERPL-MCNC: 4.3 MMOL/L — SIGNIFICANT CHANGE UP (ref 3.5–5.3)
POTASSIUM SERPL-SCNC: 4.3 MMOL/L — SIGNIFICANT CHANGE UP (ref 3.5–5.3)
RBC # BLD: 3.11 M/UL — LOW (ref 3.8–5.2)
RBC # FLD: 13.8 % — SIGNIFICANT CHANGE UP (ref 10.3–14.5)
SODIUM SERPL-SCNC: 142 MMOL/L — SIGNIFICANT CHANGE UP (ref 135–145)
WBC # BLD: 11.57 K/UL — HIGH (ref 3.8–10.5)
WBC # FLD AUTO: 11.57 K/UL — HIGH (ref 3.8–10.5)

## 2022-10-20 PROCEDURE — 99233 SBSQ HOSP IP/OBS HIGH 50: CPT

## 2022-10-20 PROCEDURE — 93010 ELECTROCARDIOGRAM REPORT: CPT

## 2022-10-20 PROCEDURE — 88311 DECALCIFY TISSUE: CPT | Mod: 26

## 2022-10-20 PROCEDURE — 73502 X-RAY EXAM HIP UNI 2-3 VIEWS: CPT | Mod: 26,RT

## 2022-10-20 PROCEDURE — 88305 TISSUE EXAM BY PATHOLOGIST: CPT | Mod: 26

## 2022-10-20 PROCEDURE — 27134 REVISE HIP JOINT REPLACEMENT: CPT | Mod: AS

## 2022-10-20 DEVICE — IMPLANTABLE DEVICE: Type: IMPLANTABLE DEVICE | Site: RIGHT | Status: FUNCTIONAL

## 2022-10-20 DEVICE — INSERT BIOLOX DELTA OPTION TYPE 1 PLUS 6MM: Type: IMPLANTABLE DEVICE | Site: RIGHT | Status: FUNCTIONAL

## 2022-10-20 DEVICE — BONE WAX 2.5GM: Type: IMPLANTABLE DEVICE | Site: RIGHT | Status: FUNCTIONAL

## 2022-10-20 DEVICE — CABLE CABLE-RDY PLT TROCH 1.8X635: Type: IMPLANTABLE DEVICE | Site: RIGHT | Status: FUNCTIONAL

## 2022-10-20 DEVICE — HEAD CERAMIC 40MM BIOLOX DELTA: Type: IMPLANTABLE DEVICE | Site: RIGHT | Status: FUNCTIONAL

## 2022-10-20 DEVICE — SUT WIRE 0.04" X 18G: Type: IMPLANTABLE DEVICE | Site: RIGHT | Status: FUNCTIONAL

## 2022-10-20 RX ORDER — MAGNESIUM HYDROXIDE 400 MG/1
30 TABLET, CHEWABLE ORAL DAILY
Refills: 0 | Status: DISCONTINUED | OUTPATIENT
Start: 2022-10-20 | End: 2022-10-22

## 2022-10-20 RX ORDER — SENNA PLUS 8.6 MG/1
2 TABLET ORAL AT BEDTIME
Refills: 0 | Status: DISCONTINUED | OUTPATIENT
Start: 2022-10-20 | End: 2022-10-22

## 2022-10-20 RX ORDER — SODIUM CHLORIDE 9 MG/ML
1000 INJECTION, SOLUTION INTRAVENOUS
Refills: 0 | Status: DISCONTINUED | OUTPATIENT
Start: 2022-10-20 | End: 2022-10-20

## 2022-10-20 RX ORDER — CELECOXIB 200 MG/1
100 CAPSULE ORAL EVERY 12 HOURS
Refills: 0 | Status: DISCONTINUED | OUTPATIENT
Start: 2022-10-21 | End: 2022-10-22

## 2022-10-20 RX ORDER — ACETAMINOPHEN 500 MG
1000 TABLET ORAL EVERY 8 HOURS
Refills: 0 | Status: DISCONTINUED | OUTPATIENT
Start: 2022-10-21 | End: 2022-10-22

## 2022-10-20 RX ORDER — POLYETHYLENE GLYCOL 3350 17 G/17G
17 POWDER, FOR SOLUTION ORAL AT BEDTIME
Refills: 0 | Status: DISCONTINUED | OUTPATIENT
Start: 2022-10-20 | End: 2022-10-22

## 2022-10-20 RX ORDER — OXYCODONE HYDROCHLORIDE 5 MG/1
5 TABLET ORAL
Refills: 0 | Status: DISCONTINUED | OUTPATIENT
Start: 2022-10-20 | End: 2022-10-21

## 2022-10-20 RX ORDER — ATORVASTATIN CALCIUM 80 MG/1
20 TABLET, FILM COATED ORAL AT BEDTIME
Refills: 0 | Status: DISCONTINUED | OUTPATIENT
Start: 2022-10-20 | End: 2022-10-22

## 2022-10-20 RX ORDER — LEVOTHYROXINE SODIUM 125 MCG
150 TABLET ORAL DAILY
Refills: 0 | Status: DISCONTINUED | OUTPATIENT
Start: 2022-10-20 | End: 2022-10-22

## 2022-10-20 RX ORDER — APREPITANT 80 MG/1
40 CAPSULE ORAL ONCE
Refills: 0 | Status: COMPLETED | OUTPATIENT
Start: 2022-10-20 | End: 2022-10-20

## 2022-10-20 RX ORDER — ACETAMINOPHEN 500 MG
1000 TABLET ORAL ONCE
Refills: 0 | Status: COMPLETED | OUTPATIENT
Start: 2022-10-20 | End: 2022-10-20

## 2022-10-20 RX ORDER — ONDANSETRON 8 MG/1
4 TABLET, FILM COATED ORAL ONCE
Refills: 0 | Status: DISCONTINUED | OUTPATIENT
Start: 2022-10-20 | End: 2022-10-20

## 2022-10-20 RX ORDER — HYDROMORPHONE HYDROCHLORIDE 2 MG/ML
0.5 INJECTION INTRAMUSCULAR; INTRAVENOUS; SUBCUTANEOUS
Refills: 0 | Status: DISCONTINUED | OUTPATIENT
Start: 2022-10-20 | End: 2022-10-22

## 2022-10-20 RX ORDER — ACETAMINOPHEN 500 MG
1000 TABLET ORAL ONCE
Refills: 0 | Status: DISCONTINUED | OUTPATIENT
Start: 2022-10-20 | End: 2022-10-22

## 2022-10-20 RX ORDER — DEXAMETHASONE 0.5 MG/5ML
8 ELIXIR ORAL ONCE
Refills: 0 | Status: COMPLETED | OUTPATIENT
Start: 2022-10-21 | End: 2022-10-21

## 2022-10-20 RX ORDER — ONDANSETRON 8 MG/1
4 TABLET, FILM COATED ORAL EVERY 6 HOURS
Refills: 0 | Status: DISCONTINUED | OUTPATIENT
Start: 2022-10-20 | End: 2022-10-22

## 2022-10-20 RX ORDER — CHLORHEXIDINE GLUCONATE 213 G/1000ML
1 SOLUTION TOPICAL ONCE
Refills: 0 | Status: COMPLETED | OUTPATIENT
Start: 2022-10-20 | End: 2022-10-20

## 2022-10-20 RX ORDER — PANTOPRAZOLE SODIUM 20 MG/1
40 TABLET, DELAYED RELEASE ORAL
Refills: 0 | Status: DISCONTINUED | OUTPATIENT
Start: 2022-10-20 | End: 2022-10-22

## 2022-10-20 RX ORDER — TRANEXAMIC ACID 100 MG/ML
1000 INJECTION, SOLUTION INTRAVENOUS ONCE
Refills: 0 | Status: COMPLETED | OUTPATIENT
Start: 2022-10-20 | End: 2022-10-20

## 2022-10-20 RX ORDER — SODIUM CHLORIDE 9 MG/ML
500 INJECTION INTRAMUSCULAR; INTRAVENOUS; SUBCUTANEOUS ONCE
Refills: 0 | Status: COMPLETED | OUTPATIENT
Start: 2022-10-20 | End: 2022-10-20

## 2022-10-20 RX ORDER — LANOLIN ALCOHOL/MO/W.PET/CERES
3 CREAM (GRAM) TOPICAL AT BEDTIME
Refills: 0 | Status: DISCONTINUED | OUTPATIENT
Start: 2022-10-20 | End: 2022-10-22

## 2022-10-20 RX ORDER — CEFAZOLIN SODIUM 1 G
2000 VIAL (EA) INJECTION EVERY 8 HOURS
Refills: 0 | Status: COMPLETED | OUTPATIENT
Start: 2022-10-20 | End: 2022-10-21

## 2022-10-20 RX ORDER — ACETAMINOPHEN 500 MG
1000 TABLET ORAL EVERY 8 HOURS
Refills: 0 | Status: DISCONTINUED | OUTPATIENT
Start: 2022-10-20 | End: 2022-10-21

## 2022-10-20 RX ORDER — OXYCODONE HYDROCHLORIDE 5 MG/1
10 TABLET ORAL
Refills: 0 | Status: DISCONTINUED | OUTPATIENT
Start: 2022-10-20 | End: 2022-10-21

## 2022-10-20 RX ORDER — HYDROMORPHONE HYDROCHLORIDE 2 MG/ML
1 INJECTION INTRAMUSCULAR; INTRAVENOUS; SUBCUTANEOUS
Refills: 0 | Status: DISCONTINUED | OUTPATIENT
Start: 2022-10-20 | End: 2022-10-20

## 2022-10-20 RX ORDER — SODIUM CHLORIDE 9 MG/ML
1000 INJECTION, SOLUTION INTRAVENOUS
Refills: 0 | Status: DISCONTINUED | OUTPATIENT
Start: 2022-10-20 | End: 2022-10-22

## 2022-10-20 RX ORDER — CEFAZOLIN SODIUM 1 G
2000 VIAL (EA) INJECTION ONCE
Refills: 0 | Status: COMPLETED | OUTPATIENT
Start: 2022-10-20 | End: 2022-10-20

## 2022-10-20 RX ORDER — APIXABAN 2.5 MG/1
2.5 TABLET, FILM COATED ORAL EVERY 12 HOURS
Refills: 0 | Status: DISCONTINUED | OUTPATIENT
Start: 2022-10-21 | End: 2022-10-22

## 2022-10-20 RX ORDER — CEFAZOLIN SODIUM 1 G
2000 VIAL (EA) INJECTION EVERY 8 HOURS
Refills: 0 | Status: DISCONTINUED | OUTPATIENT
Start: 2022-10-20 | End: 2022-10-21

## 2022-10-20 RX ORDER — SODIUM CHLORIDE 9 MG/ML
1000 INJECTION, SOLUTION INTRAVENOUS ONCE
Refills: 0 | Status: COMPLETED | OUTPATIENT
Start: 2022-10-20 | End: 2022-10-20

## 2022-10-20 RX ORDER — HYDROMORPHONE HYDROCHLORIDE 2 MG/ML
0.5 INJECTION INTRAMUSCULAR; INTRAVENOUS; SUBCUTANEOUS
Refills: 0 | Status: DISCONTINUED | OUTPATIENT
Start: 2022-10-20 | End: 2022-10-20

## 2022-10-20 RX ADMIN — Medication 650 MILLIGRAM(S): at 05:41

## 2022-10-20 RX ADMIN — OXYCODONE HYDROCHLORIDE 5 MILLIGRAM(S): 5 TABLET ORAL at 10:13

## 2022-10-20 RX ADMIN — Medication 100 MILLIGRAM(S): at 23:14

## 2022-10-20 RX ADMIN — HYDROMORPHONE HYDROCHLORIDE 0.5 MILLIGRAM(S): 2 INJECTION INTRAMUSCULAR; INTRAVENOUS; SUBCUTANEOUS at 06:45

## 2022-10-20 RX ADMIN — Medication 1000 MILLIGRAM(S): at 22:45

## 2022-10-20 RX ADMIN — SODIUM CHLORIDE 125 MILLILITER(S): 9 INJECTION, SOLUTION INTRAVENOUS at 22:37

## 2022-10-20 RX ADMIN — HYDROMORPHONE HYDROCHLORIDE 0.5 MILLIGRAM(S): 2 INJECTION INTRAMUSCULAR; INTRAVENOUS; SUBCUTANEOUS at 06:52

## 2022-10-20 RX ADMIN — SODIUM CHLORIDE 500 MILLILITER(S): 9 INJECTION INTRAMUSCULAR; INTRAVENOUS; SUBCUTANEOUS at 23:50

## 2022-10-20 RX ADMIN — CHLORHEXIDINE GLUCONATE 1 APPLICATION(S): 213 SOLUTION TOPICAL at 12:39

## 2022-10-20 RX ADMIN — SODIUM CHLORIDE 75 MILLILITER(S): 9 INJECTION, SOLUTION INTRAVENOUS at 19:53

## 2022-10-20 RX ADMIN — SODIUM CHLORIDE 500 MILLILITER(S): 9 INJECTION INTRAMUSCULAR; INTRAVENOUS; SUBCUTANEOUS at 19:53

## 2022-10-20 RX ADMIN — APREPITANT 40 MILLIGRAM(S): 80 CAPSULE ORAL at 12:39

## 2022-10-20 RX ADMIN — OXYCODONE HYDROCHLORIDE 5 MILLIGRAM(S): 5 TABLET ORAL at 10:44

## 2022-10-20 RX ADMIN — Medication 400 MILLIGRAM(S): at 22:37

## 2022-10-20 RX ADMIN — SODIUM CHLORIDE 1000 MILLILITER(S): 9 INJECTION, SOLUTION INTRAVENOUS at 20:59

## 2022-10-20 RX ADMIN — SODIUM CHLORIDE 60 MILLILITER(S): 9 INJECTION, SOLUTION INTRAVENOUS at 12:26

## 2022-10-20 RX ADMIN — Medication 150 MICROGRAM(S): at 05:41

## 2022-10-20 RX ADMIN — Medication 650 MILLIGRAM(S): at 05:46

## 2022-10-20 NOTE — BRIEF OPERATIVE NOTE - NSICDXBRIEFPREOP_GEN_ALL_CORE_FT
PRE-OP DIAGNOSIS:  Periprosthetic fracture around internal prosthetic right hip joint 20-Oct-2022 18:41:29  Hernandez Fletcher

## 2022-10-20 NOTE — PROGRESS NOTE ADULT - PROBLEM SELECTOR PLAN 6
Patient with bradycardia on last admission and PVC/s Couplets on strip by paramedics during transfer.  Clearances from April/May 2022 reviewed  EKG/ Echo/ cardiag clearance reviewed and appreciated  Patient medically optimized for urgent moderate risk procedure.   remote tele monitoring post op as per cardiology.   did have urine retention post op after TKR - will monitor

## 2022-10-20 NOTE — PROGRESS NOTE ADULT - SUBJECTIVE AND OBJECTIVE BOX
Ortho Preop Note    Patient is a 76y old  Female who presents with a chief complaint of Right Hip Pain (19 Oct 2022 15:27)    Diagnosis: periprosthetic right hip fx  Procedure: ORIF right periprosthetic hip fx, revision THR  Surgeon: Sin Leach                          13.3   13.60 )-----------( 132      ( 18 Oct 2022 16:50 )             42.0     10-19    139  |  104  |  25<H>  ----------------------------<  129<H>  4.4   |  24  |  0.70    Ca    8.9      19 Oct 2022 05:55    TPro  6.7  /  Alb  3.7  /  TBili  0.5  /  DBili  x   /  AST  31  /  ALT  17  /  AlkPhos  125<H>  10-18    PT/INR - ( 18 Oct 2022 16:50 )   PT: 13.2 sec;   INR: 1.14 ratio         PTT - ( 18 Oct 2022 16:50 )  PTT:22.1 sec      Type & Screen at blood bank  Chest X-ray: no acute disease  Sinus rhythm with frequent and consecutive premature ventricular complexes  Right bundle branch block    NPO/IVF ordered  Medical and cardiology Clearance in EMR  Added on to OR Schedule  Anti-coagulation held  Preop Abx ordered        Assessment & Plan:  76yFemale with periprosthetic right hip fx  -For OR today

## 2022-10-20 NOTE — BRIEF OPERATIVE NOTE - NSICDXBRIEFPOSTOP_GEN_ALL_CORE_FT
POST-OP DIAGNOSIS:  Periprosthetic fracture around internal prosthetic right hip joint 20-Oct-2022 18:41:41  Hernandez Fletcher

## 2022-10-20 NOTE — PROGRESS NOTE ADULT - SUBJECTIVE AND OBJECTIVE BOX
Patient is a 76y old  Female who presents with a chief complaint of Right Hip Pain (19 Oct 2022 15:27)      INTERVAL HPI/OVERNIGHT EVENTS:  pain present but controlled.  no new complaints.     MEDICATIONS  (STANDING):  acetaminophen     Tablet .. 650 milliGRAM(s) Oral every 8 hours  atorvastatin 20 milliGRAM(s) Oral at bedtime  influenza  Vaccine (HIGH DOSE) 0.7 milliLiter(s) IntraMuscular once  lactated ringers. 1000 milliLiter(s) (60 mL/Hr) IV Continuous <Continuous>  levothyroxine 150 MICROGram(s) Oral daily    MEDICATIONS  (PRN):  HYDROmorphone  Injectable 0.5 milliGRAM(s) IV Push every 3 hours PRN Severe Pain (7 - 10)  ondansetron Injectable 4 milliGRAM(s) IV Push every 6 hours PRN Nausea and/or Vomiting  oxyCODONE    IR 5 milliGRAM(s) Oral every 3 hours PRN Moderate Pain (4 - 6)      Allergies  No Known Allergies        REVIEW OF SYSTEMS:  CONSTITUTIONAL: No fever, weight loss, or fatigue  EYES: No eye pain, visual disturbances, or discharge  ENMT:  No difficulty hearing, tinnitus, vertigo; No sinus or throat pain  NECK: No pain or stiffness  BREASTS: No pain, masses, or nipple discharge  RESPIRATORY: No cough, wheezing, chills or hemoptysis; No shortness of breath  CARDIOVASCULAR: No chest pain, palpitations, or lightheadedness  GASTROINTESTINAL: No abdominal or epigastric pain. No nausea, vomiting, or hematemesis; No diarrhea or constipation. No melena or hematochezia.  GENITOURINARY: No dysuria, frequency, hematuria, or incontinence  NEUROLOGICAL: No headaches, vertigo, memory loss, loss of strength, numbness, or tremors  SKIN: No itching, burning, rashes, or lesions   LYMPH NODES: No enlarged glands  ENDOCRINE: No heat or cold intolerance; No hair loss; No polydipsia or polyuria  MUSCULOSKELETAL: No back pain  PSYCHIATRIC: No depression, anxiety, or mood swings  HEME/LYMPH: No easy bruising, or bleeding gums  ALLERGY AND IMMUNOLOGIC: No hives or eczema    Vital Signs Last 24 Hrs  T(C): 37.1 (20 Oct 2022 08:28), Max: 37.1 (20 Oct 2022 08:28)  T(F): 98.7 (20 Oct 2022 08:28), Max: 98.7 (20 Oct 2022 08:28)  HR: 82 (20 Oct 2022 08:28) (70 - 84)  BP: 111/62 (20 Oct 2022 08:28) (102/65 - 129/64)  BP(mean): --  RR: 18 (20 Oct 2022 08:28) (16 - 18)  SpO2: 97% (20 Oct 2022 08:28) (94% - 98%)    Parameters below as of 20 Oct 2022 03:03  Patient On (Oxygen Delivery Method): room air        PHYSICAL EXAM:  GENERAL: NAD, well-groomed, well-developed  HEAD:  Atraumatic, Normocephalic  EYES:  conjunctiva and sclera clear  ENMT: Moist mucous membranes  NECK: Supple, No JVD  NERVOUS SYSTEM:  Alert & Oriented X3, Good concentration;  CHEST/LUNG: Clear to auscultation bilaterally;   HEART: Regular rate and rhythm;   ABDOMEN: Soft, Nontender, Nondistended; Bowel sounds present  EXTREMITIES:  2+ Peripheral Pulses, No clubbing or cyanosis  LYMPH: No lymphadenopathy noted  SKIN: No rashes or lesions  INCISION:  Dressing dry and intact    LABS:      Ca    8.9        19 Oct 2022 05:55      PT/INR - ( 18 Oct 2022 16:50 )   PT: 13.2 sec;   INR: 1.14 ratio    PTT - ( 18 Oct 2022 16:50 )  PTT:22.1 sec    CAPILLARY BLOOD GLUCOSE          RADIOLOGY & ADDITIONAL TESTS:    Imaging Personally Reviewed:      [ ] Consultant(s) Notes Reviewed  [x] Care Discussed with Consultants/Other Providers:  Ortho PA- plan of care

## 2022-10-20 NOTE — PROGRESS NOTE ADULT - SUBJECTIVE AND OBJECTIVE BOX
Interval Hx: Patient seen and examined in preoperative area. Pain is not well controlled.    Exam:  RLE shortened and externally rotated  Skin intact over right hip. Well healed posterior incision  SILT sa/hua/sp/dp/t  Fires EHL/FHL/TA/GSC  Foot warm, well perfused.    A/P: 76F w/ history of R FELICITAS in 2019 s/p mechanical fall on 10/18/2022 with periprosthetic right hip fracture. Given the fracture pattern and bone quality, discussed nonoperative management versus right hip revision arthroplasty and open reduction internal fixation right greater trochanter.     The discussion regarding options for nonoperative and operative management was introducethrough a patient shared decision making protocol. The benefits of surgery versus the risks were discussed with the patient  and family (patient's daughter [phone]).  Risks of surgery include medical complications of anesthesia, DVT, pulmonary embolism, hardware complications, need for revision surgery, infection, neurovascular injury, as well as dislocation, fracture, and limb length discrepancy were addressed with the patient. The patient appeared to understand the ramifications of surgery and had ample time for questions, then consenting for revision total hip arthroplasty and open reduction internal fixation of right hip.

## 2022-10-20 NOTE — BRIEF OPERATIVE NOTE - NSICDXBRIEFPROCEDURE_GEN_ALL_CORE_FT
PROCEDURES:  Open reduction and internal fixation (ORIF) of fracture of right distal femur 20-Oct-2022 18:38:34  Hernandez Fletcher  Revision of right total hip arthroplasty by posterior approach 20-Oct-2022 18:39:01  Hernandez Fletcher  Open reduction and internal fixation of periprosthetic fracture of hip 20-Oct-2022 18:39:50  Hernandez Fletcher

## 2022-10-20 NOTE — CONSULT NOTE ADULT - ASSESSMENT
The patient is a 76 year old female with a history of HTN, HL, RA, DVT, SLE, lung cancer s/p lobectomy who presents with a fall.    Plan:  - Check ECG  - Echo pending  - On apixaban for DVT. Can resume post-procedure.  - CXR with no acute pulmonary disease  - After the patient's last surgery, she was bradycardic with intermittent junctional rhythm, blocked PACs, and Mobitz 1, thought to be due to sedation/anesthesia. Monitor on telemetry for recurrence.  - There are no active cardiac issues. The patient is at intermediate risk for cardiac events for an intermediate risk surgery. If echo without significant pathology, the patient is optimized to proceed from a cardiac standpoint.

## 2022-10-20 NOTE — CONSULT NOTE ADULT - SUBJECTIVE AND OBJECTIVE BOX
History of Present Illness: The patient is a 76 year old female with a history of HTN, HL, RA, DVT, SLE, lung cancer s/p lobectomy who presents with a fall. She was found to have a irineo-prosthetic hip fracture and plan is for surgery. She denies chest pain or shortness of breath. No exertional symptoms prior to fall. She is on apixaban for prior DVT and last dose was morning of 10/18.    Past Medical/Surgical History:  HTN, HL, RA, DVT, SLE, lung cancer s/p lobectomy    Medications:  Home Medications:  acetaminophen 325 mg oral tablet: 3 tab(s) orally every 8 hours, As needed, Mild Pain (1 - 3) (19 Oct 2022 11:31)  atorvastatin 20 mg oral tablet: 1 tab(s) orally once a day (at bedtime) (26 May 2022 13:51)  levothyroxine 150 mcg (0.15 mg) oral tablet: 1 tab(s) orally once a day (26 May 2022 13:51)  melatonin 3 mg oral tablet: 1 tab(s) orally once a day (at bedtime) (26 May 2022 14:29)  oxyCODONE 5 mg oral tablet: 1 tab(s) orally every 4 hours, As needed, Severe Pain (7 - 10) (19 Oct 2022 11:31)  pantoprazole 40 mg oral delayed release tablet: 1 tab(s) orally once a day (before a meal) (26 May 2022 13:51)  polyethylene glycol 3350 oral powder for reconstitution: 17 gram(s) orally once a day (at bedtime) (19 Oct 2022 11:31)  senna oral tablet: 2 tab(s) orally once a day (at bedtime), As Needed (26 May 2022 13:26)      Family History: Non-contributory family history of premature cardiovascular atherosclerotic disease    Social History: No tobacco, alcohol or drug use    Review of Systems:  General: No fevers, chills, weight gain  Skin: No rashes, color changes  Cardiovascular: No chest pain, orthopnea  Respiratory: No shortness of breath, cough  Gastrointestinal: No nausea, abdominal pain  Genitourinary: No incontinence, pain with urination  Musculoskeletal: +pain, swelling, decreased range of motion  Neurological: No headache, weakness  Psychiatric: No depression, anxiety  Endocrine: No weight gain, increased thirst  All other systems are comprehensively negative.    Physical Exam:  Vitals:        Vital Signs Last 24 Hrs  T(C): 36.7 (20 Oct 2022 03:03), Max: 37 (19 Oct 2022 15:21)  T(F): 98 (20 Oct 2022 03:03), Max: 98.6 (19 Oct 2022 15:21)  HR: 80 (20 Oct 2022 03:03) (70 - 84)  BP: 110/64 (20 Oct 2022 03:03) (102/65 - 129/64)  BP(mean): --  RR: 16 (20 Oct 2022 03:03) (16 - 18)  SpO2: 94% (20 Oct 2022 03:03) (94% - 100%)  Parameters below as of 20 Oct 2022 03:03  Patient On (Oxygen Delivery Method): room air  General: NAD  HEENT: MMM  Neck: No JVD, no carotid bruit  Lungs: CTAB  CV: RRR, nl S1/S2, 2/6 systolic murmur  Abdomen: S/NT/ND, +BS  Extremities: No LE edema, no cyanosis  Neuro: AAOx3, non-focal  Skin: No rash    Labs:                        13.3   13.60 )-----------( 132      ( 18 Oct 2022 16:50 )             42.0     10-19    139  |  104  |  25<H>  ----------------------------<  129<H>  4.4   |  24  |  0.70    Ca    8.9      19 Oct 2022 05:55    TPro  6.7  /  Alb  3.7  /  TBili  0.5  /  DBili  x   /  AST  31  /  ALT  17  /  AlkPhos  125<H>  10-18        PT/INR - ( 18 Oct 2022 16:50 )   PT: 13.2 sec;   INR: 1.14 ratio         PTT - ( 18 Oct 2022 16:50 )  PTT:22.1 sec    ECG: Pending

## 2022-10-21 LAB
ALBUMIN SERPL ELPH-MCNC: 2 G/DL — LOW (ref 3.3–5)
ALP SERPL-CCNC: 72 U/L — SIGNIFICANT CHANGE UP (ref 30–120)
ALT FLD-CCNC: 18 U/L DA — SIGNIFICANT CHANGE UP (ref 10–60)
ANION GAP SERPL CALC-SCNC: 8 MMOL/L — SIGNIFICANT CHANGE UP (ref 5–17)
AST SERPL-CCNC: 46 U/L — HIGH (ref 10–40)
BASOPHILS # BLD AUTO: 0.01 K/UL — SIGNIFICANT CHANGE UP (ref 0–0.2)
BASOPHILS NFR BLD AUTO: 0.1 % — SIGNIFICANT CHANGE UP (ref 0–2)
BILIRUB SERPL-MCNC: 0.7 MG/DL — SIGNIFICANT CHANGE UP (ref 0.2–1.2)
BUN SERPL-MCNC: 19 MG/DL — SIGNIFICANT CHANGE UP (ref 7–23)
CALCIUM SERPL-MCNC: 7.1 MG/DL — LOW (ref 8.4–10.5)
CHLORIDE SERPL-SCNC: 103 MMOL/L — SIGNIFICANT CHANGE UP (ref 96–108)
CO2 SERPL-SCNC: 25 MMOL/L — SIGNIFICANT CHANGE UP (ref 22–31)
CREAT SERPL-MCNC: 0.9 MG/DL — SIGNIFICANT CHANGE UP (ref 0.5–1.3)
EGFR: 66 ML/MIN/1.73M2 — SIGNIFICANT CHANGE UP
EOSINOPHIL # BLD AUTO: 0 K/UL — SIGNIFICANT CHANGE UP (ref 0–0.5)
EOSINOPHIL NFR BLD AUTO: 0 % — SIGNIFICANT CHANGE UP (ref 0–6)
GLUCOSE SERPL-MCNC: 157 MG/DL — HIGH (ref 70–99)
HCT VFR BLD CALC: 28.1 % — LOW (ref 34.5–45)
HGB BLD-MCNC: 9.3 G/DL — LOW (ref 11.5–15.5)
IMM GRANULOCYTES NFR BLD AUTO: 0.4 % — SIGNIFICANT CHANGE UP (ref 0–0.9)
LYMPHOCYTES # BLD AUTO: 0.92 K/UL — LOW (ref 1–3.3)
LYMPHOCYTES # BLD AUTO: 5.6 % — LOW (ref 13–44)
MCHC RBC-ENTMCNC: 30.8 PG — SIGNIFICANT CHANGE UP (ref 27–34)
MCHC RBC-ENTMCNC: 33.1 GM/DL — SIGNIFICANT CHANGE UP (ref 32–36)
MCV RBC AUTO: 93 FL — SIGNIFICANT CHANGE UP (ref 80–100)
MONOCYTES # BLD AUTO: 1.23 K/UL — HIGH (ref 0–0.9)
MONOCYTES NFR BLD AUTO: 7.5 % — SIGNIFICANT CHANGE UP (ref 2–14)
NEUTROPHILS # BLD AUTO: 14.1 K/UL — HIGH (ref 1.8–7.4)
NEUTROPHILS NFR BLD AUTO: 86.4 % — HIGH (ref 43–77)
NRBC # BLD: 0 /100 WBCS — SIGNIFICANT CHANGE UP (ref 0–0)
PLATELET # BLD AUTO: 168 K/UL — SIGNIFICANT CHANGE UP (ref 150–400)
POTASSIUM SERPL-MCNC: 3.9 MMOL/L — SIGNIFICANT CHANGE UP (ref 3.5–5.3)
POTASSIUM SERPL-SCNC: 3.9 MMOL/L — SIGNIFICANT CHANGE UP (ref 3.5–5.3)
PROT SERPL-MCNC: 5.2 G/DL — LOW (ref 6–8.3)
RBC # BLD: 3.02 M/UL — LOW (ref 3.8–5.2)
RBC # FLD: 15.1 % — HIGH (ref 10.3–14.5)
SARS-COV-2 RNA SPEC QL NAA+PROBE: SIGNIFICANT CHANGE UP
SODIUM SERPL-SCNC: 136 MMOL/L — SIGNIFICANT CHANGE UP (ref 135–145)
WBC # BLD: 16.32 K/UL — HIGH (ref 3.8–10.5)
WBC # FLD AUTO: 16.32 K/UL — HIGH (ref 3.8–10.5)

## 2022-10-21 PROCEDURE — 27138 REVISE HIP JOINT REPLACEMENT: CPT | Mod: RT

## 2022-10-21 PROCEDURE — 99232 SBSQ HOSP IP/OBS MODERATE 35: CPT

## 2022-10-21 PROCEDURE — 27507 TREATMENT OF THIGH FRACTURE: CPT | Mod: RT

## 2022-10-21 RX ORDER — NYSTATIN CREAM 100000 [USP'U]/G
1 CREAM TOPICAL
Refills: 0 | Status: DISCONTINUED | OUTPATIENT
Start: 2022-10-21 | End: 2022-10-22

## 2022-10-21 RX ORDER — MAGNESIUM HYDROXIDE 400 MG/1
30 TABLET, CHEWABLE ORAL DAILY
Refills: 0 | Status: DISCONTINUED | OUTPATIENT
Start: 2022-10-22 | End: 2022-11-12

## 2022-10-21 RX ORDER — OXYCODONE HYDROCHLORIDE 5 MG/1
5 TABLET ORAL
Refills: 0 | Status: DISCONTINUED | OUTPATIENT
Start: 2022-10-21 | End: 2022-10-22

## 2022-10-21 RX ORDER — SENNA PLUS 8.6 MG/1
2 TABLET ORAL AT BEDTIME
Refills: 0 | Status: DISCONTINUED | OUTPATIENT
Start: 2022-10-22 | End: 2022-11-12

## 2022-10-21 RX ORDER — SODIUM CHLORIDE 9 MG/ML
1000 INJECTION, SOLUTION INTRAVENOUS ONCE
Refills: 0 | Status: COMPLETED | OUTPATIENT
Start: 2022-10-21 | End: 2022-10-21

## 2022-10-21 RX ORDER — OXYCODONE HYDROCHLORIDE 5 MG/1
2.5 TABLET ORAL
Refills: 0 | Status: DISCONTINUED | OUTPATIENT
Start: 2022-10-21 | End: 2022-10-22

## 2022-10-21 RX ORDER — CELECOXIB 200 MG/1
100 CAPSULE ORAL EVERY 12 HOURS
Refills: 0 | Status: DISCONTINUED | OUTPATIENT
Start: 2022-10-22 | End: 2022-11-03

## 2022-10-21 RX ORDER — POLYETHYLENE GLYCOL 3350 17 G/17G
17 POWDER, FOR SOLUTION ORAL AT BEDTIME
Refills: 0 | Status: DISCONTINUED | OUTPATIENT
Start: 2022-10-22 | End: 2022-11-12

## 2022-10-21 RX ORDER — OXYCODONE HYDROCHLORIDE 5 MG/1
5 TABLET ORAL
Refills: 0 | Status: DISCONTINUED | OUTPATIENT
Start: 2022-10-22 | End: 2022-10-28

## 2022-10-21 RX ORDER — APIXABAN 2.5 MG/1
2.5 TABLET, FILM COATED ORAL EVERY 12 HOURS
Refills: 0 | Status: DISCONTINUED | OUTPATIENT
Start: 2022-10-22 | End: 2022-11-12

## 2022-10-21 RX ORDER — OXYCODONE HYDROCHLORIDE 5 MG/1
2.5 TABLET ORAL
Refills: 0 | Status: DISCONTINUED | OUTPATIENT
Start: 2022-10-22 | End: 2022-10-28

## 2022-10-21 RX ORDER — PANTOPRAZOLE SODIUM 20 MG/1
40 TABLET, DELAYED RELEASE ORAL
Refills: 0 | Status: DISCONTINUED | OUTPATIENT
Start: 2022-10-22 | End: 2022-11-12

## 2022-10-21 RX ADMIN — CELECOXIB 100 MILLIGRAM(S): 200 CAPSULE ORAL at 05:28

## 2022-10-21 RX ADMIN — Medication 1000 MILLIGRAM(S): at 14:48

## 2022-10-21 RX ADMIN — PANTOPRAZOLE SODIUM 40 MILLIGRAM(S): 20 TABLET, DELAYED RELEASE ORAL at 05:27

## 2022-10-21 RX ADMIN — Medication 1000 MILLIGRAM(S): at 14:05

## 2022-10-21 RX ADMIN — Medication 1000 MILLIGRAM(S): at 05:37

## 2022-10-21 RX ADMIN — CELECOXIB 100 MILLIGRAM(S): 200 CAPSULE ORAL at 17:38

## 2022-10-21 RX ADMIN — CELECOXIB 100 MILLIGRAM(S): 200 CAPSULE ORAL at 17:18

## 2022-10-21 RX ADMIN — Medication 1000 MILLIGRAM(S): at 21:31

## 2022-10-21 RX ADMIN — APIXABAN 2.5 MILLIGRAM(S): 2.5 TABLET, FILM COATED ORAL at 08:31

## 2022-10-21 RX ADMIN — Medication 150 MICROGRAM(S): at 05:27

## 2022-10-21 RX ADMIN — APIXABAN 2.5 MILLIGRAM(S): 2.5 TABLET, FILM COATED ORAL at 17:18

## 2022-10-21 RX ADMIN — Medication 101.6 MILLIGRAM(S): at 05:26

## 2022-10-21 RX ADMIN — Medication 1000 MILLIGRAM(S): at 21:30

## 2022-10-21 RX ADMIN — CELECOXIB 100 MILLIGRAM(S): 200 CAPSULE ORAL at 05:27

## 2022-10-21 RX ADMIN — NYSTATIN CREAM 1 APPLICATION(S): 100000 CREAM TOPICAL at 17:18

## 2022-10-21 RX ADMIN — Medication 1000 MILLIGRAM(S): at 05:30

## 2022-10-21 RX ADMIN — Medication 3 MILLIGRAM(S): at 23:37

## 2022-10-21 RX ADMIN — SODIUM CHLORIDE 125 MILLILITER(S): 9 INJECTION, SOLUTION INTRAVENOUS at 21:30

## 2022-10-21 RX ADMIN — SODIUM CHLORIDE 2000 MILLILITER(S): 9 INJECTION, SOLUTION INTRAVENOUS at 01:50

## 2022-10-21 RX ADMIN — ATORVASTATIN CALCIUM 20 MILLIGRAM(S): 80 TABLET, FILM COATED ORAL at 21:24

## 2022-10-21 RX ADMIN — Medication 100 MILLIGRAM(S): at 06:46

## 2022-10-21 NOTE — H&P ADULT - ASSESSMENT
ASSESSMENT/PLAN  This is a 75 YO female with PMH of HTN, HLD, heart murmur, hypothyroidism, SLE, RA, thyroid CA s/p resection, DVT (on eliquis), lung cancer (s/p tumor removal, no chemo no radiation), Right FELICITAS (Jonathan, '19), Left TKA (Jonathanuss, '20), Right FELICITAS (Jonathan, '22) presented to [facility] ED on 10/17 s/p mechanical fall with severe right hip pain and inability to ambulate. She was transferred to [facility] where she had ORIF of right femur on 10/20 with Dr. Leach. ABLA now s/p 1 U PRBC intraop and 1 U post op. Leukocytosis is likely reactive to surgery and decadron. Patient now with gait Instability, ADL impairments and Functional impairments.    #Right femur fracture  - ORIF of right femur on 10/20 with Dr. Leach  - Start Comprehensive Rehab Program: PT/OT, 3hours daily and 5 days weekly  - PT: Focused on improving strength, endurance, coordination, balance, functional mobility, and transfers  - OT: Focused on improving strength, fine motor skills, coordination, posture and ADLs.    - WB Status: WBAT    #Pain management  - Tylenol PRN, Oxycodone PRN, celebrex    #DVT ppx  - Eliquis, SCD, TEDs    #GI ppx  - Protonix 40mg    #Bowel Regimen  - Senna, miralax PRN    #Bladder management  - BS on admission, and q 8 hours (SC if > 400)  - Monitor UO    #FEN   - Diet: Regular    #Skin:  - Skin on admission: ***  - Pressure injury/Skin: Turn Q2hrs while in bed, OOB to Chair, PT/OT     #Sleep:   - Maintain quiet hours and low stim environment.  - Melatonin PRN to maximize participation in therapy during the day.     #Precaution  - Fall,  Hip     #GOC  CODE STATUS: FULL CODE     Outpatient Follow-up (Specialty/Name of physician):        MEDICAL PROGNOSIS: GOOD            REHAB POTENTIAL: GOOD             ESTIMATED DISPOSITION: HOME WITH HOME CARE            ELOS: 10-14 Days   EXPECTED THERAPY:     P.T. 1hr/day       O.T. 1hr/day          P&O Unnecessary     EXP FREQUENCY: 5 days per 7 day period     PRESCREEN COMPARISON:   I have reviewed the prescreen information and I have found no relevant changes between the preadmission screening and my post admission evaluation     RATIONALE FOR INPATIENT ADMISSION - Patient demonstrates the following: (check all that apply)  [X] Medically appropriate for rehabilitation admission  [X] Has attainable rehab goals with an appropriate initial discharge plan  [X] Has rehabilitation potential (expected to make a significant improvement within a reasonable period of time)   [X] Requires close medical management by a rehab physician, rehab nursing care, Hospitalist and comprehensive interdisciplinary team (including PT, OT, & or SLP, Prosthetics and Orthotics)   ASSESSMENT/PLAN  This is a 75 YO female with PMH of HTN, HLD, heart murmur, hypothyroidism, SLE, RA, thyroid CA s/p resection, DVT (on eliquis), lung cancer (s/p tumor removal, no chemo no radiation), Right FELICITAS (Jonathanuss, '19), Left TKA (Jonathanuss, '20), Right FELICITAS (Jonathan, '22) presented to [facility] ED on 10/17 s/p mechanical fall with severe right hip pain and inability to ambulate. She was transferred to [facility] where she had ORIF of right femur on 10/20 with Dr. Leach. ABLA now s/p 1 U PRBC intraop and 1 U post op. Leukocytosis is likely reactive to surgery and decadron. Patient now with gait Instability, ADL impairments and Functional impairments.    #Right femur fracture  - ORIF of right femur on 10/20 with Dr. Leach  - Start Comprehensive Rehab Program: PT/OT, 3hours daily and 5 days weekly  - PT: Focused on improving strength, endurance, coordination, balance, functional mobility, and transfers  - OT: Focused on improving strength, fine motor skills, coordination, posture and ADLs.    - WB Status: WBAT    #Pain management  - Tylenol Q8, Oxycodone PRN, celebrex    #DVT ppx  - Eliquis     #GI ppx  - Protonix 40mg    #Bowel Regimen  - Senna, miralax PRN    #Bladder management  - Monitor UO    #FEN   - Diet: Regular    #Skin:  - desitin to groin area for IAD  - Nystatin under breast and abd folds for candida management    #Sleep:   - Maintain quiet hours and low stim environment.  - Melatonin     #Precaution  - Fall,  Hip     #GOC  CODE STATUS: FULL CODE     Outpatient Follow-up (Specialty/Name of physician):  Sin Leach  [address]  [place]  [phone]  F/U 2 weeks    [facility]  Scheduled 10/22/22    Josselin Ramirez  [facility] Physician [facility]  Cardiology [phone]   Scheduled 12/19/22    F/U with family Physician 2-3 weeks after discharge      MEDICAL PROGNOSIS: GOOD            REHAB POTENTIAL: GOOD             ESTIMATED DISPOSITION: HOME WITH HOME CARE            ELOS: 10-14 Days   EXPECTED THERAPY:     P.T. 1hr/day       O.T. 1hr/day          P&O Unnecessary     EXP FREQUENCY: 5 days per 7 day period     PRESCREEN COMPARISON:   I have reviewed the prescreen information and I have found no relevant changes between the preadmission screening and my post admission evaluation     RATIONALE FOR INPATIENT ADMISSION - Patient demonstrates the following: (check all that apply)  [X] Medically appropriate for rehabilitation admission  [X] Has attainable rehab goals with an appropriate initial discharge plan  [X] Has rehabilitation potential (expected to make a significant improvement within a reasonable period of time)   [X] Requires close medical management by a rehab physician, rehab nursing care, Hospitalist and comprehensive interdisciplinary team (including PT, OT, & or SLP, Prosthetics and Orthotics)

## 2022-10-21 NOTE — PROGRESS NOTE ADULT - SUBJECTIVE AND OBJECTIVE BOX
Orthopedic Progress Note     Interval History:  No acute events overnight, pain is well controlled.  Patient denies any chest pain, SOB, N/V, fevers/chills. Receiving one unit pRBC this morning for acute blood loss anemia. She has been borderline hypotensive since surgery but no tachycardia. She had similar hypotension with her prior surgery.     Exam:  T(C): 36.6 (10-21-22 @ 05:15), Max: 37.1 (10-20-22 @ 08:28)  HR: 58 (10-21-22 @ 05:15) (56 - 82)  BP: 95/58 (10-21-22 @ 05:15) (78/42 - 111/62)  RR: 18 (10-21-22 @ 05:15) (10 - 18)  SpO2: 98% (10-21-22 @ 05:15) (97% - 100%)  Wt(kg): --I&O's Summary    20 Oct 2022 07:01  -  21 Oct 2022 07:00  --------------------------------------------------------  IN: 6982 mL / OUT: 1305 mL / NET: 5677 mL        Lying in bed in no apparent distress  Unlabored respirations    EXT:   NELLY Dressing clean, dry and intact. Holding suction  Sensation is intact in the superficial peroneal, deep peroneal, tibial, sural and saphenous nerve distributions  Able to dorsiflex and plantarflex ankle. Wiggles toes  Foot warm and well perfused  Sequential compression device on            Labs:                        9.7    11.57 )-----------( 178      ( 20 Oct 2022 19:33 )             30.1    10-20    142  |  106  |  18  ----------------------------<  137<H>  4.3   |  27  |  0.82    Ca    7.6<L>      20 Oct 2022 19:33    TPro  x   /  Alb  2.2<L>  /  TBili  x   /  DBili  x   /  AST  x   /  ALT  x   /  AlkPhos  x   10-20      Assessment/ Plan: KAI THOMAS is postoperative day #1 s/p revision right total hip replacement and open reduction internal fixation right femur. Overall doing well. Will continue to monitor hypotension and treat with 1U pRBC for postoperative anemia.  - please ensure ice over operative site while in bed or chair.   -lateral/ trochanteric hip precautions  - PT/ OT for rehabilitation. WBAT with ROM activities as tolerated  - multimodal pain regimen.   - chemical DVT prophylaxis per protocol  - SCDs while in bed. Out of bed to chair q shift  - thigh high compression stockings. Keep on operative leg until postoperative appointment. Can remove compression stocking to shower/ bathe.  - incentive spirometry  - disposition planning. Call [phone] to confirm or change postoperative appointment.  - page orthopedics team with questions or concerns.

## 2022-10-21 NOTE — PROGRESS NOTE ADULT - SUBJECTIVE AND OBJECTIVE BOX
POST OPERATIVE DAY #: 1    76y Female  s/p  Right hip periprosthetic fracture, revision                    SUBJECTIVE: Patient seen and examined at bedside.     Pain:  well controlled      Pain scale:   2/10  Denies CP, SOB, N/V/D, weakness, numbness   No new complains     OBJECTIVE:     Vital Signs Last 24 Hrs  T(C): 36.6 (21 Oct 2022 05:15), Max: 37.1 (20 Oct 2022 08:28)  T(F): 97.8 (21 Oct 2022 05:15), Max: 98.7 (20 Oct 2022 08:28)  HR: 58 (21 Oct 2022 05:15) (56 - 82)  BP: 95/58 (21 Oct 2022 05:15) (78/42 - 111/62)  BP(mean): --  RR: 18 (21 Oct 2022 05:15) (10 - 18)  SpO2: 98% (21 Oct 2022 05:15) (97% - 100%)    Parameters below as of 21 Oct 2022 05:15  Patient On (Oxygen Delivery Method): nasal cannula  O2 Flow (L/min): 3      Affected extremity: RLE         Dressing: karmen clean/dry/intact                        Sensation: intact to light touch          Motor exam:   5/ 5 Tibialis Anterior/Gastrocnemius-Soleus, EHL/FHL         warm, well-perfused; capillary refill < 3 seconds         negative calf tenderness B/L LE    LABS:                        9.7    11.57 )-----------( 178      ( 20 Oct 2022 19:33 )             30.1     10-20    142  |  106  |  18  ----------------------------<  137<H>  4.3   |  27  |  0.82    Ca    7.6<L>      20 Oct 2022 19:33    TPro  x   /  Alb  2.2<L>  /  TBili  x   /  DBili  x   /  AST  x   /  ALT  x   /  AlkPhos  x   10-20      I&O's Detail    20 Oct 2022 07:01  -  21 Oct 2022 07:00  --------------------------------------------------------  IN:    IV PiggyBack: 200 mL    Lactated Ringers: 3250 mL    Lactated Ringers: 1095 mL    Lactated Ringers Bolus: 1000 mL    Oral Fluid: 120 mL    PRBCs (Packed Red Blood Cells): 317 mL    Sodium Chloride 0.9% Bolus: 1000 mL  Total IN: 6982 mL    OUT:    Blood Loss (mL): 600 mL    Ureteral Catheter (mL): 705 mL  Total OUT: 1305 mL    Total NET: 5677 mL      MEDICATIONS:      Pain management:  acetaminophen     Tablet .. 1000 milliGRAM(s) Oral every 8 hours  acetaminophen   IVPB .. 1000 milliGRAM(s) IV Intermittent once  celecoxib 100 milliGRAM(s) Oral every 12 hours  HYDROmorphone  Injectable 0.5 milliGRAM(s) IV Push every 3 hours PRN  melatonin 3 milliGRAM(s) Oral at bedtime PRN  ondansetron Injectable 4 milliGRAM(s) IV Push every 6 hours PRN  oxyCODONE    IR 5 milliGRAM(s) Oral every 3 hours PRN  oxyCODONE    IR 10 milliGRAM(s) Oral every 3 hours PRN    DVT prophylaxis:   apixaban 2.5 milliGRAM(s) Oral every 12 hours      RADIOLOGY & ADDITIONAL STUDIES:    ASSESSMENT AND PLAN:   - acute blood loss anemia: received 1 u prbc intraop, receing 1 u now   - Analgesic pain control  - DVT prophylaxis: Eliquis2.5mg twice a day   SCDs       - HO prophylaxis/Pain Management: Celebrex 100mg twice a day x 21 days   - PT/OT: Weight Bearing Status:  Weight bearing as tolerated, OOBTC           Posteiror Hip precautions   Abduction pillow   -  Incentive spirometry  - IVF  - Advance diet as tolerated  - Hospitalist is following  -  Follow up labs  -  Disposition: Home        POST OPERATIVE DAY #: 1    76y Female  s/p  Right hip periprosthetic fracture, revision                    SUBJECTIVE: Patient seen and examined at bedside.     Pain:  well controlled      Pain scale:   2/10  Denies CP, SOB, N/V/D, weakness, numbness   No new complains     OBJECTIVE:     Vital Signs Last 24 Hrs  T(C): 36.6 (21 Oct 2022 05:15), Max: 37.1 (20 Oct 2022 08:28)  T(F): 97.8 (21 Oct 2022 05:15), Max: 98.7 (20 Oct 2022 08:28)  HR: 58 (21 Oct 2022 05:15) (56 - 82)  BP: 95/58 (21 Oct 2022 05:15) (78/42 - 111/62)  BP(mean): --  RR: 18 (21 Oct 2022 05:15) (10 - 18)  SpO2: 98% (21 Oct 2022 05:15) (97% - 100%)    Parameters below as of 21 Oct 2022 05:15  Patient On (Oxygen Delivery Method): nasal cannula  O2 Flow (L/min): 3      Affected extremity: RLE         Dressing: karmen clean/dry/intact                        Sensation: intact to light touch          Motor exam:   5/ 5 Tibialis Anterior/Gastrocnemius-Soleus, EHL/FHL         warm, well-perfused; capillary refill < 3 seconds         negative calf tenderness B/L LE    LABS:                        9.7    11.57 )-----------( 178      ( 20 Oct 2022 19:33 )             30.1     10-20    142  |  106  |  18  ----------------------------<  137<H>  4.3   |  27  |  0.82    Ca    7.6<L>      20 Oct 2022 19:33    TPro  x   /  Alb  2.2<L>  /  TBili  x   /  DBili  x   /  AST  x   /  ALT  x   /  AlkPhos  x   10-20      I&O's Detail    20 Oct 2022 07:01  -  21 Oct 2022 07:00  --------------------------------------------------------  IN:    IV PiggyBack: 200 mL    Lactated Ringers: 3250 mL    Lactated Ringers: 1095 mL    Lactated Ringers Bolus: 1000 mL    Oral Fluid: 120 mL    PRBCs (Packed Red Blood Cells): 317 mL    Sodium Chloride 0.9% Bolus: 1000 mL  Total IN: 6982 mL    OUT:    Blood Loss (mL): 600 mL    Ureteral Catheter (mL): 705 mL  Total OUT: 1305 mL    Total NET: 5677 mL      MEDICATIONS:      Pain management:  acetaminophen     Tablet .. 1000 milliGRAM(s) Oral every 8 hours  acetaminophen   IVPB .. 1000 milliGRAM(s) IV Intermittent once  celecoxib 100 milliGRAM(s) Oral every 12 hours  HYDROmorphone  Injectable 0.5 milliGRAM(s) IV Push every 3 hours PRN  melatonin 3 milliGRAM(s) Oral at bedtime PRN  ondansetron Injectable 4 milliGRAM(s) IV Push every 6 hours PRN  oxyCODONE    IR 5 milliGRAM(s) Oral every 3 hours PRN  oxyCODONE    IR 10 milliGRAM(s) Oral every 3 hours PRN    DVT prophylaxis:   apixaban 2.5 milliGRAM(s) Oral every 12 hours      RADIOLOGY & ADDITIONAL STUDIES:    ASSESSMENT AND PLAN:   - acute blood loss anemia: received 1 u prbc intraop, receing 1 u now   - Analgesic pain control  - DVT prophylaxis: Eliquis2.5mg twice a day   SCDs       - HO prophylaxis/Pain Management: Celebrex 100mg twice a day x 21 days   - PT/OT: Weight Bearing Status:  Weight bearing as tolerated, OOBTC           Lateral/Trochanteric Hip precautions   Abduction pillow   -  Incentive spirometry  - IVF  - Advance diet as tolerated  - Hospitalist is following  -  Follow up labs  -  Disposition: Home

## 2022-10-21 NOTE — H&P ADULT - NSHPPHYSICALEXAM_GEN_ALL_CORE
Vital Signs  T(C): 36.7 (10-22-22 @ 14:29), Max: 36.8 (10-21-22 @ 19:13)  HR: 92 (10-22-22 @ 14:29) (60 - 92)  BP: 113/55 (10-22-22 @ 14:29) (99/51 - 120/69)  RR: 15 (10-22-22 @ 14:29) (15 - 20)  SpO2: 95% (10-22-22 @ 14:29) (95% - 98%)    Gen - NAD, Comfortable  HEENT - NCAT, EOMI, MMM  Neck - No limited ROM  Pulm - CTAB, No wheeze, No rhonchi, No crackles  Cardiovascular - RRR   Abdomen - Soft, NT/ND   Extremities - No calf tenderness, chronic skin changes, appropriate edema RLE  Neuro-     Cognitive - AAOx3     Communication - Fluent, No dysarthria     Attention: Intact      Memory: Recall 3 objects immediate and 3 min later         Cranial Nerves - CN 2-12 grossly intact     Motor -                    LEFT    UE - ShAB 5/5, EF 5/5, EE 5/5, WE 5/5,  5/5                    RIGHT UE - ShAB 5/5, EF 5/5, EE 5/5, WE 5/5,  5/5                    LEFT    LE - HF 5/5, KE 5/5, DF 5/5, PF 5/5                    RIGHT LE - HF and KE 1/5 due to pain with recent surgery, DF 5/5, PF 5/5        Sensory - Intact to LT     Tone - normal  Psychiatric - Mood stable, Affect WNL  Skin: IAD groin/buttock area; mild candidiasis abdominal and breast folds  Wounds: R surgical wound with miniature wound vac present, no output to wound vac. Slight bleed through of serous discharge on bandage. Vital Signs  T(C): 36.7 (10-22-22 @ 14:29), Max: 36.8 (10-21-22 @ 19:13)  HR: 92 (10-22-22 @ 14:29) (60 - 92)  BP: 113/55 (10-22-22 @ 14:29) (99/51 - 120/69)  RR: 15 (10-22-22 @ 14:29) (15 - 20)  SpO2: 95% (10-22-22 @ 14:29) (95% - 98%)    Gen - NAD, Comfortable  HEENT - NCAT, EOMI, MMM  Neck - No limited ROM  Pulm - CTAB, No wheeze, No rhonchi, No crackles  Cardiovascular - RRR   Abdomen - Soft, NT/ND   Extremities - No calf tenderness, chronic skin changes, appropriate edema RLE  Neuro-     Cognitive - AAOx3     Communication - Fluent, No dysarthria     Attention: Intact      Memory: Recall 3 objects immediate and 3 min later         Cranial Nerves - CN 2-12 grossly intact     Motor -                    LEFT    UE - ShAB 5/5, EF 5/5, EE 5/5, WE 5/5,  5/5.  Decreased ROM L shoulder compared to R.                     RIGHT UE - ShAB 5/5, EF 5/5, EE 5/5, WE 5/5,  5/5                    LEFT    LE - HF 5/5, KE 5/5, DF 5/5, PF 5/5                    RIGHT LE - HF and KE 1/5 due to pain with recent surgery, DF 5/5, PF 5/5        Sensory - Intact to LT     Tone - normal  Psychiatric - Mood stable, Affect WNL  Skin: IAD groin/buttock area; mild candidiasis abdominal and breast folds  Wounds: R surgical wound with miniature wound vac present, no output to wound vac. Slight bleed through of serous discharge on bandage.

## 2022-10-21 NOTE — H&P ADULT - HISTORY OF PRESENT ILLNESS
This is a 75 YO female with PMH of HTN, HLD, heart murmur, hypothyroidism, SLE, RA, thyroid CA s/p resection, DVT (on eliquis), lung cancer (s/p tumor removal, no chemo no radiation), Right FELICITAS (Krauss, '19), Left TKA (Krauss, '20), Right FELICITAS (Krauss, '22) presented to [facility] ED on 10/17 s/p mechanical fall with severe right hip pain and inability to ambulate. Patient is a community ambulator at baseline with a walker. Patient transferred to [place] for repair of right periprosthetic proximal right femur fracture on 10/19. She had ORIF of right femur on 10/20 with Dr. Leach.    DBA now s/p 1 U PRBC intraop and 1 U post op. She is to be on Eliquis 2.5mg BID for DVT ppx, HIP precaution, abduction pillow per ortho. She was seen by cardiology for intermittent blocked PACs and Mobitz 1 on telemetry. ECG was done with no evidence of ischemia. Leukocytosis is likely reactive to surgery and decadron. Patient was evaluated by PM&R and therapy for functional deficits, gait/ADL impairments and acute rehabilitation was recommended. Patient was medically optimized for discharge to [facility] IRU on 10/21/22.     This is a 77 YO female with PMH of HTN, HLD, heart murmur, hypothyroidism, SLE, RA, thyroid CA s/p resection, DVT (on eliquis), lung cancer (s/p tumor removal, no chemo no radiation), Right FELICITAS (Krauss, '19), Left TKA (Krauss, '20), Right FELICITAS (Krauss, '22) presented to [facility] ED on 10/17 s/p mechanical fall with severe right hip pain and inability to ambulate. Patient is a community ambulator at baseline with a walker. Patient transferred to [place] for repair of right periprosthetic proximal right femur fracture on 10/19. She had ORIF of right femur on 10/20 with Dr. Leach.    Acute blood loss anemia now s/p 1 U PRBC intraop and 1 U post op. She is to be on Eliquis 2.5mg BID for DVT ppx, HIP precaution, abduction pillow per ortho. She was seen by cardiology for intermittent blocked PACs and Mobitz 1 on telemetry. ECG was done with no evidence of ischemia. Leukocytosis is likely reactive to surgery and decadron. Patient was evaluated by PM&R and therapy for functional deficits, gait/ADL impairments and acute rehabilitation was recommended. Patient was medically optimized for discharge to [facility] IRU on 10/21/22.

## 2022-10-21 NOTE — PROGRESS NOTE ADULT - PROBLEM SELECTOR PLAN 1
s/p ORIF and revision of THR  Pain Management: acceptable- continue current care Tylenol ATC/Celebrex ATC/ Oxycodone PRN  Continue PT/OT  DVT proph: see below  DC plan:  acute rehab s/p ORIF and revision of THR  Pain Management: acceptable- continue current care Tylenol ATC/Celebrex ATC/ Oxycodone PRN  Continue PT/OT  DVT proph: see below  DC plan:  acute rehab  post op anemia s/p prbc, check in am  Leukocytosis is likely reactive to surgery and decadron

## 2022-10-21 NOTE — PROGRESS NOTE ADULT - PROBLEM SELECTOR PLAN 2
not on treatment at this time, no further intervention needed
not on treatment at this time, no further intervention needed

## 2022-10-21 NOTE — PROGRESS NOTE ADULT - SUBJECTIVE AND OBJECTIVE BOX
Chief Complaint: Fall    Interval Events: Underwent surgery yesterday.    Review of Systems:  General: No fevers, chills, weight gain  Skin: No rashes, color changes  Cardiovascular: No chest pain, orthopnea  Respiratory: No shortness of breath, cough  Gastrointestinal: No nausea, abdominal pain  Genitourinary: No incontinence, pain with urination  Musculoskeletal: No pain, swelling, decreased range of motion  Neurological: No headache, weakness  Psychiatric: No depression, anxiety  Endocrine: No weight gain, increased thirst  All other systems are comprehensively negative.    Physical Exam:  Vitals:        Vital Signs Last 24 Hrs  T(C): 36.8 (21 Oct 2022 08:58), Max: 37 (21 Oct 2022 03:15)  T(F): 98.3 (21 Oct 2022 08:58), Max: 98.6 (21 Oct 2022 03:15)  HR: 58 (21 Oct 2022 08:58) (56 - 82)  BP: 103/57 (21 Oct 2022 08:58) (78/42 - 104/63)  BP(mean): --  RR: 18 (21 Oct 2022 08:58) (10 - 18)  SpO2: 98% (21 Oct 2022 08:58) (97% - 100%)  Parameters below as of 21 Oct 2022 08:58  Patient On (Oxygen Delivery Method): nasal cannula  O2 Flow (L/min): 3  General: NAD  HEENT: MMM  Neck: No JVD, no carotid bruit  Lungs: CTAB  CV: RRR, nl S1/S2, no M/R/G  Abdomen: S/NT/ND, +BS  Extremities: No LE edema, no cyanosis  Neuro: AAOx3, non-focal  Skin: No rash    Labs:                        9.7    11.57 )-----------( 178      ( 20 Oct 2022 19:33 )             30.1     10-20    142  |  106  |  18  ----------------------------<  137<H>  4.3   |  27  |  0.82    Ca    7.6<L>      20 Oct 2022 19:33    TPro  x   /  Alb  2.2<L>  /  TBili  x   /  DBili  x   /  AST  x   /  ALT  x   /  AlkPhos  x   10-20            Telemetry: Sinus rhythm, blocked PACs, Mobitz 1

## 2022-10-21 NOTE — PROGRESS NOTE ADULT - SUBJECTIVE AND OBJECTIVE BOX
Patient is a 76y old  Female who presents with a chief complaint of Right Periprosthetic Hip Fracture with Loose Stem (21 Oct 2022 07:55)      INTERVAL HPI/OVERNIGHT EVENTS:  feeling well today, pain level 3 or 4.  no       MEDICATIONS  (STANDING):  acetaminophen     Tablet .. 1000 milliGRAM(s) Oral every 8 hours  acetaminophen   IVPB .. 1000 milliGRAM(s) IV Intermittent once  apixaban 2.5 milliGRAM(s) Oral every 12 hours  atorvastatin 20 milliGRAM(s) Oral at bedtime  celecoxib 100 milliGRAM(s) Oral every 12 hours  influenza  Vaccine (HIGH DOSE) 0.7 milliLiter(s) IntraMuscular once  lactated ringers. 1000 milliLiter(s) (60 mL/Hr) IV Continuous <Continuous>  lactated ringers. 1000 milliLiter(s) (125 mL/Hr) IV Continuous <Continuous>  levothyroxine 150 MICROGram(s) Oral daily  pantoprazole    Tablet 40 milliGRAM(s) Oral before breakfast  polyethylene glycol 3350 17 Gram(s) Oral at bedtime  senna 2 Tablet(s) Oral at bedtime    MEDICATIONS  (PRN):  HYDROmorphone  Injectable 0.5 milliGRAM(s) IV Push every 3 hours PRN breakthrough pain  magnesium hydroxide Suspension 30 milliLiter(s) Oral daily PRN Constipation  melatonin 3 milliGRAM(s) Oral at bedtime PRN Insomnia  ondansetron Injectable 4 milliGRAM(s) IV Push every 6 hours PRN Nausea and/or Vomiting  oxyCODONE    IR 5 milliGRAM(s) Oral every 3 hours PRN Moderate Pain (4 - 6)  oxyCODONE    IR 10 milliGRAM(s) Oral every 3 hours PRN Severe Pain (7 - 10)      Allergies  No Known Allergies    REVIEW OF SYSTEMS:  CONSTITUTIONAL: No fever, weight loss, or fatigue  EYES: No eye pain, visual disturbances, or discharge  ENMT:  No difficulty hearing, tinnitus, vertigo; No sinus or throat pain  NECK: No pain or stiffness  BREASTS: No pain, masses, or nipple discharge  RESPIRATORY: No cough, wheezing, chills or hemoptysis; No shortness of breath  CARDIOVASCULAR: No chest pain, palpitations, or lightheadedness  GASTROINTESTINAL: No abdominal or epigastric pain. No nausea, vomiting, or hematemesis; No diarrhea or constipation. No melena or hematochezia.  GENITOURINARY: No dysuria, frequency, hematuria, or incontinence  NEUROLOGICAL: No headaches, vertigo, memory loss, loss of strength, numbness, or tremors  SKIN: No itching, burning, rashes, or lesions   LYMPH NODES: No enlarged glands  ENDOCRINE: No heat or cold intolerance; No hair loss; No polydipsia or polyuria  MUSCULOSKELETAL: No back pain  PSYCHIATRIC: No depression, anxiety, or mood swings  HEME/LYMPH: No easy bruising, or bleeding gums  ALLERGY AND IMMUNOLOGIC: No hives or eczema    Vital Signs Last 24 Hrs  T(C): 36.8 (21 Oct 2022 08:58), Max: 37 (21 Oct 2022 03:15)  T(F): 98.3 (21 Oct 2022 08:58), Max: 98.6 (21 Oct 2022 03:15)  HR: 58 (21 Oct 2022 08:58) (56 - 82)  BP: 103/57 (21 Oct 2022 08:58) (78/42 - 104/63)  BP(mean): --  RR: 18 (21 Oct 2022 08:58) (10 - 18)  SpO2: 98% (21 Oct 2022 08:58) (97% - 100%)    Parameters below as of 21 Oct 2022 08:58  Patient On (Oxygen Delivery Method): nasal cannula  O2 Flow (L/min): 3      PHYSICAL EXAM:  GENERAL: NAD, well-groomed, well-developed  HEAD:  Atraumatic, Normocephalic  EYES:  conjunctiva and sclera clear  ENMT: Moist mucous membranes,  NECK: Supple, No JVD  NERVOUS SYSTEM:  Alert & Oriented X3, Good concentration; Bilateral LE mobile, sensation to light touch intact  CHEST/LUNG: Clear to auscultation bilaterally; No rales, rhonchi, wheezing, or rubs  HEART: Regular rate and rhythm; No murmurs, rubs, or gallops  ABDOMEN: Soft, Nontender, Nondistended; Bowel sounds present  EXTREMITIES:  2+ Peripheral Pulses, No clubbing or cyanosis  LYMPH: No lymphadenopathy noted  SKIN: No rashes or lesions  INCISION:  Dressing dry and intact    LABS:                        9.3    16.32 )-----------( 168      ( 21 Oct 2022 11:59 )             28.1     21 Oct 2022 11:59    136    |  103    |  19     ----------------------------<  157    3.9     |  25     |  0.90     Ca    7.1        21 Oct 2022 11:59    TPro  5.2    /  Alb  2.0    /  TBili  0.7    /  DBili  x      /  AST  46     /  ALT  18     /  AlkPhos  72     21 Oct 2022 11:59        CAPILLARY BLOOD GLUCOSE          RADIOLOGY & ADDITIONAL TESTS:    Imaging Personally Reviewed:      [ ] Consultant(s) Notes Reviewed  [x] Care Discussed with Consultants/Other Providers:  Ortho PA- plan of care

## 2022-10-21 NOTE — H&P ADULT - NSHPSOCIALHISTORY_GEN_ALL_CORE
Smoking -  EtOH -   Drugs -       Patient lives alone in home with +ramp to enter, no steps in side, owns wheelchair and rollator walker.  PTA: Independent in ADLs and ambulation     CURRENT FUNCTIONAL STATUS  Date: 10/21  Bed Mobility: Min A, 2 person  Transfers: Min A, 2 person  Gait: Min A, 2 person  Upper Body Dressing: set-up required  Lower Body Dressing: unable to perform; THP Smoking -  EtOH -   Drugs -       Patient lives alone in home with +ramp to enter, no steps in side, owns wheelchair and rollator walker.  PTA: Independent in ADLs and ambulation.     CURRENT FUNCTIONAL STATUS  Date: 10/21  Bed Mobility: Min A, 2 person  Transfers: Min A, 2 person  Gait: Min A, 2 person  Upper Body Dressing: set-up required  Lower Body Dressing: unable to perform; THP Smoking - denies  EtOH - denies  Drugs - denies      Patient lives alone in home with +ramp to enter, no steps in side, owns wheelchair, rollator, standing/upright walker.  PTA: Independent in ADLs. Never drove. Reports rollator at baseline. Was using step stool to get into son's truck.   Son around often and helps with errands. Lives with cat named .     CURRENT FUNCTIONAL STATUS  Date: 10/21  Bed Mobility: Min A, 2 person  Transfers: Min A, 2 person  Gait: Min A, 2 person  Upper Body Dressing: set-up required  Lower Body Dressing: unable to perform; THP

## 2022-10-21 NOTE — PHYSICAL THERAPY INITIAL EVALUATION ADULT - PERTINENT HX OF CURRENT PROBLEM, REHAB EVAL
Pt is a 76F PMH HTN, HLD, heart murmur, SLE, RA, thyroid CA s/p resection, DVT (on eliquis), lung cancer (s/p tumor removal, no chemo no radiation), Right FELICITAS (Jonathan, '19), Left TKA (Jonathan, '20), Right FELICITAS (Jonathan, '22) presented to [facility] ED s/p mechanical fall with severe right hip pain and inability to ambulate. X-ray 10/18 pelvis: Acute comminuted periprosthetic fx R proximal femur.

## 2022-10-21 NOTE — H&P ADULT - NSHPREVIEWOFSYSTEMS_GEN_ALL_CORE
REVIEW OF SYSTEMS  Constitutional: No fever, No Chills  HEENT: No visual disturbances  Pulm: No shortness of breath  Cardio: No chest pain, No palpitations  GI:  No abdominal pain, No nausea, No vomiting  : No dysuria, No hematuria  Neuro: No headaches, No memory loss, No numbness   Endo: No temperature intolerance REVIEW OF SYSTEMS  Constitutional: No fever, No Chills  HEENT: No visual disturbances  Pulm: No shortness of breath  Cardio: No chest pain, No palpitations  GI:  No abdominal pain, No nausea, No vomiting  : No dysuria, No hematuria. Last BM 10/21.  Neuro: No headaches, No memory loss, No numbness   Endo: No temperature intolerance

## 2022-10-21 NOTE — PROGRESS NOTE ADULT - PROBLEM SELECTOR PLAN 5
h/o DVT on lifelong prevention with Eliquis 2.5mg BID  Surgery will be >48 hours post last dose of Eliquis  received heparin SQ x2 doses then stop for OR  restart Eliquis 2.5mg BID POD #1
h/o DVT on lifelong prevention with Eliquis 2.5mg BID  restart Eliquis 2.5mg BID

## 2022-10-21 NOTE — H&P ADULT - NSHPLABSRESULTS_GEN_ALL_CORE
RECENT LABS/IMAGING                        9.3    16.32 )-----------( 168      ( 21 Oct 2022 11:59 )             28.1     10-21    136  |  103  |  19  ----------------------------<  157<H>  3.9   |  25  |  0.90    Ca    7.1<L>      21 Oct 2022 11:59    TPro  5.2<L>  /  Alb  2.0<L>  /  TBili  0.7  /  DBili  x   /  AST  46<H>  /  ALT  18  /  AlkPhos  72  10-21      US Transthoracic Echocardiogram w/Doppler Complete (10.20.22 @ 10:00)     IMPRESSION:  1. Normal left ventricular systolic function.  2. Increased left ventricular wall thickness.  3. Mild aortic stenosis.  4. Mild pulmonary hypertension.    CT Hip No Cont, Right (10.19.22 @ 19:11)     IMPRESSION:    Comminuted displaced periprosthetic fracture surrounding the femoral component of the right total hip arthroplasty.  Large hematoma tracking along the right thigh extensor compartment and right thigh adductor compartment.      X-ray Femur 2 Views, Right (10.18.22 @ 17:35)     IMPRESSION:  Acute comminuted periprosthetic fracture of right proximal femur.

## 2022-10-22 ENCOUNTER — TRANSCRIPTION ENCOUNTER (OUTPATIENT)
Age: 77
End: 2022-10-22

## 2022-10-22 ENCOUNTER — INPATIENT (INPATIENT)
Facility: HOSPITAL | Age: 77
LOS: 20 days | Discharge: HOME HEALTH | DRG: 949 | End: 2022-11-12
Attending: INTERNAL MEDICINE | Admitting: INTERNAL MEDICINE
Payer: MEDICARE

## 2022-10-22 VITALS
RESPIRATION RATE: 20 BRPM | DIASTOLIC BLOOD PRESSURE: 69 MMHG | HEART RATE: 65 BPM | SYSTOLIC BLOOD PRESSURE: 120 MMHG | TEMPERATURE: 98 F | OXYGEN SATURATION: 98 %

## 2022-10-22 VITALS
DIASTOLIC BLOOD PRESSURE: 55 MMHG | RESPIRATION RATE: 15 BRPM | TEMPERATURE: 98 F | OXYGEN SATURATION: 95 % | HEART RATE: 92 BPM | HEIGHT: 66 IN | WEIGHT: 253.53 LBS | SYSTOLIC BLOOD PRESSURE: 113 MMHG

## 2022-10-22 DIAGNOSIS — Z98.890 OTHER SPECIFIED POSTPROCEDURAL STATES: Chronic | ICD-10-CM

## 2022-10-22 DIAGNOSIS — Z96.659 PRESENCE OF UNSPECIFIED ARTIFICIAL KNEE JOINT: ICD-10-CM

## 2022-10-22 DIAGNOSIS — C34.90 MALIGNANT NEOPLASM OF UNSPECIFIED PART OF UNSPECIFIED BRONCHUS OR LUNG: Chronic | ICD-10-CM

## 2022-10-22 DIAGNOSIS — Z96.649 PRESENCE OF UNSPECIFIED ARTIFICIAL HIP JOINT: ICD-10-CM

## 2022-10-22 DIAGNOSIS — Z90.89 ACQUIRED ABSENCE OF OTHER ORGANS: Chronic | ICD-10-CM

## 2022-10-22 DIAGNOSIS — Z96.659 PRESENCE OF UNSPECIFIED ARTIFICIAL KNEE JOINT: Chronic | ICD-10-CM

## 2022-10-22 DIAGNOSIS — Z98.49 CATARACT EXTRACTION STATUS, UNSPECIFIED EYE: Chronic | ICD-10-CM

## 2022-10-22 DIAGNOSIS — Z96.641 PRESENCE OF RIGHT ARTIFICIAL HIP JOINT: Chronic | ICD-10-CM

## 2022-10-22 LAB
ANION GAP SERPL CALC-SCNC: 8 MMOL/L — SIGNIFICANT CHANGE UP (ref 5–17)
BUN SERPL-MCNC: 19 MG/DL — SIGNIFICANT CHANGE UP (ref 7–23)
CALCIUM SERPL-MCNC: 7.6 MG/DL — LOW (ref 8.4–10.5)
CHLORIDE SERPL-SCNC: 110 MMOL/L — HIGH (ref 96–108)
CO2 SERPL-SCNC: 26 MMOL/L — SIGNIFICANT CHANGE UP (ref 22–31)
CREAT SERPL-MCNC: 0.84 MG/DL — SIGNIFICANT CHANGE UP (ref 0.5–1.3)
EGFR: 72 ML/MIN/1.73M2 — SIGNIFICANT CHANGE UP
GLUCOSE SERPL-MCNC: 139 MG/DL — HIGH (ref 70–99)
HCT VFR BLD CALC: 26.2 % — LOW (ref 34.5–45)
HCT VFR BLD CALC: 26.5 % — LOW (ref 34.5–45)
HGB BLD-MCNC: 8.7 G/DL — LOW (ref 11.5–15.5)
HGB BLD-MCNC: 8.7 G/DL — LOW (ref 11.5–15.5)
MCHC RBC-ENTMCNC: 30.7 PG — SIGNIFICANT CHANGE UP (ref 27–34)
MCHC RBC-ENTMCNC: 31.3 PG — SIGNIFICANT CHANGE UP (ref 27–34)
MCHC RBC-ENTMCNC: 32.8 GM/DL — SIGNIFICANT CHANGE UP (ref 32–36)
MCHC RBC-ENTMCNC: 33.2 GM/DL — SIGNIFICANT CHANGE UP (ref 32–36)
MCV RBC AUTO: 93.6 FL — SIGNIFICANT CHANGE UP (ref 80–100)
MCV RBC AUTO: 94.2 FL — SIGNIFICANT CHANGE UP (ref 80–100)
NRBC # BLD: 0 /100 WBCS — SIGNIFICANT CHANGE UP (ref 0–0)
NRBC # BLD: 0 /100 WBCS — SIGNIFICANT CHANGE UP (ref 0–0)
PLATELET # BLD AUTO: 183 K/UL — SIGNIFICANT CHANGE UP (ref 150–400)
PLATELET # BLD AUTO: 206 K/UL — SIGNIFICANT CHANGE UP (ref 150–400)
POTASSIUM SERPL-MCNC: 4.4 MMOL/L — SIGNIFICANT CHANGE UP (ref 3.5–5.3)
POTASSIUM SERPL-SCNC: 4.4 MMOL/L — SIGNIFICANT CHANGE UP (ref 3.5–5.3)
RBC # BLD: 2.78 M/UL — LOW (ref 3.8–5.2)
RBC # BLD: 2.83 M/UL — LOW (ref 3.8–5.2)
RBC # FLD: 15.6 % — HIGH (ref 10.3–14.5)
RBC # FLD: 15.9 % — HIGH (ref 10.3–14.5)
SODIUM SERPL-SCNC: 144 MMOL/L — SIGNIFICANT CHANGE UP (ref 135–145)
WBC # BLD: 15.8 K/UL — HIGH (ref 3.8–10.5)
WBC # BLD: 16.12 K/UL — HIGH (ref 3.8–10.5)
WBC # FLD AUTO: 15.8 K/UL — HIGH (ref 3.8–10.5)
WBC # FLD AUTO: 16.12 K/UL — HIGH (ref 3.8–10.5)

## 2022-10-22 PROCEDURE — 88305 TISSUE EXAM BY PATHOLOGIST: CPT

## 2022-10-22 PROCEDURE — 99239 HOSP IP/OBS DSCHRG MGMT >30: CPT

## 2022-10-22 PROCEDURE — 97165 OT EVAL LOW COMPLEX 30 MIN: CPT

## 2022-10-22 PROCEDURE — 82306 VITAMIN D 25 HYDROXY: CPT

## 2022-10-22 PROCEDURE — 73700 CT LOWER EXTREMITY W/O DYE: CPT

## 2022-10-22 PROCEDURE — 86850 RBC ANTIBODY SCREEN: CPT

## 2022-10-22 PROCEDURE — 85025 COMPLETE CBC W/AUTO DIFF WBC: CPT

## 2022-10-22 PROCEDURE — 93306 TTE W/DOPPLER COMPLETE: CPT

## 2022-10-22 PROCEDURE — 80048 BASIC METABOLIC PNL TOTAL CA: CPT

## 2022-10-22 PROCEDURE — 85027 COMPLETE CBC AUTOMATED: CPT

## 2022-10-22 PROCEDURE — 87635 SARS-COV-2 COVID-19 AMP PRB: CPT

## 2022-10-22 PROCEDURE — 36415 COLL VENOUS BLD VENIPUNCTURE: CPT

## 2022-10-22 PROCEDURE — C1889: CPT

## 2022-10-22 PROCEDURE — 97530 THERAPEUTIC ACTIVITIES: CPT

## 2022-10-22 PROCEDURE — 97161 PT EVAL LOW COMPLEX 20 MIN: CPT

## 2022-10-22 PROCEDURE — 88311 DECALCIFY TISSUE: CPT

## 2022-10-22 PROCEDURE — 86923 COMPATIBILITY TEST ELECTRIC: CPT

## 2022-10-22 PROCEDURE — 97116 GAIT TRAINING THERAPY: CPT

## 2022-10-22 PROCEDURE — 80053 COMPREHEN METABOLIC PANEL: CPT

## 2022-10-22 PROCEDURE — P9016: CPT

## 2022-10-22 PROCEDURE — 36430 TRANSFUSION BLD/BLD COMPNT: CPT

## 2022-10-22 PROCEDURE — 73502 X-RAY EXAM HIP UNI 2-3 VIEWS: CPT

## 2022-10-22 PROCEDURE — 82040 ASSAY OF SERUM ALBUMIN: CPT

## 2022-10-22 PROCEDURE — C1776: CPT

## 2022-10-22 PROCEDURE — 86901 BLOOD TYPING SEROLOGIC RH(D): CPT

## 2022-10-22 PROCEDURE — 97535 SELF CARE MNGMENT TRAINING: CPT

## 2022-10-22 PROCEDURE — 86900 BLOOD TYPING SEROLOGIC ABO: CPT

## 2022-10-22 PROCEDURE — 93005 ELECTROCARDIOGRAM TRACING: CPT

## 2022-10-22 RX ORDER — ACETAMINOPHEN 500 MG
650 TABLET ORAL EVERY 8 HOURS
Refills: 0 | Status: DISCONTINUED | OUTPATIENT
Start: 2022-10-22 | End: 2022-11-12

## 2022-10-22 RX ORDER — CELECOXIB 200 MG/1
1 CAPSULE ORAL
Qty: 0 | Refills: 0 | DISCHARGE
Start: 2022-10-22

## 2022-10-22 RX ORDER — LEVOTHYROXINE SODIUM 125 MCG
1 TABLET ORAL
Qty: 0 | Refills: 0 | DISCHARGE
Start: 2022-10-22

## 2022-10-22 RX ORDER — ACETAMINOPHEN 500 MG
2 TABLET ORAL
Qty: 0 | Refills: 0 | DISCHARGE
Start: 2022-10-22

## 2022-10-22 RX ORDER — POLYETHYLENE GLYCOL 3350 17 G/17G
17 POWDER, FOR SOLUTION ORAL
Qty: 0 | Refills: 0 | DISCHARGE
Start: 2022-10-22

## 2022-10-22 RX ORDER — APIXABAN 2.5 MG/1
1 TABLET, FILM COATED ORAL
Qty: 0 | Refills: 0 | DISCHARGE
Start: 2022-10-22

## 2022-10-22 RX ORDER — SENNA PLUS 8.6 MG/1
2 TABLET ORAL AT BEDTIME
Refills: 0 | Status: DISCONTINUED | OUTPATIENT
Start: 2022-10-22 | End: 2022-10-22

## 2022-10-22 RX ORDER — ATORVASTATIN CALCIUM 80 MG/1
20 TABLET, FILM COATED ORAL AT BEDTIME
Refills: 0 | Status: DISCONTINUED | OUTPATIENT
Start: 2022-10-22 | End: 2022-11-12

## 2022-10-22 RX ORDER — FERROUS SULFATE 325(65) MG
1 TABLET ORAL
Qty: 30 | Refills: 0
Start: 2022-10-22 | End: 2022-11-20

## 2022-10-22 RX ORDER — LANOLIN ALCOHOL/MO/W.PET/CERES
6 CREAM (GRAM) TOPICAL AT BEDTIME
Refills: 0 | Status: DISCONTINUED | OUTPATIENT
Start: 2022-10-22 | End: 2022-11-12

## 2022-10-22 RX ORDER — FERROUS SULFATE 325(65) MG
325 TABLET ORAL DAILY
Refills: 0 | Status: DISCONTINUED | OUTPATIENT
Start: 2022-10-22 | End: 2022-11-12

## 2022-10-22 RX ORDER — ACETAMINOPHEN 500 MG
975 TABLET ORAL EVERY 8 HOURS
Refills: 0 | Status: DISCONTINUED | OUTPATIENT
Start: 2022-10-22 | End: 2022-10-22

## 2022-10-22 RX ORDER — ZINC OXIDE 200 MG/G
1 OINTMENT TOPICAL EVERY 12 HOURS
Refills: 0 | Status: DISCONTINUED | OUTPATIENT
Start: 2022-10-22 | End: 2022-11-12

## 2022-10-22 RX ORDER — NYSTATIN CREAM 100000 [USP'U]/G
1 CREAM TOPICAL EVERY 12 HOURS
Refills: 0 | Status: DISCONTINUED | OUTPATIENT
Start: 2022-10-22 | End: 2022-11-12

## 2022-10-22 RX ORDER — LEVOTHYROXINE SODIUM 125 MCG
150 TABLET ORAL DAILY
Refills: 0 | Status: DISCONTINUED | OUTPATIENT
Start: 2022-10-23 | End: 2022-11-12

## 2022-10-22 RX ORDER — NYSTATIN CREAM 100000 [USP'U]/G
1 CREAM TOPICAL
Qty: 0 | Refills: 0 | DISCHARGE
Start: 2022-10-22

## 2022-10-22 RX ORDER — PANTOPRAZOLE SODIUM 20 MG/1
1 TABLET, DELAYED RELEASE ORAL
Qty: 0 | Refills: 0 | DISCHARGE
Start: 2022-10-22

## 2022-10-22 RX ADMIN — PANTOPRAZOLE SODIUM 40 MILLIGRAM(S): 20 TABLET, DELAYED RELEASE ORAL at 05:46

## 2022-10-22 RX ADMIN — APIXABAN 2.5 MILLIGRAM(S): 2.5 TABLET, FILM COATED ORAL at 05:46

## 2022-10-22 RX ADMIN — APIXABAN 2.5 MILLIGRAM(S): 2.5 TABLET, FILM COATED ORAL at 18:00

## 2022-10-22 RX ADMIN — OXYCODONE HYDROCHLORIDE 2.5 MILLIGRAM(S): 5 TABLET ORAL at 11:25

## 2022-10-22 RX ADMIN — CELECOXIB 100 MILLIGRAM(S): 200 CAPSULE ORAL at 18:51

## 2022-10-22 RX ADMIN — Medication 650 MILLIGRAM(S): at 23:26

## 2022-10-22 RX ADMIN — Medication 150 MICROGRAM(S): at 05:46

## 2022-10-22 RX ADMIN — CELECOXIB 100 MILLIGRAM(S): 200 CAPSULE ORAL at 18:01

## 2022-10-22 RX ADMIN — CELECOXIB 100 MILLIGRAM(S): 200 CAPSULE ORAL at 05:46

## 2022-10-22 RX ADMIN — SENNA PLUS 2 TABLET(S): 8.6 TABLET ORAL at 23:26

## 2022-10-22 RX ADMIN — CELECOXIB 100 MILLIGRAM(S): 200 CAPSULE ORAL at 05:50

## 2022-10-22 RX ADMIN — Medication 1 TABLET(S): at 18:00

## 2022-10-22 RX ADMIN — NYSTATIN CREAM 1 APPLICATION(S): 100000 CREAM TOPICAL at 05:46

## 2022-10-22 RX ADMIN — Medication 1000 MILLIGRAM(S): at 05:50

## 2022-10-22 RX ADMIN — OXYCODONE HYDROCHLORIDE 2.5 MILLIGRAM(S): 5 TABLET ORAL at 10:22

## 2022-10-22 RX ADMIN — NYSTATIN CREAM 1 APPLICATION(S): 100000 CREAM TOPICAL at 18:01

## 2022-10-22 RX ADMIN — ATORVASTATIN CALCIUM 20 MILLIGRAM(S): 80 TABLET, FILM COATED ORAL at 23:29

## 2022-10-22 RX ADMIN — Medication 6 MILLIGRAM(S): at 23:26

## 2022-10-22 RX ADMIN — ZINC OXIDE 1 APPLICATION(S): 200 OINTMENT TOPICAL at 23:27

## 2022-10-22 RX ADMIN — Medication 1000 MILLIGRAM(S): at 05:46

## 2022-10-22 NOTE — H&P ADULT - NSICDXFAMILYHX_GEN_ALL_CORE_FT
FAMILY HISTORY:  FHx: rheumatoid arthritis, father ,  of MI age 61    Mother  Still living? No  FH: sudden death, Age at diagnosis: Age Unknown    
FAMILY HISTORY:  FHx: rheumatoid arthritis, father ,  of MI age 61    Mother  Still living? No  FH: sudden death, Age at diagnosis: Age Unknown

## 2022-10-22 NOTE — H&P ADULT - NSICDXPASTMEDICALHX_GEN_ALL_CORE_FT
PAST MEDICAL HISTORY:  Benign heart murmur     DVT (deep venous thrombosis) during left knee replacement 2019    H/O degenerative disc disease     Hyperlipidemia     Hypertension     Hypothyroid     Lab test positive for detection of COVID-19 virus 12/20    Obesity, Class II, BMI 35-39.9, no comorbidity     Osteoarthritis     Overactive bladder     Rheumatoid arthritis     SLE (systemic lupus erythematosus)     
PAST MEDICAL HISTORY:  Benign heart murmur     DVT (deep venous thrombosis) during left knee replacement 2019    H/O degenerative disc disease     Hyperlipidemia     Hypertension     Hypothyroid     Lab test positive for detection of COVID-19 virus 12/20    Obesity, Class II, BMI 35-39.9, no comorbidity     Osteoarthritis     Overactive bladder     Rheumatoid arthritis     SLE (systemic lupus erythematosus)

## 2022-10-22 NOTE — PROGRESS NOTE ADULT - ASSESSMENT
76F PMH HTN, HLD, heart murmur, SLE, RA, thyroid CA s/p resection, DVT (on eliquis), lung cancer (s/p tumor removal, no chemo no radiation), Right FELICITAS (Jonathan, '19), Left TKA (Jonathan, '20), Right FELICITAS (Jonathan, '22) presented to [facility] ED s/p mechanical fall with severe right hip pain and inability to ambulate
The patient is a 76 year old female with a history of HTN, HL, RA, DVT, SLE, lung cancer s/p lobectomy who presents with a fall.    Plan:  - ECG with no evidence of ischemia or infarction  - Echo with normal LV systolic function, mild AS  - On apixaban for prior DVT  - Similar to prior surgery, the patient has intermittent blocked PACs and Mobitz 1 on telemetry  - Ortho follow-up  - PT
The patient is a 76 year old female with a history of HTN, HL, RA, DVT, SLE, lung cancer s/p lobectomy who presents with a fall.    Plan:  - ECG with no evidence of ischemia or infarction  - Echo with normal LV systolic function, mild AS  - On apixaban for prior DVT  - Similar to prior surgery, the patient has intermittent blocked PACs and Mobitz 1 on telemetry  - Ortho follow-up  - PT  - Discharge planning
76F PMH HTN, HLD, heart murmur, SLE, RA, thyroid CA s/p resection, DVT (on eliquis), lung cancer (s/p tumor removal, no chemo no radiation), Right FELICITAS (Jonathan, '19), Left TKA (Jonathan, '20), Right FELICITAS (Jonathan, '22) presented to [facility] ED s/p mechanical fall with severe right hip pain and inability to ambulate

## 2022-10-22 NOTE — DISCHARGE NOTE PROVIDER - HOSPITAL COURSE
This 76 year old patient was admitted to [facility] on 10/20/22 with a history of a right hip periprosthetic fracture and for an emergent ORIF/Revision of Right hip replacement.  Patient had appropriate preop medical evaluation and testing.    Patient received antibiotics according to SCIP guidelines for infection prevention.  The patient underwent an uncomplicated ORIF/Revision by Dr. Leach on 10/20/2022.   Eliquis was given for DVT prophylaxis.  Anesthesia, Medical Hospitalist, Physical Therapy and Occupational Therapy were consulted.  Patient is stable for discharge with a good prognosis.  Appropriate discharge instructions and medications are provided in this document.

## 2022-10-22 NOTE — DISCHARGE NOTE PROVIDER - NSDCCPTREATMENT_GEN_ALL_CORE_FT
PRINCIPAL PROCEDURE  Procedure: Open reduction and internal fixation (ORIF) of fracture of right distal femur  Findings and Treatment: Your surgical care provider is your best resource for information.  Your Physical Therapy /Occupational Therapy will include Ambulation, Transfers , Stairs, ADLs (activities of daily living), range of motion, and isometrics.  Your participation is vital for the fullest recovery and best results.  You may bear full weight as tolerated with rolling walker, cane or assistive device. NO HIP ABDUCTION  TOTAL HIP PRECAUTIONS   Do not bend your hip more than 90 degrees.   Do not rotate your leg drastically inward.   Continue abduction pillow while in bed.   Sit in Hip Chair or a chair that does not allow your to bend more than 90 degrees.  Do not sit on low chairs or low toilet seats.  Do not take a tub bath yet.   Do not resume driving until you have your surgeon’s permission.     Keep incision clean and dry.  Change the dressing daily if there is drainage noted.  When there is no drainage the wound may be open to air.   The wound is closed with either sutures (stiches) or Prineo (glued on mesh tape.)  Both sutures and Prineo are removed 2 weeks after surgery at rehab facility or in surgeon's office.  If there is no wet drainage you may shower and pat dry with a clean towel.      SECONDARY PROCEDURE  Procedure: Open reduction and internal fixation of periprosthetic fracture of hip  Findings and Treatment:

## 2022-10-22 NOTE — DISCHARGE NOTE PROVIDER - NSDCCPCAREPLAN_GEN_ALL_CORE_FT
PRINCIPAL DISCHARGE DIAGNOSIS  Diagnosis: Periprosthetic hip fracture  Assessment and Plan of Treatment:        PRINCIPAL DISCHARGE DIAGNOSIS  Diagnosis: Periprosthetic hip fracture  Assessment and Plan of Treatment:       SECONDARY DISCHARGE DIAGNOSES  Diagnosis: Hypothyroid  Assessment and Plan of Treatment:     Diagnosis: Hyperlipidemia  Assessment and Plan of Treatment:

## 2022-10-22 NOTE — PROGRESS NOTE ADULT - PROVIDER SPECIALTY LIST ADULT
Orthopedics
Cardiology
Hospitalist
Orthopedics
Orthopedics
Cardiology
Orthopedics
Hospitalist

## 2022-10-22 NOTE — DISCHARGE NOTE PROVIDER - NSDCFUSCHEDAPPT_GEN_ALL_CORE_FT
Autumn Finn  [facility] PreAdmits  Scheduled Appointment: 10/22/2022    [facility]  CARDIOLOGY [phone]  Scheduled Appointment: 12/19/2022

## 2022-10-22 NOTE — DISCHARGE NOTE NURSING/CASE MANAGEMENT/SOCIAL WORK - NURSING SECTION COMPLETE
Family notified: wife present in [facility] but w/o surgical    [___ Month(s)] : [unfilled] month(s) [FreeTextEntry1] : The patient is a 58-year-old postmenopausal female family history of CAD, asthma, esophageal reflux, hyperlipidemia, h/o respiratory failure secondary to throat cyst who is doing well. \par #1 CV- + FH and calcium score\par #2 Lipids- check today on atorvastatin\par #3 GI- GERD\par #4 Asthma- s/p steroid therapy, on monteleukast Patient/Caregiver provided printed discharge information.

## 2022-10-22 NOTE — H&P ADULT - NSHPREVIEWOFSYSTEMS_GEN_ALL_CORE
Constitutional: No fever, No Chills  HEENT: No visual disturbances  Pulm: No shortness of breath  Cardio: No chest pain, No palpitations  GI:  No abdominal pain, No nausea, No vomiting  : No dysuria, No hematuria. Last BM 10/21.  Neuro: No headaches, No memory loss, No numbness   Endo: No temperature intolerance

## 2022-10-22 NOTE — DISCHARGE NOTE PROVIDER - ATTENDING DISCHARGE PHYSICAL EXAMINATION:
GENERAL: NAD, well-groomed, well-developed  HEAD:  Atraumatic, Normocephalic  EYES:  conjunctiva and sclera clear  ENMT: Moist mucous membranes,  NECK: Supple, No JVD  NERVOUS SYSTEM:  Alert & Oriented X3, Good concentration; Bilateral LE mobile, sensation to light touch intact  CHEST/LUNG: Clear to auscultation bilaterally; No rales, rhonchi, wheezing, or rubs  HEART: Regular rate and rhythm; No murmurs, rubs, or gallops  ABDOMEN: Soft, Nontender, Nondistended; Bowel sounds present  EXTREMITIES:  2+ Peripheral Pulses, No clubbing or cyanosis  LYMPH: No lymphadenopathy noted  SKIN: No rashes or lesions  INCISION:  Dressing dry and intact

## 2022-10-22 NOTE — DISCHARGE NOTE PROVIDER - CARE PROVIDER_API CALL
katlin perez  [address]  [place]  [phone]  Phone: (   )    -  Fax: (   )    -  Follow Up Time: 2 weeks

## 2022-10-22 NOTE — H&P ADULT - NSHPPHYSICALEXAM_GEN_ALL_CORE
Physical Exam:   Physical Exam: Vital Signs  T(C): 36.7 (10-22-22 @ 14:29), Max: 36.8 (10-21-22 @ 19:13)  HR: 92 (10-22-22 @ 14:29) (60 - 92)  BP: 113/55 (10-22-22 @ 14:29) (99/51 - 120/69)  RR: 15 (10-22-22 @ 14:29) (15 - 20)  SpO2: 95% (10-22-22 @ 14:29) (95% - 98%)    Gen - NAD, Comfortable  HEENT - NCAT, EOMI, MMM  Neck - No limited ROM  Pulm - CTAB, No wheeze, No rhonchi, No crackles  Cardiovascular - RRR   Abdomen - Soft, NT/ND   Extremities - No calf tenderness, chronic skin changes, appropriate edema RLE  Neuro-     Cognitive - AAOx3     Communication - Fluent, No dysarthria     Attention: Intact      Memory: Recall 3 objects immediate and 3 min later         Cranial Nerves - CN 2-12 grossly intact     Motor -                    LEFT    UE - ShAB 5/5, EF 5/5, EE 5/5, WE 5/5,  5/5.  Decreased ROM L shoulder compared to R.                     RIGHT UE - ShAB 5/5, EF 5/5, EE 5/5, WE 5/5,  5/5                    LEFT    LE - HF 5/5, KE 5/5, DF 5/5, PF 5/5                    RIGHT LE - HF and KE 1/5 due to pain with recent surgery, DF 5/5, PF 5/5        Sensory - Intact to LT     Tone - normal  Psychiatric - Mood stable, Affect WNL  Skin: IAD groin/buttock area; mild candidiasis abdominal and breast folds  Wounds: R surgical wound with miniature wound vac present, no output to wound vac. Slight bleed through of serous discharge on bandage. Physical Exam:   Physical Exam: Vital Signs  T(C): 36.7 (10-22-22 @ 14:29), Max: 36.8 (10-21-22 @ 19:13)  HR: 92 (10-22-22 @ 14:29) (60 - 92)  BP: 113/55 (10-22-22 @ 14:29) (99/51 - 120/69)  RR: 15 (10-22-22 @ 14:29) (15 - 20)  SpO2: 95% (10-22-22 @ 14:29) (95% - 98%)    Gen - NAD, Comfortable  HEENT - NCAT, EOMI, MMM  Neck - No limited ROM  Pulm - CTAB, No wheeze, No rhonchi, No crackles  Cardiovascular - RRR   Abdomen - Soft, NT/ND   Extremities - No calf tenderness, chronic skin changes, appropriate edema RLE  Neuro-     Cognitive - AAOx3     Communication - Fluent, No dysarthria     Attention: Intact      Memory: Recall 3 objects immediate and 3 min later         Cranial Nerves - CN 2-12 grossly intact     Motor -                    LEFT    UE - ShAB 5/5, EF 5/5, EE 5/5, WE 5/5,  5/5.  Decreased AROM L shoulder vs R.                     RIGHT UE - ShAB 5/5, EF 5/5, EE 5/5, WE 5/5,  5/5                    LEFT    LE - HF 5/5, KE 5/5, DF 5/5, PF 5/5                    RIGHT LE - HF and KE 1/5 due to pain with recent surgery, DF 5/5, PF 5/5        Sensory - Intact to LT     Tone - normal  Psychiatric - Mood stable, Affect WNL  Skin: IAD groin/buttock area; mild candidiasis abdominal and breast folds  Wounds: R surgical wound with miniature wound vac present, no output to wound vac. Slight bleed through of serous discharge on bandage. Physical Exam:   Physical Exam: Vital Signs  T(C): 36.7 (10-22-22 @ 14:29), Max: 36.8 (10-21-22 @ 19:13)  HR: 92 (10-22-22 @ 14:29) (60 - 92)  BP: 113/55 (10-22-22 @ 14:29) (99/51 - 120/69)  RR: 15 (10-22-22 @ 14:29) (15 - 20)  SpO2: 95% (10-22-22 @ 14:29) (95% - 98%)    Gen - NAD, Comfortable  HEENT - NCAT, EOMI, MMM  Neck - No limited ROM  Pulm - CTAB, No wheeze, No rhonchi, No crackles  Cardiovascular - RRR   Abdomen - Soft, NT/ND   Extremities - No calf tenderness, chronic skin changes, appropriate edema RLE  Neuro-     Cognitive - AAOx3     Communication - Fluent, No dysarthria     Attention: Intact      Memory: Recall 3 objects immediate and 3 min later         Cranial Nerves - CN 2-12 grossly intact     Motor -                    LEFT    UE - ShAB 5/5, EF 5/5, EE 5/5, WE 5/5,  5/5.  Decreased AROM L shoulder vs R.                     RIGHT UE - ShAB 5/5, EF 5/5, EE 5/5, WE 5/5,  5/5                    LEFT    LE - HF 5/5, KE 5/5, DF 5/5, PF 5/5                    RIGHT LE - HF and KE 1/5 due to pain with recent surgery, DF 5/5, PF 5/5        Sensory - Intact to LT     Tone - normal  Psychiatric - Mood stable, Affect WNL  Skin: IAD groin/buttock area; mild candidiasis abdominal and breast folds  Wounds: R surgical wound with NELLY vac present, no output to wound vac. Slight bleed through of serous discharge on bandage.

## 2022-10-22 NOTE — PROGRESS NOTE ADULT - SUBJECTIVE AND OBJECTIVE BOX
ORTHOPEDIC PA PROGRESS NOTE  KAI THOMAS      76y Female                                                                                                                               POD #    2    STATUS POST:               Pre-Op Dx: Periprosthetic fracture around internal prosthetic right hip joint      Post-Op Dx:  Periprosthetic fracture around internal prosthetic right hip joint      Procedure: Revision of right total hip arthroplasty by posterior approach    Open reduction and internal fixation (ORIF) of fracture of right distal femur    Open reduction and internal fixation of periprosthetic fracture of hip                                                  Pain (0-10): 2  Current Pain Management:  [ ] PCA   [x ] Po Analgesics [ ] IM /IV Anagesics     T(F): 97.8  HR: 65  BP: 120/69  RR: 20  SpO2: 98%                        8.7    16.12 )-----------( 206      ( 22 Oct 2022 06:30 )             26.5                     10-22    144  |  110<H>  |  19  ----------------------------<  139<H>  4.4   |  26  |  0.84    Ca    7.6<L>      22 Oct 2022 06:30    TPro  5.2<L>  /  Alb  2.0<L>  /  TBili  0.7  /  DBili  x   /  AST  46<H>  /  ALT  18  /  AlkPhos  72  10-21    Physical Exam :    -   Dressing C/D/I.   -   Distal Neurvascular status intact grossly.   -   Warm well perfused; capillary refill <3 seconds   -   (+)EHL/FHL 5/5  -   (+) Sensation to light touch  -   (-) Calf tenderness Bilaterally    A/P: 76y Female s/p Periprosthetic fracture around internal prosthetic right hip joint      -   Ortho Stable  -   Pain control   -   Medicine to follow  -   DVT ppx:     [x ]SCDs     [ ] ASA     [x ] Eliquis     [ ] Lovenox  -   Weight bearing status:  WBAT [ x]        PWB    [ ]     TTWB  [ ]      NWB  [ ]   -  Dispo:     Home [ ]     Acute Rehab [x ]     SHAZIA [ ]     TBD [ ]

## 2022-10-22 NOTE — H&P ADULT - NSHPLABSRESULTS_GEN_ALL_CORE
Labs and Results:  Labs, Radiology, Cardiology, and Other Results: RECENT LABS/IMAGING                        9.3    16.32 )-----------( 168      ( 21 Oct 2022 11:59 )             28.1     10-21    136  |  103  |  19  ----------------------------<  157<H>  3.9   |  25  |  0.90    Ca    7.1<L>      21 Oct 2022 11:59    TPro  5.2<L>  /  Alb  2.0<L>  /  TBili  0.7  /  DBili  x   /  AST  46<H>  /  ALT  18  /  AlkPhos  72  10-21      US Transthoracic Echocardiogram w/Doppler Complete (10.20.22 @ 10:00)     IMPRESSION:  1. Normal left ventricular systolic function.  2. Increased left ventricular wall thickness.  3. Mild aortic stenosis.  4. Mild pulmonary hypertension.    CT Hip No Cont, Right (10.19.22 @ 19:11)     IMPRESSION:    Comminuted displaced periprosthetic fracture surrounding the femoral component of the right total hip arthroplasty.  Large hematoma tracking along the right thigh extensor compartment and right thigh adductor compartment.      X-ray Femur 2 Views, Right (10.18.22 @ 17:35)     IMPRESSION:  Acute comminuted periprosthetic fracture of right proximal femur.

## 2022-10-22 NOTE — H&P ADULT - NSHPSOCIALHISTORY_GEN_ALL_CORE
Smoking - denies  EtOH - denies  Drugs - denies    Patient lives alone in home with +ramp to enter, no steps in side, owns rollator, standing/upright walker.  PTA: Independent in ADLs. Never drove. Reports rollator at baseline. Was using step stool to get into son's truck.   Son around often and helps with errands. Lives with cat named .     CURRENT FUNCTIONAL STATUS as of 10/21  Bed Mobility: Min A, 2 person  Transfers: Min A, 2 person  Gait: Min A, 2 person  Upper Body Dressing: set-up required  Lower Body Dressing: unable to perform; THP

## 2022-10-22 NOTE — H&P ADULT - NSICDXPASTSURGICALHX_GEN_ALL_CORE_FT
PAST SURGICAL HISTORY:  Lung cancer small tumor removed 2015-no chemo, no radiation    S/P carpal tunnel release left    S/P cataract surgery left, right    S/P eye surgery child    S/P knee replacement left    S/P knee surgery bilateral    S/P lumbar spine operation 12/20    S/P thyroid surgery 1 lobe removed    S/P tonsillectomy     Status post total hip replacement, right     
PAST SURGICAL HISTORY:  Lung cancer small tumor removed 2015-no chemo, no radiation    S/P carpal tunnel release left    S/P cataract surgery left, right    S/P eye surgery child    S/P knee replacement left    S/P knee surgery bilateral    S/P lumbar spine operation 12/20    S/P thyroid surgery 1 lobe removed    S/P tonsillectomy     Status post total hip replacement, right

## 2022-10-22 NOTE — DISCHARGE NOTE PROVIDER - PROVIDER TOKENS
FREE:[LAST:[chris],FIRST:[katlin],PHONE:[(   )    -],FAX:[(   )    -],ADDRESS:[[address] [place] [address]  [place]  [phone]],FOLLOWUP:[2 weeks]]

## 2022-10-22 NOTE — PROGRESS NOTE ADULT - SUBJECTIVE AND OBJECTIVE BOX
Chief Complaint: Fall    Interval Events: No events overnight.    Review of Systems:  General: No fevers, chills, weight gain  Skin: No rashes, color changes  Cardiovascular: No chest pain, orthopnea  Respiratory: No shortness of breath, cough  Gastrointestinal: No nausea, abdominal pain  Genitourinary: No incontinence, pain with urination  Musculoskeletal: No pain, swelling, decreased range of motion  Neurological: No headache, weakness  Psychiatric: No depression, anxiety  Endocrine: No weight gain, increased thirst  All other systems are comprehensively negative.    Physical Exam:  Vital Signs Last 24 Hrs  T(C): 36.6 (22 Oct 2022 08:06), Max: 36.8 (21 Oct 2022 15:08)  T(F): 97.8 (22 Oct 2022 08:06), Max: 98.2 (21 Oct 2022 15:08)  HR: 65 (22 Oct 2022 08:06) (60 - 72)  BP: 120/69 (22 Oct 2022 08:06) (99/51 - 120/69)  BP(mean): --  RR: 20 (22 Oct 2022 08:06) (17 - 20)  SpO2: 98% (22 Oct 2022 08:06) (91% - 98%)  Parameters below as of 22 Oct 2022 08:06  Patient On (Oxygen Delivery Method): room air  General: NAD  HEENT: MMM  Neck: No JVD, no carotid bruit  Lungs: CTAB  CV: RRR, nl S1/S2, no M/R/G  Abdomen: S/NT/ND, +BS  Extremities: No LE edema, no cyanosis  Neuro: AAOx3, non-focal  Skin: No rash    Labs:    10-22    144  |  110<H>  |  19  ----------------------------<  139<H>  4.4   |  26  |  0.84    Ca    7.6<L>      22 Oct 2022 06:30    TPro  5.2<L>  /  Alb  2.0<L>  /  TBili  0.7  /  DBili  x   /  AST  46<H>  /  ALT  18  /  AlkPhos  72  10-21                        8.7    15.80 )-----------( 183      ( 22 Oct 2022 09:48 )             26.2       Telemetry: Sinus rhythm, blocked PACs, Ferm[place] 1

## 2022-10-22 NOTE — DISCHARGE NOTE PROVIDER - NSDCMRMEDTOKEN_GEN_ALL_CORE_FT
acetaminophen 325 mg oral tablet: 3 tab(s) orally every 8 hours, As needed, Mild Pain (1 - 3)  acetaminophen 500 mg oral tablet: 2 tab(s) orally every 8 hours  apixaban 2.5 mg oral tablet: 1 tab(s) orally every 12 hours  atorvastatin 20 mg oral tablet: 1 tab(s) orally once a day (at bedtime)  celecoxib 100 mg oral capsule: 1 cap(s) orally every 12 hours  levothyroxine 150 mcg (0.15 mg) oral tablet: 1 tab(s) orally once a day  levothyroxine 150 mcg (0.15 mg) oral tablet: 1 tab(s) orally once a day  melatonin 3 mg oral tablet: 1 tab(s) orally once a day (at bedtime)  nystatin 100,000 units/g topical powder: 1 application topically 2 times a day  oxyCODONE 5 mg oral tablet: 1 tab(s) orally every 4 hours, As needed, Severe Pain (7 - 10)  pantoprazole 40 mg oral delayed release tablet: 1 tab(s) orally once a day (before a meal)  pantoprazole 40 mg oral delayed release tablet: 1 tab(s) orally once a day (before a meal)  polyethylene glycol 3350 oral powder for reconstitution: 17 gram(s) orally once a day (at bedtime)  polyethylene glycol 3350 oral powder for reconstitution: 17 gram(s) orally once a day (at bedtime)  senna oral tablet: 2 tab(s) orally once a day (at bedtime), As Needed  sulfamethoxazole-trimethoprim 800 mg-160 mg oral tablet: 1 tab(s) orally 2 times a day   acetaminophen 500 mg oral tablet: 2 tab(s) orally every 8 hours  apixaban 2.5 mg oral tablet: 1 tab(s) orally every 12 hours  atorvastatin 20 mg oral tablet: 1 tab(s) orally once a day (at bedtime)  celecoxib 100 mg oral capsule: 1 cap(s) orally every 12 hours  ferrous sulfate 325 mg (65 mg elemental iron) oral tablet: 1 tab(s) orally once a day   levothyroxine 150 mcg (0.15 mg) oral tablet: 1 tab(s) orally once a day  melatonin 3 mg oral tablet: 1 tab(s) orally once a day (at bedtime)  nystatin 100,000 units/g topical powder: 1 application topically 2 times a day  oxyCODONE 5 mg oral tablet: 1 tab(s) orally every 4 hours, As needed, Severe Pain (7 - 10)  pantoprazole 40 mg oral delayed release tablet: 1 tab(s) orally once a day (before a meal)  polyethylene glycol 3350 oral powder for reconstitution: 17 gram(s) orally once a day (at bedtime)  senna oral tablet: 2 tab(s) orally once a day (at bedtime), As Needed  sulfamethoxazole-trimethoprim 800 mg-160 mg oral tablet: 1 tab(s) orally 2 times a day

## 2022-10-22 NOTE — DISCHARGE NOTE NURSING/CASE MANAGEMENT/SOCIAL WORK - NSDCPEFALRISK_GEN_ALL_CORE
For information on Fall & Injury Prevention, visit: [email or web address] OR  [email or web address] OR  https://www.cdc.gov/steadi/patient.html

## 2022-10-22 NOTE — H&P ADULT - ATTENDING COMMENTS
Rehab Attending- Patient seen and examined by me - Case discussed, above note reviewed by me with modifications made    This is a 75 YO female with PMH of HTN, HLD, heart murmur, hypothyroidism, SLE, RA, thyroid CA s/p resection, DVT (on eliquis), lung cancer (s/p tumor removal, no chemo no radiation), Right FELICITAS (Jonathan, '19), Left TKA (Jonathan, '20), Right FELICITAS (Jonathan, '22) presented to [facility] ED on 10/17 s/p mechanical fall with severe right hip pain and inability to ambulate. She was transferred to [facility] where she had ORIF of right femur on 10/20 with Dr. Leach. ABLA now s/p 1 U PRBC intraop and 1 U post op. Leukocytosis is likely reactive to surgery and decadron. Patient now with gait Instability, ADL impairments and Functional impairments.    doing well  pain over Right hip  last BM 10/21  voiding, no dysuria, no hesitancy  no dizziness, no headaches  no nausea, no vomiting  no SOB, no chest pain    MEDICATIONS  (STANDING):  acetaminophen     Tablet .. 650 milliGRAM(s) Oral every 8 hours  apixaban 2.5 milliGRAM(s) Oral every 12 hours  atorvastatin 20 milliGRAM(s) Oral at bedtime  celecoxib 100 milliGRAM(s) Oral every 12 hours  ferrous    sulfate 325 milliGRAM(s) Oral daily  levothyroxine 150 MICROGram(s) Oral daily  melatonin 6 milliGRAM(s) Oral at bedtime  nystatin Powder 1 Application(s) Topical every 12 hours  pantoprazole    Tablet 40 milliGRAM(s) Oral before breakfast  polyethylene glycol 3350 17 Gram(s) Oral at bedtime  senna 2 Tablet(s) Oral at bedtime  trimethoprim  160 mG/sulfamethoxazole 800 mG 1 Tablet(s) Oral every 12 hours  zinc oxide 40% Paste 1 Application(s) Topical every 12 hours    MEDICATIONS  (PRN):  magnesium hydroxide Suspension 30 milliLiter(s) Oral daily PRN Constipation  oxyCODONE    IR 5 milliGRAM(s) Oral every 3 hours PRN Severe Pain (7 - 10)  oxyCODONE    IR 2.5 milliGRAM(s) Oral every 3 hours PRN Moderate Pain (4 - 6)                            7.8    13.48 )-----------( 215      ( 23 Oct 2022 06:15 )             23.6     10-23    142  |  110<H>  |  20  ----------------------------<  116<H>  4.4   |  29  |  0.75    Ca    7.5<L>      23 Oct 2022 06:15    TPro  5.1<L>  /  Alb  1.7<L>  /  TBili  0.5  /  DBili  x   /  AST  39  /  ALT  16  /  AlkPhos  68  10-23            Vital Signs Last 24 Hrs  T(C): 36.7 (23 Oct 2022 08:58), Max: 36.8 (22 Oct 2022 23:16)  T(F): 98 (23 Oct 2022 08:58), Max: 98.3 (22 Oct 2022 23:16)  HR: 76 (23 Oct 2022 08:58) (76 - 79)  BP: 114/66 (23 Oct 2022 08:58) (114/66 - 118/65)  BP(mean): --  RR: 16 (23 Oct 2022 08:58) (15 - 16)  SpO2: 98% (23 Oct 2022 08:58) (97% - 98%)    Parameters below as of 23 Oct 2022 08:58  Patient On (Oxygen Delivery Method): room air    physical exam as above  + swelling RLE- NELLY dressing on Right hip  MS Right hip/knee <2/5 secondary to pain distally 5/5 sensation intact    IMP rehab gait ab secondary to periprosthetic FX Right hip SP Right THR revision- good candidate for intensive rehab- will tolerate three hours rehab daily  Post Op anemia- Hgb 7.8- repeat CBC in AM- T&S as well  HLD- cont Atorvasatin  hypothyroidism- cont synthroid  Pain- Oxycodone PRN, tylenol , celecoxib, ICE  B/B- senna, miralax  DVT- long term AC with eliquis  leukocytosis- no source- follow CBC

## 2022-10-22 NOTE — DISCHARGE NOTE NURSING/CASE MANAGEMENT/SOCIAL WORK - PATIENT PORTAL LINK FT
You can access the FollowMyHealth Patient Portal offered by [facility] by registering at the following website: [email or web address]. By joining [facility]’s FollowMyHealth portal, you will also be able to view your health information using other applications (apps) compatible with our system.

## 2022-10-22 NOTE — H&P ADULT - HISTORY OF PRESENT ILLNESS
This is a 77 YO female with PMH of HTN, HLD, heart murmur, hypothyroidism, SLE, RA, thyroid CA s/p resection, DVT (on eliquis), lung cancer (s/p tumor removal, no chemo no radiation), Right FELICITAS (Krauss, '19), Left TKA (Krauss, '20), Right FELICITAS (Krauss, '22) presented to [facility] ED on 10/17 s/p mechanical fall with severe right hip pain and inability to ambulate. Patient is a community ambulator at baseline with a walker. Patient transferred to [place] for repair of right periprosthetic proximal right femur fracture on 10/19. She had ORIF of right femur on 10/20 with Dr. Leach.    Acute blood loss anemia now s/p 1 U PRBC intraop and 1 U post op. She is to be on Eliquis 2.5mg BID for DVT ppx, HIP precaution, abduction pillow per ortho. She was seen by cardiology for intermittent blocked PACs and Mobitz 1 on telemetry. ECG was done with no evidence of ischemia. Leukocytosis is likely reactive to surgery and decadron. Patient was evaluated by PM&R and therapy for functional deficits, gait/ADL impairments and acute rehabilitation was recommended. Patient was medically optimized for discharge to [facility] IRU on 10/21/22.     This is a 75 YO female with PMH of HTN, HLD, heart murmur, hypothyroidism, SLE, RA, thyroid CA s/p resection, DVT (on eliquis), lung cancer (s/p tumor removal, no chemo no radiation), Right FELICITAS (Krauss, '19), Left TKA (Krauss, '20), Right FELICITAS (Krauss, '22) presented to [facility] ED on 10/17 s/p mechanical fall with severe right hip pain and inability to ambulate. Patient is a community ambulator at baseline with a walker. Patient transferred to [place] for repair of right periprosthetic proximal right femur fracture on 10/19. She had ORIF of right femurrevision Right THR on 10/20 with Dr. Leach.    Acute blood loss anemia now s/p 1 U PRBC intraop and 1 U post op. She is to be on Eliquis 2.5mg BID for DVT ppx, HIP precaution, abduction pillow per ortho. She was seen by cardiology for intermittent blocked PACs and Mobitz 1 on telemetry. ECG was done with no evidence of ischemia. Leukocytosis is likely reactive to surgery and decadron. Patient was evaluated by PM&R and therapy for functional deficits, gait/ADL impairments and acute rehabilitation was recommended. Patient was medically optimized for discharge to [facility] IRU on 10/21/22.

## 2022-10-22 NOTE — PATIENT PROFILE ADULT - FUNCTIONAL ASSESSMENT - BASIC MOBILITY 6.
2-calculated by average/Not able to assess (calculate score using [facility] averaging method)

## 2022-10-22 NOTE — H&P ADULT - ASSESSMENT
This is a 77 YO female with PMH of HTN, HLD, heart murmur, hypothyroidism, SLE, RA, thyroid CA s/p resection, DVT (on eliquis), lung cancer (s/p tumor removal, no chemo no radiation), Right FELICITAS (Krauss, '19), Left TKA (Jonathanuss, '20), Right FELICITAS (Jonathanuss, '22) presented to [facility] ED on 10/17 s/p mechanical fall with severe right hip pain and inability to ambulate. She was transferred to [facility] where she had ORIF of right femur on 10/20 with Dr. Leach. ABLA now s/p 1 U PRBC intraop and 1 U post op. Leukocytosis is likely reactive to surgery and decadron. Patient now with gait Instability, ADL impairments and Functional impairments.    #Right femur fracture  - ORIF of right femur on 10/20 with Dr. Leach  - Start Comprehensive Rehab Program: PT/OT, 3hours daily and 5 days weekly  - PT: Focused on improving strength, endurance, coordination, balance, functional mobility, and transfers  - OT: Focused on improving strength, fine motor skills, coordination, posture and ADLs.    - WB Status: WBAT    #Pain management  - Tylenol Q8, Oxycodone PRN, celebrex    #DVT ppx  - Eliquis     #GI ppx  - Protonix 40mg    #Bowel Regimen  - Senna, miralax PRN    #Bladder management  - Monitor UO    #FEN   - Diet: Regular    #Skin:  - desitin to groin area for IAD  - Nystatin under breast and abd folds for candida management    #Sleep:   - Maintain quiet hours and low stim environment.  - Melatonin     #Precaution  - Fall,  Hip     #GOC  CODE STATUS: FULL CODE     Outpatient Follow-up (Specialty/Name of physician):  Sin Leach  [address]  [place]  [phone]  F/U 2 weeks    [facility]  Scheduled 10/22/22    Josselin Ramirez  [facility] Physician [facility]  Cardiology [id number]  Scheduled 12/19/22    F/U with family Physician 2-3 weeks after discharge      MEDICAL PROGNOSIS: GOOD            REHAB POTENTIAL: GOOD             ESTIMATED DISPOSITION: HOME WITH HOME CARE            ELOS: 10-14 Days   EXPECTED THERAPY:     P.T. 1hr/day       O.T. 1hr/day          P&O Unnecessary     EXP FREQUENCY: 5 days per 7 day period     PRESCREEN COMPARISON:   I have reviewed the prescreen information and I have found no relevant changes between the preadmission screening and my post admission evaluation     RATIONALE FOR INPATIENT ADMISSION - Patient demonstrates the following: (check all that apply)  [X] Medically appropriate for rehabilitation admission  [X] Has attainable rehab goals with an appropriate initial discharge plan  [X] Has rehabilitation potential (expected to make a significant improvement within a reasonable period of time)   [X] Requires close medical management by a rehab physician, rehab nursing care, Hospitalist and comprehensive interdisciplinary team (including PT, OT, & or SLP, Prosthetics and Orthotics)       This is a 75 YO female with PMH of HTN, HLD, heart murmur, hypothyroidism, SLE, RA, thyroid CA s/p resection, DVT (on eliquis), lung cancer (s/p tumor removal, no chemo no radiation), Right FELICITAS (Krauss, '19), Left TKA (Jonathanuss, '20), Right FELICITAS (Jonathanuss, '22) presented to [facility] ED on 10/17 s/p mechanical fall with severe right hip pain and inability to ambulate. She was transferred to [facility] where she had ORIF of right femur on 10/20 with Dr. Leach. ABLA now s/p 1 U PRBC intraop and 1 U post op. Leukocytosis is likely reactive to surgery and decadron. Patient now with gait Instability, ADL impairments and Functional impairments.    #Right femur fracture  - ORIF of right femur on 10/20 with Dr. Leach  - Start Comprehensive Rehab Program: PT/OT, 3hours daily and 5 days weekly  - PT: Focused on improving strength, endurance, coordination, balance, functional mobility, and transfers  - OT: Focused on improving strength, fine motor skills, coordination, posture and ADLs.    - WB Status: WBAT    #Pain management  - Tylenol Q8, Oxycodone PRN, celebrex    #DVT ppx  - Eliquis     #GI ppx  - Protonix 40mg    #Bowel Regimen  - Senna, miralax PRN    #Bladder management  - Monitor UO    #FEN   - Diet: Regular    #Skin:  - desitin to groin area for IAD  - Nystatin under breast and abd folds for candida management  - consult wound care for evaluation of dressing change from NELLY    #Sleep:   - Maintain quiet hours and low stim environment.  - Melatonin     #Precaution  - Fall,  Hip     #GOC  CODE STATUS: FULL CODE     Outpatient Follow-up (Specialty/Name of physician):  Sin Leach  [address]  [place]  [phone]  F/U 2 weeks    [facility]  Scheduled 10/22/22    Josselin Ramirez  [facility] Physician [facility]  Cardiology [phone]   Scheduled 12/19/22    F/U with family Physician 2-3 weeks after discharge      MEDICAL PROGNOSIS: GOOD            REHAB POTENTIAL: GOOD             ESTIMATED DISPOSITION: HOME WITH HOME CARE            ELOS: 10-14 Days   EXPECTED THERAPY:     P.T. 1hr/day       O.T. 1hr/day          P&O Unnecessary     EXP FREQUENCY: 5 days per 7 day period     PRESCREEN COMPARISON:   I have reviewed the prescreen information and I have found no relevant changes between the preadmission screening and my post admission evaluation     RATIONALE FOR INPATIENT ADMISSION - Patient demonstrates the following: (check all that apply)  [X] Medically appropriate for rehabilitation admission  [X] Has attainable rehab goals with an appropriate initial discharge plan  [X] Has rehabilitation potential (expected to make a significant improvement within a reasonable period of time)   [X] Requires close medical management by a rehab physician, rehab nursing care, Hospitalist and comprehensive interdisciplinary team (including PT, OT, & or SLP, Prosthetics and Orthotics)       This is a 75 YO female with PMH of HTN, HLD, heart murmur, hypothyroidism, SLE, RA, thyroid CA s/p resection, DVT (on eliquis), lung cancer (s/p tumor removal, no chemo no radiation), Right FELICITAS (Krauss, '19), Left TKA (Krauss, '20), Right FELICITAS (Jonathanuss, '22) presented to [facility] ED on 10/17 s/p mechanical fall with severe right hip pain and inability to ambulate. She was transferred to [facility] where she had ORIF of right femur on 10/20 with Dr. Leach. ABLA now s/p 1 U PRBC intraop and 1 U post op. Leukocytosis is likely reactive to surgery and decadron. Patient now with gait Instability, ADL impairments and Functional impairments.    #Right femur fracture  - ORIF of right femur/THR revision on 10/20 with Dr. Leach  - Start Comprehensive Rehab Program: PT/OT, 3hours daily and 5 days weekly  - PT: Focused on improving strength, endurance, coordination, balance, functional mobility, and transfers  - OT: Focused on improving strength, fine motor skills, coordination, posture and ADLs.    - WB Status: WBAT    Hypothyroidism  cont synthroid    HLD  Atorvastatin    #Pain management  - Tylenol Q8, Oxycodone PRN, celebrex    #DVT ppx  - Eliquis     #GI ppx  - Protonix 40mg    #Bowel Regimen  - Senna, miralax PRN    #Bladder management  - Monitor UO    #FEN   - Diet: Regular    #Skin:  - desitin to groin area for IAD  - Nystatin under breast and abd folds for candida management  - consult wound care for evaluation of dressing change from NELLY    #Sleep:   - Maintain quiet hours and low stim environment.  - Melatonin     #Precaution  - Fall,  Hip     #GOC  CODE STATUS: FULL CODE     Outpatient Follow-up (Specialty/Name of physician):  Sin Leach  [address]  [place]  [phone]  F/U 2 weeks    [facility]  Scheduled 10/22/22    Josselin Ramirez  [facility] Physician [facility]  Cardiology [id number]  Scheduled 12/19/22    F/U with family Physician 2-3 weeks after discharge      MEDICAL PROGNOSIS: GOOD            REHAB POTENTIAL: GOOD             ESTIMATED DISPOSITION: HOME WITH HOME CARE            ELOS: 10-14 Days   EXPECTED THERAPY:     P.T. 2hr/day       O.T. 1hr/day          P&O Unnecessary     EXP FREQUENCY: 5 days per 7 day period     PRESCREEN COMPARISON:   I have reviewed the prescreen information and I have found no relevant changes between the preadmission screening and my post admission evaluation     RATIONALE FOR INPATIENT ADMISSION - Patient demonstrates the following: (check all that apply)  [X] Medically appropriate for rehabilitation admission  [X] Has attainable rehab goals with an appropriate initial discharge plan  [X] Has rehabilitation potential (expected to make a significant improvement within a reasonable period of time)   [X] Requires close medical management by a rehab physician, rehab nursing care, Hospitalist and comprehensive interdisciplinary team (including PT, OT, & or SLP, Prosthetics and Orthotics)

## 2022-10-22 NOTE — PATIENT PROFILE ADULT - FALL HARM RISK - HARM RISK INTERVENTIONS

## 2022-10-23 LAB
ALBUMIN SERPL ELPH-MCNC: 1.7 G/DL — LOW (ref 3.3–5)
ALP SERPL-CCNC: 68 U/L — SIGNIFICANT CHANGE UP (ref 40–120)
ALT FLD-CCNC: 16 U/L — SIGNIFICANT CHANGE UP (ref 10–45)
ANION GAP SERPL CALC-SCNC: 3 MMOL/L — LOW (ref 5–17)
AST SERPL-CCNC: 39 U/L — SIGNIFICANT CHANGE UP (ref 10–40)
BASOPHILS # BLD AUTO: 0.02 K/UL — SIGNIFICANT CHANGE UP (ref 0–0.2)
BASOPHILS NFR BLD AUTO: 0.1 % — SIGNIFICANT CHANGE UP (ref 0–2)
BILIRUB SERPL-MCNC: 0.5 MG/DL — SIGNIFICANT CHANGE UP (ref 0.2–1.2)
BUN SERPL-MCNC: 20 MG/DL — SIGNIFICANT CHANGE UP (ref 7–23)
CALCIUM SERPL-MCNC: 7.5 MG/DL — LOW (ref 8.4–10.5)
CHLORIDE SERPL-SCNC: 110 MMOL/L — HIGH (ref 96–108)
CO2 SERPL-SCNC: 29 MMOL/L — SIGNIFICANT CHANGE UP (ref 22–31)
CREAT SERPL-MCNC: 0.75 MG/DL — SIGNIFICANT CHANGE UP (ref 0.5–1.3)
EGFR: 82 ML/MIN/1.73M2 — SIGNIFICANT CHANGE UP
EOSINOPHIL # BLD AUTO: 0 K/UL — SIGNIFICANT CHANGE UP (ref 0–0.5)
EOSINOPHIL NFR BLD AUTO: 0 % — SIGNIFICANT CHANGE UP (ref 0–6)
GLUCOSE SERPL-MCNC: 116 MG/DL — HIGH (ref 70–99)
HCT VFR BLD CALC: 23.6 % — LOW (ref 34.5–45)
HGB BLD-MCNC: 7.8 G/DL — LOW (ref 11.5–15.5)
IMM GRANULOCYTES NFR BLD AUTO: 0.6 % — SIGNIFICANT CHANGE UP (ref 0–0.9)
LYMPHOCYTES # BLD AUTO: 1.45 K/UL — SIGNIFICANT CHANGE UP (ref 1–3.3)
LYMPHOCYTES # BLD AUTO: 10.8 % — LOW (ref 13–44)
MCHC RBC-ENTMCNC: 30.6 PG — SIGNIFICANT CHANGE UP (ref 27–34)
MCHC RBC-ENTMCNC: 33.1 GM/DL — SIGNIFICANT CHANGE UP (ref 32–36)
MCV RBC AUTO: 92.5 FL — SIGNIFICANT CHANGE UP (ref 80–100)
MONOCYTES # BLD AUTO: 1.38 K/UL — HIGH (ref 0–0.9)
MONOCYTES NFR BLD AUTO: 10.2 % — SIGNIFICANT CHANGE UP (ref 2–14)
NEUTROPHILS # BLD AUTO: 10.55 K/UL — HIGH (ref 1.8–7.4)
NEUTROPHILS NFR BLD AUTO: 78.3 % — HIGH (ref 43–77)
NRBC # BLD: 0 /100 WBCS — SIGNIFICANT CHANGE UP (ref 0–0)
PLATELET # BLD AUTO: 215 K/UL — SIGNIFICANT CHANGE UP (ref 150–400)
POTASSIUM SERPL-MCNC: 4.4 MMOL/L — SIGNIFICANT CHANGE UP (ref 3.5–5.3)
POTASSIUM SERPL-SCNC: 4.4 MMOL/L — SIGNIFICANT CHANGE UP (ref 3.5–5.3)
PROT SERPL-MCNC: 5.1 G/DL — LOW (ref 6–8.3)
RBC # BLD: 2.55 M/UL — LOW (ref 3.8–5.2)
RBC # FLD: 15.9 % — HIGH (ref 10.3–14.5)
SARS-COV-2 RNA SPEC QL NAA+PROBE: SIGNIFICANT CHANGE UP
SODIUM SERPL-SCNC: 142 MMOL/L — SIGNIFICANT CHANGE UP (ref 135–145)
WBC # BLD: 13.48 K/UL — HIGH (ref 3.8–10.5)
WBC # FLD AUTO: 13.48 K/UL — HIGH (ref 3.8–10.5)

## 2022-10-23 PROCEDURE — 99222 1ST HOSP IP/OBS MODERATE 55: CPT | Mod: GC

## 2022-10-23 PROCEDURE — 99223 1ST HOSP IP/OBS HIGH 75: CPT | Mod: CS

## 2022-10-23 RX ADMIN — CELECOXIB 100 MILLIGRAM(S): 200 CAPSULE ORAL at 18:51

## 2022-10-23 RX ADMIN — Medication 1 TABLET(S): at 06:32

## 2022-10-23 RX ADMIN — Medication 650 MILLIGRAM(S): at 06:17

## 2022-10-23 RX ADMIN — APIXABAN 2.5 MILLIGRAM(S): 2.5 TABLET, FILM COATED ORAL at 06:18

## 2022-10-23 RX ADMIN — SENNA PLUS 2 TABLET(S): 8.6 TABLET ORAL at 21:04

## 2022-10-23 RX ADMIN — Medication 1 TABLET(S): at 17:26

## 2022-10-23 RX ADMIN — ZINC OXIDE 1 APPLICATION(S): 200 OINTMENT TOPICAL at 18:56

## 2022-10-23 RX ADMIN — Medication 150 MICROGRAM(S): at 06:17

## 2022-10-23 RX ADMIN — OXYCODONE HYDROCHLORIDE 5 MILLIGRAM(S): 5 TABLET ORAL at 08:35

## 2022-10-23 RX ADMIN — ZINC OXIDE 1 APPLICATION(S): 200 OINTMENT TOPICAL at 06:19

## 2022-10-23 RX ADMIN — Medication 650 MILLIGRAM(S): at 14:50

## 2022-10-23 RX ADMIN — Medication 6 MILLIGRAM(S): at 21:06

## 2022-10-23 RX ADMIN — Medication 650 MILLIGRAM(S): at 12:45

## 2022-10-23 RX ADMIN — Medication 650 MILLIGRAM(S): at 22:39

## 2022-10-23 RX ADMIN — Medication 650 MILLIGRAM(S): at 21:05

## 2022-10-23 RX ADMIN — Medication 325 MILLIGRAM(S): at 12:45

## 2022-10-23 RX ADMIN — CELECOXIB 100 MILLIGRAM(S): 200 CAPSULE ORAL at 17:26

## 2022-10-23 RX ADMIN — APIXABAN 2.5 MILLIGRAM(S): 2.5 TABLET, FILM COATED ORAL at 17:26

## 2022-10-23 RX ADMIN — ATORVASTATIN CALCIUM 20 MILLIGRAM(S): 80 TABLET, FILM COATED ORAL at 21:05

## 2022-10-23 RX ADMIN — NYSTATIN CREAM 1 APPLICATION(S): 100000 CREAM TOPICAL at 06:19

## 2022-10-23 RX ADMIN — NYSTATIN CREAM 1 APPLICATION(S): 100000 CREAM TOPICAL at 14:55

## 2022-10-23 RX ADMIN — CELECOXIB 100 MILLIGRAM(S): 200 CAPSULE ORAL at 06:18

## 2022-10-23 RX ADMIN — POLYETHYLENE GLYCOL 3350 17 GRAM(S): 17 POWDER, FOR SOLUTION ORAL at 21:07

## 2022-10-23 RX ADMIN — PANTOPRAZOLE SODIUM 40 MILLIGRAM(S): 20 TABLET, DELAYED RELEASE ORAL at 06:18

## 2022-10-23 NOTE — CONSULT NOTE ADULT - SUBJECTIVE AND OBJECTIVE BOX
Patient is a 76y old  Female who presents with a chief complaint of R ORIF revision (22 Oct 2022 18:40)    HPI:  This is a 75 YO female with PMH of HTN, HLD, heart murmur, hypothyroidism, SLE, RA, thyroid CA s/p resection, DVT (on eliquis), lung cancer (s/p tumor removal, no chemo no radiation), Right FELICITAS (Krauss, '19), Left TKA (Jonathanuss, '20), Right FELICITAS (Jonathanuss, '22) presented to [facility] ED on 10/17 s/p mechanical fall with severe right hip pain and inability to ambulate. Patient is a community ambulator at baseline with a walker. Patient transferred to [place] for repair of right periprosthetic proximal right femur fracture on 10/19. She had ORIF of right femur on 10/20 with Dr. Leach.    Acute blood loss anemia now s/p 1 U PRBC intraop and 1 U post op. She is to be on Eliquis 2.5mg BID for DVT ppx, HIP precaution, abduction pillow per ortho. She was seen by cardiology for intermittent blocked PACs and Mobitz 1 on telemetry. ECG was done with no evidence of ischemia. Leukocytosis is likely reactive to surgery and decadron. Patient was evaluated by PM&R and therapy for functional deficits, gait/ADL impairments and acute rehabilitation was recommended. Patient was medically optimized for discharge to [facility] IRU on 10/21/22.     (22 Oct 2022 18:40)      PAST MEDICAL & SURGICAL HISTORY:  Rheumatoid arthritis      SLE (systemic lupus erythematosus)      Osteoarthritis      H/O degenerative disc disease      Hypertension      Hyperlipidemia      Benign heart murmur      Hypothyroid      Obesity, Class II, BMI 35-39.9, no comorbidity      DVT (deep venous thrombosis)  during left knee replacement 2019      Overactive bladder      Lab test positive for detection of COVID-19 virus  12/20      S/P tonsillectomy      S/P cataract surgery  left, right      S/P eye surgery  child      S/P carpal tunnel release  left      S/P knee surgery  bilateral      Lung cancer  small tumor removed 2015-no chemo, no radiation      S/P thyroid surgery  1 lobe removed      Status post total hip replacement, right      S/P knee replacement  left      S/P lumbar spine operation  12/20          Father: - at age - with history of   Mother: - at age - with history of           Substance Use (street drugs): (  ) never used  (  ) other:  Tobacco Usage:  (   ) never smoked   (   ) former smoker   (   ) current smoker  (     ) pack year  Alcohol Usage:  Sexual History:   Recent Travel:      Allergies    No Known Allergies    Intolerances        acetaminophen     Tablet .. 650 milliGRAM(s) Oral every 8 hours  atorvastatin 20 milliGRAM(s) Oral at bedtime  ferrous    sulfate 325 milliGRAM(s) Oral daily  levothyroxine 150 MICROGram(s) Oral daily  melatonin 6 milliGRAM(s) Oral at bedtime  nystatin Powder 1 Application(s) Topical every 12 hours  trimethoprim  160 mG/sulfamethoxazole 800 mG 1 Tablet(s) Oral every 12 hours  zinc oxide 40% Paste 1 Application(s) Topical every 12 hours      REVIEW OF SYSTEMS:  CONSTITUTIONAL: No fever, weight loss, or fatigue  EYES: No eye pain, visual disturbances, or discharge  ENMT:  No difficulty hearing, tinnitus, vertigo; No sinus or throat pain  NECK: No pain or stiffness  BREASTS: No pain, masses, or nipple discharge  RESPIRATORY: No cough, wheezing, chills or hemoptysis; No shortness of breath  CARDIOVASCULAR: No chest pain, palpitations, dizziness, or leg swelling  GASTROINTESTINAL: No abdominal or epigastric pain. No nausea, vomiting, or hematemesis; No diarrhea or constipation. No melena or hematochezia.  GENITOURINARY: No dysuria, frequency, hematuria, or incontinence  NEUROLOGICAL: No headaches, memory loss, loss of strength, numbness, or tremors  SKIN: No itching, burning, rashes, or lesions   LYMPH NODES: No enlarged glands  ENDOCRINE: No heat or cold intolerance; No hair loss  MUSCULOSKELETAL: No joint pain or swelling; No muscle, back, or extremity pain  PSYCHIATRIC: No depression, anxiety, mood swings, or difficulty sleeping  HEME/LYMPH: No easy bruising, or bleeding gums  ALLERY AND IMMUNOLOGIC: No hives or eczema    ALL ROS REVIEWED AND NORMAL EXCEPT AS STATED ABOVE    T(C): 36.7 (10-23-22 @ 08:58), Max: 36.8 (10-22-22 @ 23:16)  HR: 76 (10-23-22 @ 08:58) (76 - 92)  BP: 114/66 (10-23-22 @ 08:58) (113/55 - 118/65)  RR: 16 (10-23-22 @ 08:58) (15 - 16)  SpO2: 98% (10-23-22 @ 08:58) (95% - 98%)  Wt(kg): --Vital Signs Last 24 Hrs  T(C): 36.7 (23 Oct 2022 08:58), Max: 36.8 (22 Oct 2022 23:16)  T(F): 98 (23 Oct 2022 08:58), Max: 98.3 (22 Oct 2022 23:16)  HR: 76 (23 Oct 2022 08:58) (76 - 92)  BP: 114/66 (23 Oct 2022 08:58) (113/55 - 118/65)  BP(mean): --  RR: 16 (23 Oct 2022 08:58) (15 - 16)  SpO2: 98% (23 Oct 2022 08:58) (95% - 98%)    Parameters below as of 23 Oct 2022 08:58  Patient On (Oxygen Delivery Method): room air        PHYSICAL EXAM:    Gen - NAD, Comfortable  HEENT - NCAT, EOMI, MMM  Neck - No limited ROM  Pulm - CTAB, No wheeze, No rhonchi, No crackles  Cardiovascular - RRR   Abdomen - Soft, NT/ND   Extremities - No calf tenderness, chronic skin changes, appropriate edema RLE  Neuro-        LABS:                        7.8    13.48 )-----------( 215      ( 23 Oct 2022 06:15 )             23.6     10-23    142  |  110<H>  |  20  ----------------------------<  116<H>  4.4   |  29  |  0.75    Ca    7.5<L>      23 Oct 2022 06:15    TPro  5.1<L>  /  Alb  1.7<L>  /  TBili  0.5  /  DBili  x   /  AST  39  /  ALT  16  /  AlkPhos  68  10-23        RADIOLOGY & ADDITIONAL TESTS:    Consultant(s) Notes Reviewed:  [x ] YES  [ ] NO  Care Discussed with Consultants/Other Providers [ x] YES  [ ] NO  Imaging Personally Reviewed:  [ ] YES  [ ] NO

## 2022-10-23 NOTE — CONSULT NOTE ADULT - ASSESSMENT
77 YO female with PMH of HTN, HLD, heart murmur, hypothyroidism, SLE, RA, thyroid CA s/p resection, DVT (on eliquis), lung cancer (s/p tumor removal, no chemo no radiation), Right FELICITAS (Jonathanuss, '19), Left TKA (Jonahtanuss, '20), Right FELICITAS (Jonathan, '22) presented to [facility] ED on 10/17 s/p mechanical fall with severe right hip pain and inability to ambulate. She was transferred to [facility] S/p R ORIF on 10/20 with Dr. Leach. ABLA now s/p 1 U PRBC intraop and 1 U post op. Admitted to [facility] for gait impairment     Rehab: Functional and gait instability:  - C/w PT/OT per primary team     Ortho: Right femur fracture  - ORIF of right femur on 10/20 with Dr. Leach  -C/w Celecoxib as per ortho    CVS: HLD  - C/w lipitor 20 mg PO QD    Endo: Hypothyroids  - C/w Levothyroxine 150 mg     GI ppx:  Protonix 40mg    DVT PPX: Eliquis 2.5 mg PO BID

## 2022-10-24 LAB
ALBUMIN SERPL ELPH-MCNC: 1.7 G/DL — LOW (ref 3.3–5)
ALP SERPL-CCNC: 74 U/L — SIGNIFICANT CHANGE UP (ref 40–120)
ALT FLD-CCNC: 19 U/L — SIGNIFICANT CHANGE UP (ref 10–45)
ANION GAP SERPL CALC-SCNC: 5 MMOL/L — SIGNIFICANT CHANGE UP (ref 5–17)
AST SERPL-CCNC: 36 U/L — SIGNIFICANT CHANGE UP (ref 10–40)
BILIRUB SERPL-MCNC: 0.5 MG/DL — SIGNIFICANT CHANGE UP (ref 0.2–1.2)
BLD GP AB SCN SERPL QL: SIGNIFICANT CHANGE UP
BUN SERPL-MCNC: 21 MG/DL — SIGNIFICANT CHANGE UP (ref 7–23)
CALCIUM SERPL-MCNC: 7.8 MG/DL — LOW (ref 8.4–10.5)
CHLORIDE SERPL-SCNC: 110 MMOL/L — HIGH (ref 96–108)
CO2 SERPL-SCNC: 27 MMOL/L — SIGNIFICANT CHANGE UP (ref 22–31)
CREAT SERPL-MCNC: 0.81 MG/DL — SIGNIFICANT CHANGE UP (ref 0.5–1.3)
EGFR: 75 ML/MIN/1.73M2 — SIGNIFICANT CHANGE UP
GLUCOSE SERPL-MCNC: 101 MG/DL — HIGH (ref 70–99)
HCT VFR BLD CALC: 23.7 % — LOW (ref 34.5–45)
HGB BLD-MCNC: 7.6 G/DL — LOW (ref 11.5–15.5)
MCHC RBC-ENTMCNC: 30.5 PG — SIGNIFICANT CHANGE UP (ref 27–34)
MCHC RBC-ENTMCNC: 32.1 GM/DL — SIGNIFICANT CHANGE UP (ref 32–36)
MCV RBC AUTO: 95.2 FL — SIGNIFICANT CHANGE UP (ref 80–100)
NRBC # BLD: 0 /100 WBCS — SIGNIFICANT CHANGE UP (ref 0–0)
PLATELET # BLD AUTO: 222 K/UL — SIGNIFICANT CHANGE UP (ref 150–400)
POTASSIUM SERPL-MCNC: 4.2 MMOL/L — SIGNIFICANT CHANGE UP (ref 3.5–5.3)
POTASSIUM SERPL-SCNC: 4.2 MMOL/L — SIGNIFICANT CHANGE UP (ref 3.5–5.3)
PROT SERPL-MCNC: 5 G/DL — LOW (ref 6–8.3)
RBC # BLD: 2.49 M/UL — LOW (ref 3.8–5.2)
RBC # FLD: 15.8 % — HIGH (ref 10.3–14.5)
SODIUM SERPL-SCNC: 142 MMOL/L — SIGNIFICANT CHANGE UP (ref 135–145)
WBC # BLD: 11.41 K/UL — HIGH (ref 3.8–10.5)
WBC # FLD AUTO: 11.41 K/UL — HIGH (ref 3.8–10.5)

## 2022-10-24 PROCEDURE — 99233 SBSQ HOSP IP/OBS HIGH 50: CPT

## 2022-10-24 PROCEDURE — 99232 SBSQ HOSP IP/OBS MODERATE 35: CPT

## 2022-10-24 RX ADMIN — Medication 150 MICROGRAM(S): at 05:28

## 2022-10-24 RX ADMIN — APIXABAN 2.5 MILLIGRAM(S): 2.5 TABLET, FILM COATED ORAL at 05:28

## 2022-10-24 RX ADMIN — Medication 325 MILLIGRAM(S): at 11:30

## 2022-10-24 RX ADMIN — Medication 650 MILLIGRAM(S): at 05:29

## 2022-10-24 RX ADMIN — Medication 650 MILLIGRAM(S): at 22:35

## 2022-10-24 RX ADMIN — Medication 650 MILLIGRAM(S): at 16:58

## 2022-10-24 RX ADMIN — Medication 6 MILLIGRAM(S): at 21:46

## 2022-10-24 RX ADMIN — Medication 1 TABLET(S): at 16:59

## 2022-10-24 RX ADMIN — Medication 1 TABLET(S): at 05:29

## 2022-10-24 RX ADMIN — PANTOPRAZOLE SODIUM 40 MILLIGRAM(S): 20 TABLET, DELAYED RELEASE ORAL at 05:28

## 2022-10-24 RX ADMIN — ZINC OXIDE 1 APPLICATION(S): 200 OINTMENT TOPICAL at 17:00

## 2022-10-24 RX ADMIN — CELECOXIB 100 MILLIGRAM(S): 200 CAPSULE ORAL at 05:28

## 2022-10-24 RX ADMIN — POLYETHYLENE GLYCOL 3350 17 GRAM(S): 17 POWDER, FOR SOLUTION ORAL at 21:48

## 2022-10-24 RX ADMIN — ZINC OXIDE 1 APPLICATION(S): 200 OINTMENT TOPICAL at 05:36

## 2022-10-24 RX ADMIN — OXYCODONE HYDROCHLORIDE 5 MILLIGRAM(S): 5 TABLET ORAL at 18:55

## 2022-10-24 RX ADMIN — OXYCODONE HYDROCHLORIDE 5 MILLIGRAM(S): 5 TABLET ORAL at 16:56

## 2022-10-24 RX ADMIN — APIXABAN 2.5 MILLIGRAM(S): 2.5 TABLET, FILM COATED ORAL at 16:59

## 2022-10-24 RX ADMIN — CELECOXIB 100 MILLIGRAM(S): 200 CAPSULE ORAL at 06:36

## 2022-10-24 RX ADMIN — SENNA PLUS 2 TABLET(S): 8.6 TABLET ORAL at 21:46

## 2022-10-24 RX ADMIN — CELECOXIB 100 MILLIGRAM(S): 200 CAPSULE ORAL at 16:58

## 2022-10-24 RX ADMIN — NYSTATIN CREAM 1 APPLICATION(S): 100000 CREAM TOPICAL at 05:29

## 2022-10-24 RX ADMIN — OXYCODONE HYDROCHLORIDE 5 MILLIGRAM(S): 5 TABLET ORAL at 10:31

## 2022-10-24 RX ADMIN — ATORVASTATIN CALCIUM 20 MILLIGRAM(S): 80 TABLET, FILM COATED ORAL at 21:46

## 2022-10-24 RX ADMIN — NYSTATIN CREAM 1 APPLICATION(S): 100000 CREAM TOPICAL at 17:00

## 2022-10-24 RX ADMIN — Medication 650 MILLIGRAM(S): at 21:46

## 2022-10-24 RX ADMIN — Medication 650 MILLIGRAM(S): at 06:36

## 2022-10-24 NOTE — DIETITIAN INITIAL EVALUATION ADULT - PERTINENT LABORATORY DATA
10-24    142  |  110<H>  |  21  ----------------------------<  101<H>  4.2   |  27  |  0.81    Ca    7.8<L>      24 Oct 2022 06:05    TPro  5.0<L>  /  Alb  1.7<L>  /  TBili  0.5  /  DBili  x   /  AST  36  /  ALT  19  /  AlkPhos  74  10-24  A1C with Estimated Average Glucose Result: 5.5 % (04-26-22 @ 13:00)

## 2022-10-24 NOTE — DIETITIAN INITIAL EVALUATION ADULT - ENERGY INTAKE
2/17/2022         RE: Emery Barkley  [address]  [place] [address]        Dear Colleague,    Thank you for referring your patient, Emery Barkley, to the [facility]. Please see a copy of my visit note below.    Emery Barkley is an extremely pleasant 49 year old year old male patient here today for recheck warts. He notes wart still present by nose but believes ones on neck are fully resolved.  Patient has no other skin complaints today.  Remainder of the HPI, Meds, PMH, Allergies, FH, and SH was reviewed in chart.    Past Medical History:   Diagnosis Date     Hyperlipidemia        Past Surgical History:   Procedure Laterality Date     ABDOMEN SURGERY  2/7/19    umbilical hernia     HERNIORRHAPHY UMBILICAL N/A 2/7/2019    Procedure: HERNIORRHAPHY UMBILICAL;  Surgeon: Tej Beaulieu DO;  Location: [facility] OR     VASECTOMY          Family History   Problem Relation Age of Onset     C.A.D. Father         MI, chf, first mi in 50s.     Coronary Artery Disease Father      Hyperlipidemia Father      Cancer Sister         brain     Other Cancer Sister         Brain Cancer     Lipids Mother      Hyperlipidemia Mother      Depression Mother        Social History     Socioeconomic History     Marital status:      Spouse name: Not on file     Number of children: Not on file     Years of education: Not on file     Highest education level: Not on file   Occupational History     Not on file   Tobacco Use     Smoking status: Never Smoker     Smokeless tobacco: Never Used   Vaping Use     Vaping Use: Never used   Substance and Sexual Activity     Alcohol use: Yes     Comment: 2 drinks per week     Drug use: No     Sexual activity: Yes     Partners: Female     Birth control/protection: Male Surgical   Other Topics Concern     Parent/sibling w/ CABG, MI or angioplasty before 65F 55M? Yes   Social History Narrative     Not on file     Social Determinants of Health     Financial Resource  Strain: Not on file   Food Insecurity: Not on file   Transportation Needs: Not on file   Physical Activity: Not on file   Stress: Not on file   Social Connections: Not on file   Intimate Partner Violence: Not on file   Housing Stability: Not on file       Outpatient Encounter Medications as of 2/17/2022   Medication Sig Dispense Refill     atorvastatin (LIPITOR) 20 MG tablet TAKE ONE TABLET BY MOUTH ONCE DAILY 90 tablet 2     loratadine (CLARITIN) 10 MG tablet Take 10 mg by mouth daily as needed for allergies       No facility-administered encounter medications on file as of 2/17/2022.             O:   NAD, WDWN, Alert & Oriented, Mood & Affect wnl, Vitals stable   Here today alone   BP (!) 146/92   Pulse 72   SpO2 99%    General appearance normal   Vitals stable   Alert, oriented and in no acute distress     Verrucous papule on left upper cutaneous lip near entrance of nare and one on mucosa surface of ala.     Eyes: Conjunctivae/lids:Normal     ENT: Lips: normal    MSK:Normal    Pulm: Breathing Normal    Neuro/Psych: Orientation:Alert and Orientedx3 ; Mood/Affect:normal   A/P:  1. Common warts x 2  LN2:  Treated with LN2 for 5s for 1-2 cycles. Warned risks of blistering, pain, pigment change, scarring, and incomplete resolution.  Advised patient to return if lesions do not completely resolve.  Wound care sheet given.  IL Candin: PGACAC discussed.  Risks including but not limited to injection site reaction, bruising, no resolution.  All questions answered and entertained to patient s satisfaction.  Informed consent obtained.  IL Candin in concentration of 1 unit/ 0.1 ml was injected ID to both warts.  Total injected was  2 units.  Patient tolerated without complications and given wound care instructions, including not to move product around.  Return in 4 weeks for follow-up and possible additional IL Candin.          Again, thank you for allowing me to participate in the care of your patient.         Sincerely,        Yuki King PA-C     Adequate (%) States Good PO Intake/Appetite Prior to Admission to Hospital

## 2022-10-24 NOTE — DIETITIAN INITIAL EVALUATION ADULT - ADD RECOMMEND
1) Monitor Weights, Intake, Tolerance, Skin & Labwork  2) Education Provided on Need for Increased Fluids/Fiber & Proper Nutrition  3) Continue Nutrition Plan of Care

## 2022-10-24 NOTE — PROGRESS NOTE ADULT - ASSESSMENT
76F with PMH HTN, HLD, hypothyroidism, SLE, RA, thyroid CA s/p resection, hx of DVT (on Eliquis), lung cancer (s/p tumor removal, no chemo no radiation), Right FELICITAS (Jonathan, '19), Left TKA (Jonathanuss, '20), Right FELICITAS (Jonathan, '22) presented to [facility] ED on 10/17 s/p mechanical fall with severe right hip pain and inability to ambulate. She was transferred to [facility] where she had ORIF of right femur on 10/20 with Dr. Leach. Hospital course complicated by ABLA s/p pRBC. Now admitted to [facility] for initiation of multidisciplinary rehab program.     #Right femur fracture  -s/p ORIF of right femur/THR revision on 10/20 with Dr. Leach  -Continue comprehensive rehab program   -WBAT   -Continue pain control with Tylenol q 8 hours, Oxycodone PRN, Celebrex BID  -Continue Bactrim BID for hip ppx - primary to clarify with ortho duration??    #Anemia  Likely post op anemia  -H/H stable but low, no overt signs of bleeding  -Will consider 1 unit pRBC if symptomatic  -Transfuse to keep hemoglobin >7  -Continue iron supplement daily  -Follow up AM CBC    #Leukocytosis  Downtrending, likely reactive to above  -Afebrile, nontoxic appearing  -Follow up routine CBC    #Hypothyroidism  -Continue Synthroid    #HLD  -Continue Atorvastatin    #Prophylactic Measure  -DVT ppx: Eliquis 2.5mg BID  -GI ppx: Protonix  -Bowel regimen

## 2022-10-24 NOTE — DIETITIAN INITIAL EVALUATION ADULT - OTHER INFO
Initial Nutrition Assessment   76yr Old Female   Denies Food Allergy/Intolerance  Tolerates Diet Well - No Chewing/Swallowing Complications (Per Patient)  Consumed % of 1st Meals (as Per Documentation)  Surgical Incision on Right Hip & No Pressure Ulcers (as Per Nursing Flow Sheets)  No Edema Noted (as Per Nursing Flow Sheets)  No Recent Nausea/Vomiting/Diarrhea & Some Recent Constipation (as Per Patient)

## 2022-10-24 NOTE — ADVANCED PRACTICE NURSE CONSULT - RECOMMEDATIONS
Continue NELLY Vac & Dressing per orthopedic surgeon recommendations.  Monitor NELLY Vac for fault indicators. (Green flashing of 'OK" button is indicative of proper function)  Continue pain management per Medicine/Physiatry recommendations.  Continue NELLY-7 Vac & Dressing per orthopedic surgeon recommendations. (Dressing & Vac may be left for up to 7 days)  Monitor NELLY-7 Vac for fault indicators. (Green flashing of 'OK" button is indicative of proper function)  Continue pain management per Medicine/Physiatry recommendations.

## 2022-10-24 NOTE — DIETITIAN INITIAL EVALUATION ADULT - ORAL INTAKE PTA/DIET HISTORY
Patient Does Not Follow Diet @Home  Consumes 3 Meals a Day w/ Occasional Snack  Usually Cooks For Self  Does Take Vitamin/Supplements @Home (Fiber & Multivitamin)

## 2022-10-24 NOTE — PROGRESS NOTE ADULT - SUBJECTIVE AND OBJECTIVE BOX
Patient is a 76y old  Female who presents with a chief complaint of R ORIF revision (23 Oct 2022 13:37)      Patient seen and examined at bedside. No overnight events.    REVIEW OF SYSTEMS:  CONSTITUTIONAL: No fever or chills  CARDIOVASCULAR: No chest pain, palpitations  GASTROINTESTINAL: No abd pain, nausea, vomiting, or diarrhea    ALLERGIES:  No Known Allergies    MEDICATIONS  (STANDING):  acetaminophen     Tablet .. 650 milliGRAM(s) Oral every 8 hours  apixaban 2.5 milliGRAM(s) Oral every 12 hours  atorvastatin 20 milliGRAM(s) Oral at bedtime  celecoxib 100 milliGRAM(s) Oral every 12 hours  ferrous    sulfate 325 milliGRAM(s) Oral daily  levothyroxine 150 MICROGram(s) Oral daily  melatonin 6 milliGRAM(s) Oral at bedtime  nystatin Powder 1 Application(s) Topical every 12 hours  pantoprazole    Tablet 40 milliGRAM(s) Oral before breakfast  polyethylene glycol 3350 17 Gram(s) Oral at bedtime  senna 2 Tablet(s) Oral at bedtime  trimethoprim  160 mG/sulfamethoxazole 800 mG 1 Tablet(s) Oral every 12 hours  zinc oxide 40% Paste 1 Application(s) Topical every 12 hours    MEDICATIONS  (PRN):  magnesium hydroxide Suspension 30 milliLiter(s) Oral daily PRN Constipation  oxyCODONE    IR 5 milliGRAM(s) Oral every 3 hours PRN Severe Pain (7 - 10)  oxyCODONE    IR 2.5 milliGRAM(s) Oral every 3 hours PRN Moderate Pain (4 - 6)    Vital Signs Last 24 Hrs  T(F): 98 (24 Oct 2022 09:22), Max: 98 (23 Oct 2022 21:02)  HR: 75 (24 Oct 2022 09:22) (61 - 75)  BP: 131/57 (24 Oct 2022 09:22) (109/65 - 131/57)  RR: 15 (24 Oct 2022 09:22) (15 - 17)  SpO2: 98% (24 Oct 2022 09:22) (96% - 98%)  I&O's Summary    BMI (kg/m2): 40.9 (10-22-22 @ 14:29), 39.2 (10-20-22 @ 12:25)    PHYSICAL EXAM:  GENERAL: NAD  HEENT:  AT/NC, anicteric, moist mucous membranes, EOMI, PERRL, no lid-lag, conjunctiva and sclera clear  CHEST/LUNG:  CTA b/l, no rales, wheezes, or rhonchi,  normal respiratory effort, no intercostal retractions  HEART:  RRR, S1, S2, 2/6 systolic murmur; trace pitting edema bilaterally R>L  ABDOMEN:  BS+, soft, nontender, nondistended  MSK/EXTREMITIES: palpable peripheral pulses, chronic venous stasis changes; R surgical dressing minimal serous discharge   NERVOUS SYSTEM: answers questions and follows commands appropriately A&Ox3 grossly moves all extremities   PSYCH: Appropriate affect, Alert & Awake; Good judgement    LABS: Personally reviewed                        7.6    11.41 )-----------( 222      ( 24 Oct 2022 06:05 )             23.7       10-24    142  |  110  |  21  ----------------------------<  101  4.2   |  27  |  0.81    Ca    7.8      24 Oct 2022 06:05    TPro  5.0  /  Alb  1.7  /  TBili  0.5  /  DBili  x   /  AST  36  /  ALT  19  /  AlkPhos  74  10-24                COVID-19 PCR: NotDetec (10-23-22 @ 06:28)  COVID-19 PCR: NotDetec (10-21-22 @ 18:00)      RADIOLOGY & ADDITIONAL TESTS: Personally reviewed    Medical management discussed with Dr. Finn (physiatry), follow up routine H/H may need another unit pRBC if symptomatic.

## 2022-10-24 NOTE — PROGRESS NOTE ADULT - SUBJECTIVE AND OBJECTIVE BOX
Subjective   Seen and examined with NP  No acute med complaint   Reports good pain control    Therapy--engaging, tolerating same    ROS--No head ache, dizziness, dyspnea or N/V  LBM 10/23    Lab Hb low 7.6, asympomtatic  will transfuse if symptomatic of hb <7,  repeat CBC tomorrow     Vital Signs Last 24 Hrs  T(C): 36.7 (24 Oct 2022 09:22), Max: 36.7 (23 Oct 2022 21:02)  T(F): 98 (24 Oct 2022 09:22), Max: 98 (23 Oct 2022 21:02)  HR: 75 (24 Oct 2022 09:22) (61 - 75)  BP: 131/57 (24 Oct 2022 09:22) (109/65 - 131/57)  RR: 15 (24 Oct 2022 09:22) (15 - 17)  SpO2: 98% (24 Oct 2022 09:22) (96% - 98%)    O2 Parameters below as of 23 Oct 2022 21:02  Patient On (Oxygen Delivery Method): room air      Gen - NAD, Comfortable  HEENT - NAD  Neck - No limited ROM  Pulm - Clear  Cardiovascular - RRR   Abdomen - Soft, NT/ND   Extremities - No calf tenderness, chronic skin changes, appropriate edema RLE    Neuro-     Cognitive - AAOx3     Communication - Fluent, No dysarthria     Attention: Intact      Memory: Recall 3 objects immediate and 3 min later         Cranial Nerves - CN 2-12 grossly intact     Motor -                    LEFT    UE - ShAB 5/5, EF 5/5, EE 5/5, WE 5/5,  5/5.  Decreased AROM L shoulder vs R.                     RIGHT UE - ShAB 5/5, EF 5/5, EE 5/5, WE 5/5,  5/5                    LEFT    LE - HF 5/5, KE 5/5, DF 5/5, PF 5/5                    RIGHT LE - HF and KE 1/5 due to pain with recent surgery, DF 5/5, PF 5/5        Sensory - Intact to LT     Tone - normal  Psychiatric - Mood stable, Affect WNL  Skin: IAD groin/buttock area; mild candidiasis abdominal and breast folds  Wounds: R surgical wound with NELLY vac present, no output to wound vac. Dressing dry  RECENT LABS/IMAGING                        7.6    11.41 )-----------( 222      ( 24 Oct 2022 06:05 )             23.7     10-24    142  |  110<H>  |  21  ----------------------------<  101<H>  4.2   |  27  |  0.81    Ca    7.8<L>      24 Oct 2022 06:05    TPro  5.0<L>  /  Alb  1.7<L>  /  TBili  0.5  /  DBili  x   /  AST  36  /  ALT  19  /  AlkPhos  74  10-24    MEDICATIONS  (STANDING):  acetaminophen     Tablet .. 650 milliGRAM(s) Oral every 8 hours  apixaban 2.5 milliGRAM(s) Oral every 12 hours  atorvastatin 20 milliGRAM(s) Oral at bedtime  celecoxib 100 milliGRAM(s) Oral every 12 hours  ferrous    sulfate 325 milliGRAM(s) Oral daily  levothyroxine 150 MICROGram(s) Oral daily  melatonin 6 milliGRAM(s) Oral at bedtime  nystatin Powder 1 Application(s) Topical every 12 hours  pantoprazole    Tablet 40 milliGRAM(s) Oral before breakfast  polyethylene glycol 3350 17 Gram(s) Oral at bedtime  senna 2 Tablet(s) Oral at bedtime  trimethoprim  160 mG/sulfamethoxazole 800 mG 1 Tablet(s) Oral every 12 hours  zinc oxide 40% Paste 1 Application(s) Topical every 12 hours    MEDICATIONS  (PRN):  magnesium hydroxide Suspension 30 milliLiter(s) Oral daily PRN Constipation  oxyCODONE    IR 5 milliGRAM(s) Oral every 3 hours PRN Severe Pain (7 - 10)  oxyCODONE    IR 2.5 milliGRAM(s) Oral every 3 hours PRN Moderate Pain (4 - 6)

## 2022-10-24 NOTE — ADVANCED PRACTICE NURSE CONSULT - ASSESSMENT
Patient admitted to Acute Rehab Unit on 10/22, s/p revision of Right Hip ORIF on 10/20  NELLY Vac & dressing in place on right hip.  NELLY vac operating properly with no fault indicators present.  Dressing collapsed with adequate seal.  Scant serosanguinous drainage noted on distal end of dressing. Patient admitted to Acute Rehab Unit on 10/22, s/p revision of Right Hip ORIF on 10/20  NELLY-7 Vac & dressing in place on right hip.  NELLY-7 vac operating properly with no fault indicators present.  Dressing collapsed with adequate seal.  Scant serosanguinous drainage noted on distal end of dressing.

## 2022-10-24 NOTE — DIETITIAN INITIAL EVALUATION ADULT - PERTINENT MEDS FT
MEDICATIONS  (STANDING):  acetaminophen     Tablet .. 650 milliGRAM(s) Oral every 8 hours  apixaban 2.5 milliGRAM(s) Oral every 12 hours  atorvastatin 20 milliGRAM(s) Oral at bedtime  celecoxib 100 milliGRAM(s) Oral every 12 hours  ferrous    sulfate 325 milliGRAM(s) Oral daily  levothyroxine 150 MICROGram(s) Oral daily  melatonin 6 milliGRAM(s) Oral at bedtime  nystatin Powder 1 Application(s) Topical every 12 hours  pantoprazole    Tablet 40 milliGRAM(s) Oral before breakfast  polyethylene glycol 3350 17 Gram(s) Oral at bedtime  senna 2 Tablet(s) Oral at bedtime  trimethoprim  160 mG/sulfamethoxazole 800 mG 1 Tablet(s) Oral every 12 hours  zinc oxide 40% Paste 1 Application(s) Topical every 12 hours    MEDICATIONS  (PRN):  magnesium hydroxide Suspension 30 milliLiter(s) Oral daily PRN Constipation  oxyCODONE    IR 5 milliGRAM(s) Oral every 3 hours PRN Severe Pain (7 - 10)  oxyCODONE    IR 2.5 milliGRAM(s) Oral every 3 hours PRN Moderate Pain (4 - 6)

## 2022-10-24 NOTE — PROGRESS NOTE ADULT - ASSESSMENT
· Assessment	  This is a 75 YO female with PMH of HTN, HLD, heart murmur, hypothyroidism, SLE, RA, thyroid CA s/p resection, DVT (on eliquis), lung cancer (s/p tumor removal, no chemo no radiation), Right FELICITAS (Krauss, '19), Left TKA (Jonathanuss, '20), Right FELICITAS (Jonathanuss, '22) presented to [facility] ED on 10/17 s/p mechanical fall with severe right hip pain and inability to ambulate. She was transferred to [facility] where she had ORIF of right femur on 10/20 with Dr. Leach. ABLA now s/p 1 U PRBC intraop and 1 U post op. Leukocytosis is likely reactive to surgery and decadron. Patient now with gait Instability, ADL impairments and Functional impairments.    * Low Hb (transfused post surgery,, monitor, transfuse if symptomatic of hb <7),, repeat cbc tomorrow   * confirm duration of treatment for bactrim from ortho (prophylaxis post surgery),     #Right femur fracture  - ORIF of right femur/THR revision on 10/20 with Dr. Leach  --Continue PT/OT  --Posterior hip precautions  - WB Status: WBAT    Hypothyroidism  cont synthroid    HLD  Atorvastatin    #Pain management  - Tylenol Q8, Oxycodone PRN, celebrex    #DVT ppx  - Eliquis     #GI ppx  - Protonix 40mg    #Bowel Regimen  - Senna, miralax PRN    #Bladder management  - Monitor UO    #FEN   - Diet: Regular    #Skin:  - desitin to groin area for IAD  - Nystatin under breast and abd folds for candida management  - consult wound care for evaluation of dressing change from NELLY    #Sleep:   - Maintain quiet hours and low stim environment.  - Melatonin     #Precaution  - Fall,  Hip     #GOC  CODE STATUS: FULL CODE     Outpatient Follow-up (Specialty/Name of physician):  Sin Leach  [address]  [place]  [phone]  F/U 2 weeks    [facility]  Scheduled 10/22/22    Josselin Ramirez  [facility] Physician [facility]  Cardiology [phone]  Scheduled 12/19/22    F/U with family Physician 2-3 weeks after discharge

## 2022-10-25 DIAGNOSIS — Z96.641 PRESENCE OF RIGHT ARTIFICIAL HIP JOINT: ICD-10-CM

## 2022-10-25 LAB
HCT VFR BLD CALC: 27.1 % — LOW (ref 34.5–45)
HGB BLD-MCNC: 8.6 G/DL — LOW (ref 11.5–15.5)
MCHC RBC-ENTMCNC: 30.2 PG — SIGNIFICANT CHANGE UP (ref 27–34)
MCHC RBC-ENTMCNC: 31.7 GM/DL — LOW (ref 32–36)
MCV RBC AUTO: 95.1 FL — SIGNIFICANT CHANGE UP (ref 80–100)
NRBC # BLD: 0 /100 WBCS — SIGNIFICANT CHANGE UP (ref 0–0)
PLATELET # BLD AUTO: 283 K/UL — SIGNIFICANT CHANGE UP (ref 150–400)
RBC # BLD: 2.85 M/UL — LOW (ref 3.8–5.2)
RBC # FLD: 15.7 % — HIGH (ref 10.3–14.5)
WBC # BLD: 9.8 K/UL — SIGNIFICANT CHANGE UP (ref 3.8–10.5)
WBC # FLD AUTO: 9.8 K/UL — SIGNIFICANT CHANGE UP (ref 3.8–10.5)

## 2022-10-25 PROCEDURE — 99232 SBSQ HOSP IP/OBS MODERATE 35: CPT

## 2022-10-25 RX ADMIN — NYSTATIN CREAM 1 APPLICATION(S): 100000 CREAM TOPICAL at 05:47

## 2022-10-25 RX ADMIN — Medication 650 MILLIGRAM(S): at 05:45

## 2022-10-25 RX ADMIN — CELECOXIB 100 MILLIGRAM(S): 200 CAPSULE ORAL at 05:45

## 2022-10-25 RX ADMIN — SENNA PLUS 2 TABLET(S): 8.6 TABLET ORAL at 22:10

## 2022-10-25 RX ADMIN — Medication 650 MILLIGRAM(S): at 22:09

## 2022-10-25 RX ADMIN — ATORVASTATIN CALCIUM 20 MILLIGRAM(S): 80 TABLET, FILM COATED ORAL at 22:09

## 2022-10-25 RX ADMIN — APIXABAN 2.5 MILLIGRAM(S): 2.5 TABLET, FILM COATED ORAL at 17:52

## 2022-10-25 RX ADMIN — CELECOXIB 100 MILLIGRAM(S): 200 CAPSULE ORAL at 06:46

## 2022-10-25 RX ADMIN — ZINC OXIDE 1 APPLICATION(S): 200 OINTMENT TOPICAL at 05:47

## 2022-10-25 RX ADMIN — Medication 650 MILLIGRAM(S): at 13:04

## 2022-10-25 RX ADMIN — Medication 650 MILLIGRAM(S): at 06:45

## 2022-10-25 RX ADMIN — Medication 1 TABLET(S): at 05:46

## 2022-10-25 RX ADMIN — APIXABAN 2.5 MILLIGRAM(S): 2.5 TABLET, FILM COATED ORAL at 05:45

## 2022-10-25 RX ADMIN — Medication 150 MICROGRAM(S): at 05:46

## 2022-10-25 RX ADMIN — Medication 650 MILLIGRAM(S): at 23:09

## 2022-10-25 RX ADMIN — POLYETHYLENE GLYCOL 3350 17 GRAM(S): 17 POWDER, FOR SOLUTION ORAL at 22:10

## 2022-10-25 RX ADMIN — Medication 6 MILLIGRAM(S): at 22:09

## 2022-10-25 RX ADMIN — CELECOXIB 100 MILLIGRAM(S): 200 CAPSULE ORAL at 18:17

## 2022-10-25 RX ADMIN — Medication 325 MILLIGRAM(S): at 12:11

## 2022-10-25 RX ADMIN — OXYCODONE HYDROCHLORIDE 5 MILLIGRAM(S): 5 TABLET ORAL at 18:17

## 2022-10-25 RX ADMIN — NYSTATIN CREAM 1 APPLICATION(S): 100000 CREAM TOPICAL at 17:52

## 2022-10-25 RX ADMIN — CELECOXIB 100 MILLIGRAM(S): 200 CAPSULE ORAL at 17:51

## 2022-10-25 RX ADMIN — PANTOPRAZOLE SODIUM 40 MILLIGRAM(S): 20 TABLET, DELAYED RELEASE ORAL at 05:46

## 2022-10-25 RX ADMIN — ZINC OXIDE 1 APPLICATION(S): 200 OINTMENT TOPICAL at 17:53

## 2022-10-25 RX ADMIN — OXYCODONE HYDROCHLORIDE 5 MILLIGRAM(S): 5 TABLET ORAL at 08:25

## 2022-10-25 RX ADMIN — Medication 1 TABLET(S): at 17:51

## 2022-10-25 NOTE — PROGRESS NOTE ADULT - SUBJECTIVE AND OBJECTIVE BOX
Subjective/ROS  Seen and evaluated during PT  Right hip pain controlled at 4/10.  Reports baseline edema, now right > left from trauma   Hgb 7.6 yesterday, repeat 8.6 today.  denies feeling fatigued/tired  No chest pain, no shortness of breath, no cough  No headache, no dizziness  No nausea, no abdominal pain  No dysuria, no frequency    Vital Signs Last 24 Hrs  T(C): 37.2 (10-25-22 @ 08:41), Max: 37.2 (10-25-22 @ 08:41)  T(F): 99 (10-25-22 @ 08:41), Max: 99 (10-25-22 @ 08:41)  HR: 70 (10-25-22 @ 08:41) (59 - 70)  BP: 96/50 (10-25-22 @ 08:41) (96/50 - 106/55)  RR: 15 (10-25-22 @ 08:41) (15 - 16)  SpO2: 99% (10-25-22 @ 08:41) (99% - 100%)    Gen - NAD, Comfortable  HEENT - NAD  Neck - No limited ROM  Pulm - resp nonlabored  Cardiovascular - warn and well perfused  Abdomen - Soft, NT/ND   Extremities - No calf tenderness, chronic skin changes, appropriate edema RLE    Neuro-     Cognitive - AAOx4     Communication - Fluent, No dysarthria     Attention: Intact      Cranial Nerves - CN 2-12 grossly intact     Motor -                    LEFT    UE - ShAB 5/5, EF 5/5, EE 5/5, WE 5/5,  5/5.  Decreased AROM L shoulder                    RIGHT UE - ShAB 5/5, EF 5/5, EE 5/5, WE 5/5,  5/5                    LEFT    LE - HF 5/5, KE 5/5, DF 5/5, PF 5/5                    RIGHT LE - HF and KE 1/5 due to pain with recent surgery, DF 5/5, PF 5/5        Sensory - Intact to LT     Tone - normal  Psychiatric - Mood stable, Affect WNL  Skin: IAD groin/buttock area; mild candidiasis abdominal and breast folds  Wounds: R surgical wound with NELLY vac present, no output to wound vac. Dressing dry  RECENT LABS/IMAGING                        8.6    9.80  )-----------( 283      ( 25 Oct 2022 06:00 )             27.1                         7.6    11.41 )-----------( 222      ( 24 Oct 2022 06:05 )             23.7     10-24    142  |  110<H>  |  21  ----------------------------<  101<H>  4.2   |  27  |  0.81    Ca    7.8<L>      24 Oct 2022 06:05    TPro  5.0<L>  /  Alb  1.7<L>  /  TBili  0.5  /  DBili  x   /  AST  36  /  ALT  19  /  AlkPhos  74  10-24    MEDICATIONS  (STANDING):  acetaminophen     Tablet .. 650 milliGRAM(s) Oral every 8 hours  apixaban 2.5 milliGRAM(s) Oral every 12 hours  atorvastatin 20 milliGRAM(s) Oral at bedtime  celecoxib 100 milliGRAM(s) Oral every 12 hours  ferrous    sulfate 325 milliGRAM(s) Oral daily  levothyroxine 150 MICROGram(s) Oral daily  melatonin 6 milliGRAM(s) Oral at bedtime  nystatin Powder 1 Application(s) Topical every 12 hours  pantoprazole    Tablet 40 milliGRAM(s) Oral before breakfast  polyethylene glycol 3350 17 Gram(s) Oral at bedtime  senna 2 Tablet(s) Oral at bedtime  trimethoprim  160 mG/sulfamethoxazole 800 mG 1 Tablet(s) Oral every 12 hours  zinc oxide 40% Paste 1 Application(s) Topical every 12 hours    MEDICATIONS  (PRN):  magnesium hydroxide Suspension 30 milliLiter(s) Oral daily PRN Constipation  oxyCODONE    IR 5 milliGRAM(s) Oral every 3 hours PRN Severe Pain (7 - 10)  oxyCODONE    IR 2.5 milliGRAM(s) Oral every 3 hours PRN Moderate Pain (4 - 6) Subjective/ROS  Seen and evaluated during PT  Right hip pain controlled at 4/10.  Reports baseline edema, now right > left from trauma   Hgb 7.6 yesterday, repeat 8.6 today.  denies feeling fatigued/tired  No chest pain, no shortness of breath, no cough  No headache, no dizziness  No nausea, no abdominal pain  No dysuria, no frequency      Therapy--observed in therapy, engaging   Tolerating same   RLE ROM improving    Vital Signs Last 24 Hrs  T(C): 37.2 (10-25-22 @ 08:41), Max: 37.2 (10-25-22 @ 08:41)  T(F): 99 (10-25-22 @ 08:41), Max: 99 (10-25-22 @ 08:41)  HR: 70 (10-25-22 @ 08:41) (59 - 70)  BP: 96/50 (10-25-22 @ 08:41) (96/50 - 106/55)  RR: 15 (10-25-22 @ 08:41) (15 - 16)  SpO2: 99% (10-25-22 @ 08:41) (99% - 100%)    Gen - NAD, Comfortable  HEENT - NAD  Neck - No limited ROM  Pulm - resp nonlabored  Cardiovascular - warn and well perfused  Abdomen - Soft, NT/ND   Extremities - No calf tenderness, chronic skin changes, appropriate edema RLE    Neuro-     Cognitive - AAOx4     Communication - Fluent, No dysarthria     Attention: Intact      Cranial Nerves - CN 2-12 grossly intact     Motor -                    LEFT    UE - ShAB 5/5, EF 5/5, EE 5/5, WE 5/5,  5/5.  Decreased AROM L shoulder                    RIGHT UE - ShAB 5/5, EF 5/5, EE 5/5, WE 5/5,  5/5                    LEFT    LE - HF 5/5, KE 5/5, DF 5/5, PF 5/5                    RIGHT LE - HF and KE 1/5 due to pain with recent surgery, DF 5/5, PF 5/5        Sensory - Intact to LT     Tone - normal  Psychiatric - Mood stable, Affect WNL  Skin: IAD groin/buttock area; mild candidiasis abdominal and breast folds  Wounds: R surgical wound with NELLY vac present, no output to wound vac. Dressing dry  RECENT LABS/IMAGING                        8.6    9.80  )-----------( 283      ( 25 Oct 2022 06:00 )             27.1                         7.6    11.41 )-----------( 222      ( 24 Oct 2022 06:05 )             23.7     10-24    142  |  110<H>  |  21  ----------------------------<  101<H>  4.2   |  27  |  0.81    Ca    7.8<L>      24 Oct 2022 06:05    TPro  5.0<L>  /  Alb  1.7<L>  /  TBili  0.5  /  DBili  x   /  AST  36  /  ALT  19  /  AlkPhos  74  10-24    MEDICATIONS  (STANDING):  acetaminophen     Tablet .. 650 milliGRAM(s) Oral every 8 hours  apixaban 2.5 milliGRAM(s) Oral every 12 hours  atorvastatin 20 milliGRAM(s) Oral at bedtime  celecoxib 100 milliGRAM(s) Oral every 12 hours  ferrous    sulfate 325 milliGRAM(s) Oral daily  levothyroxine 150 MICROGram(s) Oral daily  melatonin 6 milliGRAM(s) Oral at bedtime  nystatin Powder 1 Application(s) Topical every 12 hours  pantoprazole    Tablet 40 milliGRAM(s) Oral before breakfast  polyethylene glycol 3350 17 Gram(s) Oral at bedtime  senna 2 Tablet(s) Oral at bedtime  trimethoprim  160 mG/sulfamethoxazole 800 mG 1 Tablet(s) Oral every 12 hours  zinc oxide 40% Paste 1 Application(s) Topical every 12 hours    MEDICATIONS  (PRN):  magnesium hydroxide Suspension 30 milliLiter(s) Oral daily PRN Constipation  oxyCODONE    IR 5 milliGRAM(s) Oral every 3 hours PRN Severe Pain (7 - 10)  oxyCODONE    IR 2.5 milliGRAM(s) Oral every 3 hours PRN Moderate Pain (4 - 6) Subjective/ROS  Seen and evaluated during PT  Right hip pain controlled at 4/10.  Reports baseline edema, now right > left from trauma   Hgb 7.6 yesterday, repeat 8.6 today.  denies feeling fatigued/tired  No chest pain, no shortness of breath, no cough  No headache, no dizziness  No nausea, no abdominal pain  No dysuria, no frequency      Therapy--observed in therapy, engaging   Tolerating same   RLE ROM improving    Hip precautions clarified     Vital Signs Last 24 Hrs  T(C): 37.2 (10-25-22 @ 08:41), Max: 37.2 (10-25-22 @ 08:41)  T(F): 99 (10-25-22 @ 08:41), Max: 99 (10-25-22 @ 08:41)  HR: 70 (10-25-22 @ 08:41) (59 - 70)  BP: 96/50 (10-25-22 @ 08:41) (96/50 - 106/55)  RR: 15 (10-25-22 @ 08:41) (15 - 16)  SpO2: 99% (10-25-22 @ 08:41) (99% - 100%)    Gen - NAD, Comfortable  HEENT - NAD  Neck - No limited ROM  Pulm - resp nonlabored  Cardiovascular - warn and well perfused  Abdomen - Soft, NT/ND   Extremities - No calf tenderness, chronic skin changes, appropriate edema RLE    Neuro-     Cognitive - AAOx4     Communication - Fluent, No dysarthria     Attention: Intact      Cranial Nerves - CN 2-12 grossly intact     Motor -                    LEFT    UE - ShAB 5/5, EF 5/5, EE 5/5, WE 5/5,  5/5.  Decreased AROM L shoulder                    RIGHT UE - ShAB 5/5, EF 5/5, EE 5/5, WE 5/5,  5/5                    LEFT    LE - HF 5/5, KE 5/5, DF 5/5, PF 5/5                    RIGHT LE - HF and KE 1/5 due to pain with recent surgery, DF 5/5, PF 5/5        Sensory - Intact to LT     Tone - normal  Psychiatric - Mood stable, Affect WNL  Skin: IAD groin/buttock area; mild candidiasis abdominal and breast folds  Wounds: R surgical wound with NELLY vac present, no output to wound vac. Dressing dry  RECENT LABS/IMAGING                        8.6    9.80  )-----------( 283      ( 25 Oct 2022 06:00 )             27.1                         7.6    11.41 )-----------( 222      ( 24 Oct 2022 06:05 )             23.7     10-24    142  |  110<H>  |  21  ----------------------------<  101<H>  4.2   |  27  |  0.81    Ca    7.8<L>      24 Oct 2022 06:05    TPro  5.0<L>  /  Alb  1.7<L>  /  TBili  0.5  /  DBili  x   /  AST  36  /  ALT  19  /  AlkPhos  74  10-24    MEDICATIONS  (STANDING):  acetaminophen     Tablet .. 650 milliGRAM(s) Oral every 8 hours  apixaban 2.5 milliGRAM(s) Oral every 12 hours  atorvastatin 20 milliGRAM(s) Oral at bedtime  celecoxib 100 milliGRAM(s) Oral every 12 hours  ferrous    sulfate 325 milliGRAM(s) Oral daily  levothyroxine 150 MICROGram(s) Oral daily  melatonin 6 milliGRAM(s) Oral at bedtime  nystatin Powder 1 Application(s) Topical every 12 hours  pantoprazole    Tablet 40 milliGRAM(s) Oral before breakfast  polyethylene glycol 3350 17 Gram(s) Oral at bedtime  senna 2 Tablet(s) Oral at bedtime  trimethoprim  160 mG/sulfamethoxazole 800 mG 1 Tablet(s) Oral every 12 hours  zinc oxide 40% Paste 1 Application(s) Topical every 12 hours    MEDICATIONS  (PRN):  magnesium hydroxide Suspension 30 milliLiter(s) Oral daily PRN Constipation  oxyCODONE    IR 5 milliGRAM(s) Oral every 3 hours PRN Severe Pain (7 - 10)  oxyCODONE    IR 2.5 milliGRAM(s) Oral every 3 hours PRN Moderate Pain (4 - 6)

## 2022-10-25 NOTE — PROGRESS NOTE ADULT - SUBJECTIVE AND OBJECTIVE BOX
Patient is a 76y old  Female who presents with a chief complaint of Presence of artificial hip     (24 Oct 2022 11:18)      Patient seen and examined at bedside. No overnight events.    REVIEW OF SYSTEMS:  CONSTITUTIONAL: No fever or chills  CARDIOVASCULAR: No chest pain, palpitations  GASTROINTESTINAL: No abd pain, nausea, vomiting, or diarrhea    ALLERGIES:  No Known Allergies    MEDICATIONS  (STANDING):  acetaminophen     Tablet .. 650 milliGRAM(s) Oral every 8 hours  apixaban 2.5 milliGRAM(s) Oral every 12 hours  atorvastatin 20 milliGRAM(s) Oral at bedtime  celecoxib 100 milliGRAM(s) Oral every 12 hours  ferrous    sulfate 325 milliGRAM(s) Oral daily  levothyroxine 150 MICROGram(s) Oral daily  melatonin 6 milliGRAM(s) Oral at bedtime  nystatin Powder 1 Application(s) Topical every 12 hours  pantoprazole    Tablet 40 milliGRAM(s) Oral before breakfast  polyethylene glycol 3350 17 Gram(s) Oral at bedtime  senna 2 Tablet(s) Oral at bedtime  trimethoprim  160 mG/sulfamethoxazole 800 mG 1 Tablet(s) Oral every 12 hours  zinc oxide 40% Paste 1 Application(s) Topical every 12 hours    MEDICATIONS  (PRN):  magnesium hydroxide Suspension 30 milliLiter(s) Oral daily PRN Constipation  oxyCODONE    IR 5 milliGRAM(s) Oral every 3 hours PRN Severe Pain (7 - 10)  oxyCODONE    IR 2.5 milliGRAM(s) Oral every 3 hours PRN Moderate Pain (4 - 6)    Vital Signs Last 24 Hrs  T(F): 97.9 (24 Oct 2022 20:48), Max: 98 (24 Oct 2022 09:22)  HR: 59 (24 Oct 2022 20:48) (59 - 75)  BP: 106/55 (24 Oct 2022 20:48) (106/55 - 131/57)  RR: 16 (24 Oct 2022 20:48) (15 - 16)  SpO2: 100% (24 Oct 2022 20:48) (98% - 100%)  I&O's Summary    BMI (kg/m2): 40.9 (10-22-22 @ 14:29), 39.2 (10-20-22 @ 12:25)    PHYSICAL EXAM:  GENERAL: NAD  HEENT:  AT/NC, anicteric, moist mucous membranes, EOMI, PERRL, no lid-lag, conjunctiva and sclera clear  CHEST/LUNG:  CTA b/l, no rales, wheezes, or rhonchi,  normal respiratory effort, no intercostal retractions  HEART:  RRR, S1, S2, 2/6 systolic murmur; trace pitting edema bilaterally R>L  ABDOMEN:  BS+, soft, nontender, nondistended  MSK/EXTREMITIES: palpable peripheral pulses, chronic venous stasis changes; R surgical dressing minimal serous discharge   NERVOUS SYSTEM: answers questions and follows commands appropriately A&Ox3 grossly moves all extremities   PSYCH: Appropriate affect, Alert & Awake; Good judgement      LABS: Personally reviewed                        8.6    9.80  )-----------( 283      ( 25 Oct 2022 06:00 )             27.1       10-24    142  |  110  |  21  ----------------------------<  101  4.2   |  27  |  0.81    Ca    7.8      24 Oct 2022 06:05    TPro  5.0  /  Alb  1.7  /  TBili  0.5  /  DBili  x   /  AST  36  /  ALT  19  /  AlkPhos  74  10-24            COVID-19 PCR: NotDetec (10-23-22 @ 06:28)  COVID-19 PCR: NotDetec (10-21-22 @ 18:00)      RADIOLOGY & ADDITIONAL TESTS: Personally reviewed    Medical management discussed with Dr. Finn (physiatry), H/H stable improved from yesterday, continue to monitor for overt signs of bleeding.

## 2022-10-25 NOTE — PROGRESS NOTE ADULT - ASSESSMENT
76F with PMH HTN, HLD, hypothyroidism, SLE, RA, thyroid CA s/p resection, hx of DVT (on Eliquis), lung cancer (s/p tumor removal, no chemo no radiation), Right FELICITAS (Jonathan, '19), Left TKA (Jonathanuss, '20), Right FELICITAS (Jonathan, '22) presented to [facility] ED on 10/17 s/p mechanical fall with severe right hip pain and inability to ambulate. She was transferred to [facility] where she had ORIF of right femur on 10/20 with Dr. Leach. Hospital course complicated by ABLA s/p pRBC. Now admitted to [facility] for initiation of multidisciplinary rehab program.     #Right femur fracture  -s/p ORIF of right femur/THR revision on 10/20 with Dr. Leach  -Continue comprehensive rehab program   -WBAT   -Continue pain control with Tylenol q 8 hours, Oxycodone PRN, Celebrex BID  -Continue Bactrim BID for hip ppx - primary to clarify with ortho duration??    #Anemia  Likely post op anemia  -H/H stable, improved / low, no overt signs of bleeding  -Will consider 1 unit pRBC if symptomatic  -Transfuse to keep hemoglobin >7  -Continue iron supplement daily  -Follow up AM CBC    #Leukocytosis  Downtrending, likely reactive to above  -Afebrile, nontoxic appearing  -Follow up routine CBC    #Hypothyroidism  -Continue Synthroid    #HLD  -Continue Atorvastatin    #Prophylactic Measure  -DVT ppx: Eliquis 2.5mg BID  -GI ppx: Protonix  -Bowel regimen

## 2022-10-25 NOTE — PROGRESS NOTE ADULT - ASSESSMENT
· Assessment	  This is a 75 YO female with PMH of HTN, HLD, heart murmur, hypothyroidism, SLE, RA, thyroid CA s/p resection, DVT (on eliquis), lung cancer (s/p tumor removal, no chemo no radiation), Right FELICITAS (Jonathan, '19), Left TKA (Jonathanuss, '20), Right FELICITAS (Jonathanuss, '22) presented to [facility] ED on 10/17 s/p mechanical fall with severe right hip pain and inability to ambulate. She was transferred to [facility] where she had ORIF of right femur on 10/20 with Dr. Leach. ABLA now s/p 1 U PRBC intraop and 1 U post op. Leukocytosis is likely reactive to surgery and decadron. Patient now with gait Instability, ADL impairments and Functional impairments.    * post op anemia? hgb 7.6 (10/24) -> repeat 10/25 8.6   * confirm duration of treatment for bactrim from ortho (prophylaxis post surgery) - awaiting to hear from Surgeon  * Lateral + driving hip precaution     #Right femur fracture  - ORIF of right femur/THR revision on 10/20 with Dr. Leach  --Continue PT/OT  --Posterior hip precautions  - WB Status: WBAT    Hypothyroidism  - synthroid    HLD  - Atorvastatin    #Pain management  - Tylenol Q8, Oxycodone PRN, celebrex x 21 days post op    #DVT ppx  - Eliquis     #GI ppx  - Protonix 40mg    #Bowel Regimen  - Senna, miralax PRN    #Bladder management  - Monitor UO    #FEN   - Diet: Regular    #Skin:  - desitin to groin area for IAD  - Nystatin under breast and abd folds for candida management  - consult wound care for evaluation of dressing change from NELLY    #Sleep:   - Maintain quiet hours and low stim environment.  - Melatonin     #Precaution  - Fall, Hip     #GOC  CODE STATUS: FULL CODE     Outpatient Follow-up (Specialty/Name of physician):  Sin Leach  [address]  [place]  [phone]  F/U 2 weeks    [facility]  Scheduled 10/22/22    Josselin Ramirez  [facility] Physician [facility]  Cardiology [address]  Scheduled 12/19/22    F/U with family Physician 2-3 weeks after discharge         · Assessment	  This is a 75 YO female with PMH of HTN, HLD, heart murmur, hypothyroidism, SLE, RA, thyroid CA s/p resection, DVT (on eliquis), lung cancer (s/p tumor removal, no chemo no radiation), Right FELICITAS (Krauss, '19), Left TKA (Jonathanuss, '20), Right FELICITAS (Jonathanuss, '22) presented to [facility] ED on 10/17 s/p mechanical fall with severe right hip pain and inability to ambulate. She was transferred to [facility] where she had ORIF of right femur on 10/20 with Dr. Leach. ABLA now s/p 1 U PRBC intraop and 1 U post op. Leukocytosis is likely reactive to surgery and decadron. Patient now with gait Instability, ADL impairments and Functional impairments.    * post op anemia? hgb 7.6 (10/24) -> repeat 10/25 8.6   * confirm duration of treatment for bactrim from ortho (prophylaxis post surgery) - awaiting to hear from Surgeon  * Lateral + driving hip precaution     #Right femur fracture  - ORIF of right femur/THR revision on 10/20 with Dr. Leach  (Lateral Approach)  --Continue PT/OT  -- Lateral + driving hip precaution   - WB Status: WBAT    Hypothyroidism  - synthroid    HLD  - Atorvastatin    #Pain management  - Tylenol Q8, Oxycodone PRN, celebrex x 21 days post op    #DVT ppx  - Eliquis     #GI ppx  - Protonix 40mg    #Bowel Regimen  - Senna, miralax PRN    #Bladder management  - Monitor UO    #FEN   - Diet: Regular    #Skin:  - desitin to groin area for IAD  - Nystatin under breast and abd folds for candida management  - consult wound care for evaluation of dressing change from NELLY    #Sleep:   - Maintain quiet hours and low stim environment.  - Melatonin     #Precaution  - Fall, Hip     #GOC  CODE STATUS: FULL CODE     Outpatient Follow-up (Specialty/Name of physician):  Sin Leach  [address]  [place]  [phone]  F/U 2 weeks    [facility]  Scheduled 10/22/22    Josselin Ramirez  [facility] Physician [facility]  Cardiology [address]  Scheduled 12/19/22    F/U with family Physician 2-3 weeks after discharge         · Assessment	  This is a 75 YO female with PMH of HTN, HLD, heart murmur, hypothyroidism, SLE, RA, thyroid CA s/p resection, DVT (on eliquis), lung cancer (s/p tumor removal, no chemo no radiation), Right FELICITAS (Jonathan, '19), Left TKA (Jonathanuss, '20), Right FELICITAS (Jonathan, '22) presented to [facility] ED on 10/17 s/p mechanical fall with severe right hip pain and inability to ambulate. She was transferred to [facility] where she had ORIF of right femur on 10/20 with Dr. Leach. ABLA now s/p 1 U PRBC intraop and 1 U post op. Leukocytosis is likely reactive to surgery and decadron. Patient now with gait Instability, ADL impairments and Functional impairments.    * post op anemia? hgb 7.6 (10/24) -> repeat 10/25 8.6   * confirm duration of treatment for bactrim from ortho (prophylaxis post surgery) - awaiting to hear from Surgeon  * Lateral + driving hip precaution and precautions --as confirmed with surgeon    #Right femur fracture  - ORIF of right femur/THR revision on 10/20 with Dr. Leach  (Lateral Approach)  --Continue PT/OT  -- Lateral + driving hip precaution   - WB Status: WBAT    HIP PRECAUTIONS  No hip adduction past neutral,   No hip internal rotation past neutral,   No hip flexion >90.   Use abduction pillow while in bed  Do not sit on low chairs or low toilet seats.    Hypothyroidism  - synthroid    HLD  - Atorvastatin    #Pain management  - Tylenol Q8, Oxycodone PRN, celebrex x 21 days post op    #DVT ppx  - Eliquis     #GI ppx  - Protonix 40mg    #Bowel Regimen  - Senna, miralax PRN    #Bladder management  - Monitor UO    #FEN   - Diet: Regular    #Skin:  - desitin to groin area for IAD  - Nystatin under breast and abd folds for candida management  - consult wound care for evaluation of dressing change from NELLY    #Sleep:   - Maintain quiet hours and low stim environment.  - Melatonin     #Precaution  - Fall, Hip     #GOC  CODE STATUS: FULL CODE     Outpatient Follow-up (Specialty/Name of physician):  Sin Leach  [address]  [place]  [phone]  F/U 2 weeks    [facility]  Scheduled 10/22/22    [facility]  Cardiology [address]  Scheduled 12/19/22    F/U with family Physician 2-3 weeks after discharge

## 2022-10-26 PROCEDURE — 99232 SBSQ HOSP IP/OBS MODERATE 35: CPT

## 2022-10-26 RX ADMIN — APIXABAN 2.5 MILLIGRAM(S): 2.5 TABLET, FILM COATED ORAL at 17:24

## 2022-10-26 RX ADMIN — Medication 1 TABLET(S): at 06:34

## 2022-10-26 RX ADMIN — ZINC OXIDE 1 APPLICATION(S): 200 OINTMENT TOPICAL at 06:35

## 2022-10-26 RX ADMIN — Medication 650 MILLIGRAM(S): at 06:34

## 2022-10-26 RX ADMIN — Medication 650 MILLIGRAM(S): at 23:05

## 2022-10-26 RX ADMIN — OXYCODONE HYDROCHLORIDE 2.5 MILLIGRAM(S): 5 TABLET ORAL at 10:28

## 2022-10-26 RX ADMIN — Medication 1 TABLET(S): at 17:24

## 2022-10-26 RX ADMIN — Medication 650 MILLIGRAM(S): at 22:35

## 2022-10-26 RX ADMIN — APIXABAN 2.5 MILLIGRAM(S): 2.5 TABLET, FILM COATED ORAL at 06:34

## 2022-10-26 RX ADMIN — ATORVASTATIN CALCIUM 20 MILLIGRAM(S): 80 TABLET, FILM COATED ORAL at 22:36

## 2022-10-26 RX ADMIN — CELECOXIB 100 MILLIGRAM(S): 200 CAPSULE ORAL at 06:35

## 2022-10-26 RX ADMIN — PANTOPRAZOLE SODIUM 40 MILLIGRAM(S): 20 TABLET, DELAYED RELEASE ORAL at 06:34

## 2022-10-26 RX ADMIN — Medication 325 MILLIGRAM(S): at 15:27

## 2022-10-26 RX ADMIN — NYSTATIN CREAM 1 APPLICATION(S): 100000 CREAM TOPICAL at 17:25

## 2022-10-26 RX ADMIN — NYSTATIN CREAM 1 APPLICATION(S): 100000 CREAM TOPICAL at 06:35

## 2022-10-26 RX ADMIN — Medication 150 MICROGRAM(S): at 06:34

## 2022-10-26 RX ADMIN — SENNA PLUS 2 TABLET(S): 8.6 TABLET ORAL at 22:35

## 2022-10-26 RX ADMIN — Medication 650 MILLIGRAM(S): at 15:27

## 2022-10-26 RX ADMIN — Medication 650 MILLIGRAM(S): at 15:52

## 2022-10-26 RX ADMIN — OXYCODONE HYDROCHLORIDE 2.5 MILLIGRAM(S): 5 TABLET ORAL at 09:39

## 2022-10-26 RX ADMIN — CELECOXIB 100 MILLIGRAM(S): 200 CAPSULE ORAL at 17:24

## 2022-10-26 RX ADMIN — ZINC OXIDE 1 APPLICATION(S): 200 OINTMENT TOPICAL at 18:39

## 2022-10-26 NOTE — PROGRESS NOTE ADULT - ASSESSMENT
76F with PMH HTN, HLD, hypothyroidism, SLE, RA, thyroid CA s/p resection, hx of DVT (on Eliquis), lung cancer (s/p tumor removal, no chemo no radiation), Right FELICITAS (Jonathan, '19), Left TKA (Jonathanuss, '20), Right FELICITAS (Jonathan, '22) presented to [facility] ED on 10/17 s/p mechanical fall with severe right hip pain and inability to ambulate. She was transferred to [facility] where she had ORIF of right femur on 10/20 with Dr. Leach. Hospital course complicated by ABLA s/p pRBC. Now admitted to [facility] for initiation of multidisciplinary rehab program.     #Right femur fracture  -s/p ORIF of right femur/THR revision on 10/20 with Dr. Leach  -Continue comprehensive rehab program   -Continue pain control with Tylenol q 8 hours, Oxycodone PRN, Celebrex BID  -Continue Bactrim BID for hip ppx - primary to clarify with ortho duration??    #Anemia  Likely post op anemia  -H/H stable, improved / low, no overt signs of bleeding  -Transfuse to keep hemoglobin >7  -Continue iron supplement daily  -Follow up AM CBC    #Leukocytosis  Downtrending, likely reactive to above  -Afebrile, nontoxic appearing  -Follow up routine CBC    #Hypothyroidism  -Continue Synthroid    #HLD  -Continue Atorvastatin    #Prophylactic Measure  -DVT ppx: Eliquis 2.5mg BID  -GI ppx: Protonix  -Bowel regimen

## 2022-10-26 NOTE — PROGRESS NOTE ADULT - SUBJECTIVE AND OBJECTIVE BOX
Patient is a 76y old  Female who presents with a chief complaint of R ORIF revision (25 Oct 2022 10:42)      Patient seen and examined at bedside. No overnight events.  Participating in therapy. Pain well controlled.     REVIEW OF SYSTEMS:  CONSTITUTIONAL: No fever or chills  CARDIOVASCULAR: No chest pain, palpitations    ALLERGIES:  No Known Allergies    MEDICATIONS  (STANDING):  acetaminophen     Tablet .. 650 milliGRAM(s) Oral every 8 hours  apixaban 2.5 milliGRAM(s) Oral every 12 hours  atorvastatin 20 milliGRAM(s) Oral at bedtime  celecoxib 100 milliGRAM(s) Oral every 12 hours  ferrous    sulfate 325 milliGRAM(s) Oral daily  levothyroxine 150 MICROGram(s) Oral daily  melatonin 6 milliGRAM(s) Oral at bedtime  nystatin Powder 1 Application(s) Topical every 12 hours  pantoprazole    Tablet 40 milliGRAM(s) Oral before breakfast  polyethylene glycol 3350 17 Gram(s) Oral at bedtime  senna 2 Tablet(s) Oral at bedtime  trimethoprim  160 mG/sulfamethoxazole 800 mG 1 Tablet(s) Oral every 12 hours  zinc oxide 40% Paste 1 Application(s) Topical every 12 hours    MEDICATIONS  (PRN):  magnesium hydroxide Suspension 30 milliLiter(s) Oral daily PRN Constipation  oxyCODONE    IR 5 milliGRAM(s) Oral every 3 hours PRN Severe Pain (7 - 10)  oxyCODONE    IR 2.5 milliGRAM(s) Oral every 3 hours PRN Moderate Pain (4 - 6)    Vital Signs Last 24 Hrs  T(F): 98.4 (25 Oct 2022 20:47), Max: 99 (25 Oct 2022 08:41)  HR: 63 (25 Oct 2022 20:47) (63 - 70)  BP: 119/58 (25 Oct 2022 20:47) (96/50 - 119/58)  RR: 16 (25 Oct 2022 20:47) (15 - 16)  SpO2: 100% (25 Oct 2022 20:47) (99% - 100%)  I&O's Summary    BMI (kg/m2): 40.9 (10-22-22 @ 14:29)    PHYSICAL EXAM:  GENERAL: NAD  HEENT:  AT/NC, anicteric, moist mucous membranes, EOMI, PERRL, no lid-lag, conjunctiva and sclera clear  CHEST/LUNG:  CTA b/l, no rales, wheezes, or rhonchi,  normal respiratory effort, no intercostal retractions  HEART:  RRR, S1, S2, 2/6 systolic murmur; trace pitting edema bilaterally R>L  ABDOMEN:  BS+, soft, nontender, nondistended  MSK/EXTREMITIES: palpable peripheral pulses, chronic venous stasis changes; R surgical dressing c/d/i  NERVOUS SYSTEM: answers questions and follows commands appropriately A&Ox3 grossly moves all extremities   PSYCH: Appropriate affect, Alert & Awake; Good judgement      LABS: Personally reviewed                        8.6    9.80  )-----------( 283      ( 25 Oct 2022 06:00 )             27.1       10-24    142  |  110  |  21  ----------------------------<  101  4.2   |  27  |  0.81    Ca    7.8      24 Oct 2022 06:05    TPro  5.0  /  Alb  1.7  /  TBili  0.5  /  DBili  x   /  AST  36  /  ALT  19  /  AlkPhos  74  10-24              COVID-19 PCR: NotDetec (10-23-22 @ 06:28)  COVID-19 PCR: NotDetec (10-21-22 @ 18:00)      RADIOLOGY & ADDITIONAL TESTS: Personally reviewed    Medical management discussed with Dr. Finn (physiatry), leukocytosis resolving likely reactive to surgery.

## 2022-10-26 NOTE — PROGRESS NOTE ADULT - SUBJECTIVE AND OBJECTIVE BOX
Subjective/ROS  Seen and examined,   No interval med events  Pain controlled    IDT--Noted to be slow with improvement in therapy  Body habitus a bit challenging, but patient is motivated    ROS  No chest pain, no shortness of breath, no cough  No headache, no dizziness  No nausea, no abdominal pain  No dysuria, no frequency  LBM 10/25    Due removal of NELLY dressing today  D/w wound care team    Vital Signs Last 24 Hrs  T(C): 36.8 (26 Oct 2022 07:45), Max: 36.9 (25 Oct 2022 20:47)  T(F): 98.2 (26 Oct 2022 07:45), Max: 98.4 (25 Oct 2022 20:47)  HR: 66 (26 Oct 2022 07:45) (63 - 66)  BP: 112/53 (26 Oct 2022 07:45) (112/53 - 119/58)  RR: 16 (26 Oct 2022 07:45) (16 - 16)  SpO2: 97% (26 Oct 2022 07:45) (97% - 100%)    O2 Parameters below as of 26 Oct 2022 07:45  Patient On (Oxygen Delivery Method): room air      Gen - NAD, Comfortable  HEENT - NAD  Neck - No limited ROM  Pulm - resp nonlabored  Cardiovascular - warn and well perfused  Abdomen - Soft, NT/ND   Extremities - No calf tenderness, chronic skin changes, appropriate edema RLE    Neuro-     Cognitive - AAOx4,     Communication - Fluent, No dysarthria     Attention: Intact      Cranial Nerves - CN 2-12 grossly intact     Motor -                    LEFT    UE - ShAB 5/5, EF 5/5, EE 5/5, WE 5/5,  5/5.  Decreased AROM L shoulder                    RIGHT UE - ShAB 5/5, EF 5/5, EE 5/5, WE 5/5,  5/5                    LEFT    LE - HF 5/5, KE 5/5, DF 5/5, PF 5/5                    RIGHT LE - HF and KE 1/5 due to pain with recent surgery, DF 5/5, PF 5/5        Sensory - Intact to LT     Tone - normal  Psychiatric - Mood stable, Affect WNL  Skin: IAD groin/buttock area; mild candidiasis abdominal and breast folds  Wounds: R surgical wound with NELLY vac present, no output to wound vac. Dressing dry                        8.6    9.80  )-----------( 283      ( 25 Oct 2022 06:00 )             27.1                         7.6    11.41 )-----------( 222      ( 24 Oct 2022 06:05 )             23.7     10-24    142  |  110<H>  |  21  ----------------------------<  101<H>  4.2   |  27  |  0.81    Ca    7.8<L>      24 Oct 2022 06:05    TPro  5.0<L>  /  Alb  1.7<L>  /  TBili  0.5  /  DBili  x   /  AST  36  /  ALT  19  /  AlkPhos  74  10-24    MEDICATIONS  (STANDING):  acetaminophen     Tablet .. 650 milliGRAM(s) Oral every 8 hours  apixaban 2.5 milliGRAM(s) Oral every 12 hours  atorvastatin 20 milliGRAM(s) Oral at bedtime  celecoxib 100 milliGRAM(s) Oral every 12 hours  ferrous    sulfate 325 milliGRAM(s) Oral daily  levothyroxine 150 MICROGram(s) Oral daily  melatonin 6 milliGRAM(s) Oral at bedtime  nystatin Powder 1 Application(s) Topical every 12 hours  pantoprazole    Tablet 40 milliGRAM(s) Oral before breakfast  polyethylene glycol 3350 17 Gram(s) Oral at bedtime  senna 2 Tablet(s) Oral at bedtime  trimethoprim  160 mG/sulfamethoxazole 800 mG 1 Tablet(s) Oral every 12 hours  zinc oxide 40% Paste 1 Application(s) Topical every 12 hours    MEDICATIONS  (PRN):  magnesium hydroxide Suspension 30 milliLiter(s) Oral daily PRN Constipation  oxyCODONE    IR 5 milliGRAM(s) Oral every 3 hours PRN Severe Pain (7 - 10)  oxyCODONE    IR 2.5 milliGRAM(s) Oral every 3 hours PRN Moderate Pain (4 - 6)

## 2022-10-26 NOTE — PROGRESS NOTE ADULT - ASSESSMENT
· Assessment	  This is a 75 YO female with PMH of HTN, HLD, heart murmur, hypothyroidism, SLE, RA, thyroid CA s/p resection, DVT (on eliquis), lung cancer (s/p tumor removal, no chemo no radiation), Right FELICITAS (Jonathan, '19), Left TKA (Jonathanuss, '20), Right FELICITAS (Jonathan, '22) presented to [facility] ED on 10/17 s/p mechanical fall with severe right hip pain and inability to ambulate. She was transferred to [facility] where she had ORIF of right femur on 10/20 with Dr. Leach. ABLA now s/p 1 U PRBC intraop and 1 U post op. Leukocytosis is likely reactive to surgery and decadron. Patient now with gait Instability, ADL impairments and Functional impairments.    * IDT details as noted, slow progression in therapy  * NELLY dressing removal later today   * confirm duration of treatment for bactrim from ortho (prophylaxis post surgery) - awaiting to hear from Surgeon      #Right femur fracture  - ORIF of right femur/THR revision on 10/20 with Dr. Leach  (Lateral Approach)  --Continue PT/OT  -- Lateral + driving hip precaution   - WB Status: WBAT    HIP PRECAUTIONS  No hip adduction past neutral,   No hip internal rotation past neutral,   No hip flexion >90.   Use abduction pillow while in bed  Do not sit on low chairs or low toilet seats.    Hypothyroidism  - synthroid    HLD  - Atorvastatin    #Pain management  - Tylenol Q8, Oxycodone PRN, celebrex x 21 days post op    #DVT ppx  - Eliquis     #GI ppx  - Protonix 40mg    #Bowel Regimen  - Senna, miralax PRN    #Bladder management  - Monitor UO    #FEN   - Diet: Regular    #Skin:  - desitin to groin area for IAD  - Nystatin under breast and abd folds for candida management  - consult wound care for evaluation of dressing change from NELLY    #Sleep:   - Maintain quiet hours and low stim environment.  - Melatonin     #Precaution  - Fall, Hip     #GOC  CODE STATUS: FULL CODE     Outpatient Follow-up (Specialty/Name of physician):  Sin Leach  [address]  [place]  [phone]  F/U 2 weeks    [facility]  Scheduled 10/22/22    [facility]  Cardiology [address]  Scheduled 12/19/22    F/U with family Physician 2-3 weeks after discharge

## 2022-10-27 LAB
ALBUMIN SERPL ELPH-MCNC: 1.4 G/DL — LOW (ref 3.3–5)
ALP SERPL-CCNC: 86 U/L — SIGNIFICANT CHANGE UP (ref 40–120)
ALT FLD-CCNC: 24 U/L — SIGNIFICANT CHANGE UP (ref 10–45)
ANION GAP SERPL CALC-SCNC: 6 MMOL/L — SIGNIFICANT CHANGE UP (ref 5–17)
AST SERPL-CCNC: 36 U/L — SIGNIFICANT CHANGE UP (ref 10–40)
BILIRUB SERPL-MCNC: 0.7 MG/DL — SIGNIFICANT CHANGE UP (ref 0.2–1.2)
BUN SERPL-MCNC: 25 MG/DL — HIGH (ref 7–23)
CALCIUM SERPL-MCNC: 7.9 MG/DL — LOW (ref 8.4–10.5)
CHLORIDE SERPL-SCNC: 112 MMOL/L — HIGH (ref 96–108)
CO2 SERPL-SCNC: 24 MMOL/L — SIGNIFICANT CHANGE UP (ref 22–31)
CREAT SERPL-MCNC: 0.84 MG/DL — SIGNIFICANT CHANGE UP (ref 0.5–1.3)
EGFR: 71 ML/MIN/1.73M2 — SIGNIFICANT CHANGE UP
GLUCOSE SERPL-MCNC: 92 MG/DL — SIGNIFICANT CHANGE UP (ref 70–99)
HCT VFR BLD CALC: 24.9 % — LOW (ref 34.5–45)
HGB BLD-MCNC: 8.1 G/DL — LOW (ref 11.5–15.5)
MCHC RBC-ENTMCNC: 31.3 PG — SIGNIFICANT CHANGE UP (ref 27–34)
MCHC RBC-ENTMCNC: 32.5 GM/DL — SIGNIFICANT CHANGE UP (ref 32–36)
MCV RBC AUTO: 96.1 FL — SIGNIFICANT CHANGE UP (ref 80–100)
NRBC # BLD: 0 /100 WBCS — SIGNIFICANT CHANGE UP (ref 0–0)
PLATELET # BLD AUTO: 344 K/UL — SIGNIFICANT CHANGE UP (ref 150–400)
POTASSIUM SERPL-MCNC: 5.2 MMOL/L — SIGNIFICANT CHANGE UP (ref 3.5–5.3)
POTASSIUM SERPL-SCNC: 5.2 MMOL/L — SIGNIFICANT CHANGE UP (ref 3.5–5.3)
PROT SERPL-MCNC: 4.9 G/DL — LOW (ref 6–8.3)
RBC # BLD: 2.59 M/UL — LOW (ref 3.8–5.2)
RBC # FLD: 15.8 % — HIGH (ref 10.3–14.5)
SODIUM SERPL-SCNC: 142 MMOL/L — SIGNIFICANT CHANGE UP (ref 135–145)
WBC # BLD: 10.01 K/UL — SIGNIFICANT CHANGE UP (ref 3.8–10.5)
WBC # FLD AUTO: 10.01 K/UL — SIGNIFICANT CHANGE UP (ref 3.8–10.5)

## 2022-10-27 PROCEDURE — 99233 SBSQ HOSP IP/OBS HIGH 50: CPT

## 2022-10-27 RX ADMIN — Medication 1 TABLET(S): at 18:05

## 2022-10-27 RX ADMIN — ZINC OXIDE 1 APPLICATION(S): 200 OINTMENT TOPICAL at 18:07

## 2022-10-27 RX ADMIN — NYSTATIN CREAM 1 APPLICATION(S): 100000 CREAM TOPICAL at 18:07

## 2022-10-27 RX ADMIN — APIXABAN 2.5 MILLIGRAM(S): 2.5 TABLET, FILM COATED ORAL at 18:05

## 2022-10-27 RX ADMIN — Medication 325 MILLIGRAM(S): at 11:41

## 2022-10-27 RX ADMIN — APIXABAN 2.5 MILLIGRAM(S): 2.5 TABLET, FILM COATED ORAL at 05:39

## 2022-10-27 RX ADMIN — CELECOXIB 100 MILLIGRAM(S): 200 CAPSULE ORAL at 06:26

## 2022-10-27 RX ADMIN — CELECOXIB 100 MILLIGRAM(S): 200 CAPSULE ORAL at 05:40

## 2022-10-27 RX ADMIN — ATORVASTATIN CALCIUM 20 MILLIGRAM(S): 80 TABLET, FILM COATED ORAL at 21:59

## 2022-10-27 RX ADMIN — NYSTATIN CREAM 1 APPLICATION(S): 100000 CREAM TOPICAL at 05:38

## 2022-10-27 RX ADMIN — Medication 650 MILLIGRAM(S): at 23:30

## 2022-10-27 RX ADMIN — Medication 150 MICROGRAM(S): at 05:39

## 2022-10-27 RX ADMIN — Medication 650 MILLIGRAM(S): at 21:59

## 2022-10-27 RX ADMIN — Medication 650 MILLIGRAM(S): at 06:26

## 2022-10-27 RX ADMIN — CELECOXIB 100 MILLIGRAM(S): 200 CAPSULE ORAL at 18:05

## 2022-10-27 RX ADMIN — PANTOPRAZOLE SODIUM 40 MILLIGRAM(S): 20 TABLET, DELAYED RELEASE ORAL at 05:39

## 2022-10-27 RX ADMIN — ZINC OXIDE 1 APPLICATION(S): 200 OINTMENT TOPICAL at 05:42

## 2022-10-27 RX ADMIN — Medication 650 MILLIGRAM(S): at 15:51

## 2022-10-27 RX ADMIN — CELECOXIB 100 MILLIGRAM(S): 200 CAPSULE ORAL at 19:05

## 2022-10-27 RX ADMIN — Medication 1 TABLET(S): at 05:39

## 2022-10-27 RX ADMIN — Medication 650 MILLIGRAM(S): at 11:41

## 2022-10-27 RX ADMIN — Medication 650 MILLIGRAM(S): at 05:40

## 2022-10-27 NOTE — PROGRESS NOTE ADULT - SUBJECTIVE AND OBJECTIVE BOX
Subjective/ROS  Seen and examined, chart examined   No interval med events  Pain controlled    Engaging well in therapy  Discussed details from IDT yesterday  Happy with same    ROS  No chest pain, no shortness of breath, no cough  No headache, no dizziness  No nausea, no abdominal pain  No dysuria, no frequency  LBM 10/27     Vital Signs Last 24 Hrs  T(C): 36.6 (27 Oct 2022 07:53), Max: 36.6 (26 Oct 2022 22:33)  T(F): 97.9 (27 Oct 2022 07:53), Max: 97.9 (26 Oct 2022 22:33)  HR: 63 (27 Oct 2022 07:53) (63 - 64)  BP: 119/70 (27 Oct 2022 07:53) (110/50 - 119/70)  RR: 16 (27 Oct 2022 07:53) (16 - 16)  SpO2: 97% (27 Oct 2022 07:53) (96% - 97%)    O2 Parameters below as of 27 Oct 2022 07:53  Patient On (Oxygen Delivery Method): room air      Gen - NAD, Comfortable  HEENT - NAD  Neck - No limited ROM  Pulm - resp nonlabored  Cardiovascular - warn and well perfused  Abdomen - Soft, NT/ND   Extremities - No calf tenderness, chronic skin changes, appropriate edema RLE    Neuro-     Cognitive - AAOx4,     Communication - Fluent, No dysarthria     Attention: Intact      Cranial Nerves - CN 2-12 grossly intact     Motor -                    LEFT    UE - ShAB 5/5, EF 5/5, EE 5/5, WE 5/5,  5/5.  Decreased AROM L shoulder                    RIGHT UE - ShAB 5/5, EF 5/5, EE 5/5, WE 5/5,  5/5                    LEFT    LE - HF 5/5, KE 5/5, DF 5/5, PF 5/5                    RIGHT LE - HF and KE 1/5 due to pain with recent surgery, DF 5/5, PF 5/5        Sensory - Intact to LT     Tone - normal  Psychiatric - Mood stable, Affect WNL  Skin: IAD groin/buttock area; mild candidiasis abdominal and breast folds  Wounds: NELLY dressing removed, has staples in situ, wound edges together, covered with aquacell and tagedon                                   8.1    10.01 )-----------( 344      ( 27 Oct 2022 06:14 )             24.9     10-27    142  |  112<H>  |  25<H>  ----------------------------<  92  5.2   |  24  |  0.84    Ca    7.9<L>      27 Oct 2022 06:14    TPro  4.9<L>  /  Alb  1.4<L>  /  TBili  0.7  /  DBili  x   /  AST  36  /  ALT  24  /  AlkPhos  86  10-27    MEDICATIONS  (STANDING):  acetaminophen     Tablet .. 650 milliGRAM(s) Oral every 8 hours  apixaban 2.5 milliGRAM(s) Oral every 12 hours  atorvastatin 20 milliGRAM(s) Oral at bedtime  celecoxib 100 milliGRAM(s) Oral every 12 hours  ferrous    sulfate 325 milliGRAM(s) Oral daily  levothyroxine 150 MICROGram(s) Oral daily  melatonin 6 milliGRAM(s) Oral at bedtime  nystatin Powder 1 Application(s) Topical every 12 hours  pantoprazole    Tablet 40 milliGRAM(s) Oral before breakfast  polyethylene glycol 3350 17 Gram(s) Oral at bedtime  senna 2 Tablet(s) Oral at bedtime  trimethoprim  160 mG/sulfamethoxazole 800 mG 1 Tablet(s) Oral every 12 hours  zinc oxide 40% Paste 1 Application(s) Topical every 12 hours    MEDICATIONS  (PRN):  magnesium hydroxide Suspension 30 milliLiter(s) Oral daily PRN Constipation  oxyCODONE    IR 5 milliGRAM(s) Oral every 3 hours PRN Severe Pain (7 - 10)  oxyCODONE    IR 2.5 milliGRAM(s) Oral every 3 hours PRN Moderate Pain (4 - 6)             Subjective/ROS  Seen and examined, chart examined   No interval med events  Pain controlled  Patient reports that Rt knee ROM has been poor prior to and after Rt knee surgery some months ago, hence RLE function had been significantly compromised for a while    Engaging well in therapy  Discussed details from IDT yesterday  Happy with same  D/w therapy--main deficit Rt hip/knee ROM and difficulty t/f from bed to WC  Early family training to for assessment of suitability for home dc    ROS  No chest pain, no shortness of breath, no cough  No headache, no dizziness  No nausea, no abdominal pain  No dysuria, no frequency  LBM 10/27     Vital Signs Last 24 Hrs  T(C): 36.6 (27 Oct 2022 07:53), Max: 36.6 (26 Oct 2022 22:33)  T(F): 97.9 (27 Oct 2022 07:53), Max: 97.9 (26 Oct 2022 22:33)  HR: 63 (27 Oct 2022 07:53) (63 - 64)  BP: 119/70 (27 Oct 2022 07:53) (110/50 - 119/70)  RR: 16 (27 Oct 2022 07:53) (16 - 16)  SpO2: 97% (27 Oct 2022 07:53) (96% - 97%)    O2 Parameters below as of 27 Oct 2022 07:53  Patient On (Oxygen Delivery Method): room air      Gen - NAD, Comfortable  HEENT - NAD  Neck - No limited ROM  Pulm - resp nonlabored  Cardiovascular - warn and well perfused  Abdomen - Soft, NT/ND   Extremities - No calf tenderness, chronic skin changes, appropriate edema RLE    Neuro-     Cognitive - AAOx4,     Communication - Fluent, No dysarthria     Attention: Intact      Cranial Nerves - CN 2-12 grossly intact     Motor -                    LEFT    UE - ShAB 5/5, EF 5/5, EE 5/5, WE 5/5,  5/5.  Decreased AROM L shoulder                    RIGHT UE - ShAB 5/5, EF 5/5, EE 5/5, WE 5/5,  5/5                    LEFT    LE - HF 5/5, KE 5/5, DF 5/5, PF 5/5                    RIGHT LE - HF and KE 1/5 due to pain with recent surgery, DF 5/5, PF 5/5        Sensory - Intact to LT     Tone - normal  Psychiatric - Mood stable, Affect WNL  Skin: IAD groin/buttock area; mild candidiasis abdominal and breast folds  Wounds: NELLY dressing removed, has staples in situ, wound edges together, covered with aquacell and tagedon                                   8.1    10.01 )-----------( 344      ( 27 Oct 2022 06:14 )             24.9     10-27    142  |  112<H>  |  25<H>  ----------------------------<  92  5.2   |  24  |  0.84    Ca    7.9<L>      27 Oct 2022 06:14    TPro  4.9<L>  /  Alb  1.4<L>  /  TBili  0.7  /  DBili  x   /  AST  36  /  ALT  24  /  AlkPhos  86  10-27    MEDICATIONS  (STANDING):  acetaminophen     Tablet .. 650 milliGRAM(s) Oral every 8 hours  apixaban 2.5 milliGRAM(s) Oral every 12 hours  atorvastatin 20 milliGRAM(s) Oral at bedtime  celecoxib 100 milliGRAM(s) Oral every 12 hours  ferrous    sulfate 325 milliGRAM(s) Oral daily  levothyroxine 150 MICROGram(s) Oral daily  melatonin 6 milliGRAM(s) Oral at bedtime  nystatin Powder 1 Application(s) Topical every 12 hours  pantoprazole    Tablet 40 milliGRAM(s) Oral before breakfast  polyethylene glycol 3350 17 Gram(s) Oral at bedtime  senna 2 Tablet(s) Oral at bedtime  trimethoprim  160 mG/sulfamethoxazole 800 mG 1 Tablet(s) Oral every 12 hours  zinc oxide 40% Paste 1 Application(s) Topical every 12 hours    MEDICATIONS  (PRN):  magnesium hydroxide Suspension 30 milliLiter(s) Oral daily PRN Constipation  oxyCODONE    IR 5 milliGRAM(s) Oral every 3 hours PRN Severe Pain (7 - 10)  oxyCODONE    IR 2.5 milliGRAM(s) Oral every 3 hours PRN Moderate Pain (4 - 6)

## 2022-10-27 NOTE — PROGRESS NOTE ADULT - ASSESSMENT
· Assessment	  This is a 77 YO female with PMH of HTN, HLD, heart murmur, hypothyroidism, SLE, RA, thyroid CA s/p resection, DVT (on eliquis), lung cancer (s/p tumor removal, no chemo no radiation), Right FELICITAS (Krauss, '19), Left TKA (Jonathanuss, '20), Right FELICITAS (Jonathan, '22) presented to [facility] ED on 10/17 s/p mechanical fall with severe right hip pain and inability to ambulate. She was transferred to [facility] where she had ORIF of right femur on 10/20 with Dr. Leach. ABLA now s/p 1 U PRBC intraop and 1 U post op. Leukocytosis is likely reactive to surgery and decadron. Patient now with gait Instability, ADL impairments and Functional impairments.    * NELLY dressing removed 10/27    #Right femur fracture  - ORIF of right femur/THR revision on 10/20 with Dr. Leach  (Lateral Approach)  --Continue PT/OT  -- Lateral + driving hip precaution   - WB Status: WBAT  --* NELLY dressing removed 10/27, staples in situ f/u with surgeon post d/c    HIP PRECAUTIONS  No hip adduction past neutral,   No hip internal rotation past neutral,   No hip flexion >90.   Use abduction pillow while in bed  Do not sit on low chairs or low toilet seats.    Hypothyroidism  - synthroid    HLD  - Atorvastatin    #Pain management  - Tylenol Q8, Oxycodone PRN, celebrex x 21 days post op    #DVT ppx  - Eliquis     #GI ppx  - Protonix 40mg    #Bowel Regimen  - Senna, miralax PRN    #Bladder management  - Monitor UO    #FEN   - Diet: Regular    #Skin:  - desitin to groin area for IAD  - Nystatin under breast and abd folds for candida management  --NELLY removed 0/27    #Sleep:   - Maintain quiet hours and low stim environment.  - Melatonin     #Precaution  - Fall, Hip     #GOC  CODE STATUS: FULL CODE     Outpatient Follow-up (Specialty/Name of physician):  Sin Leach  [address]  [place]  [phone]  F/U 2 weeks    [facility]  Scheduled 10/22/22    Josselin Ramirez  [facility]  Cardiology [id number]   Scheduled 12/19/22    F/U with family Physician 2-3 weeks after discharge         · Assessment	  This is a 75 YO female with PMH of HTN, HLD, heart murmur, hypothyroidism, SLE, RA, thyroid CA s/p resection, DVT (on eliquis), lung cancer (s/p tumor removal, no chemo no radiation), Right FELICITAS (Jonathan, '19), Left TKA (Jonathan, '20), Right FELICITAS (Jonathan, '22) presented to [facility] ED on 10/17 s/p mechanical fall with severe right hip pain and inability to ambulate. She was transferred to [facility] where she had ORIF of right femur on 10/20 with Dr. Leach. ABLA now s/p 1 U PRBC intraop and 1 U post op. Leukocytosis is likely reactive to surgery and decadron. Patient now with gait Instability, ADL impairments and Functional impairments.    * NELLY dressing removed 10/27  * Therapy/SW to facilitate family training to ensure suitability for safe home dc (living alone prior to admission, now has functional deficits--mostly t/f from bed to WC)    #Right femur fracture  - ORIF of right femur/THR revision on 10/20 with Dr. Leach  (Lateral Approach)  --Continue PT/OT  -- Lateral + driving hip precaution   - WB Status: WBAT  --* NELLY dressing removed 10/27, staples in situ f/u with surgeon post d/c    HIP PRECAUTIONS  No hip adduction past neutral,   No hip internal rotation past neutral,   No hip flexion >90.   Use abduction pillow while in bed  Do not sit on low chairs or low toilet seats.    Hypothyroidism  - synthroid    HLD  - Atorvastatin    #Pain management  - Tylenol Q8, Oxycodone PRN, celebrex x 21 days post op    #DVT ppx  - Eliquis     #GI ppx  - Protonix 40mg    #Bowel Regimen  - Senna, miralax PRN    #Bladder management  - Monitor UO    #FEN   - Diet: Regular    #Skin:  - desitin to groin area for IAD  - Nystatin under breast and abd folds for candida management  --NELLY removed 0/27    #Sleep:   - Maintain quiet hours and low stim environment.  - Melatonin     #Precaution  - Fall, Hip     #GOC  CODE STATUS: FULL CODE       IDT--10/26  Has significant support from Son for IADLs, Has ramp, no DENISE  Function--Requiring Mod A for transfers  Slow to follow through with tasks during therapy  Max assistance for bed mobility  Ambulating 45 ft with RW Min Assistance with WC follow through  Barriers--  Goal--independence with ADLs, able to transfer to shower, ambulate from bed to bathroom with min A  Est dc 11/12 to home, will revise to earlier date if not progressing towards home DC  (Baseline function to be clarified with son).  Outpatient Follow-up (Specialty/Name of physician):  Sin Leach  [address]  [place]  [phone]  F/U 2 weeks    [facility]  Scheduled 10/22/22    Josselin Ramirez  [facility] Physician [facility]  Cardiology [address]  Scheduled 12/19/22    F/U with family Physician 2-3 weeks after discharge

## 2022-10-28 LAB — SARS-COV-2 RNA SPEC QL NAA+PROBE: SIGNIFICANT CHANGE UP

## 2022-10-28 PROCEDURE — 99232 SBSQ HOSP IP/OBS MODERATE 35: CPT

## 2022-10-28 RX ORDER — OXYCODONE HYDROCHLORIDE 5 MG/1
2.5 TABLET ORAL
Refills: 0 | Status: DISCONTINUED | OUTPATIENT
Start: 2022-10-28 | End: 2022-10-28

## 2022-10-28 RX ORDER — OXYCODONE HYDROCHLORIDE 5 MG/1
5 TABLET ORAL
Refills: 0 | Status: DISCONTINUED | OUTPATIENT
Start: 2022-10-28 | End: 2022-10-28

## 2022-10-28 RX ADMIN — APIXABAN 2.5 MILLIGRAM(S): 2.5 TABLET, FILM COATED ORAL at 17:15

## 2022-10-28 RX ADMIN — PANTOPRAZOLE SODIUM 40 MILLIGRAM(S): 20 TABLET, DELAYED RELEASE ORAL at 06:10

## 2022-10-28 RX ADMIN — NYSTATIN CREAM 1 APPLICATION(S): 100000 CREAM TOPICAL at 06:11

## 2022-10-28 RX ADMIN — CELECOXIB 100 MILLIGRAM(S): 200 CAPSULE ORAL at 17:15

## 2022-10-28 RX ADMIN — Medication 650 MILLIGRAM(S): at 23:00

## 2022-10-28 RX ADMIN — CELECOXIB 100 MILLIGRAM(S): 200 CAPSULE ORAL at 17:28

## 2022-10-28 RX ADMIN — ZINC OXIDE 1 APPLICATION(S): 200 OINTMENT TOPICAL at 17:16

## 2022-10-28 RX ADMIN — SENNA PLUS 1 TABLET(S): 8.6 TABLET ORAL at 21:59

## 2022-10-28 RX ADMIN — Medication 650 MILLIGRAM(S): at 13:59

## 2022-10-28 RX ADMIN — NYSTATIN CREAM 1 APPLICATION(S): 100000 CREAM TOPICAL at 22:01

## 2022-10-28 RX ADMIN — Medication 1 TABLET(S): at 17:15

## 2022-10-28 RX ADMIN — Medication 650 MILLIGRAM(S): at 22:00

## 2022-10-28 RX ADMIN — ZINC OXIDE 1 APPLICATION(S): 200 OINTMENT TOPICAL at 06:12

## 2022-10-28 RX ADMIN — ATORVASTATIN CALCIUM 20 MILLIGRAM(S): 80 TABLET, FILM COATED ORAL at 22:00

## 2022-10-28 RX ADMIN — Medication 325 MILLIGRAM(S): at 11:36

## 2022-10-28 RX ADMIN — APIXABAN 2.5 MILLIGRAM(S): 2.5 TABLET, FILM COATED ORAL at 06:10

## 2022-10-28 RX ADMIN — CELECOXIB 100 MILLIGRAM(S): 200 CAPSULE ORAL at 06:10

## 2022-10-28 RX ADMIN — Medication 650 MILLIGRAM(S): at 13:44

## 2022-10-28 RX ADMIN — Medication 150 MICROGRAM(S): at 06:10

## 2022-10-28 RX ADMIN — Medication 1 TABLET(S): at 06:10

## 2022-10-28 RX ADMIN — Medication 650 MILLIGRAM(S): at 06:11

## 2022-10-28 NOTE — PROGRESS NOTE ADULT - SUBJECTIVE AND OBJECTIVE BOX
Patient is a 76y old  Female who presents with a chief complaint of Right hip fracture post fall s/p R ORIF revision (27 Oct 2022 17:01)      Patient seen and examined at bedside. NAD. denies acute medical complaints.     ALLERGIES:  No Known Allergies    MEDICATIONS  (STANDING):  acetaminophen     Tablet .. 650 milliGRAM(s) Oral every 8 hours  apixaban 2.5 milliGRAM(s) Oral every 12 hours  atorvastatin 20 milliGRAM(s) Oral at bedtime  celecoxib 100 milliGRAM(s) Oral every 12 hours  ferrous    sulfate 325 milliGRAM(s) Oral daily  levothyroxine 150 MICROGram(s) Oral daily  melatonin 6 milliGRAM(s) Oral at bedtime  nystatin Powder 1 Application(s) Topical every 12 hours  pantoprazole    Tablet 40 milliGRAM(s) Oral before breakfast  polyethylene glycol 3350 17 Gram(s) Oral at bedtime  senna 2 Tablet(s) Oral at bedtime  trimethoprim  160 mG/sulfamethoxazole 800 mG 1 Tablet(s) Oral every 12 hours  zinc oxide 40% Paste 1 Application(s) Topical every 12 hours    MEDICATIONS  (PRN):  magnesium hydroxide Suspension 30 milliLiter(s) Oral daily PRN Constipation  oxyCODONE    IR 5 milliGRAM(s) Oral every 3 hours PRN Severe Pain (7 - 10)  oxyCODONE    IR 2.5 milliGRAM(s) Oral every 3 hours PRN Moderate Pain (4 - 6)    Vital Signs Last 24 Hrs  T(F): 98 (28 Oct 2022 08:58), Max: 98 (27 Oct 2022 21:52)  HR: 71 (28 Oct 2022 08:58) (63 - 71)  BP: 125/75 (28 Oct 2022 08:58) (101/63 - 125/75)  RR: 15 (28 Oct 2022 08:58) (15 - 16)  SpO2: 100% (28 Oct 2022 08:58) (95% - 100%)  I&O's Summary        PHYSICAL EXAM:  General: NAD  ENT: MMM, no scleral icterus  Neck: Supple, No JVD  Lungs: Clear to auscultation bilaterally, no wheezes, rales, rhonchi  Cardio: RRR, S1/S2, +systolic murmur  Abdomen: Soft, Nontender, Nondistended; Bowel sounds present  Extremities: No calf tenderness, +trace LE edema b/l    LABS:                        8.1    10.01 )-----------( 344      ( 27 Oct 2022 06:14 )             24.9       10-27    142  |  112  |  25  ----------------------------<  92  5.2   |  24  |  0.84    Ca    7.9      27 Oct 2022 06:14    TPro  4.9  /  Alb  1.4  /  TBili  0.7  /  DBili  x   /  AST  36  /  ALT  24  /  AlkPhos  86  10-27                                      COVID-19 PCR: NotDetec (10-23-22 @ 06:28)  COVID-19 PCR: NotDetec (10-21-22 @ 18:00)      RADIOLOGY & ADDITIONAL TESTS: reviewed    Care Discussed with Consultants/Other Providers: yes, rehab

## 2022-10-28 NOTE — PROGRESS NOTE ADULT - SUBJECTIVE AND OBJECTIVE BOX
Subjective/ROS  Seen and examined, chart examined  reports no acute med complaint  Observed in PT--ambulating with walker>150ft, CGA  D/w therapist, patient making progress with transfers, still mod assist bed to    Used leg lift today      ROS  No chest pain, no shortness of breath, no cough  No headache, no dizziness  No nausea, no abdominal pain  No dysuria, no frequency  LBM 10/28    Vital Signs Last 24 Hrs  T(C): 36.7 (28 Oct 2022 08:58), Max: 36.7 (27 Oct 2022 21:52)  T(F): 98 (28 Oct 2022 08:58), Max: 98 (27 Oct 2022 21:52)  HR: 71 (28 Oct 2022 08:58) (63 - 71)  BP: 125/75 (28 Oct 2022 08:58) (101/63 - 125/75)  RR: 15 (28 Oct 2022 08:58) (15 - 16)  SpO2: 100% (28 Oct 2022 08:58) (95% - 100%)    O2 Parameters below as of 28 Oct 2022 08:58  Patient On (Oxygen Delivery Method): room air    EXAM  Gen -  Comfortable  HEENT - NAD  Neck - No limited ROM  Pulm - resp nonlabored  Cardiovascular - warn and well perfused  Abdomen - Soft, non tender   Extremities - No calf tenderness, chronic skin changes, appropriate edema RLE    Neuro-     Cognitive - AAOx4,     Communication - Fluent, No dysarthria     Attention: Intact      Cranial Nerves - CN 2-12 grossly intact     Motor -                    LEFT    UE - ShAB 5/5, EF 5/5, EE 5/5, WE 5/5,  5/5.  Decreased AROM L shoulder                    RIGHT UE - ShAB 5/5, EF 5/5, EE 5/5, WE 5/5,  5/5                    LEFT    LE - HF 5/5, KE 5/5, DF 5/5, PF 5/5                    RIGHT LE - HF and KE 1/5 due to pain with recent surgery, DF 5/5, PF 5/5        Sensory - Intact to LT     Tone - normal  Psychiatric - Mood stable, Affect WNL  Skin: IAD groin/buttock area; mild candidiasis abdominal and breast folds  Wounds aquacell and tagedon on right lat hip                                   8.1    10.01 )-----------( 344      ( 27 Oct 2022 06:14 )             24.9     10-27    142  |  112<H>  |  25<H>  ----------------------------<  92  5.2   |  24  |  0.84    Ca    7.9<L>      27 Oct 2022 06:14    TPro  4.9<L>  /  Alb  1.4<L>  /  TBili  0.7  /  DBili  x   /  AST  36  /  ALT  24  /  AlkPhos  86  10-27    MEDICATIONS  (STANDING):  acetaminophen     Tablet .. 650 milliGRAM(s) Oral every 8 hours  apixaban 2.5 milliGRAM(s) Oral every 12 hours  atorvastatin 20 milliGRAM(s) Oral at bedtime  celecoxib 100 milliGRAM(s) Oral every 12 hours  ferrous    sulfate 325 milliGRAM(s) Oral daily  levothyroxine 150 MICROGram(s) Oral daily  melatonin 6 milliGRAM(s) Oral at bedtime  nystatin Powder 1 Application(s) Topical every 12 hours  pantoprazole    Tablet 40 milliGRAM(s) Oral before breakfast  polyethylene glycol 3350 17 Gram(s) Oral at bedtime  senna 2 Tablet(s) Oral at bedtime  trimethoprim  160 mG/sulfamethoxazole 800 mG 1 Tablet(s) Oral every 12 hours  zinc oxide 40% Paste 1 Application(s) Topical every 12 hours    MEDICATIONS  (PRN):  magnesium hydroxide Suspension 30 milliLiter(s) Oral daily PRN Constipation  oxyCODONE    IR 5 milliGRAM(s) Oral every 3 hours PRN Severe Pain (7 - 10)  oxyCODONE    IR 2.5 milliGRAM(s) Oral every 3 hours PRN Moderate Pain (4 - 6)

## 2022-10-28 NOTE — PROGRESS NOTE ADULT - ASSESSMENT
77 y/o F with PMH HTN, HLD, hypothyroidism, SLE, RA, thyroid CA s/p resection, hx of DVT (on Eliquis), lung cancer (s/p tumor removal, no chemo no radiation), Right FELICITAS (Jonathan, '19), Left TKA (Jonathanuss, '20), Right FELICITAS (Jonathanuss, '22) presented to [facility] ED on 10/17 s/p mechanical fall with severe right hip pain and inability to ambulate. She was transferred to [facility] where she had ORIF of right femur on 10/20 with Dr. Leach. Hospital course complicated by ABLA s/p pRBC. Now admitted to [facility] for initiation of multidisciplinary rehab program.     #Right femur fracture  -s/p ORIF of right femur/THR revision on 10/20 with Dr. Leach  -Continue Bactrim BID for hip ppx   -Continue comprehensive rehab program - PT/OT/SLP per rehab team  -Pain management, bowel regimen per rehab     #Anemia  Likely post op anemia  -H/H stable, improved / low, no overt signs of bleeding  -Transfuse to keep hemoglobin >7  -Continue iron supplement daily    #Hypothyroidism  -Continue Synthroid    #HLD  -Continue Atorvastatin    #DVT ppx - Eliquis 2.5mg BID  #GI ppx - Protonix

## 2022-10-28 NOTE — PROGRESS NOTE ADULT - ASSESSMENT
· Assessment	  This is a 75 YO female with PMH of HTN, HLD, heart murmur, hypothyroidism, SLE, RA, thyroid CA s/p resection, DVT (on eliquis), lung cancer (s/p tumor removal, no chemo no radiation), Right FELICITAS (Jonathan, '19), Left TKA (Jonathanuss, '20), Right FELICITAS (Jonathan, '22) presented to [facility] ED on 10/17 s/p mechanical fall with severe right hip pain and inability to ambulate. She was transferred to [facility] where she had ORIF of right femur on 10/20 with Dr. Leach. ABLA now s/p 1 U PRBC intraop and 1 U post op. Leukocytosis is likely reactive to surgery and decadron. Patient now with gait Instability, ADL impairments and Functional impairments.    * Sustained functional improvement  * Await family meeting     # Right femur fracture  - ORIF of right femur/THR revision on 10/20 with Dr. Leach  (Lateral Approach)  --Continue PT/OT  -- Lateral + driving hip precaution   - WB Status: WBAT  --* NELLY dressing removed 10/27, wound covered with aquacel  staples in situ f/u with surgeon post d/c    HIP PRECAUTIONS  No hip adduction past neutral,   No hip internal rotation past neutral,   No hip flexion >90.   Use abduction pillow while in bed  Do not sit on low chairs or low toilet seats.    Hypothyroidism  - synthroid    HLD  - Atorvastatin    #Pain management  - Tylenol Q8, Oxycodone PRN, celebrex x 21 days post op    #DVT ppx  - Eliquis     #GI ppx  - Protonix 40mg    #Bowel Regimen  - Senna, miralax PRN    #Bladder management  - Monitor UO    #FEN   - Diet: Regular    #Skin:  - desitin to groin area for IAD  - Nystatin under breast and abd folds for candida management  --NELLY removed 0/27    #Sleep:   - Maintain quiet hours and low stim environment.  - Melatonin     #Precaution  - Fall, Hip     #GOC  CODE STATUS: FULL CODE       IDT--10/26  Has significant support from Son for IADLs, Has ramp, no DENISE  Function--Requiring Mod A for transfers  Slow to follow through with tasks during therapy  Max assistance for bed mobility  Ambulating 45 ft with RW Min Assistance with WC follow through  Barriers--  Goal--independence with ADLs, able to transfer to shower, ambulate from bed to bathroom with min A  Est dc 11/12 to home, will revise to earlier date if not progressing towards home DC  (Baseline function to be clarified with son).  Outpatient Follow-up (Specialty/Name of physician):  Sin Leach  [address]  [place]  [phone]  F/U 2 weeks    [facility]  Scheduled 10/22/22    Josselin Ramirez  [facility] Physician [facility]  Cardiology [id number]  Scheduled 12/19/22    F/U with family Physician 2-3 weeks after discharge

## 2022-10-29 PROCEDURE — 99232 SBSQ HOSP IP/OBS MODERATE 35: CPT

## 2022-10-29 RX ADMIN — APIXABAN 2.5 MILLIGRAM(S): 2.5 TABLET, FILM COATED ORAL at 06:16

## 2022-10-29 RX ADMIN — CELECOXIB 100 MILLIGRAM(S): 200 CAPSULE ORAL at 06:50

## 2022-10-29 RX ADMIN — CELECOXIB 100 MILLIGRAM(S): 200 CAPSULE ORAL at 17:23

## 2022-10-29 RX ADMIN — CELECOXIB 100 MILLIGRAM(S): 200 CAPSULE ORAL at 17:13

## 2022-10-29 RX ADMIN — Medication 650 MILLIGRAM(S): at 06:50

## 2022-10-29 RX ADMIN — Medication 650 MILLIGRAM(S): at 13:45

## 2022-10-29 RX ADMIN — Medication 150 MICROGRAM(S): at 06:17

## 2022-10-29 RX ADMIN — SENNA PLUS 2 TABLET(S): 8.6 TABLET ORAL at 21:29

## 2022-10-29 RX ADMIN — ZINC OXIDE 1 APPLICATION(S): 200 OINTMENT TOPICAL at 06:16

## 2022-10-29 RX ADMIN — ATORVASTATIN CALCIUM 20 MILLIGRAM(S): 80 TABLET, FILM COATED ORAL at 21:29

## 2022-10-29 RX ADMIN — Medication 650 MILLIGRAM(S): at 13:48

## 2022-10-29 RX ADMIN — CELECOXIB 100 MILLIGRAM(S): 200 CAPSULE ORAL at 06:18

## 2022-10-29 RX ADMIN — PANTOPRAZOLE SODIUM 40 MILLIGRAM(S): 20 TABLET, DELAYED RELEASE ORAL at 06:18

## 2022-10-29 RX ADMIN — NYSTATIN CREAM 1 APPLICATION(S): 100000 CREAM TOPICAL at 06:16

## 2022-10-29 RX ADMIN — APIXABAN 2.5 MILLIGRAM(S): 2.5 TABLET, FILM COATED ORAL at 17:13

## 2022-10-29 RX ADMIN — Medication 650 MILLIGRAM(S): at 06:18

## 2022-10-29 RX ADMIN — NYSTATIN CREAM 1 APPLICATION(S): 100000 CREAM TOPICAL at 21:29

## 2022-10-29 RX ADMIN — Medication 650 MILLIGRAM(S): at 21:29

## 2022-10-29 RX ADMIN — Medication 325 MILLIGRAM(S): at 11:20

## 2022-10-29 RX ADMIN — Medication 650 MILLIGRAM(S): at 22:00

## 2022-10-29 RX ADMIN — ZINC OXIDE 1 APPLICATION(S): 200 OINTMENT TOPICAL at 17:15

## 2022-10-29 NOTE — PROGRESS NOTE ADULT - ASSESSMENT
77 y/o F with PMH HTN, HLD, hypothyroidism, SLE, RA, thyroid CA s/p resection, hx of DVT (on Eliquis), lung cancer (s/p tumor removal, no chemo no radiation), Right FELICITAS (Jonathan, '19), Left TKA (Jonathanuss, '20), Right FELICITAS (Jonathanuss, '22) presented to [facility] ED on 10/17 s/p mechanical fall with severe right hip pain and inability to ambulate. She was transferred to [facility] where she had ORIF of right femur on 10/20 with Dr. Leach. Hospital course complicated by ABLA s/p pRBC. Now admitted to [facility] for initiation of multidisciplinary rehab program.     #Right femur fracture  -s/p ORIF of right femur/THR revision on 10/20 with Dr. Leach  -Continue Bactrim BID for hip ppx   -Continue comprehensive rehab program - PT/OT/SLP per rehab team  -Pain management, bowel regimen per rehab     #Anemia  Likely post op anemia  -H/H stable, improved / low, no overt signs of bleeding  -Transfuse to keep hemoglobin >7  -Continue iron supplement daily    #Hypothyroidism  -Continue Synthroid    #HLD  -Continue Atorvastatin    #DVT ppx - Eliquis 2.5mg BID  #GI ppx - Protonix

## 2022-10-30 PROCEDURE — 99232 SBSQ HOSP IP/OBS MODERATE 35: CPT

## 2022-10-30 RX ADMIN — Medication 650 MILLIGRAM(S): at 14:44

## 2022-10-30 RX ADMIN — Medication 650 MILLIGRAM(S): at 05:56

## 2022-10-30 RX ADMIN — Medication 325 MILLIGRAM(S): at 11:14

## 2022-10-30 RX ADMIN — Medication 650 MILLIGRAM(S): at 06:41

## 2022-10-30 RX ADMIN — NYSTATIN CREAM 1 APPLICATION(S): 100000 CREAM TOPICAL at 05:59

## 2022-10-30 RX ADMIN — APIXABAN 2.5 MILLIGRAM(S): 2.5 TABLET, FILM COATED ORAL at 05:56

## 2022-10-30 RX ADMIN — CELECOXIB 100 MILLIGRAM(S): 200 CAPSULE ORAL at 17:28

## 2022-10-30 RX ADMIN — ZINC OXIDE 1 APPLICATION(S): 200 OINTMENT TOPICAL at 17:18

## 2022-10-30 RX ADMIN — Medication 650 MILLIGRAM(S): at 22:00

## 2022-10-30 RX ADMIN — CELECOXIB 100 MILLIGRAM(S): 200 CAPSULE ORAL at 05:56

## 2022-10-30 RX ADMIN — PANTOPRAZOLE SODIUM 40 MILLIGRAM(S): 20 TABLET, DELAYED RELEASE ORAL at 05:56

## 2022-10-30 RX ADMIN — ATORVASTATIN CALCIUM 20 MILLIGRAM(S): 80 TABLET, FILM COATED ORAL at 21:22

## 2022-10-30 RX ADMIN — Medication 650 MILLIGRAM(S): at 21:22

## 2022-10-30 RX ADMIN — SENNA PLUS 2 TABLET(S): 8.6 TABLET ORAL at 21:32

## 2022-10-30 RX ADMIN — ZINC OXIDE 1 APPLICATION(S): 200 OINTMENT TOPICAL at 06:13

## 2022-10-30 RX ADMIN — CELECOXIB 100 MILLIGRAM(S): 200 CAPSULE ORAL at 06:41

## 2022-10-30 RX ADMIN — APIXABAN 2.5 MILLIGRAM(S): 2.5 TABLET, FILM COATED ORAL at 17:19

## 2022-10-30 RX ADMIN — Medication 650 MILLIGRAM(S): at 14:02

## 2022-10-30 RX ADMIN — NYSTATIN CREAM 1 APPLICATION(S): 100000 CREAM TOPICAL at 17:20

## 2022-10-30 RX ADMIN — Medication 150 MICROGRAM(S): at 05:55

## 2022-10-30 RX ADMIN — CELECOXIB 100 MILLIGRAM(S): 200 CAPSULE ORAL at 17:18

## 2022-10-30 NOTE — PROGRESS NOTE ADULT - SUBJECTIVE AND OBJECTIVE BOX
Cc: Gait dysfunction    HPI: Patient with no new medical issues today.  Reports she had a bowel movement today.   Pain controlled, no chest pain, no N/V, no Fevers/Chills. No other new ROS  Has been tolerating rehabilitation program.    acetaminophen     Tablet .. 650 milliGRAM(s) Oral every 8 hours  apixaban 2.5 milliGRAM(s) Oral every 12 hours  atorvastatin 20 milliGRAM(s) Oral at bedtime  celecoxib 100 milliGRAM(s) Oral every 12 hours  ferrous    sulfate 325 milliGRAM(s) Oral daily  levothyroxine 150 MICROGram(s) Oral daily  magnesium hydroxide Suspension 30 milliLiter(s) Oral daily PRN  melatonin 6 milliGRAM(s) Oral at bedtime  nystatin Powder 1 Application(s) Topical every 12 hours  oxyCODONE    IR 5 milliGRAM(s) Oral every 3 hours PRN  oxyCODONE    IR 2.5 milliGRAM(s) Oral every 3 hours PRN  pantoprazole    Tablet 40 milliGRAM(s) Oral before breakfast  polyethylene glycol 3350 17 Gram(s) Oral at bedtime  senna 2 Tablet(s) Oral at bedtime  zinc oxide 40% Paste 1 Application(s) Topical every 12 hours      T(C): 36.6 (10-29-22 @ 20:44), Max: 36.6 (10-29-22 @ 20:44)  HR: 70 (10-29-22 @ 20:44) (70 - 70)  BP: 115/54 (10-29-22 @ 20:44) (115/54 - 115/54)  RR: 14 (10-29-22 @ 20:44) (14 - 14)  SpO2: 96% (10-29-22 @ 20:44) (96% - 96%)      In NAD  HEENT- EOMI  Heart- no cyanosis   Lungs- no respiratory distress   Abd- + BS, NT  Ext- No calf pain  Neuro- Exam unchanged        Imp: Patient with diagnosis of s/p ORIF of right femur/THR revision admitted for comprehensive acute rehabilitation.    Plan:  - Continue PT/OT/SLP  - DVT prophylaxis - apixaban   - Skin- Turn q2h, check skin daily  - Continue current medications; patient medically stable.   -Active issues- Reports pain is controlled, continue tylenol, oxycodone as needed for severe pain    - Patient is stable to continue current rehabilitation program.

## 2022-10-30 NOTE — PROGRESS NOTE ADULT - SUBJECTIVE AND OBJECTIVE BOX
Patient is a 76y old  Female who presents with a chief complaint of Right hip fracture post fall s/p R ORIF revision (27 Oct 2022 17:01)      Patient seen and examined at bedside. NAD. denies acute medical complaints.     ALLERGIES:  No Known Allergies    MEDICATIONS  (STANDING):  acetaminophen     Tablet .. 650 milliGRAM(s) Oral every 8 hours  apixaban 2.5 milliGRAM(s) Oral every 12 hours  atorvastatin 20 milliGRAM(s) Oral at bedtime  celecoxib 100 milliGRAM(s) Oral every 12 hours  ferrous    sulfate 325 milliGRAM(s) Oral daily  levothyroxine 150 MICROGram(s) Oral daily  melatonin 6 milliGRAM(s) Oral at bedtime  nystatin Powder 1 Application(s) Topical every 12 hours  pantoprazole    Tablet 40 milliGRAM(s) Oral before breakfast  polyethylene glycol 3350 17 Gram(s) Oral at bedtime  senna 2 Tablet(s) Oral at bedtime  trimethoprim  160 mG/sulfamethoxazole 800 mG 1 Tablet(s) Oral every 12 hours  zinc oxide 40% Paste 1 Application(s) Topical every 12 hours    MEDICATIONS  (PRN):  magnesium hydroxide Suspension 30 milliLiter(s) Oral daily PRN Constipation  oxyCODONE    IR 5 milliGRAM(s) Oral every 3 hours PRN Severe Pain (7 - 10)  oxyCODONE    IR 2.5 milliGRAM(s) Oral every 3 hours PRN Moderate Pain (4 - 6)    Vital Signs Last 24 Hrs  T(C): 36.6 (29 Oct 2022 20:44), Max: 36.6 (29 Oct 2022 20:44)  T(F): 97.8 (29 Oct 2022 20:44), Max: 97.8 (29 Oct 2022 20:44)  HR: 70 (29 Oct 2022 20:44) (70 - 70)  BP: 115/54 (29 Oct 2022 20:44) (115/54 - 115/54)  BP(mean): --  RR: 14 (29 Oct 2022 20:44) (14 - 14)  SpO2: 96% (29 Oct 2022 20:44) (96% - 96%)    Parameters below as of 29 Oct 2022 20:44  Patient On (Oxygen Delivery Method): room air            PHYSICAL EXAM:  General: NAD  ENT: MMM, no scleral icterus  Neck: Supple, No JVD  Lungs: Clear to auscultation bilaterally, no wheezes, rales, rhonchi  Cardio: RRR, S1/S2, +systolic murmur  Abdomen: Soft, Nontender, Nondistended; Bowel sounds present  Extremities: No calf tenderness, +trace LE edema b/l    LABS:                        8.1    10.01 )-----------( 344      ( 27 Oct 2022 06:14 )             24.9       10-27    142  |  112  |  25  ----------------------------<  92  5.2   |  24  |  0.84    Ca    7.9      27 Oct 2022 06:14    TPro  4.9  /  Alb  1.4  /  TBili  0.7  /  DBili  x   /  AST  36  /  ALT  24  /  AlkPhos  86  10-27                                      COVID-19 PCR: NotDetec (10-23-22 @ 06:28)  COVID-19 PCR: NotDetec (10-21-22 @ 18:00)      RADIOLOGY & ADDITIONAL TESTS: reviewed    Care Discussed with Consultants/Other Providers: yes, rehab

## 2022-10-30 NOTE — PROGRESS NOTE ADULT - ASSESSMENT
77 y/o F with PMH HTN, HLD, hypothyroidism, SLE, RA, thyroid CA s/p resection, hx of DVT (on Eliquis), lung cancer (s/p tumor removal, no chemo no radiation), Right FELICITAS (Jonathan, '19), Left TKA (Krauss, '20), Right FELICITAS (Jonathanuss, '22) presented to [facility] ED on 10/17 s/p mechanical fall with severe right hip pain and inability to ambulate. She was transferred to [facility] where she had ORIF of right femur on 10/20 with Dr. Leach. Hospital course complicated by ABLA s/p pRBC. Now admitted to [facility] for initiation of multidisciplinary rehab program.     #Right femur fracture  -s/p ORIF of right femur/THR revision on 10/20 with Dr. Leach  -Completed Bactrim BID for hip ppx   -Continue comprehensive rehab program - PT/OT/SLP per rehab team  -Pain management, bowel regimen per rehab     #Anemia  Likely post op anemia  -H/H stable, improved / low, no overt signs of bleeding  -Transfuse to keep hemoglobin >7  -Continue iron supplement daily    #Hypothyroidism  -Continue Synthroid    #HLD  -Continue Atorvastatin    #DVT ppx - Eliquis 2.5mg BID  #GI ppx - Protonix

## 2022-10-31 LAB
ALBUMIN SERPL ELPH-MCNC: 2 G/DL — LOW (ref 3.3–5)
ALP SERPL-CCNC: 118 U/L — SIGNIFICANT CHANGE UP (ref 40–120)
ALT FLD-CCNC: 32 U/L — SIGNIFICANT CHANGE UP (ref 10–45)
ANION GAP SERPL CALC-SCNC: 7 MMOL/L — SIGNIFICANT CHANGE UP (ref 5–17)
AST SERPL-CCNC: 29 U/L — SIGNIFICANT CHANGE UP (ref 10–40)
BILIRUB SERPL-MCNC: 0.6 MG/DL — SIGNIFICANT CHANGE UP (ref 0.2–1.2)
BUN SERPL-MCNC: 25 MG/DL — HIGH (ref 7–23)
CALCIUM SERPL-MCNC: 8.6 MG/DL — SIGNIFICANT CHANGE UP (ref 8.4–10.5)
CHLORIDE SERPL-SCNC: 108 MMOL/L — SIGNIFICANT CHANGE UP (ref 96–108)
CO2 SERPL-SCNC: 27 MMOL/L — SIGNIFICANT CHANGE UP (ref 22–31)
CREAT SERPL-MCNC: 0.87 MG/DL — SIGNIFICANT CHANGE UP (ref 0.5–1.3)
EGFR: 69 ML/MIN/1.73M2 — SIGNIFICANT CHANGE UP
GLUCOSE SERPL-MCNC: 99 MG/DL — SIGNIFICANT CHANGE UP (ref 70–99)
HCT VFR BLD CALC: 28.2 % — LOW (ref 34.5–45)
HGB BLD-MCNC: 8.8 G/DL — LOW (ref 11.5–15.5)
MCHC RBC-ENTMCNC: 30.7 PG — SIGNIFICANT CHANGE UP (ref 27–34)
MCHC RBC-ENTMCNC: 31.2 GM/DL — LOW (ref 32–36)
MCV RBC AUTO: 98.3 FL — SIGNIFICANT CHANGE UP (ref 80–100)
NRBC # BLD: 0 /100 WBCS — SIGNIFICANT CHANGE UP (ref 0–0)
PLATELET # BLD AUTO: 433 K/UL — HIGH (ref 150–400)
POTASSIUM SERPL-MCNC: 4.7 MMOL/L — SIGNIFICANT CHANGE UP (ref 3.5–5.3)
POTASSIUM SERPL-SCNC: 4.7 MMOL/L — SIGNIFICANT CHANGE UP (ref 3.5–5.3)
PROT SERPL-MCNC: 6 G/DL — SIGNIFICANT CHANGE UP (ref 6–8.3)
RBC # BLD: 2.87 M/UL — LOW (ref 3.8–5.2)
RBC # FLD: 16.5 % — HIGH (ref 10.3–14.5)
SODIUM SERPL-SCNC: 142 MMOL/L — SIGNIFICANT CHANGE UP (ref 135–145)
WBC # BLD: 7.72 K/UL — SIGNIFICANT CHANGE UP (ref 3.8–10.5)
WBC # FLD AUTO: 7.72 K/UL — SIGNIFICANT CHANGE UP (ref 3.8–10.5)

## 2022-10-31 PROCEDURE — 99232 SBSQ HOSP IP/OBS MODERATE 35: CPT

## 2022-10-31 RX ADMIN — Medication 650 MILLIGRAM(S): at 12:12

## 2022-10-31 RX ADMIN — Medication 650 MILLIGRAM(S): at 22:00

## 2022-10-31 RX ADMIN — PANTOPRAZOLE SODIUM 40 MILLIGRAM(S): 20 TABLET, DELAYED RELEASE ORAL at 05:39

## 2022-10-31 RX ADMIN — ZINC OXIDE 1 APPLICATION(S): 200 OINTMENT TOPICAL at 05:40

## 2022-10-31 RX ADMIN — CELECOXIB 100 MILLIGRAM(S): 200 CAPSULE ORAL at 18:28

## 2022-10-31 RX ADMIN — CELECOXIB 100 MILLIGRAM(S): 200 CAPSULE ORAL at 17:34

## 2022-10-31 RX ADMIN — ATORVASTATIN CALCIUM 20 MILLIGRAM(S): 80 TABLET, FILM COATED ORAL at 21:15

## 2022-10-31 RX ADMIN — Medication 650 MILLIGRAM(S): at 05:39

## 2022-10-31 RX ADMIN — Medication 650 MILLIGRAM(S): at 06:24

## 2022-10-31 RX ADMIN — NYSTATIN CREAM 1 APPLICATION(S): 100000 CREAM TOPICAL at 21:15

## 2022-10-31 RX ADMIN — Medication 325 MILLIGRAM(S): at 12:12

## 2022-10-31 RX ADMIN — CELECOXIB 100 MILLIGRAM(S): 200 CAPSULE ORAL at 06:24

## 2022-10-31 RX ADMIN — CELECOXIB 100 MILLIGRAM(S): 200 CAPSULE ORAL at 05:39

## 2022-10-31 RX ADMIN — NYSTATIN CREAM 1 APPLICATION(S): 100000 CREAM TOPICAL at 05:39

## 2022-10-31 RX ADMIN — Medication 650 MILLIGRAM(S): at 13:31

## 2022-10-31 RX ADMIN — APIXABAN 2.5 MILLIGRAM(S): 2.5 TABLET, FILM COATED ORAL at 05:39

## 2022-10-31 RX ADMIN — ZINC OXIDE 1 APPLICATION(S): 200 OINTMENT TOPICAL at 21:17

## 2022-10-31 RX ADMIN — Medication 150 MICROGRAM(S): at 05:39

## 2022-10-31 RX ADMIN — APIXABAN 2.5 MILLIGRAM(S): 2.5 TABLET, FILM COATED ORAL at 17:34

## 2022-10-31 RX ADMIN — Medication 650 MILLIGRAM(S): at 21:15

## 2022-10-31 NOTE — PROGRESS NOTE ADULT - ASSESSMENT
· Assessment	  This is a 77 YO female with PMH of HTN, HLD, heart murmur, hypothyroidism, SLE, RA, thyroid CA s/p resection, DVT (on eliquis), lung cancer (s/p tumor removal, no chemo no radiation), Right FELICITAS (Jonathan, '19), Left TKA (Jonathanuss, '20), Right FELICITAS (Jonathan, '22) presented to [facility] ED on 10/17 s/p mechanical fall with severe right hip pain and inability to ambulate. She was transferred to [facility] where she had ORIF of right femur on 10/20 with Dr. Leach. ABLA now s/p 1 U PRBC intraop and 1 U post op. Leukocytosis is likely reactive to surgery and decadron. Patient now with gait Instability, ADL impairments and Functional impairments.    * Sustained functional improvement  * Await family meeting  DC planning     # Right femur fracture  - ORIF of right femur/THR revision on 10/20 with Dr. Leach  (Lateral Approach)  --Continue PT/OT  -- Lateral + driving hip precaution   - WB Status: WBAT  --*NELLY dressing removed 10/27, wound covered with aquacel  staples in situ f/u with surgeon post d/c    HIP PRECAUTIONS  No hip adduction past neutral,   No hip internal rotation past neutral,   No hip flexion >90.   Use abduction pillow while in bed  Do not sit on low chairs or low toilet seats.    Hypothyroidism  - synthroid    HLD  - Atorvastatin    #Pain management  - Tylenol Q8, Oxycodone PRN, celebrex x 21 days post op    #DVT ppx  - Eliquis     #GI ppx  - Protonix 40mg    #Bowel Regimen  - Senna, miralax PRN    #Bladder management  - Monitor UO    #FEN   - Diet: Regular    #Skin:  - desitin to groin area for IAD  - Nystatin under breast and abd folds for candida management  --NELLY removed 0/27    #Sleep:   - Maintain quiet hours and low stim environment.  - Melatonin     #Precaution  - Fall, Hip     #GOC  CODE STATUS: FULL CODE       IDT--10/26  Has significant support from Son for IADLs, Has ramp, no DENISE  Function--Requiring Mod A for transfers  Slow to follow through with tasks during therapy  Max assistance for bed mobility  Ambulating 45 ft with RW Min Assistance with WC follow through  Barriers--  Goal--independence with ADLs, able to transfer to shower, ambulate from bed to bathroom with min A  Est dc 11/12 to home, will revise to earlier date if not progressing towards home DC  (Baseline function to be clarified with son).  Outpatient Follow-up (Specialty/Name of physician):  Sin Leach  [address]  [place]  [phone]  F/U 2 weeks    [facility]  Scheduled 10/22/22    Josselin Ramirez  [facility] Physician Counts include 234 beds at the [facility]  Cardiology [phone]   Scheduled 12/19/22    F/U with family Physician 2-3 weeks after discharge

## 2022-10-31 NOTE — PROGRESS NOTE ADULT - SUBJECTIVE AND OBJECTIVE BOX
Subjective/ROS  Seen and examined, chart examined  observed in therapy  Ambulating increasing distance with walker  Rt hip motor function improving    Labs unremarkable    ROS  No chest pain, no shortness of breath, no cough  No headache, no dizziness  No nausea, no abdominal pain  No dysuria, no frequency  LBM 10/31    Vital Signs Last 24 Hrs  T(C): 36.4 (31 Oct 2022 08:10), Max: 36.6 (30 Oct 2022 20:27)  T(F): 97.6 (31 Oct 2022 08:10), Max: 97.9 (30 Oct 2022 20:27)  HR: 64 (31 Oct 2022 08:10) (64 - 72)  BP: 96/56 (31 Oct 2022 08:10) (96/56 - 114/69)  RR: 14 (31 Oct 2022 08:10) (14 - 15)  SpO2: 96% (31 Oct 2022 08:10) (96% - 98%)  O2 Parameters below as of 31 Oct 2022 08:10  Patient On (Oxygen Delivery Method): room air    EXAM  Gen -  Comfortable  HEENT - NAD  Neck - No limited ROM  Pulm - resp nonlabored  Cardiovascular - warn and well perfused  Abdomen - Soft, non tender   Extremities - No calf tenderness, chronic skin changes, appropriate edema RLE    Neuro-     Cognitive - AAOx4,     Communication - Fluent, No dysarthria     Attention: Intact      Cranial Nerves - CN 2-12 grossly intact     Motor -                    LEFT    UE - ShAB 5/5, EF 5/5, EE 5/5, WE 5/5,  5/5.                      RIGHT UE - ShAB 5/5, EF 5/5, EE 5/5, WE 5/5,  5/5                    LEFT    LE - HF 5/5, KE 5/5, DF 5/5, PF 5/5                    RIGHT LE - HF and KE improved 3/5, DF 5/5, PF 5/5        Sensory - Intact to LT     Tone - normal  Psychiatric - Mood stable, Affect WNL  Skin: skin excuriation on abd fold resolving  Wounds aquacell and tagedon on right lat hip                                     8.8    7.72  )-----------( 433      ( 31 Oct 2022 07:32 )             28.2     10-31    142  |  108  |  25<H>  ----------------------------<  99  4.7   |  27  |  0.87    Ca    8.6      31 Oct 2022 07:32    TPro  6.0  /  Alb  2.0<L>  /  TBili  0.6  /  DBili  x   /  AST  29  /  ALT  32  /  AlkPhos  118  10-31      MEDICATIONS  (STANDING):  acetaminophen     Tablet .. 650 milliGRAM(s) Oral every 8 hours  apixaban 2.5 milliGRAM(s) Oral every 12 hours  atorvastatin 20 milliGRAM(s) Oral at bedtime  celecoxib 100 milliGRAM(s) Oral every 12 hours  ferrous    sulfate 325 milliGRAM(s) Oral daily  levothyroxine 150 MICROGram(s) Oral daily  melatonin 6 milliGRAM(s) Oral at bedtime  nystatin Powder 1 Application(s) Topical every 12 hours  pantoprazole    Tablet 40 milliGRAM(s) Oral before breakfast  polyethylene glycol 3350 17 Gram(s) Oral at bedtime  senna 2 Tablet(s) Oral at bedtime  zinc oxide 40% Paste 1 Application(s) Topical every 12 hours    MEDICATIONS  (PRN):  magnesium hydroxide Suspension 30 milliLiter(s) Oral daily PRN Constipation  oxyCODONE    IR 5 milliGRAM(s) Oral every 3 hours PRN Severe Pain (7 - 10)  oxyCODONE    IR 2.5 milliGRAM(s) Oral every 3 hours PRN Moderate Pain (4 - 6)

## 2022-10-31 NOTE — PROGRESS NOTE ADULT - SUBJECTIVE AND OBJECTIVE BOX
Patient is a 76y old  Female who presents with a chief complaint of R ORIF revision (30 Oct 2022 09:40)      Patient seen and examined at bedside. denies headache, fever, chills, cp, sob, n/v, abd pain.      ALLERGIES:  No Known Allergies    MEDICATIONS  (STANDING):  acetaminophen     Tablet .. 650 milliGRAM(s) Oral every 8 hours  apixaban 2.5 milliGRAM(s) Oral every 12 hours  atorvastatin 20 milliGRAM(s) Oral at bedtime  celecoxib 100 milliGRAM(s) Oral every 12 hours  ferrous    sulfate 325 milliGRAM(s) Oral daily  levothyroxine 150 MICROGram(s) Oral daily  melatonin 6 milliGRAM(s) Oral at bedtime  nystatin Powder 1 Application(s) Topical every 12 hours  pantoprazole    Tablet 40 milliGRAM(s) Oral before breakfast  polyethylene glycol 3350 17 Gram(s) Oral at bedtime  senna 2 Tablet(s) Oral at bedtime  zinc oxide 40% Paste 1 Application(s) Topical every 12 hours    MEDICATIONS  (PRN):  magnesium hydroxide Suspension 30 milliLiter(s) Oral daily PRN Constipation  oxyCODONE    IR 5 milliGRAM(s) Oral every 3 hours PRN Severe Pain (7 - 10)  oxyCODONE    IR 2.5 milliGRAM(s) Oral every 3 hours PRN Moderate Pain (4 - 6)    Vital Signs Last 24 Hrs  T(F): 97.6 (31 Oct 2022 08:10), Max: 97.9 (30 Oct 2022 20:27)  HR: 64 (31 Oct 2022 08:10) (64 - 72)  BP: 96/56 (31 Oct 2022 08:10) (96/56 - 124/78)  RR: 14 (31 Oct 2022 08:10) (14 - 16)  SpO2: 96% (31 Oct 2022 08:10) (96% - 100%)  I&O's Summary    PHYSICAL EXAM:  General: NAD  ENT: MMM, no scleral icterus  Neck: Supple, No JVD  Lungs: Clear to auscultation bilaterally, no wheezes, rales, rhonchi  Cardio: RRR, S1/S2, +systolic murmur  Abdomen: Soft, Nontender, Nondistended; Bowel sounds present  Extremities: No calf tenderness, +trace LE edema b/l    LABS:                        8.8    7.72  )-----------( 433      ( 31 Oct 2022 07:32 )             28.2                                               COVID-19 PCR: NotDetec (10-28-22 @ 05:30)  COVID-19 PCR: NotDetec (10-23-22 @ 06:28)  COVID-19 PCR: NotDetec (10-21-22 @ 18:00)      RADIOLOGY & ADDITIONAL TESTS: reviewed    Care Discussed with Consultants/Other Providers: yes, rehab

## 2022-10-31 NOTE — PROGRESS NOTE ADULT - ASSESSMENT
75 y/o F with PMH HTN, HLD, hypothyroidism, SLE, RA, thyroid CA s/p resection, hx of DVT (on Eliquis), lung cancer (s/p tumor removal, no chemo no radiation), Right FELICITAS (Jonathan, '19), Left TKA (Jonathanuss, '20), Right FELICITAS (Jonathanuss, '22) presented to [facility] ED on 10/17 s/p mechanical fall with severe right hip pain and inability to ambulate. She was transferred to [facility] where she had ORIF of right femur on 10/20 with Dr. Leach. Hospital course complicated by ABLA s/p pRBC. Now admitted to [facility] for initiation of multidisciplinary rehab program.     #Right femur fracture  -s/p ORIF of right femur/THR revision on 10/20 with Dr. Leach  -Completed Bactrim BID for hip ppx   -Continue comprehensive rehab program - PT/OT/SLP per rehab team  -Pain management, bowel regimen per rehab     #Anemia  Likely post op anemia  -H/H stable, no overt signs of bleeding  -Transfuse prn to maintain Hb>7  -Continue iron supplement daily    #Hypothyroidism  -Continue Synthroid    #HLD  -Continue Atorvastatin    #DVT ppx - Eliquis 2.5mg BID  #GI ppx - Protonix

## 2022-11-01 PROCEDURE — 99232 SBSQ HOSP IP/OBS MODERATE 35: CPT

## 2022-11-01 RX ADMIN — Medication 650 MILLIGRAM(S): at 14:17

## 2022-11-01 RX ADMIN — CELECOXIB 100 MILLIGRAM(S): 200 CAPSULE ORAL at 18:17

## 2022-11-01 RX ADMIN — APIXABAN 2.5 MILLIGRAM(S): 2.5 TABLET, FILM COATED ORAL at 05:42

## 2022-11-01 RX ADMIN — PANTOPRAZOLE SODIUM 40 MILLIGRAM(S): 20 TABLET, DELAYED RELEASE ORAL at 05:42

## 2022-11-01 RX ADMIN — CELECOXIB 100 MILLIGRAM(S): 200 CAPSULE ORAL at 05:43

## 2022-11-01 RX ADMIN — ZINC OXIDE 1 APPLICATION(S): 200 OINTMENT TOPICAL at 17:32

## 2022-11-01 RX ADMIN — APIXABAN 2.5 MILLIGRAM(S): 2.5 TABLET, FILM COATED ORAL at 18:16

## 2022-11-01 RX ADMIN — Medication 150 MICROGRAM(S): at 05:42

## 2022-11-01 RX ADMIN — NYSTATIN CREAM 1 APPLICATION(S): 100000 CREAM TOPICAL at 05:44

## 2022-11-01 RX ADMIN — Medication 650 MILLIGRAM(S): at 13:48

## 2022-11-01 RX ADMIN — ZINC OXIDE 1 APPLICATION(S): 200 OINTMENT TOPICAL at 05:43

## 2022-11-01 RX ADMIN — Medication 650 MILLIGRAM(S): at 21:30

## 2022-11-01 RX ADMIN — Medication 650 MILLIGRAM(S): at 06:56

## 2022-11-01 RX ADMIN — Medication 325 MILLIGRAM(S): at 12:30

## 2022-11-01 RX ADMIN — ATORVASTATIN CALCIUM 20 MILLIGRAM(S): 80 TABLET, FILM COATED ORAL at 21:30

## 2022-11-01 RX ADMIN — SENNA PLUS 2 TABLET(S): 8.6 TABLET ORAL at 21:30

## 2022-11-01 RX ADMIN — Medication 650 MILLIGRAM(S): at 05:42

## 2022-11-01 RX ADMIN — Medication 650 MILLIGRAM(S): at 22:00

## 2022-11-01 RX ADMIN — CELECOXIB 100 MILLIGRAM(S): 200 CAPSULE ORAL at 06:57

## 2022-11-01 RX ADMIN — NYSTATIN CREAM 1 APPLICATION(S): 100000 CREAM TOPICAL at 17:32

## 2022-11-01 NOTE — PROGRESS NOTE ADULT - SUBJECTIVE AND OBJECTIVE BOX
Patient is a 76y old  Female who presents with a chief complaint of R ORIF revision (30 Oct 2022 09:40)      Patient seen and examined at bedside. denies headache, fever, chills, cp, sob, n/v, abd pain.      ALLERGIES:  No Known Allergies    MEDICATIONS  (STANDING):  acetaminophen     Tablet .. 650 milliGRAM(s) Oral every 8 hours  apixaban 2.5 milliGRAM(s) Oral every 12 hours  atorvastatin 20 milliGRAM(s) Oral at bedtime  celecoxib 100 milliGRAM(s) Oral every 12 hours  ferrous    sulfate 325 milliGRAM(s) Oral daily  levothyroxine 150 MICROGram(s) Oral daily  melatonin 6 milliGRAM(s) Oral at bedtime  nystatin Powder 1 Application(s) Topical every 12 hours  pantoprazole    Tablet 40 milliGRAM(s) Oral before breakfast  polyethylene glycol 3350 17 Gram(s) Oral at bedtime  senna 2 Tablet(s) Oral at bedtime  zinc oxide 40% Paste 1 Application(s) Topical every 12 hours    MEDICATIONS  (PRN):  magnesium hydroxide Suspension 30 milliLiter(s) Oral daily PRN Constipation  oxyCODONE    IR 5 milliGRAM(s) Oral every 3 hours PRN Severe Pain (7 - 10)  oxyCODONE    IR 2.5 milliGRAM(s) Oral every 3 hours PRN Moderate Pain (4 - 6)    Vital Signs Last 24 Hrs  T(C): 36.4 (01 Nov 2022 08:33), Max: 36.6 (31 Oct 2022 20:00)  T(F): 97.6 (01 Nov 2022 08:33), Max: 97.8 (31 Oct 2022 20:00)  HR: 73 (01 Nov 2022 08:33) (68 - 73)  BP: 96/48 (01 Nov 2022 08:33) (96/48 - 103/64)  BP(mean): --  RR: 16 (01 Nov 2022 08:33) (15 - 16)  SpO2: 98% (01 Nov 2022 08:33) (98% - 99%)    Parameters below as of 01 Nov 2022 08:33  Patient On (Oxygen Delivery Method): room air      PHYSICAL EXAM:  General: NAD  ENT: MMM, no scleral icterus  Neck: Supple, No JVD  Lungs: Clear to auscultation bilaterally, no wheezes, rales, rhonchi  Cardio: RRR, S1/S2, +systolic murmur  Abdomen: Soft, Nontender, Nondistended; Bowel sounds present  Extremities: No calf tenderness, +trace LE edema b/l    LABS:                        8.8    7.72  )-----------( 433      ( 31 Oct 2022 07:32 )             28.2                                               COVID-19 PCR: NotDetec (10-28-22 @ 05:30)  COVID-19 PCR: NotDetec (10-23-22 @ 06:28)  COVID-19 PCR: NotDetec (10-21-22 @ 18:00)      RADIOLOGY & ADDITIONAL TESTS: reviewed    Care Discussed with Consultants/Other Providers: yes, rehab

## 2022-11-01 NOTE — PROGRESS NOTE ADULT - ASSESSMENT
77 y/o F with PMH HTN, HLD, hypothyroidism, SLE, RA, thyroid CA s/p resection, hx of DVT (on Eliquis), lung cancer (s/p tumor removal, no chemo no radiation), Right FELICITAS (Jonathan, '19), Left TKA (Jonathanuss, '20), Right FELICITAS (Jonathanuss, '22) presented to [facility] ED on 10/17 s/p mechanical fall with severe right hip pain and inability to ambulate. She was transferred to [facility] where she had ORIF of right femur on 10/20 with Dr. Leach. Hospital course complicated by ABLA s/p pRBC. Now admitted to [facility] for initiation of multidisciplinary rehab program.     #Right femur fracture  -s/p ORIF of right femur/THR revision on 10/20 with Dr. Leach  -Completed Bactrim BID for hip ppx   -Continue comprehensive rehab program - PT/OT/SLP per rehab team  -Pain management, bowel regimen per rehab     #Anemia  Likely post op anemia  -H/H stable, no overt signs of bleeding  -Transfuse prn to maintain Hb>7  -Continue iron supplement daily    #Hypothyroidism  -Continue Synthroid    #HLD  -Continue Atorvastatin    #DVT ppx - Eliquis 2.5mg BID  #GI ppx - Protonix

## 2022-11-01 NOTE — PROGRESS NOTE ADULT - SUBJECTIVE AND OBJECTIVE BOX
Subjective/ROS  Seen and examined, chart examined, observed in therapy  Ambulating increasing distance with walker  Rt hip motor function improving    Labs unremarkable 10/31    ROS  No chest pain, no shortness of breath, no cough  No headache, no dizziness  No nausea, no abdominal pain  No dysuria, no frequency  LBM 11/1    Reports that daughter is arranging f/u with ortho  SW arranging family training with Therapists prior to dc,     Vital Signs Last 24 Hrs  T(C): 36.4 (01 Nov 2022 08:33), Max: 36.6 (31 Oct 2022 20:00)  T(F): 97.6 (01 Nov 2022 08:33), Max: 97.8 (31 Oct 2022 20:00)  HR: 73 (01 Nov 2022 08:33) (68 - 73)  BP: 96/48 (01 Nov 2022 08:33) (96/48 - 103/64)  RR: 16 (01 Nov 2022 08:33) (15 - 16)  SpO2: 98% (01 Nov 2022 08:33) (98% - 99%)    O2 Parameters below as of 01 Nov 2022 08:33  Patient On (Oxygen Delivery Method): room air      EXAM  Gen -  Comfortable, interactive   HEENT - NAD  Neck - No limited ROM  Pulm - normal resp rate  Cardiovascular - warn and well perfused  Abdomen - Soft, non tender   Extremities - No calf tenderness, chronic skin changes,  edema RLE, significantly reduced, has ace wrap in situ both legs    Neuro-     Cognitive - AAOx4,     Communication - Fluent, No dysarthria     Attention: Intact      Cranial Nerves - CN 2-12 grossly intact     Motor -                    LEFT    UE - ShAB 5/5, EF 5/5, EE 5/5, WE 5/5,  5/5.                      RIGHT UE - ShAB 5/5, EF 5/5, EE 5/5, WE 5/5,  5/5                    LEFT    LE - HF 5/5, KE 5/5, DF 5/5, PF 5/5                    RIGHT LE - HF and KE improved 3/5, DF 5/5, PF 5/5        Sensory - Intact to LT     Tone - normal  Psychiatric - Mood stable, Affect WNL  Skin: skin excuriation on abd fold resolving  Wounds aquacell and tagedon on right lat hip, no bleeding                                   8.8    7.72  )-----------( 433      ( 31 Oct 2022 07:32 )             28.2     10-31    142  |  108  |  25<H>  ----------------------------<  99  4.7   |  27  |  0.87    Ca    8.6      31 Oct 2022 07:32    TPro  6.0  /  Alb  2.0<L>  /  TBili  0.6  /  DBili  x   /  AST  29  /  ALT  32  /  AlkPhos  118  10-31    MEDICATIONS  (STANDING):  acetaminophen     Tablet .. 650 milliGRAM(s) Oral every 8 hours  apixaban 2.5 milliGRAM(s) Oral every 12 hours  atorvastatin 20 milliGRAM(s) Oral at bedtime  celecoxib 100 milliGRAM(s) Oral every 12 hours  ferrous    sulfate 325 milliGRAM(s) Oral daily  levothyroxine 150 MICROGram(s) Oral daily  melatonin 6 milliGRAM(s) Oral at bedtime  nystatin Powder 1 Application(s) Topical every 12 hours  pantoprazole    Tablet 40 milliGRAM(s) Oral before breakfast  polyethylene glycol 3350 17 Gram(s) Oral at bedtime  senna 2 Tablet(s) Oral at bedtime  zinc oxide 40% Paste 1 Application(s) Topical every 12 hours    MEDICATIONS  (PRN):  magnesium hydroxide Suspension 30 milliLiter(s) Oral daily PRN Constipation  oxyCODONE    IR 5 milliGRAM(s) Oral every 3 hours PRN Severe Pain (7 - 10)  oxyCODONE    IR 2.5 milliGRAM(s) Oral every 3 hours PRN Moderate Pain (4 - 6)

## 2022-11-01 NOTE — PROGRESS NOTE ADULT - ASSESSMENT
· Assessment	  This is a 75 YO female with PMH of HTN, HLD, heart murmur, hypothyroidism, SLE, RA, thyroid CA s/p resection, DVT (on eliquis), lung cancer (s/p tumor removal, no chemo no radiation), Right FELICITAS (Jonathan, '19), Left TKA (Jonathan, '20), Right FELICITAS (Jonathan, '22) presented to [facility] ED on 10/17 s/p mechanical fall with severe right hip pain and inability to ambulate. She was transferred to [facility] where she had ORIF of right femur on 10/20 with Dr. Leach. ABLA now s/p 1 U PRBC intraop and 1 U post op. Leukocytosis is likely reactive to surgery and decadron. Patient now with gait Instability, ADL impairments and Functional impairments.    * Sustained functional improvement, opioid prn, wound not require it on dc  * Await family meeting/DC planning   * Daughter arranging f/u with ortho and will clarify if staples removal plan has changed (current plan is ortho to remove during f/u)    # Right femur fracture  - ORIF of right femur/THR revision on 10/20 with Dr. Leach  (Lateral Approach)  --Continue PT/OT  -- Lateral + driving hip precaution   - WB Status: WBAT  --*NELLY dressing removed 10/27, wound covered with aquacel  staples in situ f/u with surgeon post d/c    HIP PRECAUTIONS  No hip adduction past neutral,   No hip internal rotation past neutral,   No hip flexion >90.   Use abduction pillow while in bed  Do not sit on low chairs or low toilet seats.    Hypothyroidism  - synthroid    HLD  - Atorvastatin    #Pain management  - Tylenol Q8, Oxycodone PRN, celebrex x 21 days post op    #DVT ppx  - Eliquis     #GI ppx  - Protonix 40mg    #Bowel Regimen  - Senna, miralax PRN    #Bladder management  - Monitor UO    #FEN   - Diet: Regular    #Skin:  - desitin to groin area for IAD  - Nystatin under breast and abd folds for candida management  --NELLY removed 0/27    #Sleep:   - Maintain quiet hours and low stim environment.  - Melatonin     #Precaution  - Fall, Hip     #GOC  CODE STATUS: FULL CODE       IDT--10/26  Has significant support from Son for IADLs, Has ramp, no DENISE  Function--Requiring Mod A for transfers  Slow to follow through with tasks during therapy  Max assistance for bed mobility  Ambulating 45 ft with RW Min Assistance with WC follow through  Barriers--  Goal--independence with ADLs, able to transfer to shower, ambulate from bed to bathroom with min A  Est dc 11/12 to home, will revise to earlier date if not progressing towards home DC  (Baseline function to be clarified with son).  Outpatient Follow-up (Specialty/Name of physician):  Sin Leach  [address]  [place]  [phone]  F/U 2 weeks    [facility]  Scheduled 10/22/22    Josselin Ramirez  [facility] Physician [facility]  Cardiology [id number]  Scheduled 12/19/22    F/U with family Physician 2-3 weeks after discharge

## 2022-11-02 PROCEDURE — 99232 SBSQ HOSP IP/OBS MODERATE 35: CPT

## 2022-11-02 RX ADMIN — SENNA PLUS 1 TABLET(S): 8.6 TABLET ORAL at 22:04

## 2022-11-02 RX ADMIN — NYSTATIN CREAM 1 APPLICATION(S): 100000 CREAM TOPICAL at 17:25

## 2022-11-02 RX ADMIN — Medication 650 MILLIGRAM(S): at 06:40

## 2022-11-02 RX ADMIN — Medication 650 MILLIGRAM(S): at 14:19

## 2022-11-02 RX ADMIN — ZINC OXIDE 1 APPLICATION(S): 200 OINTMENT TOPICAL at 22:03

## 2022-11-02 RX ADMIN — CELECOXIB 100 MILLIGRAM(S): 200 CAPSULE ORAL at 06:40

## 2022-11-02 RX ADMIN — APIXABAN 2.5 MILLIGRAM(S): 2.5 TABLET, FILM COATED ORAL at 17:24

## 2022-11-02 RX ADMIN — ATORVASTATIN CALCIUM 20 MILLIGRAM(S): 80 TABLET, FILM COATED ORAL at 22:03

## 2022-11-02 RX ADMIN — APIXABAN 2.5 MILLIGRAM(S): 2.5 TABLET, FILM COATED ORAL at 05:33

## 2022-11-02 RX ADMIN — ZINC OXIDE 1 APPLICATION(S): 200 OINTMENT TOPICAL at 05:31

## 2022-11-02 RX ADMIN — PANTOPRAZOLE SODIUM 40 MILLIGRAM(S): 20 TABLET, DELAYED RELEASE ORAL at 05:31

## 2022-11-02 RX ADMIN — Medication 150 MICROGRAM(S): at 05:31

## 2022-11-02 RX ADMIN — CELECOXIB 100 MILLIGRAM(S): 200 CAPSULE ORAL at 17:24

## 2022-11-02 RX ADMIN — CELECOXIB 100 MILLIGRAM(S): 200 CAPSULE ORAL at 05:33

## 2022-11-02 RX ADMIN — Medication 650 MILLIGRAM(S): at 22:04

## 2022-11-02 RX ADMIN — Medication 325 MILLIGRAM(S): at 11:41

## 2022-11-02 RX ADMIN — Medication 650 MILLIGRAM(S): at 15:11

## 2022-11-02 RX ADMIN — CELECOXIB 100 MILLIGRAM(S): 200 CAPSULE ORAL at 18:24

## 2022-11-02 RX ADMIN — Medication 650 MILLIGRAM(S): at 05:31

## 2022-11-02 RX ADMIN — NYSTATIN CREAM 1 APPLICATION(S): 100000 CREAM TOPICAL at 05:34

## 2022-11-02 NOTE — PROGRESS NOTE ADULT - ASSESSMENT
· Assessment	  This is a 75 YO female with PMH of HTN, HLD, heart murmur, hypothyroidism, SLE, RA, thyroid CA s/p resection, DVT (on eliquis), lung cancer (s/p tumor removal, no chemo no radiation), Right FELICITAS (Jonathan, '19), Left TKA (Jonathanuss, '20), Right FELICITAS (Jonathan, '22) presented to [facility] ED on 10/17 s/p mechanical fall with severe right hip pain and inability to ambulate. She was transferred to [facility] where she had ORIF of right femur on 10/20 with Dr. Leach. ABLA now s/p 1 U PRBC intraop and 1 U post op. Leukocytosis is likely reactive to surgery and decadron. Patient now with gait Instability, ADL impairments and Functional impairments.    * Sustained functional improvement, pain controlled, transfers much improved  * Family meeting prior to dc      * Daughter arranging f/u with ortho and will clarify if staples removal plan has changed (current plan is ortho to remove during f/u)  * DC cerebrex 12 days post op     # Right femur fracture  - ORIF of right femur/THR revision on 10/20 with Dr. Leach  (Lateral Approach)  --Continue PT/OT  -- Lateral + driving hip precaution   - WB Status: WBAT  --*NELLY dressing removed 10/27, wound covered with aquacel  staples in situ f/u with surgeon post d/c    HIP PRECAUTIONS  No hip adduction past neutral,   No hip internal rotation past neutral,   No hip flexion >90.   Use abduction pillow while in bed  Do not sit on low chairs or low toilet seats.    Hypothyroidism  - synthroid    HLD  - Atorvastatin    #Pain management  - Tylenol Q8, Oxycodone PRN, celebrex x 21 days post op    #DVT ppx  - Eliquis     #GI ppx  - Protonix 40mg    #Bowel Regimen  - Senna, miralax PRN    #Bladder management  - Monitor UO    #FEN   - Diet: Regular    #Skin:  - desitin to groin area for IAD  - Nystatin under breast and abd folds for candida management  --NELLY removed 0/27    #Sleep:   - Maintain quiet hours and low stim environment.  - Melatonin     #Precaution  - Fall, Hip     #GOC  CODE STATUS: FULL CODE       IDT--10/26  Has significant support from Son for IADLs, Has ramp, no DENISE  Function--Requiring Mod A for transfers  Slow to follow through with tasks during therapy  Max assistance for bed mobility  Ambulating 45 ft with RW Min Assistance with WC follow through  Barriers--  Goal--independence with ADLs, able to transfer to shower, ambulate from bed to bathroom with min A  Est dc 11/12 to home, will revise to earlier date if not progressing towards home DC  (Baseline function to be clarified with son).  Outpatient Follow-up (Specialty/Name of physician):  Sin Leach  [address]  [place]  [phone]  F/U 2 weeks    [facility]  Scheduled 10/22/22    Josselin Ramirez  [facility]  Cardiology [phone]   Scheduled 12/19/22    F/U with family Physician 2-3 weeks after discharge         · Assessment	  This is a 77 YO female with PMH of HTN, HLD, heart murmur, hypothyroidism, SLE, RA, thyroid CA s/p resection, DVT (on eliquis), lung cancer (s/p tumor removal, no chemo no radiation), Right FELICITAS (Jonathan, '19), Left TKA (Jonathanuss, '20), Right FELICITAS (Jonathan, '22) presented to [facility] ED on 10/17 s/p mechanical fall with severe right hip pain and inability to ambulate. She was transferred to [facility] where she had ORIF of right femur on 10/20 with Dr. Leach. ABLA now s/p 1 U PRBC intraop and 1 U post op. Leukocytosis is likely reactive to surgery and decadron. Patient now with gait Instability, ADL impairments and Functional impairments.    * Sustained functional improvement, pain controlled, transfers much improved  * Family meeting prior to dc      * Daughter arranging f/u with ortho and will clarify if staples removal plan has changed (current plan is ortho to remove during f/u)  * DC cerebrex 12 days post op     # Right femur fracture  - ORIF of right femur/THR revision on 10/20 with Dr. Leach  (Lateral Approach)  --Continue PT/OT  -- Lateral + driving hip precaution   - WB Status: WBAT  --*NELLY dressing removed 10/27, wound covered with aquacel  staples in situ f/u with surgeon post d/c    HIP PRECAUTIONS  No hip adduction past neutral,   No hip internal rotation past neutral,   No hip flexion >90.   Use abduction pillow while in bed  Do not sit on low chairs or low toilet seats.    Hypothyroidism  - synthroid    HLD  - Atorvastatin    #Pain management  - Tylenol Q8, Oxycodone PRN, celebrex x 21 days post op    #DVT ppx  - Eliquis     #GI ppx  - Protonix 40mg    #Bowel Regimen  - Senna, miralax PRN    #Bladder management  - Monitor UO    #FEN   - Diet: Regular    #Skin:  - desitin to groin area for IAD  - Nystatin under breast and abd folds for candida management  --NELLY removed 0/27    #Sleep:   - Maintain quiet hours and low stim environment.  - Melatonin     #Precaution  - Fall, Hip     #GOC  CODE STATUS: FULL CODE       IDT--10/26  Has significant support from Son for IADLs, Has ramp, no DENISE  Function--Requiring Mod A for transfers  Slow to follow through with tasks during therapy  Max assistance for bed mobility  Ambulating 45 ft with RW Min Assistance with WC follow through  Barriers--  Goal--independence with ADLs, able to transfer to shower, ambulate from bed to bathroom with min A  Est dc 11/12 to home, will revise to earlier date if not progressing towards home DC  (Baseline function to be clarified with son).  Outpatient Follow-up (Specialty/Name of physician):  Sin Leach  [address]  [place]  [phone]  F/U 2 weeks      Liaison with family--I called Ms Lr, patient's daughter to give update on patient's function and d/c plan, no response. I left a voice msg for call back    [facility]  Scheduled 10/22/22    Josselin Ramirez  [facility] Physician [facility]  Cardiology [phone]   Scheduled 12/19/22    F/U with family Physician 2-3 weeks after discharge

## 2022-11-02 NOTE — PROGRESS NOTE ADULT - SUBJECTIVE AND OBJECTIVE BOX
Subjective/ROS  Seen and examined, chart examined,   No interval med complaint  Pain controlled without opioids  Had good time with grandmichael at [place] yesterday    Engaging well in therapy  Ambulating increasing distance with walker  Rt hip motor function improving  Maintains desire for home dc   Family prefers video relay of patient's function, while they try to come for live family training towards discharge  ROM rt leg improving,, but reports that Rt knee ROM has been problematic/limited since last Rt knee replacement few months prior  But flexing >70 deg  Labs unremarkable 10/31    ROS  No chest pain, no shortness of breath, no cough  No headache, no dizziness  No nausea, no abdominal pain  No dysuria, no frequency  LBM 11/2     Vital Signs Last 24 Hrs  T(C): 36.6 (02 Nov 2022 08:06), Max: 36.6 (02 Nov 2022 08:06)  T(F): 97.8 (02 Nov 2022 08:06), Max: 97.8 (02 Nov 2022 08:06)  HR: 60 (02 Nov 2022 08:06) (60 - 73)  BP: 119/53 (02 Nov 2022 08:06) (119/53 - 144/74)  RR: 15 (02 Nov 2022 08:06) (15 - 16)  SpO2: 93% (02 Nov 2022 08:06) (93% - 98%)    O2 Parameters below as of 02 Nov 2022 08:06  Patient On (Oxygen Delivery Method): room air      EXAM  Gen -  Comfortable, interactive   HEENT - NAD  Neck - No limited ROM  Pulm - normal resp rate  Cardiovascular - warn and well perfused  Abdomen - Soft, non tender   Extremities - No calf tenderness, chronic skin changes,  edema RLE, significantly reduced, has ace wrap in situ both legs    Neuro-     Cognitive - AAOx4,     Communication - Fluent, No dysarthria     Attention: Intact      Cranial Nerves - CN 2-12 grossly intact     Motor -                    LEFT    UE - ShAB 5/5, EF 5/5, EE 5/5, WE 5/5,  5/5.                      RIGHT UE - ShAB 5/5, EF 5/5, EE 5/5, WE 5/5,  5/5                    LEFT    LE - HF 5/5, KE 5/5, DF 5/5, PF 5/5                    RIGHT LE - HF and KE 3/5, DF 5/5, PF 5/5        Sensory - Intact to LT     Tone - normal  Psychiatric - Mood stable, Affect WNL  Skin: skin excuriation on abd fold resolving  Wounds aquacell and tagedon on right lat hip, no bleeding                         8.8    7.72  )-----------( 433      ( 31 Oct 2022 07:32 )             28.2     10-31    142  |  108  |  25<H>  ----------------------------<  99  4.7   |  27  |  0.87    Ca    8.6      31 Oct 2022 07:32    TPro  6.0  /  Alb  2.0<L>  /  TBili  0.6  /  DBili  x   /  AST  29  /  ALT  32  /  AlkPhos  118  10-31    MEDICATIONS  (STANDING):  acetaminophen     Tablet .. 650 milliGRAM(s) Oral every 8 hours  apixaban 2.5 milliGRAM(s) Oral every 12 hours  atorvastatin 20 milliGRAM(s) Oral at bedtime  celecoxib 100 milliGRAM(s) Oral every 12 hours  ferrous    sulfate 325 milliGRAM(s) Oral daily  levothyroxine 150 MICROGram(s) Oral daily  melatonin 6 milliGRAM(s) Oral at bedtime  nystatin Powder 1 Application(s) Topical every 12 hours  pantoprazole    Tablet 40 milliGRAM(s) Oral before breakfast  polyethylene glycol 3350 17 Gram(s) Oral at bedtime  senna 2 Tablet(s) Oral at bedtime  zinc oxide 40% Paste 1 Application(s) Topical every 12 hours    MEDICATIONS  (PRN):  magnesium hydroxide Suspension 30 milliLiter(s) Oral daily PRN Constipation  oxyCODONE    IR 5 milliGRAM(s) Oral every 3 hours PRN Severe Pain (7 - 10)  oxyCODONE    IR 2.5 milliGRAM(s) Oral every 3 hours PRN Moderate Pain (4 - 6)                         Subjective/ROS  Seen and examined, chart examined,   No interval med complaint  Pain controlled without opioids  Had good time with grandson at [place] yesterday    Engaging well in therapy  Ambulating increasing distance with walker  Rt hip motor function improving  Maintains desire for home dc   Family prefers video relay of patient's function, while they try to come for live family training towards discharge  ROM rt leg improving,, but reports that Rt knee ROM has been problematic/limited since last Rt knee replacement few months prior  But flexing >70 deg  Labs unremarkable 10/31    Liaison with family--I called Ms Lr, patient's daughter to give update on patient's function and d/c plan, no response. I left a voice msg for call back    ROS  No chest pain, no shortness of breath, no cough  No headache, no dizziness  No nausea, no abdominal pain  No dysuria, no frequency  LBM 11/2     Vital Signs Last 24 Hrs  T(C): 36.6 (02 Nov 2022 08:06), Max: 36.6 (02 Nov 2022 08:06)  T(F): 97.8 (02 Nov 2022 08:06), Max: 97.8 (02 Nov 2022 08:06)  HR: 60 (02 Nov 2022 08:06) (60 - 73)  BP: 119/53 (02 Nov 2022 08:06) (119/53 - 144/74)  RR: 15 (02 Nov 2022 08:06) (15 - 16)  SpO2: 93% (02 Nov 2022 08:06) (93% - 98%)    O2 Parameters below as of 02 Nov 2022 08:06  Patient On (Oxygen Delivery Method): room air      EXAM  Gen -  Comfortable, interactive   HEENT - NAD  Neck - No limited ROM  Pulm - normal resp rate  Cardiovascular - warn and well perfused  Abdomen - Soft, non tender   Extremities - No calf tenderness, chronic skin changes,  edema RLE, significantly reduced, has ace wrap in situ both legs    Neuro-     Cognitive - AAOx4,     Communication - Fluent, No dysarthria     Attention: Intact      Cranial Nerves - CN 2-12 grossly intact     Motor -                    LEFT    UE - ShAB 5/5, EF 5/5, EE 5/5, WE 5/5,  5/5.                      RIGHT UE - ShAB 5/5, EF 5/5, EE 5/5, WE 5/5,  5/5                    LEFT    LE - HF 5/5, KE 5/5, DF 5/5, PF 5/5                    RIGHT LE - HF and KE 3/5, DF 5/5, PF 5/5        Sensory - Intact to LT     Tone - normal  Psychiatric - Mood stable, Affect WNL  Skin: skin excuriation on abd fold resolving  Wounds aquacell and tagedon on right lat hip, no bleeding                         8.8    7.72  )-----------( 433      ( 31 Oct 2022 07:32 )             28.2     10-31    142  |  108  |  25<H>  ----------------------------<  99  4.7   |  27  |  0.87    Ca    8.6      31 Oct 2022 07:32    TPro  6.0  /  Alb  2.0<L>  /  TBili  0.6  /  DBili  x   /  AST  29  /  ALT  32  /  AlkPhos  118  10-31    MEDICATIONS  (STANDING):  acetaminophen     Tablet .. 650 milliGRAM(s) Oral every 8 hours  apixaban 2.5 milliGRAM(s) Oral every 12 hours  atorvastatin 20 milliGRAM(s) Oral at bedtime  celecoxib 100 milliGRAM(s) Oral every 12 hours  ferrous    sulfate 325 milliGRAM(s) Oral daily  levothyroxine 150 MICROGram(s) Oral daily  melatonin 6 milliGRAM(s) Oral at bedtime  nystatin Powder 1 Application(s) Topical every 12 hours  pantoprazole    Tablet 40 milliGRAM(s) Oral before breakfast  polyethylene glycol 3350 17 Gram(s) Oral at bedtime  senna 2 Tablet(s) Oral at bedtime  zinc oxide 40% Paste 1 Application(s) Topical every 12 hours    MEDICATIONS  (PRN):  magnesium hydroxide Suspension 30 milliLiter(s) Oral daily PRN Constipation  oxyCODONE    IR 5 milliGRAM(s) Oral every 3 hours PRN Severe Pain (7 - 10)  oxyCODONE    IR 2.5 milliGRAM(s) Oral every 3 hours PRN Moderate Pain (4 - 6)

## 2022-11-03 LAB
ALBUMIN SERPL ELPH-MCNC: 1.9 G/DL — LOW (ref 3.3–5)
ALP SERPL-CCNC: 112 U/L — SIGNIFICANT CHANGE UP (ref 40–120)
ALT FLD-CCNC: 19 U/L — SIGNIFICANT CHANGE UP (ref 10–45)
ANION GAP SERPL CALC-SCNC: 3 MMOL/L — LOW (ref 5–17)
AST SERPL-CCNC: 20 U/L — SIGNIFICANT CHANGE UP (ref 10–40)
BILIRUB SERPL-MCNC: 0.5 MG/DL — SIGNIFICANT CHANGE UP (ref 0.2–1.2)
BUN SERPL-MCNC: 26 MG/DL — HIGH (ref 7–23)
CALCIUM SERPL-MCNC: 8.4 MG/DL — SIGNIFICANT CHANGE UP (ref 8.4–10.5)
CHLORIDE SERPL-SCNC: 108 MMOL/L — SIGNIFICANT CHANGE UP (ref 96–108)
CO2 SERPL-SCNC: 31 MMOL/L — SIGNIFICANT CHANGE UP (ref 22–31)
CREAT SERPL-MCNC: 0.84 MG/DL — SIGNIFICANT CHANGE UP (ref 0.5–1.3)
EGFR: 72 ML/MIN/1.73M2 — SIGNIFICANT CHANGE UP
GLUCOSE SERPL-MCNC: 93 MG/DL — SIGNIFICANT CHANGE UP (ref 70–99)
HCT VFR BLD CALC: 26.2 % — LOW (ref 34.5–45)
HGB BLD-MCNC: 8.1 G/DL — LOW (ref 11.5–15.5)
MCHC RBC-ENTMCNC: 30.2 PG — SIGNIFICANT CHANGE UP (ref 27–34)
MCHC RBC-ENTMCNC: 30.9 GM/DL — LOW (ref 32–36)
MCV RBC AUTO: 97.8 FL — SIGNIFICANT CHANGE UP (ref 80–100)
NRBC # BLD: 0 /100 WBCS — SIGNIFICANT CHANGE UP (ref 0–0)
PLATELET # BLD AUTO: 420 K/UL — HIGH (ref 150–400)
POTASSIUM SERPL-MCNC: 4.6 MMOL/L — SIGNIFICANT CHANGE UP (ref 3.5–5.3)
POTASSIUM SERPL-SCNC: 4.6 MMOL/L — SIGNIFICANT CHANGE UP (ref 3.5–5.3)
PROT SERPL-MCNC: 5.4 G/DL — LOW (ref 6–8.3)
RBC # BLD: 2.68 M/UL — LOW (ref 3.8–5.2)
RBC # FLD: 16.5 % — HIGH (ref 10.3–14.5)
SARS-COV-2 RNA SPEC QL NAA+PROBE: SIGNIFICANT CHANGE UP
SODIUM SERPL-SCNC: 142 MMOL/L — SIGNIFICANT CHANGE UP (ref 135–145)
WBC # BLD: 7.65 K/UL — SIGNIFICANT CHANGE UP (ref 3.8–10.5)
WBC # FLD AUTO: 7.65 K/UL — SIGNIFICANT CHANGE UP (ref 3.8–10.5)

## 2022-11-03 PROCEDURE — 99232 SBSQ HOSP IP/OBS MODERATE 35: CPT

## 2022-11-03 PROCEDURE — 99233 SBSQ HOSP IP/OBS HIGH 50: CPT

## 2022-11-03 RX ADMIN — CELECOXIB 100 MILLIGRAM(S): 200 CAPSULE ORAL at 06:26

## 2022-11-03 RX ADMIN — Medication 650 MILLIGRAM(S): at 21:50

## 2022-11-03 RX ADMIN — Medication 650 MILLIGRAM(S): at 14:22

## 2022-11-03 RX ADMIN — PANTOPRAZOLE SODIUM 40 MILLIGRAM(S): 20 TABLET, DELAYED RELEASE ORAL at 06:26

## 2022-11-03 RX ADMIN — NYSTATIN CREAM 1 APPLICATION(S): 100000 CREAM TOPICAL at 06:28

## 2022-11-03 RX ADMIN — Medication 650 MILLIGRAM(S): at 22:34

## 2022-11-03 RX ADMIN — Medication 650 MILLIGRAM(S): at 15:22

## 2022-11-03 RX ADMIN — NYSTATIN CREAM 1 APPLICATION(S): 100000 CREAM TOPICAL at 20:38

## 2022-11-03 RX ADMIN — APIXABAN 2.5 MILLIGRAM(S): 2.5 TABLET, FILM COATED ORAL at 17:26

## 2022-11-03 RX ADMIN — APIXABAN 2.5 MILLIGRAM(S): 2.5 TABLET, FILM COATED ORAL at 06:26

## 2022-11-03 RX ADMIN — ZINC OXIDE 1 APPLICATION(S): 200 OINTMENT TOPICAL at 20:38

## 2022-11-03 RX ADMIN — SENNA PLUS 2 TABLET(S): 8.6 TABLET ORAL at 21:50

## 2022-11-03 RX ADMIN — Medication 150 MICROGRAM(S): at 06:26

## 2022-11-03 RX ADMIN — Medication 650 MILLIGRAM(S): at 06:30

## 2022-11-03 RX ADMIN — Medication 6 MILLIGRAM(S): at 21:50

## 2022-11-03 RX ADMIN — ATORVASTATIN CALCIUM 20 MILLIGRAM(S): 80 TABLET, FILM COATED ORAL at 21:50

## 2022-11-03 RX ADMIN — ZINC OXIDE 1 APPLICATION(S): 200 OINTMENT TOPICAL at 06:27

## 2022-11-03 NOTE — PROGRESS NOTE ADULT - ASSESSMENT
75 y/o F with PMH HTN, HLD, hypothyroidism, SLE, RA, thyroid CA s/p resection, hx of DVT (on Eliquis), lung cancer (s/p tumor removal, no chemo no radiation), Right FELICITAS (Jonathan, '19), Left TKA (Jonathanuss, '20), Right FELICITAS (Jonathanuss, '22) presented [facility] ED on 10/17 s/p mechanical fall with severe right hip pain and inability to ambulate. She was transferred to [facility] where she had ORIF of right femur on 10/20 with Dr. Leach. Hospital course complicated by ABLA s/p pRBC. Now admitted to [facility] for initiation of multidisciplinary rehab program.     #Right femur fracture  -s/p ORIF of right femur/THR revision on 10/20 with Dr. Leach  -Completed Bactrim BID for hip ppx   -Continue comprehensive rehab program - PT/OT/SLP per rehab team  -Pain management, bowel regimen per rehab     #Anemia  Likely post op anemia  -H/H stable, no overt signs of bleeding  -Transfuse prn to maintain Hb>7  -Continue iron supplement daily    #Hypothyroidism  -Continue Synthroid    #HLD  -Continue Atorvastatin    #DVT ppx - Eliquis 2.5mg BID  #GI ppx - Protonix

## 2022-11-03 NOTE — PROGRESS NOTE ADULT - ASSESSMENT
· Assessment	  This is a 77 YO female with PMH of HTN, HLD, heart murmur, hypothyroidism, SLE, RA, thyroid CA s/p resection, DVT (on eliquis), lung cancer (s/p tumor removal, no chemo no radiation), Right FELICITAS (Jonathan, '19), Left TKA (Jonathan, '20), Right FELICITAS (Jonathan, '22) presented to [facility] ED on 10/17 s/p mechanical fall with severe right hip pain and inability to ambulate. She was transferred to [facility] where she had ORIF of right femur on 10/20 with Dr. Leach. ABLA now s/p 1 U PRBC intraop and 1 U post op. Leukocytosis is likely reactive to surgery and decadron. Patient now with gait Instability, ADL impairments and Functional impairments.    * Sustained functional improvement, pain controlled, transfers much improved  * Anemia--monitor  * Family training prior to dc     * DC cerebrex 12 days post op --will dc     # Right femur fracture  - ORIF of right femur/THR revision on 10/20 with Dr. Leach  (Lateral Approach)  --Continue PT/OT  -- Lateral + driving hip precaution   - WB Status: WBAT  --*NELLY dressing removed 10/27, wound covered with aquacel  staples in situ f/u with surgeon post d/c    HIP PRECAUTIONS  No hip adduction past neutral,   No hip internal rotation past neutral,   No hip flexion >90.   Use abduction pillow while in bed  Do not sit on low chairs or low toilet seats.    * Anemia--monitor  Hypothyroidism  - synthroid    HLD  - Atorvastatin    #Pain management  - Tylenol Q8, Oxycodone PRN, celebrex x 21 days post op, d/c 11/3    #DVT ppx  - Eliquis     #GI ppx  - Protonix 40mg    #Bowel Regimen  - Senna, miralax PRN    #Bladder management  - Monitor UO    #FEN   - Diet: Regular    #Skin:  - desitin to groin area for IAD  - Nystatin under breast and abd folds for candida management  --NELLY removed 0/27    #Sleep:   - Maintain quiet hours and low stim environment.  - Melatonin     #Precaution  - Fall, Hip     #GOC  CODE STATUS: FULL CODE     IDT--10/26  Has significant support from Son for IADLs, Has ramp, no DENISE  Function--Requiring Mod A for transfers  Slow to follow through with tasks during therapy  Max assistance for bed mobility  Ambulating 45 ft with RW Min Assistance with WC follow through  Barriers--  Goal--independence with ADLs, able to transfer to shower, ambulate from bed to bathroom with min A  Est dc 11/12 to home, will revise to earlier date if not progressing towards home DC      Outpatient Follow-up (Specialty/Name of physician):  Sin Leach  [address]  [place]  [phone]  F/U 2 weeks    Liaison with family--I called Ms Lr, patient's daughter to give update on patient's function and d/c plan, no response. I left a voice msg for call back    [facility]  Scheduled 10/22/22    Josselin Ramirez  [facility] Physician [facility]  Cardiology [id number]  Scheduled 12/19/22    F/U with family Physician 2-3 weeks after discharge

## 2022-11-03 NOTE — PROGRESS NOTE ADULT - SUBJECTIVE AND OBJECTIVE BOX
Patient is a 76y old  Female who presents with a chief complaint of R ORIF revision (02 Nov 2022 11:07)      Patient seen and examined at bedside. denies headache, fever, chills, cp, sob, n/v, abd pain.      ALLERGIES:  No Known Allergies    MEDICATIONS  (STANDING):  acetaminophen     Tablet .. 650 milliGRAM(s) Oral every 8 hours  apixaban 2.5 milliGRAM(s) Oral every 12 hours  atorvastatin 20 milliGRAM(s) Oral at bedtime  celecoxib 100 milliGRAM(s) Oral every 12 hours  ferrous    sulfate 325 milliGRAM(s) Oral daily  levothyroxine 150 MICROGram(s) Oral daily  melatonin 6 milliGRAM(s) Oral at bedtime  nystatin Powder 1 Application(s) Topical every 12 hours  pantoprazole    Tablet 40 milliGRAM(s) Oral before breakfast  polyethylene glycol 3350 17 Gram(s) Oral at bedtime  senna 2 Tablet(s) Oral at bedtime  zinc oxide 40% Paste 1 Application(s) Topical every 12 hours    MEDICATIONS  (PRN):  magnesium hydroxide Suspension 30 milliLiter(s) Oral daily PRN Constipation  oxyCODONE    IR 5 milliGRAM(s) Oral every 3 hours PRN Severe Pain (7 - 10)  oxyCODONE    IR 2.5 milliGRAM(s) Oral every 3 hours PRN Moderate Pain (4 - 6)    Vital Signs Last 24 Hrs  T(F): 98 (03 Nov 2022 08:15), Max: 98.4 (02 Nov 2022 22:00)  HR: 62 (03 Nov 2022 08:15) (62 - 63)  BP: 128/71 (03 Nov 2022 08:15) (106/54 - 128/71)  RR: 16 (03 Nov 2022 08:15) (15 - 16)  SpO2: 99% (03 Nov 2022 08:15) (99% - 99%)  I&O's Summary      PHYSICAL EXAM:  General: NAD  ENT: MMM, no scleral icterus  Neck: Supple, No JVD  Lungs: Clear to auscultation bilaterally, no wheezes, rales, rhonchi  Cardio: RRR, S1/S2, +systolic murmur  Abdomen: Soft, Nontender, Nondistended; Bowel sounds present  Extremities: No calf tenderness, +trace LE edema b/l    LABS:                        8.1    7.65  )-----------( 420      ( 03 Nov 2022 06:54 )             26.2       11-03    142  |  108  |  26  ----------------------------<  93  4.6   |  31  |  0.84    Ca    8.4      03 Nov 2022 06:54    TPro  5.4  /  Alb  1.9  /  TBili  0.5  /  DBili  x   /  AST  20  /  ALT  19  /  AlkPhos  112  11-03                                      COVID-19 PCR: NotDetec (10-28-22 @ 05:30)  COVID-19 PCR: NotDetec (10-23-22 @ 06:28)  COVID-19 PCR: NotDetec (10-21-22 @ 18:00)      RADIOLOGY & ADDITIONAL TESTS: reviewed    Care Discussed with Consultants/Other Providers: yes, rehab

## 2022-11-03 NOTE — PROGRESS NOTE ADULT - SUBJECTIVE AND OBJECTIVE BOX
Subjective/ROS  Seen and examined, chart examined.  No med complaint  Pain controlled    Therapy--engaging, continued improvement    Labs unremarkable 10/31  Labs reviewed, unremarkable    ROS  No chest pain, no shortness of breath, no cough  No headache, no dizziness  No nausea, no abdominal pain  No dysuria, no frequency  LBM 11/3    Vital Signs Last 24 Hrs  T(C): 36.7 (03 Nov 2022 08:15), Max: 36.9 (02 Nov 2022 22:00)  T(F): 98 (03 Nov 2022 08:15), Max: 98.4 (02 Nov 2022 22:00)  HR: 62 (03 Nov 2022 08:15) (62 - 63)  BP: 128/71 (03 Nov 2022 08:15) (106/54 - 128/71)  RR: 16 (03 Nov 2022 08:15) (15 - 16)  SpO2: 99% (03 Nov 2022 08:15) (99% - 99%)  O2 Parameters below as of 03 Nov 2022 08:15  Patient On (Oxygen Delivery Method): room air      EXAM  Gen -  Comfortable, interactive   HEENT - NAD  Neck - No limited ROM  Pulm - normal resp rate  Cardiovascular - warn and well perfused  Abdomen - Soft, non tender   Extremities - No calf tenderness, chronic skin changes,  edema RLE, significantly reduced, has ace wrap in situ both legs    Neuro-     Cognitive - AAOx4,     Communication - Fluent, No dysarthria     Attention: Intact      Cranial Nerves - CN 2-12 grossly intact     Motor -                    LEFT    UE - ShAB 5/5, EF 5/5, EE 5/5, WE 5/5,  5/5.                      RIGHT UE - ShAB 5/5, EF 5/5, EE 5/5, WE 5/5,  5/5                    LEFT    LE - HF 5/5, KE 5/5, DF 5/5, PF 5/5                    RIGHT LE - HF and KE 3/5, DF 5/5, PF 5/5        Sensory - Intact to LT     Tone - normal  Psychiatric - Mood stable, Affect WNL  Skin: skin excuriation on abd fold resolving  Wounds aquacell and tagedon on right lat hip, no bleeding                                8.1    7.65  )-----------( 420      ( 03 Nov 2022 06:54 )             26.2     11-03    142  |  108  |  26<H>  ----------------------------<  93  4.6   |  31  |  0.84    Ca    8.4      03 Nov 2022 06:54    TPro  5.4<L>  /  Alb  1.9<L>  /  TBili  0.5  /  DBili  x   /  AST  20  /  ALT  19  /  AlkPhos  112  11-03      MEDICATIONS  (STANDING):  acetaminophen     Tablet .. 650 milliGRAM(s) Oral every 8 hours  apixaban 2.5 milliGRAM(s) Oral every 12 hours  atorvastatin 20 milliGRAM(s) Oral at bedtime  celecoxib 100 milliGRAM(s) Oral every 12 hours  ferrous    sulfate 325 milliGRAM(s) Oral daily  levothyroxine 150 MICROGram(s) Oral daily  melatonin 6 milliGRAM(s) Oral at bedtime  nystatin Powder 1 Application(s) Topical every 12 hours  pantoprazole    Tablet 40 milliGRAM(s) Oral before breakfast  polyethylene glycol 3350 17 Gram(s) Oral at bedtime  senna 2 Tablet(s) Oral at bedtime  zinc oxide 40% Paste 1 Application(s) Topical every 12 hours    MEDICATIONS  (PRN):  magnesium hydroxide Suspension 30 milliLiter(s) Oral daily PRN Constipation  oxyCODONE    IR 5 milliGRAM(s) Oral every 3 hours PRN Severe Pain (7 - 10)  oxyCODONE    IR 2.5 milliGRAM(s) Oral every 3 hours PRN Moderate Pain (4 - 6)

## 2022-11-04 PROCEDURE — 99233 SBSQ HOSP IP/OBS HIGH 50: CPT

## 2022-11-04 PROCEDURE — 99232 SBSQ HOSP IP/OBS MODERATE 35: CPT

## 2022-11-04 RX ORDER — LIDOCAINE 4 G/100G
1 CREAM TOPICAL DAILY
Refills: 0 | Status: DISCONTINUED | OUTPATIENT
Start: 2022-11-04 | End: 2022-11-12

## 2022-11-04 RX ADMIN — Medication 650 MILLIGRAM(S): at 21:51

## 2022-11-04 RX ADMIN — APIXABAN 2.5 MILLIGRAM(S): 2.5 TABLET, FILM COATED ORAL at 17:38

## 2022-11-04 RX ADMIN — Medication 650 MILLIGRAM(S): at 22:10

## 2022-11-04 RX ADMIN — ATORVASTATIN CALCIUM 20 MILLIGRAM(S): 80 TABLET, FILM COATED ORAL at 21:52

## 2022-11-04 RX ADMIN — SENNA PLUS 2 TABLET(S): 8.6 TABLET ORAL at 21:51

## 2022-11-04 RX ADMIN — Medication 325 MILLIGRAM(S): at 12:34

## 2022-11-04 RX ADMIN — PANTOPRAZOLE SODIUM 40 MILLIGRAM(S): 20 TABLET, DELAYED RELEASE ORAL at 05:35

## 2022-11-04 RX ADMIN — LIDOCAINE 1 PATCH: 4 CREAM TOPICAL at 19:08

## 2022-11-04 RX ADMIN — Medication 6 MILLIGRAM(S): at 21:51

## 2022-11-04 RX ADMIN — LIDOCAINE 1 PATCH: 4 CREAM TOPICAL at 12:35

## 2022-11-04 RX ADMIN — NYSTATIN CREAM 1 APPLICATION(S): 100000 CREAM TOPICAL at 05:37

## 2022-11-04 RX ADMIN — Medication 650 MILLIGRAM(S): at 13:04

## 2022-11-04 RX ADMIN — Medication 650 MILLIGRAM(S): at 14:40

## 2022-11-04 RX ADMIN — ZINC OXIDE 1 APPLICATION(S): 200 OINTMENT TOPICAL at 17:39

## 2022-11-04 RX ADMIN — Medication 150 MICROGRAM(S): at 05:36

## 2022-11-04 RX ADMIN — APIXABAN 2.5 MILLIGRAM(S): 2.5 TABLET, FILM COATED ORAL at 05:35

## 2022-11-04 RX ADMIN — ZINC OXIDE 1 APPLICATION(S): 200 OINTMENT TOPICAL at 05:37

## 2022-11-04 RX ADMIN — NYSTATIN CREAM 1 APPLICATION(S): 100000 CREAM TOPICAL at 17:38

## 2022-11-04 RX ADMIN — Medication 650 MILLIGRAM(S): at 06:30

## 2022-11-04 RX ADMIN — Medication 650 MILLIGRAM(S): at 05:36

## 2022-11-04 NOTE — PROGRESS NOTE ADULT - ASSESSMENT
· Assessment	  This is a 77 YO female with PMH of HTN, HLD, heart murmur, hypothyroidism, SLE, RA, thyroid CA s/p resection, DVT (on eliquis), lung cancer (s/p tumor removal, no chemo no radiation), Right FELICITAS (Jonathan, '19), Left TKA (Jonathan, '20), Right FELICITAS (Jonathan, '22) presented to [facility] ED on 10/17 s/p mechanical fall with severe right hip pain and inability to ambulate. She was transferred to [facility] where she had ORIF of right femur on 10/20 with Dr. Leach. ABLA now s/p 1 U PRBC intraop and 1 U post op. Leukocytosis is likely reactive to surgery and decadron. Patient now with gait Instability, ADL impairments and Functional impairments.    * Sustained functional improvement, pain controlled, transfers much improved  * Rt shoulder OA recurrence --apply lidocaine patch, will recommence cerebrex if symptoms persist  * Family training prior to dc    * Dced cerebrex  (11/3) as recs by Surgeon for treatment till 12 days post op -    # Right femur fracture  - ORIF of right femur/THR revision on 10/20 with Dr. Leach  (Lateral Approach)  --Continue PT/OT  -- Lateral + driving hip precaution   - WB Status: WBAT  --*NELLY dressing removed 10/27, wound covered with aquacel  staples in situ f/u with surgeon post d/c    HIP PRECAUTIONS  No hip adduction past neutral,   No hip internal rotation past neutral,   No hip flexion >90.   Use abduction pillow while in bed  Do not sit on low chairs or low toilet seats.    * Anemia--monitor  Hypothyroidism  - synthroid    * Rt shoulder OA--recurrence 11/4 (after stopping celebrex 11/30) will commence lidocaine, if persistent will recommence celebrex  HX of OA Rt shoulderr s/p previous inj treatments most recently 6/2022--f/u with Rheumatology post dc     HLD  - Atorvastatin    #Pain management  - Tylenol Q8, Oxycodone PRN, celebrex x 21 days post op, d/c 11/3    #DVT ppx  - Eliquis     #GI ppx  - Protonix 40mg    #Bowel Regimen  - Senna, miralax PRN    #Bladder management  - Monitor UO    #FEN   - Diet: Regular    #Skin:  - desitin to groin area for IAD  - Nystatin under breast and abd folds for candida management  --NELLY removed 0/27    #Sleep:   - Maintain quiet hours and low stim environment.  - Melatonin     #Precaution  - Fall, Hip     #GOC  CODE STATUS: FULL CODE     IDT--10/26  Has significant support from Son for IADLs, Has ramp, no DENISE  Function--Requiring Mod A for transfers  Slow to follow through with tasks during therapy  Max assistance for bed mobility  Ambulating 45 ft with RW Min Assistance with WC follow through  Barriers--  Goal--independence with ADLs, able to transfer to shower, ambulate from bed to bathroom with min A  Est dc 11/12 to home, will revise to earlier date if not progressing towards home DC      Outpatient Follow-up (Specialty/Name of physician):  Sin Leach  [address]  [place]  [phone]  F/U 2 weeks    Liaison with family--I called Ms Lr, patient's daughter to give update on patient's function and d/c plan, no response. I left a voice msg for call back    [facility] [place]  Scheduled 10/22/22    Josselin Ramirez  [facility] Physician [facility]  Cardiology [phone]   Scheduled 12/19/22    F/U with family Physician 2-3 weeks after discharge

## 2022-11-04 NOTE — PROGRESS NOTE ADULT - SUBJECTIVE AND OBJECTIVE BOX
Subjective/ROS  Seen and examined,   Reports Rt shoulder pain, reports Rt shoulder OA with hx of injection treatment from her Rheumatoid arthritis, most recently 6/2022  No med complaint  Pain controlled    Therapy--engaging, continued improvement,  But has difficulty with Rt shoulder elevation, pain with abd beyond 90 deg  (Celebrex was d/ayan 11/3) after completing  12 post op as per surgical recs    Labs reviewed, unremarkable    ROS  No chest pain, no shortness of breath, no cough  No headache, no dizziness  No nausea, no abdominal pain  No dysuria, no frequency  LBM 11/4    Vital Signs Last 24 Hrs  T(C): 36.6 (04 Nov 2022 07:32), Max: 36.6 (04 Nov 2022 07:32)  T(F): 97.9 (04 Nov 2022 07:32), Max: 97.9 (04 Nov 2022 07:32)  HR: 54 (04 Nov 2022 07:32) (54 - 64)  BP: 108/62 (04 Nov 2022 07:32) (108/62 - 122/58)  RR: 16 (04 Nov 2022 07:32) (16 - 16)  SpO2: 100% (04 Nov 2022 07:32) (98% - 100%)  O2 Parameters below as of 04 Nov 2022 07:32  Patient On (Oxygen Delivery Method): room air    EXAM  Gen -  Comfortable, interactive   HEENT - NAD  Neck - No limited ROM  Pulm - normal resp rate  Cardiovascular - warn and well perfused  Abdomen - Soft, non tender   Extremities - No calf tenderness, chronic skin changes,  edema RLE, significantly reduced, has ace wrap in situ both legs    Neuro-     Cognitive - AAOx4,     Communication - Fluent, No dysarthria     Attention: Intact      Cranial Nerves - CN 2-12 grossly intact     Motor -                    LEFT    UE - ShAB 5/5, EF 5/5, EE 5/5, WE 5/5,  5/5.                      RIGHT UE - ShAB 5/5, EF 5/5, EE 5/5, WE 5/5,  5/5                    LEFT    LE - HF 5/5, KE 5/5, DF 5/5, PF 5/5                    RIGHT LE - HF and KE 3/5, DF 5/5, PF 5/5        Sensory - Intact to LT     Tone - normal  Psychiatric - Mood stable, Affect WNL  Skin: skin excuriation on abd fold resolving  Wounds aquacell and tagedon on right lat hip, no bleeding                                8.1    7.65  )-----------( 420      ( 03 Nov 2022 06:54 )             26.2     11-03    142  |  108  |  26<H>  ----------------------------<  93  4.6   |  31  |  0.84    Ca    8.4      03 Nov 2022 06:54    TPro  5.4<L>  /  Alb  1.9<L>  /  TBili  0.5  /  DBili  x   /  AST  20  /  ALT  19  /  AlkPhos  112  11-03      MEDICATIONS  (STANDING):  acetaminophen     Tablet .. 650 milliGRAM(s) Oral every 8 hours  apixaban 2.5 milliGRAM(s) Oral every 12 hours  atorvastatin 20 milliGRAM(s) Oral at bedtime  ferrous    sulfate 325 milliGRAM(s) Oral daily  levothyroxine 150 MICROGram(s) Oral daily  lidocaine   4% Patch 1 Patch Transdermal daily  melatonin 6 milliGRAM(s) Oral at bedtime  nystatin Powder 1 Application(s) Topical every 12 hours  pantoprazole    Tablet 40 milliGRAM(s) Oral before breakfast  polyethylene glycol 3350 17 Gram(s) Oral at bedtime  senna 2 Tablet(s) Oral at bedtime  zinc oxide 40% Paste 1 Application(s) Topical every 12 hours    MEDICATIONS  (PRN):  magnesium hydroxide Suspension 30 milliLiter(s) Oral daily PRN Constipation  oxyCODONE    IR 5 milliGRAM(s) Oral every 3 hours PRN Severe Pain (7 - 10)  oxyCODONE    IR 2.5 milliGRAM(s) Oral every 3 hours PRN Moderate Pain (4 - 6)

## 2022-11-04 NOTE — PROGRESS NOTE ADULT - SUBJECTIVE AND OBJECTIVE BOX
Patient is a 76y old  Female who presents with a chief complaint of R ORIF revision (02 Nov 2022 11:07)      Patient seen and examined at bedside. denies headache, fever, chills, cp, sob, n/v, abd pain. c/o mild right shoulder pain this am, improved with warm compress.      ALLERGIES:  No Known Allergies    MEDICATIONS  (STANDING):  acetaminophen     Tablet .. 650 milliGRAM(s) Oral every 8 hours  apixaban 2.5 milliGRAM(s) Oral every 12 hours  atorvastatin 20 milliGRAM(s) Oral at bedtime  celecoxib 100 milliGRAM(s) Oral every 12 hours  ferrous    sulfate 325 milliGRAM(s) Oral daily  levothyroxine 150 MICROGram(s) Oral daily  melatonin 6 milliGRAM(s) Oral at bedtime  nystatin Powder 1 Application(s) Topical every 12 hours  pantoprazole    Tablet 40 milliGRAM(s) Oral before breakfast  polyethylene glycol 3350 17 Gram(s) Oral at bedtime  senna 2 Tablet(s) Oral at bedtime  zinc oxide 40% Paste 1 Application(s) Topical every 12 hours    MEDICATIONS  (PRN):  magnesium hydroxide Suspension 30 milliLiter(s) Oral daily PRN Constipation  oxyCODONE    IR 5 milliGRAM(s) Oral every 3 hours PRN Severe Pain (7 - 10)  oxyCODONE    IR 2.5 milliGRAM(s) Oral every 3 hours PRN Moderate Pain (4 - 6)    Vital Signs Last 24 Hrs  T(C): 36.6 (04 Nov 2022 07:32), Max: 36.6 (04 Nov 2022 07:32)  T(F): 97.9 (04 Nov 2022 07:32), Max: 97.9 (04 Nov 2022 07:32)  HR: 54 (04 Nov 2022 07:32) (54 - 64)  BP: 108/62 (04 Nov 2022 07:32) (108/62 - 122/58)  BP(mean): --  RR: 16 (04 Nov 2022 07:32) (16 - 16)  SpO2: 100% (04 Nov 2022 07:32) (98% - 100%)    Parameters below as of 04 Nov 2022 07:32  Patient On (Oxygen Delivery Method): room air      PHYSICAL EXAM:  General: NAD  ENT: MMM, no scleral icterus  Neck: Supple, No JVD  Lungs: Clear to auscultation bilaterally, no wheezes, rales, rhonchi  Cardio: RRR, S1/S2, +systolic murmur  Abdomen: Soft, Nontender, Nondistended; Bowel sounds present  Extremities: No calf tenderness, +trace LE edema b/l    LABS:                        8.1    7.65  )-----------( 420      ( 03 Nov 2022 06:54 )             26.2       11-03    142  |  108  |  26  ----------------------------<  93  4.6   |  31  |  0.84    Ca    8.4      03 Nov 2022 06:54    TPro  5.4  /  Alb  1.9  /  TBili  0.5  /  DBili  x   /  AST  20  /  ALT  19  /  AlkPhos  112  11-03                                      COVID-19 PCR: NotDetec (10-28-22 @ 05:30)  COVID-19 PCR: NotDetec (10-23-22 @ 06:28)  COVID-19 PCR: NotDetec (10-21-22 @ 18:00)      RADIOLOGY & ADDITIONAL TESTS: reviewed    Care Discussed with Consultants/Other Providers: yes, rehab

## 2022-11-04 NOTE — PROGRESS NOTE ADULT - ASSESSMENT
75 y/o F with PMH HTN, HLD, hypothyroidism, SLE, RA, thyroid CA s/p resection, hx of DVT (on Eliquis), lung cancer (s/p tumor removal, no chemo no radiation), Right FELICITAS (Jonathan, '19), Left TKA (Jonathanuss, '20), Right FELICITAS (Jonathanuss, '22) presented to [facility] ED on 10/17 s/p mechanical fall with severe right hip pain and inability to ambulate. She was transferred to [facility] where she had ORIF of right femur on 10/20 with Dr. Leach. Hospital course complicated by ABLA s/p pRBC. Now admitted to [facility] for initiation of multidisciplinary rehab program.     #Right femur fracture  -s/p ORIF of right femur/THR revision on 10/20 with Dr. Leach  -Completed Bactrim BID for hip ppx   -Continue comprehensive rehab program - PT/OT/SLP per rehab team  -Pain management, bowel regimen per rehab     #Anemia  Likely post op anemia  -H/H stable, no overt signs of bleeding  -Transfuse prn to maintain Hb>7  -Continue iron supplement daily    #Hypothyroidism  -Continue Synthroid    #HLD  -Continue Atorvastatin    #DVT ppx - Eliquis 2.5mg BID  #GI ppx - Protonix

## 2022-11-05 PROCEDURE — 99232 SBSQ HOSP IP/OBS MODERATE 35: CPT

## 2022-11-05 RX ADMIN — LIDOCAINE 1 PATCH: 4 CREAM TOPICAL at 19:17

## 2022-11-05 RX ADMIN — Medication 6 MILLIGRAM(S): at 21:17

## 2022-11-05 RX ADMIN — LIDOCAINE 1 PATCH: 4 CREAM TOPICAL at 00:21

## 2022-11-05 RX ADMIN — ZINC OXIDE 1 APPLICATION(S): 200 OINTMENT TOPICAL at 05:40

## 2022-11-05 RX ADMIN — Medication 325 MILLIGRAM(S): at 12:12

## 2022-11-05 RX ADMIN — APIXABAN 2.5 MILLIGRAM(S): 2.5 TABLET, FILM COATED ORAL at 17:38

## 2022-11-05 RX ADMIN — NYSTATIN CREAM 1 APPLICATION(S): 100000 CREAM TOPICAL at 17:38

## 2022-11-05 RX ADMIN — APIXABAN 2.5 MILLIGRAM(S): 2.5 TABLET, FILM COATED ORAL at 05:40

## 2022-11-05 RX ADMIN — Medication 650 MILLIGRAM(S): at 06:37

## 2022-11-05 RX ADMIN — Medication 650 MILLIGRAM(S): at 05:40

## 2022-11-05 RX ADMIN — Medication 650 MILLIGRAM(S): at 14:41

## 2022-11-05 RX ADMIN — Medication 650 MILLIGRAM(S): at 21:31

## 2022-11-05 RX ADMIN — SENNA PLUS 2 TABLET(S): 8.6 TABLET ORAL at 21:17

## 2022-11-05 RX ADMIN — Medication 650 MILLIGRAM(S): at 21:17

## 2022-11-05 RX ADMIN — PANTOPRAZOLE SODIUM 40 MILLIGRAM(S): 20 TABLET, DELAYED RELEASE ORAL at 05:39

## 2022-11-05 RX ADMIN — Medication 650 MILLIGRAM(S): at 13:31

## 2022-11-05 RX ADMIN — Medication 150 MICROGRAM(S): at 05:40

## 2022-11-05 RX ADMIN — LIDOCAINE 1 PATCH: 4 CREAM TOPICAL at 08:14

## 2022-11-05 RX ADMIN — ZINC OXIDE 1 APPLICATION(S): 200 OINTMENT TOPICAL at 17:38

## 2022-11-05 RX ADMIN — LIDOCAINE 1 PATCH: 4 CREAM TOPICAL at 21:22

## 2022-11-05 RX ADMIN — NYSTATIN CREAM 1 APPLICATION(S): 100000 CREAM TOPICAL at 05:41

## 2022-11-05 RX ADMIN — ATORVASTATIN CALCIUM 20 MILLIGRAM(S): 80 TABLET, FILM COATED ORAL at 21:17

## 2022-11-05 NOTE — PROGRESS NOTE ADULT - SUBJECTIVE AND OBJECTIVE BOX
Patient is a 76y old  Female who presents with a chief complaint of R ORIF revision (02 Nov 2022 11:07)      Patient seen and examined at bedside. no acute medical complaints. denies headache, fever, chills, cp, sob, n/v, abd pain.      ALLERGIES:  No Known Allergies    MEDICATIONS  (STANDING):  acetaminophen     Tablet .. 650 milliGRAM(s) Oral every 8 hours  apixaban 2.5 milliGRAM(s) Oral every 12 hours  atorvastatin 20 milliGRAM(s) Oral at bedtime  celecoxib 100 milliGRAM(s) Oral every 12 hours  ferrous    sulfate 325 milliGRAM(s) Oral daily  levothyroxine 150 MICROGram(s) Oral daily  melatonin 6 milliGRAM(s) Oral at bedtime  nystatin Powder 1 Application(s) Topical every 12 hours  pantoprazole    Tablet 40 milliGRAM(s) Oral before breakfast  polyethylene glycol 3350 17 Gram(s) Oral at bedtime  senna 2 Tablet(s) Oral at bedtime  zinc oxide 40% Paste 1 Application(s) Topical every 12 hours    MEDICATIONS  (PRN):  magnesium hydroxide Suspension 30 milliLiter(s) Oral daily PRN Constipation  oxyCODONE    IR 5 milliGRAM(s) Oral every 3 hours PRN Severe Pain (7 - 10)  oxyCODONE    IR 2.5 milliGRAM(s) Oral every 3 hours PRN Moderate Pain (4 - 6)    Vital Signs Last 24 Hrs  T(C): 36.7 (05 Nov 2022 08:12), Max: 36.7 (04 Nov 2022 20:11)  T(F): 98.1 (05 Nov 2022 08:12), Max: 98.1 (05 Nov 2022 08:12)  HR: 72 (05 Nov 2022 08:12) (64 - 72)  BP: 93/59 (05 Nov 2022 08:12) (93/59 - 112/60)  BP(mean): --  RR: 14 (05 Nov 2022 08:12) (14 - 16)  SpO2: 98% (05 Nov 2022 08:12) (98% - 100%)    Parameters below as of 04 Nov 2022 20:11  Patient On (Oxygen Delivery Method): room air      PHYSICAL EXAM:  General: NAD  ENT: MMM, no scleral icterus  Neck: Supple, No JVD  Lungs: Clear to auscultation bilaterally, no wheezes, rales, rhonchi  Cardio: RRR, S1/S2, +systolic murmur  Abdomen: Soft, Nontender, Nondistended; Bowel sounds present  Extremities: No calf tenderness, +trace LE edema b/l    LABS:                        8.1    7.65  )-----------( 420      ( 03 Nov 2022 06:54 )             26.2       11-03    142  |  108  |  26  ----------------------------<  93  4.6   |  31  |  0.84    Ca    8.4      03 Nov 2022 06:54    TPro  5.4  /  Alb  1.9  /  TBili  0.5  /  DBili  x   /  AST  20  /  ALT  19  /  AlkPhos  112  11-03                                      COVID-19 PCR: NotDetec (10-28-22 @ 05:30)  COVID-19 PCR: NotDetec (10-23-22 @ 06:28)  COVID-19 PCR: NotDetec (10-21-22 @ 18:00)      RADIOLOGY & ADDITIONAL TESTS: reviewed    Care Discussed with Consultants/Other Providers: yes, rehab

## 2022-11-05 NOTE — PROGRESS NOTE ADULT - ASSESSMENT
77 y/o F with PMH HTN, HLD, hypothyroidism, SLE, RA, thyroid CA s/p resection, hx of DVT (on Eliquis), lung cancer (s/p tumor removal, no chemo no radiation), Right FELICITAS (Jonathan, '19), Left TKA (Jonathanuss, '20), Right FELICITAS (Jonathanuss, '22) presented to [facility] ED on 10/17 s/p mechanical fall with severe right hip pain and inability to ambulate. She was transferred to [facility] where she had ORIF of right femur on 10/20 with Dr. Leach. Hospital course complicated by ABLA s/p pRBC. Now admitted to [facility] for initiation of multidisciplinary rehab program.     #Right femur fracture  -s/p ORIF of right femur/THR revision on 10/20 with Dr. Leach  -Completed Bactrim BID for hip ppx   -Continue comprehensive rehab program - PT/OT/SLP per rehab team  -Pain management, bowel regimen per rehab     #Anemia  Likely post op anemia  -H/H stable, no overt signs of bleeding  -Transfuse prn to maintain Hb>7  -Continue iron supplement daily    #Hypothyroidism  -Continue Synthroid    #HLD  -Continue Atorvastatin    #DVT ppx - Eliquis 2.5mg BID  #GI ppx - Protonix

## 2022-11-06 PROCEDURE — 99231 SBSQ HOSP IP/OBS SF/LOW 25: CPT

## 2022-11-06 PROCEDURE — 99232 SBSQ HOSP IP/OBS MODERATE 35: CPT

## 2022-11-06 RX ADMIN — ATORVASTATIN CALCIUM 20 MILLIGRAM(S): 80 TABLET, FILM COATED ORAL at 21:28

## 2022-11-06 RX ADMIN — NYSTATIN CREAM 1 APPLICATION(S): 100000 CREAM TOPICAL at 06:14

## 2022-11-06 RX ADMIN — Medication 650 MILLIGRAM(S): at 21:26

## 2022-11-06 RX ADMIN — ZINC OXIDE 1 APPLICATION(S): 200 OINTMENT TOPICAL at 17:53

## 2022-11-06 RX ADMIN — Medication 650 MILLIGRAM(S): at 06:14

## 2022-11-06 RX ADMIN — Medication 325 MILLIGRAM(S): at 12:12

## 2022-11-06 RX ADMIN — APIXABAN 2.5 MILLIGRAM(S): 2.5 TABLET, FILM COATED ORAL at 06:15

## 2022-11-06 RX ADMIN — Medication 6 MILLIGRAM(S): at 21:26

## 2022-11-06 RX ADMIN — LIDOCAINE 1 PATCH: 4 CREAM TOPICAL at 17:54

## 2022-11-06 RX ADMIN — PANTOPRAZOLE SODIUM 40 MILLIGRAM(S): 20 TABLET, DELAYED RELEASE ORAL at 06:15

## 2022-11-06 RX ADMIN — SENNA PLUS 2 TABLET(S): 8.6 TABLET ORAL at 21:26

## 2022-11-06 RX ADMIN — APIXABAN 2.5 MILLIGRAM(S): 2.5 TABLET, FILM COATED ORAL at 17:52

## 2022-11-06 RX ADMIN — NYSTATIN CREAM 1 APPLICATION(S): 100000 CREAM TOPICAL at 17:53

## 2022-11-06 RX ADMIN — Medication 150 MICROGRAM(S): at 06:15

## 2022-11-06 RX ADMIN — ZINC OXIDE 1 APPLICATION(S): 200 OINTMENT TOPICAL at 06:14

## 2022-11-06 RX ADMIN — LIDOCAINE 1 PATCH: 4 CREAM TOPICAL at 12:12

## 2022-11-06 RX ADMIN — Medication 650 MILLIGRAM(S): at 22:00

## 2022-11-06 RX ADMIN — Medication 650 MILLIGRAM(S): at 06:35

## 2022-11-06 NOTE — PROGRESS NOTE ADULT - SUBJECTIVE AND OBJECTIVE BOX
Cc: Gait dysfunction    HPI: Patient with no new medical issues today.  Reports normal sleep  Tolerating diet    Pain controlled, no chest pain, no N/V, no Fevers/Chills. No other new ROS  Has been tolerating rehabilitation program.    MEDICATIONS  (STANDING):  acetaminophen     Tablet .. 650 milliGRAM(s) Oral every 8 hours  apixaban 2.5 milliGRAM(s) Oral every 12 hours  atorvastatin 20 milliGRAM(s) Oral at bedtime  ferrous    sulfate 325 milliGRAM(s) Oral daily  levothyroxine 150 MICROGram(s) Oral daily  lidocaine   4% Patch 1 Patch Transdermal daily  melatonin 6 milliGRAM(s) Oral at bedtime  nystatin Powder 1 Application(s) Topical every 12 hours  pantoprazole    Tablet 40 milliGRAM(s) Oral before breakfast  polyethylene glycol 3350 17 Gram(s) Oral at bedtime  senna 2 Tablet(s) Oral at bedtime  zinc oxide 40% Paste 1 Application(s) Topical every 12 hours    MEDICATIONS  (PRN):  magnesium hydroxide Suspension 30 milliLiter(s) Oral daily PRN Constipation      Vital Signs Last 24 Hrs  T(C): 36.5 (06 Nov 2022 08:37), Max: 36.7 (05 Nov 2022 21:23)  T(F): 97.7 (06 Nov 2022 08:37), Max: 98 (05 Nov 2022 21:23)  HR: 66 (06 Nov 2022 08:37) (65 - 66)  BP: 98/60 (06 Nov 2022 08:37) (98/60 - 122/67)  BP(mean): --  RR: 15 (06 Nov 2022 08:37) (15 - 16)  SpO2: 98% (06 Nov 2022 08:37) (98% - 98%)    Parameters below as of 05 Nov 2022 21:23  Patient On (Oxygen Delivery Method): room air    In NAD  HEENT- EOMI  Heart- RRR, S1S2  Lungs- CTA bl.  Abd- + BS, NT  Ext- No calf pain    Neuro- Exam AAO X 3  Right hip aqucell in situ      Imp: Patient with diagnosis of Right hip fracture s/p surgery  admitted for comprehensive acute rehabilitation.    Plan:  - Continue therapies PT/OT  - DVT prophylaxis--to continue  - Continue current medications;   - Patient is stable to continue current rehabilitation program.

## 2022-11-06 NOTE — PROGRESS NOTE ADULT - SUBJECTIVE AND OBJECTIVE BOX
Patient is a 76y old  Female who presents with a chief complaint of R ORIF revision (02 Nov 2022 11:07)      Patient seen and examined at bedside. no acute medical complaints. denies headache, fever, chills, cp, sob, n/v, abd pain.    ALLERGIES:  No Known Allergies    MEDICATIONS  (STANDING):  acetaminophen     Tablet .. 650 milliGRAM(s) Oral every 8 hours  apixaban 2.5 milliGRAM(s) Oral every 12 hours  atorvastatin 20 milliGRAM(s) Oral at bedtime  celecoxib 100 milliGRAM(s) Oral every 12 hours  ferrous    sulfate 325 milliGRAM(s) Oral daily  levothyroxine 150 MICROGram(s) Oral daily  melatonin 6 milliGRAM(s) Oral at bedtime  nystatin Powder 1 Application(s) Topical every 12 hours  pantoprazole    Tablet 40 milliGRAM(s) Oral before breakfast  polyethylene glycol 3350 17 Gram(s) Oral at bedtime  senna 2 Tablet(s) Oral at bedtime  zinc oxide 40% Paste 1 Application(s) Topical every 12 hours    MEDICATIONS  (PRN):  magnesium hydroxide Suspension 30 milliLiter(s) Oral daily PRN Constipation  oxyCODONE    IR 5 milliGRAM(s) Oral every 3 hours PRN Severe Pain (7 - 10)  oxyCODONE    IR 2.5 milliGRAM(s) Oral every 3 hours PRN Moderate Pain (4 - 6)    Vital Signs Last 24 Hrs  T(C): 36.5 (06 Nov 2022 08:37), Max: 36.7 (05 Nov 2022 21:23)  T(F): 97.7 (06 Nov 2022 08:37), Max: 98 (05 Nov 2022 21:23)  HR: 66 (06 Nov 2022 08:37) (65 - 66)  BP: 98/60 (06 Nov 2022 08:37) (98/60 - 122/67)  BP(mean): --  RR: 15 (06 Nov 2022 08:37) (15 - 16)  SpO2: 98% (06 Nov 2022 08:37) (98% - 98%)    Parameters below as of 05 Nov 2022 21:23  Patient On (Oxygen Delivery Method): room air      PHYSICAL EXAM:  General: NAD  ENT: MMM, no scleral icterus  Neck: Supple, No JVD  Lungs: Clear to auscultation bilaterally, no wheezes, rales, rhonchi  Cardio: RRR, S1/S2, +systolic murmur  Abdomen: Soft, Nontender, Nondistended; Bowel sounds present  Extremities: No calf tenderness, +trace LE edema b/l    LABS:                        8.1    7.65  )-----------( 420      ( 03 Nov 2022 06:54 )             26.2       11-03    142  |  108  |  26  ----------------------------<  93  4.6   |  31  |  0.84    Ca    8.4      03 Nov 2022 06:54    TPro  5.4  /  Alb  1.9  /  TBili  0.5  /  DBili  x   /  AST  20  /  ALT  19  /  AlkPhos  112  11-03                                      COVID-19 PCR: NotDetec (10-28-22 @ 05:30)  COVID-19 PCR: NotDetec (10-23-22 @ 06:28)  COVID-19 PCR: NotDetec (10-21-22 @ 18:00)      RADIOLOGY & ADDITIONAL TESTS: reviewed    Care Discussed with Consultants/Other Providers: yes, rehab

## 2022-11-06 NOTE — PROGRESS NOTE ADULT - ASSESSMENT
77 y/o F with PMH HTN, HLD, hypothyroidism, SLE, RA, thyroid CA s/p resection, hx of DVT (on Eliquis), lung cancer (s/p tumor removal, no chemo no radiation), Right FELICITAS (Jonathan, '19), Left TKA (Jonathanuss, '20), Right FELICITAS (Jonathanuss, '22) presented to [facility] ED on 10/17 s/p mechanical fall with severe right hip pain and inability to ambulate. She was transferred to [facility] where she had ORIF of right femur on 10/20 with Dr. Leach. Hospital course complicated by ABLA s/p pRBC. Now admitted to [facility] for initiation of multidisciplinary rehab program.     #Right femur fracture  -s/p ORIF of right femur/THR revision on 10/20 with Dr. Leach  -Completed Bactrim BID for hip ppx  -Continue comprehensive rehab program - PT/OT/SLP per rehab team  -Pain management, bowel regimen per rehab     #Anemia  Likely post op anemia  -H/H stable, no overt signs of bleeding  -Transfuse prn to maintain Hb>7  -Continue iron supplement daily    #Hypothyroidism  -Continue Synthroid    #HLD  -Continue Atorvastatin    #DVT ppx - Eliquis 2.5mg BID  #GI ppx - Protonix

## 2022-11-07 LAB
ALBUMIN SERPL ELPH-MCNC: 2 G/DL — LOW (ref 3.3–5)
ALP SERPL-CCNC: 136 U/L — HIGH (ref 40–120)
ALT FLD-CCNC: 19 U/L — SIGNIFICANT CHANGE UP (ref 10–45)
ANION GAP SERPL CALC-SCNC: 5 MMOL/L — SIGNIFICANT CHANGE UP (ref 5–17)
AST SERPL-CCNC: 21 U/L — SIGNIFICANT CHANGE UP (ref 10–40)
BILIRUB SERPL-MCNC: 0.5 MG/DL — SIGNIFICANT CHANGE UP (ref 0.2–1.2)
BUN SERPL-MCNC: 19 MG/DL — SIGNIFICANT CHANGE UP (ref 7–23)
CALCIUM SERPL-MCNC: 8.2 MG/DL — LOW (ref 8.4–10.5)
CHLORIDE SERPL-SCNC: 108 MMOL/L — SIGNIFICANT CHANGE UP (ref 96–108)
CO2 SERPL-SCNC: 29 MMOL/L — SIGNIFICANT CHANGE UP (ref 22–31)
CREAT SERPL-MCNC: 0.81 MG/DL — SIGNIFICANT CHANGE UP (ref 0.5–1.3)
EGFR: 75 ML/MIN/1.73M2 — SIGNIFICANT CHANGE UP
GLUCOSE SERPL-MCNC: 92 MG/DL — SIGNIFICANT CHANGE UP (ref 70–99)
HCT VFR BLD CALC: 28.2 % — LOW (ref 34.5–45)
HGB BLD-MCNC: 8.6 G/DL — LOW (ref 11.5–15.5)
MCHC RBC-ENTMCNC: 30 PG — SIGNIFICANT CHANGE UP (ref 27–34)
MCHC RBC-ENTMCNC: 30.5 GM/DL — LOW (ref 32–36)
MCV RBC AUTO: 98.3 FL — SIGNIFICANT CHANGE UP (ref 80–100)
NRBC # BLD: 0 /100 WBCS — SIGNIFICANT CHANGE UP (ref 0–0)
PLATELET # BLD AUTO: 377 K/UL — SIGNIFICANT CHANGE UP (ref 150–400)
POTASSIUM SERPL-MCNC: 4.3 MMOL/L — SIGNIFICANT CHANGE UP (ref 3.5–5.3)
POTASSIUM SERPL-SCNC: 4.3 MMOL/L — SIGNIFICANT CHANGE UP (ref 3.5–5.3)
PROT SERPL-MCNC: 5.8 G/DL — LOW (ref 6–8.3)
RBC # BLD: 2.87 M/UL — LOW (ref 3.8–5.2)
RBC # FLD: 16.2 % — HIGH (ref 10.3–14.5)
SODIUM SERPL-SCNC: 142 MMOL/L — SIGNIFICANT CHANGE UP (ref 135–145)
WBC # BLD: 5.96 K/UL — SIGNIFICANT CHANGE UP (ref 3.8–10.5)
WBC # FLD AUTO: 5.96 K/UL — SIGNIFICANT CHANGE UP (ref 3.8–10.5)

## 2022-11-07 PROCEDURE — 99233 SBSQ HOSP IP/OBS HIGH 50: CPT

## 2022-11-07 RX ADMIN — Medication 650 MILLIGRAM(S): at 14:12

## 2022-11-07 RX ADMIN — ATORVASTATIN CALCIUM 20 MILLIGRAM(S): 80 TABLET, FILM COATED ORAL at 21:24

## 2022-11-07 RX ADMIN — ZINC OXIDE 1 APPLICATION(S): 200 OINTMENT TOPICAL at 17:37

## 2022-11-07 RX ADMIN — Medication 650 MILLIGRAM(S): at 15:12

## 2022-11-07 RX ADMIN — LIDOCAINE 1 PATCH: 4 CREAM TOPICAL at 01:00

## 2022-11-07 RX ADMIN — Medication 325 MILLIGRAM(S): at 11:29

## 2022-11-07 RX ADMIN — Medication 6 MILLIGRAM(S): at 21:23

## 2022-11-07 RX ADMIN — APIXABAN 2.5 MILLIGRAM(S): 2.5 TABLET, FILM COATED ORAL at 05:50

## 2022-11-07 RX ADMIN — Medication 150 MICROGRAM(S): at 05:50

## 2022-11-07 RX ADMIN — NYSTATIN CREAM 1 APPLICATION(S): 100000 CREAM TOPICAL at 17:37

## 2022-11-07 RX ADMIN — Medication 650 MILLIGRAM(S): at 22:00

## 2022-11-07 RX ADMIN — Medication 650 MILLIGRAM(S): at 06:30

## 2022-11-07 RX ADMIN — NYSTATIN CREAM 1 APPLICATION(S): 100000 CREAM TOPICAL at 05:52

## 2022-11-07 RX ADMIN — PANTOPRAZOLE SODIUM 40 MILLIGRAM(S): 20 TABLET, DELAYED RELEASE ORAL at 05:50

## 2022-11-07 RX ADMIN — Medication 650 MILLIGRAM(S): at 21:24

## 2022-11-07 RX ADMIN — APIXABAN 2.5 MILLIGRAM(S): 2.5 TABLET, FILM COATED ORAL at 17:36

## 2022-11-07 RX ADMIN — SENNA PLUS 2 TABLET(S): 8.6 TABLET ORAL at 21:24

## 2022-11-07 RX ADMIN — ZINC OXIDE 1 APPLICATION(S): 200 OINTMENT TOPICAL at 05:51

## 2022-11-07 RX ADMIN — Medication 650 MILLIGRAM(S): at 05:50

## 2022-11-07 NOTE — PROGRESS NOTE ADULT - SUBJECTIVE AND OBJECTIVE BOX
Subjective/ROS  Seen and examined,   Reports an appointment with orthopedics for Rt hip post op review/imaging on 11/9  Discussed with patient that we will will liaise with ortho on this--reschedule if possible or work towards early dc     Therapy--engaging but still has difficulty T/F from bed to WC  Observed in OT, good hand function during activity     Labs reviewed, unremarkable    SW/Therapy to co ordinate video/family trainiing using [employer] emilie    ROS  No chest pain, nausea, vomiting  LBM 11.7    labs unremarkable    Vital Signs Last 24 Hrs  T(C): 36.4 (07 Nov 2022 08:21), Max: 36.4 (07 Nov 2022 08:21)  T(F): 97.6 (07 Nov 2022 08:21), Max: 97.6 (07 Nov 2022 08:21)  HR: 57 (07 Nov 2022 08:21) (57 - 68)  BP: 101/68 (07 Nov 2022 08:21) (101/68 - 122/66)  RR: 16 (07 Nov 2022 08:21) (16 - 18)  SpO2: 94% (07 Nov 2022 08:21) (94% - 98%)  O2 Parameters below as of 07 Nov 2022 08:21  Patient On (Oxygen Delivery Method): room air    EXAM  Gen -  Comfortable, interactive   HEENT - NAD  Neck - No limited ROM  Pulm - normal resp rate  Cardiovascular - warn and well perfused  Abdomen - Soft, non tender   Extremities - No calf tenderness, chronic skin changes,  edema RLE, significantly reduced, has ace wrap in situ both legs    Neuro-     Cognitive - AAOx4,     Communication - Fluent, No dysarthria     Attention: Intact      Cranial Nerves - CN 2-12 grossly intact     Motor -                    LEFT    UE - ShAB 5/5, EF 5/5, EE 5/5, WE 5/5,  5/5.                      RIGHT UE - ShAB 5/5, EF 5/5, EE 5/5, WE 5/5,  5/5                    LEFT    LE - HF 5/5, KE 5/5, DF 5/5, PF 5/5                    RIGHT LE - HF and KE 3/5, DF 5/5, PF 5/5        Sensory - Intact to LT     Tone - normal  Psychiatric - Mood stable, Affect WNL  Skin: skin excuriation on abd fold resolving  Wounds aquacell and tagedon on right lat hip, no bleeding                                8.6    5.96  )-----------( 377      ( 07 Nov 2022 06:26 )             28.2     11-07    142  |  108  |  19  ----------------------------<  92  4.3   |  29  |  0.81    Ca    8.2<L>      07 Nov 2022 06:26    TPro  5.8<L>  /  Alb  2.0<L>  /  TBili  0.5  /  DBili  x   /  AST  21  /  ALT  19  /  AlkPhos  136<H>  11-07      MEDICATIONS  (STANDING):  acetaminophen     Tablet .. 650 milliGRAM(s) Oral every 8 hours  apixaban 2.5 milliGRAM(s) Oral every 12 hours  atorvastatin 20 milliGRAM(s) Oral at bedtime  ferrous    sulfate 325 milliGRAM(s) Oral daily  levothyroxine 150 MICROGram(s) Oral daily  lidocaine   4% Patch 1 Patch Transdermal daily  melatonin 6 milliGRAM(s) Oral at bedtime  nystatin Powder 1 Application(s) Topical every 12 hours  pantoprazole    Tablet 40 milliGRAM(s) Oral before breakfast  polyethylene glycol 3350 17 Gram(s) Oral at bedtime  senna 2 Tablet(s) Oral at bedtime  zinc oxide 40% Paste 1 Application(s) Topical every 12 hours    MEDICATIONS  (PRN):  magnesium hydroxide Suspension 30 milliLiter(s) Oral daily PRN Constipation

## 2022-11-07 NOTE — PROGRESS NOTE ADULT - ASSESSMENT
· Assessment	  This is a 77 YO female with PMH of HTN, HLD, heart murmur, hypothyroidism, SLE, RA, thyroid CA s/p resection, DVT (on eliquis), lung cancer (s/p tumor removal, no chemo no radiation), Right FELICITAS (Jonathan, '19), Left TKA (Jonathan, '20), Right FELICITAS (Jonathan, '22) presented to [facility] ED on 10/17 s/p mechanical fall with severe right hip pain and inability to ambulate. She was transferred to [facility] where she had ORIF of right femur on 10/20 with Dr. Leach. ABLA now s/p 1 U PRBC intraop and 1 U post op. Leukocytosis is likely reactive to surgery and decadron. Patient now with gait Instability, ADL impairments and Functional impairments.    * SW/Therapy to liaise with family for family training   * We will clarify with ortho and reschedule f/u, or work for early dc if unable to reschedule     # Right femur fracture  - ORIF of right femur/THR revision on 10/20 with Dr. Leach  (Lateral Approach)  --Continue PT/OT  -- Lateral + driving hip precaution   - WB Status: WBAT  --*NELLY dressing removed 10/27, wound covered with aquacel  staples in situ f/u with surgeon post d/c    HIP PRECAUTIONS  No hip adduction past neutral,   No hip internal rotation past neutral,   No hip flexion >90.   Use abduction pillow while in bed  Do not sit on low chairs or low toilet seats.    * Anemia--monitor  Hypothyroidism  - synthroid    * Rt shoulder OA--recurrence 11/4 (after stopping celebrex 11/30) will commence lidocaine, if persistent will recommence celebrex  HX of OA Rt shoulderr s/p previous inj treatments most recently 6/2022--f/u with Rheumatology post dc     HLD  - Atorvastatin    #Pain management  - Tylenol Q8, Oxycodone PRN, celebrex x 21 days post op, d/c 11/3    #DVT ppx  - Eliquis     #GI ppx  - Protonix 40mg    #Bowel Regimen  - Senna, miralax PRN    #Bladder management  - Monitor UO    #FEN   - Diet: Regular    #Skin:  - desitin to groin area for IAD  - Nystatin under breast and abd folds for candida management  --NELLY removed 0/27    #Sleep:   - Maintain quiet hours and low stim environment.  - Melatonin     #Precaution  - Fall, Hip     #GOC  CODE STATUS: FULL CODE     IDT--10/26  Has significant support from Son for IADLs, Has ramp, no DENISE  Function--Requiring Mod A for transfers  Slow to follow through with tasks during therapy  Max assistance for bed mobility  Ambulating 45 ft with RW Min Assistance with WC follow through  Barriers--  Goal--independence with ADLs, able to transfer to shower, ambulate from bed to bathroom with min A  Est dc 11/12 to home, will revise to earlier date if not progressing towards home DC      Outpatient Follow-up (Specialty/Name of physician):  Sin Leach  [address]  [place]  [phone]  F/U 2 weeks    Liaison with family--I called Ms Lr, patient's daughter to give update on patient's function and d/c plan, no response. I left a voice msg for call back    [facility]  Scheduled 10/22/22    Josselin Ramirez  [facility] Physician [facility]  Cardiology [phone]  Scheduled 12/19/22    F/U with family Physician 2-3 weeks after discharge

## 2022-11-08 PROCEDURE — 99232 SBSQ HOSP IP/OBS MODERATE 35: CPT

## 2022-11-08 RX ORDER — CALCIUM CARBONATE 500(1250)
1 TABLET ORAL
Refills: 0 | Status: DISCONTINUED | OUTPATIENT
Start: 2022-11-08 | End: 2022-11-12

## 2022-11-08 RX ADMIN — LIDOCAINE 1 PATCH: 4 CREAM TOPICAL at 21:32

## 2022-11-08 RX ADMIN — PANTOPRAZOLE SODIUM 40 MILLIGRAM(S): 20 TABLET, DELAYED RELEASE ORAL at 05:53

## 2022-11-08 RX ADMIN — Medication 650 MILLIGRAM(S): at 13:20

## 2022-11-08 RX ADMIN — Medication 650 MILLIGRAM(S): at 05:55

## 2022-11-08 RX ADMIN — NYSTATIN CREAM 1 APPLICATION(S): 100000 CREAM TOPICAL at 17:32

## 2022-11-08 RX ADMIN — Medication 150 MICROGRAM(S): at 05:52

## 2022-11-08 RX ADMIN — NYSTATIN CREAM 1 APPLICATION(S): 100000 CREAM TOPICAL at 05:53

## 2022-11-08 RX ADMIN — APIXABAN 2.5 MILLIGRAM(S): 2.5 TABLET, FILM COATED ORAL at 17:32

## 2022-11-08 RX ADMIN — ZINC OXIDE 1 APPLICATION(S): 200 OINTMENT TOPICAL at 05:53

## 2022-11-08 RX ADMIN — ATORVASTATIN CALCIUM 20 MILLIGRAM(S): 80 TABLET, FILM COATED ORAL at 21:37

## 2022-11-08 RX ADMIN — APIXABAN 2.5 MILLIGRAM(S): 2.5 TABLET, FILM COATED ORAL at 05:52

## 2022-11-08 RX ADMIN — Medication 650 MILLIGRAM(S): at 21:37

## 2022-11-08 RX ADMIN — Medication 1 TABLET(S): at 21:37

## 2022-11-08 RX ADMIN — ZINC OXIDE 1 APPLICATION(S): 200 OINTMENT TOPICAL at 17:32

## 2022-11-08 RX ADMIN — LIDOCAINE 1 PATCH: 4 CREAM TOPICAL at 08:05

## 2022-11-08 RX ADMIN — Medication 650 MILLIGRAM(S): at 06:30

## 2022-11-08 RX ADMIN — Medication 6 MILLIGRAM(S): at 21:36

## 2022-11-08 RX ADMIN — Medication 325 MILLIGRAM(S): at 12:04

## 2022-11-08 RX ADMIN — LIDOCAINE 1 PATCH: 4 CREAM TOPICAL at 21:04

## 2022-11-08 RX ADMIN — Medication 650 MILLIGRAM(S): at 22:15

## 2022-11-08 NOTE — CHART NOTE - NSCHARTNOTEFT_GEN_A_CORE
[facility] Interdiscplinary Plan of Care    REHABILITATION DIAGNOSIS:  Presence of artificial hip--Right femur ORIF due to Femur fracture post fall      COMORBIDITIES/COMPLICATING CONDITIONS IMPACTING REHABILITATION:  HEALTH ISSUES - PROBLEM Dx:        PAST MEDICAL & SURGICAL HISTORY:  Rheumatoid arthritis      SLE (systemic lupus erythematosus)      Osteoarthritis      H/O degenerative disc disease      Hypertension      Hyperlipidemia      Benign heart murmur      Hypothyroid      Obesity, Class II, BMI 35-39.9, no comorbidity      DVT (deep venous thrombosis)  during left knee replacement 2019      Overactive bladder      Lab test positive for detection of COVID-19 virus  12/20      S/P tonsillectomy      S/P cataract surgery  left, right      S/P eye surgery  child      S/P carpal tunnel release  left      S/P knee surgery  bilateral      Lung cancer  small tumor removed 2015-no chemo, no radiation      S/P thyroid surgery  1 lobe removed      Status post total hip replacement, right      S/P knee replacement  left      S/P lumbar spine operation  12/20          Based upon consideration of the patient's impairments, functional status, complicating conditions and any other contributing factors and after information garnered from the assessments of all therapy disciplines involved in treating the patient and other pertinent clinicians:    INTERDISCIPLINARY REHABILITATION INTERVENTIONS:    [ X  ] Transfer Training  [ X  ] Bed Mobility  [ X  ] Therapeutic Exercise  [ X ] Balance/Coordination Exercises  [ X ] Locomotion retraining  [ X  ] Stairs  [  X ] Functional Transfer Training  [   ] Bowel/Bladder program  [   ] Pain Management  [   ] Skin/Wound Care  [   ] Visual/Perceptual Training  [  x ] Therapeutic Recreation Activities  [   x] Neuromuscular Re-education  [ X  ] Activities of Daily Living  [   ] Speech Exercise  [   ] Swallowing Exercises  [   ] Vital Stim  [   ] Dietary Supplements  [   ] Calorie Count  [   ] Cognitive Exercises  [   ] Congnitive/Linguistic Treatment  [   ] Behavior Program  [   ] Neuropsych Therapy  [ X  ] Patient/Family Counseling  [ X ] Family Training  [ X  ] Community Re-entry  [   ] Orthotic Evaluation  [   ] Prosthetic Eval/Training    MEDICAL PROGNOSIS:  Fair     REHAB POTENTIAL:  Fair   EXPECTED DAILY THERAPY:         PT: 1.5 hr        OT: 1.5 hr        ST: n/a        P&O: will be evaluated     EXPECTED INTENSITY OF PROGRAM:  3 hrs / Day    EXPECTED FREQUENCY OF PROGRAM: 5 Days/ Week    ESTIMATED LOS:  [  ] 5-7 Days  [  ] 7-10 Days  [  x] 10- 14 Days  [  ] 14- 18 Days  [  ] 18- 21 Days    ESTIMATED DISPOSITION:  [  ] Home   [ x ] Home with Outpatient Therapies  [  ] Home with Home Therapies  [  ] Assisted Living  [  ] Nursing Home  [  ] Long Term Acute Care    INTERDISCIPLINARY FUNCTIONAL OUTCOMES/GOALS:         Gait/Mobility: mod a with gait device       Transfers: with min a       ADLs: with min a       Functional Transfers: min a       Medication Management: min a       Communication: min a       Cognitive: min a       Dysphagia: gurwinder        Bladder will be evaluated       Bowel: will be evaluated      Functional Independent Measures:  6  7 = Independent  6 = Modified Independent  5 = Supervision  4 = Minimal Assist/ Contact Guard  3 = Moderate Assistance  2 = Maximum Assistance  1 = Total Assistance  0 = Unable to assess
IDT--11/2  Has significant support from Son for IADLs, Has ramp, no DENISE  Function--Requiring zcg/zcs for transfers  Min assistance for bed mobility  Ambulating 70ft with RW CG/CS Assistance  Goal--independence with ADLs, able to transfer to shower, ambulate from bed to bathroom with min A  Est dc 11/12 to home, will revise to earlier date if not progressing towards home DC
Nutrition Follow Up Note  Hospital Course   (Per Electronic Medical Record)    Source:  Patient [X]  Medical Record [X]      Diet:   Regular Diet (IDDSI Level 7) w/ Thin Liquids (IDDSI Level 0)  Tolerates Diet Consistency Well  No Chewing/Swallowing Difficulties  No Recent Nausea, Vomiting, Diarrhea or Constipation (as Per Patient)  Consumes % of Meals (as Per Documentation) - States Good PO Intake/Appetite (Per Patient)  on Ensure Plus High Protein 8oz PO BID (Provides 700kcal & 40grams of Protein) - Ordered by Medical Team for Low Albumin?  Patient Takes Nutrition Supplement Well  Education Provided on Proper Nutrition   Obtained Food Preferences from Patient    Enteral/Parenteral Nutrition: Not Applicable    Current Weight: 245.5lb on 10/29  Obtain New Weight to Confirm  Obtain Weights Weekly     Pertinent Medications: MEDICATIONS  (STANDING):  acetaminophen     Tablet .. 650 milliGRAM(s) Oral every 8 hours  apixaban 2.5 milliGRAM(s) Oral every 12 hours  atorvastatin 20 milliGRAM(s) Oral at bedtime  celecoxib 100 milliGRAM(s) Oral every 12 hours  ferrous    sulfate 325 milliGRAM(s) Oral daily  levothyroxine 150 MICROGram(s) Oral daily  melatonin 6 milliGRAM(s) Oral at bedtime  nystatin Powder 1 Application(s) Topical every 12 hours  pantoprazole    Tablet 40 milliGRAM(s) Oral before breakfast  polyethylene glycol 3350 17 Gram(s) Oral at bedtime  senna 2 Tablet(s) Oral at bedtime  zinc oxide 40% Paste 1 Application(s) Topical every 12 hours    MEDICATIONS  (PRN):  magnesium hydroxide Suspension 30 milliLiter(s) Oral daily PRN Constipation  oxyCODONE    IR 5 milliGRAM(s) Oral every 3 hours PRN Severe Pain (7 - 10)  oxyCODONE    IR 2.5 milliGRAM(s) Oral every 3 hours PRN Moderate Pain (4 - 6)    Pertinent Labs:   10-27 Alb 1.4 g/dL<L>    Skin: No Pressure Ulcers  Surgical Incision on Right Hip  (as Per Nursing Flow Sheet)     Edema: +1 Edema Noted to Feet  (as Per Documentation)     Last Bowel Movement: on 10/30    Estimated Needs:   [X] No Change Since Previous Assessment    Previous Nutrition Diagnosis:   Morbidly Obese     Nutrition Diagnosis is [X] Ongoing - Education Provided on Proper Nutrition     New Nutrition Diagnosis: [X] Not Applicable    Interventions:   1. Education Provided on Proper Nutrition   2. Recommend Continue Nutrition Plan of Care     Monitoring & Evaluation:   [X] Weights   [X] PO Intake   [X] Skin Integrity   [X] Follow Up (Per Protocol)  [X] Tolerance to Diet Prescription   [X] Other: Labs     Registered Dietitian/Nutritionist Remains Available.  Reinaldo George RDN    Phone# [phone]
Staples removed 11/8 from R ORIF site.  Patient tolerated removal well.  Steri-strips in place.  Site clean, dry and intact.  Patient to follow up with Dr. Leach outpatient.
IDT--10/26  Has significant support from Son for IADLs, Has ramp, no DENISE  Function--Requiring Mod A for transfers  Slow to follow through with tasks during therapy  Max assistance for bed mobility  Ambulating 45 ft with RW Min Assistance with WC follow through  Barriers--  Goal--independence with ADLs, able to transfer to shower, ambulate from bed to bathroom with min A  Est dc 11/12 to home, will revise to earlier date if not progressing towards home DC  (Baseline function to be clarified with son)
Nutrition Follow Up Note  Hospital Course   (Per Electronic Medical Record)    Source:  Patient [X]  Medical Record [X]      Diet:   Regular Diet (IDDSI Level 7) w/ Thin Liquids (IDDSI Level 0)  Tolerates Diet Consistency Well  No Chewing/Swallowing Difficulties  No Recent Nausea, Vomiting, Diarrhea or Constipation (as Per Patient)  Consumes % of Meals (as Per Documentation) - States Good PO Intake/Appetite (Per Patient)   on Ensure Plus High Protein 8oz PO BID (Provides 700kcal & 40grams of Protein)  Patient Takes Nutrition Supplement But Only Once Daily  Recommend Change Ensure Plus High Protein 8oz to Daily  Education Provided on Proper Nutrition  Obtained Food Preferences from Patient    Enteral/Parenteral Nutrition: Not Applicable    Current Weight: 245.5lb on 10/28  Obtain New Weight  Obtain Weights Weekly     Pertinent Medications: MEDICATIONS  (STANDING):  acetaminophen     Tablet .. 650 milliGRAM(s) Oral every 8 hours  apixaban 2.5 milliGRAM(s) Oral every 12 hours  atorvastatin 20 milliGRAM(s) Oral at bedtime  ferrous    sulfate 325 milliGRAM(s) Oral daily  levothyroxine 150 MICROGram(s) Oral daily  lidocaine   4% Patch 1 Patch Transdermal daily  melatonin 6 milliGRAM(s) Oral at bedtime  nystatin Powder 1 Application(s) Topical every 12 hours  pantoprazole    Tablet 40 milliGRAM(s) Oral before breakfast  polyethylene glycol 3350 17 Gram(s) Oral at bedtime  senna 2 Tablet(s) Oral at bedtime  zinc oxide 40% Paste 1 Application(s) Topical every 12 hours    MEDICATIONS  (PRN):  magnesium hydroxide Suspension 30 milliLiter(s) Oral daily PRN Constipation    Pertinent Labs:  11-07 Na142 mmol/L Glu 92 mg/dL K+ 4.3 mmol/L Cr  0.81 mg/dL BUN 19 mg/dL 11-07 Alb 2.0 g/dL<L>    Skin: No Pressure Ulcers  Surgical Incision on Right Hip  (as Per Nursing Flow Sheet)     Edema: +2 Edema Noted to Bilateral Ankles   (as Per Documentation)     Last Bowel Movement: on 11/6    Estimated Needs:   [X] No Change Since Previous Assessment    Previous Nutrition Diagnosis:   Morbidly Obese     Nutrition Diagnosis is [X] Ongoing - Nutrition Education Provided     New Nutrition Diagnosis: [X] Not Applicable    Interventions:   1. Recommend Change Ensure Plus High Protein 8oz PO to Daily  2. Education Provided on Proper Nutrition  3. Recommend Continue Nutrition Plan of Care     Monitoring & Evaluation:   [X] Weights   [X] PO Intake   [X] Skin Integrity   [X] Follow Up (Per Protocol)  [X] Tolerance to Diet Prescription   [X] Other: Labs     Registered Dietitian/Nutritionist Remains Available.  Reinaldo George RDN    Phone# [phone]

## 2022-11-08 NOTE — PROGRESS NOTE ADULT - ASSESSMENT
· Assessment	  This is a 77 YO female with PMH of HTN, HLD, heart murmur, hypothyroidism, SLE, RA, thyroid CA s/p resection, DVT (on eliquis), lung cancer (s/p tumor removal, no chemo no radiation), Right FELICITAS (Jonathan, '19), Left TKA (Jonathanuss, '20), Right FELICITAS (Jonathan, '22) presented to [facility] ED on 10/17 s/p mechanical fall with severe right hip pain and inability to ambulate. She was transferred to [facility] where she had ORIF of right femur on 10/20 with Dr. Leach. ABLA now s/p 1 U PRBC intraop and 1 U post op. Leukocytosis is likely reactive to surgery and decadron. Patient now with gait Instability, ADL impairments and Functional impairments.    * Had [employer]/family observation of therapy today   * We will clarify with ortho --staples removal and new clinic review day,     # Right femur fracture  - ORIF of right femur/THR revision on 10/20 with Dr. Leach  (Lateral Approach)  --Continue PT/OT  -- Lateral + driving hip precaution   - WB Status: WBAT  --*NELLY dressing removed 10/27, wound covered with aquacel  staples in situ f/u with surgeon post d/c    HIP PRECAUTIONS  No hip adduction past neutral,   No hip internal rotation past neutral,   No hip flexion >90.   Use abduction pillow while in bed  Do not sit on low chairs or low toilet seats.    * Anemia--monitor  Hypothyroidism  - synthroid    * Rt shoulder OA--recurrence 11/4 (after stopping celebrex 11/30) will commence lidocaine, if persistent will recommence celebrex  HX of OA Rt shoulderr s/p previous inj treatments most recently 6/2022--f/u with Rheumatology post dc     HLD  - Atorvastatin    #Pain management  - Tylenol Q8, Oxycodone PRN, celebrex x 21 days post op, d/c 11/3    #DVT ppx  - Eliquis     #GI ppx  - Protonix 40mg    #Bowel Regimen  - Senna, miralax PRN    #Bladder management  - Monitor UO    #FEN   - Diet: Regular    #Skin:  - desitin to groin area for IAD  - Nystatin under breast and abd folds for candida management  --NELLY removed 0/27    #Sleep:   - Maintain quiet hours and low stim environment.  - Melatonin     #Precaution  - Fall, Hip     #GOC  CODE STATUS: FULL CODE     IDT--10/26  Has significant support from Son for IADLs, Has ramp, no DENISE  Function--Requiring Mod A for transfers  Slow to follow through with tasks during therapy  Max assistance for bed mobility  Ambulating 45 ft with RW Min Assistance with WC follow through  Barriers--  Goal--independence with ADLs, able to transfer to shower, ambulate from bed to bathroom with min A  Est dc 11/12 to home, will revise to earlier date if not progressing towards home DC      Outpatient Follow-up (Specialty/Name of physician):  Sin Leach  [address]  [place]  [phone]  F/U 2 weeks    Liaison with family--I called Ms Lr, patient's daughter to give update on patient's function and d/c plan, no response. I left a voice msg for call back    [facility]  Scheduled 10/22/22    Josselin Ramirez  [facility] Physician [facility]  Cardiology [phone]  Scheduled 12/19/22    F/U with family Physician 2-3 weeks after discharge

## 2022-11-08 NOTE — CHART NOTE - NSCHARTNOTESELECT_GEN_ALL_CORE
IDT/Event Note
Nutrition Services
IDT/Event Note
IPOC/Event Note
Nutrition Services
Staples Removal/Event Note

## 2022-11-08 NOTE — PROGRESS NOTE ADULT - SUBJECTIVE AND OBJECTIVE BOX
Subjective/ROS  Seen and examined,   Reports preference of continuing therapy till her dc date of Sat 11/12 if agreeable with ortho  She plans to liaise with her daughter Adeline for new ortho clinic date  Also ortho will clarify if okay to remove staples    Therapy--engaged, observed, daughter also observed via [employer]  Ambulating increasing distance with walker, supervision      ROS  No chest pain, nausea, vomiting  LBM 11.7    labs unremarkable    Vital Signs Last 24 Hrs  T(C): 36.6 (08 Nov 2022 07:41), Max: 36.6 (08 Nov 2022 07:41)  T(F): 97.9 (08 Nov 2022 07:41), Max: 97.9 (08 Nov 2022 07:41)  HR: 53 (08 Nov 2022 07:41) (53 - 60)  BP: 103/50 (08 Nov 2022 07:41) (103/50 - 120/70)  RR: 15 (08 Nov 2022 07:41) (15 - 18)  SpO2: 97% (08 Nov 2022 07:41) (96% - 97%)  O2 Parameters below as of 08 Nov 2022 07:41  Patient On (Oxygen Delivery Method): room air    EXAM  Gen -  Comfortable, interactive   HEENT - NAD  Neck - No limited ROM  Pulm - normal resp rate  Cardiovascular - warn and well perfused  Abdomen - Soft, non tender   Extremities - No calf tenderness, chronic skin changes,  edema RLE, significantly reduced, has ace wrap in situ both legs    Neuro-     Cognitive - AAOx4,     Communication - Fluent, No dysarthria     Attention: Intact      Cranial Nerves - CN 2-12 grossly intact     Motor -                    LEFT    UE - ShAB 5/5, EF 5/5, EE 5/5, WE 5/5,  5/5.                      RIGHT UE - ShAB 5/5, EF 5/5, EE 5/5, WE 5/5,  5/5                    LEFT    LE - HF 5/5, KE 5/5, DF 5/5, PF 5/5                    RIGHT LE - HF and KE 3/5, DF 5/5, PF 5/5        Sensory - Intact to LT     Tone - normal  Psychiatric - Mood stable, Affect WNL  Skin: skin excuriation on abd fold resolving  Wounds aquacell and tagedon on right lat hip, no bleeding                                8.6    5.96  )-----------( 377      ( 07 Nov 2022 06:26 )             28.2     11-07    142  |  108  |  19  ----------------------------<  92  4.3   |  29  |  0.81    Ca    8.2<L>      07 Nov 2022 06:26    TPro  5.8<L>  /  Alb  2.0<L>  /  TBili  0.5  /  DBili  x   /  AST  21  /  ALT  19  /  AlkPhos  136<H>  11-07      MEDICATIONS  (STANDING):  acetaminophen     Tablet .. 650 milliGRAM(s) Oral every 8 hours  apixaban 2.5 milliGRAM(s) Oral every 12 hours  atorvastatin 20 milliGRAM(s) Oral at bedtime  ferrous    sulfate 325 milliGRAM(s) Oral daily  levothyroxine 150 MICROGram(s) Oral daily  lidocaine   4% Patch 1 Patch Transdermal daily  melatonin 6 milliGRAM(s) Oral at bedtime  nystatin Powder 1 Application(s) Topical every 12 hours  pantoprazole    Tablet 40 milliGRAM(s) Oral before breakfast  polyethylene glycol 3350 17 Gram(s) Oral at bedtime  senna 2 Tablet(s) Oral at bedtime  zinc oxide 40% Paste 1 Application(s) Topical every 12 hours    MEDICATIONS  (PRN):  magnesium hydroxide Suspension 30 milliLiter(s) Oral daily PRN Constipation

## 2022-11-08 NOTE — PROGRESS NOTE ADULT - ASSESSMENT
75 y/o F with PMH HTN, HLD, hypothyroidism, SLE, RA, thyroid CA s/p resection, hx of DVT (on Eliquis), lung cancer (s/p tumor removal, no chemo no radiation), Right FELICITAS (Jonathan, '19), Left TKA (Jonathanuss, '20), Right FELICITAS (Jonathanuss, '22) presented to [facility] ED on 10/17 s/p mechanical fall with severe right hip pain and inability to ambulate. She was transferred to [facility] where she had ORIF of right femur on 10/20 with Dr. Leach. Hospital course complicated by ABLA s/p pRBC. Now admitted to [facility] for initiation of multidisciplinary rehab program.     #Right femur fracture  -s/p ORIF of right femur/THR revision on 10/20 with Dr. Leach  -Completed Bactrim BID for hip ppx  -PT/OT/SLP per rehab team    #Anemia  -Likely post op anemia  -H/H stable, no overt signs of bleeding  -Transfuse prn to maintain Hb>7  -Continue iron supplement daily    #Hypothyroidism  -Continue Synthroid    #HLD  -Continue Atorvastatin    #DVT ppx - Eliquis 2.5mg BID  #GI ppx - Protonix

## 2022-11-08 NOTE — PROGRESS NOTE ADULT - SUBJECTIVE AND OBJECTIVE BOX
Patient is a 76y old  Female who presents with a chief complaint of R ORIF revision (07 Nov 2022 11:01)      SUBJECTIVE / OVERNIGHT EVENTS:  Pt seen and examined at bedside. No acute events overnight.  Pt denies cp, palpitations, sob, abd pain, N/V, fever, chills.    ROS:  All other review of systems negative    Allergies    No Known Allergies    Intolerances        MEDICATIONS  (STANDING):  acetaminophen     Tablet .. 650 milliGRAM(s) Oral every 8 hours  apixaban 2.5 milliGRAM(s) Oral every 12 hours  atorvastatin 20 milliGRAM(s) Oral at bedtime  ferrous    sulfate 325 milliGRAM(s) Oral daily  levothyroxine 150 MICROGram(s) Oral daily  lidocaine   4% Patch 1 Patch Transdermal daily  melatonin 6 milliGRAM(s) Oral at bedtime  nystatin Powder 1 Application(s) Topical every 12 hours  pantoprazole    Tablet 40 milliGRAM(s) Oral before breakfast  polyethylene glycol 3350 17 Gram(s) Oral at bedtime  senna 2 Tablet(s) Oral at bedtime  zinc oxide 40% Paste 1 Application(s) Topical every 12 hours    MEDICATIONS  (PRN):  magnesium hydroxide Suspension 30 milliLiter(s) Oral daily PRN Constipation      Vital Signs Last 24 Hrs  T(C): 36.6 (08 Nov 2022 07:41), Max: 36.6 (08 Nov 2022 07:41)  T(F): 97.9 (08 Nov 2022 07:41), Max: 97.9 (08 Nov 2022 07:41)  HR: 53 (08 Nov 2022 07:41) (53 - 60)  BP: 103/50 (08 Nov 2022 07:41) (103/50 - 120/70)  BP(mean): --  RR: 15 (08 Nov 2022 07:41) (15 - 18)  SpO2: 97% (08 Nov 2022 07:41) (96% - 97%)    Parameters below as of 08 Nov 2022 07:41  Patient On (Oxygen Delivery Method): room air      CAPILLARY BLOOD GLUCOSE        I&O's Summary      PHYSICAL EXAM:  GENERAL: NAD, well-developed elderly female  HEAD:  Atraumatic, Normocephalic  NECK: Supple, No JVD  CHEST/LUNG: Clear to auscultation bilaterally; No wheeze, nonlabored breathing  HEART: RRR + Systolic murmur  ABDOMEN: Soft, Nontender, Nondistended; Bowel sounds present  EXTREMITIES:  No clubbing, cyanosis. + B/L LE trace edema  NEUROLOGY: AAOx3, non-focal  PSYCH: calm, appropriate mood  SKIN: No rashes or lesions, warm intact    LABS:                        8.6    5.96  )-----------( 377      ( 07 Nov 2022 06:26 )             28.2     11-07    142  |  108  |  19  ----------------------------<  92  4.3   |  29  |  0.81    Ca    8.2<L>      07 Nov 2022 06:26    TPro  5.8<L>  /  Alb  2.0<L>  /  TBili  0.5  /  DBili  x   /  AST  21  /  ALT  19  /  AlkPhos  136<H>  11-07              RADIOLOGY & ADDITIONAL TESTS:  Results Reviewed:   Imaging Personally Reviewed:  Electrocardiogram Personally Reviewed:    COORDINATION OF CARE:  Care Discussed with Consultants/Other Providers [Y/N]:  Prior or Outpatient Records Reviewed [Y/N]:

## 2022-11-09 ENCOUNTER — APPOINTMENT (OUTPATIENT)
Dept: ORTHOPEDIC SURGERY | Facility: CLINIC | Age: 77
End: 2022-11-09

## 2022-11-09 LAB — SARS-COV-2 RNA SPEC QL NAA+PROBE: SIGNIFICANT CHANGE UP

## 2022-11-09 PROCEDURE — 99232 SBSQ HOSP IP/OBS MODERATE 35: CPT

## 2022-11-09 RX ADMIN — Medication 650 MILLIGRAM(S): at 13:30

## 2022-11-09 RX ADMIN — Medication 650 MILLIGRAM(S): at 06:15

## 2022-11-09 RX ADMIN — Medication 325 MILLIGRAM(S): at 12:17

## 2022-11-09 RX ADMIN — Medication 650 MILLIGRAM(S): at 12:56

## 2022-11-09 RX ADMIN — APIXABAN 2.5 MILLIGRAM(S): 2.5 TABLET, FILM COATED ORAL at 05:33

## 2022-11-09 RX ADMIN — ATORVASTATIN CALCIUM 20 MILLIGRAM(S): 80 TABLET, FILM COATED ORAL at 21:44

## 2022-11-09 RX ADMIN — Medication 150 MICROGRAM(S): at 05:33

## 2022-11-09 RX ADMIN — Medication 650 MILLIGRAM(S): at 22:14

## 2022-11-09 RX ADMIN — LIDOCAINE 1 PATCH: 4 CREAM TOPICAL at 20:36

## 2022-11-09 RX ADMIN — APIXABAN 2.5 MILLIGRAM(S): 2.5 TABLET, FILM COATED ORAL at 17:24

## 2022-11-09 RX ADMIN — NYSTATIN CREAM 1 APPLICATION(S): 100000 CREAM TOPICAL at 17:24

## 2022-11-09 RX ADMIN — LIDOCAINE 1 PATCH: 4 CREAM TOPICAL at 19:39

## 2022-11-09 RX ADMIN — Medication 650 MILLIGRAM(S): at 21:44

## 2022-11-09 RX ADMIN — Medication 6 MILLIGRAM(S): at 21:43

## 2022-11-09 RX ADMIN — LIDOCAINE 1 PATCH: 4 CREAM TOPICAL at 08:14

## 2022-11-09 RX ADMIN — NYSTATIN CREAM 1 APPLICATION(S): 100000 CREAM TOPICAL at 05:37

## 2022-11-09 RX ADMIN — ZINC OXIDE 1 APPLICATION(S): 200 OINTMENT TOPICAL at 17:25

## 2022-11-09 RX ADMIN — SENNA PLUS 2 TABLET(S): 8.6 TABLET ORAL at 21:44

## 2022-11-09 RX ADMIN — ZINC OXIDE 1 APPLICATION(S): 200 OINTMENT TOPICAL at 05:36

## 2022-11-09 RX ADMIN — PANTOPRAZOLE SODIUM 40 MILLIGRAM(S): 20 TABLET, DELAYED RELEASE ORAL at 05:34

## 2022-11-09 RX ADMIN — Medication 650 MILLIGRAM(S): at 05:33

## 2022-11-09 NOTE — PROGRESS NOTE ADULT - ASSESSMENT
75 y/o F with PMH HTN, HLD, hypothyroidism, SLE, RA, thyroid CA s/p resection, hx of DVT (on Eliquis), lung cancer (s/p tumor removal, no chemo no radiation), Right FELICITAS (Jonathan, '19), Left TKA (Jonathanuss, '20), Right FELICITAS (Jonathanuss, '22) presented to [facility] ED on 10/17 s/p mechanical fall with severe right hip pain and inability to ambulate. She was transferred to [facility] where she had ORIF of right femur on 10/20 with Dr. Leach. Hospital course complicated by ABLA s/p pRBC. Now admitted to [facility] for initiation of multidisciplinary rehab program.     #Right femur fracture  -s/p ORIF of right femur/THR revision on 10/20 with Dr. Leach  -PT/OT/SLP per rehab team    #Anemia  -Likely post op anemia  -H/H stable, no overt signs of bleeding  -Transfuse prn to maintain Hb>7  -Continue iron supplement daily    #Hypothyroidism  -Continue Synthroid    #HLD  -Continue Atorvastatin    #DVT ppx - Eliquis 2.5mg BID  #GI ppx - Protonix

## 2022-11-09 NOTE — PROGRESS NOTE ADULT - ASSESSMENT
· Assessment	  This is a 75 YO female with PMH of HTN, HLD, heart murmur, hypothyroidism, SLE, RA, thyroid CA s/p resection, DVT (on eliquis), lung cancer (s/p tumor removal, no chemo no radiation), Right FELICITAS (Jonathan, '19), Left TKA (Jonathanuss, '20), Right FELICITAS (Jonathan, '22) presented to [facility] ED on 10/17 s/p mechanical fall with severe right hip pain and inability to ambulate. She was transferred to [facility] where she had ORIF of right femur on 10/20 with Dr. Leach. ABLA now s/p 1 U PRBC intraop and 1 U post op. Leukocytosis is likely reactive to surgery and decadron. Patient now with gait Instability, ADL impairments and Functional impairments.    * Had [facility]/family observation of therapy again today     # Right femur fracture  - ORIF of right femur/THR revision on 10/20 with Dr. Leach  (Lateral Approach)  --Continue PT/OT  -- Lateral + driving hip precaution   - WB Status: WBAT  --staples removed 11/8, family to make f/u plan with ortho    HIP PRECAUTIONS  No hip adduction past neutral,   No hip internal rotation past neutral,   No hip flexion >90.   Use abduction pillow while in bed  Do not sit on low chairs or low toilet seats.    * Anemia--monitor  Hypothyroidism  - synthroid    * Rt shoulder OA--recurrence 11/4 (after stopping celebrex 11/30) will commence lidocaine, if persistent will recommence celebrex  HX of OA Rt shoulderr s/p previous inj treatments most recently 6/2022--f/u with Rheumatology post dc     HLD  - Atorvastatin    #Pain management  - Tylenol Q8, Oxycodone PRN, celebrex x 21 days post op, d/c 11/3    #DVT ppx  - Eliquis     #GI ppx  - Protonix 40mg    #Bowel Regimen  - Senna, miralax PRN    #Bladder management  - Monitor UO    #FEN   - Diet: Regular    #Skin:  - desitin to groin area for IAD  - Nystatin under breast and abd folds for candida management  --NELLY removed 0/27    #Sleep:   - Maintain quiet hours and low stim environment.  - Melatonin     #Precaution  - Fall, Hip     #GOC  CODE STATUS: FULL CODE     IDT--10/26  Has significant support from Son for IADLs, Has ramp, no DENISE  Function--Requiring Mod A for transfers  Slow to follow through with tasks during therapy  Max assistance for bed mobility  Ambulating 45 ft with RW Min Assistance with WC follow through  Barriers--  Goal--independence with ADLs, able to transfer to shower, ambulate from bed to bathroom with min A  Est dc 11/12 to home, will revise to earlier date if not progressing towards home DC      Outpatient Follow-up (Specialty/Name of physician):  Sin Leach  [address]  [place]  [phone]  F/U 2 weeks    Liaison with family--I called Ms Lr, patient's daughter to give update on patient's function and d/c plan, no response. I left a voice msg for call back    [facility]  Scheduled 10/22/22    Josselin Ramirez  [facility] Physician [facility]  Cardiology [address]  Scheduled 12/19/22    F/U with family Physician 2-3 weeks after discharge         · Assessment	  This is a 77 YO female with PMH of HTN, HLD, heart murmur, hypothyroidism, SLE, RA, thyroid CA s/p resection, DVT (on eliquis), lung cancer (s/p tumor removal, no chemo no radiation), Right FELICITAS (Jonathan, '19), Left TKA (Jonathanuss, '20), Right FELICITAS (Jonathan, '22) presented to [facility] ED on 10/17 s/p mechanical fall with severe right hip pain and inability to ambulate. She was transferred to [facility] where she had ORIF of right femur on 10/20 with Dr. Leach. ABLA now s/p 1 U PRBC intraop and 1 U post op. Leukocytosis is likely reactive to surgery and decadron. Patient now with gait Instability, ADL impairments and Functional impairments.    * Had [facility]/family observation of therapy again today     # Right femur fracture  - ORIF of right femur/THR revision on 10/20 with Dr. Leach  (Lateral Approach)  --Continue PT/OT  -- Lateral + driving hip precaution   - WB Status: WBAT  --staples removed 11/8, family to make f/u plan with ortho    HIP PRECAUTIONS  No hip adduction past neutral,   No hip internal rotation past neutral,   No hip flexion >90.   Use abduction pillow while in bed  Do not sit on low chairs or low toilet seats.    * Anemia--monitor  Hypothyroidism  - synthroid    * Rt shoulder OA--recurrence 11/4 (after stopping celebrex 11/30) will commence lidocaine, if persistent will recommence celebrex  HX of OA Rt shoulderr s/p previous inj treatments most recently 6/2022--f/u with Rheumatology post dc     HLD  - Atorvastatin    #Pain management  - Tylenol Q8, Oxycodone PRN, celebrex x 21 days post op, d/c 11/3    #DVT ppx  - Eliquis     #GI ppx  - Protonix 40mg    #Bowel Regimen  - Senna, miralax PRN    #Bladder management  - Monitor UO    #FEN   - Diet: Regular    #Skin:  - desitin to groin area for IAD  - Nystatin under breast and abd folds for candida management  --NELLY removed 0/27    #Sleep:   - Maintain quiet hours and low stim environment.  - Melatonin     #Precaution  - Fall, Hip     #GOC  CODE STATUS: FULL CODE     IDT--11/9  Has significant support from Son for IADLs, Has ramp, no DENISE  Function-- Close supervision for ADLs and transfers. Making use of adaptive equipment.  Slow to follow through with tasks during therapy  Ambulating 75 ft with RW with supervision. Completed 8 stairs with 2 HR with close supervision/supervision.  Barriers-- Distractable  Est dc 11/12 to home      Outpatient Follow-up (Specialty/Name of physician):  Sin Leach  [address]  [place]  [phone]  F/U 2 weeks    Liaison with family--I called Ms Lr, patient's daughter to give update on patient's function and d/c plan, no response. I left a voice msg for call back    [facility]  Scheduled 10/22/22    Josselin Ramirez  [facility] Physician [facility]  Cardiology [address]   Scheduled 12/19/22    F/U with family Physician 2-3 weeks after discharge

## 2022-11-09 NOTE — PROGRESS NOTE ADULT - SUBJECTIVE AND OBJECTIVE BOX
Subjective/ROS  Seen and examined, chart review  No med complaint   Staples removed yesterday, wound unremarkable  Agrees with plan for daughter to get new f/u date with ortho    Therapy--engaged, observed, daughter also observed via [employer] [facility]  Ambulating increasing distance with walker, supervision      ROS  No chest pain, nausea, vomiting  LBM 11.9    labs unremarkable    Vital Signs Last 24 Hrs  T(C): 36.4 (09 Nov 2022 07:34), Max: 36.4 (08 Nov 2022 23:45)  T(F): 97.6 (09 Nov 2022 07:34), Max: 97.6 (08 Nov 2022 23:45)  HR: 54 (09 Nov 2022 07:34) (54 - 66)  BP: 124/60 (09 Nov 2022 07:34) (110/60 - 124/60)  RR: 15 (09 Nov 2022 07:34) (15 - 18)  SpO2: 100% (09 Nov 2022 07:34) (98% - 100%)    O2 Parameters below as of 09 Nov 2022 07:34  Patient On (Oxygen Delivery Method): room air      EXAM  Gen -  Comfortable, interactive   HEENT - NAD  Neck - No limited ROM  Pulm - normal resp rate  Cardiovascular - warn and well perfused  Abdomen - Soft, non tender   Extremities - No calf tenderness, chronic skin changes,  edema RLE, significantly reduced,    Neuro-     Cognitive - AAOx4,     Communication - Fluent, No dysarthria     Attention: Intact      Cranial Nerves - CN 2-12 grossly intact     Motor -                    LEFT    UE - ShAB 5/5, EF 5/5, EE 5/5, WE 5/5,  5/5.                      RIGHT UE - ShAB 5/5, EF 5/5, EE 5/5, WE 5/5,  5/5                    LEFT    LE - HF 5/5, KE 5/5, DF 5/5, PF 5/5                    RIGHT LE - HF and KE 3/5, DF 5/5, PF 5/5        Sensory - Intact to LT     Tone - normal  Psychiatric - Mood stable, Affect WNL  Skin: skin excuriation on abd fold resolving  Wounds -staples removed, wound edges together                          8.6    5.96  )-----------( 377      ( 07 Nov 2022 06:26 )             28.2     11-07    142  |  108  |  19  ----------------------------<  92  4.3   |  29  |  0.81    Ca    8.2<L>      07 Nov 2022 06:26    TPro  5.8<L>  /  Alb  2.0<L>  /  TBili  0.5  /  DBili  x   /  AST  21  /  ALT  19  /  AlkPhos  136<H>  11-07    MEDICATIONS  (STANDING):  acetaminophen     Tablet .. 650 milliGRAM(s) Oral every 8 hours  apixaban 2.5 milliGRAM(s) Oral every 12 hours  atorvastatin 20 milliGRAM(s) Oral at bedtime  ferrous    sulfate 325 milliGRAM(s) Oral daily  levothyroxine 150 MICROGram(s) Oral daily  lidocaine   4% Patch 1 Patch Transdermal daily  melatonin 6 milliGRAM(s) Oral at bedtime  nystatin Powder 1 Application(s) Topical every 12 hours  pantoprazole    Tablet 40 milliGRAM(s) Oral before breakfast  polyethylene glycol 3350 17 Gram(s) Oral at bedtime  senna 2 Tablet(s) Oral at bedtime  zinc oxide 40% Paste 1 Application(s) Topical every 12 hours    MEDICATIONS  (PRN):  calcium carbonate    500 mG (Tums) Chewable 1 Tablet(s) Chew four times a day PRN Dyspepsia  magnesium hydroxide Suspension 30 milliLiter(s) Oral daily PRN Constipation

## 2022-11-09 NOTE — PROGRESS NOTE ADULT - SUBJECTIVE AND OBJECTIVE BOX
Patient is a 76y old  Female who presents with a chief complaint of R ORIF revision (08 Nov 2022 10:34)      SUBJECTIVE / OVERNIGHT EVENTS:  Pt seen and examined at bedside. No acute events overnight.  Pt denies cp, palpitations, sob, abd pain, N/V, fever, chills.    ROS:  All other review of systems negative    Allergies    No Known Allergies    Intolerances        MEDICATIONS  (STANDING):  acetaminophen     Tablet .. 650 milliGRAM(s) Oral every 8 hours  apixaban 2.5 milliGRAM(s) Oral every 12 hours  atorvastatin 20 milliGRAM(s) Oral at bedtime  ferrous    sulfate 325 milliGRAM(s) Oral daily  levothyroxine 150 MICROGram(s) Oral daily  lidocaine   4% Patch 1 Patch Transdermal daily  melatonin 6 milliGRAM(s) Oral at bedtime  nystatin Powder 1 Application(s) Topical every 12 hours  pantoprazole    Tablet 40 milliGRAM(s) Oral before breakfast  polyethylene glycol 3350 17 Gram(s) Oral at bedtime  senna 2 Tablet(s) Oral at bedtime  zinc oxide 40% Paste 1 Application(s) Topical every 12 hours    MEDICATIONS  (PRN):  calcium carbonate    500 mG (Tums) Chewable 1 Tablet(s) Chew four times a day PRN Dyspepsia  magnesium hydroxide Suspension 30 milliLiter(s) Oral daily PRN Constipation      Vital Signs Last 24 Hrs  T(C): 36.4 (09 Nov 2022 07:34), Max: 36.4 (08 Nov 2022 23:45)  T(F): 97.6 (09 Nov 2022 07:34), Max: 97.6 (08 Nov 2022 23:45)  HR: 54 (09 Nov 2022 07:34) (54 - 66)  BP: 124/60 (09 Nov 2022 07:34) (110/60 - 124/60)  BP(mean): --  RR: 15 (09 Nov 2022 07:34) (15 - 18)  SpO2: 100% (09 Nov 2022 07:34) (98% - 100%)    Parameters below as of 09 Nov 2022 07:34  Patient On (Oxygen Delivery Method): room air      CAPILLARY BLOOD GLUCOSE        I&O's Summary      PHYSICAL EXAM:  GENERAL: NAD, well-developed elderly female  HEAD:  Atraumatic, Normocephalic  NECK: Supple, No JVD  CHEST/LUNG: Clear to auscultation bilaterally; No wheeze, nonlabored breathing  HEART: RRR + Systolic murmur  ABDOMEN: Soft, Nontender, Nondistended; Bowel sounds present  EXTREMITIES:  No clubbing, cyanosis. + B/L LE trace edema  NEUROLOGY: AAOx3, non-focal  PSYCH: calm, appropriate mood    LABS:                    RADIOLOGY & ADDITIONAL TESTS:  Results Reviewed:   Imaging Personally Reviewed:  Electrocardiogram Personally Reviewed:    COORDINATION OF CARE:  Care Discussed with Consultants/Other Providers [Y/N]:  Prior or Outpatient Records Reviewed [Y/N]:

## 2022-11-10 ENCOUNTER — TRANSCRIPTION ENCOUNTER (OUTPATIENT)
Age: 77
End: 2022-11-10

## 2022-11-10 LAB
ALBUMIN SERPL ELPH-MCNC: 2.5 G/DL — LOW (ref 3.3–5)
ALP SERPL-CCNC: 170 U/L — HIGH (ref 40–120)
ALT FLD-CCNC: 16 U/L — SIGNIFICANT CHANGE UP (ref 10–45)
ANION GAP SERPL CALC-SCNC: 9 MMOL/L — SIGNIFICANT CHANGE UP (ref 5–17)
AST SERPL-CCNC: 23 U/L — SIGNIFICANT CHANGE UP (ref 10–40)
BILIRUB SERPL-MCNC: 0.5 MG/DL — SIGNIFICANT CHANGE UP (ref 0.2–1.2)
BUN SERPL-MCNC: 21 MG/DL — SIGNIFICANT CHANGE UP (ref 7–23)
CALCIUM SERPL-MCNC: 9.1 MG/DL — SIGNIFICANT CHANGE UP (ref 8.4–10.5)
CHLORIDE SERPL-SCNC: 106 MMOL/L — SIGNIFICANT CHANGE UP (ref 96–108)
CO2 SERPL-SCNC: 27 MMOL/L — SIGNIFICANT CHANGE UP (ref 22–31)
CREAT SERPL-MCNC: 0.71 MG/DL — SIGNIFICANT CHANGE UP (ref 0.5–1.3)
EGFR: 88 ML/MIN/1.73M2 — SIGNIFICANT CHANGE UP
GLUCOSE SERPL-MCNC: 96 MG/DL — SIGNIFICANT CHANGE UP (ref 70–99)
HCT VFR BLD CALC: 35.8 % — SIGNIFICANT CHANGE UP (ref 34.5–45)
HGB BLD-MCNC: 10.9 G/DL — LOW (ref 11.5–15.5)
MCHC RBC-ENTMCNC: 29.9 PG — SIGNIFICANT CHANGE UP (ref 27–34)
MCHC RBC-ENTMCNC: 30.4 GM/DL — LOW (ref 32–36)
MCV RBC AUTO: 98.1 FL — SIGNIFICANT CHANGE UP (ref 80–100)
NRBC # BLD: 0 /100 WBCS — SIGNIFICANT CHANGE UP (ref 0–0)
PLATELET # BLD AUTO: 429 K/UL — HIGH (ref 150–400)
POTASSIUM SERPL-MCNC: 4.2 MMOL/L — SIGNIFICANT CHANGE UP (ref 3.5–5.3)
POTASSIUM SERPL-SCNC: 4.2 MMOL/L — SIGNIFICANT CHANGE UP (ref 3.5–5.3)
PROT SERPL-MCNC: 7.1 G/DL — SIGNIFICANT CHANGE UP (ref 6–8.3)
RBC # BLD: 3.65 M/UL — LOW (ref 3.8–5.2)
RBC # FLD: 16.3 % — HIGH (ref 10.3–14.5)
SODIUM SERPL-SCNC: 142 MMOL/L — SIGNIFICANT CHANGE UP (ref 135–145)
WBC # BLD: 6.23 K/UL — SIGNIFICANT CHANGE UP (ref 3.8–10.5)
WBC # FLD AUTO: 6.23 K/UL — SIGNIFICANT CHANGE UP (ref 3.8–10.5)

## 2022-11-10 PROCEDURE — 99232 SBSQ HOSP IP/OBS MODERATE 35: CPT

## 2022-11-10 PROCEDURE — 99233 SBSQ HOSP IP/OBS HIGH 50: CPT

## 2022-11-10 RX ORDER — ZINC OXIDE 200 MG/G
1 OINTMENT TOPICAL
Qty: 0 | Refills: 0 | DISCHARGE
Start: 2022-11-10

## 2022-11-10 RX ORDER — ATORVASTATIN CALCIUM 80 MG/1
1 TABLET, FILM COATED ORAL
Qty: 30 | Refills: 0
Start: 2022-11-10 | End: 2022-12-09

## 2022-11-10 RX ORDER — LEVOTHYROXINE SODIUM 125 MCG
1 TABLET ORAL
Qty: 0 | Refills: 0 | DISCHARGE
Start: 2022-11-10

## 2022-11-10 RX ORDER — APIXABAN 2.5 MG/1
1 TABLET, FILM COATED ORAL
Qty: 60 | Refills: 0
Start: 2022-11-10 | End: 2022-12-09

## 2022-11-10 RX ORDER — POLYETHYLENE GLYCOL 3350 17 G/17G
17 POWDER, FOR SOLUTION ORAL
Qty: 0 | Refills: 0 | DISCHARGE
Start: 2022-11-10

## 2022-11-10 RX ORDER — CALCIUM CARBONATE 500(1250)
1 TABLET ORAL
Qty: 0 | Refills: 0 | DISCHARGE
Start: 2022-11-10

## 2022-11-10 RX ORDER — LIDOCAINE 4 G/100G
1 CREAM TOPICAL
Qty: 0 | Refills: 0 | DISCHARGE
Start: 2022-11-10

## 2022-11-10 RX ORDER — MAGNESIUM HYDROXIDE 400 MG/1
30 TABLET, CHEWABLE ORAL
Qty: 0 | Refills: 0 | DISCHARGE
Start: 2022-11-10

## 2022-11-10 RX ORDER — ATORVASTATIN CALCIUM 80 MG/1
1 TABLET, FILM COATED ORAL
Qty: 30 | Refills: 0
Start: 2022-11-10 | End: 2022-12-10

## 2022-11-10 RX ORDER — SENNA PLUS 8.6 MG/1
2 TABLET ORAL
Qty: 0 | Refills: 0 | DISCHARGE
Start: 2022-11-10

## 2022-11-10 RX ORDER — NYSTATIN CREAM 100000 [USP'U]/G
1 CREAM TOPICAL
Qty: 60 | Refills: 0
Start: 2022-11-10 | End: 2022-12-09

## 2022-11-10 RX ORDER — FERROUS SULFATE 325(65) MG
1 TABLET ORAL
Qty: 0 | Refills: 0 | DISCHARGE
Start: 2022-11-10

## 2022-11-10 RX ORDER — ACETAMINOPHEN 500 MG
2 TABLET ORAL
Qty: 0 | Refills: 0 | DISCHARGE
Start: 2022-11-10

## 2022-11-10 RX ORDER — LANOLIN ALCOHOL/MO/W.PET/CERES
2 CREAM (GRAM) TOPICAL
Qty: 0 | Refills: 0 | DISCHARGE
Start: 2022-11-10

## 2022-11-10 RX ADMIN — SENNA PLUS 2 TABLET(S): 8.6 TABLET ORAL at 21:38

## 2022-11-10 RX ADMIN — ZINC OXIDE 1 APPLICATION(S): 200 OINTMENT TOPICAL at 17:20

## 2022-11-10 RX ADMIN — Medication 6 MILLIGRAM(S): at 21:38

## 2022-11-10 RX ADMIN — Medication 650 MILLIGRAM(S): at 12:46

## 2022-11-10 RX ADMIN — ZINC OXIDE 1 APPLICATION(S): 200 OINTMENT TOPICAL at 06:48

## 2022-11-10 RX ADMIN — PANTOPRAZOLE SODIUM 40 MILLIGRAM(S): 20 TABLET, DELAYED RELEASE ORAL at 06:47

## 2022-11-10 RX ADMIN — ATORVASTATIN CALCIUM 20 MILLIGRAM(S): 80 TABLET, FILM COATED ORAL at 21:39

## 2022-11-10 RX ADMIN — Medication 650 MILLIGRAM(S): at 22:50

## 2022-11-10 RX ADMIN — Medication 650 MILLIGRAM(S): at 06:47

## 2022-11-10 RX ADMIN — APIXABAN 2.5 MILLIGRAM(S): 2.5 TABLET, FILM COATED ORAL at 06:48

## 2022-11-10 RX ADMIN — Medication 650 MILLIGRAM(S): at 21:38

## 2022-11-10 RX ADMIN — NYSTATIN CREAM 1 APPLICATION(S): 100000 CREAM TOPICAL at 17:19

## 2022-11-10 RX ADMIN — Medication 650 MILLIGRAM(S): at 07:10

## 2022-11-10 RX ADMIN — Medication 150 MICROGRAM(S): at 06:47

## 2022-11-10 RX ADMIN — NYSTATIN CREAM 1 APPLICATION(S): 100000 CREAM TOPICAL at 06:49

## 2022-11-10 RX ADMIN — APIXABAN 2.5 MILLIGRAM(S): 2.5 TABLET, FILM COATED ORAL at 17:19

## 2022-11-10 RX ADMIN — Medication 325 MILLIGRAM(S): at 11:35

## 2022-11-10 RX ADMIN — Medication 650 MILLIGRAM(S): at 13:20

## 2022-11-10 NOTE — DISCHARGE NOTE PROVIDER - CARE PROVIDER_API CALL
Sin Leach)  Orthopedics  [address], [address]  [place], [place] [address]  Phone: [phone]  Fax: [phone]  Follow Up Time: 2 weeks    Autumn Finn; MPH)  Surgery  Phys Medicine  [facility]  [address] [place] [address]  Phone: [phone]  Fax: [phone]  Follow Up Time: 1 month

## 2022-11-10 NOTE — PROGRESS NOTE ADULT - ASSESSMENT
75 y/o F with PMH HTN, HLD, hypothyroidism, SLE, RA, thyroid CA s/p resection, hx of DVT (on Eliquis), lung cancer (s/p tumor removal, no chemo no radiation), Right FELICITAS (Jonathan, '19), Left TKA (Jonathanuss, '20), Right FELICITAS (Jonathanuss, '22) presented to [facility] ED on 10/17 s/p mechanical fall with severe right hip pain and inability to ambulate. She was transferred to [facility] where she had ORIF of right femur on 10/20 with Dr. Leach. Hospital course complicated by ABLA s/p pRBC. Now admitted to [facility] for initiation of multidisciplinary rehab program.     #Right femur fracture  -s/p ORIF of right femur/THR revision on 10/20 with Dr. Leach  -PT/OT/SLP per rehab team    #Anemia  -Likely post op anemia  -H/H stable, no overt signs of bleeding  -Transfuse prn to maintain Hb>7  -Continue iron supplement daily    #Hypothyroidism  -Continue Synthroid    #HLD  -Continue Atorvastatin    #DVT ppx - Eliquis 2.5mg BID  #GI ppx - Protonix

## 2022-11-10 NOTE — DISCHARGE NOTE PROVIDER - NSDCFUADDAPPT_GEN_ALL_CORE_FT
Please schedule a f/u visit with your PCP 1-2wks of discharge:  Dr. Scott Dan  Phone [phone]    Please follow up with Rheumatology regarding your R shoulder injection treatments      Please abide by the following: HIP PRECAUTIONS  No hip adduction past neutral,   No hip internal rotation past neutral,   No hip flexion >90.   Use abduction pillow while in bed  Do not sit on low chairs or low toilet seats.

## 2022-11-10 NOTE — DISCHARGE NOTE PROVIDER - PROVIDER TOKENS
PROVIDER:[TOKEN:[[id number]],FOLLOWUP:[2 weeks]],PROVIDER:[TOKEN:[[id number]],FOLLOWUP:[1 month]]

## 2022-11-10 NOTE — PROGRESS NOTE ADULT - ASSESSMENT
· Assessment	  This is a 75 YO female with PMH of HTN, HLD, heart murmur, hypothyroidism, SLE, RA, thyroid CA s/p resection, DVT (on eliquis), lung cancer (s/p tumor removal, no chemo no radiation), Right FELICITAS (Jonathan, '19), Left TKA (Jonathan, '20), Right FELICITAS (Jonathan, '22) presented to [facility] ED on 10/17 s/p mechanical fall with severe right hip pain and inability to ambulate. She was transferred to [facility] where she had ORIF of right femur on 10/20 with Dr. Leach. ABLA now s/p 1 U PRBC intraop and 1 U post op. Leukocytosis is likely reactive to surgery and decadron. Patient now with gait Instability, ADL impairments and Functional impairments.    * Continue functional improvement, concentrating on Transfers bed to chair and proximal RLE motor function/progressive ambulation * DC planning     # Right femur fracture  - ORIF of right femur/THR revision on 10/20 with Dr. Leach  (Lateral Approach)  --Continue PT/OT  -- Lateral + driving hip precaution   - WB Status: WBAT  --staples removed 11/8, family to make f/u plan with ortho    HIP PRECAUTIONS  No hip adduction past neutral,   No hip internal rotation past neutral,   No hip flexion >90.   Use abduction pillow while in bed  Do not sit on low chairs or low toilet seats.    * Anemia--monitor  Hypothyroidism  - synthroid    OA Rt shoulderr s/p previous inj treatments most recently 6/2022--f/u with Rheumatology post dc     HLD  - Atorvastatin    #Pain management  - Tylenol Q8, Oxycodone PRN, celebrex x 21 days post op, d/c 11/3    #DVT ppx  - Eliquis     #GI ppx  - Protonix 40mg    #Bowel Regimen  - Senna, miralax PRN    #Bladder management  - Monitor UO    #FEN   - Diet: Regular    #Skin:  - desitin to groin area for IAD  - Nystatin under breast and abd folds for candida management      #Sleep:   - Maintain quiet hours and low stim environment.  - Melatonin     #Precaution  - Fall, Hip     #GOC  CODE STATUS: FULL CODE     IDT--11/9  Has significant support from Son for IADLs, Has ramp, no DENISE  Function-- Close supervision for ADLs and transfers. Making use of adaptive equipment.  Slow to follow through with tasks during therapy  Ambulating 75 ft with RW with supervision. Completed 8 stairs with 2 HR with close supervision/supervision.  Barriers-- Distractable  Est dc 11/12 to home      Outpatient Follow-up (Specialty/Name of physician):  Sin Leach  [address]  [place]  [phone]  F/U 2 weeks      [facility]  Scheduled 10/22/22    Josselin Ramirez  [facility] Physician [facility]  Cardiology [address]   Scheduled 12/19/22    F/U with family Physician 2-3 weeks after discharge         · Assessment	  This is a 77 YO female with PMH of HTN, HLD, heart murmur, hypothyroidism, SLE, RA, thyroid CA s/p resection, DVT (on eliquis), lung cancer (s/p tumor removal, no chemo no radiation), Right FELICITAS (Jonathan, '19), Left TKA (Jonathanuss, '20), Right FELICITAS (Jonathan, '22) presented to [facility] ED on 10/17 s/p mechanical fall with severe right hip pain and inability to ambulate. She was transferred to [facility] where she had ORIF of right femur on 10/20 with Dr. Leach. ABLA now s/p 1 U PRBC intraop and 1 U post op. Leukocytosis is likely reactive to surgery and decadron. Patient now with gait Instability, ADL impairments and Functional impairments.    * Continue functional improvement, concentrating on Transfers bed to chair and proximal RLE motor function/progressive ambulation * DC planning     # Right femur fracture  - ORIF of right femur/THR revision on 10/20 with Dr. Leach  (Lateral Approach)  --Continue PT/OT  -- Lateral + driving hip precaution   - WB Status: WBAT  --staples removed 11/8, family to make f/u plan with ortho    HIP PRECAUTIONS  No hip adduction past neutral,   No hip internal rotation past neutral,   No hip flexion >90.   Use abduction pillow while in bed  Do not sit on low chairs or low toilet seats.    * Anemia--monitor  Hypothyroidism  - synthroid    OA Rt shoulderr s/p previous inj treatments most recently 6/2022--f/u with Rheumatology post dc     HLD  - Atorvastatin    #Pain management  - Tylenol Q8/prn    #DVT ppx  - Eliquis     #GI ppx  - Protonix 40mg    #Bowel Regimen  - Senna, miralax PRN    #Bladder management--voiding appropriately    #FEN   - Diet: Regular    #Skin:  - desitin to groin area for IAD  - Nystatin under breast and abd folds for candida management      #Sleep:   - Maintain quiet hours and low stim environment.  - Melatonin     #Precaution  - Fall, Hip     #GOC  CODE STATUS: FULL CODE     IDT--11/9  Has significant support from Son for IADLs, Has ramp, no DENISE  Function-- Close supervision for ADLs and transfers. Making use of adaptive equipment.  Slow to follow through with tasks during therapy  Ambulating 75 ft with RW with supervision. Completed 8 stairs with 2 HR with close supervision/supervision.  Barriers-- Distractable  Est dc 11/12 to home      Outpatient Follow-up (Specialty/Name of physician):  Sin Leach  [address]  [place]  [phone]  F/U 2 weeks      [facility]  Scheduled 10/22/22    Josselin Ramirez  [facility]  Cardiology [address]  Scheduled 12/19/22    F/U with family Physician 2-3 weeks after discharge

## 2022-11-10 NOTE — PROGRESS NOTE ADULT - SUBJECTIVE AND OBJECTIVE BOX
Patient is a 76y old  Female who presents with a chief complaint of R ORIF revision (09 Nov 2022 11:26)      SUBJECTIVE / OVERNIGHT EVENTS:  Pt seen and examined at bedside. No acute events overnight.  Pt denies cp, palpitations, sob, abd pain, N/V, fever, chills.    ROS:  All other review of systems negative    Allergies    No Known Allergies    Intolerances        MEDICATIONS  (STANDING):  acetaminophen     Tablet .. 650 milliGRAM(s) Oral every 8 hours  apixaban 2.5 milliGRAM(s) Oral every 12 hours  atorvastatin 20 milliGRAM(s) Oral at bedtime  ferrous    sulfate 325 milliGRAM(s) Oral daily  levothyroxine 150 MICROGram(s) Oral daily  lidocaine   4% Patch 1 Patch Transdermal daily  melatonin 6 milliGRAM(s) Oral at bedtime  nystatin Powder 1 Application(s) Topical every 12 hours  pantoprazole    Tablet 40 milliGRAM(s) Oral before breakfast  polyethylene glycol 3350 17 Gram(s) Oral at bedtime  senna 2 Tablet(s) Oral at bedtime  zinc oxide 40% Paste 1 Application(s) Topical every 12 hours    MEDICATIONS  (PRN):  calcium carbonate    500 mG (Tums) Chewable 1 Tablet(s) Chew four times a day PRN Dyspepsia  magnesium hydroxide Suspension 30 milliLiter(s) Oral daily PRN Constipation      Vital Signs Last 24 Hrs  T(C): 36.7 (10 Nov 2022 07:52), Max: 36.7 (09 Nov 2022 21:41)  T(F): 98 (10 Nov 2022 07:52), Max: 98.1 (09 Nov 2022 21:41)  HR: 57 (10 Nov 2022 07:52) (57 - 72)  BP: 118/57 (10 Nov 2022 07:52) (110/68 - 118/57)  BP(mean): --  RR: 15 (10 Nov 2022 07:52) (15 - 17)  SpO2: 100% (10 Nov 2022 07:52) (99% - 100%)    Parameters below as of 10 Nov 2022 07:52  Patient On (Oxygen Delivery Method): room air      CAPILLARY BLOOD GLUCOSE        I&O's Summary      PHYSICAL EXAM:  GENERAL: NAD, well-developed elderly female  HEAD:  Atraumatic, Normocephalic  NECK: Supple, No JVD  CHEST/LUNG: Clear to auscultation bilaterally; No wheeze, nonlabored breathing  HEART: RRR + Systolic murmur  ABDOMEN: Soft, Nontender, Nondistended; Bowel sounds present  EXTREMITIES:  No clubbing, cyanosis. + B/L LE trace edema  PSYCH: calm, appropriate mood    LABS:                        10.9   6.23  )-----------( 429      ( 10 Nov 2022 07:30 )             35.8     11-10    142  |  106  |  21  ----------------------------<  96  4.2   |  27  |  0.71    Ca    9.1      10 Nov 2022 07:30    TPro  7.1  /  Alb  2.5<L>  /  TBili  0.5  /  DBili  x   /  AST  23  /  ALT  16  /  AlkPhos  170<H>  11-10              RADIOLOGY & ADDITIONAL TESTS:  Results Reviewed:   Imaging Personally Reviewed:  Electrocardiogram Personally Reviewed:    COORDINATION OF CARE:  Care Discussed with Consultants/Other Providers [Y/N]:  Prior or Outpatient Records Reviewed [Y/N]:

## 2022-11-10 NOTE — DISCHARGE NOTE NURSING/CASE MANAGEMENT/SOCIAL WORK - PATIENT PORTAL LINK FT
You can access the FollowMyHealth Patient Portal offered by [facility] by registering at the following website: [email or web address]. By joining [employer]’s FollowMyHealth portal, you will also be able to view your health information using other applications (apps) compatible with our system.

## 2022-11-10 NOTE — DISCHARGE NOTE PROVIDER - NSDCMRMEDTOKEN_GEN_ALL_CORE_FT
acetaminophen 325 mg oral tablet: 2 tab(s) orally every 8 hours  apixaban 2.5 mg oral tablet: 1 tab(s) orally every 12 hours  atorvastatin 20 mg oral tablet: 1 tab(s) orally once a day (at bedtime)  calcium carbonate 500 mg (200 mg elemental calcium) oral tablet, chewable: 1 tab(s) orally 4 times a day, As needed, Dyspepsia  ferrous sulfate 325 mg (65 mg elemental iron) oral tablet: 1 tab(s) orally once a day  levothyroxine 150 mcg (0.15 mg) oral tablet: 1 tab(s) orally once a day  lidocaine 4% topical film: Apply topically to affected area once a day  magnesium hydroxide 8% oral suspension: 30 milliliter(s) orally once a day, As needed, Constipation  melatonin 3 mg oral tablet: 2 tab(s) orally once a day (at bedtime)  nystatin 100,000 units/g topical powder: 1 application topically 2 times a day  pantoprazole 40 mg oral delayed release tablet: 1 tab(s) orally once a day (before a meal)  polyethylene glycol 3350 oral powder for reconstitution: 17 gram(s) orally once a day (at bedtime)  senna leaf extract oral tablet: 2 tab(s) orally once a day (at bedtime)  zinc oxide 40% topical ointment: Apply topically to affected area 2 times a day   acetaminophen 325 mg oral tablet: 2 tab(s) orally every 8 hours  apixaban 2.5 mg oral tablet: 1 tab(s) orally every 12 hours  atorvastatin 20 mg oral tablet: 1 tab(s) orally once a day (at bedtime)  calcium carbonate 500 mg (200 mg elemental calcium) oral tablet, chewable: 1 tab(s) orally 4 times a day, As needed, Dyspepsia  ferrous sulfate 325 mg (65 mg elemental iron) oral tablet: 1 tab(s) orally once a day  levothyroxine 150 mcg (0.15 mg) oral tablet: 1 tab(s) orally once a day  lidocaine 4% topical film: Apply topically to affected area once a day   magnesium hydroxide 8% oral suspension: 30 milliliter(s) orally once a day, As needed, Constipation  melatonin 3 mg oral tablet: 2 tab(s) orally once a day (at bedtime)  nystatin 100,000 units/g topical powder: 1 application topically 2 times a day  pantoprazole 40 mg oral delayed release tablet: 1 tab(s) orally once a day (before a meal)  polyethylene glycol 3350 oral powder for reconstitution: 17 gram(s) orally once a day (at bedtime)  senna leaf extract oral tablet: 2 tab(s) orally once a day (at bedtime)  zinc oxide 40% topical ointment: Apply topically to affected area 2 times a day   acetaminophen 325 mg oral tablet: 2 tab(s) orally every 8 hours  apixaban 2.5 mg oral tablet: 1 tab(s) orally every 12 hours  atorvastatin 20 mg oral tablet: 1 tab(s) orally once a day (at bedtime)  calcium carbonate 500 mg (200 mg elemental calcium) oral tablet, chewable: 1 tab(s) orally 4 times a day, As needed, Dyspepsia  ferrous sulfate 325 mg (65 mg elemental iron) oral tablet: 1 tab(s) orally once a day  levothyroxine 150 mcg (0.15 mg) oral tablet: 1 tab(s) orally once a day  lidocaine 4% topical film: Apply topically to affected area once a day   magnesium hydroxide 8% oral suspension: 30 milliliter(s) orally once a day, As needed, Constipation  melatonin 3 mg oral tablet: 2 tab(s) orally once a day (at bedtime)  nystatin 100,000 units/g topical powder: 1 application topically 2 times a day  pantoprazole 40 mg oral delayed release tablet: 1 tab(s) orally once a day (before a meal)  polyethylene glycol 3350 oral powder for reconstitution: 17 gram(s) orally once a day (at bedtime)  senna leaf extract oral tablet: 2 tab(s) orally once a day (at bedtime)  ASHER compression stockings for BL legs : Wear stocking daily especially when out of bed    Rt calf 19&quot; (48cm)  Left calf 17&quot; (43cm)  zinc oxide 40% topical ointment: Apply topically to affected area 2 times a day

## 2022-11-10 NOTE — DISCHARGE NOTE PROVIDER - NSDCFUSCHEDAPPT_GEN_ALL_CORE_FT
Josselin Ramirez Physician [facility]  CARDIOLOGY [id number]  Scheduled Appointment: 12/19/2022     [facility]  ORTHOSURG [address] [facility]  Scheduled Appointment: 11/16/2022    Josselin Ramirez  [facility]  CARDIOLOGY [id number]   Scheduled Appointment: 12/19/2022

## 2022-11-10 NOTE — DISCHARGE NOTE NURSING/CASE MANAGEMENT/SOCIAL WORK - NSDCPEFALRISK_GEN_ALL_CORE
For information on Fall & Injury Prevention, visit: [email or web address] OR  [email or web address] OR  https://www.cdc.gov/steadi/patient.html

## 2022-11-10 NOTE — DISCHARGE NOTE PROVIDER - ATTENDING DISCHARGE PHYSICAL EXAMINATION:
In NAD  HEENT- EOMI  Heart- RRR, S1S2  Lungs- CTA bl.  Abd- + BS, NT  Ext- No calf pain B/L  Neuro- Exam unchanged

## 2022-11-10 NOTE — DISCHARGE NOTE PROVIDER - NSDCCPCAREPLAN_GEN_ALL_CORE_FT
PRINCIPAL DISCHARGE DIAGNOSIS  Diagnosis: Periprosthetic hip fracture  Assessment and Plan of Treatment: You presented to [facility] on 10/17 after a fall that resulted in severe R hip pain and inability to ambulate. You underwent hip surgery with Dr. Leach on 10/20. Please follow up with Dr. Leach upon discharge for further management post surgery.      SECONDARY DISCHARGE DIAGNOSES  Diagnosis: HLD (hyperlipidemia)  Assessment and Plan of Treatment: You were noted to have high cholesterol levels. Please take your Atorvastatin as instructed and follow up with your PCP regarding further management.    Diagnosis: DVT, lower extremity  Assessment and Plan of Treatment: Please resume your Eliquis as prescribed to prevent any progression of your DVT. Follow up with your PCP or hematology for further follow up, management and refills of this medication.

## 2022-11-10 NOTE — DISCHARGE NOTE PROVIDER - HOSPITAL COURSE
HPI:  This is a 75 YO female with PMH of HTN, HLD, heart murmur, hypothyroidism, SLE, RA, thyroid CA s/p resection, DVT (on eliquis), lung cancer (s/p tumor removal, no chemo no radiation), Right FELICITAS (Krauss, '19), Left TKA (Krauss, '20), Right FELICITAS (Jonathanuss, '22) presented to [facility] ED on 10/17 s/p mechanical fall with severe right hip pain and inability to ambulate. Patient is a community ambulator at baseline with a walker. Patient transferred to [place] for repair of right periprosthetic proximal right femur fracture on 10/19. She had ORIF of right femurrevision Right THR on 10/20 with Dr. Leach.    Acute blood loss anemia now s/p 1 U PRBC intraop and 1 U post op. She is to be on Eliquis 2.5mg BID for DVT ppx, HIP precaution, abduction pillow per ortho. She was seen by cardiology for intermittent blocked PACs and Mobitz 1 on telemetry. ECG was done with no evidence of ischemia. Leukocytosis is likely reactive to surgery and decadron. Patient was evaluated by PM&R and therapy for functional deficits, gait/ADL impairments and acute rehabilitation was recommended. Patient was medically optimized for discharge to [facility] IRU on 10/21/22.   (22 Oct 2022 18:40)      Patient was evaluated by PM&R and therapy for gait/ADL impairments and recommended acute rehabilitation. Patient was medically optimized for discharge to [facility] on 10/22/22. Admitted with gait instability, ADL, and functional impairments.     Rehab course significant for RLE weakness with hip flexion and knee extension. Your staples from your surgery were removed on 11/8 and you tolerated the procedure well. Your legs continued to remain swollen, and ACE wraps were placed. Please wear your compression socks at home, as you were prior to your admission to the hospital.   Please abide by these:   HIP PRECAUTIONS  No hip adduction past neutral,   No hip internal rotation past neutral,   No hip flexion >90.   Use abduction pillow while in bed  Do not sit on low chairs or low toilet seats.    All other medical co-morbidities were stable. Patient tolerated course of inpatient PT/OT/SLP rehab with significant improvements and met rehab goals prior to discharge. Patient was medically cleared on 11/12/22 for discharge to home with home care. HPI:  This is a 75 YO female with PMH of HTN, HLD, heart murmur, hypothyroidism, SLE, RA, thyroid CA s/p resection, DVT (on eliquis), lung cancer (s/p tumor removal, no chemo no radiation), Right FELICITAS (Krauss, '19), Left TKA (Jonathanuss, '20), Right FELICITAS (Jonathanuss, '22) presented to [facility] ED on 10/17 s/p mechanical fall with severe right hip pain and inability to ambulate. Patient is a community ambulator at baseline with a walker. Patient transferred to [place] for repair of right periprosthetic proximal right femur fracture on 10/19. She had ORIF of right femurrevision Right THR on 10/20 with Dr. Leach.    Acute blood loss anemia now s/p 1 U PRBC intraop and 1 U post op. She is to be on Eliquis 2.5mg BID for DVT ppx, HIP precaution, abduction pillow per ortho. She was seen by cardiology for intermittent blocked PACs and Mobitz 1 on telemetry. ECG was done with no evidence of ischemia. Leukocytosis is likely reactive to surgery and decadron. Patient was evaluated by PM&R and therapy for functional deficits, gait/ADL impairments and acute rehabilitation was recommended. Patient was medically optimized for discharge to [facility] IRU on 10/21/22.   (22 Oct 2022 18:40)      Patient was evaluated by PM&R and therapy for gait/ADL impairments and recommended acute rehabilitation. Patient was medically optimized for discharge to [facility] on 10/22/22. Admitted with gait instability, ADL, and functional impairments.     Rehab course significant for RLE weakness with hip flexion and knee extension.  Your reported that  your right knee extension has been weak prior and after your Rt knee surgery earlier this year, which predates your Rt hip surgery  You identified your most significant deficit as difficulty with transfers from bed to chair/recliner  Your made marked improvement with therapy--ambulating functional distance with walker, improved ROM Rt leg, good pain control with non opioid meds,  improved ability for transfers from bed to chair, using leg lift  Your daughter Adeline liaised well with team via phone calls and playback emilie, observed your therapy sessions multiple times and was happy with your progress    Your staples from your surgery were removed on 11/8 and you tolerated the procedure well. Wound edges together and healing well  Your leg swelling responded to compressive treatment with and ACE wraps . Please wear your compression socks at home, as you were prior to your admission to the hospital.   Your daughter will liaise with your orthopedic physician for a post op review date    Please abide by these:   HIP PRECAUTIONS  No hip adduction past neutral,   No hip internal rotation past neutral,   No hip flexion >90.   Use abduction pillow while in bed  Do not sit on low chairs or low toilet seats.      Functional status at last IDT--11/9  Has significant support from Son for IADLs, Has ramp, no DENISE  Function-- Close supervision for ADLs and transfers. Making use of adaptive equipment.  Slow to follow through with tasks during therapy  Ambulating 75 ft with RW with supervision. Completed 8 stairs with 2 HR with close supervision/supervision.  Barriers-- Distractable  Est dc 11/12 to home    All other medical co-morbidities were stable. Patient tolerated course of inpatient PT/OT/SLP rehab with significant improvements and met rehab goals prior to discharge. Patient was medically cleared on 11/12/22 for discharge to home with home care.

## 2022-11-10 NOTE — DISCHARGE NOTE PROVIDER - DETAILS OF MALNUTRITION DIAGNOSIS/DIAGNOSES
This patient has been assessed with a concern for Malnutrition and was treated during this hospitalization for the following Nutrition diagnosis/diagnoses:     -  10/24/2022: Morbid obesity (BMI > 40)

## 2022-11-10 NOTE — PROGRESS NOTE ADULT - SUBJECTIVE AND OBJECTIVE BOX
Subjective/ROS  Seen and examined, chart review  Reports sustained improvement, transfers and mobility    Labs unremarkable    ROS  No chest pain, nausea, vomiting  LBM 11/10    labs unremarkable    Vital Signs Last 24 Hrs  T(C): 36.7 (10 Nov 2022 07:52), Max: 36.7 (09 Nov 2022 21:41)  T(F): 98 (10 Nov 2022 07:52), Max: 98.1 (09 Nov 2022 21:41)  HR: 57 (10 Nov 2022 07:52) (57 - 72)  BP: 118/57 (10 Nov 2022 07:52) (110/68 - 118/57)-  RR: 15 (10 Nov 2022 07:52) (15 - 17)  SpO2: 100% (10 Nov 2022 07:52) (99% - 100%)  O2 Parameters below as of 10 Nov 2022 07:52  Patient On (Oxygen Delivery Method): room air    EXAM  Gen -  Comfortable, interactive   HEENT - NAD  Neck - No limited ROM  Pulm - normal resp rate  Cardiovascular - warn and well perfused  Abdomen - Soft, non tender   Extremities - No calf tenderness, chronic skin changes,  edema RLE, significantly reduced,    Neuro-     Cognitive - AAOx4,     Communication - Fluent, No dysarthria     Attention: Intact      Cranial Nerves - CN 2-12 grossly intact     Motor -                    LEFT    UE - ShAB 5/5, EF 5/5, EE 5/5, WE 5/5,  5/5.                      RIGHT UE - ShAB 5/5, EF 5/5, EE 5/5, WE 5/5,  5/5                    LEFT    LE - HF 5/5, KE 5/5, DF 5/5, PF 5/5                    RIGHT LE - HF and KE 3/5, DF 5/5, PF 5/5        Sensory - Intact to LT     Tone - normal  Psychiatric - Mood stable, Affect WNL  Skin: skin excuriation on abd fold resolving  Wounds -, wound edges together                                     10.9   6.23  )-----------( 429      ( 10 Nov 2022 07:30 )             35.8     11-10    142  |  106  |  21  ----------------------------<  96  4.2   |  27  |  0.71    Ca    9.1      10 Nov 2022 07:30    TPro  7.1  /  Alb  2.5<L>  /  TBili  0.5  /  DBili  x   /  AST  23  /  ALT  16  /  AlkPhos  170<H>  11-10        MEDICATIONS  (STANDING):  acetaminophen     Tablet .. 650 milliGRAM(s) Oral every 8 hours  apixaban 2.5 milliGRAM(s) Oral every 12 hours  atorvastatin 20 milliGRAM(s) Oral at bedtime  ferrous    sulfate 325 milliGRAM(s) Oral daily  levothyroxine 150 MICROGram(s) Oral daily  lidocaine   4% Patch 1 Patch Transdermal daily  melatonin 6 milliGRAM(s) Oral at bedtime  nystatin Powder 1 Application(s) Topical every 12 hours  pantoprazole    Tablet 40 milliGRAM(s) Oral before breakfast  polyethylene glycol 3350 17 Gram(s) Oral at bedtime  senna 2 Tablet(s) Oral at bedtime  zinc oxide 40% Paste 1 Application(s) Topical every 12 hours    MEDICATIONS  (PRN):  calcium carbonate    500 mG (Tums) Chewable 1 Tablet(s) Chew four times a day PRN Dyspepsia  magnesium hydroxide Suspension 30 milliLiter(s) Oral daily PRN Constipation

## 2022-11-11 PROCEDURE — 99232 SBSQ HOSP IP/OBS MODERATE 35: CPT

## 2022-11-11 RX ORDER — APIXABAN 2.5 MG/1
1 TABLET, FILM COATED ORAL
Qty: 60 | Refills: 0
Start: 2022-11-11 | End: 2022-12-10

## 2022-11-11 RX ORDER — NYSTATIN CREAM 100000 [USP'U]/G
1 CREAM TOPICAL
Qty: 60 | Refills: 0
Start: 2022-11-11 | End: 2022-12-10

## 2022-11-11 RX ORDER — LIDOCAINE 4 G/100G
1 CREAM TOPICAL
Qty: 30 | Refills: 0
Start: 2022-11-11 | End: 2022-12-10

## 2022-11-11 RX ORDER — ATORVASTATIN CALCIUM 80 MG/1
1 TABLET, FILM COATED ORAL
Qty: 30 | Refills: 0
Start: 2022-11-11 | End: 2022-12-10

## 2022-11-11 RX ADMIN — Medication 650 MILLIGRAM(S): at 06:41

## 2022-11-11 RX ADMIN — ZINC OXIDE 1 APPLICATION(S): 200 OINTMENT TOPICAL at 17:38

## 2022-11-11 RX ADMIN — LIDOCAINE 1 PATCH: 4 CREAM TOPICAL at 07:55

## 2022-11-11 RX ADMIN — APIXABAN 2.5 MILLIGRAM(S): 2.5 TABLET, FILM COATED ORAL at 06:42

## 2022-11-11 RX ADMIN — Medication 650 MILLIGRAM(S): at 16:35

## 2022-11-11 RX ADMIN — ATORVASTATIN CALCIUM 20 MILLIGRAM(S): 80 TABLET, FILM COATED ORAL at 21:36

## 2022-11-11 RX ADMIN — Medication 650 MILLIGRAM(S): at 07:07

## 2022-11-11 RX ADMIN — LIDOCAINE 1 PATCH: 4 CREAM TOPICAL at 20:38

## 2022-11-11 RX ADMIN — Medication 650 MILLIGRAM(S): at 22:38

## 2022-11-11 RX ADMIN — Medication 650 MILLIGRAM(S): at 21:36

## 2022-11-11 RX ADMIN — NYSTATIN CREAM 1 APPLICATION(S): 100000 CREAM TOPICAL at 17:37

## 2022-11-11 RX ADMIN — ZINC OXIDE 1 APPLICATION(S): 200 OINTMENT TOPICAL at 06:43

## 2022-11-11 RX ADMIN — Medication 150 MICROGRAM(S): at 06:42

## 2022-11-11 RX ADMIN — LIDOCAINE 1 PATCH: 4 CREAM TOPICAL at 19:48

## 2022-11-11 RX ADMIN — NYSTATIN CREAM 1 APPLICATION(S): 100000 CREAM TOPICAL at 06:43

## 2022-11-11 RX ADMIN — Medication 6 MILLIGRAM(S): at 21:36

## 2022-11-11 RX ADMIN — PANTOPRAZOLE SODIUM 40 MILLIGRAM(S): 20 TABLET, DELAYED RELEASE ORAL at 06:42

## 2022-11-11 RX ADMIN — Medication 650 MILLIGRAM(S): at 15:04

## 2022-11-11 RX ADMIN — APIXABAN 2.5 MILLIGRAM(S): 2.5 TABLET, FILM COATED ORAL at 17:37

## 2022-11-11 RX ADMIN — Medication 325 MILLIGRAM(S): at 12:28

## 2022-11-11 NOTE — PROGRESS NOTE ADULT - SUBJECTIVE AND OBJECTIVE BOX
Patient is a 76y old  Female who presents with a chief complaint of Right periprosthetic femur fracture (10 Nov 2022 14:22)      SUBJECTIVE / OVERNIGHT EVENTS:  Pt seen and examined at bedside. No acute events overnight.  Pt denies cp, palpitations, sob, abd pain, N/V, fever, chills.    ROS:  All other review of systems negative    Allergies    No Known Allergies    Intolerances        MEDICATIONS  (STANDING):  acetaminophen     Tablet .. 650 milliGRAM(s) Oral every 8 hours  apixaban 2.5 milliGRAM(s) Oral every 12 hours  atorvastatin 20 milliGRAM(s) Oral at bedtime  ferrous    sulfate 325 milliGRAM(s) Oral daily  levothyroxine 150 MICROGram(s) Oral daily  lidocaine   4% Patch 1 Patch Transdermal daily  melatonin 6 milliGRAM(s) Oral at bedtime  nystatin Powder 1 Application(s) Topical every 12 hours  pantoprazole    Tablet 40 milliGRAM(s) Oral before breakfast  polyethylene glycol 3350 17 Gram(s) Oral at bedtime  senna 2 Tablet(s) Oral at bedtime  zinc oxide 40% Paste 1 Application(s) Topical every 12 hours    MEDICATIONS  (PRN):  calcium carbonate    500 mG (Tums) Chewable 1 Tablet(s) Chew four times a day PRN Dyspepsia  magnesium hydroxide Suspension 30 milliLiter(s) Oral daily PRN Constipation      Vital Signs Last 24 Hrs  T(C): 36.7 (11 Nov 2022 07:21), Max: 36.7 (10 Nov 2022 20:28)  T(F): 98 (11 Nov 2022 07:21), Max: 98.1 (10 Nov 2022 20:28)  HR: 51 (11 Nov 2022 07:21) (51 - 61)  BP: 133/95 (11 Nov 2022 07:21) (129/74 - 133/95)  BP(mean): --  RR: 15 (11 Nov 2022 07:21) (15 - 16)  SpO2: 99% (11 Nov 2022 07:21) (99% - 100%)    Parameters below as of 11 Nov 2022 07:21  Patient On (Oxygen Delivery Method): room air      CAPILLARY BLOOD GLUCOSE        I&O's Summary      PHYSICAL EXAM:  GENERAL: NAD, well-developed elderly female  HEAD:  Atraumatic, Normocephalic  NECK: Supple, No JVD  CHEST/LUNG: Clear to auscultation bilaterally; No wheeze, nonlabored breathing  HEART: RRR + Systolic murmur  ABDOMEN: Soft, Nontender, Nondistended; Bowel sounds present  EXTREMITIES:  No clubbing, cyanosis. + B/L LE trace edema  PSYCH: calm, appropriate mood      LABS:                        10.9   6.23  )-----------( 429      ( 10 Nov 2022 07:30 )             35.8     11-10    142  |  106  |  21  ----------------------------<  96  4.2   |  27  |  0.71    Ca    9.1      10 Nov 2022 07:30    TPro  7.1  /  Alb  2.5<L>  /  TBili  0.5  /  DBili  x   /  AST  23  /  ALT  16  /  AlkPhos  170<H>  11-10              RADIOLOGY & ADDITIONAL TESTS:  Results Reviewed:   Imaging Personally Reviewed:  Electrocardiogram Personally Reviewed:    COORDINATION OF CARE:  Care Discussed with Consultants/Other Providers [Y/N]:  Prior or Outpatient Records Reviewed [Y/N]:

## 2022-11-11 NOTE — PROGRESS NOTE ADULT - ASSESSMENT
· Assessment	  This is a 77 YO female with PMH of HTN, HLD, heart murmur, hypothyroidism, SLE, RA, thyroid CA s/p resection, DVT (on eliquis), lung cancer (s/p tumor removal, no chemo no radiation), Right FELICITAS (Jonathan, '19), Left TKA (Jonathanuss, '20), Right FELICITAS (Jonathan, '22) presented to [facility] ED on 10/17 s/p mechanical fall with severe right hip pain and inability to ambulate. She was transferred to [facility] where she had ORIF of right femur on 10/20 with Dr. Leach. ABLA now s/p 1 U PRBC intraop and 1 U post op. Leukocytosis is likely reactive to surgery and decadron. Patient now with gait Instability, ADL impairments and Functional impairments.      # Right femur fracture  - ORIF of right femur/THR revision on 10/20 with Dr. Leach  (Lateral Approach)  --Continue PT/OT  -- Lateral + driving hip precaution   - WB Status: WBAT  --staples removed 11/8, family to make f/u plan with ortho    HIP PRECAUTIONS  No hip adduction past neutral,   No hip internal rotation past neutral,   No hip flexion >90.   Use abduction pillow while in bed  Do not sit on low chairs or low toilet seats.    * Anemia--monitor  Hypothyroidism  - synthroid    OA Rt shoulderr s/p previous inj treatments most recently 6/2022--f/u with Rheumatology post dc     HLD  - Atorvastatin    #Pain management  - Tylenol Q8/prn    #DVT ppx  - Eliquis     #GI ppx  - Protonix 40mg    #Bowel Regimen  - Senna, miralax PRN    #Bladder management--voiding appropriately    #FEN   - Diet: Regular    #Skin:  - desitin to groin area for IAD  - Nystatin under breast and abd folds for candida management      #Sleep:   - Maintain quiet hours and low stim environment.  - Melatonin     #Precaution  - Fall, Hip     #GOC  CODE STATUS: FULL CODE     IDT--11/9  Has significant support from Son for IADLs, Has ramp, no DENISE  Function-- Close supervision for ADLs and transfers. Making use of adaptive equipment.  Slow to follow through with tasks during therapy  Ambulating 75 ft with RW with supervision. Completed 8 stairs with 2 HR with close supervision/supervision.  Barriers-- Distractable  Est dc 11/12 to home      Outpatient Follow-up (Specialty/Name of physician):  Sin Leach  [address]  [place]  [phone]  F/U 2 weeks      [facility]  Scheduled 10/22/22    Josselin Ramirez  [facility] Physician [facility]  Cardiology [id number]  Scheduled 12/19/22    F/U with family Physician 2-3 weeks after discharge

## 2022-11-11 NOTE — PROGRESS NOTE ADULT - SUBJECTIVE AND OBJECTIVE BOX
Subjective/ROS  Patient seen and examined at bedside today.  States that she slept well and has no complaints at this time.  She is concerned about compression stockings upon discharge- to be coordinated.  Last BM 11/11  Plan for DC 11/12    ROS  Denies headache, dizziness, chest pain, SOB, abdominal pain, nausea, vomiting, diarrhea.     Vital Signs Last 24 Hrs  T(C): 36.7 (11 Nov 2022 07:21), Max: 36.7 (10 Nov 2022 20:28)  T(F): 98 (11 Nov 2022 07:21), Max: 98.1 (10 Nov 2022 20:28)  HR: 51 (11 Nov 2022 07:21) (51 - 61)  BP: 133/95 (11 Nov 2022 07:21) (129/74 - 133/95)  BP(mean): --  RR: 15 (11 Nov 2022 07:21) (15 - 16)  SpO2: 99% (11 Nov 2022 07:21) (99% - 100%)    EXAM  Gen -  Comfortable, interactive   HEENT - NAD  Neck - No limited ROM  Pulm - normal resp rate  Cardiovascular - warn and well perfused  Abdomen - Soft, non tender   Extremities - No calf tenderness, chronic skin changes,  edema RLE, significantly reduced,    Neuro-     Cognitive - AAOx4,     Communication - Fluent, No dysarthria     Attention: Intact      Cranial Nerves - CN 2-12 grossly intact     Motor -                    LEFT    UE - ShAB 5/5, EF 5/5, EE 5/5, WE 5/5,  5/5.                      RIGHT UE - ShAB 5/5, EF 5/5, EE 5/5, WE 5/5,  5/5                    LEFT    LE - HF 5/5, KE 5/5, DF 5/5, PF 5/5                    RIGHT LE - HF and KE 3/5, DF 5/5, PF 5/5        Sensory - Intact to LT     Tone - normal  Psychiatric - Mood stable, Affect WNL  Skin: skin excuriation on abd fold resolving  Wounds -, wound edges together                                     10.9   6.23  )-----------( 429      ( 10 Nov 2022 07:30 )             35.8     11-10    142  |  106  |  21  ----------------------------<  96  4.2   |  27  |  0.71    Ca    9.1      10 Nov 2022 07:30    TPro  7.1  /  Alb  2.5<L>  /  TBili  0.5  /  DBili  x   /  AST  23  /  ALT  16  /  AlkPhos  170<H>  11-10        MEDICATIONS  (STANDING):  acetaminophen     Tablet .. 650 milliGRAM(s) Oral every 8 hours  apixaban 2.5 milliGRAM(s) Oral every 12 hours  atorvastatin 20 milliGRAM(s) Oral at bedtime  ferrous    sulfate 325 milliGRAM(s) Oral daily  levothyroxine 150 MICROGram(s) Oral daily  lidocaine   4% Patch 1 Patch Transdermal daily  melatonin 6 milliGRAM(s) Oral at bedtime  nystatin Powder 1 Application(s) Topical every 12 hours  pantoprazole    Tablet 40 milliGRAM(s) Oral before breakfast  polyethylene glycol 3350 17 Gram(s) Oral at bedtime  senna 2 Tablet(s) Oral at bedtime  zinc oxide 40% Paste 1 Application(s) Topical every 12 hours    MEDICATIONS  (PRN):  calcium carbonate    500 mG (Tums) Chewable 1 Tablet(s) Chew four times a day PRN Dyspepsia  magnesium hydroxide Suspension 30 milliLiter(s) Oral daily PRN Constipation

## 2022-11-11 NOTE — PROGRESS NOTE ADULT - ASSESSMENT
75 y/o F with PMH HTN, HLD, hypothyroidism, SLE, RA, thyroid CA s/p resection, hx of DVT (on Eliquis), lung cancer (s/p tumor removal, no chemo no radiation), Right FELICITAS (Jonathan, '19), Left TKA (Jonathanuss, '20), Right FELICITAS (Jonathanuss, '22) presented to [facility] ED on 10/17 s/p mechanical fall with severe right hip pain and inability to ambulate. She was transferred to [facility] where she had ORIF of right femur on 10/20 with Dr. Leach. Hospital course complicated by ABLA s/p pRBC. Now admitted to [facility] for initiation of multidisciplinary rehab program.     #Right femur fracture  -s/p ORIF of right femur/THR revision on 10/20 with Dr. Leach  -PT/OT/SLP per rehab team    #Anemia  -Likely post op anemia  -H/H stable, no overt signs of bleeding  -Transfuse prn to maintain Hb>7  -Continue iron supplement daily    #Hypothyroidism  -Continue Synthroid    #HLD  -Continue Atorvastatin    #DVT ppx - Eliquis 2.5mg BID  #GI ppx - Protonix

## 2022-11-12 VITALS
DIASTOLIC BLOOD PRESSURE: 67 MMHG | RESPIRATION RATE: 15 BRPM | SYSTOLIC BLOOD PRESSURE: 113 MMHG | HEART RATE: 67 BPM | OXYGEN SATURATION: 96 % | TEMPERATURE: 98 F

## 2022-11-12 LAB — SARS-COV-2 RNA SPEC QL NAA+PROBE: SIGNIFICANT CHANGE UP

## 2022-11-12 PROCEDURE — 99232 SBSQ HOSP IP/OBS MODERATE 35: CPT

## 2022-11-12 RX ADMIN — Medication 325 MILLIGRAM(S): at 11:59

## 2022-11-12 RX ADMIN — PANTOPRAZOLE SODIUM 40 MILLIGRAM(S): 20 TABLET, DELAYED RELEASE ORAL at 06:17

## 2022-11-12 RX ADMIN — Medication 150 MICROGRAM(S): at 05:26

## 2022-11-12 RX ADMIN — Medication 650 MILLIGRAM(S): at 05:27

## 2022-11-12 RX ADMIN — APIXABAN 2.5 MILLIGRAM(S): 2.5 TABLET, FILM COATED ORAL at 05:27

## 2022-11-12 RX ADMIN — Medication 650 MILLIGRAM(S): at 13:12

## 2022-11-12 RX ADMIN — LIDOCAINE 1 PATCH: 4 CREAM TOPICAL at 07:48

## 2022-11-12 RX ADMIN — Medication 650 MILLIGRAM(S): at 13:50

## 2022-11-12 RX ADMIN — NYSTATIN CREAM 1 APPLICATION(S): 100000 CREAM TOPICAL at 05:27

## 2022-11-12 RX ADMIN — ZINC OXIDE 1 APPLICATION(S): 200 OINTMENT TOPICAL at 05:28

## 2022-11-12 RX ADMIN — Medication 650 MILLIGRAM(S): at 06:18

## 2022-11-12 NOTE — PROGRESS NOTE ADULT - SUBJECTIVE AND OBJECTIVE BOX
Cc: Gait dysfunction    HPI: Patient with no new medical issues overnight.  Pain controlled, no chest pain, no N/V, no Fevers/Chills. No other new ROS  Has been tolerating rehabilitation program.    acetaminophen     Tablet .. 650 milliGRAM(s) Oral every 8 hours  apixaban 2.5 milliGRAM(s) Oral every 12 hours  atorvastatin 20 milliGRAM(s) Oral at bedtime  calcium carbonate    500 mG (Tums) Chewable 1 Tablet(s) Chew four times a day PRN  ferrous    sulfate 325 milliGRAM(s) Oral daily  levothyroxine 150 MICROGram(s) Oral daily  lidocaine   4% Patch 1 Patch Transdermal daily  magnesium hydroxide Suspension 30 milliLiter(s) Oral daily PRN  melatonin 6 milliGRAM(s) Oral at bedtime  nystatin Powder 1 Application(s) Topical every 12 hours  pantoprazole    Tablet 40 milliGRAM(s) Oral before breakfast  polyethylene glycol 3350 17 Gram(s) Oral at bedtime  senna 2 Tablet(s) Oral at bedtime  zinc oxide 40% Paste 1 Application(s) Topical every 12 hours      T(C): 36.7 (11-12-22 @ 07:36), Max: 36.7 (11-12-22 @ 07:36)  HR: 67 (11-12-22 @ 07:36) (67 - 67)  BP: 113/67 (11-12-22 @ 07:36) (113/67 - 113/67)  RR: 15 (11-12-22 @ 07:36) (15 - 15)  SpO2: 96% (11-12-22 @ 07:36) (96% - 96%)    In NAD  HEENT- EOMI  Heart- RRR, S1S2  Lungs- CTA bl.  Abd- + BS, NT  Ext- No calf pain  Neuro- Exam unchanged

## 2022-11-12 NOTE — PROGRESS NOTE ADULT - PROVIDER SPECIALTY LIST ADULT
Hospitalist
Hospitalist
Rehab Medicine
Hospitalist
Rehab Medicine
Hospitalist
Rehab Medicine
Hospitalist
Rehab Medicine

## 2022-11-12 NOTE — PROGRESS NOTE ADULT - ASSESSMENT
Imp: Patient with diagnosis of  R ORIF revision  admitted for comprehensive acute rehabilitation.    Plan:  - Continue PT/OT/SLP  - DVT prophylaxis  - Skin- Turn q2h, check skin daily  - Continue current medications; patient medically stable.   -Active issues-   - Patient is stable to continue current rehabilitation program.

## 2022-11-12 NOTE — PROGRESS NOTE ADULT - REASON FOR ADMISSION
R ORIF revision
Right hip fracture post fall s/p R ORIF revision
R ORIF revision

## 2022-11-12 NOTE — PROGRESS NOTE ADULT - ASSESSMENT
77 y/o F with PMH HTN, HLD, hypothyroidism, SLE, RA, thyroid CA s/p resection, hx of DVT (on Eliquis), lung cancer (s/p tumor removal, no chemo no radiation), Right FELICITAS (Jonathan, '19), Left TKA (Jonathanuss, '20), Right FELICITAS (Jonathanuss, '22) presented to [facility] ED on 10/17 s/p mechanical fall with severe right hip pain and inability to ambulate. She was transferred to [facility] where she had ORIF of right femur on 10/20 with Dr. Leach. Hospital course complicated by ABLA s/p pRBC. Now admitted to [facility] for initiation of multidisciplinary rehab program.     #Right femur fracture  -s/p ORIF of right femur/THR revision on 10/20 with Dr. Leach  -PT/OT/SLP per rehab team    #Anemia  -Likely post op anemia  -H/H stable, no overt signs of bleeding  -Transfuse prn to maintain Hb>7  -Continue iron supplement daily    #Hypothyroidism  -Continue Synthroid    #HLD  -Continue Atorvastatin    #DVT ppx - Eliquis 2.5mg BID  #GI ppx - Protonix

## 2022-11-12 NOTE — PROGRESS NOTE ADULT - NUTRITIONAL ASSESSMENT
This patient has been assessed with a concern for Malnutrition and has been determined to have a diagnosis/diagnoses of Morbid obesity (BMI > 40).    This patient is being managed with:   Diet Regular-  Entered: Oct 22 2022  1:56PM    
This patient has been assessed with a concern for Malnutrition and has been determined to have a diagnosis/diagnoses of Morbid obesity (BMI > 40).    This patient is being managed with:   Diet Regular-  Supplement Feeding Modality:  Oral  Ensure Enlive Cans or Servings Per Day:  1       Frequency:  Daily  Entered: Nov 7 2022 10:50AM    Diet Regular-  Supplement Feeding Modality:  Oral  Ensure Enlive Cans or Servings Per Day:  1       Frequency:  Two Times a day  Entered: Oct 26 2022 10:24AM    The following pending diet order is being considered for treatment of Morbid obesity (BMI > 40):null
This patient has been assessed with a concern for Malnutrition and has been determined to have a diagnosis/diagnoses of Morbid obesity (BMI > 40).    This patient is being managed with:   Diet Regular-  Supplement Feeding Modality:  Oral  Ensure Enlive Cans or Servings Per Day:  1       Frequency:  Two Times a day  Entered: Oct 26 2022 10:24AM    
This patient has been assessed with a concern for Malnutrition and has been determined to have a diagnosis/diagnoses of Morbid obesity (BMI > 40).    This patient is being managed with:   Diet Regular-  Supplement Feeding Modality:  Oral  Ensure Enlive Cans or Servings Per Day:  1       Frequency:  Daily  Entered: Nov 7 2022 10:50AM    
This patient has been assessed with a concern for Malnutrition and has been determined to have a diagnosis/diagnoses of Morbid obesity (BMI > 40).    This patient is being managed with:   Diet Regular-  Supplement Feeding Modality:  Oral  Ensure Enlive Cans or Servings Per Day:  1       Frequency:  Two Times a day  Entered: Oct 26 2022 10:24AM    
This patient has been assessed with a concern for Malnutrition and has been determined to have a diagnosis/diagnoses of Morbid obesity (BMI > 40).    This patient is being managed with:   Diet Regular-  Supplement Feeding Modality:  Oral  Ensure Enlive Cans or Servings Per Day:  1       Frequency:  Daily  Entered: Nov 7 2022 10:50AM    
This patient has been assessed with a concern for Malnutrition and has been determined to have a diagnosis/diagnoses of Morbid obesity (BMI > 40).    This patient is being managed with:   Diet Regular-  Supplement Feeding Modality:  Oral  Ensure Enlive Cans or Servings Per Day:  1       Frequency:  Daily  Entered: Nov 7 2022 10:50AM    Diet Regular-  Supplement Feeding Modality:  Oral  Ensure Enlive Cans or Servings Per Day:  1       Frequency:  Two Times a day  Entered: Oct 26 2022 10:24AM    The following pending diet order is being considered for treatment of Morbid obesity (BMI > 40):null
This patient has been assessed with a concern for Malnutrition and has been determined to have a diagnosis/diagnoses of Morbid obesity (BMI > 40).    This patient is being managed with:   Diet Regular-  Supplement Feeding Modality:  Oral  Ensure Enlive Cans or Servings Per Day:  1       Frequency:  Two Times a day  Entered: Oct 26 2022 10:24AM    
This patient has been assessed with a concern for Malnutrition and has been determined to have a diagnosis/diagnoses of Morbid obesity (BMI > 40).    This patient is being managed with:   Diet Regular-  Supplement Feeding Modality:  Oral  Ensure Enlive Cans or Servings Per Day:  1       Frequency:  Two Times a day  Entered: Oct 26 2022 10:24AM

## 2022-11-12 NOTE — PROGRESS NOTE ADULT - SUBJECTIVE AND OBJECTIVE BOX
Patient is a 76y old  Female who presents with a chief complaint of R ORIF revision (11 Nov 2022 10:44)      SUBJECTIVE / OVERNIGHT EVENTS:  Pt seen and examined at bedside. No acute events overnight.  Pt denies cp, palpitations, sob, abd pain, N/V, fever, chills.    ROS:  All other review of systems negative    Allergies    No Known Allergies    Intolerances        MEDICATIONS  (STANDING):  acetaminophen     Tablet .. 650 milliGRAM(s) Oral every 8 hours  apixaban 2.5 milliGRAM(s) Oral every 12 hours  atorvastatin 20 milliGRAM(s) Oral at bedtime  ferrous    sulfate 325 milliGRAM(s) Oral daily  levothyroxine 150 MICROGram(s) Oral daily  lidocaine   4% Patch 1 Patch Transdermal daily  melatonin 6 milliGRAM(s) Oral at bedtime  nystatin Powder 1 Application(s) Topical every 12 hours  pantoprazole    Tablet 40 milliGRAM(s) Oral before breakfast  polyethylene glycol 3350 17 Gram(s) Oral at bedtime  senna 2 Tablet(s) Oral at bedtime  zinc oxide 40% Paste 1 Application(s) Topical every 12 hours    MEDICATIONS  (PRN):  calcium carbonate    500 mG (Tums) Chewable 1 Tablet(s) Chew four times a day PRN Dyspepsia  magnesium hydroxide Suspension 30 milliLiter(s) Oral daily PRN Constipation      Vital Signs Last 24 Hrs  T(C): 36.7 (12 Nov 2022 07:36), Max: 36.8 (11 Nov 2022 20:27)  T(F): 98 (12 Nov 2022 07:36), Max: 98.2 (11 Nov 2022 20:27)  HR: 67 (12 Nov 2022 07:36) (67 - 68)  BP: 113/67 (12 Nov 2022 07:36) (113/67 - 119/74)  BP(mean): --  RR: 15 (12 Nov 2022 07:36) (15 - 18)  SpO2: 96% (12 Nov 2022 07:36) (96% - 98%)    Parameters below as of 12 Nov 2022 07:36  Patient On (Oxygen Delivery Method): room air      CAPILLARY BLOOD GLUCOSE        I&O's Summary      PHYSICAL EXAM:  GENERAL: NAD, well-developed elderly female  HEAD:  Atraumatic, Normocephalic  NECK: Supple, No JVD  CHEST/LUNG: Clear to auscultation bilaterally; No wheeze, nonlabored breathing  HEART: RRR + Systolic murmur  ABDOMEN: Soft, Nontender, Nondistended; Bowel sounds present  EXTREMITIES:  No clubbing, cyanosis. + B/L LE trace edema  PSYCH: calm, appropriate mood    LABS:                    RADIOLOGY & ADDITIONAL TESTS:  Results Reviewed:   Imaging Personally Reviewed:  Electrocardiogram Personally Reviewed:    COORDINATION OF CARE:  Care Discussed with Consultants/Other Providers [Y/N]:  Prior or Outpatient Records Reviewed [Y/N]:

## 2022-11-13 ENCOUNTER — INPATIENT (INPATIENT)
Facility: HOSPITAL | Age: 77
LOS: 3 days | Discharge: HOME | DRG: 547 | End: 2022-11-17
Attending: HOSPITALIST | Admitting: HOSPITALIST
Payer: MEDICARE

## 2022-11-13 VITALS
TEMPERATURE: 99 F | HEART RATE: 95 BPM | SYSTOLIC BLOOD PRESSURE: 147 MMHG | HEIGHT: 66 IN | OXYGEN SATURATION: 97 % | RESPIRATION RATE: 18 BRPM | WEIGHT: 223.99 LBS | DIASTOLIC BLOOD PRESSURE: 82 MMHG

## 2022-11-13 DIAGNOSIS — Z96.641 PRESENCE OF RIGHT ARTIFICIAL HIP JOINT: Chronic | ICD-10-CM

## 2022-11-13 DIAGNOSIS — Z98.890 OTHER SPECIFIED POSTPROCEDURAL STATES: Chronic | ICD-10-CM

## 2022-11-13 DIAGNOSIS — M25.551 PAIN IN RIGHT HIP: ICD-10-CM

## 2022-11-13 DIAGNOSIS — C34.90 MALIGNANT NEOPLASM OF UNSPECIFIED PART OF UNSPECIFIED BRONCHUS OR LUNG: Chronic | ICD-10-CM

## 2022-11-13 DIAGNOSIS — Z90.89 ACQUIRED ABSENCE OF OTHER ORGANS: Chronic | ICD-10-CM

## 2022-11-13 DIAGNOSIS — Z96.659 PRESENCE OF UNSPECIFIED ARTIFICIAL KNEE JOINT: Chronic | ICD-10-CM

## 2022-11-13 DIAGNOSIS — Z98.49 CATARACT EXTRACTION STATUS, UNSPECIFIED EYE: Chronic | ICD-10-CM

## 2022-11-13 LAB
ALBUMIN SERPL ELPH-MCNC: 2.2 G/DL — LOW (ref 3.3–5)
ALP SERPL-CCNC: 154 U/L — HIGH (ref 30–120)
ALT FLD-CCNC: 10 U/L DA — SIGNIFICANT CHANGE UP (ref 10–60)
ANION GAP SERPL CALC-SCNC: 6 MMOL/L — SIGNIFICANT CHANGE UP (ref 5–17)
ANION GAP SERPL CALC-SCNC: 7 MMOL/L — SIGNIFICANT CHANGE UP (ref 5–17)
AST SERPL-CCNC: 22 U/L — SIGNIFICANT CHANGE UP (ref 10–40)
BASOPHILS # BLD AUTO: 0.04 K/UL — SIGNIFICANT CHANGE UP (ref 0–0.2)
BASOPHILS # BLD AUTO: 0.04 K/UL — SIGNIFICANT CHANGE UP (ref 0–0.2)
BASOPHILS NFR BLD AUTO: 0.4 % — SIGNIFICANT CHANGE UP (ref 0–2)
BASOPHILS NFR BLD AUTO: 0.5 % — SIGNIFICANT CHANGE UP (ref 0–2)
BILIRUB SERPL-MCNC: 0.4 MG/DL — SIGNIFICANT CHANGE UP (ref 0.2–1.2)
BUN SERPL-MCNC: 19 MG/DL — SIGNIFICANT CHANGE UP (ref 7–23)
BUN SERPL-MCNC: 22 MG/DL — SIGNIFICANT CHANGE UP (ref 7–23)
CALCIUM SERPL-MCNC: 8.5 MG/DL — SIGNIFICANT CHANGE UP (ref 8.4–10.5)
CALCIUM SERPL-MCNC: 8.7 MG/DL — SIGNIFICANT CHANGE UP (ref 8.4–10.5)
CHLORIDE SERPL-SCNC: 105 MMOL/L — SIGNIFICANT CHANGE UP (ref 96–108)
CHLORIDE SERPL-SCNC: 107 MMOL/L — SIGNIFICANT CHANGE UP (ref 96–108)
CO2 SERPL-SCNC: 28 MMOL/L — SIGNIFICANT CHANGE UP (ref 22–31)
CO2 SERPL-SCNC: 28 MMOL/L — SIGNIFICANT CHANGE UP (ref 22–31)
CREAT SERPL-MCNC: 0.64 MG/DL — SIGNIFICANT CHANGE UP (ref 0.5–1.3)
CREAT SERPL-MCNC: 0.79 MG/DL — SIGNIFICANT CHANGE UP (ref 0.5–1.3)
EGFR: 77 ML/MIN/1.73M2 — SIGNIFICANT CHANGE UP
EGFR: 91 ML/MIN/1.73M2 — SIGNIFICANT CHANGE UP
EOSINOPHIL # BLD AUTO: 0.02 K/UL — SIGNIFICANT CHANGE UP (ref 0–0.5)
EOSINOPHIL # BLD AUTO: 0.03 K/UL — SIGNIFICANT CHANGE UP (ref 0–0.5)
EOSINOPHIL NFR BLD AUTO: 0.2 % — SIGNIFICANT CHANGE UP (ref 0–6)
EOSINOPHIL NFR BLD AUTO: 0.4 % — SIGNIFICANT CHANGE UP (ref 0–6)
GLUCOSE SERPL-MCNC: 102 MG/DL — HIGH (ref 70–99)
GLUCOSE SERPL-MCNC: 127 MG/DL — HIGH (ref 70–99)
HCT VFR BLD CALC: 29.5 % — LOW (ref 34.5–45)
HCT VFR BLD CALC: 34.4 % — LOW (ref 34.5–45)
HGB BLD-MCNC: 10.7 G/DL — LOW (ref 11.5–15.5)
HGB BLD-MCNC: 9.3 G/DL — LOW (ref 11.5–15.5)
IMM GRANULOCYTES NFR BLD AUTO: 0.1 % — SIGNIFICANT CHANGE UP (ref 0–0.9)
IMM GRANULOCYTES NFR BLD AUTO: 0.3 % — SIGNIFICANT CHANGE UP (ref 0–0.9)
LYMPHOCYTES # BLD AUTO: 0.93 K/UL — LOW (ref 1–3.3)
LYMPHOCYTES # BLD AUTO: 1.93 K/UL — SIGNIFICANT CHANGE UP (ref 1–3.3)
LYMPHOCYTES # BLD AUTO: 10.1 % — LOW (ref 13–44)
LYMPHOCYTES # BLD AUTO: 25.9 % — SIGNIFICANT CHANGE UP (ref 13–44)
MAGNESIUM SERPL-MCNC: 1.8 MG/DL — SIGNIFICANT CHANGE UP (ref 1.6–2.6)
MCHC RBC-ENTMCNC: 30.4 PG — SIGNIFICANT CHANGE UP (ref 27–34)
MCHC RBC-ENTMCNC: 30.5 PG — SIGNIFICANT CHANGE UP (ref 27–34)
MCHC RBC-ENTMCNC: 31.1 GM/DL — LOW (ref 32–36)
MCHC RBC-ENTMCNC: 31.5 GM/DL — LOW (ref 32–36)
MCV RBC AUTO: 96.7 FL — SIGNIFICANT CHANGE UP (ref 80–100)
MCV RBC AUTO: 97.7 FL — SIGNIFICANT CHANGE UP (ref 80–100)
MONOCYTES # BLD AUTO: 0.95 K/UL — HIGH (ref 0–0.9)
MONOCYTES # BLD AUTO: 0.98 K/UL — HIGH (ref 0–0.9)
MONOCYTES NFR BLD AUTO: 10.3 % — SIGNIFICANT CHANGE UP (ref 2–14)
MONOCYTES NFR BLD AUTO: 13.1 % — SIGNIFICANT CHANGE UP (ref 2–14)
NEUTROPHILS # BLD AUTO: 4.47 K/UL — SIGNIFICANT CHANGE UP (ref 1.8–7.4)
NEUTROPHILS # BLD AUTO: 7.26 K/UL — SIGNIFICANT CHANGE UP (ref 1.8–7.4)
NEUTROPHILS NFR BLD AUTO: 60 % — SIGNIFICANT CHANGE UP (ref 43–77)
NEUTROPHILS NFR BLD AUTO: 78.7 % — HIGH (ref 43–77)
NRBC # BLD: 0 /100 WBCS — SIGNIFICANT CHANGE UP (ref 0–0)
NRBC # BLD: 0 /100 WBCS — SIGNIFICANT CHANGE UP (ref 0–0)
PHOSPHATE SERPL-MCNC: 3.6 MG/DL — SIGNIFICANT CHANGE UP (ref 2.5–4.5)
PLATELET # BLD AUTO: 375 K/UL — SIGNIFICANT CHANGE UP (ref 150–400)
PLATELET # BLD AUTO: 405 K/UL — HIGH (ref 150–400)
POTASSIUM SERPL-MCNC: 3.8 MMOL/L — SIGNIFICANT CHANGE UP (ref 3.5–5.3)
POTASSIUM SERPL-MCNC: 3.8 MMOL/L — SIGNIFICANT CHANGE UP (ref 3.5–5.3)
POTASSIUM SERPL-SCNC: 3.8 MMOL/L — SIGNIFICANT CHANGE UP (ref 3.5–5.3)
POTASSIUM SERPL-SCNC: 3.8 MMOL/L — SIGNIFICANT CHANGE UP (ref 3.5–5.3)
PROT SERPL-MCNC: 6 G/DL — SIGNIFICANT CHANGE UP (ref 6–8.3)
RBC # BLD: 3.05 M/UL — LOW (ref 3.8–5.2)
RBC # BLD: 3.52 M/UL — LOW (ref 3.8–5.2)
RBC # FLD: 16.1 % — HIGH (ref 10.3–14.5)
RBC # FLD: 16.1 % — HIGH (ref 10.3–14.5)
SARS-COV-2 RNA SPEC QL NAA+PROBE: SIGNIFICANT CHANGE UP
SODIUM SERPL-SCNC: 140 MMOL/L — SIGNIFICANT CHANGE UP (ref 135–145)
SODIUM SERPL-SCNC: 141 MMOL/L — SIGNIFICANT CHANGE UP (ref 135–145)
WBC # BLD: 7.46 K/UL — SIGNIFICANT CHANGE UP (ref 3.8–10.5)
WBC # BLD: 9.23 K/UL — SIGNIFICANT CHANGE UP (ref 3.8–10.5)
WBC # FLD AUTO: 7.46 K/UL — SIGNIFICANT CHANGE UP (ref 3.8–10.5)
WBC # FLD AUTO: 9.23 K/UL — SIGNIFICANT CHANGE UP (ref 3.8–10.5)

## 2022-11-13 PROCEDURE — 99284 EMERGENCY DEPT VISIT MOD MDM: CPT

## 2022-11-13 PROCEDURE — 73502 X-RAY EXAM HIP UNI 2-3 VIEWS: CPT | Mod: 26,RT

## 2022-11-13 PROCEDURE — 99222 1ST HOSP IP/OBS MODERATE 55: CPT | Mod: AI

## 2022-11-13 PROCEDURE — 73562 X-RAY EXAM OF KNEE 3: CPT | Mod: 26,RT

## 2022-11-13 RX ORDER — POLYETHYLENE GLYCOL 3350 17 G/17G
17 POWDER, FOR SOLUTION ORAL ONCE
Refills: 0 | Status: COMPLETED | OUTPATIENT
Start: 2022-11-13 | End: 2022-11-13

## 2022-11-13 RX ORDER — ZINC OXIDE 200 MG/G
1 OINTMENT TOPICAL DAILY
Refills: 0 | Status: DISCONTINUED | OUTPATIENT
Start: 2022-11-13 | End: 2022-11-13

## 2022-11-13 RX ORDER — LANOLIN ALCOHOL/MO/W.PET/CERES
3 CREAM (GRAM) TOPICAL AT BEDTIME
Refills: 0 | Status: DISCONTINUED | OUTPATIENT
Start: 2022-11-13 | End: 2022-11-17

## 2022-11-13 RX ORDER — APIXABAN 2.5 MG/1
2.5 TABLET, FILM COATED ORAL EVERY 12 HOURS
Refills: 0 | Status: DISCONTINUED | OUTPATIENT
Start: 2022-11-13 | End: 2022-11-13

## 2022-11-13 RX ORDER — PANTOPRAZOLE SODIUM 20 MG/1
40 TABLET, DELAYED RELEASE ORAL
Refills: 0 | Status: DISCONTINUED | OUTPATIENT
Start: 2022-11-13 | End: 2022-11-17

## 2022-11-13 RX ORDER — LIDOCAINE 4 G/100G
1 CREAM TOPICAL DAILY
Refills: 0 | Status: DISCONTINUED | OUTPATIENT
Start: 2022-11-13 | End: 2022-11-17

## 2022-11-13 RX ORDER — ACETAMINOPHEN 500 MG
650 TABLET ORAL EVERY 6 HOURS
Refills: 0 | Status: DISCONTINUED | OUTPATIENT
Start: 2022-11-13 | End: 2022-11-17

## 2022-11-13 RX ORDER — ATORVASTATIN CALCIUM 80 MG/1
20 TABLET, FILM COATED ORAL AT BEDTIME
Refills: 0 | Status: DISCONTINUED | OUTPATIENT
Start: 2022-11-13 | End: 2022-11-17

## 2022-11-13 RX ORDER — CALCIUM CARBONATE 500(1250)
1 TABLET ORAL DAILY
Refills: 0 | Status: DISCONTINUED | OUTPATIENT
Start: 2022-11-13 | End: 2022-11-17

## 2022-11-13 RX ORDER — FERROUS SULFATE 325(65) MG
325 TABLET ORAL DAILY
Refills: 0 | Status: DISCONTINUED | OUTPATIENT
Start: 2022-11-13 | End: 2022-11-17

## 2022-11-13 RX ORDER — APIXABAN 2.5 MG/1
2.5 TABLET, FILM COATED ORAL EVERY 12 HOURS
Refills: 0 | Status: DISCONTINUED | OUTPATIENT
Start: 2022-11-13 | End: 2022-11-17

## 2022-11-13 RX ORDER — LEVOTHYROXINE SODIUM 125 MCG
150 TABLET ORAL DAILY
Refills: 0 | Status: DISCONTINUED | OUTPATIENT
Start: 2022-11-13 | End: 2022-11-17

## 2022-11-13 RX ORDER — ZINC OXIDE 200 MG/G
1 OINTMENT TOPICAL DAILY
Refills: 0 | Status: DISCONTINUED | OUTPATIENT
Start: 2022-11-13 | End: 2022-11-17

## 2022-11-13 RX ORDER — MAGNESIUM HYDROXIDE 400 MG/1
30 TABLET, CHEWABLE ORAL DAILY
Refills: 0 | Status: DISCONTINUED | OUTPATIENT
Start: 2022-11-13 | End: 2022-11-17

## 2022-11-13 RX ORDER — SENNA PLUS 8.6 MG/1
2 TABLET ORAL AT BEDTIME
Refills: 0 | Status: DISCONTINUED | OUTPATIENT
Start: 2022-11-13 | End: 2022-11-17

## 2022-11-13 RX ORDER — ONDANSETRON 8 MG/1
4 TABLET, FILM COATED ORAL EVERY 8 HOURS
Refills: 0 | Status: DISCONTINUED | OUTPATIENT
Start: 2022-11-13 | End: 2022-11-17

## 2022-11-13 RX ADMIN — Medication 325 MILLIGRAM(S): at 13:26

## 2022-11-13 RX ADMIN — SENNA PLUS 2 TABLET(S): 8.6 TABLET ORAL at 02:18

## 2022-11-13 RX ADMIN — Medication 3 MILLIGRAM(S): at 23:38

## 2022-11-13 RX ADMIN — POLYETHYLENE GLYCOL 3350 17 GRAM(S): 17 POWDER, FOR SOLUTION ORAL at 02:18

## 2022-11-13 RX ADMIN — APIXABAN 2.5 MILLIGRAM(S): 2.5 TABLET, FILM COATED ORAL at 21:28

## 2022-11-13 RX ADMIN — PANTOPRAZOLE SODIUM 40 MILLIGRAM(S): 20 TABLET, DELAYED RELEASE ORAL at 02:18

## 2022-11-13 RX ADMIN — SENNA PLUS 2 TABLET(S): 8.6 TABLET ORAL at 21:27

## 2022-11-13 RX ADMIN — Medication 150 MICROGRAM(S): at 06:35

## 2022-11-13 RX ADMIN — ZINC OXIDE 1 APPLICATION(S): 200 OINTMENT TOPICAL at 13:26

## 2022-11-13 RX ADMIN — APIXABAN 2.5 MILLIGRAM(S): 2.5 TABLET, FILM COATED ORAL at 02:18

## 2022-11-13 RX ADMIN — LIDOCAINE 1 PATCH: 4 CREAM TOPICAL at 20:09

## 2022-11-13 RX ADMIN — ATORVASTATIN CALCIUM 20 MILLIGRAM(S): 80 TABLET, FILM COATED ORAL at 21:27

## 2022-11-13 RX ADMIN — APIXABAN 2.5 MILLIGRAM(S): 2.5 TABLET, FILM COATED ORAL at 11:22

## 2022-11-13 RX ADMIN — LIDOCAINE 1 PATCH: 4 CREAM TOPICAL at 13:26

## 2022-11-13 NOTE — ED PROVIDER NOTE - CROS ED ROS STATEMENT
Rx sent into preferred pharmacy,see medication list for more details.     all other ROS negative except as per HPI

## 2022-11-13 NOTE — PATIENT PROFILE ADULT - NSPROGENBLOODRESTRICT_GEN_A_NUR
none

## 2022-11-13 NOTE — PATIENT PROFILE ADULT - VISION (WITH CORRECTIVE LENSES IF THE PATIENT USUALLY WEARS THEM):
Patient is a 72-year-old single female.  She is seen in clinic today with complaints of anxiety.  At the time she made the appointment she had dizziness with abdominal discomfort but now she does not have that.  She did think maybe she was dehydrated although she states she does drink 10 10 oz glasses of water every day.  She is taking all her medications accurately.  Patient also has a history of mitral valve prolapse with palpitations that she states comes on when she is at rest.  This is worrying her.  She does have a history of a non STEMI MI.  And hypothyroidism.  She is due to have her TSH checked today.    Blood pressure 130/72, pulse 53, temperature 98.6 °F (37 °C), temperature source Tympanic, height 5' (1.524 m), weight 47.4 kg, SpO2 100 %.    HEENT:  Head normocephalic, atraumatic.  Eyes are clear.  Ears with normal pearly gray tympanic membranes bilaterally.  Nose and mouth are pink and moist.  Throat is not erythematous.  Cardiac:  Patient has a rate of 53 with a rhythm.  Normal S1-S2 no S3 or S4 and no murmurs.  No JVD or carotid bruits.  Lungs:  Clear throughout anteriorly and posteriorly with no wheezes rhonchi or rales.  Abdomen:  Round bowel sounds active x4 soft nontender no hepatosplenomegaly.    Assessment  1. Anxiety  2. Dizziness  3. Mitral valve prolapse 4.  4. Heart palpitations at rest.    Plan  1. Service to cardiology Dr. Oropeza for a follow-up.  2. Labs to include CBC CMP and TSH.         Normal vision: sees adequately in most situations; can see medication labels, newsprint

## 2022-11-13 NOTE — H&P ADULT - ASSESSMENT
76F with hx of HTN, HLD, heart murmur, SLE, RA, hx of lung and thyroid CA, DVT,  multiple hip surgeries who presented from home for inability to walk right after being discharged from rehab.      Inability to walk  - admitted to medicine  - f/u hip and knee xray  - ortho consult for eval  - PT eval  - may need more rehab?    - fall precautions    Right hip revision  - pain control as needed  - ortho to be consulted   - cont with eliquis 2.5mg BID  - bowel regiment    HTN/HLD  - cont with statin    Hypothyroidism  - cont with synthroid    Hx of SLE  - not on treatment, monitor for now    Hx of DVT  - will be on eliquis    Hx of blocked PACs and Mobitz I  - remote telemetry for now  - if arrhythmias persist -> reconsult cardiology    Preventive measures  - already on eliquis  - will be on PPI

## 2022-11-13 NOTE — H&P ADULT - NSHPPHYSICALEXAM_GEN_ALL_CORE
PHYSICAL EXAM:  Vital Signs Last 24 Hrs  T(C): 37.2 (13 Nov 2022 00:07), Max: 37.2 (13 Nov 2022 00:07)  T(F): 99 (13 Nov 2022 00:07), Max: 99 (13 Nov 2022 00:07)  HR: 95 (13 Nov 2022 00:07) (67 - 95)  BP: 147/82 (13 Nov 2022 00:07) (113/67 - 147/82)  BP(mean): --  RR: 18 (13 Nov 2022 00:07) (15 - 18)  SpO2: 97% (13 Nov 2022 00:07) (96% - 97%)    Parameters below as of 13 Nov 2022 00:07  Patient On (Oxygen Delivery Method): room air    GENERAL:     NAD, well-groomed, well-developed  HEAD:     atraumatic, normocephalic  EYES:     EOMI, conjunctiva and sclera clear  RESPIRATORY:     clear to auscultation bilaterally, no rales or rhonchi or wheezing or rubs  CARDIOVASCULAR:     regular rate and rhythm, ii/vi systolic murmur loudest at LUSB  GASTROINTESTINAL:     soft, nontender, nondistended, bowel sounds present  EXTREMITIES:     1+ pitting edema in BLE, no clubbing or cyanosis   MUSCULOSKELETAL:     some right hip swelling  NERVOUS SYSTEM:     move all extremities, no sensory deficits noted   SKIN:     right hip surgical site looks clean, no active drainage, no tenderness to palpation  PSYCH:     appropriate, alert and orientated x3, good concentration

## 2022-11-13 NOTE — H&P ADULT - HISTORY OF PRESENT ILLNESS
76F with hx of HTN, HLD, heart murmur, SLE, RA, thyroid CA s/p resection, DVT (on eliquis), lung cancer (s/p tumor removal, no chemo no radiation), Right FELICITAS (Jonathan, '19), Left TKA (Jonathan, '20), Right FELICITAS (Jonathan, '22) presented who initially presented to [facility] ED with a right femur periprosthetic fracture s/p mechanical fall on 10/18, transferred to [place] for surgery on 10/20, discharged to  rehab, and then just discharged from  on 11/12 who presents now with inability to walk.  Surgery was complicated by acute blood loss and required 1 unit pRBC intraop and 1 unit post-op.  Hospital course was also complicated by intermittent blocked PACs and Mobitz I and cardiology was consulted.  Cardiology thought it was related sedation/anesthesia.  Patient was discharged to .  Course at  was significant for RLE weakness with hip flexion and knee extension but she made improvement as per DC note from .  No know acute complications were reported.  Then patient was discharged home.  Last evening, patient attempted to get out of her recliner but could not get up.  Patient self-urinated on herself.  Called EMS and they tried assisting to the restroom but patient's legs kept on buckling.   Patient was then transferred here for further evaluation.   76F with hx of HTN, HLD, heart murmur, SLE, RA, thyroid CA s/p resection, DVT (on eliquis), lung cancer (s/p tumor removal, no chemo no radiation), Right FELICITAS (Jonathan, '19), Left TKA (Jonathan, '20), Right FELICITAS (Jonathan, '22) presented who initially presented to [facility] ED with a right femur periprosthetic fracture s/p mechanical fall on 10/18, transferred to [place] for surgery on 10/20, discharged to  rehab, and then just discharged from  on 11/12 who presents now with inability to walk.  Surgery was complicated by acute blood loss and required 1 unit pRBC intraop and 1 unit post-op.  Hospital course was also complicated by intermittent blocked PACs and Mobitz I and cardiology was consulted.  Cardiology thought it was related sedation/anesthesia.  Patient was discharged to .  Course at  was significant for RLE weakness with hip flexion and knee extension but she made improvement as per DC note from .  No know acute complications were reported.   Patient said that she was participating with PT and had no acute issues either.  The patient was discharged home.  Last evening, patient attempted to get out of her recliner but could not get up.  Patient self-urinated on herself.  Called EMS and they tried assisting to the restroom but patient's legs kept on buckling.   Patient was then transferred here for further evaluation.   Patient has no acute complaints.  Only has 2/10 pain in her hip when she tries to move it.  Denies any recent illnesses such as fevers, cough, chest pain, diarrhea.

## 2022-11-13 NOTE — ED ADULT NURSE NOTE - CHIEF COMPLAINT QUOTE
Pt recently had rt hip sx, was just discharged from [place] [facility] home earlier today, was trying to get to the bathroom, needed help and called EMS, per EMS, pt stated her knees buckled as she was walking and they helped pt down to the floor.

## 2022-11-13 NOTE — ED ADULT NURSE NOTE - OBJECTIVE STATEMENT
Pt was had rt hip sx on 10/20/22 in [place], then dc'd to [facility], was there for 3 wks, wasn't ready to be dc home, but insurance wouldn't cover any longer. Pt was sitting in a recliner tonight, and couldn't get up. EMS was called to help, but pt still couldn't walk with their assistance, pt was slowly lowered to the ground

## 2022-11-13 NOTE — ED PROVIDER NOTE - CLINICAL SUMMARY MEDICAL DECISION MAKING FREE TEXT BOX
s/p right hip periprosthetic fx repair about 3 wks ago, released from rehab yesterday, tonight was unable to stand on right leg, buckled under her, will require readmission

## 2022-11-13 NOTE — PROGRESS NOTE ADULT - ASSESSMENT
76F with hx of HTN, HLD, heart murmur, SLE, RA, hx of lung and thyroid CA, DVT,  multiple hip surgeries who presented from home for inability to walk right after being discharged from rehab.      Inability to walk  - hip and knee xray  - ortho consult for eval  - PT eval    - fall precautions    Right hip revision  - pain control as needed  - ortho eval  - cont with eliquis 2.5mg BID  - bowel regiment    HTN/HLD  - cont with statin    Hypothyroidism  - cont with synthroid    Hx of SLE  - not on treatment, monitor for now    Hx of DVT  - will be on eliquis    Hx of blocked PACs and Mobitz I  - remote telemetry for now  -cardio eval with dr cannon    Preventive measures  - already on eliquis  - will be on PPI     76F with hx of HTN, HLD, heart murmur, SLE, RA, hx of lung and thyroid CA, DVT,  multiple hip surgeries who presented from home for inability to walk right after being discharged from rehab.      Inability to walk with periprosthtic fx rt knee  - hip and knee xray  - ortho consult for eval  - PT eval    - fall precautions    Right hip revision  - pain control as needed  - ortho eval  - cont with eliquis 2.5mg BID  - bowel regiment    HTN/HLD  - cont with statin    Hypothyroidism  - cont with synthroid    Hx of SLE  - not on treatment, monitor for now    Hx of DVT  - will be on eliquis    Hx of blocked PACs and Mobitz I  - remote telemetry for now  -cardio eval with dr cannon    Preventive measures  - already on eliquis  - will be on PPI     76F with hx of HTN, HLD, heart murmur, SLE, RA, hx of lung and thyroid CA, DVT,  multiple hip surgeries who presented from home for inability to walk right after being discharged from rehab.      Inability to walk with periprosthtic fx of rt femur on x ray 11/13  - hip and knee xray  - ortho consult for eval  - PT eval    - fall precautions    Right hip revision  - pain control as needed  - ortho eval  - cont with eliquis 2.5mg BID  - bowel regiment    HTN/HLD  - cont with statin    Hypothyroidism  - cont with synthroid    Hx of SLE  - not on treatment, monitor for now    Hx of DVT  - will be on eliquis    Hx of blocked PACs and Mobitz I  - remote telemetry for now  -cardio eval with dr cannon    Preventive measures  - already on eliquis  - will be on PPI

## 2022-11-13 NOTE — PATIENT PROFILE ADULT - FALL HARM RISK - HARM RISK INTERVENTIONS
Assistance with ambulation/Assistance OOB with selected safe patient handling equipment/Communicate Risk of Fall with Harm to all staff/Discuss with provider need for PT consult/Monitor gait and stability/Provide patient with walking aids - walker, cane, crutches/Reinforce activity limits and safety measures with patient and family/Review medications for side effects contributing to fall risk/Sit up slowly, dangle for a short time, stand at bedside before walking/Tailored Fall Risk Interventions/Toileting schedule using arm’s reach rule for commode and bathroom/Visual Cue: Yellow wristband and red socks/Bed in lowest position, wheels locked, appropriate side rails in place/Call bell, personal items and telephone in reach/Instruct patient to call for assistance before getting out of bed or chair/Non-slip footwear when patient is out of bed/[place] to call system/Physically safe environment - no spills, clutter or unnecessary equipment/Purposeful Proactive Rounding/Room/bathroom lighting operational, light cord in reach

## 2022-11-13 NOTE — ED ADULT TRIAGE NOTE - CHIEF COMPLAINT QUOTE
Pt was just [facility] earlier today, was trying to get to the bathroom, needed help and called EMS, per EMS, pt stated her knees buckled as she was walking and they helped pt down to the floor. Pt recently had rt hip sx, was just discharged from [facility] home earlier today, was trying to get to the bathroom, needed help and called EMS, per EMS, pt stated her knees buckled as she was walking and they helped pt down to the floor.

## 2022-11-13 NOTE — ED CLERICAL - NS ED CARE COORDINATION INFORMATION
This patient is eligible for (or currently enrolled in) an outpatient care management program available through [employer]. This program can coordinate outpatient follow up and assist the patient in accessing a variety of outpatient resources.  If discharged from the ED, the patient will be contacted to see if any additional resources are needed.                                                                                    Please call the Nurse Clinical Call Center at [phone] with any questions or for assistance in discharge planning.

## 2022-11-13 NOTE — ED PROVIDER NOTE - OBJECTIVE STATEMENT
76 y.o. F had repair of right periprosthetic right femur fracture on 10/20/22, surgeon = Sin Leach, on 10/22 was [facility] from [place] [facility], states she was there for about 3 weeks, for as long as her insurance would cover, was discharged home less than 12 hr ago, states at rehab was using a walker and did feel ready to go home, then this evening was sitting in a recliner and couldn't get up, wound up urinating on herself as she wasn't able to get up to the bathroom, EMS was called, tried to aid pt to restroom, but pt's right leg was buckling and pt could not walk even with their assistance, pt was slowly lowered to the ground, denies trauma and then EMS transferred here for further evaluation. pt's son lives in a different part of the house from her.

## 2022-11-13 NOTE — H&P ADULT - NSHPREVIEWOFSYSTEMS_GEN_ALL_CORE
REVIEW OF SYSTEMS:  CONSTITUTIONAL:    no fever or weight loss or fatigue  EYES:    no eye pain or visual disturbances or discharge  ENMT:     no difficulty hearing or tinnitus or vertigo, no sinus or throat pain  NECK:    no pain or stiffness  RESPIRATORY:    no cough or wheezing or chills or hemoptysis, no shortness of breath  CARDIOVASCULAR:    no chest pain or palpitations or dizziness or leg swelling  GASTROINTESTINAL:    no abdominal or epigastric pain. no nausea or vomiting or hematemesis, no diarrhea or constipation. no melena or hematochezia.  GENITOURINARY:    no dysuria or frequency or hematuria or incontinence  NEUROLOGICAL:    no headaches or memory loss or loss of strength or numbness or tremors  SKIN:    no itching or burning or rashes or lesions   LYMPH NODES:    no enlarged glands  ENDOCRINE:    no heat or cold intolerance, no hair loss, no polydipsia or polyuria  MUSCULOSKELETAL:    right hip pain when moving it  PSYCHIATRIC:    no depression or anxiety or mood swings or difficulty sleeping  HEME/LYMPH:    no easy bruising or bleeding gums  ALLERGY AND IMMUNOLOGIC:    no hives or eczema

## 2022-11-14 LAB — ERYTHROCYTE [SEDIMENTATION RATE] IN BLOOD: 60 MM/HR — HIGH (ref 0–20)

## 2022-11-14 PROCEDURE — 99233 SBSQ HOSP IP/OBS HIGH 50: CPT

## 2022-11-14 PROCEDURE — 73552 X-RAY EXAM OF FEMUR 2/>: CPT | Mod: 26,RT

## 2022-11-14 PROCEDURE — 99221 1ST HOSP IP/OBS SF/LOW 40: CPT | Mod: FS,57

## 2022-11-14 PROCEDURE — 73700 CT LOWER EXTREMITY W/O DYE: CPT | Mod: 26,RT

## 2022-11-14 RX ORDER — FOLIC ACID 0.8 MG
1 TABLET ORAL DAILY
Refills: 0 | Status: DISCONTINUED | OUTPATIENT
Start: 2022-11-14 | End: 2022-11-17

## 2022-11-14 RX ORDER — METHOTREXATE 2.5 MG/1
10 TABLET ORAL
Refills: 0 | Status: DISCONTINUED | OUTPATIENT
Start: 2022-11-14 | End: 2022-11-17

## 2022-11-14 RX ADMIN — APIXABAN 2.5 MILLIGRAM(S): 2.5 TABLET, FILM COATED ORAL at 17:54

## 2022-11-14 RX ADMIN — LIDOCAINE 1 PATCH: 4 CREAM TOPICAL at 01:58

## 2022-11-14 RX ADMIN — Medication 15 MILLIGRAM(S): at 18:04

## 2022-11-14 RX ADMIN — Medication 1 MILLIGRAM(S): at 17:54

## 2022-11-14 RX ADMIN — PANTOPRAZOLE SODIUM 40 MILLIGRAM(S): 20 TABLET, DELAYED RELEASE ORAL at 06:01

## 2022-11-14 RX ADMIN — Medication 150 MICROGRAM(S): at 06:01

## 2022-11-14 RX ADMIN — LIDOCAINE 1 PATCH: 4 CREAM TOPICAL at 23:00

## 2022-11-14 RX ADMIN — LIDOCAINE 1 PATCH: 4 CREAM TOPICAL at 11:40

## 2022-11-14 RX ADMIN — ZINC OXIDE 1 APPLICATION(S): 200 OINTMENT TOPICAL at 11:41

## 2022-11-14 RX ADMIN — LIDOCAINE 1 PATCH: 4 CREAM TOPICAL at 19:00

## 2022-11-14 RX ADMIN — SENNA PLUS 2 TABLET(S): 8.6 TABLET ORAL at 21:15

## 2022-11-14 RX ADMIN — METHOTREXATE 10 MILLIGRAM(S): 2.5 TABLET ORAL at 17:52

## 2022-11-14 RX ADMIN — Medication 325 MILLIGRAM(S): at 11:41

## 2022-11-14 RX ADMIN — APIXABAN 2.5 MILLIGRAM(S): 2.5 TABLET, FILM COATED ORAL at 10:24

## 2022-11-14 RX ADMIN — ATORVASTATIN CALCIUM 20 MILLIGRAM(S): 80 TABLET, FILM COATED ORAL at 21:15

## 2022-11-14 NOTE — PROGRESS NOTE ADULT - SUBJECTIVE AND OBJECTIVE BOX
Ortho PA Note    Patient is a 76y Female presenting to the emergency department for inability to bear weight in Right knee overnight 11/13 > 11/14 while turning in Bathroom. State unable to bear weight on Right LE with new Right knee pain & LE weakness. Denies distinct fall in the bathroom. She had just had EMS assist her from getting out lounge chair due to inability to arise from chair. She is S/P Right TKR 5/16/22 Dr. Castillo [facility]. She is recently S.P Revision Right THR 10/20/22  [facility] for [facility]-prosthetic proximal Right femur fracture sustained in fall at home 10/18 prompting admission. States no new Low Back pain, No new Right LE paresthesias.  Discharge from [place] [facility] 11/12. Was performing PT in [place] with no Right knee pain during rehab exercises.   Ambulating at home with walker.  Admittied to Medical service 11/14 AM    PAST MEDICAL & SURGICAL HISTORY:    Rheumatoid arthritis  SLE (systemic lupus erythematosus)  Osteoarthritis  H/O degenerative disc disease  Hypertension  Hyperlipidemia  Benign heart murmur  Hypothyroid  Obesity, Class II, BMI 35-39.9, no comorbidity  DVT (deep venous thrombosis) post left knee replacement 2019  Overactive bladder  Lab test positive for detection of COVID-19 virus 12/20    PSH:    S/P tonsillectomy  S/P cataract surgery  left, right  S/P eye surgery child  S/P carpal tunnel release left  S/P BIlateral TKR  Lung cancer small tumor removed 2015-no chemo, no radiation  S/P thyroid surgery 1 lobe removed  Status post total hip replacement, right  S/P lumbar spine fusion 12/20    Allergies: No Known Allergies    Medications: acetaminophen     Tablet .. 650 milliGRAM(s) Oral every 6 hours PRN  aluminum hydroxide/magnesium hydroxide/simethicone Suspension 30 milliLiter(s) Oral every 4 hours PRN  apixaban 2.5 milliGRAM(s) Oral every 12 hours  atorvastatin 20 milliGRAM(s) Oral at bedtime  calcium carbonate    500 mG (Tums) Chewable 1 Tablet(s) Chew daily PRN  ferrous    sulfate 325 milliGRAM(s) Oral daily  levothyroxine 150 MICROGram(s) Oral daily  lidocaine   4% Patch 1 Patch Transdermal daily  magnesium hydroxide Suspension 30 milliLiter(s) Oral daily PRN  melatonin 3 milliGRAM(s) Oral at bedtime PRN  ondansetron Injectable 4 milliGRAM(s) IV Push every 8 hours PRN  pantoprazole    Tablet 40 milliGRAM(s) Oral before breakfast  senna 2 Tablet(s) Oral at bedtime  zinc oxide 20% Ointment 1 Application(s) Topical daily      PHYSICAL EXAM:    Vital Signs Last 24 Hrs  T(C): 37 (14 Nov 2022 05:08), Max: 37 (14 Nov 2022 05:08)  T(F): 98.6 (14 Nov 2022 05:08), Max: 98.6 (14 Nov 2022 05:08)  HR: 66 (14 Nov 2022 05:08) (58 - 66)  BP: 145/73 (14 Nov 2022 05:08) (110/55 - 145/73)  RR: 18 (14 Nov 2022 05:08) (17 - 19)  SpO2: 96% (14 Nov 2022 05:08) (94% - 98%)    [Abdomen]:  protuberant soft , benign exam   Ext: HIp: Right; MIn - Mod STS Right thigh, soft, incision well healed, no cellulitis; Min to no Right hip pain with passive hip flexion, Int rot, & Ext rot.  Ext(Knee): Right/ Knee:  No  cellulitis; No effusion . Minimal medial and lateral joint line tenderness; NO soft tissue swelling;  No crepitus]; MIn.  pain with flexion past 60 deg. No laxity to Varus/Valgus stress. No pain with Varus/Valgus stress. Active knee flexion to 40/50 deg. in bed. Able to perform Quad set.  ROM: Extension 0deg.   Unable to SLR at hip, states was unable to in Rehab also.  Neuro: Has symm sensation over feet & toes bilat. Full AROM bilat feet & toes. EHL/AT = 5/5  Vascular: Feet toes warm, pink. DP = 2+. No calf tenderness bilat..    VTEP: On Bilat. Venodynes + apixaban 2.5 milliGRAM(s) Oral every 12 hours    Xrays:  Right Hip/Pelvis: NO overt femur or acetabular fracture. Hardware in same position as Post-Op Xray 10/20  Right Knee: TKR implants in same position as Xray 5/16. No overt periprosthetic fracture. Slight effusion. No osteolysis.    Labs Yesterday:  CBC                   9.3    7.46  )-----------( 375      ( 13 Nov 2022 06:00 )             29.5       11-13  Chem:  141  |  107  |  19  ----------------------------<  102<H>  3.8   |  28  |  0.64    Ca    8.5      13 Nov 2022 06:00  Phos  3.6     11-13  Mg     1.8     11-13    TPro  6.0  /  Alb  2.2<L>  /  TBili  0.4  /  DBili  x   /  AST  22  /  ALT  10  /  AlkPhos  154<H>  11-13      Primary Orthopedic Assessment:  • Stable from Orthopedic perspective - No Overt sigh of Right knee periprosthetic fracture  • Neuro motor exam stable for LE except for LE general weakness, Bilateral, Right more than left as Post Op effect post Right THR Revision.      Plan:   • Discussed clinical case details with Dr. Leach who reviewed Xrays as well. He advised CTScan , non contrast Right knee to evaluate for occult PEriprosthetic fracture. Longfemur Xrays  • Continue Medical management.     Ortho PA Note    Patient is a 76y Female presenting to the emergency department for inability to bear weight in Right knee overnight 11/13 > 11/14 while turning in Bathroom. State unable to bear weight on Right LE with new Right knee pain & LE weakness. Denies distinct fall in the bathroom. She had just had EMS assist her from getting out lounge chair due to inability to arise from chair. She is S/P Right TKR 5/16/22 Dr. [facility]. She is recently S.P Revision Right THR 10/20/22  [facility] for [place]-prosthetic proximal Right femur fracture sustained in fall at home 10/18 prompting admission. States no new Low Back pain, No new Right LE paresthesias.  Discharge from [facility] 11/12. Was performing PT in [place] with no Right knee pain during rehab exercises.   Ambulating at home with walker.  Admittied to Medical service 11/14 AM    PAST MEDICAL & SURGICAL HISTORY:    Rheumatoid arthritis  SLE (systemic lupus erythematosus)  Osteoarthritis  H/O degenerative disc disease  Hypertension  Hyperlipidemia  Benign heart murmur  Hypothyroid  Obesity, Class II, BMI 35-39.9, no comorbidity  DVT (deep venous thrombosis) post left knee replacement 2019  Overactive bladder  Lab test positive for detection of COVID-19 virus 12/20    PSH:    S/P tonsillectomy  S/P cataract surgery  left, right  S/P eye surgery child  S/P carpal tunnel release left  S/P BIlateral TKR  Lung cancer small tumor removed 2015-no chemo, no radiation  S/P thyroid surgery 1 lobe removed  Status post total hip replacement, right  S/P lumbar spine fusion 12/20    Allergies: No Known Allergies    Medications: acetaminophen     Tablet .. 650 milliGRAM(s) Oral every 6 hours PRN  aluminum hydroxide/magnesium hydroxide/simethicone Suspension 30 milliLiter(s) Oral every 4 hours PRN  apixaban 2.5 milliGRAM(s) Oral every 12 hours  atorvastatin 20 milliGRAM(s) Oral at bedtime  calcium carbonate    500 mG (Tums) Chewable 1 Tablet(s) Chew daily PRN  ferrous    sulfate 325 milliGRAM(s) Oral daily  levothyroxine 150 MICROGram(s) Oral daily  lidocaine   4% Patch 1 Patch Transdermal daily  magnesium hydroxide Suspension 30 milliLiter(s) Oral daily PRN  melatonin 3 milliGRAM(s) Oral at bedtime PRN  ondansetron Injectable 4 milliGRAM(s) IV Push every 8 hours PRN  pantoprazole    Tablet 40 milliGRAM(s) Oral before breakfast  senna 2 Tablet(s) Oral at bedtime  zinc oxide 20% Ointment 1 Application(s) Topical daily      PHYSICAL EXAM:    Vital Signs Last 24 Hrs  T(C): 37 (14 Nov 2022 05:08), Max: 37 (14 Nov 2022 05:08)  T(F): 98.6 (14 Nov 2022 05:08), Max: 98.6 (14 Nov 2022 05:08)  HR: 66 (14 Nov 2022 05:08) (58 - 66)  BP: 145/73 (14 Nov 2022 05:08) (110/55 - 145/73)  RR: 18 (14 Nov 2022 05:08) (17 - 19)  SpO2: 96% (14 Nov 2022 05:08) (94% - 98%)    [Abdomen]:  protuberant soft , benign exam   Ext: HIp: Right; MIn - Mod STS Right thigh, soft, incision well healed, no cellulitis; Min to no Right hip pain with passive hip flexion, Int rot, & Ext rot.  Ext(Knee): Right/ Knee:  No  cellulitis; No effusion . Minimal medial and lateral joint line tenderness; NO soft tissue swelling;  No crepitus]; MIn.  pain with flexion past 60 deg. No laxity to Varus/Valgus stress. No pain with Varus/Valgus stress. Active knee flexion to 40/50 deg. in bed. Able to perform Quad set.  ROM: Extension 0deg. NO Right knee ecchymoses, abrasions, or wounds.  Unable to SLR at hip, states was unable to in Rehab also.  Neuro: Has symm sensation over feet & toes bilat. Full AROM bilat feet & toes. EHL/AT = 5/5  Vascular: Feet toes warm, pink. DP = 2+. No calf tenderness bilat..    VTEP: On Bilat. Venodynes + apixaban 2.5 milliGRAM(s) Oral every 12 hours    Xrays:  Right Hip/Pelvis: NO overt femur or acetabular fracture. Hardware in same position as Post-Op Xray 10/20  Right Knee: TKR implants in same position as Xray 5/16. No overt periprosthetic fracture. Slight effusion. No osteolysis.    Labs Yesterday:  CBC                   9.3    7.46  )-----------( 375      ( 13 Nov 2022 06:00 )             29.5       11-13  Chem:  141  |  107  |  19  ----------------------------<  102<H>  3.8   |  28  |  0.64    Ca    8.5      13 Nov 2022 06:00  Phos  3.6     11-13  Mg     1.8     11-13    TPro  6.0  /  Alb  2.2<L>  /  TBili  0.4  /  DBili  x   /  AST  22  /  ALT  10  /  AlkPhos  154<H>  11-13      Primary Orthopedic Assessment:  • Stable from Orthopedic perspective - No Overt sign of new Right hip or knee periprosthetic fracture  • Neuro motor exam stable for LE except for LE general weakness, Bilateral, Right more than left as Post Op effect post Right THR Revision.      Plan:   • Discussed clinical case details with Dr. Leach who reviewed Xrays as well. He advised CTScan , non contrast Right knee to evaluate for occult PEriprosthetic fracture. Long femur Xrays  • Continue Medical management.

## 2022-11-14 NOTE — PROGRESS NOTE ADULT - SUBJECTIVE AND OBJECTIVE BOX
Patient is a 76y old  Female who presents with a chief complaint of inability to ambulate (14 Nov 2022 10:33)    INTERVAL HPI/OVERNIGHT EVENTS:  feeling a bit better, was able to walk better with nursing assist today, has not yet seen PT.     MEDICATIONS  (STANDING):  apixaban 2.5 milliGRAM(s) Oral every 12 hours  atorvastatin 20 milliGRAM(s) Oral at bedtime  ferrous    sulfate 325 milliGRAM(s) Oral daily  levothyroxine 150 MICROGram(s) Oral daily  lidocaine   4% Patch 1 Patch Transdermal daily  pantoprazole    Tablet 40 milliGRAM(s) Oral before breakfast  senna 2 Tablet(s) Oral at bedtime  zinc oxide 20% Ointment 1 Application(s) Topical daily    MEDICATIONS  (PRN):  acetaminophen     Tablet .. 650 milliGRAM(s) Oral every 6 hours PRN Temp greater or equal to 38C (100.4F), Mild Pain (1 - 3)  aluminum hydroxide/magnesium hydroxide/simethicone Suspension 30 milliLiter(s) Oral every 4 hours PRN Dyspepsia  calcium carbonate    500 mG (Tums) Chewable 1 Tablet(s) Chew daily PRN Dyspepsia  magnesium hydroxide Suspension 30 milliLiter(s) Oral daily PRN Constipation  melatonin 3 milliGRAM(s) Oral at bedtime PRN Insomnia  ondansetron Injectable 4 milliGRAM(s) IV Push every 8 hours PRN Nausea and/or Vomiting      Allergies  No Known Allergies    REVIEW OF SYSTEMS:  CONSTITUTIONAL: No fever, weight loss, or fatigue  EYES: No eye pain, visual disturbances, or discharge  ENMT:  No difficulty hearing, tinnitus, vertigo; No sinus or throat pain  NECK: No pain or stiffness  BREASTS: No pain, masses, or nipple discharge  RESPIRATORY: No cough, wheezing, chills or hemoptysis; No shortness of breath  CARDIOVASCULAR: No chest pain, palpitations, lightheadedness, or leg swelling  GASTROINTESTINAL: No abdominal or epigastric pain. No nausea, vomiting, or hematemesis; No diarrhea or constipation. No melena or hematochezia.  GENITOURINARY: No dysuria, frequency, hematuria, or incontinence  NEUROLOGICAL: No headaches, memory loss, vertigo, numbness, or tremors  SKIN: No itching, burning, rashes, or lesions   LYMPH NODES: No enlarged glands  ENDOCRINE: No heat or cold intolerance; No hair loss; No polydipsia or polyuria  MUSCULOSKELETAL: see hpi  PSYCHIATRIC: No depression, anxiety, or mood swings  HEME/LYMPH: No easy bruising, or bleeding gums  ALLERGY AND IMMUNOLOGIC: No hives or eczema    Vital Signs Last 24 Hrs  T(C): 36.4 (14 Nov 2022 10:42), Max: 37 (14 Nov 2022 05:08)  T(F): 97.6 (14 Nov 2022 10:42), Max: 98.6 (14 Nov 2022 05:08)  HR: 89 (14 Nov 2022 10:42) (61 - 89)  BP: 116/79 (14 Nov 2022 10:42) (111/50 - 145/73)  BP(mean): --  RR: 18 (14 Nov 2022 10:42) (18 - 18)  SpO2: 96% (14 Nov 2022 10:42) (96% - 97%)    Parameters below as of 14 Nov 2022 10:42  Patient On (Oxygen Delivery Method): room air        PHYSICAL EXAM:  GENERAL: NAD, well-groomed, well-developed  HEAD:  Atraumatic, Normocephalic  EYES: EOMI, PERRLA, conjunctiva and sclera clear  ENMT: Moist mucous membranes, Good dentition, No lesions; No tonsillar erythema, exudates, or enlargement  NECK: Supple, No JVD, Normal thyroid  NERVOUS SYSTEM:  Alert & Oriented X3, Good concentration; All 4 extremities mobile, no gross sensory deficits.   CHEST/LUNG: Clear to auscultation bilaterally; No rales, rhonchi, wheezing, or rubs  HEART: Regular rate and rhythm; No murmurs, rubs, or gallops  ABDOMEN: Soft, Nontender, Nondistended; Bowel sounds present  EXTREMITIES:  2+ Peripheral Pulses, No clubbing, cyanosis, or edema  LYMPH: No lymphadenopathy noted  SKIN: No rashes or lesions    LABS:      Ca    8.5        13 Nov 2022 06:00          CAPILLARY BLOOD GLUCOSE          RADIOLOGY & ADDITIONAL TESTS:    Imaging Personally Reviewed:  [ ] YES     Consultant(s) Notes Reviewed:      Care Discussed with Consultants/Other Providers:    Advanced Directives: [ ] DNR  [ ] No feeding tube  [ ] MOLST in chart  [ ] MOLST completed today  [ ] Unknown   Patient is a 76y old  Female who presents with a chief complaint of inability to ambulate (14 Nov 2022 10:33)    INTERVAL HPI/OVERNIGHT EVENTS:  feeling a bit better, was able to walk better with nursing assist today, has not yet seen PT.     MEDICATIONS  (STANDING):  apixaban 2.5 milliGRAM(s) Oral every 12 hours  atorvastatin 20 milliGRAM(s) Oral at bedtime  ferrous    sulfate 325 milliGRAM(s) Oral daily  levothyroxine 150 MICROGram(s) Oral daily  lidocaine   4% Patch 1 Patch Transdermal daily  pantoprazole    Tablet 40 milliGRAM(s) Oral before breakfast  senna 2 Tablet(s) Oral at bedtime  zinc oxide 20% Ointment 1 Application(s) Topical daily    MEDICATIONS  (PRN):  acetaminophen     Tablet .. 650 milliGRAM(s) Oral every 6 hours PRN Temp greater or equal to 38C (100.4F), Mild Pain (1 - 3)  aluminum hydroxide/magnesium hydroxide/simethicone Suspension 30 milliLiter(s) Oral every 4 hours PRN Dyspepsia  calcium carbonate    500 mG (Tums) Chewable 1 Tablet(s) Chew daily PRN Dyspepsia  magnesium hydroxide Suspension 30 milliLiter(s) Oral daily PRN Constipation  melatonin 3 milliGRAM(s) Oral at bedtime PRN Insomnia  ondansetron Injectable 4 milliGRAM(s) IV Push every 8 hours PRN Nausea and/or Vomiting      Allergies  No Known Allergies    REVIEW OF SYSTEMS:  CONSTITUTIONAL: No fever, weight loss, or fatigue  EYES: No eye pain, visual disturbances, or discharge  ENMT:  No difficulty hearing, tinnitus, vertigo; No sinus or throat pain  NECK: No pain or stiffness  BREASTS: No pain, masses, or nipple discharge  RESPIRATORY: No cough, wheezing, chills or hemoptysis; No shortness of breath  CARDIOVASCULAR: No chest pain, palpitations, lightheadedness, or leg swelling  GASTROINTESTINAL: No abdominal or epigastric pain. No nausea, vomiting, or hematemesis; No diarrhea or constipation. No melena or hematochezia.  GENITOURINARY: No dysuria, frequency, hematuria, or incontinence  NEUROLOGICAL: No headaches, memory loss, vertigo, numbness, or tremors  SKIN: No itching, burning, rashes, or lesions   LYMPH NODES: No enlarged glands  ENDOCRINE: No heat or cold intolerance; No hair loss; No polydipsia or polyuria  MUSCULOSKELETAL: see hpi  PSYCHIATRIC: No depression, anxiety, or mood swings  HEME/LYMPH: No easy bruising, or bleeding gums  ALLERGY AND IMMUNOLOGIC: No hives or eczema    Vital Signs Last 24 Hrs  T(C): 36.4 (14 Nov 2022 10:42), Max: 37 (14 Nov 2022 05:08)  T(F): 97.6 (14 Nov 2022 10:42), Max: 98.6 (14 Nov 2022 05:08)  HR: 89 (14 Nov 2022 10:42) (61 - 89)  BP: 116/79 (14 Nov 2022 10:42) (111/50 - 145/73)  BP(mean): --  RR: 18 (14 Nov 2022 10:42) (18 - 18)  SpO2: 96% (14 Nov 2022 10:42) (96% - 97%)    Parameters below as of 14 Nov 2022 10:42  Patient On (Oxygen Delivery Method): room air        PHYSICAL EXAM:  GENERAL: NAD, well-groomed, well-developed  HEAD:  Atraumatic, Normocephalic  EYES: conjunctiva and sclera clear  ENMT: Moist mucous membranes  NECK: Supple, No JVD  NERVOUS SYSTEM:  Alert & Oriented X3, Good concentration; All 4 extremities mobile, no gross sensory deficits.   CHEST/LUNG: Clear to auscultation bilaterally; No rales, rhonchi, wheezing, or rubs  HEART: Regular rate and rhythm; No murmurs, rubs, or gallops  ABDOMEN: Soft, Nontender, Nondistended; Bowel sounds present  EXTREMITIES:  2+ Peripheral Pulses, No clubbing, cyanosis, or edema  LYMPH: No lymphadenopathy noted  SKIN: No rashes or lesions    LABS:      Ca    8.5        13 Nov 2022 06:00          CAPILLARY BLOOD GLUCOSE          RADIOLOGY & ADDITIONAL TESTS:    Imaging Personally Reviewed:  [ ] YES     Consultant(s) Notes Reviewed:      Care Discussed with Consultants/Other Providers:    Advanced Directives: [ ] DNR  [ ] No feeding tube  [ ] MOLST in chart  [ ] MOLST completed today  [ ] Unknown   denies fhx/none

## 2022-11-14 NOTE — CONSULT NOTE ADULT - ASSESSMENT
The patient is a 76 year old female with a history of HTN, HL, RA, DVT, SLE, lung cancer s/p lobectomy who presents with weakness.     Plan:  - Echo 10/22 with normal LV systolic function, mild AS  - Telemetry reviewed; similar to prior admissions, there is intermittent sinus bradycardia, blocked PACs, and Mobitz 1. This is more present while the patient is asleep.  - There may be a component of TAMMY contributing to rhythm issues  - No indication for pacemaker at the current time  - Would avoid AV eliz blocking agents  - PT

## 2022-11-14 NOTE — PROGRESS NOTE ADULT - ASSESSMENT
76F with hx of HTN, HLD, heart murmur, SLE, RA, hx of lung and thyroid CA, DVT,  multiple hip surgeries who presented from home for inability to walk right after being discharged from rehab.      Inability to walk  - ortho follow up appreciated - does not appear to be fracture or new issue  - possibly related to RA/SLE?  Patient with elevated ESR.  D/w her own Rheumatologist Dr Davis.  Pt has been off Methotrexate for a while and may be experiencing exacerbation vs. PMR like activity.    - As per rheum will restart methotrexate 10mg weekly+ folic acid 1mg daily along with prednisone 15mg daily and patient can follow up with him in the office.     Right hip revision  - pain control as needed  - ortho to be consulted   - cont with eliquis 2.5mg BID  - bowel regimen    HTN/HLD  - cont with statin    Hypothyroidism  - cont with synthroid    Hx of SLE  - see above    Hx of DVT  - will be on eliquis    Hx of blocked PACs and Mobitz I  - cardiology eval appreciated  - suggest outpatient TAMMY evaluation     Preventive measures  - already on eliquis  - will be on PPI

## 2022-11-14 NOTE — CONSULT NOTE ADULT - SUBJECTIVE AND OBJECTIVE BOX
Orthopedic Consult Note     History: 76F with history of HTN, HLD, heart murmur, SLE, RA, thyroid CA s/p resection, DVT (on eliquis), lung cancer (s/p tumor removal, no chemo no radiation), Right FELICITAS (Jonathan, '19), Left TKA (Jonathan, '20), Left FELICITAS (Jonathan, '22) presented who initially presented to [facility] ED with a right femur periprosthetic fracture s/p mechanical fall on 10/18, transferred to [place] for surgery on 10/20, discharged to  rehab, and then just discharged from  on 11/12. She was overall doing well with rehabilitation and has been pain free in her right leg. She has had significant weakness in her right leg/ quad which began prior to her injury on 10/18. This weekend while at home, she was unable to navigate the bathroom as the space is too small for a walker and she was unable to get up. She called the fire department and was taken to the hospital for further management. Denies any history of fall since discharge from acute rehabilitation. No significant thigh or femur pain.     Exam:  T(C): 37 (11-14-22 @ 05:08), Max: 37 (11-14-22 @ 05:08)  HR: 66 (11-14-22 @ 05:08) (58 - 66)  BP: 145/73 (11-14-22 @ 05:08) (110/55 - 145/73)  RR: 18 (11-14-22 @ 05:08) (18 - 19)  SpO2: 96% (11-14-22 @ 05:08) (94% - 97%)  Wt(kg): --I&O's Summary    13 Nov 2022 07:01  -  14 Nov 2022 07:00  --------------------------------------------------------  IN: 480 mL / OUT: 1200 mL / NET: -720 mL        Lying in bed in no apparent distress  Unlabored respirations    EXT:   Incision clean, dry and intact. No erythema, warmth or drainage. Steri strip in place.   Sensation is intact in the superficial peroneal, deep peroneal, tibial, sural and saphenous nerve distributions  Able to dorsiflex and plantarflex ankle. Wiggles toes. Quad weakness unchanged from baseline  Foot warm and well perfused  Sequential compression device on            Labs:                        9.3    7.46  )-----------( 375      ( 13 Nov 2022 06:00 )             29.5    11-13    141  |  107  |  19  ----------------------------<  102<H>  3.8   |  28  |  0.64    Ca    8.5      13 Nov 2022 06:00  Phos  3.6     11-13  Mg     1.8     11-13    TPro  6.0  /  Alb  2.2<L>  /  TBili  0.4  /  DBili  x   /  AST  22  /  ALT  10  /  AlkPhos  154<H>  11-13    Assessment/ Plan: KAI THOMAS is s/p revision right total hip replacement with ORIF femur on 10/20 for periprosthetic femur fracture, admitted due to weakness and difficulty mobilizing independently at home.   - f/u CT femur and femur film read. No apparent fracture visualized.   - lateral and anterior hip precautions  - PT/ OT for rehabilitation. WBAT with ROM activities as tolerated. Aggressive quad strengthening. I discussed with her practicing straight leg raises and marching in chair to work on quad weakness however she does have weakness at least partially from the lesser trochanter fracture at the time of her initial injury  - multimodal pain regimen.   - chemical DVT prophylaxis per protocol  - SCDs while in bed. Out of bed to chair   - incentive spirometry  - disposition planning. Call [phone] to confirm or change postoperative appointment.  - page orthopedics team with questions or concerns.
History of Present Illness: The patient is a 76 year old female with a history of HTN, HL, RA, DVT, SLE, lung cancer s/p lobectomy who presents with weakness. The patient is a poor historian. She was at home sitting down and was unable to get up. Eventually she called EMS and on attempting to walk her legs gave out. No dizziness, syncope, chest pain, shortness of breath.    Past Medical/Surgical History:  HTN, HL, RA, DVT, SLE, lung cancer s/p lobectomy     Medications:  Home Medications:  acetaminophen 325 mg oral tablet: 2 tab(s) orally every 8 hours (10 Nov 2022 14:44)  calcium carbonate 500 mg (200 mg elemental calcium) oral tablet, chewable: 1 tab(s) orally 4 times a day, As needed, Dyspepsia (10 Nov 2022 14:44)  ferrous sulfate 325 mg (65 mg elemental iron) oral tablet: 1 tab(s) orally once a day (10 Nov 2022 14:44)  levothyroxine 150 mcg (0.15 mg) oral tablet: 1 tab(s) orally once a day (10 Nov 2022 14:44)  magnesium hydroxide 8% oral suspension: 30 milliliter(s) orally once a day, As needed, Constipation (10 Nov 2022 14:44)  melatonin 3 mg oral tablet: 2 tab(s) orally once a day (at bedtime) (10 Nov 2022 14:44)  pantoprazole 40 mg oral delayed release tablet: 1 tab(s) orally once a day (before a meal) (22 Oct 2022 09:12)  polyethylene glycol 3350 oral powder for reconstitution: 17 gram(s) orally once a day (at bedtime) (10 Nov 2022 14:44)  senna leaf extract oral tablet: 2 tab(s) orally once a day (at bedtime) (10 Nov 2022 14:44)  zinc oxide 40% topical ointment: Apply topically to affected area 2 times a day (10 Nov 2022 14:44)      Family History: Non-contributory family history of premature cardiovascular atherosclerotic disease    Social History: No tobacco, alcohol or drug use    Review of Systems:  General: No fevers, chills, weight gain  Skin: No rashes, color changes  Cardiovascular: No chest pain, orthopnea  Respiratory: No shortness of breath, cough  Gastrointestinal: No nausea, abdominal pain  Genitourinary: No incontinence, pain with urination  Musculoskeletal: No pain, swelling, decreased range of motion  Neurological: No headache, weakness  Psychiatric: No depression, anxiety  Endocrine: No weight gain, increased thirst  All other systems are comprehensively negative.    Physical Exam:  Vitals:        Vital Signs Last 24 Hrs  T(C): 37 (14 Nov 2022 05:08), Max: 37 (14 Nov 2022 05:08)  T(F): 98.6 (14 Nov 2022 05:08), Max: 98.6 (14 Nov 2022 05:08)  HR: 66 (14 Nov 2022 05:08) (58 - 66)  BP: 145/73 (14 Nov 2022 05:08) (110/55 - 145/73)  BP(mean): --  RR: 18 (14 Nov 2022 05:08) (17 - 19)  SpO2: 96% (14 Nov 2022 05:08) (94% - 98%)  Parameters below as of 14 Nov 2022 05:08  Patient On (Oxygen Delivery Method): room air  General: NAD  HEENT: MMM  Neck: No JVD, no carotid bruit  Lungs: CTAB  CV: RRR, nl S1/S2, no M/R/G  Abdomen: S/NT/ND, +BS  Extremities: No LE edema, no cyanosis  Neuro: AAOx3, non-focal  Skin: No rash    Labs:                        9.3    7.46  )-----------( 375      ( 13 Nov 2022 06:00 )             29.5     11-13    141  |  107  |  19  ----------------------------<  102<H>  3.8   |  28  |  0.64    Ca    8.5      13 Nov 2022 06:00  Phos  3.6     11-13  Mg     1.8     11-13    TPro  6.0  /  Alb  2.2<L>  /  TBili  0.4  /  DBili  x   /  AST  22  /  ALT  10  /  AlkPhos  154<H>  11-13            ECG/Telemetry: Sinus rhythm, blocked PACs, Mobitz 1

## 2022-11-15 PROCEDURE — 99232 SBSQ HOSP IP/OBS MODERATE 35: CPT

## 2022-11-15 RX ADMIN — Medication 15 MILLIGRAM(S): at 06:26

## 2022-11-15 RX ADMIN — APIXABAN 2.5 MILLIGRAM(S): 2.5 TABLET, FILM COATED ORAL at 06:26

## 2022-11-15 RX ADMIN — PANTOPRAZOLE SODIUM 40 MILLIGRAM(S): 20 TABLET, DELAYED RELEASE ORAL at 06:26

## 2022-11-15 RX ADMIN — LIDOCAINE 1 PATCH: 4 CREAM TOPICAL at 20:38

## 2022-11-15 RX ADMIN — LIDOCAINE 1 PATCH: 4 CREAM TOPICAL at 12:11

## 2022-11-15 RX ADMIN — ATORVASTATIN CALCIUM 20 MILLIGRAM(S): 80 TABLET, FILM COATED ORAL at 22:58

## 2022-11-15 RX ADMIN — APIXABAN 2.5 MILLIGRAM(S): 2.5 TABLET, FILM COATED ORAL at 17:54

## 2022-11-15 RX ADMIN — ZINC OXIDE 1 APPLICATION(S): 200 OINTMENT TOPICAL at 12:10

## 2022-11-15 RX ADMIN — Medication 325 MILLIGRAM(S): at 12:10

## 2022-11-15 RX ADMIN — Medication 3 MILLIGRAM(S): at 22:59

## 2022-11-15 RX ADMIN — Medication 150 MICROGRAM(S): at 06:26

## 2022-11-15 RX ADMIN — Medication 1 MILLIGRAM(S): at 12:10

## 2022-11-15 NOTE — PROGRESS NOTE ADULT - ASSESSMENT
76F with hx of HTN, HLD, heart murmur, SLE, RA, hx of lung and thyroid CA, DVT,  multiple hip surgeries who presented from home for inability to walk right after being discharged from rehab.      Inability to walk  - ortho follow up appreciated - does not appear to be fracture or new issue  - possibly related to RA/SLE?  Patient with elevated ESR.  D/w her own Rheumatologist Dr Davis on 11/14.  Pt has been off Methotrexate for a while and may be experiencing exacerbation vs. PMR like activity.    - As per rheum will restart methotrexate 10mg weekly+ folic acid 1mg daily along with prednisone 15mg daily for 7 days (11/14-20) then prednisone 10mg daily and patient can follow up with him in the office.   - PT eval pending for today    Right hip revision  - pain control as needed  - ortho eval appreciated  - cont with eliquis 2.5mg BID  - bowel regimen    HTN/HLD  - cont with statin    Hypothyroidism  - cont with synthroid    Hx of SLE  - see above    Hx of DVT  - will be on eliquis    Hx of blocked PACs and Mobitz I  - cardiology eval appreciated  - suggest outpatient TAMMY evaluation     Preventive measures  - already on eliquis  - will be on PPI

## 2022-11-15 NOTE — PHYSICAL THERAPY INITIAL EVALUATION ADULT - PERTINENT HX OF CURRENT PROBLEM, REHAB EVAL
Pt is a 77 y/o female, admitted from home with difficulty ambulating. Discharged from  rehab on 11/12 who presents now with inability to walk. Recent right ORIF of right femur periprosthetic fracture.

## 2022-11-15 NOTE — PROGRESS NOTE ADULT - SUBJECTIVE AND OBJECTIVE BOX
Chief Complaint: Weakness    Interval Events: No events overnight.    Review of Systems:  General: No fevers, chills, weight gain  Skin: No rashes, color changes  Cardiovascular: No chest pain, orthopnea  Respiratory: No shortness of breath, cough  Gastrointestinal: No nausea, abdominal pain  Genitourinary: No incontinence, pain with urination  Musculoskeletal: No pain, swelling, decreased range of motion  Neurological: No headache, weakness  Psychiatric: No depression, anxiety  Endocrine: No weight gain, increased thirst  All other systems are comprehensively negative.    Physical Exam:  Vitals:        Vital Signs Last 24 Hrs  T(C): 37 (14 Nov 2022 05:08), Max: 37 (14 Nov 2022 05:08)  T(F): 98.6 (14 Nov 2022 05:08), Max: 98.6 (14 Nov 2022 05:08)  HR: 66 (14 Nov 2022 05:08) (58 - 66)  BP: 145/73 (14 Nov 2022 05:08) (110/55 - 145/73)  BP(mean): --  RR: 18 (14 Nov 2022 05:08) (17 - 19)  SpO2: 96% (14 Nov 2022 05:08) (94% - 98%)  Parameters below as of 14 Nov 2022 05:08  Patient On (Oxygen Delivery Method): room air  General: NAD  HEENT: MMM  Neck: No JVD, no carotid bruit  Lungs: CTAB  CV: RRR, nl S1/S2, no M/R/G  Abdomen: S/NT/ND, +BS  Extremities: No LE edema, no cyanosis  Neuro: AAOx3, non-focal  Skin: No rash    Labs:                  ECG/Telemetry: Sinus rhythm, Mobitz 1, blocked PACs

## 2022-11-15 NOTE — PROGRESS NOTE ADULT - SUBJECTIVE AND OBJECTIVE BOX
Patient is a 76y old  Female who presents with a chief complaint of inability to ambulate (14 Nov 2022 13:50)      INTERVAL HPI/OVERNIGHT EVENTS: feeling the same, complaining of pain in right shoulder (has been present for a while) and unable to lift right leg but no new complaints.  no more buckling of right leg but hasnt been up with PT yet.     MEDICATIONS  (STANDING):  apixaban 2.5 milliGRAM(s) Oral every 12 hours  atorvastatin 20 milliGRAM(s) Oral at bedtime  ferrous    sulfate 325 milliGRAM(s) Oral daily  folic acid 1 milliGRAM(s) Oral daily  levothyroxine 150 MICROGram(s) Oral daily  lidocaine   4% Patch 1 Patch Transdermal daily  methotrexate 10 milliGRAM(s) Oral every week  pantoprazole    Tablet 40 milliGRAM(s) Oral before breakfast  predniSONE   Tablet 15 milliGRAM(s) Oral daily  senna 2 Tablet(s) Oral at bedtime  zinc oxide 20% Ointment 1 Application(s) Topical daily    MEDICATIONS  (PRN):  acetaminophen     Tablet .. 650 milliGRAM(s) Oral every 6 hours PRN Temp greater or equal to 38C (100.4F), Mild Pain (1 - 3)  aluminum hydroxide/magnesium hydroxide/simethicone Suspension 30 milliLiter(s) Oral every 4 hours PRN Dyspepsia  calcium carbonate    500 mG (Tums) Chewable 1 Tablet(s) Chew daily PRN Dyspepsia  magnesium hydroxide Suspension 30 milliLiter(s) Oral daily PRN Constipation  melatonin 3 milliGRAM(s) Oral at bedtime PRN Insomnia  ondansetron Injectable 4 milliGRAM(s) IV Push every 8 hours PRN Nausea and/or Vomiting      Allergies  No Known Allergies        REVIEW OF SYSTEMS:  CONSTITUTIONAL: No fever, weight loss, or fatigue  EYES: No eye pain, visual disturbances, or discharge  ENMT:  No difficulty hearing, tinnitus, vertigo; No sinus or throat pain  NECK: No pain or stiffness  BREASTS: No pain, masses, or nipple discharge  RESPIRATORY: No cough, wheezing, chills or hemoptysis; No shortness of breath  CARDIOVASCULAR: No chest pain, palpitations, lightheadedness, or leg swelling  GASTROINTESTINAL: No abdominal or epigastric pain. No nausea, vomiting, or hematemesis; No diarrhea or constipation. No melena or hematochezia.  GENITOURINARY: No dysuria, frequency, hematuria, or incontinence  NEUROLOGICAL: No headaches, memory loss, vertigo, loss of strength, numbness, or tremors  SKIN: No itching, burning, rashes, or lesions   LYMPH NODES: No enlarged glands  ENDOCRINE: No heat or cold intolerance; No hair loss; No polydipsia or polyuria  MUSCULOSKELETAL: see hpi  PSYCHIATRIC: No depression, anxiety, or mood swings  HEME/LYMPH: No easy bruising, or bleeding gums  ALLERGY AND IMMUNOLOGIC: No hives or eczema    Vital Signs Last 24 Hrs  T(C): 36.7 (15 Nov 2022 08:45), Max: 37 (14 Nov 2022 21:56)  T(F): 98 (15 Nov 2022 08:45), Max: 98.6 (14 Nov 2022 21:56)  HR: 65 (15 Nov 2022 08:45) (65 - 94)  BP: 124/70 (15 Nov 2022 08:45) (109/56 - 124/70)  BP(mean): --  RR: 19 (15 Nov 2022 08:45) (17 - 19)  SpO2: 98% (15 Nov 2022 08:45) (92% - 98%)    Parameters below as of 15 Nov 2022 05:29  Patient On (Oxygen Delivery Method): room air        PHYSICAL EXAM:  GENERAL: NAD, well-groomed, well-developed  HEAD:  Atraumatic, Normocephalic  EYES: EOMI, PERRLA, conjunctiva and sclera clear  ENMT: Moist mucous membranes, Good dentition, No lesions; No tonsillar erythema, exudates, or enlargement  NECK: Supple, No JVD, Normal thyroid  NERVOUS SYSTEM:  Alert & Oriented X3, Good concentration; All 4 extremities mobile, no gross sensory deficits.   CHEST/LUNG: Clear to auscultation bilaterally; No rales, rhonchi, wheezing, or rubs  HEART: Regular rate and rhythm; No murmurs, rubs, or gallops  ABDOMEN: Soft, Nontender, Nondistended; Bowel sounds present  EXTREMITIES:  2+ Peripheral Pulses, No clubbing, cyanosis, or edema  LYMPH: No lymphadenopathy noted  SKIN: No rashes or lesions    LABS:              CAPILLARY BLOOD GLUCOSE          RADIOLOGY & ADDITIONAL TESTS:    Imaging Personally Reviewed:  [ ] YES     Consultant(s) Notes Reviewed:      Care Discussed with Consultants/Other Providers:    Advanced Directives: [ ] DNR  [ ] No feeding tube  [ ] MOLST in chart  [ ] MOLST completed today  [ ] Unknown   Patient is a 76y old  Female who presents with a chief complaint of inability to ambulate (14 Nov 2022 13:50)      INTERVAL HPI/OVERNIGHT EVENTS: feeling the same, complaining of pain in right shoulder (has been present for a while) and unable to lift right leg but no new complaints.  no more buckling of right leg but hasnt been up with PT yet.     MEDICATIONS  (STANDING):  apixaban 2.5 milliGRAM(s) Oral every 12 hours  atorvastatin 20 milliGRAM(s) Oral at bedtime  ferrous    sulfate 325 milliGRAM(s) Oral daily  folic acid 1 milliGRAM(s) Oral daily  levothyroxine 150 MICROGram(s) Oral daily  lidocaine   4% Patch 1 Patch Transdermal daily  methotrexate 10 milliGRAM(s) Oral every week  pantoprazole    Tablet 40 milliGRAM(s) Oral before breakfast  predniSONE   Tablet 15 milliGRAM(s) Oral daily  senna 2 Tablet(s) Oral at bedtime  zinc oxide 20% Ointment 1 Application(s) Topical daily    MEDICATIONS  (PRN):  acetaminophen     Tablet .. 650 milliGRAM(s) Oral every 6 hours PRN Temp greater or equal to 38C (100.4F), Mild Pain (1 - 3)  aluminum hydroxide/magnesium hydroxide/simethicone Suspension 30 milliLiter(s) Oral every 4 hours PRN Dyspepsia  calcium carbonate    500 mG (Tums) Chewable 1 Tablet(s) Chew daily PRN Dyspepsia  magnesium hydroxide Suspension 30 milliLiter(s) Oral daily PRN Constipation  melatonin 3 milliGRAM(s) Oral at bedtime PRN Insomnia  ondansetron Injectable 4 milliGRAM(s) IV Push every 8 hours PRN Nausea and/or Vomiting      Allergies  No Known Allergies    REVIEW OF SYSTEMS:  CONSTITUTIONAL: No fever, weight loss, or fatigue  EYES: No eye pain, visual disturbances, or discharge  ENMT:  No difficulty hearing, tinnitus, vertigo; No sinus or throat pain  NECK: No pain or stiffness  BREASTS: No pain, masses, or nipple discharge  RESPIRATORY: No cough, wheezing, chills or hemoptysis; No shortness of breath  CARDIOVASCULAR: No chest pain, palpitations, lightheadedness, or leg swelling  GASTROINTESTINAL: No abdominal or epigastric pain. No nausea, vomiting, or hematemesis; No diarrhea or constipation. No melena or hematochezia.  GENITOURINARY: No dysuria, frequency, hematuria, or incontinence  NEUROLOGICAL: No headaches, memory loss, vertigo, loss of strength, numbness, or tremors  SKIN: No itching, burning, rashes, or lesions   LYMPH NODES: No enlarged glands  ENDOCRINE: No heat or cold intolerance; No hair loss; No polydipsia or polyuria  MUSCULOSKELETAL: see hpi  PSYCHIATRIC: No depression, anxiety, or mood swings  HEME/LYMPH: No easy bruising, or bleeding gums  ALLERGY AND IMMUNOLOGIC: No hives or eczema    Vital Signs Last 24 Hrs  T(C): 36.7 (15 Nov 2022 08:45), Max: 37 (14 Nov 2022 21:56)  T(F): 98 (15 Nov 2022 08:45), Max: 98.6 (14 Nov 2022 21:56)  HR: 65 (15 Nov 2022 08:45) (65 - 94)  BP: 124/70 (15 Nov 2022 08:45) (109/56 - 124/70)  BP(mean): --  RR: 19 (15 Nov 2022 08:45) (17 - 19)  SpO2: 98% (15 Nov 2022 08:45) (92% - 98%)    Parameters below as of 15 Nov 2022 05:29  Patient On (Oxygen Delivery Method): room air      PHYSICAL EXAM:  GENERAL: NAD, well-groomed, well-developed  HEAD:  Atraumatic, Normocephalic  EYES: Econjunctiva and sclera clear  ENMT: Moist mucous membranes,  NECK: Supple, No JVD  NERVOUS SYSTEM:  Alert & Oriented X3, Good concentration; All 4 extremities mobile, no gross sensory deficits.   CHEST/LUNG: Clear to auscultation bilaterally  HEART: Regular rate and rhythm;   ABDOMEN: Soft, Nontender, Nondistended; Bowel sounds present  EXTREMITIES:  2+ Peripheral Pulses, right thigh with some swelling and weakness as compared to left      LABS:      CAPILLARY BLOOD GLUCOSE          RADIOLOGY & ADDITIONAL TESTS:    Imaging Personally Reviewed:  [ ] YES     Consultant(s) Notes Reviewed:      Care Discussed with Consultants/Other Providers:    Advanced Directives: [ ] DNR  [ ] No feeding tube  [ ] MOLST in chart  [ ] MOLST completed today  [ ] Unknown

## 2022-11-15 NOTE — PHYSICAL THERAPY INITIAL EVALUATION ADULT - ADDITIONAL COMMENTS
pt lives alone, son living with her but works during the day. Pt has no steps, ramp to enter, commode over toilet and RW

## 2022-11-15 NOTE — PHYSICAL THERAPY INITIAL EVALUATION ADULT - MANUAL MUSCLE TESTING RESULTS, REHAB EVAL
----- Message from Carolin Gustafson sent at 10/4/2022  8:57 AM CDT -----  Pt called in re: his nurse visit on 10/14 pt states he cant do any Friday appts please reschedule and call pt @ [phone]      [facility]        at least 4/5 throughout/grossly assessed due to

## 2022-11-16 ENCOUNTER — APPOINTMENT (OUTPATIENT)
Dept: ORTHOPEDIC SURGERY | Facility: CLINIC | Age: 77
End: 2022-11-16

## 2022-11-16 LAB — SARS-COV-2 RNA SPEC QL NAA+PROBE: SIGNIFICANT CHANGE UP

## 2022-11-16 PROCEDURE — 99232 SBSQ HOSP IP/OBS MODERATE 35: CPT

## 2022-11-16 RX ADMIN — PANTOPRAZOLE SODIUM 40 MILLIGRAM(S): 20 TABLET, DELAYED RELEASE ORAL at 06:09

## 2022-11-16 RX ADMIN — LIDOCAINE 1 PATCH: 4 CREAM TOPICAL at 00:00

## 2022-11-16 RX ADMIN — SENNA PLUS 2 TABLET(S): 8.6 TABLET ORAL at 22:02

## 2022-11-16 RX ADMIN — ATORVASTATIN CALCIUM 20 MILLIGRAM(S): 80 TABLET, FILM COATED ORAL at 22:02

## 2022-11-16 RX ADMIN — Medication 3 MILLIGRAM(S): at 22:01

## 2022-11-16 RX ADMIN — ZINC OXIDE 1 APPLICATION(S): 200 OINTMENT TOPICAL at 12:12

## 2022-11-16 RX ADMIN — Medication 150 MICROGRAM(S): at 06:08

## 2022-11-16 RX ADMIN — Medication 325 MILLIGRAM(S): at 12:12

## 2022-11-16 RX ADMIN — APIXABAN 2.5 MILLIGRAM(S): 2.5 TABLET, FILM COATED ORAL at 19:08

## 2022-11-16 RX ADMIN — Medication 15 MILLIGRAM(S): at 06:08

## 2022-11-16 RX ADMIN — LIDOCAINE 1 PATCH: 4 CREAM TOPICAL at 19:22

## 2022-11-16 RX ADMIN — Medication 1 MILLIGRAM(S): at 12:12

## 2022-11-16 RX ADMIN — LIDOCAINE 1 PATCH: 4 CREAM TOPICAL at 12:12

## 2022-11-16 RX ADMIN — APIXABAN 2.5 MILLIGRAM(S): 2.5 TABLET, FILM COATED ORAL at 06:09

## 2022-11-16 NOTE — OCCUPATIONAL THERAPY INITIAL EVALUATION ADULT - BALANCE TRAINING, PT EVAL
Goal: Pt will improve dynamic standing balance to G for improved safety with ADL/IADL completion in 1 week.

## 2022-11-16 NOTE — PROGRESS NOTE ADULT - REASON FOR ADMISSION
Right Knee pain / Right knee buckling
inability to ambulate

## 2022-11-16 NOTE — PROGRESS NOTE ADULT - SUBJECTIVE AND OBJECTIVE BOX
Patient is a 76y old  Female who presents with a chief complaint of inability to ambulate (15 Nov 2022 12:59)      INTERVAL HPI/OVERNIGHT EVENTS:  no new complaints.  right shoulder is better.     MEDICATIONS  (STANDING):  apixaban 2.5 milliGRAM(s) Oral every 12 hours  atorvastatin 20 milliGRAM(s) Oral at bedtime  ferrous    sulfate 325 milliGRAM(s) Oral daily  folic acid 1 milliGRAM(s) Oral daily  levothyroxine 150 MICROGram(s) Oral daily  lidocaine   4% Patch 1 Patch Transdermal daily  methotrexate 10 milliGRAM(s) Oral every week  pantoprazole    Tablet 40 milliGRAM(s) Oral before breakfast  predniSONE   Tablet 15 milliGRAM(s) Oral daily  senna 2 Tablet(s) Oral at bedtime  zinc oxide 20% Ointment 1 Application(s) Topical daily    MEDICATIONS  (PRN):  acetaminophen     Tablet .. 650 milliGRAM(s) Oral every 6 hours PRN Temp greater or equal to 38C (100.4F), Mild Pain (1 - 3)  aluminum hydroxide/magnesium hydroxide/simethicone Suspension 30 milliLiter(s) Oral every 4 hours PRN Dyspepsia  calcium carbonate    500 mG (Tums) Chewable 1 Tablet(s) Chew daily PRN Dyspepsia  magnesium hydroxide Suspension 30 milliLiter(s) Oral daily PRN Constipation  melatonin 3 milliGRAM(s) Oral at bedtime PRN Insomnia  ondansetron Injectable 4 milliGRAM(s) IV Push every 8 hours PRN Nausea and/or Vomiting      Allergies  No Known Allergies      REVIEW OF SYSTEMS:  CONSTITUTIONAL: No fever, weight loss, or fatigue  EYES: No eye pain, visual disturbances, or discharge  ENMT:  No difficulty hearing, tinnitus, vertigo; No sinus or throat pain  NECK: No pain or stiffness  BREASTS: No pain, masses, or nipple discharge  RESPIRATORY: No cough, wheezing, chills or hemoptysis; No shortness of breath  CARDIOVASCULAR: No chest pain, palpitations, lightheadedness, or leg swelling  GASTROINTESTINAL: No abdominal or epigastric pain. No nausea, vomiting, or hematemesis; No diarrhea or constipation. No melena or hematochezia.  GENITOURINARY: No dysuria, frequency, hematuria, or incontinence  NEUROLOGICAL: No headaches, memory loss, vertigo, loss of strength, numbness, or tremors  SKIN: No itching, burning, rashes, or lesions   LYMPH NODES: No enlarged glands  ENDOCRINE: No heat or cold intolerance; No hair loss; No polydipsia or polyuria  MUSCULOSKELETAL: see hpi  PSYCHIATRIC: No depression, anxiety, or mood swings  HEME/LYMPH: No easy bruising, or bleeding gums  ALLERGY AND IMMUNOLOGIC: No hives or eczema    Vital Signs Last 24 Hrs  T(C): 36.7 (16 Nov 2022 05:00), Max: 36.8 (15 Nov 2022 14:14)  T(F): 98 (16 Nov 2022 05:00), Max: 98.3 (15 Nov 2022 14:14)  HR: 62 (16 Nov 2022 05:00) (62 - 83)  BP: 119/66 (16 Nov 2022 05:00) (103/56 - 123/62)  BP(mean): --  RR: 18 (16 Nov 2022 05:00) (18 - 19)  SpO2: 93% (16 Nov 2022 05:00) (93% - 99%)    Parameters below as of 16 Nov 2022 05:00  Patient On (Oxygen Delivery Method): room air        PHYSICAL EXAM:  GENERAL: NAD, well-groomed, well-developed  HEAD:  Atraumatic, Normocephalic  EYES: conjunctiva and sclera clear  ENMT: Moist mucous membranes  NECK: Supple, No JVD  NERVOUS SYSTEM:  Alert & Oriented X3, Good concentration; All 4 extremities mobile, no gross sensory deficits.   CHEST/LUNG: Clear to auscultation bilaterally; No rales, rhonchi, wheezing, or rubs  HEART: Regular rate and rhythm; No murmurs, rubs, or gallops  ABDOMEN: Soft, Nontender, Nondistended; Bowel sounds present  EXTREMITIES:  2+ Peripheral Pulses, right thigh swelling - no change  LYMPH: No lymphadenopathy noted  SKIN: No rashes or lesions    LABS:              CAPILLARY BLOOD GLUCOSE          RADIOLOGY & ADDITIONAL TESTS:    Imaging Personally Reviewed:  [ ] YES     Consultant(s) Notes Reviewed:      Care Discussed with Consultants/Other Providers:    Advanced Directives: [ ] DNR  [ ] No feeding tube  [ ] MOLST in chart  [ ] MOLST completed today  [ ] Unknown

## 2022-11-16 NOTE — PROGRESS NOTE ADULT - ASSESSMENT
76F with hx of HTN, HLD, heart murmur, SLE, RA, hx of lung and thyroid CA, DVT,  multiple hip surgeries who presented from home for inability to walk right after being discharged from rehab.      Inability to walk  - ortho follow up appreciated - does not appear to be fracture or new issue  - possibly related to RA/SLE?  Patient with elevated ESR.  D/w her own Rheumatologist Dr Davis on 11/14.  Pt has been off Methotrexate for a while and may be experiencing exacerbation vs. PMR like activity.    - As per rheum will restart methotrexate 10mg weekly+ folic acid 1mg daily along with prednisone 15mg daily for 7 days (11/14-20) then prednisone 10mg daily and patient can follow up with him in the office.   - PT/OT follow up for today    Right hip revision  - pain control as needed  - ortho eval appreciated  - cont with eliquis 2.5mg BID  - bowel regimen    HTN/HLD  - cont with statin    Hypothyroidism  - cont with synthroid    Hx of SLE  - see above    Hx of DVT  - will be on eliquis    Hx of blocked PACs and Mobitz I  - cardiology eval appreciated  - suggest outpatient TAMMY evaluation     Preventive measures  - already on eliquis  - will be on PPI    medically stable for DC.  Await PT/OT input on safe dc planning.

## 2022-11-16 NOTE — PROGRESS NOTE ADULT - ASSESSMENT
The patient is a 76 year old female with a history of HTN, HL, RA, DVT, SLE, lung cancer s/p lobectomy who presents with weakness.     Plan:  - Echo 10/22 with normal LV systolic function, mild AS  - Telemetry reviewed; similar to prior admissions, there is intermittent sinus bradycardia, blocked PACs, and Mobitz 1. This is more present while the patient is asleep.  - There may be a component of TAMMY contributing to rhythm issues  - No indication for pacemaker at the current time  - Would avoid AV eliz blocking agents  - PT  - Discharge planning

## 2022-11-16 NOTE — OCCUPATIONAL THERAPY INITIAL EVALUATION ADULT - LIVES WITH, PROFILE
Pt lives with her son in a private home, ramp access to enter with a tub inside. Pt reports she typically sleeps in a recliner. Pt was recently discharged to home from acute rehab. Pt has a RW (has used for a while at home), tub transfer bench, hip kit, commode, leg  at home. Pt reports her son works nights and is not able to assist too often at home./children

## 2022-11-16 NOTE — PROGRESS NOTE ADULT - SUBJECTIVE AND OBJECTIVE BOX
Chief Complaint: Weakness    Interval Events: No events overnight.    Review of Systems:  General: No fevers, chills, weight gain  Skin: No rashes, color changes  Cardiovascular: No chest pain, orthopnea  Respiratory: No shortness of breath, cough  Gastrointestinal: No nausea, abdominal pain  Genitourinary: No incontinence, pain with urination  Musculoskeletal: No pain, swelling, decreased range of motion  Neurological: No headache, weakness  Psychiatric: No depression, anxiety  Endocrine: No weight gain, increased thirst  All other systems are comprehensively negative.    Physical Exam:  Vital Signs Last 24 Hrs  T(C): 36.7 (16 Nov 2022 05:00), Max: 36.8 (15 Nov 2022 14:14)  T(F): 98 (16 Nov 2022 05:00), Max: 98.3 (15 Nov 2022 14:14)  HR: 62 (16 Nov 2022 05:00) (62 - 83)  BP: 119/66 (16 Nov 2022 05:00) (103/56 - 124/70)  BP(mean): --  RR: 18 (16 Nov 2022 05:00) (18 - 19)  SpO2: 93% (16 Nov 2022 05:00) (93% - 99%)  Parameters below as of 16 Nov 2022 05:00  Patient On (Oxygen Delivery Method): room air  General: NAD  HEENT: MMM  Neck: No JVD, no carotid bruit  Lungs: CTAB  CV: RRR, nl S1/S2, no M/R/G  Abdomen: S/NT/ND, +BS  Extremities: No LE edema, no cyanosis  Neuro: AAOx3, non-focal  Skin: No rash    Labs:                  ECG/Telemetry: Sinus rhythm, Mobitz 1, blocked PACs

## 2022-11-16 NOTE — OCCUPATIONAL THERAPY INITIAL EVALUATION ADULT - PERTINENT HX OF CURRENT PROBLEM, REHAB EVAL
75 y/o female, admitted from home with difficulty ambulating. Discharged to home from  rehab on 11/12 who presents now with inability to walk. Pt with recent right hip ORIF of right femur periprosthetic fracture.

## 2022-11-17 ENCOUNTER — TRANSCRIPTION ENCOUNTER (OUTPATIENT)
Age: 77
End: 2022-11-17

## 2022-11-17 VITALS
OXYGEN SATURATION: 96 % | TEMPERATURE: 98 F | HEART RATE: 73 BPM | SYSTOLIC BLOOD PRESSURE: 117 MMHG | RESPIRATION RATE: 18 BRPM | DIASTOLIC BLOOD PRESSURE: 74 MMHG

## 2022-11-17 PROCEDURE — 97116 GAIT TRAINING THERAPY: CPT

## 2022-11-17 PROCEDURE — 97161 PT EVAL LOW COMPLEX 20 MIN: CPT

## 2022-11-17 PROCEDURE — 73502 X-RAY EXAM HIP UNI 2-3 VIEWS: CPT

## 2022-11-17 PROCEDURE — 83735 ASSAY OF MAGNESIUM: CPT

## 2022-11-17 PROCEDURE — 36415 COLL VENOUS BLD VENIPUNCTURE: CPT

## 2022-11-17 PROCEDURE — 97165 OT EVAL LOW COMPLEX 30 MIN: CPT

## 2022-11-17 PROCEDURE — 85025 COMPLETE CBC W/AUTO DIFF WBC: CPT

## 2022-11-17 PROCEDURE — 80048 BASIC METABOLIC PNL TOTAL CA: CPT

## 2022-11-17 PROCEDURE — 73700 CT LOWER EXTREMITY W/O DYE: CPT

## 2022-11-17 PROCEDURE — 99285 EMERGENCY DEPT VISIT HI MDM: CPT

## 2022-11-17 PROCEDURE — 87635 SARS-COV-2 COVID-19 AMP PRB: CPT

## 2022-11-17 PROCEDURE — 99239 HOSP IP/OBS DSCHRG MGMT >30: CPT

## 2022-11-17 PROCEDURE — 73562 X-RAY EXAM OF KNEE 3: CPT

## 2022-11-17 PROCEDURE — 73552 X-RAY EXAM OF FEMUR 2/>: CPT

## 2022-11-17 PROCEDURE — 97530 THERAPEUTIC ACTIVITIES: CPT

## 2022-11-17 PROCEDURE — 80053 COMPREHEN METABOLIC PANEL: CPT

## 2022-11-17 PROCEDURE — 85652 RBC SED RATE AUTOMATED: CPT

## 2022-11-17 PROCEDURE — 84100 ASSAY OF PHOSPHORUS: CPT

## 2022-11-17 PROCEDURE — 97535 SELF CARE MNGMENT TRAINING: CPT

## 2022-11-17 RX ORDER — FOLIC ACID 0.8 MG
1 TABLET ORAL
Qty: 0 | Refills: 0 | DISCHARGE
Start: 2022-11-17

## 2022-11-17 RX ORDER — CALCIUM CARBONATE 500(1250)
1 TABLET ORAL
Qty: 0 | Refills: 0 | DISCHARGE
Start: 2022-11-17

## 2022-11-17 RX ORDER — ZINC OXIDE 200 MG/G
1 OINTMENT TOPICAL
Qty: 0 | Refills: 0 | DISCHARGE
Start: 2022-11-17

## 2022-11-17 RX ORDER — METHOTREXATE 2.5 MG/1
4 TABLET ORAL
Qty: 0 | Refills: 0 | DISCHARGE
Start: 2022-11-17

## 2022-11-17 RX ADMIN — Medication 150 MICROGRAM(S): at 05:39

## 2022-11-17 RX ADMIN — ZINC OXIDE 1 APPLICATION(S): 200 OINTMENT TOPICAL at 11:12

## 2022-11-17 RX ADMIN — Medication 15 MILLIGRAM(S): at 05:39

## 2022-11-17 RX ADMIN — Medication 650 MILLIGRAM(S): at 13:20

## 2022-11-17 RX ADMIN — PANTOPRAZOLE SODIUM 40 MILLIGRAM(S): 20 TABLET, DELAYED RELEASE ORAL at 05:39

## 2022-11-17 RX ADMIN — LIDOCAINE 1 PATCH: 4 CREAM TOPICAL at 00:44

## 2022-11-17 RX ADMIN — APIXABAN 2.5 MILLIGRAM(S): 2.5 TABLET, FILM COATED ORAL at 05:39

## 2022-11-17 RX ADMIN — Medication 325 MILLIGRAM(S): at 11:11

## 2022-11-17 RX ADMIN — Medication 1 MILLIGRAM(S): at 11:11

## 2022-11-17 RX ADMIN — LIDOCAINE 1 PATCH: 4 CREAM TOPICAL at 11:11

## 2022-11-17 RX ADMIN — Medication 650 MILLIGRAM(S): at 12:26

## 2022-11-17 NOTE — PROGRESS NOTE ADULT - ASSESSMENT
The patient is a 76 year old female with a history of HTN, HL, RA, DVT, SLE, lung cancer s/p lobectomy who presents with weakness.     Plan:  - Echo 10/22 with normal LV systolic function, mild AS  - Telemetry reviewed; similar to prior admissions, there is intermittent sinus bradycardia, blocked PACs, and Mobitz 1. This is more present while the patient is asleep.  - There may be a component of TAMMY contributing to rhythm issues  - No indication for pacemaker at the current time  - Would avoid AV elzi blocking agents  - PT  - Discharge planning

## 2022-11-17 NOTE — DISCHARGE NOTE PROVIDER - ATTENDING DISCHARGE PHYSICAL EXAMINATION:
PHYSICAL EXAM:  Vital Signs Last 24 Hrs  T(C): 36.8 (17 Nov 2022 13:38), Max: 36.9 (16 Nov 2022 13:59)  T(F): 98.3 (17 Nov 2022 13:38), Max: 98.5 (16 Nov 2022 13:59)  HR: 63 (17 Nov 2022 13:38) (60 - 80)  BP: 110/62 (17 Nov 2022 13:38) (101/52 - 122/55)  BP(mean): --  RR: 18 (17 Nov 2022 13:38) (18 - 18)  SpO2: 98% (17 Nov 2022 13:38) (97% - 98%)    Parameters below as of 17 Nov 2022 05:35  Patient On (Oxygen Delivery Method): room air    GENERAL: NAD, well-groomed, well-developed  HEAD:  Atraumatic, Normocephalic  EYES:  conjunctiva and sclera clear  ENMT: Moist mucous membranes  NECK: Supple, No JVD  NERVOUS SYSTEM:  Alert & Oriented X3, Good concentration; Moving all extremiteis  CHEST/LUNG: Clear to auscultation bilaterally; No rales, rhonchi, wheezing, or rubs  HEART: Regular rate and rhythm;  ABDOMEN: Soft, Nontender, Nondistended; Bowel sounds present  EXTREMITIES:  2+ Peripheral Pulses, right thigh edema unchanged.

## 2022-11-17 NOTE — DISCHARGE NOTE PROVIDER - NSDCMRMEDTOKEN_GEN_ALL_CORE_FT
acetaminophen 325 mg oral tablet: 2 tab(s) orally every 8 hours  apixaban 2.5 mg oral tablet: 1 tab(s) orally every 12 hours  atorvastatin 20 mg oral tablet: 1 tab(s) orally once a day (at bedtime)  calcium carbonate 500 mg (200 mg elemental calcium) oral tablet, chewable: 1 tab(s) orally 4 times a day, As needed, Dyspepsia  ferrous sulfate 325 mg (65 mg elemental iron) oral tablet: 1 tab(s) orally once a day  levothyroxine 150 mcg (0.15 mg) oral tablet: 1 tab(s) orally once a day  lidocaine 4% topical film: Apply topically to affected area once a day   magnesium hydroxide 8% oral suspension: 30 milliliter(s) orally once a day, As needed, Constipation  melatonin 3 mg oral tablet: 2 tab(s) orally once a day (at bedtime)  nystatin 100,000 units/g topical powder: 1 application topically 2 times a day  pantoprazole 40 mg oral delayed release tablet: 1 tab(s) orally once a day (before a meal)  polyethylene glycol 3350 oral powder for reconstitution: 17 gram(s) orally once a day (at bedtime)  senna leaf extract oral tablet: 2 tab(s) orally once a day (at bedtime)  ASHER compression stockings for BL legs : Wear stocking daily especially when out of bed    Rt calf 19&quot; (48cm)  Left calf 17&quot; (43cm)  zinc oxide 40% topical ointment: Apply topically to affected area 2 times a day   acetaminophen 325 mg oral tablet: 2 tab(s) orally every 8 hours  apixaban 2.5 mg oral tablet: 1 tab(s) orally every 12 hours  atorvastatin 20 mg oral tablet: 1 tab(s) orally once a day (at bedtime)  calcium carbonate 500 mg (200 mg elemental calcium) oral tablet, chewable: 1 tab(s) orally once a day, As needed, Dyspepsia  ferrous sulfate 325 mg (65 mg elemental iron) oral tablet: 1 tab(s) orally once a day  folic acid 1 mg oral tablet: 1 tab(s) orally once a day  levothyroxine 150 mcg (0.15 mg) oral tablet: 1 tab(s) orally once a day  lidocaine 4% topical film: Apply topically to affected area once a day   melatonin 3 mg oral tablet: 2 tab(s) orally once a day (at bedtime)  methotrexate 2.5 mg oral tablet: 4 tab(s) orally once a week  nystatin 100,000 units/g topical powder: 1 application topically 2 times a day  pantoprazole 40 mg oral delayed release tablet: 1 tab(s) orally once a day (before a meal)  polyethylene glycol 3350 oral powder for reconstitution: 17 gram(s) orally once a day (at bedtime)  predniSONE: 15 milligram(s) orally once a day  senna leaf extract oral tablet: 2 tab(s) orally once a day (at bedtime)  ASHER compression stockings for BL legs : Wear stocking daily especially when out of bed    Rt calf 19&quot; (48cm)  Left calf 17&quot; (43cm)  zinc oxide 20% topical ointment: 1 application topically once a day

## 2022-11-17 NOTE — PROGRESS NOTE ADULT - SUBJECTIVE AND OBJECTIVE BOX
Chief Complaint: Weakness    Interval Events: No events overnight.    Review of Systems:  General: No fevers, chills, weight gain  Skin: No rashes, color changes  Cardiovascular: No chest pain, orthopnea  Respiratory: No shortness of breath, cough  Gastrointestinal: No nausea, abdominal pain  Genitourinary: No incontinence, pain with urination  Musculoskeletal: No pain, swelling, decreased range of motion  Neurological: No headache, weakness  Psychiatric: No depression, anxiety  Endocrine: No weight gain, increased thirst  All other systems are comprehensively negative.    Physical Exam:  Vital Signs Last 24 Hrs  T(C): 36.6 (17 Nov 2022 05:35), Max: 36.9 (16 Nov 2022 13:59)  T(F): 97.8 (17 Nov 2022 05:35), Max: 98.5 (16 Nov 2022 13:59)  HR: 61 (17 Nov 2022 05:35) (60 - 80)  BP: 101/52 (17 Nov 2022 05:35) (101/52 - 122/55)  BP(mean): --  RR: 18 (17 Nov 2022 05:35) (18 - 18)  SpO2: 98% (17 Nov 2022 05:35) (97% - 98%)  Parameters below as of 17 Nov 2022 05:35  Patient On (Oxygen Delivery Method): room air  General: NAD  HEENT: MMM  Neck: No JVD, no carotid bruit  Lungs: CTAB  CV: RRR, nl S1/S2, no M/R/G  Abdomen: S/NT/ND, +BS  Extremities: No LE edema, no cyanosis  Neuro: AAOx3, non-focal  Skin: No rash    Labs:                  ECG/Telemetry: Sinus rhythm, Mobitz 1, blocked PACs

## 2022-11-17 NOTE — DISCHARGE NOTE NURSING/CASE MANAGEMENT/SOCIAL WORK - NSDCPEFALRISK_GEN_ALL_CORE
For information on Fall & Injury Prevention, visit: [email or web address] OR  [email or web address] OR  https://www.cdc.gov/steadi/patient.html

## 2022-11-17 NOTE — DISCHARGE NOTE PROVIDER - CARE PROVIDERS DIRECT ADDRESSES
,DirectAddress_Unknown ,DirectAddress_Unknown,DirectAddress_Unknown,DirectAddress_Unknown,[email or web address]

## 2022-11-17 NOTE — DISCHARGE NOTE PROVIDER - NSDCCPCAREPLAN_GEN_ALL_CORE_FT
PRINCIPAL DISCHARGE DIAGNOSIS  Diagnosis: Right hip pain  Assessment and Plan of Treatment:   Physical Therapy/Occupational Therapy for: Ambulation, transfers, stairs, & ADLs, isometrics.  Full weight bearing on both legs; Walker/cane use as instructed by Physical therapy/Occupational therapy.  Anterior Total Hip Replacement precautions for  6 weeks:  - no abductor exercises   - No straight leg raise;  - No external rotation of hip when extended-standing or lying flat; No hyperextension of hip when standing (kickback).  - Use pain medication if needed as prescribed.  Ice packs are a helpful addition to your comfort.  Applying a  waterproof ice 20 minutes on alternating with 20 minutes off for 48 hours post op pack over a cloth or thin towel to the site for 30 minutes per hour may provide benefit by reducing swelling and discomfort.  -Take short, frequent walks increasing the distance that you walk each day as tolerated.  - Change your position every hour to decrease pain and stiffness.  - Continue the exercises taught to you by your physical therapist.  - No driving until cleared by the doctor.  - No tub baths, hot tubs, or swimming pools until instructed by your doctor.         PRINCIPAL DISCHARGE DIAGNOSIS  Diagnosis: Right hip pain  Assessment and Plan of Treatment:   Physical Therapy/Occupational Therapy for: Ambulation, transfers, stairs, & ADLs, isometrics.  Full weight bearing on both legs; Walker/cane use as instructed by Physical therapy/Occupational therapy.  Anterior Total Hip Replacement precautions for  6 weeks:  - no abductor exercises   - No straight leg raise;  - No external rotation of hip when extended-standing or lying flat; No hyperextension of hip when standing (kickback).  - Use pain medication if needed as prescribed.  Ice packs are a helpful addition to your comfort.  Applying a  waterproof ice 20 minutes on alternating with 20 minutes off for 48 hours post op pack over a cloth or thin towel to the site for 30 minutes per hour may provide benefit by reducing swelling and discomfort.  -Take short, frequent walks increasing the distance that you walk each day as tolerated.  - Change your position every hour to decrease pain and stiffness.  - Continue the exercises taught to you by your physical therapist.  - No driving until cleared by the doctor.  - No tub baths, hot tubs, or swimming pools until instructed by your doctor.        SECONDARY DISCHARGE DIAGNOSES  Diagnosis: Lupus  Assessment and Plan of Treatment: continue methotrexate 10mg every tuesday;  prednisone 15mg daily through 11/20; then decrease to 10mg daily, then follow up with Dr Uriarte.  folic acid daily while on methotrexate.    Diagnosis: Prophylactic measure  Assessment and Plan of Treatment: prophylactic dose of eliquis until patient mobile

## 2022-11-17 NOTE — DISCHARGE NOTE PROVIDER - CARE PROVIDER_API CALL
WILLY ACUNA  Orthopaedic Surgery  [address], [address]  [place] [address]  Phone: [phone]  Fax: ()-  Follow Up Time: 2 weeks   WILLY ACUNA  Orthopaedic Surgery  [address], [address] [place] [address]  Phone: ([phone]  Fax: ()-  Follow Up Time: 2 weeks    KENNEDI IVY  Rheumatology  [address] [place] [address]  Phone: [phone]  Fax: [phone]  Follow Up Time: 2 weeks    Scott Dan  [facility]  [address] [facility], [place] [address]  Phone: [phone]  Fax: [phone]  Follow Up Time:     Josselin Ramirez)  Cardiovascular Disease; Interventional Cardiology  [address] [place] [address]  Phone: [phone]  Fax: [phone]  Follow Up Time:

## 2022-11-17 NOTE — DISCHARGE NOTE PROVIDER - NSDCFUSCHEDAPPT_GEN_ALL_CORE_FT
Josselin Ramirez Physician [facility]  CARDIOLOGY [address]  Scheduled Appointment: 12/19/2022

## 2022-11-17 NOTE — DISCHARGE NOTE PROVIDER - PROVIDER TOKENS
PROVIDER:[TOKEN:[[id number]:[id number]],FOLLOWUP:[2 weeks]] PROVIDER:[TOKEN:[[id number]:[facility]:[id number]],FOLLOWUP:[2 weeks]],PROVIDER:[TOKEN:[[id number]],FOLLOWUP:[2 weeks]],PROVIDER:[TOKEN:[[id number]]],PROVIDER:[TOKEN:[[id number]]]

## 2022-11-17 NOTE — DISCHARGE NOTE PROVIDER - HOSPITAL COURSE
76F with hx of HTN, HLD, heart murmur, SLE, RA, thyroid CA s/p resection, DVT (on eliquis), lung cancer (s/p tumor removal, no chemo no radiation), Right FELICITAS (Jonathan, '19), Left TKA (Jonathan, '20), Right FELICITAS (Jonathan, '22) presented who initially presented to [facility] ED with a right femur periprosthetic fracture s/p mechanical fall on 10/18, transferred to [place] for surgery on 10/20, discharged to  acute rehab, and then just discharged from  on 11/12 who presents now with inability to walk.    At home patient attempted to get out of her recliner but could not get up.  Patient self-urinated on herself.  Called EMS and they tried assisting to the restroom but patient's legs kept on buckling.   Patient was then transferred here for further evaluation.   Patient has no acute complaints.  Only has 2/10 pain in her hip when she tries to move it.  Denies any recent illnesses such as fevers, cough, chest pain, diarrhea.       76F with hx of HTN, HLD, heart murmur, SLE, RA, thyroid CA s/p resection, DVT (on eliquis), lung cancer (s/p tumor removal, no chemo no radiation), Right FELCIITAS (Jonathan, '19), Left TKA (Jonathan, '20), Right FELICITAS (Jonathan, '22) presented who initially presented to [facility] ED with a right femur periprosthetic fracture s/p mechanical fall on 10/18, transferred to [place] for surgery on 10/20, discharged to  acute rehab, and then just discharged from  on 11/12 who presents now with inability to walk.    At home patient attempted to get out of her recliner but could not get up.  Patient self-urinated on herself.  Called EMS and they tried assisting to the restroom but patient's legs kept on buckling.   Patient was then transferred here for further evaluation.   Patient has no acute complaints.  Only has 2/10 pain in her hip when she tries to move it.  Denies any recent illnesses such as fevers, cough, chest pain, diarrhea.      Weakness right leg  - seen by Dr Leach from orthopedics  - Xray and CT scans completed - s/p revision of right THR periprosthetic fracture demonstrates anatomic alignment and healing.   - no acute intervention needed  - cleared for WBAT with anterior and troch precautions    RA/SLE - and possible PMR?   -probable component of rheumatological diseases in weakness.   - Patient with elevated ESR.  D/w her own Rheumatologist Dr Uriarte on 11/14.  Pt has been off Methotrexate for a while and may be experiencing exacerbation vs. PMR.   - As per rheum will restart methotrexate 10mg weekly+ folic acid 1mg daily along with prednisone 15mg daily for 7 days (11/14-20) then prednisone 10mg daily and patient can follow up with him in the office.     HLD  - cont with statin    Hypothyroidism  - cont with synthroid    Hx of DVT  - will be on eliquis    Hx of blocked PACs and Mobitz I  - cardiology eval appreciated  - suggest outpatient TAMMY evaluation     Preventive measures  - DVT proph on eliquis  - will be on PPI

## 2022-11-17 NOTE — DISCHARGE NOTE NURSING/CASE MANAGEMENT/SOCIAL WORK - PATIENT PORTAL LINK FT
You can access the FollowMyHealth Patient Portal offered by [facility] by registering at the following website: [email or web address]. By joining [facility]’s FollowMyHealth portal, you will also be able to view your health information using other applications (apps) compatible with our system.

## 2022-11-23 ENCOUNTER — INPATIENT (INPATIENT)
Facility: HOSPITAL | Age: 77
LOS: 8 days | Discharge: HOME | DRG: 179 | End: 2022-12-02
Attending: INTERNAL MEDICINE | Admitting: INTERNAL MEDICINE
Payer: MEDICARE

## 2022-11-23 VITALS
OXYGEN SATURATION: 94 % | DIASTOLIC BLOOD PRESSURE: 73 MMHG | RESPIRATION RATE: 18 BRPM | HEART RATE: 68 BPM | TEMPERATURE: 98 F | WEIGHT: 225.09 LBS | HEIGHT: 66 IN | SYSTOLIC BLOOD PRESSURE: 113 MMHG

## 2022-11-23 DIAGNOSIS — Z98.890 OTHER SPECIFIED POSTPROCEDURAL STATES: Chronic | ICD-10-CM

## 2022-11-23 DIAGNOSIS — U07.1 COVID-19: ICD-10-CM

## 2022-11-23 DIAGNOSIS — Z98.49 CATARACT EXTRACTION STATUS, UNSPECIFIED EYE: Chronic | ICD-10-CM

## 2022-11-23 DIAGNOSIS — C34.90 MALIGNANT NEOPLASM OF UNSPECIFIED PART OF UNSPECIFIED BRONCHUS OR LUNG: Chronic | ICD-10-CM

## 2022-11-23 DIAGNOSIS — Z96.659 PRESENCE OF UNSPECIFIED ARTIFICIAL KNEE JOINT: Chronic | ICD-10-CM

## 2022-11-23 DIAGNOSIS — Z96.641 PRESENCE OF RIGHT ARTIFICIAL HIP JOINT: Chronic | ICD-10-CM

## 2022-11-23 DIAGNOSIS — Z90.89 ACQUIRED ABSENCE OF OTHER ORGANS: Chronic | ICD-10-CM

## 2022-11-23 LAB
ALBUMIN SERPL ELPH-MCNC: 2.5 G/DL — LOW (ref 3.3–5)
ALBUMIN SERPL ELPH-MCNC: SIGNIFICANT CHANGE UP G/DL (ref 3.3–5)
ALP SERPL-CCNC: 171 U/L — HIGH (ref 30–120)
ALP SERPL-CCNC: SIGNIFICANT CHANGE UP U/L (ref 30–120)
ALT FLD-CCNC: 11 U/L DA — SIGNIFICANT CHANGE UP (ref 10–60)
ALT FLD-CCNC: SIGNIFICANT CHANGE UP U/L DA (ref 10–60)
ANION GAP SERPL CALC-SCNC: 6 MMOL/L — SIGNIFICANT CHANGE UP (ref 5–17)
ANION GAP SERPL CALC-SCNC: 7 MMOL/L — SIGNIFICANT CHANGE UP (ref 5–17)
AST SERPL-CCNC: 18 U/L — SIGNIFICANT CHANGE UP (ref 10–40)
AST SERPL-CCNC: SIGNIFICANT CHANGE UP U/L (ref 10–40)
BASOPHILS # BLD AUTO: 0.03 K/UL — SIGNIFICANT CHANGE UP (ref 0–0.2)
BASOPHILS NFR BLD AUTO: 0.3 % — SIGNIFICANT CHANGE UP (ref 0–2)
BILIRUB SERPL-MCNC: 0.4 MG/DL — SIGNIFICANT CHANGE UP (ref 0.2–1.2)
BILIRUB SERPL-MCNC: SIGNIFICANT CHANGE UP MG/DL (ref 0.2–1.2)
BUN SERPL-MCNC: 16 MG/DL — SIGNIFICANT CHANGE UP (ref 7–23)
BUN SERPL-MCNC: 17 MG/DL — SIGNIFICANT CHANGE UP (ref 7–23)
CALCIUM SERPL-MCNC: 8.1 MG/DL — LOW (ref 8.4–10.5)
CALCIUM SERPL-MCNC: 8.4 MG/DL — SIGNIFICANT CHANGE UP (ref 8.4–10.5)
CHLORIDE SERPL-SCNC: 106 MMOL/L — SIGNIFICANT CHANGE UP (ref 96–108)
CHLORIDE SERPL-SCNC: 106 MMOL/L — SIGNIFICANT CHANGE UP (ref 96–108)
CO2 SERPL-SCNC: 27 MMOL/L — SIGNIFICANT CHANGE UP (ref 22–31)
CO2 SERPL-SCNC: 28 MMOL/L — SIGNIFICANT CHANGE UP (ref 22–31)
CREAT SERPL-MCNC: 0.74 MG/DL — SIGNIFICANT CHANGE UP (ref 0.5–1.3)
CREAT SERPL-MCNC: 0.79 MG/DL — SIGNIFICANT CHANGE UP (ref 0.5–1.3)
EGFR: 77 ML/MIN/1.73M2 — SIGNIFICANT CHANGE UP
EGFR: 83 ML/MIN/1.73M2 — SIGNIFICANT CHANGE UP
EOSINOPHIL # BLD AUTO: 0 K/UL — SIGNIFICANT CHANGE UP (ref 0–0.5)
EOSINOPHIL NFR BLD AUTO: 0 % — SIGNIFICANT CHANGE UP (ref 0–6)
FLUAV AG NPH QL: SIGNIFICANT CHANGE UP
FLUBV AG NPH QL: SIGNIFICANT CHANGE UP
GLUCOSE SERPL-MCNC: 118 MG/DL — HIGH (ref 70–99)
GLUCOSE SERPL-MCNC: 120 MG/DL — HIGH (ref 70–99)
HCT VFR BLD CALC: 36.7 % — SIGNIFICANT CHANGE UP (ref 34.5–45)
HGB BLD-MCNC: 11.4 G/DL — LOW (ref 11.5–15.5)
IMM GRANULOCYTES NFR BLD AUTO: 0.3 % — SIGNIFICANT CHANGE UP (ref 0–0.9)
LYMPHOCYTES # BLD AUTO: 1.83 K/UL — SIGNIFICANT CHANGE UP (ref 1–3.3)
LYMPHOCYTES # BLD AUTO: 21 % — SIGNIFICANT CHANGE UP (ref 13–44)
MCHC RBC-ENTMCNC: 30 PG — SIGNIFICANT CHANGE UP (ref 27–34)
MCHC RBC-ENTMCNC: 31.1 GM/DL — LOW (ref 32–36)
MCV RBC AUTO: 96.6 FL — SIGNIFICANT CHANGE UP (ref 80–100)
MONOCYTES # BLD AUTO: 0.99 K/UL — HIGH (ref 0–0.9)
MONOCYTES NFR BLD AUTO: 11.4 % — SIGNIFICANT CHANGE UP (ref 2–14)
NEUTROPHILS # BLD AUTO: 5.83 K/UL — SIGNIFICANT CHANGE UP (ref 1.8–7.4)
NEUTROPHILS NFR BLD AUTO: 67 % — SIGNIFICANT CHANGE UP (ref 43–77)
NRBC # BLD: 0 /100 WBCS — SIGNIFICANT CHANGE UP (ref 0–0)
PLATELET # BLD AUTO: 340 K/UL — SIGNIFICANT CHANGE UP (ref 150–400)
POTASSIUM SERPL-MCNC: 3.9 MMOL/L — SIGNIFICANT CHANGE UP (ref 3.5–5.3)
POTASSIUM SERPL-MCNC: 4 MMOL/L — SIGNIFICANT CHANGE UP (ref 3.5–5.3)
POTASSIUM SERPL-SCNC: 3.9 MMOL/L — SIGNIFICANT CHANGE UP (ref 3.5–5.3)
POTASSIUM SERPL-SCNC: 4 MMOL/L — SIGNIFICANT CHANGE UP (ref 3.5–5.3)
PROT SERPL-MCNC: 6.3 G/DL — SIGNIFICANT CHANGE UP (ref 6–8.3)
PROT SERPL-MCNC: SIGNIFICANT CHANGE UP G/DL (ref 6–8.3)
RBC # BLD: 3.8 M/UL — SIGNIFICANT CHANGE UP (ref 3.8–5.2)
RBC # FLD: 15.2 % — HIGH (ref 10.3–14.5)
RSV RNA NPH QL NAA+NON-PROBE: SIGNIFICANT CHANGE UP
SARS-COV-2 RNA SPEC QL NAA+PROBE: DETECTED
SODIUM SERPL-SCNC: 139 MMOL/L — SIGNIFICANT CHANGE UP (ref 135–145)
SODIUM SERPL-SCNC: 141 MMOL/L — SIGNIFICANT CHANGE UP (ref 135–145)
WBC # BLD: 8.71 K/UL — SIGNIFICANT CHANGE UP (ref 3.8–10.5)
WBC # FLD AUTO: 8.71 K/UL — SIGNIFICANT CHANGE UP (ref 3.8–10.5)

## 2022-11-23 PROCEDURE — 99223 1ST HOSP IP/OBS HIGH 75: CPT | Mod: AI

## 2022-11-23 PROCEDURE — 99285 EMERGENCY DEPT VISIT HI MDM: CPT | Mod: FS,CS

## 2022-11-23 PROCEDURE — 93010 ELECTROCARDIOGRAM REPORT: CPT

## 2022-11-23 PROCEDURE — 71045 X-RAY EXAM CHEST 1 VIEW: CPT | Mod: 26

## 2022-11-23 RX ORDER — REMDESIVIR 5 MG/ML
100 INJECTION INTRAVENOUS EVERY 24 HOURS
Refills: 0 | Status: COMPLETED | OUTPATIENT
Start: 2022-11-24 | End: 2022-11-26

## 2022-11-23 RX ORDER — ACETAMINOPHEN 500 MG
975 TABLET ORAL ONCE
Refills: 0 | Status: COMPLETED | OUTPATIENT
Start: 2022-11-23 | End: 2022-11-23

## 2022-11-23 RX ORDER — SODIUM CHLORIDE 9 MG/ML
1000 INJECTION INTRAMUSCULAR; INTRAVENOUS; SUBCUTANEOUS ONCE
Refills: 0 | Status: COMPLETED | OUTPATIENT
Start: 2022-11-23 | End: 2022-11-23

## 2022-11-23 RX ORDER — REMDESIVIR 5 MG/ML
200 INJECTION INTRAVENOUS EVERY 24 HOURS
Refills: 0 | Status: COMPLETED | OUTPATIENT
Start: 2022-11-23 | End: 2022-11-23

## 2022-11-23 RX ORDER — REMDESIVIR 5 MG/ML
INJECTION INTRAVENOUS
Refills: 0 | Status: COMPLETED | OUTPATIENT
Start: 2022-11-23 | End: 2022-11-26

## 2022-11-23 RX ADMIN — Medication 975 MILLIGRAM(S): at 21:16

## 2022-11-23 RX ADMIN — Medication 975 MILLIGRAM(S): at 22:16

## 2022-11-23 RX ADMIN — SODIUM CHLORIDE 1000 MILLILITER(S): 9 INJECTION INTRAMUSCULAR; INTRAVENOUS; SUBCUTANEOUS at 22:53

## 2022-11-23 RX ADMIN — SODIUM CHLORIDE 1000 MILLILITER(S): 9 INJECTION INTRAMUSCULAR; INTRAVENOUS; SUBCUTANEOUS at 21:16

## 2022-11-23 NOTE — ED ADULT NURSE NOTE - NSSUHOSCREENINGYN_ED_ALL_ED
Discharge Summary/Instructions after an Endoscopic Procedure  Patient Name: Marcio Engel  Patient MRN: [id number]  Patient YOB: 1953 Friday, January 7, 2022  Scott Crowe MD  Dear patient,  As a result of recent federal legislation (The Federal Cures Act), you may   receive lab or pathology results from your procedure in your [facility]   account before your physician is able to contact you. Your physician or   their representative will relay the results to you with their   recommendations at their soonest availability.  Thank you,  Your health is very important to us during the Covid Crisis. Following your   procedure today, you will receive a daily text for 2 weeks asking about   signs or symptoms of Covid 19.  Please respond to this text when you   receive it so we can follow up and keep you as safe as possible.   RESTRICTIONS:  During your procedure today, you received medications for sedation.  These   medications may affect your judgment, balance and coordination.  Therefore,   for 24 hours, you have the following restrictions:   - DO NOT drive a car, operate machinery, make legal/financial decisions,   sign important papers or drink alcohol.    ACTIVITY:  Today: no heavy lifting, straining or running due to procedural   sedation/anesthesia.  The following day: return to full activity including work.  DIET:  Eat and drink normally unless instructed otherwise.     TREATMENT FOR COMMON SIDE EFFECTS:  - Mild abdominal pain, nausea, belching, bloating or excessive gas:  rest,   eat lightly and use a heating pad.  - Sore Throat: treat with throat lozenges and/or gargle with warm salt   water.  - Because air was used during the procedure, expelling large amounts of air   from your rectum or belching is normal.  - If a bowel prep was taken, you may not have a bowel movement for 1-3 days.    This is normal.  SYMPTOMS TO WATCH FOR AND REPORT TO YOUR PHYSICIAN:  1. Abdominal pain or bloating, other than gas  cramps.  2. Chest pain.  3. Back pain.  4. Signs of infection such as: chills or fever occurring within 24 hours   after the procedure.  5. Rectal bleeding, which would show as bright red, maroon, or black stools.   (A tablespoon of blood from the rectum is not serious, especially if   hemorrhoids are present.)  6. Vomiting.  7. Weakness or dizziness.  GO DIRECTLY TO THE NEAREST EMERGENCY ROOM IF YOU HAVE ANY OF THE FOLLOWING:      Difficulty breathing              Chills and/or fever over 101 F   Persistent vomiting and/or vomiting blood   Severe abdominal pain   Severe chest pain   Black, tarry stools   Bleeding- more than one tablespoon   Any other symptom or condition that you feel may need urgent attention  Your doctor recommends these additional instructions:  If any biopsies were taken, your doctors clinic will contact you in 1 to 2   weeks with any results.  - Discharge patient to home.   - Patient has a contact number available for emergencies.  The signs and   symptoms of potential delayed complications were discussed with the   patient.  Return to normal activities tomorrow.  Written discharge   instructions were provided to the patient.   - Resume previous diet.   - Continue present medications.   - Await pathology results.   - Repeat upper endoscopy in 6 weeks for retreatment. Likely will need EGD in   3 years depending on Barretts biopsies.  - Protonix BID for at least 3 months, then daily indefintely.  For questions, problems or results please call your physician - Scott Crowe MD.  EMERGENCY PHONE NUMBER: [phone],  LAB RESULTS: [phone]  IF A COMPLICATION OR EMERGENCY SITUATION ARISES AND YOU ARE UNABLE TO REACH   YOUR PHYSICIAN - GO DIRECTLY TO THE EMERGENCY ROOM.  Scott Crowe MD  1/7/2022 3:38:20 PM  This report has been verified and signed electronically.  Dear patient,  As a result of recent federal legislation (The Federal Cures Act), you may   receive lab or pathology  results from your procedure in your [facility]   account before your physician is able to contact you. Your physician or   their representative will relay the results to you with their   recommendations at their soonest availability.  Thank you,  [employer]OVATION   Yes - the patient is able to be screened

## 2022-11-23 NOTE — H&P ADULT - PROBLEM SELECTOR PLAN 1
admitted to telemetry with continuous SPO2 monitoring, airborne & contact precautions, patient is fully vaccinated for COVID-19 with one booster, started patient on Remdisivir given her age, with multiple comorbidities, and immunocompromised, SPO2 in high 90's% on RA, continue her Prednisone 15 mg Po daily, monitor CBC with diff & markers, ID consult with Dr. Zamora was called.

## 2022-11-23 NOTE — ED ADULT NURSE NOTE - OBJECTIVE STATEMENT
pt send from " the [facility]" Rehab facility for + covid results. pt currently c/o h/a and sore throat, pt is upset with the care over at the rehab and wishes to not go back there. pt denies abd pain, nausea, vomiting, back pain, neck pain, recent travel, numbness, tingling, weakness, blurred vision, sinus congestion, fevers, chills, body aches, hematuria, dizziness, chest pain, sob, trouble speaking, trouble walking or any other complaints.

## 2022-11-23 NOTE — ED PROVIDER NOTE - OBJECTIVE STATEMENT
76-year-old female with history of overactive bladder, obesity, hypothyroidism, hypertension, hyperlipidemia, osteoarthritis, SLE, and rheumatoid arthritis presents with runny nose, headache, ear discomfort, and sore throat for the past 3 to 4 days.  Patient is currently at [facility] in [place].  Had a hip replacement October 10 and sent to rehab.  Patient states she tested positive for COVID today and 2 days ago.  States she was told by staff there was no doctor at the rehab tomorrow and over the weekend so recommended to come to hospital.  Denies any chest pain or shortness of breath.  Denies abdominal pain, nausea, vomiting, or diarrhea.  No known fevers.  Patient states she will not go back to [facility] because she was not happy with the care there.  PCP Scott Dan

## 2022-11-23 NOTE — H&P ADULT - NSHPLABSRESULTS_GEN_ALL_CORE
-                   11.4   8.71   )----------(  340       ( 23 Nov 2022 21:00 )               36.7        141    |  106    |  17     ----------------------------<  118        ( 23 Nov 2022 22:06 )  3.9     |  28     |  0.74     Ca    8.1        ( 23 Nov 2022 22:06 )    TPro  6.3    /  Alb  2.5    /  TBili  0.4    /  DBili  x      /  AST  18     /  ALT  11     /  AlkPhos  171    ( 23 Nov 2022 22:06 )    LIVER FUNCTIONS - ( 23 Nov 2022 22:06 )  Alb: 2.5 g/dL / Pro: 6.3 g/dL / ALK PHOS: 171 U/L / ALT: 11 U/L DA / AST: 18 U/L / GGT: x             COVID-19 PCR: NotDetec (16 Nov 2022 12:20)  COVID-19 PCR: NotDetec (13 Nov 2022 00:32)  COVID-19 PCR: NotDetec (12 Nov 2022 06:34)  COVID-19 PCR: NotDetec (09 Nov 2022 06:45)  COVID-19 PCR: NotDetec (03 Nov 2022 07:24)  COVID-19 PCR: NotDetec (28 Oct 2022 05:30)  COVID-19 PCR: NotDetec (23 Oct 2022 06:28)  COVID-19 PCR: NotDetec (21 Oct 2022 18:00)  COVID-19 PCR: NotDetec (28 May 2022 12:46)      Flu With COVID-19 By AMARILYS (11.23.22 @ 21:00)   SARS-CoV-2 Result: Detected  Influenza A Result: NotDetec   Influenza B Result: NotDetec   Resp Syn Virus Result: NotDetec           CXR:    As per my review shows thoracolumbar spine hardware, poor inspiratory effort, borderline cardiac shadow size, prominent interstitial markings B/L, no pulmonary infiltrates, pleural effusion, or pneumothorax. Pending official report.         EKG:    As per my review shows SR at 65/min, with 1st degree AV block, prolonged QTc intervals, complete RBBB, old inferior infarct, normal QRS voltage & axis (- 15), with early  transition, no ST-T abnormality.      -

## 2022-11-23 NOTE — H&P ADULT - ASSESSMENT
77 y/o F with PMH of HTN, Dyslipidemia, DVT, Lung CA s/p lobectomy, SLE, RA on Methotrexate & Prednisone, COVID-19 infection in 2020, Post-Op Anemia s/p transfusion on PO iron therapy, and Obesity sent for positive COVID-19 PCR.  77 y/o F with PMH of HTN, Dyslipidemia, DVT, Lung CA s/p lobectomy, Hypothyroidism, SLE, RA on Methotrexate & Prednisone, COVID-19 infection in 2020, Post-Op Anemia s/p transfusion on PO iron therapy, and Obesity sent for positive COVID-19 PCR.

## 2022-11-23 NOTE — ED PROVIDER NOTE - CLINICAL SUMMARY MEDICAL DECISION MAKING FREE TEXT BOX
Patient in rehab is positive for covid. C/o h/a, sore throat, ear discomfort, runny nose. Was told there is no MD coverage at the rehab and patient unhappy with the care there, so wanted to come here . No acute findings on exam. Will get labs and will admit pt for SW

## 2022-11-23 NOTE — H&P ADULT - PROBLEM SELECTOR PLAN 6
Patient with high risk for VTE, already on Apixaban 2.5 mg Q 12 since the hip surgery for DVT prophylaxis, continue, no need for further DVT prophylaxis.

## 2022-11-23 NOTE — ED PROVIDER NOTE - ATTENDING APP SHARED VISIT CONTRIBUTION OF CARE
Didier Bautista MD: I have personally performed a face to face diagnostic evaluation on this patient.  I have reviewed the PA note and agree with the history, exam, and plan of care, except as noted.  History and Exam by me shows same findings as documented

## 2022-11-23 NOTE — ED PROVIDER NOTE - PROGRESS NOTE DETAILS
Patient stable.  Resting comfortably.  No tachycardia, hypoxia, or tachypnea.  Refusing to go back to [facility] in [place], not happy with care.  Recently tested positive for COVID.  Will admit, will need placement by .  Spoke with Dr. Gonzáles, kindly accepts patient for admission

## 2022-11-23 NOTE — ED PROVIDER NOTE - ENMT, MLM
Airway patent, TM's normal in appearance without erythema or bulging.  Throat has no vesicles, no oropharyngeal exudates and uvula is midline.

## 2022-11-23 NOTE — ED ADULT NURSE NOTE - CAS EDN INTEG ASSESS
The patient is seen for a 30 minute evaluation and management meeting. We are evaluating the patient's mood and ADHD issues. Date of service is 6/21/2018. Started meeting at 11:30 AM ended meeting at noon.    Mood: The patient's mood improved soon after going up to 150 mg of sertraline. Worries and anxiety did diminish although he still finds himself tending to worry and feel anxious. The frequency of irritability associated with this anxiety also has gone down. This observation predates his changing jobs. 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 He reports less stress at this new dealership. The stress in his life centers on both he and his girlfriend moving into grandmother's home living in the basement area which is partially finished. This is a big disappointment for his girlfriend as she does not have control over the environment as they did in their apartment. He has been resistant in allowing her to update the environment with appearances and wall hangings because he does not want to alter the memories of how it was over the years when he would visit grandparents and their home. He has made this decision to live there as he will get the home after 10 years if he does so. We discussed how important it was for him to have more sense of control in his life especially his work life and how stressed he felt when he did not have enough of that. He feels like there finances are better also where they live because he does not have to pay rent. We talked about his need to be more empathetic to his girlfriend who is not living in the place of her choice and is not being allowed to make even minor alterations. His need to be flexible was discussed and he should consider making some compromises as she is not asking him to describe things just allowing her to bring some of her own belongings into the living space. He has not had any increasing irritability overall with sertraline if  anything it has been less. Anxiety and sad mood and generally been better. No thoughts of suicide. No dizziness or nausea with the sertraline dosage.    ADHD: The patient believes that the Intuniv that was added is helping with restlessness and impulsivity. He doesn't blurt things out so much. He still can be very blunted and speak out and interrupt people but not as often. He has not experienced any significant dizziness on the medicine. His blood pressures have been running in the 110-120 range systolic. His blood pressure today demonstrated a systolic reading of 124 and a diastolic of 84. Because he does work in a hot environment and can get dehydrated we discussed keeping the current dose of not pushing of higher at this time. He has been more regular taking his Adderall which has also helped with frustration tolerance and thinking before he says and does things. No headaches or diminished appetite on the Adderall.    Mental status exam: In the meeting the patient is cooperative. He is not restless. His mood seems calmer than at our last meeting. His speech is of normal rate and tone. He seems to think will be more carefully about what he says. Affect is appropriate to thought content. He is oriented to person place and time. Memory intact for recent to remote events. Thought processes are goal-directed. No hallucinations delusions or illusions. No suicidal or homicidal thinking. Judgment intact. Insight is fairly good. Intellect at least average based on verbal skills. Gait is normal.    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 He continues on Intuniv 3 mg in the morning and sertraline 150 mg daily. I reminded him about the need to contact central scheduling to establish follow-up with my  upcoming correction at the end of this month. I suggested that he see either Dr. Brachmann, Dr. Saravia or Dr. Yahr-Nelson. I asked him to contact me if there are any complications and getting the appointment. Over 50% time was spent in counseling over is working on being more flexible with his girlfriend to avoid arguments over how the make the living space where they are more to her liking as well as transition issues to his new workplace as well as transition issues to a new provider. Prescriptions to cover 2 months of Adderall were printed off and given to the patient.   - - -

## 2022-11-23 NOTE — H&P ADULT - NSHPREVIEWOFSYSTEMS_GEN_ALL_CORE
-    CONSTITUTIONAL: No fever or chills.  EYES: No eye pain, visual disturbances, or discharge.  ENMT:  (+) sore throat, no difficulty hearing, vertigo, or sinus pain.  NECK: No pain or stiffness.	  RESPIRATORY: (+) cough, no wheezing, or hemoptysis; No shortness of breath.  CARDIOVASCULAR: No chest pain, palpitations, dizziness, or leg swelling.  GASTROINTESTINAL: No abdominal pain, no nausea, vomiting, or hematemesis; No diarrhea or Change in bowel habits. No melena or hematochezia.  GENITOURINARY: No dysuria, frequency, hematuria, or incontinence.  NEUROLOGICAL: No headaches, focal muscle weakness, numbness, or tremors.  SKIN: No itching, burning or rashes.  MUSCULOSKELETAL: No joint swelling or pain.  PSYCHIATRIC: No depression, anxiety, or agitation.  HEME/LYMPH: No easy bruising, bleeding gums, or nose bleed.  ALLERGY AND IMMUNOLOGIC: No hives or eczema.

## 2022-11-23 NOTE — H&P ADULT - HISTORY OF PRESENT ILLNESS
This is a 75 y/o F with PMH of HTN, Dyslipidemia, DVT, Lung CA s/p lobectomy, SLE, RA on Methotrexate & Prednisone, COVID-19 infection in 2020, Post-Op Anemia s/p transfusion on PO iron therapy, and Obesity who was sent from rehab facility for positive COVID-19 PCR. Patient was discharged to rehab after revision total right hip arthroplasty performed on October 10th for a periprosthetic fracture, developed upper respiratory symptoms on Friday, tested positive for COVID-19 on Monday, then again today, was told that no MDs at the facility during the thanksgiving holiday & over the weekend, so she was sent to the hospital, reports "some' productive cough with this clear sputum, no SOB, no fever or chills. Patient is unhappy with the her current rehab facility, and requests to be discharged to a different one.  This is a 75 y/o F with PMH of HTN, Dyslipidemia, DVT, Lung CA s/p lobectomy, Hypothyroidism, SLE, RA on Methotrexate & Prednisone, COVID-19 infection in 2020, Post-Op Anemia s/p transfusion on PO iron therapy, and Obesity who was sent from rehab facility for positive COVID-19 PCR. Patient was discharged to rehab after revision total right hip arthroplasty performed on October 10th for a periprosthetic fracture, developed upper respiratory symptoms on Friday, tested positive for COVID-19 on Monday, then again today, was told that no MDs at the facility during the thanksgiving holiday & over the weekend, so she was sent to the hospital, reports "some' productive cough with this clear sputum, no SOB, no fever or chills. Patient is unhappy with the her current rehab facility, and requests to be discharged to a different one.

## 2022-11-23 NOTE — H&P ADULT - PROBLEM SELECTOR PLAN 2
Patient was sent to rehab after her revision total hip arthroplasty, unhappy with the facility & refusing to go back on discharge, requested to be sent to another facility, case management & SW consults in am.

## 2022-11-23 NOTE — H&P ADULT - NSHPPHYSICALEXAM_GEN_ALL_CORE
-    Vital Signs Last 24 Hrs  T(C): 37.1 (24 Nov 2022 05:22), Max: 37.1 (24 Nov 2022 05:22)  T(F): 98.7 (24 Nov 2022 05:22), Max: 98.7 (24 Nov 2022 05:22)  HR: 58 (24 Nov 2022 05:22) (58 - 74)  BP: 143/55 (24 Nov 2022 05:22) (112/75 - 144/70)  BP(mean): --  RR: 18 (24 Nov 2022 05:22) (17 - 18)  SpO2: 98% (24 Nov 2022 05:22) (94% - 100%)    Parameters below as of 24 Nov 2022 05:22  Patient On (Oxygen Delivery Method): room air        PHYSICAL EXAM:  		  GENERAL: NAD, well-groomed, well-developed.  HEAD:  Atraumatic, Norm cephalic.  EYES: PERRLA, conjunctiva clear.  ENMT: no nasal discharge, MMM.   NECK: Supple, No JVD.  NERVOUS SYSTEM:  Alert & oriented X3, neurologically intact grossly.  CHEST/LUNG: Fair air entry B/L, scattered rales, no rhonchi, or wheezing.  HEART: Normal S1 & S2, no murmurs, or extra sounds.  ABDOMEN: Soft, non-tender, obese non-distended; bowel sounds present, no palpable masses or organomegaly.  EXTREMITIES:  No clubbing, cyanosis, or edema.  VASCULAR: 2+ radial, DPA / PTA pulses B/L.  SKIN: No rashes or lesions.  PSYCH: normal affect & behavior.

## 2022-11-24 DIAGNOSIS — Z29.9 ENCOUNTER FOR PROPHYLACTIC MEASURES, UNSPECIFIED: ICD-10-CM

## 2022-11-24 DIAGNOSIS — E03.9 HYPOTHYROIDISM, UNSPECIFIED: ICD-10-CM

## 2022-11-24 DIAGNOSIS — U07.1 COVID-19: ICD-10-CM

## 2022-11-24 DIAGNOSIS — E78.5 HYPERLIPIDEMIA, UNSPECIFIED: ICD-10-CM

## 2022-11-24 DIAGNOSIS — I10 ESSENTIAL (PRIMARY) HYPERTENSION: ICD-10-CM

## 2022-11-24 DIAGNOSIS — Z53.29 PROCEDURE AND TREATMENT NOT CARRIED OUT BECAUSE OF PATIENT'S DECISION FOR OTHER REASONS: ICD-10-CM

## 2022-11-24 DIAGNOSIS — M06.9 RHEUMATOID ARTHRITIS, UNSPECIFIED: ICD-10-CM

## 2022-11-24 LAB
ALBUMIN SERPL ELPH-MCNC: 2.2 G/DL — LOW (ref 3.3–5)
ALP SERPL-CCNC: 160 U/L — HIGH (ref 30–120)
ALT FLD-CCNC: <10 U/L DA — LOW (ref 10–60)
ANION GAP SERPL CALC-SCNC: 6 MMOL/L — SIGNIFICANT CHANGE UP (ref 5–17)
AST SERPL-CCNC: 16 U/L — SIGNIFICANT CHANGE UP (ref 10–40)
BASOPHILS # BLD AUTO: 0 K/UL — SIGNIFICANT CHANGE UP (ref 0–0.2)
BASOPHILS NFR BLD AUTO: 0 % — SIGNIFICANT CHANGE UP (ref 0–2)
BILIRUB SERPL-MCNC: 0.3 MG/DL — SIGNIFICANT CHANGE UP (ref 0.2–1.2)
BUN SERPL-MCNC: 13 MG/DL — SIGNIFICANT CHANGE UP (ref 7–23)
CALCIUM SERPL-MCNC: 8.1 MG/DL — LOW (ref 8.4–10.5)
CHLORIDE SERPL-SCNC: 109 MMOL/L — HIGH (ref 96–108)
CO2 SERPL-SCNC: 29 MMOL/L — SIGNIFICANT CHANGE UP (ref 22–31)
CREAT SERPL-MCNC: 0.59 MG/DL — SIGNIFICANT CHANGE UP (ref 0.5–1.3)
CRP SERPL-MCNC: 19 MG/L — HIGH
EGFR: 93 ML/MIN/1.73M2 — SIGNIFICANT CHANGE UP
EOSINOPHIL # BLD AUTO: 0 K/UL — SIGNIFICANT CHANGE UP (ref 0–0.5)
EOSINOPHIL NFR BLD AUTO: 0 % — SIGNIFICANT CHANGE UP (ref 0–6)
FERRITIN SERPL-MCNC: 172 NG/ML — HIGH (ref 15–150)
GLUCOSE SERPL-MCNC: 88 MG/DL — SIGNIFICANT CHANGE UP (ref 70–99)
HCT VFR BLD CALC: 32.5 % — LOW (ref 34.5–45)
HGB BLD-MCNC: 10 G/DL — LOW (ref 11.5–15.5)
LDH SERPL L TO P-CCNC: 285 U/L — HIGH (ref 50–242)
LYMPHOCYTES # BLD AUTO: 1.5 K/UL — SIGNIFICANT CHANGE UP (ref 1–3.3)
LYMPHOCYTES # BLD AUTO: 23 % — SIGNIFICANT CHANGE UP (ref 13–44)
MCHC RBC-ENTMCNC: 29.8 PG — SIGNIFICANT CHANGE UP (ref 27–34)
MCHC RBC-ENTMCNC: 30.8 GM/DL — LOW (ref 32–36)
MCV RBC AUTO: 96.7 FL — SIGNIFICANT CHANGE UP (ref 80–100)
MONOCYTES # BLD AUTO: 0.59 K/UL — SIGNIFICANT CHANGE UP (ref 0–0.9)
MONOCYTES NFR BLD AUTO: 9 % — SIGNIFICANT CHANGE UP (ref 2–14)
NEUTROPHILS # BLD AUTO: 4.45 K/UL — SIGNIFICANT CHANGE UP (ref 1.8–7.4)
NEUTROPHILS NFR BLD AUTO: 68 % — SIGNIFICANT CHANGE UP (ref 43–77)
NRBC # BLD: SIGNIFICANT CHANGE UP /100 WBCS (ref 0–0)
PLATELET # BLD AUTO: 304 K/UL — SIGNIFICANT CHANGE UP (ref 150–400)
POTASSIUM SERPL-MCNC: 3.3 MMOL/L — LOW (ref 3.5–5.3)
POTASSIUM SERPL-SCNC: 3.3 MMOL/L — LOW (ref 3.5–5.3)
PROT SERPL-MCNC: 5.8 G/DL — LOW (ref 6–8.3)
RBC # BLD: 3.36 M/UL — LOW (ref 3.8–5.2)
RBC # FLD: 15.3 % — HIGH (ref 10.3–14.5)
SODIUM SERPL-SCNC: 144 MMOL/L — SIGNIFICANT CHANGE UP (ref 135–145)
TROPONIN I, HIGH SENSITIVITY RESULT: 13.4 NG/L — SIGNIFICANT CHANGE UP
WBC # BLD: 6.54 K/UL — SIGNIFICANT CHANGE UP (ref 3.8–10.5)
WBC # FLD AUTO: 6.54 K/UL — SIGNIFICANT CHANGE UP (ref 3.8–10.5)

## 2022-11-24 PROCEDURE — 99233 SBSQ HOSP IP/OBS HIGH 50: CPT

## 2022-11-24 RX ORDER — SENNA PLUS 8.6 MG/1
2 TABLET ORAL AT BEDTIME
Refills: 0 | Status: DISCONTINUED | OUTPATIENT
Start: 2022-11-24 | End: 2022-12-02

## 2022-11-24 RX ORDER — FOLIC ACID 0.8 MG
1 TABLET ORAL DAILY
Refills: 0 | Status: DISCONTINUED | OUTPATIENT
Start: 2022-11-24 | End: 2022-12-02

## 2022-11-24 RX ORDER — ZINC OXIDE 200 MG/G
1 OINTMENT TOPICAL DAILY
Refills: 0 | Status: DISCONTINUED | OUTPATIENT
Start: 2022-11-24 | End: 2022-12-02

## 2022-11-24 RX ORDER — POLYETHYLENE GLYCOL 3350 17 G/17G
17 POWDER, FOR SOLUTION ORAL AT BEDTIME
Refills: 0 | Status: DISCONTINUED | OUTPATIENT
Start: 2022-11-24 | End: 2022-12-02

## 2022-11-24 RX ORDER — INFLUENZA VIRUS VACCINE 15; 15; 15; 15 UG/.5ML; UG/.5ML; UG/.5ML; UG/.5ML
0.7 SUSPENSION INTRAMUSCULAR ONCE
Refills: 0 | Status: DISCONTINUED | OUTPATIENT
Start: 2022-11-24 | End: 2022-11-24

## 2022-11-24 RX ORDER — LIDOCAINE 4 G/100G
1 CREAM TOPICAL DAILY
Refills: 0 | Status: DISCONTINUED | OUTPATIENT
Start: 2022-11-24 | End: 2022-12-02

## 2022-11-24 RX ORDER — ATORVASTATIN CALCIUM 80 MG/1
20 TABLET, FILM COATED ORAL AT BEDTIME
Refills: 0 | Status: DISCONTINUED | OUTPATIENT
Start: 2022-11-24 | End: 2022-12-02

## 2022-11-24 RX ORDER — ACETAMINOPHEN 500 MG
650 TABLET ORAL EVERY 6 HOURS
Refills: 0 | Status: DISCONTINUED | OUTPATIENT
Start: 2022-11-24 | End: 2022-12-02

## 2022-11-24 RX ORDER — POTASSIUM CHLORIDE 20 MEQ
40 PACKET (EA) ORAL ONCE
Refills: 0 | Status: COMPLETED | OUTPATIENT
Start: 2022-11-24 | End: 2022-11-24

## 2022-11-24 RX ORDER — APIXABAN 2.5 MG/1
2.5 TABLET, FILM COATED ORAL EVERY 12 HOURS
Refills: 0 | Status: DISCONTINUED | OUTPATIENT
Start: 2022-11-24 | End: 2022-12-02

## 2022-11-24 RX ORDER — LANOLIN ALCOHOL/MO/W.PET/CERES
3 CREAM (GRAM) TOPICAL AT BEDTIME
Refills: 0 | Status: DISCONTINUED | OUTPATIENT
Start: 2022-11-24 | End: 2022-12-02

## 2022-11-24 RX ORDER — BENZOCAINE AND MENTHOL 5; 1 G/100ML; G/100ML
1 LIQUID ORAL
Refills: 0 | Status: DISCONTINUED | OUTPATIENT
Start: 2022-11-24 | End: 2022-12-02

## 2022-11-24 RX ORDER — PANTOPRAZOLE SODIUM 20 MG/1
40 TABLET, DELAYED RELEASE ORAL
Refills: 0 | Status: DISCONTINUED | OUTPATIENT
Start: 2022-11-24 | End: 2022-12-02

## 2022-11-24 RX ORDER — NYSTATIN CREAM 100000 [USP'U]/G
1 CREAM TOPICAL
Refills: 0 | Status: DISCONTINUED | OUTPATIENT
Start: 2022-11-24 | End: 2022-12-02

## 2022-11-24 RX ORDER — LEVOTHYROXINE SODIUM 125 MCG
150 TABLET ORAL DAILY
Refills: 0 | Status: DISCONTINUED | OUTPATIENT
Start: 2022-11-24 | End: 2022-12-02

## 2022-11-24 RX ORDER — METHOTREXATE 2.5 MG/1
10 TABLET ORAL
Refills: 0 | Status: DISCONTINUED | OUTPATIENT
Start: 2022-11-24 | End: 2022-12-02

## 2022-11-24 RX ORDER — CALCIUM CARBONATE 500(1250)
1 TABLET ORAL DAILY
Refills: 0 | Status: DISCONTINUED | OUTPATIENT
Start: 2022-11-24 | End: 2022-12-02

## 2022-11-24 RX ORDER — FERROUS SULFATE 325(65) MG
325 TABLET ORAL DAILY
Refills: 0 | Status: DISCONTINUED | OUTPATIENT
Start: 2022-11-24 | End: 2022-12-02

## 2022-11-24 RX ORDER — SODIUM CHLORIDE 0.65 %
1 AEROSOL, SPRAY (ML) NASAL
Refills: 0 | Status: DISCONTINUED | OUTPATIENT
Start: 2022-11-24 | End: 2022-12-02

## 2022-11-24 RX ADMIN — NYSTATIN CREAM 1 APPLICATION(S): 100000 CREAM TOPICAL at 06:16

## 2022-11-24 RX ADMIN — ZINC OXIDE 1 APPLICATION(S): 200 OINTMENT TOPICAL at 11:51

## 2022-11-24 RX ADMIN — Medication 3 MILLIGRAM(S): at 21:34

## 2022-11-24 RX ADMIN — LIDOCAINE 1 PATCH: 4 CREAM TOPICAL at 19:56

## 2022-11-24 RX ADMIN — LIDOCAINE 1 PATCH: 4 CREAM TOPICAL at 23:09

## 2022-11-24 RX ADMIN — LIDOCAINE 1 PATCH: 4 CREAM TOPICAL at 11:50

## 2022-11-24 RX ADMIN — APIXABAN 2.5 MILLIGRAM(S): 2.5 TABLET, FILM COATED ORAL at 06:15

## 2022-11-24 RX ADMIN — Medication 40 MILLIEQUIVALENT(S): at 17:56

## 2022-11-24 RX ADMIN — Medication 1 MILLIGRAM(S): at 11:51

## 2022-11-24 RX ADMIN — APIXABAN 2.5 MILLIGRAM(S): 2.5 TABLET, FILM COATED ORAL at 17:57

## 2022-11-24 RX ADMIN — REMDESIVIR 200 MILLIGRAM(S): 5 INJECTION INTRAVENOUS at 00:49

## 2022-11-24 RX ADMIN — LIDOCAINE 1 PATCH: 4 CREAM TOPICAL at 17:56

## 2022-11-24 RX ADMIN — Medication 10 MILLIGRAM(S): at 17:56

## 2022-11-24 RX ADMIN — Medication 100 MILLIGRAM(S): at 17:57

## 2022-11-24 RX ADMIN — ATORVASTATIN CALCIUM 20 MILLIGRAM(S): 80 TABLET, FILM COATED ORAL at 21:33

## 2022-11-24 RX ADMIN — Medication 1 SPRAY(S): at 17:56

## 2022-11-24 RX ADMIN — Medication 15 MILLIGRAM(S): at 06:16

## 2022-11-24 RX ADMIN — PANTOPRAZOLE SODIUM 40 MILLIGRAM(S): 20 TABLET, DELAYED RELEASE ORAL at 06:15

## 2022-11-24 RX ADMIN — BENZOCAINE AND MENTHOL 1 LOZENGE: 5; 1 LIQUID ORAL at 17:56

## 2022-11-24 RX ADMIN — Medication 150 MICROGRAM(S): at 06:15

## 2022-11-24 RX ADMIN — REMDESIVIR 500 MILLIGRAM(S): 5 INJECTION INTRAVENOUS at 21:36

## 2022-11-24 RX ADMIN — SENNA PLUS 2 TABLET(S): 8.6 TABLET ORAL at 21:33

## 2022-11-24 RX ADMIN — Medication 325 MILLIGRAM(S): at 11:51

## 2022-11-24 NOTE — PATIENT PROFILE ADULT - LIVING ENVIRONMENT
Problem: Occupational Therapy  Goal: Occupational Therapy Goal  Description: Goals to be met by: 11/26/2022      Patient will increase functional independence with ADLs by performing:    UE Dressing with Modified [place].  LE Dressing with Stand-by Assistance.  Grooming while standing with Supervision.  Toileting from bedside commode with Supervision for hygiene and clothing management.   Toilet transfer to bedside commode with Supervision.  Increased functional strength to WFL for self care.  Upper extremity exercise program x10 reps per handout, with supervision.     Outcome: Ongoing, Progressing  Pt would benefit from continued OT to address deficits in self care and functional mobility. Recommending likely HHOT/PT but pt may decline; DME needs possibly SC     no

## 2022-11-24 NOTE — CONSULT NOTE ADULT - SUBJECTIVE AND OBJECTIVE BOX
History of Present Illness: The patient is a 76 year old female with a history of HTN, HL, RA, DVT, SLE, lung cancer s/p lobectomy who is admitted with COVID. She was recently admitted with weakness. She was found to be COVID positive at rehab and sent back to hospital.    Past Medical/Surgical History:  HTN, HL, RA, DVT, SLE, lung cancer s/p lobectomy    Medications:  Home Medications:  acetaminophen 325 mg oral tablet: 2 tab(s) orally every 8 hours (10 Nov 2022 14:44)  calcium carbonate 500 mg (200 mg elemental calcium) oral tablet, chewable: 1 tab(s) orally once a day, As needed, Dyspepsia (17 Nov 2022 13:46)  ferrous sulfate 325 mg (65 mg elemental iron) oral tablet: 1 tab(s) orally once a day (10 Nov 2022 14:44)  folic acid 1 mg oral tablet: 1 tab(s) orally once a day (17 Nov 2022 13:46)  levothyroxine 150 mcg (0.15 mg) oral tablet: 1 tab(s) orally once a day (10 Nov 2022 14:44)  melatonin 3 mg oral tablet: 2 tab(s) orally once a day (at bedtime) (10 Nov 2022 14:44)  methotrexate 2.5 mg oral tablet: 4 tab(s) orally once a week (17 Nov 2022 13:46)  pantoprazole 40 mg oral delayed release tablet: 1 tab(s) orally once a day (before a meal) (22 Oct 2022 09:12)  polyethylene glycol 3350 oral powder for reconstitution: 17 gram(s) orally once a day (at bedtime) (10 Nov 2022 14:44)  predniSONE: 15 milligram(s) orally once a day (17 Nov 2022 13:46)  senna leaf extract oral tablet: 2 tab(s) orally once a day (at bedtime) (10 Nov 2022 14:44)  zinc oxide 20% topical ointment: 1 application topically once a day (17 Nov 2022 13:46)      Family History: Non-contributory family history of premature cardiovascular atherosclerotic disease    Social History: No tobacco, alcohol or drug use    Review of Systems:  General: No fevers, chills, weight gain  Skin: No rashes, color changes  Cardiovascular: No chest pain, orthopnea  Respiratory: No shortness of breath, cough  Gastrointestinal: No nausea, abdominal pain  Genitourinary: No incontinence, pain with urination  Musculoskeletal: No pain, swelling, decreased range of motion  Neurological: No headache, weakness  Psychiatric: No depression, anxiety  Endocrine: No weight gain, increased thirst  All other systems are comprehensively negative.    Physical Exam:  Vitals:        Vital Signs Last 24 Hrs  T(C): 37.1 (24 Nov 2022 05:22), Max: 37.1 (24 Nov 2022 05:22)  T(F): 98.7 (24 Nov 2022 05:22), Max: 98.7 (24 Nov 2022 05:22)  HR: 58 (24 Nov 2022 05:22) (58 - 74)  BP: 143/55 (24 Nov 2022 05:22) (112/75 - 144/70)  BP(mean): --  RR: 18 (24 Nov 2022 05:22) (17 - 18)  SpO2: 98% (24 Nov 2022 05:22) (94% - 100%)  Parameters below as of 24 Nov 2022 05:22  Patient On (Oxygen Delivery Method): room air  General: NAD  HEENT: MMM  Neck: No JVD, no carotid bruit  Lungs: CTAB  CV: RRR, nl S1/S2, no M/R/G  Abdomen: S/NT/ND, +BS  Extremities: No LE edema, no cyanosis  Neuro: AAOx3, non-focal  Skin: No rash    Labs:                        10.0   6.54  )-----------( 304      ( 24 Nov 2022 08:16 )             32.5     11-24    144  |  109<H>  |  13  ----------------------------<  88  3.3<L>   |  29  |  0.59    Ca    8.1<L>      24 Nov 2022 08:16    TPro  5.8<L>  /  Alb  2.2<L>  /  TBili  0.3  /  DBili  x   /  AST  16  /  ALT  <10<L>  /  AlkPhos  160<H>  11-24            ECG/Telemetry: Sinus rhythm, blocked PACs, Mobitz 1    
    HPI:  77YO F PMH of HTN, Dyslipidemia, DVT, Lung CA s/p lobectomy, Hypothyroidism, SLE, RA on Methotrexate & Prednisone, COVID-19 infection in , Post-Op Anemia s/p transfusion on PO iron therapy, and Obesity sp recent revision total right hip arthroplasty performed on  for a periprosthetic fracture, developed upper respiratory symptoms on Friday, tested positive for COVID-19 sent to the hospital for further care. Pt is on RA. +cough No SOB n/v/d CP abd pain urinary symptoms.    Infectious Disease consult was called to evaluate pt.    Past Medical & Surgical Hx:  PAST MEDICAL & SURGICAL HISTORY:  Rheumatoid arthritis  SLE (systemic lupus erythematosus)  Osteoarthritis  H/O degenerative disc disease  Hypertension  Hyperlipidemia  Benign heart murmur  Hypothyroid  Obesity, Class II, BMI 35-39.9, no comorbidity  DVT (deep venous thrombosis)  during left knee replacement   Overactive bladder      Lab test positive for detection of COVID-19 virus    S/P tonsillectomy  S/P cataract surgery  left, right  S/P eye surgery  child  S/P carpal tunnel release  left  S/P knee surgery  bilateral  Lung cancer  small tumor removed -no chemo, no radiation  S/P thyroid surgery  1 lobe removed  Status post total hip replacement, right  S/P knee replacement  left  S/P lumbar spine operation        Social History--  EtOH: denies  Tobacco: denies  Drug Use: denies    FAMILY HISTORY:  FHx: rheumatoid arthritis  father ,  of MI age 61    FH: sudden death (Mother)  age 58        Allergies  No Known Allergies    Intolerances  NONE      Home Medications:  acetaminophen 325 mg oral tablet: 2 tab(s) orally every 8 hours (10 Nov 2022 14:44)  calcium carbonate 500 mg (200 mg elemental calcium) oral tablet, chewable: 1 tab(s) orally once a day, As needed, Dyspepsia (2022 13:46)  ferrous sulfate 325 mg (65 mg elemental iron) oral tablet: 1 tab(s) orally once a day (10 Nov 2022 14:44)  folic acid 1 mg oral tablet: 1 tab(s) orally once a day (2022 13:46)  levothyroxine 150 mcg (0.15 mg) oral tablet: 1 tab(s) orally once a day (10 Nov 2022 14:44)  melatonin 3 mg oral tablet: 2 tab(s) orally once a day (at bedtime) (10 Nov 2022 14:44)  methotrexate 2.5 mg oral tablet: 4 tab(s) orally once a week (2022 13:46)  pantoprazole 40 mg oral delayed release tablet: 1 tab(s) orally once a day (before a meal) (22 Oct 2022 09:12)  polyethylene glycol 3350 oral powder for reconstitution: 17 gram(s) orally once a day (at bedtime) (10 Nov 2022 14:44)  predniSONE: 15 milligram(s) orally once a day (2022 13:46)  senna leaf extract oral tablet: 2 tab(s) orally once a day (at bedtime) (10 Nov 2022 14:44)  zinc oxide 20% topical ointment: 1 application topically once a day (2022 13:46)      Current Inpatient Medications :    ANTIBIOTICS:   remdesivir  IVPB 100 milliGRAM(s) IV Intermittent every 24 hours  remdesivir  IVPB   IV Intermittent       OTHER RELEVANT MEDICATIONS :  acetaminophen     Tablet .. 650 milliGRAM(s) Oral every 6 hours PRN  apixaban 2.5 milliGRAM(s) Oral every 12 hours  atorvastatin 20 milliGRAM(s) Oral at bedtime  benzocaine 15 mG/menthol 3.6 mG Lozenge 1 Lozenge Oral every 3 hours PRN  benzonatate 100 milliGRAM(s) Oral every 8 hours PRN  calcium carbonate    500 mG (Tums) Chewable 1 Tablet(s) Chew daily PRN  ferrous    sulfate 325 milliGRAM(s) Oral daily  folic acid 1 milliGRAM(s) Oral daily  levothyroxine 150 MICROGram(s) Oral daily  lidocaine   4% Patch 1 Patch Transdermal daily  lidocaine   4% Patch 1 Patch Transdermal daily  melatonin 3 milliGRAM(s) Oral at bedtime PRN  nystatin Powder 1 Application(s) Topical two times a day  pantoprazole    Tablet 40 milliGRAM(s) Oral before breakfast  polyethylene glycol 3350 17 Gram(s) Oral at bedtime  predniSONE   Tablet 10 milliGRAM(s) Oral daily  senna 2 Tablet(s) Oral at bedtime  sodium chloride 0.65% Nasal 1 Spray(s) Both Nostrils every 3 hours PRN  zinc oxide 20% Ointment 1 Application(s) Topical daily    ROS:  CONSTITUTIONAL:  Negative fever or chills  EYES:  Negative  blurry vision or double vision  CARDIOVASCULAR:  Negative for chest pain or palpitations  RESPIRATORY:  Negative for wheezing, or SOB  +cough,  GASTROINTESTINAL:  Negative for nausea, vomiting, diarrhea, constipation, or abdominal pain  GENITOURINARY:  Negative frequency, urgency , dysuria or hematuria   NEUROLOGIC:  No headache, confusion, dizziness, lightheadedness  All other systems were reviewed and are negative    I&O's Detail    2022 07:01  -  2022 20:46  --------------------------------------------------------  IN:  Total IN: 0 mL    OUT:    Voided (mL): 1025 mL  Total OUT: 1025 mL    Total NET: -1025 mL    Physical Exam:  Vital Signs Last 24 Hrs  T(C): 36.8 (2022 18:37), Max: 37.1 (2022 05:22)  T(F): 98.2 (2022 18:37), Max: 98.7 (2022 05:22)  HR: 58 (2022 18:37) (56 - 74)  BP: 137/70 (2022 18:37) (112/75 - 144/70)  RR: 18 (2022 18:37) (17 - 18)  SpO2: 99% (2022 18:37) (94% - 100%)    Parameters below as of 2022 18:37  Patient On (Oxygen Delivery Method): room air      General:  no acute distress  Neck: supple, trachea midline  Lungs: clear, no wheeze/rhonchi  Cardiovascular: regular rate and rhythm, S1 S2  Abdomen: soft, nontender, ND, bowel sounds normal  Neurological:  alert and oriented x3  Skin: no rash  Extremities: no edema    Labs:                         10.0   6.54  )-----------( 304      ( 2022 08:16 )             32.5   11-    144  |  109<H>  |  13  ----------------------------<  88  3.3<L>   |  29  |  0.59    Ca    8.1<L>      2022 08:16    TPro  5.8<L>  /  Alb  2.2<L>  /  TBili  0.3  /  DBili  x   /  AST  16  /  ALT  <10<L>  /  AlkPhos  160<H>  -24        RECENT CULTURES:  Flu With COVID-19 By AMARILYS (22 @ 21:00)    SARS-CoV-2 Result: Detected     RADIOLOGY & ADDITIONAL STUDIES:  ACC: [id number] EXAM:  XR CHEST PORTABLE URGENT 1V                          PROCEDURE DATE:  2022          INTERPRETATION:  AP chest on 2022 at 9:20 PM. Patient has   cough and is positive for Covid.    Heart likely enlarged. Thoracolumbar lumbar hardware again noted.    There is a small atelectatic infiltrate at left base increased from    this year.    Assessment :   77YO F PMH of HTN, Dyslipidemia, DVT, Lung CA s/p lobectomy, Hypothyroidism, SLE, RA on Methotrexate & Prednisone, COVID-19 infection in , Post-Op Anemia s/p transfusion on PO iron therapy, and Obesity sp recent revision total right hip arthroplasty  for a periprosthetic fracture admitted for COVID-19 infection Pt is on RA. +cough   Pt is vaccinated and boostered      Plan :   Remdesivir x 3 days  No role for Decadron at this time  Trend temps and cbc  Pulm toileting  Increase activity    Continue with present regiment .  Approptiate use of antibiotics and adverse effects reviewed.      I have discussed the above plan of care with patient in detail. She expressed understanding of the treatment plan . Risks, benefits and alternatives discussed in detail. I have asked if she has any questions or concerns and appropriately addressed them to the best of my ability .      > 45 minutes spent in direct patient care reviewing  the notes, lab data/ imaging , discussion with multidisciplinary team. All questions were addressed and answered to the best of my capacity .    Thank you for allowing me to participate in the care of your patient .      Fior Zamora MD  Infectious Disease  [phone]

## 2022-11-24 NOTE — PATIENT PROFILE ADULT - CONTRAINDICATIONS & PRECAUTIONS (SELECT ALL THAT APPLY)
none... Symptomatic with suspected or confirmed COVID-19. Defer administration of Influenza Vaccine at this time

## 2022-11-24 NOTE — PATIENT PROFILE ADULT - FALL HARM RISK - HARM RISK INTERVENTIONS
Assistance with ambulation/Assistance OOB with selected safe patient handling equipment/Communicate Risk of Fall with Harm to all staff/Discuss with provider need for PT consult/Monitor gait and stability/Provide patient with walking aids - walker, cane, crutches/Reinforce activity limits and safety measures with patient and family/Sit up slowly, dangle for a short time, stand at bedside before walking/Tailored Fall Risk Interventions/Use of alarms - bed, chair and/or voice tab/Visual Cue: Yellow wristband and red socks/Bed in lowest position, wheels locked, appropriate side rails in place/Call bell, personal items and telephone in reach/Instruct patient to call for assistance before getting out of bed or chair/Non-slip footwear when patient is out of bed/[place] to call system/Physically safe environment - no spills, clutter or unnecessary equipment/Purposeful Proactive Rounding/Room/bathroom lighting operational, light cord in reach

## 2022-11-24 NOTE — CONSULT NOTE ADULT - ASSESSMENT
The patient is a 76 year old female with a history of HTN, HL, RA, DVT, SLE, lung cancer s/p lobectomy who is admitted with COVID.    Plan:  - Echo 10/22 with normal LV systolic function, mild AS  - Telemetry reviewed; similar to prior admissions, there is intermittent sinus bradycardia, blocked PACs, and Mobitz 1. This is more present while the patient is asleep.  - There may be a component of TAMMY contributing to rhythm issues  - No indication for pacemaker at the current time  - Would avoid AV eliz blocking agents  - COVID positive  - On remdesivir

## 2022-11-24 NOTE — PROGRESS NOTE ADULT - ASSESSMENT
76F with hx of HTN, HLD, heart murmur, SLE, RA, hx of lung and thyroid CA, DVT,  multiple hip surgeries who presented from home for inability to walk right after being discharged from rehab.  PT sent to rehab on 11/17, returned COVID positive    Covid positive  -minor symptoms  - no hypoxia  - continue Remdesivir     RA/SLE/ possible PMR  - continue prednisone - now ready to be on 10mg dose.   - continue weekly methotrexate    Right hip revision  - pain control as needed  - ortho eval appreciated  - cont with eliquis 2.5mg BID  - bowel regimen  - PT/OT    HTN/HLD  - cont with statin    Hypothyroidism  - cont with synthroid    Hx of DVT  - will be on eliquis    Hx of blocked PACs and Mobitz I  - cardiology eval appreciated  - suggest outpatient TAMMY evaluation     Preventive measures  - already on eliquis  - will be on PPI

## 2022-11-25 LAB
ANION GAP SERPL CALC-SCNC: 6 MMOL/L — SIGNIFICANT CHANGE UP (ref 5–17)
BUN SERPL-MCNC: 14 MG/DL — SIGNIFICANT CHANGE UP (ref 7–23)
CALCIUM SERPL-MCNC: 8.2 MG/DL — LOW (ref 8.4–10.5)
CHLORIDE SERPL-SCNC: 108 MMOL/L — SIGNIFICANT CHANGE UP (ref 96–108)
CO2 SERPL-SCNC: 29 MMOL/L — SIGNIFICANT CHANGE UP (ref 22–31)
CREAT SERPL-MCNC: 0.67 MG/DL — SIGNIFICANT CHANGE UP (ref 0.5–1.3)
EGFR: 90 ML/MIN/1.73M2 — SIGNIFICANT CHANGE UP
ERYTHROCYTE [SEDIMENTATION RATE] IN BLOOD: 18 MM/HR — SIGNIFICANT CHANGE UP (ref 0–20)
GLUCOSE SERPL-MCNC: 105 MG/DL — HIGH (ref 70–99)
HCT VFR BLD CALC: 33.8 % — LOW (ref 34.5–45)
HGB BLD-MCNC: 10.4 G/DL — LOW (ref 11.5–15.5)
MAGNESIUM SERPL-MCNC: 1.8 MG/DL — SIGNIFICANT CHANGE UP (ref 1.6–2.6)
MCHC RBC-ENTMCNC: 29.3 PG — SIGNIFICANT CHANGE UP (ref 27–34)
MCHC RBC-ENTMCNC: 30.8 GM/DL — LOW (ref 32–36)
MCV RBC AUTO: 95.2 FL — SIGNIFICANT CHANGE UP (ref 80–100)
NRBC # BLD: 0 /100 WBCS — SIGNIFICANT CHANGE UP (ref 0–0)
PHOSPHATE SERPL-MCNC: 3.8 MG/DL — SIGNIFICANT CHANGE UP (ref 2.5–4.5)
PLATELET # BLD AUTO: 290 K/UL — SIGNIFICANT CHANGE UP (ref 150–400)
POTASSIUM SERPL-MCNC: 4.1 MMOL/L — SIGNIFICANT CHANGE UP (ref 3.5–5.3)
POTASSIUM SERPL-SCNC: 4.1 MMOL/L — SIGNIFICANT CHANGE UP (ref 3.5–5.3)
RBC # BLD: 3.55 M/UL — LOW (ref 3.8–5.2)
RBC # FLD: 15.2 % — HIGH (ref 10.3–14.5)
SODIUM SERPL-SCNC: 143 MMOL/L — SIGNIFICANT CHANGE UP (ref 135–145)
WBC # BLD: 6.82 K/UL — SIGNIFICANT CHANGE UP (ref 3.8–10.5)
WBC # FLD AUTO: 6.82 K/UL — SIGNIFICANT CHANGE UP (ref 3.8–10.5)

## 2022-11-25 PROCEDURE — 99232 SBSQ HOSP IP/OBS MODERATE 35: CPT

## 2022-11-25 RX ADMIN — PANTOPRAZOLE SODIUM 40 MILLIGRAM(S): 20 TABLET, DELAYED RELEASE ORAL at 05:28

## 2022-11-25 RX ADMIN — LIDOCAINE 1 PATCH: 4 CREAM TOPICAL at 19:00

## 2022-11-25 RX ADMIN — Medication 1 MILLIGRAM(S): at 13:55

## 2022-11-25 RX ADMIN — NYSTATIN CREAM 1 APPLICATION(S): 100000 CREAM TOPICAL at 17:59

## 2022-11-25 RX ADMIN — NYSTATIN CREAM 1 APPLICATION(S): 100000 CREAM TOPICAL at 05:27

## 2022-11-25 RX ADMIN — Medication 150 MICROGRAM(S): at 05:25

## 2022-11-25 RX ADMIN — ZINC OXIDE 1 APPLICATION(S): 200 OINTMENT TOPICAL at 12:06

## 2022-11-25 RX ADMIN — Medication 10 MILLIGRAM(S): at 05:26

## 2022-11-25 RX ADMIN — APIXABAN 2.5 MILLIGRAM(S): 2.5 TABLET, FILM COATED ORAL at 17:59

## 2022-11-25 RX ADMIN — LIDOCAINE 1 PATCH: 4 CREAM TOPICAL at 05:33

## 2022-11-25 RX ADMIN — REMDESIVIR 500 MILLIGRAM(S): 5 INJECTION INTRAVENOUS at 22:34

## 2022-11-25 RX ADMIN — Medication 325 MILLIGRAM(S): at 12:06

## 2022-11-25 RX ADMIN — LIDOCAINE 1 PATCH: 4 CREAM TOPICAL at 12:05

## 2022-11-25 RX ADMIN — APIXABAN 2.5 MILLIGRAM(S): 2.5 TABLET, FILM COATED ORAL at 05:26

## 2022-11-25 RX ADMIN — ATORVASTATIN CALCIUM 20 MILLIGRAM(S): 80 TABLET, FILM COATED ORAL at 21:35

## 2022-11-25 RX ADMIN — LIDOCAINE 1 PATCH: 4 CREAM TOPICAL at 12:04

## 2022-11-25 NOTE — OCCUPATIONAL THERAPY INITIAL EVALUATION ADULT - TRANSFER TRAINING, PT EVAL
Patient will transfer to toilet with DME as needed with supervision  with in 2-3 sessions. Patient will transfer to toilet with DME as needed with supervision  with in 1-2  sessions.

## 2022-11-25 NOTE — PROGRESS NOTE ADULT - ASSESSMENT
76F with hx of HTN, HLD, heart murmur, SLE, RA, hx of lung and thyroid CA, DVT,  multiple hip surgeries who presented from home for inability to walk right after being discharged from rehab.  PT sent to rehab on 11/17, returned COVID positive    Covid positive  - minor symptoms - continue symptomatic treatment  - no hypoxia  - continue Remdesivir course per ID    RA/SLE/ possible PMR  - continue prednisone - now ready to be on 10mg dose.   - continue weekly methotrexate  - improvement in ESR.    - follow up with primary Rheum after discharge    Right hip revision  - pain control as needed  - ortho - Dr Leach aware pt is here but patient does not have acute ortho issue to be addressed at this time.   - cont with eliquis 2.5mg BID  - bowel regimen  - PT/OT    HTN/HLD  - cont with statin    Hypothyroidism  - cont with synthroid    Hx of DVT  - will be on eliquis    Hx of blocked PACs and Mobitz I  - cardiology eval appreciated  - suggest outpatient TAMMY evaluation     Preventive measures  - already on eliquis  - will be on PPI    DC to [facility] probably Monday

## 2022-11-25 NOTE — OCCUPATIONAL THERAPY INITIAL EVALUATION ADULT - ADL RETRAINING, OT EVAL
Patient will dress lower body with supervisoin AE as needed with in 2-3 sessions. Patient will dress lower body with supervision AE as needed with in 1-2 sessions.

## 2022-11-26 LAB
ALBUMIN SERPL ELPH-MCNC: 2.7 G/DL — LOW (ref 3.3–5)
ALP SERPL-CCNC: 194 U/L — HIGH (ref 30–120)
ALT FLD-CCNC: 13 U/L DA — SIGNIFICANT CHANGE UP (ref 10–60)
ANION GAP SERPL CALC-SCNC: 10 MMOL/L — SIGNIFICANT CHANGE UP (ref 5–17)
AST SERPL-CCNC: 19 U/L — SIGNIFICANT CHANGE UP (ref 10–40)
BILIRUB SERPL-MCNC: 0.3 MG/DL — SIGNIFICANT CHANGE UP (ref 0.2–1.2)
BUN SERPL-MCNC: 19 MG/DL — SIGNIFICANT CHANGE UP (ref 7–23)
CALCIUM SERPL-MCNC: 8.2 MG/DL — LOW (ref 8.4–10.5)
CHLORIDE SERPL-SCNC: 105 MMOL/L — SIGNIFICANT CHANGE UP (ref 96–108)
CO2 SERPL-SCNC: 28 MMOL/L — SIGNIFICANT CHANGE UP (ref 22–31)
CREAT SERPL-MCNC: 0.82 MG/DL — SIGNIFICANT CHANGE UP (ref 0.5–1.3)
EGFR: 74 ML/MIN/1.73M2 — SIGNIFICANT CHANGE UP
GLUCOSE SERPL-MCNC: 101 MG/DL — HIGH (ref 70–99)
HCT VFR BLD CALC: 39 % — SIGNIFICANT CHANGE UP (ref 34.5–45)
HGB BLD-MCNC: 12.1 G/DL — SIGNIFICANT CHANGE UP (ref 11.5–15.5)
MCHC RBC-ENTMCNC: 29.8 PG — SIGNIFICANT CHANGE UP (ref 27–34)
MCHC RBC-ENTMCNC: 31 GM/DL — LOW (ref 32–36)
MCV RBC AUTO: 96.1 FL — SIGNIFICANT CHANGE UP (ref 80–100)
NRBC # BLD: 0 /100 WBCS — SIGNIFICANT CHANGE UP (ref 0–0)
PLATELET # BLD AUTO: 330 K/UL — SIGNIFICANT CHANGE UP (ref 150–400)
POTASSIUM SERPL-MCNC: 4.3 MMOL/L — SIGNIFICANT CHANGE UP (ref 3.5–5.3)
POTASSIUM SERPL-SCNC: 4.3 MMOL/L — SIGNIFICANT CHANGE UP (ref 3.5–5.3)
PROT SERPL-MCNC: 6.4 G/DL — SIGNIFICANT CHANGE UP (ref 6–8.3)
RBC # BLD: 4.06 M/UL — SIGNIFICANT CHANGE UP (ref 3.8–5.2)
RBC # FLD: 15.6 % — HIGH (ref 10.3–14.5)
SODIUM SERPL-SCNC: 143 MMOL/L — SIGNIFICANT CHANGE UP (ref 135–145)
WBC # BLD: 8.53 K/UL — SIGNIFICANT CHANGE UP (ref 3.8–10.5)
WBC # FLD AUTO: 8.53 K/UL — SIGNIFICANT CHANGE UP (ref 3.8–10.5)

## 2022-11-26 PROCEDURE — 99233 SBSQ HOSP IP/OBS HIGH 50: CPT

## 2022-11-26 RX ORDER — LANOLIN ALCOHOL/MO/W.PET/CERES
3 CREAM (GRAM) TOPICAL AT BEDTIME
Refills: 0 | Status: DISCONTINUED | OUTPATIENT
Start: 2022-11-26 | End: 2022-11-26

## 2022-11-26 RX ADMIN — LIDOCAINE 1 PATCH: 4 CREAM TOPICAL at 21:04

## 2022-11-26 RX ADMIN — Medication 3 MILLIGRAM(S): at 01:33

## 2022-11-26 RX ADMIN — Medication 650 MILLIGRAM(S): at 02:00

## 2022-11-26 RX ADMIN — REMDESIVIR 500 MILLIGRAM(S): 5 INJECTION INTRAVENOUS at 21:47

## 2022-11-26 RX ADMIN — LIDOCAINE 1 PATCH: 4 CREAM TOPICAL at 00:00

## 2022-11-26 RX ADMIN — Medication 650 MILLIGRAM(S): at 01:33

## 2022-11-26 RX ADMIN — Medication 1 MILLIGRAM(S): at 11:21

## 2022-11-26 RX ADMIN — LIDOCAINE 1 PATCH: 4 CREAM TOPICAL at 11:22

## 2022-11-26 RX ADMIN — NYSTATIN CREAM 1 APPLICATION(S): 100000 CREAM TOPICAL at 17:48

## 2022-11-26 RX ADMIN — Medication 100 MILLIGRAM(S): at 21:47

## 2022-11-26 RX ADMIN — PANTOPRAZOLE SODIUM 40 MILLIGRAM(S): 20 TABLET, DELAYED RELEASE ORAL at 05:30

## 2022-11-26 RX ADMIN — LIDOCAINE 1 PATCH: 4 CREAM TOPICAL at 11:21

## 2022-11-26 RX ADMIN — Medication 325 MILLIGRAM(S): at 11:21

## 2022-11-26 RX ADMIN — Medication 150 MICROGRAM(S): at 05:30

## 2022-11-26 RX ADMIN — Medication 10 MILLIGRAM(S): at 05:30

## 2022-11-26 RX ADMIN — APIXABAN 2.5 MILLIGRAM(S): 2.5 TABLET, FILM COATED ORAL at 17:48

## 2022-11-26 RX ADMIN — APIXABAN 2.5 MILLIGRAM(S): 2.5 TABLET, FILM COATED ORAL at 05:30

## 2022-11-26 RX ADMIN — ZINC OXIDE 1 APPLICATION(S): 200 OINTMENT TOPICAL at 17:48

## 2022-11-26 RX ADMIN — ATORVASTATIN CALCIUM 20 MILLIGRAM(S): 80 TABLET, FILM COATED ORAL at 21:47

## 2022-11-26 RX ADMIN — NYSTATIN CREAM 1 APPLICATION(S): 100000 CREAM TOPICAL at 05:30

## 2022-11-26 NOTE — PHYSICAL THERAPY INITIAL EVALUATION ADULT - PERTINENT HX OF CURRENT PROBLEM, REHAB EVAL
pt is a 77 y/o female, admitted from [facility] with +COVID. Patient was discharged to rehab after revision total right hip arthroplasty performed on October 10th for a periprosthetic fracture, developed upper respiratory symptoms on Friday, tested positive for COVID-19 on Monday.

## 2022-11-26 NOTE — PHYSICAL THERAPY INITIAL EVALUATION ADULT - ADDITIONAL COMMENTS
pt lives alone, son living with her but works during the day. Pt has no steps, ramp to enter, commode over toilet and RW.

## 2022-11-27 LAB
ALBUMIN SERPL ELPH-MCNC: 2.2 G/DL — LOW (ref 3.3–5)
ALP SERPL-CCNC: 170 U/L — HIGH (ref 30–120)
ALT FLD-CCNC: 14 U/L DA — SIGNIFICANT CHANGE UP (ref 10–60)
ANION GAP SERPL CALC-SCNC: 6 MMOL/L — SIGNIFICANT CHANGE UP (ref 5–17)
AST SERPL-CCNC: 17 U/L — SIGNIFICANT CHANGE UP (ref 10–40)
BILIRUB SERPL-MCNC: 0.2 MG/DL — SIGNIFICANT CHANGE UP (ref 0.2–1.2)
BUN SERPL-MCNC: 19 MG/DL — SIGNIFICANT CHANGE UP (ref 7–23)
CALCIUM SERPL-MCNC: 8.2 MG/DL — LOW (ref 8.4–10.5)
CHLORIDE SERPL-SCNC: 107 MMOL/L — SIGNIFICANT CHANGE UP (ref 96–108)
CO2 SERPL-SCNC: 29 MMOL/L — SIGNIFICANT CHANGE UP (ref 22–31)
CREAT SERPL-MCNC: 0.85 MG/DL — SIGNIFICANT CHANGE UP (ref 0.5–1.3)
EGFR: 71 ML/MIN/1.73M2 — SIGNIFICANT CHANGE UP
GLUCOSE SERPL-MCNC: 91 MG/DL — SIGNIFICANT CHANGE UP (ref 70–99)
HCT VFR BLD CALC: 34.5 % — SIGNIFICANT CHANGE UP (ref 34.5–45)
HGB BLD-MCNC: 10.7 G/DL — LOW (ref 11.5–15.5)
MCHC RBC-ENTMCNC: 29.9 PG — SIGNIFICANT CHANGE UP (ref 27–34)
MCHC RBC-ENTMCNC: 31 GM/DL — LOW (ref 32–36)
MCV RBC AUTO: 96.4 FL — SIGNIFICANT CHANGE UP (ref 80–100)
NRBC # BLD: 0 /100 WBCS — SIGNIFICANT CHANGE UP (ref 0–0)
PLATELET # BLD AUTO: 295 K/UL — SIGNIFICANT CHANGE UP (ref 150–400)
POTASSIUM SERPL-MCNC: 3.8 MMOL/L — SIGNIFICANT CHANGE UP (ref 3.5–5.3)
POTASSIUM SERPL-SCNC: 3.8 MMOL/L — SIGNIFICANT CHANGE UP (ref 3.5–5.3)
PROT SERPL-MCNC: 5.9 G/DL — LOW (ref 6–8.3)
RBC # BLD: 3.58 M/UL — LOW (ref 3.8–5.2)
RBC # FLD: 15.5 % — HIGH (ref 10.3–14.5)
SODIUM SERPL-SCNC: 142 MMOL/L — SIGNIFICANT CHANGE UP (ref 135–145)
WBC # BLD: 9.18 K/UL — SIGNIFICANT CHANGE UP (ref 3.8–10.5)
WBC # FLD AUTO: 9.18 K/UL — SIGNIFICANT CHANGE UP (ref 3.8–10.5)

## 2022-11-27 PROCEDURE — 99233 SBSQ HOSP IP/OBS HIGH 50: CPT

## 2022-11-27 RX ADMIN — LIDOCAINE 1 PATCH: 4 CREAM TOPICAL at 11:32

## 2022-11-27 RX ADMIN — Medication 650 MILLIGRAM(S): at 22:32

## 2022-11-27 RX ADMIN — BENZOCAINE AND MENTHOL 1 LOZENGE: 5; 1 LIQUID ORAL at 22:02

## 2022-11-27 RX ADMIN — Medication 10 MILLIGRAM(S): at 05:45

## 2022-11-27 RX ADMIN — APIXABAN 2.5 MILLIGRAM(S): 2.5 TABLET, FILM COATED ORAL at 05:45

## 2022-11-27 RX ADMIN — NYSTATIN CREAM 1 APPLICATION(S): 100000 CREAM TOPICAL at 18:32

## 2022-11-27 RX ADMIN — Medication 325 MILLIGRAM(S): at 11:31

## 2022-11-27 RX ADMIN — Medication 3 MILLIGRAM(S): at 00:59

## 2022-11-27 RX ADMIN — APIXABAN 2.5 MILLIGRAM(S): 2.5 TABLET, FILM COATED ORAL at 18:32

## 2022-11-27 RX ADMIN — LIDOCAINE 1 PATCH: 4 CREAM TOPICAL at 19:57

## 2022-11-27 RX ADMIN — LIDOCAINE 1 PATCH: 4 CREAM TOPICAL at 19:59

## 2022-11-27 RX ADMIN — ATORVASTATIN CALCIUM 20 MILLIGRAM(S): 80 TABLET, FILM COATED ORAL at 21:39

## 2022-11-27 RX ADMIN — LIDOCAINE 1 PATCH: 4 CREAM TOPICAL at 11:31

## 2022-11-27 RX ADMIN — PANTOPRAZOLE SODIUM 40 MILLIGRAM(S): 20 TABLET, DELAYED RELEASE ORAL at 05:45

## 2022-11-27 RX ADMIN — Medication 650 MILLIGRAM(S): at 22:02

## 2022-11-27 RX ADMIN — Medication 1 MILLIGRAM(S): at 11:30

## 2022-11-27 RX ADMIN — Medication 150 MICROGRAM(S): at 05:46

## 2022-11-27 RX ADMIN — SENNA PLUS 2 TABLET(S): 8.6 TABLET ORAL at 21:39

## 2022-11-27 RX ADMIN — ZINC OXIDE 1 APPLICATION(S): 200 OINTMENT TOPICAL at 11:35

## 2022-11-27 NOTE — PROGRESS NOTE ADULT - ASSESSMENT
76F with hx of HTN, HLD, heart murmur, SLE, RA, hx of lung and thyroid CA, DVT,  multiple hip surgeries who presented from home for inability to walk right after being discharged from rehab.  PT sent to rehab on 11/17, returned COVID positive    Covid positive  - minor symptoms - continue symptomatic treatment  - no hypoxia  - S/p  Remdesivir course. ID following     RA/SLE/ possible PMR  - continue prednisone   - continue weekly methotrexate  - improvement in ESR.    - follow up with primary Rheum after discharge    Right hip revision  - pain control as needed  - ortho - Dr Leach aware pt is here but patient does not have acute ortho issue to be addressed at this time.   - cont with eliquis 2.5mg BID  - bowel regimen  - PT/OT    HTN/HLD  - cont with statin    Hypothyroidism  - cont with synthroid    Hx of DVT  - will be on eliquis    Hx of blocked PACs and Mobitz I  - cardiology eval appreciated  - suggest outpatient TAMMY evaluation     Preventive measures  - already on eliquis  - will be on PPI    DC to [facility] probably Monday

## 2022-11-27 NOTE — PROGRESS NOTE ADULT - ASSESSMENT
The patient is a 76 year old female with a history of HTN, HL, RA, DVT, SLE, lung cancer s/p lobectomy who is admitted with COVID.    Plan:  - Echo 10/22 with normal LV systolic function, mild AS  - Telemetry reviewed; similar to prior admissions, there is intermittent sinus bradycardia, blocked PACs, and Mobitz 1. This is more present while the patient is asleep.  - There may be a component of TAMMY contributing to rhythm issues  - No indication for pacemaker at the current time  - Would avoid AV eliz blocking agents  - COVID positive  - Completed remdesivir

## 2022-11-27 NOTE — DIETITIAN INITIAL EVALUATION ADULT - REASON FOR ADMISSION
As per H&P "The pt is a 75 y/o F with PMH of HTN, Dyslipidemia, DVT, Lung CA s/p lobectomy, Hypothyroidism, SLE, RA on Methotrexate & Prednisone, COVID-19 infection in 2020, Post-Op Anemia s/p transfusion on PO iron therapy, and Obesity who was sent from rehab facility for positive COVID-19 PCR. Patient was discharged to rehab after revision total right hip arthroplasty performed on October 10th for a periprosthetic fracture, developed upper respiratory symptoms on Friday, tested positive for COVID-19 on Monday, then again today, was told that no MDs at the facility during the thanksgiving holiday & over the weekend, so she was sent to the hospital, reports "some' productive cough with this clear sputum, no SOB, no fever or chills. Patient is unhappy with the her current rehab facility, and requests to be discharged to a different one."

## 2022-11-27 NOTE — DIETITIAN INITIAL EVALUATION ADULT - ORAL INTAKE PTA/DIET HISTORY
Pt seen at bedside, reports good appetite but fair PO intake PTA due dislikes foods provided in rehab facility. Pt states following a regular diet with no therapeutic restrictions. Reports NKFA and taking Multivitamin supplement PTA.

## 2022-11-27 NOTE — DIETITIAN INITIAL EVALUATION ADULT - PERTINENT MEDS FT
MEDICATIONS  (STANDING):  apixaban 2.5 milliGRAM(s) Oral every 12 hours  atorvastatin 20 milliGRAM(s) Oral at bedtime  ferrous    sulfate 325 milliGRAM(s) Oral daily  folic acid 1 milliGRAM(s) Oral daily  levothyroxine 150 MICROGram(s) Oral daily  lidocaine   4% Patch 1 Patch Transdermal daily  lidocaine   4% Patch 1 Patch Transdermal daily  methotrexate 10 milliGRAM(s) Oral <User Schedule>  nystatin Powder 1 Application(s) Topical two times a day  pantoprazole    Tablet 40 milliGRAM(s) Oral before breakfast  polyethylene glycol 3350 17 Gram(s) Oral at bedtime  predniSONE   Tablet 10 milliGRAM(s) Oral daily  senna 2 Tablet(s) Oral at bedtime  zinc oxide 20% Ointment 1 Application(s) Topical daily    MEDICATIONS  (PRN):  acetaminophen     Tablet .. 650 milliGRAM(s) Oral every 6 hours PRN Temp greater or equal to 38C (100.4F), Mild Pain (1 - 3)  benzocaine 15 mG/menthol 3.6 mG Lozenge 1 Lozenge Oral every 3 hours PRN Sore Throat  benzonatate 100 milliGRAM(s) Oral every 8 hours PRN Cough  calcium carbonate    500 mG (Tums) Chewable 1 Tablet(s) Chew daily PRN Dyspepsia  melatonin 3 milliGRAM(s) Oral at bedtime PRN Insomnia  sodium chloride 0.65% Nasal 1 Spray(s) Both Nostrils every 3 hours PRN Nasal Congestion

## 2022-11-27 NOTE — DIETITIAN INITIAL EVALUATION ADULT - PERTINENT LABORATORY DATA
11-27    142  |  107  |  19  ----------------------------<  91  3.8   |  29  |  0.85    Ca    8.2<L>      27 Nov 2022 06:00    TPro  5.9<L>  /  Alb  2.2<L>  /  TBili  0.2  /  DBili  x   /  AST  17  /  ALT  14  /  AlkPhos  170<H>  11-27  A1C with Estimated Average Glucose Result: 5.5 % (04-26-22 @ 13:00)

## 2022-11-27 NOTE — DIETITIAN INITIAL EVALUATION ADULT - OTHER INFO
Weight: Pt reports a current wt of 220 lbs with no noted significant wt changes. Upon admission pt with a wt of 225 lbs (11/23), obtained bed scale weight of 225.9(11/27). Will continue to monitor weights and trend as available.     Pt seen for nutrition assessment secondary to length of stay policy, presents with Covid+ remains on contact precautions. Presently on a DASH/TLC diet denies difficulty chewing/ swallowing of foods at this time. Pt reports consuming >75% of meals provided in-house and happy with service provided by food and nutrition department. Pt made aware food preferences to be obtained daily to optimize/ maintain PO intake. No acute GI distress reported at this time, per pt last BM on (11/26) on bowel regimen (Miralax, senna).     Provided general healthful nutrition therapy, discussed importance of well balanced meals, proper portion sizes, and importance of adequate protein intake to optimize nutrition status. Pt verbalized understating, with no nutrition-related questions at this time. Per documents possible D/C to [facility] on monday, pt made aware RD remains available as needed.

## 2022-11-28 ENCOUNTER — TRANSCRIPTION ENCOUNTER (OUTPATIENT)
Age: 77
End: 2022-11-28

## 2022-11-28 LAB
ALBUMIN SERPL ELPH-MCNC: 2.4 G/DL — LOW (ref 3.3–5)
ALP SERPL-CCNC: 187 U/L — HIGH (ref 30–120)
ALT FLD-CCNC: 14 U/L DA — SIGNIFICANT CHANGE UP (ref 10–60)
ANION GAP SERPL CALC-SCNC: 5 MMOL/L — SIGNIFICANT CHANGE UP (ref 5–17)
AST SERPL-CCNC: 18 U/L — SIGNIFICANT CHANGE UP (ref 10–40)
BILIRUB SERPL-MCNC: 0.3 MG/DL — SIGNIFICANT CHANGE UP (ref 0.2–1.2)
BUN SERPL-MCNC: 20 MG/DL — SIGNIFICANT CHANGE UP (ref 7–23)
CALCIUM SERPL-MCNC: 8.6 MG/DL — SIGNIFICANT CHANGE UP (ref 8.4–10.5)
CHLORIDE SERPL-SCNC: 107 MMOL/L — SIGNIFICANT CHANGE UP (ref 96–108)
CO2 SERPL-SCNC: 30 MMOL/L — SIGNIFICANT CHANGE UP (ref 22–31)
CREAT SERPL-MCNC: 0.83 MG/DL — SIGNIFICANT CHANGE UP (ref 0.5–1.3)
EGFR: 73 ML/MIN/1.73M2 — SIGNIFICANT CHANGE UP
GLUCOSE SERPL-MCNC: 116 MG/DL — HIGH (ref 70–99)
HCT VFR BLD CALC: 39.8 % — SIGNIFICANT CHANGE UP (ref 34.5–45)
HGB BLD-MCNC: 12.2 G/DL — SIGNIFICANT CHANGE UP (ref 11.5–15.5)
MAGNESIUM SERPL-MCNC: 1.9 MG/DL — SIGNIFICANT CHANGE UP (ref 1.6–2.6)
MCHC RBC-ENTMCNC: 29.4 PG — SIGNIFICANT CHANGE UP (ref 27–34)
MCHC RBC-ENTMCNC: 30.7 GM/DL — LOW (ref 32–36)
MCV RBC AUTO: 95.9 FL — SIGNIFICANT CHANGE UP (ref 80–100)
NRBC # BLD: 0 /100 WBCS — SIGNIFICANT CHANGE UP (ref 0–0)
PHOSPHATE SERPL-MCNC: 3.8 MG/DL — SIGNIFICANT CHANGE UP (ref 2.5–4.5)
PLATELET # BLD AUTO: 287 K/UL — SIGNIFICANT CHANGE UP (ref 150–400)
POTASSIUM SERPL-MCNC: 4.3 MMOL/L — SIGNIFICANT CHANGE UP (ref 3.5–5.3)
POTASSIUM SERPL-SCNC: 4.3 MMOL/L — SIGNIFICANT CHANGE UP (ref 3.5–5.3)
PROT SERPL-MCNC: 6.4 G/DL — SIGNIFICANT CHANGE UP (ref 6–8.3)
RBC # BLD: 4.15 M/UL — SIGNIFICANT CHANGE UP (ref 3.8–5.2)
RBC # FLD: 15.4 % — HIGH (ref 10.3–14.5)
SODIUM SERPL-SCNC: 142 MMOL/L — SIGNIFICANT CHANGE UP (ref 135–145)
WBC # BLD: 9.15 K/UL — SIGNIFICANT CHANGE UP (ref 3.8–10.5)
WBC # FLD AUTO: 9.15 K/UL — SIGNIFICANT CHANGE UP (ref 3.8–10.5)

## 2022-11-28 PROCEDURE — 99232 SBSQ HOSP IP/OBS MODERATE 35: CPT

## 2022-11-28 RX ADMIN — Medication 3 MILLIGRAM(S): at 02:46

## 2022-11-28 RX ADMIN — LIDOCAINE 1 PATCH: 4 CREAM TOPICAL at 11:43

## 2022-11-28 RX ADMIN — Medication 100 MILLIGRAM(S): at 06:18

## 2022-11-28 RX ADMIN — Medication 325 MILLIGRAM(S): at 11:43

## 2022-11-28 RX ADMIN — ZINC OXIDE 1 APPLICATION(S): 200 OINTMENT TOPICAL at 11:44

## 2022-11-28 RX ADMIN — APIXABAN 2.5 MILLIGRAM(S): 2.5 TABLET, FILM COATED ORAL at 17:03

## 2022-11-28 RX ADMIN — LIDOCAINE 1 PATCH: 4 CREAM TOPICAL at 19:07

## 2022-11-28 RX ADMIN — Medication 1 MILLIGRAM(S): at 11:43

## 2022-11-28 RX ADMIN — NYSTATIN CREAM 1 APPLICATION(S): 100000 CREAM TOPICAL at 17:03

## 2022-11-28 RX ADMIN — METHOTREXATE 10 MILLIGRAM(S): 2.5 TABLET ORAL at 07:58

## 2022-11-28 RX ADMIN — LIDOCAINE 1 PATCH: 4 CREAM TOPICAL at 11:44

## 2022-11-28 RX ADMIN — LIDOCAINE 1 PATCH: 4 CREAM TOPICAL at 02:16

## 2022-11-28 RX ADMIN — Medication 150 MICROGRAM(S): at 06:20

## 2022-11-28 RX ADMIN — ATORVASTATIN CALCIUM 20 MILLIGRAM(S): 80 TABLET, FILM COATED ORAL at 21:45

## 2022-11-28 RX ADMIN — Medication 10 MILLIGRAM(S): at 06:18

## 2022-11-28 RX ADMIN — Medication 3 MILLIGRAM(S): at 21:45

## 2022-11-28 RX ADMIN — SENNA PLUS 2 TABLET(S): 8.6 TABLET ORAL at 21:45

## 2022-11-28 RX ADMIN — PANTOPRAZOLE SODIUM 40 MILLIGRAM(S): 20 TABLET, DELAYED RELEASE ORAL at 06:18

## 2022-11-28 RX ADMIN — APIXABAN 2.5 MILLIGRAM(S): 2.5 TABLET, FILM COATED ORAL at 06:18

## 2022-11-28 RX ADMIN — LIDOCAINE 1 PATCH: 4 CREAM TOPICAL at 23:30

## 2022-11-28 NOTE — DISCHARGE NOTE PROVIDER - ATTENDING DISCHARGE PHYSICAL EXAMINATION:
PHYSICAL EXAM:  Vital Signs Last 24 Hrs  T(C): 36.3 (02 Dec 2022 05:13), Max: 36.7 (01 Dec 2022 14:36)  T(F): 97.3 (02 Dec 2022 05:13), Max: 98 (01 Dec 2022 14:36)  HR: 58 (02 Dec 2022 05:13) (58 - 70)  BP: 129/51 (02 Dec 2022 05:13) (97/52 - 129/51)  BP(mean): --  RR: 18 (02 Dec 2022 05:13) (18 - 18)  SpO2: 97% (02 Dec 2022 05:13) (95% - 97%)    Parameters below as of 02 Dec 2022 05:13  Patient On (Oxygen Delivery Method): room air    GENERAL: NAD, well-groomed, well-developed  HEAD:  Atraumatic, Normocephalic  EYES:  conjunctiva and sclera clear  ENMT: Moist mucous membranes  NECK: Supple, No JVD  NERVOUS SYSTEM:  Alert & Oriented X3, Good concentration; Motor Strength 5/5 B/L upper and lower extremities;  CHEST/LUNG: Clear to auscultation bilaterally; No rales, rhonchi, wheezing, or rubs  HEART: Regular rate and rhythm; No murmurs, rubs, or gallops  ABDOMEN: Soft, Nontender, Nondistended; Bowel sounds present  EXTREMITIES:  2+ Peripheral Pulses, No clubbing, cyanosis, or edema

## 2022-11-28 NOTE — DISCHARGE NOTE PROVIDER - NSDCFUSCHEDAPPT_GEN_ALL_CORE_FT
Josselin Ramirez Physician [facility]  CARDIOLOGY [address]  Scheduled Appointment: 12/19/2022

## 2022-11-28 NOTE — DISCHARGE NOTE PROVIDER - PROVIDER TOKENS
PROVIDER:[TOKEN:[[id number]],FOLLOWUP:[2 weeks]],PROVIDER:[TOKEN:[[id number]],FOLLOWUP:[2 weeks]],PROVIDER:[TOKEN:[[id number]]],PROVIDER:[TOKEN:[[id number]]]

## 2022-11-28 NOTE — DISCHARGE NOTE PROVIDER - CARE PROVIDER_API CALL
WILLY ACUNA  Orthopaedic Surgery  [address], [address] [place] [address]  Phone: [phone]  Fax: ()-  Follow Up Time: 2 weeks    KENNEDI IYV  Rheumatology  [address] [place] [address]  Phone: [phone]  Fax: [phone]  Follow Up Time: 2 weeks    Scott Dan  [facility]  [address] [facility], [place] [address]  Phone: [phone]  Fax: [phone]  Follow Up Time:     Josselin Ramirez)  Cardiovascular Disease; Interventional Cardiology  [address] [place] [address]  Phone: [phone]  Fax: [phone]  Follow Up Time:

## 2022-11-28 NOTE — DISCHARGE NOTE PROVIDER - HOSPITAL COURSE
77 y/o F with PMH of HTN, Dyslipidemia, DVT, Lung CA s/p lobectomy, Hypothyroidism, SLE, RA on Methotrexate & Prednisone, COVID-19 infection in 2020, Post-Op Anemia s/p transfusion on PO iron therapy, and Obesity who was sent from rehab facility for positive COVID-19 PCR. Patient was discharged to rehab after revision total right hip arthroplasty performed on October 10th for a periprosthetic fracture, developed upper respiratory symptoms on Friday, tested positive for COVID-19 on Monday, then again today, was told that no MDs at the facility during the thanksgiving holiday & over the weekend, so she was sent to the hospital, reports "some' productive cough with this clear sputum, no SOB, no fever or chills. Patient is unhappy with the her current rehab facility, and requests to be discharged to a different one.     Covid positive  - minor symptoms only  - no hypoxia  - S/p  Remdesivir course. ID following   - Day 0 = Nov 21, Day 10 = Dec 1; off isolation as of Dec 2    RA/SLE/ possible PMR  - continue prednisone   - continue weekly methotrexate  - improvement in ESR.    - follow up with primary Rheum after discharge    Right hip revision  - pain control as needed  - ortho - Dr Leach aware pt is here but patient does not have acute ortho issue to be addressed at this time.   - cont with eliquis 2.5mg BID  - bowel regimen  - PT/OT    HTN/HLD  - cont with statin    Hypothyroidism  - cont with synthroid    Hx of DVT  - will be on eliquis    Hx of blocked PACs and Mobitz I  - cardiology eval appreciated  - suggest outpatient TAMMY evaluation   - can dc telemetry    Preventive measures  - already on eliquis  - will be on PPI    75 y/o F with PMH of HTN, Dyslipidemia, DVT, Lung CA s/p lobectomy, Hypothyroidism, SLE, RA on Methotrexate & Prednisone, COVID-19 infection in 2020, Post-Op Anemia s/p transfusion on PO iron therapy, and Obesity who was sent from rehab facility for positive COVID-19 PCR. Patient was discharged to rehab after revision total right hip arthroplasty performed on October 10th for a periprosthetic fracture, developed upper respiratory symptoms on Friday, tested positive for COVID-19 on Monday, then again today, was told that no MDs at the facility during the thanksgiving holiday & over the weekend, so she was sent to the hospital, reports "some' productive cough with this clear sputum, no SOB, no fever or chills. Patient is unhappy with the her current rehab facility, and requests to be discharged to a different one.     Covid positive  - minor symptoms only  - no hypoxia  - S/p  Remdesivir course. ID following   - Day 0 = Nov 21, Day 10 = Dec 1; off isolation as of Dec 2    RA/SLE/ possible PMR  - continue prednisone   - continue weekly methotrexate  - improvement in ESR.    - follow up with primary Rheum after discharge    Right hip revision  - pain control as needed  - ortho - Dr Leach follow up appreciated  - cont with eliquis 2.5mg BID  - bowel regimen  - PT/OT    HTN/HLD  - cont with statin    Hypothyroidism  - cont with synthroid    Hx of DVT  - will be on eliquis    Hx of blocked PACs and Mobitz I  - cardiology eval appreciated  - suggest outpatient TAMMY evaluation   - can dc telemetry    Preventive measures  - already on eliquis  - will be on PPI

## 2022-11-28 NOTE — DISCHARGE NOTE PROVIDER - CARE PROVIDERS DIRECT ADDRESSES
,DirectAddress_Unknown,DirectAddress_Unknown,DirectAddress_Unknown,[email or web address]

## 2022-11-28 NOTE — DISCHARGE NOTE PROVIDER - NSDCCPCAREPLAN_GEN_ALL_CORE_FT
PRINCIPAL DISCHARGE DIAGNOSIS  Diagnosis: COVID-19  Assessment and Plan of Treatment: - minor symptoms only  - no hypoxia  - S/p  Remdesivir course  - Day 0 = Nov 21, Day 10 = Dec 1; off isolation as of Dec 2      SECONDARY DISCHARGE DIAGNOSES  Diagnosis: Right hip pain  Assessment and Plan of Treatment: Physical Therapy/Occupational Therapy for: Ambulation, transfers, stairs, & ADLs, isometrics.  Full weight bearing on both legs; Walker/cane use as instructed by Physical therapy/Occupational therapy.  Anterior Total Hip Replacement precautions for  6 weeks:  - no abductor exercises   - No straight leg raise;  - No external rotation of hip when extended-standing or lying flat; No hyperextension of hip when standing (kickback).  - Use pain medication if needed as prescribed.  Ice packs are a helpful addition to your comfort.  Applying a  waterproof ice 20 minutes on alternating with 20 minutes off for 48 hours post op pack over a cloth or thin towel to the site for 30 minutes per hour may provide benefit by reducing swelling and discomfort.  -Take short, frequent walks increasing the distance that you walk each day as tolerated.  - Change your position every hour to decrease pain and stiffness.  - Continue the exercises taught to you by your physical therapist.  - No driving until cleared by the doctor.  - No tub baths, hot tubs, or swimming pools until instructed by your doctor.      Diagnosis: Rheumatoid arthritis  Assessment and Plan of Treatment: continue methotrexate 10mg every tuesday;  prednisone 15mg daily through 11/20; then decrease to 10mg daily, then follow up with Dr Uriarte.  folic acid daily while on methotrexate.     PRINCIPAL DISCHARGE DIAGNOSIS  Diagnosis: COVID-19  Assessment and Plan of Treatment: - minor symptoms only  - no hypoxia  - S/p  Remdesivir course  - Day 0 = Nov 21, Day 10 = Dec 1; off isolation as of Dec 2      SECONDARY DISCHARGE DIAGNOSES  Diagnosis: Right hip pain  Assessment and Plan of Treatment: Physical Therapy/Occupational Therapy for: Ambulation, transfers, stairs, & ADLs, isometrics.  Full weight bearing on both legs; Walker/cane use as instructed by Physical therapy/Occupational therapy.  Anterior Total Hip Replacement precautions for  8 weeks from date of surgery (10/20/22):  - no abductor exercises   - No straight leg raise;  - No external rotation of hip when extended-standing or lying flat; No hyperextension of hip when standing (kickback).  - Use pain medication if needed as prescribed.  Ice packs are a helpful addition to your comfort.  Applying a  waterproof ice 20 minutes on alternating with 20 minutes off for 48 hours post op pack over a cloth or thin towel to the site for 30 minutes per hour may provide benefit by reducing swelling and discomfort.  -Take short, frequent walks increasing the distance that you walk each day as tolerated.  - Change your position every hour to decrease pain and stiffness.  - Continue the exercises taught to you by your physical therapist.  - No driving until cleared by the doctor.  - No tub baths, hot tubs, or swimming pools until instructed by your doctor.      Diagnosis: Rheumatoid arthritis  Assessment and Plan of Treatment: continue methotrexate 10mg every tuesday;  prednisone 15mg daily through 11/20; then decrease to 10mg daily, then follow up with Dr Uriarte.  folic acid daily while on methotrexate.

## 2022-11-28 NOTE — PROGRESS NOTE ADULT - ASSESSMENT
76F with hx of HTN, HLD, heart murmur, SLE, RA, hx of lung and thyroid CA, DVT,  multiple hip surgeries who presented from home for inability to walk right after being discharged from rehab.  PT sent to rehab on 11/17, returned COVID positive    Covid positive  - minor symptoms - continue symptomatic treatment  - no hypoxia  - S/p  Remdesivir course. ID following     RA/SLE/ possible PMR  - continue prednisone   - continue weekly methotrexate  - improvement in ESR.    - follow up with primary Rheum after discharge    Right hip revision  - pain control as needed  - ortho - Dr Leach aware pt is here but patient does not have acute ortho issue to be addressed at this time.   - cont with eliquis 2.5mg BID  - bowel regimen  - PT/OT    HTN/HLD  - cont with statin    Hypothyroidism  - cont with synthroid    Hx of DVT  - will be on eliquis    Hx of blocked PACs and Mobitz I  - cardiology eval appreciated  - suggest outpatient TAMMY evaluation   - can dc telemetry    Preventive measures  - already on eliquis  - will be on PPI    Medically stable for discharge to rehab.      76F with hx of HTN, HLD, heart murmur, SLE, RA, hx of lung and thyroid CA, DVT,  multiple hip surgeries who presented from home for inability to walk right after being discharged from rehab.  PT sent to rehab on 11/17, returned COVID positive    Covid positive  - minor symptoms - continue symptomatic treatment  - no hypoxia  - S/p  Remdesivir course. ID following   - Day 0 = Nov 21, Day 10 = Dec 1; off isolation as of Dec 2    RA/SLE/ possible PMR  - continue prednisone   - continue weekly methotrexate  - improvement in ESR.    - follow up with primary Rheum after discharge    Right hip revision  - pain control as needed  - ortho - Dr Leach aware pt is here but patient does not have acute ortho issue to be addressed at this time.   - cont with eliquis 2.5mg BID  - bowel regimen  - PT/OT    HTN/HLD  - cont with statin    Hypothyroidism  - cont with synthroid    Hx of DVT  - will be on eliquis    Hx of blocked PACs and Mobitz I  - cardiology eval appreciated  - suggest outpatient TAMMY evaluation   - can dc telemetry    Preventive measures  - already on eliquis  - will be on PPI    Medically stable for discharge to rehab.

## 2022-11-28 NOTE — DISCHARGE NOTE PROVIDER - NSDCMRMEDTOKEN_GEN_ALL_CORE_FT
acetaminophen 325 mg oral tablet: 2 tab(s) orally every 8 hours  apixaban 2.5 mg oral tablet: 1 tab(s) orally every 12 hours  atorvastatin 20 mg oral tablet: 1 tab(s) orally once a day (at bedtime)  calcium carbonate 500 mg (200 mg elemental calcium) oral tablet, chewable: 1 tab(s) orally once a day, As needed, Dyspepsia  ferrous sulfate 325 mg (65 mg elemental iron) oral tablet: 1 tab(s) orally once a day  folic acid 1 mg oral tablet: 1 tab(s) orally once a day  levothyroxine 150 mcg (0.15 mg) oral tablet: 1 tab(s) orally once a day  lidocaine 4% topical film: Apply topically to affected area once a day   melatonin 3 mg oral tablet: 2 tab(s) orally once a day (at bedtime)  methotrexate 2.5 mg oral tablet: 4 tab(s) orally once a week  nystatin 100,000 units/g topical powder: 1 application topically 2 times a day  pantoprazole 40 mg oral delayed release tablet: 1 tab(s) orally once a day (before a meal)  polyethylene glycol 3350 oral powder for reconstitution: 17 gram(s) orally once a day (at bedtime)  predniSONE: 15 milligram(s) orally once a day  senna leaf extract oral tablet: 2 tab(s) orally once a day (at bedtime)  ASHER compression stockings for BL legs : Wear stocking daily especially when out of bed    Rt calf 19&quot; (48cm)  Left calf 17&quot; (43cm)  zinc oxide 20% topical ointment: 1 application topically once a day   acetaminophen 325 mg oral tablet: 2 tab(s) orally every 8 hours  apixaban 2.5 mg oral tablet: 1 tab(s) orally every 12 hours  atorvastatin 20 mg oral tablet: 1 tab(s) orally once a day (at bedtime)  benzonatate 100 mg oral capsule: 1 cap(s) orally every 8 hours, As needed, Cough  calcium carbonate 500 mg (200 mg elemental calcium) oral tablet, chewable: 1 tab(s) orally once a day, As needed, Dyspepsia  ferrous sulfate 325 mg (65 mg elemental iron) oral tablet: 1 tab(s) orally once a day  folic acid 1 mg oral tablet: 1 tab(s) orally once a day  levothyroxine 150 mcg (0.15 mg) oral tablet: 1 tab(s) orally once a day  lidocaine 4% topical film: Apply topically to affected area once a day  melatonin 3 mg oral tablet: 2 tab(s) orally once a day (at bedtime)  menthol-benzocaine 3.6 mg-15 mg mucous membrane lozenge: 1  mucous membrane every 4 hours, As Needed  methotrexate 2.5 mg oral tablet: 4 tab(s) orally once a week  nystatin 100,000 units/g topical powder: 1 application topically 2 times a day  pantoprazole 40 mg oral delayed release tablet: 1 tab(s) orally once a day (before a meal)  polyethylene glycol 3350 oral powder for reconstitution: 17 gram(s) orally once a day (at bedtime)  predniSONE 10 mg oral tablet: 1 tab(s) orally once a day  senna leaf extract oral tablet: 2 tab(s) orally once a day (at bedtime)  sodium chloride 0.65% nasal spray: 1  nasal 2 times a day  ASHER compression stockings for BL legs : Wear stocking daily especially when out of bed    Rt calf 19&quot; (48cm)  Left calf 17&quot; (43cm)  zinc oxide 20% topical ointment: 1 application topically once a day

## 2022-11-29 PROCEDURE — 99232 SBSQ HOSP IP/OBS MODERATE 35: CPT

## 2022-11-29 RX ADMIN — Medication 1 MILLIGRAM(S): at 12:13

## 2022-11-29 RX ADMIN — LIDOCAINE 1 PATCH: 4 CREAM TOPICAL at 20:35

## 2022-11-29 RX ADMIN — Medication 325 MILLIGRAM(S): at 12:13

## 2022-11-29 RX ADMIN — APIXABAN 2.5 MILLIGRAM(S): 2.5 TABLET, FILM COATED ORAL at 05:31

## 2022-11-29 RX ADMIN — Medication 10 MILLIGRAM(S): at 05:32

## 2022-11-29 RX ADMIN — ZINC OXIDE 1 APPLICATION(S): 200 OINTMENT TOPICAL at 12:13

## 2022-11-29 RX ADMIN — APIXABAN 2.5 MILLIGRAM(S): 2.5 TABLET, FILM COATED ORAL at 17:10

## 2022-11-29 RX ADMIN — Medication 3 MILLIGRAM(S): at 21:22

## 2022-11-29 RX ADMIN — ATORVASTATIN CALCIUM 20 MILLIGRAM(S): 80 TABLET, FILM COATED ORAL at 21:22

## 2022-11-29 RX ADMIN — PANTOPRAZOLE SODIUM 40 MILLIGRAM(S): 20 TABLET, DELAYED RELEASE ORAL at 05:32

## 2022-11-29 RX ADMIN — SENNA PLUS 2 TABLET(S): 8.6 TABLET ORAL at 21:22

## 2022-11-29 RX ADMIN — LIDOCAINE 1 PATCH: 4 CREAM TOPICAL at 19:37

## 2022-11-29 RX ADMIN — Medication 650 MILLIGRAM(S): at 22:10

## 2022-11-29 RX ADMIN — LIDOCAINE 1 PATCH: 4 CREAM TOPICAL at 12:12

## 2022-11-29 RX ADMIN — NYSTATIN CREAM 1 APPLICATION(S): 100000 CREAM TOPICAL at 06:06

## 2022-11-29 RX ADMIN — LIDOCAINE 1 PATCH: 4 CREAM TOPICAL at 00:10

## 2022-11-29 RX ADMIN — Medication 150 MICROGRAM(S): at 05:31

## 2022-11-29 RX ADMIN — Medication 100 MILLIGRAM(S): at 21:23

## 2022-11-29 RX ADMIN — Medication 650 MILLIGRAM(S): at 21:23

## 2022-11-29 RX ADMIN — NYSTATIN CREAM 1 APPLICATION(S): 100000 CREAM TOPICAL at 17:11

## 2022-11-29 NOTE — PROGRESS NOTE ADULT - ASSESSMENT
76F with hx of HTN, HLD, heart murmur, SLE, RA, hx of lung and thyroid CA, DVT,  multiple hip surgeries who presented from home for inability to walk right after being discharged from rehab.  PT sent to rehab on 11/17, returned COVID positive    Covid positive  - minor symptoms - continue symptomatic treatment  - no hypoxia  - S/p  Remdesivir course. ID following   - Day 0 = Nov 21, Day 10 = Dec 1; off isolation as of Dec 2    RA/SLE/ possible PMR  - continue prednisone   - continue weekly methotrexate  - improvement in ESR.    - follow up with primary Rheum after discharge    Right hip revision  - pain control as needed  - ortho - Dr Leach aware pt is here but patient does not have acute ortho issue to be addressed at this time.   - cont with eliquis 2.5mg BID  - bowel regimen  - PT/OT    HTN/HLD  - cont with statin    Hypothyroidism  - cont with synthroid    Hx of DVT  - will be on eliquis    Hx of blocked PACs and Mobitz I  - cardiology eval appreciated  - suggest outpatient TAMMY evaluation   - does not need further tele or continuous pulse ox monitoring.      Preventive measures  - already on eliquis  - will be on PPI    Medically stable for discharge to rehab.

## 2022-11-29 NOTE — PROGRESS NOTE ADULT - ASSESSMENT
The patient is a 76 year old female with a history of HTN, HL, RA, DVT, SLE, lung cancer s/p lobectomy who is admitted with COVID.    Plan:  - Echo 10/22 with normal LV systolic function, mild AS  - Telemetry reviewed; similar to prior admissions, there is intermittent sinus bradycardia, blocked PACs, and Mobitz 1. This is more present while the patient is asleep.  - There may be a component of TAMMY contributing to rhythm issues  - No indication for pacemaker at the current time  - Would avoid AV eliz blocking agents  - COVID positive  - Completed remdesivir  - Discharge planning

## 2022-11-30 PROCEDURE — 99232 SBSQ HOSP IP/OBS MODERATE 35: CPT

## 2022-11-30 RX ADMIN — PANTOPRAZOLE SODIUM 40 MILLIGRAM(S): 20 TABLET, DELAYED RELEASE ORAL at 05:34

## 2022-11-30 RX ADMIN — LIDOCAINE 1 PATCH: 4 CREAM TOPICAL at 19:00

## 2022-11-30 RX ADMIN — Medication 325 MILLIGRAM(S): at 12:09

## 2022-11-30 RX ADMIN — Medication 150 MICROGRAM(S): at 05:34

## 2022-11-30 RX ADMIN — APIXABAN 2.5 MILLIGRAM(S): 2.5 TABLET, FILM COATED ORAL at 17:03

## 2022-11-30 RX ADMIN — NYSTATIN CREAM 1 APPLICATION(S): 100000 CREAM TOPICAL at 17:05

## 2022-11-30 RX ADMIN — APIXABAN 2.5 MILLIGRAM(S): 2.5 TABLET, FILM COATED ORAL at 05:35

## 2022-11-30 RX ADMIN — Medication 1 MILLIGRAM(S): at 12:09

## 2022-11-30 RX ADMIN — Medication 10 MILLIGRAM(S): at 05:34

## 2022-11-30 RX ADMIN — ZINC OXIDE 1 APPLICATION(S): 200 OINTMENT TOPICAL at 12:13

## 2022-11-30 RX ADMIN — LIDOCAINE 1 PATCH: 4 CREAM TOPICAL at 00:22

## 2022-11-30 RX ADMIN — Medication 3 MILLIGRAM(S): at 23:37

## 2022-11-30 RX ADMIN — NYSTATIN CREAM 1 APPLICATION(S): 100000 CREAM TOPICAL at 05:41

## 2022-11-30 RX ADMIN — LIDOCAINE 1 PATCH: 4 CREAM TOPICAL at 12:10

## 2022-11-30 RX ADMIN — ATORVASTATIN CALCIUM 20 MILLIGRAM(S): 80 TABLET, FILM COATED ORAL at 22:00

## 2022-11-30 NOTE — PROGRESS NOTE ADULT - ASSESSMENT
76F with hx of HTN, HLD, heart murmur, SLE, RA, hx of lung and thyroid CA, DVT,  multiple hip surgeries who presented from home for inability to walk right after being discharged from rehab.  PT sent to rehab on 11/17, returned COVID positive    Covid positive  - minor symptoms - continue symptomatic treatment  - no hypoxia  - S/p  Remdesivir course. ID following   - Day 0 = Nov 21, Day 10 = Dec 1; off isolation as of Dec 2    RA/SLE/ possible PMR  - continue prednisone   - continue weekly methotrexate  - improvement in ESR.    - follow up with primary Rheum after discharge    Right hip revision  - pain control as needed  - ortho - Dr Leach aware pt is here but patient does not have acute ortho issue to be addressed at this time.   - cont with eliquis 2.5mg BID  - bowel regimen  - PT/OT  - Ant hip precautions for 8 weeks from surgical date (10/20)    HTN/HLD  - cont with statin    Hypothyroidism  - cont with synthroid    Hx of DVT  - will be on eliquis    Hx of blocked PACs and Mobitz I  - cardiology eval appreciated  - suggest outpatient TAMMY evaluation   - does not need further tele or continuous pulse ox monitoring.      Preventive measures  - already on eliquis  - will be on PPI    Medically stable for discharge to rehab vs home with home care and assistance.

## 2022-12-01 PROCEDURE — 99232 SBSQ HOSP IP/OBS MODERATE 35: CPT

## 2022-12-01 RX ADMIN — Medication 650 MILLIGRAM(S): at 22:00

## 2022-12-01 RX ADMIN — LIDOCAINE 1 PATCH: 4 CREAM TOPICAL at 12:32

## 2022-12-01 RX ADMIN — Medication 650 MILLIGRAM(S): at 01:39

## 2022-12-01 RX ADMIN — SENNA PLUS 2 TABLET(S): 8.6 TABLET ORAL at 21:42

## 2022-12-01 RX ADMIN — PANTOPRAZOLE SODIUM 40 MILLIGRAM(S): 20 TABLET, DELAYED RELEASE ORAL at 06:35

## 2022-12-01 RX ADMIN — LIDOCAINE 1 PATCH: 4 CREAM TOPICAL at 00:10

## 2022-12-01 RX ADMIN — Medication 150 MICROGRAM(S): at 06:35

## 2022-12-01 RX ADMIN — LIDOCAINE 1 PATCH: 4 CREAM TOPICAL at 19:00

## 2022-12-01 RX ADMIN — Medication 650 MILLIGRAM(S): at 21:45

## 2022-12-01 RX ADMIN — Medication 650 MILLIGRAM(S): at 01:09

## 2022-12-01 RX ADMIN — ATORVASTATIN CALCIUM 20 MILLIGRAM(S): 80 TABLET, FILM COATED ORAL at 21:42

## 2022-12-01 RX ADMIN — NYSTATIN CREAM 1 APPLICATION(S): 100000 CREAM TOPICAL at 06:36

## 2022-12-01 RX ADMIN — Medication 1 MILLIGRAM(S): at 12:33

## 2022-12-01 RX ADMIN — APIXABAN 2.5 MILLIGRAM(S): 2.5 TABLET, FILM COATED ORAL at 06:36

## 2022-12-01 RX ADMIN — Medication 10 MILLIGRAM(S): at 06:35

## 2022-12-01 RX ADMIN — NYSTATIN CREAM 1 APPLICATION(S): 100000 CREAM TOPICAL at 18:43

## 2022-12-01 RX ADMIN — Medication 3 MILLIGRAM(S): at 21:41

## 2022-12-01 RX ADMIN — APIXABAN 2.5 MILLIGRAM(S): 2.5 TABLET, FILM COATED ORAL at 18:38

## 2022-12-01 RX ADMIN — Medication 325 MILLIGRAM(S): at 12:33

## 2022-12-01 NOTE — CHART NOTE - NSCHARTNOTEFT_GEN_A_CORE
Nutrition Follow Up Note  Patient seen for: Nutrition follow up     Chart reviewed, events noted: As per chart "The pt is a 76F with hx of HTN, HLD, heart murmur, SLE, RA, hx of lung and thyroid CA, DVT,  multiple hip surgeries who presented from home for inability to walk right after being discharged from rehab.  PT sent to rehab on 11/17, returned COVID positive."    12/1  Pt due for nutrition follow up, seen at bedside OOB at time of visit and continues on contact precautions secondary to covid+. Pt presently on a DASH/TLC diet with good appetite/PO intake, per pt consuming ~75% of meals provided in-house.  Reports episodes of diarrhea on (11/30) but currently resolved, on bowel regimen (Miralax and senna). Pt made aware food preferences to be obtained daily to maintain PO intake, pt verbalized understating with no nutrition-related questions at this time. Made aware RD remains available as needed and will continue to follow up per protocol.     Source: [x] Patient       [x] EMR        [] RN        [] Family at bedside       [] Other:    -If unable to interview patient: [] Trach/Vent/BiPAP  [] Disoriented/confused/inappropriate to interview    Diet Order:   Diet, Regular:   DASH/TLC {Sodium & Cholesterol Restricted} (11-23-22)    - Is current order appropriate/adequate? [x] Yes  []  No:     - PO intake :   [x] >75%  Adequate    [] 50-75%  Fair       [] <50%  Poor    Weights: 225 lbs (11/23)- OOB to chair, unable to obtain new weight. Will continue to monitor weights and trend as available.     Nutritionally Pertinent MEDICATIONS  (STANDING):  atorvastatin  ferrous    sulfate  folic acid  levothyroxine  methotrexate  pantoprazole    Tablet  polyethylene glycol 3350  predniSONE   Tablet  senna    Skin per nursing documentation: Free of pressure injuries   Edema: No noted edema as per flow sheets.     Estimated Needs:   [x] no change since previous assessment  [] recalculated:     Previous Nutrition Diagnosis: Overweight/obesity   Nutrition Diagnosis is: [x] ongoing  [] resolved [] not applicable- Education and presently on DASH/TLC diet     New Nutrition Diagnosis: [x] Not applicable    Nutrition Care Plan:  [x] In Progress  [] Achieved  [] Not applicable    Nutrition Interventions:     Education Provided:       [x] Yes:  [] No: Discussed importance of consuming adequate protein intake at meals to optimize nutrition status.        Recommendations:         [x] Continue current diet order and monitor tolerance      [x] RD to remain available    Monitoring and Evaluation:   Continue to monitor nutritional intake, tolerance to diet prescription, weights, labs, skin integrity      RD remains available upon request and will follow up per protocol

## 2022-12-01 NOTE — PROGRESS NOTE ADULT - ASSESSMENT
76F with hx of HTN, HLD, heart murmur, SLE, RA, hx of lung and thyroid CA, DVT,  multiple hip surgeries who presented from home for inability to walk right after being discharged from rehab.  PT sent to rehab on 11/17, returned COVID positive    Covid positive  - minor symptoms - continue symptomatic treatment  - no hypoxia  - S/p  Remdesivir course. ID following   - Day 0 = Nov 21, Day 10 = Dec 1; off isolation as of midnight tonight    RA/SLE/ possible PMR  - continue prednisone   - continue weekly methotrexate  - improvement in ESR.    - follow up with primary Rheum after discharge    Right hip revision  - pain control as needed  - ortho - Dr Leach follow up appreciated.   - cont with eliquis 2.5mg BID  - bowel regimen  - PT/OT  - Ant hip precautions for 8 weeks from surgical date (10/20)    HTN/HLD  - cont with statin    Hypothyroidism  - cont with synthroid    Hx of DVT  - will be on eliquis    Hx of blocked PACs and Mobitz I  - cardiology eval appreciated  - suggest outpatient TAMMY evaluation   - does not need further tele or continuous pulse ox monitoring.      Preventive measures  - already on eliquis  - will be on PPI    Medically stable for discharge to rehab vs home with home care and assistance.

## 2022-12-02 ENCOUNTER — TRANSCRIPTION ENCOUNTER (OUTPATIENT)
Age: 77
End: 2022-12-02

## 2022-12-02 VITALS
HEART RATE: 88 BPM | SYSTOLIC BLOOD PRESSURE: 164 MMHG | TEMPERATURE: 98 F | DIASTOLIC BLOOD PRESSURE: 77 MMHG | OXYGEN SATURATION: 94 % | RESPIRATION RATE: 18 BRPM

## 2022-12-02 PROCEDURE — 96360 HYDRATION IV INFUSION INIT: CPT

## 2022-12-02 PROCEDURE — 80048 BASIC METABOLIC PNL TOTAL CA: CPT

## 2022-12-02 PROCEDURE — 80053 COMPREHEN METABOLIC PANEL: CPT

## 2022-12-02 PROCEDURE — 93005 ELECTROCARDIOGRAM TRACING: CPT

## 2022-12-02 PROCEDURE — 97165 OT EVAL LOW COMPLEX 30 MIN: CPT

## 2022-12-02 PROCEDURE — 87637 SARSCOV2&INF A&B&RSV AMP PRB: CPT

## 2022-12-02 PROCEDURE — 86140 C-REACTIVE PROTEIN: CPT

## 2022-12-02 PROCEDURE — 36415 COLL VENOUS BLD VENIPUNCTURE: CPT

## 2022-12-02 PROCEDURE — 83615 LACTATE (LD) (LDH) ENZYME: CPT

## 2022-12-02 PROCEDURE — 84484 ASSAY OF TROPONIN QUANT: CPT

## 2022-12-02 PROCEDURE — 99238 HOSP IP/OBS DSCHRG MGMT 30/<: CPT

## 2022-12-02 PROCEDURE — 85652 RBC SED RATE AUTOMATED: CPT

## 2022-12-02 PROCEDURE — 71045 X-RAY EXAM CHEST 1 VIEW: CPT

## 2022-12-02 PROCEDURE — 82728 ASSAY OF FERRITIN: CPT

## 2022-12-02 PROCEDURE — 97530 THERAPEUTIC ACTIVITIES: CPT

## 2022-12-02 PROCEDURE — 97161 PT EVAL LOW COMPLEX 20 MIN: CPT

## 2022-12-02 PROCEDURE — 97535 SELF CARE MNGMENT TRAINING: CPT

## 2022-12-02 PROCEDURE — 83735 ASSAY OF MAGNESIUM: CPT

## 2022-12-02 PROCEDURE — 85025 COMPLETE CBC W/AUTO DIFF WBC: CPT

## 2022-12-02 PROCEDURE — 99285 EMERGENCY DEPT VISIT HI MDM: CPT

## 2022-12-02 PROCEDURE — 97116 GAIT TRAINING THERAPY: CPT

## 2022-12-02 PROCEDURE — 97110 THERAPEUTIC EXERCISES: CPT

## 2022-12-02 PROCEDURE — 84100 ASSAY OF PHOSPHORUS: CPT

## 2022-12-02 PROCEDURE — 85027 COMPLETE CBC AUTOMATED: CPT

## 2022-12-02 RX ORDER — FOLIC ACID 0.8 MG
1 TABLET ORAL
Qty: 0 | Refills: 0 | DISCHARGE
Start: 2022-12-02

## 2022-12-02 RX ORDER — SODIUM CHLORIDE 0.65 %
1 AEROSOL, SPRAY (ML) NASAL
Qty: 0 | Refills: 0 | DISCHARGE
Start: 2022-12-02

## 2022-12-02 RX ORDER — BENZOCAINE AND MENTHOL 5; 1 G/100ML; G/100ML
1 LIQUID ORAL
Qty: 0 | Refills: 0 | DISCHARGE
Start: 2022-12-02

## 2022-12-02 RX ORDER — LIDOCAINE 4 G/100G
1 CREAM TOPICAL
Qty: 0 | Refills: 0 | DISCHARGE
Start: 2022-12-02

## 2022-12-02 RX ADMIN — NYSTATIN CREAM 1 APPLICATION(S): 100000 CREAM TOPICAL at 07:30

## 2022-12-02 RX ADMIN — LIDOCAINE 1 PATCH: 4 CREAM TOPICAL at 00:25

## 2022-12-02 RX ADMIN — APIXABAN 2.5 MILLIGRAM(S): 2.5 TABLET, FILM COATED ORAL at 05:11

## 2022-12-02 RX ADMIN — Medication 10 MILLIGRAM(S): at 05:11

## 2022-12-02 RX ADMIN — LIDOCAINE 1 PATCH: 4 CREAM TOPICAL at 11:12

## 2022-12-02 RX ADMIN — ZINC OXIDE 1 APPLICATION(S): 200 OINTMENT TOPICAL at 11:12

## 2022-12-02 RX ADMIN — PANTOPRAZOLE SODIUM 40 MILLIGRAM(S): 20 TABLET, DELAYED RELEASE ORAL at 05:28

## 2022-12-02 RX ADMIN — Medication 325 MILLIGRAM(S): at 11:12

## 2022-12-02 RX ADMIN — Medication 150 MICROGRAM(S): at 05:11

## 2022-12-02 RX ADMIN — LIDOCAINE 1 PATCH: 4 CREAM TOPICAL at 11:11

## 2022-12-02 RX ADMIN — Medication 1 MILLIGRAM(S): at 11:12

## 2022-12-02 NOTE — PROGRESS NOTE ADULT - SUBJECTIVE AND OBJECTIVE BOX
KAI THOMAS is a 76yFemale , patient examined and chart reviewed.     INTERVAL HPI/ OVERNIGHT EVENTS:  No events. Afebrile.  In chair.    Past Medical History--  PAST MEDICAL & SURGICAL HISTORY:  Rheumatoid arthritis  SLE (systemic lupus erythematosus)  Osteoarthritis  H/O degenerative disc disease  Hypertension  Hyperlipidemia  Benign heart murmur  Hypothyroid  Obesity, Class II, BMI 35-39.9, no comorbidity  DVT (deep venous thrombosis)  during left knee replacement 2019  Overactive bladder  Lab test positive for detection of COVID-19 virus  12/20  S/P tonsillectomy  S/P cataract surgery  left, right  S/P eye surgery  child  S/P carpal tunnel release  left  S/P knee surgery  bilateral  Lung cancer  small tumor removed 2015-no chemo, no radiation  S/P thyroid surgery  1 lobe removed  Status post total hip replacement, right  S/P knee replacement  left  S/P lumbar spine operation  12/20        For details regarding the patient's social history, family history, and other miscellaneous elements, please refer the initial infectious diseases consultation and/or the admitting history and physical examination for this admission.    ROS:  CONSTITUTIONAL:  Negative fever or chills  EYES:  Negative  blurry vision or double vision  CARDIOVASCULAR:  Negative for chest pain or palpitations  RESPIRATORY:  Negative for cough, wheezing, or SOB   GASTROINTESTINAL:  Negative for nausea, vomiting, diarrhea, constipation, or abdominal pain  GENITOURINARY:  Negative frequency, urgency , dysuria or hematuria   NEUROLOGIC:  No headache, confusion, dizziness, lightheadedness  All other systems were reviewed and are negative       Current inpatient medications :    ANTIBIOTICS/RELEVANT:    MEDICATIONS  (STANDING):  apixaban 2.5 milliGRAM(s) Oral every 12 hours  atorvastatin 20 milliGRAM(s) Oral at bedtime  ferrous    sulfate 325 milliGRAM(s) Oral daily  folic acid 1 milliGRAM(s) Oral daily  levothyroxine 150 MICROGram(s) Oral daily  lidocaine   4% Patch 1 Patch Transdermal daily  lidocaine   4% Patch 1 Patch Transdermal daily  methotrexate 10 milliGRAM(s) Oral <User Schedule>  nystatin Powder 1 Application(s) Topical two times a day  pantoprazole    Tablet 40 milliGRAM(s) Oral before breakfast  polyethylene glycol 3350 17 Gram(s) Oral at bedtime  predniSONE   Tablet 10 milliGRAM(s) Oral daily  senna 2 Tablet(s) Oral at bedtime  zinc oxide 20% Ointment 1 Application(s) Topical daily    MEDICATIONS  (PRN):  acetaminophen     Tablet .. 650 milliGRAM(s) Oral every 6 hours PRN Temp greater or equal to 38C (100.4F), Mild Pain (1 - 3)  benzocaine 15 mG/menthol 3.6 mG Lozenge 1 Lozenge Oral every 3 hours PRN Sore Throat  benzonatate 100 milliGRAM(s) Oral every 8 hours PRN Cough  calcium carbonate    500 mG (Tums) Chewable 1 Tablet(s) Chew daily PRN Dyspepsia  melatonin 3 milliGRAM(s) Oral at bedtime PRN Insomnia  sodium chloride 0.65% Nasal 1 Spray(s) Both Nostrils every 3 hours PRN Nasal Congestion      Objective:  Vital Signs Last 24 Hrs  T(C): 36.8 (29 Nov 2022 10:17), Max: 37 (29 Nov 2022 01:55)  T(F): 98.2 (29 Nov 2022 10:17), Max: 98.6 (29 Nov 2022 01:55)  HR: 62 (29 Nov 2022 10:17) (49 - 71)  BP: 102/53 (29 Nov 2022 10:17) (102/53 - 123/63)  RR: 18 (29 Nov 2022 10:17) (18 - 20)  SpO2: 99% (29 Nov 2022 10:17) (95% - 100%)    Parameters below as of 29 Nov 2022 10:17  Patient On (Oxygen Delivery Method): room air    Physical Exam:  GEN: NAD, pleasant  HEENT: normocephalic and atraumatic.   NECK: Supple.   LUNGS: Clear to auscultation.  HEART: Regular rate and rhythm without murmur.  ABDOMEN: Soft, nontender, and nondistended.  Positive bowel sounds.    EXTREMITIES: Without any cyanosis, clubbing, rash, lesions or edema.  NEUROLOGIC: A & O x3, No focal neurological deficits   SKIN: No rash    LABS:                        12.2   9.15  )-----------( 287      ( 28 Nov 2022 09:30 )             39.8   11-28    142  |  107  |  20  ----------------------------<  116<H>  4.3   |  30  |  0.83    Ca    8.6      28 Nov 2022 09:30  Phos  3.8     11-28  Mg     1.9     11-28    TPro  6.4  /  Alb  2.4<L>  /  TBili  0.3  /  DBili  x   /  AST  18  /  ALT  14  /  AlkPhos  187<H>  11-28      MICROBIOLOGY:  Flu With COVID-19 By AMARILYS (11.23.22 @ 21:00)    SARS-CoV-2 Result: Detected     RADIOLOGY & ADDITIONAL STUDIES:  ACC: [id number] EXAM:  XR CHEST PORTABLE URGENT 1V                          PROCEDURE DATE:  11/23/2022          INTERPRETATION:  AP chest on November 23, 2022 at 9:20 PM. Patient has   cough and is positive for Covid.    Heart likely enlarged. Thoracolumbar lumbar hardware again noted.    There is a small atelectatic infiltrate at left base increased from   October 18 this year.    Assessment :   77YO F PMH of HTN, Dyslipidemia, DVT, Lung CA s/p lobectomy, Hypothyroidism, SLE, RA on Methotrexate & Prednisone, COVID-19 infection in 2020, Post-Op Anemia s/p transfusion on PO iron therapy, and Obesity sp recent revision total right hip arthroplasty October 10th for a periprosthetic fracture admitted for COVID-19 infection Pt is on RA.   Pt is vaccinated and boostered  Doing well    Plan :   Supportive care  Sp Remdesivir x 3 days  No role for Decadron at this time  Trend temps and cbc  Pulm toileting  Increase activity  Stable from ID standpoint  Dc planning to rehab    Continue with present regiment.  Appropriate use of antibiotics and adverse effects reviewed.      35 minutes reviewing notes, labs data/ imaging , discussion with multidisciplinary team.    Thank you for allowing me to participate in care of your patient .        Fior Zamora MD  Infectious Disease  [phone]
     KAI THOMAS is a 76yFemale , patient examined and chart reviewed.     INTERVAL HPI/ OVERNIGHT EVENTS:  No events. Afebrile.  NAD. In chair.    Past Medical History--  PAST MEDICAL & SURGICAL HISTORY:  Rheumatoid arthritis  SLE (systemic lupus erythematosus)  Osteoarthritis  H/O degenerative disc disease  Hypertension  Hyperlipidemia  Benign heart murmur  Hypothyroid  Obesity, Class II, BMI 35-39.9, no comorbidity  DVT (deep venous thrombosis)  during left knee replacement 2019  Overactive bladder  Lab test positive for detection of COVID-19 virus  12/20  S/P tonsillectomy  S/P cataract surgery  left, right  S/P eye surgery  child  S/P carpal tunnel release  left  S/P knee surgery  bilateral  Lung cancer  small tumor removed 2015-no chemo, no radiation  S/P thyroid surgery  1 lobe removed  Status post total hip replacement, right  S/P knee replacement  left  S/P lumbar spine operation  12/20        For details regarding the patient's social history, family history, and other miscellaneous elements, please refer the initial infectious diseases consultation and/or the admitting history and physical examination for this admission.    ROS:  CONSTITUTIONAL:  Negative fever or chills  EYES:  Negative  blurry vision or double vision  CARDIOVASCULAR:  Negative for chest pain or palpitations  RESPIRATORY:  Negative for cough, wheezing, or SOB   GASTROINTESTINAL:  Negative for nausea, vomiting, diarrhea, constipation, or abdominal pain  GENITOURINARY:  Negative frequency, urgency , dysuria or hematuria   NEUROLOGIC:  No headache, confusion, dizziness, lightheadedness  All other systems were reviewed and are negative       Current inpatient medications :    ANTIBIOTICS/RELEVANT:    MEDICATIONS  (STANDING):  apixaban 2.5 milliGRAM(s) Oral every 12 hours  atorvastatin 20 milliGRAM(s) Oral at bedtime  ferrous    sulfate 325 milliGRAM(s) Oral daily  folic acid 1 milliGRAM(s) Oral daily  levothyroxine 150 MICROGram(s) Oral daily  lidocaine   4% Patch 1 Patch Transdermal daily  lidocaine   4% Patch 1 Patch Transdermal daily  methotrexate 10 milliGRAM(s) Oral <User Schedule>  nystatin Powder 1 Application(s) Topical two times a day  pantoprazole    Tablet 40 milliGRAM(s) Oral before breakfast  polyethylene glycol 3350 17 Gram(s) Oral at bedtime  predniSONE   Tablet 10 milliGRAM(s) Oral daily  senna 2 Tablet(s) Oral at bedtime  zinc oxide 20% Ointment 1 Application(s) Topical daily    MEDICATIONS  (PRN):  acetaminophen     Tablet .. 650 milliGRAM(s) Oral every 6 hours PRN Temp greater or equal to 38C (100.4F), Mild Pain (1 - 3)  benzocaine 15 mG/menthol 3.6 mG Lozenge 1 Lozenge Oral every 3 hours PRN Sore Throat  benzonatate 100 milliGRAM(s) Oral every 8 hours PRN Cough  calcium carbonate    500 mG (Tums) Chewable 1 Tablet(s) Chew daily PRN Dyspepsia  melatonin 3 milliGRAM(s) Oral at bedtime PRN Insomnia  sodium chloride 0.65% Nasal 1 Spray(s) Both Nostrils every 3 hours PRN Nasal Congestion      Objective:  Vital Signs Last 24 Hrs  T(C): 36.3 (02 Dec 2022 05:13), Max: 36.7 (01 Dec 2022 14:36)  T(F): 97.3 (02 Dec 2022 05:13), Max: 98 (01 Dec 2022 14:36)  HR: 58 (02 Dec 2022 05:13) (58 - 70)  BP: 129/51 (02 Dec 2022 05:13) (97/52 - 129/51)  RR: 18 (02 Dec 2022 05:13) (18 - 18)  SpO2: 97% (02 Dec 2022 05:13) (95% - 97%)    Parameters below as of 02 Dec 2022 05:13  Patient On (Oxygen Delivery Method): room air      Physical Exam:  GEN: NAD, pleasant  HEENT: normocephalic and atraumatic.   NECK: Supple.   LUNGS: Clear to auscultation.  HEART: Regular rate and rhythm without murmur.  ABDOMEN: Soft, nontender, and nondistended.  Positive bowel sounds.    EXTREMITIES: Without any cyanosis, clubbing, rash, lesions or edema.  NEUROLOGIC: A & O x3, No focal neurological deficits   SKIN: No rash    LABS:      MICROBIOLOGY:  Flu With COVID-19 By AMARILYS (11.23.22 @ 21:00)    SARS-CoV-2 Result: Detected     RADIOLOGY & ADDITIONAL STUDIES:  ACC: [id number] EXAM:  XR CHEST PORTABLE URGENT 1V                          PROCEDURE DATE:  11/23/2022          INTERPRETATION:  AP chest on November 23, 2022 at 9:20 PM. Patient has   cough and is positive for Covid.    Heart likely enlarged. Thoracolumbar lumbar hardware again noted.    There is a small atelectatic infiltrate at left base increased from   October 18 this year.    Assessment :   77YO F PMH of HTN, Dyslipidemia, DVT, Lung CA s/p lobectomy, Hypothyroidism, SLE, RA on Methotrexate & Prednisone, COVID-19 infection in 2020, Post-Op Anemia s/p transfusion on PO iron therapy, and Obesity sp recent revision total right hip arthroplasty October 10th for a periprosthetic fracture admitted for COVID-19 infection Pt is on RA.   Pt is vaccinated and boostered  clinically doing well    Plan :   Supportive care  Sp Remdesivir x 3 doses  No role for Decadron   Trend temps and cbc  Pulm toileting  Increase activity  Stable from ID standpoint  Dc planning to rehab    Continue with present regiment.  Appropriate use of antibiotics and adverse effects reviewed.      35 minutes reviewing notes, labs data/ imaging , discussion with multidisciplinary team.    Thank you for allowing me to participate in care of your patient .        Fior Zamora MD  Infectious Disease  [phone]
Chief Complaint: COVID    Interval Events: No events overnight.    Review of Systems:  General: No fevers, chills, weight gain  Skin: No rashes, color changes  Cardiovascular: No chest pain, orthopnea  Respiratory: No shortness of breath, cough  Gastrointestinal: No nausea, abdominal pain  Genitourinary: No incontinence, pain with urination  Musculoskeletal: No pain, swelling, decreased range of motion  Neurological: No headache, weakness  Psychiatric: No depression, anxiety  Endocrine: No weight gain, increased thirst  All other systems are comprehensively negative.    Physical Exam:  Vital Signs Last 24 Hrs  T(C): 36.7 (29 Nov 2022 06:03), Max: 37 (29 Nov 2022 01:55)  T(F): 98 (29 Nov 2022 06:03), Max: 98.6 (29 Nov 2022 01:55)  HR: 49 (29 Nov 2022 06:03) (49 - 71)  BP: 111/58 (29 Nov 2022 06:03) (111/58 - 123/63)  BP(mean): --  RR: 18 (29 Nov 2022 06:03) (18 - 20)  SpO2: 95% (29 Nov 2022 06:03) (93% - 100%)  Parameters below as of 29 Nov 2022 06:03  Patient On (Oxygen Delivery Method): room air  General: NAD  HEENT: MMM  Neck: No JVD, no carotid bruit  Lungs: CTAB  CV: RRR, nl S1/S2, no M/R/G  Abdomen: S/NT/ND, +BS  Extremities: No LE edema, no cyanosis  Neuro: AAOx3, non-focal  Skin: No rash    Labs:             11-28    142  |  107  |  20  ----------------------------<  116<H>  4.3   |  30  |  0.83    Ca    8.6      28 Nov 2022 09:30  Phos  3.8     11-28  Mg     1.9     11-28    TPro  6.4  /  Alb  2.4<L>  /  TBili  0.3  /  DBili  x   /  AST  18  /  ALT  14  /  AlkPhos  187<H>  11-28                        12.2   9.15  )-----------( 287      ( 28 Nov 2022 09:30 )             39.8       ECG/Telemetry: Sinus rhythm, blocked PACs, Mobitz 1
Chief Complaint: COVID    Interval Events: No events overnight.    Review of Systems:  General: No fevers, chills, weight gain  Skin: No rashes, color changes  Cardiovascular: No chest pain, orthopnea  Respiratory: No shortness of breath, cough  Gastrointestinal: No nausea, abdominal pain  Genitourinary: No incontinence, pain with urination  Musculoskeletal: No pain, swelling, decreased range of motion  Neurological: No headache, weakness  Psychiatric: No depression, anxiety  Endocrine: No weight gain, increased thirst  All other systems are comprehensively negative.    Physical Exam:  Vital Signs Last 24 Hrs  T(C): 36.9 (27 Nov 2022 08:15), Max: 36.9 (26 Nov 2022 13:30)  T(F): 98.4 (27 Nov 2022 08:15), Max: 98.4 (26 Nov 2022 13:30)  HR: 54 (27 Nov 2022 08:15) (53 - 64)  BP: 117/62 (27 Nov 2022 08:15) (113/66 - 148/72)  BP(mean): --  RR: 19 (27 Nov 2022 08:15) (18 - 19)  SpO2: 98% (27 Nov 2022 08:15) (95% - 99%)  Parameters below as of 27 Nov 2022 05:08  Patient On (Oxygen Delivery Method): room air  General: NAD  HEENT: MMM  Neck: No JVD, no carotid bruit  Lungs: CTAB  CV: RRR, nl S1/S2, no M/R/G  Abdomen: S/NT/ND, +BS  Extremities: No LE edema, no cyanosis  Neuro: AAOx3, non-focal  Skin: No rash    Labs:             11-27    142  |  107  |  19  ----------------------------<  91  3.8   |  29  |  0.85    Ca    8.2<L>      27 Nov 2022 06:00    TPro  5.9<L>  /  Alb  2.2<L>  /  TBili  0.2  /  DBili  x   /  AST  17  /  ALT  14  /  AlkPhos  170<H>  11-27                        10.7   9.18  )-----------( 295      ( 27 Nov 2022 06:00 )             34.5       ECG/Telemetry: Sinus rhythm, blocked PACs, Mobitz 1
Orthopedic Progress Note     History: 76F with history of HTN, HLD, heart murmur, SLE, RA, thyroid CA s/p resection, DVT (on eliquis), lung cancer (s/p tumor removal, no chemo no radiation), Right FELICITAS (Jonathan, '19), Left TKA (Jonathan, '20), Left FELICITAS (Jonathan, '22) presented who initially presented to [facility] ED with a right femur periprosthetic fracture s/p mechanical fall on 10/18, transferred to [place] for surgery on 10/20, discharged to  rehab, and then just discharged from  on 11/12. She was overall doing well with rehabilitation and has been pain free in her right leg. She has had significant weakness in her right leg/ quad which began prior to her injury on 10/18. Was admitted several weeks ago after discharge home with difficulty mobilizing and was discharged to [facility]. She is returning after she was found to be Covid positive at the NH. Overall, she is feeling well and is able to participate in PT/OT. No hip pain.     Exam:  T(C): 36.9 (12-01-22 @ 05:49), Max: 36.9 (12-01-22 @ 05:49)  HR: 50 (12-01-22 @ 05:49) (50 - 72)  BP: 97/50 (12-01-22 @ 05:49) (97/50 - 118/63)  RR: 18 (12-01-22 @ 05:49) (16 - 18)  SpO2: 93% (12-01-22 @ 05:49) (93% - 96%)  Wt(kg): --I&O's Summary    30 Nov 2022 07:01  -  01 Dec 2022 07:00  --------------------------------------------------------  IN: 0 mL / OUT: 800 mL / NET: -800 mL        Lying in bed in no apparent distress  Unlabored respirations    EXT:   Incision clean, dry and intact. Well healing No erythema, warmth or drainage.  Sensation is intact in the superficial peroneal, deep peroneal, tibial, sural and saphenous nerve distributions  Able to dorsiflex and plantarflex ankle. Wiggles toes. Quad weakness improved from baseline.   Foot warm and well perfused  Sequential compression device on on            Labs:       Assessment/ Plan: KAI THOMAS is s/p revision right total hip replacement with ORIF femur on 10/20 for periprosthetic femur fracture, admitted due to Covid .   - lateral and anterior hip precautions for eight weeks postoperative.y  - PT/ OT for rehabilitation. WBAT with ROM activities as tolerated. Aggressive quad strengthening.   - multimodal pain regimen.   - chemical DVT prophylaxis per protocol  - SCDs while in bed. Out of bed to chair   - incentive spirometry  - disposition planning. Call [phone] to confirm or change postoperative appointment.  - page orthopedics team with questions or concerns.  - remaining care per primary team  
     KAI THOMAS is a 76yFemale , patient examined and chart reviewed.     INTERVAL HPI/ OVERNIGHT EVENTS:  No events. Afebrile.  In chair.    Past Medical History--  PAST MEDICAL & SURGICAL HISTORY:  Rheumatoid arthritis  SLE (systemic lupus erythematosus)  Osteoarthritis  H/O degenerative disc disease  Hypertension  Hyperlipidemia  Benign heart murmur  Hypothyroid  Obesity, Class II, BMI 35-39.9, no comorbidity  DVT (deep venous thrombosis)  during left knee replacement 2019  Overactive bladder  Lab test positive for detection of COVID-19 virus  12/20  S/P tonsillectomy  S/P cataract surgery  left, right  S/P eye surgery  child  S/P carpal tunnel release  left  S/P knee surgery  bilateral  Lung cancer  small tumor removed 2015-no chemo, no radiation  S/P thyroid surgery  1 lobe removed  Status post total hip replacement, right  S/P knee replacement  left  S/P lumbar spine operation  12/20        For details regarding the patient's social history, family history, and other miscellaneous elements, please refer the initial infectious diseases consultation and/or the admitting history and physical examination for this admission.        ROS:  CONSTITUTIONAL:  Negative fever or chills  EYES:  Negative  blurry vision or double vision  CARDIOVASCULAR:  Negative for chest pain or palpitations  RESPIRATORY:  Negative for cough, wheezing, or SOB   GASTROINTESTINAL:  Negative for nausea, vomiting, diarrhea, constipation, or abdominal pain  GENITOURINARY:  Negative frequency, urgency , dysuria or hematuria   NEUROLOGIC:  No headache, confusion, dizziness, lightheadedness  All other systems were reviewed and are negative       Current inpatient medications :    ANTIBIOTICS/RELEVANT:    MEDICATIONS  (STANDING):  apixaban 2.5 milliGRAM(s) Oral every 12 hours  atorvastatin 20 milliGRAM(s) Oral at bedtime  ferrous    sulfate 325 milliGRAM(s) Oral daily  folic acid 1 milliGRAM(s) Oral daily  levothyroxine 150 MICROGram(s) Oral daily  lidocaine   4% Patch 1 Patch Transdermal daily  lidocaine   4% Patch 1 Patch Transdermal daily  methotrexate 10 milliGRAM(s) Oral <User Schedule>  nystatin Powder 1 Application(s) Topical two times a day  pantoprazole    Tablet 40 milliGRAM(s) Oral before breakfast  polyethylene glycol 3350 17 Gram(s) Oral at bedtime  predniSONE   Tablet 10 milliGRAM(s) Oral daily  senna 2 Tablet(s) Oral at bedtime  zinc oxide 20% Ointment 1 Application(s) Topical daily    MEDICATIONS  (PRN):  acetaminophen     Tablet .. 650 milliGRAM(s) Oral every 6 hours PRN Temp greater or equal to 38C (100.4F), Mild Pain (1 - 3)  benzocaine 15 mG/menthol 3.6 mG Lozenge 1 Lozenge Oral every 3 hours PRN Sore Throat  benzonatate 100 milliGRAM(s) Oral every 8 hours PRN Cough  calcium carbonate    500 mG (Tums) Chewable 1 Tablet(s) Chew daily PRN Dyspepsia  melatonin 3 milliGRAM(s) Oral at bedtime PRN Insomnia  sodium chloride 0.65% Nasal 1 Spray(s) Both Nostrils every 3 hours PRN Nasal Congestion      Objective:  Vital Signs Last 24 Hrs  T(C): 36.9 (27 Nov 2022 13:15), Max: 36.9 (27 Nov 2022 08:15)  T(F): 98.4 (27 Nov 2022 13:15), Max: 98.4 (27 Nov 2022 08:15)  HR: 73 (27 Nov 2022 13:15) (53 - 73)  BP: 115/78 (27 Nov 2022 13:15) (114/70 - 148/72)  RR: 19 (27 Nov 2022 13:15) (18 - 19)  SpO2: 98% (27 Nov 2022 13:15) (95% - 99%)    Parameters below as of 27 Nov 2022 05:08  Patient On (Oxygen Delivery Method): room air    Physical Exam:  GEN: NAD, pleasant  HEENT: normocephalic and atraumatic.   NECK: Supple.   LUNGS: Clear to auscultation.  HEART: Regular rate and rhythm without murmur.  ABDOMEN: Soft, nontender, and nondistended.  Positive bowel sounds.    EXTREMITIES: Without any cyanosis, clubbing, rash, lesions or edema.  NEUROLOGIC: A & O x3, No focal neurological deficits   SKIN: No rash    LABS:                        10.7   9.18  )-----------( 295      ( 27 Nov 2022 06:00 )             34.5   11-27    142  |  107  |  19  ----------------------------<  91  3.8   |  29  |  0.85    Ca    8.2<L>      27 Nov 2022 06:00    TPro  5.9<L>  /  Alb  2.2<L>  /  TBili  0.2  /  DBili  x   /  AST  17  /  ALT  14  /  AlkPhos  170<H>  11-27    MICROBIOLOGY:  Flu With COVID-19 By AMARILYS (11.23.22 @ 21:00)    SARS-CoV-2 Result: Detected     RADIOLOGY & ADDITIONAL STUDIES:  ACC: [id number] EXAM:  XR CHEST PORTABLE URGENT 1V                          PROCEDURE DATE:  11/23/2022          INTERPRETATION:  AP chest on November 23, 2022 at 9:20 PM. Patient has   cough and is positive for Covid.    Heart likely enlarged. Thoracolumbar lumbar hardware again noted.    There is a small atelectatic infiltrate at left base increased from   October 18 this year.    Assessment :   77YO F PMH of HTN, Dyslipidemia, DVT, Lung CA s/p lobectomy, Hypothyroidism, SLE, RA on Methotrexate & Prednisone, COVID-19 infection in 2020, Post-Op Anemia s/p transfusion on PO iron therapy, and Obesity sp recent revision total right hip arthroplasty October 10th for a periprosthetic fracture admitted for COVID-19 infection Pt is on RA.   Pt is vaccinated and boostered  Doing well    Plan :   Supportive care  Sp Remdesivir x 3 days  No role for Decadron at this time  Trend temps and cbc  Pulm toileting  Increase activity  Stable from ID standpoint  Dc planning    Continue with present regiment.  Appropriate use of antibiotics and adverse effects reviewed.    I have discussed the above plan of care with patient in detail. She expressed understanding of the the treatment plan . Risks, benefits and alternatives discussed in detail. I have asked if she has any questions or concerns and appropriately addressed them to the best of my ability .      35 minutes reviewing notes, labs data/ imaging , discussion with multidisciplinary team.    Thank you for allowing me to participate in care of your patient .        Fior Zamora MD  Infectious Disease  [phone]
Chief Complaint: COVID    Interval Events: No events overnight.    Review of Systems:  General: No fevers, chills, weight gain  Skin: No rashes, color changes  Cardiovascular: No chest pain, orthopnea  Respiratory: No shortness of breath, cough  Gastrointestinal: No nausea, abdominal pain  Genitourinary: No incontinence, pain with urination  Musculoskeletal: No pain, swelling, decreased range of motion  Neurological: No headache, weakness  Psychiatric: No depression, anxiety  Endocrine: No weight gain, increased thirst  All other systems are comprehensively negative.    Physical Exam:  Vital Signs Last 24 Hrs  T(C): 36.7 (30 Nov 2022 05:09), Max: 37.2 (29 Nov 2022 14:53)  T(F): 98.1 (30 Nov 2022 05:09), Max: 98.9 (29 Nov 2022 14:53)  HR: 70 (30 Nov 2022 05:09) (60 - 70)  BP: 121/57 (30 Nov 2022 05:09) (102/53 - 126/73)  BP(mean): --  RR: 18 (30 Nov 2022 05:09) (16 - 18)  SpO2: 98% (30 Nov 2022 05:09) (98% - 99%)  Parameters below as of 30 Nov 2022 05:09  Patient On (Oxygen Delivery Method): room air  General: NAD  HEENT: MMM  Neck: No JVD, no carotid bruit  Lungs: CTAB  CV: RRR, nl S1/S2, no M/R/G  Abdomen: S/NT/ND, +BS  Extremities: No LE edema, no cyanosis  Neuro: AAOx3, non-focal  Skin: No rash    Labs:             11-28    142  |  107  |  20  ----------------------------<  116<H>  4.3   |  30  |  0.83    Ca    8.6      28 Nov 2022 09:30  Phos  3.8     11-28  Mg     1.9     11-28    TPro  6.4  /  Alb  2.4<L>  /  TBili  0.3  /  DBili  x   /  AST  18  /  ALT  14  /  AlkPhos  187<H>  11-28                        12.2   9.15  )-----------( 287      ( 28 Nov 2022 09:30 )             39.8       ECG/Telemetry: Sinus rhythm, blocked PACs, Mobitz 1
Chief Complaint: COVID    Interval Events: No events overnight.    Review of Systems:  General: No fevers, chills, weight gain  Skin: No rashes, color changes  Cardiovascular: No chest pain, orthopnea  Respiratory: No shortness of breath, cough  Gastrointestinal: No nausea, abdominal pain  Genitourinary: No incontinence, pain with urination  Musculoskeletal: No pain, swelling, decreased range of motion  Neurological: No headache, weakness  Psychiatric: No depression, anxiety  Endocrine: No weight gain, increased thirst  All other systems are comprehensively negative.    Physical Exam:  Vital Signs Last 24 Hrs  T(C): 36.9 (28 Nov 2022 05:06), Max: 37.2 (27 Nov 2022 17:27)  T(F): 98.4 (28 Nov 2022 05:06), Max: 98.9 (27 Nov 2022 17:27)  HR: 47 (28 Nov 2022 05:06) (47 - 73)  BP: 118/58 (28 Nov 2022 05:06) (115/78 - 134/67)  BP(mean): --  RR: 18 (28 Nov 2022 05:06) (18 - 19)  SpO2: 92% (28 Nov 2022 05:06) (92% - 98%)  General: NAD  HEENT: MMM  Neck: No JVD, no carotid bruit  Lungs: CTAB  CV: RRR, nl S1/S2, no M/R/G  Abdomen: S/NT/ND, +BS  Extremities: No LE edema, no cyanosis  Neuro: AAOx3, non-focal  Skin: No rash    Labs:             11-27    142  |  107  |  19  ----------------------------<  91  3.8   |  29  |  0.85    Ca    8.2<L>      27 Nov 2022 06:00    TPro  5.9<L>  /  Alb  2.2<L>  /  TBili  0.2  /  DBili  x   /  AST  17  /  ALT  14  /  AlkPhos  170<H>  11-27                        10.7   9.18  )-----------( 295      ( 27 Nov 2022 06:00 )             34.5       ECG/Telemetry: Sinus rhythm, blocked PACs, Mobitz 1
Patient is a 76y old  Female who presents with a chief complaint of Sent for COVID-19 positive test. (01 Dec 2022 12:05)    INTERVAL HPI/OVERNIGHT EVENTS: still with occ cough and sore throat.  no new symptoms.     MEDICATIONS  (STANDING):  apixaban 2.5 milliGRAM(s) Oral every 12 hours  atorvastatin 20 milliGRAM(s) Oral at bedtime  ferrous    sulfate 325 milliGRAM(s) Oral daily  folic acid 1 milliGRAM(s) Oral daily  levothyroxine 150 MICROGram(s) Oral daily  lidocaine   4% Patch 1 Patch Transdermal daily  lidocaine   4% Patch 1 Patch Transdermal daily  methotrexate 10 milliGRAM(s) Oral <User Schedule>  nystatin Powder 1 Application(s) Topical two times a day  pantoprazole    Tablet 40 milliGRAM(s) Oral before breakfast  polyethylene glycol 3350 17 Gram(s) Oral at bedtime  predniSONE   Tablet 10 milliGRAM(s) Oral daily  senna 2 Tablet(s) Oral at bedtime  zinc oxide 20% Ointment 1 Application(s) Topical daily    MEDICATIONS  (PRN):  acetaminophen     Tablet .. 650 milliGRAM(s) Oral every 6 hours PRN Temp greater or equal to 38C (100.4F), Mild Pain (1 - 3)  benzocaine 15 mG/menthol 3.6 mG Lozenge 1 Lozenge Oral every 3 hours PRN Sore Throat  benzonatate 100 milliGRAM(s) Oral every 8 hours PRN Cough  calcium carbonate    500 mG (Tums) Chewable 1 Tablet(s) Chew daily PRN Dyspepsia  melatonin 3 milliGRAM(s) Oral at bedtime PRN Insomnia  sodium chloride 0.65% Nasal 1 Spray(s) Both Nostrils every 3 hours PRN Nasal Congestion      Allergies  No Known Allergies    REVIEW OF SYSTEMS:  CONSTITUTIONAL: No fever, weight loss, or fatigue  EYES: No eye pain, visual disturbances, or discharge  ENMT:  No difficulty hearing, tinnitus, vertigo; No sinus or throat pain  NECK: No pain or stiffness  BREASTS: No pain, masses, or nipple discharge  RESPIRATORY: No cough, wheezing, chills or hemoptysis; No shortness of breath  CARDIOVASCULAR: No chest pain, palpitations, lightheadedness, or leg swelling  GASTROINTESTINAL: No abdominal or epigastric pain. No nausea, vomiting, or hematemesis; No diarrhea or constipation. No melena or hematochezia.  GENITOURINARY: No dysuria, frequency, hematuria, or incontinence  NEUROLOGICAL: No headaches, memory loss, vertigo, loss of strength, numbness, or tremors  SKIN: No itching, burning, rashes, or lesions   LYMPH NODES: No enlarged glands  ENDOCRINE: No heat or cold intolerance; No hair loss; No polydipsia or polyuria  MUSCULOSKELETAL: No joint pain or swelling; No muscle, back, or extremity pain  PSYCHIATRIC: No depression, anxiety, or mood swings  HEME/LYMPH: No easy bruising, or bleeding gums  ALLERGY AND IMMUNOLOGIC: No hives or eczema    Vital Signs Last 24 Hrs  T(C): 36.7 (01 Dec 2022 11:30), Max: 36.9 (01 Dec 2022 05:49)  T(F): 98.1 (01 Dec 2022 11:30), Max: 98.5 (01 Dec 2022 05:49)  HR: 51 (01 Dec 2022 11:30) (50 - 72)  BP: 106/52 (01 Dec 2022 11:30) (97/50 - 118/63)  BP(mean): --  RR: 18 (01 Dec 2022 11:30) (16 - 18)  SpO2: 94% (01 Dec 2022 11:30) (93% - 94%)    Parameters below as of 01 Dec 2022 11:30  Patient On (Oxygen Delivery Method): room air    PHYSICAL EXAM:  GENERAL: NAD, well-groomed, well-developed  HEAD:  Atraumatic, Normocephalic  EYES:  conjunctiva and sclera clear  ENMT: Moist mucous membranes  NECK: Supple, No JVD  NERVOUS SYSTEM:  Alert & Oriented X3, Good concentration; All 4 extremities mobile, no gross sensory deficits.   CHEST/LUNG: Clear to auscultation bilaterally; No rales, rhonchi, wheezing, or rubs  HEART: Regular rate and rhythm; No murmurs, rubs, or gallops  ABDOMEN: Soft, Nontender, Nondistended; Bowel sounds present  EXTREMITIES:  2+ Peripheral Pulses, No clubbing, cyanosis, or edema  LYMPH: No lymphadenopathy noted  SKIN: No rashes or lesions    LABS:              CAPILLARY BLOOD GLUCOSE          RADIOLOGY & ADDITIONAL TESTS:    Imaging Personally Reviewed:  [ ] YES     Consultant(s) Notes Reviewed:      Care Discussed with Consultants/Other Providers:    Advanced Directives: [ ] DNR  [ ] No feeding tube  [ ] MOLST in chart  [ ] MOLST completed today  [ ] Unknown  
Patient is a 76y old  Female who presents with a chief complaint of Sent for COVID-19 positive test. (28 Nov 2022 13:40)      INTERVAL HPI/OVERNIGHT EVENTS: still with mild sore throat and cough.  breathing feels ok.  no new complaints.     MEDICATIONS  (STANDING):  apixaban 2.5 milliGRAM(s) Oral every 12 hours  atorvastatin 20 milliGRAM(s) Oral at bedtime  ferrous    sulfate 325 milliGRAM(s) Oral daily  folic acid 1 milliGRAM(s) Oral daily  levothyroxine 150 MICROGram(s) Oral daily  lidocaine   4% Patch 1 Patch Transdermal daily  lidocaine   4% Patch 1 Patch Transdermal daily  methotrexate 10 milliGRAM(s) Oral <User Schedule>  nystatin Powder 1 Application(s) Topical two times a day  pantoprazole    Tablet 40 milliGRAM(s) Oral before breakfast  polyethylene glycol 3350 17 Gram(s) Oral at bedtime  predniSONE   Tablet 10 milliGRAM(s) Oral daily  senna 2 Tablet(s) Oral at bedtime  zinc oxide 20% Ointment 1 Application(s) Topical daily    MEDICATIONS  (PRN):  acetaminophen     Tablet .. 650 milliGRAM(s) Oral every 6 hours PRN Temp greater or equal to 38C (100.4F), Mild Pain (1 - 3)  benzocaine 15 mG/menthol 3.6 mG Lozenge 1 Lozenge Oral every 3 hours PRN Sore Throat  benzonatate 100 milliGRAM(s) Oral every 8 hours PRN Cough  calcium carbonate    500 mG (Tums) Chewable 1 Tablet(s) Chew daily PRN Dyspepsia  melatonin 3 milliGRAM(s) Oral at bedtime PRN Insomnia  sodium chloride 0.65% Nasal 1 Spray(s) Both Nostrils every 3 hours PRN Nasal Congestion      Allergies  No Known Allergies      REVIEW OF SYSTEMS:  CONSTITUTIONAL: No fever, weight loss, or fatigue  EYES: No eye pain, visual disturbances, or discharge  ENMT:  No difficulty hearing, tinnitus, vertigo; No sinus t pain  NECK: No pain or stiffness  BREASTS: No pain, masses, or nipple discharge  RESPIRATORY: No , wheezing, chills or hemoptysis; No shortness of breath  CARDIOVASCULAR: No chest pain, palpitations, lightheadedness, or leg swelling  GASTROINTESTINAL: No abdominal or epigastric pain. No nausea, vomiting, or hematemesis; No diarrhea or constipation. No melena or hematochezia.  GENITOURINARY: No dysuria, frequency, hematuria, or incontinence  NEUROLOGICAL: No headaches, memory loss, vertigo, loss of strength, numbness, or tremors  SKIN: No itching, burning, rashes, or lesions   LYMPH NODES: No enlarged glands  ENDOCRINE: No heat or cold intolerance; No hair loss; No polydipsia or polyuria  MUSCULOSKELETAL: No joint pain or swelling; No muscle, back, or extremity pain  PSYCHIATRIC: No depression, anxiety, or mood swings  HEME/LYMPH: No easy bruising, or bleeding gums  ALLERGY AND IMMUNOLOGIC: No hives or eczema    Vital Signs Last 24 Hrs  T(C): 36.8 (29 Nov 2022 10:17), Max: 37 (29 Nov 2022 01:55)  T(F): 98.2 (29 Nov 2022 10:17), Max: 98.6 (29 Nov 2022 01:55)  HR: 62 (29 Nov 2022 10:17) (49 - 71)  BP: 102/53 (29 Nov 2022 10:17) (102/53 - 123/63)  BP(mean): --  RR: 18 (29 Nov 2022 10:17) (18 - 20)  SpO2: 99% (29 Nov 2022 10:17) (95% - 100%)    Parameters below as of 29 Nov 2022 10:17  Patient On (Oxygen Delivery Method): room air        PHYSICAL EXAM:  GENERAL: NAD  HEAD:  Atraumatic, Normocephalic  EYES: conjunctiva and sclera clear  ENMT: Moist mucous membranes  NECK: Supple, No JVD  NERVOUS SYSTEM:  Alert & Oriented X3, Good concentration; All 4 extremities mobile, no gross sensory deficits.   CHEST/LUNG: Clear to auscultation bilaterally;  HEART: Regular rate and rhythm;  ABDOMEN: Soft, Nontender, Nondistended; Bowel sounds present  EXTREMITIES:  2+ Peripheral Pulses, No clubbing, cyanosis, or edema    LABS:      Ca    8.6        28 Nov 2022 09:30          CAPILLARY BLOOD GLUCOSE          RADIOLOGY & ADDITIONAL TESTS:    Imaging Personally Reviewed:  [ ] YES     Consultant(s) Notes Reviewed:      Care Discussed with Consultants/Other Providers:    Advanced Directives: [ ] DNR  [ ] No feeding tube  [ ] MOLST in chart  [ ] MOLST completed today  [ ] Unknown  
     KAI THOMAS is a 76yFemale , patient examined and chart reviewed.     INTERVAL HPI/ OVERNIGHT EVENTS:  No events. Afebrile.    Past Medical History--  PAST MEDICAL & SURGICAL HISTORY:  Rheumatoid arthritis  SLE (systemic lupus erythematosus)  Osteoarthritis  H/O degenerative disc disease  Hypertension  Hyperlipidemia  Benign heart murmur  Hypothyroid  Obesity, Class II, BMI 35-39.9, no comorbidity  DVT (deep venous thrombosis)  during left knee replacement 2019  Overactive bladder  Lab test positive for detection of COVID-19 virus  12/20  S/P tonsillectomy  S/P cataract surgery  left, right  S/P eye surgery  child  S/P carpal tunnel release  left  S/P knee surgery  bilateral  Lung cancer  small tumor removed 2015-no chemo, no radiation  S/P thyroid surgery  1 lobe removed  Status post total hip replacement, right  S/P knee replacement  left  S/P lumbar spine operation  12/20        For details regarding the patient's social history, family history, and other miscellaneous elements, please refer the initial infectious diseases consultation and/or the admitting history and physical examination for this admission.        ROS:  CONSTITUTIONAL:  Negative fever or chills  EYES:  Negative  blurry vision or double vision  CARDIOVASCULAR:  Negative for chest pain or palpitations  RESPIRATORY:  Negative for cough, wheezing, or SOB   GASTROINTESTINAL:  Negative for nausea, vomiting, diarrhea, constipation, or abdominal pain  GENITOURINARY:  Negative frequency, urgency , dysuria or hematuria   NEUROLOGIC:  No headache, confusion, dizziness, lightheadedness  All other systems were reviewed and are negative       Current inpatient medications :    ANTIBIOTICS/RELEVANT:  remdesivir  IVPB 100 milliGRAM(s) IV Intermittent every 24 hours  remdesivir  IVPB   IV Intermittent       acetaminophen     Tablet .. 650 milliGRAM(s) Oral every 6 hours PRN  apixaban 2.5 milliGRAM(s) Oral every 12 hours  atorvastatin 20 milliGRAM(s) Oral at bedtime  benzocaine 15 mG/menthol 3.6 mG Lozenge 1 Lozenge Oral every 3 hours PRN  benzonatate 100 milliGRAM(s) Oral every 8 hours PRN  calcium carbonate    500 mG (Tums) Chewable 1 Tablet(s) Chew daily PRN  ferrous    sulfate 325 milliGRAM(s) Oral daily  folic acid 1 milliGRAM(s) Oral daily  levothyroxine 150 MICROGram(s) Oral daily  lidocaine   4% Patch 1 Patch Transdermal daily  lidocaine   4% Patch 1 Patch Transdermal daily  melatonin 3 milliGRAM(s) Oral at bedtime PRN  methotrexate 10 milliGRAM(s) Oral <User Schedule>  nystatin Powder 1 Application(s) Topical two times a day  pantoprazole    Tablet 40 milliGRAM(s) Oral before breakfast  polyethylene glycol 3350 17 Gram(s) Oral at bedtime  predniSONE   Tablet 10 milliGRAM(s) Oral daily  senna 2 Tablet(s) Oral at bedtime  sodium chloride 0.65% Nasal 1 Spray(s) Both Nostrils every 3 hours PRN  zinc oxide 20% Ointment 1 Application(s) Topical daily      Objective:    11-24 @ 07:01  -  11-25 @ 07:00  --------------------------------------------------------  IN: 0 mL / OUT: 2225 mL / NET: -2225 mL      T(C): 36.7 (11-25-22 @ 13:54), Max: 37 (11-24-22 @ 14:30)  HR: 56 (11-25-22 @ 13:54) (53 - 58)  BP: 127/66 (11-25-22 @ 13:54) (121/74 - 137/70)  RR: 18 (11-25-22 @ 13:54) (16 - 18)  SpO2: 98% (11-25-22 @ 13:54) (92% - 100%)  Wt(kg): --      Physical Exam:  GEN: NAD, pleasant  HEENT: normocephalic and atraumatic.   NECK: Supple.   LUNGS: Clear to auscultation.  HEART: Regular rate and rhythm without murmur.  ABDOMEN: Soft, nontender, and nondistended.  Positive bowel sounds.    EXTREMITIES: Without any cyanosis, clubbing, rash, lesions or edema.  NEUROLOGIC: A & O x3, No focal neurological deficits   SKIN: No rash    LABS:                        10.4   6.82  )-----------( 290      ( 25 Nov 2022 07:34 )             33.8       11-25    143  |  108  |  14  ----------------------------<  105<H>  4.1   |  29  |  0.67    Ca    8.2<L>      25 Nov 2022 07:34  Phos  3.8     11-25  Mg     1.8     11-25    TPro  5.8<L>  /  Alb  2.2<L>  /  TBili  0.3  /  DBili  x   /  AST  16  /  ALT  <10<L>  /  AlkPhos  160<H>  11-24      MICROBIOLOGY:  Flu With COVID-19 By AMARILYS (11.23.22 @ 21:00)    SARS-CoV-2 Result: Detected     RADIOLOGY & ADDITIONAL STUDIES:  ACC: [id number] EXAM:  XR CHEST PORTABLE URGENT 1V                          PROCEDURE DATE:  11/23/2022          INTERPRETATION:  AP chest on November 23, 2022 at 9:20 PM. Patient has   cough and is positive for Covid.    Heart likely enlarged. Thoracolumbar lumbar hardware again noted.    There is a small atelectatic infiltrate at left base increased from   October 18 this year.    Assessment :   75YO F PMH of HTN, Dyslipidemia, DVT, Lung CA s/p lobectomy, Hypothyroidism, SLE, RA on Methotrexate & Prednisone, COVID-19 infection in 2020, Post-Op Anemia s/p transfusion on PO iron therapy, and Obesity sp recent revision total right hip arthroplasty October 10th for a periprosthetic fracture admitted for COVID-19 infection Pt is on RA.   Pt is vaccinated and boostered  Doing well    Plan :   Remdesivir x 3 days  No role for Decadron at this time  Trend temps and cbc  Pulm toileting  Increase activity      Continue with present regiment.  Appropriate use of antibiotics and adverse effects reviewed.    I have discussed the above plan of care with patient in detail. She expressed understanding of the the treatment plan . Risks, benefits and alternatives discussed in detail. I have asked if she has any questions or concerns and appropriately addressed them to the best of my ability .      Critical care time greater then 35 minutes reviewing notes, labs data/ imaging , discussion with multidisciplinary team.    Thank you for allowing me to participate in care of your patient .        Fior Zamora MD  Infectious Disease  [phone]
     KAI THOMAS is a 76yFemale , patient examined and chart reviewed.     INTERVAL HPI/ OVERNIGHT EVENTS:  No events. Afebrile.    Past Medical History--  PAST MEDICAL & SURGICAL HISTORY:  Rheumatoid arthritis  SLE (systemic lupus erythematosus)  Osteoarthritis  H/O degenerative disc disease  Hypertension  Hyperlipidemia  Benign heart murmur  Hypothyroid  Obesity, Class II, BMI 35-39.9, no comorbidity  DVT (deep venous thrombosis)  during left knee replacement 2019  Overactive bladder  Lab test positive for detection of COVID-19 virus  12/20  S/P tonsillectomy  S/P cataract surgery  left, right  S/P eye surgery  child  S/P carpal tunnel release  left  S/P knee surgery  bilateral  Lung cancer  small tumor removed 2015-no chemo, no radiation  S/P thyroid surgery  1 lobe removed  Status post total hip replacement, right  S/P knee replacement  left  S/P lumbar spine operation  12/20        For details regarding the patient's social history, family history, and other miscellaneous elements, please refer the initial infectious diseases consultation and/or the admitting history and physical examination for this admission.    ROS:  CONSTITUTIONAL:  Negative fever or chills  EYES:  Negative  blurry vision or double vision  CARDIOVASCULAR:  Negative for chest pain or palpitations  RESPIRATORY:  Negative for cough, wheezing, or SOB   GASTROINTESTINAL:  Negative for nausea, vomiting, diarrhea, constipation, or abdominal pain  GENITOURINARY:  Negative frequency, urgency , dysuria or hematuria   NEUROLOGIC:  No headache, confusion, dizziness, lightheadedness  All other systems were reviewed and are negative       Current inpatient medications :    ANTIBIOTICS/RELEVANT:    MEDICATIONS  (STANDING):  apixaban 2.5 milliGRAM(s) Oral every 12 hours  atorvastatin 20 milliGRAM(s) Oral at bedtime  ferrous    sulfate 325 milliGRAM(s) Oral daily  folic acid 1 milliGRAM(s) Oral daily  levothyroxine 150 MICROGram(s) Oral daily  lidocaine   4% Patch 1 Patch Transdermal daily  lidocaine   4% Patch 1 Patch Transdermal daily  methotrexate 10 milliGRAM(s) Oral <User Schedule>  nystatin Powder 1 Application(s) Topical two times a day  pantoprazole    Tablet 40 milliGRAM(s) Oral before breakfast  polyethylene glycol 3350 17 Gram(s) Oral at bedtime  predniSONE   Tablet 10 milliGRAM(s) Oral daily  senna 2 Tablet(s) Oral at bedtime  zinc oxide 20% Ointment 1 Application(s) Topical daily    MEDICATIONS  (PRN):  acetaminophen     Tablet .. 650 milliGRAM(s) Oral every 6 hours PRN Temp greater or equal to 38C (100.4F), Mild Pain (1 - 3)  benzocaine 15 mG/menthol 3.6 mG Lozenge 1 Lozenge Oral every 3 hours PRN Sore Throat  benzonatate 100 milliGRAM(s) Oral every 8 hours PRN Cough  calcium carbonate    500 mG (Tums) Chewable 1 Tablet(s) Chew daily PRN Dyspepsia  melatonin 3 milliGRAM(s) Oral at bedtime PRN Insomnia  sodium chloride 0.65% Nasal 1 Spray(s) Both Nostrils every 3 hours PRN Nasal Congestion        Objective:  Vital Signs Last 24 Hrs  T(C): 36.3 (28 Nov 2022 11:08), Max: 37.2 (27 Nov 2022 17:27)  T(F): 97.3 (28 Nov 2022 11:08), Max: 98.9 (27 Nov 2022 17:27)  HR: 53 (28 Nov 2022 11:08) (47 - 73)  BP: 116/66 (28 Nov 2022 11:08) (115/78 - 134/67)  RR: 20 (28 Nov 2022 11:08) (18 - 20)  SpO2: 93% (28 Nov 2022 11:08) (92% - 98%)    Parameters below as of 28 Nov 2022 11:08  Patient On (Oxygen Delivery Method): room air    Physical Exam:  GEN: NAD, pleasant  HEENT: normocephalic and atraumatic.   NECK: Supple.   LUNGS: Clear to auscultation.  HEART: Regular rate and rhythm without murmur.  ABDOMEN: Soft, nontender, and nondistended.  Positive bowel sounds.    EXTREMITIES: Without any cyanosis, clubbing, rash, lesions or edema.  NEUROLOGIC: A & O x3, No focal neurological deficits   SKIN: No rash    LABS:                        12.2   9.15  )-----------( 287      ( 28 Nov 2022 09:30 )             39.8   11-28    142  |  107  |  20  ----------------------------<  116<H>  4.3   |  30  |  0.83    Ca    8.6      28 Nov 2022 09:30  Phos  3.8     11-28  Mg     1.9     11-28    TPro  6.4  /  Alb  2.4<L>  /  TBili  0.3  /  DBili  x   /  AST  18  /  ALT  14  /  AlkPhos  187<H>  11-28      MICROBIOLOGY:  Flu With COVID-19 By AMARILYS (11.23.22 @ 21:00)    SARS-CoV-2 Result: Detected     RADIOLOGY & ADDITIONAL STUDIES:  ACC: [id number] EXAM:  XR CHEST PORTABLE URGENT 1V                          PROCEDURE DATE:  11/23/2022          INTERPRETATION:  AP chest on November 23, 2022 at 9:20 PM. Patient has   cough and is positive for Covid.    Heart likely enlarged. Thoracolumbar lumbar hardware again noted.    There is a small atelectatic infiltrate at left base increased from   October 18 this year.    Assessment :   77YO F PMH of HTN, Dyslipidemia, DVT, Lung CA s/p lobectomy, Hypothyroidism, SLE, RA on Methotrexate & Prednisone, COVID-19 infection in 2020, Post-Op Anemia s/p transfusion on PO iron therapy, and Obesity sp recent revision total right hip arthroplasty October 10th for a periprosthetic fracture admitted for COVID-19 infection Pt is on RA.   Pt is vaccinated and boostered  Doing well    Plan :   Supportive care  Sp Remdesivir x 3 days  No role for Decadron at this time  Trend temps and cbc  Pulm toileting  Increase activity  Stable from ID standpoint  Dc planning to rehab    Continue with present regiment.  Appropriate use of antibiotics and adverse effects reviewed.      35 minutes reviewing notes, labs data/ imaging , discussion with multidisciplinary team.    Thank you for allowing me to participate in care of your patient .        Fior Zamora MD  Infectious Disease  [phone]
     KAI THOMAS is a 76yFemale , patient examined and chart reviewed.     INTERVAL HPI/ OVERNIGHT EVENTS:  No events. Afebrile.  In chair.    Past Medical History--  PAST MEDICAL & SURGICAL HISTORY:  Rheumatoid arthritis  SLE (systemic lupus erythematosus)  Osteoarthritis  H/O degenerative disc disease  Hypertension  Hyperlipidemia  Benign heart murmur  Hypothyroid  Obesity, Class II, BMI 35-39.9, no comorbidity  DVT (deep venous thrombosis)  during left knee replacement 2019  Overactive bladder  Lab test positive for detection of COVID-19 virus  12/20  S/P tonsillectomy  S/P cataract surgery  left, right  S/P eye surgery  child  S/P carpal tunnel release  left  S/P knee surgery  bilateral  Lung cancer  small tumor removed 2015-no chemo, no radiation  S/P thyroid surgery  1 lobe removed  Status post total hip replacement, right  S/P knee replacement  left  S/P lumbar spine operation  12/20        For details regarding the patient's social history, family history, and other miscellaneous elements, please refer the initial infectious diseases consultation and/or the admitting history and physical examination for this admission.        ROS:  CONSTITUTIONAL:  Negative fever or chills  EYES:  Negative  blurry vision or double vision  CARDIOVASCULAR:  Negative for chest pain or palpitations  RESPIRATORY:  Negative for cough, wheezing, or SOB   GASTROINTESTINAL:  Negative for nausea, vomiting, diarrhea, constipation, or abdominal pain  GENITOURINARY:  Negative frequency, urgency , dysuria or hematuria   NEUROLOGIC:  No headache, confusion, dizziness, lightheadedness  All other systems were reviewed and are negative       Current inpatient medications :    ANTIBIOTICS/RELEVANT:  remdesivir  IVPB 100 milliGRAM(s) IV Intermittent every 24 hours  remdesivir  IVPB   IV Intermittent     MEDICATIONS  (STANDING):  apixaban 2.5 milliGRAM(s) Oral every 12 hours  atorvastatin 20 milliGRAM(s) Oral at bedtime  ferrous    sulfate 325 milliGRAM(s) Oral daily  folic acid 1 milliGRAM(s) Oral daily  levothyroxine 150 MICROGram(s) Oral daily  lidocaine   4% Patch 1 Patch Transdermal daily  lidocaine   4% Patch 1 Patch Transdermal daily  methotrexate 10 milliGRAM(s) Oral <User Schedule>  nystatin Powder 1 Application(s) Topical two times a day  pantoprazole    Tablet 40 milliGRAM(s) Oral before breakfast  polyethylene glycol 3350 17 Gram(s) Oral at bedtime  predniSONE   Tablet 10 milliGRAM(s) Oral daily  senna 2 Tablet(s) Oral at bedtime  zinc oxide 20% Ointment 1 Application(s) Topical daily    MEDICATIONS  (PRN):  acetaminophen     Tablet .. 650 milliGRAM(s) Oral every 6 hours PRN Temp greater or equal to 38C (100.4F), Mild Pain (1 - 3)  benzocaine 15 mG/menthol 3.6 mG Lozenge 1 Lozenge Oral every 3 hours PRN Sore Throat  benzonatate 100 milliGRAM(s) Oral every 8 hours PRN Cough  calcium carbonate    500 mG (Tums) Chewable 1 Tablet(s) Chew daily PRN Dyspepsia  melatonin 3 milliGRAM(s) Oral at bedtime PRN Insomnia  sodium chloride 0.65% Nasal 1 Spray(s) Both Nostrils every 3 hours PRN Nasal Congestion        Objective:  Vital Signs Last 24 Hrs  T(C): 36.4 (26 Nov 2022 18:00), Max: 36.9 (25 Nov 2022 22:43)  T(F): 97.5 (26 Nov 2022 18:00), Max: 98.5 (25 Nov 2022 22:43)  HR: 53 (26 Nov 2022 18:00) (53 - 65)  BP: 144/76 (26 Nov 2022 18:00) (104/52 - 144/76)  RR: 18 (26 Nov 2022 18:00) (18 - 19)  SpO2: 99% (26 Nov 2022 18:00) (95% - 99%)    Parameters below as of 26 Nov 2022 18:00  Patient On (Oxygen Delivery Method): nasal cannula      Physical Exam:  GEN: NAD, pleasant  HEENT: normocephalic and atraumatic.   NECK: Supple.   LUNGS: Clear to auscultation.  HEART: Regular rate and rhythm without murmur.  ABDOMEN: Soft, nontender, and nondistended.  Positive bowel sounds.    EXTREMITIES: Without any cyanosis, clubbing, rash, lesions or edema.  NEUROLOGIC: A & O x3, No focal neurological deficits   SKIN: No rash    LABS:                        12.1   8.53  )-----------( 330      ( 26 Nov 2022 12:08 )             39.0   11-26    143  |  105  |  19  ----------------------------<  101<H>  4.3   |  28  |  0.82    Ca    8.2<L>      26 Nov 2022 12:08  Phos  3.8     11-25  Mg     1.8     11-25    TPro  6.4  /  Alb  2.7<L>  /  TBili  0.3  /  DBili  x   /  AST  19  /  ALT  13  /  AlkPhos  194<H>  11-26      MICROBIOLOGY:  Flu With COVID-19 By AMARILYS (11.23.22 @ 21:00)    SARS-CoV-2 Result: Detected     RADIOLOGY & ADDITIONAL STUDIES:  ACC: [id number] EXAM:  XR CHEST PORTABLE URGENT 1V                          PROCEDURE DATE:  11/23/2022          INTERPRETATION:  AP chest on November 23, 2022 at 9:20 PM. Patient has   cough and is positive for Covid.    Heart likely enlarged. Thoracolumbar lumbar hardware again noted.    There is a small atelectatic infiltrate at left base increased from   October 18 this year.    Assessment :   75YO F PMH of HTN, Dyslipidemia, DVT, Lung CA s/p lobectomy, Hypothyroidism, SLE, RA on Methotrexate & Prednisone, COVID-19 infection in 2020, Post-Op Anemia s/p transfusion on PO iron therapy, and Obesity sp recent revision total right hip arthroplasty October 10th for a periprosthetic fracture admitted for COVID-19 infection Pt is on RA.   Pt is vaccinated and boostered  Doing well    Plan :   Supportive care  Sp Remdesivir x 3 days  No role for Decadron at this time  Trend temps and cbc  Pulm toileting  Increase activity  Stable from ID standpoint    Continue with present regiment.  Appropriate use of antibiotics and adverse effects reviewed.    I have discussed the above plan of care with patient in detail. She expressed understanding of the the treatment plan . Risks, benefits and alternatives discussed in detail. I have asked if she has any questions or concerns and appropriately addressed them to the best of my ability .      35 minutes reviewing notes, labs data/ imaging , discussion with multidisciplinary team.    Thank you for allowing me to participate in care of your patient .        Fior Zamora MD  Infectious Disease  [phone]
     KAI THOMAS is a 76yFemale , patient examined and chart reviewed.     INTERVAL HPI/ OVERNIGHT EVENTS:  No events. Afebrile.  NAD.    Past Medical History--  PAST MEDICAL & SURGICAL HISTORY:  Rheumatoid arthritis  SLE (systemic lupus erythematosus)  Osteoarthritis  H/O degenerative disc disease  Hypertension  Hyperlipidemia  Benign heart murmur  Hypothyroid  Obesity, Class II, BMI 35-39.9, no comorbidity  DVT (deep venous thrombosis)  during left knee replacement 2019  Overactive bladder  Lab test positive for detection of COVID-19 virus  12/20  S/P tonsillectomy  S/P cataract surgery  left, right  S/P eye surgery  child  S/P carpal tunnel release  left  S/P knee surgery  bilateral  Lung cancer  small tumor removed 2015-no chemo, no radiation  S/P thyroid surgery  1 lobe removed  Status post total hip replacement, right  S/P knee replacement  left  S/P lumbar spine operation  12/20        For details regarding the patient's social history, family history, and other miscellaneous elements, please refer the initial infectious diseases consultation and/or the admitting history and physical examination for this admission.    ROS:  CONSTITUTIONAL:  Negative fever or chills  EYES:  Negative  blurry vision or double vision  CARDIOVASCULAR:  Negative for chest pain or palpitations  RESPIRATORY:  Negative for cough, wheezing, or SOB   GASTROINTESTINAL:  Negative for nausea, vomiting, diarrhea, constipation, or abdominal pain  GENITOURINARY:  Negative frequency, urgency , dysuria or hematuria   NEUROLOGIC:  No headache, confusion, dizziness, lightheadedness  All other systems were reviewed and are negative       Current inpatient medications :    ANTIBIOTICS/RELEVANT:    MEDICATIONS  (STANDING):  apixaban 2.5 milliGRAM(s) Oral every 12 hours  atorvastatin 20 milliGRAM(s) Oral at bedtime  ferrous    sulfate 325 milliGRAM(s) Oral daily  folic acid 1 milliGRAM(s) Oral daily  levothyroxine 150 MICROGram(s) Oral daily  lidocaine   4% Patch 1 Patch Transdermal daily  lidocaine   4% Patch 1 Patch Transdermal daily  methotrexate 10 milliGRAM(s) Oral <User Schedule>  nystatin Powder 1 Application(s) Topical two times a day  pantoprazole    Tablet 40 milliGRAM(s) Oral before breakfast  polyethylene glycol 3350 17 Gram(s) Oral at bedtime  predniSONE   Tablet 10 milliGRAM(s) Oral daily  senna 2 Tablet(s) Oral at bedtime  zinc oxide 20% Ointment 1 Application(s) Topical daily    MEDICATIONS  (PRN):  acetaminophen     Tablet .. 650 milliGRAM(s) Oral every 6 hours PRN Temp greater or equal to 38C (100.4F), Mild Pain (1 - 3)  benzocaine 15 mG/menthol 3.6 mG Lozenge 1 Lozenge Oral every 3 hours PRN Sore Throat  benzonatate 100 milliGRAM(s) Oral every 8 hours PRN Cough  calcium carbonate    500 mG (Tums) Chewable 1 Tablet(s) Chew daily PRN Dyspepsia  melatonin 3 milliGRAM(s) Oral at bedtime PRN Insomnia  sodium chloride 0.65% Nasal 1 Spray(s) Both Nostrils every 3 hours PRN Nasal Congestion        Objective:  Vital Signs Last 24 Hrs  T(C): 36.7 (01 Dec 2022 11:30), Max: 36.9 (01 Dec 2022 05:49)  T(F): 98.1 (01 Dec 2022 11:30), Max: 98.5 (01 Dec 2022 05:49)  HR: 51 (01 Dec 2022 11:30) (50 - 72)  BP: 106/52 (01 Dec 2022 11:30) (97/50 - 118/63)  RR: 18 (01 Dec 2022 11:30) (16 - 18)  SpO2: 94% (01 Dec 2022 11:30) (93% - 94%)    Parameters below as of 01 Dec 2022 11:30  Patient On (Oxygen Delivery Method): room air        Physical Exam:  GEN: NAD, pleasant  HEENT: normocephalic and atraumatic.   NECK: Supple.   LUNGS: Clear to auscultation.  HEART: Regular rate and rhythm without murmur.  ABDOMEN: Soft, nontender, and nondistended.  Positive bowel sounds.    EXTREMITIES: Without any cyanosis, clubbing, rash, lesions or edema.  NEUROLOGIC: A & O x3, No focal neurological deficits   SKIN: No rash    LABS:      MICROBIOLOGY:  Flu With COVID-19 By AMARILYS (11.23.22 @ 21:00)    SARS-CoV-2 Result: Detected     RADIOLOGY & ADDITIONAL STUDIES:  ACC: [id number] EXAM:  XR CHEST PORTABLE URGENT 1V                          PROCEDURE DATE:  11/23/2022          INTERPRETATION:  AP chest on November 23, 2022 at 9:20 PM. Patient has   cough and is positive for Covid.    Heart likely enlarged. Thoracolumbar lumbar hardware again noted.    There is a small atelectatic infiltrate at left base increased from   October 18 this year.    Assessment :   75YO F PMH of HTN, Dyslipidemia, DVT, Lung CA s/p lobectomy, Hypothyroidism, SLE, RA on Methotrexate & Prednisone, COVID-19 infection in 2020, Post-Op Anemia s/p transfusion on PO iron therapy, and Obesity sp recent revision total right hip arthroplasty October 10th for a periprosthetic fracture admitted for COVID-19 infection Pt is on RA.   Pt is vaccinated and boostered  Doing well    Plan :   Supportive care  Sp Remdesivir x 3 days  No role for Decadron at this time  Trend temps and cbc  Pulm toileting  Increase activity  Stable from ID standpoint  Dc planning to rehab    Continue with present regiment.  Appropriate use of antibiotics and adverse effects reviewed.      35 minutes reviewing notes, labs data/ imaging , discussion with multidisciplinary team.    Thank you for allowing me to participate in care of your patient .        Fior Zamora MD  Infectious Disease  [phone]
     KAI THOMAS is a 76yFemale , patient examined and chart reviewed.     INTERVAL HPI/ OVERNIGHT EVENTS:  No events. Afebrile.  NAD.    Past Medical History--  PAST MEDICAL & SURGICAL HISTORY:  Rheumatoid arthritis  SLE (systemic lupus erythematosus)  Osteoarthritis  H/O degenerative disc disease  Hypertension  Hyperlipidemia  Benign heart murmur  Hypothyroid  Obesity, Class II, BMI 35-39.9, no comorbidity  DVT (deep venous thrombosis)  during left knee replacement 2019  Overactive bladder  Lab test positive for detection of COVID-19 virus  12/20  S/P tonsillectomy  S/P cataract surgery  left, right  S/P eye surgery  child  S/P carpal tunnel release  left  S/P knee surgery  bilateral  Lung cancer  small tumor removed 2015-no chemo, no radiation  S/P thyroid surgery  1 lobe removed  Status post total hip replacement, right  S/P knee replacement  left  S/P lumbar spine operation  12/20        For details regarding the patient's social history, family history, and other miscellaneous elements, please refer the initial infectious diseases consultation and/or the admitting history and physical examination for this admission.    ROS:  CONSTITUTIONAL:  Negative fever or chills  EYES:  Negative  blurry vision or double vision  CARDIOVASCULAR:  Negative for chest pain or palpitations  RESPIRATORY:  Negative for cough, wheezing, or SOB   GASTROINTESTINAL:  Negative for nausea, vomiting, diarrhea, constipation, or abdominal pain  GENITOURINARY:  Negative frequency, urgency , dysuria or hematuria   NEUROLOGIC:  No headache, confusion, dizziness, lightheadedness  All other systems were reviewed and are negative       Current inpatient medications :    ANTIBIOTICS/RELEVANT:    MEDICATIONS  (STANDING):  apixaban 2.5 milliGRAM(s) Oral every 12 hours  atorvastatin 20 milliGRAM(s) Oral at bedtime  ferrous    sulfate 325 milliGRAM(s) Oral daily  folic acid 1 milliGRAM(s) Oral daily  levothyroxine 150 MICROGram(s) Oral daily  lidocaine   4% Patch 1 Patch Transdermal daily  lidocaine   4% Patch 1 Patch Transdermal daily  methotrexate 10 milliGRAM(s) Oral <User Schedule>  nystatin Powder 1 Application(s) Topical two times a day  pantoprazole    Tablet 40 milliGRAM(s) Oral before breakfast  polyethylene glycol 3350 17 Gram(s) Oral at bedtime  predniSONE   Tablet 10 milliGRAM(s) Oral daily  senna 2 Tablet(s) Oral at bedtime  zinc oxide 20% Ointment 1 Application(s) Topical daily    MEDICATIONS  (PRN):  acetaminophen     Tablet .. 650 milliGRAM(s) Oral every 6 hours PRN Temp greater or equal to 38C (100.4F), Mild Pain (1 - 3)  benzocaine 15 mG/menthol 3.6 mG Lozenge 1 Lozenge Oral every 3 hours PRN Sore Throat  benzonatate 100 milliGRAM(s) Oral every 8 hours PRN Cough  calcium carbonate    500 mG (Tums) Chewable 1 Tablet(s) Chew daily PRN Dyspepsia  melatonin 3 milliGRAM(s) Oral at bedtime PRN Insomnia  sodium chloride 0.65% Nasal 1 Spray(s) Both Nostrils every 3 hours PRN Nasal Congestion        Objective:  Vital Signs Last 24 Hrs  T(C): 36.8 (30 Nov 2022 11:55), Max: 37.2 (29 Nov 2022 14:53)  T(F): 98.2 (30 Nov 2022 11:55), Max: 98.9 (29 Nov 2022 14:53)  HR: 67 (30 Nov 2022 11:55) (60 - 70)  BP: 108/47 (30 Nov 2022 11:55) (108/47 - 126/73)  RR: 16 (30 Nov 2022 11:55) (16 - 18)  SpO2: 96% (30 Nov 2022 11:55) (96% - 99%)    Parameters below as of 30 Nov 2022 05:09  Patient On (Oxygen Delivery Method): room air      Physical Exam:  GEN: NAD, pleasant  HEENT: normocephalic and atraumatic.   NECK: Supple.   LUNGS: Clear to auscultation.  HEART: Regular rate and rhythm without murmur.  ABDOMEN: Soft, nontender, and nondistended.  Positive bowel sounds.    EXTREMITIES: Without any cyanosis, clubbing, rash, lesions or edema.  NEUROLOGIC: A & O x3, No focal neurological deficits   SKIN: No rash    LABS:      MICROBIOLOGY:  Flu With COVID-19 By AMARILYS (11.23.22 @ 21:00)    SARS-CoV-2 Result: Detected     RADIOLOGY & ADDITIONAL STUDIES:  ACC: [id number] EXAM:  XR CHEST PORTABLE URGENT 1V                          PROCEDURE DATE:  11/23/2022          INTERPRETATION:  AP chest on November 23, 2022 at 9:20 PM. Patient has   cough and is positive for Covid.    Heart likely enlarged. Thoracolumbar lumbar hardware again noted.    There is a small atelectatic infiltrate at left base increased from   October 18 this year.    Assessment :   77YO F PMH of HTN, Dyslipidemia, DVT, Lung CA s/p lobectomy, Hypothyroidism, SLE, RA on Methotrexate & Prednisone, COVID-19 infection in 2020, Post-Op Anemia s/p transfusion on PO iron therapy, and Obesity sp recent revision total right hip arthroplasty October 10th for a periprosthetic fracture admitted for COVID-19 infection Pt is on RA.   Pt is vaccinated and boostered  Doing well    Plan :   Supportive care  Sp Remdesivir x 3 days  No role for Decadron at this time  Trend temps and cbc  Pulm toileting  Increase activity  Stable from ID standpoint  Dc planning to rehab    Continue with present regiment.  Appropriate use of antibiotics and adverse effects reviewed.      35 minutes reviewing notes, labs data/ imaging , discussion with multidisciplinary team.    Thank you for allowing me to participate in care of your patient .        Fior Zamora MD  Infectious Disease  [phone]
Chief Complaint: COVID    Interval Events: No events overnight.    Review of Systems:  General: No fevers, chills, weight gain  Skin: No rashes, color changes  Cardiovascular: No chest pain, orthopnea  Respiratory: No shortness of breath, cough  Gastrointestinal: No nausea, abdominal pain  Genitourinary: No incontinence, pain with urination  Musculoskeletal: No pain, swelling, decreased range of motion  Neurological: No headache, weakness  Psychiatric: No depression, anxiety  Endocrine: No weight gain, increased thirst  All other systems are comprehensively negative.    Physical Exam:  Vital Signs Last 24 Hrs  T(C): 36.3 (02 Dec 2022 05:13), Max: 36.7 (01 Dec 2022 11:30)  T(F): 97.3 (02 Dec 2022 05:13), Max: 98.1 (01 Dec 2022 11:30)  HR: 58 (02 Dec 2022 05:13) (51 - 70)  BP: 129/51 (02 Dec 2022 05:13) (97/52 - 129/51)  BP(mean): --  RR: 18 (02 Dec 2022 05:13) (18 - 18)  SpO2: 97% (02 Dec 2022 05:13) (94% - 97%)  Parameters below as of 02 Dec 2022 05:13  Patient On (Oxygen Delivery Method): room air  General: NAD  HEENT: MMM  Neck: No JVD, no carotid bruit  Lungs: CTAB  CV: RRR, nl S1/S2, no M/R/G  Abdomen: S/NT/ND, +BS  Extremities: No LE edema, no cyanosis  Neuro: AAOx3, non-focal  Skin: No rash    Labs:        ECG/Telemetry: Sinus rhythm, blocked PACs, Mobitz 1
Chief Complaint: COVID    Interval Events: No events overnight.    Review of Systems:  General: No fevers, chills, weight gain  Skin: No rashes, color changes  Cardiovascular: No chest pain, orthopnea  Respiratory: No shortness of breath, cough  Gastrointestinal: No nausea, abdominal pain  Genitourinary: No incontinence, pain with urination  Musculoskeletal: No pain, swelling, decreased range of motion  Neurological: No headache, weakness  Psychiatric: No depression, anxiety  Endocrine: No weight gain, increased thirst  All other systems are comprehensively negative.    Physical Exam:  Vital Signs Last 24 Hrs  T(C): 36.7 (26 Nov 2022 05:14), Max: 36.9 (25 Nov 2022 22:43)  T(F): 98 (26 Nov 2022 05:14), Max: 98.5 (25 Nov 2022 22:43)  HR: 57 (26 Nov 2022 05:14) (56 - 65)  BP: 122/67 (26 Nov 2022 05:14) (104/52 - 127/66)  BP(mean): --  RR: 18 (26 Nov 2022 05:14) (16 - 18)  SpO2: 99% (26 Nov 2022 05:14) (94% - 99%)  Parameters below as of 25 Nov 2022 13:54  Patient On (Oxygen Delivery Method): room air  General: NAD  HEENT: MMM  Neck: No JVD, no carotid bruit  Lungs: CTAB  CV: RRR, nl S1/S2, no M/R/G  Abdomen: S/NT/ND, +BS  Extremities: No LE edema, no cyanosis  Neuro: AAOx3, non-focal  Skin: No rash    Labs:             11-25    143  |  108  |  14  ----------------------------<  105<H>  4.1   |  29  |  0.67    Ca    8.2<L>      25 Nov 2022 07:34  Phos  3.8     11-25  Mg     1.8     11-25                          10.4   6.82  )-----------( 290      ( 25 Nov 2022 07:34 )             33.8           ECG/Telemetry: Sinus rhythm, blocked PACs, Mobitz 1
Chief Complaint: COVID    Interval Events: No events overnight.    Review of Systems:  General: No fevers, chills, weight gain  Skin: No rashes, color changes  Cardiovascular: No chest pain, orthopnea  Respiratory: No shortness of breath, cough  Gastrointestinal: No nausea, abdominal pain  Genitourinary: No incontinence, pain with urination  Musculoskeletal: No pain, swelling, decreased range of motion  Neurological: No headache, weakness  Psychiatric: No depression, anxiety  Endocrine: No weight gain, increased thirst  All other systems are comprehensively negative.    Physical Exam:  Vital Signs Last 24 Hrs  T(C): 36.9 (01 Dec 2022 05:49), Max: 36.9 (01 Dec 2022 05:49)  T(F): 98.5 (01 Dec 2022 05:49), Max: 98.5 (01 Dec 2022 05:49)  HR: 50 (01 Dec 2022 05:49) (50 - 72)  BP: 97/50 (01 Dec 2022 05:49) (97/50 - 118/63)  BP(mean): --  RR: 18 (01 Dec 2022 05:49) (16 - 18)  SpO2: 93% (01 Dec 2022 05:49) (93% - 96%)  Parameters below as of 01 Dec 2022 05:49  Patient On (Oxygen Delivery Method): room air  General: NAD  HEENT: MMM  Neck: No JVD, no carotid bruit  Lungs: CTAB  CV: RRR, nl S1/S2, no M/R/G  Abdomen: S/NT/ND, +BS  Extremities: No LE edema, no cyanosis  Neuro: AAOx3, non-focal  Skin: No rash    Labs:        ECG/Telemetry: Sinus rhythm, blocked PACs, Mobitz 1
Chief Complaint: COVID    Interval Events: No events overnight.    Review of Systems:  General: No fevers, chills, weight gain  Skin: No rashes, color changes  Cardiovascular: No chest pain, orthopnea  Respiratory: No shortness of breath, cough  Gastrointestinal: No nausea, abdominal pain  Genitourinary: No incontinence, pain with urination  Musculoskeletal: No pain, swelling, decreased range of motion  Neurological: No headache, weakness  Psychiatric: No depression, anxiety  Endocrine: No weight gain, increased thirst  All other systems are comprehensively negative.    Physical Exam:  Vitals:        Vital Signs Last 24 Hrs  T(C): 36.7 (25 Nov 2022 05:22), Max: 37 (24 Nov 2022 14:30)  T(F): 98.1 (25 Nov 2022 05:22), Max: 98.6 (24 Nov 2022 14:30)  HR: 58 (25 Nov 2022 05:22) (53 - 58)  BP: 121/74 (25 Nov 2022 05:22) (119/65 - 137/70)  BP(mean): --  RR: 18 (25 Nov 2022 05:22) (18 - 18)  SpO2: 100% (25 Nov 2022 05:22) (92% - 100%)  Parameters below as of 25 Nov 2022 05:22  Patient On (Oxygen Delivery Method): room air  General: NAD  HEENT: MMM  Neck: No JVD, no carotid bruit  Lungs: CTAB  CV: RRR, nl S1/S2, no M/R/G  Abdomen: S/NT/ND, +BS  Extremities: No LE edema, no cyanosis  Neuro: AAOx3, non-focal  Skin: No rash    Labs:                        10.4   6.82  )-----------( 290      ( 25 Nov 2022 07:34 )             33.8     11-25    143  |  108  |  14  ----------------------------<  105<H>  4.1   |  29  |  0.67    Ca    8.2<L>      25 Nov 2022 07:34  Phos  3.8     11-25  Mg     1.8     11-25    TPro  5.8<L>  /  Alb  2.2<L>  /  TBili  0.3  /  DBili  x   /  AST  16  /  ALT  <10<L>  /  AlkPhos  160<H>  11-24            ECG/Telemetry: Sinus rhythm, blocked PACs, Mobitz 1
Patient is a 76y old  Female who presents with a chief complaint of Sent for COVID-19 positive test. (30 Nov 2022 13:13)      INTERVAL HPI/OVERNIGHT EVENTS: pt continues to report intermittent sore throat.  cough decreased.  no other symptoms.     MEDICATIONS  (STANDING):  apixaban 2.5 milliGRAM(s) Oral every 12 hours  atorvastatin 20 milliGRAM(s) Oral at bedtime  ferrous    sulfate 325 milliGRAM(s) Oral daily  folic acid 1 milliGRAM(s) Oral daily  levothyroxine 150 MICROGram(s) Oral daily  lidocaine   4% Patch 1 Patch Transdermal daily  lidocaine   4% Patch 1 Patch Transdermal daily  methotrexate 10 milliGRAM(s) Oral <User Schedule>  nystatin Powder 1 Application(s) Topical two times a day  pantoprazole    Tablet 40 milliGRAM(s) Oral before breakfast  polyethylene glycol 3350 17 Gram(s) Oral at bedtime  predniSONE   Tablet 10 milliGRAM(s) Oral daily  senna 2 Tablet(s) Oral at bedtime  zinc oxide 20% Ointment 1 Application(s) Topical daily    MEDICATIONS  (PRN):  acetaminophen     Tablet .. 650 milliGRAM(s) Oral every 6 hours PRN Temp greater or equal to 38C (100.4F), Mild Pain (1 - 3)  benzocaine 15 mG/menthol 3.6 mG Lozenge 1 Lozenge Oral every 3 hours PRN Sore Throat  benzonatate 100 milliGRAM(s) Oral every 8 hours PRN Cough  calcium carbonate    500 mG (Tums) Chewable 1 Tablet(s) Chew daily PRN Dyspepsia  melatonin 3 milliGRAM(s) Oral at bedtime PRN Insomnia  sodium chloride 0.65% Nasal 1 Spray(s) Both Nostrils every 3 hours PRN Nasal Congestion      Allergies  No Known Allergies    REVIEW OF SYSTEMS:  CONSTITUTIONAL: No fever, weight loss, or fatigue  EYES: No eye pain, visual disturbances, or discharge  ENMT:  No difficulty hearing, tinnitus, vertigo; No sinus pain  NECK: No pain or stiffness  BREASTS: No pain, masses, or nipple discharge  RESPIRATORY: No  wheezing, chills or hemoptysis; No shortness of breath  CARDIOVASCULAR: No chest pain, palpitations, lightheadedness, or leg swelling  GASTROINTESTINAL: No abdominal or epigastric pain. No nausea, vomiting, or hematemesis; No diarrhea or constipation. No melena or hematochezia.  GENITOURINARY: No dysuria, frequency, hematuria, or incontinence  NEUROLOGICAL: No headaches, memory loss, vertigo, loss of strength, numbness, or tremors  SKIN: No itching, burning, rashes, or lesions   LYMPH NODES: No enlarged glands  ENDOCRINE: No heat or cold intolerance; No hair loss; No polydipsia or polyuria  MUSCULOSKELETAL: No joint pain or swelling; No muscle, back, or extremity pain  PSYCHIATRIC: No depression, anxiety, or mood swings  HEME/LYMPH: No easy bruising, or bleeding gums  ALLERGY AND IMMUNOLOGIC: No hives or eczema    Vital Signs Last 24 Hrs  T(C): 36.8 (30 Nov 2022 11:55), Max: 37.2 (29 Nov 2022 14:53)  T(F): 98.2 (30 Nov 2022 11:55), Max: 98.9 (29 Nov 2022 14:53)  HR: 67 (30 Nov 2022 11:55) (60 - 70)  BP: 108/47 (30 Nov 2022 11:55) (108/47 - 126/73)  BP(mean): --  RR: 16 (30 Nov 2022 11:55) (16 - 18)  SpO2: 96% (30 Nov 2022 11:55) (96% - 99%)    Parameters below as of 30 Nov 2022 05:09  Patient On (Oxygen Delivery Method): room air        PHYSICAL EXAM:  GENERAL: NAD, well-groomed, well-developed  HEAD:  Atraumatic, Normocephalic  EYES: conjunctiva and sclera clear  ENMT: Moist mucous membranes  NECK: Supple, No JVD  NERVOUS SYSTEM:  Alert & Oriented X3, Good concentration; All 4 extremities mobile, no gross sensory deficits.   CHEST/LUNG: Clear to auscultation bilaterally;   HEART: Regular rate and rhythm;   ABDOMEN: Soft, Nontender, Nondistended; Bowel sounds present  EXTREMITIES:  2+ Peripheral Pulses, No clubbing, cyanosis, or edema  LYMPH: No lymphadenopathy noted  SKIN: No rashes or lesions    LABS:              CAPILLARY BLOOD GLUCOSE          RADIOLOGY & ADDITIONAL TESTS:    Imaging Personally Reviewed:  [ ] YES     Consultant(s) Notes Reviewed:      Care Discussed with Consultants/Other Providers:    Advanced Directives: [ ] DNR  [ ] No feeding tube  [ ] MOLST in chart  [ ] MOLST completed today  [ ] Unknown  
Patient is a 76y old  Female who presents with a chief complaint of Sent for COVID-19 positive test. (23 Nov 2022 22:40)      INTERVAL HPI/OVERNIGHT EVENTS:  reports stuffy nose, sore throat and cough.  no trouble breathing, no SOB.  complaining of arthritis pain in both knees.      MEDICATIONS  (STANDING):  apixaban 2.5 milliGRAM(s) Oral every 12 hours  atorvastatin 20 milliGRAM(s) Oral at bedtime  ferrous    sulfate 325 milliGRAM(s) Oral daily  folic acid 1 milliGRAM(s) Oral daily  levothyroxine 150 MICROGram(s) Oral daily  lidocaine   4% Patch 1 Patch Transdermal daily  lidocaine   4% Patch 1 Patch Transdermal daily  methotrexate 10 milliGRAM(s) Oral <User Schedule>  nystatin Powder 1 Application(s) Topical two times a day  pantoprazole    Tablet 40 milliGRAM(s) Oral before breakfast  polyethylene glycol 3350 17 Gram(s) Oral at bedtime  potassium chloride    Tablet ER 40 milliEquivalent(s) Oral once  predniSONE   Tablet 10 milliGRAM(s) Oral daily  remdesivir  IVPB 100 milliGRAM(s) IV Intermittent every 24 hours  remdesivir  IVPB   IV Intermittent   senna 2 Tablet(s) Oral at bedtime  zinc oxide 20% Ointment 1 Application(s) Topical daily    MEDICATIONS  (PRN):  acetaminophen     Tablet .. 650 milliGRAM(s) Oral every 6 hours PRN Temp greater or equal to 38C (100.4F), Mild Pain (1 - 3)  benzocaine 15 mG/menthol 3.6 mG Lozenge 1 Lozenge Oral every 3 hours PRN Sore Throat  benzonatate 100 milliGRAM(s) Oral every 8 hours PRN Cough  calcium carbonate    500 mG (Tums) Chewable 1 Tablet(s) Chew daily PRN Dyspepsia  melatonin 3 milliGRAM(s) Oral at bedtime PRN Insomnia  sodium chloride 0.65% Nasal 1 Spray(s) Both Nostrils every 3 hours PRN Nasal Congestion      Allergies  No Known Allergies    REVIEW OF SYSTEMS:  CONSTITUTIONAL: No fever, weight loss, or fatigue  EYES: No eye pain, visual disturbances, or discharge  ENMT:  No difficulty hearing, tinnitus, vertigo;   NECK: No pain or stiffness  BREASTS: No pain, masses, or nipple discharge  RESPIRATORY: No cough, wheezing, chills or hemoptysis; No shortness of breath  CARDIOVASCULAR: No chest pain, palpitations, lightheadedness, or leg swelling  GASTROINTESTINAL: No abdominal or epigastric pain. No nausea, vomiting, or hematemesis; No diarrhea or constipation. No melena or hematochezia.  GENITOURINARY: No dysuria, frequency, hematuria, or incontinence  NEUROLOGICAL: No headaches, memory loss, vertigo, loss of strength, numbness, or tremors  SKIN: No itching, burning, rashes, or lesions   LYMPH NODES: No enlarged glands  ENDOCRINE: No heat or cold intolerance; No hair loss; No polydipsia or polyuria  MUSCULOSKELETAL: No joint pain or swelling; No muscle, back, or extremity pain  PSYCHIATRIC: No depression, anxiety, or mood swings  HEME/LYMPH: No easy bruising, or bleeding gums  ALLERGY AND IMMUNOLOGIC: No hives or eczema    Vital Signs Last 24 Hrs  T(C): 37 (24 Nov 2022 14:30), Max: 37.1 (24 Nov 2022 05:22)  T(F): 98.6 (24 Nov 2022 14:30), Max: 98.7 (24 Nov 2022 05:22)  HR: 56 (24 Nov 2022 14:30) (56 - 74)  BP: 122/56 (24 Nov 2022 14:30) (112/75 - 144/70)  BP(mean): --  RR: 18 (24 Nov 2022 14:30) (17 - 18)  SpO2: 98% (24 Nov 2022 14:30) (94% - 100%)    Parameters below as of 24 Nov 2022 14:30  Patient On (Oxygen Delivery Method): room air        PHYSICAL EXAM:  GENERAL: NAD, well-groomed, well-developed  HEAD:  Atraumatic, Normocephalic  EYES:  conjunctiva and sclera clear  ENMT: Moist mucous membranes  NECK: Supple, No JVD  NERVOUS SYSTEM:  Alert & Oriented X3, Good concentration; All 4 extremities mobile, no gross sensory deficits.   CHEST/LUNG: Clear to auscultation bilaterally; No rales, rhonchi, wheezing, or rubs  HEART: Regular rate and rhythm; No murmurs, rubs, or gallops  ABDOMEN: Soft, Nontender, Nondistended; Bowel sounds present  EXTREMITIES:  2+ Peripheral Pulses, No clubbing, cyanosis, or edema  LYMPH: No lymphadenopathy noted  SKIN: No rashes or lesions    LABS:                        10.0   6.54  )-----------( 304      ( 24 Nov 2022 08:16 )             32.5     24 Nov 2022 08:16    144    |  109    |  13     ----------------------------<  88     3.3     |  29     |  0.59     Ca    8.1        24 Nov 2022 08:16    TPro  5.8    /  Alb  2.2    /  TBili  0.3    /  DBili  x      /  AST  16     /  ALT  <10    /  AlkPhos  160    24 Nov 2022 08:16        CAPILLARY BLOOD GLUCOSE          RADIOLOGY & ADDITIONAL TESTS:    Imaging Personally Reviewed:  [ ] YES     Consultant(s) Notes Reviewed:      Care Discussed with Consultants/Other Providers:    Advanced Directives: [ ] DNR  [ ] No feeding tube  [ ] MOLST in chart  [ ] MOLST completed today  [ ] Unknown  
Patient is a 76y old  Female who presents with a chief complaint of Sent for COVID-19 positive test. (24 Nov 2022 19:46)      INTERVAL HPI/OVERNIGHT EVENTS:  cough with clear sputum, sore throat, stuffy nose,  no increase in symptoms.  +large bm this morning   pain in knees is ok with lidocaine patches.     MEDICATIONS  (STANDING):  apixaban 2.5 milliGRAM(s) Oral every 12 hours  atorvastatin 20 milliGRAM(s) Oral at bedtime  ferrous    sulfate 325 milliGRAM(s) Oral daily  folic acid 1 milliGRAM(s) Oral daily  levothyroxine 150 MICROGram(s) Oral daily  lidocaine   4% Patch 1 Patch Transdermal daily  lidocaine   4% Patch 1 Patch Transdermal daily  methotrexate 10 milliGRAM(s) Oral <User Schedule>  nystatin Powder 1 Application(s) Topical two times a day  pantoprazole    Tablet 40 milliGRAM(s) Oral before breakfast  polyethylene glycol 3350 17 Gram(s) Oral at bedtime  predniSONE   Tablet 10 milliGRAM(s) Oral daily  remdesivir  IVPB 100 milliGRAM(s) IV Intermittent every 24 hours  remdesivir  IVPB   IV Intermittent   senna 2 Tablet(s) Oral at bedtime  zinc oxide 20% Ointment 1 Application(s) Topical daily    MEDICATIONS  (PRN):  acetaminophen     Tablet .. 650 milliGRAM(s) Oral every 6 hours PRN Temp greater or equal to 38C (100.4F), Mild Pain (1 - 3)  benzocaine 15 mG/menthol 3.6 mG Lozenge 1 Lozenge Oral every 3 hours PRN Sore Throat  benzonatate 100 milliGRAM(s) Oral every 8 hours PRN Cough  calcium carbonate    500 mG (Tums) Chewable 1 Tablet(s) Chew daily PRN Dyspepsia  melatonin 3 milliGRAM(s) Oral at bedtime PRN Insomnia  sodium chloride 0.65% Nasal 1 Spray(s) Both Nostrils every 3 hours PRN Nasal Congestion      Allergies  No Known Allergies    REVIEW OF SYSTEMS:  CONSTITUTIONAL: No fever, weight loss, or fatigue  EYES: No eye pain, visual disturbances, or discharge  ENMT:  No difficulty hearing, tinnitus, vertigo;   NECK: No pain or stiffness  BREASTS: No pain, masses, or nipple discharge  RESPIRATORY: No wheezing, chills or hemoptysis; No shortness of breath  CARDIOVASCULAR: No chest pain, palpitations, lightheadedness, or leg swelling  GASTROINTESTINAL: No abdominal or epigastric pain. No nausea, vomiting, or hematemesis; No diarrhea or constipation. No melena or hematochezia.  GENITOURINARY: No dysuria, frequency, hematuria, or incontinence  NEUROLOGICAL: No headaches, memory loss, vertigo, loss of strength, numbness, or tremors  SKIN: No itching, burning, rashes, or lesions   LYMPH NODES: No enlarged glands  ENDOCRINE: No heat or cold intolerance; No hair loss; No polydipsia or polyuria  MUSCULOSKELETAL: No joint pain or swelling;   PSYCHIATRIC: No depression, anxiety, or mood swings  HEME/LYMPH: No easy bruising, or bleeding gums  ALLERGY AND IMMUNOLOGIC: No hives or eczema    Vital Signs Last 24 Hrs  T(C): 36.8 (25 Nov 2022 09:40), Max: 37 (24 Nov 2022 14:30)  T(F): 98.3 (25 Nov 2022 09:40), Max: 98.6 (24 Nov 2022 14:30)  HR: 57 (25 Nov 2022 09:40) (53 - 58)  BP: 124/64 (25 Nov 2022 09:40) (121/74 - 137/70)  BP(mean): --  RR: 16 (25 Nov 2022 09:40) (16 - 18)  SpO2: 94% (25 Nov 2022 09:40) (92% - 100%)    Parameters below as of 25 Nov 2022 09:40  Patient On (Oxygen Delivery Method): room air        PHYSICAL EXAM:  GENERAL: NAD, well-groomed, well-developed  HEAD:  Atraumatic, Normocephalic  EYES: conjunctiva and sclera clear  ENMT: Moist mucous membranes  NECK: Supple, No JVD  NERVOUS SYSTEM:  Alert & Oriented X3, Good concentration; All 4 extremities mobile, no gross sensory deficits.   CHEST/LUNG: Clear to auscultation bilaterally; No rales, rhonchi, wheezing, or rubs  HEART: Regular rate and rhythm; No murmurs, rubs, or gallops  ABDOMEN: Soft, Nontender, Nondistended; Bowel sounds present  EXTREMITIES:  2+ Peripheral Pulses, No clubbing, cyanosis; mild bilat LE edema  LYMPH: No lymphadenopathy noted  SKIN: No rashes or lesions    LABS:                        10.4   6.82  )-----------( 290      ( 25 Nov 2022 07:34 )             33.8     25 Nov 2022 07:34    143    |  108    |  14     ----------------------------<  105    4.1     |  29     |  0.67     Ca    8.2        25 Nov 2022 07:34  Phos  3.8       25 Nov 2022 07:34  Mg     1.8       25 Nov 2022 07:34          CAPILLARY BLOOD GLUCOSE          RADIOLOGY & ADDITIONAL TESTS:    Imaging Personally Reviewed:  [ ] YES     Consultant(s) Notes Reviewed:      Care Discussed with Consultants/Other Providers:    Advanced Directives: [ ] DNR  [ ] No feeding tube  [ ] MOLST in chart  [ ] MOLST completed today  [ ] Unknown  
Patient is a 76y old  Female who presents with a chief complaint of Sent for COVID-19 positive test. (28 Nov 2022 13:11)      INTERVAL HPI/OVERNIGHT EVENTS: still with mild sore throat.  no other covid symptoms.     MEDICATIONS  (STANDING):  apixaban 2.5 milliGRAM(s) Oral every 12 hours  atorvastatin 20 milliGRAM(s) Oral at bedtime  ferrous    sulfate 325 milliGRAM(s) Oral daily  folic acid 1 milliGRAM(s) Oral daily  levothyroxine 150 MICROGram(s) Oral daily  lidocaine   4% Patch 1 Patch Transdermal daily  lidocaine   4% Patch 1 Patch Transdermal daily  methotrexate 10 milliGRAM(s) Oral <User Schedule>  nystatin Powder 1 Application(s) Topical two times a day  pantoprazole    Tablet 40 milliGRAM(s) Oral before breakfast  polyethylene glycol 3350 17 Gram(s) Oral at bedtime  predniSONE   Tablet 10 milliGRAM(s) Oral daily  senna 2 Tablet(s) Oral at bedtime  zinc oxide 20% Ointment 1 Application(s) Topical daily    MEDICATIONS  (PRN):  acetaminophen     Tablet .. 650 milliGRAM(s) Oral every 6 hours PRN Temp greater or equal to 38C (100.4F), Mild Pain (1 - 3)  benzocaine 15 mG/menthol 3.6 mG Lozenge 1 Lozenge Oral every 3 hours PRN Sore Throat  benzonatate 100 milliGRAM(s) Oral every 8 hours PRN Cough  calcium carbonate    500 mG (Tums) Chewable 1 Tablet(s) Chew daily PRN Dyspepsia  melatonin 3 milliGRAM(s) Oral at bedtime PRN Insomnia  sodium chloride 0.65% Nasal 1 Spray(s) Both Nostrils every 3 hours PRN Nasal Congestion      Allergies  No Known Allergies    REVIEW OF SYSTEMS:  CONSTITUTIONAL: No fever, weight loss, or fatigue  EYES: No eye pain, visual disturbances, or discharge  ENMT:  No difficulty hearing, tinnitus, vertigo; No sinus pain  NECK: No pain or stiffness  BREASTS: No pain, masses, or nipple discharge  RESPIRATORY: No cough, wheezing, chills or hemoptysis; No shortness of breath  CARDIOVASCULAR: No chest pain, palpitations, lightheadedness, or leg swelling  GASTROINTESTINAL: No abdominal or epigastric pain. No nausea, vomiting, or hematemesis; No diarrhea or constipation. No melena or hematochezia.  GENITOURINARY: No dysuria, frequency, hematuria, or incontinence  NEUROLOGICAL: No headaches, memory loss, vertigo, loss of strength, numbness, or tremors  SKIN: No itching, burning, rashes, or lesions   LYMPH NODES: No enlarged glands  ENDOCRINE: No heat or cold intolerance; No hair loss; No polydipsia or polyuria  MUSCULOSKELETAL: No joint pain or swelling; No muscle, back, or extremity pain  PSYCHIATRIC: No depression, anxiety, or mood swings  HEME/LYMPH: No easy bruising, or bleeding gums  ALLERGY AND IMMUNOLOGIC: No hives or eczema    Vital Signs Last 24 Hrs  T(C): 36.3 (28 Nov 2022 11:08), Max: 37.2 (27 Nov 2022 17:27)  T(F): 97.3 (28 Nov 2022 11:08), Max: 98.9 (27 Nov 2022 17:27)  HR: 53 (28 Nov 2022 11:08) (47 - 58)  BP: 116/66 (28 Nov 2022 11:08) (116/66 - 134/67)  BP(mean): --  RR: 20 (28 Nov 2022 11:08) (18 - 20)  SpO2: 93% (28 Nov 2022 11:08) (92% - 98%)    Parameters below as of 28 Nov 2022 11:08  Patient On (Oxygen Delivery Method): room air        PHYSICAL EXAM:  GENERAL: NAD, well-groomed, well-developed  HEAD:  Atraumatic, Normocephalic  EYES: conjunctiva and sclera clear  ENMT: Moist mucous membranes  NECK: Supple, No JVD  NERVOUS SYSTEM:  Alert & Oriented X3, Good concentration; All 4 extremities mobile, no gross sensory deficits.   CHEST/LUNG: Clear to auscultation bilaterally; No rales, rhonchi, wheezing, or rubs  HEART: Regular rate and rhythm; No murmurs, rubs, or gallops  ABDOMEN: Soft, Nontender, Nondistended; Bowel sounds present  EXTREMITIES:  2+ Peripheral Pulses, No clubbing, cyanosis, or edema  LYMPH: No lymphadenopathy noted  SKIN: No rashes or lesions    LABS:                        12.2   9.15  )-----------( 287      ( 28 Nov 2022 09:30 )             39.8     28 Nov 2022 09:30    142    |  107    |  20     ----------------------------<  116    4.3     |  30     |  0.83     Ca    8.6        28 Nov 2022 09:30  Phos  3.8       28 Nov 2022 09:30  Mg     1.9       28 Nov 2022 09:30    TPro  6.4    /  Alb  2.4    /  TBili  0.3    /  DBili  x      /  AST  18     /  ALT  14     /  AlkPhos  187    28 Nov 2022 09:30        CAPILLARY BLOOD GLUCOSE          RADIOLOGY & ADDITIONAL TESTS:    Imaging Personally Reviewed:  [ ] YES     Consultant(s) Notes Reviewed:      Care Discussed with Consultants/Other Providers:    Advanced Directives: [ ] DNR  [ ] No feeding tube  [ ] MOLST in chart  [ ] MOLST completed today  [ ] Unknown  
Patient is a 76y old  Female who presents with a chief complaint of Sent for COVID-19 positive test. (25 Nov 2022 14:27)       INTERVAL HPI/OVERNIGHT EVENTS: Seen and examined at bedside. Denies any new symptoms, complaints. Awake, alert, no distress noted. Afebrile     MEDICATIONS  (STANDING):  apixaban 2.5 milliGRAM(s) Oral every 12 hours  atorvastatin 20 milliGRAM(s) Oral at bedtime  ferrous    sulfate 325 milliGRAM(s) Oral daily  folic acid 1 milliGRAM(s) Oral daily  levothyroxine 150 MICROGram(s) Oral daily  lidocaine   4% Patch 1 Patch Transdermal daily  lidocaine   4% Patch 1 Patch Transdermal daily  methotrexate 10 milliGRAM(s) Oral <User Schedule>  nystatin Powder 1 Application(s) Topical two times a day  pantoprazole    Tablet 40 milliGRAM(s) Oral before breakfast  polyethylene glycol 3350 17 Gram(s) Oral at bedtime  predniSONE   Tablet 10 milliGRAM(s) Oral daily  remdesivir  IVPB 100 milliGRAM(s) IV Intermittent every 24 hours  remdesivir  IVPB   IV Intermittent   senna 2 Tablet(s) Oral at bedtime  zinc oxide 20% Ointment 1 Application(s) Topical daily    MEDICATIONS  (PRN):  acetaminophen     Tablet .. 650 milliGRAM(s) Oral every 6 hours PRN Temp greater or equal to 38C (100.4F), Mild Pain (1 - 3)  benzocaine 15 mG/menthol 3.6 mG Lozenge 1 Lozenge Oral every 3 hours PRN Sore Throat  benzonatate 100 milliGRAM(s) Oral every 8 hours PRN Cough  calcium carbonate    500 mG (Tums) Chewable 1 Tablet(s) Chew daily PRN Dyspepsia  melatonin 3 milliGRAM(s) Oral at bedtime PRN Insomnia  sodium chloride 0.65% Nasal 1 Spray(s) Both Nostrils every 3 hours PRN Nasal Congestion      Allergies    No Known Allergies    Intolerances        REVIEW OF SYSTEMS:  CONSTITUTIONAL: No fever, weight loss, or fatigue  EYES: No eye pain, visual disturbances, or discharge  ENMT:  No difficulty hearing, tinnitus, vertigo;   NECK: No pain or stiffness  BREASTS: No pain, masses, or nipple discharge  RESPIRATORY: No wheezing, chills or hemoptysis; No shortness of breath  CARDIOVASCULAR: No chest pain, palpitations, lightheadedness, or leg swelling  GASTROINTESTINAL: No abdominal or epigastric pain. No nausea, vomiting, or hematemesis; No diarrhea or constipation. No melena or hematochezia.  GENITOURINARY: No dysuria, frequency, hematuria, or incontinence  NEUROLOGICAL: No headaches, memory loss, vertigo, loss of strength, numbness, or tremors  SKIN: No itching, burning, rashes, or lesions   LYMPH NODES: No enlarged glands  ENDOCRINE: No heat or cold intolerance; No hair loss; No polydipsia or polyuria  MUSCULOSKELETAL: No joint pain or swelling;   PSYCHIATRIC: No depression, anxiety, or mood swings  HEME/LYMPH: No easy bruising, or bleeding gums  ALLERGY AND IMMUNOLOGIC: No hives or eczema    Vital Signs Last 24 Hrs  T(C): 36.7 (26 Nov 2022 05:14), Max: 36.9 (25 Nov 2022 22:43)  T(F): 98 (26 Nov 2022 05:14), Max: 98.5 (25 Nov 2022 22:43)  HR: 57 (26 Nov 2022 05:14) (56 - 65)  BP: 122/67 (26 Nov 2022 05:14) (104/52 - 127/66)  BP(mean): --  RR: 18 (26 Nov 2022 05:14) (17 - 18)  SpO2: 99% (26 Nov 2022 05:14) (94% - 99%)    Parameters below as of 25 Nov 2022 13:54  Patient On (Oxygen Delivery Method): room air        PHYSICAL EXAM:  ENERAL: NAD, well-developed  HEAD:  Atraumatic, Normocephalic  EYES: conjunctiva and sclera clear  ENMT: Moist mucous membranes  NECK: Supple, No JVD  NERVOUS SYSTEM:  Alert & Oriented X3, Good concentration; All 4 extremities mobile, no gross sensory deficits.   CHEST/LUNG: Clear to auscultation bilaterally; No rales, rhonchi, wheezing, or rubs  HEART: Regular rate and rhythm; No murmurs, rubs, or gallops  ABDOMEN: Soft, Nontender, Nondistended; Bowel sounds present  EXTREMITIES:  2+ Peripheral Pulses, No clubbing, cyanosis; mild bilat LE edema  LYMPH: No lymphadenopathy noted  SKIN: No rashes or lesions  LABS:      Ca    8.2        25 Nov 2022 07:34        CAPILLARY BLOOD GLUCOSE        BLOOD CULTURE    RADIOLOGY & ADDITIONAL TESTS:    Imaging Personally Reviewed:  [ ] YES     Consultant(s) Notes Reviewed:      Care Discussed with Consultants/Other Providers:
Patient is a 76y old  Female who presents with a chief complaint of Sent for COVID-19 positive test. (26 Nov 2022 19:33)       INTERVAL HPI/OVERNIGHT EVENTS: Seen and examined at bedside. Feeling better. Denies cough, sob. Afebrile     MEDICATIONS  (STANDING):  apixaban 2.5 milliGRAM(s) Oral every 12 hours  atorvastatin 20 milliGRAM(s) Oral at bedtime  ferrous    sulfate 325 milliGRAM(s) Oral daily  folic acid 1 milliGRAM(s) Oral daily  levothyroxine 150 MICROGram(s) Oral daily  lidocaine   4% Patch 1 Patch Transdermal daily  lidocaine   4% Patch 1 Patch Transdermal daily  methotrexate 10 milliGRAM(s) Oral <User Schedule>  nystatin Powder 1 Application(s) Topical two times a day  pantoprazole    Tablet 40 milliGRAM(s) Oral before breakfast  polyethylene glycol 3350 17 Gram(s) Oral at bedtime  predniSONE   Tablet 10 milliGRAM(s) Oral daily  senna 2 Tablet(s) Oral at bedtime  zinc oxide 20% Ointment 1 Application(s) Topical daily    MEDICATIONS  (PRN):  acetaminophen     Tablet .. 650 milliGRAM(s) Oral every 6 hours PRN Temp greater or equal to 38C (100.4F), Mild Pain (1 - 3)  benzocaine 15 mG/menthol 3.6 mG Lozenge 1 Lozenge Oral every 3 hours PRN Sore Throat  benzonatate 100 milliGRAM(s) Oral every 8 hours PRN Cough  calcium carbonate    500 mG (Tums) Chewable 1 Tablet(s) Chew daily PRN Dyspepsia  melatonin 3 milliGRAM(s) Oral at bedtime PRN Insomnia  sodium chloride 0.65% Nasal 1 Spray(s) Both Nostrils every 3 hours PRN Nasal Congestion      Allergies    No Known Allergies    Intolerances        REVIEW OF SYSTEMS:  CONSTITUTIONAL: No fever, weight loss, or fatigue  EYES: No eye pain, visual disturbances, or discharge  ENMT:  No difficulty hearing, tinnitus, vertigo;   NECK: No pain or stiffness  BREASTS: No pain, masses, or nipple discharge  RESPIRATORY: No wheezing, chills or hemoptysis; No shortness of breath  CARDIOVASCULAR: No chest pain, palpitations, lightheadedness, or leg swelling  GASTROINTESTINAL: No abdominal or epigastric pain. No nausea, vomiting, or hematemesis; No diarrhea or constipation. No melena or hematochezia.  GENITOURINARY: No dysuria, frequency, hematuria, or incontinence  NEUROLOGICAL: No headaches, memory loss, vertigo, loss of strength, numbness, or tremors  SKIN: No itching, burning, rashes, or lesions   LYMPH NODES: No enlarged glands  ENDOCRINE: No heat or cold intolerance; No hair loss; No polydipsia or polyuria  MUSCULOSKELETAL: No joint pain or swelling;   PSYCHIATRIC: No depression, anxiety, or mood swings  HEME/LYMPH: No easy bruising, or bleeding gums  ALLERGY AND IMMUNOLOGIC: No hives or eczema    Vital Signs Last 24 Hrs  T(C): 36.9 (27 Nov 2022 08:15), Max: 36.9 (26 Nov 2022 13:30)  T(F): 98.4 (27 Nov 2022 08:15), Max: 98.4 (26 Nov 2022 13:30)  HR: 54 (27 Nov 2022 08:15) (53 - 64)  BP: 117/62 (27 Nov 2022 08:15) (113/66 - 148/72)  BP(mean): --  RR: 19 (27 Nov 2022 08:15) (18 - 19)  SpO2: 98% (27 Nov 2022 08:15) (95% - 99%)    Parameters below as of 27 Nov 2022 05:08  Patient On (Oxygen Delivery Method): room air        PHYSICAL EXAM:  GENERAL: NAD, well-developed  HEAD:  Atraumatic, Normocephalic  EYES: conjunctiva and sclera clear  ENMT: Moist mucous membranes  NECK: Supple, No JVD  NERVOUS SYSTEM:  Alert & Oriented X3, Good concentration; All 4 extremities mobile, no gross sensory deficits.   CHEST/LUNG: Clear to auscultation bilaterally; No rales, rhonchi, wheezing, or rubs  HEART: Regular rate and rhythm; No murmurs, rubs, or gallops  ABDOMEN: Soft, Nontender, Nondistended; Bowel sounds present  EXTREMITIES:  2+ Peripheral Pulses, No clubbing, cyanosis; mild bilat LE edema  LYMPH: No lymphadenopathy noted  SKIN: No rashes or lesions    LABS:                        10.7   9.18  )-----------( 295      ( 27 Nov 2022 06:00 )             34.5     27 Nov 2022 06:00    142    |  107    |  19     ----------------------------<  91     3.8     |  29     |  0.85     Ca    8.2        27 Nov 2022 06:00    TPro  5.9    /  Alb  2.2    /  TBili  0.2    /  DBili  x      /  AST  17     /  ALT  14     /  AlkPhos  170    27 Nov 2022 06:00      CAPILLARY BLOOD GLUCOSE        BLOOD CULTURE    RADIOLOGY & ADDITIONAL TESTS:    Imaging Personally Reviewed:  [ ] YES     Consultant(s) Notes Reviewed:      Care Discussed with Consultants/Other Providers:

## 2022-12-02 NOTE — PROGRESS NOTE ADULT - REASON FOR ADMISSION
Sent for COVID-19 positive test.

## 2022-12-02 NOTE — PROGRESS NOTE ADULT - PROVIDER SPECIALTY LIST ADULT
Cardiology
Hospitalist
Hospitalist
Infectious Disease
Internal Medicine
Cardiology
Hospitalist
Infectious Disease
Cardiology
Hospitalist
Infectious Disease
Infectious Disease
Orthopedics
Internal Medicine

## 2022-12-02 NOTE — DISCHARGE NOTE NURSING/CASE MANAGEMENT/SOCIAL WORK - PATIENT PORTAL LINK FT
You can access the FollowMyHealth Patient Portal offered by [facility] by registering at the following website: [email or web address]. By joining [facility]’s FollowMyHealth portal, you will also be able to view your health information using other applications (apps) compatible with our system.

## 2022-12-02 NOTE — DISCHARGE NOTE NURSING/CASE MANAGEMENT/SOCIAL WORK - NSDCPEFALRISK_GEN_ALL_CORE
For information on Fall & Injury Prevention, visit: [email or web address] OR  [email or web address] OR  https://www.cdc.gov/steadi/patient.html

## 2022-12-09 PROCEDURE — 87635 SARS-COV-2 COVID-19 AMP PRB: CPT

## 2022-12-09 PROCEDURE — 86900 BLOOD TYPING SEROLOGIC ABO: CPT

## 2022-12-09 PROCEDURE — 85027 COMPLETE CBC AUTOMATED: CPT

## 2022-12-09 PROCEDURE — 86901 BLOOD TYPING SEROLOGIC RH(D): CPT

## 2022-12-09 PROCEDURE — 80053 COMPREHEN METABOLIC PANEL: CPT

## 2022-12-09 PROCEDURE — 97535 SELF CARE MNGMENT TRAINING: CPT

## 2022-12-09 PROCEDURE — 97112 NEUROMUSCULAR REEDUCATION: CPT

## 2022-12-09 PROCEDURE — 97116 GAIT TRAINING THERAPY: CPT

## 2022-12-09 PROCEDURE — 86850 RBC ANTIBODY SCREEN: CPT

## 2022-12-09 PROCEDURE — 36415 COLL VENOUS BLD VENIPUNCTURE: CPT

## 2022-12-09 PROCEDURE — 97163 PT EVAL HIGH COMPLEX 45 MIN: CPT

## 2022-12-09 PROCEDURE — 97167 OT EVAL HIGH COMPLEX 60 MIN: CPT

## 2022-12-09 PROCEDURE — U0005: CPT

## 2022-12-09 PROCEDURE — U0003: CPT

## 2022-12-09 PROCEDURE — 97110 THERAPEUTIC EXERCISES: CPT

## 2022-12-09 PROCEDURE — 97530 THERAPEUTIC ACTIVITIES: CPT

## 2022-12-09 PROCEDURE — 85025 COMPLETE CBC W/AUTO DIFF WBC: CPT

## 2022-12-14 ENCOUNTER — TRANSCRIPTION ENCOUNTER (OUTPATIENT)
Age: 77
End: 2022-12-14

## 2022-12-19 ENCOUNTER — APPOINTMENT (OUTPATIENT)
Dept: CARDIOLOGY | Facility: CLINIC | Age: 77
End: 2022-12-19

## 2022-12-19 ENCOUNTER — TRANSCRIPTION ENCOUNTER (OUTPATIENT)
Age: 77
End: 2022-12-19

## 2022-12-19 VITALS
TEMPERATURE: 97.7 F | OXYGEN SATURATION: 95 % | HEART RATE: 85 BPM | SYSTOLIC BLOOD PRESSURE: 135 MMHG | DIASTOLIC BLOOD PRESSURE: 66 MMHG | RESPIRATION RATE: 16 BRPM | HEIGHT: 66 IN

## 2022-12-19 PROCEDURE — 93000 ELECTROCARDIOGRAM COMPLETE: CPT

## 2022-12-19 PROCEDURE — 99214 OFFICE O/P EST MOD 30 MIN: CPT

## 2022-12-19 NOTE — HISTORY OF PRESENT ILLNESS
[FreeTextEntry1] : Marely fell in October and broke her previously operated on hip. After surgery went to rehab where she got COVID. Then transferred to [place] and received Remdesevir. Then she got home less than one day and fell at home and went back to [place]. No surgery.

## 2022-12-19 NOTE — DISCUSSION/SUMMARY
[FreeTextEntry1] : The patient is a 77-year-old obese female history of rheumatoid arthritis,  lupus, hypothyroidism, hyperlipidemia, hypertension, lower extremity edema ,RBBB, s/p left lobectomy for cancer s/p rt hip replacement complicated by DVT s/p left knee replacement s/p spine surgery s/p hip fracture and COVID with remdesevir whose PVC are under control . \par #1 RA- on MTX and plaquenil, ambulates with walker\par #2 Htn- continue  losartan 50mg\par #3 Lipids- continue atorvastatin \par #4 Hypothyroidism- continue levothyroxine 150mcg\par #5 CV- pharm stress negative, PVC controlled with metoprolol, mod AS 1.5, EF= 60%\par #6 Ortho- s/p right hip replacement and Lt TKR  s/p spine surgery and rt TK, c/w  eliquis bid\par \par  [EKG obtained to assist in diagnosis and management of assessed problem(s)] : EKG obtained to assist in diagnosis and management of assessed problem(s)

## 2022-12-20 LAB — SARS-COV-2 RNA SPEC QL NAA+PROBE: SIGNIFICANT CHANGE UP

## 2022-12-21 NOTE — H&P PST ADULT - GASTROINTESTINAL DETAILS
[de-identified] : Fasting glucose was 108, HBA1c  6.4, estimated glucose 137, GFR 56. Reports were reviewed with pt in details. Other blood tests came back wnl. Pt has been taking all her medications.\par \par \par  nontender/no masses palpable

## 2023-01-04 NOTE — PATIENT PROFILE ADULT - NSPREOP1_ARRIVALTIME_GEN_A_NUR
05:45 patient is A&Ox4. Breathing unlabored on RA. PMH migraines. complaining of headache x3 days, reports the head feels "tight". complaining of intermittent "stinging throbbing pain". denies seeing neurologist. reports being referred to ED by [facility]. reports taking Tylenol around 11:30 am. reports taking Tylenol at home with temporary relief. complaining the headache is usually right-sided. states the headache is in different parts of the head worse on right side this time. complaining of TMJ pain when opening mouth since x3 days. denies any injuries or trauma. denies abdominal pain, dizziness, LOC, blurred vision, n/v/d, cp, sob, difficulty breathing, weakness, numbness/tingling, neck pain, fever, chills. denies any other symptoms at this time.

## 2023-01-12 ENCOUNTER — TRANSCRIPTION ENCOUNTER (OUTPATIENT)
Age: 78
End: 2023-01-12

## 2023-01-17 ENCOUNTER — TRANSCRIPTION ENCOUNTER (OUTPATIENT)
Age: 78
End: 2023-01-17

## 2023-01-19 ENCOUNTER — APPOINTMENT (OUTPATIENT)
Dept: CARDIOLOGY | Facility: CLINIC | Age: 78
End: 2023-01-19
Payer: MEDICARE

## 2023-01-19 ENCOUNTER — NON-APPOINTMENT (OUTPATIENT)
Age: 78
End: 2023-01-19

## 2023-01-19 VITALS
WEIGHT: 219 LBS | HEART RATE: 88 BPM | TEMPERATURE: 98.2 F | DIASTOLIC BLOOD PRESSURE: 73 MMHG | BODY MASS INDEX: 35.2 KG/M2 | OXYGEN SATURATION: 97 % | RESPIRATION RATE: 12 BRPM | SYSTOLIC BLOOD PRESSURE: 121 MMHG | HEIGHT: 66 IN

## 2023-01-19 DIAGNOSIS — L93.0 DISCOID LUPUS ERYTHEMATOSUS: ICD-10-CM

## 2023-01-19 DIAGNOSIS — B02.39 OTHER HERPES ZOSTER EYE DISEASE: ICD-10-CM

## 2023-01-19 PROCEDURE — 93000 ELECTROCARDIOGRAM COMPLETE: CPT

## 2023-01-19 PROCEDURE — 99213 OFFICE O/P EST LOW 20 MIN: CPT

## 2023-01-19 NOTE — DISCUSSION/SUMMARY
[FreeTextEntry1] : The patient is a 77-year-old obese female history of rheumatoid arthritis,  lupus, hypothyroidism, hyperlipidemia, hypertension, lower extremity edema ,RBBB, s/p left lobectomy for cancer s/p rt hip replacement complicated by DVT s/p left knee replacement s/p spine surgery s/p hip fracture and COVID with remdesevir , PVC, s/p hospitalization for shingles of the eye.\par #1 RA- on MTX and plaquenil, ambulates with walker\par #2 Htn- c/w losartan 50mg\par #3 Lipids- c/w atorvastatin \par #4 Hypothyroidism- c/w levothyroxine 150mcg\par #5 CV- pharm stress negative, PVC controlled with metoprolol, mod AS 1.5, EF= 60%\par #6 Ortho- s/p right hip replacement and Lt TKR  s/p spine surgery and rt TK, c/w  eliquis bid\par #7 Ophtho- seen today for the shingles and site will be ok\par \par  [EKG obtained to assist in diagnosis and management of assessed problem(s)] : EKG obtained to assist in diagnosis and management of assessed problem(s)

## 2023-02-02 RX ORDER — CIPROFLOXACIN HYDROCHLORIDE 500 MG/1
500 TABLET, FILM COATED ORAL TWICE DAILY
Qty: 10 | Refills: 0 | Status: DISCONTINUED | COMMUNITY
Start: 2023-02-02 | End: 2023-02-02

## 2023-02-02 RX ORDER — AMOXICILLIN AND CLAVULANATE POTASSIUM 875; 125 MG/1; MG/1
875-125 TABLET, COATED ORAL TWICE DAILY
Qty: 10 | Refills: 0 | Status: ACTIVE | COMMUNITY
Start: 2023-02-02 | End: 1900-01-01

## 2023-02-08 NOTE — BRIEF OPERATIVE NOTE - ANTIBIOTIC PROTOCOL
Followed protocol Tranexamic Acid Counseling:  Patient advised of the small risk of bleeding problems with tranexamic acid. They were also instructed to call if they developed any nausea, vomiting or diarrhea. All of the patient's questions and concerns were addressed.

## 2023-02-15 NOTE — PRE-OP CHECKLIST - RESPIRATORY RATE (BREATHS/MIN)
Date of Surgery Update:  Holedn Rhoades 18

## 2023-02-21 NOTE — PATIENT PROFILE ADULT - NS PRO AD PATIENT TYPE
Spine appears normal, range of motion is not limited, no muscle or joint tenderness
Health Care Proxy (HCP)

## 2023-03-01 ENCOUNTER — NON-APPOINTMENT (OUTPATIENT)
Age: 78
End: 2023-03-01

## 2023-03-09 ENCOUNTER — APPOINTMENT (OUTPATIENT)
Dept: VASCULAR SURGERY | Facility: CLINIC | Age: 78
End: 2023-03-09
Payer: MEDICARE

## 2023-03-09 VITALS
DIASTOLIC BLOOD PRESSURE: 76 MMHG | HEIGHT: 66 IN | TEMPERATURE: 97.9 F | SYSTOLIC BLOOD PRESSURE: 115 MMHG | WEIGHT: 219 LBS | HEART RATE: 94 BPM | BODY MASS INDEX: 35.2 KG/M2

## 2023-03-09 DIAGNOSIS — I89.0 LYMPHEDEMA, NOT ELSEWHERE CLASSIFIED: ICD-10-CM

## 2023-03-09 PROCEDURE — 99213 OFFICE O/P EST LOW 20 MIN: CPT

## 2023-03-13 NOTE — DISCHARGE NOTE PROVIDER - NSDCQMCOGNITION_NEU_ALL_CORE
[Today's Date] : [unfilled] [FreeTextEntry1] : Normal sinus rhythm, normal QRS axis, normal intervals (QTc 416 msec), no hypertrophy, no pre-excitation, no ST segment or T wave abnormalities. Normal EKG.\par \par \par [id number]. Sinus rhythm, rate 70 beats per minute, QRS axis +91°, ME 0.13, QRS 0.08, QTC 0.41 seconds and is within normal limits for age.  [FreeTextEntry2] : Summary:  \par 1.Normal pulmonary valve morphology and systolic Doppler profile.\par 2.Physiologic pulmonary valve regurgitation.\par 3.Normal left ventricular size, morphology and systolic function.\par 4.Normal right ventricular morphology with qualitatively normal size and systolic function.\par 5.No pericardial effusion\par \par \par Echo: 05/01/2018. Situs solitus, D. looped ventricles, normally related great vessels, normal left ventricular function and dimension, (see report).  No difficulties

## 2023-03-16 NOTE — HISTORY OF PRESENT ILLNESS
[FreeTextEntry1] : KAI THOMAS is a 77 year old female who presents for evaluation of bilateral LE edema\par \par Referred by Dr. Castillo. \par \par Patient has a history of left knee replacement in 2017 with provoked DVT on eliquis 2.5mg twice daily. She presents today for evaluation prior to her right knee placement. She has previously been followed by Dr. Jethro Hoyos (vascular cardiology).\par \par + PMH: rheumatoid arthritis, lupus, hypothyroidism, hyperlipidemia, hypertension, lymphedema\par + PSH: left knee replacement, right hip replacement, spine surgery\par + FH: CAD\par + SH: never smoker, limited etoh use\par + Aller: NKDA\par \par PCP is Dr. Scott Parra

## 2023-03-16 NOTE — ASSESSMENT
[FreeTextEntry1] : KAI THOMAS is a 77 year old female here for evaluation bilateral lower extremity swelling.\par \par \par Patient does not have any evidence of arterial insufficiency.  No evidence of varicosity.  Patient has bilateral lower extremity swelling consistent with lymphedema.  At this point I recommended to her to undergo conservative management with leg elevation, compression stocking and pneumatic lymphedema pump.\par Patient presents with lymphedema extending from lower extremities into truncal region, secondary to obesity and despite efforts of compression (20-30mmHg), elevation and exercise for over 4 weeks symptoms persist. Early signs of hyperpigmentation are noted. Patient has used basic pump (entre ) for over 3 years however it has worsened truncal lymphedema and increased pain due to her skin sensitivity. Patient also attempted self MLD at home without any improvement in symptoms. Now recommending Flexitouch for treatment of both lower extremities and truncal region. \par \par

## 2023-03-16 NOTE — PHYSICAL EXAM
[2+] : right 2+ [1+] : left 1+ [Ankle Swelling (On Exam)] : present [Ankle Swelling Bilaterally] : bilaterally  [] : bilaterally [Ankle Swelling On The Right] : mild [Calm] : calm [Varicose Veins Of Lower Extremities] : not present [de-identified] : Well-appearing  [de-identified] : EOMI, anicteric  [de-identified] : Soft, nontender, nondistended  [de-identified] : motor and sensation intact in all four extremities  [de-identified] : no wounds bilateral feet [de-identified] : A&Ox4

## 2023-03-20 ENCOUNTER — APPOINTMENT (OUTPATIENT)
Dept: CARDIOLOGY | Facility: CLINIC | Age: 78
End: 2023-03-20
Payer: MEDICARE

## 2023-03-20 ENCOUNTER — NON-APPOINTMENT (OUTPATIENT)
Age: 78
End: 2023-03-20

## 2023-03-20 VITALS
DIASTOLIC BLOOD PRESSURE: 77 MMHG | TEMPERATURE: 97.7 F | WEIGHT: 219 LBS | SYSTOLIC BLOOD PRESSURE: 133 MMHG | HEIGHT: 66 IN | HEART RATE: 86 BPM | OXYGEN SATURATION: 96 % | BODY MASS INDEX: 35.2 KG/M2 | RESPIRATION RATE: 14 BRPM

## 2023-03-20 DIAGNOSIS — I47.1 SUPRAVENTRICULAR TACHYCARDIA: ICD-10-CM

## 2023-03-20 PROCEDURE — 99214 OFFICE O/P EST MOD 30 MIN: CPT

## 2023-03-20 PROCEDURE — 93000 ELECTROCARDIOGRAM COMPLETE: CPT

## 2023-03-20 RX ORDER — PREGABALIN 75 MG/1
75 CAPSULE ORAL
Qty: 90 | Refills: 0 | Status: ACTIVE | COMMUNITY
Start: 2023-02-13

## 2023-03-20 RX ORDER — MUPIROCIN 20 MG/G
2 OINTMENT TOPICAL
Qty: 22 | Refills: 0 | Status: COMPLETED | COMMUNITY
Start: 2023-01-20

## 2023-03-20 RX ORDER — OFLOXACIN 3 MG/ML
0.3 SOLUTION/ DROPS OPHTHALMIC
Qty: 5 | Refills: 0 | Status: COMPLETED | COMMUNITY
Start: 2023-03-08

## 2023-03-20 RX ORDER — ERYTHROMYCIN 5 MG/G
5 OINTMENT OPHTHALMIC
Qty: 4 | Refills: 0 | Status: COMPLETED | COMMUNITY
Start: 2023-02-10

## 2023-03-20 RX ORDER — PREGABALIN 100 MG/1
100 CAPSULE ORAL
Qty: 60 | Refills: 0 | Status: ACTIVE | COMMUNITY
Start: 2023-02-23

## 2023-03-20 RX ORDER — TRIFLURIDINE 10 MG/ML
1 SOLUTION OPHTHALMIC
Qty: 8 | Refills: 0 | Status: COMPLETED | COMMUNITY
Start: 2023-01-18

## 2023-03-20 RX ORDER — AMOXICILLIN AND CLAVULANATE POTASSIUM 500; 125 MG/1; MG/1
500-125 TABLET, FILM COATED ORAL
Qty: 14 | Refills: 0 | Status: COMPLETED | COMMUNITY
Start: 2023-03-18

## 2023-03-20 NOTE — DISCUSSION/SUMMARY
[FreeTextEntry1] : The patient is a 77-year-old obese female RA,  lupus, hypothyroidism, HTN, HLD, lower extremity edema ,RBBB, s/p left lobectomy for cancer s/p rt hip replacement complicated by DVT s/p left knee replacement s/p spine surgery s/p hip fracture and COVID with remdesevir , PVC, with pain from shingles of the eye and occasional PVC.\par #1 RA- on MTX and plaquenil, ambulates with walker\par #2 HTN- c/w losartan 50mg\par #3 HLD- c/w atorvastatin \par #4 Hypothyroidism- c/w levothyroxine 150mcg\par #5 CV- pharm stress negative, PVC controlled with metoprolol, mod AS/MR, EF= 60%, echo next visit\par #6 Ortho- s/p right hip replacement and Lt TKR  s/p spine surgery and rt TK, c/w  eliquis bid\par #7 Ophtho- f/u shingles, corneal involvement, on Lyrica for pain\par \par  [EKG obtained to assist in diagnosis and management of assessed problem(s)] : EKG obtained to assist in diagnosis and management of assessed problem(s)

## 2023-03-20 NOTE — HISTORY OF PRESENT ILLNESS
[FreeTextEntry1] : Marely continues to suffer from pain from shingles.  She does have corneal involvement and is on several medication for that.  Lyrica is not really helping her that much and she has difficulty sleeping.  Denies any palpitations, lightheadedness or dizziness.

## 2023-03-20 NOTE — PHYSICAL EXAM
[Well Developed] : well developed [Well Nourished] : well nourished [No Acute Distress] : no acute distress [Normal Conjunctiva] : normal conjunctiva [Normal Venous Pressure] : normal venous pressure [No Carotid Bruit] : no carotid bruit [Normal S1, S2] : normal S1, S2 [No Rub] : no rub [No Gallop] : no gallop [Clear Lung Fields] : clear lung fields [Good Air Entry] : good air entry [No Respiratory Distress] : no respiratory distress  [Soft] : abdomen soft [Non Tender] : non-tender [No Masses/organomegaly] : no masses/organomegaly [Normal Bowel Sounds] : normal bowel sounds [Normal Gait] : normal gait [No Edema] : no edema [No Cyanosis] : no cyanosis [No Clubbing] : no clubbing [No Varicosities] : no varicosities [No Rash] : no rash [No Skin Lesions] : no skin lesions [Moves all extremities] : moves all extremities [No Focal Deficits] : no focal deficits [Normal Speech] : normal speech [Alert and Oriented] : alert and oriented [Normal memory] : normal memory [Murmur] : murmur [de-identified] : 3/6 systolic

## 2023-03-27 ENCOUNTER — APPOINTMENT (OUTPATIENT)
Dept: UROLOGY | Facility: CLINIC | Age: 78
End: 2023-03-27
Payer: MEDICARE

## 2023-03-27 VITALS
BODY MASS INDEX: 35.2 KG/M2 | WEIGHT: 219 LBS | DIASTOLIC BLOOD PRESSURE: 79 MMHG | RESPIRATION RATE: 14 BRPM | OXYGEN SATURATION: 97 % | HEIGHT: 66 IN | SYSTOLIC BLOOD PRESSURE: 126 MMHG | HEART RATE: 77 BPM | TEMPERATURE: 97.5 F

## 2023-03-27 DIAGNOSIS — N39.0 URINARY TRACT INFECTION, SITE NOT SPECIFIED: ICD-10-CM

## 2023-03-27 DIAGNOSIS — R35.0 FREQUENCY OF MICTURITION: ICD-10-CM

## 2023-03-27 DIAGNOSIS — R39.15 URGENCY OF URINATION: ICD-10-CM

## 2023-03-27 PROCEDURE — 99214 OFFICE O/P EST MOD 30 MIN: CPT

## 2023-03-27 NOTE — HISTORY OF PRESENT ILLNESS
[FreeTextEntry1] : Boris olsen 77-year-old woman who presents for follow-up of increased urinary frequency, urinary urgency, recurrent urinary tract infections.  She continues to take Hip-Elias.  She continues to take for urinary urgency.  She reports that this improved her urinary symptoms.  She does report increased urinary frequency that is persistent.  She also reports recently being diagnosed with shingles of the face.

## 2023-03-27 NOTE — ASSESSMENT
[FreeTextEntry1] : Very pleasant 77-year-old woman who presents for follow-up of recurrent urinary tract infections, increased urinary frequency, urinary urgency\par -Urine culture today\par -Urinalysis today\par -Continue Hip-Elias\par -Continue Solifenacin\par -Start mirabegron after results of urine culture returns\par -We discussed the risks and benefits of mirabegron, including the need to check blood pressure more frequently when taking the medication\par -Follow-up in 6 months

## 2023-03-31 RX ORDER — CIPROFLOXACIN HYDROCHLORIDE 500 MG/1
500 TABLET, FILM COATED ORAL TWICE DAILY
Qty: 14 | Refills: 0 | Status: ACTIVE | COMMUNITY
Start: 2023-03-31 | End: 1900-01-01

## 2023-04-03 ENCOUNTER — RX RENEWAL (OUTPATIENT)
Age: 78
End: 2023-04-03

## 2023-04-10 ENCOUNTER — APPOINTMENT (OUTPATIENT)
Dept: UROLOGY | Facility: CLINIC | Age: 78
End: 2023-04-10

## 2023-04-10 NOTE — PROGRESS NOTE ADULT - SUBJECTIVE AND OBJECTIVE BOX
Cc: Gait dysfunction    HPI: Patient with no new medical issues today.  Reports a bowel movement his morning.   Pain controlled, no chest pain, no N/V, no Fevers/Chills. No other new ROS  Has been tolerating rehabilitation program.    acetaminophen     Tablet .. 650 milliGRAM(s) Oral every 8 hours  apixaban 2.5 milliGRAM(s) Oral every 12 hours  atorvastatin 20 milliGRAM(s) Oral at bedtime  celecoxib 100 milliGRAM(s) Oral every 12 hours  ferrous    sulfate 325 milliGRAM(s) Oral daily  levothyroxine 150 MICROGram(s) Oral daily  magnesium hydroxide Suspension 30 milliLiter(s) Oral daily PRN  melatonin 6 milliGRAM(s) Oral at bedtime  nystatin Powder 1 Application(s) Topical every 12 hours  oxyCODONE    IR 5 milliGRAM(s) Oral every 3 hours PRN  oxyCODONE    IR 2.5 milliGRAM(s) Oral every 3 hours PRN  pantoprazole    Tablet 40 milliGRAM(s) Oral before breakfast  polyethylene glycol 3350 17 Gram(s) Oral at bedtime  senna 2 Tablet(s) Oral at bedtime  zinc oxide 40% Paste 1 Application(s) Topical every 12 hours      T(C): 36.7 (10-29-22 @ 08:42), Max: 36.7 (10-29-22 @ 08:42)  HR: 68 (10-29-22 @ 08:42) (65 - 68)  BP: 108/57 (10-29-22 @ 08:42) (108/57 - 117/74)  RR: 16 (10-29-22 @ 08:42) (16 - 16)  SpO2: 100% (10-29-22 @ 08:42) (96% - 100%)    In NAD  HEENT- EOMI  Heart- no cyanosis   Lungs- no respiratory distress   Abd- + BS, NT  Ext- No calf pain  Neuro- Exam unchanged          Imp: Patient with diagnosis of  s/p ORIF of right femur/THR revision admitted for comprehensive acute rehabilitation.    Plan:  - Continue PT/OT/SLP  - DVT prophylaxis - apixaban   - Skin- Turn q2h, check skin daily  - Continue current medications; patient medically stable.   -Active issues- continue miralax and senna for bowel program   - Patient is stable to continue current rehabilitation program.    Detail Level: Detailed

## 2023-04-13 LAB
APPEARANCE: CLEAR
BACTERIA: NEGATIVE
BILIRUBIN URINE: NEGATIVE
BLOOD URINE: NEGATIVE
COLOR: YELLOW
GLUCOSE QUALITATIVE U: NEGATIVE
HYALINE CASTS: 1 /LPF
KETONES URINE: NEGATIVE
LEUKOCYTE ESTERASE URINE: NEGATIVE
MICROSCOPIC-UA: NORMAL
NITRITE URINE: NEGATIVE
PH URINE: 6
PROTEIN URINE: ABNORMAL
RED BLOOD CELLS URINE: 4 /HPF
SPECIFIC GRAVITY URINE: 1.03
SQUAMOUS EPITHELIAL CELLS: >27 /HPF
UROBILINOGEN URINE: NORMAL
WHITE BLOOD CELLS URINE: 3 /HPF

## 2023-06-12 NOTE — DISCHARGE NOTE NURSING/CASE MANAGEMENT/SOCIAL WORK - NSDCFUADDAPPT_GEN_ALL_CORE_FT
HF TCC Provider Note (Follow-up) Consult Note      HPI:     Patient estimates can walk 2-3 blocks and pace normal before SOB. Denies worsening SOB. Ambulating to clinic today using cane for assistance and pace normal without appreciable SOB              Patient sleeps on 2 number of pillows at BL              Patient wakes up SOB, has to get out of bed, associated cough- denies              Palpitations - occasional heart skipping a beat               Dizzy, light-headed, pre-syncope or syncope- occasional brief dizziness lasting couple seconds in duration. Denies pre-syncope/syncope              Since discharge frequency of performing weights, home weight and weight change- performing home weights. Weight stable at 255lbs on home scale              Other information felt pertinent to HPI: Mr. Vick Gonzalez is a 63 yo male with a PMHx of alcoholic liver disease s/p liver transplant, HCV s/p viral treatment, T2DM on insulin, HTN, gout, hypothyroidism and PAD who presents to second H[facility] visit following recent admission for new HFpEF.    6/20/23: Last visit we decreased lisinopril to 20mg daily. Today he has no acute complaints. Denies worsening SOB. Mild BLE edema. Has not required prn lasix doses since last visit. Has been out of nifedipine for the past month.     PHYSICAL:   Vitals:    06/20/23 1342   BP: (!) 145/76   Pulse: 75      Wt Readings from Last 3 Encounters:   06/20/23 118.5 kg (261 lb 4.8 oz)   05/31/23 116.6 kg (257 lb 1.6 oz)   05/29/23 119.7 kg (264 lb)       JVD: no   Heart rhythm: regular  Cardiac murmur: No    S3: no  S4: no  Lungs: faint crackles in posterior lung bases  Hepatojugular reflux: to clavicle sitting  Edema: 1+ BLE edema in ankles    Lab Results   Component Value Date     05/31/2023    K 4.7 05/31/2023    MG 1.9 05/31/2023     05/31/2023    CO2 22 (L) 05/31/2023    BUN 39 (H) 05/31/2023    CREATININE 2.2 (H) 05/31/2023    GLU 71 05/31/2023    CALCIUM 9.3 05/31/2023    AST 19  05/31/2023    ALT 30 05/31/2023    ALBUMIN 4.1 05/31/2023    ALBUMIN 4.5 07/25/2018    PROT 7.5 05/31/2023    BILITOT 0.4 05/31/2023     Lab Results   Component Value Date    BNP 11 05/31/2023    BNP 46 05/12/2023    BNP <10 11/11/2020       ASSESSMENT: HFpEF    PLAN:      Patient Instructions:   Instruct the patient to notify this clinic if HH, a physician or an advanced care provider wants to change medication one of their HF medications   Activity and Diet restrictions:   Recommend 2-3 gram sodium restriction and 1500cc- 2000cc fluid restriction.  Encourage physical activity with graded exercise program.  Requested patient to weigh themselves daily, and to notify us if their weight increases by more than 3 lbs in 1 day or 5 lbs in 3 days.    Assigned dry weight on home scale: 255lbs  Medication changes (include current dose and changed dose): NYHA Class II symptoms. Minimal fluid volume on exam. 4lb weight gain on clinic scale. Instructions to take dose prn lasix 40mg today. Renal function now returned to baseline. Resume nifedipine 30mg daily for additional BP management.     RD education provided during visit.    Upcoming labs and date anticipated: plan for phone check in next week with tentative phone discharge     Randi Wasbhurn PA-C             2/03/2020 at 9:40 AM  3/17/2020 at 12:15 PM

## 2023-06-23 NOTE — H&P ADULT - SOCIAL HISTORY
No
No
Libtayo Pregnancy And Lactation Text: This medication is contraindicated in pregnancy and when breast feeding.

## 2023-07-03 ENCOUNTER — APPOINTMENT (OUTPATIENT)
Dept: CARDIOLOGY | Facility: CLINIC | Age: 78
End: 2023-07-03
Payer: MEDICARE

## 2023-07-03 VITALS
SYSTOLIC BLOOD PRESSURE: 105 MMHG | OXYGEN SATURATION: 92 % | RESPIRATION RATE: 14 BRPM | DIASTOLIC BLOOD PRESSURE: 65 MMHG | HEIGHT: 66 IN | HEART RATE: 56 BPM

## 2023-07-03 PROCEDURE — 93000 ELECTROCARDIOGRAM COMPLETE: CPT

## 2023-07-03 PROCEDURE — 93306 TTE W/DOPPLER COMPLETE: CPT

## 2023-07-03 PROCEDURE — 99214 OFFICE O/P EST MOD 30 MIN: CPT

## 2023-07-03 NOTE — PHYSICAL EXAM
[Well Developed] : well developed [No Acute Distress] : no acute distress [Well Nourished] : well nourished [Normal Conjunctiva] : normal conjunctiva [Normal Venous Pressure] : normal venous pressure [No Carotid Bruit] : no carotid bruit [No Rub] : no rub [Normal S1, S2] : normal S1, S2 [No Gallop] : no gallop [Murmur] : murmur [Clear Lung Fields] : clear lung fields [Good Air Entry] : good air entry [No Respiratory Distress] : no respiratory distress  [Non Tender] : non-tender [Soft] : abdomen soft [No Masses/organomegaly] : no masses/organomegaly [Normal Bowel Sounds] : normal bowel sounds [Normal Gait] : normal gait [No Edema] : no edema [No Cyanosis] : no cyanosis [No Clubbing] : no clubbing [No Varicosities] : no varicosities [No Rash] : no rash [No Skin Lesions] : no skin lesions [Moves all extremities] : moves all extremities [No Focal Deficits] : no focal deficits [Normal Speech] : normal speech [Alert and Oriented] : alert and oriented [Normal memory] : normal memory [de-identified] : 3/6 systolic

## 2023-07-03 NOTE — DISCUSSION/SUMMARY
[FreeTextEntry1] : The patient is a 77-year-old obese female RA,  lupus, hypothyroidism, HTN, HLD, lower extremity edema ,RBBB, s/p left lobectomy for cancer s/p rt hip replacement complicated by DVT s/p left knee replacement s/p spine surgery s/p hip fracture whose vision has been affected by shingles.\par #1 RA- on MTX and plaquenil, ambulates with walker\par #2 HTN- c/w losartan 50mg\par #3 HLD- c/w atorvastatin \par #4 Hypothyroidism- c/w levothyroxine 150mcg\par #5 CV- pharm stress negative, PVC controlled with metoprolol, mod AS/MR, EF= 60%, echo results pending\par #6 Ortho- s/p right hip replacement and Lt TKR  s/p spine surgery and rt TK, c/w  eliquis bid\par #7 Ophtho- f/u shingles, corneal involvement, on Lyrica for pain\par \par  [EKG obtained to assist in diagnosis and management of assessed problem(s)] : EKG obtained to assist in diagnosis and management of assessed problem(s)

## 2023-07-03 NOTE — HISTORY OF PRESENT ILLNESS
[FreeTextEntry1] : Marely remains frustrated with loss of vision from shiingles. Mild improvement thus far. Walks with walker.

## 2023-07-19 NOTE — DISCHARGE NOTE PROVIDER - REASON FOR ADMISSION
Please follow up PCP and psych. Recommend tylenol 650 mg and ibuprofen 600 mg every 6 hours as needed for pain. Please return for severe chest pain, significant shortness of breath, severely worsening symptoms, or any other concerning signs or symptoms. Please refer to the following documents for additional instructions and return precautions. inability to ambulate

## 2023-10-04 ENCOUNTER — NON-APPOINTMENT (OUTPATIENT)
Age: 78
End: 2023-10-04

## 2023-10-04 ENCOUNTER — APPOINTMENT (OUTPATIENT)
Dept: CARDIOLOGY | Facility: CLINIC | Age: 78
End: 2023-10-04
Payer: MEDICARE

## 2023-10-04 VITALS
HEART RATE: 59 BPM | HEIGHT: 66 IN | DIASTOLIC BLOOD PRESSURE: 78 MMHG | WEIGHT: 216 LBS | BODY MASS INDEX: 34.72 KG/M2 | OXYGEN SATURATION: 100 % | TEMPERATURE: 97.7 F | SYSTOLIC BLOOD PRESSURE: 150 MMHG | RESPIRATION RATE: 12 BRPM

## 2023-10-04 DIAGNOSIS — I10 ESSENTIAL (PRIMARY) HYPERTENSION: ICD-10-CM

## 2023-10-04 DIAGNOSIS — R60.9 EDEMA, UNSPECIFIED: ICD-10-CM

## 2023-10-04 PROCEDURE — 99214 OFFICE O/P EST MOD 30 MIN: CPT

## 2023-10-04 PROCEDURE — 93000 ELECTROCARDIOGRAM COMPLETE: CPT

## 2023-11-13 NOTE — HISTORY OF PRESENT ILLNESS
Per pharmacy, Brilinta would cost the patient $439.46 per month.
[FreeTextEntry1] : Marely is s/p hospitalization for shingles of the eye. She was off her medications and they wanted to increase the eliquis but no reason.

## 2023-12-01 ENCOUNTER — RX RENEWAL (OUTPATIENT)
Age: 78
End: 2023-12-01

## 2024-01-22 ENCOUNTER — RX RENEWAL (OUTPATIENT)
Age: 79
End: 2024-01-22

## 2024-02-15 ENCOUNTER — APPOINTMENT (OUTPATIENT)
Dept: CARDIOLOGY | Facility: CLINIC | Age: 79
End: 2024-02-15
Payer: MEDICARE

## 2024-02-15 ENCOUNTER — NON-APPOINTMENT (OUTPATIENT)
Age: 79
End: 2024-02-15

## 2024-02-15 VITALS
SYSTOLIC BLOOD PRESSURE: 164 MMHG | HEART RATE: 58 BPM | OXYGEN SATURATION: 99 % | HEIGHT: 66 IN | RESPIRATION RATE: 12 BRPM | DIASTOLIC BLOOD PRESSURE: 67 MMHG | TEMPERATURE: 97.3 F

## 2024-02-15 DIAGNOSIS — I49.3 VENTRICULAR PREMATURE DEPOLARIZATION: ICD-10-CM

## 2024-02-15 DIAGNOSIS — E78.5 HYPERLIPIDEMIA, UNSPECIFIED: ICD-10-CM

## 2024-02-15 DIAGNOSIS — I34.0 NONRHEUMATIC MITRAL (VALVE) INSUFFICIENCY: ICD-10-CM

## 2024-02-15 DIAGNOSIS — I44.1 ATRIOVENTRICULAR BLOCK, SECOND DEGREE: ICD-10-CM

## 2024-02-15 PROCEDURE — 93000 ELECTROCARDIOGRAM COMPLETE: CPT

## 2024-02-15 PROCEDURE — 99214 OFFICE O/P EST MOD 30 MIN: CPT

## 2024-02-15 RX ORDER — METOPROLOL SUCCINATE 25 MG/1
25 TABLET, EXTENDED RELEASE ORAL
Qty: 180 | Refills: 1 | Status: ACTIVE | COMMUNITY
Start: 2021-03-08 | End: 1900-01-01

## 2024-02-15 RX ORDER — LOSARTAN POTASSIUM 50 MG/1
50 TABLET, FILM COATED ORAL DAILY
Qty: 90 | Refills: 1 | Status: ACTIVE | COMMUNITY
Start: 2020-10-12 | End: 1900-01-01

## 2024-02-15 RX ORDER — APIXABAN 2.5 MG/1
2.5 TABLET, FILM COATED ORAL
Qty: 180 | Refills: 3 | Status: ACTIVE | COMMUNITY
Start: 2019-08-15 | End: 1900-01-01

## 2024-02-16 PROBLEM — I44.1 2ND DEGREE AV BLOCK: Status: ACTIVE | Noted: 2020-01-15

## 2024-02-16 PROBLEM — I34.0 MITRAL REGURGITATION: Status: ACTIVE | Noted: 2019-06-17

## 2024-02-16 NOTE — PHYSICAL EXAM

## 2024-02-16 NOTE — HISTORY OF PRESENT ILLNESS
[FreeTextEntry1] : Marely is frustrated from her shingles and loss of vision. Thinks she had the vaccines but not sure. Not seeing Dr. Grimes.

## 2024-02-16 NOTE — DISCUSSION/SUMMARY
[FreeTextEntry1] : The patient is a 78-year-old obese female RA,  lupus, hypothyroidism, HTN, HLD, lower extremity edema ,RBBB, s/p left lobectomy for cancer s/p rt hip replacement complicated by DVT s/p left knee replacement s/p spine surgery s/p hip fracture whose vision has been affected by shingles. #1 RA- on MTX and plaquenil, ambulates with walker #2 HTN- c/w losartan 50mg #3 HLD- c/w atorvastatin  #4 Hypothyroidism- c/w levothyroxine 150mcg #5 CV- pharm stress negative, PVC controlled with metoprolol, mod AS/MR, EF= 60% #6 Ortho- s/p right hip replacement and Lt TKR  s/p spine surgery and rt TK, c/w  eliquis bid #7 Ophtho- f/u shingles, corneal involvement, on Lyrica for pain   [EKG obtained to assist in diagnosis and management of assessed problem(s)] : EKG obtained to assist in diagnosis and management of assessed problem(s)

## 2024-05-20 NOTE — HISTORY OF PRESENT ILLNESS
[de-identified] : Ms. KAI THOMAS is a 75 year old female, accompanied by her daughter with hx of Lupus, RA, HTN, HLD, DVT on Eliquis, hypothyroid, OA,  presents for follow up visit\par to discuss abnormal lab results that she had with her cardiologist last week\par Pt saw cardiology and had blood work done on March 8th. She was called and told to f/u with PMD for abnormal thyroid results\par She is s/p  back surgery January 1st. followed by rehab. She was discharged from rehab on March 3rd\par Pt states she has been getting home PT\par Denies SOB, chest pain, abdominal pain, N/V/D, leg swelling, headache, dizziness \par \par \par  Detail Level: Detailed

## 2024-07-24 NOTE — PROVIDER CONTACT NOTE (OTHER) - REASON
Successful dental extractions today .      No other new clinical issues presetn .. We plan mitral valve replacement and possible closure of PFO Thursday.    If you have any questions regarding the care of Elizabeth Horne, please contact me.    Javi Stanley  CV Surgery    Office # [phone]    Please do not use [employer] or Epic messaging to contact me    
hypoglycemia
patient noted with Vomited x 1, 2 BM
Positive covid swab test results ( notified by Brooke RN)

## 2024-08-19 ENCOUNTER — APPOINTMENT (OUTPATIENT)
Dept: CARDIOLOGY | Facility: CLINIC | Age: 79
End: 2024-08-19
Payer: MEDICARE

## 2024-08-19 ENCOUNTER — NON-APPOINTMENT (OUTPATIENT)
Age: 79
End: 2024-08-19

## 2024-08-19 VITALS
DIASTOLIC BLOOD PRESSURE: 70 MMHG | HEART RATE: 59 BPM | WEIGHT: 230 LBS | OXYGEN SATURATION: 98 % | BODY MASS INDEX: 36.96 KG/M2 | TEMPERATURE: 97.3 F | RESPIRATION RATE: 12 BRPM | HEIGHT: 66 IN | SYSTOLIC BLOOD PRESSURE: 121 MMHG

## 2024-08-19 DIAGNOSIS — I10 ESSENTIAL (PRIMARY) HYPERTENSION: ICD-10-CM

## 2024-08-19 DIAGNOSIS — E78.5 HYPERLIPIDEMIA, UNSPECIFIED: ICD-10-CM

## 2024-08-19 DIAGNOSIS — I35.0 NONRHEUMATIC AORTIC (VALVE) STENOSIS: ICD-10-CM

## 2024-08-19 PROCEDURE — 99214 OFFICE O/P EST MOD 30 MIN: CPT

## 2024-08-19 PROCEDURE — G2211 COMPLEX E/M VISIT ADD ON: CPT

## 2024-08-19 PROCEDURE — 93000 ELECTROCARDIOGRAM COMPLETE: CPT

## 2024-08-19 NOTE — HISTORY OF PRESENT ILLNESS
[FreeTextEntry1] : Marely still struggles with poor vision since shingles. If gets to a certain point she will be candidate for corneal transplant. Unable to read which she loves. Ambulates with walker. Helps her remain active.

## 2024-08-19 NOTE — DISCUSSION/SUMMARY
[FreeTextEntry1] : The patient is a 78-year-old obese female RA,  lupus, hypothyroidism, HTN, HLD, lower extremity edema ,RBBB, s/p left lobectomy for cancer s/p rt hip replacement complicated by DVT s/p left knee replacement s/p spine surgery s/p hip fracture whose vision has been affected by shingles. #1 RA- on MTX and plaquenil, ambulates with walker #2 HTN- c/w losartan 50mg #3 HLD- c/w atorvastatin  #4 Hypothyroidism- c/w levothyroxine 150mcg #5 CV- pharm stress negative, PVC controlled with metoprolol, mod AS/MR, EF= 60%, repeat ECHO next visit #6 Ortho- s/p right hip replacement and Lt TKR  s/p spine surgery and rt TK, c/w  eliquis bid #7 Ophtho- f/u shingles, corneal involvement, on Lyrica for pain   [EKG obtained to assist in diagnosis and management of assessed problem(s)] : EKG obtained to assist in diagnosis and management of assessed problem(s)

## 2024-09-13 NOTE — ED ADULT TRIAGE NOTE - GLASGOW COMA SCALE: SCORE, MLM
----- Message from Leatha Constantino sent at 9/13/2024 11:23 AM CDT -----  Contact: estela Deluna is requesting a call back in regards to his appointment on 9/23 being canceled, and needs to be rescheduled. Please give him a call back at [phone]   head injury 15

## 2024-09-16 ENCOUNTER — APPOINTMENT (OUTPATIENT)
Dept: CARDIOLOGY | Facility: CLINIC | Age: 79
End: 2024-09-16
Payer: MEDICARE

## 2024-09-16 VITALS
OXYGEN SATURATION: 96 % | HEART RATE: 76 BPM | TEMPERATURE: 97.3 F | RESPIRATION RATE: 12 BRPM | SYSTOLIC BLOOD PRESSURE: 118 MMHG | WEIGHT: 250 LBS | HEIGHT: 66 IN | DIASTOLIC BLOOD PRESSURE: 65 MMHG | BODY MASS INDEX: 40.18 KG/M2

## 2024-09-16 DIAGNOSIS — R60.9 EDEMA, UNSPECIFIED: ICD-10-CM

## 2024-09-16 DIAGNOSIS — I82.409 ACUTE EMBOLISM AND THROMBOSIS OF UNSPECIFIED DEEP VEINS OF UNSPECIFIED LOWER EXTREMITY: ICD-10-CM

## 2024-09-16 PROCEDURE — G2211 COMPLEX E/M VISIT ADD ON: CPT

## 2024-09-16 PROCEDURE — 99214 OFFICE O/P EST MOD 30 MIN: CPT

## 2024-09-16 NOTE — ASSESSMENT
[FreeTextEntry1] : Assessment: 1.  R Pop DVT - provoked - July 20th 2019 - on Eliquis 5mg BID, now on Eliquis 2.5mg BID  2.  R THP 3.  Lymphedema  Plan 1.  Continue eliquis to 2.5mg BID 2.  Still with immobility, will continue eliquis for now. PT as tolerated 3.  Leg elevation. 4.    Compression garments - try to wear daily  5  Increase pneumatic pump to BID 6.  If all ok then RTC in 1 year

## 2024-09-16 NOTE — HISTORY OF PRESENT ILLNESS
[FreeTextEntry1] : 9/16/2024 remains on eliquis 2.5mg BID  She has L leg swelling She had bilateral knee replacement and both hips mobility is not that good had shingles R eye.    She is using her pump, but not all the time    11/5/2021   Venous duplex June 2021:  [facility] radiology :  No DVT Followed by urology for UTis Having trouble getting compression garment - surgical supply store - some clerical issues.   She has lost a lot of weight  ***She has on the L shin sharp, lanny prominence.    Needs to see her orthopedist for this.   She is using her pneuamtic pump Remains on eliquis 2.5mg BID Mobility is "on and off"

## 2024-09-16 NOTE — REASON FOR VISIT
[Follow-Up - Clinic] : a clinic follow-up of [FreeTextEntry2] : DVT / LE edema [FreeTextEntry1] : 78 F w/ obesity, provoked DVT in the setting of orthopedic surgery present for follow-up.

## 2024-10-17 ENCOUNTER — RX RENEWAL (OUTPATIENT)
Age: 79
End: 2024-10-17

## 2024-10-28 ENCOUNTER — NON-APPOINTMENT (OUTPATIENT)
Age: 79
End: 2024-10-28

## 2024-10-28 ENCOUNTER — APPOINTMENT (OUTPATIENT)
Dept: CARDIOLOGY | Facility: CLINIC | Age: 79
End: 2024-10-28
Payer: MEDICARE

## 2024-10-28 VITALS
WEIGHT: 250 LBS | HEIGHT: 66 IN | OXYGEN SATURATION: 95 % | BODY MASS INDEX: 40.18 KG/M2 | SYSTOLIC BLOOD PRESSURE: 111 MMHG | TEMPERATURE: 97.5 F | HEART RATE: 76 BPM | DIASTOLIC BLOOD PRESSURE: 72 MMHG | RESPIRATION RATE: 12 BRPM

## 2024-10-28 DIAGNOSIS — I10 ESSENTIAL (PRIMARY) HYPERTENSION: ICD-10-CM

## 2024-10-28 DIAGNOSIS — I35.0 NONRHEUMATIC AORTIC (VALVE) STENOSIS: ICD-10-CM

## 2024-10-28 PROCEDURE — 99214 OFFICE O/P EST MOD 30 MIN: CPT

## 2024-10-28 PROCEDURE — G2211 COMPLEX E/M VISIT ADD ON: CPT

## 2024-10-28 PROCEDURE — 93000 ELECTROCARDIOGRAM COMPLETE: CPT

## 2025-01-07 NOTE — PHYSICAL THERAPY INITIAL EVALUATION ADULT - RISK REDUCTION/PREVENTION, PT EVAL
[Use of Plain Language] : use of plain language [Adequate] : adequate [None] : none [] : I have reviewed management goals with caretaker and provided a copy of care plan risk factors

## 2025-01-14 ENCOUNTER — RX RENEWAL (OUTPATIENT)
Age: 80
End: 2025-01-14

## 2025-02-04 NOTE — ED ADULT NURSE NOTE - [facility] ED NUR SYMPTOMS NEG
Consent: The patient's consent was obtained including but not limited to risks of crusting, scabbing, blistering, scarring, darker or lighter pigmentary change, recurrence, incomplete removal and infection. Show Aperture Variable?: Yes Detail Level: Simple Duration Of Freeze Thaw-Cycle (Seconds): 0 Post-Care Instructions: CARE INSTRUCTIONS\\nCryotherapy\\n\\nThis information is also available at [email or web address] \\n\\nCryotherapy, using liquid nitrogen, creates a superficial chemical burn. This is used to destroy multiple types of benign and even precancerous lesions on the skin.\\nWhat To Expect\\n    • Anticipate redness, with itching or burning pain initially. Tylenol (if you are able to take Tylenol) or cold compresses can help with any significant discomfort. \\n    • Blisters may occur at the treated area(s) over the next few days. The blister may be clear or bloody and may begin to weep or drain. \\n    • After lesion heals, skin may be slightly darker or lighter at treatment site; this typically fades within a few weeks.\\nCare of Treated Area(s)\\n    • Wash gently each day, but do not scrub. \\n    • Apply Vaseline frequently to minimize irritation and reduce scab formation. \\n    • If the blister is tense and uncomfortable, you may clean with alcohol and puncture with a sterile (cleansed in alcohol) needle. Wear gloves, or wash hands immediately before and after this procedure. \\n    • If open or draining, you may apply Polysporin antibiotic ointment or Vaseline and a bandage, if needed. \\n    • Do not pick at or pull off scab. Allow to completely heal.\\nReturn to Clinic\\n    • If lesion is persistent 3 to 4 weeks after treatment, please return to clinic for additional treatment.\\nNOTE: Cryotherapy may not fully destroy every lesion, every time.  Some lesions may require multiple treatments (which could lead to multiple charges).  Others may require a biopsy if destruction is not effective. Render Note In Bullet Format When Appropriate: No Number Of Freeze-Thaw Cycles: 1 freeze-thaw cycle no motor function loss

## 2025-02-14 ENCOUNTER — RX RENEWAL (OUTPATIENT)
Age: 80
End: 2025-02-14

## 2025-02-19 ENCOUNTER — NON-APPOINTMENT (OUTPATIENT)
Age: 80
End: 2025-02-19

## 2025-02-19 ENCOUNTER — APPOINTMENT (OUTPATIENT)
Dept: CARDIOLOGY | Facility: CLINIC | Age: 80
End: 2025-02-19
Payer: MEDICARE

## 2025-02-19 VITALS
HEART RATE: 64 BPM | HEIGHT: 66 IN | WEIGHT: 280 LBS | RESPIRATION RATE: 19 BRPM | OXYGEN SATURATION: 98 % | BODY MASS INDEX: 45 KG/M2 | SYSTOLIC BLOOD PRESSURE: 142 MMHG | DIASTOLIC BLOOD PRESSURE: 72 MMHG

## 2025-02-19 DIAGNOSIS — E78.5 HYPERLIPIDEMIA, UNSPECIFIED: ICD-10-CM

## 2025-02-19 DIAGNOSIS — I35.0 NONRHEUMATIC AORTIC (VALVE) STENOSIS: ICD-10-CM

## 2025-02-19 DIAGNOSIS — I44.1 ATRIOVENTRICULAR BLOCK, SECOND DEGREE: ICD-10-CM

## 2025-02-19 DIAGNOSIS — I10 ESSENTIAL (PRIMARY) HYPERTENSION: ICD-10-CM

## 2025-02-19 LAB
25(OH)D3 SERPL-MCNC: 64.1 NG/ML
ALBUMIN SERPL ELPH-MCNC: 3.7 G/DL
ALP BLD-CCNC: 111 U/L
ALT SERPL-CCNC: 16 U/L
ANION GAP SERPL CALC-SCNC: 12 MMOL/L
AST SERPL-CCNC: 23 U/L
BILIRUB SERPL-MCNC: 0.4 MG/DL
BUN SERPL-MCNC: 20 MG/DL
CALCIUM SERPL-MCNC: 9.8 MG/DL
CHLORIDE SERPL-SCNC: 105 MMOL/L
CHOLEST SERPL-MCNC: 149 MG/DL
CO2 SERPL-SCNC: 27 MMOL/L
CREAT SERPL-MCNC: 0.76 MG/DL
EGFR: 79 ML/MIN/1.73M2
GLUCOSE SERPL-MCNC: 102 MG/DL
HCT VFR BLD CALC: 42.8 %
HDLC SERPL-MCNC: 60 MG/DL
HGB BLD-MCNC: 13.8 G/DL
LDLC SERPL CALC-MCNC: 70 MG/DL
MCHC RBC-ENTMCNC: 32.2 G/DL
MCHC RBC-ENTMCNC: 32.4 PG
MCV RBC AUTO: 100.5 FL
NONHDLC SERPL-MCNC: 89 MG/DL
PLATELET # BLD AUTO: 214 K/UL
POTASSIUM SERPL-SCNC: 5 MMOL/L
PROT SERPL-MCNC: 6.6 G/DL
RBC # BLD: 4.26 M/UL
RBC # FLD: 13.9 %
SODIUM SERPL-SCNC: 144 MMOL/L
TRIGL SERPL-MCNC: 106 MG/DL
WBC # FLD AUTO: 8.31 K/UL

## 2025-02-19 PROCEDURE — 93000 ELECTROCARDIOGRAM COMPLETE: CPT

## 2025-02-19 PROCEDURE — 93306 TTE W/DOPPLER COMPLETE: CPT

## 2025-02-19 PROCEDURE — 99214 OFFICE O/P EST MOD 30 MIN: CPT

## 2025-02-19 PROCEDURE — G2211 COMPLEX E/M VISIT ADD ON: CPT

## 2025-02-20 LAB
ESTIMATED AVERAGE GLUCOSE: 111 MG/DL
HBA1C MFR BLD HPLC: 5.5 %

## 2025-03-21 NOTE — PATIENT PROFILE ADULT - NSPROGENBLOODRESTRICT_GEN_A_NUR
March 21, 2025      Vandana Moreno  [id number]  [facility] [address]        To Whom It May Concern,     Please excuse Vandana Moreno from school on the following days: 3/19/2025-3/21/2025.      Sincerely,        Elsa Segovia MD    Electronically signed      
none

## 2025-04-16 ENCOUNTER — APPOINTMENT (OUTPATIENT)
Dept: CARDIOLOGY | Facility: CLINIC | Age: 80
End: 2025-04-16
Payer: MEDICARE

## 2025-04-16 ENCOUNTER — NON-APPOINTMENT (OUTPATIENT)
Age: 80
End: 2025-04-16

## 2025-04-16 VITALS
HEART RATE: 60 BPM | RESPIRATION RATE: 22 BRPM | OXYGEN SATURATION: 97 % | DIASTOLIC BLOOD PRESSURE: 57 MMHG | HEIGHT: 66 IN | SYSTOLIC BLOOD PRESSURE: 108 MMHG

## 2025-04-16 DIAGNOSIS — I35.0 NONRHEUMATIC AORTIC (VALVE) STENOSIS: ICD-10-CM

## 2025-04-16 DIAGNOSIS — I10 ESSENTIAL (PRIMARY) HYPERTENSION: ICD-10-CM

## 2025-04-16 PROCEDURE — 93000 ELECTROCARDIOGRAM COMPLETE: CPT

## 2025-04-16 PROCEDURE — G2211 COMPLEX E/M VISIT ADD ON: CPT

## 2025-04-16 PROCEDURE — 99214 OFFICE O/P EST MOD 30 MIN: CPT

## 2025-06-18 ENCOUNTER — APPOINTMENT (OUTPATIENT)
Dept: CARDIOLOGY | Facility: CLINIC | Age: 80
End: 2025-06-18
Payer: MEDICARE

## 2025-06-18 ENCOUNTER — NON-APPOINTMENT (OUTPATIENT)
Age: 80
End: 2025-06-18

## 2025-06-18 VITALS
HEART RATE: 58 BPM | BODY MASS INDEX: 40.18 KG/M2 | WEIGHT: 250 LBS | RESPIRATION RATE: 24 BRPM | DIASTOLIC BLOOD PRESSURE: 67 MMHG | OXYGEN SATURATION: 97 % | HEIGHT: 66 IN | SYSTOLIC BLOOD PRESSURE: 136 MMHG

## 2025-06-18 PROCEDURE — 93000 ELECTROCARDIOGRAM COMPLETE: CPT

## 2025-06-18 PROCEDURE — G2211 COMPLEX E/M VISIT ADD ON: CPT

## 2025-06-18 PROCEDURE — 99214 OFFICE O/P EST MOD 30 MIN: CPT

## 2025-07-14 ENCOUNTER — RX RENEWAL (OUTPATIENT)
Age: 80
End: 2025-07-14

## 2025-09-03 ENCOUNTER — APPOINTMENT (OUTPATIENT)
Dept: CARDIOLOGY | Facility: CLINIC | Age: 80
End: 2025-09-03
Payer: MEDICARE

## 2025-09-03 ENCOUNTER — NON-APPOINTMENT (OUTPATIENT)
Age: 80
End: 2025-09-03

## 2025-09-03 VITALS
DIASTOLIC BLOOD PRESSURE: 65 MMHG | RESPIRATION RATE: 24 BRPM | SYSTOLIC BLOOD PRESSURE: 108 MMHG | WEIGHT: 265 LBS | OXYGEN SATURATION: 95 % | HEIGHT: 66 IN | BODY MASS INDEX: 42.59 KG/M2 | HEART RATE: 76 BPM

## 2025-09-03 DIAGNOSIS — E78.5 HYPERLIPIDEMIA, UNSPECIFIED: ICD-10-CM

## 2025-09-03 DIAGNOSIS — I10 ESSENTIAL (PRIMARY) HYPERTENSION: ICD-10-CM

## 2025-09-03 DIAGNOSIS — I35.0 NONRHEUMATIC AORTIC (VALVE) STENOSIS: ICD-10-CM

## 2025-09-03 LAB
25(OH)D3 SERPL-MCNC: 57.2 NG/ML
ALBUMIN SERPL ELPH-MCNC: 3.9 G/DL
ALP BLD-CCNC: 102 U/L
ALT SERPL-CCNC: 12 U/L
ANION GAP SERPL CALC-SCNC: 13 MMOL/L
AST SERPL-CCNC: 21 U/L
BILIRUB SERPL-MCNC: 0.6 MG/DL
BUN SERPL-MCNC: 32 MG/DL
CALCIUM SERPL-MCNC: 9.2 MG/DL
CHLORIDE SERPL-SCNC: 105 MMOL/L
CHOLEST SERPL-MCNC: 145 MG/DL
CO2 SERPL-SCNC: 24 MMOL/L
CREAT SERPL-MCNC: 0.83 MG/DL
EGFRCR SERPLBLD CKD-EPI 2021: 71 ML/MIN/1.73M2
ESTIMATED AVERAGE GLUCOSE: 108 MG/DL
GLUCOSE SERPL-MCNC: 98 MG/DL
HBA1C MFR BLD HPLC: 5.4 %
HCT VFR BLD CALC: 41.4 %
HDLC SERPL-MCNC: 60 MG/DL
HGB BLD-MCNC: 13.6 G/DL
LDLC SERPL-MCNC: 69 MG/DL
MCHC RBC-ENTMCNC: 32.9 G/DL
MCHC RBC-ENTMCNC: 34.3 PG
MCV RBC AUTO: 104.3 FL
NONHDLC SERPL-MCNC: 84 MG/DL
PLATELET # BLD AUTO: 237 K/UL
POTASSIUM SERPL-SCNC: 4.3 MMOL/L
PROT SERPL-MCNC: 7 G/DL
RBC # BLD: 3.97 M/UL
RBC # FLD: 14.3 %
SODIUM SERPL-SCNC: 142 MMOL/L
TRIGL SERPL-MCNC: 79 MG/DL
TSH SERPL-ACNC: 0.66 UIU/ML
VIT B12 SERPL-MCNC: 535 PG/ML
WBC # FLD AUTO: 7.08 K/UL

## 2025-09-03 PROCEDURE — G2211 COMPLEX E/M VISIT ADD ON: CPT

## 2025-09-03 PROCEDURE — 93000 ELECTROCARDIOGRAM COMPLETE: CPT

## 2025-09-03 PROCEDURE — 99214 OFFICE O/P EST MOD 30 MIN: CPT

## (undated) DEVICE — DRSG COMBINE 5X9"

## (undated) DEVICE — SUT DERMABOND PRINEO 60CM

## (undated) DEVICE — SOL IRR POUR NS 0.9% 500ML

## (undated) DEVICE — CONTAINER SPECIMEN PET

## (undated) DEVICE — HOOD FLYTE STRYKER HELMET SHIELD

## (undated) DEVICE — SYR LUER LOK 50CC

## (undated) DEVICE — SOL IRR BAG NS 0.9% 3000ML

## (undated) DEVICE — DRAPE ISOLATION W IOBAN & POUCH

## (undated) DEVICE — SYM-STRYKER SYSTEM 7: Type: DURABLE MEDICAL EQUIPMENT

## (undated) DEVICE — SUT STRATAFIX SPIRAL MONOCRYL PLUS 4-0 14CM PS-2 UNDYED

## (undated) DEVICE — WRAP COMPRESSION CALF MED

## (undated) DEVICE — CUFF TOURNIQUET 34" DUAL PORT W PLC

## (undated) DEVICE — SEALER BIPOLAR 6.0 AQUAMANTYS

## (undated) DEVICE — BLANKET WARMER UPPER ADULT

## (undated) DEVICE — SUT VICRYL PLUS 2-0 27" CP-1 UNDYED

## (undated) DEVICE — SOL IRR POUR H2O 1500ML

## (undated) DEVICE — SUT MONOCRYL 3-0 18" PS-1

## (undated) DEVICE — NDL SPINAL 18G X 3.5"

## (undated) DEVICE — SUT VICRYL PLUS 1 27" CP UNDYED

## (undated) DEVICE — GOWN XL W TOWEL

## (undated) DEVICE — DRSG STERISTRIPS 0.5X4"

## (undated) DEVICE — KIT OPTIVAC CEMENT MIXER 40GM

## (undated) DEVICE — POSITIONER POSITIONING ROLL  5X17"

## (undated) DEVICE — SYR LUER LOK 20CC

## (undated) DEVICE — DRAPE IOBAN 10X5"

## (undated) DEVICE — DRAPE C ARM 41X140"

## (undated) DEVICE — SAW BLADE STRYKER SAGITTAL DUAL CUT 25X90X1.27MM

## (undated) DEVICE — DRSG PICO NPWT 4X12"

## (undated) DEVICE — DRSG PICO NPWT 4X16"

## (undated) DEVICE — ELCTR ROCKER SWITCH PENCIL TELESCOPING 10FT

## (undated) DEVICE — SUT MONOSOF 3-0 30" C-16

## (undated) DEVICE — DRAPE TOP SHEET 53"X101"

## (undated) DEVICE — DRSG XEROFORM 1"

## (undated) DEVICE — SOL BETADINE POUCH 0.75OZ STERILE

## (undated) DEVICE — DRAPE MAYO STAND 30"

## (undated) DEVICE — DRSG 4X4

## (undated) DEVICE — PACK TOTAL HIP CUST

## (undated) DEVICE — SOLIDIFIER CANN EXPRESS 3K

## (undated) DEVICE — IRRISEPT JET LAVAGE W 0.05 PCT CHG

## (undated) DEVICE — DRAPE SHEET XL 77X98"

## (undated) DEVICE — PACK TOTAL KNEE

## (undated) DEVICE — ELCTR PENCIL NEPTUNE SMOKE EVACUATION